# Patient Record
Sex: MALE | Race: WHITE | NOT HISPANIC OR LATINO | Employment: FULL TIME | URBAN - METROPOLITAN AREA
[De-identification: names, ages, dates, MRNs, and addresses within clinical notes are randomized per-mention and may not be internally consistent; named-entity substitution may affect disease eponyms.]

---

## 2020-02-24 ENCOUNTER — TRANSFERRED RECORDS (OUTPATIENT)
Dept: HEALTH INFORMATION MANAGEMENT | Facility: CLINIC | Age: 35
End: 2020-02-24

## 2020-04-13 ENCOUNTER — TRANSFERRED RECORDS (OUTPATIENT)
Dept: HEALTH INFORMATION MANAGEMENT | Facility: CLINIC | Age: 35
End: 2020-04-13

## 2020-04-16 ENCOUNTER — REFERRAL (OUTPATIENT)
Dept: TRANSPLANT | Facility: CLINIC | Age: 35
End: 2020-04-16

## 2020-04-16 DIAGNOSIS — Z87.891 HISTORY OF TOBACCO USE: ICD-10-CM

## 2020-04-16 DIAGNOSIS — Z01.818 PRE-TRANSPLANT EVALUATION FOR KIDNEY TRANSPLANT: ICD-10-CM

## 2020-04-16 DIAGNOSIS — Z76.82 ORGAN TRANSPLANT CANDIDATE: ICD-10-CM

## 2020-04-16 DIAGNOSIS — I42.9 CARDIOMYOPATHY (H): ICD-10-CM

## 2020-04-16 DIAGNOSIS — Q60.0 SOLITARY KIDNEY, CONGENITAL: ICD-10-CM

## 2020-04-16 DIAGNOSIS — N18.6 END STAGE RENAL DISEASE (H): ICD-10-CM

## 2020-04-16 DIAGNOSIS — I25.10 CARDIOVASCULAR DISEASE: ICD-10-CM

## 2020-04-16 DIAGNOSIS — I10 ESSENTIAL HYPERTENSION: ICD-10-CM

## 2020-04-16 DIAGNOSIS — Z99.2 ESRD (END STAGE RENAL DISEASE) ON DIALYSIS (H): ICD-10-CM

## 2020-04-16 DIAGNOSIS — N18.6 ESRD (END STAGE RENAL DISEASE) (H): Primary | ICD-10-CM

## 2020-04-16 DIAGNOSIS — N18.6 ESRD (END STAGE RENAL DISEASE) ON DIALYSIS (H): ICD-10-CM

## 2020-04-16 DIAGNOSIS — I10 HYPERTENSION: ICD-10-CM

## 2020-04-16 NOTE — LETTER
April 28, 2020      Chip Fowler  [address]  [address]  [facility] [address]    Dear Chip,    Thank you for your interest in the Transplant Center at [facility], [facility]. We look forward to being a part of your care team and assisting you through the transplant process.    As we discussed, your transplant coordinator is Naima Johns (Kidney).  You may call your coordinator at any time with questions or concerns.  Your first scheduled call will be on May 5, 2020.  If this needs to change, call [phone].    Please complete the following.    1. Fill out and return the enclosed forms    Authorization for Electronic Communication    Authorization to Discuss Protected Health Information    Authorization for Release of Protected Health Information    2. Sign up for:    [employer], access to your electronic medical record (see enclosed pamphlet)    [email or web address], a transplant education website    You can use these tools to learn more about your transplant, communicate with your care team, and track your medical details      Sincerely,    Solid Organ Transplant  [facility], [facility]    cc: Referring Physician

## 2020-04-17 VITALS — WEIGHT: 154 LBS | BODY MASS INDEX: 20.86 KG/M2 | HEIGHT: 72 IN

## 2020-04-17 PROBLEM — Q60.0 UNILATERAL CONGENITAL ABSENCE OF KIDNEY: Status: ACTIVE | Noted: 2020-02-24

## 2020-04-17 SDOH — HEALTH STABILITY: MENTAL HEALTH: HOW OFTEN DO YOU HAVE A DRINK CONTAINING ALCOHOL?: NOT ASKED

## 2020-04-17 ASSESSMENT — MIFFLIN-ST. JEOR: SCORE: 1666.54

## 2020-04-17 NOTE — TELEPHONE ENCOUNTER
PCP: no PCP   Referring Provider: Les Jaime  Referring Diagnosis: ESRD  Patient was born with only 1 kidney  Hx htn    Is patient under the age of 65? y  Is patient diabetic? n  Is patient on insulin? n  Was patient offered a pancreas transplant referral? n    Is patient in a group home/assisted living? n  Does patient have a guardian? n    Referral intake process completed.  Patient is aware that after financial approval is received, medical records will be requested.   Patient confirmed for a callback from transplant coordinator on May 5, 2020. (within 2 weeks)  Tentative evaluation date June 18, 2020. (within 4 weeks)    Confirmed coordinator will discuss evaluation process in more detail at the time of their call.   Patient is aware of the need to arrange age appropriate cancer screening, vaccinations, and dental care.  Reminded patient to complete questionnaire, complete medical records release, and review packet prior to evaluation visit .  Assessed patient for special needs (ie--wheelchair, assistance, guardian, and ):  no   Patient instructed to call [phone] with questions.

## 2020-04-30 ENCOUNTER — DOCUMENTATION ONLY (OUTPATIENT)
Dept: TRANSPLANT | Facility: CLINIC | Age: 35
End: 2020-04-30

## 2020-05-04 ENCOUNTER — DOCUMENTATION ONLY (OUTPATIENT)
Dept: TRANSPLANT | Facility: CLINIC | Age: 35
End: 2020-05-04

## 2020-05-04 NOTE — TELEPHONE ENCOUNTER
"Contacted patient and introduced myself as their Transplant Coordinator, also introduced the role of the Transplant Coordinator in the transplant process.  Explained the purpose of this call including reviewing next steps and answering questions.    Confirmed Referring Provider, Dialysis Center, and Primary Care Physician. Notified patient of the importance of continued communication with referring providers and primary care physicians.    Reviewed components of transplant evaluation process including necessary appointments, tests, and procedures.    Answered questions for patient regarding evaluation, provided my name and contact information and requested they call with any additional questions.    Determined that patient would like additional information regarding transplant by:     Drop Down choices: Mail, Email, MyChart, Phone Call   Encourage MyChart   Notified patients that they will hear from a Transplant  to schedule evaluation.       Reviewed medical records and interviewed the patient. ESRD on HD since 2/29/2020. He has a congenital solitary kidney. History of multiple bouts of kidney, bladder, urethral stones from 2014. Stones were calcium oxalate and calcium phosphate. He has had several cystoscopy and holmium laser and urethral dilations. He developed cardiomyopathy with his renal failure with an EF of 15%. There has been discussion of an AICD and a cardiac MRI. He will have a follow up cardiology visit on 6/4/2020 at [facility]. When I called him he had just walked into the dialysis unit. He was febrile to 100 and had chills. He was immediately isolated. He does not recall an exposure to COVID. Has never received blood. Smokes, no etoh and former history of meth. He has been sober for \"months\" and has never been through any treatment. There is discussion his renal failure and cardiac decompensation could be related to the illegal drug use. BMI 22.8. He lives with his girlfriend and she " will be able to assist him. He is independent in his ADL's. There are no living donors and he is quite resistant to the idea of a living donor despite being told the average wait time for a  donor is 5 years. He is due for dental. Records acceptable to proceed with pre kidney evaluation.     We talked about the virtual visits due to the pandemic and he is willing to start the process. We talked about the online MTP videos he will watch and then I will call him to discuss the day before his virtual visits. Reviewed the list of providers he will see and their roles. Reviewed the overall goals of an evaluation and the approval process. He is aware his next contact will be from scheduling. Provided my contact information and encouraged him to call with any questions.    Smart set orders placed in [place] and routed to scheduling.

## 2020-05-07 NOTE — TELEPHONE ENCOUNTER
Patient called back confirmed for Thurs June 18 for kidney eval, he doesn't have virtual capability.  I told him that I would be calling him back about 10 days before hand to verity live or virtual

## 2020-05-13 ENCOUNTER — DOCUMENTATION ONLY (OUTPATIENT)
Dept: TRANSPLANT | Facility: CLINIC | Age: 35
End: 2020-05-13

## 2020-06-17 ENCOUNTER — ALLIED HEALTH/NURSE VISIT (OUTPATIENT)
Dept: TRANSPLANT | Facility: CLINIC | Age: 35
End: 2020-06-17

## 2020-06-17 DIAGNOSIS — Z76.82 AWAITING ORGAN TRANSPLANT: Primary | ICD-10-CM

## 2020-06-17 NOTE — PROGRESS NOTES
"Kidney Transplant Referral - 4/16/2020  Chip Fowler watched the pre-transplant patient education videos at home today due to the pandemic.    Content reviewed:    Living Donation and how to access that program    Paired exchange    Kidney Donor Profile Index (KDPI)    Waiting list issues (right to decline without penalty, high PHS risk donors, what to expect when called with an offer)    Hospital experience,  length of stay , need to stay locally post-discharge (2-4 weeks)    Surgical options (with pictures)                             Post-surgery lifting and driving restrictions    Post-transplant routines, frequency of lab work and clinic visits    Need to stay locally post-discharge (2-4 weeks)    Role of Transplant Coordinator    Participants were informed of the benefits of transplant as well as potential risks such as infection, cancer, and death.  The need for total adherence with immunosuppression medications and following transplant regimens was stressed.  The overall evaluation/approval/listing process was reviewed.        The patient was provided with the following documents:  What You Need to Know About a Kidney Transplant  Adult Kidney Transplant - A Guide for Patients  SRTR Data Sheet - Kidney  Brochure - Kidney Allocation  Brochure - Multiple Listing and Waiting Time Transfer  What Every Patient Needs to Know (UNOS)  UNOS Facts and Figures  Finding a Donor  My Transplant Place - Quick Start Guide  KDPI Consent  Receipt of Information form    Chip Fowler signed the  Receipt of Information for Organ Transplant Recipient.\" He was provided Naima Johns's business card and instructed to call with additional questions.          "

## 2020-06-17 NOTE — PROGRESS NOTES
Assessment and Plan:  # Kidney Transplant Evaluation: Patient is a good candidate overall. Benefits of a living donor transplant were discussed.    # ESKD: multifactorial in setting of congenital solitary kidney, urologic history, and substance abuse. Doing OK on dialysis since February 2020, but may benefit from a kidney transplant.     # Urethral Strictures/Stones: 2/2 retained metal foreign bodies (see HPI for detail). Will refer back to urology as last seen several years ago with no interim follow up.    # Polysubstance Abuse: patient reports last methamphetamine use approximately 6 months ago. Appreciate social work input.    # Cardiac Risk: followed by local cardiology for new cardiomyopathy with EF of 15% discovered during February 2020 admission. EF now improved to 45% as of June 2020 but still with diffuse hypokinesis. He has not yet had further stress testing, angiogram, MRI, etc for work up of cardiomyopathy. He will need risk assessment.     # Abnormal Renal Imaging: renal US from February 2020 showed a 1.7 cm hypoechoic area at the superior pole of the left kidney that either represents a lesion vs normal pyramid. Will likely need further imaging, based on radiologic recommendations, and will have urology comment.    # Health Maintenance: Dental: should be done if not updated in the past 6-12 months.     Discussed the risks and benefits of a transplant, including the risk of surgery and immunosuppression medications.  Patient's overall evaluation will be discussed in the Transplant Program's regular meeting with a final recommendation on the patients suitability for transplant to be made at that time.  Patient was seen in conjunction with Dr. Leeroy Rahman as part of a shared visit.    Evaluation:  Chip Fowler was seen in consultation at the request of Dr. Sathya Sinha for evaluation as a potential kidney transplant recipient.    Reason for Visit:  Chip Fowler is a 35-year-old male with ESKD who  presents for kidney transplant evaluation.    History of Present Illness:         Kidney Disease Hx: Congenital left solitary kidney. 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 Was lost to follow up and then admitted February 2020 with worsening kidney function requiring initiation of dialysis and cardiomyopathy with EF 15%. Had used methamphetamine 10 days PTA. Creatinine had been normal in 2016. He underwent a native kidney biopsy, but the tissue sample was insufficient. Serologic work up was negative.        Kidney Disease Dx: multifactorial in setting of congenital solitary kidney, urologic history, and substance abuse       Biopsy Proven: No         On Dialysis: Yes, Date initiated: February 2020 and Dialysis Type: Incenter HD, but recently had PD catheter placed       Primary Nephrologist: Dr. Jaime         Diabetic Hx: None           Cardiac/Vascular Disease Risk Factors:        See above. Now followed by cardiology as outpatient with medical management, no stress or angio/mri yet. 6/2/2020 ECHO EF 45%, diffuse hypokinesis, no significant valvular abnormalities.          Functional Capacity/Frailty:        No significant limitations currently. No chest pain, SOB, or claudication symptoms.       Fatigue/Decreased Energy: [] No [] Yes    Chest Pain or SOB with Exertion: [] No [] Yes    Significant Weight Change: [] No [] Yes    Nausea, Vomiting or  Diarrhea: [] No [] Yes    Fever, Sweats or Chills:  [] No [] Yes    Leg Swelling [] No [] Yes        History of Cancer: None    Other Significant Medical Issues: None    Review of Systems:  A comprehensive review of systems was obtained and negative, except as noted in the HPI or PMH.    Past Medical History:   Medical record was reviewed and PMH was discussed with patient and noted below.  Past Medical History:   Diagnosis Date     Bladder stones      Cardiomyopathy (H)      ESRD (end stage renal disease) on dialysis (H)      Hypertension      Migraines      Polysubstance abuse (H)      Solitary kidney, congenital      Urethral stone      Urethral stricture        Past Social History:   Past Surgical History:   Procedure Laterality Date     BIOPSY  02/2020    renal, [place]     COMBINED CYSTOSCOPY, LASER HOLMIUM LITHOTRIPSY URETER(S)       CYSTOSCOPY       HERNIA REPAIR      infant     INSERT CATHETER PERITONEAL DIALYSIS       urethral dilation       Personal history of bleeding or anesthesia problems: No    Family History:  Family History   Problem Relation Age of Onset     Kidney Disease No family hx of        Personal History:   Social History     Socioeconomic History     Marital status: Single     Spouse name: Not on file     Number of children: Not on file     Years of education: Not on file     Highest education level: Not on file   Occupational History     Not on file   Social Needs     Financial resource strain: Not on file     Food insecurity     Worry: Not on file     Inability: Not on file     Transportation needs     Medical: Not on file     Non-medical: Not on file   Tobacco Use     Smoking status: Current Every Day Smoker     Packs/day: 0.40     Types: Cigarettes     Start date: 2002     Smokeless tobacco: Never Used     Tobacco comment: 4-8 cigs /day   Substance and Sexual Activity     Alcohol use: Not Currently     Comment: quit alcohol 3-4 yrs ago     Drug use: Not Currently     Types:  Methamphetamines     Sexual activity: Not on file   Lifestyle     Physical activity     Days per week: Not on file     Minutes per session: Not on file     Stress: Not on file   Relationships     Social connections     Talks on phone: Not on file     Gets together: Not on file     Attends Judaism service: Not on file     Active member of club or organization: Not on file     Attends meetings of clubs or organizations: Not on file     Relationship status: Not on file     Intimate partner violence     Fear of current or ex partner: Not on file     Emotionally abused: Not on file     Physically abused: Not on file     Forced sexual activity: Not on file   Other Topics Concern     Parent/sibling w/ CABG, MI or angioplasty before 65F 55M? Not Asked   Social History Narrative     Not on file       Allergies:  No Known Allergies    Medications:  Current Outpatient Medications   Medication Sig     carvedilol (COREG) 25 MG tablet Take 25 mg by mouth     furosemide (LASIX) 40 MG tablet Lasix 40 mg daily on non dialysis days     lisinopril (ZESTRIL) 2.5 MG tablet Take 2.5 mg by mouth     No current facility-administered medications for this visit.      Exam:  GENERAL: Healthy, alert and no distress  EYES: Eyes grossly normal to inspection.  No discharge or erythema, or obvious scleral/conjunctival abnormalities.  RESP: No audible wheeze, cough, or visible cyanosis.  No visible retractions or increased work of breathing.    SKIN: Visible skin clear. No significant rash, abnormal pigmentation or lesions.  NEURO: Cranial nerves grossly intact.  Mentation and speech appropriate for age.  PSYCH: Mentation appears normal, affect normal/bright, judgement and insight intact, normal speech and appearance well-groomed.

## 2020-06-18 ENCOUNTER — APPOINTMENT (OUTPATIENT)
Dept: TRANSPLANT | Facility: CLINIC | Age: 35
End: 2020-06-18
Attending: PHYSICIAN ASSISTANT
Payer: COMMERCIAL

## 2020-06-18 ENCOUNTER — DOCUMENTATION ONLY (OUTPATIENT)
Dept: TRANSPLANT | Facility: CLINIC | Age: 35
End: 2020-06-18

## 2020-06-18 VITALS — WEIGHT: 154 LBS | HEIGHT: 72 IN | BODY MASS INDEX: 20.86 KG/M2

## 2020-06-18 DIAGNOSIS — N21.1 URETHRAL STONE: ICD-10-CM

## 2020-06-18 DIAGNOSIS — Z76.82 AWAITING ORGAN TRANSPLANT: ICD-10-CM

## 2020-06-18 DIAGNOSIS — F19.10 POLYSUBSTANCE ABUSE (H): ICD-10-CM

## 2020-06-18 DIAGNOSIS — Z76.82 ORGAN TRANSPLANT CANDIDATE: Primary | ICD-10-CM

## 2020-06-18 DIAGNOSIS — N18.6 ESRD (END STAGE RENAL DISEASE) (H): Primary | ICD-10-CM

## 2020-06-18 DIAGNOSIS — N21.0 BLADDER STONES: ICD-10-CM

## 2020-06-18 DIAGNOSIS — Z01.818 PRE-TRANSPLANT EVALUATION FOR KIDNEY TRANSPLANT: ICD-10-CM

## 2020-06-18 RX ORDER — LISINOPRIL 2.5 MG/1
2.5 TABLET ORAL EVERY EVENING
COMMUNITY
Start: 2020-06-02 | End: 2021-08-10

## 2020-06-18 RX ORDER — CARVEDILOL 25 MG/1
6.25 TABLET ORAL 2 TIMES DAILY WITH MEALS
COMMUNITY
Start: 2020-03-20 | End: 2021-07-20

## 2020-06-18 RX ORDER — FUROSEMIDE 40 MG
80 TABLET ORAL 2 TIMES DAILY
Status: ON HOLD | COMMUNITY
Start: 2020-03-20 | End: 2023-01-18

## 2020-06-18 ASSESSMENT — MIFFLIN-ST. JEOR: SCORE: 1666.54

## 2020-06-18 NOTE — PROGRESS NOTES
"Chip Fowler is a 35 year old male who is being evaluated via a billable telephone visit.      The patient has been notified of following:     \"This telephone visit will be conducted via a call between you and your physician/provider. We have found that certain health care needs can be provided without the need for a physical exam.  This service lets us provide the care you need with a short phone conversation.  If a prescription is necessary we can send it directly to your pharmacy.  If lab work is needed we can place an order for that and you can then stop by our lab to have the test done at a later time.    Telephone visits are billed at different rates depending on your insurance coverage. During this emergency period, for some insurers they may be billed the same as an in-person visit.  Please reach out to your insurance provider with any questions.    If during the course of the call the physician/provider feels a telephone visit is not appropriate, you will not be charged for this service.\"    Patient has given verbal consent for Telephone visit?  yes    Phone call duration: 15 minutes    Outpatient MNT: Kidney Transplant Evaluation    Current BMI: 20.9 (HT 72 in,  lbs/70 kg)- data from 4/17   BMI is within recommendation of <35 for kidney transplant     Time Spent: 15 minutes  Visit Type: Initial  Referring Physician: Bharath  Pt accompanied by: self    Medical dx associated with RD referral  - ESRD    History of previous txp: none   Dialysis: yes    Dialysis Modality: HD  Days/Times: MWF 2-530  Dialysis Start: 3/2020   Pt receives protein supplement with dialysis: N    Nutrition Assessment  Appetite: good/baseline     Vitamins, Supplements, Pertinent Meds: none   Herbal Medicines/Supplements: none     Diet Recall  Breakfast Egg white s/w    Lunch Burger or salad with chicken    Dinner Lean Cuisine    Snacks Oatmeal or grapes    Beverages Water, some cranberry or grape juice    Alcohol None    Dining out " 1x/month      Physical Activity  Walking      Anthropometrics  Height:   72 in   BMI:    20.9    Weight Status:Normal BMI   Weight:  154 lbs (4/17)            IBW (lb): 178  % IBW: 87    Wt Hx: Pt reports weight overall stable w/ exception of diuresis when he started dialysis.     Adj/dosing BW: 154 lbs/70 kg       Labs  K 3.8 (3/3)  No recent Phos on file, yet pt reports slightly high lately     Malnutrition  % Intake: No decreased intake noted  % Weight Loss: None noted  Subcutaneous Fat Loss: None  Muscle Loss: None  Fluid Accumulation/Edema: None noted  Malnutrition Diagnosis: Patient does not meet two of the above criteria necessary for diagnosing malnutrition     Estimated Nutrition Needs  Energy  2996-0760     (25-30 kcal/kg for maintenance)     Protein  84-98    (1.2-1.4 g/kg for HD/PD needs)         Fluid  1 ml/kcal or per MD   Micronutrient   Na+: <2000 mg/day  K+: 0220-7975 mg/day  Phos: 800-1000 mg/day            Nutrition Diagnosis  Food and nutrition related knowledge deficit r/t pre kidney transplant eval AEB pt verbalized not hearing pre/post transplant diet guidelines.    Nutrition Intervention  Nutrition education provided:  Discussed sodium intake (low sodium foods and drinks, seasoning food without salt and tips for low sodium diet). Reviewed other components of dialysis diet (K, Phos, protein needs).     Reviewed post txp diet guidelines in brief (will review in further detail post txp):  (1) Review of proper food safety measures d/t immunosuppressant therapy post-op and increased risk for food-borne illness    (2) Avoid the following post txp d/t risk for rejection, unknown effects on the organs, and/or potential interactions with immunosuppressants:  - Herbal, Chinese, holistic, chiropractic, natural, alternative medicines and supplements  - Detoxes and cleanses  - Weight loss pills  - Protein powders or other products with extracts or herbs (ie green tea extract)    (3) Med regimen and  possible side effects    Patient Understanding: Pt verbalized understanding of education provided.  Expected Compliance: Good  Follow-Up Plans: PRN     Nutrition Goals  1. Limit Na+ <2000mg/day  2. Pt to verbalize understanding of 3 aspects of post txp education provided    Provided pt with contact info.   Yola Cochran, RD, LD, CCTD  [facility] [phone]

## 2020-06-18 NOTE — LETTER
6/18/2020         RE: Chip Fowler  [address]  [address]  [facility] [address]        Dear Colleague,    Thank you for referring your patient, Chip Fowler, to the [facility]. Please see a copy of my visit note below.    Psychosocial Assessment For Kidney Transplantation  Patient Name/ Age: Chip Fowler 35 year old   Medical Record #: [id number]  Duration of Interview:  30 min  Process:   Telephone Interview                (counseling < 50%)   Present on telephone: Chip        : SHAUN Whitlock [facility] Date:  June 18, 2020        Type of transplant: Kidney    Donor type:      Cadaver   Prior Transplants:    No Status of Transplant: n/a           Current Living Situation    Location:   [address]    [facility] [address]  With Whom: lives alone       Family/ Social Support:    Chip reported he does not have any children. He has two sisters Jelly and Muriel. Both live in [place], [place]. His mother and stepfather live in [place], [place]. He reported his mother has Alzheimer's.    available, helpful   Committed Relationship: Chip reported he has been in a relationship with his girlfriend Iraida for 9 years.      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 He reported he recently applied for SSDI. He reported he applied due to his kidney disease and he believes his heart failure.      Income   Salary/wages   Insurance      At this time, patient can afford medication costs:  Yes  MA through [facility]       [facility]    Current Mode of Treatment for ESRD Dialysis since  "2/2020   Complications None       Behavioral    Tobacco Use Yes  Chemical Dependency Yes , history    Chip reported he smokes about 1/2 pack of cigarettes a day. He reported he is thinking about quitting.    Chip reported he has not drank alcohol in \"many years\". When asked why, he reported because \"I got bored with it\". Chip reported he has a history of daily methamphetamine use. He reported he stopped using methamphetamine 6 months ago due to his health. He denied any other current or history of substance use. Chip reported he is currently attending AA and is going to be getting a chemical dependency evaluation, which is a requirement for him to get his drivers license reinstated. Informed Chip it is this writer's recommendation that he remain sober and he have a chemical dependency evaluation and comply with any recommendations. He reported he would provide the assessment to the transplant team once completed.      Psychiatric Impairment No  Chip denied any current or history of anxiety, depression, or other mental health concerns. He did note that large groups can make him nervous at times.     Reading Ability: Good  Education Level: High School Recent Legal History Yes   Chip has a history of DWI's. He reported his last DWI was 5 years go. He currently does not have a drivers license because of this but is working on getting it reinstated.     Coping Style/Strategies: Deep breathing        Ability to Adhere to Complex Medical Regime: Yes     Adherence History: Chip reported he follows his physician's recommendations, takes his medications as prescribed, and attends his appointments. Chip reported he recently started taking medications due to his health issues. He discussed some of the difficulty he has had coming up with a system to take his medications around the same time (stated he does take his medications daily). Discussed using a medication reminder maría elena on his cell phone and a pill box.       "   Education  _X_ Medicare  _X_ Rehabilitation  _X_ Donor issues  _X_ Community resources  _X_ Post discharge housing  _X_ Financial resources  _X_ Medical insurance options  _X_ Psych adjustment  _X_ Family adjustment  _X_ Health Care Directive Yes, Will Provide- Chip reported his sister Jelly Sunshine is his identified health care agent.    Psychosocial Risks of Transplant Reviewed and Discussed:  _X_ Increased stress related to emotional,            family, social, employment or financial           situation  _X_ Effect on work and/or disability benefits  _X_ Effect on future health and life           insurance  _X_ Transplant outcome expectations may           not be met  _X_ Mental Health Risks: anxiety,           depression, PTSD, guilt, grief and           chronic fatigue     Notable Items:   Chip has a history of daily methamphetamine use. He reported he stopped using methamphetamine 6 months ago due to his health. He denied any other current or history of substance use. Chip reported he is currently attending AA and is going to be getting a chemical dependency evaluation, which is a requirement for him to get his drivers license reinstated. Informed Chip it is this writer's recommendation that he remain sober and have a chemical dependency evaluation and comply with any recommendations. He reported he would provide the assessment to the transplant team once completed.       Final Evaluation/Assessment   Patient seemed to process information well. Appeared well informed, motivated and able to follow post transplant requirements. Behavior was appropriate during interview. Has adequate income and insurance coverage. Adequate social support. Recent methamphetamine use.  At this time patient appears to understand the risks and benefits of transplant.      Recommendation  Conditional- Recommend pt complete chemical dependency evaluation and follow through with any recommendations   Selection Criteria Met:  Plan  for support Yes   Chemical Dependence No  Smoking No  Mental Health Yes   Adequate Finances Yes    Signature: SHAUN Whitlock [facility]   Title: Clinical        Again, thank you for allowing me to participate in the care of your patient.        Sincerely,        SHAUN Wright

## 2020-06-18 NOTE — PROGRESS NOTES
Patient was seen by myself, Dr. Leeroy Rahman, in conjunction with Betzaida Reno, as part of a shared visit.    I personally reviewed past medical and surgical history, vital signs, medications and labs.  Present and past medical history, along with significant physical exam findings were all reviewed with DK.    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 Now up to 45% EF. He had JACK during this that necessitated HD initiation.    Now getting PD catheter and initiation.    No cp, sob, no n/v/d, no f/s/c.    Key management decisions made by me and discussed with DK:  1. Kidney transplant evaluation - patient is a fair candidate overall. Benefits of a living donor transplant were discussed. As on dialysis, will complete workup as below as then be listed actively. Can have donors come forward.    2. 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 Hx of methamphetamine use: will need CD eval and sobriety  4. Hx of systolic heart failure: will have cardiology assessment given reduced EF  5. Hx of urethral strictures: follow up with urology  6. Need for dental clearance    The combined time for this visit between the advanced practice provider and myself was 45 minutes of which > 50% of time was spent counseling regarding the options for replacing kidney function. All questions were answered.      Approximately 31 minutes of non face-to-face time were spent in review of the patient's medical record to date.  This included review of previous: clinic visits, hospital records, lab results, imaging studies, and procedural documentation.  The findings from  this review are summarized in the above note.

## 2020-06-18 NOTE — PROGRESS NOTES
"Chip Fowler is a 35 year old male who is being evaluated via a billable video visit.      The patient has been notified of following:     \"This video visit will be conducted via a call between you and your physician/provider. We have found that certain health care needs can be provided without the need for an in-person physical exam.  This service lets us provide the care you need with a video conversation.  If a prescription is necessary we can send it directly to your pharmacy.  If lab work is needed we can place an order for that and you can then stop by our lab to have the test done at a later time.    Video visits are billed at different rates depending on your insurance coverage.  Please reach out to your insurance provider with any questions.    If during the course of the call the physician/provider feels a video visit is not appropriate, you will not be charged for this service.\"    Patient has given verbal consent for Video visit? Yes    Will anyone else be joining your video visit? No        Video-Visit Details    Type of service:  Video Visit    Video Start Time: 0920  Video End Time: 10:07 AM    Originating Location (pt. Location): Home    Distant Location (provider location):  [facility]     Platform used for Video Visit: Osiris Rahman MD      "

## 2020-06-18 NOTE — LETTER
"    6/18/2020         RE: Chip Fowler  [address]  [address]  [place] [facility] [address]        Dear Colleague,    Thank you for referring your patient, Chip Fowler, to the [facility]. Please see a copy of my visit note below.    Chip Fowler is a 35 year old male who is being evaluated via a billable telephone visit.      The patient has been notified of following:     \"This telephone visit will be conducted via a call between you and your physician/provider. We have found that certain health care needs can be provided without the need for a physical exam.  This service lets us provide the care you need with a short phone conversation.  If a prescription is necessary we can send it directly to your pharmacy.  If lab work is needed we can place an order for that and you can then stop by our lab to have the test done at a later time.    Telephone visits are billed at different rates depending on your insurance coverage. During this emergency period, for some insurers they may be billed the same as an in-person visit.  Please reach out to your insurance provider with any questions.    If during the course of the call the physician/provider feels a telephone visit is not appropriate, you will not be charged for this service.\"    Patient has given verbal consent for Telephone visit?  yes    Phone call duration: 15 minutes    Outpatient MNT: Kidney Transplant Evaluation    Current BMI: 20.9 (HT 72 in,  lbs/70 kg)- data from 4/17   BMI is within recommendation of <35 for kidney transplant     Time Spent: 15 minutes  Visit Type: Initial  Referring Physician: Bharath  Pt accompanied by: self    Medical dx associated with RD referral  - ESRD    History of previous txp: none   Dialysis: yes    Dialysis Modality: HD  Days/Times: MWF 2-530  Dialysis Start: 3/2020   Pt receives protein supplement with dialysis: N    Nutrition Assessment  Appetite: good/baseline     Vitamins, Supplements, Pertinent " Meds: none   Herbal Medicines/Supplements: none     Diet Recall  Breakfast Egg white s/w    Lunch Burger or salad with chicken    Dinner Lean Cuisine    Snacks Oatmeal or grapes    Beverages Water, some cranberry or grape juice    Alcohol None    Dining out 1x/month      Physical Activity  Walking      Anthropometrics  Height:   72 in   BMI:    20.9    Weight Status:Normal BMI   Weight:  154 lbs (4/17)            IBW (lb): 178  % IBW: 87    Wt Hx: Pt reports weight overall stable w/ exception of diuresis when he started dialysis.     Adj/dosing BW: 154 lbs/70 kg       Labs  K 3.8 (3/3)  No recent Phos on file, yet pt reports slightly high lately     Malnutrition  % Intake: No decreased intake noted  % Weight Loss: None noted  Subcutaneous Fat Loss: None  Muscle Loss: None  Fluid Accumulation/Edema: None noted  Malnutrition Diagnosis: Patient does not meet two of the above criteria necessary for diagnosing malnutrition     Estimated Nutrition Needs  Energy  9872-2329     (25-30 kcal/kg for maintenance)     Protein  84-98    (1.2-1.4 g/kg for HD/PD needs)         Fluid  1 ml/kcal or per MD   Micronutrient   Na+: <2000 mg/day  K+: 9149-6651 mg/day  Phos: 800-1000 mg/day            Nutrition Diagnosis  Food and nutrition related knowledge deficit r/t pre kidney transplant eval AEB pt verbalized not hearing pre/post transplant diet guidelines.    Nutrition Intervention  Nutrition education provided:  Discussed sodium intake (low sodium foods and drinks, seasoning food without salt and tips for low sodium diet). Reviewed other components of dialysis diet (K, Phos, protein needs).     Reviewed post txp diet guidelines in brief (will review in further detail post txp):  (1) Review of proper food safety measures d/t immunosuppressant therapy post-op and increased risk for food-borne illness    (2) Avoid the following post txp d/t risk for rejection, unknown effects on the organs, and/or potential interactions with  immunosuppressants:  - Herbal, Chinese, holistic, chiropractic, natural, alternative medicines and supplements  - Detoxes and cleanses  - Weight loss pills  - Protein powders or other products with extracts or herbs (ie green tea extract)    (3) Med regimen and possible side effects    Patient Understanding: Pt verbalized understanding of education provided.  Expected Compliance: Good  Follow-Up Plans: PRN     Nutrition Goals  1. Limit Na+ <2000mg/day  2. Pt to verbalize understanding of 3 aspects of post txp education provided    Provided pt with contact info.   Yola Cochran RD, LD, CCTD  Pgr [phone]                          Yola Cochran RD

## 2020-06-18 NOTE — LETTER
6/18/2020         RE: Chip Fowler  [address]  [address]  [facility] [address]        Dear Colleague,    Thank you for referring your patient, Chip Fowler, to the [facility]. Please see a copy of my visit note below.    Assessment and Plan:  # Kidney Transplant Evaluation: Patient is a good candidate overall. Benefits of a living donor transplant were discussed.    # ESKD: multifactorial in setting of congenital solitary kidney, urologic history, and substance abuse. Doing OK on dialysis since February 2020, but may benefit from a kidney transplant.     # Urethral Strictures/Stones: 2/2 retained metal foreign bodies (see HPI for detail). Will refer back to urology as last seen several years ago with no interim follow up.    # Polysubstance Abuse: patient reports last methamphetamine use approximately 6 months ago. Appreciate social work input.    # Cardiac Risk: followed by local cardiology for new cardiomyopathy with EF of 15% discovered during February 2020 admission. EF now improved to 45% as of June 2020 but still with diffuse hypokinesis. He has not yet had further stress testing, angiogram, MRI, etc for work up of cardiomyopathy. He will need risk assessment.     # Abnormal Renal Imaging: renal US from February 2020 showed a 1.7 cm hypoechoic area at the superior pole of the left kidney that either represents a lesion vs normal pyramid. Will likely need further imaging, based on radiologic recommendations, and will have urology comment.    # Health Maintenance: Dental: should be done if not updated in the past 6-12 months.     Discussed the risks and benefits of a transplant, including the risk of surgery and immunosuppression medications.  Patient's overall evaluation will be discussed in the Transplant Program's regular meeting with a final recommendation on the patients suitability for transplant to be made at that time.  Patient was seen in conjunction with Dr. Umaña  Keys as part of a shared visit.    Evaluation:  Chip Fowler was seen in consultation at the request of Dr. Sathya Sinha for evaluation as a potential kidney transplant recipient.    Reason for Visit:  Chip Fowler is a 35-year-old male with ESKD who presents for kidney transplant evaluation.    History of Present Illness:         Kidney Disease Hx: Congenital left solitary kidney. 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 Was lost to follow up and then admitted February 2020 with worsening kidney function requiring initiation of dialysis and cardiomyopathy with EF 15%. Had used methamphetamine 10 days PTA. Creatinine had been normal in 2016. He underwent a native kidney biopsy, but the tissue sample was insufficient. Serologic work up was negative.        Kidney Disease Dx: multifactorial in setting of congenital solitary kidney, urologic history, and substance abuse       Biopsy Proven: No         On Dialysis: Yes, Date initiated: February 2020 and Dialysis Type: [facility] HD, but recently had PD catheter placed       Primary Nephrologist: Dr. Jaime         Diabetic Hx: None           Cardiac/Vascular Disease Risk Factors:        See above. Now followed by cardiology as outpatient with medical management, no stress or angio/mri yet. 6/2/2020 ECHO EF 45%, diffuse hypokinesis, no significant valvular abnormalities.          Functional Capacity/Frailty:         No significant limitations currently. No chest pain, SOB, or claudication symptoms.       Fatigue/Decreased Energy: [] No [] Yes    Chest Pain or SOB with Exertion: [] No [] Yes    Significant Weight Change: [] No [] Yes    Nausea, Vomiting or Diarrhea: [] No [] Yes    Fever, Sweats or Chills:  [] No [] Yes    Leg Swelling [] No [] Yes        History of Cancer: None    Other Significant Medical Issues: None    Review of Systems:  A comprehensive review of systems was obtained and negative, except as noted in the HPI or PMH.    Past Medical History:   Medical record was reviewed and PMH was discussed with patient and noted below.  Past Medical History:   Diagnosis Date     Bladder stones      Cardiomyopathy (H)      ESRD (end stage renal disease) on dialysis (H)      Hypertension      Migraines      Polysubstance abuse (H)      Solitary kidney, congenital      Urethral stone      Urethral stricture        Past Social History:   Past Surgical History:   Procedure Laterality Date     BIOPSY  02/2020    renal, Religious     COMBINED CYSTOSCOPY, LASER HOLMIUM LITHOTRIPSY URETER(S)       CYSTOSCOPY       HERNIA REPAIR      infant     INSERT CATHETER PERITONEAL DIALYSIS       urethral dilation       Personal history of bleeding or anesthesia problems: No    Family History:  Family History   Problem Relation Age of Onset     Kidney Disease No family hx of        Personal History:   Social History     Socioeconomic History     Marital status: Single     Spouse name: Not on file     Number of children: Not on file     Years of education: Not on file     Highest education level: Not on file   Occupational History     Not on file   Social Needs     Financial resource strain: Not on file     Food insecurity     Worry: Not on file     Inability: Not on file     Transportation needs     Medical: Not on file     Non-medical: Not on file   Tobacco Use     Smoking status: Current Every Day Smoker     Packs/day: 0.40     Types:  Cigarettes     Start date: 2002     Smokeless tobacco: Never Used     Tobacco comment: 4-8 cigs /day   Substance and Sexual Activity     Alcohol use: Not Currently     Comment: quit alcohol 3-4 yrs ago     Drug use: Not Currently     Types: Methamphetamines     Sexual activity: Not on file   Lifestyle     Physical activity     Days per week: Not on file     Minutes per session: Not on file     Stress: Not on file   Relationships     Social connections     Talks on phone: Not on file     Gets together: Not on file     Attends Scientologist service: Not on file     Active member of club or organization: Not on file     Attends meetings of clubs or organizations: Not on file     Relationship status: Not on file     Intimate partner violence     Fear of current or ex partner: Not on file     Emotionally abused: Not on file     Physically abused: Not on file     Forced sexual activity: Not on file   Other Topics Concern     Parent/sibling w/ CABG, MI or angioplasty before 65F 55M? Not Asked   Social History Narrative     Not on file       Allergies:  No Known Allergies    Medications:  Current Outpatient Medications   Medication Sig     carvedilol (COREG) 25 MG tablet Take 25 mg by mouth     furosemide (LASIX) 40 MG tablet Lasix 40 mg daily on non dialysis days     lisinopril (ZESTRIL) 2.5 MG tablet Take 2.5 mg by mouth     No current facility-administered medications for this visit.      Exam:  GENERAL: Healthy, alert and no distress  EYES: Eyes grossly normal to inspection.  No discharge or erythema, or obvious scleral/conjunctival abnormalities.  RESP: No audible wheeze, cough, or visible cyanosis.  No visible retractions or increased work of breathing.    SKIN: Visible skin clear. No significant rash, abnormal pigmentation or lesions.  NEURO: Cranial nerves grossly intact.  Mentation and speech appropriate for age.  PSYCH: Mentation appears normal, affect normal/bright, judgement and insight intact, normal speech and  "appearance well-groomed.      Chip Fowler is a 35 year old male who is being evaluated via a billable video visit.      The patient has been notified of following:     \"This video visit will be conducted via a call between you and your physician/provider. We have found that certain health care needs can be provided without the need for an in-person physical exam.  This service lets us provide the care you need with a video conversation.  If a prescription is necessary we can send it directly to your pharmacy.  If lab work is needed we can place an order for that and you can then stop by our lab to have the test done at a later time.    Video visits are billed at different rates depending on your insurance coverage.  Please reach out to your insurance provider with any questions.    If during the course of the call the physician/provider feels a video visit is not appropriate, you will not be charged for this service.\"    Patient has given verbal consent for Video visit? Yes    Will anyone else be joining your video visit? No        Video-Visit Details    Type of service:  Video Visit    Video Start Time: 0920  Video End Time: 10:07 AM    Originating Location (pt. Location): Home    Distant Location (provider location):  [facility]     Platform used for Video Visit: [employer]    Leeroy Rahman MD          Patient was seen by myself, Dr. Leeroy Rahman, in conjunction with Betzaida Reno, as part of a shared visit.    I personally reviewed past medical and surgical history, vital signs, medications and labs.  Present and past medical history, along with significant physical exam findings were all reviewed with DK.    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    Patient with congenital abnormality of kidney and urogenital tract complicated by foreign body insertions and urethral strictures " from this leading to progressive kidney injury from [id number].    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 Now up to 45% EF. He had JACK during this that necessitated HD initiation.    Now getting PD catheter and initiation.    No cp, sob, no n/v/d, no f/s/c.    Key management decisions made by me and discussed with DK:  1. Kidney transplant evaluation - patient is a fair candidate overall. Benefits of a living donor transplant were discussed. As on dialysis, will complete workup as below as then be listed actively. Can have donors come forward.    2. 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 Hx of methamphetamine use: will need CD eval and sobriety  4. Hx of systolic heart failure: will have cardiology assessment given reduced EF  5. Hx of urethral strictures: follow up with urology  6. Need for dental clearance    The combined time for this visit between the advanced practice provider and myself was 45 minutes of which > 50% of time was spent counseling regarding the options for replacing kidney function. All questions were answered.      Approximately 31 minutes of non face-to-face time were spent in review of the patient's medical record to date.  This included review of previous: clinic visits, hospital records, lab results, imaging studies, and procedural documentation.  The findings from this review are summarized in the above note.    Again, thank you for allowing me to participate in the care of your patient.        Sincerely,        PRABHU

## 2020-06-18 NOTE — LETTER
"    6/18/2020         RE: Chip Fowler  [address]  [address]  [facility] [address]        Dear Colleague,    Thank you for referring your patient, Chip Fowler, to the [facility] TRANSPL[facility]. Please see a copy of my visit note below.    Chip Fowler is a 35 year old male who is being evaluated via a billable video visit.      The patient has been notified of following:     \"This video visit will be conducted via a call between you and your physician/provider. We have found that certain health care needs can be provided without the need for an in-person physical exam.  This service lets us provide the care you need with a video conversation.  If a prescription is necessary we can send it directly to your pharmacy.  If lab work is needed we can place an order for that and you can then stop by our lab to have the test done at a later time.    Video visits are billed at different rates depending on your insurance coverage.  Please reach out to your insurance provider with any questions.    If during the course of the call the physician/provider feels a video visit is not appropriate, you will not be charged for this service.\"    Patient has given verbal consent for Video visit? Yes    Will anyone else be joining your video visit? No        Video-Visit Details    Type of service:  Video Visit    Video Start Time: 9:40am  Video End Time: 10:05am    Originating Location (pt. Location): Home    Distant Location (provider location):  [facility]ANS[facility]     Platform used for Video Visit: [employer]    RE: Chip Fowler    [facility]# [id number]        I saw your patient, Chip Fowler, in consultation in our pretransplant clinic.  He presented via video call to discuss care for his end-stage renal disease.      We talked about the pros and cons of transplantation vs. dialysis.  We discussed the fact that it was important that he think about the pros and cons of each treatment option and make an active " decision.  We also discussed the fact that the two were interconnected and he may need to go on dialysis before transplant (if he chose to have a transplant) and that if the transplant failed, he might need dialysis before another transplant.       We also discussed the fact that if he chose to have a transplant, he would need to decide between going on the wait list for a  donor transplant vs. having a living donor transplant.  We talked about the pros and cons of each option.  Although I didn't express an opinion regarding transplantation or dialysis, I suggested that if he chose to have a transplant, a living donor transplant would be preferable in that the surgery is the same, the immunosuppressive drugs and the risks are the same, but the transplant could be done sooner and the results are better.  I told him that the wait for  donor kidney was approximately five years for patients who are newly put on the waiting list.  In addition, we talked about the fact that the disadvantage of a living donor transplant was the risk to the donor.       His sister is interested in donation but he is concerned about risks to her.  I described our donor evaluation process and how the donor risks would be repeatedly discussed with the donor candidate. Because he was interested in living donation, we spent some time discussing those risks, including the risks of mortality, morbidity, and long-term risks with a single kidney. We also discussed the fact that a living donor does not have to be a direct match and that if there was a donor who met the criteria but was not a match, there was an option of participating in the national paired exchange system.  I described how the system would work. I have asked our coordinator to send him our donor video to view and share at his leisure.     I also discussed the new ( donor) kidney (KDPI) scoring system with him.  We discussed the advantages and disadvantages of  "accepting an \"expanded criteria\" donor kidney, and the latest data as to who is potentially benefited by receiving an expanded criteria donor kidney versus waiting longer for a standard criteria donor. I recommended he say \"no\" to one of these kidneys.      Finally, we discussed his recent drug use and our concerns re: transplantation.  He is currently in AA and says he is committed to be drug-free going forward.      I attempted to answer any remaining questions.  I also told him that should he have any questions, he should feel free to contact us.  We would be glad to answer any questions either over the phone or at another clinic visit.  His transplant coordinator is Elisabeth Johns and may be reached at [phone].  Thank you for the opportunity to see him.     I spent 25 minutes with this patient.  Over 90% of that time was spent in counseling and coordination of care.             Yours truly,               Sathya Sihna MD         Professor of Surgery         ([phone])    AJ/st          Again, thank you for allowing me to participate in the care of your patient.        Sincerely,        PRABHU    "

## 2020-06-18 NOTE — PROGRESS NOTES
Kidney Transplant Evaluation - 6/18/2020    Summary    Team s concerns/comments: Called pt at the end of appointments today, spoke to him briefly and he asked I call him again later he was on his way to dialysis right now.  Called pt again a little while later and spoke with him. Pt stating he had a very good experience today with provider appts and learned a lot. I reviewed the below recommendations, pt responded that he already is working on getting some of these things done and wants to get a kidney transplant some day. Pt shared he has not yet watched MTP videos - instructed him to do so asap so Elisabeth can review this with him at her next call - pt stating he will do. Reviewed next steps of Selection Committee review and Elisabeth will call him afterwards with the outcome of this. Pt expressed very good understanding of all and was in good agreement with the plan.     Chemical Dependency evaluation   Cardiology evaluation   Urology follow up   Dental     Candidacy category: YELLOW     Action/Plan: Continue evaluation     Expected Selection Meeting Discussion: 06/24/2020

## 2020-06-18 NOTE — PROGRESS NOTES
Psychosocial Assessment For Kidney Transplantation  Patient Name/ Age: Chip Fowler 35 year old   Medical Record #: [id number]  Duration of Interview:  30 min  Process:   Telephone Interview                (counseling < 50%)   Present on telephone: Chip        : SHAUN Whitlock LICSW Date:  June 18, 2020        Type of transplant: Kidney    Donor type:      Cadaver   Prior Transplants:    No Status of Transplant: n/a           Current Living Situation    Location:   [address] [place] [address] [address]  [facility] [address]  With Whom: lives alone       Family/ Social Support:    Chip reported he does not have any children. He has two sisters Jelly and Muriel. Both live in [place], [place]. His mother and stepfather live in [place], [place]. He reported his mother has Alzheimer's.    available, helpful   Committed Relationship: Chip reported he has been in a relationship with his girlfriend Iraida for 9 years.      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 He reported he recently applied for SSDI. He reported he applied due to his kidney disease and he believes his heart failure.      Income   Salary/wages   Insurance      At this time, patient can afford medication costs:  Yes  MA through [facility]       Medical Status    Current Mode of Treatment for ESRD Dialysis since 2/2020   Complications None       Behavioral    Tobacco Use Yes  Chemical Dependency Yes , history    Chip reported he smokes about 1/2 pack of cigarettes a day. He reported he is thinking about quitting.    Chip reported he has not drank alcohol in  "\"many years\". When asked why, he reported because \"I got bored with it\". Chip reported he has a history of daily methamphetamine use. He reported he stopped using methamphetamine 6 months ago due to his health. He denied any other current or history of substance use. Chip reported he is currently attending AA and is going to be getting a chemical dependency evaluation, which is a requirement for him to get his drivers license reinstated. Informed Chip it is this writer's recommendation that he remain sober and he have a chemical dependency evaluation and comply with any recommendations. He reported he would provide the assessment to the transplant team once completed.      Psychiatric Impairment No  Chip denied any current or history of anxiety, depression, or other mental health concerns. He did note that large groups can make him nervous at times.     Reading Ability: Good  Education Level: High School Recent Legal History Yes   Chip has a history of DWI's. He reported his last DWI was 5 years go. He currently does not have a drivers license because of this but is working on getting it reinstated.     Coping Style/Strategies: Deep breathing        Ability to Adhere to Complex Medical Regime: Yes     Adherence History: Chip reported he follows his physician's recommendations, takes his medications as prescribed, and attends his appointments. Chip reported he recently started taking medications due to his health issues. He discussed some of the difficulty he has had coming up with a system to take his medications around the same time (stated he does take his medications daily). Discussed using a medication reminder maría elena on his cell phone and a pill box.         Education  _X_ Medicare  _X_ Rehabilitation  _X_ Donor issues  _X_ Community resources  _X_ Post discharge housing  _X_ Financial resources  _X_ Medical insurance options  _X_ Psych adjustment  _X_ Family adjustment  _X_ Health Care Directive Yes, Will " Provide- Chip reported his sister Jelly Sunshine is his identified health care agent.    Psychosocial Risks of Transplant Reviewed and Discussed:  _X_ Increased stress related to emotional,            family, social, employment or financial           situation  _X_ Effect on work and/or disability benefits  _X_ Effect on future health and life           insurance  _X_ Transplant outcome expectations may           not be met  _X_ Mental Health Risks: anxiety,           depression, PTSD, guilt, grief and           chronic fatigue     Notable Items:   Chip has a history of daily methamphetamine use. He reported he stopped using methamphetamine 6 months ago due to his health. He denied any other current or history of substance use. Chip reported he is currently attending AA and is going to be getting a chemical dependency evaluation, which is a requirement for him to get his drivers license reinstated. Informed Chip it is this writer's recommendation that he remain sober and have a chemical dependency evaluation and comply with any recommendations. He reported he would provide the assessment to the transplant team once completed.       Final Evaluation/Assessment   Patient seemed to process information well. Appeared well informed, motivated and able to follow post transplant requirements. Behavior was appropriate during interview. Has adequate income and insurance coverage. Adequate social support. Recent methamphetamine use.  At this time patient appears to understand the risks and benefits of transplant.      Recommendation  Conditional- Recommend pt complete chemical dependency evaluation and follow through with any recommendations   Selection Criteria Met:  Plan for support Yes   Chemical Dependence No  Smoking No  Mental Health Yes   Adequate Finances Yes    Signature: SHAUN Whitlock LIC   Title: Clinical

## 2020-06-18 NOTE — PROGRESS NOTES
"Chip Fowler is a 35 year old male who is being evaluated via a billable video visit.      The patient has been notified of following:     \"This video visit will be conducted via a call between you and your physician/provider. We have found that certain health care needs can be provided without the need for an in-person physical exam.  This service lets us provide the care you need with a video conversation.  If a prescription is necessary we can send it directly to your pharmacy.  If lab work is needed we can place an order for that and you can then stop by our lab to have the test done at a later time.    Video visits are billed at different rates depending on your insurance coverage.  Please reach out to your insurance provider with any questions.    If during the course of the call the physician/provider feels a video visit is not appropriate, you will not be charged for this service.\"    Patient has given verbal consent for Video visit? Yes    Will anyone else be joining your video visit? No        Video-Visit Details    Type of service:  Video Visit    Video Start Time: 9:40am  Video End Time: 10:05am    Originating Location (pt. Location): Home    Distant Location (provider location):  [place] [facility]     Platform used for Video Visit: [employer]    RE: Chip Fowler    [facility]# [id number]        I saw your patient, Chip Fowler, in consultation in our pretransplant clinic.  He presented via video call to discuss care for his end-stage renal disease.      We talked about the pros and cons of transplantation vs. dialysis.  We discussed the fact that it was important that he think about the pros and cons of each treatment option and make an active decision.  We also discussed the fact that the two were interconnected and he may need to go on dialysis before transplant (if he chose to have a transplant) and that if the transplant failed, he might need dialysis before another transplant.       We also " "discussed the fact that if he chose to have a transplant, he would need to decide between going on the wait list for a  donor transplant vs. having a living donor transplant.  We talked about the pros and cons of each option.  Although I didn't express an opinion regarding transplantation or dialysis, I suggested that if he chose to have a transplant, a living donor transplant would be preferable in that the surgery is the same, the immunosuppressive drugs and the risks are the same, but the transplant could be done sooner and the results are better.  I told him that the wait for  donor kidney was approximately five years for patients who are newly put on the waiting list.  In addition, we talked about the fact that the disadvantage of a living donor transplant was the risk to the donor.       His sister is interested in donation but he is concerned about risks to her.  I described our donor evaluation process and how the donor risks would be repeatedly discussed with the donor candidate. Because he was interested in living donation, we spent some time discussing those risks, including the risks of mortality, morbidity, and long-term risks with a single kidney. We also discussed the fact that a living donor does not have to be a direct match and that if there was a donor who met the criteria but was not a match, there was an option of participating in the national paired exchange system.  I described how the system would work. I have asked our coordinator to send him our donor video to view and share at his leisure.     I also discussed the new ( donor) kidney (KDPI) scoring system with him.  We discussed the advantages and disadvantages of accepting an \"expanded criteria\" donor kidney, and the latest data as to who is potentially benefited by receiving an expanded criteria donor kidney versus waiting longer for a standard criteria donor. I recommended he say \"no\" to one of these kidneys. "      Finally, we discussed his recent drug use and our concerns re: transplantation.  He is currently in AA and says he is committed to be drug-free going forward.      I attempted to answer any remaining questions.  I also told him that should he have any questions, he should feel free to contact us.  We would be glad to answer any questions either over the phone or at another clinic visit.  His transplant coordinator is Elisabeth Johns and may be reached at [phone].  Thank you for the opportunity to see him.     I spent 25 minutes with this patient.  Over 90% of that time was spent in counseling and coordination of care.             Yours truly,               Sathya Sinha MD         Professor of Surgery         ([phone])    [place]/

## 2020-06-24 ENCOUNTER — COMMITTEE REVIEW (OUTPATIENT)
Dept: TRANSPLANT | Facility: CLINIC | Age: 35
End: 2020-06-24

## 2020-06-24 NOTE — COMMITTEE REVIEW
Abdominal Committee Review Note     Evaluation Date: 6/18/2020  Committee Review Date: 6/24/2020    Organ being evaluated for: Kidney    Transplant Phase: Evaluation  Transplant Status: Active    Transplant Coordinator: Naima Johns  Transplant Surgeon:       Referring Physician: Les Jaime    Primary Diagnosis: Nephrolithiasis  Secondary Diagnosis: Hypertensive Nephrosclerosis    Committee Review Members:  Nephrology Leeroy Rahman MD, Cuauhtemoc Syed, APRN CNP   Nurse Ashlee Garcia, RN   Pharmacy Parviz Jefferson, [facility]    - Clinical Lyla Cortez, [facility], Fadumo Truong [facility]   Transplant Veronica Reno PA-C, Allison Toussaint, RN, Terri Bedolla, RN, Sindy Segura, DIPTI, Cassie Damico, DIPTI, Jane Man, SILVIA, Liz Shields, DIPTI, Jovanna Mancuso, RN, Partha Staton MD, Naima Johns, RN   Transplant Surgery Katja Greenberg RN       Transplant Eligibility: Irreversible chronic kidney disease treated w/dialysis or expected need for dialysis    Committee Review Decision: Needs Re-presentation    Relative Contraindications: Other, CD assessment    Absolute Contraindications: None    Committee Chair Pratha Staton MD verbally attested to the committee's decision.    Committee Discussion Details: Reviewed medical records and evaluation results to date. Decision on candidacy is deferred for the time being. He is on dialysis so he will not lose any wait time. He is going to need a CD assessment due to his methamphetamine use. He will need cardiology and urology; team would like him seen here. He will need dental. Patient will be called and transplant summary letter will be sent.

## 2020-06-25 ENCOUNTER — TELEPHONE (OUTPATIENT)
Dept: TRANSPLANT | Facility: CLINIC | Age: 35
End: 2020-06-25

## 2020-06-25 DIAGNOSIS — Z76.82 AWAITING ORGAN TRANSPLANT: Primary | ICD-10-CM

## 2020-06-25 NOTE — LETTER
June 25, 2020    Chipkatt Fowler  [address]  [place] [address]      Dear Chip,    It was a pleasure to start working with you toward the goal of a kidney transplant. Your pre-transplant evaluation began as a virtual visit due to the pandemic on June 18, 2020. Your evaluation results were discussed at the Multidisciplinary Selection Committee on June 24, 2020. The Committee has deferred a decision on your candidacy for the time being. We would like you to complete the following items first:    1.  Please have a CD (chemical dependency) assessment done. We will ask you to follow the recommendations. Please call me when the assessment is complete because I will need to obtain those records.  2. Please establish care with a primary care provider. I do not have one listed in your chart. It is important to have a primary care provider to monitor your care outside of transplant. Call me with the information so I can get it into your chart. We do keep in communication with primary care providers.  3. We are asking you to see cardiology to make sure your heart is strong enough to undergo the stress of a transplant. Our  will call you with this appointment.  4. We are asking you to see urology to assess your urethral strictures. Our  will call you with this appointment.  5. Please make and appointment with your dentist and have any recommended work done. Call me when complete.    Once all of the above has been successfully completed your evaluation will be re-discussed at our Multidisciplinary Selection Committee for a determination on candidacy. Your waiting time will start with the start date of your dialysis so you will not be disadvantaged. You will be notified by our office with the outcome of the decision.    Even though we have not made a formal decision on your candidacy it is still a good idea to be talking to people about living donation. Once your candidacy is approved we will  talk more about living donors.    If you have any questions please feel free to call me with any questions at [phone].      Sincerely,       Elisabeth Johns, RN, BSN Transplant Coordinator  Solid [facility] Transplant [address]  [address] [facility], [address] [address]  Phone [phone]  Fax [phone]  [email or web address]    CC:[facility]

## 2020-06-25 NOTE — Clinical Note
Needs cardiology and urology consults. These do not need to be done immediately. He may chose to wait until September until he gets his 's license back and that is fine

## 2020-06-25 NOTE — TELEPHONE ENCOUNTER
Called Chip to discuss the outcome of the Selection Committee from yesterday 6/24/2020. A decision on candidacy is deferred for the time being. He will need to complete a CD assessment and follow the recommendations. He will need to see cardiology and urology for urethral strictures. He also needs dental. I suggested to him to call the dentist even if they are not seeing patients due to the pandemic and at least get on the list to be seen. Transplant summary letter will be sent.

## 2020-07-01 ENCOUNTER — TELEPHONE (OUTPATIENT)
Dept: TRANSPLANT | Facility: CLINIC | Age: 35
End: 2020-07-01

## 2020-07-02 NOTE — TELEPHONE ENCOUNTER
Returned Chip's call. He was wondering what I meant by finding a PCP in his transplant summary letter. I explained he needs a primary care provider to be the point person for his medical care. I used the example that if he got pneumonia it would not be transplant that would treat him but rather his primary doctor. That person would be the central point for his care. I told him it is important to transplant that we know who his PCP is so we can communicate with them. He understands and will call [facility] and ask to establish care with primary care and then let me know who his provider will be.

## 2020-07-06 ENCOUNTER — PRE VISIT (OUTPATIENT)
Dept: UROLOGY | Facility: CLINIC | Age: 35
End: 2020-07-06

## 2020-07-06 DIAGNOSIS — N35.919 URETHRAL STRICTURE: Primary | ICD-10-CM

## 2020-07-06 NOTE — TELEPHONE ENCOUNTER
Reason for visit: Transplant clearance      Relevant information: history of urethral stricture    Records/imaging/labs/orders: records available    Pt called: Yes, pt scheduled with Mena Calabrese PA-C, and message routed to Mena    At Rooming: standard

## 2020-07-07 NOTE — TELEPHONE ENCOUNTER
MEDICAL RECORDS REQUEST   [facility]  Urology Clinic  [address] [place] [address]  PHONE: [phone]  Fax: [phone]        FUTURE VISIT INFORMATION                                                   ESVIN Costa: 1985 scheduled for future visit at [facility]    APPOINTMENT INFORMATION:    Date: 20 11AM    Provider:  Mena Calabrese PA-C    Reason for Visit/Diagnosis: TX clearance     REFERRAL INFORMATION:    Referring provider:  N/A    Specialty: N/A    Referring providers clinic:  [facility]    Clinic contact number:  N/A    RECORDS REQUESTED FOR VISIT                                                     NOTES  STATUS/DETAILS   OFFICE NOTE from referring provider  yes   OFFICE NOTE from other specialist  no   DISCHARGE SUMMARY from hospital  no   DISCHARGE REPORT from the ER  no   OPERATIVE REPORT  yes   MEDICATION LIST  yes     PRE-VISIT CHECKLIST      Record collection complete Yes- Internal recs in epic   Appointment appropriately scheduled           (right time/right provider) Yes   MyChart activation Yes   Questionnaire complete If no, please explain: In process      Completed by: Ramona Tapia

## 2020-07-08 ENCOUNTER — VIRTUAL VISIT (OUTPATIENT)
Dept: UROLOGY | Facility: CLINIC | Age: 35
End: 2020-07-08
Payer: MEDICARE

## 2020-07-08 ENCOUNTER — PRE VISIT (OUTPATIENT)
Dept: UROLOGY | Facility: CLINIC | Age: 35
End: 2020-07-08

## 2020-07-08 DIAGNOSIS — R39.16 STRAINING TO VOID: ICD-10-CM

## 2020-07-08 DIAGNOSIS — N28.9 RENAL LESION: ICD-10-CM

## 2020-07-08 DIAGNOSIS — Z87.448: ICD-10-CM

## 2020-07-08 DIAGNOSIS — Z87.448 HISTORY OF URETHRAL STRICTURE: Primary | ICD-10-CM

## 2020-07-08 DIAGNOSIS — R39.198 SLOWING OF URINARY STREAM: ICD-10-CM

## 2020-07-08 RX ORDER — FOLIC ACID/VIT B COMPLEX AND C 0.8 MG
1 TABLET ORAL EVERY MORNING
Status: ON HOLD | COMMUNITY
Start: 2020-06-25 | End: 2023-01-16

## 2020-07-08 NOTE — LETTER
"7/8/2020       RE: Chip Fowler  [address]  [address]  [place] [address]     Dear Colleague,    Thank you for referring your patient, Chip Fowler, to the [facility] UROLOGY AND INST FOR PROSTATE AND UROLOGIC CANCERS at [facility]. Please see a copy of my visit note below.    Chip Fowler is a 35 year old male who is being evaluated via a billable video visit.      The patient has been notified of following:     \"This video visit will be conducted via a call between you and your physician/provider. We have found that certain health care needs can be provided without the need for an in-person physical exam.  This service lets us provide the care you need with a video conversation.  If a prescription is necessary we can send it directly to your pharmacy.  If lab work is needed we can place an order for that and you can then stop by our lab to have the test done at a later time.    Video visits are billed at different rates depending on your insurance coverage.  Please reach out to your insurance provider with any questions.    If during the course of the call the physician/provider feels a video visit is not appropriate, you will not be charged for this service.\"    Patient has given verbal consent for Video visit? Yes    How would you like to obtain your AVS? [facility]    Patient would like the video invitation sent by: Mobile [employer]hentication Waiting Room or [employer] via text if needed.       Video Start Time: 11:00 AM    Additional provider notes:      Name: Chip Fowler    MRN: [id number]   YOB: 1985                 Chief Complaint:   Transplant clearance for history of urethral stricture          History of Present Illness:   Mr. Chip Fowler is a 35 year old male with PMH significant for ESRD 2/2 multifactorial etiology including congenital solitary kidney, urologic history (see below), and polysubstance abuse who is being evaluated via billable video visit in " consultation from Veronica Reno PA-C for history of a urethral stricture and urology clearance prior to kidney transplant. History is obtained directly from the patient and supplemented by chart review.     In 2012, patient sustained a penile injury during intercourse. He had some pain and swelling and straining to urinate following this incident. Then in June 2014, he presented to [facility] with urinary retention and sudden leakage of urine through the skin near the base of his penis. 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 At that time, he was found to have a distal urethral stricture as well as a huge proximal urethral stone which was embedded around foreign material. Patient describes a history of inserting objects per urethra to delay orgasm. Suprapubic tube was placed due to inability to pass anything beyond the large stone/foreign body. He eventually underwent transurethral lithotripsy and removal of the stone/foreign body on 7/15/2014 at which time his SP tube was exchanged. 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 His SP tube was once again exchanged and he had urethral Taylor catheter placed to allow continued urethral rest/healing. He then failed to return for follow up urethrogram and was not seen again until 10/3/14 at which time the urethral catheter was unable to be removed in clinic. He was scheduled for surgery to remove it in the OR, but it apparently fell out at home 3 days later. RUG/VCUG through the SP tube on 10/13/14 showed that the proximal urethra was healed with no evidence for persistent urethrocutaneous fistula; however, the distal urethra showed stricture recurrence plus stone material, as well as a new bladder stone. He was taken to the OR the following day where the bladder stone was removed and his distal urethral stricture was  "re-dilated. His SP tube was also replaced. On 10/22/14, his urethral Taylor catheter was removed and he was instructed to self-dilate using an 18 Fr catheter 1-2 times daily. About 1 month later on 11/17/14, his SP tube was removed. At that time, he reported voiding well on his own and was self-dilating 1-2 times per day as instructed. He was recommended to follow up in 1-2 months for another cystourethroscopy but was then lost to follow up.    Today, he reports that he has continued to void on his own per urethra, though his flow is slow and he has to strain to void. He has not self-dilated in several years. He denies dysuria or gross hematuria and believes that he is emptying his bladder. He describes an intermittent cramping sensation in his left back with voiding, though this has improved in recent weeks. He also notes that he continues to dribble urine from a \"hole\" at the base of his penis at the site of his previous urethrocutaneous fistula. He denies redness or purulent drainage at this site.     Of note, outside renal ultrasound on 2/24/2020 revealed a possible 1.7 cm hypoechoic area at the superior pole, which could represent a nonspecific lesion or a normal pyramid. CT without contrast on 2/28/2020 (in conjunction with kidney biopsy) demonstrated congenital absence of right kidney, and a markedly abnormal left kidney with lobulated contours and mixed areas of low attenuation. Underlying renal mass or cystic change could not be excluded. Urology has been asked to comment on these findings.          Past Medical History:     Past Medical History:   Diagnosis Date     Bladder stones      Cardiomyopathy (H)      ESRD (end stage renal disease) on dialysis (H)      Hypertension      Migraines      Polysubstance abuse (H)      Solitary kidney, congenital      Urethral stone      Urethral stricture             Past Surgical History:     Past Surgical History:   Procedure Laterality Date     BIOPSY  02/2020    " renal, Hindu     COMBINED CYSTOSCOPY, LASER HOLMIUM LITHOTRIPSY URETER(S)       CYSTOSCOPY       HERNIA REPAIR      infant     INSERT CATHETER PERITONEAL DIALYSIS       urethral dilation              Social History:     Social History     Tobacco Use     Smoking status: Current Every Day Smoker     Packs/day: 0.40     Types: Cigarettes     Start date: 2002     Smokeless tobacco: Never Used     Tobacco comment: 4-8 cigs /day   Substance Use Topics     Alcohol use: Not Currently     Comment: quit alcohol 3-4 yrs ago            Family History:     Family History   Problem Relation Age of Onset     Kidney Disease No family hx of               Allergies:   No Known Allergies         Medications:     Current Outpatient Medications   Medication Sig     B Complex-C-Folic Acid (DIALYVITE 800) 0.8 MG TABS TK 1 T PO  QAM AFTER DIALYSIS     carvedilol (COREG) 25 MG tablet Take 25 mg by mouth     furosemide (LASIX) 40 MG tablet Lasix 40 mg daily on non dialysis days     lisinopril (ZESTRIL) 2.5 MG tablet Take 2.5 mg by mouth     No current facility-administered medications for this visit.              Review of Systems:    ROS: 14 point ROS neg other than the symptoms noted above in the HPI and PMH.          Physical Exam:   GENERAL: Healthy, alert and no distress  EYES: Eyes grossly normal to inspection.  No discharge or erythema, or obvious scleral/conjunctival abnormalities.  RESP: No audible wheeze, cough, or visible cyanosis.  No visible retractions or increased work of breathing.    SKIN: Visible skin clear. No significant rash, abnormal pigmentation or lesions.  NEURO: Cranial nerves grossly intact.  Mentation and speech appropriate for age.  PSYCH: Mentation appears normal, affect normal/bright, judgement and insight intact, normal speech and appearance well-groomed.         Labs:    Cr   6.84   6/9/2020    UC on 2/24/2020 with >100K Group B strep      Imaging:    Renal Bladder Ultrasound   2/24/2020  Result  Impression   COMPARISON:  None.    FINDINGS:      Right kidney: Absent.    Left kidney: Measures  9.2 x 5.3 x 5.6 cm. No hydronephrosis. Lobular contour. Question increased echogenicity. Faintly visualized 1.7 cm hypoechoic area at the superior pole could represent either a nonspecific lesion or a normal pyramid.    Urinary bladder: Unremarkable. A left ureteral jet is seen.    Pleural effusions are noted.    IMPRESSION:   1. No left hydronephrosis. A left ureteral jet is seen.  2. Question increased echogenicity of the left kidney. This is nonspecific but can be seen with medical renal disease.  3. A faintly visualized 1.7 cm hypoechoic area at the superior pole of the left kidney may represent either a nonspecific lesion or a normal pyramid. A nonemergent renal MRI is recommended for further assessment.  4. Pleural effusions.    Abnormal findings requiring follow-up.  Recommend: Nonemergent renal MRI, after the patient's        EXAM: CT-guided left renal random biopsy without contrast   2/28/2020.  The patient does not have a right kidney. The left kidney is markedly abnormal in appearance with lobulated contours and mixed areas of low attenuation. Underlying renal mass or cystic change not excluded based on this limited study.        Outside records:    I spent 10 minutes reviewing outside records.         Assessment and Plan:   35 year old male with a history of distal urethral stricture, urethral foreign body, bladder and urethral stones, and urethrocutaneous fistula s/p multiple procedures with [facility] urology in 2014 as outlined in the HPI. Previously recommended to self-dilate his distal stricture once daily which he has not performed in several years. Currently complains of slow stream and straining to void as well as dribbling of urine per possible recurrence of urethrocutaneous fistula. He will require additional evaluation.  -Schedule cystourethroscopy with Dr. Mejia next available. Possible RUG/VCUG  pending findings.   -May also be helpful to complete uroflow/PVR at the time of cystoscopy to assess flow rate and emptying ability prior to kidney transplant.     Also with abnormal findings of his solitary left kidney based on ultrasound and CT without contrast from 2/2020. Several areas of low attenuation which may represent cystic changes but underlying renal mass cannot be excluded. Unfortunately, his kidney function prohibits him from receiving contrast dye at this time which would provide enhanced imaging capabilities.   -Will refer to Dr. Sen for additional evaluation and discussion of next steps.          Video-Visit Details    Type of service:  Video Visit    Video End Time (time video stopped): 11:12 AM    Originating Location (pt. Location): Home    Distant Location (provider location):  [facility] UROLOGY AND [facility] FOR PROSTATE AND UROLOGIC CANCERS     Mode of Communication:  Video Conference via [employer]      Mena Calabrese PA-C

## 2020-07-08 NOTE — NURSING NOTE
Chip Fowler has given verbal permission for a video visit 07/08/20 10:37 AM and would like to be reached in the [facility] Waiting Room.    Called the pt 07/08/20, and 10:37 AM and reviewed their medications, history, and allergies. I then asked that they be in a quiet location with minimal distractions so they can hear the provider well.    Chief Complaint   Patient presents with     Video Visit     history of urethral stricture and transplant clearence        There were no vitals taken for this visit. There is no height or weight on file to calculate BMI.    Patient Active Problem List   Diagnosis     Unilateral congenital absence of kidney     ESRD (end stage renal disease) on dialysis (H)     Hypertension     Solitary kidney, congenital     Cardiomyopathy (H)     Bladder stones     Urethral stone     Urethral stricture     Polysubstance abuse (H)       No Known Allergies    Current Outpatient Medications   Medication Sig Dispense Refill     B Complex-C-Folic Acid (DIALYVITE 800) 0.8 MG TABS TK 1 T PO  QAM AFTER DIALYSIS       carvedilol (COREG) 25 MG tablet Take 25 mg by mouth       furosemide (LASIX) 40 MG tablet Lasix 40 mg daily on non dialysis days       lisinopril (ZESTRIL) 2.5 MG tablet Take 2.5 mg by mouth         Social History     Tobacco Use     Smoking status: Current Every Day Smoker     Packs/day: 0.40     Types: Cigarettes     Start date: 2002     Smokeless tobacco: Never Used     Tobacco comment: 4-8 cigs /day   Substance Use Topics     Alcohol use: Not Currently     Comment: quit alcohol 3-4 yrs ago     Drug use: Not Currently     Types: Methamphetamines       Aj Morales, EMT,  7/8/2020  10:37 AM

## 2020-07-08 NOTE — PROGRESS NOTES
"Chip Fowler is a 35 year old male who is being evaluated via a billable video visit.      The patient has been notified of following:     \"This video visit will be conducted via a call between you and your physician/provider. We have found that certain health care needs can be provided without the need for an in-person physical exam.  This service lets us provide the care you need with a video conversation.  If a prescription is necessary we can send it directly to your pharmacy.  If lab work is needed we can place an order for that and you can then stop by our lab to have the test done at a later time.    Video visits are billed at different rates depending on your insurance coverage.  Please reach out to your insurance provider with any questions.    If during the course of the call the physician/provider feels a video visit is not appropriate, you will not be charged for this service.\"    Patient has given verbal consent for Video visit? Yes    How would you like to obtain your AVS? [facility]    Patient would like the video invitation sent by: [facility] Waiting Room or [facility] via text if needed.       Video Start Time: 11:00 AM    Additional provider notes:      Name: Chip Fowler    MRN: [id number]   YOB: 1985                 Chief Complaint:   Transplant clearance for history of urethral stricture          History of Present Illness:   Mr. Chip Fowler is a 35 year old male with PMH significant for ESRD 2/2 multifactorial etiology including congenital solitary kidney, urologic history (see below), and polysubstance abuse who is being evaluated via billable video visit in consultation from Veronica Reno PA-C for history of a urethral stricture and urology clearance prior to kidney transplant. History is obtained directly from the patient and supplemented by chart review.     In 2012, patient sustained a penile injury during intercourse. He had some pain and swelling and straining to urinate " following this incident. Then in June 2014, he presented to [facility] with urinary retention and sudden leakage of urine through the skin near the base of his penis. 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 At that time, he was found to have a distal urethral stricture as well as a huge proximal urethral stone which was embedded around foreign material. Patient describes a history of inserting objects per urethra to delay orgasm. Suprapubic tube was placed due to inability to pass anything beyond the large stone/foreign body. He eventually underwent transurethral lithotripsy and removal of the stone/foreign body on 7/15/2014 at which time his SP tube was exchanged. 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 His SP tube was once again exchanged and he had urethral Taylor catheter placed to allow continued urethral rest/healing. He then failed to return for follow up urethrogram and was not seen again until 10/3/14 at which time the urethral catheter was unable to be removed in clinic. He was scheduled for surgery to remove it in the OR, but it apparently fell out at home 3 days later. RUG/VCUG through the SP tube on 10/13/14 showed that the proximal urethra was healed with no evidence for persistent urethrocutaneous fistula; however, the distal urethra showed stricture recurrence plus stone material, as well as a new bladder stone. He was taken to the OR the following day where the bladder stone was removed and his distal urethral stricture was re-dilated. His SP tube was also replaced. On 10/22/14, his urethral Taylor catheter was removed and he was instructed to self-dilate using an 18 Fr catheter 1-2 times daily. About 1 month later on 11/17/14, his SP tube was removed. At that time, he reported voiding well on his own and was self-dilating 1-2 times per day as  "instructed. He was recommended to follow up in 1-2 months for another cystourethroscopy but was then lost to follow up.    Today, he reports that he has continued to void on his own per urethra, though his flow is slow and he has to strain to void. He has not self-dilated in several years. He denies dysuria or gross hematuria and believes that he is emptying his bladder. He describes an intermittent cramping sensation in his left back with voiding, though this has improved in recent weeks. He also notes that he continues to dribble urine from a \"hole\" at the base of his penis at the site of his previous urethrocutaneous fistula. He denies redness or purulent drainage at this site.     Of note, outside renal ultrasound on 2/24/2020 revealed a possible 1.7 cm hypoechoic area at the superior pole, which could represent a nonspecific lesion or a normal pyramid. CT without contrast on 2/28/2020 (in conjunction with kidney biopsy) demonstrated congenital absence of right kidney, and a markedly abnormal left kidney with lobulated contours and mixed areas of low attenuation. Underlying renal mass or cystic change could not be excluded. Urology has been asked to comment on these findings.          Past Medical History:     Past Medical History:   Diagnosis Date     Bladder stones      Cardiomyopathy (H)      ESRD (end stage renal disease) on dialysis (H)      Hypertension      Migraines      Polysubstance abuse (H)      Solitary kidney, congenital      Urethral stone      Urethral stricture             Past Surgical History:     Past Surgical History:   Procedure Laterality Date     BIOPSY  02/2020    renal, Oriental orthodox     COMBINED CYSTOSCOPY, LASER HOLMIUM LITHOTRIPSY URETER(S)       CYSTOSCOPY       HERNIA REPAIR      infant     INSERT CATHETER PERITONEAL DIALYSIS       urethral dilation              Social History:     Social History     Tobacco Use     Smoking status: Current Every Day Smoker     Packs/day: 0.40     " Types: Cigarettes     Start date: 2002     Smokeless tobacco: Never Used     Tobacco comment: 4-8 cigs /day   Substance Use Topics     Alcohol use: Not Currently     Comment: quit alcohol 3-4 yrs ago            Family History:     Family History   Problem Relation Age of Onset     Kidney Disease No family hx of               Allergies:   No Known Allergies         Medications:     Current Outpatient Medications   Medication Sig     B Complex-C-Folic Acid (DIALYVITE 800) 0.8 MG TABS TK 1 T PO  QAM AFTER DIALYSIS     carvedilol (COREG) 25 MG tablet Take 25 mg by mouth     furosemide (LASIX) 40 MG tablet Lasix 40 mg daily on non dialysis days     lisinopril (ZESTRIL) 2.5 MG tablet Take 2.5 mg by mouth     No current facility-administered medications for this visit.              Review of Systems:    ROS: 14 point ROS neg other than the symptoms noted above in the HPI and PMH.          Physical Exam:   GENERAL: Healthy, alert and no distress  EYES: Eyes grossly normal to inspection.  No discharge or erythema, or obvious scleral/conjunctival abnormalities.  RESP: No audible wheeze, cough, or visible cyanosis.  No visible retractions or increased work of breathing.    SKIN: Visible skin clear. No significant rash, abnormal pigmentation or lesions.  NEURO: Cranial nerves grossly intact.  Mentation and speech appropriate for age.  PSYCH: Mentation appears normal, affect normal/bright, judgement and insight intact, normal speech and appearance well-groomed.         Labs:    Cr   6.84   6/9/2020    [facility] on 2/24/2020 with >100K Group B strep      Imaging:    Renal Bladder Ultrasound   2/24/2020  Result Impression   COMPARISON:  None.    FINDINGS:      Right kidney: Absent.    Left kidney: Measures  9.2 x 5.3 x 5.6 cm. No hydronephrosis. Lobular contour. Question increased echogenicity. Faintly visualized 1.7 cm hypoechoic area at the superior pole could represent either a nonspecific lesion or a normal pyramid.    Urinary  bladder: Unremarkable. A left ureteral jet is seen.    Pleural effusions are noted.    IMPRESSION:   1. No left hydronephrosis. A left ureteral jet is seen.  2. Question increased echogenicity of the left kidney. This is nonspecific but can be seen with medical renal disease.  3. A faintly visualized 1.7 cm hypoechoic area at the superior pole of the left kidney may represent either a nonspecific lesion or a normal pyramid. A nonemergent renal MRI is recommended for further assessment.  4. Pleural effusions.    Abnormal findings requiring follow-up.  Recommend: Nonemergent renal MRI, after the patient's        EXAM: CT-guided left renal random biopsy without contrast   2/28/2020.  The patient does not have a right kidney. The left kidney is markedly abnormal in appearance with lobulated contours and mixed areas of low attenuation. Underlying renal mass or cystic change not excluded based on this limited study.        Outside records:    I spent 10 minutes reviewing outside records.         Assessment and Plan:   35 year old male with a history of distal urethral stricture, urethral foreign body, bladder and urethral stones, and urethrocutaneous fistula s/p multiple procedures with [facility] in 2014 as outlined in the HPI. Previously recommended to self-dilate his distal stricture once daily which he has not performed in several years. Currently complains of slow stream and straining to void as well as dribbling of urine per possible recurrence of urethrocutaneous fistula. He will require additional evaluation.  -Schedule cystourethroscopy with Dr. Mejia next available. Possible RUG/VCUG pending findings.   -May also be helpful to complete uroflow/PVR at the time of cystoscopy to assess flow rate and emptying ability prior to kidney transplant.     Also with abnormal findings of his solitary left kidney based on ultrasound and CT without contrast from 2/2020. Several areas of low attenuation which may represent  cystic changes but underlying renal mass cannot be excluded. Unfortunately, his kidney function prohibits him from receiving contrast dye at this time which would provide enhanced imaging capabilities.   -Will refer to Dr. Sen for additional evaluation and discussion of next steps.          Video-Visit Details    Type of service:  Video Visit    Video End Time (time video stopped): 11:12 AM    Originating Location (pt. Location): Home    Distant Location (provider location):  [facility] UROLOGY AND [facility] FOR PROSTATE AND UROLOGIC CANCERS     Mode of Communication:  Video Conference via [employer]      eMna Calabrese PA-C

## 2020-07-08 NOTE — PATIENT INSTRUCTIONS
UROLOGY CLINIC VISIT PATIENT INSTRUCTIONS    Someone will contact you to schedule cystoscopy with Dr. Mejia (next available open cystoscopy slot).     You will also need an appointment (virtual or in person) with Dr. Sen to discuss a possible lesion on your kidney seen on ultrasound and CT scan from February 2020.     CYSTOSCOPY    What is a Cystoscopy?  This is a procedure done to check for problems inside the bladder.  Problems may include polyps (growths), tumors, inflammation (swelling and redness) and other concerns.    The doctor inserts a thin tube (called a cystoscope) into the bladder.  The tube is about the size of a pencil.  We will give you numbing medicine to reduce the pain or discomfort you may feel.    The tube allows the doctor to:  The doctor will be able to see inside the bladder by filling the bladder with water.  The water makes it easier to see any problems that may be present.    If needed, the doctor may use the tube to:  The doctor is able to take tissue samples (biopsies).  Samples are sent to the lab for testing.  The doctor can also burn off any small growths or tumors that are found.  This is call fulguration.    How should I get ready for the exam?  To prepare, stop taking any medications as instructed. Ask whether you should avoid eating or drinking anything after midnight before the procedure. Follow any other instructions your doctor gives you.    If you are having this procedure done at the clinic, you will be there for up to an hour.  You will receive care before and after the procedure.    Please tell your doctor if:  - You have a history of urinary tract infections.  - You know that you have a tumor in your bladder.  - You have bleeding problems.  - You have any allergies.  - You are or may be pregnant.      What happens after the exam?  You may go back to your normal diet and activity as you feel ready, unless your doctor tells you not to.    For the next two days, you may  notice:  - Some blood in your urine.  - Some burning when you urinate (use the toilet).  - An urge to urinate more often.  - Bladder spasms.    These are normal after the procedure. They should go away on their own after a day or two.      - You can help to relieve the above listed symptoms by:  - Drinking 6 to 8 large glasses of water each day (includes drinks at meals).  This will help clear the urine.  - Take warm baths to relieve pain and bladder spasms.  Do not add anything to the bath water.  - Your doctor may prescribe pain medicine.  You may also take Tylenol (acetaminophen) for pain.    When should I call my doctor?  - A fever over 100.0 F (38 C) for more than a day.  (Before you call the doctor, check your temperature under your tongue.)  - Chills.  - Failure to urinate: No urine comes out when you try to use the toilet.  (Try soaking in a bathtub full of warm water.  If still no urine, call your doctor.)  - A lot of blood in the urine or blood clots larger than a nickel.  - Pain in the back or abdomen (belly / stomach area).  - Pain or spasms that are not relieved by warm tub baths and pain medicine.  - Severe pain, burning or other problems while passing urine.  - Pain that gets worse after two days.        If you have any issues, questions or concerns in the meantime, do not hesitate to contact us at [phone] or via [employer].     It was a pleasure meeting with you today.  Thank you for allowing me and my team the privilege of caring for you today.  YOU are the reason we are here, and I truly hope we provided you with the excellent service you deserve.  Please let us know if there is anything else we can do for you so that we can be sure you are leaving completely satisfied with your care experience.

## 2020-07-28 ENCOUNTER — PRE VISIT (OUTPATIENT)
Dept: UROLOGY | Facility: CLINIC | Age: 35
End: 2020-07-28

## 2020-07-28 NOTE — TELEPHONE ENCOUNTER
Visit Type : Cysto    Hx/Sx: Stricture work up    Records/Orders: Yes    Pt Contacted: n/a    At Rooming: Flow/PVR

## 2020-07-30 ENCOUNTER — PRE VISIT (OUTPATIENT)
Dept: UROLOGY | Facility: CLINIC | Age: 35
End: 2020-07-30

## 2020-07-30 NOTE — TELEPHONE ENCOUNTER
Chief Complaint : Consult    Hx/Sx: Renal lesion, congenital absence of R kidney, urethral stricture    Records/Orders: Available    Pt Contacted: N/a    At Rooming: Tina Herrera, EMT

## 2020-08-12 ENCOUNTER — ALLIED HEALTH/NURSE VISIT (OUTPATIENT)
Dept: UROLOGY | Facility: CLINIC | Age: 35
End: 2020-08-12
Payer: MEDICARE

## 2020-08-12 ENCOUNTER — OFFICE VISIT (OUTPATIENT)
Dept: UROLOGY | Facility: CLINIC | Age: 35
End: 2020-08-12
Payer: MEDICARE

## 2020-08-12 ENCOUNTER — PRE VISIT (OUTPATIENT)
Dept: SURGERY | Facility: CLINIC | Age: 35
End: 2020-08-12

## 2020-08-12 VITALS — SYSTOLIC BLOOD PRESSURE: 128 MMHG | DIASTOLIC BLOOD PRESSURE: 88 MMHG | HEART RATE: 69 BPM

## 2020-08-12 DIAGNOSIS — N28.9 KIDNEY LESION, NATIVE, LEFT: Primary | ICD-10-CM

## 2020-08-12 DIAGNOSIS — N21.1 URETHRAL CALCULUS: Primary | ICD-10-CM

## 2020-08-12 DIAGNOSIS — Z11.59 ENCOUNTER FOR SCREENING FOR OTHER VIRAL DISEASES: Primary | ICD-10-CM

## 2020-08-12 DIAGNOSIS — Z87.448 HISTORY OF URETHRAL STRICTURE: Primary | ICD-10-CM

## 2020-08-12 DIAGNOSIS — N35.919 URETHRAL STRICTURE: Primary | ICD-10-CM

## 2020-08-12 RX ORDER — GENTAMICIN SULFATE 1 MG/G
OINTMENT TOPICAL EVERY MORNING
Status: ON HOLD | COMMUNITY
Start: 2020-06-23 | End: 2023-01-16

## 2020-08-12 RX ORDER — AMPICILLIN 2 G/1
2 INJECTION, POWDER, FOR SOLUTION INTRAVENOUS
Status: CANCELLED | OUTPATIENT
Start: 2020-08-12

## 2020-08-12 RX ORDER — CIPROFLOXACIN 2 MG/ML
400 INJECTION, SOLUTION INTRAVENOUS
Status: DISCONTINUED | OUTPATIENT
Start: 2020-08-12 | End: 2020-08-17 | Stop reason: HOSPADM

## 2020-08-12 RX ORDER — LIDOCAINE HYDROCHLORIDE 20 MG/ML
JELLY TOPICAL ONCE
Status: DISCONTINUED | OUTPATIENT
Start: 2020-08-12 | End: 2020-08-14

## 2020-08-12 ASSESSMENT — PAIN SCALES - GENERAL: PAINLEVEL: NO PAIN (0)

## 2020-08-12 NOTE — LETTER
8/12/2020       RE: Chip Fowler  [address]  [address]  [place] [address]     Dear Colleague,    Thank you for referring your patient, Chip Fowler, to the [facility] UROLOGY AND INST FOR PROSTATE AND UROLOGIC CANCERS at [facility]. Please see a copy of my visit note below.    Cystoscopy    PRE-PROCEDURE DIAGNOSIS:   1. History of urethral stricture  2. Obstructive voiding  3. History of foreign body in urethra  4. History of drug abuse  5. Urethrocutaneous fistula    POST-PROCEDURE DIAGNOSIS:   1. History of urethral stricture  2. Obstructive voiding  3. History of foreign body in urethra  4. History of drug abuse  5. Urethrocutaneous fistula  6. Urethral Stone  7. Urethral Stricture    PROCEDURE: Urethroscopy    HISTORY: Chip Fowler is a 35 year old man with a very unusual history of urethral manipulation. He has a history of drug abuse with methamphetamine but has since stopped using it. He now has ESRD and is on peritoneal dialysis. He has a history of urethral sounding and BDSM during meth abuse episodes. He has a number of prior episodes of cysto, urethral dilation and laser stone removal and foreign body removal at [facility] 4-8 years ago. One episode they took out a screw after breaking up stones. He is trying to get worked up for transplant and sent to us for eval. He notes obstructive voiding symptoms. Physical exam reveals some VERY abnormal distal bulbar urethral foreign body (stone vs. Item) and a UC fistula near the penoscrotal junction. He's had suprapubic tubes. He also has a renal mass and Dr. Liriano saw him today who recommended MRI.    Questionnaires reviewed. See flowsheet for details.    REVIEW OF OFFICE STUDIES:  Urinary Flow Rate  Residual Volume by Ultrasound: 140 mL     DESCRIPTION OF PROCEDURE:  After informed consent was obtained, the patient was brought to the procedure room where he was placed in the supine position with all pressure points well  padded.  He was prepped and draped in a sterile fashion. A flexible cystoscope was introduced through a well-lubricated urethra.  The urethra was scarred and I had to switch to a pediatric flexible scope. I estimate the urethra at 12-14 Fr. Upon passing into the proximal pendulous urethra, numerous bladder stones were visible and obstructing the lumen. I could not pass the stones.    ASSESSMENT AND PLAN:  I suspect foreign body with stones in the urethra. And perhaps in the bladder. I will take to OR electively and perform urethral dilation, urethral stone removal, removal of all foreign body, assessment of fistula. I suspect needing a catheter for 3-7 days postop. Patient understands risks, benefits, alternatives. I suspect he will need some form of urethroplasty once we get foreign bodies out and we can evaluate. Will do that during separate procedure.    Sam Mejia MD

## 2020-08-12 NOTE — NURSING NOTE
Pre Op Teaching Flowsheet       Pre and Post op Patient Education  Relevant Diagnosis:  Urethral calculus   Surgical procedure:  CYSTOSCOPY, WITH CALCULUS REMOVAL WITH HOLMIUM LASER and Urethral Dilation  Teaching Topic:  Pre and post op teaching  Person Involved in teaching: Yes    Motivation Level:  Asks Questions: Yes  Eager to Learn: Yes  Cooperative: Yes  Receptive (willing/able to accept information):  Yes    Patient demonstrates understanding of the following:  Date of surgery:  8/21/20  Location of surgery:  [facility]- 5th Floor  History and Physical and any other testing necessary prior to surgery: Yes  Required time line for completion of History and Physical and any pre-op testing: Yes    Patient demonstrates understanding of the following:  Pre-op bowel prep:  N/A  Pre-op showering/scrub information with PCMX Soap: Yes  Blood thinner medications discussed and when to stop (if applicable):  N/A      Infection Prevention:   Patient demonstrates understanding of the following:  Surgical procedure site care taught: Yes  Signs and symptoms of infection taught: Yes      Post-op follow-up:  Discussed how to contact the hospital, nurse, and clinic scheduling staff if necessary.    Instructional materials used/given/mailed:  [facility] Surgery Booklet, post op teaching sheet, Map, Soap, and arrival/location information.    Surgical instructions packet given to patient in office:  N/A    Follow up: Discussed arranging for someone to drive you home. ( No public transportation)  Someone needed to stay the first twenty hours after surgery: Yes

## 2020-08-12 NOTE — TELEPHONE ENCOUNTER
FUTURE VISIT INFORMATION      SURGERY INFORMATION:    Date: 20    Location: [facility] OR    Surgeon:  Sam Mejia MD     Anesthesia Type:  General    Procedure: CYSTOSCOPY, WITH CALCULUS REMOVAL WITH HOLMIUM LASER and Urethral Dilation     Consult: OV 20    RECORDS REQUESTED FROM:       Primary Care Provider: Ele Joshi- [facility], [facility]    Pertinent Medical History: Hypertension, cardiomyopathy    Most recent EKG+ Tracin20    Most recent ECHO: 20- [facility]    Most recent PFT's: 20

## 2020-08-12 NOTE — PROGRESS NOTES
Cystoscopy    PRE-PROCEDURE DIAGNOSIS:   1. History of urethral stricture  2. Obstructive voiding  3. History of foreign body in urethra  4. History of drug abuse  5. Urethrocutaneous fistula    POST-PROCEDURE DIAGNOSIS:   1. History of urethral stricture  2. Obstructive voiding  3. History of foreign body in urethra  4. History of drug abuse  5. Urethrocutaneous fistula  6. Urethral Stone  7. Urethral Stricture    PROCEDURE: Urethroscopy    HISTORY: Chip Fowler is a 35 year old man with a very unusual history of urethral manipulation. He has a history of drug abuse with methamphetamine but has since stopped using it. He now has ESRD and is on peritoneal dialysis. He has a history of urethral sounding and BDSM during meth abuse episodes. He has a number of prior episodes of cysto, urethral dilation and laser stone [place] and [place] [facility] [place]. One episode they took out a screw after breaking up stones. He is trying to get worked up for transplant and sent to us for eval. He notes obstructive voiding symptoms. Physical exam reveals some VERY abnormal distal bulbar urethral foreign body (stone vs. Item) and a UC fistula near the penoscrotal junction. He's had suprapubic tubes. He also has a renal mass and Dr. Liriano saw him today who recommended MRI.    Questionnaires reviewed. See flowsheet for details.    REVIEW OF OFFICE STUDIES:  Urinary Flow Rate  Residual Volume by Ultrasound: 140 mL     DESCRIPTION OF PROCEDURE:  After informed consent was obtained, the patient was brought to the procedure room where he was placed in the supine position with all pressure points well padded.  He was prepped and draped in a sterile fashion. A flexible cystoscope was introduced through a well-lubricated urethra.  The urethra was scarred and I had to switch to a pediatric flexible scope. I estimate the urethra at 12-14 Fr. Upon passing into the proximal pendulous urethra, numerous bladder stones were  visible and obstructing the lumen. I could not pass the stones.    ASSESSMENT AND PLAN:  I suspect foreign body with stones in the urethra. And perhaps in the bladder. I will take to OR electively and perform urethral dilation, urethral stone removal, removal of all foreign body, assessment of fistula. I suspect needing a catheter for 3-7 days postop. Patient understands risks, benefits, alternatives. I suspect he will need some form of urethroplasty once we get foreign bodies out and we can evaluate. Will do that during separate procedure.    Sam Mejia MD

## 2020-08-12 NOTE — NURSING NOTE
Chief Complaint   Patient presents with     Consult For     Renal lesion       Blood pressure 128/88, pulse 69. There is no height or weight on file to calculate BMI.    Patient Active Problem List   Diagnosis     Unilateral congenital absence of kidney     ESRD (end stage renal disease) on dialysis (H)     Hypertension     Solitary kidney, congenital     Cardiomyopathy (H)     Bladder stones     Urethral stone     Urethral stricture     Polysubstance abuse (H)       No Known Allergies    Current Outpatient Medications   Medication Sig Dispense Refill     B Complex-C-Folic Acid (DIALYVITE 800) 0.8 MG TABS TK 1 T PO  QAM AFTER DIALYSIS       carvedilol (COREG) 25 MG tablet Take 25 mg by mouth       furosemide (LASIX) 40 MG tablet Lasix 40 mg daily on non dialysis days       lisinopril (ZESTRIL) 2.5 MG tablet Take 2.5 mg by mouth       gentamicin (GARAMYCIN) 0.1 % external ointment APPLY TOPICALLY TO PERITONEAL EXIT SITE QD         Social History     Tobacco Use     Smoking status: Current Every Day Smoker     Packs/day: 0.40     Types: Cigarettes     Start date: 2002     Smokeless tobacco: Never Used     Tobacco comment: 4-8 cigs /day   Substance Use Topics     Alcohol use: Not Currently     Comment: quit alcohol 3-4 yrs ago     Drug use: Not Currently     Types: Methamphetamines       John Herrera, EMT  8/12/2020  9:08 AM

## 2020-08-12 NOTE — PROGRESS NOTES
Chief Complaint:    Renal lesion         History of Present Illness:    Chip Fowler is a very pleasant 35 year old male who presents with a history of ESRD and congenital solitary kidney. On dialysis and under consideration for kidney transplant. On ultrasound earlier this year, noted to have indeterminate left renal lesion on native kidney. No symptoms from this. In context of transplant consideration, his SOT team desires further evaluation of this potential lesion.    Of note, he also has complex history of urethral stricture, and is scheduled to meet with Dr. Mejia later today.          Past Medical History:     Past Medical History:   Diagnosis Date     Bladder stones      Cardiomyopathy (H)      ESRD (end stage renal disease) on dialysis (H)      Hypertension      Migraines      Polysubstance abuse (H)      Solitary kidney, congenital      Urethral stone      Urethral stricture           Past Surgical History:     Past Surgical History:   Procedure Laterality Date     BIOPSY  02/2020    renal, [employer]     COMBINED CYSTOSCOPY, LASER HOLMIUM LITHOTRIPSY URETER(S)       CYSTOSCOPY       HERNIA REPAIR      infant     INSERT CATHETER PERITONEAL DIALYSIS       urethral dilation              Medications     Current Outpatient Medications   Medication     B Complex-C-Folic Acid (DIALYVITE 800) 0.8 MG TABS     carvedilol (COREG) 25 MG tablet     furosemide (LASIX) 40 MG tablet     lisinopril (ZESTRIL) 2.5 MG tablet     gentamicin (GARAMYCIN) 0.1 % external ointment     No current facility-administered medications for this visit.             Family History:     Family History   Problem Relation Age of Onset     Kidney Disease No family hx of             Social History:     Social History     Socioeconomic History     Marital status: Single     Spouse name: Not on file     Number of children: Not on file     Years of education: Not on file     Highest education level: Not on file   Occupational History      Not on file   Social Needs     Financial resource strain: Not on file     Food insecurity     Worry: Not on file     Inability: Not on file     Transportation needs     Medical: Not on file     Non-medical: Not on file   Tobacco Use     Smoking status: Current Every Day Smoker     Packs/day: 0.40     Types: Cigarettes     Start date: 2002     Smokeless tobacco: Never Used     Tobacco comment: 4-8 cigs /day   Substance and Sexual Activity     Alcohol use: Not Currently     Comment: quit alcohol 3-4 yrs ago     Drug use: Not Currently     Types: Methamphetamines     Sexual activity: Not on file   Lifestyle     Physical activity     Days per week: Not on file     Minutes per session: Not on file     Stress: Not on file   Relationships     Social connections     Talks on phone: Not on file     Gets together: Not on file     Attends Protestant service: Not on file     Active member of club or organization: Not on file     Attends meetings of clubs or organizations: Not on file     Relationship status: Not on file     Intimate partner violence     Fear of current or ex partner: Not on file     Emotionally abused: Not on file     Physically abused: Not on file     Forced sexual activity: Not on file   Other Topics Concern     Parent/sibling w/ CABG, MI or angioplasty before 65F 55M? Not Asked   Social History Narrative     Not on file            Allergies:   Patient has no known allergies.         Review of Systems:  From intake questionnaire     Skin: negative  Eyes: negative  Ears/Nose/Throat: negative  Respiratory: No shortness of breath, dyspnea on exertion, cough, or hemoptysis  Cardiovascular: No chest pain or palpitations  Gastrointestinal: negative; no nausea/vomiting, constipation or diarrhea  Genitourinary: as per HPI  Musculoskeletal: negative  Neurologic: negative  Psychiatric: negative  Hematologic/Lymphatic/Immunologic: negative  Endocrine: negative         Physical Exam:     Patient is a 35 year old  male    Vitals: Blood pressure 128/88, pulse 69.  Constitutional: There is no height or weight on file to calculate BMI.  Alert, no acute distress, oriented, conversant  Eyes: no scleral icterus; extraocular muscles intact, moist conjunctivae  Neck: trachea midline, no thyromegaly  Ears/nose/mouth: throat/mouth:normal, good dentition  Respiratory: no respiratory distress, or pursed lip breathing  Cardiovascular: pulses strong and intact; no obvious jugular venous distension present  Gastrointestinal: soft, nontender, no organomegaly or masses,   Lymphatics: No inguinal adenopathy  Musculoskeletal: extremities normal, no peripheral edema  Skin: no suspicious lesions or rashes  Neuro: Alert, oriented, speech and mentation normal  Psych: affect and mood normal, alert and oriented to person, place and time  Gait: Normal      Imaging:    I directly visualized and reviewed all applicable imaging a with patient.    Renal US 2/2020  IMPRESSION:   1. No left hydronephrosis. A left ureteral jet is seen.  2. Question increased echogenicity of the left kidney. This is nonspecific but can be seen with medical renal disease.  3. A faintly visualized 1.7 cm hypoechoic area at the superior pole of the left kidney may represent either a nonspecific lesion or a normal pyramid. A nonemergent renal MRI is recommended for further assessment.  4. Pleural effusions.         Assessment and Plan:     Assessment: 35 year old male with ESRD on dialysis under consideration for transplant. Indeterminate lesion on left solitary kidney from ultrasound earlier this year. In anticipation of potential transplant, will plan for MRI to better assess this potential lesion. Will arrange for this in the near future and will call with results. Patient also to consult with Dr. Mejia with regard to his urethral stricture disease later today.    Plan:  MRI w/o contrast    Orders  Orders Placed This Encounter   Procedures     MR Abdomen w/o Contrast     I spent  over 15 minutes with the patient.  Over half this time was spent on counseling regarding left renal lesion.    Sam Liriano MD  Urology  [facility]

## 2020-08-12 NOTE — NURSING NOTE
Chief Complaint   Patient presents with     Cystoscopy     Stricture work up       There were no vitals taken for this visit. There is no height or weight on file to calculate BMI.    Patient Active Problem List   Diagnosis     Unilateral congenital absence of kidney     ESRD (end stage renal disease) on dialysis (H)     Hypertension     Solitary kidney, congenital     Cardiomyopathy (H)     Bladder stones     Urethral stone     Urethral stricture     Polysubstance abuse (H)       No Known Allergies    Current Outpatient Medications   Medication Sig Dispense Refill     B Complex-C-Folic Acid (DIALYVITE 800) 0.8 MG TABS TK 1 T PO  QAM AFTER DIALYSIS       carvedilol (COREG) 25 MG tablet Take 25 mg by mouth       furosemide (LASIX) 40 MG tablet Lasix 40 mg daily on non dialysis days       gentamicin (GARAMYCIN) 0.1 % external ointment APPLY TOPICALLY TO PERITONEAL EXIT SITE QD       lisinopril (ZESTRIL) 2.5 MG tablet Take 2.5 mg by mouth         Social History     Tobacco Use     Smoking status: Current Every Day Smoker     Packs/day: 0.40     Types: Cigarettes     Start date:      Smokeless tobacco: Never Used     Tobacco comment: 4-8 cigs /day   Substance Use Topics     Alcohol use: Not Currently     Comment: quit alcohol 3-4 yrs ago     Drug use: Not Currently     Types: Methamphetamines       Invasive Procedure Safety Checklist:    Procedure: Cystoscopy    Action: Complete sections and checkboxes as appropriate.    Pre-procedure:  1. Patient ID Verified with 2 identifiers (Janice and  or MRN) : YES    2. Procedure and site verified with patient/designee (when able) : YES    3. Accurate consent documentation in medical record : YES    4. H&P (or appropriate assessment) documented in medical record : N/A  H&P must be up to 30 days prior to procedure an updated within 24 hours of                 Procedure as applicable.     5. Relevant diagnostic and radiology test results appropriately labeled and displayed as  applicable : YES    6. Blood products, implants, devices, and/or special equipment available for the procedure as applicable : YES    7. Procedure site(s) marked with provider initials [Exclusions: none] : NO    8. Marking not required. Reason : Yes  Procedure does not require site marking    Time Out:     Time-Out performed immediately prior to starting procedure, including verbal and active participation of all team members addressing: YES    1. Correct patient identity.  2. Confirmed that the correct side and site are marked.  3. An accurate procedure to be done.  4. Agreement on the procedure to be done.  5. Correct patient position.  6. Relevant images and results are properly labeled and appropriately displayed.  7. The need to administer antibiotics or fluids for irrigation purposes during the procedure as applicable.  8. Safety precautions based on patient history or medication use.    During Procedure: Verification of correct person, site, and procedure occurs any time the responsibility for care of the patient is transferred to another member of the care team.    The following medication was given:     MEDICATION: Lidocaine Uro-Jet 2% 200mg (20mg/mL)  ROUTE: Urethral   SITE: Urethra   DOSE: 10mL  LOT #: [id number]  : [employer]   EXPIRATION DATE: 4-22  NDC#: [address]-93   Was there drug waste? No    Prior to injection, verified patient identity using patient's name and date of birth.  Due to injection administration, patient instructed to remain in clinic for 15 minutes  afterwards, and to report any adverse reaction to me immediately.    Drug Amount Wasted:  None.  Vial/Syringe: Single dose vial      PIYUSH Duggan  8/12/2020  10:30 AM

## 2020-08-12 NOTE — PATIENT INSTRUCTIONS
Please schedule MRI. Dr. Liriano will call you if there are any issues with your imaging.    It was a pleasure meeting with you today.  Thank you for allowing me and my team the privilege of caring for you today.  YOU are the reason we are here, and I truly hope we provided you with the excellent service you deserve.  Please let us know if there is anything else we can do for you so that we can be sure you are leaving completely satisfied with your care experience.

## 2020-08-14 ENCOUNTER — ANESTHESIA EVENT (OUTPATIENT)
Dept: SURGERY | Facility: AMBULATORY SURGERY CENTER | Age: 35
End: 2020-08-14

## 2020-08-14 ENCOUNTER — OFFICE VISIT (OUTPATIENT)
Dept: SURGERY | Facility: CLINIC | Age: 35
End: 2020-08-14
Payer: MEDICARE

## 2020-08-14 VITALS
OXYGEN SATURATION: 100 % | HEIGHT: 72 IN | TEMPERATURE: 98 F | HEART RATE: 66 BPM | SYSTOLIC BLOOD PRESSURE: 118 MMHG | WEIGHT: 159 LBS | RESPIRATION RATE: 16 BRPM | DIASTOLIC BLOOD PRESSURE: 74 MMHG | BODY MASS INDEX: 21.54 KG/M2

## 2020-08-14 DIAGNOSIS — Z01.818 PREOP EXAMINATION: ICD-10-CM

## 2020-08-14 DIAGNOSIS — Z01.818 PRE-TRANSPLANT EVALUATION FOR KIDNEY TRANSPLANT: ICD-10-CM

## 2020-08-14 DIAGNOSIS — N21.1 URETHRAL CALCULUS: ICD-10-CM

## 2020-08-14 DIAGNOSIS — Z76.82 AWAITING ORGAN TRANSPLANT: ICD-10-CM

## 2020-08-14 DIAGNOSIS — Z01.818 PREOP EXAMINATION: Primary | ICD-10-CM

## 2020-08-14 DIAGNOSIS — N18.6 ESRD (END STAGE RENAL DISEASE) (H): ICD-10-CM

## 2020-08-14 PROBLEM — Z72.0 TOBACCO USE: Status: ACTIVE | Noted: 2020-02-25

## 2020-08-14 PROBLEM — I50.9 CONGESTIVE HEART FAILURE OF UNKNOWN ETIOLOGY (H): Status: ACTIVE | Noted: 2020-02-24

## 2020-08-14 PROBLEM — N18.9 ACUTE RENAL FAILURE SUPERIMPOSED ON CHRONIC KIDNEY DISEASE (H): Status: ACTIVE | Noted: 2020-02-24

## 2020-08-14 PROBLEM — N17.9 ACUTE RENAL FAILURE SUPERIMPOSED ON CHRONIC KIDNEY DISEASE (H): Status: ACTIVE | Noted: 2020-02-24

## 2020-08-14 PROBLEM — R82.90 ABNORMAL URINALYSIS: Status: ACTIVE | Noted: 2020-02-24

## 2020-08-14 PROBLEM — F15.10 METHAMPHETAMINE ABUSE, EPISODIC (H): Status: ACTIVE | Noted: 2020-02-24

## 2020-08-14 PROBLEM — G43.909 MIGRAINE: Status: ACTIVE | Noted: 2020-02-24

## 2020-08-14 LAB
ALBUMIN UR-MCNC: 30 MG/DL
ANION GAP SERPL CALCULATED.3IONS-SCNC: 8 MMOL/L (ref 3–14)
APPEARANCE UR: ABNORMAL
BACTERIA #/AREA URNS HPF: ABNORMAL /HPF
BILIRUB UR QL STRIP: NEGATIVE
BUN SERPL-MCNC: 69 MG/DL (ref 7–30)
CALCIUM SERPL-MCNC: 8 MG/DL (ref 8.5–10.1)
CHLORIDE SERPL-SCNC: 110 MMOL/L (ref 94–109)
CO2 SERPL-SCNC: 23 MMOL/L (ref 20–32)
COLOR UR AUTO: YELLOW
CREAT SERPL-MCNC: 7.41 MG/DL (ref 0.66–1.25)
ERYTHROCYTE [DISTWIDTH] IN BLOOD BY AUTOMATED COUNT: 13.5 % (ref 10–15)
GFR SERPL CREATININE-BSD FRML MDRD: 9 ML/MIN/{1.73_M2}
GLUCOSE SERPL-MCNC: 77 MG/DL (ref 70–99)
GLUCOSE UR STRIP-MCNC: 50 MG/DL
HCT VFR BLD AUTO: 35.4 % (ref 40–53)
HGB BLD-MCNC: 11.7 G/DL (ref 13.3–17.7)
HGB UR QL STRIP: NEGATIVE
KETONES UR STRIP-MCNC: NEGATIVE MG/DL
LEUKOCYTE ESTERASE UR QL STRIP: ABNORMAL
MCH RBC QN AUTO: 31 PG (ref 26.5–33)
MCHC RBC AUTO-ENTMCNC: 33.1 G/DL (ref 31.5–36.5)
MCV RBC AUTO: 94 FL (ref 78–100)
NITRATE UR QL: NEGATIVE
PH UR STRIP: 7 PH (ref 5–7)
PLATELET # BLD AUTO: 231 10E9/L (ref 150–450)
POTASSIUM SERPL-SCNC: 4.4 MMOL/L (ref 3.4–5.3)
RBC # BLD AUTO: 3.78 10E12/L (ref 4.4–5.9)
RBC #/AREA URNS AUTO: 4 /HPF (ref 0–2)
SODIUM SERPL-SCNC: 141 MMOL/L (ref 133–144)
SOURCE: ABNORMAL
SP GR UR STRIP: 1.01 (ref 1–1.03)
SQUAMOUS #/AREA URNS AUTO: <1 /HPF (ref 0–1)
UROBILINOGEN UR STRIP-MCNC: 0 MG/DL (ref 0–2)
WBC # BLD AUTO: 10.4 10E9/L (ref 4–11)
WBC #/AREA URNS AUTO: 59 /HPF (ref 0–5)

## 2020-08-14 PROCEDURE — 87088 URINE BACTERIA CULTURE: CPT | Performed by: NURSE PRACTITIONER

## 2020-08-14 PROCEDURE — 87086 URINE CULTURE/COLONY COUNT: CPT | Performed by: NURSE PRACTITIONER

## 2020-08-14 PROCEDURE — 86886 COOMBS TEST INDIRECT TITER: CPT | Performed by: PHYSICIAN ASSISTANT

## 2020-08-14 PROCEDURE — 87186 SC STD MICRODIL/AGAR DIL: CPT | Performed by: NURSE PRACTITIONER

## 2020-08-14 ASSESSMENT — LIFESTYLE VARIABLES: TOBACCO_USE: 1

## 2020-08-14 ASSESSMENT — MIFFLIN-ST. JEOR: SCORE: 1689.22

## 2020-08-14 ASSESSMENT — PAIN SCALES - GENERAL: PAINLEVEL: NO PAIN (0)

## 2020-08-14 NOTE — H&P
Pre-Operative H & P     CC:  Preoperative exam to assess for increased cardiopulmonary risk while undergoing surgery and anesthesia.    Date of Encounter: 8/14/2020  Primary Care Physician:  No Ref-Primary, Physician  Associated diagnosis:  Urethral calculus    HPI  Chip Fowler is a 35 year old male who presents for pre-operative H & P in preparation for CYSTOSCOPY, WITH CALCULUS REMOVAL WITH HOLMIUM LASER and Urethral Dilation on 8/21/20 by Dr. Mejia   at [facility].     Chip Fowler is a 35 year old male with hypertension, cardiomyopathy, tobacco use disorder, migraines, ESRD on dialysis, congenital solitary kidney and meth dependence in remission that has a urethral calculus vs foreign body.  He has a history of urethral sounding and BDSM in the past.  He has had a numerous prior cystoscopies and urethral dilation procedures at other facilities.  There have been incidences before requiring foreign body removal.  He was referred to Dr. Mejia for further evaluation as part of the process of getting worked up for kidney transplant.  He noted Dr. Mejia symptoms of obstructive voiding.  Per Dr. Mejia's note, physical exam revealed abnormal distal bulbar urethral foreign body (stone vs item) and a UC fistula near the penoscrotal junction.  The above listed procedure has now been recommended.     History is obtained from the patient and the medical director.     Past Medical History  Past Medical History:   Diagnosis Date     Bladder stones      Cardiomyopathy (H)      ESRD (end stage renal disease) on dialysis (H)      Hypertension      Migraines      Polysubstance abuse (H)      Solitary kidney, congenital      Urethral stone      Urethral stricture        Past Surgical History  Past Surgical History:   Procedure Laterality Date     BIOPSY  02/2020    renal, [facility]     COMBINED CYSTOSCOPY, LASER HOLMIUM LITHOTRIPSY URETER(S)       CYSTOSCOPY       HERNIA REPAIR      infant      INSERT CATHETER PERITONEAL DIALYSIS       urethral dilation         Hx of Blood transfusions/reactions: none    Hx of abnormal bleeding or anti-platelet use: none      Steroid use in the last year: none    Personal or FH with difficulty with Anesthesia:  none    Prior to Admission Medications  Current Outpatient Medications   Medication Sig Dispense Refill     B Complex-C-Folic Acid (DIALYVITE 800) 0.8 MG TABS Take 1 tablet by mouth every morning        carvedilol (COREG) 25 MG tablet Take 25 mg by mouth 2 times daily (with meals)        furosemide (LASIX) 40 MG tablet Take 40 mg by mouth every morning        gentamicin (GARAMYCIN) 0.1 % external ointment every morning        lisinopril (ZESTRIL) 2.5 MG tablet Take 2.5 mg by mouth every evening          Allergies  No Known Allergies    Social History  Social History     Socioeconomic History     Marital status: Single     Spouse name: Not on file     Number of children: Not on file     Years of education: Not on file     Highest education level: Not on file   Occupational History     Occupation: material stager   Social Needs     Financial resource strain: Not on file     Food insecurity     Worry: Not on file     Inability: Not on file     Transportation needs     Medical: Not on file     Non-medical: Not on file   Tobacco Use     Smoking status: Current Every Day Smoker     Packs/day: 0.40     Types: Cigarettes     Start date: 2002     Smokeless tobacco: Never Used     Tobacco comment: 4-8 cigs /day   Substance and Sexual Activity     Alcohol use: Not Currently     Comment: quit alcohol 3-4 yrs ago     Drug use: Not Currently     Types: Methamphetamines     Sexual activity: Not on file   Lifestyle     Physical activity     Days per week: Not on file     Minutes per session: Not on file     Stress: Not on file   Relationships     Social connections     Talks on phone: Not on file     Gets together: Not on file     Attends Hinduism service: Not on file     Active  member of club or organization: Not on file     Attends meetings of clubs or organizations: Not on file     Relationship status: Not on file     Intimate partner violence     Fear of current or ex partner: Not on file     Emotionally abused: Not on file     Physically abused: Not on file     Forced sexual activity: Not on file   Other Topics Concern     Parent/sibling w/ CABG, MI or angioplasty before 65F 55M? Not Asked   Social History Narrative     Not on file       Family History  Family History   Problem Relation Age of Onset     Alzheimer Disease Mother      Unknown/Adopted Father      No Known Problems Sister      No Known Problems Sister      Kidney Disease No family hx of                ROS/MED HX  The complete review of systems is negative other than noted in the HPI or here.   ENT/Pulmonary:     (+)tobacco use, Current use 0.4 packs/day  , . .   (-) recent URI   Neurologic:  - neg neurologic ROS     Cardiovascular: Comment: cardiomyopathy    (+) hypertension----. : . CHF Last EF: 45% date: 6/2020 . . :    (-) JUNIOR   METS/Exercise Tolerance:  >4 METS   Hematologic:  - neg hematologic  ROS      (-) history of blood clots and History of Transfusion   Musculoskeletal:  - neg musculoskeletal ROS       GI/Hepatic:  - neg GI/hepatic ROS       Renal/Genitourinary:     (+) chronic renal disease, type: ESRD, Pt requires dialysis, type: Peritoneal dialysis, Pt has no history of transplant,       Endo:  - neg endo ROS       Psychiatric:     (+) psychiatric history Psychiatric Hx List: meth dependence in remission since 2/2020.      Infectious Disease:  - neg infectious disease ROS      (-) Recent Fever   Malignancy:      - no malignancy   Other:    (+) no H/O Chronic Pain,                       PHYSICAL EXAM:   Mental Status/Neuro: A/A/O; Age Appropriate   Airway: Facies: Feasible  Mallampati: I  Mouth/Opening: Full  TM distance: > 6 cm  Neck ROM: Full   Respiratory: Auscultation: CTAB     Resp. Rate: Normal     Resp.  "Effort: Normal      CV: Rhythm: Regular  Rate: Age appropriate  Heart: Normal Sounds  Edema: None   Comments: 1 left upper implant     Dental: Details                  Temp: 98  F (36.7  C) Temp src: Oral BP: 118/74 Pulse: 66   Resp: 16 SpO2: 100 %         159 lbs 0 oz  6' 0\"[pt reported[   Body mass index is 21.56 kg/m .       Physical Exam  Constitutional: Awake, alert, cooperative, no apparent distress, and appears stated age.  Eyes: Pupils equal, round and reactive to light, extra ocular muscles intact, sclera clear, conjunctiva normal.  HENT: Normocephalic, oral pharynx with moist mucus membranes, good dentition. No goiter appreciated.   Respiratory: Clear to auscultation bilaterally, no crackles or wheezing.  Cardiovascular: Regular rate and rhythm, normal S1 and S2, and no murmur noted.  Carotids +2, no bruits. No edema. Palpable pulses to radial  DP and PT arteries.   GI: Normal bowel sounds, soft, non-distended, non-tender, no masses palpated, no hepatosplenomegaly.   PD catheter LLQ.     Lymph/Hematologic: No cervical lymphadenopathy and no supraclavicular lymphadenopathy.  Genitourinary:  deferred  Skin: Warm and dry.  No rashes at anticipated surgical site.   Musculoskeletal: Full ROM of neck. There is no redness, warmth, or swelling of the exposed joints. Gross motor strength is normal.    Neurologic: Awake, alert, oriented to name, place and time. Cranial nerves II-XII are grossly intact. Gait is normal.   Neuropsychiatric: Calm, cooperative. Normal affect.     Labs: (personally reviewed)   Component      Latest Ref Rng & Units 8/14/2020   Sodium      133 - 144 mmol/L 141   Potassium      3.4 - 5.3 mmol/L 4.4   Chloride      94 - 109 mmol/L 110 (H)   Carbon Dioxide      20 - 32 mmol/L 23   Anion Gap      3 - 14 mmol/L 8   Glucose      70 - 99 mg/dL 77   Urea Nitrogen      7 - 30 mg/dL 69 (H)   Creatinine      0.66 - 1.25 mg/dL 7.41 (H)   GFR Estimate      >60 mL/min/1.73:m2 9 (L)   GFR Estimate If " Black      >60 mL/min/1.73:m2 10 (L)   Calcium      8.5 - 10.1 mg/dL 8.0 (L)   WBC      4.0 - 11.0 10e9/L 10.4   RBC Count      4.4 - 5.9 10e12/L 3.78 (L)   Hemoglobin      13.3 - 17.7 g/dL 11.7 (L)   Hematocrit      40.0 - 53.0 % 35.4 (L)   MCV      78 - 100 fl 94   MCH      26.5 - 33.0 pg 31.0   MCHC      31.5 - 36.5 g/dL 33.1   RDW      10.0 - 15.0 % 13.5   Platelet Count      150 - 450 10e9/L 231     Component      Latest Ref Rng & Units 8/14/2020   Color Urine       Yellow   Appearance Urine       Slightly Cloudy   Glucose Urine      NEG:Negative mg/dL 50 (A)   Bilirubin Urine      NEG:Negative Negative   Ketones Urine      NEG:Negative mg/dL Negative   Specific Gravity Urine      1.003 - 1.035 1.011   Blood Urine      NEG:Negative Negative   pH Urine      5.0 - 7.0 pH 7.0   Protein Albumin Urine      NEG:Negative mg/dL 30 (A)   Urobilinogen mg/dL      0.0 - 2.0 mg/dL 0.0   Nitrite Urine      NEG:Negative Negative   Leukocyte Esterase Urine      NEG:Negative Large (A)   Source       Midstream Urine   RBC Urine      0 - 2 /HPF 4 (H)   WBC Urine      0 - 5 /HPF 59 (H)   Bacteria Urine      NEG:Negative /HPF Many (A)   Squamous Epithelial /HPF Urine      0 - 1 /HPF <1   Urine culture pending**    Cardiac echo: 6/2/20  CONCLUSIONS  Mild diffuse hypokinesis is present.  Left ventricular ejection fraction is visually estimated at 45%.  No significant valvular abnormalities were identified.  Trace (physiologic) tricuspid regurgitation.  Pulmonary artery pressures cannot be estimated due to absence of  adequate TR jet.  The inferior vena cava is normal suggesting normal RA pressure.    Compared to the prior study on 2/25/2020, EF has significantly  improved.        Outside records reviewed from: Care Everywhere        ASSESSMENT and PLAN  Chip Fowler is a 35 year old male scheduled for CYSTOSCOPY, WITH CALCULUS REMOVAL WITH HOLMIUM LASER and Urethral Dilation on 8/21/20 by Dr. Mejia in treatment of urethral  calculus.  PAC referral for risk assessment and optimization for anesthesia with comorbid conditions of: hypertension, cardiomyopathy, tobacco use disorder, migraines, ESRD on dialysis, congenital solitary kidney and meth dependence in remission.    Pre-operative considerations:  1.  Cardiac:  Functional status- METS >4.  He doesn't purposefully exercise, but reports he is very active at work and can walk several blocks with no complication.  He is followed at [facility] cardiology for the above cardiac conditions.  In February 2020 his EF was noted to be 15-20%.  He has been on lasix, coreg and lisinopril and now on his echo done in June 2020 his EF showed to be improved to 45%.  Continue lasix, coreg and lisinopril with no interruption prior to surgery.  Low risk surgery with 0.9% risk of major adverse cardiac event.   2.  Pulm:  Airway feasible.  ZAHIDA risk: low.  He is a current smoker.  3.  GI:  Risk of PONV score = 2.  If > 2, anti-emetic intervention recommended.  4. :  ESRD secondary to meth use and associated uncontrolled hypertension.  He is doing peritoneal dialysis at home nightly.  He is not actively on the transplant list currently.  He has a congenital solitary kidney.  5. Psych:  Meth dependence in remission since Feb 2020.  6. Heme:  + chronic anemia.      VTE risk: 0.5%    Patient is optimized and is acceptable candidate for the proposed procedure.  No further diagnostic evaluation is needed.     Patient discussed with Dr. Olea.  Approved case for .          Gina Morales DNP, RN, APRN  Preoperative Assessment Center  [facility]  Phone: [phone]  Fax: [phone]

## 2020-08-14 NOTE — RESULT ENCOUNTER NOTE
Kerri Saunders,    Your test results are attached.  All of your labs are okay for surgery.  Your urine culture is pending.       Gina Morales DNP, RN, ANP-C

## 2020-08-14 NOTE — PATIENT INSTRUCTIONS
Preparing for Your Surgery      Name:  Chip Fowler   MRN:  [id number]   :  1985   Today's Date:  2020         Arriving for surgery:  Surgery date:  20  Arrival time:  11:00 am    Restrictions due to COVID 19:  No visitors at the Surgery Center   parking is not available     Please come to:    [facility]  [address] [place] [id number]    Please check in on the 5th floor at the Ambulatory Surgery Center         What can I eat or drink?    -  You may eat and drink normally until 8 hours before surgery. (Until 4:30 am)  -  You may have clear liquids up to 4 hours before surgery. (Until 8:30 am)      Examples of clear liquids:  Water  Clear broth  Juices (apple, white grape, white cranberry  and cider) without pulp  Noncarbonated, powder based beverages  (lemonade and Michael-Aid)  Sodas (Sprite, 7-Up, ginger ale and seltzer)  Coffee or tea (without milk or cream)  Gatorade    --No alcohol for at least 24 hours before surgery    Which medicines can I take?    Hold aspirin for 7 days before surgery.   Hold multivitamins for 7 days before surgery.  Hold supplements for 7 days before surgery.  Hold Ibuprofen (Advil, Motrin) for 1 day before surgery--unless otherwise directed by surgeon.  Hold Naproxen (Aleve) for 4 days before surgery.      -  PLEASE TAKE the following medications the day of surgery:  Carvedilol (Coreg)           Furosemide (Lasix)      How do I prepare myself?  - Please take 2 showers before surgery using Scrubcare or Hibiclens soap.    Use this soap only from the neck to your toes.     Leave the soap on your skin for one minute--then rinse thoroughly.      You may use your own shampoo and conditioner; no other hair products.   - Please remove all jewelry and body piercings.  - No lotions, deodorants or fragrance.  - Bring your ID and insurance card.        - All patients are required to have a Covid-19 test within 4 days of surgery/procedure.       -Patients will be contacted by the [facility] scheduling team within 1 week of surgery to make an appointment.      - Patients may call the Scheduling team at [phone] if they have not been scheduled within 4 days of  surgery.      ALL PATIENTS ARE REQUIRED TO HAVE A RESPONSIBLE ADULT TO DRIVE AND BE IN ATTENDANCE WITH THEM FOR 24 HOURS FOLLOWING SURGERY       Questions or Concerns:    -For questions regarding the day of surgery please contact the Ambulatory Surgery Center at [phone].    -If you have health changes between today and your surgery please contact your surgeon.     For questions after surgery please call your surgeons office.

## 2020-08-14 NOTE — ANESTHESIA PREPROCEDURE EVALUATION
Anesthesia Pre-Procedure Evaluation    Patient: Chip Fowler   MRN:     [id number] Gender:   male   Age:    35 year old :      1985        Preoperative Diagnosis: Urethral calculus [N21.1]   Procedure(s):  CYSTOSCOPY, WITH CALCULUS REMOVAL WITH HOLMIUM LASER and Urethral Dilation     LABS:  CBC: No results found for: WBC, HGB, HCT, PLT  BMP: No results found for: NA, POTASSIUM, CHLORIDE, CO2, BUN, CR, GLC  COAGS: No results found for: PTT, INR, FIBR  POC: No results found for: BGM, HCG, HCGS  OTHER: No results found for: PH, LACT, A1C, VISHNU, PHOS, MAG, ALBUMIN, PROTTOTAL, ALT, AST, GGT, ALKPHOS, BILITOTAL, BILIDIRECT, LIPASE, AMYLASE, SACHA, TSH, T4, T3, CRP, SED     Preop Vitals    BP Readings from Last 3 Encounters:   20 118/74   20 128/88    Pulse Readings from Last 3 Encounters:   20 66   20 69      Resp Readings from Last 3 Encounters:   20 16    SpO2 Readings from Last 3 Encounters:   20 100%      Temp Readings from Last 1 Encounters:   20 98  F (36.7  C) (Oral)    Ht Readings from Last 1 Encounters:   20 1.829 m (6')      Wt Readings from Last 1 Encounters:   20 72.1 kg (159 lb)    Estimated body mass index is 21.56 kg/m  as calculated from the following:    Height as of this encounter: 1.829 m (6').    Weight as of this encounter: 72.1 kg (159 lb).     LDA:        Past Medical History:   Diagnosis Date     Bladder stones      Cardiomyopathy (H)      ESRD (end stage renal disease) on dialysis (H)      Hypertension      Migraines      Polysubstance abuse (H)      Solitary kidney, congenital      Urethral stone      Urethral stricture       Past Surgical History:   Procedure Laterality Date     BIOPSY  2020    renal, [facility]     COMBINED CYSTOSCOPY, LASER HOLMIUM LITHOTRIPSY URETER(S)       CYSTOSCOPY       HERNIA REPAIR      infant     INSERT CATHETER PERITONEAL DIALYSIS       urethral dilation        No Known Allergies     Anesthesia  Evaluation     . Pt has had prior anesthetic. Type: General and MAC    No history of anesthetic complications          ROS/MED HX    ENT/Pulmonary:     (+)tobacco use, Current use 0.4 packs/day  , . .   (-) recent URI   Neurologic:  - neg neurologic ROS     Cardiovascular: Comment: cardiomyopathy    (+) hypertension----. : . CHF Last EF: 45% date: 6/2020 . . :. . Previous cardiac testing Echodate:6/2020results:CONCLUSIONS  Mild diffuse hypokinesis is present.  Left ventricular ejection fraction is visually estimated at 45%.  No significant valvular abnormalities were identified.  Trace (physiologic) tricuspid regurgitation.  Pulmonary artery pressures cannot be estimated due to absence of  adequate TR jet.  The inferior vena cava is normal suggesting normal RA pressure.    Compared to the prior study on 2/25/2020, EF has significantly  improved.date: results: date: results: date: results:         (-) JUNIOR   METS/Exercise Tolerance:  >4 METS   Hematologic:  - neg hematologic  ROS      (-) history of blood clots and History of Transfusion   Musculoskeletal:  - neg musculoskeletal ROS       GI/Hepatic:  - neg GI/hepatic ROS       Renal/Genitourinary:     (+) chronic renal disease, type: ESRD, Pt requires dialysis, type: Peritoneal dialysis, Pt has no history of transplant,       Endo:  - neg endo ROS       Psychiatric:     (+) psychiatric history Psychiatric Hx List: meth dependence in remission since 2/2020.      Infectious Disease:  - neg infectious disease ROS      (-) Recent Fever   Malignancy:      - no malignancy   Other:    (+) no H/O Chronic Pain,                       PHYSICAL EXAM:   Mental Status/Neuro: A/A/O; Age Appropriate   Airway: Facies: Feasible  Mallampati: I  Mouth/Opening: Full  TM distance: > 6 cm  Neck ROM: Full   Respiratory: Auscultation: CTAB     Resp. Rate: Normal     Resp. Effort: Normal      CV: Rhythm: Regular  Rate: Age appropriate  Heart: Normal Sounds  Edema: None   Comments: 1 left  upper implant     Dental: Details                Assessment:   ASA SCORE: 3    H&P: History and physical reviewed and following examination; no interval change.   Smoking Status:  Non-Smoker/Unknown   NPO Status: NPO Appropriate     Plan:   Anes. Type:  General   Pre-Medication: None   Induction:  IV (Standard)   Airway: LMA   Access/Monitoring: PIV   Maintenance: TIVA     Postop Plan:   Postop Pain: Opioids  Postop Sedation/Airway: Not planned  Disposition: Outpatient     PONV Management:   Adult Risk Factors:, Non-Smoker, Postop Opioids   Prevention: Ondansetron, Dexamethasone, No Volatiles     CONSENT: Direct conversation   Plan and risks discussed with: Patient                   PAC Discussion and Assessment    ASA Classification: 3  Case is suitable for: ASC  Anesthetic techniques and relevant risks discussed: GA  Invasive monitoring and risk discussed:   Types:   Possibility and Risk of blood transfusion discussed:   NPO instructions given:   Additional anesthetic preparation and risks discussed:   Needs early admission to pre-op area:   Other:     PAC Resident/NP Anesthesia Assessment:  Chip Fowler is a 35 year old male scheduled for CYSTOSCOPY, WITH CALCULUS REMOVAL WITH HOLMIUM LASER and Urethral Dilation on 8/21/20 by Dr. Mejia in treatment of urethral calculus.  PAC referral for risk assessment and optimization for anesthesia with comorbid conditions of: hypertension, cardiomyopathy, tobacco use disorder, migraines, ESRD on dialysis, congenital solitary kidney and meth dependence in remission.    Pre-operative considerations:  1.  Cardiac:  Functional status- METS >4.  He doesn't purposefully exercise, but reports he is very active at work and can walk several blocks with no complication.  He is followed at [facility] cardiology for the above cardiac conditions.  In February 2020 his EF was noted to be 15-20%.  He has been on lasix, coreg and lisinopril and now on his echo done in June 2020 his EF  showed to be improved to 45%.  Continue lasix, coreg and lisinopril with no interruption prior to surgery.  Low risk surgery with 0.9% risk of major adverse cardiac event.   2.  Pulm:  Airway feasible.  ZAHIDA risk: low.  He is a current smoker.  3.  GI:  Risk of PONV score = 2.  If > 2, anti-emetic intervention recommended.  4. :  ESRD secondary to meth use and associated uncontrolled hypertension.  He is doing peritoneal dialysis at home nightly.  He is not actively on the transplant list currently.  He has a congenital solitary kidney.  5. Psych:  Meth dependence in remission since Feb 2020.  6. Heme:  + chronic anemia.      VTE risk: 0.5%    Patient is optimized and is acceptable candidate for the proposed procedure.  No further diagnostic evaluation is needed.     Patient discussed with Dr. Olea.  Approved case for .      **For further details of assessment, testing, and physical exam please see H and P completed on same date.          Gina Morales DNP, RN, APRN      Reviewed and Signed by PAC Mid-Level Provider/Resident  Mid-Level Provider/Resident: Gina Morales DNP, RN, APRN  Date: 8/14/20  Time: 1337    Attending Anesthesiologist Anesthesia Assessment:        Anesthesiologist:   Date:   Time:   Pass/Fail:   Disposition:     PAC Pharmacist Assessment:        Pharmacist:   Date:   Time:    JESSE Rubio CNP

## 2020-08-16 LAB
BACTERIA SPEC CULT: ABNORMAL
Lab: ABNORMAL
SPECIMEN SOURCE: ABNORMAL

## 2020-08-18 DIAGNOSIS — N18.6 ESRD (END STAGE RENAL DISEASE) (H): ICD-10-CM

## 2020-08-18 DIAGNOSIS — Z01.818 PRE-TRANSPLANT EVALUATION FOR KIDNEY TRANSPLANT: ICD-10-CM

## 2020-08-18 DIAGNOSIS — Z11.59 ENCOUNTER FOR SCREENING FOR OTHER VIRAL DISEASES: ICD-10-CM

## 2020-08-18 DIAGNOSIS — Z76.82 AWAITING ORGAN TRANSPLANT: ICD-10-CM

## 2020-08-18 LAB
ABO + RH BLD: NORMAL
ABO + RH BLD: NORMAL
ALBUMIN SERPL-MCNC: 3.5 G/DL (ref 3.4–5)
ALBUMIN UR-MCNC: 100 MG/DL
ALP SERPL-CCNC: 76 U/L (ref 40–150)
ALT SERPL W P-5'-P-CCNC: 46 U/L (ref 0–70)
ANION GAP SERPL CALCULATED.3IONS-SCNC: 7 MMOL/L (ref 3–14)
APPEARANCE UR: ABNORMAL
APTT PPP: 29 SEC (ref 22–37)
AST SERPL W P-5'-P-CCNC: 18 U/L (ref 0–45)
BACTERIA #/AREA URNS HPF: ABNORMAL /HPF
BASOPHILS # BLD AUTO: 0.1 10E9/L (ref 0–0.2)
BASOPHILS NFR BLD AUTO: 1.1 %
BILIRUB SERPL-MCNC: 0.3 MG/DL (ref 0.2–1.3)
BILIRUB UR QL STRIP: NEGATIVE
BLD GP AB SCN SERPL QL: NORMAL
BLOOD BANK CMNT PATIENT-IMP: NORMAL
BUN SERPL-MCNC: 72 MG/DL (ref 7–30)
CALCIUM SERPL-MCNC: 8.1 MG/DL (ref 8.5–10.1)
CHLORIDE SERPL-SCNC: 108 MMOL/L (ref 94–109)
CO2 SERPL-SCNC: 24 MMOL/L (ref 20–32)
COLOR UR AUTO: YELLOW
CREAT SERPL-MCNC: 7.19 MG/DL (ref 0.66–1.25)
DIFFERENTIAL METHOD BLD: ABNORMAL
EOSINOPHIL # BLD AUTO: 1.6 10E9/L (ref 0–0.7)
EOSINOPHIL NFR BLD AUTO: 15.6 %
ERYTHROCYTE [DISTWIDTH] IN BLOOD BY AUTOMATED COUNT: 13.6 % (ref 10–15)
GFR SERPL CREATININE-BSD FRML MDRD: 9 ML/MIN/{1.73_M2}
GLUCOSE SERPL-MCNC: 80 MG/DL (ref 70–99)
GLUCOSE UR STRIP-MCNC: 50 MG/DL
HCT VFR BLD AUTO: 35.8 % (ref 40–53)
HGB BLD-MCNC: 12 G/DL (ref 13.3–17.7)
HGB UR QL STRIP: ABNORMAL
IMM GRANULOCYTES # BLD: 0 10E9/L (ref 0–0.4)
IMM GRANULOCYTES NFR BLD: 0.3 %
INR PPP: 1.08 (ref 0.86–1.14)
KETONES UR STRIP-MCNC: NEGATIVE MG/DL
LEUKOCYTE ESTERASE UR QL STRIP: ABNORMAL
LYMPHOCYTES # BLD AUTO: 2.5 10E9/L (ref 0.8–5.3)
LYMPHOCYTES NFR BLD AUTO: 23.9 %
MCH RBC QN AUTO: 31 PG (ref 26.5–33)
MCHC RBC AUTO-ENTMCNC: 33.5 G/DL (ref 31.5–36.5)
MCV RBC AUTO: 93 FL (ref 78–100)
MONOCYTES # BLD AUTO: 0.6 10E9/L (ref 0–1.3)
MONOCYTES NFR BLD AUTO: 5.3 %
NEUTROPHILS # BLD AUTO: 5.6 10E9/L (ref 1.6–8.3)
NEUTROPHILS NFR BLD AUTO: 53.8 %
NITRATE UR QL: NEGATIVE
NRBC # BLD AUTO: 0 10*3/UL
NRBC BLD AUTO-RTO: 0 /100
PH UR STRIP: 7 PH (ref 5–7)
PHOSPHATE SERPL-MCNC: 4.4 MG/DL (ref 2.5–4.5)
PLATELET # BLD AUTO: 231 10E9/L (ref 150–450)
POTASSIUM SERPL-SCNC: 4.6 MMOL/L (ref 3.4–5.3)
PROT SERPL-MCNC: 7.3 G/DL (ref 6.8–8.8)
RBC # BLD AUTO: 3.87 10E12/L (ref 4.4–5.9)
RBC #/AREA URNS AUTO: 9 /HPF (ref 0–2)
SODIUM SERPL-SCNC: 140 MMOL/L (ref 133–144)
SOURCE: ABNORMAL
SP GR UR STRIP: 1.01 (ref 1–1.03)
SPECIMEN EXP DATE BLD: NORMAL
SQUAMOUS #/AREA URNS AUTO: <1 /HPF (ref 0–1)
UROBILINOGEN UR STRIP-MCNC: 0 MG/DL (ref 0–2)
WBC # BLD AUTO: 10.4 10E9/L (ref 4–11)
WBC #/AREA URNS AUTO: >182 /HPF (ref 0–5)

## 2020-08-18 PROCEDURE — 81241 F5 GENE: CPT | Performed by: PHYSICIAN ASSISTANT

## 2020-08-18 PROCEDURE — 86147 CARDIOLIPIN ANTIBODY EA IG: CPT | Performed by: PHYSICIAN ASSISTANT

## 2020-08-18 PROCEDURE — 86905 BLOOD TYPING RBC ANTIGENS: CPT | Performed by: PHYSICIAN ASSISTANT

## 2020-08-18 PROCEDURE — 86665 EPSTEIN-BARR CAPSID VCA: CPT | Performed by: PHYSICIAN ASSISTANT

## 2020-08-18 PROCEDURE — 86481 TB AG RESPONSE T-CELL SUSP: CPT | Performed by: PHYSICIAN ASSISTANT

## 2020-08-18 PROCEDURE — 86704 HEP B CORE ANTIBODY TOTAL: CPT | Performed by: PHYSICIAN ASSISTANT

## 2020-08-18 PROCEDURE — 85670 THROMBIN TIME PLASMA: CPT | Performed by: PHYSICIAN ASSISTANT

## 2020-08-18 PROCEDURE — 86706 HEP B SURFACE ANTIBODY: CPT | Performed by: PHYSICIAN ASSISTANT

## 2020-08-18 PROCEDURE — 87340 HEPATITIS B SURFACE AG IA: CPT | Performed by: PHYSICIAN ASSISTANT

## 2020-08-18 PROCEDURE — 85613 RUSSELL VIPER VENOM DILUTED: CPT | Performed by: PHYSICIAN ASSISTANT

## 2020-08-18 PROCEDURE — 86644 CMV ANTIBODY: CPT | Performed by: PHYSICIAN ASSISTANT

## 2020-08-18 PROCEDURE — 85730 THROMBOPLASTIN TIME PARTIAL: CPT | Performed by: PHYSICIAN ASSISTANT

## 2020-08-18 PROCEDURE — 86803 HEPATITIS C AB TEST: CPT | Performed by: PHYSICIAN ASSISTANT

## 2020-08-18 PROCEDURE — 86780 TREPONEMA PALLIDUM: CPT | Performed by: PHYSICIAN ASSISTANT

## 2020-08-18 PROCEDURE — U0003 INFECTIOUS AGENT DETECTION BY NUCLEIC ACID (DNA OR RNA); SEVERE ACUTE RESPIRATORY SYNDROME CORONAVIRUS 2 (SARS-COV-2) (CORONAVIRUS DISEASE [COVID-19]), AMPLIFIED PROBE TECHNIQUE, MAKING USE OF HIGH THROUGHPUT TECHNOLOGIES AS DESCRIBED BY CMS-2020-01-R: HCPCS | Performed by: UROLOGY

## 2020-08-18 PROCEDURE — 40000866 ZZHCL STATISTIC HIV 1/2 ANTIGEN/ANTIBODY PRETRANSPLANT ONLY: Performed by: PHYSICIAN ASSISTANT

## 2020-08-18 PROCEDURE — 86787 VARICELLA-ZOSTER ANTIBODY: CPT | Performed by: PHYSICIAN ASSISTANT

## 2020-08-18 PROCEDURE — 81240 F2 GENE: CPT | Performed by: PHYSICIAN ASSISTANT

## 2020-08-18 PROCEDURE — 86886 COOMBS TEST INDIRECT TITER: CPT | Performed by: PHYSICIAN ASSISTANT

## 2020-08-19 DIAGNOSIS — N39.0 URINARY TRACT INFECTION: Primary | ICD-10-CM

## 2020-08-19 LAB
BLD GP AB SCN TITR SERPL: NORMAL {TITER}
BLOOD BANK CMNT PATIENT-IMP: NORMAL
BLOOD BANK CMNT PATIENT-IMP: NORMAL
CARDIOLIPIN ANTIBODY IGG: <1.6 GPL-U/ML (ref 0–19.9)
CARDIOLIPIN ANTIBODY IGM: 2.1 MPL-U/ML (ref 0–19.9)
CMV IGG SERPL QL IA: <0.2 AI (ref 0–0.8)
EBV VCA IGG SER QL IA: >8 AI (ref 0–0.8)
HBV CORE AB SERPL QL IA: NONREACTIVE
HBV SURFACE AB SERPL IA-ACNC: 212.08 M[IU]/ML
HBV SURFACE AG SERPL QL IA: NONREACTIVE
HCV AB SERPL QL IA: NONREACTIVE
HIV 1+2 AB+HIV1 P24 AG SERPL QL IA: NONREACTIVE
SARS-COV-2 RNA SPEC QL NAA+PROBE: NOT DETECTED
SPECIMEN SOURCE: NORMAL
T PALLIDUM AB SER QL: NONREACTIVE
THROMBIN TIME: 16 SEC (ref 13–19)
VZV IGG SER QL IA: 4.2 AI (ref 0–0.8)

## 2020-08-19 RX ORDER — CEPHALEXIN 500 MG/1
500 CAPSULE ORAL 2 TIMES DAILY
Qty: 10 CAPSULE | Refills: 0 | Status: SHIPPED | OUTPATIENT
Start: 2020-08-19 | End: 2020-08-24

## 2020-08-20 LAB
A* LOCUS: NORMAL
A*: NORMAL
ABTEST METHOD: NORMAL
B* LOCUS: NORMAL
B*: NORMAL
BW-1: NORMAL
BW-2: NORMAL
C* LOCUS: NORMAL
COPATH REPORT: NORMAL
DPA1* NMDP: NORMAL
DPA1*: NORMAL
DPB1* LOCUS NMDP: NORMAL
DPB1* NMDP: NORMAL
DPB1*: NORMAL
DPB1*LOCUS: NORMAL
DQA1*: NORMAL
DQA1*LOCUS: NORMAL
DQB1* LOCUS: NORMAL
DQB1*: NORMAL
DRB1* LOCUS: NORMAL
DRB1*: NORMAL
DRB3* LOCUS: NORMAL
DRSSO TEST METHOD: NORMAL
GAMMA INTERFERON BACKGROUND BLD IA-ACNC: 0.03 IU/ML
M TB IFN-G CD4+ BCKGRND COR BLD-ACNC: 9.97 IU/ML
M TB TUBERC IFN-G BLD QL: NEGATIVE
MITOGEN IGNF BCKGRD COR BLD-ACNC: 0.02 IU/ML
MITOGEN IGNF BCKGRD COR BLD-ACNC: 0.03 IU/ML
PROTOCOL CUTOFF: NORMAL
SA1 CELL: NORMAL
SA1 COMMENTS: NORMAL
SA1 HI RISK ABY: NORMAL
SA1 MOD RISK ABY: NORMAL
SA1 TEST METHOD: NORMAL
SA2 CELL: NORMAL
SA2 COMMENTS: NORMAL
SA2 HI RISK ABY UA: NORMAL
SA2 MOD RISK ABY: NORMAL
SA2 TEST METHOD: NORMAL
UNACCEPTABLE ANTIGEN: NORMAL
UNOS CPRA: 46

## 2020-08-20 RX ORDER — SODIUM CHLORIDE, SODIUM LACTATE, POTASSIUM CHLORIDE, CALCIUM CHLORIDE 600; 310; 30; 20 MG/100ML; MG/100ML; MG/100ML; MG/100ML
INJECTION, SOLUTION INTRAVENOUS CONTINUOUS
Status: CANCELLED | OUTPATIENT
Start: 2020-08-20

## 2020-08-21 ENCOUNTER — HOSPITAL ENCOUNTER (OUTPATIENT)
Facility: AMBULATORY SURGERY CENTER | Age: 35
End: 2020-08-21
Attending: UROLOGY
Payer: MEDICARE

## 2020-08-21 ENCOUNTER — ANESTHESIA (OUTPATIENT)
Dept: SURGERY | Facility: AMBULATORY SURGERY CENTER | Age: 35
End: 2020-08-21

## 2020-08-21 ENCOUNTER — ANCILLARY PROCEDURE (OUTPATIENT)
Dept: RADIOLOGY | Facility: AMBULATORY SURGERY CENTER | Age: 35
End: 2020-08-21
Attending: UROLOGY
Payer: MEDICARE

## 2020-08-21 VITALS
SYSTOLIC BLOOD PRESSURE: 103 MMHG | OXYGEN SATURATION: 98 % | HEART RATE: 69 BPM | DIASTOLIC BLOOD PRESSURE: 56 MMHG | RESPIRATION RATE: 16 BRPM | HEIGHT: 72 IN | TEMPERATURE: 97 F | BODY MASS INDEX: 21.54 KG/M2 | WEIGHT: 159 LBS

## 2020-08-21 DIAGNOSIS — N21.1 URETHRAL CALCULUS: ICD-10-CM

## 2020-08-21 DIAGNOSIS — N39.498 OTHER URINARY INCONTINENCE: ICD-10-CM

## 2020-08-21 LAB
ANION GAP SERPL CALCULATED.3IONS-SCNC: 8 MMOL/L (ref 3–14)
BUN SERPL-MCNC: 66 MG/DL (ref 7–30)
CALCIUM SERPL-MCNC: 8.1 MG/DL (ref 8.5–10.1)
CHLORIDE SERPL-SCNC: 110 MMOL/L (ref 94–109)
CO2 SERPL-SCNC: 20 MMOL/L (ref 20–32)
CREAT SERPL-MCNC: 7.16 MG/DL (ref 0.66–1.25)
GFR SERPL CREATININE-BSD FRML MDRD: 9 ML/MIN/{1.73_M2}
GLUCOSE SERPL-MCNC: 85 MG/DL (ref 70–99)
LA PPP-IMP: NEGATIVE
POTASSIUM SERPL-SCNC: 4.5 MMOL/L (ref 3.4–5.3)
SODIUM SERPL-SCNC: 138 MMOL/L (ref 133–144)

## 2020-08-21 PROCEDURE — 88300 SURGICAL PATH GROSS: CPT | Performed by: UROLOGY

## 2020-08-21 RX ORDER — LIDOCAINE HYDROCHLORIDE 20 MG/ML
INJECTION, SOLUTION INFILTRATION; PERINEURAL PRN
Status: DISCONTINUED | OUTPATIENT
Start: 2020-08-21 | End: 2020-08-21

## 2020-08-21 RX ORDER — ACETAMINOPHEN 325 MG/1
650 TABLET ORAL EVERY 6 HOURS PRN
Qty: 90 TABLET | Refills: 0 | Status: ON HOLD | OUTPATIENT
Start: 2020-08-21 | End: 2023-01-18

## 2020-08-21 RX ORDER — FENTANYL CITRATE 50 UG/ML
25-50 INJECTION, SOLUTION INTRAMUSCULAR; INTRAVENOUS
Status: DISCONTINUED | OUTPATIENT
Start: 2020-08-21 | End: 2020-08-22 | Stop reason: HOSPADM

## 2020-08-21 RX ORDER — LIDOCAINE 40 MG/G
CREAM TOPICAL
Status: DISCONTINUED | OUTPATIENT
Start: 2020-08-21 | End: 2020-08-21 | Stop reason: HOSPADM

## 2020-08-21 RX ORDER — IOPAMIDOL 612 MG/ML
INJECTION, SOLUTION INTRAVASCULAR PRN
Status: DISCONTINUED | OUTPATIENT
Start: 2020-08-21 | End: 2020-08-21 | Stop reason: HOSPADM

## 2020-08-21 RX ORDER — AMOXICILLIN 250 MG
1 CAPSULE ORAL DAILY
Qty: 14 TABLET | Refills: 0 | Status: SHIPPED | OUTPATIENT
Start: 2020-08-21 | End: 2020-09-04

## 2020-08-21 RX ORDER — ONDANSETRON 4 MG/1
4 TABLET, ORALLY DISINTEGRATING ORAL EVERY 30 MIN PRN
Status: DISCONTINUED | OUTPATIENT
Start: 2020-08-21 | End: 2020-08-22 | Stop reason: HOSPADM

## 2020-08-21 RX ORDER — EPHEDRINE SULFATE 50 MG/ML
INJECTION, SOLUTION INTRAMUSCULAR; INTRAVENOUS; SUBCUTANEOUS PRN
Status: DISCONTINUED | OUTPATIENT
Start: 2020-08-21 | End: 2020-08-21

## 2020-08-21 RX ORDER — CEFTRIAXONE 1 G/1
1 INJECTION, POWDER, FOR SOLUTION INTRAMUSCULAR; INTRAVENOUS
Status: COMPLETED | OUTPATIENT
Start: 2020-08-21 | End: 2020-08-21

## 2020-08-21 RX ORDER — FENTANYL CITRATE 50 UG/ML
25-50 INJECTION, SOLUTION INTRAMUSCULAR; INTRAVENOUS
Status: DISCONTINUED | OUTPATIENT
Start: 2020-08-21 | End: 2020-08-21 | Stop reason: HOSPADM

## 2020-08-21 RX ORDER — GABAPENTIN 300 MG/1
300 CAPSULE ORAL ONCE
Status: DISCONTINUED | OUTPATIENT
Start: 2020-08-21 | End: 2020-08-21 | Stop reason: HOSPADM

## 2020-08-21 RX ORDER — HYDROMORPHONE HYDROCHLORIDE 1 MG/ML
.3-.5 INJECTION, SOLUTION INTRAMUSCULAR; INTRAVENOUS; SUBCUTANEOUS EVERY 10 MIN PRN
Status: DISCONTINUED | OUTPATIENT
Start: 2020-08-21 | End: 2020-08-22 | Stop reason: HOSPADM

## 2020-08-21 RX ORDER — SODIUM CHLORIDE, SODIUM LACTATE, POTASSIUM CHLORIDE, CALCIUM CHLORIDE 600; 310; 30; 20 MG/100ML; MG/100ML; MG/100ML; MG/100ML
INJECTION, SOLUTION INTRAVENOUS CONTINUOUS
Status: DISCONTINUED | OUTPATIENT
Start: 2020-08-21 | End: 2020-08-21 | Stop reason: HOSPADM

## 2020-08-21 RX ORDER — DEXAMETHASONE SODIUM PHOSPHATE 4 MG/ML
INJECTION, SOLUTION INTRA-ARTICULAR; INTRALESIONAL; INTRAMUSCULAR; INTRAVENOUS; SOFT TISSUE PRN
Status: DISCONTINUED | OUTPATIENT
Start: 2020-08-21 | End: 2020-08-21

## 2020-08-21 RX ORDER — ACETAMINOPHEN 325 MG/1
975 TABLET ORAL ONCE
Status: COMPLETED | OUTPATIENT
Start: 2020-08-21 | End: 2020-08-21

## 2020-08-21 RX ORDER — PROPOFOL 10 MG/ML
INJECTION, EMULSION INTRAVENOUS PRN
Status: DISCONTINUED | OUTPATIENT
Start: 2020-08-21 | End: 2020-08-21

## 2020-08-21 RX ORDER — MEPERIDINE HYDROCHLORIDE 25 MG/ML
12.5 INJECTION INTRAMUSCULAR; INTRAVENOUS; SUBCUTANEOUS
Status: DISCONTINUED | OUTPATIENT
Start: 2020-08-21 | End: 2020-08-22 | Stop reason: HOSPADM

## 2020-08-21 RX ORDER — ONDANSETRON 2 MG/ML
4 INJECTION INTRAMUSCULAR; INTRAVENOUS EVERY 30 MIN PRN
Status: DISCONTINUED | OUTPATIENT
Start: 2020-08-21 | End: 2020-08-22 | Stop reason: HOSPADM

## 2020-08-21 RX ORDER — AMPICILLIN 1 G/1
1 INJECTION, POWDER, FOR SOLUTION INTRAMUSCULAR; INTRAVENOUS
Status: COMPLETED | OUTPATIENT
Start: 2020-08-21 | End: 2020-08-21

## 2020-08-21 RX ORDER — PROPOFOL 10 MG/ML
INJECTION, EMULSION INTRAVENOUS CONTINUOUS PRN
Status: DISCONTINUED | OUTPATIENT
Start: 2020-08-21 | End: 2020-08-21

## 2020-08-21 RX ORDER — NALOXONE HYDROCHLORIDE 0.4 MG/ML
.1-.4 INJECTION, SOLUTION INTRAMUSCULAR; INTRAVENOUS; SUBCUTANEOUS
Status: DISCONTINUED | OUTPATIENT
Start: 2020-08-21 | End: 2020-08-22 | Stop reason: HOSPADM

## 2020-08-21 RX ORDER — OXYCODONE HYDROCHLORIDE 5 MG/1
5 TABLET ORAL EVERY 6 HOURS PRN
Qty: 10 TABLET | Refills: 0 | Status: SHIPPED | OUTPATIENT
Start: 2020-08-21 | End: 2020-08-24

## 2020-08-21 RX ORDER — ONDANSETRON 2 MG/ML
INJECTION INTRAMUSCULAR; INTRAVENOUS PRN
Status: DISCONTINUED | OUTPATIENT
Start: 2020-08-21 | End: 2020-08-21

## 2020-08-21 RX ORDER — SODIUM CHLORIDE 9 MG/ML
INJECTION, SOLUTION INTRAVENOUS
Status: COMPLETED | OUTPATIENT
Start: 2020-08-21 | End: 2020-08-21

## 2020-08-21 RX ORDER — OXYCODONE HYDROCHLORIDE 5 MG/1
5 TABLET ORAL EVERY 4 HOURS PRN
Status: DISCONTINUED | OUTPATIENT
Start: 2020-08-21 | End: 2020-08-22 | Stop reason: HOSPADM

## 2020-08-21 RX ORDER — FENTANYL CITRATE 50 UG/ML
INJECTION, SOLUTION INTRAMUSCULAR; INTRAVENOUS PRN
Status: DISCONTINUED | OUTPATIENT
Start: 2020-08-21 | End: 2020-08-21

## 2020-08-21 RX ADMIN — LIDOCAINE HYDROCHLORIDE 80 MG: 20 INJECTION, SOLUTION INFILTRATION; PERINEURAL at 14:27

## 2020-08-21 RX ADMIN — FENTANYL CITRATE 25 MCG: 50 INJECTION, SOLUTION INTRAMUSCULAR; INTRAVENOUS at 15:12

## 2020-08-21 RX ADMIN — PROPOFOL: 10 INJECTION, EMULSION INTRAVENOUS at 15:04

## 2020-08-21 RX ADMIN — CEFTRIAXONE 1 G: 1 INJECTION, POWDER, FOR SOLUTION INTRAMUSCULAR; INTRAVENOUS at 14:30

## 2020-08-21 RX ADMIN — AMPICILLIN 1 G: 1 INJECTION, POWDER, FOR SOLUTION INTRAMUSCULAR; INTRAVENOUS at 14:24

## 2020-08-21 RX ADMIN — ACETAMINOPHEN 975 MG: 325 TABLET ORAL at 12:21

## 2020-08-21 RX ADMIN — FENTANYL CITRATE 25 MCG: 50 INJECTION, SOLUTION INTRAMUSCULAR; INTRAVENOUS at 15:23

## 2020-08-21 RX ADMIN — PROPOFOL 50 MG: 10 INJECTION, EMULSION INTRAVENOUS at 14:28

## 2020-08-21 RX ADMIN — PROPOFOL 50 MG: 10 INJECTION, EMULSION INTRAVENOUS at 14:51

## 2020-08-21 RX ADMIN — PROPOFOL 200 MG: 10 INJECTION, EMULSION INTRAVENOUS at 14:27

## 2020-08-21 RX ADMIN — PROPOFOL 150 MCG/KG/MIN: 10 INJECTION, EMULSION INTRAVENOUS at 14:27

## 2020-08-21 RX ADMIN — PROPOFOL 30 MG: 10 INJECTION, EMULSION INTRAVENOUS at 14:29

## 2020-08-21 RX ADMIN — SODIUM CHLORIDE: 9 INJECTION, SOLUTION INTRAVENOUS at 14:24

## 2020-08-21 RX ADMIN — FENTANYL CITRATE 25 MCG: 50 INJECTION, SOLUTION INTRAMUSCULAR; INTRAVENOUS at 15:48

## 2020-08-21 RX ADMIN — ONDANSETRON 4 MG: 2 INJECTION INTRAMUSCULAR; INTRAVENOUS at 14:27

## 2020-08-21 RX ADMIN — DEXAMETHASONE SODIUM PHOSPHATE 4 MG: 4 INJECTION, SOLUTION INTRA-ARTICULAR; INTRALESIONAL; INTRAMUSCULAR; INTRAVENOUS; SOFT TISSUE at 14:27

## 2020-08-21 RX ADMIN — PROPOFOL 30 MG: 10 INJECTION, EMULSION INTRAVENOUS at 15:11

## 2020-08-21 RX ADMIN — EPHEDRINE SULFATE 10 MG: 50 INJECTION, SOLUTION INTRAMUSCULAR; INTRAVENOUS; SUBCUTANEOUS at 14:44

## 2020-08-21 RX ADMIN — OXYCODONE HYDROCHLORIDE 5 MG: 5 TABLET ORAL at 16:48

## 2020-08-21 ASSESSMENT — MIFFLIN-ST. JEOR: SCORE: 1689.22

## 2020-08-21 NOTE — ANESTHESIA CARE TRANSFER NOTE
Patient: Chip Fowler    Procedure(s):  CYSTOSCOPY, bladder and urethral stone extraction, removal of foreign body, urethral dilation, urethrotomy, laser on standby    Diagnosis: Urethral calculus [N21.1]  Diagnosis Additional Information: No value filed.    Anesthesia Type:   General     Note:  Airway :Face Mask  Patient transferred to:PACU  Comments: VSS and WNL, comfortable, no PONV, report to Erin RESENDEZHandoff Report: Identifed the Patient, Identified the Reponsible Provider, Reviewed the pertinent medical history, Discussed the surgical course, Reviewed Intra-OP anesthesia mangement and issues during anesthesia, Set expectations for post-procedure period and Allowed opportunity for questions and acknowledgement of understanding      Vitals: (Last set prior to Anesthesia Care Transfer)    CRNA VITALS  8/21/2020 1533 - 8/21/2020 1609      8/21/2020             Pulse:  68    SpO2:  98 %    Resp Rate (observed):  (!) 5    Resp Rate (set):  10                Electronically Signed By: JESSE Callejas CRNA  August 21, 2020  4:09 PM

## 2020-08-21 NOTE — PROGRESS NOTES
PREOPERATIVE DIAGNOSIS:  Urethral stricture, urethral foreign body    POSTOPERATIVE DIAGNOSIS: As above    PROCEDURES PERFORMED:   1. Cystoscopy  2. Urethral dilation  3. Direct visualization internal urethrotomy  4. Removal of urethral foreign body  5. Intraoperative interpretation of fluoroscopic images  6. Complex catheter placement    STAFF SURGEON: Sam Mejia MD    ASSISTANT: Gaetano Yang MD    ANESTHESIA: GET    ESTIMATED BLOOD LOSS: <5cc mL.     IV FLUIDS:  see dictated anesthesia record    COMPLICATIONS: None.     SIGNIFICANT FINDINGS: Urethral foreign bodies, including one clip and one Cash pin.  Long anterior urethral stricture involving the pendulous and bulbar urethra.  Dilation performed using balloon dilator and Amplatz dilators, DVIU performed with urethrotome.  Complex catheter placement over a wire.    BRIEF OPERATIVE INDICATIONS: Chip Fowler is a 35 year old man with a history of urethral manipulation and insertion of foreign bodies, often while on Meth, who now has new foreign bodies in his urethra. He also has a history of frequent urethral sounding and BDSM during meth abuse episodes and has developed a worsening stricture. He understands the risks to include but not be limited to bleeding, infection, the need for additional procedures and recurrence of primary disease. He wishes to proceed.    DESCRIPTION OF PROCEDURE:  After full informed voluntary consent was obtained, the patient was transported to the operating room, placed in dorsal lithotomy on the table. After adequate anesthesia was induced, they were prepped and draped in the usual sterile fashion. A timeout was taken to confirm correct patient, procedure and laterality.     A 19 Vietnamese rigid cystoscope was inserted into a well-lubricated urethra but was unable to be passed beyond the proximal pendulous urethra given a large, dense stricture.  A sensor wire was fed through the scope into the bladder, confirmed by anoscopy.   Fluoroscopy was also notable for a few foreign object seemingly within the urethra, 1 of which.  To be a Cash pin.  The second object was irregular and difficult to identify.  A flexible grasper was passed through the rigid scope and grabbed the second object, which was then removed.  This appeared to be a clip of some sort.  The scope was then reinserted but again was unable to be passed.  We therefore decided to proceed with dilation of the urethra.    We began dilation by using Amplatz serial dilators, however only able to dilate up to 16 Taiwanese and were unable to pass the 18 Taiwanese dilator.  We also tried a balloon dilator, up to 30 Taiwanese, but this was also unsuccessful at allowing us to accommodate the 19 Taiwanese rigid scope, so we decided to proceed with direct visualization internal urethrotomy.  Using the urethrotome through the 21 Taiwanese urethrotomy scope, radial cuts were then made with a straight blade until the caliber of the urethra was approximately 20 F and the base of the incisions appeared healthy and bleeding.  We were then able to advance an offset pediatric rigid cystoscope first, and then the 19 Taiwanese rigid adult cystoscope with mild force.  The stricture did appear to and distal to the external sphincter, and were ultimately able to pass the scope through the bladder.  The bladder was drained.    We finished by draining a few small pieces of bladder debris/small stones through the scope, then advancing an 18F [place] tip catheter over the wire into the bladder, filling with 10 mL sterile water, then irrigating. Urine was clear.     Patient tolerated the procedure well.  No apparent complications. He was transported to the postanesthesia care unit in stable condition.

## 2020-08-21 NOTE — DISCHARGE INSTRUCTIONS
[facility]  Home Care Following Anesthesia  For 24 hours after surgery:  1. Get plenty of rest.  A responsible adult must stay with you for at least 24 hours after you leave the surgery center.  2. Do not drive or use heavy equipment.  If you have weakness or tingling, don't drive or use heavy equipment until this feeling goes away.   3. Do not drink alcohol.   4. Avoid strenuous or risky activities.  Ask for help when climbing stairs.  5. You may feel lightheaded.  IF so, sit for a few minutes before standing.  Have someone help you get up.   6. If you have nausea (feel sick to your stomach): Drink only clear liquids such as apple juice, ginger ale, broth or 7-Up.  Rest may also help.  Be sure to drink enough fluids.  Move to a regular diet as you feel able.   7. You may have a slight fever.  Call the doctor if your fever is over 100 F (37.7 C) (taken under the tongue) or lasts longer than 24 hours.  8. You may have a dry mouth, a sore throat, muscle aches or trouble sleeping. These should go away after 24 hours.  9. Do not make important or legal decisions.               Tips for taking pain medications  To get the best pain relief possible, remember these points:    Take pain medications as directed, before pain becomes severe.    Pain medication can upset your stomach: taking it with food may help.    Constipation is a common side effect of pain medication. Drink plenty of  fluids.    Eat foods high in fiber. Take a stool softener if recommended by your doctor or pharmacist.    Do not drink alcohol, drive or operate machinery while taking pain medications.    Ask about other ways to control pain, such as with heat, ice or relaxation.    Tylenol/Acetaminophen Consumption  To help encourage the safe use of acetaminophen, the makers of TYLENOL  have lowered the maximum daily dose for single-ingredient Extra Strength TYLENOL  (acetaminophen) products sold in the [place] from 8  pills per day (4,000 mg) to 6 pills per day (3,000 mg). The dosing interval has also changed from 2 pills every 4-6 hours to 2 pills every 6 hours.    If you feel your pain relief is insufficient, you may take Tylenol/Acetaminophen in addition to your narcotic pain medication.     Be careful not to exceed 3,000 mg of Tylenol/Acetaminophen in a 24 hour period from all sources.    If you are taking extra strength Tylenol/acetaminophen (500 mg), the maximum dose is 6 tablets in 24 hours.    If you are taking regular strength acetaminophen (325 mg), the maximum dose is 9 tablets in 24 hours.    Call a doctor for any of the followin. Signs of infection (fever, growing tenderness at the surgery site, a large amount of drainage or bleeding, severe pain, foul-smelling drainage, redness, swelling).  2. It has been over 8 to 10 hours since surgery and you are still not able to urinate (pass water).  3. Headache for over 24 hours.  4. Numbness, tingling or weakness the day after surgery (if you had spinal anesthesia).  5. Signs of Covid-19 infection (temperature over 100 degrees, shortness of breath, cough, loss of taste/smell, generalized body aches, persistent headache, chills, sore throat, nausea/vomiting/diarrhea)  Your doctor is:       Dr. Sam Mejia, Prostate and Urology: [phone]               Or dial [phone] and ask for the resident on call for:  Prostate Urology  For emergency care, call the:  [facility]:  [phone] (TTY for hearing impaired: [phone])    Emptying and Cleaning Your Urinary Catheter Bag  You have an indwelling urinary catheter. This drains urine from your bladder into a bag. The bag can be one that is used at your bedside. Or it can be a smaller bag that is strapped to your leg. Follow the steps below to empty and clean a urinary bag.       Drain Clean tube Clean catheter   Step 1. Drain the bag    Wash your hands well with soap and water to prevent  infecting the urinary catheter and bag.    If the short drainage tube is inserted into a pocket on the bag, take the drainage tube out of the pocket.    Hold the drainage tube over a toilet or measuring container. Open the valve.    Don t touch the tip of the valve or let it touch the toilet or container.    Wash your hands again.  Step 2. Clean the drainage tube    When the bag is empty, clean the tip of the drainage valve with an alcohol wipe.    Close the valve.    Reinsert the drainage tube into the pocket, if there is one.  Step 3. Clean your skin    Wash your hands well before and after cleaning your skin.    If you have a catheter (such as a Taylor) that enters through the urethra, clean the urethral area with soap and water 1 time(s) daily as you were taught by your healthcare provider. You should also clean after every bowel movement to prevent infection.  ? Don't pull on the tubing when cleaning so you don t injure the urethra.  ? Don t apply antibiotic ointment or any other antibacterial product to the urethra.  ? Don t use lubricant on the urethra.  ? Don t apply powder to the genital area or to the tubing.    If you have a suprapubic catheter, your healthcare provider will tell you how to clean your skin around the catheter. This is a catheter that was surgically placed into the bladder through the lower abdomen.  Step 4. Check and clean the catheter tubing    Check the tubing. If there are kinks, cracks, clogs, or you can t see into the tubing, you ll need to change to new tubing as you were shown by your healthcare provider.    If the current tubing can still be used, wash it with soap and water. Always wash the tubing in the direction away from your body. Don't pull on the tubing.    Dry the tubing with a clean washcloth or paper towel.  Step 5. Clean the drainage bag    Have a clean backup bag or other drainage device ready.    Follow these steps:  ? Wash your hands well with soap and  water.  ? Disconnect the bag from the catheter tubing. Connect the tubing to the backup bag or drainage device.  ? Drain any remaining urine from the bag you just disconnected. Close the drainage valve.  ? Pour some warm (not hot) soapy water into the bag. Swish the soap around, being sure to get the corners of the bag.  ? Open the drainage valve to drain the soap. Close the valve.:  ? Use a certain solution to clean the bag if your healthcare provider recommends one. Recommended solutions may include:     2 parts vinegar and 3 parts water    1 tablespoon of chlorine bleach mixed with a half cup of water  ? Ask your healthcare provider how often you should clean your bag and what solution you should use to reduce odor and keep your bag free of germs.  ? Shake the solution a bit and allow it to remain in the bag for 30 minutes.  ? Drain the solution and rinse the bag with cold tap water.  ? Hang the bag to drain and air-dry.  When to call your healthcare provider  Call your healthcare provider right away if you have any of the following:    Little or no urine flowing into the bag    Urine leaking where the catheter enters the body    Pain, burning, or redness of the area where the catheter enters the body    Bloody urine (a trace of blood is normal)    Cloudy or foul-smelling urine, or sand-like grains in your urine    Pain in your lower back or lower abdomen    Your catheter falls out    Fever of 100.4 F (38 C) or higher, or as directed by your healthcare provider    Shaking chills   Date Last Reviewed: 1/1/2017 2000-2018 [employer]. [address] [place] [address]. All rights reserved. This information is not intended as a substitute for professional medical care. Always follow your healthcare professional's instructions.       Discharge Instructions: Caring for Your Leg Bag  You are going home with a urinary catheter and collection device (drainage bag) in place. One type of collection  device is called a leg bag. This is a smaller drainage bag that you can wear on your leg to collect urine during the day. The bag can fit under your clothing. You can move around with greater ease when using a leg bag instead of a larger collection bag.  You were shown how to care for your catheter in the hospital. This sheet will help you remember those steps when you are at home.  Home care    Wash your hands thoroughly before and after you care for your catheter or collection device.    Gather your supplies:  ? Alcohol wipes  ? Soap and water  ? Towel and washcloth  ? Leg strap and leg bag    Use soap and water to wash the area where your catheter enters your body. Rinse well.    Secure the bag guan to your leg:  ? Put the leg band high on your thigh with the product label pointing away from your leg.  ? Stretch the leg band in place and fasten.  ? Place the catheter tubing over the bag and secure it. You may secure it with a Velcro tab or other method, depending on the product you use. Be sure to leave enough loop in the catheter above the leg band so you won't pull on the tube.  ? Every 4 to 6 hours, reposition the band. This will prevent pressure from the elastic on your leg. You can do this by changing the bag to the other leg or by raising or lowering the leg band.  ? Wash the band as often as needed. You can hand wash and dry the leg band.    Place the bag in the bag guan.    Clean the urine bag end of the catheter and your catheter port with an alcohol wipe.    Place a towel under the bag and port to keep urine from dripping onto your leg.    Before connecting the outlet valve at the bottom of the bag to the catheter, make sure that it is firmly closed. Flip the valve upward toward the bag. It needs to snap firmly in place. Be sure not to tug on the tubing. Be gentle.    Attach the urine bag to the end of the catheter. Insert the connector snugly into the catheter port. You can prevent dribbling urine  by bending the catheter tubing just below the tip and holding it while you disconnect it from the catheter. Be careful to keep the tip clean while connecting the leg bag tubing to the catheter--this keeps germs from getting into the system.    Drain the bag when it's full. To drain the bag, flip the clamp downward. Direct the flexible outlet tube to control the flow of urine. You don t have to disconnect the leg bag from the catheter to empty it. Raise your leg up to the edge of the toilet to reach the leg bag. Then you can empty the bag directly into the toilet. This way, you won t need to bend over, which may be uncomfortable.    Keep the leg bag clean. Your healthcare provider may recommend that you use a specific solution to clean the bag. Recommended solutions may include:  ? 2 parts vinegar and 3 parts water  ? 1 tablespoon of chlorine bleach mixed with a half cup of water    Ask your healthcare provider how often you should clean your bag and what solution you should use ?to reduce odor and keep the bag free of germs.    Shake the solution a bit and allow it to remain in the bag for 30 minutes.      Drain the solution and rinse the bag with cold tap water.    Hang the bag to drain and air dry.    Remember to keep the drainage bag below the level of your bladder for proper drainage.  Follow-up  Make a follow-up appointment as directed by your healthcare provider.  When to call your healthcare provider  Call your healthcare provider right away if you have any of the following:    Redness, swelling, or warmth around the catheter entry site    Pus draining from your catheter entry site or into the catheter tubing and bag    Blood, clots, or floating debris in the urine    Nausea and vomiting    Shaking chills    Fever above 100.4 F (38 C), or as directed by your healthcare provider    Pain that is not relieved by medicine     Catheter that falls out or is dislodged   Date Last Reviewed: 1/1/2017 2000-2018 [employer]. [address] [place] [address], [place] [address]. All rights reserved. This information is not intended as a substitute for professional medical care. Always follow your healthcare professional's instructions.

## 2020-08-21 NOTE — BRIEF OP NOTE
[facility]    Brief Operative Note    Pre-operative diagnosis: Urethral calculus [N21.1]  Post-operative diagnosis Same as pre-operative diagnosis    Procedure: Procedure(s):  CYSTOSCOPY, bladder and urethral stone extraction, removal of foreign body, urethral dilation, urethrotomy, laser on standby  Surgeon: Surgeon(s) and Role:     * Sam Mejia MD - Primary     * Naldo Carmona MD - Resident - Assisting  Anesthesia: General   Estimated blood loss: Minimal  Drains:  18F [place]-tip catheter  Specimens:   ID Type Source Tests Collected by Time Destination   A : Foreign Bodies Other (specify in comments) Other SURGICAL PATHOLOGY EXAM Sam Mejia MD 8/21/2020  3:59 PM      Findings:   Metal clip, marybeth pin in urethra removed via grasper, dilation performed with Amplatz dilators and balloon dilators, DVIU using urethrotome, catheter placed .  Complications: None.  Implants: * No implants in log *    Plan:  - Catheter until follow up 9/9/20

## 2020-08-25 ENCOUNTER — PRE VISIT (OUTPATIENT)
Dept: UROLOGY | Facility: CLINIC | Age: 35
End: 2020-08-25

## 2020-08-25 LAB — COPATH REPORT: NORMAL

## 2020-08-25 NOTE — TELEPHONE ENCOUNTER
Visit Type : Clinic-Post Op    Hx/Sx: Urethral stone/ foreign body removal     Records/Orders: yes    Pt Contacted: n/a    At Rooming: TOV/ Cath removal

## 2020-08-26 NOTE — OP NOTE
PRE-PROCEDURE DIAGNOSIS:   1. Urethral stricture  2. Foreign body in urethra  3. Urethrocutaneous fistula    POST-PROCEDURE DIAGNOSIS:   1. Urethral stricture  2. Foreign body in urethra  3. Urethrocutaneous fistula    PROCEDURE:   1. Cystoscopy and direct vision urethrotomy  2. Cystoscopy with urethral dilation  3. Cystoscopy with removal of multiple foreign bodies in the urethra    DATE OF SURGERY: 8/21/2020    SURGEON: Sam Mejia MD  ASSISTANT: Shen Yang MD    ANESTHESIA: general    TUBES/DRAINS: 18 Fr [place] tip    INDICATIONS: Chip Fowler is a 35 year old man with a history of drug abuse. He has a history of urethral play during his drug use for enjoyment. Years ago, he underwent a cystoscopy. Metal and stones were removed. He has now been clean from drug use for about 6 months but is now on dialysis and working towards transplant. He thinks over the last few years, a few foreign items are in his urethra. I performed cystoscopy in clinic which revealed a stone and urethral stricture which was impassable. I recommended cystoscopy with urethral dilation, urethrotomy if needed and foreign body removal. We discussed that he will likely need a urethroplasty and urethrocutaneous fistula repair in the future once the foreign bodies are removed and the urethra is allowed to heal. Risks and benefits were discussed.    DESCRIPTION OF PROCEDURE:  After informed consent was obtained, the patient was brought to the procedure room where he was placed in the supine position with all pressure points well padded.  After adequate anesthesia and securing of the airway he was prepped and draped in the dorsal lithotomy position. The penis and scrotum were prepped and draped in a sterile fashion. A rigid cystoscope was introduced through a well-lubricated urethra. There was notable stricture starting the fossa navicularis. A foreign body was visualized. A wire was placed in the bladder.   A 24 Fr urethral balloon  dilator was used, which allowed passage, but because of the metal, the balloon was lacerated.   We then switched to a urethrotome. Radial cuts were made in the pendulous urethra to allow scope passage. We first removed a metal clip. Some minor urethral trauma was encountered due to jagged edges of the metal. Next, the scope was passed, and additional stricture was noted in the proximal pendulous urethra. There was a ventrally located urethrocutaneous fistula with a large cavity. The stricture was opened again. In the proximal urethra, there was a marybeth pin and some stone material. I removed the marybeth pin with some difficulty. I finally entered the bladder. The bladder was clear except for some stone material which was irrigated to clear. The scope was removed and an 18 Fr [place] tip catheter was placed over the wire into the bladder and the wire was removed. 10cc placed in the balloon.      The patient was awakened from anesthesia and transferred to a gurney and to the PACU in stable position.    COMPLICATIONS: none  EBL: 20cc    PLAN:  Will plan at least 2 weeks of urethral catheter given extensive urethral manipulation today  I suspect he will develop stricture disease and he has a UC fistula. Thus, he will require some form of urethroplasty in the future, but we will make operative plan based on followup imaging.    As attending surgeon, I, Sam Mejia MD, was scrubbed and present for the entire procedure.

## 2020-08-28 ENCOUNTER — TELEPHONE (OUTPATIENT)
Dept: TRANSPLANT | Facility: CLINIC | Age: 35
End: 2020-08-28

## 2020-08-28 NOTE — TELEPHONE ENCOUNTER
Called patient he confirmed for 2nd day appts on Fri Sept 11 w/Ekg, PFT, CXR, Echo and Mon Sept 14 w/I Can in [facility] for a video conference call

## 2020-08-30 ENCOUNTER — TELEPHONE (OUTPATIENT)
Dept: UROLOGY | Facility: CLINIC | Age: 35
End: 2020-08-30

## 2020-08-30 NOTE — TELEPHONE ENCOUNTER
"Patient is status post urethral dilation, removal of multiple foreign bodies in urethra, and direct vision urethrotomy on 8/21/20. He has a shaver catheter in place, and he called as he was concerned he may be developing a UTI. He also reports that he slept oddly on side that may have caused the shaver catheter to be placed in the \"wrong position\" and stick to the skin of his scrotum.    He reports no fevers, chills, erythema. He also notes the shaver catheter is draining well. I explained the exam was limited as I am not able to see what is going on, and he understood.    I advised he go to urgent care to drop of a urine sample for UA/culture, as the increased foul smelling urine concerning for UTI (though he has no systemic symptoms). He said he would like to come to urology clinic and drop off a sample tomorrow morning, and given he has no systemic symptoms and has shaver catheter in place. I gave strict ED return precautions.    Naldo Carmona MD  PGY-2 Urology  "

## 2020-09-01 NOTE — TELEPHONE ENCOUNTER
RECORDS RECEIVED FROM: Internal/Care Everywhere   DATE RECEIVED: 9-14   NOTES STATUS DETAILS   OFFICE NOTE from referring provider    Internal SOT   OFFICE NOTE from other cardiologist    Care Everywhere JACQUELIN Joshi 3-20-20 [facility]   DISCHARGE SUMMARY from hospital    Care Everywhere 2-24-20 [facility]   DISCHARGE REPORT from the ER   N/A    OPERATIVE REPORT    N/A    MEDICATION LIST   Internal    LABS     BMP   Internal 8-21-20   CBC   Internal 8-14-20   CMP   Internal 8-18-20   Lipids   N/A    TSH   N/A    DIAGNOSTIC PROCEDURES     EKG   In process Scheduled 9-11-20   Monitor Reports   N/A    IMAGING (DISC & REPORT)      Echo   In process Scheduled 9-11-20   Stress Tests   N/A    Cath   N/A    MRI/MRA   N/A    CT/CTA   N/A      Action    Action Taken 9-1: Requested from PN:    EKG Strip    Echo   4 most recent    6-2-20, 2-25-20 9-9: sent second request to PN     Action 9.11.20 MJ   Action Taken Called PN and requested 2.25.20 EKG tracing. Spoke with Khloe- she will fax tracing.  Transferred to radiology spoke with Viviana, transferred to echo lab [phone]. Phone rang for awhile, no answer or VM.     Action 9.11.20 MJ 2:43 PM   Action Taken Received EKG strips- sent to scanning, called echo lab- no answer.

## 2020-09-04 ENCOUNTER — TELEPHONE (OUTPATIENT)
Dept: TRANSPLANT | Facility: CLINIC | Age: 35
End: 2020-09-04

## 2020-09-04 NOTE — TELEPHONE ENCOUNTER
Patient Call:  Chip checked his insurance Is OK to have Chemical Assessment   Please call Chip has a few question about where and when he should go            Call back needed? Yes    Return Call Needed  Same as documented in contacts section  When to return call?: Same day: Route High Priority

## 2020-09-05 ENCOUNTER — ANCILLARY PROCEDURE (OUTPATIENT)
Dept: MRI IMAGING | Facility: CLINIC | Age: 35
End: 2020-09-05
Attending: UROLOGY
Payer: MEDICARE

## 2020-09-05 DIAGNOSIS — N28.9 KIDNEY LESION, NATIVE, LEFT: ICD-10-CM

## 2020-09-09 ENCOUNTER — OFFICE VISIT (OUTPATIENT)
Dept: UROLOGY | Facility: CLINIC | Age: 35
End: 2020-09-09
Payer: MEDICARE

## 2020-09-09 DIAGNOSIS — N35.016 POST-TRAUMATIC STRICTURE OF OVERLAPPING SITES OF URETHRA IN MALE: Primary | ICD-10-CM

## 2020-09-09 RX ORDER — CIPROFLOXACIN 500 MG/1
500 TABLET, FILM COATED ORAL ONCE
Status: COMPLETED | OUTPATIENT
Start: 2020-09-09 | End: 2020-09-09

## 2020-09-09 RX ADMIN — CIPROFLOXACIN 500 MG: 500 TABLET, FILM COATED ORAL at 11:55

## 2020-09-09 ASSESSMENT — PAIN SCALES - GENERAL: PAINLEVEL: NO PAIN (0)

## 2020-09-09 NOTE — NURSING NOTE
Chip Fowler comes into clinic today at the request of Dr. Mejia for a TOV.    The following medication was given:     MEDICATION:  Ciprofloxacin  ROUTE: PO  SITE: Medication was given orally   DOSE: 500 mg  LOT #: [id number]   : [employer]  EXPIRATION DATE: 08/21  NDC#: 33466 [id number]   Was there drug waste? No    Prior to administration, verified patient identity using patient's name and date of birth.    Drug Amount Wasted:  None.  Vial/Syringe: single      Approx 150 mL of sterile water instilled into the bladder via catheter.      Removal:  18 Fr straight tipped latex shaver catheter removed from urethral meatus without difficulty after removing 10 mL of fluid from the balloon, balloon intact.    Patient voided approx 250 mL of clear urine.     Post-void residual was: 72 mL per bladder scan.    Patient tolerated procedure well.      Education: Teaching done with patient verbally as to where to go or call  if unable to urinate post-catheter removal. Increase fluids.   Plan: Follow-up as planned      This service provided today was under the supervising provider of the day Dr. Mejia, who was available if needed.    Armida Pa CMA

## 2020-09-09 NOTE — NURSING NOTE
Chief Complaint   Patient presents with     RECHECK     Post op -  Urethral stone/ foreign body removal, TOV       There were no vitals taken for this visit. There is no height or weight on file to calculate BMI.    Patient Active Problem List   Diagnosis     Unilateral congenital absence of kidney     ESRD (end stage renal disease) on dialysis (H)     Hypertension     Solitary kidney, congenital     Cardiomyopathy (H)     Bladder stones     Urethral stone     Urethral stricture     Polysubstance abuse (H)     Urethral calculus     Abnormal urinalysis     Acute renal failure superimposed on chronic kidney disease (H)     Acute retention of urine     Congestive heart failure of unknown etiology (H)     Methamphetamine abuse, episodic (H)     Migraine     Nephrolithiasis     Tobacco use       No Known Allergies    Current Outpatient Medications   Medication Sig Dispense Refill     acetaminophen (TYLENOL) 325 MG tablet Take 2 tablets (650 mg) by mouth every 6 hours as needed for mild pain 90 tablet 0     B Complex-C-Folic Acid (DIALYVITE 800) 0.8 MG TABS Take 1 tablet by mouth every morning        carvedilol (COREG) 25 MG tablet Take 6.25 mg by mouth 2 times daily (with meals) 1/2 tab BID w/meals       furosemide (LASIX) 40 MG tablet Take 40 mg by mouth every morning        gentamicin (GARAMYCIN) 0.1 % external ointment every morning        lisinopril (ZESTRIL) 2.5 MG tablet Take 2.5 mg by mouth every evening          Social History     Tobacco Use     Smoking status: Current Every Day Smoker     Packs/day: 0.40     Types: Cigarettes     Start date: 2002     Smokeless tobacco: Never Used     Tobacco comment: 4-8 cigs /day   Substance Use Topics     Alcohol use: Not Currently     Comment: quit alcohol 3-4 yrs ago     Drug use: Not Currently     Types: Methamphetamines       Armida Pa CMA  9/9/2020  10:49 AM

## 2020-09-09 NOTE — LETTER
9/9/2020       RE: Chip Fowler  [address]  [address]  [place] [facility] [address]     Dear Colleague,    Thank you for referring your patient, Chip Fowler, to the [facility] UROLOGY AND INST FOR PROSTATE AND UROLOGIC CANCERS at [facility]. Please see a copy of my visit note below.    Urology Followup Note    Chip Fowler is a 35 year old man with a very unusual history of urethral manipulation. He has a history of drug abuse with methamphetamine but has since stopped using it. He now has ESRD and is on peritoneal dialysis. He has a history of urethral sounding and BDSM during meth abuse episodes. He has a number of prior episodes of cysto, urethral dilation and laser stone removal and foreign body removal at [facility] 4-8 years ago. One episode they took out a screw after breaking up stones. He is trying to get worked up for transplant and sent to us for eval. He notes obstructive voiding symptoms. Physical exam reveals some VERY abnormal distal bulbar urethral foreign body (stone vs. Item) and a UC fistula near the penoscrotal junction. He's had suprapubic tubes.     He went to OR for DVIU and urethral foreign body extraction on 8/21/20.  He has extensive urethral stricture disease and a UC fistula at penoscrotal junction.  And we removed a marybeth pin and a metal clip of some sort from his urethra. There was some random debris in his bladder which was also removed.  We performed voiding trial today.  I suspect his stricture will recur and we will plan a urethroplasty (my suspicion is dorsal buccal onlay) with a fistula repair. Though we have to let his urethra heal first then evaluate.  He voided today without issue.  Followup in 2 months with a RUG and visit to plan his surgery.  He is working towards transplant and can't get one while his urethra is strictured.    Sam Mejia MD

## 2020-09-09 NOTE — TELEPHONE ENCOUNTER
Transplant Social Work Services Phone Call      Data: Received in-basket message that pt is wondering where he should have his Rule 25 Assessment done. Spoke with pt. Discussed pt will need to contact [facility] regarding where he can get an assessment done. Pt was not in a place where he could take the information down so this writer sent pt the information via [employer].   Intervention: Phone call   Assessment: Due to history of daily methamphetamine use and history of DWI's (and no longer having a license), it was recommended during transplant evaluation that pt have a chemical dependency evaluation and comply with any recommendations. Pt appears to be following through with the plan.   Education provided by : Rule 25 Assessment information   Plan: Pt to follow up with [facility] regarding Rule 25 Assessment.     Lyla Cortez [facility]    Kidney/Pancreas/Auto Islet Transplant Programs

## 2020-09-11 ENCOUNTER — ANCILLARY PROCEDURE (OUTPATIENT)
Dept: CARDIOLOGY | Facility: CLINIC | Age: 35
End: 2020-09-11
Attending: PHYSICIAN ASSISTANT
Payer: MEDICARE

## 2020-09-11 ENCOUNTER — ANCILLARY PROCEDURE (OUTPATIENT)
Dept: GENERAL RADIOLOGY | Facility: CLINIC | Age: 35
End: 2020-09-11
Attending: PHYSICIAN ASSISTANT
Payer: MEDICARE

## 2020-09-11 ENCOUNTER — RESULTS ONLY (OUTPATIENT)
Dept: NEPHROLOGY | Facility: CLINIC | Age: 35
End: 2020-09-11

## 2020-09-11 DIAGNOSIS — N18.6 ESRD (END STAGE RENAL DISEASE) (H): ICD-10-CM

## 2020-09-11 DIAGNOSIS — I25.10 CARDIOVASCULAR DISEASE: ICD-10-CM

## 2020-09-11 DIAGNOSIS — N18.6 ESRD (END STAGE RENAL DISEASE) ON DIALYSIS (H): Primary | ICD-10-CM

## 2020-09-11 DIAGNOSIS — Z87.891 HISTORY OF TOBACCO USE: ICD-10-CM

## 2020-09-11 DIAGNOSIS — Z76.82 ORGAN TRANSPLANT CANDIDATE: ICD-10-CM

## 2020-09-11 DIAGNOSIS — N18.6 END STAGE RENAL DISEASE (H): ICD-10-CM

## 2020-09-11 DIAGNOSIS — Z99.2 ESRD (END STAGE RENAL DISEASE) ON DIALYSIS (H): Primary | ICD-10-CM

## 2020-09-11 DIAGNOSIS — Z01.818 PRE-TRANSPLANT EVALUATION FOR KIDNEY TRANSPLANT: ICD-10-CM

## 2020-09-11 DIAGNOSIS — I10 ESSENTIAL HYPERTENSION: ICD-10-CM

## 2020-09-11 LAB — INTERPRETATION ECG - MUSE: NORMAL

## 2020-09-14 ENCOUNTER — VIRTUAL VISIT (OUTPATIENT)
Dept: CARDIOLOGY | Facility: CLINIC | Age: 35
End: 2020-09-14
Attending: INTERNAL MEDICINE
Payer: MEDICARE

## 2020-09-14 ENCOUNTER — PRE VISIT (OUTPATIENT)
Dept: CARDIOLOGY | Facility: CLINIC | Age: 35
End: 2020-09-14

## 2020-09-14 DIAGNOSIS — F19.11 HISTORY OF DRUG ABUSE (H): ICD-10-CM

## 2020-09-14 DIAGNOSIS — N18.6 ESRD (END STAGE RENAL DISEASE) ON DIALYSIS (H): ICD-10-CM

## 2020-09-14 DIAGNOSIS — Z76.82 KIDNEY TRANSPLANT CANDIDATE: Primary | ICD-10-CM

## 2020-09-14 DIAGNOSIS — Z99.2 ESRD (END STAGE RENAL DISEASE) ON DIALYSIS (H): ICD-10-CM

## 2020-09-14 LAB — INTERPRETATION ECG - MUSE: NORMAL

## 2020-09-14 PROCEDURE — 99203 OFFICE O/P NEW LOW 30 MIN: CPT | Mod: 95 | Performed by: INTERNAL MEDICINE

## 2020-09-14 ASSESSMENT — PAIN SCALES - GENERAL: PAINLEVEL: NO PAIN (0)

## 2020-09-14 NOTE — PROGRESS NOTES
"Chip Fowler is a 35 year old male who is being evaluated via a billable video visit.      The patient has been notified of following:     \"This video visit will be conducted via a call between you and your physician/provider. We have found that certain health care needs can be provided without the need for an in-person physical exam.  This service lets us provide the care you need with a video conversation.  If a prescription is necessary we can send it directly to your pharmacy.  If lab work is needed we can place an order for that and you can then stop by our lab to have the test done at a later time.    Video visits are billed at different rates depending on your insurance coverage.  Please reach out to your insurance provider with any questions.    If during the course of the call the physician/provider feels a video visit is not appropriate, you will not be charged for this service.\"    Patient has given verbal consent for Video visit? Yes    How would you like to obtain your AVS? Nicholashart    Patient would like the video invitation sent by: Patient will be using MY CHART        Vitals - Patient Reported  Systolic (Patient Reported): 131  Diastolic (Patient Reported): 81  Weight (Patient Reported): 70.2 kg (154 lb 12.8 oz)  Height (Patient Reported): 182.9 cm (6')  BMI (Based on Pt Reported Ht/Wt): 20.99  Pain Score: No Pain (0)(No SOB)    Video Start Time: 3:30 PM      Video-Visit Details    Type of service:  Video Visit    Video End Time (time video stopped): 3:50 pm (video visit duration 20 minutes)    Originating Location (pt. Location): Home    Distant Location (provider location):  [facility]     Mode of Communication:  Video Conference via trueEX    HPI: Mr. Chip Fowler is a 35 year old  male with PMH significant for end-stage renal disease from congenital solitary kidney, substance abuse, history of systolic heart failure with EF of 15 to 20% in 2/2020 recovered to 50% now.  Patient on " dialysis since February 2020.  He is currently being evaluated for kidney transplant.    Patient presented to [facility] on 2/24/2020 with dyspnea on exertion and lower extremity edema.  He was found to have heart failure with reduced ejection fraction at 15% and renal failure.  He was started on dialysis.  He was followed with cardiology a month later.  He reported doing well at that time.  Patient's EF improved to 50 to 55% as of 9/11/2020 echocardiogram at [facility].    Patient currently reports doing well. Patient works part-time at a plastic factory. The patient denies a history of chest discomfort, dyspnea, PND, orthopnea, pedal edema, palpitations, lightheadedness, or syncope.    He is a current smoker since 2002 (smokes half pack year for the last 18 years).  No alcohol abuse. Up until 6 months ago patient has history of using methamphetamine.  He denies using meth since February of this year.      No history of hypertension, diabetes, coronary artery disease, or family history of heart failure.    Patient is currently on carvedilol 6.25 mg twice daily, furosemide 40 mg and lisinopril 2.5 mg.    Echocardiogram 9/11/2020 shows EF of 50 to 55%.  Normal RV function.  No valvular disease.    I have reviewed patient's EKG 9/11/2020 which shows sinus rhythm otherwise unremarkable.    I reviewed patient's labs which are consistent with end-stage renal disease and mild anemia.    Medications, personal, family, and social history reviewed with patient and revised.    PAST MEDICAL HISTORY:  Past Medical History:   Diagnosis Date     Bladder stones      Cardiomyopathy (H)      ESRD (end stage renal disease) on dialysis (H)      Hypertension      Migraines      Polysubstance abuse (H)      Solitary kidney, congenital      Urethral stone      Urethral stricture        CURRENT MEDICATIONS:  Current Outpatient Medications   Medication Sig Dispense Refill     acetaminophen (TYLENOL) 325 MG tablet Take 2 tablets (650 mg)  by mouth every 6 hours as needed for mild pain 90 tablet 0     B Complex-C-Folic Acid (DIALYVITE 800) 0.8 MG TABS Take 1 tablet by mouth every morning        carvedilol (COREG) 25 MG tablet Take 6.25 mg by mouth 2 times daily (with meals) 1/2 tab BID w/meals       furosemide (LASIX) 40 MG tablet Take 40 mg by mouth every morning        gentamicin (GARAMYCIN) 0.1 % external ointment every morning        lisinopril (ZESTRIL) 2.5 MG tablet Take 2.5 mg by mouth every evening          PAST SURGICAL HISTORY:  Past Surgical History:   Procedure Laterality Date     BIOPSY  02/2020    renal, Buddhism     COMBINED CYSTOSCOPY, LASER HOLMIUM LITHOTRIPSY URETER(S)       CYSTOSCOPY       HERNIA REPAIR      infant     INSERT CATHETER PERITONEAL DIALYSIS       LASER HOLMIUM LITHOTRIPSY URETER(S), INSERT STENT, COMBINED N/A 8/21/2020    Procedure: CYSTOSCOPY, bladder and urethral stone extraction, removal of foreign body, urethral dilation, urethrotomy, laser on standby;  Surgeon: Sam Mejia MD;  Location: [facility]     urethral dilation         ALLERGIES:   No Known Allergies    FAMILY HISTORY:  Family History   Problem Relation Age of Onset     Alzheimer Disease Mother      Unknown/Adopted Father      No Known Problems Sister      No Known Problems Sister      Kidney Disease No family hx of          SOCIAL HISTORY:  Social History     Tobacco Use     Smoking status: Current Every Day Smoker     Packs/day: 0.40     Types: Cigarettes     Start date: 2002     Smokeless tobacco: Never Used     Tobacco comment: 4-8 cigs /day   Substance Use Topics     Alcohol use: Not Currently     Comment: quit alcohol 3-4 yrs ago     Drug use: Not Currently     Types: Methamphetamines       ROS:   Constitutional: No fever, chills, or sweats. Weight stable.   ENT: No visual disturbance, ear ache, epistaxis, sore throat.   Cardiovascular: As per HPI.   Respiratory: No cough, hemoptysis.    GI: No nausea, vomiting, hematemesis, melena, or  hematochezia.   : No hematuria.   Integument: Negative.   Psychiatric: Negative.   Hematologic:  No easy bruising, no easy bleeding.  Neuro: Negative.   Endocrinology: No significant heat or cold intolerance   Musculoskeletal: No myalgia.    Exam:  Physical Exam Elements attainable via telehealth:       Constitutional - alert and no distress    Eyes - no redness, no discharge    Respiratory - no cough, no labored breathing    Skin - no discoloration or lesions on the face or the arm.    Neurological -alert, normal speech,and affect, no tremor.     I have reviewed the labs and personally reviewed the imaging below and made my comment in the assessment and plan.    Labs:  CBC RESULTS:   Lab Results   Component Value Date    WBC 10.4 08/18/2020    RBC 3.87 (L) 08/18/2020    HGB 12.0 (L) 08/18/2020    HCT 35.8 (L) 08/18/2020    MCV 93 08/18/2020    MCH 31.0 08/18/2020    MCHC 33.5 08/18/2020    RDW 13.6 08/18/2020     08/18/2020       BMP RESULTS:  Lab Results   Component Value Date     08/21/2020    POTASSIUM 4.5 08/21/2020    CHLORIDE 110 (H) 08/21/2020    CO2 20 08/21/2020    ANIONGAP 8 08/21/2020    GLC 85 08/21/2020    BUN 66 (H) 08/21/2020    CR 7.16 (H) 08/21/2020    GFRESTIMATED 9 (L) 08/21/2020    GFRESTBLACK 10 (L) 08/21/2020    VISHNU 8.1 (L) 08/21/2020        INR RESULTS:  Lab Results   Component Value Date    INR 1.08 08/18/2020         Echocardiogram 9/11/2020 [facility]  Left ventricular size is normal.  Left ventricular wall thickness is normal.  The Ejection Fraction is estimated at 50-55%.  Right ventricular function, chamber size, wall motion, and thickness are normal.  The inferior vena cava is normal.  No pericardial effusion is present.  Previous study not available for comparison.    Echocardiogram [facility] 2/25/2020  CONCLUSIONS  Mild left ventricular dilation is present.  Left ventricular ejection fraction is visually estimated at 15-20%.  No hemodynamically significant valvular  abnormalities.    EKG 9/11/2020     Assessment and Plan:    Mr. Chip Fowler is a 35 year old  male with PMH significant for end-stage renal disease in the setting of congenital solitary kidney, substance abuse, history of systolic heart failure with EF of 15 to 20% in 2/2020 recovered to 50% now.  Patient on dialysis since February 2020.  He is currently being evaluated for kidney transplant.    Patient is currently asymptomatic from cardiac standpoint.  He has normal functional capacity (can easily climb 2 flights of stairs).    His major cardiac risk factors are CKD and tobacco abuse.  Recommended exercise stress echocardiogram.    Patient counseled against smoking during this visit.    Recommended to continue current medications including lisinopril 2.5, furosemide 40 mg and carvedilol 6.25 mg twice daily.    A total of  20 minutes spent face-to-face through video encounter today with greater than 50% of the time spent in counseling and coordinating cares of the issues above.     Please donot hesitate to contact me if you have any questions or concerns. Again, thank you for allowing me to participate in the care of your patient.    Kb GEE MD  [facility] Division of Cardiology  Pager [phone]     Addendum note 3/2/2021:    Stress test 2/18/2021  A low to moderate workload was achieved.  Target Heart Rate was achieved.  The resting visual ejection fraction is estimated at 45-50%.  Global LV systolic function augments with exercise.  The visual ejection fraction is estimated at 55-60%.  This test indicates a low probability of severe occlusive coronary artery  disease.    Patient completed stress test. Baseline EF mildly low at 50%. He reached target heart rate.  No inducible ischemia.    He can proceed to kidney transplant.

## 2020-09-14 NOTE — PATIENT INSTRUCTIONS
1.  Exercise stress echocardiogram will be scheduled at [facility].  We recommend you to hold carvedilol 24 hours prior to your stress test.  We will evaluate the results of the test and will let you know.

## 2020-09-14 NOTE — LETTER
"9/14/2020      RE: Chip Fowler  [address]  [address]  [place] [facility] [address]       Dear Colleague,    Thank you for the opportunity to participate in the care of your patient, Chip Fowler, at the [facility]. Please see a copy of my visit note below.    Chip Fowler is a 35 year old male who is being evaluated via a billable video visit.      The patient has been notified of following:     \"This video visit will be conducted via a call between you and your physician/provider. We have found that certain health care needs can be provided without the need for an in-person physical exam.  This service lets us provide the care you need with a video conversation.  If a prescription is necessary we can send it directly to your pharmacy.  If lab work is needed we can place an order for that and you can then stop by our lab to have the test done at a later time.    Video visits are billed at different rates depending on your insurance coverage.  Please reach out to your insurance provider with any questions.    If during the course of the call the physician/provider feels a video visit is not appropriate, you will not be charged for this service.\"    Patient has given verbal consent for Video visit? Yes    How would you like to obtain your AVS? Nicholashart    Patient would like the video invitation sent by: Patient will be using MY CHART        Vitals - Patient Reported  Systolic (Patient Reported): 131  Diastolic (Patient Reported): 81  Weight (Patient Reported): 70.2 kg (154 lb 12.8 oz)  Height (Patient Reported): 182.9 cm (6')  BMI (Based on Pt Reported Ht/Wt): 20.99  Pain Score: No Pain (0)(No SOB)    Video Start Time: 3:30 PM      Video-Visit Details    Type of service:  Video Visit    Video End Time (time video stopped): 3:50 pm (video visit duration 20 minutes)    Originating Location (pt. Location): Home    Distant Location (provider location):  [facility]" HEART CARE     Mode of Communication:  Video Conference via Headroom    HPI: Mr. Cihp Fowler is a 35 year old  male with PMH significant for end-stage renal disease from congenital solitary kidney, substance abuse, history of systolic heart failure with EF of 15 to 20% in 2/2020 recovered to 50% now.  Patient on dialysis since February 2020.  He is currently being evaluated for kidney transplant.    Patient presented to [facility] on 2/24/2020 with dyspnea on exertion and lower extremity edema.  He was found to have heart failure with reduced ejection fraction at 15% and renal failure.  He was started on dialysis.  He was followed with cardiology a month later.  He reported doing well at that time.  Patient's EF improved to 50 to 55% as of 9/11/2020 echocardiogram at [facility].    Patient currently reports doing well. Patient works part-time at a plastic factory. The patient denies a history of chest discomfort, dyspnea, PND, orthopnea, pedal edema, palpitations, lightheadedness, or syncope.    He is a current smoker since 2002 (smokes half pack year for the last 18 years).  No alcohol abuse. Up until 6 months ago patient has history of using methamphetamine.  He denies using meth since February of this year.      No history of hypertension, diabetes, coronary artery disease, or family history of heart failure.    Patient is currently on carvedilol 6.25 mg twice daily, furosemide 40 mg and lisinopril 2.5 mg.    Echocardiogram 9/11/2020 shows EF of 50 to 55%.  Normal RV function.  No valvular disease.    I have reviewed patient's EKG 9/11/2020 which shows sinus rhythm otherwise unremarkable.    I reviewed patient's labs which are consistent with end-stage renal disease and mild anemia.    Medications, personal, family, and social history reviewed with patient and revised.    PAST MEDICAL HISTORY:  Past Medical History:   Diagnosis Date     Bladder stones      Cardiomyopathy (H)      ESRD (end stage renal disease)  on dialysis (H)      Hypertension      Migraines      Polysubstance abuse (H)      Solitary kidney, congenital      Urethral stone      Urethral stricture        CURRENT MEDICATIONS:  Current Outpatient Medications   Medication Sig Dispense Refill     acetaminophen (TYLENOL) 325 MG tablet Take 2 tablets (650 mg) by mouth every 6 hours as needed for mild pain 90 tablet 0     B Complex-C-Folic Acid (DIALYVITE 800) 0.8 MG TABS Take 1 tablet by mouth every morning        carvedilol (COREG) 25 MG tablet Take 6.25 mg by mouth 2 times daily (with meals) 1/2 tab BID w/meals       furosemide (LASIX) 40 MG tablet Take 40 mg by mouth every morning        gentamicin (GARAMYCIN) 0.1 % external ointment every morning        lisinopril (ZESTRIL) 2.5 MG tablet Take 2.5 mg by mouth every evening          PAST SURGICAL HISTORY:  Past Surgical History:   Procedure Laterality Date     BIOPSY  02/2020    renal, [employer]     COMBINED CYSTOSCOPY, LASER HOLMIUM LITHOTRIPSY URETER(S)       CYSTOSCOPY       HERNIA REPAIR      infant     INSERT CATHETER PERITONEAL DIALYSIS       LASER HOLMIUM LITHOTRIPSY URETER(S), INSERT STENT, COMBINED N/A 8/21/2020    Procedure: CYSTOSCOPY, bladder and urethral stone extraction, removal of foreign body, urethral dilation, urethrotomy, laser on standby;  Surgeon: Sam Mejia MD;  Location: [facility] OR     urethral dilation         ALLERGIES:   No Known Allergies    FAMILY HISTORY:  Family History   Problem Relation Age of Onset     Alzheimer Disease Mother      Unknown/Adopted Father      No Known Problems Sister      No Known Problems Sister      Kidney Disease No family hx of          SOCIAL HISTORY:  Social History     Tobacco Use     Smoking status: Current Every Day Smoker     Packs/day: 0.40     Types: Cigarettes     Start date: 2002     Smokeless tobacco: Never Used     Tobacco comment: 4-8 cigs /day   Substance Use Topics     Alcohol use: Not Currently     Comment: quit alcohol 3-4 yrs ago     Drug  use: Not Currently     Types: Methamphetamines       ROS:   Constitutional: No fever, chills, or sweats. Weight stable.   ENT: No visual disturbance, ear ache, epistaxis, sore throat.   Cardiovascular: As per HPI.   Respiratory: No cough, hemoptysis.    GI: No nausea, vomiting, hematemesis, melena, or hematochezia.   : No hematuria.   Integument: Negative.   Psychiatric: Negative.   Hematologic:  No easy bruising, no easy bleeding.  Neuro: Negative.   Endocrinology: No significant heat or cold intolerance   Musculoskeletal: No myalgia.    Exam:  Physical Exam Elements attainable via telehealth:       Constitutional - alert and no distress    Eyes - no redness, no discharge    Respiratory - no cough, no labored breathing    Skin - no discoloration or lesions on the face or the arm.    Neurological -alert, normal speech,and affect, no tremor.     I have reviewed the labs and personally reviewed the imaging below and made my comment in the assessment and plan.    Labs:  CBC RESULTS:   Lab Results   Component Value Date    WBC 10.4 08/18/2020    RBC 3.87 (L) 08/18/2020    HGB 12.0 (L) 08/18/2020    HCT 35.8 (L) 08/18/2020    MCV 93 08/18/2020    MCH 31.0 08/18/2020    MCHC 33.5 08/18/2020    RDW 13.6 08/18/2020     08/18/2020       BMP RESULTS:  Lab Results   Component Value Date     08/21/2020    POTASSIUM 4.5 08/21/2020    CHLORIDE 110 (H) 08/21/2020    CO2 20 08/21/2020    ANIONGAP 8 08/21/2020    GLC 85 08/21/2020    BUN 66 (H) 08/21/2020    CR 7.16 (H) 08/21/2020    GFRESTIMATED 9 (L) 08/21/2020    GFRESTBLACK 10 (L) 08/21/2020    VISHNU 8.1 (L) 08/21/2020        INR RESULTS:  Lab Results   Component Value Date    INR 1.08 08/18/2020         Echocardiogram 9/11/2020 [facility]  Left ventricular size is normal.  Left ventricular wall thickness is normal.  The Ejection Fraction is estimated at 50-55%.  Right ventricular function, chamber size, wall motion, and thickness are normal.  The inferior vena cava is  normal.  No pericardial effusion is present.  Previous study not available for comparison.    [facility]  CONCLUSIONS  Mild left ventricular dilation is present.  Left ventricular ejection fraction is visually estimated at 15-20%.  No hemodynamically significant valvular abnormalities.    EKG 9/11/2020     Assessment and Plan:    Mr. Chip Fowler is a 35 year old  male with PMH significant for end-stage renal disease in the setting of congenital solitary kidney, substance abuse, history of systolic heart failure with EF of 15 to 20% in 2/2020 recovered to 50% now.  Patient on dialysis since February 2020.  He is currently being evaluated for kidney transplant.    Patient is currently asymptomatic from cardiac standpoint.  He has normal functional capacity (can easily climb 2 flights of stairs).    His major cardiac risk factors are CKD and tobacco abuse.  Recommended exercise stress echocardiogram.    Patient counseled against smoking during this visit.    Recommended to continue current medications including lisinopril 2.5, furosemide 40 mg and carvedilol 6.25 mg twice daily.    A total of  20 minutes spent face-to-face through video encounter today with greater than 50% of the time spent in counseling and coordinating cares of the issues above.     Please donot hesitate to contact me if you have any questions or concerns. Again, thank you for allowing me to participate in the care of your patient.    Kb GEE MD  [facility] Division of Cardiology  Pager [phone]       Please do not hesitate to contact me if you have any questions/concerns.     Sincerely,     Kb Gee MD

## 2020-09-15 ENCOUNTER — TELEPHONE (OUTPATIENT)
Dept: TRANSPLANT | Facility: CLINIC | Age: 35
End: 2020-09-15

## 2020-09-15 DIAGNOSIS — Z76.82 AWAITING ORGAN TRANSPLANT: ICD-10-CM

## 2020-09-15 DIAGNOSIS — Z76.82 AWAITING ORGAN TRANSPLANT: Primary | ICD-10-CM

## 2020-09-15 LAB
ABO + RH BLD: NORMAL
ABO + RH BLD: NORMAL
BASOPHILS # BLD AUTO: 0.1 10E9/L (ref 0–0.2)
BASOPHILS NFR BLD AUTO: 1.5 %
DIFFERENTIAL METHOD BLD: ABNORMAL
EOSINOPHIL # BLD AUTO: 1.3 10E9/L (ref 0–0.7)
EOSINOPHIL NFR BLD AUTO: 14.6 %
ERYTHROCYTE [DISTWIDTH] IN BLOOD BY AUTOMATED COUNT: 14.6 % (ref 10–15)
HCT VFR BLD AUTO: 30.6 % (ref 40–53)
HGB BLD-MCNC: 10.3 G/DL (ref 13.3–17.7)
IMM GRANULOCYTES # BLD: 0 10E9/L (ref 0–0.4)
IMM GRANULOCYTES NFR BLD: 0.3 %
LYMPHOCYTES # BLD AUTO: 3 10E9/L (ref 0.8–5.3)
LYMPHOCYTES NFR BLD AUTO: 34.9 %
MCH RBC QN AUTO: 31 PG (ref 26.5–33)
MCHC RBC AUTO-ENTMCNC: 33.7 G/DL (ref 31.5–36.5)
MCV RBC AUTO: 92 FL (ref 78–100)
MONOCYTES # BLD AUTO: 0.6 10E9/L (ref 0–1.3)
MONOCYTES NFR BLD AUTO: 6.5 %
NEUTROPHILS # BLD AUTO: 3.6 10E9/L (ref 1.6–8.3)
NEUTROPHILS NFR BLD AUTO: 42.2 %
PLATELET # BLD AUTO: 296 10E9/L (ref 150–450)
RBC # BLD AUTO: 3.32 10E12/L (ref 4.4–5.9)
SPECIMEN EXP DATE BLD: NORMAL
WBC # BLD AUTO: 8.6 10E9/L (ref 4–11)

## 2020-09-15 PROCEDURE — 85025 COMPLETE CBC W/AUTO DIFF WBC: CPT | Performed by: PHYSICIAN ASSISTANT

## 2020-09-15 PROCEDURE — 86900 BLOOD TYPING SEROLOGIC ABO: CPT | Performed by: PHYSICIAN ASSISTANT

## 2020-09-15 PROCEDURE — 36415 COLL VENOUS BLD VENIPUNCTURE: CPT | Performed by: PHYSICIAN ASSISTANT

## 2020-09-15 NOTE — TELEPHONE ENCOUNTER
Called Chip to see if he could get labs done today. The second ABO was missed and he needs a repeat CBC with diff. His eosinophil count was elevated and Dr Desir was consulted. Received in basket message from Dr Desir to redraw the CBC with diff. Lab appointment made for Chip today at [facility] for 1410. He is in agreement.

## 2020-09-15 NOTE — PROGRESS NOTES
Urology Followup Note    Chip Fowler is a 35 year old man with a very unusual history of urethral manipulation. He has a history of drug abuse with methamphetamine but has since stopped using it. He now has ESRD and is on peritoneal dialysis. He has a history of urethral sounding and BDSM during meth abuse episodes. He has a number of prior episodes of cysto, urethral dilation and laser stone removal and foreign body [facility] 4[facility]8 years ago. One episode they took out a screw after breaking up stones. He is trying to get worked up for transplant and sent to us for eval. He notes obstructive voiding symptoms. Physical exam reveals some VERY abnormal distal bulbar urethral foreign body (stone vs. Item) and a UC fistula near the penoscrotal junction. He's had suprapubic tubes.     He went to OR for DVIU and urethral foreign body extraction on 8/21/20.  He has extensive urethral stricture disease and a UC fistula at penoscrotal junction.  And we removed a marybeth pin and a metal clip of some sort from his urethra. There was some random debris in his bladder which was also removed.  We performed voiding trial today.  I suspect his stricture will recur and we will plan a urethroplasty (my suspicion is dorsal buccal onlay) with a fistula repair. Though we have to let his urethra heal first then evaluate.  He voided today without issue.  Followup in 2 months with a RUG and visit to plan his surgery.  He is working towards transplant and can't get one while his urethra is strictured.    Sam Mejia MD

## 2020-09-16 LAB
DLCOUNC-%PRED-PRE: 75 %
DLCOUNC-PRE: 25.45 ML/MIN/MMHG
DLCOUNC-PRED: 33.73 ML/MIN/MMHG
ERV-%PRED-PRE: 81 %
ERV-PRE: 1.69 L
ERV-PRED: 2.08 L
EXPTIME-PRE: 5.71 SEC
FEF2575-%PRED-PRE: 77 %
FEF2575-PRE: 3.51 L/SEC
FEF2575-PRED: 4.5 L/SEC
FEFMAX-%PRED-PRE: 79 %
FEFMAX-PRE: 8.46 L/SEC
FEFMAX-PRED: 10.69 L/SEC
FEV1-%PRED-PRE: 94 %
FEV1-PRE: 4.38 L
FEV1FEV6-PRE: 74 %
FEV1FEV6-PRED: 82 %
FEV1FVC-PRE: 74 %
FEV1FVC-PRED: 81 %
FEV1SVC-PRE: 78 %
FEV1SVC-PRED: 78 %
FIFMAX-PRE: 5.59 L/SEC
FRCPLETH-%PRED-PRE: 127 %
FRCPLETH-PRE: 4.46 L
FRCPLETH-PRED: 3.51 L
FVC-%PRED-PRE: 103 %
FVC-PRE: 5.93 L
FVC-PRED: 5.71 L
IC-%PRED-PRE: 102 %
IC-PRE: 3.92 L
IC-PRED: 3.81 L
RVPLETH-%PRED-PRE: 141 %
RVPLETH-PRE: 2.77 L
RVPLETH-PRED: 1.96 L
TLCPLETH-%PRED-PRE: 111 %
TLCPLETH-PRE: 8.38 L
TLCPLETH-PRED: 7.53 L
VA-%PRED-PRE: 105 %
VA-PRE: 7.54 L
VC-%PRED-PRE: 95 %
VC-PRE: 5.61 L
VC-PRED: 5.89 L

## 2020-09-23 ENCOUNTER — TRANSFERRED RECORDS (OUTPATIENT)
Dept: HEALTH INFORMATION MANAGEMENT | Facility: CLINIC | Age: 35
End: 2020-09-23

## 2020-10-01 ENCOUNTER — TELEPHONE (OUTPATIENT)
Dept: TRANSPLANT | Facility: CLINIC | Age: 35
End: 2020-10-01

## 2020-10-01 NOTE — TELEPHONE ENCOUNTER
RECORDS RECEIVED FROM: Internal - Awaiting organ transplant [Z76.82]   DATE RECEIVED: 10.06.2020   NOTES (Gather within 2 years) STATUS DETAILS   OFFICE NOTE from referring provider   Internal 09.15.2020 Veronica Reno PA-C   OFFICE NOTE from other specialist N/A    DISCHARGE SUMMARY from hospital N/A    DISCHARGE REPORT from the ER N/A    LABS (any labs) Internal / CE    MEDICATION LIST Internal / CE    IMAGING  (NEED IMAGES AND REPORTS)     Osteomyelitis: Foot imaging  N/A    Liver Abscess: Abdominal imaging N/A    Other (anything related to diagnoses Internal 09.11.2020

## 2020-10-06 ENCOUNTER — PRE VISIT (OUTPATIENT)
Dept: INFECTIOUS DISEASES | Facility: CLINIC | Age: 35
End: 2020-10-06

## 2020-10-06 ENCOUNTER — VIRTUAL VISIT (OUTPATIENT)
Dept: INFECTIOUS DISEASES | Facility: CLINIC | Age: 35
End: 2020-10-06
Attending: INTERNAL MEDICINE
Payer: MEDICARE

## 2020-10-06 DIAGNOSIS — Z01.818 ENCOUNTER FOR PRE-TRANSPLANT EVALUATION FOR CHRONIC KIDNEY DISEASE: ICD-10-CM

## 2020-10-06 DIAGNOSIS — K52.9 CHRONIC DIARRHEA: ICD-10-CM

## 2020-10-06 DIAGNOSIS — D72.19 EOSINOPHILIC LEUKOCYTOSIS, UNSPECIFIED TYPE: Primary | ICD-10-CM

## 2020-10-06 PROCEDURE — 99204 OFFICE O/P NEW MOD 45 MIN: CPT | Mod: 95 | Performed by: INTERNAL MEDICINE

## 2020-10-06 ASSESSMENT — PAIN SCALES - GENERAL: PAINLEVEL: NO PAIN (0)

## 2020-10-06 NOTE — PROGRESS NOTES
"Chip Fowler is a 35 year old male who is being evaluated via a billable video visit.      The patient has been notified of following:     \"This video visit will be conducted via a call between you and your physician/provider. We have found that certain health care needs can be provided without the need for an in-person physical exam.  This service lets us provide the care you need with a video conversation.  If a prescription is necessary we can send it directly to your pharmacy.  If lab work is needed we can place an order for that and you can then stop by our lab to have the test done at a later time.    Video visits are billed at different rates depending on your insurance coverage.  Please reach out to your insurance provider with any questions.    If during the course of the call the physician/provider feels a video visit is not appropriate, you will not be charged for this service.\"    Patient has given verbal consent for Video visit? Yes  How would you like to obtain your AVS? MyChart  Will anyone else be joining your video visit? No        Video-Visit Details    Type of service:  Video Visit    Video Start Time: 6:06 PM  Video End Time: 6:45 PM    Originating Location (pt. Location): Home    Distant Location (provider location):  [facility] [place]     Platform used for Video Visit: Chela Desir MD        "

## 2020-10-06 NOTE — PROGRESS NOTES
[facility]    Transplant Infectious Diseases Outpatient Consultation     Patient:  Chip Fowler, Date of birth 1985, Medical record number [id number]  Date of Visit:  10/06/2020  Consult requested by kidney transplant team for evaluation of eosinophilia.           Recommendations:   1. Stool for O/P X3, enteric panel, Cryptosporidium stain, Microsporidium stain, Giardia Ag, adenovirus Ag.   2. Strongyloides serology.   3. Salmonella serology.   4. Francisella tularensis serology.   5. Fungal serology.   6. Repeat CBC with diff.   7. Until the urethral stenosis and the urthrocutaneous fistula are repaired this patient is at increased risk for recurrent UTI and pyelonephritis following transplant. Also will need advice that a recurring BDSM sexual behaviors in the future will increase the risk of UTI and pyelonephritis.     RTC: one month.         Summary of Presentation:   This patient is a 35 year old male with congenital kidney disease and HTN induced renal failure, was started on HD then PD since 2/2020 or 3/2020.   The patient is currently being evaluated for kidney transplant and was found to have eosinophilia so he was referred to ID.         Active Problems and Infectious Diseases Issues:   1. Eosinophilia.   2. Chronic diarrhea since he was started on PD in 3/2020.   3. Pre-kidney transplant evaluation.     The differential diagnosis in a patient with eosinophilia with no signs or symptoms to suggest hematologic malignancies, with reptiles as pets, and diarrhea includes parasitic infections, bacterial infection specifically Salmonella, fungal infection given his trip to [place] and residence in [place].   Will also do additional infectious workup for the chronic diarrhea.   Will also repeat CBC with diff to determine the current eosinophilic count.      By reviewing medications list, no medication that would induce eosinophilia or OTC medications.   Denied history of eczema or allergy.      The differential diagnosis should also include reaction to foreign bodies in the urethra found as of 8/2020.         Old Problems and Infectious Diseases Issues:   1. E coli in urine in 8/2020.   2. Group B Strep in urine in 2/2020.     Other Infectious Disease issues include:  - Serostatus: CMV -, EBV +, HSV1/2 ?, VZV +        Thank you for your kind consultation, and for involving me in the care of Mr. Chip Fowler. Please do not hesitate to call me for any question.     Attestation:  I interviewed the patient and obtained history from the patient, and by reviewing the patient's chart including outside records, microbiological data, and radiological data. All data are summarized in this notes.  Tamara Desir MD   Pager: [phone]  10/06/2020         History of the Infectious Disease lllness:   This patient is a 35 year old male with congenital kidney disease and HTN induced renal failure, was started on HD then PD since 2/2020 or 3/2020. Kidney biopsy in OSH 2/2020 with insufficient cortical tissue for interpretation.   The patient is currently being evaluated for kidney transplant and was found to have eosinophilia so he was referred to ID.   The patient has history of IVDU and amphetamine use in the past but he's been abstinent for a while now. During that time he also had BDSM sexual behaviors which involved inserting foreign objects in his urethra.   He underwent urethrotomy with removal of stones, foreign bodies (marybeth pin and metal clips), stones and bladder debris on 8/21/2020. 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 Apparently there are plans for urethroplasty.   Worthwhile to mention that the patient had similar findings in the past in OSH according to [facility] when foreign bodies were removed from his urethra on multiple occasions in 2014 and 2016.     Stated that he has no problems with PD and no history of  infections involving the PD catheter, the dialysate bags are always clear.   Since he was started on PD he's been experiencing watery diarrhea up to 3-4 times a day.   No N/V, abdominal pain, fever, chills.   He did not seek medical attention for diarrhea.   No weight loss.   No night sweats.     Exposure History:  Was born in [place]. He lives in [place] in an apartment with his girlfriend. He has 2 snakes in an aquarium that he cleans himself with bare hands using paper towels and dish soap. He feeds the snakes mostly frozen rodents but sometimes living rodents. He works in plastic factory. The last travel outside of [place] was a road trip to [place] with friends 15-20 years ago. No known TB exposure, no institutionalization. No significant outdoors exposure. Tested negative for LTBI by [facility] when he was started on HD then PD.          Past Medical History:     Past Medical History:   Diagnosis Date     Bladder stones      Cardiomyopathy (H)      ESRD (end stage renal disease) on dialysis (H)      Hypertension      Migraines      Polysubstance abuse (H)      Solitary kidney, congenital      Urethral stone      Urethral stricture             Past Surgical History:     Past Surgical History:   Procedure Laterality Date     BIOPSY  02/2020    renal, [place]oroastrianism     COMBINED CYSTOSCOPY, LASER HOLMIUM LITHOTRIPSY URETER(S)       CYSTOSCOPY       HERNIA REPAIR      infant     INSERT CATHETER PERITONEAL DIALYSIS       LASER HOLMIUM LITHOTRIPSY URETER(S), INSERT STENT, COMBINED N/A 8/21/2020    Procedure: CYSTOSCOPY, bladder and urethral stone extraction, removal of foreign body, urethral dilation, urethrotomy, laser on standby;  Surgeon: Sam Mejia MD;  Location: [facility] OR     urethral dilation                Social History:     Social History     Tobacco Use     Smoking status: Current Every Day Smoker     Packs/day: 0.40     Types: Cigarettes     Start date: 2002     Smokeless tobacco: Never Used     Tobacco comment: 4-8 cigs  /day   Substance Use Topics     Alcohol use: Not Currently     Comment: quit alcohol 3-4 yrs ago            Family History:     Family History   Problem Relation Age of Onset     Alzheimer Disease Mother      Unknown/Adopted Father      No Known Problems Sister      No Known Problems Sister      Kidney Disease No family hx of             Immunizations:     Immunization History   Administered Date(s) Administered     Influenza (intradermal) 03/11/2020     Mantoux Tuberculin Skin Test 03/11/2020     Pneumococcal, Unspecified 03/11/2020             Allergies:   No Known Allergies          Medications:     Current Outpatient Medications   Medication Sig     acetaminophen (TYLENOL) 325 MG tablet Take 2 tablets (650 mg) by mouth every 6 hours as needed for mild pain     B Complex-C-Folic Acid (DIALYVITE 800) 0.8 MG TABS Take 1 tablet by mouth every morning      carvedilol (COREG) 25 MG tablet Take 6.25 mg by mouth 2 times daily (with meals) 1/2 tab BID w/meals     furosemide (LASIX) 40 MG tablet Take 40 mg by mouth every morning      gentamicin (GARAMYCIN) 0.1 % external ointment every morning      lisinopril (ZESTRIL) 2.5 MG tablet Take 2.5 mg by mouth every evening      No current facility-administered medications for this visit.        There are no discontinued medications.         Review of Systems:   As mentioned in the interim history otherwise negative by reviewing constitutional symptoms, central and peripheral neurological systems, respiratory system, cardiac system, GI system,  system, musculoskeletal, skin, allergy, and lymphatics.            Physical Exam:   There were no vitals taken for this visit. due to visit being virtual.     Constitutional: awake, alert, cooperative, no apparent distress and appears at stated age, well nourished.   Speaking in complete sentences without dyspnea. No rash on visible exposed parts of his body during the interview.            Laboratory Data:     No results found for:  ACD4    Inflammatory Markers  No lab results found.    Immune Globulin Studies    No lab results found.    Metabolic Studies    Recent Labs   Lab Test 08/21/20  1130 08/18/20  1307 08/14/20  1423    140 141   POTASSIUM 4.5 4.6 4.4   CHLORIDE 110* 108 110*   CO2 20 24 23   ANIONGAP 8 7 8   BUN 66* 72* 69*   CR 7.16* 7.19* 7.41*   GFRESTIMATED 9* 9* 9*   GLC 85 80 77   VISHNU 8.1* 8.1* 8.0*   PHOS  --  4.4  --        Hepatic Studies    Recent Labs   Lab Test 08/18/20  1307   BILITOTAL 0.3   ALKPHOS 76   PROTTOTAL 7.3   ALBUMIN 3.5   AST 18   ALT 46       Hematology Studies     Recent Labs   Lab Test 09/15/20  1347 08/18/20  1307 08/14/20  1423   WBC 8.6 10.4 10.4   ANEU 3.6 5.6  --    ALYM 3.0 2.5  --    TY 0.6 0.6  --    AEOS 1.3* 1.6*  --    HGB 10.3* 12.0* 11.7*   HCT 30.6* 35.8* 35.4*    231 231       Clotting Studies    Recent Labs   Lab Test 08/18/20  1307   INR 1.08   PTT 29       Urine Studies    Recent Labs   Lab Test 08/18/20  1312 08/14/20  1415   URINEPH 7.0 7.0   NITRITE Negative Negative   LEUKEST Large* Large*   WBCU >182* 59*         Microbiology:  Last 6 Culture results with specimen source  Culture Micro   Date Value Ref Range Status   08/14/2020 >100,000 colonies/mL  Escherichia coli   (A)  Final    Specimen Description   Date Value Ref Range Status   08/14/2020 Midstream Urine  Final      No results found for: CMV    Last check of C difficile  No results found for: CDBPCT      Virology:  CMV viral loads    CMV viral loads  No results found for: 56588, 19268, 39462, 90328, CMVQAL  CMV viral loads  No lab results found.    CMV viral loads  No results found for: CMVLOG, 23515, 90760, 53752, 62144    CMV resistance testing  No lab results found.  No results found for: CMVCID, CMVFOS, CMVGAN     EBV viral loads   No lab results found.  No results found for: EBVDN, EBRES, EBVDN, EBVSP, EBVPC, EBVPCR    Human Herpes Virus 6 viral loads    No results found for: H6RES No results found for:  H6SPEC    CMV Antibody IgG   Date Value Ref Range Status   08/18/2020 <0.2 0.0 - 0.8 AI Final     Comment:     Negative  Antibody index (AI) values reflect qualitative changes in antibody   concentration that cannot be directly associated with clinical condition or   disease state.       No results found for: EBIG2, EBIGM, EBVIGG, EBIGG, EBVAGN, ZZ7225, TOXG    Pathology:  Kidney biopsy in OSH 2/2020 with insufficient cortical tissue for interpretation.     Imaging:  Results for orders placed or performed in visit on 09/11/20   XR Chest 2 Views [IMG36]    Narrative    EXAM: XR CHEST 2 VW  9/11/2020 2:03 PM     HISTORY:  ESRD (end stage renal disease) (H); Pre-transplant  evaluation for kidney transplant; Cardiovascular disease; End stage  renal disease (H); History of tobacco use; Organ transplant candidate;  Essential hypertension       COMPARISON:  Outside chest radiograph 2/24/2020    FINDINGS:   PA and lateral views of the chest. Trachea is midline.  Cardiomediastinal silhouette and pulmonary vasculature are within  normal limits. No focal airspace opacity, pleural effusion or  appreciable pneumothorax. No acute osseous abnormality. Visualized  upper abdomen is unremarkable.        Impression    IMPRESSION: No acute cardiopulmonary disease.     I have personally reviewed the examination and initial interpretation  and I agree with the findings.    DAR BISWAS MD

## 2020-10-06 NOTE — LETTER
10/6/2020       RE: Chip Fowler  [address]  [address]  [facility] [address]     Dear Colleague,    Thank you for referring your patient, Chip Fowler, to the [facility] [place] [facility]. Please see a copy of my visit note below.    [facility]    Transplant Infectious Diseases Outpatient Consultation     Patient:  Chip Fowler, Date of birth 1985, Medical record number [id number]  Date of Visit:  10/06/2020  Consult requested by kidney transplant team for evaluation of eosinophilia.           Recommendations:   1. Stool for O/P X3, enteric panel, Cryptosporidium stain, Microsporidium stain, Giardia Ag, adenovirus Ag.   2. Strongyloides serology.   3. Salmonella serology.   4. Francisella tularensis serology.   5. Fungal serology.   6. Repeat CBC with diff.   7. Until the urethral stenosis and the urthrocutaneous fistula are repaired this patient is at increased risk for recurrent UTI and pyelonephritis following transplant. Also will need advice that a recurring BDSM sexual behaviors in the future will increase the risk of UTI and pyelonephritis.     RTC: one month.         Summary of Presentation:   This patient is a 35 year old male with congenital kidney disease and HTN induced renal failure, was started on HD then PD since 2/2020 or 3/2020.   The patient is currently being evaluated for kidney transplant and was found to have eosinophilia so he was referred to ID.         Active Problems and Infectious Diseases Issues:   1. Eosinophilia.   2. Chronic diarrhea since he was started on PD in 3/2020.   3. Pre-kidney transplant evaluation.     The differential diagnosis in a patient with eosinophilia with no signs or symptoms to suggest hematologic malignancies, with reptiles as pets, and diarrhea includes parasitic infections, bacterial infection specifically Salmonella, fungal infection given his  trip to [place] and residence [place].   Will also do additional infectious workup for the chronic diarrhea.   Will also repeat CBC with diff to determine the current eosinophilic count.      By reviewing medications list, no medication that would induce eosinophilia or OTC medications.   Denied history of eczema or allergy.     The differential diagnosis should also include reaction to foreign bodies in the urethra found as of 8/2020.         Old Problems and Infectious Diseases Issues:   1. E coli in urine in 8/2020.   2. Group B Strep in urine in 2/2020.     Other Infectious Disease issues include:  - Serostatus: CMV -, EBV +, HSV1/2 ?, VZV +        Thank you for your kind consultation, and for involving me in the care of Mr. Chip Fowler. Please do not hesitate to call me for any question.     Attestation:  I interviewed the patient and obtained history from the patient, and by reviewing the patient's chart including outside records, microbiological data, and radiological data. All data are summarized in this notes.  Tamara Desir MD   Pager: [phone]  10/06/2020         History of the Infectious Disease lllness:   This patient is a 35 year old male with congenital kidney disease and HTN induced renal failure, was started on HD then PD since 2/2020 or 3/2020. Kidney biopsy in OSH 2/2020 with insufficient cortical tissue for interpretation.   The patient is currently being evaluated for kidney transplant and was found to have eosinophilia so he was referred to ID.   The patient has history of IVDU and amphetamine use in the past but he's been abstinent for a while now. During that time he also had BDSM sexual behaviors which involved inserting foreign objects in his urethra.   He underwent urethrotomy with removal of stones, foreign bodies (marybeth pin and metal clips), stones and bladder debris on 8/21/2020. 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 Apparently there are plans for urethroplasty.   Worthwhile to mention that the patient had similar findings in the past in OSH according to [facility] when foreign bodies were removed from his urethra on multiple occasions in 2014 and 2016.     Stated that he has no problems with PD and no history of infections involving the PD catheter, the dialysate bags are always clear.   Since he was started on PD he's been experiencing watery diarrhea up to 3-4 times a day.   No N/V, abdominal pain, fever, chills.   He did not seek medical attention for diarrhea.   No weight loss.   No night sweats.     Exposure History:  Was born in [place]. He lives in [place] in an apartment with his girlfriend. He has 2 snakes in an aquarium that he cleans himself with bare hands using paper towels and dish soap. He feeds the snakes mostly frozen rodents but sometimes living rodents. He works in plastic factory. The last travel outside of [place] was a road trip to [place] with friends 15-20 years ago. No known TB exposure, no institutionalization. No significant outdoors exposure. Tested negative for LTBI by [facility] when he was started on HD then PD.          Past Medical History:     Past Medical History:   Diagnosis Date     Bladder stones      Cardiomyopathy (H)      ESRD (end stage renal disease) on dialysis (H)      Hypertension      Migraines      Polysubstance abuse (H)      Solitary kidney, congenital      Urethral stone      Urethral stricture             Past Surgical History:     Past Surgical History:   Procedure Laterality Date     BIOPSY  02/2020    renal, Hindu     COMBINED CYSTOSCOPY, LASER HOLMIUM LITHOTRIPSY URETER(S)       CYSTOSCOPY       HERNIA REPAIR      infant     INSERT CATHETER PERITONEAL DIALYSIS       LASER HOLMIUM LITHOTRIPSY URETER(S), INSERT STENT, COMBINED N/A 8/21/2020    Procedure: CYSTOSCOPY, bladder and urethral stone extraction, removal of foreign body, urethral dilation, urethrotomy, laser on  felix;  Surgeon: Sam Mejia MD;  Location: [facility] OR     urethral dilation                Social History:     Social History     Tobacco Use     Smoking status: Current Every Day Smoker     Packs/day: 0.40     Types: Cigarettes     Start date: 2002     Smokeless tobacco: Never Used     Tobacco comment: 4-8 cigs /day   Substance Use Topics     Alcohol use: Not Currently     Comment: quit alcohol 3-4 yrs ago            Family History:     Family History   Problem Relation Age of Onset     Alzheimer Disease Mother      Unknown/Adopted Father      No Known Problems Sister      No Known Problems Sister      Kidney Disease No family hx of             Immunizations:     Immunization History   Administered Date(s) Administered     Influenza (intradermal) 03/11/2020     Mantoux Tuberculin Skin Test 03/11/2020     Pneumococcal, Unspecified 03/11/2020             Allergies:   No Known Allergies          Medications:     Current Outpatient Medications   Medication Sig     acetaminophen (TYLENOL) 325 MG tablet Take 2 tablets (650 mg) by mouth every 6 hours as needed for mild pain     B Complex-C-Folic Acid (DIALYVITE 800) 0.8 MG TABS Take 1 tablet by mouth every morning      carvedilol (COREG) 25 MG tablet Take 6.25 mg by mouth 2 times daily (with meals) 1/2 tab BID w/meals     furosemide (LASIX) 40 MG tablet Take 40 mg by mouth every morning      gentamicin (GARAMYCIN) 0.1 % external ointment every morning      lisinopril (ZESTRIL) 2.5 MG tablet Take 2.5 mg by mouth every evening      No current facility-administered medications for this visit.        There are no discontinued medications.         Review of Systems:   As mentioned in the interim history otherwise negative by reviewing constitutional symptoms, central and peripheral neurological systems, respiratory system, cardiac system, GI system,  system, musculoskeletal, skin, allergy, and lymphatics.            Physical Exam:   There were no vitals taken for this  visit. due to visit being virtual.     Constitutional: awake, alert, cooperative, no apparent distress and appears at stated age, well nourished.   Speaking in complete sentences without dyspnea. No rash on visible exposed parts of his body during the interview.            Laboratory Data:     No results found for: ACD4    Inflammatory Markers  No lab results found.    Immune Globulin Studies    No lab results found.    Metabolic Studies    Recent Labs   Lab Test 08/21/20  1130 08/18/20  1307 08/14/20  1423    140 141   POTASSIUM 4.5 4.6 4.4   CHLORIDE 110* 108 110*   CO2 20 24 23   ANIONGAP 8 7 8   BUN 66* 72* 69*   CR 7.16* 7.19* 7.41*   GFRESTIMATED 9* 9* 9*   GLC 85 80 77   VISHNU 8.1* 8.1* 8.0*   PHOS  --  4.4  --        Hepatic Studies    Recent Labs   Lab Test 08/18/20  1307   BILITOTAL 0.3   ALKPHOS 76   PROTTOTAL 7.3   ALBUMIN 3.5   AST 18   ALT 46       Hematology Studies     Recent Labs   Lab Test 09/15/20  1347 08/18/20  1307 08/14/20  1423   WBC 8.6 10.4 10.4   ANEU 3.6 5.6  --    ALYM 3.0 2.5  --    TY 0.6 0.6  --    AEOS 1.3* 1.6*  --    HGB 10.3* 12.0* 11.7*   HCT 30.6* 35.8* 35.4*    231 231       Clotting Studies    Recent Labs   Lab Test 08/18/20  1307   INR 1.08   PTT 29       Urine Studies    Recent Labs   Lab Test 08/18/20  1312 08/14/20  1415   URINEPH 7.0 7.0   NITRITE Negative Negative   LEUKEST Large* Large*   WBCU >182* 59*         Microbiology:  Last 6 Culture results with specimen source  Culture Micro   Date Value Ref Range Status   08/14/2020 >100,000 colonies/mL  Escherichia coli   (A)  Final    Specimen Description   Date Value Ref Range Status   08/14/2020 Midstream Urine  Final      No results found for: CMV    Last check of C difficile  No results found for: CDBPCT      Virology:  CMV viral loads    CMV viral loads  No results found for: 22290, 71186, 50635, 87047, CMVQAL  CMV viral loads  No lab results found.    CMV viral loads  No results found for: CMVLOG, 46408,  "94097, 23331, 31038    CMV resistance testing  No lab results found.  No results found for: CMVCID, CMVFOS, CMVGAN     EBV viral loads   No lab results found.  No results found for: EBVDN, EBRES, EBVDN, EBVSP, EBVPC, EBVPCR    Human Herpes Virus 6 viral loads    No results found for: H6RES No results found for: H6SPEC    CMV Antibody IgG   Date Value Ref Range Status   08/18/2020 <0.2 0.0 - 0.8 AI Final     Comment:     Negative  Antibody index (AI) values reflect qualitative changes in antibody   concentration that cannot be directly associated with clinical condition or   disease state.       No results found for: EBIG2, EBIGM, EBVIGG, EBIGG, EBVAGN, UY2892, TOXG    Pathology:  Kidney biopsy in OSH 2/2020 with insufficient cortical tissue for interpretation.     Imaging:  Results for orders placed or performed in visit on 09/11/20   XR Chest 2 Views [IMG36]    Narrative    EXAM: XR CHEST 2 VW  9/11/2020 2:03 PM     HISTORY:  ESRD (end stage renal disease) (H); Pre-transplant  evaluation for kidney transplant; Cardiovascular disease; End stage  renal disease (H); History of tobacco use; Organ transplant candidate;  Essential hypertension       COMPARISON:  Outside chest radiograph 2/24/2020    FINDINGS:   PA and lateral views of the chest. Trachea is midline.  Cardiomediastinal silhouette and pulmonary vasculature are within  normal limits. No focal airspace opacity, pleural effusion or  appreciable pneumothorax. No acute osseous abnormality. Visualized  upper abdomen is unremarkable.        Impression    IMPRESSION: No acute cardiopulmonary disease.     I have personally reviewed the examination and initial interpretation  and I agree with the findings.    DAR BISWAS MD         Chip Fowler is a 35 year old male who is being evaluated via a billable video visit.      The patient has been notified of following:     \"This video visit will be conducted via a call between you and your physician/provider. We have " "found that certain health care needs can be provided without the need for an in-person physical exam.  This service lets us provide the care you need with a video conversation.  If a prescription is necessary we can send it directly to your pharmacy.  If lab work is needed we can place an order for that and you can then stop by our lab to have the test done at a later time.    Video visits are billed at different rates depending on your insurance coverage.  Please reach out to your insurance provider with any questions.    If during the course of the call the physician/provider feels a video visit is not appropriate, you will not be charged for this service.\"    Patient has given verbal consent for Video visit? Yes  How would you like to obtain your AVS? MyChart  Will anyone else be joining your video visit? No        Video-Visit Details    Type of service:  Video Visit    Video Start Time: 6:06 PM  Video End Time: 6:45 PM    Originating Location (pt. Location): Home    Distant Location (provider location):  [facility] [place]     Platform used for Video Visit: Chela Desir MD    "

## 2020-10-08 DIAGNOSIS — Z01.818 ENCOUNTER FOR PRE-TRANSPLANT EVALUATION FOR CHRONIC KIDNEY DISEASE: ICD-10-CM

## 2020-10-08 DIAGNOSIS — K52.9 CHRONIC DIARRHEA: ICD-10-CM

## 2020-10-08 DIAGNOSIS — D72.19 EOSINOPHILIC LEUKOCYTOSIS, UNSPECIFIED TYPE: ICD-10-CM

## 2020-10-08 LAB
BASOPHILS # BLD AUTO: 0.1 10E9/L (ref 0–0.2)
BASOPHILS NFR BLD AUTO: 1.4 %
C COLI+JEJUNI+LARI FUSA STL QL NAA+PROBE: NOT DETECTED
DIFFERENTIAL METHOD BLD: ABNORMAL
EC STX1 GENE STL QL NAA+PROBE: NOT DETECTED
EC STX2 GENE STL QL NAA+PROBE: NOT DETECTED
ENTERIC PATHOGEN COMMENT: NORMAL
EOSINOPHIL # BLD AUTO: 1.7 10E9/L (ref 0–0.7)
EOSINOPHIL NFR BLD AUTO: 17.7 %
ERYTHROCYTE [DISTWIDTH] IN BLOOD BY AUTOMATED COUNT: 14.6 % (ref 10–15)
HCT VFR BLD AUTO: 34.7 % (ref 40–53)
HGB BLD-MCNC: 11.7 G/DL (ref 13.3–17.7)
IMM GRANULOCYTES # BLD: 0 10E9/L (ref 0–0.4)
IMM GRANULOCYTES NFR BLD: 0.3 %
LYMPHOCYTES # BLD AUTO: 2.6 10E9/L (ref 0.8–5.3)
LYMPHOCYTES NFR BLD AUTO: 27.6 %
MCH RBC QN AUTO: 32.2 PG (ref 26.5–33)
MCHC RBC AUTO-ENTMCNC: 33.7 G/DL (ref 31.5–36.5)
MCV RBC AUTO: 96 FL (ref 78–100)
MONOCYTES # BLD AUTO: 0.5 10E9/L (ref 0–1.3)
MONOCYTES NFR BLD AUTO: 5.3 %
NEUTROPHILS # BLD AUTO: 4.5 10E9/L (ref 1.6–8.3)
NEUTROPHILS NFR BLD AUTO: 47.7 %
NOROV GI+II ORF1-ORF2 JNC STL QL NAA+PR: NOT DETECTED
PLATELET # BLD AUTO: 238 10E9/L (ref 150–450)
RBC # BLD AUTO: 3.63 10E12/L (ref 4.4–5.9)
RVA NSP5 STL QL NAA+PROBE: NOT DETECTED
SALMONELLA SP RPOD STL QL NAA+PROBE: NOT DETECTED
SHIGELLA SP+EIEC IPAH STL QL NAA+PROBE: NOT DETECTED
V CHOL+PARA RFBL+TRKH+TNAA STL QL NAA+PR: NOT DETECTED
WBC # BLD AUTO: 9.4 10E9/L (ref 4–11)
Y ENTERO RECN STL QL NAA+PROBE: NOT DETECTED

## 2020-10-08 PROCEDURE — 99000 SPECIMEN HANDLING OFFICE-LAB: CPT | Performed by: INTERNAL MEDICINE

## 2020-10-08 PROCEDURE — 87177 OVA AND PARASITES SMEARS: CPT | Mod: 59 | Performed by: INTERNAL MEDICINE

## 2020-10-08 PROCEDURE — 87506 IADNA-DNA/RNA PROBE TQ 6-11: CPT | Performed by: INTERNAL MEDICINE

## 2020-10-08 PROCEDURE — 86606 ASPERGILLUS ANTIBODY: CPT | Mod: 90 | Performed by: INTERNAL MEDICINE

## 2020-10-08 PROCEDURE — 86668 FRANCISELLA TULARENSIS: CPT | Mod: 90 | Performed by: INTERNAL MEDICINE

## 2020-10-08 PROCEDURE — 87328 CRYPTOSPORIDIUM AG IA: CPT | Performed by: INTERNAL MEDICINE

## 2020-10-08 PROCEDURE — 87329 GIARDIA AG IA: CPT | Mod: 59 | Performed by: INTERNAL MEDICINE

## 2020-10-08 PROCEDURE — 86682 HELMINTH ANTIBODY: CPT | Mod: 90 | Performed by: INTERNAL MEDICINE

## 2020-10-08 PROCEDURE — 86612 BLASTOMYCES ANTIBODY: CPT | Mod: 90 | Performed by: INTERNAL MEDICINE

## 2020-10-08 PROCEDURE — 85025 COMPLETE CBC W/AUTO DIFF WBC: CPT | Performed by: INTERNAL MEDICINE

## 2020-10-08 PROCEDURE — 86635 COCCIDIOIDES ANTIBODY: CPT | Mod: 90 | Performed by: INTERNAL MEDICINE

## 2020-10-08 PROCEDURE — 87209 SMEAR COMPLEX STAIN: CPT | Mod: 59 | Performed by: INTERNAL MEDICINE

## 2020-10-08 PROCEDURE — 86698 HISTOPLASMA ANTIBODY: CPT | Mod: 90 | Performed by: INTERNAL MEDICINE

## 2020-10-08 PROCEDURE — 86768 SALMONELLA ANTIBODY: CPT | Mod: 90 | Performed by: INTERNAL MEDICINE

## 2020-10-08 PROCEDURE — 87206 SMEAR FLUORESCENT/ACID STAI: CPT | Mod: 59 | Performed by: INTERNAL MEDICINE

## 2020-10-08 PROCEDURE — 87449 NOS EACH ORGANISM AG IA: CPT | Performed by: INTERNAL MEDICINE

## 2020-10-08 PROCEDURE — 36415 COLL VENOUS BLD VENIPUNCTURE: CPT | Performed by: INTERNAL MEDICINE

## 2020-10-09 LAB
C PARVUM AG STL QL IA: NEGATIVE
G LAMBLIA AG STL QL IA: NEGATIVE
HADV AG STL QL IA: NEGATIVE
MICROSPORID STL TRI STN: NORMAL
MICROSPORID STL TRI STN: NORMAL
O+P STL CONC: NORMAL
O+P STL CONC: NORMAL
O+P STL MICRO: NORMAL
O+P STL MICRO: NORMAL
SPECIMEN SOURCE: NORMAL

## 2020-10-10 LAB
SALMONELLA AB SER QL: POSITIVE
STRONGYLOIDES IGG SER IA-ACNC: 0.6 IV

## 2020-10-11 LAB
ASPERGILLUS AB TITR SER CF: NORMAL {TITER}
B DERMAT AB SER-ACNC: 0.3 IV
COCCIDIOIDES AB TITR SER CF: NORMAL {TITER}
H CAPSUL MYC AB TITR SER CF: NORMAL {TITER}
H CAPSUL YST AB TITR SER CF: NORMAL {TITER}

## 2020-10-15 DIAGNOSIS — D72.19 EOSINOPHILIC LEUKOCYTOSIS, UNSPECIFIED TYPE: Primary | ICD-10-CM

## 2020-10-21 NOTE — TELEPHONE ENCOUNTER
RECORDS STATUS - ALL OTHER DIAGNOSIS      RECORDS RECEIVED FROM: [facility]   DATE RECEIVED: 11/10/2020    NOTES STATUS DETAILS   OFFICE NOTE from referring provider Complete Tamara Desir MD   OFFICE NOTE from medical oncologist N/A    DISCHARGE SUMMARY from hospital N/A    DISCHARGE REPORT from the ER     OPERATIVE REPORT NA    MEDICATION LIST Complete [facility]   CLINICAL TRIAL TREATMENTS TO DATE     LABS     PATHOLOGY REPORTS     ANYTHING RELATED TO DIAGNOSIS Complete Labs last updated on 10/8/2020    GENONOMIC TESTING     TYPE:     IMAGING (NEED IMAGES & REPORT)     CT SCANS     MRI     MAMMO     ULTRASOUND     PET

## 2020-10-26 LAB — LAB SCANNED RESULT: NORMAL

## 2020-11-01 NOTE — PROGRESS NOTES
"Chip Fowler is a 35 year old male who is being evaluated via a billable video visit.      The patient has been notified of following:     \"This video visit will be conducted via a call between you and your physician/provider. We have found that certain health care needs can be provided without the need for an in-person physical exam.  This service lets us provide the care you need with a video conversation.  If a prescription is necessary we can send it directly to your pharmacy.  If lab work is needed we can place an order for that and you can then stop by our lab to have the test done at a later time.    Video visits are billed at different rates depending on your insurance coverage.  Please reach out to your insurance provider with any questions.    If during the course of the call the physician/provider feels a video visit is not appropriate, you will not be charged for this service.\"    Patient has given verbal consent for Video visit? yes    Video-Visit Details    Type of service:  Video Visit    Video visit duration: 22 min  Originating Location (pt. Location): home      Distant Location (provider location):  [facility]     Platform used for Video Visit:TRAVIS Betancourt MD, MD    Hematology Consultation:  Date on this visit: 11/10/2020    Chip Fowler  is referred by Dr.Karam ORLIN Desir for a hematology consultation. He requires evaluation for new diagnosis of eosinophilia.    Nephrologist Les Guevara [place]   Transplant:Leeroy Reddy  Urology: Sam Baptiste    History Of Present Illness:  Mr. Fowler is a 35 year old male who presents with HX of ESRD on PD since 03/2020, HTN, who presents for evaluation of eosinophilia.  He was noted to have a mildly elevated absolute eosinophil count dating back to August 2020 as below.  He has also had mild normocytic anemia.  Otherwise white count and absolute differential counts have been normal and platelet " count has remained normal as well.  Results for CAROLYN HIGHTOWER (MRN [id number]) as of 11/1/2020 16:56   Ref. Range 8/18/2020 13:07 9/15/2020 13:47 10/8/2020 13:00   Absolute Eosinophils Latest Ref Range: 0.0 - 0.7 10e9/L 1.6 (H) 1.3 (H) 1.7 (H)     I have reviewed outside lab results and it looks like absolute acidophil count was normal on February 24, 2020 in [facility].  It measured 0.4 at that time.  There are no differential blood counts up available prior to February 2020.  He was seen by infectious disease specialists for evaluation of eosinophilia and no infectious etiology was found except Salmonella typhi paratyphi in the stool.  He has had chronic diarrhea since he was started on peritoneal dialysis in March 2020.  He is being considered for kidney transplant. 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 The plan is is to proceed with urethroplasty with a fistula repair prior to kidney transplant in the future. The patient denies any rashes. He does get occasional rash/irritation when he applies silk tape around peritoneal dialysis catheter.  In addition, a complete 12 point  review of systems is negative.    Past Medical/Surgical History:  Past Medical History:   Diagnosis Date     Bladder stones      Cardiomyopathy (H)      ESRD (end stage renal disease) on dialysis (H)      Hypertension      Migraines      Polysubstance abuse (H)      Solitary kidney, congenital      Urethral stone      Urethral stricture      Past Surgical History:   Procedure Laterality Date     BIOPSY  02/2020    renal, [place]anda[place]     COMBINED CYSTOSCOPY, LASER HOLMIUM LITHOTRIPSY URETER(S)       CYSTOSCOPY       HERNIA REPAIR      infant     INSERT CATHETER PERITONEAL DIALYSIS       LASER HOLMIUM LITHOTRIPSY URETER(S), INSERT STENT, COMBINED N/A 8/21/2020    Procedure: CYSTOSCOPY, bladder and urethral stone extraction, removal of foreign body, urethral  dilation, urethrotomy, laser on standby;  Surgeon: Sam Mejia MD;  [facility]     urethral dilation       Allergies:  Allergies as of 11/10/2020     (No Known Allergies)     Current Medications:  Current Outpatient Medications   Medication Sig Dispense Refill     acetaminophen (TYLENOL) 325 MG tablet Take 2 tablets (650 mg) by mouth every 6 hours as needed for mild pain 90 tablet 0     B Complex-C-Folic Acid (DIALYVITE 800) 0.8 MG TABS Take 1 tablet by mouth every morning        carvedilol (COREG) 25 MG tablet Take 6.25 mg by mouth 2 times daily (with meals) 1/2 tab BID w/meals       furosemide (LASIX) 40 MG tablet Take 40 mg by mouth every morning        gentamicin (GARAMYCIN) 0.1 % external ointment every morning        lisinopril (ZESTRIL) 2.5 MG tablet Take 2.5 mg by mouth every evening         Family History:  Family History   Problem Relation Age of Onset     Alzheimer Disease Mother      Unknown/Adopted Father      No Known Problems Sister      No Known Problems Sister      Kidney Disease No family hx of      No family history of bleeding or clotting disorder.  Social History:  Social History     Socioeconomic History     Marital status: Single     Spouse name: Not on file     Number of children: Not on file     Years of education: Not on file     Highest education level: Not on file   Occupational History     Occupation: material stager   Social Needs     Financial resource strain: Not on file     Food insecurity     Worry: Not on file     Inability: Not on file     Transportation needs     Medical: Not on file     Non-medical: Not on file   Tobacco Use     Smoking status: Current Every Day Smoker     Packs/day: 0.40     Types: Cigarettes     Start date: 2002     Smokeless tobacco: Never Used     Tobacco comment: 4-8 cigs /day   Substance and Sexual Activity     Alcohol use: Not Currently     Comment: quit alcohol 3-4 yrs ago     Drug use: Not Currently     Types: Methamphetamines     Sexual  activity: Not on file     Physical Exam:  Wt Readings from Last 5 Encounters:   08/21/20 72.1 kg (159 lb)   08/14/20 72.1 kg (159 lb)   06/18/20 69.9 kg (154 lb)   04/17/20 69.9 kg (154 lb)   Constitutional: alert and in no distress  Eyes: No redness or discharge  Respiratory: No cough or labored breathing.  Musculoskeletal: Full range of motion in extremities.  Skin: no visible skin lesions or discoloration  Neurological: No tremors and denies headache.  Psychiatric: Mentation appears normal and affect is normal as well.  Alert and oriented x3.  The rest the comprehensive physical examination is deferred due to public health emergency video visit restrictions.    Laboratory/Imaging Studies  Labs reviewed and documented in the EMR.    ASSESSMENT/PLAN:    Chip is a pleasant 35 year old man with ESRD, on peritoneal dialysis since 03/2020, also with chronic diarrhea since, with negative ova and parasite testing, with new eosinophilia in August 2020, as compared to normal absolute eosinophil count prior to peritoneal dialysis initiation.   We'll go ahead and repeat CBCd and review a peripheral blood smear. We'll also check serum tryptase level.  If the above workup is non-revealing and he still has eosinophilia on his upcoming CBCd, then would also consider peritoneal catheter allergy. There are case reports of eosinophilia and rash associated with a peritoneal dialysis catheter as well as hemodialysis catheters, the former associated with silicone allergy and the latter with epoxy resin. (Edgardo Alvarez. Semin Dial. 2011 Nov-Dec; 24(6):686-7). We'll consider referral to an allergist for a patch test. In the aforementioned case report, removal of the peritoneal dialysis catheter following the kidney transplant, resulted in resolution of eosinophilia. It also seems that if he has contact dermatitis to the silk tape he uses around peritoneal dialysis catheter.  We'll inform the patient of the results and recommendations  and  f/up plan based on the results.   At the end of our visit patient verbalized understanding and concurred with the plan.      Addendum:  peripheral blood smear  Slight normochromic, normocytic anemia        Slight eosinophilia, with normal WBC   Absolute eosinophil count 1.2,  Serum tryptase mildly elevated at 17.3  We'll refer to allergy for consideration of peritoneal dialysis catheter allergy.    Addendum: per communication with Dr. Nath and after patient's consultation with Dr. Nath with essentially negative allergy workup except for positive allergy testing for cats only, we both recommend proceeding with bone marrow biopsy to r/o primary hematologic disorder as a cause of eosinophilia although most likely eosinophilia is secondary to PD, but unable to test for that.  Proceed with bone marrow biopsy at this time.

## 2020-11-10 ENCOUNTER — VIRTUAL VISIT (OUTPATIENT)
Dept: ONCOLOGY | Facility: CLINIC | Age: 35
End: 2020-11-10
Attending: INTERNAL MEDICINE
Payer: MEDICARE

## 2020-11-10 ENCOUNTER — PRE VISIT (OUTPATIENT)
Dept: ONCOLOGY | Facility: CLINIC | Age: 35
End: 2020-11-10

## 2020-11-10 DIAGNOSIS — N21.1 CALCULUS IN URETHRA: ICD-10-CM

## 2020-11-10 DIAGNOSIS — D72.19 OTHER EOSINOPHILIA: Primary | ICD-10-CM

## 2020-11-10 DIAGNOSIS — D72.828 OTHER ELEVATED WHITE BLOOD CELL COUNT: ICD-10-CM

## 2020-11-10 PROCEDURE — 99204 OFFICE O/P NEW MOD 45 MIN: CPT | Mod: 95 | Performed by: INTERNAL MEDICINE

## 2020-11-10 NOTE — LETTER
"    11/10/2020         RE: Chip Fowler  [address]  [address]  [place] [address]        Dear Colleague,    Thank you for referring your patient, Chip Fowler, to the [facility]. Please see a copy of my visit note below.    Chip Fowler is a 35 year old male who is being evaluated via a billable video visit.      The patient has been notified of following:     \"This video visit will be conducted via a call between you and your physician/provider. We have found that certain health care needs can be provided without the need for an in-person physical exam.  This service lets us provide the care you need with a video conversation.  If a prescription is necessary we can send it directly to your pharmacy.  If lab work is needed we can place an order for that and you can then stop by our lab to have the test done at a later time.    Video visits are billed at different rates depending on your insurance coverage.  Please reach out to your insurance provider with any questions.    If during the course of the call the physician/provider feels a video visit is not appropriate, you will not be charged for this service.\"    Patient has given verbal consent for Video visit? yes    Video-Visit Details    Type of service:  Video Visit    Video visit duration: 22 min  Originating Location (pt. Location): home      Distant Location (provider location):  [facility]     Platform used for Video Visit:TRAVIS Betancourt MD, MD    Hematology Consultation:  Date on this visit: 11/10/2020    Chip Fowler  is referred by Dr.Karam ORLIN Desir for a hematology consultation. He requires evaluation for new diagnosis of eosinophilia.    Nephrologist Les Guevara [place]   Transplant:Leeroy Reddy  Urology: Sam Baptiste    History Of Present Illness:  Mr. Fowler is a 35 year old male who presents with HX of ESRD on PD since 03/2020, HTN, " who presents for evaluation of eosinophilia.  He was noted to have a mildly elevated absolute eosinophil count dating back to August 2020 as below.  He has also had mild normocytic anemia.  Otherwise white count and absolute differential counts have been normal and platelet count has remained normal as well.  Results for CAROLYN HIGHTOWER (MRN [id number]) as of 11/1/2020 16:56   Ref. Range 8/18/2020 13:07 9/15/2020 13:47 10/8/2020 13:00   Absolute Eosinophils Latest Ref Range: 0.0 - 0.7 10e9/L 1.6 (H) 1.3 (H) 1.7 (H)     I have reviewed outside lab results and it looks like absolute acidophil count was normal on February 24, 2020 in [facility].  It measured 0.4 at that time.  There are no differential blood counts up available prior to February 2020.  He was seen by infectious disease specialists for evaluation of eosinophilia and no infectious etiology was found except Salmonella typhi paratyphi in the stool.  He has had chronic diarrhea since he was started on peritoneal dialysis in March 2020.  He is being considered for kidney transplant. 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 The plan is is to proceed with urethroplasty with a fistula repair prior to kidney transplant in the future. The patient denies any rashes. He does get occasional rash/irritation when he applies silk tape around peritoneal dialysis catheter.  In addition, a complete 12 point  review of systems is negative.    Past Medical/Surgical History:  Past Medical History:   Diagnosis Date     Bladder stones      Cardiomyopathy (H)      ESRD (end stage renal disease) on dialysis (H)      Hypertension      Migraines      Polysubstance abuse (H)      Solitary kidney, congenital      Urethral stone      Urethral stricture      Past Surgical History:   Procedure Laterality Date     BIOPSY  02/2020    renal, [place]     COMBINED CYSTOSCOPY, LASER HOLMIUM LITHOTRIPSY  URETER(S)       CYSTOSCOPY       HERNIA REPAIR      infant     INSERT CATHETER PERITONEAL DIALYSIS       LASER HOLMIUM LITHOTRIPSY URETER(S), INSERT STENT, COMBINED N/A 8/21/2020    Procedure: CYSTOSCOPY, bladder and urethral stone extraction, removal of foreign body, urethral dilation, urethrotomy, laser on standby;  Surgeon: Sam Mejia MD;  [facility]     urethral dilation       Allergies:  Allergies as of 11/10/2020     (No Known Allergies)     Current Medications:  Current Outpatient Medications   Medication Sig Dispense Refill     acetaminophen (TYLENOL) 325 MG tablet Take 2 tablets (650 mg) by mouth every 6 hours as needed for mild pain 90 tablet 0     B Complex-C-Folic Acid (DIALYVITE 800) 0.8 MG TABS Take 1 tablet by mouth every morning        carvedilol (COREG) 25 MG tablet Take 6.25 mg by mouth 2 times daily (with meals) 1/2 tab BID w/meals       furosemide (LASIX) 40 MG tablet Take 40 mg by mouth every morning        gentamicin (GARAMYCIN) 0.1 % external ointment every morning        lisinopril (ZESTRIL) 2.5 MG tablet Take 2.5 mg by mouth every evening         Family History:  Family History   Problem Relation Age of Onset     Alzheimer Disease Mother      Unknown/Adopted Father      No Known Problems Sister      No Known Problems Sister      Kidney Disease No family hx of      No family history of bleeding or clotting disorder.  Social History:  Social History     Socioeconomic History     Marital status: Single     Spouse name: Not on file     Number of children: Not on file     Years of education: Not on file     Highest education level: Not on file   Occupational History     Occupation: material stager   Social Needs     Financial resource strain: Not on file     Food insecurity     Worry: Not on file     Inability: Not on file     Transportation needs     Medical: Not on file     Non-medical: Not on file   Tobacco Use     Smoking status: Current Every Day Smoker     Packs/day: 0.40      Types: Cigarettes     Start date: 2002     Smokeless tobacco: Never Used     Tobacco comment: 4-8 cigs /day   Substance and Sexual Activity     Alcohol use: Not Currently     Comment: quit alcohol 3-4 yrs ago     Drug use: Not Currently     Types: Methamphetamines     Sexual activity: Not on file     Physical Exam:  Wt Readings from Last 5 Encounters:   08/21/20 72.1 kg (159 lb)   08/14/20 72.1 kg (159 lb)   06/18/20 69.9 kg (154 lb)   04/17/20 69.9 kg (154 lb)   Constitutional: alert and in no distress  Eyes: No redness or discharge  Respiratory: No cough or labored breathing.  Musculoskeletal: Full range of motion in extremities.  Skin: no visible skin lesions or discoloration  Neurological: No tremors and denies headache.  Psychiatric: Mentation appears normal and affect is normal as well.  Alert and oriented x3.  The rest the comprehensive physical examination is deferred due to public health emergency video visit restrictions.    Laboratory/Imaging Studies  Labs reviewed and documented in the EMR.    ASSESSMENT/PLAN:    Chip is a pleasant 35 year old man with ESRD, on peritoneal dialysis since 03/2020, also with chronic diarrhea since, with negative ova and parasite testing, with new eosinophilia in August 2020, as compared to normal absolute eosinophil count prior to peritoneal dialysis initiation.   We'll go ahead and repeat CBCd and review a peripheral blood smear. We'll also check serum tryptase level.  If the above workup is non-revealing and he still has eosinophilia on his upcoming CBCd, then would also consider peritoneal catheter allergy. There are case reports of eosinophilia and rash associated with a peritoneal dialysis catheter as well as hemodialysis catheters, the former associated with silicone allergy and the latter with epoxy resin. (Edgardo Alvarez. Semin Dial. 2011 Nov-Dec; 24(6):686-7). We'll consider referral to an allergist for a patch test. In the aforementioned case report, removal of  the peritoneal dialysis catheter following the kidney transplant, resulted in resolution of eosinophilia. It also seems that if he has contact dermatitis to the silk tape he uses around peritoneal dialysis catheter.  We'll inform the patient of the results and recommendations and  f/up plan based on the results.   At the end of our visit patient verbalized understanding and concurred with the plan.        Again, thank you for allowing me to participate in the care of your patient.        Sincerely,        Maame Betancourt MD, MD

## 2020-11-10 NOTE — NURSING NOTE
"Chip Fowler is a 35 year old male who is being evaluated via a billable video visit.      The patient has been notified of following:     \"This video visit will be conducted via a call between you and your physician/provider. We have found that certain health care needs can be provided without the need for an in-person physical exam.  This service lets us provide the care you need with a video conversation.  If a prescription is necessary we can send it directly to your pharmacy.  If lab work is needed we can place an order for that and you can then stop by our lab to have the test done at a later time.    Video visits are billed at different rates depending on your insurance coverage.  Please reach out to your insurance provider with any questions.    If during the course of the call the physician/provider feels a video visit is not appropriate, you will not be charged for this service.\"    Patient has given verbal consent for Video visit? Yes  How would you like to obtain your AVS? MyChart  If you are dropped from the video visit, the video invite should be resent to: Text to cell phone: [phone]  Will anyone else be joining your video visit? No      Video-Visit Details    Type of service:  Video Visit    Originating Location (pt. Location): Home    Distant Location (provider location):  [facility]     Platform used for Video Visit: Chela Mcdonnell CMA        "

## 2020-11-10 NOTE — LETTER
"    11/10/2020         RE: Chip Fowler  [address]  [address]  [facility] [address]        Dear Colleague,    Thank you for referring your patient, Chip Fowler, to the [facility]. Please see a copy of my visit note below.    Chip Fowler is a 35 year old male who is being evaluated via a billable video visit.      The patient has been notified of following:     \"This video visit will be conducted via a call between you and your physician/provider. We have found that certain health care needs can be provided without the need for an in-person physical exam.  This service lets us provide the care you need with a video conversation.  If a prescription is necessary we can send it directly to your pharmacy.  If lab work is needed we can place an order for that and you can then stop by our lab to have the test done at a later time.    Video visits are billed at different rates depending on your insurance coverage.  Please reach out to your insurance provider with any questions.    If during the course of the call the physician/provider feels a video visit is not appropriate, you will not be charged for this service.\"    Patient has given verbal consent for Video visit? yes    Video-Visit Details    Type of service:  Video Visit    Video visit duration: 22 min  Originating Location (pt. Location): home      Distant Location (provider location):  [facility]     Platform used for Video Visit:TRAVIS Betancourt MD, MD    Hematology Consultation:  Date on this visit: 11/10/2020    Chip Fowler  is referred by Dr.Karam ORLIN Desir for a hematology consultation. He requires evaluation for new diagnosis of eosinophilia.    Nephrologist Les Guevara [place]   Transplant:Leeroy Reddy  Urology: Sam Baptiste    History Of Present Illness:  Mr. Fowler is a 35 year old male who presents with HX of ESRD on PD since 03/2020, HTN, " who presents for evaluation of eosinophilia.  He was noted to have a mildly elevated absolute eosinophil count dating back to August 2020 as below.  He has also had mild normocytic anemia.  Otherwise white count and absolute differential counts have been normal and platelet count has remained normal as well.  Results for CAROLYN HIGHTOWER (MRN [id number]) as of 11/1/2020 16:56   Ref. Range 8/18/2020 13:07 9/15/2020 13:47 10/8/2020 13:00   Absolute Eosinophils Latest Ref Range: 0.0 - 0.7 10e9/L 1.6 (H) 1.3 (H) 1.7 (H)     I have reviewed outside lab results and it looks like absolute acidophil count was normal on February 24, 2020 in [facility].  It measured 0.4 at that time.  There are no differential blood counts up available prior to February 2020.  He was seen by infectious disease specialists for evaluation of eosinophilia and no infectious etiology was found except Salmonella typhi paratyphi in the stool.  He has had chronic diarrhea since he was started on peritoneal dialysis in March 2020.  He is being considered for kidney transplant. 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 The plan is is to proceed with urethroplasty with a fistula repair prior to kidney transplant in the future. The patient denies any rashes. He does get occasional rash/irritation when he applies silk tape around peritoneal dialysis catheter.  In addition, a complete 12 point  review of systems is negative.    Past Medical/Surgical History:  Past Medical History:   Diagnosis Date     Bladder stones      Cardiomyopathy (H)      ESRD (end stage renal disease) on dialysis (H)      Hypertension      Migraines      Polysubstance abuse (H)      Solitary kidney, congenital      Urethral stone      Urethral stricture      Past Surgical History:   Procedure Laterality Date     BIOPSY  02/2020    renal, [facility]     COMBINED CYSTOSCOPY, LASER HOLMIUM LITHOTRIPSY  URETER(S)       CYSTOSCOPY       HERNIA REPAIR      infant     INSERT CATHETER PERITONEAL DIALYSIS       LASER HOLMIUM LITHOTRIPSY URETER(S), INSERT STENT, COMBINED N/A 8/21/2020    Procedure: CYSTOSCOPY, bladder and urethral stone extraction, removal of foreign body, urethral dilation, urethrotomy, laser on standby;  Surgeon: Sam Mejia MD;  [facility]     urethral dilation       Allergies:  Allergies as of 11/10/2020     (No Known Allergies)     Current Medications:  Current Outpatient Medications   Medication Sig Dispense Refill     acetaminophen (TYLENOL) 325 MG tablet Take 2 tablets (650 mg) by mouth every 6 hours as needed for mild pain 90 tablet 0     B Complex-C-Folic Acid (DIALYVITE 800) 0.8 MG TABS Take 1 tablet by mouth every morning        carvedilol (COREG) 25 MG tablet Take 6.25 mg by mouth 2 times daily (with meals) 1/2 tab BID w/meals       furosemide (LASIX) 40 MG tablet Take 40 mg by mouth every morning        gentamicin (GARAMYCIN) 0.1 % external ointment every morning        lisinopril (ZESTRIL) 2.5 MG tablet Take 2.5 mg by mouth every evening         Family History:  Family History   Problem Relation Age of Onset     Alzheimer Disease Mother      Unknown/Adopted Father      No Known Problems Sister      No Known Problems Sister      Kidney Disease No family hx of      No family history of bleeding or clotting disorder.  Social History:  Social History     Socioeconomic History     Marital status: Single     Spouse name: Not on file     Number of children: Not on file     Years of education: Not on file     Highest education level: Not on file   Occupational History     Occupation: material stager   Social Needs     Financial resource strain: Not on file     Food insecurity     Worry: Not on file     Inability: Not on file     Transportation needs     Medical: Not on file     Non-medical: Not on file   Tobacco Use     Smoking status: Current Every Day Smoker     Packs/day: 0.40      Types: Cigarettes     Start date: 2002     Smokeless tobacco: Never Used     Tobacco comment: 4-8 cigs /day   Substance and Sexual Activity     Alcohol use: Not Currently     Comment: quit alcohol 3-4 yrs ago     Drug use: Not Currently     Types: Methamphetamines     Sexual activity: Not on file     Physical Exam:  Wt Readings from Last 5 Encounters:   08/21/20 72.1 kg (159 lb)   08/14/20 72.1 kg (159 lb)   06/18/20 69.9 kg (154 lb)   04/17/20 69.9 kg (154 lb)   Constitutional: alert and in no distress  Eyes: No redness or discharge  Respiratory: No cough or labored breathing.  Musculoskeletal: Full range of motion in extremities.  Skin: no visible skin lesions or discoloration  Neurological: No tremors and denies headache.  Psychiatric: Mentation appears normal and affect is normal as well.  Alert and oriented x3.  The rest the comprehensive physical examination is deferred due to public health emergency video visit restrictions.    Laboratory/Imaging Studies  Labs reviewed and documented in the EMR.    ASSESSMENT/PLAN:    Chip is a pleasant 35 year old man with ESRD, on peritoneal dialysis since 03/2020, also with chronic diarrhea since, with negative ova and parasite testing, with new eosinophilia in August 2020, as compared to normal absolute eosinophil count prior to peritoneal dialysis initiation.   We'll go ahead and repeat CBCd and review a peripheral blood smear. We'll also check serum tryptase level.  If the above workup is non-revealing and he still has eosinophilia on his upcoming CBCd, then would also consider peritoneal catheter allergy. There are case reports of eosinophilia and rash associated with a peritoneal dialysis catheter as well as hemodialysis catheters, the former associated with silicone allergy and the latter with epoxy resin. (Edgardo Alvarez. [employer]in [employer]. 2011 Nov-Dec; 24(6):686-7). We'll consider referral to an allergist for a patch test. In the aforementioned case report, removal of  the peritoneal dialysis catheter following the kidney transplant, resulted in resolution of eosinophilia. It also seems that if he has contact dermatitis to the silk tape he uses around peritoneal dialysis catheter.  We'll inform the patient of the results and recommendations and  f/up plan based on the results.   At the end of our visit patient verbalized understanding and concurred with the plan.        Again, thank you for allowing me to participate in the care of your patient.        Sincerely,        Maame Betancourt MD, MD

## 2020-11-11 ENCOUNTER — PRE VISIT (OUTPATIENT)
Dept: UROLOGY | Facility: CLINIC | Age: 35
End: 2020-11-11

## 2020-11-12 NOTE — TELEPHONE ENCOUNTER
Reason for visit: cystoscopy    Relevant information:urethral stricture and fistula    Records/imaging/labs/orders: imaging scheduled    Pt called: no need for a call    At Rooming: verify cystoscopy

## 2020-11-17 ENCOUNTER — ANCILLARY PROCEDURE (OUTPATIENT)
Dept: GENERAL RADIOLOGY | Facility: CLINIC | Age: 35
End: 2020-11-17
Attending: UROLOGY
Payer: MEDICARE

## 2020-11-17 DIAGNOSIS — D72.19 OTHER EOSINOPHILIA: ICD-10-CM

## 2020-11-17 DIAGNOSIS — N35.919 URETHRAL STRICTURE: ICD-10-CM

## 2020-11-17 DIAGNOSIS — N21.1 CALCULUS IN URETHRA: ICD-10-CM

## 2020-11-17 LAB
BASOPHILS # BLD AUTO: 0.1 10E9/L (ref 0–0.2)
BASOPHILS NFR BLD AUTO: 1.1 %
COPATH REPORT: NORMAL
DIFFERENTIAL METHOD BLD: ABNORMAL
EOSINOPHIL # BLD AUTO: 1.2 10E9/L (ref 0–0.7)
EOSINOPHIL NFR BLD AUTO: 15.9 %
ERYTHROCYTE [DISTWIDTH] IN BLOOD BY AUTOMATED COUNT: 12.8 % (ref 10–15)
HCT VFR BLD AUTO: 38.1 % (ref 40–53)
HGB BLD-MCNC: 12.7 G/DL (ref 13.3–17.7)
IMM GRANULOCYTES # BLD: 0 10E9/L (ref 0–0.4)
IMM GRANULOCYTES NFR BLD: 0.3 %
LYMPHOCYTES # BLD AUTO: 2.2 10E9/L (ref 0.8–5.3)
LYMPHOCYTES NFR BLD AUTO: 30.9 %
MCH RBC QN AUTO: 32.7 PG (ref 26.5–33)
MCHC RBC AUTO-ENTMCNC: 33.3 G/DL (ref 31.5–36.5)
MCV RBC AUTO: 98 FL (ref 78–100)
MONOCYTES # BLD AUTO: 0.5 10E9/L (ref 0–1.3)
MONOCYTES NFR BLD AUTO: 7.3 %
NEUTROPHILS # BLD AUTO: 3.2 10E9/L (ref 1.6–8.3)
NEUTROPHILS NFR BLD AUTO: 44.5 %
NRBC # BLD AUTO: 0 10*3/UL
NRBC BLD AUTO-RTO: 0 /100
PLATELET # BLD AUTO: 218 10E9/L (ref 150–450)
RBC # BLD AUTO: 3.88 10E12/L (ref 4.4–5.9)
RETICS # AUTO: 29.1 10E9/L (ref 25–95)
RETICS/RBC NFR AUTO: 0.8 % (ref 0.5–2)
WBC # BLD AUTO: 7.2 10E9/L (ref 4–11)

## 2020-11-17 PROCEDURE — 999N001086 HC STATISTIC MORPHOLOGY W/INTERP HEMEPATH TC 85060: Performed by: PATHOLOGY

## 2020-11-17 PROCEDURE — 85025 COMPLETE CBC W/AUTO DIFF WBC: CPT | Performed by: PATHOLOGY

## 2020-11-17 PROCEDURE — 85060 BLOOD SMEAR INTERPRETATION: CPT | Performed by: PATHOLOGY

## 2020-11-17 PROCEDURE — 36415 COLL VENOUS BLD VENIPUNCTURE: CPT | Performed by: PATHOLOGY

## 2020-11-17 PROCEDURE — 74450 X-RAY URETHRA/BLADDER: CPT | Mod: GC | Performed by: RADIOLOGY

## 2020-11-17 PROCEDURE — 51610 INJECTION FOR BLADDER X-RAY: CPT | Mod: GC | Performed by: RADIOLOGY

## 2020-11-17 PROCEDURE — 83520 IMMUNOASSAY QUANT NOS NONAB: CPT | Performed by: PATHOLOGY

## 2020-11-17 PROCEDURE — 99207 PR SATISFY VISIT NUMBER: CPT | Performed by: RADIOLOGY

## 2020-11-17 PROCEDURE — 85045 AUTOMATED RETICULOCYTE COUNT: CPT | Performed by: PATHOLOGY

## 2020-11-17 RX ORDER — LIDOCAINE HYDROCHLORIDE 20 MG/ML
10 JELLY TOPICAL ONCE
Status: COMPLETED | OUTPATIENT
Start: 2020-11-17 | End: 2020-11-17

## 2020-11-17 RX ADMIN — LIDOCAINE HYDROCHLORIDE 10 ML: 20 JELLY TOPICAL at 08:36

## 2020-11-18 ENCOUNTER — OFFICE VISIT (OUTPATIENT)
Dept: UROLOGY | Facility: CLINIC | Age: 35
End: 2020-11-18
Payer: MEDICARE

## 2020-11-18 VITALS — HEART RATE: 50 BPM | SYSTOLIC BLOOD PRESSURE: 150 MMHG | DIASTOLIC BLOOD PRESSURE: 91 MMHG

## 2020-11-18 DIAGNOSIS — N35.014 POST-TRAUMATIC MALE URETHRAL STRICTURE: Primary | ICD-10-CM

## 2020-11-18 DIAGNOSIS — N36.1 URETHRAL DIVERTICULUM: ICD-10-CM

## 2020-11-18 LAB — TRYPTASE SERPL-MCNC: 17.3 UG/L

## 2020-11-18 PROCEDURE — 52000 CYSTOURETHROSCOPY: CPT | Performed by: UROLOGY

## 2020-11-18 RX ORDER — AMPICILLIN 2 G/1
2 INJECTION, POWDER, FOR SOLUTION INTRAVENOUS
Status: CANCELLED | OUTPATIENT
Start: 2020-11-18

## 2020-11-18 RX ORDER — CEFTRIAXONE 1 G/1
1 INJECTION, POWDER, FOR SOLUTION INTRAMUSCULAR; INTRAVENOUS
Status: CANCELLED | OUTPATIENT
Start: 2020-11-18

## 2020-11-18 ASSESSMENT — PAIN SCALES - GENERAL: PAINLEVEL: NO PAIN (0)

## 2020-11-18 NOTE — LETTER
11/18/2020       RE: Chip Fowler  [address]  [address]  [facility] [address]     Dear Colleague,    Thank you for referring your patient, Chip Fowler, to the [facility] [place] at [facility]. Please see a copy of my visit note below.    Cystoscopy Note    PRE-PROCEDURE DIAGNOSIS:  Urethral stricture    POST-PROCEDURE DIAGNOSIS:  Urethral stricture    PROCEDURE:   Cystoscopy    HISTORY:  Chip Fowler is a 35 year old man with a history of urethral manipulation. He has a history of drug abuse with methamphetamine but has since stopped using it. He now has ESRD and is on peritoneal dialysis. He has a history of urethral sounding and BDSM during meth abuse episodes. He has a number of prior episodes of cysto, urethral dilation and laser stone removal and [place] [facility] [place]. One episode they took out a screw after breaking up stones. He is trying to get worked up for transplant and sent to us for eval. He notes obstructive voiding symptoms.    I took him to the OR for removal of a metal clip and a marybeth pin from his urethra. His urethra was grossly stenotic and required dilation and DVIU to remove all the foreign bodies. This was back in Aug 2020.  He now returns after urethral rest for cystoscopy, RUG, VCUG and for surgical planning. Of note, he wishes to have a kidney transplant and needs an unobstructed urethra in order to have a kidney transplant.    DESCRIPTION OF PROCEDURE:  After informed consent was obtained, the patient was brought to the procedure room where they were placed in the modified lithotomy position with all pressure points well padded.  The patient was prepped and draped in a sterile fashion. A flexible cystoscope was introduced through a well-lubricated urethra. The meatus was normal. Just proximal to the fossa, there was evidence of diffuse stricture. It was markedly narrow proximal to this and  the scope would not pass.      ASSESSMENT AND PLAN:  We reviewed his VCUG/RUG:      His VCUG/RUG shows 2 urethral diverticula. It also shows distal narrowing.    I discussed he would be a candidate for a perineal urethrostomy, but he feels strongly about being able to urinate from the tip and giving it an attempt at urethral reconstruction. Thus, we discussed that he would be a candidate for urethral diverticulectomy x 2 for those two outpouchings. I can basically palpate them on exam. One is at the penoscrotal junction and one slightly more proximal. Then he would need a buccal urethroplasty for the more distal aspect. Maybe 4-6 cm graft. I favor a dorsal buccal one stage approach. Based on exam, I think this could be done in supine position. I discussed that some would do an asopa ventral inlay. Some would stage it. I discussed that PU would be a backup plan if urethroplasty failed.    He understands risks, benefits, alternatives. We discussed SPT insertion during time of procedure.    Sam Mejia MD

## 2020-11-18 NOTE — PATIENT INSTRUCTIONS
"Schedule surgery.    It was a pleasure meeting with you today.  Thank you for allowing me and my team the privilege of caring for you today.  YOU are the reason we are here, and I truly hope we provided you with the excellent service you deserve.  Please let us know if there is anything else we can do for you so that we can be sure you are leaving completely satisfied with your care experience.        Armida Pa CMA    AFTER YOUR CYSTOSCOPY        You have just completed a cystoscopy, or \"cysto\", which allowed your physician to learn more about your bladder (or to remove a stent placed after surgery). We suggest that you continue to avoid caffeine, fruit juice, and alcohol for the next 24 hours, however, you are encouraged to return to your normal activities.         A few things that are considered normal after your cystoscopy:     * Small amount of bleeding (or spotting) that clears within the next 24 hours     * Slight burning sensation with urination     * Sensation to of needing to avoid more frequently     * The feeling of \"air\" in your urine     * Mild discomfort that is relieved with Tylenol        Please contact our office promptly if you:     * Develop a fever above 101 degrees     * Are unable to urinate     * Develop bright red blood that does not stop     * Severe pain or swelling         Please contact our office with any concerns or questions @[email or web address].  "

## 2020-11-18 NOTE — NURSING NOTE
Chief Complaint   Patient presents with     Cystoscopy     urethral stricture and bladder diverticulum       Blood pressure (!) 150/91, pulse 50. There is no height or weight on file to calculate BMI.    Patient Active Problem List   Diagnosis     Unilateral congenital absence of kidney     ESRD (end stage renal disease) on dialysis (H)     Hypertension     Solitary kidney, congenital     Cardiomyopathy (H)     Bladder stones     Urethral stone     Urethral stricture     Polysubstance abuse (H)     Urethral calculus     Abnormal urinalysis     Acute renal failure superimposed on chronic kidney disease (H)     Acute retention of urine     Congestive heart failure of unknown etiology (H)     Methamphetamine abuse, episodic (H)     Migraine     Nephrolithiasis     Tobacco use       No Known Allergies    Current Outpatient Medications   Medication Sig Dispense Refill     acetaminophen (TYLENOL) 325 MG tablet Take 2 tablets (650 mg) by mouth every 6 hours as needed for mild pain 90 tablet 0     B Complex-C-Folic Acid (DIALYVITE 800) 0.8 MG TABS Take 1 tablet by mouth every morning        carvedilol (COREG) 25 MG tablet Take 6.25 mg by mouth 2 times daily (with meals) 1/2 tab BID w/meals       furosemide (LASIX) 40 MG tablet Take 40 mg by mouth every morning        gentamicin (GARAMYCIN) 0.1 % external ointment every morning        lisinopril (ZESTRIL) 2.5 MG tablet Take 2.5 mg by mouth every evening        VITAMIN D, CHOLECALCIFEROL, PO Take by mouth daily         Social History     Tobacco Use     Smoking status: Current Every Day Smoker     Packs/day: 0.40     Types: Cigarettes     Start date: 2002     Smokeless tobacco: Never Used     Tobacco comment: 4-8 cigs /day   Substance Use Topics     Alcohol use: Not Currently     Comment: quit alcohol 3-4 yrs ago     Drug use: Not Currently     Types: Methamphetamines       Armida Pa CMA  11/18/2020  9:53 AM     Invasive Procedure Safety Checklist:    Procedure:  Cystoscopy     Action: Complete sections and checkboxes as appropriate.    Pre-procedure:  1. Patient ID Verified with 2 identifiers (Janice and  or MRN) : YES    2. Procedure and site verified with patient/designee (when able) : YES    3. Accurate consent documentation in medical record : YES    4. H&P (or appropriate assessment) documented in medical record : YES  H&P must be up to 30 days prior to procedure an updated within 24 hours of                 Procedure as applicable.     5. Relevant diagnostic and radiology test results appropriately labeled and displayed as applicable : YES    6. Blood products, implants, devices, and/or special equipment available for the procedure as applicable : YES    7. Procedure site(s) marked with provider initials [Exclusions: None] : NO    8. Marking not required. Reason : Yes  Procedure does not require site marking    Time Out:     Time-Out performed immediately prior to starting procedure, including verbal and active participation of all team members addressing: YES    1. Correct patient identity.  2. Confirmed that the correct side and site are marked.  3. An accurate procedure to be done.  4. Agreement on the procedure to be done.  5. Correct patient position.  6. Relevant images and results are properly labeled and appropriately displayed.  7. The need to administer antibiotics or fluids for irrigation purposes during the procedure as applicable.  8. Safety precautions based on patient history or medication use.    During Procedure: Verification of correct person, site, and procedure occurs any time the responsibility for care of the patient is transferred to another member of the care team.    No medications administered during this procedure.    Armida Pa CMA  2020

## 2020-11-19 DIAGNOSIS — N35.919 URETHRAL STRICTURE: Primary | ICD-10-CM

## 2020-11-19 NOTE — PROGRESS NOTES
Cystoscopy Note    PRE-PROCEDURE DIAGNOSIS:  Urethral stricture    POST-PROCEDURE DIAGNOSIS:  Urethral stricture    PROCEDURE:   Cystoscopy    HISTORY:  Chip Fowler is a 35 year old man with a history of urethral manipulation. He has a history of drug abuse with methamphetamine but has since stopped using it. He now has ESRD and is on peritoneal dialysis. He has a history of urethral sounding and BDSM during meth abuse episodes. He has a number of prior episodes of cysto, urethral dilation and laser stone removal and foreign body removal at [facility] [place] 4-8 years ago. One episode they took out a screw after breaking up stones. He is trying to get worked up for transplant and sent to us for eval. He notes obstructive voiding symptoms.    I took him to the OR for removal of a metal clip and a marybeth pin from his urethra. His urethra was grossly stenotic and required dilation and DVIU to remove all the foreign bodies. This was back in Aug 2020.  He now returns after urethral rest for cystoscopy, RUG, VCUG and for surgical planning. Of note, he wishes to have a kidney transplant and needs an unobstructed urethra in order to have a kidney transplant.    DESCRIPTION OF PROCEDURE:  After informed consent was obtained, the patient was brought to the procedure room where they were placed in the modified lithotomy position with all pressure points well padded.  The patient was prepped and draped in a sterile fashion. A flexible cystoscope was introduced through a well-lubricated urethra. The meatus was normal. Just proximal to the fossa, there was evidence of diffuse stricture. It was markedly narrow proximal to this and the scope would not pass.      ASSESSMENT AND PLAN:  We reviewed his VCUG/RUG:      His VCUG/RUG shows 2 urethral diverticula. It also shows distal narrowing.    I discussed he would be a candidate for a perineal urethrostomy, but he feels strongly about being able to urinate from the tip and giving it an attempt  at urethral reconstruction. Thus, we discussed that he would be a candidate for urethral diverticulectomy x 2 for those two outpouchings. I can basically palpate them on exam. One is at the penoscrotal junction and one slightly more proximal. Then he would need a buccal urethroplasty for the more distal aspect. Maybe 4-6 cm graft. I favor a dorsal buccal one stage approach. Based on exam, I think this could be done in supine position. I discussed that some would do an asopa ventral inlay. Some would stage it. I discussed that PU would be a backup plan if urethroplasty failed.    He understands risks, benefits, alternatives. We discussed SPT insertion during time of procedure.    Sam Mejia MD

## 2020-11-20 ENCOUNTER — PATIENT OUTREACH (OUTPATIENT)
Dept: UROLOGY | Facility: CLINIC | Age: 35
End: 2020-11-20

## 2020-11-20 DIAGNOSIS — R39.198 SLOWING OF URINARY STREAM: Primary | ICD-10-CM

## 2020-11-20 DIAGNOSIS — N39.498 OTHER URINARY INCONTINENCE: ICD-10-CM

## 2020-11-20 NOTE — TELEPHONE ENCOUNTER
Sent [email or web address] message to let the patient know what is expected prior to surgery on 12/14/20 with Dr. Mejia.    Isabel Walker, RN   Care Coordinator Urology

## 2020-11-23 ASSESSMENT — ENCOUNTER SYMPTOMS
TASTE DISTURBANCE: 0
RECTAL PAIN: 0
SINUS PAIN: 0
SORE THROAT: 0
BLOOD IN STOOL: 0
DIARRHEA: 1
SINUS CONGESTION: 0
CONSTIPATION: 0
BOWEL INCONTINENCE: 0
NECK MASS: 0
ABDOMINAL PAIN: 0
TROUBLE SWALLOWING: 1
BLOATING: 0
VOMITING: 0
SMELL DISTURBANCE: 0
NAUSEA: 0
HOARSE VOICE: 0
HEARTBURN: 1
JAUNDICE: 0

## 2020-11-24 ENCOUNTER — OFFICE VISIT (OUTPATIENT)
Dept: INFECTIOUS DISEASES | Facility: CLINIC | Age: 35
End: 2020-11-24
Attending: INTERNAL MEDICINE
Payer: MEDICARE

## 2020-11-24 VITALS
OXYGEN SATURATION: 100 % | TEMPERATURE: 98.1 F | DIASTOLIC BLOOD PRESSURE: 83 MMHG | SYSTOLIC BLOOD PRESSURE: 137 MMHG | WEIGHT: 160.2 LBS | HEART RATE: 70 BPM | BODY MASS INDEX: 21.73 KG/M2

## 2020-11-24 DIAGNOSIS — K52.9 CHRONIC DIARRHEA: ICD-10-CM

## 2020-11-24 DIAGNOSIS — Z01.818 ENCOUNTER FOR PRE-TRANSPLANT EVALUATION FOR CHRONIC KIDNEY DISEASE: ICD-10-CM

## 2020-11-24 DIAGNOSIS — Z23 NEED FOR VACCINATION AGAINST STREPTOCOCCUS PNEUMONIAE USING PNEUMOCOCCAL CONJUGATE VACCINE 13: ICD-10-CM

## 2020-11-24 DIAGNOSIS — D72.19 EOSINOPHILIC LEUKOCYTOSIS, UNSPECIFIED TYPE: Primary | ICD-10-CM

## 2020-11-24 PROCEDURE — 90670 PCV13 VACCINE IM: CPT | Performed by: INTERNAL MEDICINE

## 2020-11-24 PROCEDURE — 250N000011 HC RX IP 250 OP 636: Performed by: INTERNAL MEDICINE

## 2020-11-24 PROCEDURE — G0009 ADMIN PNEUMOCOCCAL VACCINE: HCPCS | Performed by: INTERNAL MEDICINE

## 2020-11-24 PROCEDURE — 99213 OFFICE O/P EST LOW 20 MIN: CPT | Performed by: INTERNAL MEDICINE

## 2020-11-24 RX ADMIN — PNEUMOCOCCAL 13-VALENT CONJUGATE VACCINE 0.5 ML: 2.2; 2.2; 2.2; 2.2; 2.2; 4.4; 2.2; 2.2; 2.2; 2.2; 2.2; 2.2; 2.2 INJECTION, SUSPENSION INTRAMUSCULAR at 16:53

## 2020-11-24 ASSESSMENT — PAIN SCALES - GENERAL: PAINLEVEL: NO PAIN (0)

## 2020-11-24 NOTE — PATIENT INSTRUCTIONS
1. Please take the pneumovax (23-valent pneumonia vaccine) on or after 1/24/2021.   2. Also remember that reptiles, including snakes, carry the risk of Salmonella infection specifically after transplant and it is best not have then as pets after transplantation. If you have to have snakes as pets please do not clean the cage or come in contact with anything that can be contaminated with their feces. Though it is preferable that you do not care for them or clean their cage of feet them, if you have to do so it is best that you wear gloves while doing so. Also do not eat or drink or smoke while you are caring for them.

## 2020-11-24 NOTE — LETTER
11/24/2020       RE: Chip Fowler  [address]  [address]  [facility] [address]     Dear Colleague,    Thank you for referring your patient, Chip Fowler, to the [facility] INFECTIOUS DISEASE CLINIC [place] [facility]. Please see a copy of my visit note below.    [facility]    Transplant Infectious Diseases Outpatient Progress Note     Patient:  Chip Fowler, Date of birth 1985, Medical record number [id number]  Date of Visit:  11/24/2020  Consult requested by kidney transplant team for evaluation of eosinophilia.           Recommendations:   1. This patient has no active infectious diseases issues that would prevent him from being listed for kidney transplant; however, this patient is at higher risk for UTI after transplantation given the urethral stenosis. This urethral stenosis is considered a modifiable factor, which could be rectified by the planned urethroplasty and diverticulectomy. Also has snakes as pets, which I recommended against keeping. But snakes as pets do not represent an absolute contraindication against transplantation.   2. O/P x2 to complete workup.   3. Prevnar today and pneumovax in 2 months.     RTC: as needed.          Summary of Presentation:   This patient is a 35 year old male with congenital kidney disease and HTN induced renal failure, was started on HD then PD since 2/2020 or 3/2020.   The patient is currently being evaluated for kidney transplant and was found to have eosinophilia so he was referred to ID.         Active Problems and Infectious Diseases Issues:   1. Eosinophilia.   2. Chronic diarrhea since he was started on PD in 7/2020.   3. Pre-kidney transplant evaluation.   4. Positive Salmonella serology.     The workup for eosinophilia and diarrhea was negative for fungal and parasitic infections that would account for the eosinophilia.   The eosinophilia is likely reactive to foreign  objects; the urethral foreign objects are less likely to be the culprit as they are no longer present with persistent eosinophilia. The PD catheter is potentially the source as noted by hematology.   The relatively increased tryptase level favors allergy (likely to PD catheter) to be the etiology of the eosinophilia rather than infectious processes or malignancies.     The removal of the PD catheter after transplantation and the use of prednisone will likely result in the resolution of eosinophilia.     The diarrhea workup has been negative. The patient stated the diarrhea started when PD was initiated.     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   The patient was counseled against keeping the snakes after transplantation as they are source of recurrent Salmonella infection.   No indication to treat the Salmonella as of now.     Will give the prevnar vaccine today and the pneumovax in 2 months.     Please see the recommendations section for transplant clearance.         Old Problems and Infectious Diseases Issues:   1. E coli in urine in 8/2020.   2. Group B Strep in urine in 2/2020.     Other Infectious Disease issues include:  - Serostatus: CMV -, EBV +, HSV1/2 ?, VZV +      Attestation:  I interviewed the patient and obtained history from the patient, and by reviewing the patient's chart including outside records, microbiological data, and radiological data. All data are summarized in this notes.  Tamara Desir MD   Pager: [phone]  11/24/2020           Interim History:   The patient still has diarrhea, watery up to 3-4 times a day.   No fever, no chills, no weight loss.   No other complaints.     During the initial phone consult, the patient stated that he didn't have history of PD catheter infection. Today she endorsed  possible purulent drainage from the PD site back in 9/2020 which was treated with three different antimicrobials (one of the might be an antifungal). He stated that back then he was applying lotion to PD site which may have been confused as purulence.          History of the Infectious Disease lllness:   This patient is a 35 year old male with congenital kidney disease and HTN induced renal failure, was started on HD since 3/2020 then PD since 7/2020. Kidney biopsy in OSH 2/2020 with insufficient cortical tissue for interpretation.   The patient is currently being evaluated for kidney transplant and was found to have eosinophilia so he was referred to ID.   The patient has history of IVDU and amphetamine use in the past but he's been abstinent for a while now. During that time he also had BDSM sexual behaviors which involved inserting foreign objects in his urethra.   He underwent urethrotomy with removal of stones, foreign bodies (marybeth pin and metal clips), stones and bladder debris on 8/21/2020. 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 Apparently there are plans for urethroplasty.   Worthwhile to mention that the patient had similar findings in the past in OSH according to [facility] when foreign bodies were removed from his urethra on multiple occasions in 2014 and 2016.     Stated that he has no problems with PD and no history of infections involving the PD catheter, the dialysate bags are always clear.   Since he was started on PD he's been experiencing watery diarrhea up to 3-4 times a day.   No N/V, abdominal pain, fever, chills.   He did not seek medical attention for diarrhea.   No weight loss.   No night sweats.     Exposure History:  Was born in [place]. He lives in [place] in an apartment with his girlfriend. He has 2 snakes in an aquarium that he cleans himself with bare hands using paper towels and dish soap. He feeds the  snakes mostly frozen rodents but sometimes living rodents. He works in plastic factory. The last travel outside of [place] was a road trip to [place] with friends 15-20 years ago. No known TB exposure, no institutionalization. No significant outdoors exposure. Tested negative for LTBI by [facility] when he was started on HD then PD.            Immunizations:     Immunization History   Administered Date(s) Administered     Influenza (intradermal) 03/11/2020     Mantoux Tuberculin Skin Test 03/11/2020     Pneumococcal, Unspecified 03/11/2020             Allergies:   No Known Allergies          Medications:     Current Outpatient Medications   Medication Sig     acetaminophen (TYLENOL) 325 MG tablet Take 2 tablets (650 mg) by mouth every 6 hours as needed for mild pain     B Complex-C-Folic Acid (DIALYVITE 800) 0.8 MG TABS Take 1 tablet by mouth every morning      carvedilol (COREG) 25 MG tablet Take 6.25 mg by mouth 2 times daily (with meals) 1/2 tab BID w/meals     furosemide (LASIX) 40 MG tablet Take 40 mg by mouth every morning      gentamicin (GARAMYCIN) 0.1 % external ointment every morning      lisinopril (ZESTRIL) 2.5 MG tablet Take 2.5 mg by mouth every evening      VITAMIN D, CHOLECALCIFEROL, PO Take by mouth daily     No current facility-administered medications for this visit.        There are no discontinued medications.         Review of Systems:   As mentioned in the interim history otherwise negative by reviewing constitutional symptoms, central and peripheral neurological systems, respiratory system, cardiac system, GI system,  system, musculoskeletal, skin, allergy, and lymphatics.            Physical Exam:     Vitals:    11/24/20 1552   BP: 137/83   Pulse: 70   Temp: 98.1  F (36.7  C)   SpO2: 100%   Weight: 72.7 kg (160 lb 3.2 oz)      Constitutional: awake, alert, cooperative, no apparent distress and appears at stated age, well nourished.   Head, ENT, Eyes, and Neck: Normocephalic, PERRL, EOMI, pink conjunctivae,  non-icteric sclera.   Neck supple without rigidity, no LA   Neurologic: Patient is moving all extremities without focal deficit, no focal sensory loss.   Lungs: CTA bilaterally, no accessory muscle use, no dullness to percussion and no abnormal tactile fremitus.   CVS: RRR, normal S1/S2, no murmur, PMI was not displaced.   Abdomen: non-tender, non-distended, no masses, no bruit, no shifting dullness, normal BS.   Genitalia: no lesions were visualized and no tenderness to palpation.   Extremities: no pitting edema of bilateral lower extremities, no ulcers, normal ROM of all joints, no swelling or erythema of any of joints and no tenderness to palpation.   Skin: no induration, fluctuation or discharge at the PD catheter site, and no rash             Laboratory Data:     No results found for: ACD4    Inflammatory Markers  No lab results found.    Immune Globulin Studies    No lab results found.    Metabolic Studies    Recent Labs   Lab Test 08/21/20  1130 08/18/20  1307 08/14/20  1423    140 141   POTASSIUM 4.5 4.6 4.4   CHLORIDE 110* 108 110*   CO2 20 24 23   ANIONGAP 8 7 8   BUN 66* 72* 69*   CR 7.16* 7.19* 7.41*   GFRESTIMATED 9* 9* 9*   GLC 85 80 77   VISHNU 8.1* 8.1* 8.0*   PHOS  --  4.4  --        Hepatic Studies    Recent Labs   Lab Test 08/18/20  1307   BILITOTAL 0.3   ALKPHOS 76   PROTTOTAL 7.3   ALBUMIN 3.5   AST 18   ALT 46       Hematology Studies     Recent Labs   Lab Test 11/17/20  0906 10/08/20  1300 09/15/20  1347 08/18/20  1307 08/14/20  1423   WBC 7.2 9.4 8.6 10.4 10.4   ANEU 3.2 4.5 3.6 5.6  --    ALYM 2.2 2.6 3.0 2.5  --    TY 0.5 0.5 0.6 0.6  --    AEOS 1.2* 1.7* 1.3* 1.6*  --    HGB 12.7* 11.7* 10.3* 12.0* 11.7*   HCT 38.1* 34.7* 30.6* 35.8* 35.4*    238 296 231 231       Clotting Studies    Recent Labs   Lab Test 08/18/20  1307   INR 1.08   PTT 29       Urine Studies    Recent Labs   Lab Test 08/18/20  1312 08/14/20  1415   URINEPH 7.0 7.0   NITRITE Negative Negative   LEUKEST Large*  Large*   WBCU >182* 59*         Microbiology:  Last 6 Culture results with specimen source  Culture Micro   Date Value Ref Range Status   08/14/2020 >100,000 colonies/mL  Escherichia coli   (A)  Final    Specimen Description   Date Value Ref Range Status   10/08/2020 Feces  Final   10/08/2020 Feces  Final   10/08/2020 Feces  Final   10/08/2020 Feces  Final   08/14/2020 Midstream Urine  Final      No results found for: CMV    Last check of C difficile  No results found for: CDBPCT      Virology:  CMV viral loads    CMV viral loads  No results found for: 79014, 10370, 42062, 14588, CMVQAL  CMV viral loads  No lab results found.    CMV viral loads  No results found for: CMVLOG, 93780, 50074, 13799, 30618    CMV resistance testing  No lab results found.  No results found for: CMVCID, CMVFOS, CMVGAN     EBV viral loads   No lab results found.  No results found for: EBVDN, EBRES, EBVDN, EBVSP, EBVPC, EBVPCR    Human Herpes Virus 6 viral loads    No results found for: H6RES No results found for: H6SPEC    CMV Antibody IgG   Date Value Ref Range Status   08/18/2020 <0.2 0.0 - 0.8 AI Final     Comment:     Negative  Antibody index (AI) values reflect qualitative changes in antibody   concentration that cannot be directly associated with clinical condition or   disease state.       No results found for: EBIG2, EBIGM, EBVIGG, EBIGG, EBVAGN, OP6123, TOXG    Pathology:  Kidney biopsy in OSH 2/2020 with insufficient cortical tissue for interpretation.     Imaging:  Results for orders placed or performed in visit on 09/11/20   XR Chest 2 Views [IMG36]    Narrative    EXAM: XR CHEST 2 VW  9/11/2020 2:03 PM     HISTORY:  ESRD (end stage renal disease) (H); Pre-transplant  evaluation for kidney transplant; Cardiovascular disease; End stage  renal disease (H); History of tobacco use; Organ transplant candidate;  Essential hypertension       COMPARISON:  Outside chest radiograph 2/24/2020    FINDINGS:   PA and lateral views of the  chest. Trachea is midline.  Cardiomediastinal silhouette and pulmonary vasculature are within  normal limits. No focal airspace opacity, pleural effusion or  appreciable pneumothorax. No acute osseous abnormality. Visualized  upper abdomen is unremarkable.        Impression    IMPRESSION: No acute cardiopulmonary disease.     I have personally reviewed the examination and initial interpretation  and I agree with the findings.    DAR BISWAS MD

## 2020-11-24 NOTE — PROGRESS NOTES
[facility]    Transplant Infectious Diseases Outpatient Progress Note     Patient:  Chip Fowler, Date of birth 1985, Medical record number [id number]  Date of Visit:  11/24/2020  Consult requested by kidney transplant team for evaluation of eosinophilia.           Recommendations:   1. This patient has no active infectious diseases issues that would prevent him from being listed for kidney transplant; however, this patient is at higher risk for UTI after transplantation given the urethral stenosis. This urethral stenosis is considered a modifiable factor, which could be rectified by the planned urethroplasty and diverticulectomy. Also has snakes as pets, which I recommended against keeping. But snakes as pets do not represent an absolute contraindication against transplantation.   2. O/P x2 to complete workup.   3. Prevnar today and pneumovax in 2 months.     RTC: as needed.          Summary of Presentation:   This patient is a 35 year old male with congenital kidney disease and HTN induced renal failure, was started on HD then PD since 2/2020 or 3/2020.   The patient is currently being evaluated for kidney transplant and was found to have eosinophilia so he was referred to ID.         Active Problems and Infectious Diseases Issues:   1. Eosinophilia.   2. Chronic diarrhea since he was started on PD in 7/2020.   3. Pre-kidney transplant evaluation.   4. Positive Salmonella serology.     The workup for eosinophilia and diarrhea was negative for fungal and parasitic infections that would account for the eosinophilia.   The eosinophilia is likely reactive to foreign objects; the urethral foreign objects are less likely to be the culprit as they are no longer present with persistent eosinophilia. The PD catheter is potentially the source as noted by hematology.   The relatively increased tryptase level favors allergy (likely to PD catheter) to be the etiology of the eosinophilia rather  than infectious processes or malignancies.     The removal of the PD catheter after transplantation and the use of prednisone will likely result in the resolution of eosinophilia.     The diarrhea workup has been negative. The patient stated the diarrhea started when PD was initiated.     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   The patient was counseled against keeping the snakes after transplantation as they are source of recurrent Salmonella infection.   No indication to treat the Salmonella as of now.     Will give the prevnar vaccine today and the pneumovax in 2 months.     Please see the recommendations section for transplant clearance.         Old Problems and Infectious Diseases Issues:   1. E coli in urine in 8/2020.   2. Group B Strep in urine in 2/2020.     Other Infectious Disease issues include:  - Serostatus: CMV -, EBV +, HSV1/2 ?, VZV +      Attestation:  I interviewed the patient and obtained history from the patient, and by reviewing the patient's chart including outside records, microbiological data, and radiological data. All data are summarized in this notes.  Tamara Desir MD   Pager: [phone]  11/24/2020           Interim History:   The patient still has diarrhea, watery up to 3-4 times a day.   No fever, no chills, no weight loss.   No other complaints.     During the initial phone consult, the patient stated that he didn't have history of PD catheter infection. Today she endorsed possible purulent drainage from the PD site back in 9/2020 which was treated with three different antimicrobials (one of the might be an antifungal). He stated that back then he was applying lotion to PD site which may have been confused as purulence.          History of the Infectious Disease lllness:   This patient is a 35 year  old male with congenital kidney disease and HTN induced renal failure, was started on HD since 3/2020 then PD since 7/2020. Kidney biopsy in OSH 2/2020 with insufficient cortical tissue for interpretation.   The patient is currently being evaluated for kidney transplant and was found to have eosinophilia so he was referred to ID.   The patient has history of IVDU and amphetamine use in the past but he's been abstinent for a while now. During that time he also had BDSM sexual behaviors which involved inserting foreign objects in his urethra.   He underwent urethrotomy with removal of stones, foreign bodies (marybeth pin and metal clips), stones and bladder debris on 8/21/2020. 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 Apparently there are plans for urethroplasty.   Worthwhile to mention that the patient had similar findings in the past in OSH according to [facility] when foreign bodies were removed from his urethra on multiple occasions in 2014 and 2016.     Stated that he has no problems with PD and no history of infections involving the PD catheter, the dialysate bags are always clear.   Since he was started on PD he's been experiencing watery diarrhea up to 3-4 times a day.   No N/V, abdominal pain, fever, chills.   He did not seek medical attention for diarrhea.   No weight loss.   No night sweats.     Exposure History:  Was born in [place]. He lives in [place] in an apartment with his girlfriend. He has 2 snakes in an aquarium that he cleans himself with bare hands using paper towels and dish soap. He feeds the snakes mostly frozen rodents but sometimes living rodents. He works in plastic factory. The last travel outside of [place] was a road trip to [place] with friends 15-20 years ago. No known TB exposure, no institutionalization. No significant outdoors exposure. Tested negative for LTBI by [facility] when he was started on HD then PD.             Immunizations:     Immunization History   Administered Date(s) Administered     Influenza (intradermal) 03/11/2020     Mantoux Tuberculin Skin Test 03/11/2020     Pneumococcal, Unspecified 03/11/2020             Allergies:   No Known Allergies          Medications:     Current Outpatient Medications   Medication Sig     acetaminophen (TYLENOL) 325 MG tablet Take 2 tablets (650 mg) by mouth every 6 hours as needed for mild pain     B Complex-C-Folic Acid (DIALYVITE 800) 0.8 MG TABS Take 1 tablet by mouth every morning      carvedilol (COREG) 25 MG tablet Take 6.25 mg by mouth 2 times daily (with meals) 1/2 tab BID w/meals     furosemide (LASIX) 40 MG tablet Take 40 mg by mouth every morning      gentamicin (GARAMYCIN) 0.1 % external ointment every morning      lisinopril (ZESTRIL) 2.5 MG tablet Take 2.5 mg by mouth every evening      VITAMIN D, CHOLECALCIFEROL, PO Take by mouth daily     No current facility-administered medications for this visit.        There are no discontinued medications.         Review of Systems:   As mentioned in the interim history otherwise negative by reviewing constitutional symptoms, central and peripheral neurological systems, respiratory system, cardiac system, GI system,  system, musculoskeletal, skin, allergy, and lymphatics.            Physical Exam:     Vitals:    11/24/20 1552   BP: 137/83   Pulse: 70   Temp: 98.1  F (36.7  C)   SpO2: 100%   Weight: 72.7 kg (160 lb 3.2 oz)      Constitutional: awake, alert, cooperative, no apparent distress and appears at stated age, well nourished.   Head, ENT, Eyes, and Neck: Normocephalic, PERRL, EOMI, pink conjunctivae, non-icteric sclera.   Neck supple without rigidity, no LA   Neurologic: Patient is moving all extremities without focal deficit, no focal sensory loss.   Lungs: CTA bilaterally, no accessory muscle use, no dullness to percussion and no abnormal tactile fremitus.   CVS: RRR, normal S1/S2, no murmur, PMI was not displaced.    Abdomen: non-tender, non-distended, no masses, no bruit, no shifting dullness, normal BS.   Genitalia: no lesions were visualized and no tenderness to palpation.   Extremities: no pitting edema of bilateral lower extremities, no ulcers, normal ROM of all joints, no swelling or erythema of any of joints and no tenderness to palpation.   Skin: no induration, fluctuation or discharge at the PD catheter site, and no rash             Laboratory Data:     No results found for: ACD4    Inflammatory Markers  No lab results found.    Immune Globulin Studies    No lab results found.    Metabolic Studies    Recent Labs   Lab Test 08/21/20  1130 08/18/20  1307 08/14/20  1423    140 141   POTASSIUM 4.5 4.6 4.4   CHLORIDE 110* 108 110*   CO2 20 24 23   ANIONGAP 8 7 8   BUN 66* 72* 69*   CR 7.16* 7.19* 7.41*   GFRESTIMATED 9* 9* 9*   GLC 85 80 77   VISHNU 8.1* 8.1* 8.0*   PHOS  --  4.4  --        Hepatic Studies    Recent Labs   Lab Test 08/18/20  1307   BILITOTAL 0.3   ALKPHOS 76   PROTTOTAL 7.3   ALBUMIN 3.5   AST 18   ALT 46       Hematology Studies     Recent Labs   Lab Test 11/17/20  0906 10/08/20  1300 09/15/20  1347 08/18/20  1307 08/14/20  1423   WBC 7.2 9.4 8.6 10.4 10.4   ANEU 3.2 4.5 3.6 5.6  --    ALYM 2.2 2.6 3.0 2.5  --    TY 0.5 0.5 0.6 0.6  --    AEOS 1.2* 1.7* 1.3* 1.6*  --    HGB 12.7* 11.7* 10.3* 12.0* 11.7*   HCT 38.1* 34.7* 30.6* 35.8* 35.4*    238 296 231 231       Clotting Studies    Recent Labs   Lab Test 08/18/20  1307   INR 1.08   PTT 29       Urine Studies    Recent Labs   Lab Test 08/18/20  1312 08/14/20  1415   URINEPH 7.0 7.0   NITRITE Negative Negative   LEUKEST Large* Large*   WBCU >182* 59*         Microbiology:  Last 6 Culture results with specimen source  Culture Micro   Date Value Ref Range Status   08/14/2020 >100,000 colonies/mL  Escherichia coli   (A)  Final    Specimen Description   Date Value Ref Range Status   10/08/2020 Feces  Final   10/08/2020 Feces  Final   10/08/2020  Feces  Final   10/08/2020 Feces  Final   08/14/2020 Midstream Urine  Final      No results found for: CMV    Last check of C difficile  No results found for: CDBPCT      Virology:  CMV viral loads    CMV viral loads  No results found for: 05253, 38273, 84595, 78545, CMVQAL  CMV viral loads  No lab results found.    CMV viral loads  No results found for: CMVLOG, 71268, 98997, 39230, 29014    CMV resistance testing  No lab results found.  No results found for: CMVCID, CMVFOS, CMVGAN     EBV viral loads   No lab results found.  No results found for: EBVDN, EBRES, EBVDN, EBVSP, EBVPC, EBVPCR    Human Herpes Virus 6 viral loads    No results found for: H6RES No results found for: H6SPEC    CMV Antibody IgG   Date Value Ref Range Status   08/18/2020 <0.2 0.0 - 0.8 AI Final     Comment:     Negative  Antibody index (AI) values reflect qualitative changes in antibody   concentration that cannot be directly associated with clinical condition or   disease state.       No results found for: EBIG2, EBIGM, EBVIGG, EBIGG, EBVAGN, JA0150, TOXG    Pathology:  Kidney biopsy in OSH 2/2020 with insufficient cortical tissue for interpretation.     Imaging:  Results for orders placed or performed in visit on 09/11/20   XR Chest 2 Views [IMG36]    Narrative    EXAM: XR CHEST 2 VW  9/11/2020 2:03 PM     HISTORY:  ESRD (end stage renal disease) (H); Pre-transplant  evaluation for kidney transplant; Cardiovascular disease; End stage  renal disease (H); History of tobacco use; Organ transplant candidate;  Essential hypertension       COMPARISON:  Outside chest radiograph 2/24/2020    FINDINGS:   PA and lateral views of the chest. Trachea is midline.  Cardiomediastinal silhouette and pulmonary vasculature are within  normal limits. No focal airspace opacity, pleural effusion or  appreciable pneumothorax. No acute osseous abnormality. Visualized  upper abdomen is unremarkable.        Impression    IMPRESSION: No acute cardiopulmonary disease.      I have personally reviewed the examination and initial interpretation  and I agree with the findings.    DAR BISWAS MD

## 2020-11-24 NOTE — NURSING NOTE
Chief Complaint   Patient presents with     RECHECK     eosinophillic leukocytosis      Vital signs:  Temp: 98.1  F (36.7  C)   BP: 137/83 Pulse: 70     SpO2: 100 %       Weight: 72.7 kg (160 lb 3.2 oz)  Estimated body mass index is 21.73 kg/m  as calculated from the following:    Height as of 8/21/20: 1.829 m (6').    Weight as of this encounter: 72.7 kg (160 lb 3.2 oz).        Janae Mac, CMA

## 2020-11-26 DIAGNOSIS — Z11.59 ENCOUNTER FOR SCREENING FOR OTHER VIRAL DISEASES: Primary | ICD-10-CM

## 2020-12-03 ENCOUNTER — OFFICE VISIT (OUTPATIENT)
Dept: ALLERGY | Facility: CLINIC | Age: 35
End: 2020-12-03
Payer: MEDICARE

## 2020-12-03 ENCOUNTER — OFFICE VISIT (OUTPATIENT)
Dept: INTERNAL MEDICINE | Facility: CLINIC | Age: 35
End: 2020-12-03
Payer: MEDICARE

## 2020-12-03 VITALS
WEIGHT: 161 LBS | OXYGEN SATURATION: 99 % | DIASTOLIC BLOOD PRESSURE: 62 MMHG | SYSTOLIC BLOOD PRESSURE: 110 MMHG | BODY MASS INDEX: 21.81 KG/M2 | RESPIRATION RATE: 20 BRPM | TEMPERATURE: 98.5 F | HEART RATE: 74 BPM | HEIGHT: 72 IN

## 2020-12-03 VITALS
DIASTOLIC BLOOD PRESSURE: 62 MMHG | HEART RATE: 74 BPM | HEIGHT: 72 IN | TEMPERATURE: 98.5 F | OXYGEN SATURATION: 99 % | SYSTOLIC BLOOD PRESSURE: 110 MMHG | BODY MASS INDEX: 21.81 KG/M2 | WEIGHT: 161 LBS

## 2020-12-03 DIAGNOSIS — R13.19 OTHER DYSPHAGIA: Primary | ICD-10-CM

## 2020-12-03 DIAGNOSIS — I10 ESSENTIAL HYPERTENSION: ICD-10-CM

## 2020-12-03 DIAGNOSIS — R53.83 FATIGUE, UNSPECIFIED TYPE: ICD-10-CM

## 2020-12-03 DIAGNOSIS — Z53.9 NO SHOW: Primary | ICD-10-CM

## 2020-12-03 DIAGNOSIS — Z01.818 PRE-OP EXAM: Primary | ICD-10-CM

## 2020-12-03 DIAGNOSIS — R39.198 SLOWING OF URINARY STREAM: ICD-10-CM

## 2020-12-03 DIAGNOSIS — J30.9 ALLERGIC RHINITIS, UNSPECIFIED SEASONALITY, UNSPECIFIED TRIGGER: ICD-10-CM

## 2020-12-03 DIAGNOSIS — D72.19 OTHER EOSINOPHILIA: ICD-10-CM

## 2020-12-03 DIAGNOSIS — N35.016 POST-TRAUMATIC STRICTURE OF OVERLAPPING SITES OF URETHRA IN MALE: ICD-10-CM

## 2020-12-03 DIAGNOSIS — N18.6 ESRD (END STAGE RENAL DISEASE) ON DIALYSIS (H): ICD-10-CM

## 2020-12-03 DIAGNOSIS — N36.1 URETHRAL DIVERTICULUM: ICD-10-CM

## 2020-12-03 DIAGNOSIS — Q60.0 SOLITARY KIDNEY, CONGENITAL: ICD-10-CM

## 2020-12-03 DIAGNOSIS — Z99.2 ESRD (END STAGE RENAL DISEASE) ON DIALYSIS (H): ICD-10-CM

## 2020-12-03 LAB
BASOPHILS # BLD AUTO: 0.1 10E9/L (ref 0–0.2)
BASOPHILS NFR BLD AUTO: 0.8 %
CREAT SERPL-MCNC: 6.2 MG/DL (ref 0.66–1.25)
DIFFERENTIAL METHOD BLD: ABNORMAL
EOSINOPHIL # BLD AUTO: 1.5 10E9/L (ref 0–0.7)
EOSINOPHIL NFR BLD AUTO: 15.9 %
ERYTHROCYTE [DISTWIDTH] IN BLOOD BY AUTOMATED COUNT: 12.8 % (ref 10–15)
GFR SERPL CREATININE-BSD FRML MDRD: 11 ML/MIN/{1.73_M2}
HCT VFR BLD AUTO: 38.4 % (ref 40–53)
HGB BLD-MCNC: 13 G/DL (ref 13.3–17.7)
LYMPHOCYTES # BLD AUTO: 3 10E9/L (ref 0.8–5.3)
LYMPHOCYTES NFR BLD AUTO: 31.7 %
MCH RBC QN AUTO: 32.4 PG (ref 26.5–33)
MCHC RBC AUTO-ENTMCNC: 33.9 G/DL (ref 31.5–36.5)
MCV RBC AUTO: 96 FL (ref 78–100)
MONOCYTES # BLD AUTO: 0.5 10E9/L (ref 0–1.3)
MONOCYTES NFR BLD AUTO: 5.6 %
NEUTROPHILS # BLD AUTO: 4.3 10E9/L (ref 1.6–8.3)
NEUTROPHILS NFR BLD AUTO: 46 %
PLATELET # BLD AUTO: 243 10E9/L (ref 150–450)
POTASSIUM SERPL-SCNC: 4.2 MMOL/L (ref 3.4–5.3)
RBC # BLD AUTO: 4.01 10E12/L (ref 4.4–5.9)
TROPONIN I SERPL-MCNC: <0.015 UG/L (ref 0–0.04)
VIT B12 SERPL-MCNC: 824 PG/ML (ref 193–986)
WBC # BLD AUTO: 9.3 10E9/L (ref 4–11)

## 2020-12-03 PROCEDURE — 84132 ASSAY OF SERUM POTASSIUM: CPT | Performed by: NURSE PRACTITIONER

## 2020-12-03 PROCEDURE — 82565 ASSAY OF CREATININE: CPT | Performed by: NURSE PRACTITIONER

## 2020-12-03 PROCEDURE — 86003 ALLG SPEC IGE CRUDE XTRC EA: CPT | Performed by: ALLERGY & IMMUNOLOGY

## 2020-12-03 PROCEDURE — 82607 VITAMIN B-12: CPT | Performed by: INTERNAL MEDICINE

## 2020-12-03 PROCEDURE — 86355 B CELLS TOTAL COUNT: CPT | Performed by: ALLERGY & IMMUNOLOGY

## 2020-12-03 PROCEDURE — 86255 FLUORESCENT ANTIBODY SCREEN: CPT | Performed by: ALLERGY & IMMUNOLOGY

## 2020-12-03 PROCEDURE — 84484 ASSAY OF TROPONIN QUANT: CPT | Performed by: ALLERGY & IMMUNOLOGY

## 2020-12-03 PROCEDURE — 86360 T CELL ABSOLUTE COUNT/RATIO: CPT | Performed by: ALLERGY & IMMUNOLOGY

## 2020-12-03 PROCEDURE — 99215 OFFICE O/P EST HI 40 MIN: CPT | Performed by: NURSE PRACTITIONER

## 2020-12-03 PROCEDURE — 99204 OFFICE O/P NEW MOD 45 MIN: CPT | Performed by: ALLERGY & IMMUNOLOGY

## 2020-12-03 PROCEDURE — 82785 ASSAY OF IGE: CPT | Performed by: ALLERGY & IMMUNOLOGY

## 2020-12-03 PROCEDURE — 85025 COMPLETE CBC W/AUTO DIFF WBC: CPT | Performed by: ALLERGY & IMMUNOLOGY

## 2020-12-03 PROCEDURE — 36415 COLL VENOUS BLD VENIPUNCTURE: CPT | Performed by: ALLERGY & IMMUNOLOGY

## 2020-12-03 PROCEDURE — 86359 T CELLS TOTAL COUNT: CPT | Performed by: ALLERGY & IMMUNOLOGY

## 2020-12-03 PROCEDURE — 87086 URINE CULTURE/COLONY COUNT: CPT | Performed by: UROLOGY

## 2020-12-03 PROCEDURE — 86357 NK CELLS TOTAL COUNT: CPT | Performed by: ALLERGY & IMMUNOLOGY

## 2020-12-03 PROCEDURE — 82784 ASSAY IGA/IGD/IGG/IGM EACH: CPT | Performed by: ALLERGY & IMMUNOLOGY

## 2020-12-03 ASSESSMENT — MIFFLIN-ST. JEOR
SCORE: 1698.29
SCORE: 1698.29

## 2020-12-03 NOTE — PROGRESS NOTES
Chip Hightower is a 35 year old White male with previous medical history significant for ESRD on PD, eosinophilia. Chip Hightower is being seen today for evaluation of eosinophilia. The patient is being seen in consultation at the request of Dr. Serafin MD.     Patient is being seen in consultation for eosinophil elevation. Previously seen by ID and hematology.    Echocardiogram normal from 4/2020. However, in february 2020 he had EF of 15%. Thought possibly secondary to methamphetamine use. Had JACK at that time which necessitated initiation of HD. Additionally he has a history of urethral strictures. Follows with urology. He congenitally has one kidney. PFT's done in September 2020 and normal. History of absolute eosinophilia noted in early 2020.  He has had several levels above 1.5 and several below 1.5. Apparently at [facility] in February of 2020 prior to starting dialysis he had eosinophil count of 0.4. Chest x-ray normal in September of 2020. Normal HIV. Elevated serum tryptase. Seen by ID and had salmonella IgG. All else negative. Had peripheral smear and was normal. Had HIV studies and normal.     Had future orders for vitamin B12 and PDGFRA mutational analysis.    History of ESRD on PD. Kidney biopsy with insufficient renal cortex tissue. Serologic workup negative at that time. No eosinophil count prior to dialysis that I can ascertain. He has a small amount of contact rash at site of tape exposure.     Has persistent nasal congestion. Associated ocular watering. Has sinus pressure involving maxillary sinuses. No seasonal worsening. No problems with cats, dogs, dust mites.     History of difficulty swallowing meats and breads. No impactions. This has been ongoing for years. No EGD.     Results for CHIP HIGHTOWER (MRN [id number]) as of 12/3/2020 17:05   Ref. Range 8/18/2020 13:07 9/15/2020 13:47 10/8/2020 13:00 11/17/2020 09:06   Absolute Eosinophils Latest Ref Range: 0.0 - 0.7 10e9/L 1.6 (H) 1.3 (H)  1.7 (H) 1.2 (H)       ENVIRONMENTAL HISTORY: [place] The home is heated with a forced air. They do have central air conditioning. The patient's bedroom is furnished with carpeting in bedroom.  Pets inside the house include 3 snakes. There is no history of cockroach or mice infestation. There is/are 0 smokers in the house.  The house does not have a damp basement.       Past Medical History:   Diagnosis Date     Bladder stones      Cardiomyopathy (H)      ESRD (end stage renal disease) on dialysis (H)      Hypertension      Migraines      Polysubstance abuse (H)      Solitary kidney, congenital      Urethral stone      Urethral stricture      Family History   Problem Relation Age of Onset     Alzheimer Disease Mother      Unknown/Adopted Father      No Known Problems Sister      No Known Problems Sister      Kidney Disease No family hx of      Past Surgical History:   Procedure Laterality Date     BIOPSY  02/2020    renal, [employer]ikh[employer]     COMBINED CYSTOSCOPY, LASER HOLMIUM LITHOTRIPSY URETER(S)       CYSTOSCOPY       HERNIA REPAIR      infant     INSERT CATHETER PERITONEAL DIALYSIS       LASER HOLMIUM LITHOTRIPSY URETER(S), INSERT STENT, COMBINED N/A 8/21/2020    Procedure: CYSTOSCOPY, bladder and urethral stone extraction, removal of foreign body, urethral dilation, urethrotomy, laser on standby;  Surgeon: Sam Mejia MD;  Location: [facility] OR     urethral dilation         REVIEW OF SYSTEMS:  General: negative for weight gain. negative for weight loss. negative for changes in sleep.   Ears: negative for fullness. negative for hearing loss. negative for dizziness.   Nose: negative for snoring.negative for changes in smell. negative for drainage.   Eyes: negative for eye watering. negative for eye itching. negative for vision changes. negative for eye redness.  Throat: negative for hoarseness. negative for sore throat. positive  for trouble swallowing.   Lungs: negative for  shortness of breath.negative for wheezing. negative for sputum production.   Cardiovascular: negative for chest pain. negative for swelling of ankles. negative for fast or irregular heartbeat.   Gastrointestinal: negative for nausea. negative for heartburn. negative for acid reflux.   Musculoskeletal: negative for joint pain. negative for joint stiffness. negative for joint swelling.   Neurologic: negative for seizures. negative for fainting. negative for weakness.   Psychiatric: negative for changes in mood. negative for anxiety.   Endocrine: negative for cold intolerance. negative for heat intolerance. negative for tremors.   Lymphatic: negative for lower extremity swelling. negative for lymph node swelling.   Hematologic: negative for easy bruising. negative for easy bleeding.  Integumentary: negative for rash. negative for scaling. negative for nail changes.       Current Outpatient Medications:      acetaminophen (TYLENOL) 325 MG tablet, Take 2 tablets (650 mg) by mouth every 6 hours as needed for mild pain, Disp: 90 tablet, Rfl: 0     B Complex-C-Folic Acid (DIALYVITE 800) 0.8 MG TABS, Take 1 tablet by mouth every morning , Disp: , Rfl:      carvedilol (COREG) 25 MG tablet, Take 6.25 mg by mouth 2 times daily (with meals) 1/2 tab BID w/meals, Disp: , Rfl:      furosemide (LASIX) 40 MG tablet, Take 40 mg by mouth every morning , Disp: , Rfl:      gentamicin (GARAMYCIN) 0.1 % external ointment, every morning , Disp: , Rfl:      lisinopril (ZESTRIL) 2.5 MG tablet, Take 2.5 mg by mouth every evening , Disp: , Rfl:      VITAMIN D, CHOLECALCIFEROL, PO, Take by mouth daily Takes M, W, F, Disp: , Rfl:   Immunization History   Administered Date(s) Administered     DTAP (<7y) 1984, 1984, 1984, 10/04/1985, 03/21/1989     Flu, Unspecified 11/12/1997     Hep B, Peds or Adolescent 02/19/1996, 06/17/1996, 09/04/1996     Influenza (intradermal) 03/11/2020     Influenza Vaccine IM > 6 months Valent IIV4  11/06/2016     MMR 07/03/1985, 02/19/1996     Mantoux Tuberculin Skin Test 03/11/2020     Pneumo Conj 13-V (2010&after) 11/24/2020     Pneumococcal 23 valent 03/11/2020     Poliovirus, inactivated (IPV) 1984, 1984, 1984, 03/21/1989     TDAP Vaccine (Adacel) 04/28/2020     Td (Adult), Adsorbed 06/17/1996     No Known Allergies      EXAM:   Constitutional:  Appears well-developed and well-nourished. No distress.   HEENT:   Head: Normocephalic.   Nasal tissue pink and normal appearing.  No rhinorrhea noted.    Eyes: Conjunctivae are non-erythematous   No maxillary or frontal sinus tenderness to palpation.   Cardiovascular: Normal rate, regular rhythm and normal heart sounds. Exam reveals no gallop and no friction rub.   No murmur heard.  Respiratory: Effort normal and breath sounds normal. No respiratory distress. No wheezes. No rales.   Musculoskeletal: Normal range of motion.   Lymphadenopathy:   No cervical adenopathy.   No lower extremity edema.   Neuro: Oriented to person, place, and time.  Skin: Skin is warm and dry. No rash noted.   Psychiatric: Normal mood and affect.     Nursing note and vitals reviewed.    ASSESSMENT/PLAN:  Problem List Items Addressed This Visit        Respiratory    Allergic rhinitis, unspecified seasonality, unspecified trigger     Perennial congestion. Associated ocular watering. Sinus pressure. Absolute eosinophilia.     - Serum IgE for environmental allergens.   - Consider CT of sinuses if negative allergy testing.          Relevant Orders    Allergen cat epithellium IgE (Completed)    Allergen dog epithelium IgE (Completed)    Allergen linda IgE (Completed)    Allergen D pteronyssinus IgE (Completed)    Allergen D farinae IgE (Completed)    Allergen alternaria alternata IgE (Completed)    Allergen Epicoccum purpurascens IgE (Completed)    Allergen penicillium notatum IgE (Completed)    Allergen aspergillus fumigatus IgE (Completed)    Allergen cladosporium herbarum  IgE (Completed)    Allergen Refugio IgE (Completed)    Allergen cottonwood IgE (Completed)    Allergen elm IgE (Completed)    Allergen maple box elder IgE (Completed)    Allergen Red [place] IgE (Completed)    Allergen silver  birch IgE (Completed)    Allergen Tree White [place] IgE (Completed)    Allergen white pine IgE (Completed)    Allergen oak white IgE (Completed)    Allergen dinorah white IgE (Completed)    Allergen English plantain IgE (Completed)    Allergen giant ragweed IgE (Completed)    Allergen lamb's quarter IgE (Completed)    Allergen Mugwort IgE (Completed)    Allergen ragweed short IgE (Completed)    Allergen Sheep Sorrel IgE (Completed)    Allergen thistle Russian IgE (Completed)       Digestive    Other dysphagia - Primary     Difficulty swallowing meats and breads. Elevated eosinophilia. No impaction. Could be concerned for eosinophilic esophagitis. Sending for EGD.          Relevant Orders    UPPER GI ENDOSCOPY       Immune    Other eosinophilia     History of elevated eosinophilia noted on numerous cbc. Prior to starting on dialysis he had absolute eosinophl count of 400. Today 1500. Otherwise complex history with renal failure and now on PD after briefly being on HD. He had cardiomyopathy that has improved. Elevated serum tryptase. This could be seen in renal failure. Congenitally one kidney. Follows with ID. Positive salmonella IgG. Normal HIV. Follows with hematology. PFT normal.     - Repeat cbc.   - tropoinin, vitamin b12, PDGFRA, lymphocyte flow cytometry, immunoglobulins, ANCA.   - could have eosinophilia associated with renal disease or dialysis. Eosinophilia can be associated with HD or PD in under 10% of patients. Maybe related to catheters or membranes and generally mild. This maybe the case with this patient given normal absolute eosinophil count in February of 2020.. Alternatively renal disease can be precipitated by eosinophilia. Had renal biopsy but inconclusive.   - Could consider  bone marrow biopsy given elevated serum tryptase. If normal would feel more comfortable stating eosinophilia secondary to dialysis vs renal disease vs some sort of hypereosinophilic syndrome. Will additionally consider ct of chest and abdomen to determine if areas of organ involvement.   - Given symptoms of dysphagia will also evaluate for EoE with EGD.   - Continue hematology follow up.          Relevant Orders    IgE (Completed)    IgG (Completed)    IgM (Completed)    IgA (Completed)    Troponin I (Completed)    T cell subset extended profile (Completed)    ANCA IgG by IFA with Reflex to Titer (Completed)    CBC with platelets differential (Completed)      Other Visit Diagnoses     Fatigue, unspecified type        Relevant Orders    CBC with platelets differential (Completed)          Chart documentation with Dragon Voice recognition Software. Although reviewed after completion, some words and grammatical errors may remain.    Chip Nath DO [id number]  Medical Director for Allergy/Immunology at [place], [place]

## 2020-12-03 NOTE — PATIENT INSTRUCTIONS
Allergy Staff Appt Hours Shot Hours Locations    Physician     Chip Nath DO       Support Staff     DIPTI Torres CMA  Tuesday:        [place] 7-5 Wednesday:        [place]: 7-5 Thursday:                    [place] 7-6     Friday:  [place]  7-2   [place]        Thursday: 8-5:20        Friday: 7-12     [place]        Tuesday: 7- 3:20 Wednesday: 7-4:20     [place] Monday: 7-2:20 Tuesday: 9-5:20         [facility]  [address] [place] [address]  Appt Line: [phone]  Allergy RN:  [phone]    [facility]  [address] [place] [address]  Appt Line: [phone]  Allergy RN:  [phone]       Important Scheduling Information  Aspirin Desensitization: Appt will last 2 clinic days. Please call the Allergy RN line for your clinic to schedule. Discontinue antihistamines 7 days prior to the appointment.     Food Challenges: Appt will last 3-4 hours. Please call the Allergy RN line for your clinic to schedule. Discontinue antihistamines 7 days prior to the appointment.     Penicillin Testing: Appt will last 2-3 hours. Please call the Allergy RN line for your clinic to schedule. Discontinue antihistamines 7 days prior to the appointment.     Skin Testing: Appt will about 40 minutes. Call the appointment line for your clinic to schedule. Discontinue antihistamines 7 days prior to the appointment.     Venom Testing: Appt will last 2-3 hours. Please call the Allergy RN line for your clinic to schedule. Discontinue antihistamines 7 days prior to the appointment.     Thank you for trusting us with your Allergy, Asthma, and Immunology care. Please feel free to contact us with any questions or concerns you may have.      - Blood testing today.   - EGD for difficulty swallowing.

## 2020-12-03 NOTE — PROGRESS NOTES
[place] [address]  [facility] [address]  Phone: [phone]  Primary Provider: No Ref-Primary, Physician  Pre-op Performing Provider: JUDITH SAMANIEGO    PREOPERATIVE EVALUATION:  Today's date: 12/3/2020    Chip Fowler is a 35 year old male who presents for a preoperative evaluation.    Surgical Information:  Surgery/Procedure:   URETHROPLASTY, USING BUCCAL MUCOSA GRAFT N/A General   CYSTOSCOPY, WITH OPEN EXCISION OF URETHRAL DIVERTICULUM N/A General   CYSTOSCOPY, WITH SUPRAPUBIC CATHETER INSERTION     surgery Location:   Surgeon: Roberto  Surgery Date: 12-  Time of Surgery: 7:30am  Where patient plans to recover: At home with family  Fax number for surgical facility: Note does not need to be faxed, will be available electronically in Epic.    Type of Anesthesia Anticipated: General    Subjective     HPI related to upcoming procedure:   Cysto and repair urethra - damaged form previous surgery   He is on peritoneal dialysis     Preop Questions 12/2/2020   1. Have you ever had a heart attack or stroke? No   2. Have you ever had surgery on your heart or blood vessels, such as a stent placement, a coronary artery bypass, or surgery on an artery in your head, neck, heart, or legs? No   3. Do you have chest pain with activity? No   4. Do you have a history of  heart failure? No   5. Do you currently have a cold, bronchitis or symptoms of other infection? No   6. Do you have a cough, shortness of breath, or wheezing? No   7. Do you or anyone in your family have previous history of blood clots? No   8. Do you or does anyone in your family have a serious bleeding problem such as prolonged bleeding following surgeries or cuts? No   9. Have you ever had problems with anemia or been told to take iron pills? No   10. Have you had any abnormal blood loss such as black, tarry or bloody stools? No   11. Have you ever had a blood transfusion? No   12. Are you willing to  have a blood transfusion if it is medically needed before, during, or after your surgery? Yes   13. Have you or any of your relatives ever had problems with anesthesia? No   14. Do you have sleep apnea, excessive snoring or daytime drowsiness? No   15. Do you have any artifical heart valves or other implanted medical devices like a pacemaker, defibrillator, or continuous glucose monitor? No   16. Do you have artificial joints? No   17. Are you allergic to latex? No       Health Care Directive:  Patient does not have a Health Care Directive or Living Will: Discussed advance care planning with patient; however, patient declined at this time.    56}    Status of Chronic Conditions:  HYPERTENSION - Patient has longstanding history of HTN , currently denies any symptoms referable to elevated blood pressure. Specifically denies chest pain, palpitations, dyspnea, orthopnea, PND or peripheral edema. Blood pressure readings have been in normal range. Current medication regimen is as listed below. Patient denies any side effects of medication.     history meth use and cardiomyopathy   No longer using and his EF is improved  50-55% 4/16/2020    He is on peritoneal dialysis and working towards possibly a transplant    His girlfriend in labor with their baby today   Her mom with her     Review of Systems  CONSTITUTIONAL: NEGATIVE for fever, chills, change in weight  INTEGUMENTARY/SKIN: NEGATIVE for worrisome rashes, moles or lesions  EYES: NEGATIVE for vision changes or irritation  ENT/MOUTH: NEGATIVE for ear, mouth and throat problems  RESP: NEGATIVE for significant cough or SOB  BREAST: NEGATIVE for masses, tenderness or discharge  CV: NEGATIVE for chest pain, palpitations or peripheral edema  GI: NEGATIVE for nausea, abdominal pain, heartburn, or change in bowel habits  : NEGATIVE for frequency, dysuria, or hematuria  MUSCULOSKELETAL: NEGATIVE for significant arthralgias or myalgia  NEURO: NEGATIVE for weakness, dizziness  or paresthesias  ENDOCRINE: NEGATIVE for temperature intolerance, skin/hair changes  HEME: NEGATIVE for bleeding problems  PSYCHIATRIC: NEGATIVE for changes in mood or affect    Patient Active Problem List    Diagnosis Date Noted     Post-traumatic male urethral stricture 11/18/2020     Priority: Medium     Added automatically from request for surgery [id number]       Urethral diverticulum 11/18/2020     Priority: Medium     Added automatically from request for surgery [id number]       Urethral calculus 08/12/2020     Priority: Medium     Added automatically from request for surgery [id number]       Bladder stones      Priority: Medium     Urethral stone      Priority: Medium     Urethral stricture      Priority: Medium     Polysubstance abuse (H)      Priority: Medium     ESRD (end stage renal disease) on dialysis (H)      Priority: Medium     Hypertension      Priority: Medium     Solitary kidney, congenital      Priority: Medium     Cardiomyopathy (H)      Priority: Medium     Tobacco use 02/25/2020     Priority: Medium     Unilateral congenital absence of kidney 02/24/2020     Priority: Medium     Abnormal urinalysis 02/24/2020     Priority: Medium     Acute renal failure superimposed on chronic kidney disease (H) 02/24/2020     Priority: Medium     Congestive heart failure of unknown etiology (H) 02/24/2020     Priority: Medium     Methamphetamine abuse, episodic (H) 02/24/2020     Priority: Medium     Migraine 02/24/2020     Priority: Medium     Nephrolithiasis 11/17/2014     Priority: Medium     Acute retention of urine 06/16/2014     Priority: Medium      Past Medical History:   Diagnosis Date     Bladder stones      Cardiomyopathy (H)      ESRD (end stage renal disease) on dialysis (H)      Hypertension      Migraines      Polysubstance abuse (H)      Solitary kidney, congenital      Urethral stone      Urethral stricture      Past Surgical History:   Procedure Laterality Date     BIOPSY  02/2020    renal, [place]      COMBINED CYSTOSCOPY, LASER HOLMIUM LITHOTRIPSY URETER(S)       CYSTOSCOPY       HERNIA REPAIR      infant     INSERT CATHETER PERITONEAL DIALYSIS       LASER HOLMIUM LITHOTRIPSY URETER(S), INSERT STENT, COMBINED N/A 8/21/2020    Procedure: CYSTOSCOPY, bladder and urethral stone extraction, removal of foreign body, urethral dilation, urethrotomy, laser on standby;  Surgeon: Sam Mejia MD;  Location: [facility] OR     urethral dilation       Current Outpatient Medications   Medication Sig Dispense Refill     B Complex-C-Folic Acid (DIALYVITE 800) 0.8 MG TABS Take 1 tablet by mouth every morning        carvedilol (COREG) 25 MG tablet Take 6.25 mg by mouth 2 times daily (with meals) 1/2 tab BID w/meals       furosemide (LASIX) 40 MG tablet Take 40 mg by mouth every morning        gentamicin (GARAMYCIN) 0.1 % external ointment every morning        lisinopril (ZESTRIL) 2.5 MG tablet Take 2.5 mg by mouth every evening        VITAMIN D, CHOLECALCIFEROL, PO Take by mouth daily Takes M, W, F       acetaminophen (TYLENOL) 325 MG tablet Take 2 tablets (650 mg) by mouth every 6 hours as needed for mild pain (Patient not taking: Reported on 12/3/2020) 90 tablet 0       No Known Allergies     Social History     Tobacco Use     Smoking status: Current Every Day Smoker     Packs/day: 0.40     Types: Cigarettes     Start date: 2002     Smokeless tobacco: Never Used     Tobacco comment: 4-8 cigs /day   Substance Use Topics     Alcohol use: Not Currently     Comment: quit alcohol 3-4 yrs ago     Family History   Problem Relation Age of Onset     Alzheimer Disease Mother      Unknown/Adopted Father      No Known Problems Sister      No Known Problems Sister      Kidney Disease No family hx of      History   Drug Use Unknown         Objective     /62   Pulse 74   Temp 98.5  F (36.9  C) (Oral)   Resp 20   Ht 1.829 m (6')   Wt 73 kg (161 lb)   SpO2 99%   BMI 21.84 kg/m      Physical Exam    GENERAL APPEARANCE:  alert and no  distress     HENT: ear canals and TM's normal and nose and mouth without ulcers or lesions     RESP: lungs clear to auscultation - no rales, rhonchi or wheezes     CV: regular rates and rhythm, normal S1 S2, no S3 or S4 and no murmur, click or rub     ABDOMEN:  soft, nontender, no HSM or masses and bowel sounds normal     MS: extremities normal- no gross deformities noted, no evidence of inflammation in joints, FROM in all extremities.     SKIN: no suspicious lesions or rashes     NEURO: Normal strength and tone, sensory exam grossly normal, mentation intact and speech normal     PSYCH: mentation appears normal. and affect normal/bright    Recent Labs   Lab Test 11/17/20  0906 10/08/20  1300 08/21/20  1130 08/21/20  1130 08/18/20  1307   HGB 12.7* 11.7*   < >  --  12.0*    238   < >  --  231   INR  --   --   --   --  1.08   NA  --   --   --  138 140   POTASSIUM  --   --   --  4.5 4.6   CR  --   --   --  7.16* 7.19*    < > = values in this interval not displayed.        Diagnostics:  Labs pending at this time.  Results will be reviewed when available.   EKG: appears normal, NSR, normal axis, normal intervals, no acute ST/T changes c/w ischemia, no LVH by voltage criteria, unchanged from previous tracings, 9/11/2020    Revised Cardiac Risk Index (RCRI):  The patient has the following serious cardiovascular risks for perioperative complications:   - Serum Creatinine >2.0 mg/dl = 1 point     RCRI Interpretation: 1 point: Class II (low risk - 0.9% complication rate)           Assessment & Plan   The proposed surgical procedure is considered INTERMEDIATE risk.    Pre-op exam  He is on peritoneal dialysis   - Potassium  - Creatinine    Post-traumatic stricture of overlapping sites of urethra in male  Needs repair     Urethral diverticulum      Solitary kidney, congenital      Essential hypertension  In god range current  medication   - Potassium  - Creatinine    ESRD (end stage renal disease) on dialysis (H)  On  peritoneal dialysis   - Potassium  - Creatinine           Medication Instructions:  Morning of surgery take     RECOMMENDATION:  APPROVAL GIVEN to proceed with proposed procedure, without further diagnostic evaluation.    Signed Electronically by: JESSE Luna CNP    Copy of this evaluation report is provided to requesting physician.    Preop [facility] Preop Guidelines    Revised Cardiac Risk Index

## 2020-12-03 NOTE — LETTER
12/3/2020         RE: Chip Fowler  [address]  [address]  [facility] [address]        Dear Colleague,    Thank you for referring your patient, Chip Fowler, to the [facility]. Please see a copy of my visit note below.    Chip Fowler is a 35 year old White male with previous medical history significant for ESRD on PD, eosinophilia. Chip Fowler is being seen today for evaluation of eosinophilia. The patient is being seen in consultation at the request of Dr. Serafin MD.     Patient is being seen in consultation for eosinophil elevation. Previously seen by ID and hematology.    Echocardiogram normal from 4/2020. However, in february 2020 he had EF of 15%. Thought possibly secondary to methamphetamine use. Had JACK at that time which necessitated initiation of HD. Additionally he has a history of urethral strictures. Follows with urology. He congenitally has one kidney. PFT's done in September 2020 and normal. History of absolute eosinophilia noted in early 2020.  He has had several levels above 1.5 and several below 1.5. Apparently at [facility] in February of 2020 prior to starting dialysis he had eosinophil count of 0.4. Chest x-ray normal in September of 2020. Normal HIV. Elevated serum tryptase. Seen by ID and had salmonella IgG. All else negative. Had peripheral smear and was normal. Had HIV studies and normal.     Had future orders for vitamin B12 and PDGFRA mutational analysis.    History of ESRD on PD. Kidney biopsy with insufficient renal cortex tissue. Serologic workup negative at that time. No eosinophil count prior to dialysis that I can ascertain. He has a small amount of contact rash at site of tape exposure.     Has persistent nasal congestion. Associated ocular watering. Has sinus pressure involving maxillary sinuses. No seasonal worsening. No problems with cats, dogs, dust mites.     History of difficulty swallowing meats and breads. No impactions.  This has been ongoing for years. No EGD.     Results for CAROLYN HIGHTOWER (MRN [id number]) as of 12/3/2020 17:05   Ref. Range 8/18/2020 13:07 9/15/2020 13:47 10/8/2020 13:00 11/17/2020 09:06   Absolute Eosinophils Latest Ref Range: 0.0 - 0.7 10e9/L 1.6 (H) 1.3 (H) 1.7 (H) 1.2 (H)       ENVIRONMENTAL HISTORY: [place] The home is heated with a forced air. They do have central air conditioning. The patient's bedroom is furnished with carpeting in bedroom.  Pets inside the house include 3 snakes. There is no history of cockroach or mice infestation. There is/are 0 smokers in the house.  The house does not have a damp basement.       Past Medical History:   Diagnosis Date     Bladder stones      Cardiomyopathy (H)      ESRD (end stage renal disease) on dialysis (H)      Hypertension      Migraines      Polysubstance abuse (H)      Solitary kidney, congenital      Urethral stone      Urethral stricture      Family History   Problem Relation Age of Onset     Alzheimer Disease Mother      Unknown/Adopted Father      No Known Problems Sister      No Known Problems Sister      Kidney Disease No family hx of      Past Surgical History:   Procedure Laterality Date     BIOPSY  02/2020    renal, Denominational     COMBINED CYSTOSCOPY, LASER HOLMIUM LITHOTRIPSY URETER(S)       CYSTOSCOPY       HERNIA REPAIR      infant     INSERT CATHETER PERITONEAL DIALYSIS       LASER HOLMIUM LITHOTRIPSY URETER(S), INSERT STENT, COMBINED N/A 8/21/2020    Procedure: CYSTOSCOPY, bladder and urethral stone extraction, removal of foreign body, urethral dilation, urethrotomy, laser on standby;  Surgeon: Sam Mejia MD;  Location: [facility] OR     urethral dilation         REVIEW OF SYSTEMS:  General: negative for weight gain. negative for weight loss. negative for changes in sleep.   Ears: negative for fullness. negative for hearing loss. negative for dizziness.   Nose: negative for snoring.negative for changes  in smell. negative for drainage.   Eyes: negative for eye watering. negative for eye itching. negative for vision changes. negative for eye redness.  Throat: negative for hoarseness. negative for sore throat. positive  for trouble swallowing.   Lungs: negative for shortness of breath.negative for wheezing. negative for sputum production.   Cardiovascular: negative for chest pain. negative for swelling of ankles. negative for fast or irregular heartbeat.   Gastrointestinal: negative for nausea. negative for heartburn. negative for acid reflux.   Musculoskeletal: negative for joint pain. negative for joint stiffness. negative for joint swelling.   Neurologic: negative for seizures. negative for fainting. negative for weakness.   Psychiatric: negative for changes in mood. negative for anxiety.   Endocrine: negative for cold intolerance. negative for heat intolerance. negative for tremors.   Lymphatic: negative for lower extremity swelling. negative for lymph node swelling.   Hematologic: negative for easy bruising. negative for easy bleeding.  Integumentary: negative for rash. negative for scaling. negative for nail changes.       Current Outpatient Medications:      acetaminophen (TYLENOL) 325 MG tablet, Take 2 tablets (650 mg) by mouth every 6 hours as needed for mild pain, Disp: 90 tablet, Rfl: 0     B Complex-C-Folic Acid (DIALYVITE 800) 0.8 MG TABS, Take 1 tablet by mouth every morning , Disp: , Rfl:      carvedilol (COREG) 25 MG tablet, Take 6.25 mg by mouth 2 times daily (with meals) 1/2 tab BID w/meals, Disp: , Rfl:      furosemide (LASIX) 40 MG tablet, Take 40 mg by mouth every morning , Disp: , Rfl:      gentamicin (GARAMYCIN) 0.1 % external ointment, every morning , Disp: , Rfl:      lisinopril (ZESTRIL) 2.5 MG tablet, Take 2.5 mg by mouth every evening , Disp: , Rfl:      VITAMIN D, CHOLECALCIFEROL, PO, Take by mouth daily Takes M, W, F, Disp: , Rfl:   Immunization History   Administered Date(s)  Administered     DTAP (<7y) 1984, 1984, 1984, 10/04/1985, 03/21/1989     Flu, Unspecified 11/12/1997     Hep B, Peds or Adolescent 02/19/1996, 06/17/1996, 09/04/1996     Influenza (intradermal) 03/11/2020     Influenza Vaccine IM > 6 months Valent IIV4 11/06/2016     MMR 07/03/1985, 02/19/1996     Mantoux Tuberculin Skin Test 03/11/2020     Pneumo Conj 13-V (2010&after) 11/24/2020     Pneumococcal 23 valent 03/11/2020     Poliovirus, inactivated (IPV) 1984, 1984, 1984, 03/21/1989     TDAP Vaccine (Adacel) 04/28/2020     Td (Adult), Adsorbed 06/17/1996     No Known Allergies      EXAM:   Constitutional:  Appears well-developed and well-nourished. No distress.   HEENT:   Head: Normocephalic.   Nasal tissue pink and normal appearing.  No rhinorrhea noted.    Eyes: Conjunctivae are non-erythematous   No maxillary or frontal sinus tenderness to palpation.   Cardiovascular: Normal rate, regular rhythm and normal heart sounds. Exam reveals no gallop and no friction rub.   No murmur heard.  Respiratory: Effort normal and breath sounds normal. No respiratory distress. No wheezes. No rales.   Musculoskeletal: Normal range of motion.   Lymphadenopathy:   No cervical adenopathy.   No lower extremity edema.   Neuro: Oriented to person, place, and time.  Skin: Skin is warm and dry. No rash noted.   Psychiatric: Normal mood and affect.     Nursing note and vitals reviewed.    ASSESSMENT/PLAN:  Problem List Items Addressed This Visit        Respiratory    Allergic rhinitis, unspecified seasonality, unspecified trigger     Perennial congestion. Associated ocular watering. Sinus pressure. Absolute eosinophilia.     - Serum IgE for environmental allergens.   - Consider CT of sinuses if negative allergy testing.          Relevant Orders    Allergen cat epithellium IgE (Completed)    Allergen dog epithelium IgE (Completed)    Allergen linda IgE (Completed)    Allergen D pteronyssinus IgE (Completed)     Allergen D farinae IgE (Completed)    Allergen alternaria alternata IgE (Completed)    Allergen Epicoccum purpurascens IgE (Completed)    Allergen penicillium notatum IgE (Completed)    Allergen aspergillus fumigatus IgE (Completed)    Allergen cladosporium herbarum IgE (Completed)    Allergen [place] IgE (Completed)    Allergen cottonwood IgE (Completed)    Allergen elm IgE (Completed)    Allergen maple box elder IgE (Completed)    Allergen Red [place] IgE (Completed)    Allergen silver  birch IgE (Completed)    Allergen Tree White [place] IgE (Completed)    Allergen white pine IgE (Completed)    Allergen oak white IgE (Completed)    Allergen dinorah white IgE (Completed)    Allergen English plantain IgE (Completed)    Allergen giant ragweed IgE (Completed)    Allergen lamb's quarter IgE (Completed)    Allergen Mugwort IgE (Completed)    Allergen ragweed short IgE (Completed)    Allergen Sheep Sorrel IgE (Completed)    Allergen thistle Russian IgE (Completed)       Digestive    Other dysphagia - Primary     Difficulty swallowing meats and breads. Elevated eosinophilia. No impaction. Could be concerned for eosinophilic esophagitis. Sending for EGD.          Relevant Orders    UPPER GI ENDOSCOPY       Immune    Other eosinophilia     History of elevated eosinophilia noted on numerous cbc. Prior to starting on dialysis he had absolute eosinophl count of 400. Today 1500. Otherwise complex history with renal failure and now on PD after briefly being on HD. He had cardiomyopathy that has improved. Elevated serum tryptase. This could be seen in renal failure. Congenitally one kidney. Follows with ID. Positive salmonella IgG. Normal HIV. Follows with hematology. PFT normal.     - Repeat cbc.   - tropoinin, vitamin b12, PDGFRA, lymphocyte flow cytometry, immunoglobulins, ANCA.   - could have eosinophilia associated with renal disease or dialysis. Eosinophilia can be associated with HD or PD in under 10% of patients. Maybe  related to catheters or membranes and generally mild. This maybe the case with this patient given normal absolute eosinophil count in February of 2020.. Alternatively renal disease can be precipitated by eosinophilia. Had renal biopsy but inconclusive.   - Could consider bone marrow biopsy given elevated serum tryptase. If normal would feel more comfortable stating eosinophilia secondary to dialysis vs renal disease vs some sort of hypereosinophilic syndrome. Will additionally consider ct of chest and abdomen to determine if areas of organ involvement.   - Given symptoms of dysphagia will also evaluate for EoE with EGD.   - Continue hematology follow up.          Relevant Orders    IgE (Completed)    IgG (Completed)    IgM (Completed)    IgA (Completed)    Troponin I (Completed)    T cell subset extended profile (Completed)    ANCA IgG by IFA with Reflex to Titer (Completed)    CBC with platelets differential (Completed)      Other Visit Diagnoses     Fatigue, unspecified type        Relevant Orders    CBC with platelets differential (Completed)          Chart documentation with Dragon Voice recognition Software. Although reviewed after completion, some words and grammatical errors may remain.    Chip Nath DO [id number]  Medical Director for Allergy/Immunology at [place], [place]        Again, thank you for allowing me to participate in the care of your patient.        Sincerely,        Chip Nath DO

## 2020-12-03 NOTE — LETTER
12/3/2020         RE: Chip Fowler  [address]  [address]  [facility] [facility] [address]        Dear Colleague,    Thank you for referring your patient, Chip Fowler, to the [facility]. Please see a copy of my visit note below.      This patient was a no show for this scheduled appointment.      Again, thank you for allowing me to participate in the care of your patient.        Sincerely,        Chip Nath, DO

## 2020-12-03 NOTE — NURSING NOTE
Chief Complaint   Patient presents with     Pre-Op Exam     initial /62   Pulse 74   Temp 98.5  F (36.9  C) (Oral)   Resp 20   Ht 1.829 m (6')   Wt 73 kg (161 lb)   SpO2 99%   BMI 21.84 kg/m   Estimated body mass index is 21.84 kg/m  as calculated from the following:    Height as of this encounter: 1.829 m (6').    Weight as of this encounter: 73 kg (161 lb)..  bp completed using cuff size regular  STUART CAMERON LPN

## 2020-12-03 NOTE — PATIENT INSTRUCTIONS
Lab in [address]    Morning of surgery take   Carvedilol     Hold other medication until back home and eating

## 2020-12-04 LAB
ANCA AB PATTERN SER IF-IMP: NORMAL
BACTERIA SPEC CULT: NO GROWTH
C-ANCA TITR SER IF: NORMAL {TITER}
CD19 CELLS # BLD: 532 CELLS/UL (ref 107–698)
CD19 CELLS NFR BLD: 17 % (ref 6–27)
CD3 CELLS # BLD: 2385 CELLS/UL (ref 603–2990)
CD3 CELLS NFR BLD: 74 % (ref 49–84)
CD3+CD4+ CELLS # BLD: 1325 CELLS/UL (ref 441–2156)
CD3+CD4+ CELLS NFR BLD: 41 % (ref 28–63)
CD3+CD4+ CELLS/CD3+CD8+ CLL BLD: 1.64 % (ref 1.4–2.6)
CD3+CD8+ CELLS # BLD: 794 CELLS/UL (ref 125–1312)
CD3+CD8+ CELLS NFR BLD: 25 % (ref 10–40)
CD3-CD16+CD56+ CELLS # BLD: 294 CELLS/UL (ref 95–640)
CD3-CD16+CD56+ CELLS NFR BLD: 9 % (ref 4–25)
IFC SPECIMEN: NORMAL
IGA SERPL-MCNC: 268 MG/DL (ref 84–499)
IGG SERPL-MCNC: 1152 MG/DL (ref 610–1616)
IGM SERPL-MCNC: 64 MG/DL (ref 35–242)
SPECIMEN SOURCE: NORMAL

## 2020-12-04 NOTE — ASSESSMENT & PLAN NOTE
Perennial congestion. Associated ocular watering. Sinus pressure. Absolute eosinophilia.     - Serum IgE for environmental allergens.   - Consider CT of sinuses if negative allergy testing.

## 2020-12-04 NOTE — ASSESSMENT & PLAN NOTE
Difficulty swallowing meats and breads. Elevated eosinophilia. No impaction. Could be concerned for eosinophilic esophagitis. Sending for EGD.

## 2020-12-04 NOTE — ASSESSMENT & PLAN NOTE
History of elevated eosinophilia noted on numerous cbc. Prior to starting on dialysis he had absolute eosinophl count of 400. Today 1500. Otherwise complex history with renal failure and now on PD after briefly being on HD. He had cardiomyopathy that has improved. Elevated serum tryptase. This could be seen in renal failure. Congenitally one kidney. Follows with ID. Positive salmonella IgG. Normal HIV. Follows with hematology. PFT normal.     - Repeat cbc.   - tropoinin, vitamin b12, PDGFRA, lymphocyte flow cytometry, immunoglobulins, ANCA.   - could have eosinophilia associated with renal disease or dialysis. However, it does not appear eosinophilia started till after dialysis initiated. Eosinophilia not present when his renal function was diminished. Eosinophilia can be associated with HD or PD in under 10% of patients. Maybe related to catheters or membranes and generally mild. This maybe the case with this patient given normal absolute eosinophil count in February of 2020.. Alternatively renal disease can be precipitated by eosinophilia. Had renal biopsy but inconclusive.   - Could consider bone marrow biopsy given elevated serum tryptase. If normal would feel more comfortable stating eosinophilia secondary to dialysis vs renal disease vs some sort of hypereosinophilic syndrome. Will additionally consider ct of chest and abdomen to determine if areas of organ involvement.   - Given symptoms of dysphagia will also evaluate for EoE with EGD.   - Continue hematology follow up.

## 2020-12-07 LAB
A ALTERNATA IGE QN: <0.1 KU(A)/L
A FUMIGATUS IGE QN: <0.1 KU(A)/L
C HERBARUM IGE QN: <0.1 KU(A)/L
CAT DANDER IGG QN: 0.58 KU(A)/L
CEDAR IGE QN: <0.1 KU(A)/L
COMMON RAGWEED IGE QN: <0.1 KU(A)/L
COTTONWOOD IGE QN: <0.1 KU(A)/L
D FARINAE IGE QN: <0.1 KU(A)/L
D PTERONYSS IGE QN: <0.1 KU(A)/L
DOG DANDER+EPITH IGE QN: <0.1 KU(A)/L
E PURPURASCENS IGE QN: <0.1 KU(A)/L
EAST WHITE PINE IGE QN: <0.1 KU(A)/L
ENGL PLANTAIN IGE QN: <0.1 KU(A)/L
GIANT RAGWEED IGE QN: <0.1 KU(A)/L
GOOSEFOOT IGE QN: <0.1 KU(A)/L
IGE SERPL-ACNC: 18 KIU/L (ref 0–114)
MAPLE IGE QN: <0.1 KU(A)/L
MUGWORT IGE QN: <0.1 KU(A)/L
P NOTATUM IGE QN: <0.1 KU(A)/L
RED MULBERRY IGE QN: <0.1 KU(A)/L
SALTWORT IGE QN: <0.1 KU(A)/L
SHEEP SORREL IGE QN: <0.1 KU(A)/L
SILVER BIRCH IGE QN: <0.1 KU(A)/L
TIMOTHY IGE QN: <0.1 KU(A)/L
WHITE ASH IGE QN: <0.1 KU(A)/L
WHITE ELM IGE QN: <0.1 KU(A)/L
WHITE MULBERRY IGE QN: <0.1 KU(A)/L
WHITE OAK IGE QN: <0.1 KU(A)/L

## 2020-12-08 NOTE — RESULT ENCOUNTER NOTE
Allergy testing positive for cats only. All else negative. Immune work up looks good too. Really only abnormal lab is the eosinophilia. I discussed with hematology. I really do think this is likely from either renal failure or dialysis. However, in effort of completeness to make sure no hematologic cause of eosinophilia especially in light of elevated serum tryptase a bone marrow biopsy could be considered. Thanks.     Dr. Nath

## 2020-12-09 PROCEDURE — 87177 OVA AND PARASITES SMEARS: CPT | Performed by: INTERNAL MEDICINE

## 2020-12-09 PROCEDURE — 87209 SMEAR COMPLEX STAIN: CPT | Performed by: INTERNAL MEDICINE

## 2020-12-09 PROCEDURE — 87506 IADNA-DNA/RNA PROBE TQ 6-11: CPT | Performed by: INTERNAL MEDICINE

## 2020-12-10 DIAGNOSIS — Z11.59 ENCOUNTER FOR SCREENING FOR OTHER VIRAL DISEASES: ICD-10-CM

## 2020-12-10 DIAGNOSIS — K52.9 CHRONIC DIARRHEA: ICD-10-CM

## 2020-12-10 DIAGNOSIS — D72.19 OTHER EOSINOPHILIA: ICD-10-CM

## 2020-12-10 DIAGNOSIS — Z23 NEED FOR VACCINATION AGAINST STREPTOCOCCUS PNEUMONIAE USING PNEUMOCOCCAL CONJUGATE VACCINE 13: ICD-10-CM

## 2020-12-10 DIAGNOSIS — D72.19 EOSINOPHILIC LEUKOCYTOSIS, UNSPECIFIED TYPE: ICD-10-CM

## 2020-12-10 PROCEDURE — G0452 MOLECULAR PATHOLOGY INTERPR: HCPCS | Mod: 59 | Performed by: PATHOLOGY

## 2020-12-10 PROCEDURE — 87177 OVA AND PARASITES SMEARS: CPT | Performed by: INTERNAL MEDICINE

## 2020-12-10 PROCEDURE — 36415 COLL VENOUS BLD VENIPUNCTURE: CPT | Performed by: INTERNAL MEDICINE

## 2020-12-10 PROCEDURE — 87209 SMEAR COMPLEX STAIN: CPT | Performed by: INTERNAL MEDICINE

## 2020-12-10 PROCEDURE — U0003 INFECTIOUS AGENT DETECTION BY NUCLEIC ACID (DNA OR RNA); SEVERE ACUTE RESPIRATORY SYNDROME CORONAVIRUS 2 (SARS-COV-2) (CORONAVIRUS DISEASE [COVID-19]), AMPLIFIED PROBE TECHNIQUE, MAKING USE OF HIGH THROUGHPUT TECHNOLOGIES AS DESCRIBED BY CMS-2020-01-R: HCPCS | Performed by: UROLOGY

## 2020-12-10 PROCEDURE — 81314 PDGFRA GENE: CPT | Performed by: INTERNAL MEDICINE

## 2020-12-11 LAB
O+P STL MICRO: NORMAL
SARS-COV-2 RNA SPEC QL NAA+PROBE: NOT DETECTED
SPECIMEN SOURCE: NORMAL

## 2020-12-13 ENCOUNTER — ANESTHESIA EVENT (OUTPATIENT)
Dept: SURGERY | Facility: CLINIC | Age: 35
End: 2020-12-13
Payer: MEDICARE

## 2020-12-14 ENCOUNTER — HOSPITAL ENCOUNTER (OUTPATIENT)
Facility: CLINIC | Age: 35
Discharge: HOME | End: 2020-12-14
Attending: UROLOGY | Admitting: UROLOGY
Payer: MEDICARE

## 2020-12-14 ENCOUNTER — ANESTHESIA (OUTPATIENT)
Dept: SURGERY | Facility: CLINIC | Age: 35
End: 2020-12-14
Payer: MEDICARE

## 2020-12-14 VITALS
HEIGHT: 72 IN | DIASTOLIC BLOOD PRESSURE: 75 MMHG | BODY MASS INDEX: 21.74 KG/M2 | RESPIRATION RATE: 16 BRPM | WEIGHT: 160.5 LBS | TEMPERATURE: 98.6 F | SYSTOLIC BLOOD PRESSURE: 121 MMHG | OXYGEN SATURATION: 100 % | HEART RATE: 68 BPM

## 2020-12-14 DIAGNOSIS — N36.1 URETHRAL DIVERTICULUM: ICD-10-CM

## 2020-12-14 DIAGNOSIS — N35.014 POST-TRAUMATIC MALE URETHRAL STRICTURE: ICD-10-CM

## 2020-12-14 LAB — GLUCOSE BLDC GLUCOMTR-MCNC: 74 MG/DL (ref 70–99)

## 2020-12-14 PROCEDURE — 250N000011 HC RX IP 250 OP 636: Performed by: NURSE ANESTHETIST, CERTIFIED REGISTERED

## 2020-12-14 PROCEDURE — 999N000139 HC STATISTIC PRE-PROCEDURE ASSESSMENT II: Performed by: UROLOGY

## 2020-12-14 PROCEDURE — 258N000003 HC RX IP 258 OP 636: Performed by: NURSE ANESTHETIST, CERTIFIED REGISTERED

## 2020-12-14 PROCEDURE — 250N000013 HC RX MED GY IP 250 OP 250 PS 637: Performed by: UROLOGY

## 2020-12-14 PROCEDURE — 761N000007 HC RECOVERY PHASE 2 EACH 15 MINS: Performed by: UROLOGY

## 2020-12-14 PROCEDURE — 360N000022 HC SURGERY LEVEL 3 1ST 30 MIN - UMMC: Performed by: UROLOGY

## 2020-12-14 PROCEDURE — 250N000011 HC RX IP 250 OP 636: Performed by: UROLOGY

## 2020-12-14 PROCEDURE — 360N000023 HC SURGERY LEVEL 3 EA 15 ADDTL MIN UMMC: Performed by: UROLOGY

## 2020-12-14 PROCEDURE — 88304 TISSUE EXAM BY PATHOLOGIST: CPT | Mod: 26 | Performed by: PATHOLOGY

## 2020-12-14 PROCEDURE — C2627 CATH, SUPRAPUBIC/CYSTOSCOPIC: HCPCS | Performed by: UROLOGY

## 2020-12-14 PROCEDURE — 370N000001 HC ANESTHESIA TECHNICAL FEE, 1ST 30 MIN: Performed by: UROLOGY

## 2020-12-14 PROCEDURE — 999N001017 HC STATISTIC GLUCOSE BY METER IP

## 2020-12-14 PROCEDURE — 761N000004 HC RECOVERY PHASE 1 LEVEL 2 EA ADDTL HR: Performed by: UROLOGY

## 2020-12-14 PROCEDURE — 370N000002 HC ANESTHESIA TECHNICAL FEE, EACH ADDTL 15 MIN: Performed by: UROLOGY

## 2020-12-14 PROCEDURE — 761N000003 HC RECOVERY PHASE 1 LEVEL 2 FIRST HR: Performed by: UROLOGY

## 2020-12-14 PROCEDURE — 250N000011 HC RX IP 250 OP 636: Performed by: STUDENT IN AN ORGANIZED HEALTH CARE EDUCATION/TRAINING PROGRAM

## 2020-12-14 PROCEDURE — C1769 GUIDE WIRE: HCPCS | Performed by: UROLOGY

## 2020-12-14 PROCEDURE — 88304 TISSUE EXAM BY PATHOLOGIST: CPT | Mod: TC | Performed by: UROLOGY

## 2020-12-14 PROCEDURE — 250N000003 HC SEVOFLURANE, EA 15 MIN: Performed by: UROLOGY

## 2020-12-14 PROCEDURE — 250N000009 HC RX 250: Performed by: UROLOGY

## 2020-12-14 PROCEDURE — 250N000009 HC RX 250: Performed by: NURSE ANESTHETIST, CERTIFIED REGISTERED

## 2020-12-14 PROCEDURE — 272N000001 HC OR GENERAL SUPPLY STERILE: Performed by: UROLOGY

## 2020-12-14 PROCEDURE — 250N000013 HC RX MED GY IP 250 OP 250 PS 637: Performed by: STUDENT IN AN ORGANIZED HEALTH CARE EDUCATION/TRAINING PROGRAM

## 2020-12-14 RX ORDER — CHLORHEXIDINE GLUCONATE ORAL RINSE 1.2 MG/ML
SOLUTION DENTAL PRN
Status: DISCONTINUED | OUTPATIENT
Start: 2020-12-14 | End: 2020-12-14 | Stop reason: HOSPADM

## 2020-12-14 RX ORDER — OXYCODONE HYDROCHLORIDE 5 MG/1
5 TABLET ORAL EVERY 6 HOURS PRN
Qty: 15 TABLET | Refills: 0 | Status: SHIPPED | OUTPATIENT
Start: 2020-12-14 | End: 2020-12-18

## 2020-12-14 RX ORDER — EPHEDRINE SULFATE 50 MG/ML
INJECTION, SOLUTION INTRAMUSCULAR; INTRAVENOUS; SUBCUTANEOUS PRN
Status: DISCONTINUED | OUTPATIENT
Start: 2020-12-14 | End: 2020-12-14

## 2020-12-14 RX ORDER — NALOXONE HYDROCHLORIDE 0.4 MG/ML
0.4 INJECTION, SOLUTION INTRAMUSCULAR; INTRAVENOUS; SUBCUTANEOUS
Status: DISCONTINUED | OUTPATIENT
Start: 2020-12-14 | End: 2020-12-14 | Stop reason: HOSPADM

## 2020-12-14 RX ORDER — TOLTERODINE 4 MG/1
4 CAPSULE, EXTENDED RELEASE ORAL DAILY
Qty: 30 CAPSULE | Refills: 0 | Status: SHIPPED | OUTPATIENT
Start: 2020-12-14 | End: 2021-02-02

## 2020-12-14 RX ORDER — GABAPENTIN 100 MG/1
300 CAPSULE ORAL ONCE
Status: DISCONTINUED | OUTPATIENT
Start: 2020-12-14 | End: 2020-12-14 | Stop reason: HOSPADM

## 2020-12-14 RX ORDER — SODIUM CHLORIDE 9 MG/ML
INJECTION, SOLUTION INTRAVENOUS CONTINUOUS PRN
Status: DISCONTINUED | OUTPATIENT
Start: 2020-12-14 | End: 2020-12-14

## 2020-12-14 RX ORDER — LIDOCAINE HYDROCHLORIDE 20 MG/ML
INJECTION, SOLUTION INFILTRATION; PERINEURAL PRN
Status: DISCONTINUED | OUTPATIENT
Start: 2020-12-14 | End: 2020-12-14

## 2020-12-14 RX ORDER — FENTANYL CITRATE 50 UG/ML
INJECTION, SOLUTION INTRAMUSCULAR; INTRAVENOUS PRN
Status: DISCONTINUED | OUTPATIENT
Start: 2020-12-14 | End: 2020-12-14

## 2020-12-14 RX ORDER — NALOXONE HYDROCHLORIDE 0.4 MG/ML
0.2 INJECTION, SOLUTION INTRAMUSCULAR; INTRAVENOUS; SUBCUTANEOUS
Status: DISCONTINUED | OUTPATIENT
Start: 2020-12-14 | End: 2020-12-14 | Stop reason: HOSPADM

## 2020-12-14 RX ORDER — AMPICILLIN 2 G/1
2 INJECTION, POWDER, FOR SOLUTION INTRAVENOUS
Status: COMPLETED | OUTPATIENT
Start: 2020-12-14 | End: 2020-12-14

## 2020-12-14 RX ORDER — CEFTRIAXONE 1 G/1
1 INJECTION, POWDER, FOR SOLUTION INTRAMUSCULAR; INTRAVENOUS
Status: COMPLETED | OUTPATIENT
Start: 2020-12-14 | End: 2020-12-14

## 2020-12-14 RX ORDER — HYDROMORPHONE HYDROCHLORIDE 1 MG/ML
.2-.4 INJECTION, SOLUTION INTRAMUSCULAR; INTRAVENOUS; SUBCUTANEOUS EVERY 10 MIN PRN
Status: DISCONTINUED | OUTPATIENT
Start: 2020-12-14 | End: 2020-12-14 | Stop reason: HOSPADM

## 2020-12-14 RX ORDER — FENTANYL CITRATE 50 UG/ML
25-50 INJECTION, SOLUTION INTRAMUSCULAR; INTRAVENOUS
Status: DISCONTINUED | OUTPATIENT
Start: 2020-12-14 | End: 2020-12-14 | Stop reason: HOSPADM

## 2020-12-14 RX ORDER — CIPROFLOXACIN 500 MG/1
500 TABLET, FILM COATED ORAL 2 TIMES DAILY
Qty: 1 TABLET | Refills: 0 | Status: SHIPPED | OUTPATIENT
Start: 2020-12-14 | End: 2021-01-07

## 2020-12-14 RX ORDER — SODIUM CHLORIDE, SODIUM LACTATE, POTASSIUM CHLORIDE, CALCIUM CHLORIDE 600; 310; 30; 20 MG/100ML; MG/100ML; MG/100ML; MG/100ML
INJECTION, SOLUTION INTRAVENOUS CONTINUOUS
Status: DISCONTINUED | OUTPATIENT
Start: 2020-12-14 | End: 2020-12-14 | Stop reason: HOSPADM

## 2020-12-14 RX ORDER — LIDOCAINE 40 MG/G
CREAM TOPICAL
Status: DISCONTINUED | OUTPATIENT
Start: 2020-12-14 | End: 2020-12-14 | Stop reason: HOSPADM

## 2020-12-14 RX ORDER — MAGNESIUM HYDROXIDE 1200 MG/15ML
LIQUID ORAL PRN
Status: DISCONTINUED | OUTPATIENT
Start: 2020-12-14 | End: 2020-12-14 | Stop reason: HOSPADM

## 2020-12-14 RX ORDER — PROPOFOL 10 MG/ML
INJECTION, EMULSION INTRAVENOUS PRN
Status: DISCONTINUED | OUTPATIENT
Start: 2020-12-14 | End: 2020-12-14

## 2020-12-14 RX ORDER — SENNA AND DOCUSATE SODIUM 50; 8.6 MG/1; MG/1
1 TABLET, FILM COATED ORAL AT BEDTIME
Qty: 14 TABLET | Refills: 0 | Status: SHIPPED | OUTPATIENT
Start: 2020-12-14 | End: 2021-01-29

## 2020-12-14 RX ORDER — OXYCODONE HCL 5 MG/5 ML
5 SOLUTION, ORAL ORAL EVERY 4 HOURS PRN
Status: DISCONTINUED | OUTPATIENT
Start: 2020-12-14 | End: 2020-12-14 | Stop reason: HOSPADM

## 2020-12-14 RX ORDER — BUPIVACAINE HYDROCHLORIDE AND EPINEPHRINE 5; 5 MG/ML; UG/ML
INJECTION, SOLUTION PERINEURAL PRN
Status: DISCONTINUED | OUTPATIENT
Start: 2020-12-14 | End: 2020-12-14 | Stop reason: HOSPADM

## 2020-12-14 RX ORDER — ACETAMINOPHEN 325 MG/1
975 TABLET ORAL ONCE
Status: DISCONTINUED | OUTPATIENT
Start: 2020-12-14 | End: 2020-12-14 | Stop reason: HOSPADM

## 2020-12-14 RX ORDER — ONDANSETRON 2 MG/ML
INJECTION INTRAMUSCULAR; INTRAVENOUS PRN
Status: DISCONTINUED | OUTPATIENT
Start: 2020-12-14 | End: 2020-12-14

## 2020-12-14 RX ORDER — GLYCOPYRROLATE 0.2 MG/ML
INJECTION, SOLUTION INTRAMUSCULAR; INTRAVENOUS PRN
Status: DISCONTINUED | OUTPATIENT
Start: 2020-12-14 | End: 2020-12-14

## 2020-12-14 RX ORDER — ONDANSETRON 2 MG/ML
4 INJECTION INTRAMUSCULAR; INTRAVENOUS EVERY 30 MIN PRN
Status: DISCONTINUED | OUTPATIENT
Start: 2020-12-14 | End: 2020-12-14 | Stop reason: HOSPADM

## 2020-12-14 RX ORDER — BACITRACIN ZINC 500 [USP'U]/G
OINTMENT TOPICAL PRN
Status: DISCONTINUED | OUTPATIENT
Start: 2020-12-14 | End: 2020-12-14 | Stop reason: HOSPADM

## 2020-12-14 RX ORDER — NEOSTIGMINE METHYLSULFATE 1 MG/ML
VIAL (ML) INJECTION PRN
Status: DISCONTINUED | OUTPATIENT
Start: 2020-12-14 | End: 2020-12-14

## 2020-12-14 RX ORDER — KETAMINE HYDROCHLORIDE 10 MG/ML
INJECTION INTRAMUSCULAR; INTRAVENOUS PRN
Status: DISCONTINUED | OUTPATIENT
Start: 2020-12-14 | End: 2020-12-14

## 2020-12-14 RX ORDER — ONDANSETRON 4 MG/1
4 TABLET, ORALLY DISINTEGRATING ORAL EVERY 30 MIN PRN
Status: DISCONTINUED | OUTPATIENT
Start: 2020-12-14 | End: 2020-12-14 | Stop reason: HOSPADM

## 2020-12-14 RX ORDER — DEXAMETHASONE SODIUM PHOSPHATE 4 MG/ML
INJECTION, SOLUTION INTRA-ARTICULAR; INTRALESIONAL; INTRAMUSCULAR; INTRAVENOUS; SOFT TISSUE PRN
Status: DISCONTINUED | OUTPATIENT
Start: 2020-12-14 | End: 2020-12-14

## 2020-12-14 RX ORDER — CHLORHEXIDINE GLUCONATE ORAL RINSE 1.2 MG/ML
15 SOLUTION DENTAL 2 TIMES DAILY
Qty: 118 ML | Refills: 0 | Status: SHIPPED | OUTPATIENT
Start: 2020-12-14 | End: 2021-01-29

## 2020-12-14 RX ADMIN — DEXAMETHASONE SODIUM PHOSPHATE 6 MG: 4 INJECTION, SOLUTION INTRAMUSCULAR; INTRAVENOUS at 07:41

## 2020-12-14 RX ADMIN — Medication 10 MG: at 11:08

## 2020-12-14 RX ADMIN — ROCURONIUM BROMIDE 20 MG: 10 INJECTION INTRAVENOUS at 09:49

## 2020-12-14 RX ADMIN — MIDAZOLAM 2 MG: 1 INJECTION INTRAMUSCULAR; INTRAVENOUS at 07:31

## 2020-12-14 RX ADMIN — FENTANYL CITRATE 30 MCG: 50 INJECTION, SOLUTION INTRAMUSCULAR; INTRAVENOUS at 09:18

## 2020-12-14 RX ADMIN — Medication 10 MG: at 12:23

## 2020-12-14 RX ADMIN — FENTANYL CITRATE 25 MCG: 50 INJECTION INTRAMUSCULAR; INTRAVENOUS at 13:34

## 2020-12-14 RX ADMIN — SODIUM CHLORIDE: 9 INJECTION, SOLUTION INTRAVENOUS at 07:38

## 2020-12-14 RX ADMIN — AMPICILLIN SODIUM 2 G: 2 INJECTION, POWDER, FOR SOLUTION INTRAMUSCULAR; INTRAVENOUS at 07:59

## 2020-12-14 RX ADMIN — ROCURONIUM BROMIDE 20 MG: 10 INJECTION INTRAVENOUS at 08:35

## 2020-12-14 RX ADMIN — FENTANYL CITRATE 20 MCG: 50 INJECTION, SOLUTION INTRAMUSCULAR; INTRAVENOUS at 08:44

## 2020-12-14 RX ADMIN — Medication 10 MG: at 10:36

## 2020-12-14 RX ADMIN — GLYCOPYRROLATE 0.8 MG: 0.2 INJECTION, SOLUTION INTRAMUSCULAR; INTRAVENOUS at 12:30

## 2020-12-14 RX ADMIN — FENTANYL CITRATE 25 MCG: 50 INJECTION INTRAMUSCULAR; INTRAVENOUS at 13:12

## 2020-12-14 RX ADMIN — FENTANYL CITRATE 25 MCG: 50 INJECTION INTRAMUSCULAR; INTRAVENOUS at 13:44

## 2020-12-14 RX ADMIN — PROPOFOL 30 MG: 10 INJECTION, EMULSION INTRAVENOUS at 08:24

## 2020-12-14 RX ADMIN — ONDANSETRON 4 MG: 2 INJECTION INTRAMUSCULAR; INTRAVENOUS at 12:25

## 2020-12-14 RX ADMIN — PHENYLEPHRINE HYDROCHLORIDE 100 MCG: 10 INJECTION INTRAVENOUS at 09:34

## 2020-12-14 RX ADMIN — NEOSTIGMINE METHYLSULFATE 4 MG: 1 INJECTION, SOLUTION INTRAVENOUS at 12:30

## 2020-12-14 RX ADMIN — PHENYLEPHRINE HYDROCHLORIDE 100 MCG: 10 INJECTION INTRAVENOUS at 08:02

## 2020-12-14 RX ADMIN — FENTANYL CITRATE 25 MCG: 50 INJECTION INTRAMUSCULAR; INTRAVENOUS at 13:03

## 2020-12-14 RX ADMIN — Medication 10 MG: at 09:48

## 2020-12-14 RX ADMIN — ROCURONIUM BROMIDE 50 MG: 10 INJECTION INTRAVENOUS at 07:41

## 2020-12-14 RX ADMIN — PHENYLEPHRINE HYDROCHLORIDE 150 MCG: 10 INJECTION INTRAVENOUS at 07:50

## 2020-12-14 RX ADMIN — Medication 10 MG: at 10:47

## 2020-12-14 RX ADMIN — AMPICILLIN SODIUM 1 G: 2 INJECTION, POWDER, FOR SOLUTION INTRAMUSCULAR; INTRAVENOUS at 09:59

## 2020-12-14 RX ADMIN — AMPICILLIN SODIUM 1 G: 2 INJECTION, POWDER, FOR SOLUTION INTRAMUSCULAR; INTRAVENOUS at 11:56

## 2020-12-14 RX ADMIN — PHENYLEPHRINE HYDROCHLORIDE 50 MCG: 10 INJECTION INTRAVENOUS at 07:47

## 2020-12-14 RX ADMIN — PROPOFOL 150 MG: 10 INJECTION, EMULSION INTRAVENOUS at 07:41

## 2020-12-14 RX ADMIN — LIDOCAINE HYDROCHLORIDE 60 MG: 20 INJECTION, SOLUTION INFILTRATION; PERINEURAL at 07:41

## 2020-12-14 RX ADMIN — PROPOFOL 20 MG: 10 INJECTION, EMULSION INTRAVENOUS at 10:09

## 2020-12-14 RX ADMIN — PHENYLEPHRINE HYDROCHLORIDE 100 MCG: 10 INJECTION INTRAVENOUS at 08:09

## 2020-12-14 RX ADMIN — PHENYLEPHRINE HYDROCHLORIDE 100 MCG: 10 INJECTION INTRAVENOUS at 08:33

## 2020-12-14 RX ADMIN — OXYCODONE HYDROCHLORIDE 5 MG: 5 SOLUTION ORAL at 13:48

## 2020-12-14 RX ADMIN — Medication 30 MG: at 07:41

## 2020-12-14 RX ADMIN — CEFTRIAXONE 1 G: 1 INJECTION, POWDER, FOR SOLUTION INTRAMUSCULAR; INTRAVENOUS at 07:59

## 2020-12-14 RX ADMIN — FENTANYL CITRATE 50 MCG: 50 INJECTION, SOLUTION INTRAMUSCULAR; INTRAVENOUS at 07:41

## 2020-12-14 ASSESSMENT — MIFFLIN-ST. JEOR: SCORE: 1696

## 2020-12-14 ASSESSMENT — LIFESTYLE VARIABLES: TOBACCO_USE: 1

## 2020-12-14 NOTE — ANESTHESIA POSTPROCEDURE EVALUATION
"Anesthesia POST Procedure Evaluation    Patient: Chip Fowler   MRN:     [id number] Gender:   male   Age:    35 year old :      1985        Preoperative Diagnosis: Post-traumatic male urethral stricture [N35.014]  Urethral diverticulum [N36.1]   Procedure(s):  URETHROPLASTY, USING BUCCAL MUCOSA GRAFT  EXCISION OF URETHRAL DIVERTICULUM X2  CYSTOSCOPY, WITH SUPRAPUBIC CATHETER INSERTION   Postop Comments: No value filed.     Anesthesia Type: General       Disposition: Outpatient   Postop Pain Control: Uneventful            Sign Out: Well controlled pain   PONV: No   Neuro/Psych: Uneventful            Sign Out: Acceptable/Baseline neuro status   Airway/Respiratory: Uneventful            Sign Out: Acceptable/Baseline resp. status   CV/Hemodynamics: Uneventful            Sign Out: Acceptable CV status   Other NRE: NONE   DID A NON-ROUTINE EVENT OCCUR? No    Event details/Postop Comments:  Patient comfortable, tolerating PO. Feels llike cheek \"has been in a fight\" Denies questions re anesthesia         Last Anesthesia Record Vitals:  CRNA VITALS  2020 1210 - 2020 1310      2020             NIBP:  123/82    Pulse:  83    Ht Rate:  83    Temp:  37.1  C (98.8  F)    SpO2:  100 %          Last PACU Vitals:  Vitals Value Taken Time   /81 20 1400   Temp 37  C (98.6  F) 20 1400   Pulse 75 20 1350   Resp 0 20 1350   SpO2 100 % 20 1400   Temp src     NIBP 123/82 20 1253   Pulse 83 20 1253   SpO2 100 % 20 1253   Resp     Temp 37.1  C (98.8  F) 20 1253   Ht Rate 83 20 1253   Temp 2     Vitals shown include unvalidated device data.      Electronically Signed By: Roma Huston MD, 2020, 2:34 PM  "

## 2020-12-14 NOTE — ANESTHESIA CARE TRANSFER NOTE
Patient: Chip Fowler    Procedure(s):  URETHROPLASTY, USING BUCCAL MUCOSA GRAFT  EXCISION OF URETHRAL DIVERTICULUM X2  CYSTOSCOPY, WITH SUPRAPUBIC CATHETER INSERTION    Diagnosis: Post-traumatic male urethral stricture [N35.014]  Urethral diverticulum [N36.1]  Diagnosis Additional Information: No value filed.    Anesthesia Type:   General     Note:  Airway :Face Mask  Patient transferred to:PACU  Handoff Report: Identifed the Patient, Identified the Reponsible Provider, Reviewed the pertinent medical history, Discussed the surgical course, Reviewed Intra-OP anesthesia mangement and issues during anesthesia, Set expectations for post-procedure period and Allowed opportunity for questions and acknowledgement of understanding      Vitals: (Last set prior to Anesthesia Care Transfer)    CRNA VITALS  12/14/2020 1210 - 12/14/2020 1255      12/14/2020             NIBP:  123/82    Pulse:  83    Ht Rate:  83    Temp:  37.1  C (98.8  F)    SpO2:  100 %                Electronically Signed By: JESSE Rivero CRNA  December 14, 2020  12:55 PM

## 2020-12-14 NOTE — DISCHARGE INSTRUCTIONS
Same-Day Surgery   Adult Discharge Orders & Instructions     For 24 hours after surgery:  1. Get plenty of rest.  A responsible adult must stay with you for at least 24 hours after you leave the hospital.   2. Pain medication can slow your reflexes. Do not drive or use heavy equipment.  If you have weakness or tingling, don't drive or use heavy equipment until this feeling goes away.  3. Mixing alcohol and pain medication can cause dizziness and slow your breathing. It can even be fatal. Do not drink alcohol while taking pain medication.  4. Avoid strenuous or risky activities.  Ask for help when climbing stairs.   5. You may feel lightheaded.  If so, sit for a few minutes before standing.  Have someone help you get up.   6. If you have nausea (feel sick to your stomach), drink only clear liquids such as apple juice, ginger ale, broth or 7-Up.  Rest may also help.  Be sure to drink enough fluids.  Move to a regular diet as you feel able. Take pain medications with a small amount of solid food, such as toast or crackers, to avoid nausea.   7. A slight fever is normal. Call the doctor if your fever is over 100 F (37.7 C) (taken under the tongue) or lasts longer than 24 hours.  8. You may have a dry mouth, muscle aches, trouble sleeping or a sore throat.  These symptoms should go away after 24 hours.  9. Do not make important or legal decisions.   Pain Management:      1. Take pain medication (if prescribed) for pain as directed by your physician.        2. WARNING: If the pain medication you have been prescribed contains Tylenol  (acetaminophen), DO NOT take additional doses of Tylenol (acetaminophen).     Call your doctor for any of the followin.  Signs of infection (fever, growing tenderness at the surgery site, severe pain, a large amount of drainage or bleeding, foul-smelling drainage, redness, swelling).    2.  It has been over 8 to 10 hours since surgery and you are still not able to urinate (pee).    3.   Headache for over 24 hours.    4.  Numbness, tingling or weakness the day after surgery (if you had spinal anesthesia).  To contact a doctor, call ____[phone] [CLINIC]__ or:      [phone] and ask for the Resident On Call for:          ___Dr. Mejia_____ (answered 24 hours a day)      Emergency Department:  [facility]: [phone]  [facility]: [phone]               Rev. 10/2014

## 2020-12-14 NOTE — BRIEF OP NOTE
[facility]     Brief Operative Note    Pre-operative diagnosis: Post-traumatic male urethral stricture [N35.014]  Urethral diverticulum [N36.1]  Post-operative diagnosis Same as pre-operative diagnosis    Procedure: Procedure(s):  URETHROPLASTY, USING BUCCAL MUCOSA GRAFT  EXCISION OF URETHRAL DIVERTICULUM X2  CYSTOSCOPY, WITH SUPRAPUBIC CATHETER INSERTION  Surgeon: Surgeon(s) and Role:     * Sam Mejia MD - Primary     * Katherine Waldrop MD - Resident - Assisting     * Woo Contreras MD - Fellow - Assisting  Anesthesia: General   Estimated blood loss: Less than 100 ml  Drains:  16 Swedish Taylor per urethra, 16 Swedish suprapubic tube  Specimens:   ID Type Source Tests Collected by Time Destination   A : urethral diverticulum Tissue Urethra SURGICAL PATHOLOGY EXAM Sam Mejia MD 12/14/2020  8:43 AM      Findings:   None.  Complications: None.  Implants: * No implants in log *      - 6 x 2 graft

## 2020-12-14 NOTE — ANESTHESIA PREPROCEDURE EVALUATION
Anesthesia Pre-Procedure Evaluation    Patient: Chip Fowler   MRN:     [id number] Gender:   male   Age:    35 year old :      1985        Preoperative Diagnosis: Post-traumatic male urethral stricture [N35.014]  Urethral diverticulum [N36.1]   Procedure(s):  URETHROPLASTY, USING BUCCAL MUCOSA GRAFT  CYSTOSCOPY, WITH OPEN EXCISION OF URETHRAL DIVERTICULUM  CYSTOSCOPY, WITH SUPRAPUBIC CATHETER INSERTION     LABS:  CBC:   Lab Results   Component Value Date    WBC 9.3 2020    WBC 7.2 2020    HGB 13.0 (L) 2020    HGB 12.7 (L) 2020    HCT 38.4 (L) 2020    HCT 38.1 (L) 2020     2020     2020     BMP:   Lab Results   Component Value Date     2020     2020    POTASSIUM 4.2 2020    POTASSIUM 4.5 2020    CHLORIDE 110 (H) 2020    CHLORIDE 108 2020    CO2 20 2020    CO2 24 2020    BUN 66 (H) 2020    BUN 72 (H) 2020    CR 6.20 (H) 2020    CR 7.16 (H) 2020    GLC 85 2020    GLC 80 2020     COAGS:   Lab Results   Component Value Date    PTT 29 2020    INR 1.08 2020     POC:   Lab Results   Component Value Date    BGM 74 2020     OTHER:   Lab Results   Component Value Date    VISHNU 8.1 (L) 2020    PHOS 4.4 2020    ALBUMIN 3.5 2020    PROTTOTAL 7.3 2020    ALT 46 2020    AST 18 2020    ALKPHOS 76 2020    BILITOTAL 0.3 2020        Preop Vitals    BP Readings from Last 3 Encounters:   20 (!) 134/92   20 110/62   20 110/62    Pulse Readings from Last 3 Encounters:   20 63   20 74   20 74      Resp Readings from Last 3 Encounters:   20 16   20 20   20 16    SpO2 Readings from Last 3 Encounters:   20 100%   20 99%   20 99%      Temp Readings from Last 1 Encounters:   20 36.6  C (97.9  F) (Oral)    Ht Readings from Last 1 Encounters:    12/14/20 1.829 m (6')      Wt Readings from Last 1 Encounters:   12/14/20 72.8 kg (160 lb 7.9 oz)    Estimated body mass index is 21.77 kg/m  as calculated from the following:    Height as of this encounter: 1.829 m (6').    Weight as of this encounter: 72.8 kg (160 lb 7.9 oz).     LDA:  Peripheral IV 12/14/20 Right Lower forearm (Active)   Site Assessment WDL 12/14/20 0616   Line Status Saline locked 12/14/20 0616   Dressing Intervention New dressing  12/14/20 0616   Phlebitis Scale 0-->no symptoms 12/14/20 0616   Infiltration Scale 0 12/14/20 0616   Number of days: 0       ETT Cuffed Single 8 mm (Active)   Number of days: 0       Urethral Catheter Latex;Double-lumen 18 fr (Active)   Urine Output 100 mL 08/21/20 1600   Number of days: 115       Urethral Catheter Non-latex;Straight-tip 16 fr (Active)   Number of days: 0        Past Medical History:   Diagnosis Date     Bladder stones      Cardiomyopathy (H)      ESRD (end stage renal disease) on dialysis (H)      Hypertension      Migraines      Polysubstance abuse (H)      Solitary kidney, congenital      Urethral stone      Urethral stricture       Past Surgical History:   Procedure Laterality Date     BIOPSY  02/2020    renal, [place]     COMBINED CYSTOSCOPY, LASER HOLMIUM LITHOTRIPSY URETER(S)       CYSTOSCOPY       HERNIA REPAIR      infant     INSERT CATHETER PERITONEAL DIALYSIS       LASER HOLMIUM LITHOTRIPSY URETER(S), INSERT STENT, COMBINED N/A 8/21/2020    Procedure: CYSTOSCOPY, bladder and urethral stone extraction, removal of foreign body, urethral dilation, urethrotomy, laser on standby;  Surgeon: Sam Mejia MD;  Location: [facility] OR     urethral dilation        No Known Allergies     Anesthesia Evaluation     . Pt has had prior anesthetic. Type: General and MAC    No history of anesthetic complications          ROS/MED HX    ENT/Pulmonary:     (+)tobacco use, Current use 0.4 packs/day  , . .   (-) recent URI   Neurologic:  - neg neurologic ROS      Cardiovascular: Comment: Cardiomyopathy ? 2/2 meth, improved     (+) hypertension----. : . CHF Last EF: 45% date: 6/2020 . . :. . Previous cardiac testing Echodate:6/2020results:CONCLUSIONS  Mild diffuse hypokinesis is present.  Left ventricular ejection fraction is visually estimated at 45%.  No significant valvular abnormalities were identified.  Trace (physiologic) tricuspid regurgitation.  Pulmonary artery pressures cannot be estimated due to absence of  adequate TR jet.  The inferior vena cava is normal suggesting normal RA pressure.    Compared to the prior study on 2/25/2020, EF has significantly  improved.date: results: date: results: date: results:         (-) JUNIOR   METS/Exercise Tolerance:  >4 METS   Hematologic:  - neg hematologic  ROS      (-) history of blood clots and History of Transfusion   Musculoskeletal:  - neg musculoskeletal ROS       GI/Hepatic: Comment: Eosinophilia, sonetimes difficulty swallowing meat        Renal/Genitourinary:     (+) chronic renal disease, type: ESRD, Pt requires dialysis, type: Peritoneal dialysis, Pt has no history of transplant,       Endo:  - neg endo ROS       Psychiatric:     (+) psychiatric history other (comment) (meth dependence in remission since 2/2020)      Infectious Disease:  - neg infectious disease ROS      (-) Recent Fever   Malignancy:      - no malignancy   Other:    (+) no H/O Chronic Pain,                       PHYSICAL EXAM:   Mental Status/Neuro: A/A/O   Airway: Facies: Feasible  Mallampati: I  Mouth/Opening: Full  TM distance: > 6 cm  Neck ROM: Full   Respiratory: Auscultation: CTAB     Resp. Rate: Normal     Resp. Effort: Normal      CV: Rhythm: Regular  Rate: Age appropriate  Heart: Normal Sounds  Edema: None   Comments:      Dental: Normal Dentition                Assessment:   ASA SCORE: 3    H&P: History and physical reviewed and following examination; no interval change.     Smoking Status:  Active Smoker       - patient did not smoke on  day of surgery       - instructed to abstain from smoking on day of procedure   NPO Status: NPO Appropriate     Plan:   Anes. Type:  General   Pre-Medication: None   Induction:  IV (Standard)   Airway: ETT; Oral   Access/Monitoring: PIV   Maintenance: Balanced     Postop Plan:   Postop Pain: Opioids  Postop Sedation/Airway: Not planned  Disposition: Inpatient/Admit     PONV Management:   Adult Risk Factors:, Postop Opioids   Prevention: Ondansetron, Dexamethasone     CONSENT: Direct conversation   Plan and risks discussed with: Patient   Blood Products: Consent Deferred (Minimal Blood Loss)       Comments for Plan/Consent:  Discussed risks of anesthesia including nausea, vomiting, sore throat, dental damage, cardiopulmonary complications, neurologic complications, and serious complications.    Close fluid control                 Roma Huston MD

## 2020-12-14 NOTE — ANESTHESIA PROCEDURE NOTES
Airway   Date/Time: 12/14/2020 7:43 AM   Patient location during procedure: OR    Staff -   CRNA: Edwin Junior APRN CRNA  Performed By: CRNA    Consent for Airway   Urgency: elective    Indications and Patient Condition  Indications for airway management: ra-procedural  Induction type:intravenousMask difficulty assessment: 1 - vent by mask    Final Airway Details  Final airway type: endotracheal airway  Successful airway:ETT - single  Endotracheal Airway Details   ETT size (mm): 8.0  Cuffed: yes  Successful intubation technique: direct laryngoscopy  Grade View of Cords: 1  Adjucts: stylet  Measured from: lips  Secured at (cm): 23  Secured with: silk tape  Bite block used: None    Post intubation assessment   Placement verified by: capnometry, equal breath sounds and chest rise   Number of attempts at approach: 1  Number of other approaches attempted: 0  Secured with:silk tape  Ease of procedure: easy  Dentition: Intact

## 2020-12-15 ENCOUNTER — PATIENT OUTREACH (OUTPATIENT)
Dept: ONCOLOGY | Facility: CLINIC | Age: 35
End: 2020-12-15

## 2020-12-15 NOTE — OP NOTE
December 14, 2020    Operative Report    PREOPERATIVE DIAGNOSIS:   1. Penile urethral stricture (6 cm)   2. Urethral diverticulum x 2    POSTOPERATIVE DIAGNOSIS:   1. Penile urethral stricture (6 cm)   2. Urethral diverticulum x 2    PROCEDURES:   1. Complex single stage anterior urethroplasty utilizing a dorsal onlay of buccal mucosa graft ( 6 cm x 2 cm).   2. [place] of buccal mucosa graft from the left oral cavity ( 2 cm x 6 cm).   3. Preparation of wound bed for grafting   4. Excision of urethral diverticula (x2)  5. Cystoscopy with Suprapubic tube placement    SURGEON: Sam Mejia MD  ASSISTANT: Katherine Waldrop MD; Randolph Contreras MD  SPECIMEN: Excised diverticulae  TUBES: 16 Fr urethral catheter, 16 Fr suprapubic catheter  EBL: 75ml    INDICATIONS: Mr. Chip Fowler is a 35 year old gentleman with a 6 cm proximal penile urethral stricture refractory to minimally-invasive management also with ESRD on hemodialysis seeking urethral reconstruction for protection of future kidney transplant. He also has two urethral diverticula on urethrogram. These were caused by urethral sounding and foreign body insertion during drug abuse. However, now he is completely off drug abuse. The risks, benefits and alternatives of the multiple treatment options were described and the patient wished to proceed with urethroplasty. He understood the risks to include but not be limited to bleeding, infection, penile pain or numbness, scrotal pain or numbness, change in erectile or ejaculatory function, lower extremity neuropathy, deep venous thrombosis. He understood that perineal urethrostomy would be a more definitive surgery and single stage urethroplasty has higher rates of complication and failure. He wished to proceed.     DESCRIPTION OF PROCEDURE:   After informed consent was obtained and preoperative antibiotics were given, the patient was taken to the operating room and placed supine on the operating table. General  anesthesia was induced. He was intubated.    The patient was placed in supine position. The groin, penis was shaved, prepped and draped in the usual sterile fashion. The mouth was prepped and draped in the usual sterile fashion.   The paired urethral diverticulae were palpated at the penoscrotal junction. A transverse high scrotal incision was made.   A red rubber catheter was placed per urethral meatus but would not progress beyond 1.5 cm therefore a 5 Dutch catheter was placed per urethra to help guide dissection. There was significant penile scarring and planes were difficult to initially find however with careful dissection we were able to dissect the urethra off the corporal bodies from the penoscrotal junction to the location of normal caliber urethra distally. We were also able to dissect out and excise the two diverticulae. The larger, more distal diverticulum was excised and yielded a 1.5 cm urethrotomy at the proximal penile to distal bulbar urethra. Excess diverticulum was excised to allow later closure. The more proximal diverticulum was excised in entirety. Small stones were present within. The diverticulum was sent for pathology. The neck was closed in two layers with 5-0 PDS.    We placed stay sutures on the area of scarred penile urethra dorsally in order to assist rotating this anteriorly then opened longitudinally a 6 cm portion of scarred urerthra dorsally.   The urethra calibrated proximally and distally 24 Fr. Upon visual inspection the urethral stricture appeared to be 6 cm long and the dorsal plate was as narrow as 1 cm. We had extended our urethrotomy into normal urethra for a short distance proximally and distally.  A flexible cystoscope was advanced through the proximal urethral opening. Additional urethral stricture was none. The voluntary sphincter was intact. The prostate was small . Bladder stones were absent. Trabeculations were present. Tumors were absent.   We took this opportunity  to place our suprapubic tube, filling the bladder, finding our trajectory with a spinal needle, then creating our cystotomy with a 16 Czech Tereso trocar. A 16 Fr catheter was advanced into the bladder, 10 ml in the balloon, and the trocar  Removed.   A 2 cm x 6 cm buccal graft was then  harvested from the left oral cavity in the standard fashion. Three holding sutures of 2-0 silk were placed in the vermillion border of the lip. The Steinhauser buccal mucosa retractor was put in place. The graft was marked out and hydrodissection was achieved with 0.5% Marcaine with 1:200,000 epinephrine. The graft was sharply harvested with a #15-blade scalpel taking care to avoid Carito's duct and leave the buccinator muscle down with the patient. The graft was defatted and tapered on the back table using a silicone block. The buccal mucosa was reapproximated in the mouth in a Z-plasty fashion using a running 4-0 chromic suture.   The corporal bodies (wound bed) were prepared for grafting by ensuring they were free of overlying tissue and controlling all bleeding points with bipolar cautery.  The graft was then brought into the field and sewn to these previously placed sutures. The graft was fixed in several places to the coporal bodies with mattress sutures of 4-0 Vicryl. 4-0 Vicryl was also used to fixed the lateral edges of the graft to the lateral edges of the corporal bodies. 5-0 PDS interrupted sutures were used to anastomose the proximal and distal apices of the graft to the proximal and distal apices of the urethra. The deep (right) edge of the graft was sewn to the lateral edge of the urethrotomy on the first side with a running 5-0 PDS. The urethra was then rotated back to anatomical position (0 degrees) The lateral edge of the second side of the graft was then sewn to the second side of the urethrotomy with another 5-0 PDS suture. This was all done over 16-Czech silicone catheter. A superficial layer was interposed  "to completely cover the underlying suture line with running 3-0 Vicryl. Dartos fascia was closed with a running 3-0 Vicryl suture. Skin was closed with rnning 4-0 Monocryl suture.   Bacitracin, soraida and coban were appled to the wound.    Katherine Waldrop MD  Urology Resident    6' 0\"  160 lbs 7.92 oz  Body mass index is 21.77 kg/m .    As attending surgeon, Sam DUFFY MD, was scrubbed and present for the entire procedure.                "

## 2020-12-15 NOTE — PROGRESS NOTES
Call placed to patient to communicate that Dr. Betancourt has communicated with Dr. Nath and his allergy testing did not show a cause of an elevated eosinophil count. They both think that elevated eosinophil count has to do with his body's reaction to his dialysis catheter, but there is no way to prove that unless dialysis catheter is removed and that is not advisable at this time either. His kidney team would want it figured out before they clear him for transplant and make sure he does not have a bone marrow issue before he receives a renal transplant. Dr. Nath and Dr. Coronado agree that for completion purposes he should have a bone marrow biopsy to make sure there are no hematologic issues with overproduction of eosinophils in the bone marrow.  Patient verbalizes agreement to proceed with the bone marrow biopsy.

## 2020-12-17 LAB — COPATH REPORT: NORMAL

## 2020-12-23 LAB — COPATH REPORT: NORMAL

## 2020-12-28 ENCOUNTER — PRE VISIT (OUTPATIENT)
Dept: UROLOGY | Facility: CLINIC | Age: 35
End: 2020-12-28

## 2021-01-04 ENCOUNTER — HEALTH MAINTENANCE LETTER (OUTPATIENT)
Age: 36
End: 2021-01-04

## 2021-01-05 ENCOUNTER — ANCILLARY PROCEDURE (OUTPATIENT)
Dept: GENERAL RADIOLOGY | Facility: CLINIC | Age: 36
End: 2021-01-05
Attending: UROLOGY
Payer: MEDICARE

## 2021-01-05 DIAGNOSIS — N35.014 POST-TRAUMATIC MALE URETHRAL STRICTURE: ICD-10-CM

## 2021-01-05 LAB — RADIOLOGIST FLAGS: ABNORMAL

## 2021-01-05 PROCEDURE — 51600 INJECTION FOR BLADDER X-RAY: CPT | Mod: GC | Performed by: RADIOLOGY

## 2021-01-05 PROCEDURE — 74455 X-RAY URETHRA/BLADDER: CPT | Mod: GC | Performed by: RADIOLOGY

## 2021-01-06 ENCOUNTER — OFFICE VISIT (OUTPATIENT)
Dept: UROLOGY | Facility: CLINIC | Age: 36
End: 2021-01-06
Payer: MEDICARE

## 2021-01-06 VITALS — SYSTOLIC BLOOD PRESSURE: 110 MMHG | HEART RATE: 88 BPM | DIASTOLIC BLOOD PRESSURE: 61 MMHG

## 2021-01-06 DIAGNOSIS — N35.014 POST-TRAUMATIC MALE URETHRAL STRICTURE: ICD-10-CM

## 2021-01-06 DIAGNOSIS — K65.1 ABSCESS OF PERITONEUM (H): Primary | ICD-10-CM

## 2021-01-06 DIAGNOSIS — N49.2 SCROTAL ABSCESS: ICD-10-CM

## 2021-01-06 PROCEDURE — 99024 POSTOP FOLLOW-UP VISIT: CPT | Performed by: UROLOGY

## 2021-01-06 RX ORDER — CEPHALEXIN 500 MG/1
500 CAPSULE ORAL 2 TIMES DAILY
Qty: 42 CAPSULE | Refills: 0 | Status: SHIPPED | OUTPATIENT
Start: 2021-01-06 | End: 2021-01-27

## 2021-01-06 ASSESSMENT — PAIN SCALES - GENERAL: PAINLEVEL: SEVERE PAIN (6)

## 2021-01-06 NOTE — NURSING NOTE
Chief Complaint   Patient presents with     RECHECK     Post op - SPT removal       Blood pressure 110/61, pulse 88. There is no height or weight on file to calculate BMI.    Patient Active Problem List   Diagnosis     Unilateral congenital absence of kidney     ESRD (end stage renal disease) on dialysis (H)     Hypertension     Solitary kidney, congenital     Cardiomyopathy (H)     Bladder stones     Urethral stone     Urethral stricture     Polysubstance abuse (H)     Urethral calculus     Abnormal urinalysis     Acute renal failure superimposed on chronic kidney disease (H)     Acute retention of urine     Congestive heart failure of unknown etiology (H)     Methamphetamine abuse, episodic (H)     Migraine     Nephrolithiasis     Tobacco use     Post-traumatic male urethral stricture     Urethral diverticulum     Other eosinophilia     Allergic rhinitis, unspecified seasonality, unspecified trigger     Other dysphagia       No Known Allergies    Current Outpatient Medications   Medication Sig Dispense Refill     acetaminophen (TYLENOL) 325 MG tablet Take 2 tablets (650 mg) by mouth every 6 hours as needed for mild pain 90 tablet 0     B Complex-C-Folic Acid (DIALYVITE 800) 0.8 MG TABS Take 1 tablet by mouth every morning        carvedilol (COREG) 25 MG tablet Take 6.25 mg by mouth 2 times daily (with meals) 1/2 tab BID w/meals       chlorhexidine (PERIDEX) 0.12 % solution Swish and spit 15 mLs in mouth 2 times daily 118 mL 0     furosemide (LASIX) 40 MG tablet Take 40 mg by mouth every morning        lisinopril (ZESTRIL) 2.5 MG tablet Take 2.5 mg by mouth every evening        tolterodine ER (DETROL LA) 4 MG 24 hr capsule Take 1 capsule (4 mg) by mouth daily 30 capsule 0     VITAMIN D, CHOLECALCIFEROL, PO Take by mouth daily Takes M, W, F       ciprofloxacin (CIPRO) 500 MG tablet Take 1 tablet (500 mg) by mouth 2 times daily Morning of catheter removal (Patient not taking: Reported on 1/6/2021) 1 tablet 0      gentamicin (GARAMYCIN) 0.1 % external ointment every morning        oxyCODONE (ROXICODONE) 5 MG tablet Take 1 tablet (5 mg) by mouth every 6 hours as needed for pain (Patient not taking: Reported on 1/6/2021) 20 tablet 0     SENNA-docusate sodium (SENNA S) 8.6-50 MG tablet Take 1 tablet by mouth At Bedtime (Patient not taking: Reported on 1/6/2021) 14 tablet 0       Social History     Tobacco Use     Smoking status: Current Every Day Smoker     Packs/day: 0.40     Types: Cigarettes     Start date: 2002     Smokeless tobacco: Never Used     Tobacco comment: 4-8 cigs /day   Substance Use Topics     Alcohol use: Not Currently     Comment: quit alcohol 3-4 yrs ago     Drug use: Not Currently     Types: Methamphetamines       Armida Pa CMA  1/6/2021  8:38 AM

## 2021-01-06 NOTE — LETTER
"1/6/2021       RE: Chip Fowler  [address]  [address]  [facility] [address]     Dear Colleague,    Thank you for referring your patient, Chip Fowler, to the [facility] [place] at [facility]. Please see a copy of my visit note below.    S/p urethroplasty and diverticulectomy x 2 after significant trauma from urethral sounding 3 weeks ago.  VCUG done yesterday which shows patent buccal graft, but the large urethral diverticulum closure has a leak. It is draining out of the skin.  I reviewed images today.  When squeezing the penis, there is drainage from tip of penis and cutaneously. Otherwise there is no cellulitis and the incision is well healed.    A/P:  -Large leak and diverticulum. He has some fistulous drainage today.  -Discussed replacement of shaver but he wants to avoid.  -Repeat VCUG in 2 weeks  -Antibiotics - Keflex  -Discussed next steps in form of abscess drainage and/or urethroplasty and/or fistula repair.  Also discussed perineal urethrostomy, but wants to avoid that \"at all costs\".    Sam Mejia MD    "

## 2021-01-06 NOTE — PROGRESS NOTES
[facility] Adult Bone Marrow Biopsy Procedure Note  January 7, 2021  /71   Pulse 62   Temp 96.5  F (35.8  C) (Oral)   Resp 16   SpO2 100%   Results for orders placed or performed in visit on 01/07/21   *CBC with platelets differential     Status: Abnormal   Result Value Ref Range    WBC 11.2 (H) 4.0 - 11.0 10e9/L    RBC Count 3.72 (L) 4.4 - 5.9 10e12/L    Hemoglobin 11.7 (L) 13.3 - 17.7 g/dL    Hematocrit 35.0 (L) 40.0 - 53.0 %    MCV 94 78 - 100 fl    MCH 31.5 26.5 - 33.0 pg    MCHC 33.4 31.5 - 36.5 g/dL    RDW 13.7 10.0 - 15.0 %    Platelet Count 346 150 - 450 10e9/L    Diff Method Automated Method     % Neutrophils 57.3 %    % Lymphocytes 24.5 %    % Monocytes 6.5 %    % Eosinophils 10.1 %    % Basophils 1.2 %    % Immature Granulocytes 0.4 %    Absolute Neutrophil 6.4 1.6 - 8.3 10e9/L    Absolute Lymphocytes 2.7 0.8 - 5.3 10e9/L    Absolute Monocytes 0.7 0.0 - 1.3 10e9/L    Absolute Eosinophils 1.1 (H) 0.0 - 0.7 10e9/L    Absolute Basophils 0.1 0.0 - 0.2 10e9/L    Abs Immature Granulocytes 0.1 0 - 0.4 10e9/L     DIAGNOSIS: evaluate for primary eosinophilia    PROCEDURE: Unilateral Bone Marrow Biopsy and Unilateral Aspirate    Patient s identification was positively verified by verbal identification and invasive procedure safety checklist was completed. Informed consent was obtained. Following the administration of [id number] IV as pre-medication, patient was placed in the prone position and prepped and draped in a sterile manner. Approximately 10 cc of 1% Lidocaine was used over the right posterior iliac spine. Following this a 3 mm incision was made. Trephine bone marrow core(s) was (were) obtained from the [facility]. Bone marrow aspirates were obtained from the [facility]. Aspirates were sent for morphology, immunophenotyping, cytogenetics and molecular diagnostics. A total of approximately 20 ml of marrow was aspirated. Following this procedure a sterile dressing was applied to the bone marrow biopsy site(s). The  patient was placed in the supine position to maintain pressure on the biopsy site. Post-procedure wound care instructions were given.     Complications: Has suprapubic catheter so was propped over and right side used    Post-procedural pain assessment: 0 out of 10 on the numeric pain rating scale. Tolerated well.    Interventions: NO    Length of procedure:20 minutes or less      Procedure performed by: Elaine Moran Cnp

## 2021-01-06 NOTE — PATIENT INSTRUCTIONS
Repeat VCG in 2 weeks with follow up with Dr. Mejia the day after.    It was a pleasure meeting with you today.  Thank you for allowing me and my team the privilege of caring for you today.  YOU are the reason we are here, and I truly hope we provided you with the excellent service you deserve.  Please let us know if there is anything else we can do for you so that we can be sure you are leaving completely satisfied with your care experience.

## 2021-01-06 NOTE — PROGRESS NOTES
"S/p urethroplasty and diverticulectomy x 2 after significant trauma from urethral sounding 3 weeks ago.  VCUG done yesterday which shows patent buccal graft, but the large urethral diverticulum closure has a leak. It is draining out of the skin.  I reviewed images today.  When squeezing the penis, there is drainage from tip of penis and cutaneously. Otherwise there is no cellulitis and the incision is well healed.    A/P:  -Large leak and diverticulum. He has some fistulous drainage today.  -Discussed replacement of shaver but he wants to avoid.  -Repeat VCUG in 2 weeks  -Antibiotics - Keflex  -Discussed next steps in form of abscess drainage and/or urethroplasty and/or fistula repair.  Also discussed perineal urethrostomy, but wants to avoid that \"at all costs\".    Sam Mejia MD  "

## 2021-01-07 ENCOUNTER — ONCOLOGY VISIT (OUTPATIENT)
Dept: ONCOLOGY | Facility: CLINIC | Age: 36
End: 2021-01-07
Payer: MEDICARE

## 2021-01-07 ENCOUNTER — OFFICE VISIT (OUTPATIENT)
Dept: ONCOLOGY | Facility: CLINIC | Age: 36
End: 2021-01-07
Payer: MEDICARE

## 2021-01-07 VITALS
SYSTOLIC BLOOD PRESSURE: 113 MMHG | RESPIRATION RATE: 16 BRPM | OXYGEN SATURATION: 100 % | DIASTOLIC BLOOD PRESSURE: 71 MMHG | HEART RATE: 62 BPM | TEMPERATURE: 96.5 F

## 2021-01-07 DIAGNOSIS — D72.19 OTHER EOSINOPHILIA: Primary | ICD-10-CM

## 2021-01-07 DIAGNOSIS — D72.19 OTHER EOSINOPHILIA: ICD-10-CM

## 2021-01-07 DIAGNOSIS — D72.828 OTHER ELEVATED WHITE BLOOD CELL COUNT: ICD-10-CM

## 2021-01-07 LAB
BASOPHILS # BLD AUTO: 0.1 10E9/L (ref 0–0.2)
BASOPHILS NFR BLD AUTO: 1.2 %
COPATH REPORT: NORMAL
DIFFERENTIAL METHOD BLD: ABNORMAL
EOSINOPHIL # BLD AUTO: 1.1 10E9/L (ref 0–0.7)
EOSINOPHIL NFR BLD AUTO: 10.1 %
ERYTHROCYTE [DISTWIDTH] IN BLOOD BY AUTOMATED COUNT: 13.7 % (ref 10–15)
HCT VFR BLD AUTO: 35 % (ref 40–53)
HGB BLD-MCNC: 11.7 G/DL (ref 13.3–17.7)
IMM GRANULOCYTES # BLD: 0.1 10E9/L (ref 0–0.4)
IMM GRANULOCYTES NFR BLD: 0.4 %
LYMPHOCYTES # BLD AUTO: 2.7 10E9/L (ref 0.8–5.3)
LYMPHOCYTES NFR BLD AUTO: 24.5 %
MCH RBC QN AUTO: 31.5 PG (ref 26.5–33)
MCHC RBC AUTO-ENTMCNC: 33.4 G/DL (ref 31.5–36.5)
MCV RBC AUTO: 94 FL (ref 78–100)
MONOCYTES # BLD AUTO: 0.7 10E9/L (ref 0–1.3)
MONOCYTES NFR BLD AUTO: 6.5 %
NEUTROPHILS # BLD AUTO: 6.4 10E9/L (ref 1.6–8.3)
NEUTROPHILS NFR BLD AUTO: 57.3 %
PLATELET # BLD AUTO: 346 10E9/L (ref 150–450)
RBC # BLD AUTO: 3.72 10E12/L (ref 4.4–5.9)
WBC # BLD AUTO: 11.2 10E9/L (ref 4–11)

## 2021-01-07 PROCEDURE — 38222 DX BONE MARROW BX & ASPIR: CPT | Performed by: NURSE PRACTITIONER

## 2021-01-07 PROCEDURE — 88161 CYTOPATH SMEAR OTHER SOURCE: CPT | Mod: 59 | Performed by: PATHOLOGY

## 2021-01-07 PROCEDURE — 88305 TISSUE EXAM BY PATHOLOGIST: CPT | Performed by: PATHOLOGY

## 2021-01-07 PROCEDURE — 88189 FLOWCYTOMETRY/READ 16 & >: CPT | Performed by: PATHOLOGY

## 2021-01-07 PROCEDURE — 88342 IMHCHEM/IMCYTCHM 1ST ANTB: CPT | Mod: 59 | Performed by: PATHOLOGY

## 2021-01-07 PROCEDURE — 99N1137 PR STATISTIC FLOW >15 ABY TC 88189: Performed by: INTERNAL MEDICINE

## 2021-01-07 PROCEDURE — 85025 COMPLETE CBC W/AUTO DIFF WBC: CPT | Performed by: INTERNAL MEDICINE

## 2021-01-07 PROCEDURE — 88341 IMHCHEM/IMCYTCHM EA ADD ANTB: CPT | Performed by: PATHOLOGY

## 2021-01-07 PROCEDURE — 88313 SPECIAL STAINS GROUP 2: CPT | Performed by: PATHOLOGY

## 2021-01-07 PROCEDURE — 99N1022: Performed by: INTERNAL MEDICINE

## 2021-01-07 PROCEDURE — 99N1086 PR STATISTIC MORPHOLOGY W/INTERP HEMEPATH TC 85060: Performed by: INTERNAL MEDICINE

## 2021-01-07 PROCEDURE — 85097 BONE MARROW INTERPRETATION: CPT | Performed by: PATHOLOGY

## 2021-01-07 PROCEDURE — 88291 CYTO/MOLECULAR REPORT: CPT | Performed by: MEDICAL GENETICS

## 2021-01-07 PROCEDURE — 88311 DECALCIFY TISSUE: CPT | Performed by: PATHOLOGY

## 2021-01-07 PROCEDURE — 85060 BLOOD SMEAR INTERPRETATION: CPT | Performed by: PATHOLOGY

## 2021-01-07 PROCEDURE — 96374 THER/PROPH/DIAG INJ IV PUSH: CPT | Mod: 59

## 2021-01-07 PROCEDURE — 99N1126: Performed by: INTERNAL MEDICINE

## 2021-01-07 ASSESSMENT — PAIN SCALES - GENERAL
PAINLEVEL: SEVERE PAIN (6)
PAINLEVEL: MILD PAIN (2)

## 2021-01-07 NOTE — PROGRESS NOTES
"Patient presents for bone marrow biopsy.  Discussed procedure with patient.  Vitals obtained and stable.  Lab staff present during PIV start and lab drawn.  Elaine Moran CNP met with patient and consent was signed.  Pre-medications administered, patient positioned in prone position.  Patient tolerated procedure well.  Post observation x 30\", Juice and snack provided. PIV D/C'd.  Dressing assessed; no bleeding. Discharge instructions reviewed with patient and he expressed understanding.  Patient had transportation service drive him home.  Patient discharged at 0940. Sherie Heller RN  BSN [facility]      "

## 2021-01-07 NOTE — LETTER
1/7/2021         RE: Chip Fowler  [address]  [address]  [facility] [address]        Dear Colleague,    Thank you for referring your patient, Chip Fowler, to the [facility]. Please see a copy of my visit note below.    ONC Adult Bone Marrow Biopsy Procedure Note  January 7, 2021  /71   Pulse 62   Temp 96.5  F (35.8  C) (Oral)   Resp 16   SpO2 100%   Results for orders placed or performed in visit on 01/07/21   *CBC with platelets differential     Status: Abnormal   Result Value Ref Range    WBC 11.2 (H) 4.0 - 11.0 10e9/L    RBC Count 3.72 (L) 4.4 - 5.9 10e12/L    Hemoglobin 11.7 (L) 13.3 - 17.7 g/dL    Hematocrit 35.0 (L) 40.0 - 53.0 %    MCV 94 78 - 100 fl    MCH 31.5 26.5 - 33.0 pg    MCHC 33.4 31.5 - 36.5 g/dL    RDW 13.7 10.0 - 15.0 %    Platelet Count 346 150 - 450 10e9/L    Diff Method Automated Method     % Neutrophils 57.3 %    % Lymphocytes 24.5 %    % Monocytes 6.5 %    % Eosinophils 10.1 %    % Basophils 1.2 %    % Immature Granulocytes 0.4 %    Absolute Neutrophil 6.4 1.6 - 8.3 10e9/L    Absolute Lymphocytes 2.7 0.8 - 5.3 10e9/L    Absolute Monocytes 0.7 0.0 - 1.3 10e9/L    Absolute Eosinophils 1.1 (H) 0.0 - 0.7 10e9/L    Absolute Basophils 0.1 0.0 - 0.2 10e9/L    Abs Immature Granulocytes 0.1 0 - 0.4 10e9/L     DIAGNOSIS: evaluate for primary eosinophilia    PROCEDURE: Unilateral Bone Marrow Biopsy and Unilateral Aspirate    Patient s identification was positively verified by verbal identification and invasive procedure safety checklist was completed. Informed consent was obtained. Following the administration of Drylhufrj2gp IV as pre-medication, patient was placed in the prone position and prepped and draped in a sterile manner. Approximately 10 cc of 1% Lidocaine was used over the right posterior iliac spine. Following this a 3 mm incision was made. Trephine bone marrow core(s) was (were) obtained from the [facility]. Bone marrow aspirates were  obtained from the [facility]. Aspirates were sent for morphology, immunophenotyping, cytogenetics and molecular diagnostics. A total of approximately 20 ml of marrow was aspirated. Following this procedure a sterile dressing was applied to the bone marrow biopsy site(s). The patient was placed in the supine position to maintain pressure on the biopsy site. Post-procedure wound care instructions were given.     Complications: Has suprapubic catheter so was propped over and right side used    Post-procedural pain assessment: 0 out of 10 on the numeric pain rating scale. Tolerated well.    Interventions: NO    Length of procedure:20 minutes or less      Procedure performed by: Elaine Moran Cnp        Again, thank you for allowing me to participate in the care of your patient.        Sincerely,        Elaine Moran NP, APRN CNP

## 2021-01-11 LAB — COPATH REPORT: NORMAL

## 2021-01-13 ENCOUNTER — PRE VISIT (OUTPATIENT)
Dept: UROLOGY | Facility: CLINIC | Age: 36
End: 2021-01-13

## 2021-01-13 NOTE — TELEPHONE ENCOUNTER
Reason for visit: review VCUG     Relevant information: diverticulum    Records/imaging/labs/orders: records available    Pt called: no need for a call    At Rooming: standard

## 2021-01-15 ENCOUNTER — DOCUMENTATION ONLY (OUTPATIENT)
Dept: OTHER | Facility: CLINIC | Age: 36
End: 2021-01-15

## 2021-01-16 ENCOUNTER — HOSPITAL ENCOUNTER (EMERGENCY)
Facility: CLINIC | Age: 36
Discharge: HOME | End: 2021-01-16
Attending: EMERGENCY MEDICINE | Admitting: EMERGENCY MEDICINE
Payer: MEDICARE

## 2021-01-16 VITALS
SYSTOLIC BLOOD PRESSURE: 118 MMHG | HEART RATE: 76 BPM | TEMPERATURE: 97.8 F | RESPIRATION RATE: 18 BRPM | DIASTOLIC BLOOD PRESSURE: 83 MMHG | OXYGEN SATURATION: 98 %

## 2021-01-16 DIAGNOSIS — T83.010A SUPRAPUBIC CATHETER DYSFUNCTION, INITIAL ENCOUNTER (H): ICD-10-CM

## 2021-01-16 PROCEDURE — 51700 IRRIGATION OF BLADDER: CPT

## 2021-01-16 PROCEDURE — 99283 EMERGENCY DEPT VISIT LOW MDM: CPT | Mod: 25

## 2021-01-16 ASSESSMENT — ENCOUNTER SYMPTOMS
FEVER: 0
DIFFICULTY URINATING: 1
VOMITING: 0
BACK PAIN: 0

## 2021-01-16 NOTE — ED AVS SNAPSHOT
[facility]  [address]  [facility] [address]  Phone: [phone]  Fax: [phone]                                    Chip Fowler   MRN: [id number]    Department: [facility]   Date of Visit: 1/16/2021           After Visit Summary Signature Page    I have received my discharge instructions, and my questions have been answered. I have discussed any challenges I see with this plan with the nurse or doctor.    ..........................................................................................................................................  Patient/Patient Representative Signature      ..........................................................................................................................................  Patient Representative Print Name and Relationship to Patient    ..................................................               ................................................  Date                                   Time    ..........................................................................................................................................  Reviewed by Signature/Title    ...................................................              ..............................................  Date                                               Time          22EPIC Rev 08/18

## 2021-01-16 NOTE — ED NOTES
Flushed suprapubic cath with 200ml. 200ml output prior to hooking back up to leg bag. No clots noted. Light yellow urine emptied from leg bag prior to flushing.  Will monitor further output.

## 2021-01-16 NOTE — ED NOTES
Reviewed home bladder irrigation with patient.  Supplies given for home care. Patient was able to teach back.  Patient also viewed this nurse flushing/irrigating catheter while in ED. All questions answered prior to discharge.

## 2021-01-16 NOTE — ED TRIAGE NOTES
A&O x4, ABCs intact. Pt presents with concern for clogged suprapubic catheter. Pt reports having urethral surgery on 12/14/20.

## 2021-01-16 NOTE — ED PROVIDER NOTES
History     Chief Complaint:  Catheter Problem       HPI  Chip Fowler is a 35 year old male with a history of hypertension, cardiomyopathy, acute renal failure, urethral diverticulum, and urethral stricture who presents for evaluation of catheter problems.  The patient states that today he has been able to urinate for a couple of seconds, but then stops suddenly.  He feels as though there is something blocking his catheter.  He denies any fever, vomiting and back pain.        Of note, he states that he has had his catheter since 12/14/2020 but feels as though he is able to pass urine on his own now.  He was supposed to get it out at his last check up, but they decided to leave it in.  His next appointment is scheduled for early next week.      Allergies:  No Known Allergies    Medications:   Tylenol  Coreg  Keflex  Lasix  Zestril  Oxycodone  Senna-docusate  Detrol    Medical History:   Bladder stones  Cardiomyopathy  ESRD  Hypertension  Migraines  Polysubstance abuse  Solitary kidney, congenital  Urethral stone  Acute renal failure  CHF  Methamphetamine abuse  Nephrolithiasis   Post traumatic male urethral stricture  Urethral diverticulum  Eosinophilia  Allergic rhinitis  Dysphagia  Bilateral PE      Surgical History   Renal biopsy  Cystoscopy, lithotripsy combined  Cystoscopy with suprapubic catheter  Excision of urethral diverticulum x2  Hernia repair  Insert catheter peritoneal dialysis  Cystoscopy, bladder and urethral stone extraction  Urethral dilation  Urethroplasty with buccal graft    Family History:   Mother: Alzheimer disease    Social History:  Patient presents to ED alone.  Patient is a current smoker.  PCP: No Ref-Primary, Physician      Review of Systems   Constitutional: Negative for fever.   Gastrointestinal: Negative for vomiting.   Genitourinary: Positive for difficulty urinating.   Musculoskeletal: Negative for back pain.   All other systems reviewed and are negative.      Physical Exam      Patient Vitals for the past 24 hrs:   BP Temp Temp src Pulse Resp SpO2   01/16/21 1218 (!) 149/104 97.8  F (36.6  C) Temporal 86 18 100 %        Physical Exam     General: Patient is alert and interactive when I enter the room  Head:  The scalp, face, and head appear normal  Eyes:  Conjunctivae are normal  ENT:    The nose is normal    Pinnae are normal  Neck:  Trachea midline  CV:  Normal rate  Resp:  No respiratory distress   GI:  No tenderness to palpation. Bladder not palpable.   Musc:  Normal muscular tone  Skin:  No rash or lesions noted  Neuro: Speech is normal and fluent. Face is symmetric. Moving all extremities well.   Psych:  Awake. Alert.  Normal affect.  Appropriate interactions.        Emergency Department Course     Emergency Department Course:     Reviewed:  I reviewed the patient's nursing notes, vitals, past medical records, Care Everywhere.      Assessments:  1307 I preformed my initial assessment of the patient.    1345 Patient rechecked and updated.     1555 Patient rechecked and updated.      Consults:   1338 I spoke with Dr. Mejia of Urology from [facility] regarding patient's presentation, findings, and plan of care.     Disposition:  Discharged to home.       Impression & Plan     Medical Decision Making:  Pt presents with difficulty with his suprapubic catheter. He notes bladder spasms and urination through his penis. He presents to ED wanting his catheter out. Discussed with his urologist who recommends against this at this time. We flushed catheter which appears to be adequately draining. Pt educated on how to flush catheter if there's concern that it's plugged again. Plan for urology follow-up on Wednesday as scheduled. Pt will return to ED for problems in the meantime.       Diagnosis:     ICD-10-CM    1. Suprapubic catheter dysfunction, initial encounter (H)  T83.010A         Disposition:  Discharged to home.      Scribe Disclosure:  Yesica DUFFY, am serving as a scribe at 1:13 PM  on 1/16/2021 to document services personally performed by Rios Bianchi MD based on my observations and the provider's statements to me.     Michie SARAH BETH Bianchi, Rios Cam MD  01/16/21 9030

## 2021-01-19 ENCOUNTER — ANCILLARY PROCEDURE (OUTPATIENT)
Dept: GENERAL RADIOLOGY | Facility: CLINIC | Age: 36
End: 2021-01-19
Attending: UROLOGY
Payer: MEDICARE

## 2021-01-19 DIAGNOSIS — N35.014 POST-TRAUMATIC MALE URETHRAL STRICTURE: ICD-10-CM

## 2021-01-19 PROCEDURE — 51600 INJECTION FOR BLADDER X-RAY: CPT | Mod: GC | Performed by: RADIOLOGY

## 2021-01-19 PROCEDURE — 74455 X-RAY URETHRA/BLADDER: CPT | Mod: GC | Performed by: RADIOLOGY

## 2021-01-20 ENCOUNTER — OFFICE VISIT (OUTPATIENT)
Dept: UROLOGY | Facility: CLINIC | Age: 36
End: 2021-01-20
Payer: MEDICARE

## 2021-01-20 VITALS — SYSTOLIC BLOOD PRESSURE: 124 MMHG | HEART RATE: 58 BPM | DIASTOLIC BLOOD PRESSURE: 79 MMHG

## 2021-01-20 DIAGNOSIS — N35.912 STRICTURE OF BULBOUS URETHRA IN MALE, UNSPECIFIED STRICTURE TYPE: ICD-10-CM

## 2021-01-20 DIAGNOSIS — N49.2 SCROTAL ABSCESS: Primary | ICD-10-CM

## 2021-01-20 LAB — COPATH REPORT: NORMAL

## 2021-01-20 PROCEDURE — 99024 POSTOP FOLLOW-UP VISIT: CPT | Performed by: UROLOGY

## 2021-01-20 RX ORDER — CIPROFLOXACIN 500 MG/1
500 TABLET, FILM COATED ORAL ONCE
Status: COMPLETED | OUTPATIENT
Start: 2021-01-20 | End: 2021-01-20

## 2021-01-20 RX ADMIN — CIPROFLOXACIN 500 MG: 500 TABLET, FILM COATED ORAL at 09:30

## 2021-01-20 ASSESSMENT — PAIN SCALES - GENERAL: PAINLEVEL: MODERATE PAIN (5)

## 2021-01-20 NOTE — PROGRESS NOTES
S/p urethroplasty and diverticulectomy x 2 after significant trauma from urethral sounding 5 weeks ago.  Initial postop VCUG showed an impressive leak.  We left the SPT and repeated VCUG now - 2 weeks later. He has significant improvement on imaging though the image is not as clear.    Penile exam no longer demonstrates a fistula. There is some fullness on the penile shaft though.    A/P:  S/p urethroplasty for stricture and diverticulectomy complicated by postoperative leak and fistula.  Much of his issues have resolved with urethral rest and urinary diversion through SPT.  I suggest 2 more weeks of urethral rest then repeat imaging.  If stable or improving, I would perform voiding trial in clinic.  I removed and replaced the suprapubic tube today. 10cc removed from balloon. Site cleaned. New 16 Fr catheter was inserted and 10cc in balloon. Catheter flushed well.    Sam Mejia MD

## 2021-01-20 NOTE — PROGRESS NOTES
The following medication was given:     MEDICATION:  Ciprofloxacin   ROUTE: PO  SITE: Medication was given orally   DOSE: 500 mg  LOT #: [id number]  : [employer]   EXPIRATION DATE: 07/22  NDC#: 18997 070 11   Was there drug waste? No    Prior to administration, verified patient identity using patient's name and date of birth.  Due to administration, patient instructed to remain in clinic for 15 minutes  afterwards, and to report any adverse reaction to me immediately.    Drug Amount Wasted:  None.  Vial/Syringe: single dose    Armida Pa CMA  January 20, 2021  11:09 AM

## 2021-01-20 NOTE — PATIENT INSTRUCTIONS
Schedule repeat imaging in two weeks and follow up with Dr. Mejia.    It was a pleasure meeting with you today.  Thank you for allowing me and my team the privilege of caring for you today.  YOU are the reason we are here, and I truly hope we provided you with the excellent service you deserve.  Please let us know if there is anything else we can do for you so that we can be sure you are leaving completely satisfied with your care experience.        Armida Pa, CMA

## 2021-01-20 NOTE — LETTER
1/20/2021       RE: Chip Fowler  [address]  [address]  [facility] [address]     Dear Colleague,    Thank you for referring your patient, Chip Fowler, to the [facility] [place] [facility]. Please see a copy of my visit note below.    S/p urethroplasty and diverticulectomy x 2 after significant trauma from urethral sounding 5 weeks ago.  Initial postop VCUG showed an impressive leak.  We left the SPT and repeated VCUG now - 2 weeks later. He has significant improvement on imaging though the image is not as clear.    Penile exam no longer demonstrates a fistula. There is some fullness on the penile shaft though.    A/P:  S/p urethroplasty for stricture and diverticulectomy complicated by postoperative leak and fistula.  Much of his issues have resolved with urethral rest and urinary diversion through SPT.  I suggest 2 more weeks of urethral rest then repeat imaging.  If stable or improving, I would perform voiding trial in clinic.  I removed and replaced the suprapubic tube today. 10cc removed from balloon. Site cleaned. New 16 Fr catheter was inserted and 10cc in balloon. Catheter flushed well.    Sam Mejia MD      The following medication was given:     MEDICATION:  Ciprofloxacin   ROUTE: PO  SITE: Medication was given orally   DOSE: 500 mg  LOT #: [id number]  : [employer]   EXPIRATION DATE: 07/22  NDC#: 33611 070 11   Was there drug waste? No    Prior to administration, verified patient identity using patient's name and date of birth.  Due to administration, patient instructed to remain in clinic for 15 minutes  afterwards, and to report any adverse reaction to me immediately.    Drug Amount Wasted:  None.  Vial/Syringe: single dose    Armida Pa CMA  January 20, 2021  11:09 AM

## 2021-01-26 ENCOUNTER — PRE VISIT (OUTPATIENT)
Dept: UROLOGY | Facility: CLINIC | Age: 36
End: 2021-01-26

## 2021-01-26 LAB — COPATH REPORT: NORMAL

## 2021-01-27 LAB — COPATH REPORT: NORMAL

## 2021-01-27 NOTE — ANESTHESIA POSTPROCEDURE EVALUATION
Anesthesia POST Procedure Evaluation    Patient: Chip Fowler   MRN:     [id number] Gender:   male   Age:    35 year old :      1985        Preoperative Diagnosis: Urethral calculus [N21.1]   Procedure(s):  CYSTOSCOPY, bladder and urethral stone extraction, removal of foreign body, urethral dilation, urethrotomy, laser on standby   Postop Comments: No value filed.     Anesthesia Type: General       Disposition: Outpatient   Postop Pain Control: Uneventful            Sign Out: Well controlled pain   PONV: No   Neuro/Psych: Uneventful            Sign Out: Acceptable/Baseline neuro status   Airway/Respiratory: Uneventful            Sign Out: Acceptable/Baseline resp. status   CV/Hemodynamics: Uneventful            Sign Out: Acceptable CV status   Other NRE: NONE   DID A NON-ROUTINE EVENT OCCUR? No         Last Anesthesia Record Vitals:  CRNA VITALS  2020 1533 - 2020 1633      2020             Pulse:  68    SpO2:  98 %    Resp Rate (observed):  (!) 5    Resp Rate (set):  10          Last PACU Vitals:  Vitals Value Taken Time   BP 77/62 2020  4:06 PM   Temp 36.4  C (97.5  F) 2020  4:06 PM   Pulse 64 2020  4:09 PM   Resp 11 2020  4:09 PM   SpO2 98 % 2020  4:09 PM   Temp src     NIBP     Pulse     SpO2     Resp     Temp     Ht Rate     Temp 2     Vitals shown include unvalidated device data.      Electronically Signed By: Gaetano Acosta MD, MD, 2020, 11:25 AM   Never smoker

## 2021-01-29 ENCOUNTER — VIRTUAL VISIT (OUTPATIENT)
Dept: ONCOLOGY | Facility: CLINIC | Age: 36
End: 2021-01-29
Payer: MEDICARE

## 2021-01-29 VITALS — HEIGHT: 72 IN | WEIGHT: 154 LBS | BODY MASS INDEX: 20.86 KG/M2

## 2021-01-29 DIAGNOSIS — D72.19 OTHER EOSINOPHILIA: Primary | ICD-10-CM

## 2021-01-29 PROCEDURE — 99213 OFFICE O/P EST LOW 20 MIN: CPT | Mod: 95 | Performed by: INTERNAL MEDICINE

## 2021-01-29 ASSESSMENT — PAIN SCALES - GENERAL: PAINLEVEL: NO PAIN (0)

## 2021-01-29 ASSESSMENT — MIFFLIN-ST. JEOR: SCORE: 1666.54

## 2021-01-29 NOTE — LETTER
"    1/29/2021         RE: Chip Fowler  [address]  [address]  [facility] [address]        Dear Colleague,    Thank you for referring your patient, Chip Fowler, to the [facility]. Please see a copy of my visit note below.    Chip Fowler is a 35 year old male who is being evaluated via a billable video visit.      The patient has been notified of following:     \"This video visit will be conducted via a call between you and your physician/provider. We have found that certain health care needs can be provided without the need for an in-person physical exam.  This service lets us provide the care you need with a video conversation.  If a prescription is necessary we can send it directly to your pharmacy.  If lab work is needed we can place an order for that and you can then stop by our lab to have the test done at a later time.    Video visits are billed at different rates depending on your insurance coverage.  Please reach out to your insurance provider with any questions.    If during the course of the call the physician/provider feels a video visit is not appropriate, you will not be charged for this service.\"    Patient has given verbal consent for Video visit? yes    Video-Visit Details    Type of service:  Video Visit    Video visit duration: 15 min  Originating Location (pt. Location): home      Distant Location (provider location):  [facility]     Platform used for Video Visit:TRAVIS Betancourt MD, MD    Hematology follow-up visit:  Date on this visit: Jan 29, 2021    Diagnosis: Eosinophilia.    Nephrologist Les Guevara [place]   Transplant:Leeroy Reddy  Urology: Sam Baptiste  Allergist: Dr. Nath    History Of Present Illness:  Mr. Fowler is a 35 year old male  with HX of ESRD on PD since 03/2020, HTN, who presents for follow-up of eosinophilia.  He was noted to have a mildly elevated absolute " eosinophil count (1.3-1.7 range) dating back to August 2020.  Prior to that his absolute eosinophil count was normal.  Actually, his absolute eosinophil count was normal prior to initiation of dialysis.   He has also had mild normocytic anemia.  Otherwise platelet count, white count and absolute differential counts have been normal.  He was seen by infectious disease specialists for evaluation of eosinophilia and no infectious etiology was found except Salmonella typhi paratyphi in the stool.  He has had chronic diarrhea since he was started on peritoneal dialysis in March 2020.  He is being considered for kidney transplant.   He proceeded with further hematologic evaluation of his eosinophilia. Peripheral blood smear on November 17, 2020 showed slight normochromic normocytic anemia, slight eosinophilia.  Serum tryptase level was mildly elevated at 17.3.  Vitamin B12 level was normal.  He was seen by  Dr. Nath and had  essentially negative allergy workup except for positive allergy testing for cats only.  Dr. Cabrera and I felt that his eosinophilia is likely reactive secondary to peritoneal dialysis catheter, but bone marrow biopsy was recommended to rule out primary hematologic malignancy/systemic mastocytosis.  He is here to discuss the results of the bone marrow biopsy which was performed on January 7, 2021.  It demonstrated normocellular bone marrow (40 to 50% cellularity), with trilineage hematopoiesis, slight eosinophilia and no increase in blasts.  There was no morphologic or immunophenotypic evidence for primary hematolymphoid neoplasm and no evidence of mast cell disease. Concurrent flow cytometry (IF21-78 ) showed no increase in myeloid blasts, no abnormal myeloid blast   population, polytypic B cells, no aberrant immunophenotype on the T cells.  Cytogenetics showed normal karyotype 46 XY. FISH showed no evidence of rearrangement of the PDGFRA locus.   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 He is s/p  urethroplasty for stricture and diverticulectomy complicated by postoperative leak and fistula.  He is feeling well.   In addition, a complete 12 point  review of systems is negative.    Past Medical/Surgical History:  Past Medical History:   Diagnosis Date     Bladder stones      Cardiomyopathy (H)      ESRD (end stage renal disease) on dialysis (H)      Hypertension      Migraines      Polysubstance abuse (H)      Solitary kidney, congenital      Urethral stone      Urethral stricture      Past Surgical History:   Procedure Laterality Date     BIOPSY  02/2020    renal, [facility]     COMBINED CYSTOSCOPY, LASER HOLMIUM LITHOTRIPSY URETER(S)       CYSTOSCOPY       CYSTOSCOPY FLEXIBLE, CYSTOSTOMY, INSERT TUBE SUPRAPUBIC, COMBINED N/A 12/14/2020    Procedure: CYSTOSCOPY, WITH SUPRAPUBIC CATHETER INSERTION;  Surgeon: Sam Mejia MD;  [facility]     CYSTOSCOPY, OPEN EXCISION URETHRAL DIVERTICULUM, COMBINED N/A 12/14/2020    Procedure: EXCISION OF URETHRAL DIVERTICULUM X2;  Surgeon: Sam Mejia MD;  Location: [facility] OR     HERNIA REPAIR      infant     INSERT CATHETER PERITONEAL DIALYSIS       LASER HOLMIUM LITHOTRIPSY URETER(S), INSERT STENT, COMBINED N/A 8/21/2020    Procedure: CYSTOSCOPY, bladder and urethral stone extraction, removal of foreign body, urethral dilation, urethrotomy, laser on standby;  Surgeon: Sam Mejia MD;  [facility]     urethral dilation       URETHROPLASTY WITH BUCCAL GRAFT N/A 12/14/2020    Procedure: URETHROPLASTY, USING BUCCAL MUCOSA GRAFT;  Surgeon: Sam Mejia MD;  Location: [facility]     Allergies:  Allergies as of 01/29/2021     (No Known Allergies)     Current Medications:  Current Outpatient Medications   Medication Sig Dispense Refill     B Complex-C-Folic Acid (DIALYVITE 800) 0.8 MG TABS Take 1 tablet by mouth every morning        carvedilol (COREG) 25 MG tablet Take 6.25 mg by mouth 2 times  daily (with meals) 1/2 tab BID w/meals       furosemide (LASIX) 40 MG tablet Take 40 mg by mouth every morning        lisinopril (ZESTRIL) 2.5 MG tablet Take 2.5 mg by mouth every evening        VITAMIN D, CHOLECALCIFEROL, PO Take by mouth daily Takes M, W, F       acetaminophen (TYLENOL) 325 MG tablet Take 2 tablets (650 mg) by mouth every 6 hours as needed for mild pain (Patient not taking: Reported on 1/29/2021) 90 tablet 0     gentamicin (GARAMYCIN) 0.1 % external ointment every morning        oxyCODONE (ROXICODONE) 5 MG tablet Take 1 tablet (5 mg) by mouth every 6 hours as needed for pain (Patient not taking: Reported on 1/20/2021) 20 tablet 0     tolterodine ER (DETROL LA) 4 MG 24 hr capsule Take 1 capsule (4 mg) by mouth daily (Patient not taking: Reported on 1/20/2021) 30 capsule 0      Family History:  Family History   Problem Relation Age of Onset     Alzheimer Disease Mother      Unknown/Adopted Father      No Known Problems Sister      No Known Problems Sister      Kidney Disease No family hx of      Social History:  Social History     Socioeconomic History     Marital status: Single     Spouse name: Not on file     Number of children: Not on file     Years of education: Not on file     Highest education level: Not on file   Occupational History     Occupation: material stager   Social Needs     Financial resource strain: Not on file     Food insecurity     Worry: Not on file     Inability: Not on file     Transportation needs     Medical: Not on file     Non-medical: Not on file   Tobacco Use     Smoking status: Current Every Day Smoker     Packs/day: 0.40     Types: Cigarettes     Start date: 2002     Smokeless tobacco: Never Used     Tobacco comment: 4-8 cigs /day   Substance and Sexual Activity     Alcohol use: Not Currently     Comment: quit alcohol 3-4 yrs ago     Drug use: Not Currently     Types: Methamphetamines     Sexual activity: Not on file     Physical Exam:  Wt Readings from Last 5  Encounters:   01/29/21 69.9 kg (154 lb)   12/14/20 72.8 kg (160 lb 7.9 oz)   12/03/20 73 kg (161 lb)   12/03/20 73 kg (161 lb)   11/24/20 72.7 kg (160 lb 3.2 oz)   Constitutional: alert and in no distress  Eyes: No redness or discharge  Respiratory: No cough or labored breathing.  Musculoskeletal: Full range of motion in extremities.  Skin: no visible skin lesions or discoloration  Neurological: No tremors and denies headache.  Psychiatric: Mentation appears normal and affect is normal as well.  Alert and oriented x3.  The rest the comprehensive physical examination is deferred due to public health emergency video visit restrictions.    Laboratory/Imaging Studies  Labs reviewed and documented in the EMR.  Pulmonary biopsy results reviewed as above.  Component      Latest Ref Rng & Units 1/7/2021   WBC      4.0 - 11.0 10e9/L 11.2 (H)   RBC Count      4.4 - 5.9 10e12/L 3.72 (L)   Hemoglobin      13.3 - 17.7 g/dL 11.7 (L)   Hematocrit      40.0 - 53.0 % 35.0 (L)   MCV      78 - 100 fl 94   MCH      26.5 - 33.0 pg 31.5   MCHC      31.5 - 36.5 g/dL 33.4   RDW      10.0 - 15.0 % 13.7   Platelet Count      150 - 450 10e9/L 346   Diff Method       Automated Method   % Neutrophils      % 57.3   % Lymphocytes      % 24.5   % Monocytes      % 6.5   % Eosinophils      % 10.1   % Basophils      % 1.2   % Immature Granulocytes      % 0.4   Absolute Neutrophil      1.6 - 8.3 10e9/L 6.4   Absolute Lymphocytes      0.8 - 5.3 10e9/L 2.7   Absolute Monocytes      0.0 - 1.3 10e9/L 0.7   Absolute Eosinophils      0.0 - 0.7 10e9/L 1.1 (H)   Absolute Basophils      0.0 - 0.2 10e9/L 0.1   Abs Immature Granulocytes      0 - 0.4 10e9/L 0.1       ASSESSMENT/PLAN:    Chip is a pleasant 35 year old man with ESRD, on peritoneal dialysis since 03/2020, also with chronic diarrhea since, with negative ova and parasite testing, essentially negative allergy workup, with new eosinophilia in August 2020, as compared to normal absolute eosinophil count  prior to peritoneal dialysis initiation.     1.  Reactive eosinophilia-there is no evidence for primary hematolymphoid neoplasm and no evidence of mast cell disease on BMBx.  I believe his eosinophilia is secondary to his peritoneal dialysis catheter, and I would expect it to resolve following kidney transplantation and removal of the peritoneal dialysis catheter       2. Cardiac-history of systolic heart failure with LVEF 15 to 20% recovering to 50%.  He was seen by  in September 2020 and stress echocardiogram was recommended but he has not scheduled yet.  We will assist him with scheduling stress echocardiogram per patient's request.    3.  Urology-S/p urethroplasty for stricture and diverticulectomy complicated by postoperative leak and fistula. F/up with Dr. Mejia.  4.  Mild normochromic normocytic anemia-stable and likely related to ESRD.  At the end of our visit patient verbalized understanding and concurred with the plan.        Again, thank you for allowing me to participate in the care of your patient.        Sincerely,        Maame Betancorut MD, MD

## 2021-01-29 NOTE — LETTER
"    1/29/2021         RE: Chip Fowler  [address]  [address]  [facility] [address]        Dear Colleague,    Thank you for referring your patient, Chip Fowler, to the [facility]. Please see a copy of my visit note below.    Chip Fowler is a 35 year old male who is being evaluated via a billable video visit.      The patient has been notified of following:     \"This video visit will be conducted via a call between you and your physician/provider. We have found that certain health care needs can be provided without the need for an in-person physical exam.  This service lets us provide the care you need with a video conversation.  If a prescription is necessary we can send it directly to your pharmacy.  If lab work is needed we can place an order for that and you can then stop by our lab to have the test done at a later time.    Video visits are billed at different rates depending on your insurance coverage.  Please reach out to your insurance provider with any questions.    If during the course of the call the physician/provider feels a video visit is not appropriate, you will not be charged for this service.\"    Patient has given verbal consent for Video visit? yes    Video-Visit Details    Type of service:  Video Visit    Video visit duration: 15 min  Originating Location (pt. Location): home      Distant Location (provider location):  [facility]     Platform used for Video Visit:TRAVIS Betancourt MD, MD    Hematology follow-up visit:  Date on this visit: Jan 29, 2021    Diagnosis: Eosinophilia.    Nephrologist Les Guevara [place]   Transplant:Leeroy Reddy  Urology: Sam Baptiste  Allergist: Dr. Nath    History Of Present Illness:  Mr. Fowler is a 35 year old male  with HX of ESRD on PD since 03/2020, HTN, who presents for follow-up of eosinophilia.  He was noted to have a mildly elevated absolute " eosinophil count (1.3-1.7 range) dating back to August 2020.  Prior to that his absolute eosinophil count was normal.  Actually, his absolute eosinophil count was normal prior to initiation of dialysis.   He has also had mild normocytic anemia.  Otherwise platelet count, white count and absolute differential counts have been normal.  He was seen by infectious disease specialists for evaluation of eosinophilia and no infectious etiology was found except Salmonella typhi paratyphi in the stool.  He has had chronic diarrhea since he was started on peritoneal dialysis in March 2020.  He is being considered for kidney transplant.   He proceeded with further hematologic evaluation of his eosinophilia. Peripheral blood smear on November 17, 2020 showed slight normochromic normocytic anemia, slight eosinophilia.  Serum tryptase level was mildly elevated at 17.3.  Vitamin B12 level was normal.  He was seen by  Dr. Nath and had  essentially negative allergy workup except for positive allergy testing for cats only.  Dr. Cabrera and I felt that his eosinophilia is likely reactive secondary to peritoneal dialysis catheter, but bone marrow biopsy was recommended to rule out primary hematologic malignancy/systemic mastocytosis.  He is here to discuss the results of the bone marrow biopsy which was performed on January 7, 2021.  It demonstrated normocellular bone marrow (40 to 50% cellularity), with trilineage hematopoiesis, slight eosinophilia and no increase in blasts.  There was no morphologic or immunophenotypic evidence for primary hematolymphoid neoplasm and no evidence of mast cell disease. Concurrent flow cytometry (IF21-78 ) showed no increase in myeloid blasts, no abnormal myeloid blast   population, polytypic B cells, no aberrant immunophenotype on the T cells.  Cytogenetics showed normal karyotype 46 XY. FISH showed no evidence of rearrangement of the PDGFRA locus.   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 He is s/p  urethroplasty for stricture and diverticulectomy complicated by postoperative leak and fistula.  He is feeling well.   In addition, a complete 12 point  review of systems is negative.    Past Medical/Surgical History:  Past Medical History:   Diagnosis Date     Bladder stones      Cardiomyopathy (H)      ESRD (end stage renal disease) on dialysis (H)      Hypertension      Migraines      Polysubstance abuse (H)      Solitary kidney, congenital      Urethral stone      Urethral stricture      Past Surgical History:   Procedure Laterality Date     BIOPSY  02/2020    renal, [facility]     COMBINED CYSTOSCOPY, LASER HOLMIUM LITHOTRIPSY URETER(S)       CYSTOSCOPY       CYSTOSCOPY FLEXIBLE, CYSTOSTOMY, INSERT TUBE SUPRAPUBIC, COMBINED N/A 12/14/2020    Procedure: CYSTOSCOPY, WITH SUPRAPUBIC CATHETER INSERTION;  Surgeon: Sam Mejia MD;  [facility]     CYSTOSCOPY, OPEN EXCISION URETHRAL DIVERTICULUM, COMBINED N/A 12/14/2020    Procedure: EXCISION OF URETHRAL DIVERTICULUM X2;  Surgeon: Sam Mejia MD;  [facility]     HERNIA REPAIR      infant     INSERT CATHETER PERITONEAL DIALYSIS       LASER HOLMIUM LITHOTRIPSY URETER(S), INSERT STENT, COMBINED N/A 8/21/2020    Procedure: CYSTOSCOPY, bladder and urethral stone extraction, removal of foreign body, urethral dilation, urethrotomy, laser on standby;  Surgeon: Sam Mejia MD;  [facility]     urethral dilation       URETHROPLASTY WITH BUCCAL GRAFT N/A 12/14/2020    Procedure: URETHROPLASTY, USING BUCCAL MUCOSA GRAFT;  Surgeon: Sam Mejia MD;  [facility]     Allergies:  Allergies as of 01/29/2021     (No Known Allergies)     Current Medications:  Current Outpatient Medications   Medication Sig Dispense Refill     B Complex-C-Folic Acid (DIALYVITE 800) 0.8 MG TABS Take 1 tablet by mouth every morning        carvedilol (COREG) 25 MG tablet Take 6.25 mg by mouth 2 times  daily (with meals) 1/2 tab BID w/meals       furosemide (LASIX) 40 MG tablet Take 40 mg by mouth every morning        lisinopril (ZESTRIL) 2.5 MG tablet Take 2.5 mg by mouth every evening        VITAMIN D, CHOLECALCIFEROL, PO Take by mouth daily Takes M, W, F       acetaminophen (TYLENOL) 325 MG tablet Take 2 tablets (650 mg) by mouth every 6 hours as needed for mild pain (Patient not taking: Reported on 1/29/2021) 90 tablet 0     gentamicin (GARAMYCIN) 0.1 % external ointment every morning        oxyCODONE (ROXICODONE) 5 MG tablet Take 1 tablet (5 mg) by mouth every 6 hours as needed for pain (Patient not taking: Reported on 1/20/2021) 20 tablet 0     tolterodine ER (DETROL LA) 4 MG 24 hr capsule Take 1 capsule (4 mg) by mouth daily (Patient not taking: Reported on 1/20/2021) 30 capsule 0      Family History:  Family History   Problem Relation Age of Onset     Alzheimer Disease Mother      Unknown/Adopted Father      No Known Problems Sister      No Known Problems Sister      Kidney Disease No family hx of      Social History:  Social History     Socioeconomic History     Marital status: Single     Spouse name: Not on file     Number of children: Not on file     Years of education: Not on file     Highest education level: Not on file   Occupational History     Occupation: material stager   Social Needs     Financial resource strain: Not on file     Food insecurity     Worry: Not on file     Inability: Not on file     Transportation needs     Medical: Not on file     Non-medical: Not on file   Tobacco Use     Smoking status: Current Every Day Smoker     Packs/day: 0.40     Types: Cigarettes     Start date: 2002     Smokeless tobacco: Never Used     Tobacco comment: 4-8 cigs /day   Substance and Sexual Activity     Alcohol use: Not Currently     Comment: quit alcohol 3-4 yrs ago     Drug use: Not Currently     Types: Methamphetamines     Sexual activity: Not on file     Physical Exam:  Wt Readings from Last 5  Encounters:   01/29/21 69.9 kg (154 lb)   12/14/20 72.8 kg (160 lb 7.9 oz)   12/03/20 73 kg (161 lb)   12/03/20 73 kg (161 lb)   11/24/20 72.7 kg (160 lb 3.2 oz)   Constitutional: alert and in no distress  Eyes: No redness or discharge  Respiratory: No cough or labored breathing.  Musculoskeletal: Full range of motion in extremities.  Skin: no visible skin lesions or discoloration  Neurological: No tremors and denies headache.  Psychiatric: Mentation appears normal and affect is normal as well.  Alert and oriented x3.  The rest the comprehensive physical examination is deferred due to public health emergency video visit restrictions.    Laboratory/Imaging Studies  Labs reviewed and documented in the EMR.  Pulmonary biopsy results reviewed as above.  Component      Latest Ref Rng & Units 1/7/2021   WBC      4.0 - 11.0 10e9/L 11.2 (H)   RBC Count      4.4 - 5.9 10e12/L 3.72 (L)   Hemoglobin      13.3 - 17.7 g/dL 11.7 (L)   Hematocrit      40.0 - 53.0 % 35.0 (L)   MCV      78 - 100 fl 94   MCH      26.5 - 33.0 pg 31.5   MCHC      31.5 - 36.5 g/dL 33.4   RDW      10.0 - 15.0 % 13.7   Platelet Count      150 - 450 10e9/L 346   Diff Method       Automated Method   % Neutrophils      % 57.3   % Lymphocytes      % 24.5   % Monocytes      % 6.5   % Eosinophils      % 10.1   % Basophils      % 1.2   % Immature Granulocytes      % 0.4   Absolute Neutrophil      1.6 - 8.3 10e9/L 6.4   Absolute Lymphocytes      0.8 - 5.3 10e9/L 2.7   Absolute Monocytes      0.0 - 1.3 10e9/L 0.7   Absolute Eosinophils      0.0 - 0.7 10e9/L 1.1 (H)   Absolute Basophils      0.0 - 0.2 10e9/L 0.1   Abs Immature Granulocytes      0 - 0.4 10e9/L 0.1       ASSESSMENT/PLAN:    Chip is a pleasant 35 year old man with ESRD, on peritoneal dialysis since 03/2020, also with chronic diarrhea since, with negative ova and parasite testing, essentially negative allergy workup, with new eosinophilia in August 2020, as compared to normal absolute eosinophil count  prior to peritoneal dialysis initiation.     1.  Reactive eosinophilia-there is no evidence for primary hematolymphoid neoplasm and no evidence of mast cell disease on BMBx.  I believe his eosinophilia is secondary to his peritoneal dialysis catheter, and I would expect it to resolve following kidney transplantation and removal of the peritoneal dialysis catheter       2. Cardiac-history of systolic heart failure with LVEF 15 to 20% recovering to 50%.  He was seen by  in September 2020 and stress echocardiogram was recommended but he has not scheduled yet.  We will assist him with scheduling stress echocardiogram per patient's request.    3.  Urology-S/p urethroplasty for stricture and diverticulectomy complicated by postoperative leak and fistula. F/up with Dr. Mejia.  4.  Mild normochromic normocytic anemia-stable and likely related to ESRD.  At the end of our visit patient verbalized understanding and concurred with the plan.        Again, thank you for allowing me to participate in the care of your patient.        Sincerely,        Maame Betancourt MD, MD

## 2021-01-29 NOTE — PROGRESS NOTES
"Chip Fowler is a 35 year old male who is being evaluated via a billable video visit.      The patient has been notified of following:     \"This video visit will be conducted via a call between you and your physician/provider. We have found that certain health care needs can be provided without the need for an in-person physical exam.  This service lets us provide the care you need with a video conversation.  If a prescription is necessary we can send it directly to your pharmacy.  If lab work is needed we can place an order for that and you can then stop by our lab to have the test done at a later time.    Video visits are billed at different rates depending on your insurance coverage.  Please reach out to your insurance provider with any questions.    If during the course of the call the physician/provider feels a video visit is not appropriate, you will not be charged for this service.\"    Patient has given verbal consent for Video visit? yes    Video-Visit Details    Type of service:  Video Visit    Video visit duration: 15 min  Originating Location (pt. Location): home      Distant Location (provider location):  [facility]     Platform used for Video Visit:TRAVIS Betancourt MD, MD    Hematology follow-up visit:  Date on this visit: Jan 29, 2021    Diagnosis: Eosinophilia.    Nephrologist Les Guevara [place]   Transplant:Leeroy Reddy  Urology: Sam Baptiste  Allergist: Dr. Nath    History Of Present Illness:  Mr. Fowler is a 35 year old male  with HX of ESRD on PD since 03/2020, HTN, who presents for follow-up of eosinophilia.  He was noted to have a mildly elevated absolute eosinophil count (1.3-1.7 range) dating back to August 2020.  Prior to that his absolute eosinophil count was normal.  Actually, his absolute eosinophil count was normal prior to initiation of dialysis.   He has also had mild normocytic anemia.  Otherwise platelet count, " white count and absolute differential counts have been normal.  He was seen by infectious disease specialists for evaluation of eosinophilia and no infectious etiology was found except Salmonella typhi paratyphi in the stool.  He has had chronic diarrhea since he was started on peritoneal dialysis in March 2020.  He is being considered for kidney transplant.   He proceeded with further hematologic evaluation of his eosinophilia. Peripheral blood smear on November 17, 2020 showed slight normochromic normocytic anemia, slight eosinophilia.  Serum tryptase level was mildly elevated at 17.3.  Vitamin B12 level was normal.  He was seen by  Dr. Nath and had  essentially negative allergy workup except for positive allergy testing for cats only.  Dr. Cabrera and I felt that his eosinophilia is likely reactive secondary to peritoneal dialysis catheter, but bone marrow biopsy was recommended to rule out primary hematologic malignancy/systemic mastocytosis.  He is here to discuss the results of the bone marrow biopsy which was performed on January 7, 2021.  It demonstrated normocellular bone marrow (40 to 50% cellularity), with trilineage hematopoiesis, slight eosinophilia and no increase in blasts.  There was no morphologic or immunophenotypic evidence for primary hematolymphoid neoplasm and no evidence of mast cell disease. Concurrent flow cytometry ([id number] ) showed no increase in myeloid blasts, no abnormal myeloid blast   population, polytypic B cells, no aberrant immunophenotype on the T cells.  Cytogenetics showed normal karyotype 46 XY. FISH showed no evidence of rearrangement of the PDGFRA locus.   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 He is s/p  urethroplasty for stricture and diverticulectomy complicated by postoperative leak and fistula.  He is feeling well.   In addition, a complete 12 point  review of systems is negative.    Past  Medical/Surgical History:  Past Medical History:   Diagnosis Date     Bladder stones      Cardiomyopathy (H)      ESRD (end stage renal disease) on dialysis (H)      Hypertension      Migraines      Polysubstance abuse (H)      Solitary kidney, congenital      Urethral stone      Urethral stricture      Past Surgical History:   Procedure Laterality Date     BIOPSY  02/2020    renal, Yarsani     COMBINED CYSTOSCOPY, LASER HOLMIUM LITHOTRIPSY URETER(S)       CYSTOSCOPY       CYSTOSCOPY FLEXIBLE, CYSTOSTOMY, INSERT TUBE SUPRAPUBIC, COMBINED N/A 12/14/2020    Procedure: CYSTOSCOPY, WITH SUPRAPUBIC CATHETER INSERTION;  Surgeon: Sam Mejia MD;  Location: [facility]     CYSTOSCOPY, OPEN EXCISION URETHRAL DIVERTICULUM, COMBINED N/A 12/14/2020    Procedure: EXCISION OF URETHRAL DIVERTICULUM X2;  Surgeon: Sam Mejia MD;  Location: [facility] OR     HERNIA REPAIR      infant     INSERT CATHETER PERITONEAL DIALYSIS       LASER HOLMIUM LITHOTRIPSY URETER(S), INSERT STENT, COMBINED N/A 8/21/2020    Procedure: CYSTOSCOPY, bladder and urethral stone extraction, removal of foreign body, urethral dilation, urethrotomy, laser on standby;  Surgeon: Sam Mejia MD;  [facility]     urethral dilation       URETHROPLASTY WITH BUCCAL GRAFT N/A 12/14/2020    Procedure: URETHROPLASTY, USING BUCCAL MUCOSA GRAFT;  Surgeon: Sam Mejia MD;  Location: [facility] OR     Allergies:  Allergies as of 01/29/2021     (No Known Allergies)     Current Medications:  Current Outpatient Medications   Medication Sig Dispense Refill     B Complex-C-Folic Acid (DIALYVITE 800) 0.8 MG TABS Take 1 tablet by mouth every morning        carvedilol (COREG) 25 MG tablet Take 6.25 mg by mouth 2 times daily (with meals) 1/2 tab BID w/meals       furosemide (LASIX) 40 MG tablet Take 40 mg by mouth every morning        lisinopril (ZESTRIL) 2.5 MG tablet Take 2.5 mg by mouth every evening        VITAMIN D, CHOLECALCIFEROL, PO Take by mouth daily Takes M, W, F        acetaminophen (TYLENOL) 325 MG tablet Take 2 tablets (650 mg) by mouth every 6 hours as needed for mild pain (Patient not taking: Reported on 1/29/2021) 90 tablet 0     gentamicin (GARAMYCIN) 0.1 % external ointment every morning        oxyCODONE (ROXICODONE) 5 MG tablet Take 1 tablet (5 mg) by mouth every 6 hours as needed for pain (Patient not taking: Reported on 1/20/2021) 20 tablet 0     tolterodine ER (DETROL LA) 4 MG 24 hr capsule Take 1 capsule (4 mg) by mouth daily (Patient not taking: Reported on 1/20/2021) 30 capsule 0      Family History:  Family History   Problem Relation Age of Onset     Alzheimer Disease Mother      Unknown/Adopted Father      No Known Problems Sister      No Known Problems Sister      Kidney Disease No family hx of      Social History:  Social History     Socioeconomic History     Marital status: Single     Spouse name: Not on file     Number of children: Not on file     Years of education: Not on file     Highest education level: Not on file   Occupational History     Occupation: material stager   Social Needs     Financial resource strain: Not on file     Food insecurity     Worry: Not on file     Inability: Not on file     Transportation needs     Medical: Not on file     Non-medical: Not on file   Tobacco Use     Smoking status: Current Every Day Smoker     Packs/day: 0.40     Types: Cigarettes     Start date: 2002     Smokeless tobacco: Never Used     Tobacco comment: 4-8 cigs /day   Substance and Sexual Activity     Alcohol use: Not Currently     Comment: quit alcohol 3-4 yrs ago     Drug use: Not Currently     Types: Methamphetamines     Sexual activity: Not on file     Physical Exam:  Wt Readings from Last 5 Encounters:   01/29/21 69.9 kg (154 lb)   12/14/20 72.8 kg (160 lb 7.9 oz)   12/03/20 73 kg (161 lb)   12/03/20 73 kg (161 lb)   11/24/20 72.7 kg (160 lb 3.2 oz)   Constitutional: alert and in no distress  Eyes: No redness or discharge  Respiratory: No cough or labored  breathing.  Musculoskeletal: Full range of motion in extremities.  Skin: no visible skin lesions or discoloration  Neurological: No tremors and denies headache.  Psychiatric: Mentation appears normal and affect is normal as well.  Alert and oriented x3.  The rest the comprehensive physical examination is deferred due to public health emergency video visit restrictions.    Laboratory/Imaging Studies  Labs reviewed and documented in the EMR.  Pulmonary biopsy results reviewed as above.  Component      Latest Ref Rng & Units 1/7/2021   WBC      4.0 - 11.0 10e9/L 11.2 (H)   RBC Count      4.4 - 5.9 10e12/L 3.72 (L)   Hemoglobin      13.3 - 17.7 g/dL 11.7 (L)   Hematocrit      40.0 - 53.0 % 35.0 (L)   MCV      78 - 100 fl 94   MCH      26.5 - 33.0 pg 31.5   MCHC      31.5 - 36.5 g/dL 33.4   RDW      10.0 - 15.0 % 13.7   Platelet Count      150 - 450 10e9/L 346   Diff Method       Automated Method   % Neutrophils      % 57.3   % Lymphocytes      % 24.5   % Monocytes      % 6.5   % Eosinophils      % 10.1   % Basophils      % 1.2   % Immature Granulocytes      % 0.4   Absolute Neutrophil      1.6 - 8.3 10e9/L 6.4   Absolute Lymphocytes      0.8 - 5.3 10e9/L 2.7   Absolute Monocytes      0.0 - 1.3 10e9/L 0.7   Absolute Eosinophils      0.0 - 0.7 10e9/L 1.1 (H)   Absolute Basophils      0.0 - 0.2 10e9/L 0.1   Abs Immature Granulocytes      0 - 0.4 10e9/L 0.1       ASSESSMENT/PLAN:    Chip is a pleasant 35 year old man with ESRD, on peritoneal dialysis since 03/2020, also with chronic diarrhea since, with negative ova and parasite testing, essentially negative allergy workup, with new eosinophilia in August 2020, as compared to normal absolute eosinophil count prior to peritoneal dialysis initiation.     1.  Reactive eosinophilia-there is no evidence for primary hematolymphoid neoplasm and no evidence of mast cell disease on BMBx.  I believe his eosinophilia is secondary to his peritoneal dialysis catheter, and I would  expect it to resolve following kidney transplantation and removal of the peritoneal dialysis catheter       2. Cardiac-history of systolic heart failure with LVEF 15 to 20% recovering to 50%.  He was seen by  in September 2020 and stress echocardiogram was recommended but he has not scheduled yet.  We will assist him with scheduling stress echocardiogram per patient's request.    3.  Urology-S/p urethroplasty for stricture and diverticulectomy complicated by postoperative leak and fistula. F/up with Dr. Mejia.  4.  Mild normochromic normocytic anemia-stable and likely related to ESRD.  At the end of our visit patient verbalized understanding and concurred with the plan.

## 2021-02-02 ENCOUNTER — HOSPITAL ENCOUNTER (OUTPATIENT)
Dept: GENERAL RADIOLOGY | Facility: CLINIC | Age: 36
Discharge: HOME | End: 2021-02-02
Attending: UROLOGY | Admitting: UROLOGY
Payer: MEDICARE

## 2021-02-02 DIAGNOSIS — N35.014 POST-TRAUMATIC MALE URETHRAL STRICTURE: ICD-10-CM

## 2021-02-02 DIAGNOSIS — N35.912 STRICTURE OF BULBOUS URETHRA IN MALE, UNSPECIFIED STRICTURE TYPE: ICD-10-CM

## 2021-02-02 DIAGNOSIS — N39.0 URINARY TRACT INFECTION WITHOUT HEMATURIA, SITE UNSPECIFIED: ICD-10-CM

## 2021-02-02 DIAGNOSIS — N39.0 URINARY TRACT INFECTION WITHOUT HEMATURIA, SITE UNSPECIFIED: Primary | ICD-10-CM

## 2021-02-02 PROCEDURE — 255N000002 HC RX 255 OP 636: Performed by: RADIOLOGY

## 2021-02-02 PROCEDURE — 74455 X-RAY URETHRA/BLADDER: CPT

## 2021-02-02 PROCEDURE — 51600 INJECTION FOR BLADDER X-RAY: CPT | Mod: GC | Performed by: RADIOLOGY

## 2021-02-02 PROCEDURE — 74455 X-RAY URETHRA/BLADDER: CPT | Mod: 26 | Performed by: RADIOLOGY

## 2021-02-02 RX ORDER — TOLTERODINE 4 MG/1
4 CAPSULE, EXTENDED RELEASE ORAL DAILY
Qty: 30 CAPSULE | Refills: 0 | Status: SHIPPED | OUTPATIENT
Start: 2021-02-02 | End: 2021-08-10

## 2021-02-02 RX ORDER — CIPROFLOXACIN 500 MG/1
500 TABLET, FILM COATED ORAL 2 TIMES DAILY
Qty: 6 TABLET | Refills: 0 | Status: SHIPPED | OUTPATIENT
Start: 2021-02-02 | End: 2021-05-05

## 2021-02-02 RX ORDER — CIPROFLOXACIN 500 MG/1
500 TABLET, FILM COATED ORAL 2 TIMES DAILY
Qty: 6 TABLET | Refills: 0 | Status: SHIPPED | OUTPATIENT
Start: 2021-02-02 | End: 2021-02-02

## 2021-02-02 RX ORDER — IOPAMIDOL 510 MG/ML
200 INJECTION, SOLUTION INTRAVASCULAR ONCE
Status: COMPLETED | OUTPATIENT
Start: 2021-02-02 | End: 2021-02-02

## 2021-02-02 RX ADMIN — IOPAMIDOL 175 ML: 510 INJECTION, SOLUTION INTRAVASCULAR at 08:32

## 2021-02-03 ENCOUNTER — OFFICE VISIT (OUTPATIENT)
Dept: UROLOGY | Facility: CLINIC | Age: 36
End: 2021-02-03
Payer: MEDICARE

## 2021-02-03 VITALS — DIASTOLIC BLOOD PRESSURE: 69 MMHG | HEART RATE: 52 BPM | SYSTOLIC BLOOD PRESSURE: 115 MMHG

## 2021-02-03 DIAGNOSIS — N35.016 POST-TRAUMATIC STRICTURE OF OVERLAPPING SITES OF URETHRA IN MALE: Primary | ICD-10-CM

## 2021-02-03 PROCEDURE — 99024 POSTOP FOLLOW-UP VISIT: CPT | Performed by: UROLOGY

## 2021-02-03 ASSESSMENT — PAIN SCALES - GENERAL: PAINLEVEL: NO PAIN (0)

## 2021-02-03 NOTE — LETTER
2/3/2021     RE: Chip Fowler  [address]  [address]  [place] [address]     Dear Colleague,    Thank you for referring your patient, Chip Fowler, to the [facility] UROLOGY CLINIC [place] at [facility]. Please see a copy of my visit note below.    Chip Fowler is a 35 year old male who is s/p very very complex urethroplasty and diverticulum excision on Dec 14 2020  Dorsal onlay 6 cm and 2 proximal urethral diverticula.  Unfortunately, there was a leak - I suspect from the largest diverticulum.  He was left with SPT and yesterday underwent a VCUG which shows stabilization and generally, the leak is now contained.  His SPT was removed, and he is now voiding spontaneously.  He was given Cipro to clear his bacteriuria.    /69 (BP Location: Right arm, Patient Position: Sitting, Cuff Size: Adult Regular)   Pulse 52     Exam:  Incision clean, dry, intact  No tenderness or evidence of cellulitis  No hematoma  Penis has some fullness consistent with a contained diverticulum around the penoscrotal junction.    A/P   Recurrent diverticulum after urethroplasty though contained.  I discussed he is currently able to void and empty his bladder.  Will need a few months to know how this will truly heal.  We've discussed his options if he would recur would be some sort of repeat procedure or more simply a perineal urethrostomy.  For now, we'll cystoscope in 3 months and he'll RTC if issues arise sooner.    Sam Mejia MD

## 2021-02-03 NOTE — PATIENT INSTRUCTIONS
Please follow up in 3 month for a cystoscopy    It was a pleasure meeting with you today.  Thank you for allowing me and my team the privilege of caring for you today.  YOU are the reason we are here, and I truly hope we provided you with the excellent service you deserve.  Please let us know if there is anything else we can do for you so that we can be sure you are leaving completely satisfied with your care experience.

## 2021-02-04 NOTE — PROGRESS NOTES
Chip Fowler is a 35 year old male who is s/p very very complex urethroplasty and diverticulum excision on Dec 14 2020  Dorsal onlay 6 cm and 2 proximal urethral diverticula.  Unfortunately, there was a leak - I suspect from the largest diverticulum.  He was left with SPT and yesterday underwent a VCUG which shows stabilization and generally, the leak is now contained.  His SPT was removed, and he is now voiding spontaneously.  He was given Cipro to clear his bacteriuria.    /69 (BP Location: Right arm, Patient Position: Sitting, Cuff Size: Adult Regular)   Pulse 52       Exam:  Incision clean, dry, intact  No tenderness or evidence of cellulitis  No hematoma  Penis has some fullness consistent with a contained diverticulum around the penoscrotal junction.    A/P   Recurrent diverticulum after urethroplasty though contained.  I discussed he is currently able to void and empty his bladder.  Will need a few months to know how this will truly heal.  We've discussed his options if he would recur would be some sort of repeat procedure or more simply a perineal urethrostomy.  For now, we'll cystoscope in 3 months and he'll RTC if issues arise sooner.    Sam Mejia MD

## 2021-02-10 ENCOUNTER — TELEPHONE (OUTPATIENT)
Dept: TRANSPLANT | Facility: CLINIC | Age: 36
End: 2021-02-10

## 2021-02-11 ENCOUNTER — TELEPHONE (OUTPATIENT)
Dept: TRANSPLANT | Facility: CLINIC | Age: 36
End: 2021-02-11

## 2021-02-11 NOTE — TELEPHONE ENCOUNTER
"Transplant Social Work Services Phone Call      Data: Spoke with pt regarding recommendation he attend AA (per Rule 25 assessment he completed). Pt reported he completed 3 months of AA (went through all steps). He reported in order to get his license back he had to also attend AA. He reported his license has been reinstated. Pt reported he remains sober. He does not have a sponsor and reported he is motivated to stay clean because it is \"life or death\".  Intervention: phone call  Assessment: Pt has a history of alcohol abuse and methamphetamine use. Pt followed through with obtaining Rule 25 and attended AA at the recommendation of the assessment. Per pt report, he remains sober.   Education provided by SW: N/A  Plan: SW to follow as needed.     Lyla Cortez, [facility]    Kidney/Pancreas/[facility] Transplant Programs          "

## 2021-02-11 NOTE — TELEPHONE ENCOUNTER
Returned call to Chip. Wondering what is next. He has dental nearly completed. He is scheduled for follow up cysto on 5/5/2021 and will need clearance from urology. He said he had his CD assessment done last fall and had it faxed. Unable to locate in epic. Will need to complete exercise stress echo and message sent to cardiology.

## 2021-02-18 DIAGNOSIS — Z01.810 PRE-OPERATIVE CARDIOVASCULAR EXAMINATION: Primary | ICD-10-CM

## 2021-02-25 ENCOUNTER — HOSPITAL ENCOUNTER (OUTPATIENT)
Dept: CARDIOLOGY | Facility: CLINIC | Age: 36
Discharge: HOME | End: 2021-02-25
Attending: INTERNAL MEDICINE | Admitting: INTERNAL MEDICINE
Payer: MEDICARE

## 2021-02-25 DIAGNOSIS — Z01.810 PRE-OPERATIVE CARDIOVASCULAR EXAMINATION: ICD-10-CM

## 2021-02-25 PROCEDURE — 93350 STRESS TTE ONLY: CPT | Mod: 26 | Performed by: INTERNAL MEDICINE

## 2021-02-25 PROCEDURE — 93321 DOPPLER ECHO F-UP/LMTD STD: CPT | Mod: 26 | Performed by: INTERNAL MEDICINE

## 2021-02-25 PROCEDURE — 93016 CV STRESS TEST SUPVJ ONLY: CPT | Performed by: INTERNAL MEDICINE

## 2021-02-25 PROCEDURE — 93018 CV STRESS TEST I&R ONLY: CPT | Performed by: INTERNAL MEDICINE

## 2021-02-25 PROCEDURE — 999N000208 ECHO STRESS ECHOCARDIOGRAM

## 2021-02-25 PROCEDURE — 255N000002 HC RX 255 OP 636: Performed by: INTERNAL MEDICINE

## 2021-02-25 PROCEDURE — 93325 DOPPLER ECHO COLOR FLOW MAPG: CPT | Mod: 26 | Performed by: INTERNAL MEDICINE

## 2021-02-25 RX ADMIN — HUMAN ALBUMIN MICROSPHERES AND PERFLUTREN 4 ML: 10; .22 INJECTION, SOLUTION INTRAVENOUS at 14:22

## 2021-04-19 ENCOUNTER — TRANSFERRED RECORDS (OUTPATIENT)
Dept: HEALTH INFORMATION MANAGEMENT | Facility: CLINIC | Age: 36
End: 2021-04-19

## 2021-04-20 ENCOUNTER — PRE VISIT (OUTPATIENT)
Dept: UROLOGY | Facility: CLINIC | Age: 36
End: 2021-04-20

## 2021-04-20 NOTE — TELEPHONE ENCOUNTER
Reason for visit: Cystoscopy     Relevant information: Hx of urethral stricture    Records/imaging/labs/orders: in EPIC    Pt called: no    At Rooming: normal

## 2021-04-29 ENCOUNTER — CARE COORDINATION (OUTPATIENT)
Dept: CARDIOLOGY | Facility: CLINIC | Age: 36
End: 2021-04-29

## 2021-04-29 NOTE — PROGRESS NOTES
See nurses note.     Charly Bowen CMA  Heart Failure, Advanced Heart Failure & CORE  Referral Specialist &     [facility]  Cardiology  Office: [phone]

## 2021-04-29 NOTE — PROGRESS NOTES
This is not a referral for heart failure. Patient called to follow up with cardiologist.  It appears that Dr. Elizabeth saw him in 2020 and then he had repeat stress test 2/2021 and message was sent to him via My Chart that his test was normal and she was clearing him for Tx. If patient needs meds refilled, he should call Dr. Elizabeth's office.

## 2021-05-05 ENCOUNTER — TELEPHONE (OUTPATIENT)
Dept: TRANSPLANT | Facility: CLINIC | Age: 36
End: 2021-05-05

## 2021-05-05 ENCOUNTER — OFFICE VISIT (OUTPATIENT)
Dept: UROLOGY | Facility: CLINIC | Age: 36
End: 2021-05-05
Payer: MEDICARE

## 2021-05-05 VITALS — DIASTOLIC BLOOD PRESSURE: 72 MMHG | SYSTOLIC BLOOD PRESSURE: 118 MMHG | HEART RATE: 54 BPM

## 2021-05-05 DIAGNOSIS — N35.014 POST-TRAUMATIC MALE URETHRAL STRICTURE: Primary | ICD-10-CM

## 2021-05-05 PROCEDURE — 52000 CYSTOURETHROSCOPY: CPT | Performed by: UROLOGY

## 2021-05-05 PROCEDURE — 51741 ELECTRO-UROFLOWMETRY FIRST: CPT | Performed by: UROLOGY

## 2021-05-05 ASSESSMENT — PAIN SCALES - GENERAL: PAINLEVEL: NO PAIN (0)

## 2021-05-05 NOTE — LETTER
5/5/2021       RE: Chip Fowler  [address]  [address]  [place] [address]     Dear Colleague,    Thank you for referring your patient, Chip Fowler, to the [facility] UROLOGY CLINIC [place] at [facility]. Please see a copy of my visit note below.    Urethroplasty Follow-up Visit with Surveillance Urethroscopy    PRE-PROCEDURE DIAGNOSIS: History of urethral stricture  POST-PROCEDURE DIAGNOSIS: No evidence of urethral stricture   PROCEDURE: Urethroscopy    HISTORY: Chip Fowler is a 36 year old man with ESRD on PD who is seeking renal transplant.  He is  month status-post buccal graft dorsal onlay urethroplasty for urethral stricture in pendulous urethra with 2 diverticulectomies as well.  He states he is urinating very well without complaints.    BMG complaints: No  Positioning complaints: No  Perineal / Genital complaints: No  Urinary incontinence: No    REVIEW OF OFFICE STUDIES:  Urinary Flow Rate  Peak Flow: 15.7 mL/s  Average Flow: 8.5 mL/s  Voided (mL): 140 mL     DESCRIPTION OF PROCEDURE:  After informed consent was obtained, the patient was brought to the procedure room where he was placed in the supine position with all pressure points well padded.  He was prepped and draped in a sterile fashion. A flexible cystoscope was introduced through a well-lubricated urethra.  The anterior urethra up to the point of reconstruction was normal in appearance. At the site of reconstruction there was subtle evidence of stricture recurrence. The narrowest caliber of the urethra at the reconstruction was estimated to be 17F and this was located in penile urethra at the proximal anastomosis. The flexible cystoscope passed with mild pressure. The proximal aspect of the pendulous urethra noted a wide mouthed diverticulum which did not have a fistula present and appeared well healed.  The voluntary sphincter was identified and the scope  withdrawn.    ASSESSMENT AND PLAN:  Excellent outcome after urethroplasty. The patient will follow-up in 9 months with with uroflowmetry, post-void residual urine volume measurement, Urethroplasty PROM, RICHIE, MSHQ, and CLSS and post-op questionnaires. Cystoscopy will be needed. If symptoms recur cystoscopy will be done sooner.    I would consider him healed from transplant given that a 17 Fr cystoscope passes through his urethra and risk of failure after 5 months healing after urethroplasty is exceedingly low. Of note, he will be a difficult catheterization. I would also suggest a 14 Fr catheter. I would consider cystoscopic placement with a wire during time of renal transplant given his penile urethral diverticulum.      Sam Mejia MD

## 2021-05-05 NOTE — PATIENT INSTRUCTIONS
"Return in nine months for a cystoscopy with Dr. Mejia. Please come with a comfortably full bladder.    It was a pleasure meeting with you today.  Thank you for allowing me and my team the privilege of caring for you today.  YOU are the reason we are here, and I truly hope we provided you with the excellent service you deserve.  Please let us know if there is anything else we can do for you so that we can be sure you are leaving completely satisfied with your care experience.        Armida Pa CMA    AFTER YOUR CYSTOSCOPY        You have just completed a cystoscopy, or \"cysto\", which allowed your physician to learn more about your bladder (or to remove a stent placed after surgery). We suggest that you continue to avoid caffeine, fruit juice, and alcohol for the next 24 hours, however, you are encouraged to return to your normal activities.         A few things that are considered normal after your cystoscopy:     * Small amount of bleeding (or spotting) that clears within the next 24 hours     * Slight burning sensation with urination     * Sensation to of needing to avoid more frequently     * The feeling of \"air\" in your urine     * Mild discomfort that is relieved with Tylenol        Please contact our office promptly if you:     * Develop a fever above 101 degrees     * Are unable to urinate     * Develop bright red blood that does not stop     * Severe pain or swelling         Please contact our office with any concerns or questions @DEPHN.  "

## 2021-05-05 NOTE — NURSING NOTE
Chief Complaint   Patient presents with     Cystoscopy     post urethroplasty       Blood pressure 118/72, pulse 54. There is no height or weight on file to calculate BMI.    Patient Active Problem List   Diagnosis     Unilateral congenital absence of kidney     ESRD (end stage renal disease) on dialysis (H)     Hypertension     Solitary kidney, congenital     Cardiomyopathy (H)     Bladder stones     Urethral stone     Urethral stricture     Polysubstance abuse (H)     Urethral calculus     Abnormal urinalysis     Acute renal failure superimposed on chronic kidney disease (H)     Acute retention of urine     Congestive heart failure of unknown etiology (H)     Methamphetamine abuse, episodic (H)     Migraine     Nephrolithiasis     Tobacco use     Post-traumatic male urethral stricture     Urethral diverticulum     Other eosinophilia     Allergic rhinitis, unspecified seasonality, unspecified trigger     Other dysphagia       No Known Allergies    Current Outpatient Medications   Medication Sig Dispense Refill     acetaminophen (TYLENOL) 325 MG tablet Take 2 tablets (650 mg) by mouth every 6 hours as needed for mild pain 90 tablet 0     B Complex-C-Folic Acid (DIALYVITE 800) 0.8 MG TABS Take 1 tablet by mouth every morning        furosemide (LASIX) 40 MG tablet Take 40 mg by mouth every morning        lisinopril (ZESTRIL) 2.5 MG tablet Take 2.5 mg by mouth every evening        tolterodine ER (DETROL LA) 4 MG 24 hr capsule Take 1 capsule (4 mg) by mouth daily 30 capsule 0     VITAMIN D, CHOLECALCIFEROL, PO Take by mouth daily Takes M, W, F       carvedilol (COREG) 25 MG tablet Take 6.25 mg by mouth 2 times daily (with meals) 1/2 tab BID w/meals       gentamicin (GARAMYCIN) 0.1 % external ointment every morning        oxyCODONE (ROXICODONE) 5 MG tablet Take 1 tablet (5 mg) by mouth every 6 hours as needed for pain (Patient not taking: Reported on 2/3/2021) 20 tablet 0       Social History     Tobacco Use     Smoking  status: Current Every Day Smoker     Packs/day: 0.40     Types: Cigarettes     Start date:      Smokeless tobacco: Never Used     Tobacco comment: 4-8 cigs /day   Substance Use Topics     Alcohol use: Not Currently     Comment: quit alcohol 3-4 yrs ago     Drug use: Not Currently     Types: Methamphetamines       Armida Pa CMA  2021  8:18 AM     Invasive Procedure Safety Checklist:    Procedure: Cystoscopy     Action: Complete sections and checkboxes as appropriate.    Pre-procedure:  1. Patient ID Verified with 2 identifiers (Janice and  or MRN) : YES    2. Procedure and site verified with patient/designee (when able) : YES    3. Accurate consent documentation in medical record : YES    4. H&P (or appropriate assessment) documented in medical record : YES  H&P must be up to 30 days prior to procedure an updated within 24 hours of                 Procedure as applicable.     5. Relevant diagnostic and radiology test results appropriately labeled and displayed as applicable : YES    6. Blood products, implants, devices, and/or special equipment available for the procedure as applicable : YES    7. Procedure site(s) marked with provider initials [Exclusions: None] : NO    8. Marking not required. Reason : Yes  Procedure does not require site marking    Time Out:     Time-Out performed immediately prior to starting procedure, including verbal and active participation of all team members addressing: YES    1. Correct patient identity.  2. Confirmed that the correct side and site are marked.  3. An accurate procedure to be done.  4. Agreement on the procedure to be done.  5. Correct patient position.  6. Relevant images and results are properly labeled and appropriately displayed.  7. The need to administer antibiotics or fluids for irrigation purposes during the procedure as applicable.  8. Safety precautions based on patient history or medication use.    During Procedure: Verification of correct  person, site, and procedure occurs any time the responsibility for care of the patient is transferred to another member of the care team.    No medications administered during this procedure.    Armida Pa CMA  May 5, 2021

## 2021-05-05 NOTE — PROGRESS NOTES
Urethroplasty Follow-up Visit with Surveillance Urethroscopy    PRE-PROCEDURE DIAGNOSIS: History of urethral stricture  POST-PROCEDURE DIAGNOSIS: No evidence of urethral stricture   PROCEDURE: Urethroscopy    HISTORY: Chip Fowler is a 36 year old man with ESRD on PD who is seeking renal transplant.  He is  month status-post buccal graft dorsal onlay urethroplasty for urethral stricture in pendulous urethra with 2 diverticulectomies as well.  He states he is urinating very well without complaints.    BMG complaints: No  Positioning complaints: No  Perineal / Genital complaints: No  Urinary incontinence: No    REVIEW OF OFFICE STUDIES:  Urinary Flow Rate  Peak Flow: 15.7 mL/s  Average Flow: 8.5 mL/s  Voided (mL): 140 mL     DESCRIPTION OF PROCEDURE:  After informed consent was obtained, the patient was brought to the procedure room where he was placed in the supine position with all pressure points well padded.  He was prepped and draped in a sterile fashion. A flexible cystoscope was introduced through a well-lubricated urethra.  The anterior urethra up to the point of reconstruction was normal in appearance. At the site of reconstruction there was subtle evidence of stricture recurrence. The narrowest caliber of the urethra at the reconstruction was estimated to be 17F and this was located in penile urethra at the proximal anastomosis. The flexible cystoscope passed with mild pressure. The proximal aspect of the pendulous urethra noted a wide mouthed diverticulum which did not have a fistula present and appeared well healed.  The voluntary sphincter was identified and the scope withdrawn.    ASSESSMENT AND PLAN:  Excellent outcome after urethroplasty. The patient will follow-up in 9 months with with uroflowmetry, post-void residual urine volume measurement, Urethroplasty PROM, RICHIE, MSHQ, and CLSS and post-op questionnaires. Cystoscopy will be needed. If symptoms recur cystoscopy will be done sooner.    I would  consider him healed from transplant given that a 17 Fr cystoscope passes through his urethra and risk of failure after 5 months healing after urethroplasty is exceedingly low. Of note, he will be a difficult catheterization. I would also suggest a 14 Fr catheter. I would consider cystoscopic placement with a wire during time of renal transplant given his penile urethral diverticulum.

## 2021-05-10 NOTE — TELEPHONE ENCOUNTER
Aaron called wanting to know the answers to insurance questions and coverage and eligibility if he were to work full time. I told him honestly I do not have the answers to those questions but will ask Lyla to call him.

## 2021-05-11 ENCOUNTER — TELEPHONE (OUTPATIENT)
Dept: TRANSPLANT | Facility: CLINIC | Age: 36
End: 2021-05-11

## 2021-05-11 NOTE — TELEPHONE ENCOUNTER
[facility] Phone Call      Data: Pt had questions about what would happen with his insurance if he returns to work full time. Discussed that he is currently on Medicare and [employer] [place]. Should he increase his work hours, he may no longer be eligible for MA as MA is income based. Pt reported he may be able to get BCBS through his employer. Reviewed that with private insurance he would have to pay towards a deductible and pay premiums out of his paycheck. Encouraged pt to reach out to his local county to see if he would be eligible for [place]-E[place] if he increased his work hours.   Intervention: phone call   Assessment: Pt would benefit from increasing his work hours as he would then have more income. However, having more income could lead to no longer being eligible for Medicaid. Pt is concerned about potential cost of private insurance (higher copays, large deductible, etc).  Education provided by : Private vs MA insurance  Plan: Pt to contact his local county to see if he would qualify for [place] if he increased his work hours. This writer sent in-basket message to transplant financial  at pt's request to discuss private insurance.     Lyla Cortez [facility]    Kidney/Pancreas/Auto Islet Transplant Programs

## 2021-05-20 ENCOUNTER — TELEPHONE (OUTPATIENT)
Dept: TRANSPLANT | Facility: CLINIC | Age: 36
End: 2021-05-20

## 2021-05-20 NOTE — TELEPHONE ENCOUNTER
Pt needs to let Elisabeth know there will be no changes in insurance  And has some updates to review

## 2021-05-25 NOTE — TELEPHONE ENCOUNTER
Returned call to Chip and he will need another abdominal MRI per urology to follow the kidney lesion. He will receive a call from urology scheduling.

## 2021-06-04 DIAGNOSIS — N28.9 KIDNEY LESION, NATIVE, LEFT: Primary | ICD-10-CM

## 2021-06-22 ENCOUNTER — HOSPITAL ENCOUNTER (OUTPATIENT)
Dept: MRI IMAGING | Facility: CLINIC | Age: 36
Discharge: HOME | End: 2021-06-22
Attending: UROLOGY | Admitting: UROLOGY
Payer: MEDICARE

## 2021-06-22 DIAGNOSIS — N28.9 KIDNEY LESION, NATIVE, LEFT: ICD-10-CM

## 2021-06-22 PROCEDURE — 74181 MRI ABDOMEN W/O CONTRAST: CPT | Mod: MG

## 2021-07-06 DIAGNOSIS — N28.9 KIDNEY LESION, NATIVE, LEFT: Primary | ICD-10-CM

## 2021-07-08 DIAGNOSIS — N28.9 KIDNEY LESION, NATIVE, LEFT: Primary | ICD-10-CM

## 2021-07-08 NOTE — PROGRESS NOTES
Outpatient IR Biopsy Referral    Patient is a 37 y/o male with a PMH of HTN, CHF, methamphetamine history, tobacco use, cardiomyopathy, solitary kidney, ESRD on PD, status-post buccal graft dorsal onlay   urethroplasty for urethral stricture in pendulous urethra with 2 diverticulectomies. Patient is being worked up for renal transplant. Patient has had a renal US 2/24/20 at [facility], and [facility] insufficient renal cortex tissue is present for evaluation. The [place] Red stain is negative for amyloid on the renal medulla tissue.  Most recent imaging MRI 6/22 notes no significant interval change in configuration of abnormal signal throughout the majority of the left kidney. This demonstrates moderate elevated T2 signal and may represent an infiltrative process within the left kidney including potential neoplasm. Recommend further oncologic workup. No new mass effect can be seen. No hydronephrosis on the left.    Per Dr. Liriano the patient is in the process of being listed for renal transplant. The appearance of his kidney on MRI remains suspicious, and it is critical we further evaluate for a potential malignant process prior to him being listed. The biopsy is time sensitive, as he is awaiting transplant, and there remains concern for possible malignancy.  IR has been asked by  to biopsy left kidney for transplant workup.    Case and imaging was reviewed with Dr. Ortiz from IR and US guided with CT availability to ensure adequate biopsy sample of the infiltrative process questionable for malignancy of the left kidney is recommended. Series 7 Image 39.        Procedure order and surgical pathology and leukemia lymphoma orders placed.    If requesting team would like sample sent for anything else please enter them.     Primary team and Dr. Liriano made aware of IR recommendations via epic messaging.     Angie Drew, JESSE CNP  Interventional  Radiology   IR on-call pager: [phone]

## 2021-07-09 DIAGNOSIS — Z11.59 ENCOUNTER FOR SCREENING FOR OTHER VIRAL DISEASES: ICD-10-CM

## 2021-07-20 DIAGNOSIS — Z76.82 KIDNEY TRANSPLANT CANDIDATE: ICD-10-CM

## 2021-07-20 DIAGNOSIS — I10 HYPERTENSION: Primary | ICD-10-CM

## 2021-07-20 RX ORDER — CARVEDILOL 6.25 MG/1
6.25 TABLET ORAL 2 TIMES DAILY WITH MEALS
Qty: 180 TABLET | Refills: 0 | Status: ON HOLD | OUTPATIENT
Start: 2021-07-20 | End: 2023-01-18

## 2021-08-04 ENCOUNTER — PRE VISIT (OUTPATIENT)
Dept: UROLOGY | Facility: CLINIC | Age: 36
End: 2021-08-04

## 2021-08-04 NOTE — TELEPHONE ENCOUNTER
Reason for visit: IR biopsy reslts     Relevant information: Left kidney lesion    Records/imaging/labs/orders: Check if MRI and IR bx results are ready    Pt called: N/A    At Rooming: Standard

## 2021-08-05 ENCOUNTER — TELEPHONE (OUTPATIENT)
Dept: INTERVENTIONAL RADIOLOGY/VASCULAR | Facility: CLINIC | Age: 36
End: 2021-08-05

## 2021-08-07 ENCOUNTER — LAB (OUTPATIENT)
Dept: URGENT CARE | Facility: URGENT CARE | Age: 36
End: 2021-08-07
Attending: NURSE PRACTITIONER
Payer: MEDICARE

## 2021-08-07 DIAGNOSIS — Z11.59 ENCOUNTER FOR SCREENING FOR OTHER VIRAL DISEASES: ICD-10-CM

## 2021-08-07 LAB — SARS-COV-2 RNA RESP QL NAA+PROBE: NEGATIVE

## 2021-08-07 PROCEDURE — U0005 INFEC AGEN DETEC AMPLI PROBE: HCPCS

## 2021-08-07 PROCEDURE — U0003 INFECTIOUS AGENT DETECTION BY NUCLEIC ACID (DNA OR RNA); SEVERE ACUTE RESPIRATORY SYNDROME CORONAVIRUS 2 (SARS-COV-2) (CORONAVIRUS DISEASE [COVID-19]), AMPLIFIED PROBE TECHNIQUE, MAKING USE OF HIGH THROUGHPUT TECHNOLOGIES AS DESCRIBED BY CMS-2020-01-R: HCPCS

## 2021-08-09 ENCOUNTER — APPOINTMENT (OUTPATIENT)
Dept: INTERVENTIONAL RADIOLOGY/VASCULAR | Facility: CLINIC | Age: 36
End: 2021-08-09
Attending: NURSE PRACTITIONER
Payer: MEDICARE

## 2021-08-09 ENCOUNTER — HOSPITAL ENCOUNTER (OUTPATIENT)
Facility: CLINIC | Age: 36
Discharge: HOME | End: 2021-08-09
Attending: UROLOGY | Admitting: RADIOLOGY
Payer: MEDICARE

## 2021-08-09 ENCOUNTER — APPOINTMENT (OUTPATIENT)
Dept: MEDSURG UNIT | Facility: CLINIC | Age: 36
End: 2021-08-09
Attending: UROLOGY
Payer: MEDICARE

## 2021-08-09 VITALS
BODY MASS INDEX: 20.99 KG/M2 | HEIGHT: 72 IN | WEIGHT: 155 LBS | HEART RATE: 64 BPM | DIASTOLIC BLOOD PRESSURE: 72 MMHG | OXYGEN SATURATION: 99 % | TEMPERATURE: 97.8 F | SYSTOLIC BLOOD PRESSURE: 99 MMHG | RESPIRATION RATE: 16 BRPM

## 2021-08-09 DIAGNOSIS — N28.9 KIDNEY LESION, NATIVE, LEFT: ICD-10-CM

## 2021-08-09 LAB
ANION GAP SERPL CALCULATED.3IONS-SCNC: 4 MMOL/L (ref 3–14)
BUN SERPL-MCNC: 40 MG/DL (ref 7–30)
CALCIUM SERPL-MCNC: 7.9 MG/DL (ref 8.5–10.1)
CHLORIDE BLD-SCNC: 111 MMOL/L (ref 94–109)
CO2 SERPL-SCNC: 27 MMOL/L (ref 20–32)
CREAT SERPL-MCNC: 5.21 MG/DL (ref 0.66–1.25)
ERYTHROCYTE [DISTWIDTH] IN BLOOD BY AUTOMATED COUNT: 12.7 % (ref 10–15)
GFR SERPL CREATININE-BSD FRML MDRD: 13 ML/MIN/1.73M2
GLUCOSE BLD-MCNC: 83 MG/DL (ref 70–99)
HCT VFR BLD AUTO: 39.8 % (ref 40–53)
HGB BLD-MCNC: 13.2 G/DL (ref 13.3–17.7)
INR PPP: 0.96 (ref 0.85–1.15)
MCH RBC QN AUTO: 32 PG (ref 26.5–33)
MCHC RBC AUTO-ENTMCNC: 33.2 G/DL (ref 31.5–36.5)
MCV RBC AUTO: 97 FL (ref 78–100)
PLATELET # BLD AUTO: 227 10E3/UL (ref 150–450)
POTASSIUM BLD-SCNC: 4.1 MMOL/L (ref 3.4–5.3)
RBC # BLD AUTO: 4.12 10E6/UL (ref 4.4–5.9)
SODIUM SERPL-SCNC: 142 MMOL/L (ref 133–144)
WBC # BLD AUTO: 8.4 10E3/UL (ref 4–11)

## 2021-08-09 PROCEDURE — 250N000011 HC RX IP 250 OP 636: Performed by: STUDENT IN AN ORGANIZED HEALTH CARE EDUCATION/TRAINING PROGRAM

## 2021-08-09 PROCEDURE — 250N000009 HC RX 250: Performed by: RADIOLOGY

## 2021-08-09 PROCEDURE — 36415 COLL VENOUS BLD VENIPUNCTURE: CPT | Performed by: NURSE PRACTITIONER

## 2021-08-09 PROCEDURE — 88305 TISSUE EXAM BY PATHOLOGIST: CPT | Mod: TC | Performed by: NURSE PRACTITIONER

## 2021-08-09 PROCEDURE — 88185 FLOWCYTOMETRY/TC ADD-ON: CPT | Performed by: NURSE PRACTITIONER

## 2021-08-09 PROCEDURE — 258N000003 HC RX IP 258 OP 636: Performed by: NURSE PRACTITIONER

## 2021-08-09 PROCEDURE — 85610 PROTHROMBIN TIME: CPT | Performed by: NURSE PRACTITIONER

## 2021-08-09 PROCEDURE — 77012 CT SCAN FOR NEEDLE BIOPSY: CPT | Mod: MG

## 2021-08-09 PROCEDURE — 85027 COMPLETE CBC AUTOMATED: CPT | Performed by: NURSE PRACTITIONER

## 2021-08-09 PROCEDURE — 88161 CYTOPATH SMEAR OTHER SOURCE: CPT | Mod: TC | Performed by: NURSE PRACTITIONER

## 2021-08-09 PROCEDURE — 77012 CT SCAN FOR NEEDLE BIOPSY: CPT | Mod: 26 | Performed by: RADIOLOGY

## 2021-08-09 PROCEDURE — 80048 BASIC METABOLIC PNL TOTAL CA: CPT | Performed by: NURSE PRACTITIONER

## 2021-08-09 PROCEDURE — 272N000506 HC NEEDLE CR6

## 2021-08-09 PROCEDURE — 88184 FLOWCYTOMETRY/ TC 1 MARKER: CPT | Performed by: NURSE PRACTITIONER

## 2021-08-09 PROCEDURE — 50200 RENAL BIOPSY PERQ: CPT | Mod: LT | Performed by: RADIOLOGY

## 2021-08-09 PROCEDURE — 999N000134 HC STATISTIC PP CARE STAGE 3

## 2021-08-09 PROCEDURE — 88189 FLOWCYTOMETRY/READ 16 & >: CPT | Performed by: PATHOLOGY

## 2021-08-09 PROCEDURE — 272N000653 HC COIL/EMBOLIC DEVICE CR8

## 2021-08-09 PROCEDURE — 99152 MOD SED SAME PHYS/QHP 5/>YRS: CPT

## 2021-08-09 PROCEDURE — 99152 MOD SED SAME PHYS/QHP 5/>YRS: CPT | Performed by: RADIOLOGY

## 2021-08-09 RX ORDER — NALOXONE HYDROCHLORIDE 0.4 MG/ML
0.2 INJECTION, SOLUTION INTRAMUSCULAR; INTRAVENOUS; SUBCUTANEOUS
Status: DISCONTINUED | OUTPATIENT
Start: 2021-08-09 | End: 2021-08-09

## 2021-08-09 RX ORDER — NALOXONE HYDROCHLORIDE 0.4 MG/ML
0.4 INJECTION, SOLUTION INTRAMUSCULAR; INTRAVENOUS; SUBCUTANEOUS
Status: DISCONTINUED | OUTPATIENT
Start: 2021-08-09 | End: 2021-08-09

## 2021-08-09 RX ORDER — FLUMAZENIL 0.1 MG/ML
0.2 INJECTION, SOLUTION INTRAVENOUS
Status: DISCONTINUED | OUTPATIENT
Start: 2021-08-09 | End: 2021-08-09

## 2021-08-09 RX ORDER — SODIUM CHLORIDE 9 MG/ML
INJECTION, SOLUTION INTRAVENOUS CONTINUOUS
Status: DISCONTINUED | OUTPATIENT
Start: 2021-08-09 | End: 2021-08-09

## 2021-08-09 RX ORDER — LIDOCAINE 40 MG/G
CREAM TOPICAL
Status: DISCONTINUED | OUTPATIENT
Start: 2021-08-09 | End: 2021-08-09

## 2021-08-09 RX ORDER — FENTANYL CITRATE 50 UG/ML
25-50 INJECTION, SOLUTION INTRAMUSCULAR; INTRAVENOUS EVERY 5 MIN PRN
Status: DISCONTINUED | OUTPATIENT
Start: 2021-08-09 | End: 2021-08-09

## 2021-08-09 RX ORDER — LIDOCAINE HYDROCHLORIDE 10 MG/ML
1-30 INJECTION, SOLUTION EPIDURAL; INFILTRATION; INTRACAUDAL; PERINEURAL
Status: COMPLETED | OUTPATIENT
Start: 2021-08-09 | End: 2021-08-09

## 2021-08-09 RX ADMIN — FENTANYL CITRATE 50 MCG: 50 INJECTION, SOLUTION INTRAMUSCULAR; INTRAVENOUS at 15:02

## 2021-08-09 RX ADMIN — MIDAZOLAM 1 MG: 1 INJECTION INTRAMUSCULAR; INTRAVENOUS at 15:02

## 2021-08-09 RX ADMIN — FENTANYL CITRATE 50 MCG: 50 INJECTION, SOLUTION INTRAMUSCULAR; INTRAVENOUS at 14:57

## 2021-08-09 RX ADMIN — SODIUM CHLORIDE: 9 INJECTION, SOLUTION INTRAVENOUS at 11:25

## 2021-08-09 RX ADMIN — MIDAZOLAM 1 MG: 1 INJECTION INTRAMUSCULAR; INTRAVENOUS at 14:57

## 2021-08-09 RX ADMIN — LIDOCAINE HYDROCHLORIDE 5 ML: 10 INJECTION, SOLUTION EPIDURAL; INFILTRATION; INTRACAUDAL; PERINEURAL at 15:02

## 2021-08-09 ASSESSMENT — MIFFLIN-ST. JEOR: SCORE: 1666.08

## 2021-08-09 NOTE — DISCHARGE INSTRUCTIONS
[facility] Following Kidney Biopsy    Physician:                                               Date:August 9, 2021    ACTIVITY:      Relax and take it easy, no strenuous activity for 24 hours.    No heavy lifting (>10 lbs.) for 1 week    DIET:            Resume your regular diet and drink plenty of fluids, unless you are fluid restricted                    DRAINAGE:    There should be minimal drainage from the biopsy site. If bleeding soaks the dressing, you should lie down and apply pressure to the site for a minimum of 10 minutes. If the bleeding persists, refer to the emergency contact numbers below; whether the bleeding persists or not, you should report the occurrence to one of the numbers below.    Refer to the emergency numbers below for the following:     Excessive bleeding or drainage    Excessive swelling, redness, or tenderness at the site    Fever above 100.5 degrees orally    Severe pain    Drainage that is green, yellow, thick white, or has a bad odor    Passage of bloody urine or clots after you are discharged.     Emergency Contact Numbers:             [phone]      Ask for the Adult Nephrology Fellow on Call, someone is available 24 hours a day.

## 2021-08-09 NOTE — SEDATION DOCUMENTATION
Pt pre op cares completed. Mervin wipes completed by pt independently. Awaiting physician to sign consent and talk with RN about plan with sedation.

## 2021-08-09 NOTE — SEDATION DOCUMENTATION
Pt confirms that he will get picked up by his roommate Migue after his procedure. Will continue to monitor pt status until he goes into procedure.

## 2021-08-09 NOTE — IR NOTE
Patient Name: Chip Fwoler  Medical Record Number: [id number]  Today's Date: 8/9/2021    Procedure: image guided left kidney biopsy  Proceduralist: Filiberto SARAH  Procedure Start: 1457  Procedure end: 1515  Sedation medications administered: 2mg versed IV, 100mcg fentanyl IV     Report given to: Anca RESENDEZ  : n/a    Other Notes: Pt arrived to IR room CT2 from . Consent reviewed. Pt denies any questions or concerns regarding procedure. Pt positioned prone and monitored per protocol. Pt tolerated procedure without any noted complications.Pathology present during procedure. Samples sent to lab. Pt transferred back to .

## 2021-08-09 NOTE — SEDATION DOCUMENTATION
Patient tolerated recovery stage well. VSS, L flank site clean/dry/intact, no hematoma, and denies pain. Patient tolerated PO food and fluids. Teaching was done and discharge instructions were given. All questions answered. Patient ambulated, voided, and PIV was removed. Patient discharged from the hospital via wheel chair to home with roommate.

## 2021-08-09 NOTE — PRE-PROCEDURE
GENERAL PRE-PROCEDURE:   Procedure:  Image guided left renal biospy     Written consent obtained?: Yes    Risks and benefits: Risks, benefits and alternatives were discussed    DC Plan: Appropriate discharge home plan in place for patients who are going home after procedure   Consent given by:  Patient  Patient states understanding of procedure being performed: Yes    Patient's understanding of procedure matches consent: Yes    Procedure consent matches procedure scheduled: Yes    Expected level of sedation:  Moderate  Appropriately NPO:  Yes  Mallampati  :  Grade 2- soft palate, base of uvula, tonsillar pillars, and portion of posterior pharyngeal wall visible  Lungs:  Lungs clear with good breath sounds bilaterally  Heart:  Normal heart sounds and rate  History & Physical reviewed:  History and physical reviewed and no updates needed  Statement of review:  I have reviewed the lab findings, diagnostic data, medications, and the plan for sedation

## 2021-08-10 ENCOUNTER — OFFICE VISIT (OUTPATIENT)
Dept: CARDIOLOGY | Facility: CLINIC | Age: 36
End: 2021-08-10
Payer: MEDICARE

## 2021-08-10 VITALS
WEIGHT: 151 LBS | SYSTOLIC BLOOD PRESSURE: 108 MMHG | DIASTOLIC BLOOD PRESSURE: 82 MMHG | OXYGEN SATURATION: 99 % | HEART RATE: 56 BPM | BODY MASS INDEX: 20.45 KG/M2 | HEIGHT: 72 IN

## 2021-08-10 DIAGNOSIS — I42.8 OTHER CARDIOMYOPATHY (H): ICD-10-CM

## 2021-08-10 DIAGNOSIS — I10 ESSENTIAL HYPERTENSION: Primary | ICD-10-CM

## 2021-08-10 LAB

## 2021-08-10 PROCEDURE — 99203 OFFICE O/P NEW LOW 30 MIN: CPT | Performed by: INTERNAL MEDICINE

## 2021-08-10 PROCEDURE — 88305 TISSUE EXAM BY PATHOLOGIST: CPT | Mod: 26 | Performed by: PATHOLOGY

## 2021-08-10 PROCEDURE — 88333 PATH CONSLTJ SURG CYTO XM 1: CPT | Mod: 26 | Performed by: PATHOLOGY

## 2021-08-10 RX ORDER — LISINOPRIL 5 MG/1
5 TABLET ORAL EVERY EVENING
Qty: 90 TABLET | Refills: 3 | Status: SHIPPED | OUTPATIENT
Start: 2021-08-10 | End: 2022-06-20

## 2021-08-10 ASSESSMENT — MIFFLIN-ST. JEOR: SCORE: 1647.93

## 2021-08-10 NOTE — LETTER
8/10/2021    Physician No Ref-Primary  No address on file    RE: Chip Fowler       Dear Colleague,    I had the pleasure of seeing Chip Fowler in the [facility].    HISTORY:    Chip Fowler is a very pleasant 36-year-old male with a history of congenital solitary kidney,, heart failure with EF 15 to 20% in February 2020 recovered to 50%, and end-stage renal disease on annual dialysis since February 2020.  He presents today for establishment of audiology care within the [place] system.    Chip reports that he generally is doing well.  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  He is able to fulfill his job duties without difficulty.  Even when his EF was low he was continuing to work full-time but mostly experienced dyspnea when he tried to lay down at night.  Chip specifically denies any exertional chest, arm, neck, or jaw discomfort as well as any symptoms of palpitations, PND/orthopnea, syncope or near syncope, orthostasis, strokelike symptoms, peripheral edema, or claudication.    Chip has been using a combination of low-dose lisinopril and carvedilol.  His dose of carvedilol was recently cut from 12.5 mg twice daily to 6.25 mg because of bradycardia.  Resting heart rate today is 56.    Chip had a stress echo done in February of this year demonstrating a resting ejection fraction of around 50% with good augmentation post exercise.  No inducible ischemia was detected.      ASSESSMENT/PLAN:    1.  Cardiomyopathy.  The patient presented with severe reduction in EF which has responded nicely to a combination of dialysis and use of low-dose carvedilol and lisinopril.  I like to adjust his medications to try to maximize heart failure meds and to that and I have suggested that we increase his lisinopril from 2.5 mg daily to 5 mg daily.  He is agreeable to this and let me know if he does not  tolerate it.  He has his electrolytes checked regularly because of his dialysis.  2.  Stage renal disease on peritoneal dialysis.  He is in the process of pain to get listed for kidney transplant.  Should that event occur he is stable for transplant and would not need further cardiology evaluation.    Thank you for inviting me to participate in your patient's care.  Please do not hesitate to call if I can be of further assistance.  I spent over 30 minutes today reviewing the chart, interviewing and examining the patient, and documenting our visit.    Chart documentation was completed, in part, with [employer] voice-recognition software. Even though reviewed, some grammatical, spelling, and word errors may remain.       Orders Placed This Encounter   Procedures     Follow-Up with Cardiologist     Orders Placed This Encounter   Medications     lisinopril (ZESTRIL) 5 MG tablet     Sig: Take 1 tablet (5 mg) by mouth every evening     Dispense:  90 tablet     Refill:  3     Medications Discontinued During This Encounter   Medication Reason     tolterodine ER (DETROL LA) 4 MG 24 hr capsule Medication Reconciliation Clean Up     lisinopril (ZESTRIL) 2.5 MG tablet Reorder       10 year ASCVD risk: The ASCVD Risk score ([place] DC Jr., et al., 2013) failed to calculate for the following reasons:    The 2013 ASCVD risk score is only valid for ages 40 to 79    Encounter Diagnoses   Name Primary?     Essential hypertension Yes     Other cardiomyopathy (H)        CURRENT MEDICATIONS:  Current Outpatient Medications   Medication Sig Dispense Refill     acetaminophen (TYLENOL) 325 MG tablet Take 2 tablets (650 mg) by mouth every 6 hours as needed for mild pain 90 tablet 0     B Complex-C-Folic Acid (DIALYVITE 800) 0.8 MG TABS Take 1 tablet by mouth every morning        carvedilol (COREG) 6.25 MG tablet Take 1 tablet (6.25 mg) by mouth 2 times daily (with meals) 180 tablet 0     furosemide (LASIX) 40 MG tablet Take 40 mg by mouth every  morning        gentamicin (GARAMYCIN) 0.1 % external ointment every morning        lisinopril (ZESTRIL) 5 MG tablet Take 1 tablet (5 mg) by mouth every evening 90 tablet 3     VITAMIN D, CHOLECALCIFEROL, PO Take by mouth daily Takes M, W, F         ALLERGIES   No Known Allergies    PAST MEDICAL HISTORY:  Past Medical History:   Diagnosis Date     Bladder stones      Cardiomyopathy (H)      ESRD (end stage renal disease) on dialysis (H)      Hypertension      Migraines      Polysubstance abuse (H)      Solitary kidney, congenital      Urethral stone      Urethral stricture        PAST SURGICAL HISTORY:  Past Surgical History:   Procedure Laterality Date     BIOPSY  02/2020    renal, [place]     COMBINED CYSTOSCOPY, LASER HOLMIUM LITHOTRIPSY URETER(S)       CYSTOSCOPY       CYSTOSCOPY FLEXIBLE, CYSTOSTOMY, INSERT TUBE SUPRAPUBIC, COMBINED N/A 12/14/2020    Procedure: CYSTOSCOPY, WITH SUPRAPUBIC CATHETER INSERTION;  Surgeon: Sam Mejia MD;  [facility]     CYSTOSCOPY, OPEN EXCISION URETHRAL DIVERTICULUM, COMBINED N/A 12/14/2020    Procedure: EXCISION OF URETHRAL DIVERTICULUM X2;  Surgeon: Sam Mejia MD;  [facility]     HERNIA REPAIR      infant     INSERT CATHETER PERITONEAL DIALYSIS       LASER HOLMIUM LITHOTRIPSY URETER(S), INSERT STENT, COMBINED N/A 8/21/2020    Procedure: CYSTOSCOPY, bladder and urethral stone extraction, removal of foreign body, urethral dilation, urethrotomy, laser on standby;  Surgeon: Sam Mejia MD;  [facility]     urethral dilation       URETHROPLASTY WITH BUCCAL GRAFT N/A 12/14/2020    Procedure: URETHROPLASTY, USING BUCCAL MUCOSA GRAFT;  Surgeon: Sam Mejia MD;  [facility]       FAMILY HISTORY:  Family History   Problem Relation Age of Onset     Alzheimer Disease Mother      Unknown/Adopted Father      No Known Problems Sister      No Known Problems Sister      Kidney Disease No family hx of        SOCIAL HISTORY:  Social History     Socioeconomic  History     Marital status: Single     Spouse name: None     Number of children: None     Years of education: None     Highest education level: None   Occupational History     Occupation: material stager   Tobacco Use     Smoking status: Light Tobacco Smoker     Packs/day: 0.40     Types: Cigarettes     Start date: 2002     Smokeless tobacco: Never Used     Tobacco comment: 4-8 cigs /day   Substance and Sexual Activity     Alcohol use: Not Currently     Comment: quit alcohol 3-4 yrs ago     Drug use: Not Currently     Types: Methamphetamines     Sexual activity: Not Currently   Other Topics Concern     Parent/sibling w/ CABG, MI or angioplasty before 65F 55M? Not Asked   Social History Narrative     None     Social Determinants of Health     Financial Resource Strain:      Difficulty of Paying Living Expenses:    Food Insecurity:      Worried About Running Out of Food in the Last Year:      Ran Out of Food in the Last Year:    Transportation Needs:      Lack of Transportation (Medical):      Lack of Transportation (Non-Medical):    Physical Activity:      Days of Exercise per Week:      Minutes of Exercise per Session:    Stress:      Feeling of Stress :    Social Connections:      Frequency of Communication with Friends and Family:      Frequency of Social Gatherings with Friends and Family:      Attends [employer] Services:      Active Member of Clubs or Organizations:      Attends Club or Organization Meetings:      Marital Status:    Intimate Partner Violence:      Fear of Current or Ex-Partner:      Emotionally Abused:      Physically Abused:      Sexually Abused:        Review of Systems:  Skin:  Negative     Eyes:  Positive for    ENT:  Negative    Respiratory:  Negative    Cardiovascular:  Negative    Gastroenterology: Negative    Genitourinary:  Positive for    Musculoskeletal:  Negative    Neurologic:  Negative    Psychiatric:  Negative    Heme/Lymph/Imm:  Negative    Endocrine:  Negative      Physical  Exam:  Vitals: /82 (BP Location: Right arm, Patient Position: Sitting, Cuff Size: Adult Regular)   Pulse 56   Ht 1.829 m (6')   Wt 68.5 kg (151 lb)   SpO2 99%   BMI 20.48 kg/m      Constitutional:           Skin:           Head:           Eyes:           ENT:           Neck:           Chest:           Cardiac:                    Abdomen:           Vascular:                                        Extremities and Muscular Skeletal:              Neurological:           Psych:        Recent Lab Results:  LIPID RESULTS:  No results found for: CHOL, HDL, LDL, TRIG, CHOLHDLRATIO    LIVER ENZYME RESULTS:  Lab Results   Component Value Date    AST 18 08/18/2020    ALT 46 08/18/2020       CBC RESULTS:  Lab Results   Component Value Date    WBC 8.4 08/09/2021    WBC 11.2 (H) 01/07/2021    RBC 4.12 (L) 08/09/2021    RBC 3.72 (L) 01/07/2021    HGB 13.2 (L) 08/09/2021    HGB 11.7 (L) 01/07/2021    HCT 39.8 (L) 08/09/2021    HCT 35.0 (L) 01/07/2021    MCV 97 08/09/2021    MCV 94 01/07/2021    MCH 32.0 08/09/2021    MCH 31.5 01/07/2021    MCHC 33.2 08/09/2021    MCHC 33.4 01/07/2021    RDW 12.7 08/09/2021    RDW 13.7 01/07/2021     08/09/2021     01/07/2021       BMP RESULTS:  Lab Results   Component Value Date     08/09/2021     08/21/2020    POTASSIUM 4.1 08/09/2021    POTASSIUM 4.2 12/03/2020    CHLORIDE 111 (H) 08/09/2021    CHLORIDE 110 (H) 08/21/2020    CO2 27 08/09/2021    CO2 20 08/21/2020    ANIONGAP 4 08/09/2021    ANIONGAP 8 08/21/2020    GLC 83 08/09/2021    GLC 85 08/21/2020    BUN 40 (H) 08/09/2021    BUN 66 (H) 08/21/2020    CR 5.21 (H) 08/09/2021    CR 6.20 (H) 12/03/2020    GFRESTIMATED 13 (L) 08/09/2021    GFRESTIMATED 11 (L) 12/03/2020    GFRESTBLACK 12 (L) 12/03/2020    VISHNU 7.9 (L) 08/09/2021    VISHNU 8.1 (L) 08/21/2020        A1C RESULTS:  No results found for: A1C    INR RESULTS:  Lab Results   Component Value Date    INR 0.96 08/09/2021    INR 1.08 08/18/2020         Jung  SAY Shine MD, [facility]    CC  No referring provider defined for this encounter.                      Thank you for allowing me to participate in the care of your patient.      Sincerely,     Jung Shine MD     [facility]  cc:   No referring provider defined for this encounter.

## 2021-08-10 NOTE — PROGRESS NOTES
HISTORY:    Chip Fowler is a very pleasant 36-year-old male with a history of congenital solitary kidney,, heart failure with EF 15 to 20% in February 2020 recovered to 50%, and end-stage renal disease on annual dialysis since February 2020.  He presents today for establishment of audiology care within the [place] system.    Chip reports that he generally is doing well.  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  He is able to fulfill his job duties without difficulty.  Even when his EF was low he was continuing to work full-time but mostly experienced dyspnea when he tried to lay down at night.  Chip specifically denies any exertional chest, arm, neck, or jaw discomfort as well as any symptoms of palpitations, PND/orthopnea, syncope or near syncope, orthostasis, strokelike symptoms, peripheral edema, or claudication.    Chip has been using a combination of low-dose lisinopril and carvedilol.  His dose of carvedilol was recently cut from 12.5 mg twice daily to 6.25 mg because of bradycardia.  Resting heart rate today is 56.    Chip had a stress echo done in February of this year demonstrating a resting ejection fraction of around 50% with good augmentation post exercise.  No inducible ischemia was detected.      ASSESSMENT/PLAN:    1.  Cardiomyopathy.  The patient presented with severe reduction in EF which has responded nicely to a combination of dialysis and use of low-dose carvedilol and lisinopril.  I like to adjust his medications to try to maximize heart failure meds and to that and I have suggested that we increase his lisinopril from 2.5 mg daily to 5 mg daily.  He is agreeable to this and let me know if he does not tolerate it.  He has his electrolytes checked regularly because of his dialysis.  2.  Stage renal disease on peritoneal dialysis.  He is in the process of pain to get listed for kidney transplant.  Should that event occur he is stable  for transplant and would not need further cardiology evaluation.    Thank you for inviting me to participate in your patient's care.  Please do not hesitate to call if I can be of further assistance.  I spent over 30 minutes today reviewing the chart, interviewing and examining the patient, and documenting our visit.    Chart documentation was completed, in part, with [employer] voice-recognition software. Even though reviewed, some grammatical, spelling, and word errors may remain.       Orders Placed This Encounter   Procedures     Follow-Up with Cardiologist     Orders Placed This Encounter   Medications     lisinopril (ZESTRIL) 5 MG tablet     Sig: Take 1 tablet (5 mg) by mouth every evening     Dispense:  90 tablet     Refill:  3     Medications Discontinued During This Encounter   Medication Reason     tolterodine ER (DETROL LA) 4 MG 24 hr capsule Medication Reconciliation Clean Up     lisinopril (ZESTRIL) 2.5 MG tablet Reorder       10 year ASCVD risk: The ASCVD Risk score (Dani ROHITH Jr., et al., 2013) failed to calculate for the following reasons:    The 2013 ASCVD risk score is only valid for ages 40 to 79    Encounter Diagnoses   Name Primary?     Essential hypertension Yes     Other cardiomyopathy (H)        CURRENT MEDICATIONS:  Current Outpatient Medications   Medication Sig Dispense Refill     acetaminophen (TYLENOL) 325 MG tablet Take 2 tablets (650 mg) by mouth every 6 hours as needed for mild pain 90 tablet 0     B Complex-C-Folic Acid (DIALYVITE 800) 0.8 MG TABS Take 1 tablet by mouth every morning        carvedilol (COREG) 6.25 MG tablet Take 1 tablet (6.25 mg) by mouth 2 times daily (with meals) 180 tablet 0     furosemide (LASIX) 40 MG tablet Take 40 mg by mouth every morning        gentamicin (GARAMYCIN) 0.1 % external ointment every morning        lisinopril (ZESTRIL) 5 MG tablet Take 1 tablet (5 mg) by mouth every evening 90 tablet 3     VITAMIN D, CHOLECALCIFEROL, PO Take by mouth daily Takes M,  W, F         ALLERGIES   No Known Allergies    PAST MEDICAL HISTORY:  Past Medical History:   Diagnosis Date     Bladder stones      Cardiomyopathy (H)      ESRD (end stage renal disease) on dialysis (H)      Hypertension      Migraines      Polysubstance abuse (H)      Solitary kidney, congenital      Urethral stone      Urethral stricture        PAST SURGICAL HISTORY:  Past Surgical History:   Procedure Laterality Date     BIOPSY  02/2020    renal, [place]     COMBINED CYSTOSCOPY, LASER HOLMIUM LITHOTRIPSY URETER(S)       CYSTOSCOPY       CYSTOSCOPY FLEXIBLE, CYSTOSTOMY, INSERT TUBE SUPRAPUBIC, COMBINED N/A 12/14/2020    Procedure: CYSTOSCOPY, WITH SUPRAPUBIC CATHETER INSERTION;  Surgeon: Sam Mejia MD;  [facility]     CYSTOSCOPY, OPEN EXCISION URETHRAL DIVERTICULUM, COMBINED N/A 12/14/2020    Procedure: EXCISION OF URETHRAL DIVERTICULUM X2;  Surgeon: Sam Mejia MD;  [facility]     HERNIA REPAIR      infant     INSERT CATHETER PERITONEAL DIALYSIS       LASER HOLMIUM LITHOTRIPSY URETER(S), INSERT STENT, COMBINED N/A 8/21/2020    Procedure: CYSTOSCOPY, bladder and urethral stone extraction, removal of foreign body, urethral dilation, urethrotomy, laser on standby;  Surgeon: Sam Mejia MD;  [facility]     urethral dilation       URETHROPLASTY WITH BUCCAL GRAFT N/A 12/14/2020    Procedure: URETHROPLASTY, USING BUCCAL MUCOSA GRAFT;  Surgeon: Sam Mejia MD;  [facility]       FAMILY HISTORY:  Family History   Problem Relation Age of Onset     Alzheimer Disease Mother      Unknown/Adopted Father      No Known Problems Sister      No Known Problems Sister      Kidney Disease No family hx of        SOCIAL HISTORY:  Social History     Socioeconomic History     Marital status: Single     Spouse name: None     Number of children: None     Years of education: None     Highest education level: None   Occupational History     Occupation: material stager   Tobacco Use     Smoking  status: Light Tobacco Smoker     Packs/day: 0.40     Types: Cigarettes     Start date: 2002     Smokeless tobacco: Never Used     Tobacco comment: 4-8 cigs /day   Substance and Sexual Activity     Alcohol use: Not Currently     Comment: quit alcohol 3-4 yrs ago     Drug use: Not Currently     Types: Methamphetamines     Sexual activity: Not Currently   Other Topics Concern     Parent/sibling w/ CABG, MI or angioplasty before 65F 55M? Not Asked   Social History Narrative     None     Social Determinants of Health     Financial Resource Strain:      Difficulty of Paying Living Expenses:    Food Insecurity:      Worried About Running Out of Food in the Last Year:      Ran Out of Food in the Last Year:    Transportation Needs:      Lack of Transportation (Medical):      Lack of Transportation (Non-Medical):    Physical Activity:      Days of Exercise per Week:      Minutes of Exercise per Session:    Stress:      Feeling of Stress :    Social Connections:      Frequency of Communication with Friends and Family:      Frequency of Social Gatherings with Friends and Family:      Attends [employer] Services:      Active Member of Clubs or Organizations:      Attends Club or Organization Meetings:      Marital Status:    Intimate Partner Violence:      Fear of Current or Ex-Partner:      Emotionally Abused:      Physically Abused:      Sexually Abused:        Review of Systems:  Skin:  Negative     Eyes:  Positive for    ENT:  Negative    Respiratory:  Negative    Cardiovascular:  Negative    Gastroenterology: Negative    Genitourinary:  Positive for    Musculoskeletal:  Negative    Neurologic:  Negative    Psychiatric:  Negative    Heme/Lymph/Imm:  Negative    Endocrine:  Negative      Physical Exam:  Vitals: /82 (BP Location: Right arm, Patient Position: Sitting, Cuff Size: Adult Regular)   Pulse 56   Ht 1.829 m (6')   Wt 68.5 kg (151 lb)   SpO2 99%   BMI 20.48 kg/m      Constitutional:           Skin:            Head:           Eyes:           ENT:           Neck:           Chest:           Cardiac:                    Abdomen:           Vascular:                                        Extremities and Muscular Skeletal:              Neurological:           Psych:        Recent Lab Results:  LIPID RESULTS:  No results found for: CHOL, HDL, LDL, TRIG, CHOLHDLRATIO    LIVER ENZYME RESULTS:  Lab Results   Component Value Date    AST 18 08/18/2020    ALT 46 08/18/2020       CBC RESULTS:  Lab Results   Component Value Date    WBC 8.4 08/09/2021    WBC 11.2 (H) 01/07/2021    RBC 4.12 (L) 08/09/2021    RBC 3.72 (L) 01/07/2021    HGB 13.2 (L) 08/09/2021    HGB 11.7 (L) 01/07/2021    HCT 39.8 (L) 08/09/2021    HCT 35.0 (L) 01/07/2021    MCV 97 08/09/2021    MCV 94 01/07/2021    MCH 32.0 08/09/2021    MCH 31.5 01/07/2021    MCHC 33.2 08/09/2021    MCHC 33.4 01/07/2021    RDW 12.7 08/09/2021    RDW 13.7 01/07/2021     08/09/2021     01/07/2021       BMP RESULTS:  Lab Results   Component Value Date     08/09/2021     08/21/2020    POTASSIUM 4.1 08/09/2021    POTASSIUM 4.2 12/03/2020    CHLORIDE 111 (H) 08/09/2021    CHLORIDE 110 (H) 08/21/2020    CO2 27 08/09/2021    CO2 20 08/21/2020    ANIONGAP 4 08/09/2021    ANIONGAP 8 08/21/2020    GLC 83 08/09/2021    GLC 85 08/21/2020    BUN 40 (H) 08/09/2021    BUN 66 (H) 08/21/2020    CR 5.21 (H) 08/09/2021    CR 6.20 (H) 12/03/2020    GFRESTIMATED 13 (L) 08/09/2021    GFRESTIMATED 11 (L) 12/03/2020    GFRESTBLACK 12 (L) 12/03/2020    VISHNU 7.9 (L) 08/09/2021    VISHNU 8.1 (L) 08/21/2020        A1C RESULTS:  No results found for: A1C    INR RESULTS:  Lab Results   Component Value Date    INR 0.96 08/09/2021    INR 1.08 08/18/2020         Jung Shine MD, [facility]    CC  No referring provider defined for this encounter.

## 2021-08-25 ENCOUNTER — OFFICE VISIT (OUTPATIENT)
Dept: UROLOGY | Facility: CLINIC | Age: 36
End: 2021-08-25
Payer: MEDICARE

## 2021-08-25 VITALS
HEART RATE: 64 BPM | HEIGHT: 72 IN | BODY MASS INDEX: 20.99 KG/M2 | WEIGHT: 155 LBS | SYSTOLIC BLOOD PRESSURE: 112 MMHG | DIASTOLIC BLOOD PRESSURE: 71 MMHG

## 2021-08-25 DIAGNOSIS — Z99.2 ESRD (END STAGE RENAL DISEASE) ON DIALYSIS (H): Primary | ICD-10-CM

## 2021-08-25 DIAGNOSIS — N28.9 LESION OF LEFT NATIVE KIDNEY: ICD-10-CM

## 2021-08-25 DIAGNOSIS — N18.6 ESRD (END STAGE RENAL DISEASE) ON DIALYSIS (H): Primary | ICD-10-CM

## 2021-08-25 PROCEDURE — 99213 OFFICE O/P EST LOW 20 MIN: CPT | Performed by: UROLOGY

## 2021-08-25 RX ORDER — CEPHALEXIN 500 MG/1
CAPSULE ORAL
Status: ON HOLD | COMMUNITY
Start: 2021-08-17 | End: 2023-01-16

## 2021-08-25 RX ORDER — FLUCONAZOLE 200 MG/1
TABLET ORAL
Status: ON HOLD | COMMUNITY
Start: 2021-08-17 | End: 2023-01-16

## 2021-08-25 ASSESSMENT — MIFFLIN-ST. JEOR: SCORE: 1666.08

## 2021-08-25 ASSESSMENT — PAIN SCALES - GENERAL: PAINLEVEL: NO PAIN (0)

## 2021-08-25 NOTE — LETTER
8/25/2021      RE: Chip Fowler  [id number] [facility] [id number]         CHIEF COMPLAINT   It was my pleasure to see Chip Fowler who is a 36 year old male for follow-up of renal lesion.      HPI  Chip Fowler is a very pleasant 36 year old male who presents with a history of ESRD and congenital solitary kidney. On dialysis and under consideration for kidney transplant. On ultrasound earlier this year, noted to have indeterminate left renal lesion on native kidney.      Of note, he also has complex history of urethral stricture, and had this repaired by Dr. Mejia.    On Follow-up MRI, lesion in kidney thought to be suspicious in appearance. Subsequent biopsy, reviewed today, demonstrates no evidence of malignancy.    CT renal biopsy 8/9/2021  Final Diagnosis   Kidney, left, biopsy:  - Benign renal parenchyma  - Negative for malignancy in this specimen   There is no immunophenotypic evidence of non-Hodgkin lymphoma. This specimen has decreased viability, which may affect the ability to detect a neoplastic process.  Final interpretation requires correlation with morphologic and clinical features.     MRI abd 6/22/2021  IMPRESSION:  1.  No significant interval change in configuration of abnormal signal  throughout the majority of the left kidney. This demonstrates moderate  elevated T2 signal and may represent an infiltrative process within  the left kidney including potential neoplasm. Recommend further  oncologic workup. No new mass effect can be seen. No hydronephrosis on  the left.  2.  Suggestion of iron deposition within the liver.  3.  Moderate ascites.  4.  Right kidney is absent.    PHYSICAL EXAM  Patient is a 36 year old  male   Vitals: Blood pressure 112/71, pulse 64, height 1.829 m (6'), weight 70.3 kg (155 lb).  General Appearance Adult: Body mass index is 21.02 kg/m .  Alert, no acute distress, oriented  Lungs: no respiratory distress, or pursed lip breathing  Abdomen: soft, nontender, no  organomegaly or masses  Back: no CVAT  Neuro: Alert, oriented, speech and mentation normal  Psych: affect and mood normal    Outside and Past Medical records:  Review of the result(s) of each unique test - MRI, renal biopsy    ASSESSMENT and PLAN  36 year old male with ESRD undergoing transplant workup. Left renal lesion has now been biopsied and demonstrated to be benign. No further workup indicated at this time. OK to proceed with transplant from urology perspective. Will follow-up with Dr. Mejia regarding his urethra, as scheduled.    21 minutes spent on the date of the encounter doing chart review, history and exam, documentation and further activities as noted above.    Sam Liriano MD  Urology  [facility]

## 2021-08-25 NOTE — NURSING NOTE
Chief Complaint   Patient presents with     Follow Up     biopsy results        Blood pressure 112/71, pulse 64, height 1.829 m (6'), weight 70.3 kg (155 lb). Body mass index is 21.02 kg/m .    Patient Active Problem List   Diagnosis     Unilateral congenital absence of kidney     ESRD (end stage renal disease) on dialysis (H)     Hypertension     Solitary kidney, congenital     Cardiomyopathy (H)     Bladder stones     Urethral stone     Urethral stricture     Polysubstance abuse (H)     Urethral calculus     Abnormal urinalysis     Acute renal failure superimposed on chronic kidney disease (H)     Acute retention of urine     Congestive heart failure of unknown etiology (H)     Methamphetamine abuse, episodic (H)     Migraine     Nephrolithiasis     Tobacco use     Post-traumatic male urethral stricture     Urethral diverticulum     Other eosinophilia     Allergic rhinitis, unspecified seasonality, unspecified trigger     Other dysphagia       No Known Allergies    Current Outpatient Medications   Medication Sig Dispense Refill     acetaminophen (TYLENOL) 325 MG tablet Take 2 tablets (650 mg) by mouth every 6 hours as needed for mild pain 90 tablet 0     B Complex-C-Folic Acid (DIALYVITE 800) 0.8 MG TABS Take 1 tablet by mouth every morning        carvedilol (COREG) 6.25 MG tablet Take 1 tablet (6.25 mg) by mouth 2 times daily (with meals) 180 tablet 0     cephALEXin (KEFLEX) 500 MG capsule TAKE ONE CAPSULE BY MOUTH TWICE DAILY FOR 14 DAYS       fluconazole (DIFLUCAN) 200 MG tablet TAKE ONE TABLET BY MOUTH EVERY 48 HOURS FOR 4 WEEKS       furosemide (LASIX) 40 MG tablet Take 40 mg by mouth every morning        gentamicin (GARAMYCIN) 0.1 % external ointment every morning        lisinopril (ZESTRIL) 5 MG tablet Take 1 tablet (5 mg) by mouth every evening 90 tablet 3     VITAMIN D, CHOLECALCIFEROL, PO Take by mouth daily Takes M, W, F         Social History     Tobacco Use     Smoking status: Light Tobacco Smoker      Packs/day: 0.40     Types: Cigarettes     Start date: 2002     Smokeless tobacco: Never Used     Tobacco comment: 4-8 cigs /day   Substance Use Topics     Alcohol use: Not Currently     Comment: quit alcohol 3-4 yrs ago     Drug use: Not Currently     Types: Methamphetamines       Jane Scruggs  8/25/2021  1:28 PM

## 2021-08-25 NOTE — PROGRESS NOTES
CHIEF COMPLAINT   It was my pleasure to see Chip Fowler who is a 36 year old male for follow-up of renal lesion.      HPI  Chip Fowler is a very pleasant 36 year old male who presents with a history of ESRD and congenital solitary kidney. On dialysis and under consideration for kidney transplant. On ultrasound earlier this year, noted to have indeterminate left renal lesion on native kidney.      Of note, he also has complex history of urethral stricture, and had this repaired by Dr. Mejia.    On Follow-up MRI, lesion in kidney thought to be suspicious in appearance. Subsequent biopsy, reviewed today, demonstrates no evidence of malignancy.    CT renal biopsy 8/9/2021  Final Diagnosis   Kidney, left, biopsy:  - Benign renal parenchyma  - Negative for malignancy in this specimen   There is no immunophenotypic evidence of non-Hodgkin lymphoma. This specimen has decreased viability, which may affect the ability to detect a neoplastic process.  Final interpretation requires correlation with morphologic and clinical features.     MRI abd 6/22/2021  IMPRESSION:  1.  No significant interval change in configuration of abnormal signal  throughout the majority of the left kidney. This demonstrates moderate  elevated T2 signal and may represent an infiltrative process within  the left kidney including potential neoplasm. Recommend further  oncologic workup. No new mass effect can be seen. No hydronephrosis on  the left.  2.  Suggestion of iron deposition within the liver.  3.  Moderate ascites.  4.  Right kidney is absent.    PHYSICAL EXAM  Patient is a 36 year old  male   Vitals: Blood pressure 112/71, pulse 64, height 1.829 m (6'), weight 70.3 kg (155 lb).  General Appearance Adult: Body mass index is 21.02 kg/m .  Alert, no acute distress, oriented  Lungs: no respiratory distress, or pursed lip breathing  Abdomen: soft, nontender, no organomegaly or masses  Back: no CVAT  Neuro: Alert, oriented, speech and mentation  normal  Psych: affect and mood normal    Outside and Past Medical records:  Review of the result(s) of each unique test - MRI, renal biopsy    ASSESSMENT and PLAN  36 year old male with ESRD undergoing transplant workup. Left renal lesion has now been biopsied and demonstrated to be benign. No further workup indicated at this time. OK to proceed with transplant from urology perspective. Will follow-up with Dr. Mejia regarding his urethra, as scheduled.    21 minutes spent on the date of the encounter doing chart review, history and exam, documentation and further activities as noted above.    Sam Liriano MD  Urology  [facility]

## 2021-09-08 ENCOUNTER — TELEPHONE (OUTPATIENT)
Dept: TRANSPLANT | Facility: CLINIC | Age: 36
End: 2021-09-08

## 2021-09-08 ENCOUNTER — COMMITTEE REVIEW (OUTPATIENT)
Dept: TRANSPLANT | Facility: CLINIC | Age: 36
End: 2021-09-08

## 2021-09-08 NOTE — TELEPHONE ENCOUNTER
Called Chip to discuss ACTIVE status. He was placed on active status today 9/8/2021. This means he is eligible to receive organ offers. He should keep his phone on and charged at all times. Answer all incoming calls even if he does not recognize the incoming number. Return any missed calls as soon as possible. Reminded him the on call coordinator will not leave a message and he has 30 minutes to return the missed call before the organ is offered to the next recipient. He will be told all known information about the donor and may decline an offer for any reason without penalty or consequence. He will be told when to report to the hospital, when to stop eating/drinking and what medications to take. Reviewed the admission process, periop, surgery,pacu, intermediate care and 7A routines. Surgery will take 4-5 hours, he will be in the hospital for 3-4 days and once discharged will need to be seen daily in [facility] for the first 2 weeks. Emphasized the importance of staying on track with his medications and labs.He is aware a PRA is due. He will have a new set of coordinators while on the wait list and their names will be included on the active listing letter. Active listing letter, PRA orders/mailers will be sent.

## 2021-09-08 NOTE — TELEPHONE ENCOUNTER
Called pt to introduce myself as WL coordinator and encouraged pt to stay up on health maintenance and to let us know if insurance changes, contact info updates. Explained WL protocol for pt's status and when follow up is needed. Gave pt direct information and encouraged to reach out with any questions/concerns.

## 2021-09-08 NOTE — LETTER
PHYSICIAN ORDER   ALA/PRA BLOOD    DATE & TIME ISSUED: 2021 4:37 PM  PATIENT NAME: Chip Fowler   : 1985     [facility] MR#  [id number]     DIAGNOSIS/ICD-10 CODE: Awaiting Organ transplant [Z76.82}   EXPIRES: (1 YEAR AFTER DATE ISSUED)  EVERY 12 weeks / 3 months Please draw and send in as soon as possible  1. Please draw 20ml of blood in red top (plain) tube for Antileukocyte Antibody (ALA or PRA).   2. Label tubes with the patient s name, complete lab slip.         3. Mailers, lab slips with instructions are sent to patient separately.      4. Call the Outreach Lab at [phone] to reorder mailers.       5. Mail blood to (this address is also on the mailers):    IMMUNOLOGY LABORATORY   [facility]   Room [phone] [address]  [address]      Viktoria Stephens MD FACS  Assistant Professor of Surgery  Director, Living Kidney Donor Program.

## 2021-09-08 NOTE — COMMITTEE REVIEW
Abdominal Committee Review Note     Evaluation Date: 6/18/2020  Committee Review Date: 9/8/2021    Organ being evaluated for: Kidney    Transplant Phase: Evaluation  Transplant Status: Active    Transplant Coordinator: Naima Johns  Transplant Surgeon:       Referring Physician: Les Jaime    Primary Diagnosis: Nephrolithiasis  Secondary Diagnosis: Hypertensive Nephrosclerosis    Committee Review Members:  Nephrology Franko Cerda MD, Cuauhtemoc Syed, APRN CNP, Rajeev Santos MD, Georgie Herrera MD, Edwin Green MD   Nutrition Yola Cochran,    Pharmacy Stefano Harris [facility]   Physician Assistant - Medical Anca Robison, APRN CNP    - Clinical Fadumo Breanna Truong, [facility]   Transplant Sindy Segura, DIPTI, Lee Soler MD, Barbi Cooper, DIPTI, Jane Man, SILVIA, Ashlee Garcia, RN, Liz Shields, RN, Carlyn Gorman, RN, Naima Johns, RN   Transplant Surgery Viktoria Stephens MD, MD       Transplant Eligibility: Irreversible chronic kidney disease treated w/dialysis or expected need for dialysis    Committee Review Decision: Approved    Relative Contraindications: None    Absolute Contraindications: None    Committee Chair Lee Soler MD verbally attested to the committee's decision.    Committee Discussion Details: Reviewed medical records and evaluation results to date. Reviewed the sequence of events leading up to the urethroplasty and need for urology to place shaver catheter at time of transplant. Reviewed negative renal mass biopsy. Reviewed work up for eosinopilia with ID, allergy,hematology and negative bmb. Conclusion is reaction to PD catheter. Reviewed cardiology approval. Reviewed completed CD assessment. He is approved for ACTIVE status. Patient will be called, ACTIVE listing letter, PRA orders and mailers will be sent.

## 2021-09-08 NOTE — LETTER
2021    Chip Fowler  [address] [address]    Dear Chip,    This letter is sent to confirm that you have completed your transplant work-up and you are a candidate in the kidney transplant program at the [facility].  You were placed on the kidney active waitlist on 2021. Your waiting time will start with the start date of your dialysis which is 2020 so you will not be disadvantaged. .      When you are active on the waitlist and an organ becomes available, a coordinator will need to speak to you immediately.  You could be contacted at any time during the day or night as an organ could become available at any time.  Please make certain our office always has your current telephone numbers and address.Keep your phone on and charged at all times. Answer all incoming calls even if you do not recognize the incoming number. Return any missed calls as soon as possible. Remember, the on call coordinator will not leave you a message and you have 30 minutes to return the missed call before the organ is offered to the next recipient in line. You will be given all known information about the donor and you may decline an organ offer for any reason without penalty or consequence. You will be told when to report to the hospital, when to stop eating/drinking and what medications to take.    Items we will need from you:      We have received approval from your insurance company for the transplant procedure.  It is critical that you notify us if there is any change in your insurance.  It is also important that you familiarize yourself with the details of your specific insurance policy.  Our patient  is available to assist you if you should have any questions regarding your coverage.      An ALA or PRA blood sample will  need to be sent here every 3 months to match you with  donors or any potential  living donors.  If you need this testing, special mailing boxes (called mailers) will be sent to you directly from the Outreach Department. You should take the physician order form and the  to your home laboratory when it is time to for this testing to be done.  Additional mailers can be obtained by calling the Transplant Office and asking to speak to a kidney .Please have this lab drawn and sent in as soon as possible.      During this waiting period, we may request additional periodic laboratory tests with your primary physician.  It will be your responsibility to remind your physician to forward your results to the Transplant Office.      We need to be kept informed of any changes in your medical condition such as:    o changes in your medications,   o significant changes in your health  o significant infections (such as pneumonia or abscesses)  o blood transfusions  o any condition which requires hospitalization  o any surgery      Remember to complete any routine cancer screening tests required before your transplant.  This includes colonoscopy; prostate screening for men, and mammogram and gynecologic testing for women, as well as dental work.  Your primary care clinic can assist you with arranging for these exams.  Remind your caregivers to forward copies of the records and final reports.    We want you to know that our program has physician and surgeon coverage 24 hours a day, 365 days a year. If this coverage changes or there are substantial program changes, you will be notified in writing by letter. You will now have a new set of coordinators while on the wait list. Jane Baugh -[phone] and Phyllis Hernandez -[phone].    Attached is a letter from the [employer] for Organ Sharing (UNOS). It describes the services and information offered to patients by UNOS and the Organ Procurement and Transplantation Network.    We appreciate having had the opportunity to  participate in your care.  If you have questions, please feel free to call the Transplant Office at [phone] or [phone].      Sincerely,      Solid Organ Transplant  [facility], [facility]      Enclosures: JOSE FRANCISCO  cc: Les Jaime, Chip Nath, Yoli Asher, Daniel Hargrove               The Organ Procurement and Transplantation Network  Toll-free patient services line:     Your resource for organ transplant information    If you have a question regarding your own medical care, you always should call your transplant hospital first. However, for general organ transplant-related information, you can call the Organ Procurement and Transplantation Network (OPTN) toll-free patient services line at [phone]- [phone]. Anyone, including potential transplant candidates, candidates, recipients, family members, friends, living donors, and donor family members, can call this number to:          Talk about organ donation, living donation, the transplant process, the donation process, and transplant policies.    Get a free patient information kit with helpful booklets, waiting list and transplant information, and a list of all transplant hospitals.    Ask questions about the OPTN website (https://optn.transplant.hrsa.gov/), the [employer] for Organ Sharing s (UNOS) website (https://unos.org/), or the UNOS website for living donors and transplant recipients. (https://www.transplantliving.org/).    Learn how the OPTN can help you.    Talk about any concerns that you may have with a transplant hospital.    The [facility] s transplant system, the OPTN, is managed under federal contract by the [employer] Organ Sharing (UNOS), which is a non-profit charitable organization. The OPTN helps create and define organ sharing policies that make the best use of donated organs. This process continuously evaluating new advances and  discoveries so policies can be adapted to best serve patients waiting for transplants. To do so, the OPTN works closely with transplant professionals, transplant patients, transplant candidates, donor families, living donors, and the public. All transplant programs and organ procurement organizations throughout the country are OPTN members and are obligated to follow the policies the OPTN creates for allocating organs.    The OPTN also is responsible for:      Providing educational material for patients, the public, and professionals.    Raising awareness of the need for donated organs and tissue.    Coordinating organ procurement, matching, and placement.    Collecting information about every organ transplant and donation that occurs in the [place].    Remember, you should contact your transplant hospital directly if you have questions or concerns about your own medical care including medical records, work-up progress, and test results.    We are not your transplant hospital, and our staff will not be able to answer questions about your case, so please keep your transplant hospital s phone number handy.    However, while you research your transplant needs and learn as much as you can about transplantation and donation, we welcome your call to our toll-free patient services line at [phone].          Updated 4/1/2019

## 2021-09-09 DIAGNOSIS — Z76.82 AWAITING ORGAN TRANSPLANT: Primary | ICD-10-CM

## 2021-09-16 ENCOUNTER — DOCUMENTATION ONLY (OUTPATIENT)
Dept: TRANSPLANT | Facility: CLINIC | Age: 36
End: 2021-09-16

## 2021-09-17 ENCOUNTER — LAB (OUTPATIENT)
Dept: LAB | Facility: CLINIC | Age: 36
End: 2021-09-17
Payer: MEDICARE

## 2021-09-17 DIAGNOSIS — Z76.82 AWAITING ORGAN TRANSPLANT: ICD-10-CM

## 2021-09-17 PROCEDURE — 86832 HLA CLASS I HIGH DEFIN QUAL: CPT

## 2021-09-17 PROCEDURE — 86833 HLA CLASS II HIGH DEFIN QUAL: CPT

## 2021-09-21 LAB
SA 1 CELL: NORMAL
SA 1 TEST METHOD: NORMAL
SA 2 CELL: NORMAL
SA 2 TEST METHOD: NORMAL
SA1 HI RISK ABY: NORMAL
SA1 MOD RISK ABY: NORMAL
SA2 HI RISK ABY: NORMAL
SA2 MOD RISK ABY: NORMAL
ZZZSA 1  COMMENTS: NORMAL
ZZZSA 2 COMMENTS: NORMAL

## 2021-09-22 LAB
PROTOCOL CUTOFF: NORMAL
UNACCEPTABLE ANTIGENS: NORMAL
UNOS CPRA: 46

## 2021-10-10 ENCOUNTER — HEALTH MAINTENANCE LETTER (OUTPATIENT)
Age: 36
End: 2021-10-10

## 2021-10-21 ENCOUNTER — TELEPHONE (OUTPATIENT)
Dept: TRANSPLANT | Facility: CLINIC | Age: 36
End: 2021-10-21

## 2022-01-03 ENCOUNTER — LAB (OUTPATIENT)
Dept: LAB | Facility: CLINIC | Age: 37
End: 2022-01-03
Payer: MEDICARE

## 2022-01-03 DIAGNOSIS — Z76.82 AWAITING ORGAN TRANSPLANT: ICD-10-CM

## 2022-01-03 PROCEDURE — 86832 HLA CLASS I HIGH DEFIN QUAL: CPT

## 2022-01-03 PROCEDURE — 86833 HLA CLASS II HIGH DEFIN QUAL: CPT

## 2022-01-11 LAB
PROTOCOL CUTOFF: NORMAL
SA 1 CELL: NORMAL
SA 1 TEST METHOD: NORMAL
SA 2 CELL: NORMAL
SA 2 TEST METHOD: NORMAL
SA1 HI RISK ABY: NORMAL
SA1 MOD RISK ABY: NORMAL
SA2 HI RISK ABY: NORMAL
SA2 MOD RISK ABY: NORMAL
UNACCEPTABLE ANTIGENS: NORMAL
UNOS CPRA: 46
ZZZSA 1  COMMENTS: NORMAL
ZZZSA 2 COMMENTS: NORMAL

## 2022-01-17 ENCOUNTER — PRE VISIT (OUTPATIENT)
Dept: UROLOGY | Facility: CLINIC | Age: 37
End: 2022-01-17
Payer: MEDICARE

## 2022-01-30 ENCOUNTER — HEALTH MAINTENANCE LETTER (OUTPATIENT)
Age: 37
End: 2022-01-30

## 2022-02-09 ENCOUNTER — OFFICE VISIT (OUTPATIENT)
Dept: UROLOGY | Facility: CLINIC | Age: 37
End: 2022-02-09
Payer: MEDICARE

## 2022-02-09 VITALS — DIASTOLIC BLOOD PRESSURE: 80 MMHG | HEART RATE: 70 BPM | SYSTOLIC BLOOD PRESSURE: 120 MMHG

## 2022-02-09 DIAGNOSIS — N35.819 OTHER STRICTURE OF URETHRA IN MALE: Primary | ICD-10-CM

## 2022-02-09 PROCEDURE — 52000 CYSTOURETHROSCOPY: CPT | Performed by: UROLOGY

## 2022-02-09 ASSESSMENT — PAIN SCALES - GENERAL: PAINLEVEL: NO PAIN (0)

## 2022-02-09 NOTE — LETTER
Date:February 11, 2022      Patient was self referred, no letter generated. Do not send.        [facility] Health Information

## 2022-02-09 NOTE — PATIENT INSTRUCTIONS
"  AFTER YOUR CYSTOSCOPY        You have just completed a cystoscopy, or \"cysto\", which allowed your physician to learn more about your bladder (or to remove a stent placed after surgery). We suggest that you continue to avoid caffeine, fruit juice, and alcohol for the next 24 hours, however, you are encouraged to return to your normal activities.         A few things that are considered normal after your cystoscopy:     * Small amount of bleeding (or spotting) that clears within the next 24 hours     * Slight burning sensation with urination     * Sensation to of needing to avoid more frequently     * The feeling of \"air\" in your urine     * Mild discomfort that is relieved with Tylenol        Please contact our office promptly if you:     * Develop a fever above 101 degrees     * Are unable to urinate     * Develop bright red blood that does not stop     * Severe pain or swelling     Please follow up as needed.    It was a pleasure meeting with you today.  Thank you for allowing me and my team the privilege of caring for you today.  YOU are the reason we are here, and I truly hope we provided you with the excellent service you deserve.  Please let us know if there is anything else we can do for you so that we can be sure you are leaving completely satisfied with your care experience.          "

## 2022-02-09 NOTE — LETTER
2/9/2022       RE: Chip Fowler  [address] [address]     Dear Colleague,    Thank you for referring your patient, Chip Fowler, to the [facility] [place] at [facility]. Please see a copy of my visit note below.    Urethroplasty Follow-up Visit with Surveillance Urethroscopy    PRE-PROCEDURE DIAGNOSIS: History of urethral stricture  POST-PROCEDURE DIAGNOSIS: No evidence of urethral stricture   PROCEDURE: Urethroscopy    HISTORY: Chip Fowler is a 36 year old man with ESRD on PD who is seeking renal transplant.  He is s/p buccal urethroplasty + diverticulum excision in Dec 2020.  He is doing well.  On the transplant list.  Voiding without issues.  Happy with sexual function      DESCRIPTION OF PROCEDURE:  After informed consent was obtained, the patient was brought to the procedure room where he was placed in the supine position with all pressure points well padded.  He was prepped and draped in a sterile fashion. A flexible cystoscope was introduced through a well-lubricated urethra.  The anterior urethra up to the point of reconstruction was normal in appearance. At the site of reconstruction there were scattered bands of urethral stricture, but the flexible 17 Fr cystoscope passes through the entirety of the urethra without issue.    There is a very very small penile urethral diverticulum.    The voluntary sphincter was identified and the scope withdrawn.    ASSESSMENT AND PLAN:    Excellent outcome after urethroplasty without recurrence.  Cleared for transplant from urologic standpoint. No further surgeries or urologic surveillance required.    I would also suggest a 14 Fr catheter if needs Taylor during transplant given mild banding. I would consider cystoscopic placement with a wire during time of renal transplant given his penile urethral diverticulum if the catheter does not pass easily.    Can followup PRN if  voiding issues arise but recurrence rates are extremely low after 1 yr for urethral stricture.    Sam Mejia MD      Again, thank you for allowing me to participate in the care of your patient.      Sincerely,    Sam Mejia MD

## 2022-02-09 NOTE — PROGRESS NOTES
Urethroplasty Follow-up Visit with Surveillance Urethroscopy    PRE-PROCEDURE DIAGNOSIS: History of urethral stricture  POST-PROCEDURE DIAGNOSIS: No evidence of urethral stricture   PROCEDURE: Urethroscopy    HISTORY: Chip Fowler is a 36 year old man with ESRD on PD who is seeking renal transplant.  He is s/p buccal urethroplasty + diverticulum excision in Dec 2020.  He is doing well.  On the transplant list.  Voiding without issues.  Happy with sexual function      DESCRIPTION OF PROCEDURE:  After informed consent was obtained, the patient was brought to the procedure room where he was placed in the supine position with all pressure points well padded.  He was prepped and draped in a sterile fashion. A flexible cystoscope was introduced through a well-lubricated urethra.  The anterior urethra up to the point of reconstruction was normal in appearance. At the site of reconstruction there were scattered bands of urethral stricture, but the flexible 17 Fr cystoscope passes through the entirety of the urethra without issue.    There is a very very small penile urethral diverticulum.    The voluntary sphincter was identified and the scope withdrawn.    ASSESSMENT AND PLAN:    Excellent outcome after urethroplasty without recurrence.  Cleared for transplant from urologic standpoint. No further surgeries or urologic surveillance required.    I would also suggest a 14 Fr catheter if needs Taylor during transplant given mild banding. I would consider cystoscopic placement with a wire during time of renal transplant given his penile urethral diverticulum if the catheter does not pass easily.    Can followup PRN if voiding issues arise but recurrence rates are extremely low after 1 yr for urethral stricture.    Sam Mejia MD

## 2022-03-21 ENCOUNTER — HOSPITAL ENCOUNTER (EMERGENCY)
Facility: CLINIC | Age: 37
Discharge: HOME | End: 2022-03-21
Attending: EMERGENCY MEDICINE | Admitting: EMERGENCY MEDICINE
Payer: MEDICARE

## 2022-03-21 ENCOUNTER — APPOINTMENT (OUTPATIENT)
Dept: GENERAL RADIOLOGY | Facility: CLINIC | Age: 37
End: 2022-03-21
Attending: EMERGENCY MEDICINE
Payer: MEDICARE

## 2022-03-21 VITALS
RESPIRATION RATE: 24 BRPM | DIASTOLIC BLOOD PRESSURE: 78 MMHG | TEMPERATURE: 97.8 F | HEART RATE: 62 BPM | SYSTOLIC BLOOD PRESSURE: 140 MMHG | OXYGEN SATURATION: 100 %

## 2022-03-21 DIAGNOSIS — R05.9 COUGH: ICD-10-CM

## 2022-03-21 DIAGNOSIS — N18.6 ESRD (END STAGE RENAL DISEASE) ON DIALYSIS (H): ICD-10-CM

## 2022-03-21 DIAGNOSIS — Z99.2 ESRD (END STAGE RENAL DISEASE) ON DIALYSIS (H): ICD-10-CM

## 2022-03-21 DIAGNOSIS — J06.9 UPPER RESPIRATORY TRACT INFECTION, UNSPECIFIED TYPE: ICD-10-CM

## 2022-03-21 LAB
ALBUMIN SERPL-MCNC: 3.1 G/DL (ref 3.4–5)
ALP SERPL-CCNC: 62 U/L (ref 40–150)
ALT SERPL W P-5'-P-CCNC: 73 U/L (ref 0–70)
ANION GAP SERPL CALCULATED.3IONS-SCNC: 9 MMOL/L (ref 3–14)
AST SERPL W P-5'-P-CCNC: 37 U/L (ref 0–45)
BASOPHILS # BLD AUTO: 0.1 10E3/UL (ref 0–0.2)
BASOPHILS NFR BLD AUTO: 1 %
BILIRUB SERPL-MCNC: 0.3 MG/DL (ref 0.2–1.3)
BUN SERPL-MCNC: 51 MG/DL (ref 7–30)
CALCIUM SERPL-MCNC: 8.3 MG/DL (ref 8.5–10.1)
CHLORIDE BLD-SCNC: 109 MMOL/L (ref 94–109)
CO2 SERPL-SCNC: 21 MMOL/L (ref 20–32)
CREAT SERPL-MCNC: 5.35 MG/DL (ref 0.66–1.25)
EOSINOPHIL # BLD AUTO: 1.2 10E3/UL (ref 0–0.7)
EOSINOPHIL NFR BLD AUTO: 15 %
ERYTHROCYTE [DISTWIDTH] IN BLOOD BY AUTOMATED COUNT: 12.4 % (ref 10–15)
FLUAV RNA SPEC QL NAA+PROBE: NEGATIVE
FLUBV RNA RESP QL NAA+PROBE: NEGATIVE
GFR SERPL CREATININE-BSD FRML MDRD: 13 ML/MIN/1.73M2
GLUCOSE BLD-MCNC: 90 MG/DL (ref 70–99)
HCT VFR BLD AUTO: 37.3 % (ref 40–53)
HGB BLD-MCNC: 12.5 G/DL (ref 13.3–17.7)
HOLD SPECIMEN: NORMAL
HOLD SPECIMEN: NORMAL
IMM GRANULOCYTES # BLD: 0 10E3/UL
IMM GRANULOCYTES NFR BLD: 0 %
LYMPHOCYTES # BLD AUTO: 2 10E3/UL (ref 0.8–5.3)
LYMPHOCYTES NFR BLD AUTO: 25 %
MCH RBC QN AUTO: 32.1 PG (ref 26.5–33)
MCHC RBC AUTO-ENTMCNC: 33.5 G/DL (ref 31.5–36.5)
MCV RBC AUTO: 96 FL (ref 78–100)
MONOCYTES # BLD AUTO: 0.5 10E3/UL (ref 0–1.3)
MONOCYTES NFR BLD AUTO: 7 %
NEUTROPHILS # BLD AUTO: 4.1 10E3/UL (ref 1.6–8.3)
NEUTROPHILS NFR BLD AUTO: 52 %
NRBC # BLD AUTO: 0 10E3/UL
NRBC BLD AUTO-RTO: 0 /100
NT-PROBNP SERPL-MCNC: 174 PG/ML (ref 0–450)
PLATELET # BLD AUTO: 233 10E3/UL (ref 150–450)
POTASSIUM BLD-SCNC: 4.9 MMOL/L (ref 3.4–5.3)
PROT SERPL-MCNC: 7 G/DL (ref 6.8–8.8)
RBC # BLD AUTO: 3.89 10E6/UL (ref 4.4–5.9)
SARS-COV-2 RNA RESP QL NAA+PROBE: NEGATIVE
SODIUM SERPL-SCNC: 139 MMOL/L (ref 133–144)
TROPONIN I SERPL HS-MCNC: 22 NG/L
WBC # BLD AUTO: 7.9 10E3/UL (ref 4–11)

## 2022-03-21 PROCEDURE — 80053 COMPREHEN METABOLIC PANEL: CPT | Performed by: EMERGENCY MEDICINE

## 2022-03-21 PROCEDURE — 99285 EMERGENCY DEPT VISIT HI MDM: CPT | Mod: 25

## 2022-03-21 PROCEDURE — 250N000013 HC RX MED GY IP 250 OP 250 PS 637: Performed by: EMERGENCY MEDICINE

## 2022-03-21 PROCEDURE — 85025 COMPLETE CBC W/AUTO DIFF WBC: CPT | Performed by: EMERGENCY MEDICINE

## 2022-03-21 PROCEDURE — 71046 X-RAY EXAM CHEST 2 VIEWS: CPT

## 2022-03-21 PROCEDURE — 93005 ELECTROCARDIOGRAM TRACING: CPT

## 2022-03-21 PROCEDURE — 83880 ASSAY OF NATRIURETIC PEPTIDE: CPT | Performed by: EMERGENCY MEDICINE

## 2022-03-21 PROCEDURE — 87636 SARSCOV2 & INF A&B AMP PRB: CPT | Performed by: EMERGENCY MEDICINE

## 2022-03-21 PROCEDURE — C9803 HOPD COVID-19 SPEC COLLECT: HCPCS

## 2022-03-21 PROCEDURE — 93308 TTE F-UP OR LMTD: CPT

## 2022-03-21 PROCEDURE — 94640 AIRWAY INHALATION TREATMENT: CPT

## 2022-03-21 PROCEDURE — 36415 COLL VENOUS BLD VENIPUNCTURE: CPT | Performed by: EMERGENCY MEDICINE

## 2022-03-21 PROCEDURE — 84484 ASSAY OF TROPONIN QUANT: CPT | Performed by: EMERGENCY MEDICINE

## 2022-03-21 RX ORDER — FLUTICASONE PROPIONATE 50 MCG
1 SPRAY, SUSPENSION (ML) NASAL DAILY
Qty: 11.1 ML | Refills: 0 | Status: SHIPPED | OUTPATIENT
Start: 2022-03-21 | End: 2022-03-26

## 2022-03-21 RX ORDER — ALBUTEROL SULFATE 90 UG/1
6 AEROSOL, METERED RESPIRATORY (INHALATION) ONCE
Status: COMPLETED | OUTPATIENT
Start: 2022-03-21 | End: 2022-03-21

## 2022-03-21 RX ADMIN — ALBUTEROL SULFATE 6 PUFF: 90 AEROSOL, METERED RESPIRATORY (INHALATION) at 19:12

## 2022-03-21 NOTE — ED PROVIDER NOTES
History     Chief Complaint:  Shortness of Breath, Nasal Congestion, and Cough     HPI:  Chip Fowler is a 36 year old male who presents with shortness of breath, nasal congestion and cough.  Patient reports his son was ill with URI symptoms about 2 weeks previous.  The patient and his wife subsequently developed symptoms last week.  The patient symptoms began on Thursday (4-5 days ago).  Wife has improved, though patient continues with nasal congestion, postnasal drip, cough, and wheezing.  He also notes a feeling of fullness in his lungs when lying down flat, and has had to sit upright.  He does have a history of ESRD secondary to uncontrolled HTN, as well as subsequent heart failure exacerbation at the time of diagnosis a few years ago.  He presents to the ED to ensure that his symptoms today are not suggestive of recurrent heart failure.  He denies any lower extremity edema.  He denies any known Covid exposures.  He has received his Covid vaccine and booster.  He is a former smoker.  He acknowledges hearing occasional wheezing.  He denies associated fevers.  Patient currently on peritoneal dialysis.  Reports compliance with this.  On the transplant list for kidney transplant.  Reports hypertension is well controlled, and typical blood pressure is 120/70.      Interpretation Summary  Echo 2/25/21  A low to moderate workload was achieved.  Target Heart Rate was achieved.  The resting visual ejection fraction is estimated at 45-50%.  Global LV systolic function augments with exercise.  The visual ejection fraction is estimated at 55-60%.  This test indicates a low probability of severe occlusive coronary artery  disease.    Allergies:  No Known Allergies     Medications:    fluticasone (FLONASE) 50 MCG/ACT nasal spray  acetaminophen (TYLENOL) 325 MG tablet  B Complex-C-Folic Acid (DIALYVITE 800) 0.8 MG TABS  carvedilol (COREG) 6.25 MG tablet  cephALEXin (KEFLEX) 500 MG capsule  fluconazole (DIFLUCAN) 200 MG  tablet  furosemide (LASIX) 40 MG tablet  gentamicin (GARAMYCIN) 0.1 % external ointment  lisinopril (ZESTRIL) 5 MG tablet  VITAMIN D, CHOLECALCIFEROL, PO      Past Medical History:    Past Medical History:   Diagnosis Date     Bladder stones      Cardiomyopathy (H)      ESRD (end stage renal disease) on dialysis (H)      Hypertension      Migraines      Polysubstance abuse (H)      Solitary kidney, congenital      Urethral stone      Urethral stricture      Patient Active Problem List    Diagnosis Date Noted     Other eosinophilia 12/03/2020     Priority: Medium     Allergic rhinitis, unspecified seasonality, unspecified trigger 12/03/2020     Priority: Medium     Other dysphagia 12/03/2020     Priority: Medium     Post-traumatic male urethral stricture 11/18/2020     Priority: Medium     Added automatically from request for surgery [id number]       Urethral diverticulum 11/18/2020     Priority: Medium     Added automatically from request for surgery [id number]       Urethral calculus 08/12/2020     Priority: Medium     Added automatically from request for surgery [id number]       Bladder stones      Priority: Medium     Urethral stone      Priority: Medium     Urethral stricture      Priority: Medium     Polysubstance abuse (H)      Priority: Medium     ESRD (end stage renal disease) on dialysis (H)      Priority: Medium     Hypertension      Priority: Medium     Solitary kidney, congenital      Priority: Medium     Cardiomyopathy (H)      Priority: Medium     Tobacco use 02/25/2020     Priority: Medium     Unilateral congenital absence of kidney 02/24/2020     Priority: Medium     Abnormal urinalysis 02/24/2020     Priority: Medium     Acute renal failure superimposed on chronic kidney disease (H) 02/24/2020     Priority: Medium     Congestive heart failure of unknown etiology (H) 02/24/2020     Priority: Medium     Methamphetamine abuse, episodic (H) 02/24/2020     Priority: Medium     Migraine 02/24/2020     Priority:  Medium     Nephrolithiasis 11/17/2014     Priority: Medium     Acute retention of urine 06/16/2014     Priority: Medium        Past Surgical History:    Past Surgical History:   Procedure Laterality Date     BIOPSY  02/2020    renal, [employer]     COMBINED CYSTOSCOPY, LASER HOLMIUM LITHOTRIPSY URETER(S)       CYSTOSCOPY       CYSTOSCOPY FLEXIBLE, CYSTOSTOMY, INSERT TUBE SUPRAPUBIC, COMBINED N/A 12/14/2020    Procedure: CYSTOSCOPY, WITH SUPRAPUBIC CATHETER INSERTION;  Surgeon: Sam Mejia MD;  Location: [facility] OR     CYSTOSCOPY, OPEN EXCISION URETHRAL DIVERTICULUM, COMBINED N/A 12/14/2020    Procedure: EXCISION OF URETHRAL DIVERTICULUM X2;  Surgeon: Sam Mejia MD;  Location: [facility] OR     HERNIA REPAIR      infant     INSERT CATHETER PERITONEAL DIALYSIS       LASER HOLMIUM LITHOTRIPSY URETER(S), INSERT STENT, COMBINED N/A 8/21/2020    Procedure: CYSTOSCOPY, bladder and urethral stone extraction, removal of foreign body, urethral dilation, urethrotomy, laser on standby;  Surgeon: Sam Mejia MD;  Location: [facility]     urethral dilation       URETHROPLASTY WITH BUCCAL GRAFT N/A 12/14/2020    Procedure: URETHROPLASTY, USING BUCCAL MUCOSA GRAFT;  Surgeon: Sam Mejia MD;  Location: [facility] OR        Family History:    family history includes Alzheimer Disease in his mother; No Known Problems in his sister and sister; Unknown/Adopted in his father.    Social History:   reports that he has been smoking cigarettes. He started smoking about 20 years ago. He has been smoking about 0.40 packs per day. He has never used smokeless tobacco. He reports previous alcohol use. He reports previous drug use. Drug: Methamphetamines.    Review of Systems:  10 point ROS is negative aside from that mentioned in the HPI      Physical Exam     Patient Vitals for the past 24 hrs:   BP Temp Temp src Pulse Resp SpO2   03/21/22 1916 (!) 135/97 -- -- -- -- 97 %   03/21/22 1544 (!) 130/94 97.8  F (36.6  C) Temporal 61 24 99 %       General:   Well-nourished   Speaking in full sentences  Eyes:   Conjunctiva without injection or scleral icterus   PERRL   EOM full w/out entrapment or proptosis  ENT:   Moist mucous membranes   Posterior oropharynx clear without erythema or exudate   No tonsillar hypertrophy, exudate, asymmetry, nor uvular deviation   No oral lesions   Bilateral TM translucent and gray without air/fluid level or overlying erythema, bony landmarks visualized.   Nares patent though notable nasal congestion appreciated   Pinnae normal   No midface swelling, erythema, or asymmetry  Neck:   Full ROM   No stiffness appreciated  Resp:   Faint end expiratory wheezing bilaterally   No crackles  CV:    Normal rate, regular rhythm   S1 and S2 present   No murmur, gallop or rub  GI:   BS present   Abdomen soft without distention   Non-tender to light and deep palpation   No guarding or rebound tenderness  Skin:   Warm, dry, well perfused   No rashes or open wounds on exposed skin  MSK:   Moves all extremities   No focal deformities or swelling  Neuro:   Alert   Answers questions appropriately   Moves all extremities equally   Gait stable  Psych:   Normal affect, normal mood          Emergency Department Course     ECG  ECG taken at 1556, ECG read at 1600  Sinus bradycardia. Otherwise normal ECG.   No significant change as compared to prior, dated 9/1/20.  Rate 58 bpm. TX interval 144 ms. QRS duration 88 ms. QT/QTc 392/384 ms. P-R-T axes 71 69 77.     Imaging:  Radiology findings were communicated with the patient who voiced understanding of the findings.  POC US ECHO LIMITED   Final Result    Limited Bedside ED Cardiac Ultrasound Procedure Note:      PROCEDURE: PERFORMED BY: Dr. Soren Rios MD   INDICATIONS/SYMPTOM:  Shortness of Breath   PROBE: Cardiac phased array probe   BODY LOCATION: Chest   FINDINGS:    The ultrasound was performed utilizing the parasternal long axis and parasternal short axis views.   Cardiac contractility:   Present   Gross estimation of cardiac kinesis: mildly depressed   Pericardial Effusion:  None   RV:LV ratio: RV > LV   Lung:  No b-lines bilaterally   INTERPRETATION:    Cardiac contractility present, mildly depressed EF, no pericardial effusion, no pulmonary edema.     IMAGE DOCUMENTATION: Images were archived to PACs system.              XR Chest 2 Views   Final Result   IMPRESSION: Chest is negative and unchanged. Lungs clear.      JAY JAY EATON MD            SYSTEM ID:  [id number]           Laboratory:  Laboratory findings were communicated with the patient who voiced understanding of the findings.  Labs Ordered and Resulted from Time of ED Arrival to Time of ED Departure   COMPREHENSIVE METABOLIC PANEL - Abnormal       Result Value    Sodium 139      Potassium 4.9      Chloride 109      Carbon Dioxide (CO2) 21      Anion Gap 9      Urea Nitrogen 51 (*)     Creatinine 5.35 (*)     Calcium 8.3 (*)     Glucose 90      Alkaline Phosphatase 62      AST 37      ALT 73 (*)     Protein Total 7.0      Albumin 3.1 (*)     Bilirubin Total 0.3      GFR Estimate 13 (*)    CBC WITH PLATELETS AND DIFFERENTIAL - Abnormal    WBC Count 7.9      RBC Count 3.89 (*)     Hemoglobin 12.5 (*)     Hematocrit 37.3 (*)     MCV 96      MCH 32.1      MCHC 33.5      RDW 12.4      Platelet Count 233      % Neutrophils 52      % Lymphocytes 25      % Monocytes 7      % Eosinophils 15      % Basophils 1      % Immature Granulocytes 0      NRBCs per 100 WBC 0      Absolute Neutrophils 4.1      Absolute Lymphocytes 2.0      Absolute Monocytes 0.5      Absolute Eosinophils 1.2 (*)     Absolute Basophils 0.1      Absolute Immature Granulocytes 0.0      Absolute NRBCs 0.0     TROPONIN I - Normal    Troponin I High Sensitivity 22     NT PROBNP INPATIENT - Normal    N terminal Pro BNP Inpatient 174     INFLUENZA A/B & SARS-COV2 PCR MULTIPLEX - Normal    Influenza A PCR Negative      Influenza B PCR Negative      SARS CoV2 PCR Negative         Diverticulitis interventions:  Medications   albuterol (PROVENTIL HFA/VENTOLIN HFA) inhaler (6 puffs Inhalation Given 3/21/22 1912)        Emergency Department Course / Reassessment:  Nursing notes and vitals reviewed.  6:41 PM: I performed an exam of the patient as documented above.      The patient was sent for X-ray while in the emergency department, results above.      ED Course as of 03/21/22 2001   Mon Mar 21, 2022   1911 Bedside US performed   2000 Patient re-evaluated        I discussed the treatment plan with the patient and significant other. They expressed understanding of this plan and consented to discharge. They will be discharged home with instructions for care and follow up. In addition, the patient will return to the emergency department if their symptoms persist, worsen, if new symptoms arise or if there is any concern.  All questions were answered.    I personally reviewed the imaging and laboratory results with the Patient and answered all related questions prior to discharge.      Impression & Plan      Medical Decision Making:  Chip Fowler is a 36 year old male who presents to the ER for evaluation of shortness of breath, cough, and nasal congestion.  Vital signs on presentation reveal mildly elevated BP though otherwise are unremarkable.  History, exam, and ED course as outlined above.  His presenting symptoms are most suspicious for upper respiratory infection and postnasal drip, with resultant cough/shortness of breath.  His exam demonstrates clear nasal congestion, postnasal drip, and occasional cough.  Chest x-ray negative for pneumonia, or other signs of pulmonary edema or pleural effusion.  I feel this is unlikely to represent CHF, in the absence of increased pulmonary edema, effusion, elevated BNP, or lower extremity edema.  Bedside ultrasound negative for pericardial effusion.  Laboratory evaluation revealed BMP consistent with ESRD, though no gross electrolyte abnormalities.   Patient informed of incidentally noted mildly elevated ALT (73), though this is of doubtful clinical significance (no acetaminophen use) and he is understanding he can follow-up with PCP as outpatient to have this re-checked.  ACS considered though felt unlikely as well.  EKG demonstrates sinus rhythm without acute ischemic changes compared with previous and high-sensitivity troponin has returned normal.  Rapid Covid/influenza testing obtained, which returned negative.  Patient was provided a bronchodilator treatment, with improvement in symptoms.  At this point, I suspect patient symptoms most likely URI, and do feel patient is appropriate for discharge and supportive outpatient treatment.  He was able to lie supine without respiratory distress.  Discussed supportive cares including trial of Flonase for decongestion and other supportive cares.  In the absence of fever, lobar infiltrate, hypoxia, or other signs of systemic illness, do feel empiric antibiotic therapy can be deferred safely at this time.  Return precautions discussed including any worsening breathing, development of chest pain, increased swelling, or any other concerns.  All questions have been answered prior to discharge.    Diagnosis:    ICD-10-CM    1. Upper respiratory tract infection, unspecified type  J06.9    2. Cough  R05.9    3. ESRD (end stage renal disease) on dialysis (H)  N18.6     Z99.2         Discharge Medications:  New Prescriptions    FLUTICASONE (FLONASE) 50 MCG/ACT NASAL SPRAY    Spray 1 spray into both nostrils daily for 5 days        3/21/2022   Sorne Rios MD Roach, Brian Donald, MD  03/21/22 2005

## 2022-03-21 NOTE — ED TRIAGE NOTES
"Pt presents with chest congestion, wheezing, shortness of breath when laying on back since last week  Last week, started \"feeling sick\"  Hx heart failure, PD dialysis daily.       Son was sick with cold symptoms 2 weeks ago.  "

## 2022-03-22 LAB
ATRIAL RATE - MUSE: 58 BPM
DIASTOLIC BLOOD PRESSURE - MUSE: NORMAL MMHG
INTERPRETATION ECG - MUSE: NORMAL
P AXIS - MUSE: 71 DEGREES
PR INTERVAL - MUSE: 144 MS
QRS DURATION - MUSE: 88 MS
QT - MUSE: 392 MS
QTC - MUSE: 384 MS
R AXIS - MUSE: 69 DEGREES
SYSTOLIC BLOOD PRESSURE - MUSE: NORMAL MMHG
T AXIS - MUSE: 77 DEGREES
VENTRICULAR RATE- MUSE: 58 BPM

## 2022-03-22 NOTE — DISCHARGE INSTRUCTIONS
Please monitor your symptoms very closely  You may use the Flonase nasal spray in both nostrils daily  Monitor for any worsened shortness of breath, increased swelling of your legs, chest pain, fevers, or any other concerns.  They should prompt you to return to the ED for reassessment.    Discharge Instructions  Upper Respiratory Infection    The upper respiratory tract includes the sinuses, nasal passages, pharynx, and larynx. A URI, or upper respiratory infection, is an infection of any of the parts of the upper airway. Symptoms include runny nose, congestion, sneezing, sore throat, cough, and fever. URIs are almost always caused by a virus. Antibiotics do not help with viral infections, so are generally not prescribed. A URI is very contagious through coughing and nasal secretions; make sure you wash your hands often and clean surfaces after sneezing, coughing or touching them. While you should start to improve in 3 - 5 days, remember that sometimes a cough can linger for several weeks.    Generally, every Emergency Department visit should have a follow-up clinic visit with either a primary or a specialty clinic/provider. Please follow-up as instructed by your emergency provider today.    Return to the Emergency Department if:  Any of your symptoms get much worse.  You seem very sick, like being too weak to get up.  You have chest pain or shortness of breath.   You have a severe headache.  You are vomiting (throwing up) so much you cannot keep fluids or medicines down.  You have confusion or seem unusually drowsy.  You have a seizure.    What can I do to help myself?  Fill any prescriptions the provider gave you and take them right away  If you have a fever, get plenty of rest and drink lots of fluids, especially water.  Using a humidifier or saline nose spray will also help loosen mucous.   Clothes or blankets will not change your fever. Do what is comfortable for you.  Bathing or sponging in lukewarm water may  help you feel better.  Acetaminophen (Tylenol ) or ibuprofen (Advil , Motrin ) will help bring fever down and may help you feel more comfortable. Be sure to read and follow the package directions, and ask your provider if you have questions.  Do not drink alcohol.  Decongestants may help you feel better. You may use decongestant nose sprays Afrin  (oxymetazoline) or Ed-Synephrine  (phenylephrine hydrochloride) for up to 3 days, or may use a decongestant tablet like Sudafed  (pseudoephedrine).  If you were given a prescription for medicine here today, be sure to read all of the information (including the package insert) that comes with your prescription.  This will include important information about the medicine, its side effects, and any warnings that you need to know about.  The pharmacist who fills the prescription can provide more information and answer questions you may have about the medicine.  If you have questions or concerns that the pharmacist cannot address, please call or return to the Emergency Department.   Remember that you can always come back to the Emergency Department if you are not able to see your regular provider in the amount of time listed above, if you get any new symptoms, or if there is anything that worries you.

## 2022-04-04 ENCOUNTER — LAB (OUTPATIENT)
Dept: LAB | Facility: CLINIC | Age: 37
End: 2022-04-04
Payer: MEDICARE

## 2022-04-04 DIAGNOSIS — Z76.82 AWAITING ORGAN TRANSPLANT: ICD-10-CM

## 2022-04-04 PROCEDURE — 86833 HLA CLASS II HIGH DEFIN QUAL: CPT

## 2022-04-04 PROCEDURE — 86832 HLA CLASS I HIGH DEFIN QUAL: CPT

## 2022-04-18 ENCOUNTER — OFFICE VISIT (OUTPATIENT)
Dept: FAMILY MEDICINE | Facility: CLINIC | Age: 37
End: 2022-04-18
Payer: MEDICARE

## 2022-04-18 VITALS
HEIGHT: 72 IN | BODY MASS INDEX: 21.13 KG/M2 | DIASTOLIC BLOOD PRESSURE: 80 MMHG | SYSTOLIC BLOOD PRESSURE: 120 MMHG | HEART RATE: 68 BPM | WEIGHT: 156 LBS | TEMPERATURE: 98.4 F | OXYGEN SATURATION: 100 %

## 2022-04-18 DIAGNOSIS — N17.9 ACUTE RENAL FAILURE SUPERIMPOSED ON CHRONIC KIDNEY DISEASE, ON CHRONIC DIALYSIS, UNSPECIFIED ACUTE RENAL FAILURE TYPE (H): ICD-10-CM

## 2022-04-18 DIAGNOSIS — N18.6 ACUTE RENAL FAILURE SUPERIMPOSED ON CHRONIC KIDNEY DISEASE, ON CHRONIC DIALYSIS, UNSPECIFIED ACUTE RENAL FAILURE TYPE (H): ICD-10-CM

## 2022-04-18 DIAGNOSIS — Z99.2 ACUTE RENAL FAILURE SUPERIMPOSED ON CHRONIC KIDNEY DISEASE, ON CHRONIC DIALYSIS, UNSPECIFIED ACUTE RENAL FAILURE TYPE (H): ICD-10-CM

## 2022-04-18 DIAGNOSIS — Z13.220 SCREENING FOR HYPERLIPIDEMIA: ICD-10-CM

## 2022-04-18 DIAGNOSIS — R94.5 ABNORMAL RESULTS OF LIVER FUNCTION STUDIES: ICD-10-CM

## 2022-04-18 DIAGNOSIS — I50.9 CONGESTIVE HEART FAILURE OF UNKNOWN ETIOLOGY (H): Primary | ICD-10-CM

## 2022-04-18 PROBLEM — N18.9 CHRONIC KIDNEY DISEASE: Status: ACTIVE | Noted: 2021-05-26

## 2022-04-18 LAB
ALBUMIN SERPL-MCNC: 3.4 G/DL (ref 3.4–5)
ALP SERPL-CCNC: 72 U/L (ref 40–150)
ALT SERPL W P-5'-P-CCNC: 76 U/L (ref 0–70)
AST SERPL W P-5'-P-CCNC: 36 U/L (ref 0–45)
BILIRUB DIRECT SERPL-MCNC: <0.1 MG/DL (ref 0–0.2)
BILIRUB SERPL-MCNC: 0.3 MG/DL (ref 0.2–1.3)
CHOLEST SERPL-MCNC: 161 MG/DL
CREAT UR-MCNC: 33 MG/DL
FASTING STATUS PATIENT QL REPORTED: NORMAL
HDLC SERPL-MCNC: 49 MG/DL
LDLC SERPL CALC-MCNC: 96 MG/DL
MICROALBUMIN UR-MCNC: 37 MG/L
MICROALBUMIN/CREAT UR: 112.12 MG/G CR (ref 0–17)
NONHDLC SERPL-MCNC: 112 MG/DL
PROT SERPL-MCNC: 7 G/DL (ref 6.8–8.8)
PTH-INTACT SERPL-MCNC: 315 PG/ML (ref 18–80)
TRIGL SERPL-MCNC: 78 MG/DL
TSH SERPL DL<=0.005 MIU/L-ACNC: 2.34 MU/L (ref 0.4–4)

## 2022-04-18 PROCEDURE — 84443 ASSAY THYROID STIM HORMONE: CPT | Performed by: PHYSICIAN ASSISTANT

## 2022-04-18 PROCEDURE — 99204 OFFICE O/P NEW MOD 45 MIN: CPT | Performed by: PHYSICIAN ASSISTANT

## 2022-04-18 PROCEDURE — 80076 HEPATIC FUNCTION PANEL: CPT | Performed by: PHYSICIAN ASSISTANT

## 2022-04-18 PROCEDURE — 82043 UR ALBUMIN QUANTITATIVE: CPT | Performed by: PHYSICIAN ASSISTANT

## 2022-04-18 PROCEDURE — 83970 ASSAY OF PARATHORMONE: CPT | Performed by: PHYSICIAN ASSISTANT

## 2022-04-18 PROCEDURE — 80061 LIPID PANEL: CPT | Performed by: PHYSICIAN ASSISTANT

## 2022-04-18 PROCEDURE — 36415 COLL VENOUS BLD VENIPUNCTURE: CPT | Performed by: PHYSICIAN ASSISTANT

## 2022-04-18 NOTE — PROGRESS NOTES
Assessment & Plan     Congestive heart failure of unknown etiology (H)    Patient is due for labs to screen cholesterol and thyroid. Stable and sees cardiology.    - Lipid panel reflex to direct LDL Fasting; Future  - TSH WITH FREE T4 REFLEX; Future  - Lipid panel reflex to direct LDL Fasting  - TSH WITH FREE T4 REFLEX      Acute renal failure superimposed on chronic kidney disease, on chronic dialysis, unspecified acute renal failure type (H)    Stable per recent BMP. Due for labs. Follows with nephrology and transplant clinic.    - Parathyroid Hormone Intact; Future  - Albumin Random Urine Quantitative with Creat Ratio; Future  - Parathyroid Hormone Intact  - Albumin Random Urine Quantitative with Creat Ratio      Abnormal results of liver function studies    Slightly abnormal. Will recheck.    - Hepatic panel (Albumin, ALT, AST, Bili, Alk Phos, TP); Future  - Hepatic panel (Albumin, ALT, AST, Bili, Alk Phos, TP)      Screening for hyperlipidemia    - Lipid panel reflex to direct LDL Fasting; Future  - Lipid panel reflex to direct LDL Fasting                 No follow-ups on file.    [facility]IN[facility]    Vineet Saunders is a 37 year old who presents for the following health issues  HPI     Pt would like to discuss lab results from an ER visit. States that liver tests were abnormal. No symptoms but they told him that he should get it checked.      Review of Systems   Constitutional, HEENT, cardiovascular, pulmonary, gi and gu systems are negative, except as otherwise noted.        Objective    /80 (BP Location: Right arm, Patient Position: Sitting, Cuff Size: Adult Regular)   Pulse 68   Temp 98.4  F (36.9  C) (Oral)   Ht 1.829 m (6')   Wt 70.8 kg (156 lb)   SpO2 100%   BMI 21.16 kg/m    Body mass index is 21.16 kg/m .       Physical Exam   GENERAL: healthy, alert and no distress  EYES: Eyes grossly normal to inspection, PERRL and conjunctivae and sclerae  normal  RESP: lungs clear to auscultation - no rales, rhonchi or wheezes  CV: regular rate and rhythm, normal S1 S2, no S3 or S4, no murmur, click or rub, no peripheral edema and peripheral pulses strong  ABDOMEN: soft, nontender, no hepatosplenomegaly, no masses and bowel sounds normal  MS: no gross musculoskeletal defects noted, no edema  SKIN: no suspicious lesions or rashes  NEURO: Normal strength and tone, mentation intact and speech normal  PSYCH: mentation appears normal, affect normal/bright

## 2022-04-20 ENCOUNTER — DOCUMENTATION ONLY (OUTPATIENT)
Dept: TRANSPLANT | Facility: CLINIC | Age: 37
End: 2022-04-20
Payer: MEDICARE

## 2022-04-20 ENCOUNTER — ORGAN (OUTPATIENT)
Dept: TRANSPLANT | Facility: CLINIC | Age: 37
End: 2022-04-20
Payer: MEDICARE

## 2022-04-20 DIAGNOSIS — Z76.82 AWAITING ORGAN TRANSPLANT: Primary | ICD-10-CM

## 2022-04-21 NOTE — TELEPHONE ENCOUNTER
Organ Offer Encounter Information    Organ Offer Information  Organ offer date & time: 4/20/2022 10:00 PM  Coordinator/Fellow/Attending name: Sheryl Baugh RN   Organ(s):  Organ UNOS ID Match Run ID Comment Organ Laterality   Kidney [id number] VONNIE       Recent infections?: No    New medications?: No Recent pregnancy?: No   Angicoagulation medications?: No Recent vaccinations?: No   Recent blood transfusions?: No Recent hospitalizations?: No   Has your insurance changed in the last 6-12 months?: Neg    Patient last dialyzed: 4/20/2022 10:24 PM  Dialysis type: Peritoneal  Discussed organ offer with: Patient  Patient/Caregiver name: Chip  Discussed risk category with Patient/Other: N/A  Understood donor criteria, verbalized understanding  Patient/Other asked to speak to a surgeon?: No  Discussed program-specific outcomes: Did not have questions regarding SRTR  Right to decline organ offer without penalty, Patient/Other: Aware of option to decline without penalty  Organ offer decision status Patient/Other: Accepted Offer  Organ disposition: Case Cancelled - Other (specify) (Comment: kidney went to primary recipient)  Additional Comments: 4/20/2022 10:30 PM  Kidney: Backup, import offer  MD: Shemar  OPO Contact: VONNIE, case complete  VXM Results: Compatible, no DSA  XM Plan (FXM must be done with serum no older than 10 days from transplant): If pt becomes primary will admit for FXM   Plan (Admission, NPO, Donor OR): Donor OR complete, Xclamp at 0230, called patient to discuss offer, he would like to move forward, currently on PD will come off around 0300, made NPO now, knows he will get a call early tomorrow morning to either be admitted or called off. Verbalized understanding, answered all questions.   - - -   COVID Screening  In the past month, have you had:  Any close contact with a suspect or laboratory-confirmed COVID-19 patient: No  Travel anywhere: No  COVID Symptoms (Fever, Cough, Short of Breath,  Loss of Taste/Smell, Rash): No    4/21/2022 6:16 AM:   Per Dr. Avelar the kidney is looking to go to the primary recipient but he will let me or Janae know in an hour or so when we know for sure. Called Chip to udpate him on the plan and let him know we will let him know as soon as we know.   Sheryl Baugh  Transplant Coordinator    4/21/2022 8:55 AM:  Called Dr. Avelar in the OR to see who the kidney was going to. Kidney is going to the primary recipient. Called Chip to tell him that the kidney is going to the primary recipient. Chip understood and had no further questions.   Janae Reed  Transplant Coordinator       Attestation I have discussed all of the above with the Patient/Legal Guardian/Caregiver regarding this organ offer.: Yes  Coordinator/Fellow/Attending name: Sheryl Baugh RN

## 2022-06-20 DIAGNOSIS — I42.8 OTHER CARDIOMYOPATHY (H): ICD-10-CM

## 2022-06-20 DIAGNOSIS — I10 ESSENTIAL HYPERTENSION: ICD-10-CM

## 2022-06-20 RX ORDER — LISINOPRIL 5 MG/1
5 TABLET ORAL EVERY EVENING
Qty: 90 TABLET | Refills: 0 | Status: ON HOLD | OUTPATIENT
Start: 2022-06-20 | End: 2023-01-16

## 2022-06-20 NOTE — TELEPHONE ENCOUNTER
[facility] Cardiology Refill Guideline reviewed.  Medication meets criteria for refill.   Radha Snow RN on 6/20/2022 at 1:44 PM

## 2022-07-11 ENCOUNTER — LAB (OUTPATIENT)
Dept: LAB | Facility: CLINIC | Age: 37
End: 2022-07-11
Payer: MEDICARE

## 2022-07-11 DIAGNOSIS — Z76.82 AWAITING ORGAN TRANSPLANT: ICD-10-CM

## 2022-07-11 PROCEDURE — 86832 HLA CLASS I HIGH DEFIN QUAL: CPT

## 2022-07-11 PROCEDURE — 86833 HLA CLASS II HIGH DEFIN QUAL: CPT

## 2022-09-06 ENCOUNTER — MYC MEDICAL ADVICE (OUTPATIENT)
Dept: INTERNAL MEDICINE | Facility: CLINIC | Age: 37
End: 2022-09-06

## 2022-09-09 ENCOUNTER — TELEPHONE (OUTPATIENT)
Dept: CARDIOLOGY | Facility: CLINIC | Age: 37
End: 2022-09-09

## 2022-09-09 NOTE — TELEPHONE ENCOUNTER
Pt wondering if he is having any testing done before this appointment and if he needs to hold any cardiac medications. I confirmed with pt that he is only scheduled for visit with cardiologist and will not have to hold anything. Pt reports understanding and denies further questions.     Doyle Moreno, RN   Cardiology Nurse Coordinator

## 2022-09-09 NOTE — TELEPHONE ENCOUNTER
[facility] Call Center    Phone Message    May a detailed message be left on voicemail: yes     Reason for Call: Medication Question or concern regarding medication   Prescription Clarification  Name of Medication: carvedilol (COREG) 6.25 MG tablet  Prescribing Provider: Can   Pharmacy:   [id number] - [place], [place] - [address] AT [facility]   What on the order needs clarification? Patient would like to know if he should be taking this medication. Please call patient back to further discuss, thank you.    Action Taken: Message routed to:  Other: Cardiology    Travel Screening: Not Applicable

## 2022-09-13 ENCOUNTER — TELEPHONE (OUTPATIENT)
Dept: TRANSPLANT | Facility: CLINIC | Age: 37
End: 2022-09-13

## 2022-09-13 NOTE — TELEPHONE ENCOUNTER
Returned patient's call about if he needs to stop meds prior to tomorrow's cardiology appt. Also confirmed patient's new address in Epic.

## 2022-09-14 ENCOUNTER — OFFICE VISIT (OUTPATIENT)
Dept: CARDIOLOGY | Facility: CLINIC | Age: 37
End: 2022-09-14
Attending: INTERNAL MEDICINE
Payer: MEDICARE

## 2022-09-14 VITALS
WEIGHT: 149 LBS | OXYGEN SATURATION: 100 % | BODY MASS INDEX: 20.86 KG/M2 | SYSTOLIC BLOOD PRESSURE: 129 MMHG | DIASTOLIC BLOOD PRESSURE: 81 MMHG | HEART RATE: 49 BPM | HEIGHT: 71 IN

## 2022-09-14 DIAGNOSIS — N18.6 ESRD (END STAGE RENAL DISEASE) ON DIALYSIS (H): ICD-10-CM

## 2022-09-14 DIAGNOSIS — Z01.810 PRE-OPERATIVE CARDIOVASCULAR EXAMINATION: ICD-10-CM

## 2022-09-14 DIAGNOSIS — I42.8 NONISCHEMIC CARDIOMYOPATHY (H): ICD-10-CM

## 2022-09-14 DIAGNOSIS — I10 ESSENTIAL HYPERTENSION: ICD-10-CM

## 2022-09-14 DIAGNOSIS — Z99.2 ESRD (END STAGE RENAL DISEASE) ON DIALYSIS (H): ICD-10-CM

## 2022-09-14 DIAGNOSIS — I50.9 CONGESTIVE HEART FAILURE OF UNKNOWN ETIOLOGY (H): Primary | ICD-10-CM

## 2022-09-14 DIAGNOSIS — I42.8 OTHER CARDIOMYOPATHY (H): ICD-10-CM

## 2022-09-14 PROCEDURE — 93005 ELECTROCARDIOGRAM TRACING: CPT

## 2022-09-14 PROCEDURE — 99214 OFFICE O/P EST MOD 30 MIN: CPT | Performed by: INTERNAL MEDICINE

## 2022-09-14 PROCEDURE — G0463 HOSPITAL OUTPT CLINIC VISIT: HCPCS | Mod: 25

## 2022-09-14 ASSESSMENT — PAIN SCALES - GENERAL: PAINLEVEL: NO PAIN (0)

## 2022-09-14 NOTE — NURSING NOTE
No med changes today. Plan for Echo. We will contact pt with results. Follow up with Dr. Lawson as needed.     Doyle Moreno, DIPTI   Cardiology Nurse Coordinator

## 2022-09-14 NOTE — PROGRESS NOTES
I am delighted to see Chip Fowler in consultation.The primary encounter diagnosis was Congestive heart failure of unknown etiology (H). Diagnoses of Essential hypertension, Other cardiomyopathy (H), Pre-operative cardiovascular examination, Nonischemic cardiomyopathy (H), and ESRD (end stage renal disease) on dialysis (H) were also pertinent to this visit.   As you know, the patient is a 37 year old  male. He   has a past medical history of Bladder stones, Cardiomyopathy (H), ESRD (end stage renal disease) on dialysis (H), Hypertension, Migraines, Polysubstance abuse (H), Solitary kidney, congenital, Urethral stone, and Urethral stricture..    On this visit, the patient states that he has good exercise tolerance.  The patient denies chest pressure/discomfort, palpitations, near-syncope, syncope, orthopnea, paroxysmal nocturnal dyspnea and lower extermity edema.    The patient's cardiovascular risk factors include known cardiac disease and hypertension.    The following portions of the patient's history were reviewed and updated as appropriate: allergies, current medications, past family history, past medical history, past social history, past surgical history, and the problem list.    PMH: The patient's past medical history includes:    Past Medical History:   Diagnosis Date     Bladder stones      Cardiomyopathy (H)      ESRD (end stage renal disease) on dialysis (H)      Hypertension      Migraines      Polysubstance abuse (H)      Solitary kidney, congenital      Urethral stone      Urethral stricture       Past Surgical History:   Procedure Laterality Date     BIOPSY  02/2020    renal, [facility]     COMBINED CYSTOSCOPY, LASER HOLMIUM LITHOTRIPSY URETER(S)       CYSTOSCOPY       CYSTOSCOPY FLEXIBLE, CYSTOSTOMY, INSERT TUBE SUPRAPUBIC, COMBINED N/A 12/14/2020    Procedure: CYSTOSCOPY, WITH SUPRAPUBIC CATHETER INSERTION;  Surgeon: Sam Mejia MD;  [facility]     CYSTOSCOPY, OPEN EXCISION  URETHRAL DIVERTICULUM, COMBINED N/A 12/14/2020    Procedure: EXCISION OF URETHRAL DIVERTICULUM X2;  Surgeon: Sam Mejia MD;  [facility]     HERNIA REPAIR      infant     INSERT CATHETER PERITONEAL DIALYSIS       LASER HOLMIUM LITHOTRIPSY URETER(S), INSERT STENT, COMBINED N/A 8/21/2020    Procedure: CYSTOSCOPY, bladder and urethral stone extraction, removal of foreign body, urethral dilation, urethrotomy, laser on standby;  Surgeon: Sam Mejia MD;  [facility]     urethral dilation       URETHROPLASTY WITH BUCCAL GRAFT N/A 12/14/2020    Procedure: URETHROPLASTY, USING BUCCAL MUCOSA GRAFT;  Surgeon: Sam Mejia MD;  [facility]       The patient's medications as of the current encounter are:     Current Outpatient Medications   Medication Sig Dispense Refill     B Complex-C-Folic Acid (DIALYVITE 800) 0.8 MG TABS Take 1 tablet by mouth every morning        carvedilol (COREG) 6.25 MG tablet Take 1 tablet (6.25 mg) by mouth 2 times daily (with meals) 180 tablet 0     furosemide (LASIX) 40 MG tablet Take 40 mg by mouth every morning        gentamicin (GARAMYCIN) 0.1 % external ointment every morning        lisinopril (ZESTRIL) 5 MG tablet Take 1 tablet (5 mg) by mouth every evening 90 tablet 0     VITAMIN D, CHOLECALCIFEROL, PO Take by mouth daily       acetaminophen (TYLENOL) 325 MG tablet Take 2 tablets (650 mg) by mouth every 6 hours as needed for mild pain 90 tablet 0     cephALEXin (KEFLEX) 500 MG capsule TAKE ONE CAPSULE BY MOUTH TWICE DAILY FOR 14 DAYS (Patient not taking: Reported on 9/14/2022)       fluconazole (DIFLUCAN) 200 MG tablet TAKE ONE TABLET BY MOUTH EVERY 48 HOURS FOR 4 WEEKS (Patient not taking: Reported on 9/14/2022)         Labs:     Lab on 07/11/2022   Component Date Value Ref Range Status     SA 1 TEST METHOD 07/11/2022 SA FCS   Final     SA 1 CELL 07/11/2022 Class I   Final     SA1 HI RISK TARA 07/11/2022 B:44 82   Final     SA1 MOD RISK TARA 07/11/2022 B:8 37 41 42 45  46Cw:5   Final     SA 1  COMMENTS 07/11/2022  Test performed by modified procedure. Serum heat inactivated and tested by a modified ([facility]) protocol including fetal calf serum addition. High-risk, mfi >3,000. Mod-risk, mfi 500-3,000.   Final     SA 2 TEST METHOD 07/11/2022 SA FCS   Final     SA 2 CELL 07/11/2022 Class II   Final     SA2 HI RISK TARA 07/11/2022 None   Final     SA2 MOD RISK TARA 07/11/2022 DP:20   Final     SA 2 COMMENTS 07/11/2022  Test performed by modified procedure. Serum heat inactivated and tested by a modified (Garber) protocol including fetal calf serum addition. High-risk, mfi >3,000. Mod-risk, mfi 500-3,000.   Final     PROTOCOL CUTOFF 07/11/2022 Plan A, 500 mfi cumulative    Final     UNOS CPRA 07/11/2022 46   Final     UNACCEPTABLE ANTIGENS 07/11/2022 B:8 37 41 42 44 45 46 82       Final       Allergies:  No Known Allergies    Family History:   Family History   Problem Relation Age of Onset     Alzheimer Disease Mother      Unknown/Adopted Father      No Known Problems Sister      No Known Problems Sister      Kidney Disease No family hx of        Psychosocial history:  reports that he has quit smoking. His smoking use included cigarettes. He started smoking about 20 years ago. He smoked 0.40 packs per day. He has never used smokeless tobacco. He reports previous alcohol use. He reports previous drug use. Drug: Methamphetamines.    Review of systems: negative for, palpitations, exertional chest pain or pressure, paroxysmal nocturnal dyspnea, dyspnea on exertion, orthopnea, lower extremity edema, syncope or near-syncope, claudication and exercise intolerance    In addition,   General: No change in weight, sleep or appetite.  Normal energy.  No fever or chills  Eyes: Negative for vision changes or eye problems  ENT: No problems with ears, nose or throat.  No difficulty swallowing.  Resp: No coughing, wheezing or shortness of breath  GI: No nausea, vomiting,  heartburn, abdominal pain,  "diarrhea, constipation or change in bowel habits  : No urinary frequency or dysuria, bladder or kidney problems  Musculoskeletal: No significant muscle or joint pains  Neurologic: No headaches, numbness, tingling, weakness, problems with balance or coordination  Psychiatric: No problems with anxiety, depression or mental health  Heme/immune/allergy: No history of bleeding or clotting problems or anemia.    Endocrine: No history of thyroid disease, diabetes or other endocrine disorders  Skin: No rashes,worrisome lesions or skin problems  Vascular:  No claudication, lifestyle limiting or otherwise; no ischemic rest pain; no non-healing ulcers. No weakness, No loss of sensation        Physical examination  Vitals: /81 (BP Location: Right arm, Patient Position: Supine, Cuff Size: Adult Regular)   Pulse (!) 49   Ht 1.813 m (5' 11.38\")   Wt 67.6 kg (149 lb)   SpO2 100%   BMI 20.56 kg/m    BMI= Body mass index is 20.56 kg/m .    In general, the patient is a pleasant male in no apparent distress.    HEENT: Normiocephalic and atraumatic.  PERRLA.  EOMI.  Sclerae white, not injected.  Nares clear.  Pharynx without erythema or exudate.  Dentition intact.    Neck: No adenopathy.  No thyromegaly. Carotids +2/2 bilaterally without bruits.  No jugular venous distension.   Heart:  The PMI is in the 5th ICS in the midclavicular line. There is no heave. Regular rate and rhythm. Normal S1, S2 splits physiologically. No murmur, rub, click, or gallop.    Lungs: Clear to asculation.  No ronchi, wheezes, rales.  No dullness to percussion.   Abdomen: Soft, nontender, nondistended. No organomegaly. No AAA.  No bruits.   Extremities: No clubbing, cyanosis, or edema. The pulses were intact bilaterally.   Neurological: The neurological examination reveal a patient who was oriented to person, place, and time.  The remainder of the examination was nonfocal.    Cardiac tests include:    2/25/21 - stress echo - nonischemic " cardiomyopathy  ECG: sinus markos HR= 48    Assessment and Plan    1. HTN - on ACEI  2. ESRD - on PD  3. Nonischemic CM - on beta blocker, ACEI  - heart rate prevents titration of beta blocker  - will check echo    The patient is to return in 6 months. The patient understood the treatment plan as outlined above.  There were no barriers to learning.      Bashir Lawson MD

## 2022-09-14 NOTE — NURSING NOTE
Chief Complaint   Patient presents with     Follow Up     Return Cardiology- Return kidney waitlist pt     Vitals were taken, medications reconciled, and EKG was performed.    PIYUSH Guerra  12:16 PM

## 2022-09-14 NOTE — PATIENT INSTRUCTIONS
"Cardiology Providers you saw during your visit:  Dr. Lawson    Medication changes: None    Follow up:   Echocardiogram in 2-4 weeks. We will contact you with results.   Follow up with Dr. Lawson in 6 months.       If you have any questions, contact  Doyle Moreno RN. We are encouraging the use of [employer] to communicate with your HealthCare Provider     To contact the [facility], please call, [phone]  To schedule an appointment or to leave a message for your Care Team Press #1  If you are a physician calling for another physician Press #2  For Billing Press #3  For Medical Records Press #4\"    "

## 2022-09-14 NOTE — LETTER
9/14/2022       RE: Chip Fowler  [id number] [address] [facility] [address]       Dear Colleague,    Thank you for the opportunity to participate in the care of your patient, Chip Fowler, at the [facility]. Please see a copy of my visit note below.    I am delighted to see Chip Fowler in consultation.The primary encounter diagnosis was Congestive heart failure of unknown etiology (H). Diagnoses of Essential hypertension, Other cardiomyopathy (H), Pre-operative cardiovascular examination, Nonischemic cardiomyopathy (H), and ESRD (end stage renal disease) on dialysis (H) were also pertinent to this visit.   As you know, the patient is a 37 year old  male. He   has a past medical history of Bladder stones, Cardiomyopathy (H), ESRD (end stage renal disease) on dialysis (H), Hypertension, Migraines, Polysubstance abuse (H), Solitary kidney, congenital, Urethral stone, and Urethral stricture..    On this visit, the patient states that he has good exercise tolerance.  The patient denies chest pressure/discomfort, palpitations, near-syncope, syncope, orthopnea, paroxysmal nocturnal dyspnea and lower extermity edema.    The patient's cardiovascular risk factors include known cardiac disease and hypertension.    The following portions of the patient's history were reviewed and updated as appropriate: allergies, current medications, past family history, past medical history, past social history, past surgical history, and the problem list.    PMH: The patient's past medical history includes:    Past Medical History:   Diagnosis Date     Bladder stones      Cardiomyopathy (H)      ESRD (end stage renal disease) on dialysis (H)      Hypertension      Migraines      Polysubstance abuse (H)      Solitary kidney, congenital      Urethral stone      Urethral stricture       Past Surgical History:   Procedure Laterality Date      BIOPSY  02/2020    renal, [facility]     COMBINED CYSTOSCOPY, LASER HOLMIUM LITHOTRIPSY URETER(S)       CYSTOSCOPY       CYSTOSCOPY FLEXIBLE, CYSTOSTOMY, INSERT TUBE SUPRAPUBIC, COMBINED N/A 12/14/2020    Procedure: CYSTOSCOPY, WITH SUPRAPUBIC CATHETER INSERTION;  Surgeon: Sam Mejia MD;  [facility]     CYSTOSCOPY, OPEN EXCISION URETHRAL DIVERTICULUM, COMBINED N/A 12/14/2020    Procedure: EXCISION OF URETHRAL DIVERTICULUM X2;  Surgeon: Sam Mejia MD;  [facility]     HERNIA REPAIR      infant     INSERT CATHETER PERITONEAL DIALYSIS       LASER HOLMIUM LITHOTRIPSY URETER(S), INSERT STENT, COMBINED N/A 8/21/2020    Procedure: CYSTOSCOPY, bladder and urethral stone extraction, removal of foreign body, urethral dilation, urethrotomy, laser on standby;  Surgeon: Sam Mejia MD;  [facility]     urethral dilation       URETHROPLASTY WITH BUCCAL GRAFT N/A 12/14/2020    Procedure: URETHROPLASTY, USING BUCCAL MUCOSA GRAFT;  Surgeon: Sam Mejia MD;  [facility]       The patient's medications as of the current encounter are:     Current Outpatient Medications   Medication Sig Dispense Refill     B Complex-C-Folic Acid (DIALYVITE 800) 0.8 MG TABS Take 1 tablet by mouth every morning        carvedilol (COREG) 6.25 MG tablet Take 1 tablet (6.25 mg) by mouth 2 times daily (with meals) 180 tablet 0     furosemide (LASIX) 40 MG tablet Take 40 mg by mouth every morning        gentamicin (GARAMYCIN) 0.1 % external ointment every morning        lisinopril (ZESTRIL) 5 MG tablet Take 1 tablet (5 mg) by mouth every evening 90 tablet 0     VITAMIN D, CHOLECALCIFEROL, PO Take by mouth daily       acetaminophen (TYLENOL) 325 MG tablet Take 2 tablets (650 mg) by mouth every 6 hours as needed for mild pain 90 tablet 0     cephALEXin (KEFLEX) 500 MG capsule TAKE ONE CAPSULE BY MOUTH TWICE DAILY FOR 14 DAYS (Patient not taking: Reported on 9/14/2022)       fluconazole (DIFLUCAN) 200 MG tablet TAKE ONE  TABLET BY MOUTH EVERY 48 HOURS FOR 4 WEEKS (Patient not taking: Reported on 9/14/2022)         Labs:     Lab on 07/11/2022   Component Date Value Ref Range Status     SA 1 TEST METHOD 07/11/2022 SA FCS   Final     SA 1 CELL 07/11/2022 Class I   Final     SA1 HI RISK TARA 07/11/2022 B:44 82   Final     SA1 MOD RISK TARA 07/11/2022 B:8 37 41 42 45 46Cw:5   Final     SA 1  COMMENTS 07/11/2022  Test performed by modified procedure. Serum heat inactivated and tested by a modified (Roy) protocol including fetal calf serum addition. High-risk, mfi >3,000. Mod-risk, mfi 500-3,000.   Final     SA 2 TEST METHOD 07/11/2022 SA FCS   Final     SA 2 CELL 07/11/2022 Class II   Final     SA2 HI RISK TARA 07/11/2022 None   Final     SA2 MOD RISK TARA 07/11/2022 DP:20   Final     SA 2 COMMENTS 07/11/2022  Test performed by modified procedure. Serum heat inactivated and tested by a modified (Roy) protocol including fetal calf serum addition. High-risk, mfi >3,000. Mod-risk, mfi 500-3,000.   Final     PROTOCOL CUTOFF 07/11/2022 Plan A, 500 mfi cumulative    Final     UNOS CPRA 07/11/2022 46   Final     UNACCEPTABLE ANTIGENS 07/11/2022 B:8 37 41 42 44 45 46 82       Final       Allergies:  No Known Allergies    Family History:   Family History   Problem Relation Age of Onset     Alzheimer Disease Mother      Unknown/Adopted Father      No Known Problems Sister      No Known Problems Sister      Kidney Disease No family hx of        Psychosocial history:  reports that he has quit smoking. His smoking use included cigarettes. He started smoking about 20 years ago. He smoked 0.40 packs per day. He has never used smokeless tobacco. He reports previous alcohol use. He reports previous drug use. Drug: Methamphetamines.    Review of systems: negative for, palpitations, exertional chest pain or pressure, paroxysmal nocturnal dyspnea, dyspnea on exertion, orthopnea, lower extremity edema, syncope or near-syncope, claudication and exercise  "intolerance    In addition,   General: No change in weight, sleep or appetite.  Normal energy.  No fever or chills  Eyes: Negative for vision changes or eye problems  ENT: No problems with ears, nose or throat.  No difficulty swallowing.  Resp: No coughing, wheezing or shortness of breath  GI: No nausea, vomiting,  heartburn, abdominal pain, diarrhea, constipation or change in bowel habits  : No urinary frequency or dysuria, bladder or kidney problems  Musculoskeletal: No significant muscle or joint pains  Neurologic: No headaches, numbness, tingling, weakness, problems with balance or coordination  Psychiatric: No problems with anxiety, depression or mental health  Heme/immune/allergy: No history of bleeding or clotting problems or anemia.    Endocrine: No history of thyroid disease, diabetes or other endocrine disorders  Skin: No rashes,worrisome lesions or skin problems  Vascular:  No claudication, lifestyle limiting or otherwise; no ischemic rest pain; no non-healing ulcers. No weakness, No loss of sensation        Physical examination  Vitals: /81 (BP Location: Right arm, Patient Position: Supine, Cuff Size: Adult Regular)   Pulse (!) 49   Ht 1.813 m (5' 11.38\")   Wt 67.6 kg (149 lb)   SpO2 100%   BMI 20.56 kg/m    BMI= Body mass index is 20.56 kg/m .    In general, the patient is a pleasant male in no apparent distress.    HEENT: Normiocephalic and atraumatic.  PERRLA.  EOMI.  Sclerae white, not injected.  Nares clear.  Pharynx without erythema or exudate.  Dentition intact.    Neck: No adenopathy.  No thyromegaly. Carotids +2/2 bilaterally without bruits.  No jugular venous distension.   Heart:  The PMI is in the 5th ICS in the midclavicular line. There is no heave. Regular rate and rhythm. Normal S1, S2 splits physiologically. No murmur, rub, click, or gallop.    Lungs: Clear to asculation.  No ronchi, wheezes, rales.  No dullness to percussion.   Abdomen: Soft, nontender, nondistended. No " organomegaly. No AAA.  No bruits.   Extremities: No clubbing, cyanosis, or edema. The pulses were intact bilaterally.   Neurological: The neurological examination reveal a patient who was oriented to person, place, and time.  The remainder of the examination was nonfocal.    Cardiac tests include:    2/25/21 - stress echo - nonischemic cardiomyopathy  ECG: sinus markos HR= 48    Assessment and Plan    1. HTN - on ACEI  2. ESRD - on PD  3. Nonischemic CM - on beta blocker, ACEI  - heart rate prevents titration of beta blocker  - will check echo    The patient is to return in 6 months. The patient understood the treatment plan as outlined above.  There were no barriers to learning.      Bashir Lawson MD

## 2022-09-15 LAB
ATRIAL RATE - MUSE: 48 BPM
DIASTOLIC BLOOD PRESSURE - MUSE: NORMAL MMHG
INTERPRETATION ECG - MUSE: NORMAL
P AXIS - MUSE: 69 DEGREES
PR INTERVAL - MUSE: 160 MS
QRS DURATION - MUSE: 90 MS
QT - MUSE: 414 MS
QTC - MUSE: 369 MS
R AXIS - MUSE: 63 DEGREES
SYSTOLIC BLOOD PRESSURE - MUSE: NORMAL MMHG
T AXIS - MUSE: 74 DEGREES
VENTRICULAR RATE- MUSE: 48 BPM

## 2022-09-16 ENCOUNTER — DOCUMENTATION ONLY (OUTPATIENT)
Dept: TRANSPLANT | Facility: CLINIC | Age: 37
End: 2022-09-16

## 2022-09-16 DIAGNOSIS — N18.6 ESRD (END STAGE RENAL DISEASE) (H): ICD-10-CM

## 2022-09-16 DIAGNOSIS — Z76.82 ORGAN TRANSPLANT CANDIDATE: ICD-10-CM

## 2022-09-23 ENCOUNTER — HOSPITAL ENCOUNTER (OUTPATIENT)
Dept: CARDIOLOGY | Facility: CLINIC | Age: 37
Discharge: HOME | End: 2022-09-23
Attending: INTERNAL MEDICINE | Admitting: INTERNAL MEDICINE
Payer: MEDICARE

## 2022-09-23 DIAGNOSIS — I42.8 NONISCHEMIC CARDIOMYOPATHY (H): ICD-10-CM

## 2022-09-23 LAB — LVEF ECHO: NORMAL

## 2022-09-23 PROCEDURE — 93306 TTE W/DOPPLER COMPLETE: CPT | Mod: 26 | Performed by: INTERNAL MEDICINE

## 2022-09-23 PROCEDURE — 93306 TTE W/DOPPLER COMPLETE: CPT

## 2022-09-24 ENCOUNTER — HEALTH MAINTENANCE LETTER (OUTPATIENT)
Age: 37
End: 2022-09-24

## 2022-09-28 ENCOUNTER — TELEPHONE (OUTPATIENT)
Dept: TRANSPLANT | Facility: CLINIC | Age: 37
End: 2022-09-28

## 2022-09-28 NOTE — TELEPHONE ENCOUNTER
Well-Check Questionnaire    September 28, 2022  Patient: Chip   Interviewer: Jane RESENDEZ      1. Have you been admitted to the hospital since we last saw you or had a recent ED visits?  NA  o If yes, what were you hospitalized for?  NA  o What facility were you admitted to?  NA   o When did this occur?  NA  o Did you complete all the recommended follow-up testing and visits, if applicable?  2. Have you had any surgeries or procedures since we last saw you?  NA  o If yes, what procedures were completed?  NA  o What facility performed the procedure?  NA  o When did this occur?  NA  o Did you complete all the recommended follow-up testing and visits, if applicable?  NA  3. Do you or have you ever been told you have PVD and/or other vascular issues?  NA  o If yes, what have you been diagnosed with?  NA  o Who is the physician treating this issue?  NA  4. Do you or have you ever been told you have Cardiac problems and/or issues?  Follows with Dr Nigel balbuena for 3/2023  o If yes, what have you been diagnosed with?  NA  o Who is the physician treating this issue?  NA  5. Do you or have you ever been told you have Pulmonary problems and/or Issues?  NA  o If yes, what have you been diagnosed with?  NA  o Who is the physician treating this issue?  NA  6. Do you have any current medical issues that are requiring the monitoring of a physician, that you did not note above (examples: open or non-healing wounds, hematologic conditions, etc.)?  NA  o If yes, what have you been diagnosed with?  NA  o Who is the physician treating this issue?  NA  7. Do you currently use or take any of the following:  o Oxygen NA   i. If yes, how many liters and how often do you use it?  o Midodrine (for hypotension)  NA  i. If yes, what is your current dosage and how often used?  o Florinef (for hypotension)  NA  i. If yes, what is your current dosage and how often used?  NA  o Anticoagulation or anti-platelet medications including Coumadin/Warfarin,  Plavix, Eliquis, Pradaxa, Brilinta, and/or Aspirin?  NA  i. If you, what medication are you taking and indication for use?  NA  o Antibiotic(s)  NA  i. If yes, what medication are you taking and indication for use?  NA  8. Describe your functional status, your ability to walk around and care for yourself:  o How far can you walk without stopping (Example: 1-2 Blocks)?  No concerns, has physical job   o Do you have any limitations or use assistive devices such as a walker or cane? NA   o Are you able to complete your Activities of Daily Living (ADL's) without         assistance? Yes  9. Are you currently on Dialysis?  Yes  o If yes, which type (Hemodialysis or Peritoneal)?  PD  o What center do you attend?  [facility]   o Which days of the week do you do dialysis?  Daily  10. Describe your current social situation:  o Where and with whom do you reside?  Lives with family   o What is your Post-Transplant Caregiver Plan?  Family   o What is your Post-Transplant Lodging Plan?  Back home   11. What is your current Insurance coverage?  [employer] and medicare  o Has this changed since you were last seen by us?  NA  12. Do you have any questions or concerns related to your transplant listing/next steps in the transplant process?  NA

## 2022-10-13 ENCOUNTER — LAB (OUTPATIENT)
Dept: LAB | Facility: CLINIC | Age: 37
End: 2022-10-13
Payer: MEDICARE

## 2022-10-13 DIAGNOSIS — N18.6 ESRD (END STAGE RENAL DISEASE) (H): ICD-10-CM

## 2022-10-13 DIAGNOSIS — Z76.82 ORGAN TRANSPLANT CANDIDATE: ICD-10-CM

## 2022-10-13 PROCEDURE — 86833 HLA CLASS II HIGH DEFIN QUAL: CPT

## 2022-10-13 PROCEDURE — 86832 HLA CLASS I HIGH DEFIN QUAL: CPT

## 2022-10-17 LAB
PROTOCOL CUTOFF: NORMAL
SA 1 CELL: NORMAL
SA 1 TEST METHOD: NORMAL
SA 2 CELL: NORMAL
SA 2 TEST METHOD: NORMAL
SA1 HI RISK ABY: NORMAL
SA1 MOD RISK ABY: NORMAL
SA2 HI RISK ABY: NORMAL
SA2 MOD RISK ABY: NORMAL
UNACCEPTABLE ANTIGENS: NORMAL
UNOS CPRA: 50
ZZZSA 1  COMMENTS: NORMAL
ZZZSA 2 COMMENTS: NORMAL

## 2023-01-06 PROCEDURE — 86833 HLA CLASS II HIGH DEFIN QUAL: CPT | Performed by: SURGERY

## 2023-01-06 PROCEDURE — 86832 HLA CLASS I HIGH DEFIN QUAL: CPT | Performed by: SURGERY

## 2023-01-09 ENCOUNTER — LAB (OUTPATIENT)
Dept: LAB | Facility: CLINIC | Age: 38
End: 2023-01-09
Payer: MEDICARE

## 2023-01-09 DIAGNOSIS — N18.6 ESRD (END STAGE RENAL DISEASE) (H): ICD-10-CM

## 2023-01-09 DIAGNOSIS — Z76.82 ORGAN TRANSPLANT CANDIDATE: ICD-10-CM

## 2023-01-14 DIAGNOSIS — Z76.82 AWAITING ORGAN TRANSPLANT: Primary | ICD-10-CM

## 2023-01-15 ENCOUNTER — DOCUMENTATION ONLY (OUTPATIENT)
Dept: TRANSPLANT | Facility: CLINIC | Age: 38
End: 2023-01-15

## 2023-01-15 ENCOUNTER — ANESTHESIA (OUTPATIENT)
Dept: SURGERY | Facility: CLINIC | Age: 38
DRG: 652 | End: 2023-01-15
Payer: MEDICARE

## 2023-01-15 ENCOUNTER — HOSPITAL ENCOUNTER (INPATIENT)
Facility: CLINIC | Age: 38
LOS: 2 days | Discharge: HOME | DRG: 652 | End: 2023-01-18
Attending: SURGERY | Admitting: SURGERY
Payer: MEDICARE

## 2023-01-15 ENCOUNTER — APPOINTMENT (OUTPATIENT)
Dept: ULTRASOUND IMAGING | Facility: CLINIC | Age: 38
DRG: 652 | End: 2023-01-15
Attending: SURGERY
Payer: MEDICARE

## 2023-01-15 ENCOUNTER — APPOINTMENT (OUTPATIENT)
Dept: GENERAL RADIOLOGY | Facility: CLINIC | Age: 38
DRG: 652 | End: 2023-01-15
Attending: SURGERY
Payer: MEDICARE

## 2023-01-15 ENCOUNTER — ANESTHESIA EVENT (OUTPATIENT)
Dept: SURGERY | Facility: CLINIC | Age: 38
DRG: 652 | End: 2023-01-15
Payer: MEDICARE

## 2023-01-15 ENCOUNTER — ORGAN (OUTPATIENT)
Dept: TRANSPLANT | Facility: CLINIC | Age: 38
End: 2023-01-15

## 2023-01-15 DIAGNOSIS — Z94.0 KIDNEY REPLACED BY TRANSPLANT: Primary | ICD-10-CM

## 2023-01-15 LAB
ABO/RH(D): NORMAL
ALBUMIN SERPL BCG-MCNC: 4.2 G/DL (ref 3.5–5.2)
ALBUMIN UR-MCNC: 50 MG/DL
ALP SERPL-CCNC: 59 U/L (ref 40–129)
ALT SERPL W P-5'-P-CCNC: 81 U/L (ref 10–50)
ANION GAP SERPL CALCULATED.3IONS-SCNC: 14 MMOL/L (ref 7–15)
ANION GAP SERPL CALCULATED.3IONS-SCNC: 16 MMOL/L (ref 7–15)
ANTIBODY SCREEN: NEGATIVE
APPEARANCE UR: CLEAR
APTT PPP: 28 SECONDS (ref 22–38)
AST SERPL W P-5'-P-CCNC: 52 U/L (ref 10–50)
BACTERIA #/AREA URNS HPF: ABNORMAL /HPF
BASE EXCESS BLDV CALC-SCNC: -4.7 MMOL/L (ref -8.1–1.9)
BASOPHILS # BLD AUTO: 0.1 10E3/UL (ref 0–0.2)
BASOPHILS NFR BLD AUTO: 1 %
BILIRUB SERPL-MCNC: 0.2 MG/DL
BILIRUB UR QL STRIP: NEGATIVE
BUN SERPL-MCNC: 44.2 MG/DL (ref 6–20)
BUN SERPL-MCNC: 47.5 MG/DL (ref 6–20)
CA-I BLD-MCNC: 4.5 MG/DL (ref 4.4–5.2)
CALCIUM SERPL-MCNC: 8.3 MG/DL (ref 8.6–10)
CALCIUM SERPL-MCNC: 9.1 MG/DL (ref 8.6–10)
CHLORIDE SERPL-SCNC: 106 MMOL/L (ref 98–107)
CHLORIDE SERPL-SCNC: 107 MMOL/L (ref 98–107)
CHOLEST SERPL-MCNC: 177 MG/DL
COLOR UR AUTO: ABNORMAL
CREAT SERPL-MCNC: 4.54 MG/DL (ref 0.67–1.17)
CREAT SERPL-MCNC: 4.62 MG/DL (ref 0.67–1.17)
DEPRECATED HCO3 PLAS-SCNC: 18 MMOL/L (ref 22–29)
DEPRECATED HCO3 PLAS-SCNC: 19 MMOL/L (ref 22–29)
EOSINOPHIL # BLD AUTO: 1.7 10E3/UL (ref 0–0.7)
EOSINOPHIL NFR BLD AUTO: 24 %
ERYTHROCYTE [DISTWIDTH] IN BLOOD BY AUTOMATED COUNT: 12.5 % (ref 10–15)
ERYTHROCYTE [DISTWIDTH] IN BLOOD BY AUTOMATED COUNT: 12.6 % (ref 10–15)
GFR SERPL CREATININE-BSD FRML MDRD: 16 ML/MIN/1.73M2
GFR SERPL CREATININE-BSD FRML MDRD: 16 ML/MIN/1.73M2
GLUCOSE BLD-MCNC: 101 MG/DL (ref 70–99)
GLUCOSE BLDC GLUCOMTR-MCNC: 94 MG/DL (ref 70–99)
GLUCOSE SERPL-MCNC: 106 MG/DL (ref 70–99)
GLUCOSE SERPL-MCNC: 90 MG/DL (ref 70–99)
GLUCOSE UR STRIP-MCNC: NEGATIVE MG/DL
HBA1C MFR BLD: 5.2 %
HCO3 BLDV-SCNC: 22 MMOL/L (ref 21–28)
HCT VFR BLD AUTO: 30.2 % (ref 40–53)
HCT VFR BLD AUTO: 39.6 % (ref 40–53)
HDLC SERPL-MCNC: 52 MG/DL
HGB BLD-MCNC: 10 G/DL (ref 13.3–17.7)
HGB BLD-MCNC: 10.3 G/DL (ref 13.3–17.7)
HGB BLD-MCNC: 13.1 G/DL (ref 13.3–17.7)
HGB BLD-MCNC: 9.5 G/DL (ref 13.3–17.7)
HGB UR QL STRIP: NEGATIVE
IMM GRANULOCYTES # BLD: 0 10E3/UL
IMM GRANULOCYTES NFR BLD: 0 %
INR PPP: 0.9 (ref 0.85–1.15)
KETONES UR STRIP-MCNC: NEGATIVE MG/DL
LACTATE BLD-SCNC: 1.5 MMOL/L
LDLC SERPL CALC-MCNC: 102 MG/DL
LEUKOCYTE ESTERASE UR QL STRIP: NEGATIVE
LYMPHOCYTES # BLD AUTO: 2 10E3/UL (ref 0.8–5.3)
LYMPHOCYTES NFR BLD AUTO: 29 %
MAGNESIUM SERPL-MCNC: 1.5 MG/DL (ref 1.7–2.3)
MCH RBC QN AUTO: 31.7 PG (ref 26.5–33)
MCH RBC QN AUTO: 31.8 PG (ref 26.5–33)
MCHC RBC AUTO-ENTMCNC: 33.1 G/DL (ref 31.5–36.5)
MCHC RBC AUTO-ENTMCNC: 33.1 G/DL (ref 31.5–36.5)
MCV RBC AUTO: 96 FL (ref 78–100)
MCV RBC AUTO: 96 FL (ref 78–100)
MONOCYTES # BLD AUTO: 0.5 10E3/UL (ref 0–1.3)
MONOCYTES NFR BLD AUTO: 7 %
NEUTROPHILS # BLD AUTO: 2.7 10E3/UL (ref 1.6–8.3)
NEUTROPHILS NFR BLD AUTO: 39 %
NITRATE UR QL: NEGATIVE
NONHDLC SERPL-MCNC: 125 MG/DL
NRBC # BLD AUTO: 0 10E3/UL
NRBC BLD AUTO-RTO: 0 /100
O2/TOTAL GAS SETTING VFR VENT: 45 %
PCO2 BLDV: 45 MM HG (ref 40–50)
PH BLDV: 7.29 [PH] (ref 7.32–7.43)
PH UR STRIP: 7 [PH] (ref 5–7)
PHOSPHATE SERPL-MCNC: 3.5 MG/DL (ref 2.5–4.5)
PLATELET # BLD AUTO: 116 10E3/UL (ref 150–450)
PLATELET # BLD AUTO: 231 10E3/UL (ref 150–450)
PO2 BLDV: 45 MM HG (ref 25–47)
POTASSIUM BLD-SCNC: 4.7 MMOL/L (ref 3.5–5)
POTASSIUM SERPL-SCNC: 4.1 MMOL/L (ref 3.4–5.3)
POTASSIUM SERPL-SCNC: 4.5 MMOL/L (ref 3.4–5.3)
POTASSIUM SERPL-SCNC: 4.8 MMOL/L (ref 3.4–5.3)
PROT SERPL-MCNC: 7.1 G/DL (ref 6.4–8.3)
RBC # BLD AUTO: 3.14 10E6/UL (ref 4.4–5.9)
RBC # BLD AUTO: 4.13 10E6/UL (ref 4.4–5.9)
RBC URINE: <1 /HPF
SARS-COV-2 RNA RESP QL NAA+PROBE: NEGATIVE
SODIUM BLD-SCNC: 136 MMOL/L (ref 133–144)
SODIUM SERPL-SCNC: 138 MMOL/L (ref 136–145)
SODIUM SERPL-SCNC: 142 MMOL/L (ref 136–145)
SP GR UR STRIP: 1.01 (ref 1–1.03)
SPECIMEN EXPIRATION DATE: NORMAL
SQUAMOUS EPITHELIAL: 1 /HPF
TRANSITIONAL EPI: <1 /HPF
TRIGL SERPL-MCNC: 115 MG/DL
UROBILINOGEN UR STRIP-MCNC: NORMAL MG/DL
WBC # BLD AUTO: 3.9 10E3/UL (ref 4–11)
WBC # BLD AUTO: 7 10E3/UL (ref 4–11)
WBC URINE: 1 /HPF

## 2023-01-15 PROCEDURE — 83735 ASSAY OF MAGNESIUM: CPT | Performed by: SURGERY

## 2023-01-15 PROCEDURE — 86704 HEP B CORE ANTIBODY TOTAL: CPT

## 2023-01-15 PROCEDURE — 85730 THROMBOPLASTIN TIME PARTIAL: CPT

## 2023-01-15 PROCEDURE — 81001 URINALYSIS AUTO W/SCOPE: CPT

## 2023-01-15 PROCEDURE — 0TY00Z0 TRANSPLANTATION OF RIGHT KIDNEY, ALLOGENEIC, OPEN APPROACH: ICD-10-PCS | Performed by: SURGERY

## 2023-01-15 PROCEDURE — 86665 EPSTEIN-BARR CAPSID VCA: CPT

## 2023-01-15 PROCEDURE — 84100 ASSAY OF PHOSPHORUS: CPT | Performed by: SURGERY

## 2023-01-15 PROCEDURE — 86644 CMV ANTIBODY: CPT

## 2023-01-15 PROCEDURE — 87340 HEPATITIS B SURFACE AG IA: CPT

## 2023-01-15 PROCEDURE — 258N000003 HC RX IP 258 OP 636: Performed by: STUDENT IN AN ORGANIZED HEALTH CARE EDUCATION/TRAINING PROGRAM

## 2023-01-15 PROCEDURE — 250N000011 HC RX IP 250 OP 636: Performed by: STUDENT IN AN ORGANIZED HEALTH CARE EDUCATION/TRAINING PROGRAM

## 2023-01-15 PROCEDURE — 250N000009 HC RX 250: Performed by: STUDENT IN AN ORGANIZED HEALTH CARE EDUCATION/TRAINING PROGRAM

## 2023-01-15 PROCEDURE — 812N000003 HC ACQUISITION KIDNEY CADAVER

## 2023-01-15 PROCEDURE — 85025 COMPLETE CBC W/AUTO DIFF WBC: CPT

## 2023-01-15 PROCEDURE — C2617 STENT, NON-COR, TEM W/O DEL: HCPCS | Performed by: SURGERY

## 2023-01-15 PROCEDURE — 85027 COMPLETE CBC AUTOMATED: CPT | Performed by: SURGERY

## 2023-01-15 PROCEDURE — 83036 HEMOGLOBIN GLYCOSYLATED A1C: CPT

## 2023-01-15 PROCEDURE — 76776 US EXAM K TRANSPL W/DOPPLER: CPT | Mod: 26 | Performed by: RADIOLOGY

## 2023-01-15 PROCEDURE — 86825 HLA X-MATH NON-CYTOTOXIC: CPT

## 2023-01-15 PROCEDURE — 82330 ASSAY OF CALCIUM: CPT

## 2023-01-15 PROCEDURE — 0WPG03Z REMOVAL OF INFUSION DEVICE FROM PERITONEAL CAVITY, OPEN APPROACH: ICD-10-PCS | Performed by: SURGERY

## 2023-01-15 PROCEDURE — 85610 PROTHROMBIN TIME: CPT

## 2023-01-15 PROCEDURE — 71046 X-RAY EXAM CHEST 2 VIEWS: CPT

## 2023-01-15 PROCEDURE — 84156 ASSAY OF PROTEIN URINE: CPT

## 2023-01-15 PROCEDURE — 82962 GLUCOSE BLOOD TEST: CPT

## 2023-01-15 PROCEDURE — 93005 ELECTROCARDIOGRAM TRACING: CPT

## 2023-01-15 PROCEDURE — 370N000017 HC ANESTHESIA TECHNICAL FEE, PER MIN: Performed by: SURGERY

## 2023-01-15 PROCEDURE — 85018 HEMOGLOBIN: CPT | Performed by: SURGERY

## 2023-01-15 PROCEDURE — 250N000013 HC RX MED GY IP 250 OP 250 PS 637: Performed by: SURGERY

## 2023-01-15 PROCEDURE — 86803 HEPATITIS C AB TEST: CPT

## 2023-01-15 PROCEDURE — 50360 RNL ALTRNSPLJ W/O RCP NFRCT: CPT | Mod: RT | Performed by: SURGERY

## 2023-01-15 PROCEDURE — 36415 COLL VENOUS BLD VENIPUNCTURE: CPT

## 2023-01-15 PROCEDURE — 84295 ASSAY OF SERUM SODIUM: CPT | Performed by: SURGERY

## 2023-01-15 PROCEDURE — 80061 LIPID PANEL: CPT

## 2023-01-15 PROCEDURE — 250N000009 HC RX 250: Performed by: ANESTHESIOLOGY

## 2023-01-15 PROCEDURE — 71046 X-RAY EXAM CHEST 2 VIEWS: CPT | Mod: 26 | Performed by: RADIOLOGY

## 2023-01-15 PROCEDURE — U0003 INFECTIOUS AGENT DETECTION BY NUCLEIC ACID (DNA OR RNA); SEVERE ACUTE RESPIRATORY SYNDROME CORONAVIRUS 2 (SARS-COV-2) (CORONAVIRUS DISEASE [COVID-19]), AMPLIFIED PROBE TECHNIQUE, MAKING USE OF HIGH THROUGHPUT TECHNOLOGIES AS DESCRIBED BY CMS-2020-01-R: HCPCS

## 2023-01-15 PROCEDURE — 93010 ELECTROCARDIOGRAM REPORT: CPT | Performed by: INTERNAL MEDICINE

## 2023-01-15 PROCEDURE — 86832 HLA CLASS I HIGH DEFIN QUAL: CPT

## 2023-01-15 PROCEDURE — 80053 COMPREHEN METABOLIC PANEL: CPT

## 2023-01-15 PROCEDURE — 250N000011 HC RX IP 250 OP 636: Performed by: SURGERY

## 2023-01-15 PROCEDURE — 86833 HLA CLASS II HIGH DEFIN QUAL: CPT

## 2023-01-15 PROCEDURE — 360N000077 HC SURGERY LEVEL 4, PER MIN: Performed by: SURGERY

## 2023-01-15 PROCEDURE — 999N000141 HC STATISTIC PRE-PROCEDURE NURSING ASSESSMENT: Performed by: SURGERY

## 2023-01-15 PROCEDURE — 250N000025 HC SEVOFLURANE, PER MIN: Performed by: SURGERY

## 2023-01-15 PROCEDURE — 250N000009 HC RX 250: Performed by: SURGERY

## 2023-01-15 PROCEDURE — 272N000001 HC OR GENERAL SUPPLY STERILE: Performed by: SURGERY

## 2023-01-15 PROCEDURE — 250N000011 HC RX IP 250 OP 636

## 2023-01-15 PROCEDURE — 258N000003 HC RX IP 258 OP 636: Performed by: ANESTHESIOLOGY

## 2023-01-15 PROCEDURE — 999N000063 XR CHEST PORT 1 VIEW

## 2023-01-15 PROCEDURE — 710N000010 HC RECOVERY PHASE 1, LEVEL 2, PER MIN: Performed by: SURGERY

## 2023-01-15 PROCEDURE — 71045 X-RAY EXAM CHEST 1 VIEW: CPT | Mod: 26 | Performed by: RADIOLOGY

## 2023-01-15 PROCEDURE — 258N000003 HC RX IP 258 OP 636: Performed by: SURGERY

## 2023-01-15 PROCEDURE — 999N000128 HC STATISTIC PERIPHERAL IV START W/O US GUIDANCE

## 2023-01-15 PROCEDURE — 86901 BLOOD TYPING SEROLOGIC RH(D): CPT

## 2023-01-15 PROCEDURE — 84132 ASSAY OF SERUM POTASSIUM: CPT | Performed by: SURGERY

## 2023-01-15 PROCEDURE — 250N000009 HC RX 250

## 2023-01-15 PROCEDURE — 0T9B70Z DRAINAGE OF BLADDER WITH DRAINAGE DEVICE, VIA NATURAL OR ARTIFICIAL OPENING: ICD-10-PCS | Performed by: UROLOGY

## 2023-01-15 PROCEDURE — 76776 US EXAM K TRANSPL W/DOPPLER: CPT

## 2023-01-15 PROCEDURE — 86790 VIRUS ANTIBODY NOS: CPT

## 2023-01-15 PROCEDURE — 99223 1ST HOSP IP/OBS HIGH 75: CPT | Mod: GC

## 2023-01-15 PROCEDURE — 87535 HIV-1 PROBE&REVERSE TRNSCRPJ: CPT

## 2023-01-15 PROCEDURE — 36592 COLLECT BLOOD FROM PICC: CPT | Performed by: SURGERY

## 2023-01-15 PROCEDURE — 250N000012 HC RX MED GY IP 250 OP 636 PS 637: Performed by: SURGERY

## 2023-01-15 PROCEDURE — 87389 HIV-1 AG W/HIV-1&-2 AB AG IA: CPT

## 2023-01-15 PROCEDURE — 258N000003 HC RX IP 258 OP 636

## 2023-01-15 PROCEDURE — 86706 HEP B SURFACE ANTIBODY: CPT

## 2023-01-15 DEVICE — SOF-FLEX DOUBLE PIGTAIL URETERAL STENT SET
Type: IMPLANTABLE DEVICE | Site: URETER | Status: NON-FUNCTIONAL
Brand: SOF-FLEX
Removed: 2023-03-01

## 2023-01-15 RX ORDER — SODIUM CHLORIDE, SODIUM GLUCONATE, SODIUM ACETATE, POTASSIUM CHLORIDE AND MAGNESIUM CHLORIDE 526; 502; 368; 37; 30 MG/100ML; MG/100ML; MG/100ML; MG/100ML; MG/100ML
INJECTION, SOLUTION INTRAVENOUS CONTINUOUS PRN
Status: DISCONTINUED | OUTPATIENT
Start: 2023-01-15 | End: 2023-01-15

## 2023-01-15 RX ORDER — PROPOFOL 10 MG/ML
INJECTION, EMULSION INTRAVENOUS PRN
Status: DISCONTINUED | OUTPATIENT
Start: 2023-01-15 | End: 2023-01-15

## 2023-01-15 RX ORDER — BISACODYL 10 MG
10 SUPPOSITORY, RECTAL RECTAL DAILY PRN
Status: DISCONTINUED | OUTPATIENT
Start: 2023-01-15 | End: 2023-01-18 | Stop reason: HOSPADM

## 2023-01-15 RX ORDER — PAPAVERINE HYDROCHLORIDE 30 MG/ML
INJECTION INTRAMUSCULAR; INTRAVENOUS PRN
Status: DISCONTINUED | OUTPATIENT
Start: 2023-01-15 | End: 2023-01-15 | Stop reason: HOSPADM

## 2023-01-15 RX ORDER — VASOPRESSIN IN 0.9 % NACL 2 UNIT/2ML
SYRINGE (ML) INTRAVENOUS PRN
Status: DISCONTINUED | OUTPATIENT
Start: 2023-01-15 | End: 2023-01-15

## 2023-01-15 RX ORDER — LIDOCAINE 40 MG/G
CREAM TOPICAL
Status: DISCONTINUED | OUTPATIENT
Start: 2023-01-15 | End: 2023-01-15

## 2023-01-15 RX ORDER — HYDROMORPHONE HCL IN WATER/PF 6 MG/30 ML
0.2 PATIENT CONTROLLED ANALGESIA SYRINGE INTRAVENOUS
Status: DISCONTINUED | OUTPATIENT
Start: 2023-01-15 | End: 2023-01-16

## 2023-01-15 RX ORDER — POLYETHYLENE GLYCOL 3350 17 G/17G
17 POWDER, FOR SOLUTION ORAL DAILY
Status: DISCONTINUED | OUTPATIENT
Start: 2023-01-16 | End: 2023-01-18 | Stop reason: HOSPADM

## 2023-01-15 RX ORDER — ONDANSETRON 4 MG/1
4 TABLET, ORALLY DISINTEGRATING ORAL EVERY 6 HOURS PRN
Status: DISCONTINUED | OUTPATIENT
Start: 2023-01-15 | End: 2023-01-18 | Stop reason: HOSPADM

## 2023-01-15 RX ORDER — DEXTROSE, SODIUM CHLORIDE, SODIUM LACTATE, POTASSIUM CHLORIDE, AND CALCIUM CHLORIDE 5; .6; .31; .03; .02 G/100ML; G/100ML; G/100ML; G/100ML; G/100ML
INJECTION, SOLUTION INTRAVENOUS CONTINUOUS
Status: DISCONTINUED | OUTPATIENT
Start: 2023-01-15 | End: 2023-01-16

## 2023-01-15 RX ORDER — ONDANSETRON 4 MG/1
4 TABLET, ORALLY DISINTEGRATING ORAL EVERY 30 MIN PRN
Status: DISCONTINUED | OUTPATIENT
Start: 2023-01-15 | End: 2023-01-15 | Stop reason: HOSPADM

## 2023-01-15 RX ORDER — ONDANSETRON 2 MG/ML
4 INJECTION INTRAMUSCULAR; INTRAVENOUS EVERY 30 MIN PRN
Status: DISCONTINUED | OUTPATIENT
Start: 2023-01-15 | End: 2023-01-15 | Stop reason: HOSPADM

## 2023-01-15 RX ORDER — CEFUROXIME SODIUM 1.5 G/16ML
1.5 INJECTION, POWDER, FOR SOLUTION INTRAVENOUS ONCE
Status: DISCONTINUED | OUTPATIENT
Start: 2023-01-15 | End: 2023-01-15 | Stop reason: HOSPADM

## 2023-01-15 RX ORDER — LIDOCAINE HYDROCHLORIDE 20 MG/ML
JELLY TOPICAL PRN
Status: DISCONTINUED | OUTPATIENT
Start: 2023-01-15 | End: 2023-01-15 | Stop reason: HOSPADM

## 2023-01-15 RX ORDER — NALOXONE HYDROCHLORIDE 0.4 MG/ML
0.4 INJECTION, SOLUTION INTRAMUSCULAR; INTRAVENOUS; SUBCUTANEOUS
Status: DISCONTINUED | OUTPATIENT
Start: 2023-01-15 | End: 2023-01-18 | Stop reason: HOSPADM

## 2023-01-15 RX ORDER — ONDANSETRON 2 MG/ML
INJECTION INTRAMUSCULAR; INTRAVENOUS PRN
Status: DISCONTINUED | OUTPATIENT
Start: 2023-01-15 | End: 2023-01-15

## 2023-01-15 RX ORDER — FAMOTIDINE 20 MG/1
20 TABLET, FILM COATED ORAL
Status: DISCONTINUED | OUTPATIENT
Start: 2023-01-15 | End: 2023-01-16

## 2023-01-15 RX ORDER — SODIUM CHLORIDE, SODIUM LACTATE, POTASSIUM CHLORIDE, CALCIUM CHLORIDE 600; 310; 30; 20 MG/100ML; MG/100ML; MG/100ML; MG/100ML
INJECTION, SOLUTION INTRAVENOUS CONTINUOUS
Status: DISCONTINUED | OUTPATIENT
Start: 2023-01-15 | End: 2023-01-15 | Stop reason: HOSPADM

## 2023-01-15 RX ORDER — MAGNESIUM HYDROXIDE 1200 MG/15ML
LIQUID ORAL PRN
Status: DISCONTINUED | OUTPATIENT
Start: 2023-01-15 | End: 2023-01-15 | Stop reason: HOSPADM

## 2023-01-15 RX ORDER — ACETAMINOPHEN 325 MG/1
975 TABLET ORAL EVERY 8 HOURS
Status: COMPLETED | OUTPATIENT
Start: 2023-01-15 | End: 2023-01-18

## 2023-01-15 RX ORDER — SODIUM CHLORIDE 450 MG/100ML
INJECTION, SOLUTION INTRAVENOUS CONTINUOUS PRN
Status: DISCONTINUED | OUTPATIENT
Start: 2023-01-15 | End: 2023-01-16

## 2023-01-15 RX ORDER — HYDROMORPHONE HYDROCHLORIDE 1 MG/ML
0.2 INJECTION, SOLUTION INTRAMUSCULAR; INTRAVENOUS; SUBCUTANEOUS EVERY 5 MIN PRN
Status: DISCONTINUED | OUTPATIENT
Start: 2023-01-15 | End: 2023-01-15 | Stop reason: HOSPADM

## 2023-01-15 RX ORDER — PROCHLORPERAZINE MALEATE 5 MG
10 TABLET ORAL EVERY 6 HOURS PRN
Status: DISCONTINUED | OUTPATIENT
Start: 2023-01-15 | End: 2023-01-18 | Stop reason: HOSPADM

## 2023-01-15 RX ORDER — MAGNESIUM OXIDE 400 MG/1
400 TABLET ORAL
Status: DISCONTINUED | OUTPATIENT
Start: 2023-01-17 | End: 2023-01-18 | Stop reason: HOSPADM

## 2023-01-15 RX ORDER — EPHEDRINE SULFATE 50 MG/ML
INJECTION, SOLUTION INTRAVENOUS PRN
Status: DISCONTINUED | OUTPATIENT
Start: 2023-01-15 | End: 2023-01-15

## 2023-01-15 RX ORDER — TACROLIMUS 1 MG/1
2 CAPSULE ORAL
Status: DISCONTINUED | OUTPATIENT
Start: 2023-01-16 | End: 2023-01-18

## 2023-01-15 RX ORDER — CALCIUM CHLORIDE 100 MG/ML
INJECTION INTRAVENOUS; INTRAVENTRICULAR PRN
Status: DISCONTINUED | OUTPATIENT
Start: 2023-01-15 | End: 2023-01-15

## 2023-01-15 RX ORDER — LIDOCAINE 40 MG/G
CREAM TOPICAL
Status: DISCONTINUED | OUTPATIENT
Start: 2023-01-15 | End: 2023-01-15 | Stop reason: HOSPADM

## 2023-01-15 RX ORDER — BUMETANIDE 0.25 MG/ML
4.5 INJECTION INTRAMUSCULAR; INTRAVENOUS ONCE
Status: DISCONTINUED | OUTPATIENT
Start: 2023-01-15 | End: 2023-01-15

## 2023-01-15 RX ORDER — FENTANYL CITRATE 50 UG/ML
25 INJECTION, SOLUTION INTRAMUSCULAR; INTRAVENOUS EVERY 5 MIN PRN
Status: DISCONTINUED | OUTPATIENT
Start: 2023-01-15 | End: 2023-01-15 | Stop reason: HOSPADM

## 2023-01-15 RX ORDER — MYCOPHENOLATE MOFETIL 250 MG/1
750 CAPSULE ORAL
Status: DISCONTINUED | OUTPATIENT
Start: 2023-01-15 | End: 2023-01-18 | Stop reason: HOSPADM

## 2023-01-15 RX ORDER — SODIUM CHLORIDE, SODIUM LACTATE, POTASSIUM CHLORIDE, CALCIUM CHLORIDE 600; 310; 30; 20 MG/100ML; MG/100ML; MG/100ML; MG/100ML
INJECTION, SOLUTION INTRAVENOUS CONTINUOUS PRN
Status: DISCONTINUED | OUTPATIENT
Start: 2023-01-15 | End: 2023-01-15

## 2023-01-15 RX ORDER — METHOCARBAMOL 750 MG/1
750 TABLET, FILM COATED ORAL EVERY 6 HOURS PRN
Status: DISCONTINUED | OUTPATIENT
Start: 2023-01-15 | End: 2023-01-18 | Stop reason: HOSPADM

## 2023-01-15 RX ORDER — SULFAMETHOXAZOLE AND TRIMETHOPRIM 400; 80 MG/1; MG/1
1 TABLET ORAL
Status: DISCONTINUED | OUTPATIENT
Start: 2023-01-16 | End: 2023-01-18 | Stop reason: HOSPADM

## 2023-01-15 RX ORDER — NALOXONE HYDROCHLORIDE 0.4 MG/ML
0.2 INJECTION, SOLUTION INTRAMUSCULAR; INTRAVENOUS; SUBCUTANEOUS
Status: DISCONTINUED | OUTPATIENT
Start: 2023-01-15 | End: 2023-01-18 | Stop reason: HOSPADM

## 2023-01-15 RX ORDER — CEFUROXIME SODIUM 1.5 G/16ML
1.5 INJECTION, POWDER, FOR SOLUTION INTRAVENOUS SEE ADMIN INSTRUCTIONS
Status: DISCONTINUED | OUTPATIENT
Start: 2023-01-15 | End: 2023-01-15

## 2023-01-15 RX ORDER — NICOTINE POLACRILEX 4 MG
15-30 LOZENGE BUCCAL
Status: DISCONTINUED | OUTPATIENT
Start: 2023-01-15 | End: 2023-01-15 | Stop reason: HOSPADM

## 2023-01-15 RX ORDER — SODIUM CHLORIDE 9 MG/ML
1000 INJECTION, SOLUTION INTRAVENOUS CONTINUOUS PRN
Status: DISCONTINUED | OUTPATIENT
Start: 2023-01-15 | End: 2023-01-16

## 2023-01-15 RX ORDER — CEFUROXIME SODIUM 1.5 G/16ML
1.5 INJECTION, POWDER, FOR SOLUTION INTRAVENOUS SEE ADMIN INSTRUCTIONS
Status: DISCONTINUED | OUTPATIENT
Start: 2023-01-15 | End: 2023-01-15 | Stop reason: HOSPADM

## 2023-01-15 RX ORDER — NICOTINE POLACRILEX 4 MG
15-30 LOZENGE BUCCAL
Status: DISCONTINUED | OUTPATIENT
Start: 2023-01-15 | End: 2023-01-15

## 2023-01-15 RX ORDER — BUMETANIDE 0.25 MG/ML
0.5 INJECTION INTRAMUSCULAR; INTRAVENOUS ONCE
Status: COMPLETED | OUTPATIENT
Start: 2023-01-15 | End: 2023-01-15

## 2023-01-15 RX ORDER — LIDOCAINE HYDROCHLORIDE 20 MG/ML
INJECTION, SOLUTION INFILTRATION; PERINEURAL PRN
Status: DISCONTINUED | OUTPATIENT
Start: 2023-01-15 | End: 2023-01-15

## 2023-01-15 RX ORDER — DEXTROSE MONOHYDRATE 25 G/50ML
25-50 INJECTION, SOLUTION INTRAVENOUS
Status: DISCONTINUED | OUTPATIENT
Start: 2023-01-15 | End: 2023-01-15 | Stop reason: HOSPADM

## 2023-01-15 RX ORDER — ONDANSETRON 2 MG/ML
4 INJECTION INTRAMUSCULAR; INTRAVENOUS EVERY 6 HOURS PRN
Status: DISCONTINUED | OUTPATIENT
Start: 2023-01-15 | End: 2023-01-18

## 2023-01-15 RX ORDER — ATORVASTATIN CALCIUM 10 MG/1
10 TABLET, FILM COATED ORAL DAILY
Status: DISCONTINUED | OUTPATIENT
Start: 2023-01-16 | End: 2023-01-18 | Stop reason: HOSPADM

## 2023-01-15 RX ORDER — ACETAMINOPHEN 325 MG/1
975 TABLET ORAL ONCE
Status: DISCONTINUED | OUTPATIENT
Start: 2023-01-15 | End: 2023-01-15 | Stop reason: HOSPADM

## 2023-01-15 RX ORDER — HYDROMORPHONE HYDROCHLORIDE 1 MG/ML
0.4 INJECTION, SOLUTION INTRAMUSCULAR; INTRAVENOUS; SUBCUTANEOUS EVERY 5 MIN PRN
Status: DISCONTINUED | OUTPATIENT
Start: 2023-01-15 | End: 2023-01-15 | Stop reason: HOSPADM

## 2023-01-15 RX ORDER — FUROSEMIDE 10 MG/ML
INJECTION INTRAMUSCULAR; INTRAVENOUS PRN
Status: DISCONTINUED | OUTPATIENT
Start: 2023-01-15 | End: 2023-01-15

## 2023-01-15 RX ORDER — OXYCODONE HYDROCHLORIDE 10 MG/1
10 TABLET ORAL EVERY 4 HOURS PRN
Status: DISCONTINUED | OUTPATIENT
Start: 2023-01-15 | End: 2023-01-18 | Stop reason: HOSPADM

## 2023-01-15 RX ORDER — HYDROMORPHONE HCL IN WATER/PF 6 MG/30 ML
0.4 PATIENT CONTROLLED ANALGESIA SYRINGE INTRAVENOUS
Status: DISCONTINUED | OUTPATIENT
Start: 2023-01-15 | End: 2023-01-18

## 2023-01-15 RX ORDER — GLYCOPYRROLATE 0.2 MG/ML
INJECTION, SOLUTION INTRAMUSCULAR; INTRAVENOUS PRN
Status: DISCONTINUED | OUTPATIENT
Start: 2023-01-15 | End: 2023-01-15

## 2023-01-15 RX ORDER — HEPARIN SODIUM 1000 [USP'U]/ML
INJECTION, SOLUTION INTRAVENOUS; SUBCUTANEOUS PRN
Status: DISCONTINUED | OUTPATIENT
Start: 2023-01-15 | End: 2023-01-15

## 2023-01-15 RX ORDER — LABETALOL HYDROCHLORIDE 5 MG/ML
10 INJECTION, SOLUTION INTRAVENOUS
Status: DISCONTINUED | OUTPATIENT
Start: 2023-01-15 | End: 2023-01-15 | Stop reason: HOSPADM

## 2023-01-15 RX ORDER — FENTANYL CITRATE 50 UG/ML
INJECTION, SOLUTION INTRAMUSCULAR; INTRAVENOUS PRN
Status: DISCONTINUED | OUTPATIENT
Start: 2023-01-15 | End: 2023-01-15

## 2023-01-15 RX ORDER — FENTANYL CITRATE 50 UG/ML
50 INJECTION, SOLUTION INTRAMUSCULAR; INTRAVENOUS EVERY 5 MIN PRN
Status: DISCONTINUED | OUTPATIENT
Start: 2023-01-15 | End: 2023-01-15 | Stop reason: HOSPADM

## 2023-01-15 RX ORDER — MANNITOL 20 G/100ML
INJECTION, SOLUTION INTRAVENOUS PRN
Status: DISCONTINUED | OUTPATIENT
Start: 2023-01-15 | End: 2023-01-15

## 2023-01-15 RX ORDER — CEFUROXIME SODIUM 1.5 G/16ML
1.5 INJECTION, POWDER, FOR SOLUTION INTRAVENOUS ONCE
Status: COMPLETED | OUTPATIENT
Start: 2023-01-15 | End: 2023-01-15

## 2023-01-15 RX ORDER — DEXTROSE MONOHYDRATE 25 G/50ML
25-50 INJECTION, SOLUTION INTRAVENOUS
Status: DISCONTINUED | OUTPATIENT
Start: 2023-01-15 | End: 2023-01-15

## 2023-01-15 RX ORDER — VALGANCICLOVIR 450 MG/1
450 TABLET, FILM COATED ORAL
Status: DISCONTINUED | OUTPATIENT
Start: 2023-01-16 | End: 2023-01-18 | Stop reason: HOSPADM

## 2023-01-15 RX ORDER — ACETAMINOPHEN 325 MG/1
650 TABLET ORAL EVERY 4 HOURS PRN
Status: DISCONTINUED | OUTPATIENT
Start: 2023-01-18 | End: 2023-01-18 | Stop reason: HOSPADM

## 2023-01-15 RX ORDER — OXYCODONE HYDROCHLORIDE 5 MG/1
5 TABLET ORAL EVERY 4 HOURS PRN
Status: DISCONTINUED | OUTPATIENT
Start: 2023-01-15 | End: 2023-01-18 | Stop reason: HOSPADM

## 2023-01-15 RX ORDER — CEFUROXIME SODIUM 1.5 G/16ML
1.5 INJECTION, POWDER, FOR SOLUTION INTRAVENOUS ONCE
Status: DISCONTINUED | OUTPATIENT
Start: 2023-01-15 | End: 2023-01-15

## 2023-01-15 RX ORDER — AMOXICILLIN 250 MG
1 CAPSULE ORAL 2 TIMES DAILY
Status: DISCONTINUED | OUTPATIENT
Start: 2023-01-15 | End: 2023-01-18 | Stop reason: HOSPADM

## 2023-01-15 RX ADMIN — Medication 65 MG: at 11:10

## 2023-01-15 RX ADMIN — MIDAZOLAM 1 MG: 1 INJECTION INTRAMUSCULAR; INTRAVENOUS at 11:07

## 2023-01-15 RX ADMIN — EPHEDRINE SULFATE 2.5 MG: 50 INJECTION, SOLUTION INTRAVENOUS at 15:00

## 2023-01-15 RX ADMIN — FUROSEMIDE 10 MG: 10 INJECTION, SOLUTION INTRAVENOUS at 15:08

## 2023-01-15 RX ADMIN — GLYCOPYRROLATE 0.2 MG: 0.2 INJECTION, SOLUTION INTRAMUSCULAR; INTRAVENOUS at 12:02

## 2023-01-15 RX ADMIN — CALCIUM CHLORIDE 0.2 G: 100 INJECTION INTRAVENOUS; INTRAVENTRICULAR at 16:50

## 2023-01-15 RX ADMIN — SENNOSIDES AND DOCUSATE SODIUM 1 TABLET: 8.6; 5 TABLET ORAL at 20:12

## 2023-01-15 RX ADMIN — NOREPINEPHRINE BITARTRATE 6.4 MCG: 1 INJECTION, SOLUTION, CONCENTRATE INTRAVENOUS at 11:21

## 2023-01-15 RX ADMIN — HYDROMORPHONE HYDROCHLORIDE 0.2 MG: 1 INJECTION, SOLUTION INTRAMUSCULAR; INTRAVENOUS; SUBCUTANEOUS at 13:22

## 2023-01-15 RX ADMIN — HEPARIN SODIUM 1000 UNITS: 1000 INJECTION INTRAVENOUS; SUBCUTANEOUS at 13:21

## 2023-01-15 RX ADMIN — LIDOCAINE HYDROCHLORIDE 100 MG: 20 INJECTION, SOLUTION INFILTRATION; PERINEURAL at 11:08

## 2023-01-15 RX ADMIN — NOREPINEPHRINE BITARTRATE 6.4 MCG: 1 INJECTION, SOLUTION, CONCENTRATE INTRAVENOUS at 11:36

## 2023-01-15 RX ADMIN — FUROSEMIDE 10 MG: 10 INJECTION, SOLUTION INTRAVENOUS at 15:10

## 2023-01-15 RX ADMIN — FUROSEMIDE 10 MG: 10 INJECTION, SOLUTION INTRAVENOUS at 15:09

## 2023-01-15 RX ADMIN — HYDROMORPHONE HYDROCHLORIDE 0.4 MG: 1 INJECTION, SOLUTION INTRAMUSCULAR; INTRAVENOUS; SUBCUTANEOUS at 18:35

## 2023-01-15 RX ADMIN — EPHEDRINE SULFATE 2.5 MG: 50 INJECTION, SOLUTION INTRAVENOUS at 15:04

## 2023-01-15 RX ADMIN — SODIUM CHLORIDE, SODIUM GLUCONATE, SODIUM ACETATE, POTASSIUM CHLORIDE AND MAGNESIUM CHLORIDE: 526; 502; 368; 37; 30 INJECTION, SOLUTION INTRAVENOUS at 11:45

## 2023-01-15 RX ADMIN — FENTANYL CITRATE 50 MCG: 50 INJECTION, SOLUTION INTRAMUSCULAR; INTRAVENOUS at 18:17

## 2023-01-15 RX ADMIN — GLYCOPYRROLATE 0.2 MG: 0.2 INJECTION, SOLUTION INTRAMUSCULAR; INTRAVENOUS at 14:56

## 2023-01-15 RX ADMIN — HYDROMORPHONE HYDROCHLORIDE 0.2 MG: 1 INJECTION, SOLUTION INTRAMUSCULAR; INTRAVENOUS; SUBCUTANEOUS at 18:28

## 2023-01-15 RX ADMIN — FUROSEMIDE 10 MG: 10 INJECTION, SOLUTION INTRAVENOUS at 15:06

## 2023-01-15 RX ADMIN — FENTANYL CITRATE 50 MCG: 50 INJECTION, SOLUTION INTRAMUSCULAR; INTRAVENOUS at 17:45

## 2023-01-15 RX ADMIN — ANTI-THYMOCYTE GLOBULIN (RABBIT) 125 MG: 5 INJECTION, POWDER, LYOPHILIZED, FOR SOLUTION INTRAVENOUS at 12:43

## 2023-01-15 RX ADMIN — FUROSEMIDE 10 MG: 10 INJECTION, SOLUTION INTRAVENOUS at 15:07

## 2023-01-15 RX ADMIN — SODIUM CHLORIDE, SODIUM LACTATE, POTASSIUM CHLORIDE, CALCIUM CHLORIDE AND DEXTROSE MONOHYDRATE: 5; 600; 310; 30; 20 INJECTION, SOLUTION INTRAVENOUS at 18:16

## 2023-01-15 RX ADMIN — MYCOPHENOLATE MOFETIL 750 MG: 250 CAPSULE ORAL at 20:11

## 2023-01-15 RX ADMIN — CALCIUM CHLORIDE 0.2 G: 100 INJECTION INTRAVENOUS; INTRAVENTRICULAR at 17:12

## 2023-01-15 RX ADMIN — CALCIUM CHLORIDE 0.2 G: 100 INJECTION INTRAVENOUS; INTRAVENTRICULAR at 16:12

## 2023-01-15 RX ADMIN — MANNITOL 25 G: 20 INJECTION, SOLUTION INTRAVENOUS at 15:05

## 2023-01-15 RX ADMIN — FENTANYL CITRATE 50 MCG: 50 INJECTION, SOLUTION INTRAMUSCULAR; INTRAVENOUS at 18:10

## 2023-01-15 RX ADMIN — OXYCODONE HYDROCHLORIDE 10 MG: 10 TABLET ORAL at 22:30

## 2023-01-15 RX ADMIN — HYDROMORPHONE HYDROCHLORIDE 0.4 MG: 0.2 INJECTION, SOLUTION INTRAMUSCULAR; INTRAVENOUS; SUBCUTANEOUS at 20:12

## 2023-01-15 RX ADMIN — METHOCARBAMOL 750 MG: 750 TABLET, FILM COATED ORAL at 22:31

## 2023-01-15 RX ADMIN — FENTANYL CITRATE 100 MCG: 50 INJECTION, SOLUTION INTRAMUSCULAR; INTRAVENOUS at 11:08

## 2023-01-15 RX ADMIN — ONDANSETRON 4 MG: 2 INJECTION INTRAMUSCULAR; INTRAVENOUS at 17:24

## 2023-01-15 RX ADMIN — HYDROMORPHONE HYDROCHLORIDE 0.25 MG: 1 INJECTION, SOLUTION INTRAMUSCULAR; INTRAVENOUS; SUBCUTANEOUS at 16:57

## 2023-01-15 RX ADMIN — CEFUROXIME 1.5 G: 1.5 INJECTION, POWDER, FOR SOLUTION INTRAVENOUS at 11:40

## 2023-01-15 RX ADMIN — SODIUM CHLORIDE, POTASSIUM CHLORIDE, SODIUM LACTATE AND CALCIUM CHLORIDE: 600; 310; 30; 20 INJECTION, SOLUTION INTRAVENOUS at 11:07

## 2023-01-15 RX ADMIN — CEFUROXIME 1.5 G: 1.5 INJECTION, POWDER, FOR SOLUTION INTRAVENOUS at 16:04

## 2023-01-15 RX ADMIN — FUROSEMIDE 10 MG: 10 INJECTION, SOLUTION INTRAVENOUS at 15:11

## 2023-01-15 RX ADMIN — SUGAMMADEX 200 MG: 100 INJECTION, SOLUTION INTRAVENOUS at 17:30

## 2023-01-15 RX ADMIN — FENTANYL CITRATE 25 MCG: 50 INJECTION, SOLUTION INTRAMUSCULAR; INTRAVENOUS at 18:24

## 2023-01-15 RX ADMIN — Medication 10 MG: at 14:29

## 2023-01-15 RX ADMIN — NOREPINEPHRINE BITARTRATE 12.8 MCG: 1 INJECTION, SOLUTION, CONCENTRATE INTRAVENOUS at 11:26

## 2023-01-15 RX ADMIN — CALCIUM CHLORIDE 0.2 G: 100 INJECTION INTRAVENOUS; INTRAVENTRICULAR at 17:20

## 2023-01-15 RX ADMIN — Medication 1 UNITS: at 11:55

## 2023-01-15 RX ADMIN — BUMETANIDE 5 MG: 0.25 INJECTION INTRAMUSCULAR; INTRAVENOUS at 15:12

## 2023-01-15 RX ADMIN — SODIUM CHLORIDE 500 MG: 9 INJECTION, SOLUTION INTRAVENOUS at 12:08

## 2023-01-15 RX ADMIN — FENTANYL CITRATE 25 MCG: 50 INJECTION, SOLUTION INTRAMUSCULAR; INTRAVENOUS at 17:53

## 2023-01-15 RX ADMIN — PROPOFOL 140 MG: 10 INJECTION, EMULSION INTRAVENOUS at 11:10

## 2023-01-15 RX ADMIN — HYDROMORPHONE HYDROCHLORIDE 0.4 MG: 1 INJECTION, SOLUTION INTRAMUSCULAR; INTRAVENOUS; SUBCUTANEOUS at 18:41

## 2023-01-15 RX ADMIN — FAMOTIDINE 20 MG: 20 TABLET, FILM COATED ORAL at 20:12

## 2023-01-15 RX ADMIN — HYDROMORPHONE HYDROCHLORIDE 0.3 MG: 1 INJECTION, SOLUTION INTRAMUSCULAR; INTRAVENOUS; SUBCUTANEOUS at 12:10

## 2023-01-15 RX ADMIN — MIDAZOLAM 1 MG: 1 INJECTION INTRAMUSCULAR; INTRAVENOUS at 11:03

## 2023-01-15 RX ADMIN — HYDROMORPHONE HYDROCHLORIDE 0.25 MG: 1 INJECTION, SOLUTION INTRAMUSCULAR; INTRAVENOUS; SUBCUTANEOUS at 16:41

## 2023-01-15 RX ADMIN — FUROSEMIDE 10 MG: 10 INJECTION, SOLUTION INTRAVENOUS at 15:15

## 2023-01-15 RX ADMIN — Medication 10 MG: at 13:58

## 2023-01-15 RX ADMIN — CALCIUM CHLORIDE 0.2 G: 100 INJECTION INTRAVENOUS; INTRAVENTRICULAR at 15:04

## 2023-01-15 RX ADMIN — GLYCOPYRROLATE 0.2 MG: 0.2 INJECTION, SOLUTION INTRAMUSCULAR; INTRAVENOUS at 12:00

## 2023-01-15 RX ADMIN — Medication 20 MG: at 13:13

## 2023-01-15 RX ADMIN — FENTANYL CITRATE 50 MCG: 50 INJECTION, SOLUTION INTRAMUSCULAR; INTRAVENOUS at 16:36

## 2023-01-15 RX ADMIN — Medication 5 MG: at 11:08

## 2023-01-15 RX ADMIN — Medication 10 MG: at 15:03

## 2023-01-15 RX ADMIN — NOREPINEPHRINE BITARTRATE 6.4 MCG: 1 INJECTION, SOLUTION, CONCENTRATE INTRAVENOUS at 11:17

## 2023-01-15 RX ADMIN — FUROSEMIDE 10 MG: 10 INJECTION, SOLUTION INTRAVENOUS at 15:12

## 2023-01-15 RX ADMIN — EPHEDRINE SULFATE 2.5 MG: 50 INJECTION, SOLUTION INTRAVENOUS at 14:58

## 2023-01-15 RX ADMIN — SODIUM CHLORIDE 1000 ML: 9 INJECTION, SOLUTION INTRAVENOUS at 18:15

## 2023-01-15 RX ADMIN — FENTANYL CITRATE 50 MCG: 50 INJECTION, SOLUTION INTRAMUSCULAR; INTRAVENOUS at 18:03

## 2023-01-15 RX ADMIN — ACETAMINOPHEN 975 MG: 325 TABLET, FILM COATED ORAL at 20:11

## 2023-01-15 RX ADMIN — FUROSEMIDE 10 MG: 10 INJECTION, SOLUTION INTRAVENOUS at 15:14

## 2023-01-15 RX ADMIN — Medication 1 UNITS: at 12:02

## 2023-01-15 RX ADMIN — FUROSEMIDE 10 MG: 10 INJECTION, SOLUTION INTRAVENOUS at 15:13

## 2023-01-15 RX ADMIN — MYCOPHENOLATE MOFETIL 1000 MG: 500 INJECTION, POWDER, LYOPHILIZED, FOR SOLUTION INTRAVENOUS at 12:13

## 2023-01-15 ASSESSMENT — ACTIVITIES OF DAILY LIVING (ADL)
ADLS_ACUITY_SCORE: 20
ADLS_ACUITY_SCORE: 31
ADLS_ACUITY_SCORE: 20

## 2023-01-15 ASSESSMENT — VISUAL ACUITY: OU: NORMAL ACUITY

## 2023-01-15 ASSESSMENT — ENCOUNTER SYMPTOMS
ORTHOPNEA: 0
DYSRHYTHMIAS: 0

## 2023-01-15 ASSESSMENT — LIFESTYLE VARIABLES: TOBACCO_USE: 1

## 2023-01-15 NOTE — ANESTHESIA PROCEDURE NOTES
Central Line/PA Catheter Placement    Pre-Procedure   Staff -        Anesthesiologist:  Jung Richardson MD       Resident/Fellow: Gregg Gamez MD       Performed By: resident       Location: OR       Pre-Anesthestic Checklist: patient identified, IV checked, risks and benefits discussed, informed consent, monitors and equipment checked, pre-op evaluation and at physician/surgeon's request  Timeout:       Correct Patient: Yes        Correct Procedure: Yes        Correct Site: Yes        Correct Position: Yes   Line Placement:   This line was placed Post Induction starting at 1/15/2023 11:14 AM and ending at 1/15/2023 11:44 AM    Procedure   Procedure: central line       Laterality: right       Insertion Site: internal jugular.       Patient Position: Trendelenburg  Sterile Prep        All elements of maximal sterile barrier technique followed       Patient Prep/Sterile Barriers: draped, hand hygiene, gloves , hat , mask , draped, gown, sterile gel and probe cover       Skin prep: Chloraprep  Insertion/Injection        Technique: ultrasound guided and Seldinger Technique        1. Ultrasound was used to evaluate the access site.       2. Vein evaluated via ultrasound for patency/adequacy.       3. Using real-time ultrasound the needle/catheter was observed entering the artery/vein.       Type: CVC       Number of Lumens: triple lumen  Narrative        Tegaderm and Biopatch dressing used.       All lumens flushed: Yes       Verification method: Ultrasound   Comments:  Ultrasound with wire passing through internal jugular to large vein just below internal jugular, wire retracted by Dr. Richardson and advanced in internal jugular with no issues.

## 2023-01-15 NOTE — LETTER
Transition Communication Hand-off for Care Transitions to Next Level of Care Provider    Name: Chip Fowler  : 1985  MRN #: [id number]  Primary Care Provider: Physician No Ref-Primary     Primary Clinic: No address on file     Reason for Hospitalization:  ESRD (end stage renal disease) (H) [N18.6]  Kidney disease, chronic, end stage on dialysis (H) [N18.6, Z99.2]  Transplant, organ [Z94.9]  Admit Date/Time: 1/15/2023  5:04 AM  Discharge Date:  2023  Payor Source: Payor: MEDICARE / Plan: MEDICARE / Product Type: Medicare /          Reason for Communication Hand-off Referral: Other Continuity of care    Discharge Plan: Home with home care and outpatient follow up         Concern for non-adherence with plan of care:   No  Discharge Needs Assessment:  Needs    Flowsheet Row Most Recent Value   Equipment Currently Used at Home none   # of Referrals Placed by [facility]          Follow-up specialty is recommended: Yes    Follow-up plan:    Future Appointments   Date Time Provider Department Center   2023  7:15 AM  LAB [facility]   2023  8:00 AM [facility] NURSE [facility]   2023  9:00 AM Rasheeda Hernandez APRN CNP [facility]   2023  7:15 AM  LAB [facility]   2023  8:00 AM [facility]   2023  9:00 AM Rasheeda Hernandez APRN CNP [facility]   2023 10:00 AM Tato Ralph DO [facility]    3/14/2023  9:00 AM Bashir Lawson MD [facility]       Any outstanding tests or procedures:        Referrals     Future Labs/Procedures    Home Care Referral     Comments:    [facility]  Phone: [phone]  Fax: [phone]    Home Care visits to start following completion of daily ATC Clinic visits ([phone])     Skilled nursing visits to monitor cardiac and resp status   Monitor hydration, nutrition, urinary and bowel status   Monitor healing of incision     Instruct in medications and eval effects    Assist / teach  patient to obtain and record lab results in handbook     Lab draws(mornings) per orders, report results to Post Transplant Coordinator:  #[phone]  Fax # [phone]      Your provider has ordered home health services. If you have not been contacted within 2 days of your discharge please call the inpatient department phone number at [phone] .            Key Recommendations:  Please see attached AVS.  ATC appointments confirmed.  Home care confirmed.      Namrata Thompson RN    AVS/Discharge Summary is the source of truth; this is a helpful guide for improved communication of patient story

## 2023-01-15 NOTE — PROGRESS NOTES
Admitted/transferred from: Home  Time of arrival on unit 0500  2 RN full  skin assessment completed by Marielena FLORES and Val SEQUEIRA  Skin assessment finding: No wounds. Pt. Has petechiae on scrotum.   Interventions/actions: none     Will continue to monitor.

## 2023-01-15 NOTE — CONSULTS
"Urology Consult    Name: Chip Fowler    MRN: [id number]   YOB: 1985               Chief Complaint:   \"hx of urethroplasty; prior urology note recommend urology to place shaver in OR\"    History is obtained from the patient and chart review          History of Present Illness:   Chip Fowler is a 37 year old male with ESRD on PD and s/p buccal urethroplasty + diverticulum excision 12/2020 who is admitted for a renal transplant. Transplant surgery have placed a consult for shaver catheter placement within the OR.    He was last seen by Dr. Mejia 2/9/2022 where a cystoscopy was performed. The cystoscopy demonstrated normal anterior urethra to the point of his reconstruction where there were scattered bands of urethral stricture, but a flexible 17Fr cystoscope was able to be passed through the entirety of the urethra without issues. He also had a very small penile urethra diverticulum. It was recommended at that time that if a catheter is needed during his surgery that at 14Fr catheter be placed given the banding. If the 14Fr is unable to pass then placement using a cystoscope may be warranted.          Past Medical History:     Past Medical History:   Diagnosis Date     Bladder stones      Cardiomyopathy (H)      ESRD (end stage renal disease) on dialysis (H)      Hypertension      Migraines      Polysubstance abuse (H)      Solitary kidney, congenital      Urethral stone      Urethral stricture             Past Surgical History:     Past Surgical History:   Procedure Laterality Date     BIOPSY  02/2020    renal, Shinto     COMBINED CYSTOSCOPY, LASER HOLMIUM LITHOTRIPSY URETER(S)       CYSTOSCOPY       CYSTOSCOPY FLEXIBLE, CYSTOSTOMY, INSERT TUBE SUPRAPUBIC, COMBINED N/A 12/14/2020    Procedure: CYSTOSCOPY, WITH SUPRAPUBIC CATHETER INSERTION;  Surgeon: Sam Mejia MD;  [facility]: [facility]     CYSTOSCOPY, OPEN EXCISION URETHRAL DIVERTICULUM, COMBINED N/A 12/14/2020    Procedure: EXCISION OF " URETHRAL DIVERTICULUM X2;  Surgeon: Sam Mejia MD;  Location: [facility] OR     HERNIA REPAIR      infant     INSERT CATHETER PERITONEAL DIALYSIS       LASER HOLMIUM LITHOTRIPSY URETER(S), INSERT STENT, COMBINED N/A 8/21/2020    Procedure: CYSTOSCOPY, bladder and urethral stone extraction, removal of foreign body, urethral dilation, urethrotomy, laser on standby;  Surgeon: Sam Mejia MD;  [facility]     urethral dilation       URETHROPLASTY WITH BUCCAL GRAFT N/A 12/14/2020    Procedure: URETHROPLASTY, USING BUCCAL MUCOSA GRAFT;  Surgeon: Sam Mejia MD;  Location: [facility] OR            Social History:     Social History     Tobacco Use     Smoking status: Former     Packs/day: 0.40     Types: Cigarettes     Start date: 2002     Smokeless tobacco: Never     Tobacco comments:     4-8 cigs /day   Substance Use Topics     Alcohol use: Not Currently     Comment: quit alcohol 3-4 yrs ago            Family History:     Family History   Problem Relation Age of Onset     Alzheimer Disease Mother      Unknown/Adopted Father      No Known Problems Sister      No Known Problems Sister      Kidney Disease No family hx of             Allergies:   No Known Allergies         Medications:     Current Facility-Administered Medications   Medication     anti-thymocyte globulin (THYMOGLOBULIN - Rabbit) 2 mg/kg in sodium chloride 0.9 % intermittent infusion     cefuroxime (ZINACEF) 1.5 g vial to attach to  ml bag for ADULTS or NS 50 ml bag for PEDS    And     cefuroxime (ZINACEF) 1.5 g vial to attach to  ml bag for ADULTS or NS 50 ml bag for PEDS     glucose gel 15-30 g    Or     dextrose 50 % injection 25-50 mL    Or     glucagon injection 1 mg     lidocaine (LMX4) cream     lidocaine 1 % 0.1-1 mL     methylPREDNISolone sodium succinate (solu-MEDROL) 500 mg in sodium chloride 0.9 % 118 mL intermittent infusion     mycophenolate mofetil (CELLCEPT) 1,000 mg in D5W intra-operative intermittent infusion     sodium  chloride (PF) 0.9% PF flush 3 mL     sodium chloride (PF) 0.9% PF flush 3 mL             Review of Systems:    ROS: 10 point ROS neg other than the symptoms noted above in the HPI           Physical Exam:   VS:  T: 98.2    HR: 53    BP: 139/98    RR: 18   GEN: Under anesthesia   CV:  RRR  LUNGS: Non-labored breathing.   BACK:  No midline or CVA tenderness.  ABD:  Soft.  NT.  ND.  No rebound or guarding.  No masses.  :  uncircumcised.  Normal penile shaft.  Testicles descended bilaterally, no nodules or tenderness.  No inguinal hernias.          Data:   All laboratory data reviewed:    Recent Labs   Lab 01/15/23  0540   WBC 7.0   HGB 13.1*          Recent Labs   Lab 01/15/23  1750 01/15/23  1711 01/15/23  0904 01/15/23  0540    136  --  142   POTASSIUM 4.8 4.7  --  4.1   CHLORIDE 106  --   --  107   CO2 18*  --   --  19*   BUN 44.2*  --   --  47.5*   CR 4.54*  --   --  4.62*   * 101* 94 90   VISHNU 8.3*  --   --  9.1   MAG 1.5*  --   --   --    PHOS 3.5  --   --   --        Recent Labs   Lab 01/15/23  0637   COLOR Light Yellow   APPEARANCE Clear   URINEGLC Negative   URINEBILI Negative   URINEKETONE Negative   SG 1.012   URINEPH 7.0   PROTEIN 50*   NITRITE Negative   LEUKEST Negative   RBCU <1   WBCU 1     All pertinent imaging reviewed.    Procedure:  The patient was prepped and draped in the usual sterile fashion. 10ml of urojet was instilled into the urethra. A 14Fr Shaver catheter was then placed through the well lubricated urethra and easily advanced into the bladder with return of yellow urine. 10ml was instilled into the balloon. The catheter was secured to the patient's leg and connected to a drainage bag.          Impression and Plan:   Impression:   Chip Fowler is a 37 year old male with ESRD on PD and s/p buccal urethroplasty + diverticulum excision 12/2020 who is admitted for a renal transplant today with consult placed for shaver catheter placement in OR. Successful shaver placement  in the OR. Defer timeline of catheter to transplant surgery, but request removal in the AM incase patient experiences retention and requires catheterization again.       Plan:   - catheter in place     - Defer timeline of catheter to transplant surgery, but request removal in the AM incase patient experiences retention and requires catheterization again.     - Urology to sign-off       Discussed with urology staff surgeon Dr. Ardon.    Niki Santos MD  Urology Resident    Agree with above.  Zain Ardon MD

## 2023-01-15 NOTE — OP NOTE
Transplant Surgery  Operative Note     Procedure date:  01/15/23    Preoperative diagnosis:  End Stage renal failure due to hypertension    Postoperative diagnosis:  Same    Procedure:  1. Right kidney transplant,  Donation after Circulatory Death , Right  iliac fossa, with venous reconstruction. A J-J ureteral stent was placed.  2. Kidney allograft preparation on Back Table  3. Peritoneal dialysis catheter removal    Surgeon:  KIMO HAY    Fellow/Assistant:  Niki Santos MD- resident. Dr. Santos participated in all aspects of the procedure including bench reconstruction, exposure, anastomosis, and closure.     Anesthesia:  General    Specimen:  None    Drains:  King-Solano drain    Urine output:  800 mls    Estimated blood loss:  50    Fluids administered:       Indication: The patient has End Stage renal failure due to hypertension and received an organ offer for a Donation after Circulatory Death  kidney allograft. After discussing the risks and benefits of proceeding, the patient agreed to proceed with surgery and provided informed consent.  Findings: Integrity of recipient artery: normal  Graft right kidney, single artery/vein/ureter. Vein reconstructed caval conduit with some redundancy on each side. Performed well on pump- flow ~150mL, resistance 0.18. Ureter appeared stripped of all periureteric tissue proximally during benching but did have decent vascularization following reperfusion. Peritoneum full of dialysate as this wasn't drained prior to OR. Small rent in peritoneum made and dialysate drained through this. This was repaired. Additional large rent in peritoneum made in superior most aspect of wound- not able to be visualized. Some omentum protruded through this- this was tacked in place as best able to contain this opening. Vein placed end to side to external iliac vein, artery placed end to side to external iliac artery. Good reperfusion- pink and pulsatile with good  thrill and no evident spasm. Vein had been reconstructed with stapled caval conduit that had some area of redundancy, but felt full and soft with evident flow. Bladder somewhat thin and retracted during dissection. Ureter managed with liche maximiliano anterior multistitch anastomosis, but had to be revised due to twisting in ureter. URETERAL STENT PLACED. TOM placed both to surveil bladder and to monitor for PD fluid leakage. Wound irrigated and closed in layers.    Separate incision made over peritoneal dialysis fascial cuff. Noted that cuff tunneled through midline rather than paramedian space. When cuff and catheter removed, fascia closed with 0 nonlooped PDS figure of 8 to reduce risk of hernia formation. Wound irrigated and closed in layers, PD exit site packed open.     Patient has uretheral stricture-Urology assisted with Taylor placement. DO NOT manipulate Taylor without Urology clearance.   Intraoperative Events: none    Final ABO/Crossmatch verification: After the donor organ arrived to the operating room and prior to anastomosis, I participated in the transplant pre-verification upon organ receipt timeout by visually verifying the donor ID, organ and laterality, donor blood type, recipient unique identifier, recipient blood type, and that the donor and recipient are blood type compatible. The crossmatch was done retrospectively; the T cell flow crossmatch result was negative and B cell flow crossmatch result was negative prior to anastomosis.  The patient received Thymoglobulin, Cellcept and Solumedrol on induction.    Donor Organ Information:   Donor UNOS ID:  [id number]    Donor arterial clamp on:  1/14/2023  6:13 PM    Total ischemic time:  1248 min    Cold ischemic time:  1,208 min    Warm ischemic time:  40 min    Preservation fluid:  HTK     Back Table Details:   Procedure:  Bench preparation of the kidney allograft for transplantation with venous reconstruction    Surgeon:  KIMO HAY     Faculty Co-Surgeon:  KIMO HAY    Fellow/Assistant: As above    Donor arrival to recipient room:  1/15/2023 12:22 PM    Graft injury:  yes- stripped ureter (procurment)    Graft biopsy:  no    Organ received on:  Pump    Pump resistance:      Pump flow:      Arterial anatomy:  single, normal    Donor arterial quality:  normal    Venous anatomy:  single    Ureteral anatomy:  single    Any reconstruction:  caval conduit    Artery:  no    Vein:  yes- caval conduit     Complications: None.    Findings: as above       None.    Back Table Preparation:  The donor kidney was received and inspected. It had been flushed with HTK. The graft was prepared on the back table by removing perinephric fat and ligating venous tributaries and lymphatics. The ureter was also cleaned of excess tissue. If required, reconstruction was performed as detailed above. The kidney was stored in iced cold preservation solution until ready for transplantation. Faculty was present for the critical portions of the procedure.    Operative Procedure:   Arterial anastomosis start:  1/15/2023  2:21 PM    Arterial unclamp:  1/15/2023  3:01 PM    Extra vessels used:   n/a      The patient was brought to the operating room, placed in a supine position, and a time out was performed. Sequential compression devices were placed on both lower extremities and general endotracheal anesthesia was induced.  The patient was given IV antibiotics and a Taylor catheter. A central line was placed by Anesthesia service. The abdomen was then shaved, prepped, and draped in the usual sterile fashion.  An incision was made in the right lower quadrant and carried down through the subcutaneous tissue and the abdominal wall fascia. If encountered, the epigastric vessels were ligated in continuity, divided and secured with surgical clips. The right iliac artery and vein were exposed. The retractor system was placed and the lymphatics overlying the vessels  were serially ligated and divided.     The patient was heparinized. We applied atraumatic vascular clamps and the donor kidney was brought to the operative field. We made a venotomy and the caval conduit was anastomosed to the recipient right external iliac vein in an end-to-side fashion. An arteriotomy was made and the donor renal artery was anastomosed to the recipient right external iliac artery  in an end to side fashion. The patient was simultaneously loaded with IV mannitol, Lasix and volume. The renal artery was protected and the clamps were removed. After several cardiac cycles, we opened the renal artery and the kidney had good reperfusion and was firm and pink .    The transplant ureter was managed by creating a Liche Gregroire anterior multistitch anastomosis with absorbable suture. A stent was placed across the anastomosis. The bladder retracted and visualization was difficult, so the anastomosis was redone. The kidney made good urine prior to implantation.    Hemostasis was obtained, the anastomoses inspected, and the kidney placed in the iliac fossa. After placement, the vessel lay was inspected and found to be acceptable. The kidney position was on its side, spine facing out in the retroperitoneum. The field was irrigated with antibiotic solution. A drain was placed. The retractor was removed and the abdominal wall fascia reapproximated. Subcutaneous tissues were irrigated and hemostasis obtained.  The skin was reapproximated with running subcuticular stitch and dermabond was applied. We then turned attention to the removal of the peritoneal dialysis catheter. The prior cuff incision was reopened and the catheter cuffs dissected free of the subcutaneous and fascia/muscle. Notably, the inner cuff traversed the midline fascia rather than staying entirely paramedian. When the cuff was removed, I closed the fascia with 0 PDS to help reduce the risk of hernia formation. That wound was irrigated and closed  in layers with 3-0 vicryl and 4-0 monocryl. The exit site was packed open.   All needle, sponge and instrument counts were correct x 2. The patient was awakened, extubated, and transferred to PACU for post-op monitoring. Faculty was present for key portions of the procedure.

## 2023-01-15 NOTE — ANESTHESIA PREPROCEDURE EVALUATION
Anesthesia Pre-Procedure Evaluation    Patient: Chip Fowler   MRN: [id number] : 1985        Procedure : Procedure(s):  TRANSPLANT, KIDNEY, RECIPIENT,  DONOR          Past Medical History:   Diagnosis Date     Bladder stones      Cardiomyopathy (H)      ESRD (end stage renal disease) on dialysis (H)      Hypertension      Migraines      Polysubstance abuse (H)      Solitary kidney, congenital      Urethral stone      Urethral stricture       Past Surgical History:   Procedure Laterality Date     BIOPSY  2020    renal, Caodaism     COMBINED CYSTOSCOPY, LASER HOLMIUM LITHOTRIPSY URETER(S)       CYSTOSCOPY       CYSTOSCOPY FLEXIBLE, CYSTOSTOMY, INSERT TUBE SUPRAPUBIC, COMBINED N/A 2020    Procedure: CYSTOSCOPY, WITH SUPRAPUBIC CATHETER INSERTION;  Surgeon: Sam Mejia MD;  [facility]     CYSTOSCOPY, OPEN EXCISION URETHRAL DIVERTICULUM, COMBINED N/A 2020    Procedure: EXCISION OF URETHRAL DIVERTICULUM X2;  Surgeon: Sam Mejia MD;  [facility] OR     HERNIA REPAIR      infant     INSERT CATHETER PERITONEAL DIALYSIS       LASER HOLMIUM LITHOTRIPSY URETER(S), INSERT STENT, COMBINED N/A 2020    Procedure: CYSTOSCOPY, bladder and urethral stone extraction, removal of foreign body, urethral dilation, urethrotomy, laser on standby;  Surgeon: Sam Mejia MD;  [facility]     urethral dilation       URETHROPLASTY WITH BUCCAL GRAFT N/A 2020    Procedure: URETHROPLASTY, USING BUCCAL MUCOSA GRAFT;  Surgeon: Sam Mejia MD;  [facility]      No Known Allergies   Social History     Tobacco Use     Smoking status: Former     Packs/day: 0.40     Types: Cigarettes     Start date:      Smokeless tobacco: Never     Tobacco comments:     4-8 cigs /day   Substance Use Topics     Alcohol use: Not Currently     Comment: quit alcohol 3-4 yrs ago      Wt Readings from Last 1 Encounters:   22 67.6 kg (149 lb)        Anesthesia Evaluation   Pt has had prior  anesthetic. Type: General and Regional (Blade size: 3. The patient was intubated with a 7.5 mm standard endotracheal tube  ).    No history of anesthetic complications       ROS/MED HX  ENT/Pulmonary:     (+) tobacco use, Past use,     Neurologic:  - neg neurologic ROS     Cardiovascular:     (+) hypertension-----CHF etiology: likely 2/2 metamphetamine  Previous cardiac testing   Echo: Date: 9/2022 Results:  The visual ejection fraction is 55-60%.  Normal transthoracic echocardiogram. Compared to prior study, changes are  noted.  Stress Test: Date: 9/2021 Results:  A low to moderate workload was achieved.  Target Heart Rate was achieved.  The resting visual ejection fraction is estimated at 45-50%.  Global LV systolic function augments with exercise.  The visual ejection fraction is estimated at 55-60%.  This test indicates a low probability of severe occlusive coronary artery  disease.  ECG Reviewed: Date: 9/2022 Results:  Sinus bradycardia  Cath:  Date: Results:   (-) JUNIOR, orthopnea/PND, arrhythmias, irregular heartbeat/palpitations, valvular problems/murmurs and murmur   METS/Exercise Tolerance: 4 - Raking leaves, gardening    Hematologic:     (+) anemia,     Musculoskeletal:  - neg musculoskeletal ROS     GI/Hepatic:  - neg GI/hepatic ROS     Renal/Genitourinary: Comment: Solitary kidney, congenital  Last dialyzed overnight    (+) renal disease, type: ESRD, Pt requires dialysis, type: Peritoneal dialysis,     Endo:  - neg endo ROS     Psychiatric/Substance Use: Comment: Hx of polysubstance abuse    (+) Recreational drug usage: Meth (in remission).    Infectious Disease:  - neg infectious disease ROS     Malignancy:  - neg malignancy ROS     Other:            Physical Exam    Airway      Comment: Limited mouth opening    Mallampati: III   TM distance: > 3 FB   Neck ROM: full   Mouth opening: > 3 cm    Respiratory Devices and Support         Dental  no notable dental history         Cardiovascular          Rhythm  and rate: regular and normal (-) no murmur    Pulmonary           breath sounds clear to auscultation           OUTSIDE LABS:  CBC:   Lab Results   Component Value Date    WBC 7.9 03/21/2022    WBC 8.4 08/09/2021    HGB 12.5 (L) 03/21/2022    HGB 13.2 (L) 08/09/2021    HCT 37.3 (L) 03/21/2022    HCT 39.8 (L) 08/09/2021     03/21/2022     08/09/2021     BMP:   Lab Results   Component Value Date     03/21/2022     08/09/2021    POTASSIUM 4.9 03/21/2022    POTASSIUM 4.1 08/09/2021    CHLORIDE 109 03/21/2022    CHLORIDE 111 (H) 08/09/2021    CO2 21 03/21/2022    CO2 27 08/09/2021    BUN 51 (H) 03/21/2022    BUN 40 (H) 08/09/2021    CR 5.35 (H) 03/21/2022    CR 5.21 (H) 08/09/2021    GLC 90 03/21/2022    GLC 83 08/09/2021     COAGS:   Lab Results   Component Value Date    PTT 29 08/18/2020    INR 0.96 08/09/2021     POC:   Lab Results   Component Value Date    BGM 74 12/14/2020     HEPATIC:   Lab Results   Component Value Date    ALBUMIN 3.4 04/18/2022    PROTTOTAL 7.0 04/18/2022    ALT 76 (H) 04/18/2022    AST 36 04/18/2022    ALKPHOS 72 04/18/2022    BILITOTAL 0.3 04/18/2022     OTHER:   Lab Results   Component Value Date    VISHNU 8.3 (L) 03/21/2022    PHOS 4.4 08/18/2020    TSH 2.34 04/18/2022       Anesthesia Plan    ASA Status:  4   NPO Status:  NPO Appropriate    Anesthesia Type: General.     - Airway: ETT   Induction: Intravenous.   Maintenance: Inhalation.   Techniques and Equipment:     - Lines/Monitors: Central Line, BIS, CVP     - Blood: T&S     Consents    Anesthesia Plan(s) and associated risks, benefits, and realistic alternatives discussed. Questions answered and patient/representative(s) expressed understanding.    - Discussed:     - Discussed with:  Patient         Postoperative Care    Pain management: IV analgesics, Oral pain medications.   PONV prophylaxis: Ondansetron (or other 5HT-3), Dexamethasone or Solumedrol     Comments:    Other Comments: Patient seen and examined.   Risks, benefits and alternatives to GETA discussed.  Questions answered and patient wishes to proceed  Patient with a known hx of congestive heart failure, presumptively due to methamphetamine abuse.  Most recent echo reveals resolution.            Michael Gonzalez MD

## 2023-01-15 NOTE — H&P
[facility], [place]    Transplant Surgery  History and Physical    Chip Fowler  : 1985  MRN # [id number]    ADMIT DATE: 1/15/2023    PCP: No Ref-Primary, Physician    CHIEF COMPLAINT: Kidney Failure    HPI: Chip Fowler is a 37 year old male with PMH of congestive heart failure and ESRD on PD with last dialysis treatment today 1/15/2023 (still has some of the dialysis infusion in peritoneum as session was interrupted by call to come to the hospital. He is being admitted after receiving organ offer for kidney in anticipation of simultaneous kidney transplant on 1/15/2023.    ROS:   CONSTITUTIONAL: Denies fever, chills, fatigue, or weight fluctuations  CV:Denies chest pain, palpitations, or shortness of breath.   PULMONARY: Denies cough or shortness of breath.  GI:Denies nausea, vomiting, diarrhea, and abdominal pain.    EXT:Denies lower extremity edema.   SKIN:Denies abnormal rashes or lesions.   MUSCULOSKELETAL:Denies upper or lower extremity weakness and pain.   NEUROLOGIC:Denies lightheadedness, dizziness, or upper or lower extremity paresthesia.   PSYCHIATRIC:Denies any mood alterations.     PMH:  Past Medical History:   Diagnosis Date     Bladder stones      Cardiomyopathy (H)      ESRD (end stage renal disease) on dialysis (H)      Hypertension      Migraines      Polysubstance abuse (H)      Solitary kidney, congenital      Urethral stone      Urethral stricture        PSH:  Past Surgical History:   Procedure Laterality Date     BIOPSY  2020    renal, Yarsanism     COMBINED CYSTOSCOPY, LASER HOLMIUM LITHOTRIPSY URETER(S)       CYSTOSCOPY       CYSTOSCOPY FLEXIBLE, CYSTOSTOMY, INSERT TUBE SUPRAPUBIC, COMBINED N/A 2020    Procedure: CYSTOSCOPY, WITH SUPRAPUBIC CATHETER INSERTION;  Surgeon: Sam Mejia MD;  [facility]     CYSTOSCOPY, OPEN EXCISION URETHRAL DIVERTICULUM, COMBINED N/A 2020    Procedure: EXCISION OF URETHRAL DIVERTICULUM X2;   Surgeon: Sam Mejia MD;  [facility] OR     HERNIA REPAIR      infant     INSERT CATHETER PERITONEAL DIALYSIS       LASER HOLMIUM LITHOTRIPSY URETER(S), INSERT STENT, COMBINED N/A 8/21/2020    Procedure: CYSTOSCOPY, bladder and urethral stone extraction, removal of foreign body, urethral dilation, urethrotomy, laser on standby;  Surgeon: Sam Mejia MD;  [facility]     urethral dilation       URETHROPLASTY WITH BUCCAL GRAFT N/A 12/14/2020    Procedure: URETHROPLASTY, USING BUCCAL MUCOSA GRAFT;  Surgeon: Sam Mejia MD;  [facility]       MEDICATIONS:  Prior to Admission Medications   Prescriptions Last Dose Informant Patient Reported? Taking?   B Complex-C-Folic Acid (DIALYVITE 800) 0.8 MG TABS   Yes No   Sig: Take 1 tablet by mouth every morning    VITAMIN D, CHOLECALCIFEROL, PO   Yes No   Sig: Take by mouth daily   acetaminophen (TYLENOL) 325 MG tablet   No No   Sig: Take 2 tablets (650 mg) by mouth every 6 hours as needed for mild pain   carvedilol (COREG) 6.25 MG tablet   No No   Sig: Take 1 tablet (6.25 mg) by mouth 2 times daily (with meals)   cephALEXin (KEFLEX) 500 MG capsule   Yes No   Sig: TAKE ONE CAPSULE BY MOUTH TWICE DAILY FOR 14 DAYS   Patient not taking: Reported on 9/14/2022   fluconazole (DIFLUCAN) 200 MG tablet   Yes No   Sig: TAKE ONE TABLET BY MOUTH EVERY 48 HOURS FOR 4 WEEKS   Patient not taking: Reported on 9/14/2022   furosemide (LASIX) 40 MG tablet   Yes No   Sig: Take 40 mg by mouth every morning    gentamicin (GARAMYCIN) 0.1 % external ointment   Yes No   Sig: every morning    lisinopril (ZESTRIL) 5 MG tablet   No No   Sig: Take 1 tablet (5 mg) by mouth every evening      Facility-Administered Medications: None        ALLERGIES:   No Known Allergies    FAMILY HISTORY:  Family History   Problem Relation Age of Onset     Alzheimer Disease Mother      Unknown/Adopted Father      No Known Problems Sister      No Known Problems Sister      Kidney Disease No family hx  of        SOCIAL HISTORY:  Social History     Socioeconomic History     Marital status: Single     Spouse name: Not on file     Number of children: Not on file     Years of education: Not on file     Highest education level: Not on file   Occupational History     Occupation: material stager   Tobacco Use     Smoking status: Former     Packs/day: 0.40     Types: Cigarettes     Start date: 2002     Smokeless tobacco: Never     Tobacco comments:     4-8 cigs /day   Vaping Use     Vaping Use: Never used   Substance and Sexual Activity     Alcohol use: Not Currently     Comment: quit alcohol 3-4 yrs ago     Drug use: Not Currently     Types: Methamphetamines     Sexual activity: Yes   Other Topics Concern     Parent/sibling w/ CABG, MI or angioplasty before 65F 55M? Not Asked   Social History Narrative     Not on file     Social Determinants of Health     Financial Resource Strain: Not on file   Food Insecurity: Not on file   Transportation Needs: Not on file   Physical Activity: Not on file   Stress: Not on file   Social Connections: Not on file   Intimate Partner Violence: Not on file   Housing Stability: Not on file       PHYSICAL EXAM:  There were no vitals taken for this visit.  GENERAL: No acute distress. Excited and nervous for surgery.  HEENT: Eye symmetrical and free of discharge bilaterally. Mucous membranes moist and without lesions.  NECK: Supple with normal ROM.  CV: RRR, normal S1 and S2.  RESPIRATORY: Respirations regular, even, and unlabored. Lungs CTA throughout.   GI: Soft and non distended with left sided PD cath in place. No tenderness, rebound, guarding. Multiple well healed scars from prior right and left sided PD cath placements.  EXTREMITIES: No peripheral edema.   NEUROLOGIC: Alert and orientated x 3. No focal deficits.   MUSCULOSKELETAL: No joint swelling or tenderness.   SKIN: No jaundice. No rashes or lesions.     LABS:  CBC:  Recent Labs   Lab Test 03/21/22  1551   WBC 7.9   RBC 3.89*   HGB  12.5*   HCT 37.3*   MCV 96   MCH 32.1   MCHC 33.5   RDW 12.4          CMP:  Recent Labs   Lab Test 04/18/22  0851 03/21/22  1551   NA  --  139   POTASSIUM  --  4.9   CHLORIDE  --  109   VISHNU  --  8.3*   CO2  --  21   BUN  --  51*   CR  --  5.35*   GLC  --  90   AST 36 37   ALT 76* 73*   BILITOTAL 0.3 0.3   ALBUMIN 3.4 3.1*   PROTTOTAL 7.0 7.0   ALKPHOS 72 62       IMAGING:  Imaging pending    ASSESSMENT & PLAN:  Chip Fowler is a 37 year old male with PMH of congestive heart failure and ESRD on PD with last dialysis treatment today 1/15/2023. He is being admitted after receiving an organ offer for kidney in anticipation of simultaneous kidney transplant on 1/15/2023.    - Stat Covid PCR and final HLA crossmatch  - US abdomen complete  - Nephrology consult  - 2 view CXR  - EKG  - Lab: CBC, CMP, CMV, EBV, BK, Hep B/C, HIV serology    CODE STATUS:  Full code      - - - - - - - - - - - - - - - - - -  Antonino Jasso MD  Surgery Resident PGY-1  01/15/2023     Attestation: I saw and examined the patient with Antonino Jasso MD, and the transplant team. I independently reviewed all pertinent laboratory and imaging results and made independent management decisions including immunosuppression management. I spent 30 minutes in care of this patient.  Tez Emerson MD

## 2023-01-15 NOTE — ANESTHESIA PROCEDURE NOTES
Airway       Patient location during procedure: OR       Procedure Start/Stop Times: 1/15/2023 11:12 AM  Staff -        Anesthesiologist:  Jung Richardson MD       Resident/Fellow: Gregg Gamez MD       Performed By: resident  Consent for Airway        Urgency: elective  Indications and Patient Condition       Indications for airway management: ra-procedural       Induction type:intravenous       Mask difficulty assessment: 1 - vent by mask    Final Airway Details       Final airway type: endotracheal airway       Successful airway: ETT - single  Endotracheal Airway Details        ETT size (mm): 7.5       Cuffed: yes       Successful intubation technique: direct laryngoscopy       DL Blade Type: Valdez 2       Grade View of Cords: 1       Adjucts: stylet       Position: Right       Measured from: lips       Secured at (cm): 24       Bite block used: None    Post intubation assessment        Placement verified by: capnometry, equal breath sounds and chest rise        Number of attempts at approach: 1       Secured with: pink tape       Ease of procedure: easy       Dentition: Intact and Unchanged    Medication(s) Administered   Medication Administration Time: 1/15/2023 11:12 AM

## 2023-01-15 NOTE — ANESTHESIA CARE TRANSFER NOTE
Patient: Chip Fowler    Procedure: Procedure(s):  TRANSPLANT, KIDNEY, RECIPIENT,  DONOR WITH DONOR URETER STENTING, PERITONEAL CATHETER REMOVAL       Diagnosis: ESRD (end stage renal disease) (H) [N18.6]  Diagnosis Additional Information: No value filed.    Anesthesia Type:   General     Note:    Oropharynx: oropharynx clear of all foreign objects and spontaneously breathing  Level of Consciousness: awake  Oxygen Supplementation: room air    Independent Airway: airway patency satisfactory and stable  Dentition: dentition unchanged  Vital Signs Stable: post-procedure vital signs reviewed and stable  Report to RN Given: handoff report given  Patient transferred to: PACU    Handoff Report: Identifed the Patient, Identified the Reponsible Provider, Reviewed the pertinent medical history, Discussed the surgical course, Reviewed Intra-OP anesthesia mangement and issues during anesthesia, Set expectations for post-procedure period and Allowed opportunity for questions and acknowledgement of understanding      Vitals:  Vitals Value Taken Time   /56 01/15/23 1750   Temp     Pulse 99 01/15/23 1756   Resp 7 01/15/23 1756   SpO2 96 % 01/15/23 1756   Vitals shown include unvalidated device data.    Electronically Signed By: Gregg Gamez MD  January 15, 2023  5:57 PM

## 2023-01-15 NOTE — OR NURSING
No need to give this a.m. Carvedilol dose per Dr. Richardson.    Pt told me  and Dr Richardson that he has a urethral stricture from surgery, and Dr. Mejia told the pt that Dr. Mejia might be the only one who could insert a drainage catheter through his penis. Pt also has a dental implant to Left upper jaw.

## 2023-01-16 ENCOUNTER — APPOINTMENT (OUTPATIENT)
Dept: ULTRASOUND IMAGING | Facility: CLINIC | Age: 38
DRG: 652 | End: 2023-01-16
Attending: SURGERY
Payer: MEDICARE

## 2023-01-16 PROBLEM — Z94.9 TRANSPLANT, ORGAN: Status: ACTIVE | Noted: 2023-01-16

## 2023-01-16 LAB
ALBUMIN MFR UR ELPH: 30.3 MG/DL (ref 1–14)
ANION GAP SERPL CALCULATED.3IONS-SCNC: 12 MMOL/L (ref 7–15)
ATRIAL RATE - MUSE: 57 BPM
BASOPHILS # BLD AUTO: 0 10E3/UL (ref 0–0.2)
BASOPHILS NFR BLD AUTO: 0 %
BUN SERPL-MCNC: 48 MG/DL (ref 6–20)
CALCIUM SERPL-MCNC: 7.3 MG/DL (ref 8.6–10)
CHLORIDE SERPL-SCNC: 108 MMOL/L (ref 98–107)
CMV DNA SPEC NAA+PROBE-ACNC: NOT DETECTED IU/ML
CMV IGG SERPL IA-ACNC: <0.2 U/ML
CMV IGG SERPL IA-ACNC: NORMAL
CREAT SERPL-MCNC: 4.82 MG/DL (ref 0.67–1.17)
CREAT UR-MCNC: 100 MG/DL
DEPRECATED HCO3 PLAS-SCNC: 18 MMOL/L (ref 22–29)
DIASTOLIC BLOOD PRESSURE - MUSE: NORMAL MMHG
EBV VCA IGG SER IA-ACNC: 228 U/ML
EBV VCA IGG SER IA-ACNC: POSITIVE
EBV VCA IGM SER IA-ACNC: <10 U/ML
EBV VCA IGM SER IA-ACNC: NORMAL
EOSINOPHIL # BLD AUTO: 0 10E3/UL (ref 0–0.7)
EOSINOPHIL NFR BLD AUTO: 0 %
ERYTHROCYTE [DISTWIDTH] IN BLOOD BY AUTOMATED COUNT: 12.5 % (ref 10–15)
GFR SERPL CREATININE-BSD FRML MDRD: 15 ML/MIN/1.73M2
GLUCOSE SERPL-MCNC: 137 MG/DL (ref 70–99)
HBV CORE AB SERPL QL IA: NONREACTIVE
HBV SURFACE AB SERPL IA-ACNC: 134.22 M[IU]/ML
HBV SURFACE AB SERPL IA-ACNC: REACTIVE M[IU]/ML
HBV SURFACE AG SERPL QL IA: NONREACTIVE
HCT VFR BLD AUTO: 31.8 % (ref 40–53)
HCV AB SERPL QL IA: NONREACTIVE
HGB BLD-MCNC: 10.2 G/DL (ref 13.3–17.7)
HGB BLD-MCNC: 10.3 G/DL (ref 13.3–17.7)
HGB BLD-MCNC: 10.4 G/DL (ref 13.3–17.7)
HGB BLD-MCNC: 10.5 G/DL (ref 13.3–17.7)
HGB BLD-MCNC: 10.6 G/DL (ref 13.3–17.7)
HGB BLD-MCNC: 11.1 G/DL (ref 13.3–17.7)
HIV 1+2 AB+HIV1 P24 AG SERPL QL IA: NONREACTIVE
IMM GRANULOCYTES # BLD: 0.3 10E3/UL
IMM GRANULOCYTES NFR BLD: 1 %
INTERPRETATION ECG - MUSE: NORMAL
LACTATE SERPL-SCNC: 1.2 MMOL/L (ref 0.7–2)
LYMPHOCYTES # BLD AUTO: 0 10E3/UL (ref 0.8–5.3)
LYMPHOCYTES NFR BLD AUTO: 0 %
MAGNESIUM SERPL-MCNC: 1.6 MG/DL (ref 1.7–2.3)
MAGNESIUM SERPL-MCNC: 1.9 MG/DL (ref 1.7–2.3)
MCH RBC QN AUTO: 31.2 PG (ref 26.5–33)
MCHC RBC AUTO-ENTMCNC: 32.4 G/DL (ref 31.5–36.5)
MCV RBC AUTO: 96 FL (ref 78–100)
MONOCYTES # BLD AUTO: 0.5 10E3/UL (ref 0–1.3)
MONOCYTES NFR BLD AUTO: 3 %
NEUTROPHILS # BLD AUTO: 18.6 10E3/UL (ref 1.6–8.3)
NEUTROPHILS NFR BLD AUTO: 96 %
NRBC # BLD AUTO: 0 10E3/UL
NRBC BLD AUTO-RTO: 0 /100
P AXIS - MUSE: 68 DEGREES
PHOSPHATE SERPL-MCNC: 3 MG/DL (ref 2.5–4.5)
PLATELET # BLD AUTO: 127 10E3/UL (ref 150–450)
POTASSIUM SERPL-SCNC: 4.6 MMOL/L (ref 3.4–5.3)
POTASSIUM SERPL-SCNC: 4.8 MMOL/L (ref 3.4–5.3)
POTASSIUM SERPL-SCNC: 4.8 MMOL/L (ref 3.4–5.3)
POTASSIUM SERPL-SCNC: 5 MMOL/L (ref 3.4–5.3)
POTASSIUM SERPL-SCNC: 5.4 MMOL/L (ref 3.4–5.3)
POTASSIUM SERPL-SCNC: 5.4 MMOL/L (ref 3.4–5.3)
PR INTERVAL - MUSE: 156 MS
PROT/CREAT 24H UR: 0.3 MG/MG CR (ref 0–0.2)
QRS DURATION - MUSE: 94 MS
QT - MUSE: 392 MS
QTC - MUSE: 381 MS
R AXIS - MUSE: 62 DEGREES
RBC # BLD AUTO: 3.3 10E6/UL (ref 4.4–5.9)
SODIUM SERPL-SCNC: 138 MMOL/L (ref 136–145)
SYSTOLIC BLOOD PRESSURE - MUSE: NORMAL MMHG
T AXIS - MUSE: 73 DEGREES
VENTRICULAR RATE- MUSE: 57 BPM
WBC # BLD AUTO: 19.4 10E3/UL (ref 4–11)

## 2023-01-16 PROCEDURE — 76776 US EXAM K TRANSPL W/DOPPLER: CPT | Mod: 26 | Performed by: RADIOLOGY

## 2023-01-16 PROCEDURE — 80048 BASIC METABOLIC PNL TOTAL CA: CPT | Performed by: SURGERY

## 2023-01-16 PROCEDURE — 36415 COLL VENOUS BLD VENIPUNCTURE: CPT

## 2023-01-16 PROCEDURE — 36592 COLLECT BLOOD FROM PICC: CPT | Performed by: PHYSICIAN ASSISTANT

## 2023-01-16 PROCEDURE — 85018 HEMOGLOBIN: CPT | Performed by: SURGERY

## 2023-01-16 PROCEDURE — 99231 SBSQ HOSP IP/OBS SF/LOW 25: CPT | Mod: GC

## 2023-01-16 PROCEDURE — 258N000003 HC RX IP 258 OP 636

## 2023-01-16 PROCEDURE — 85025 COMPLETE CBC W/AUTO DIFF WBC: CPT | Performed by: SURGERY

## 2023-01-16 PROCEDURE — 80076 HEPATIC FUNCTION PANEL: CPT | Performed by: PHYSICIAN ASSISTANT

## 2023-01-16 PROCEDURE — 250N000012 HC RX MED GY IP 250 OP 636 PS 637: Performed by: SURGERY

## 2023-01-16 PROCEDURE — 83735 ASSAY OF MAGNESIUM: CPT | Performed by: SURGERY

## 2023-01-16 PROCEDURE — 36415 COLL VENOUS BLD VENIPUNCTURE: CPT | Performed by: SURGERY

## 2023-01-16 PROCEDURE — 36592 COLLECT BLOOD FROM PICC: CPT | Performed by: SURGERY

## 2023-01-16 PROCEDURE — 84100 ASSAY OF PHOSPHORUS: CPT | Performed by: SURGERY

## 2023-01-16 PROCEDURE — 84132 ASSAY OF SERUM POTASSIUM: CPT | Performed by: SURGERY

## 2023-01-16 PROCEDURE — 84132 ASSAY OF SERUM POTASSIUM: CPT

## 2023-01-16 PROCEDURE — 85018 HEMOGLOBIN: CPT

## 2023-01-16 PROCEDURE — 76776 US EXAM K TRANSPL W/DOPPLER: CPT

## 2023-01-16 PROCEDURE — 250N000011 HC RX IP 250 OP 636

## 2023-01-16 PROCEDURE — 250N000011 HC RX IP 250 OP 636: Performed by: PHYSICIAN ASSISTANT

## 2023-01-16 PROCEDURE — 250N000013 HC RX MED GY IP 250 OP 250 PS 637: Performed by: SURGERY

## 2023-01-16 PROCEDURE — 83605 ASSAY OF LACTIC ACID: CPT | Performed by: SURGERY

## 2023-01-16 PROCEDURE — 99223 1ST HOSP IP/OBS HIGH 75: CPT | Mod: GC | Performed by: INTERNAL MEDICINE

## 2023-01-16 PROCEDURE — 250N000013 HC RX MED GY IP 250 OP 250 PS 637

## 2023-01-16 PROCEDURE — 120N000011 HC R&B TRANSPLANT UMMC

## 2023-01-16 PROCEDURE — 83735 ASSAY OF MAGNESIUM: CPT | Performed by: PHYSICIAN ASSISTANT

## 2023-01-16 RX ORDER — MAGNESIUM SULFATE 1 G/100ML
1 INJECTION INTRAVENOUS ONCE
Status: COMPLETED | OUTPATIENT
Start: 2023-01-16 | End: 2023-01-16

## 2023-01-16 RX ORDER — PREDNISONE 20 MG/1
20 TABLET ORAL DAILY
Status: DISCONTINUED | OUTPATIENT
Start: 2023-01-22 | End: 2023-01-18 | Stop reason: HOSPADM

## 2023-01-16 RX ORDER — SEVELAMER CARBONATE 800 MG/1
2400 TABLET, FILM COATED ORAL
Status: ON HOLD | COMMUNITY
End: 2023-01-18

## 2023-01-16 RX ORDER — SODIUM BICARBONATE 650 MG/1
1300 TABLET ORAL 2 TIMES DAILY
Status: DISCONTINUED | OUTPATIENT
Start: 2023-01-16 | End: 2023-01-17

## 2023-01-16 RX ORDER — FUROSEMIDE 10 MG/ML
80 INJECTION INTRAMUSCULAR; INTRAVENOUS ONCE
Status: COMPLETED | OUTPATIENT
Start: 2023-01-16 | End: 2023-01-16

## 2023-01-16 RX ORDER — PREDNISONE 20 MG/1
40 TABLET ORAL DAILY
Status: DISCONTINUED | OUTPATIENT
Start: 2023-01-20 | End: 2023-01-16

## 2023-01-16 RX ORDER — PREDNISONE 10 MG/1
10 TABLET ORAL DAILY
Status: DISCONTINUED | OUTPATIENT
Start: 2023-02-05 | End: 2023-01-16

## 2023-01-16 RX ORDER — ATORVASTATIN CALCIUM 20 MG/1
20 TABLET, FILM COATED ORAL DAILY
Status: ON HOLD | COMMUNITY
End: 2023-01-18

## 2023-01-16 RX ORDER — PREDNISONE 5 MG/1
5 TABLET ORAL DAILY
Status: DISCONTINUED | OUTPATIENT
Start: 2023-02-12 | End: 2023-01-18 | Stop reason: HOSPADM

## 2023-01-16 RX ORDER — ASPIRIN 81 MG/1
81 TABLET ORAL DAILY
Status: ON HOLD | COMMUNITY
End: 2023-01-18

## 2023-01-16 RX ORDER — PREDNISONE 20 MG/1
20 TABLET ORAL DAILY
Status: DISCONTINUED | OUTPATIENT
Start: 2023-01-22 | End: 2023-01-16

## 2023-01-16 RX ORDER — PREDNISONE 10 MG/1
10 TABLET ORAL DAILY
Status: DISCONTINUED | OUTPATIENT
Start: 2023-02-05 | End: 2023-01-18 | Stop reason: HOSPADM

## 2023-01-16 RX ORDER — HYDROXYZINE HYDROCHLORIDE 25 MG/1
25 TABLET, FILM COATED ORAL EVERY 6 HOURS PRN
Status: DISCONTINUED | OUTPATIENT
Start: 2023-01-16 | End: 2023-01-18 | Stop reason: HOSPADM

## 2023-01-16 RX ORDER — METHYLPREDNISOLONE SODIUM SUCCINATE 125 MG/2ML
100 INJECTION, POWDER, LYOPHILIZED, FOR SOLUTION INTRAMUSCULAR; INTRAVENOUS ONCE
Status: DISCONTINUED | OUTPATIENT
Start: 2023-01-17 | End: 2023-01-16

## 2023-01-16 RX ORDER — PREDNISONE 20 MG/1
60 TABLET ORAL DAILY
Status: DISCONTINUED | OUTPATIENT
Start: 2023-01-18 | End: 2023-01-16

## 2023-01-16 RX ORDER — PREDNISONE 5 MG/1
5 TABLET ORAL DAILY
Status: DISCONTINUED | OUTPATIENT
Start: 2023-02-12 | End: 2023-01-16

## 2023-01-16 RX ORDER — SODIUM CHLORIDE, SODIUM LACTATE, POTASSIUM CHLORIDE, CALCIUM CHLORIDE 600; 310; 30; 20 MG/100ML; MG/100ML; MG/100ML; MG/100ML
INJECTION, SOLUTION INTRAVENOUS CONTINUOUS
Status: DISCONTINUED | OUTPATIENT
Start: 2023-01-16 | End: 2023-01-17

## 2023-01-16 RX ORDER — DIPHENHYDRAMINE HYDROCHLORIDE 50 MG/ML
25 INJECTION INTRAMUSCULAR; INTRAVENOUS EVERY 6 HOURS PRN
Status: DISCONTINUED | OUTPATIENT
Start: 2023-01-16 | End: 2023-01-18

## 2023-01-16 RX ORDER — DIPHENHYDRAMINE HCL 25 MG
25 CAPSULE ORAL EVERY 6 HOURS PRN
Status: DISCONTINUED | OUTPATIENT
Start: 2023-01-16 | End: 2023-01-18 | Stop reason: HOSPADM

## 2023-01-16 RX ORDER — PREDNISONE 20 MG/1
40 TABLET ORAL DAILY
Status: DISCONTINUED | OUTPATIENT
Start: 2023-01-20 | End: 2023-01-18 | Stop reason: HOSPADM

## 2023-01-16 RX ORDER — CALCIUM CARBONATE 500(1250)
1000 TABLET ORAL AT BEDTIME
Status: DISCONTINUED | OUTPATIENT
Start: 2023-01-16 | End: 2023-01-18 | Stop reason: HOSPADM

## 2023-01-16 RX ORDER — METHYLPREDNISOLONE SODIUM SUCCINATE 125 MG/2ML
100 INJECTION, POWDER, LYOPHILIZED, FOR SOLUTION INTRAMUSCULAR; INTRAVENOUS ONCE
Status: COMPLETED | OUTPATIENT
Start: 2023-01-17 | End: 2023-01-17

## 2023-01-16 RX ORDER — FOLIC ACID/VIT B COMPLEX AND C 0.8 MG
1 TABLET ORAL DAILY
Status: ON HOLD | COMMUNITY
End: 2023-01-18

## 2023-01-16 RX ORDER — PANTOPRAZOLE SODIUM 40 MG/1
40 TABLET, DELAYED RELEASE ORAL
Status: DISCONTINUED | OUTPATIENT
Start: 2023-01-16 | End: 2023-01-18 | Stop reason: HOSPADM

## 2023-01-16 RX ORDER — PREDNISONE 20 MG/1
60 TABLET ORAL DAILY
Status: DISCONTINUED | OUTPATIENT
Start: 2023-01-18 | End: 2023-01-18 | Stop reason: HOSPADM

## 2023-01-16 RX ORDER — FUROSEMIDE 10 MG/ML
80 INJECTION INTRAMUSCULAR; INTRAVENOUS ONCE
Status: DISCONTINUED | OUTPATIENT
Start: 2023-01-16 | End: 2023-01-16

## 2023-01-16 RX ORDER — HYDROXYZINE HYDROCHLORIDE 50 MG/1
50 TABLET, FILM COATED ORAL EVERY 6 HOURS PRN
Status: DISCONTINUED | OUTPATIENT
Start: 2023-01-16 | End: 2023-01-18 | Stop reason: HOSPADM

## 2023-01-16 RX ADMIN — SODIUM CHLORIDE 20 MG: 9 INJECTION, SOLUTION INTRAVENOUS at 14:16

## 2023-01-16 RX ADMIN — OXYCODONE HYDROCHLORIDE 5 MG: 5 TABLET ORAL at 13:15

## 2023-01-16 RX ADMIN — OXYCODONE HYDROCHLORIDE 10 MG: 10 TABLET ORAL at 03:38

## 2023-01-16 RX ADMIN — ACETAMINOPHEN 975 MG: 325 TABLET, FILM COATED ORAL at 11:18

## 2023-01-16 RX ADMIN — METHOCARBAMOL 750 MG: 750 TABLET, FILM COATED ORAL at 16:28

## 2023-01-16 RX ADMIN — SENNOSIDES AND DOCUSATE SODIUM 1 TABLET: 8.6; 5 TABLET ORAL at 20:11

## 2023-01-16 RX ADMIN — SODIUM CHLORIDE, POTASSIUM CHLORIDE, SODIUM LACTATE AND CALCIUM CHLORIDE: 600; 310; 30; 20 INJECTION, SOLUTION INTRAVENOUS at 09:53

## 2023-01-16 RX ADMIN — SODIUM BICARBONATE 1300 MG: 650 TABLET ORAL at 20:11

## 2023-01-16 RX ADMIN — ACETAMINOPHEN 975 MG: 325 TABLET, FILM COATED ORAL at 03:38

## 2023-01-16 RX ADMIN — MYCOPHENOLATE MOFETIL 750 MG: 250 CAPSULE ORAL at 18:18

## 2023-01-16 RX ADMIN — TACROLIMUS 2 MG: 1 CAPSULE ORAL at 18:18

## 2023-01-16 RX ADMIN — SODIUM CHLORIDE 250 MG: 9 INJECTION, SOLUTION INTRAVENOUS at 12:29

## 2023-01-16 RX ADMIN — ATORVASTATIN CALCIUM 10 MG: 10 TABLET, FILM COATED ORAL at 07:53

## 2023-01-16 RX ADMIN — TACROLIMUS 2 MG: 1 CAPSULE ORAL at 07:53

## 2023-01-16 RX ADMIN — VALGANCICLOVIR 450 MG: 450 TABLET, FILM COATED ORAL at 08:55

## 2023-01-16 RX ADMIN — OXYCODONE HYDROCHLORIDE 5 MG: 5 TABLET ORAL at 17:30

## 2023-01-16 RX ADMIN — OXYCODONE HYDROCHLORIDE 10 MG: 10 TABLET ORAL at 07:59

## 2023-01-16 RX ADMIN — OXYCODONE HYDROCHLORIDE 5 MG: 5 TABLET ORAL at 22:06

## 2023-01-16 RX ADMIN — SODIUM BICARBONATE 1300 MG: 650 TABLET ORAL at 11:17

## 2023-01-16 RX ADMIN — SODIUM CHLORIDE, POTASSIUM CHLORIDE, SODIUM LACTATE AND CALCIUM CHLORIDE: 600; 310; 30; 20 INJECTION, SOLUTION INTRAVENOUS at 18:22

## 2023-01-16 RX ADMIN — POLYETHYLENE GLYCOL 3350 17 G: 17 POWDER, FOR SOLUTION ORAL at 07:53

## 2023-01-16 RX ADMIN — MAGNESIUM SULFATE IN DEXTROSE 1 G: 10 INJECTION, SOLUTION INTRAVENOUS at 11:14

## 2023-01-16 RX ADMIN — SENNOSIDES AND DOCUSATE SODIUM 1 TABLET: 8.6; 5 TABLET ORAL at 07:53

## 2023-01-16 RX ADMIN — FUROSEMIDE 80 MG: 10 INJECTION, SOLUTION INTRAVENOUS at 07:59

## 2023-01-16 RX ADMIN — Medication 1000 MG: at 22:06

## 2023-01-16 RX ADMIN — PANTOPRAZOLE SODIUM 40 MG: 40 TABLET, DELAYED RELEASE ORAL at 08:50

## 2023-01-16 RX ADMIN — MYCOPHENOLATE MOFETIL 750 MG: 250 CAPSULE ORAL at 07:49

## 2023-01-16 ASSESSMENT — ACTIVITIES OF DAILY LIVING (ADL)
ADLS_ACUITY_SCORE: 22
ADLS_ACUITY_SCORE: 20
ADLS_ACUITY_SCORE: 22
ADLS_ACUITY_SCORE: 20
ADLS_ACUITY_SCORE: 22
ADLS_ACUITY_SCORE: 20

## 2023-01-16 NOTE — ANESTHESIA POSTPROCEDURE EVALUATION
Patient: Chip Fowler    Procedure: Procedure(s):  TRANSPLANT, KIDNEY, RECIPIENT,  DONOR WITH DONOR URETER STENTING, PERITONEAL CATHETER REMOVAL       Anesthesia Type:  General    Note:  Disposition: Admission   Postop Pain Control: Uneventful            Sign Out: Well controlled pain   PONV: No   Neuro/Psych: Uneventful            Sign Out: Acceptable/Baseline neuro status   Airway/Respiratory: Uneventful            Sign Out: Acceptable/Baseline resp. status   CV/Hemodynamics: Uneventful            Sign Out: Acceptable CV status; No obvious hypovolemia; No obvious fluid overload   Other NRE: NONE   DID A NON-ROUTINE EVENT OCCUR? No           Last vitals:  Vitals Value Taken Time   /68 01/15/23 1830   Temp     Pulse 103 01/15/23 1840   Resp 9 01/15/23 1840   SpO2 100 % 01/15/23 1840   Vitals shown include unvalidated device data.    Electronically Signed By: Jung Richardson MD  January 15, 2023  6:41 PM

## 2023-01-16 NOTE — CONSULTS
[facility]  Transplant Nephrology Consult  Date of Admission:  1/15/2023  Today's Date: 01/16/2023  Requesting physician: Tez Emerson*    Recommendations:  - no RRT indicated at this time. will assess daily for HD needs.   - can add sodium bicarb 650 mg bid if no improvement.   - basiliximab 20 mg iv today, dose #2 1/21  - Monitor LFT, if not improving would hold statin and use alternative to bactrim (check G6PD)  - echocardiogram ~6 weeks post transplant (on ACEi/b-blocker ore transplant and currently off)    Assessment & Plan   # DDKT: Trend up   - Baseline Creatinine: ~ TBD    - Proteinuria: Not checked post transplant   - Date DSA Last Checked: Not Known      Latest DSA: No   - BK Viremia: Not checked recently   - Kidney Tx Biopsy: No        # Immunosuppression: Tacrolimus immediate release (goal 8-10) and Mycophenolate mofetil (dose 750 mg every 12 hours) steroid taper   - Induction: Intermediate risk    - Changes: No    # Infection Prophylaxis:   - PJP: Sulfa/TMP (Bactrim)  - CMV: Valganciclovir (Valcyte) D? R-    # Hypertension: Controlled;  Goal BP: < 150/90   - Volume status: Euvolemic  EDW ~ 70K   - Changes: No        # Anemia in Chronic Renal Disease: Hgb: Stable      SARA: No   - Iron studies: Not checked recently    # Mineral Bone Disorder:   - Secondary renal hyperparathyroidism; PTH level: Not checked recently        On treatment: None  - Vitamin D; level: Not checked recently        On supplement: No  - Calcium; level: Low normal        On supplement: No  - Phosphorus; level: Normal        On supplement: No    # Electrolytes:   - Potassium; level: Normal        On supplement: No  - Magnesium; level: Normal        On supplement: No  - Bicarbonate; level: Low        On supplement: No    # Transaminitis:   - hx of alcohol abuse   - hepatitis serologies negative   - hold statin and use alternative to bactrim if not improving    # Hx of  "cardiomyopathy:   - attributed to methamphetamine use in 2020 (ef:15%)   - echo: ef 55-60% Sep 2023   - stress echo neg Feb 2021   - previously on ACEI+b-blocker \"lisinopril & coreg\", currently on hold as BP low     # Hx of Polysubstance use:   - hx of methamphetamine use   - attended AA and underwent CD eval   - sober since 3/2021     # Hx of urethral stricture:   - shaver per tx surgery/urology    # Transplant History:  Etiology of Kidney Failure: unclear   hx of congenital solitary L kidney, CAUKT, urethral stricture, non diagnostic kidney bx, neg serologic w/up  Tx: DDKT  Transplant: 1/15/2023 (Kidney)  Crossmatch at time of Tx: pending  Significant changes in immunosuppression: None  Significant transplant-related complications: None    Recommendations were communicated to the primary team via this note.    Seen and discussed with Dr. Sharon Cueto MD  Pager: [phone]    REASON FOR CONSULT   Post kidney transplant    History of Present Illness   Chip Fowler is a 37 year old male with hx of end-stage renal disease of unclear etiology (kidney bx non diagnostic, neg serologic w/up, solitary L kidney), hx of CAKUT, urethral stricture s/p dilation, on PD x 2 years with residual renal function (~2L UOP/d), hx of polysubstance use \"methamphetamine, alcohol abuse\", cardiomyopathy \"attributed to methamphetamine, ef as low as 15% in 2020, nl ef 55-60% per last echo in 2022, status post DD KT on 01/15/2023 with J-J ureteral stent, intermediate risk induction.    Patient was on PD prior to surgery and he used to have normal urine output.  During surgery the PD catheter was removed and a temporary right IJ dialysis catheter was placed.  He has received ATG on 25 mg once.  He is scheduled to start basiliximab 20 mg today.  He is also receiving Solu-Medrol.  He had Bumex 0.5 mg yesterday with good response.  He has received Lasix 80 mg once daily.  He has good amount of urine output.  He is also started on " Valcyte 450 twice  .  His labs this morning shows hyperkalemia; 5.4 which has improved with diuretics.  Repeat K this afternoon was 4.8.  Metabolic acidosis also improving.    He reports mild abdominal discomfort secondary to surgery.  He denies any chest pain, palpitation, dizziness, lightheadedness, shortness of breath, or fever.  He denies hematuria or dysuria.    Review of Systems    The 10 point Review of Systems is negative other than noted in the HPI or here.     Past Medical History    I have reviewed this patient's medical history and updated it with pertinent information if needed.   Past Medical History:   Diagnosis Date     Bladder stones      Cardiomyopathy (H)      ESRD (end stage renal disease) on dialysis (H)      Hypertension      Migraines      Polysubstance abuse (H)      Solitary kidney, congenital      Urethral stone      Urethral stricture        Past Surgical History   I have reviewed this patient's surgical history and updated it with pertinent information if needed.  Past Surgical History:   Procedure Laterality Date     BIOPSY  02/2020    renal, Hoahaoism     COMBINED CYSTOSCOPY, LASER HOLMIUM LITHOTRIPSY URETER(S)       CYSTOSCOPY       CYSTOSCOPY FLEXIBLE, CYSTOSTOMY, INSERT TUBE SUPRAPUBIC, COMBINED N/A 12/14/2020    Procedure: CYSTOSCOPY, WITH SUPRAPUBIC CATHETER INSERTION;  Surgeon: Sam Mejia MD;  [facility]     CYSTOSCOPY, OPEN EXCISION URETHRAL DIVERTICULUM, COMBINED N/A 12/14/2020    Procedure: EXCISION OF URETHRAL DIVERTICULUM X2;  Surgeon: Sam Mejia MD;  [facility]     HERNIA REPAIR      infant     INSERT CATHETER PERITONEAL DIALYSIS       LASER HOLMIUM LITHOTRIPSY URETER(S), INSERT STENT, COMBINED N/A 8/21/2020    Procedure: CYSTOSCOPY, bladder and urethral stone extraction, removal of foreign body, urethral dilation, urethrotomy, laser on standby;  Surgeon: Sam Mejia MD;  [facility]     urethral dilation       URETHROPLASTY WITH BUCCAL GRAFT  N/A 12/14/2020    Procedure: URETHROPLASTY, USING BUCCAL MUCOSA GRAFT;  Surgeon: Sam Mejia MD;  [facility]       Family History   I have reviewed this patient's family history and updated it with pertinent information if needed.   Family History   Problem Relation Age of Onset     Alzheimer Disease Mother      Unknown/Adopted Father      No Known Problems Sister      No Known Problems Sister      Kidney Disease No family hx of        Social History   I have reviewed this patient's social history and updated it with pertinent information if needed. Chip Fowler  reports that he has quit smoking. His smoking use included cigarettes. He started smoking about 21 years ago. He smoked an average of .4 packs per day. He has never used smokeless tobacco. He reports that he does not currently use alcohol. He reports that he does not currently use drugs after having used the following drugs: Methamphetamines.    Allergies   No Known Allergies  Prior to Admission Medications     acetaminophen  975 mg Oral Q8H     atorvastatin  10 mg Oral Daily     [START ON 1/20/2023] basiliximab (SIMULECT) infusion  20 mg Intravenous Once     calcium carbonate  1,000 mg Oral At Bedtime     [START ON 1/17/2023] magnesium oxide  400 mg Oral Daily with lunch     [START ON 1/17/2023] methylPREDNISolone  100 mg Intravenous Once     mycophenolate  750 mg Oral BID IS     pantoprazole  40 mg Oral QAM AC     polyethylene glycol  17 g Oral Daily     [START ON 1/18/2023] predniSONE  60 mg Oral Daily    Followed by     [START ON 1/20/2023] predniSONE  40 mg Oral Daily    Followed by     [START ON 1/22/2023] predniSONE  20 mg Oral Daily    Followed by     [START ON 1/29/2023] predniSONE  15 mg Oral Daily    Followed by     [START ON 2/5/2023] predniSONE  10 mg Oral Daily    Followed by     [START ON 2/12/2023] predniSONE  5 mg Oral Daily     senna-docusate  1 tablet Oral BID     sodium bicarbonate  1,300 mg Oral BID     sodium chloride (PF)   10 mL Intracatheter Q8H     sulfamethoxazole-trimethoprim  1 tablet Oral Once per day on      tacrolimus  2 mg Oral BID IS     valGANciclovir  450 mg Oral Once per day on        lactated ringers 100 mL/hr at 23 1558       Physical Exam   Temp  Av.9  F (37.2  C)  Min: 98.1  F (36.7  C)  Max: 101  F (38.3  C)      Pulse  Av.3  Min: 53  Max: 118 Resp  Avg: 15.2  Min: 5  Max: 27  SpO2  Av.4 %  Min: 96 %  Max: 100 %    CVP (mmHg): 9 mmHgBP 114/73 (BP Location: Left arm)   Pulse 77   Temp 98.5  F (36.9  C) (Oral)   Resp 16   Wt 70.4 kg (155 lb 3.3 oz)   SpO2 98%   BMI 21.42 kg/m     Date 23 0700 - 23 0659   Shift 8236-5781 0841-3597 8827-2148 24 Hour Total   INTAKE   P.O. 700   700   I.V. 794.67   794.67   Shift Total(mL/kg) 1494.67(21.23)   1494.67(21.23)   OUTPUT   Urine 3650 425  4075   Drains 10   10   Shift Total(mL/kg) 3660(51.99) 425(6.04)  4085(58.03)   Weight (kg) 70.4 70.4 70.4 70.4      Admit Weight: 68.4 kg (150 lb 12.8 oz)     GENERAL APPEARANCE: alert and no distress  HENT: mouth without ulcers or lesions  LYMPHATICS: no cervical or supraclavicular nodes  RESP: lungs clear to auscultation - no rales, rhonchi or wheezes  CV: regular rhythm, normal rate, no rub, no murmur  EDEMA: no LE edema bilaterally  ABDOMEN: soft, nondistended, nontender, bowel sounds normal  MS: extremities normal - no gross deformities noted, no evidence of inflammation in joints, no muscle tenderness  SKIN: no rash    Data   CMP  Recent Labs   Lab 23  1406 23  0959 23  0632 23  0125 01/15/23  2210 01/15/23  1750 01/15/23  1711 01/15/23  0904 01/15/23  0540   NA  --   --  138  --   --  138 136  --  142   POTASSIUM 4.8 4.8 5.4*  5.4* 5.0   < > 4.8 4.7  --  4.1   CHLORIDE  --   --  108*  --   --  106  --   --  107   CO2  --   --  18*  --   --  18*  --   --  19*   ANIONGAP  --   --  12  --   --  14  --   --  16*   GLC  --   --  137*  --   --  106* 101* 94 90    BUN  --   --  48.0*  --   --  44.2*  --   --  47.5*   CR  --   --  4.82*  --   --  4.54*  --   --  4.62*   GFRESTIMATED  --   --  15*  --   --  16*  --   --  16*   VISHNU  --   --  7.3*  --   --  8.3*  --   --  9.1   MAG 1.9  --  1.6*  --   --  1.5*  --   --   --    PHOS  --   --  3.0  --   --  3.5  --   --   --    PROTTOTAL  --   --   --   --   --   --   --   --  7.1   ALBUMIN  --   --   --   --   --   --   --   --  4.2   BILITOTAL  --   --   --   --   --   --   --   --  0.2   ALKPHOS  --   --   --   --   --   --   --   --  59   AST  --   --   --   --   --   --   --   --  52*   ALT  --   --   --   --   --   --   --   --  81*    < > = values in this interval not displayed.     CBC  Recent Labs   Lab 01/16/23  1406 01/16/23  0959 01/16/23  0632 01/16/23  0125 01/15/23  2210 01/15/23  1750 01/15/23  1711 01/15/23  0540   HGB 10.2* 10.4* 10.3* 10.6*   < > 10.0*   < > 13.1*   WBC  --   --  19.4*  --   --  3.9*  --  7.0   RBC  --   --  3.30*  --   --  3.14*  --  4.13*   HCT  --   --  31.8*  --   --  30.2*  --  39.6*   MCV  --   --  96  --   --  96  --  96   MCH  --   --  31.2  --   --  31.8  --  31.7   MCHC  --   --  32.4  --   --  33.1  --  33.1   RDW  --   --  12.5  --   --  12.6  --  12.5   PLT  --   --  127*  --   --  116*  --  231    < > = values in this interval not displayed.     INR  Recent Labs   Lab 01/15/23  0540   INR 0.90   PTT 28     ABG  Recent Labs   Lab 01/15/23  1711   O2PER 45.0      Urine Studies  Recent Labs   Lab Test 01/15/23  0637 08/18/20  1312 08/14/20  1415   COLOR Light Yellow Yellow Yellow   APPEARANCE Clear Cloudy Slightly Cloudy   URINEGLC Negative 50* 50*   URINEBILI Negative Negative Negative   URINEKETONE Negative Negative Negative   SG 1.012 1.012 1.011   UBLD Negative Small* Negative   URINEPH 7.0 7.0 7.0   PROTEIN 50* 100* 30*   NITRITE Negative Negative Negative   LEUKEST Negative Large* Large*   RBCU <1 9* 4*   WBCU 1 >182* 59*     No lab results found.  PTH  Recent Labs   Lab Test  04/18/22  0851   PTHI 315*     Iron Studies  No lab results found.    IMAGING:  All imaging studies reviewed by me.    This patient has been seen and evaluated by me, Georgie Herrera MD.  I have reviewed the note and agree with plan of care as documented by the fellow.

## 2023-01-16 NOTE — TELEPHONE ENCOUNTER
Organ Offer Encounter Information    Organ Offer Information  Organ offer date & time: 1/15/2023  3:21 AM  Coordinator/Fellow/Attending name: Carlyn Morris RN   Organ(s):  Organ UNOS ID Match Run ID Comment Organ Laterality   Kidney [id number] IAOP       Recent infections?: No    New medications?: No Recent pregnancy?: No   Angicoagulation medications?: No Recent vaccinations?: No   Recent blood transfusions?: No Recent hospitalizations?: No   Has your insurance changed in the last 6-12 months?: Neg    Patient last dialyzed: 1/15/2023  3:26 AM  Dialysis type: Peritoneal  Discussed organ offer with: Patient  Patient/Caregiver name: Chip  Discussed risk category with Patient/Other: DCD  Patient/Other asked to speak to a surgeon?: No  Discussed program-specific outcomes: Asked questions regarding SRTR, verbalized understanding  Right to decline organ offer without penalty, Patient/Other: Aware of option to decline without penalty  Organ offer decision status Patient/Other: Accepted Offer  Organ disposition: Transplanted  Additional Comments: 1/15/2023 3:23 AM  Kidney: import primary kidney offer  MD: Idania  OPO Contact:   VXM Results: no dsa, compatible  XM Plan (FXM must be done with serum no older than 10 days from transplant):  on arrival of kidney  Is this an ABO A2 donor to ABO B Recipient: no  (In the event of A2 to B transplant, Anti-A titers need to be collected at the time of the crossmatch or admission - whichever is first.  Use smartphrase .A2toB for instructions.)  Plan (Admission, NPO, Donor OR): admit immediately, NPO immediately. Last food 0000, last liquid 0000.   - - -   COVID Screening  In the past month, have you:  Or anyone close to you had a positive COVID test or suspected to have COVID: no  Had any COVID symptoms (Fever, Cough, Short of Breath, Loss of Taste/Smell, Rash): no    Admissions: VinhHorace Carlyn.   Unit: 0335, 7a-Katja. Bed available. Patient information provided. ETA  [phone]  Update Provider Entering Orders (XM Plan & COVID Testing):  pagepilar gen Gretchen sx 0347  Immunology: 0405, Dorene  Inpatient Lab (COVID Testing [phone], Option 2): RN to confirm stat run  Book OR: 0358 Raisa, OR booked for 0930  Vessel Storage Confirmation (PA/KP/LI): yes, ok to bank  Blood Bank: 0[address]  Research: ON HOLD  TransNet/ABO Verification: 0356 labels printed  Add Organ: 0353 right kidney added        Attestation I have discussed all of the above with the Patient/Legal Guardian/Caregiver regarding this organ offer.: Yes  Coordinator/Fellow/Attending name: Carlyn Morris RN

## 2023-01-16 NOTE — PHARMACY-ADMISSION MEDICATION HISTORY
Admission Medication History Completed by Pharmacy    See [facility] Admission Navigator for allergy information, preferred outpatient pharmacy, prior to admission medications and immunization status.     Medication History Sources:     Fill History    Patient Interview    Changes made to PTA medication list (reason):    Added: None    Deleted:   o Cephalexin  o fluconazole    Changed:   o Dialyvite 800 -> Ankush-yogesh once daily  o Gentamycin every morning -> gentamicin apply topically every morning to PD catheter site    Additional Information:    Patient does not know his dose of vitamin D that he takes daily    Prior to Admission medications    Medication Sig Last Dose Taking? Auth Provider Long Term End Date   acetaminophen (TYLENOL) 325 MG tablet Take 2 tablets (650 mg) by mouth every 6 hours as needed for mild pain Unknown at PRN Yes Gaetano Yang MD     B Complex-C-Folic Acid (ANKUSH-YOGESH) TABS Take 1 tablet by mouth daily 1/14/2023 at AM Yes Unknown, Entered By History     carvedilol (COREG) 6.25 MG tablet Take 1 tablet (6.25 mg) by mouth 2 times daily (with meals) 1/14/2023 at PPM Yes Kb Elizabeth MD Yes    furosemide (LASIX) 40 MG tablet Take 40 mg by mouth every morning  1/14/2023 at AM Yes Reported, Patient Yes    gentamicin (GARAMYCIN) 0.1 % external ointment Apply topically every morning Apply to PD catheter site 1/14/2023 at AM Yes Reported, Patient     VITAMIN D, CHOLECALCIFEROL, PO Take by mouth daily 1/14/2023 at AM Yes Reported, Patient     lisinopril (ZESTRIL) 5 MG tablet Take 1 tablet (5 mg) by mouth every evening 1/14/2023 at PM  Jung Shine MD Yes        Date completed: 01/16/23    Medication history completed by:     Saad Vergara, PharmD  PGY1 Pharmacist Resident

## 2023-01-16 NOTE — PROGRESS NOTES
CLINICAL NUTRITION SERVICES - ASSESSMENT NOTE     Nutrition Prescription    Malnutrition Status:    Patient does not meet two of the established criteria necessary for diagnosing malnutrition but is at risk for malnutrition    Future/Additional Recommendations:  Minimize diet restrictions as able d/t high calorie/protein needs post-transplant.  Oral supplements as needed to help meet nutritional needs.     High protein food choices with meals to help meet high needs post-transplant over the next 6-8 weeks.     Heart-healthy diet (low saturated fat, low sodium, high fiber) and food safety precautions long term due to immunosuppression regimen post-transplant         REASON FOR ASSESSMENT  Chip Fowler is a 37 year old male seen by the dietitian for MD Order- Assess & Educate post-op SOT    NUTRITION HISTORY  Patient reported good appetite and no diet restrictions while on PD.      CURRENT NUTRITION ORDERS  Diet: 2 gm K+   Intake/Tolerance: Very hungry    LABS  K+ 5.4 (elevated)    MEDICATIONS  Protonix  Prednisone  Pepcid  Senokot BID  Miralax  Lasix  Tacrolimus  Mycophenolate    ANTHROPOMETRICS  Height: 181.3 cm  Most Recent Weight: 68.4 kg (150 lb 12.8 oz)    IBW: 79 kg  BMI: Normal BMI  Weight History:  He reports UBW of 147-149# lately.    Wt Readings from Last 15 Encounters:   01/15/23 68.4 kg (150 lb 12.8 oz)   09/14/22 67.6 kg (149 lb)   04/18/22 70.8 kg (156 lb)   08/25/21 70.3 kg (155 lb)   08/10/21 68.5 kg (151 lb)   08/09/21 70.3 kg (155 lb)   01/29/21 69.9 kg (154 lb)   12/14/20 72.8 kg (160 lb 7.9 oz)   12/03/20 73 kg (161 lb)   12/03/20 73 kg (161 lb)   11/24/20 72.7 kg (160 lb 3.2 oz)   08/21/20 72.1 kg (159 lb)   08/14/20 72.1 kg (159 lb)   06/18/20 69.9 kg (154 lb)   04/17/20 69.9 kg (154 lb)   Dosing Weight: 68 kg (actual wt)     ASSESSED NUTRITION NEEDS:  Estimated Energy Needs: 3734-0305 kcals (30-35 Kcal/Kg)  Justification: increased needs post-op SOT  Estimated Protein Needs:  grams  protein (1.3-2 gm/Kg)  Justification: increased needs post op SOT  Estimated Fluid Needs: per MD pending fluid status and adequate UOP    PHYSICAL FINDINGS  See malnutrition section below.    MALNUTRITION  % Intake: No decreased intake noted  % Weight Loss: None noted  Subcutaneous Fat Loss: Facial region:  mild and Thoracic/intercostal: mild  Muscle Loss: None observed  Fluid Accumulation/Edema: None noted  Malnutrition Diagnosis: Patient does not meet two of the established criteria necessary for diagnosing malnutrition but is at risk for malnutrition    NUTRITION DIAGNOSIS:  Food and nutrition-related knowledge deficit r/t length of time since previous post-transplant education AEB patient verbal report, review of chart record, and MD consult for nutrition education.     INTERVENTIONS  Implementation  Nutrition Education (to patient and SO):  1. Provided instruction on post-transplant diet with discussion regarding protein sources and high protein needs in acute post-tx phase.  Reviewed recommendations to follow low fat/low sodium diet long term and discussed heart healthy diet tips.  Discussed monitoring of K+/Phos lab values with possible need for adjustment of these in the diet as necessary. Reviewed need for food safety precautions to prevent food borne illness.    Discussed temporary low K+ diet with patient and spouse as well.     2. Provided & reviewed handout: Post-transplant diet guidelines. Potassium in Foods.  Patient receptive to information provided. Expected diet compliance is good.     Goals  1. Patient will verbalize understanding of 3 important aspects of post-transplant diet guidelines.     2. PO intake >50% meals TID.    Monitoring/Evaluation  Progress toward goals will be monitored and evaluated per protocol.    Danielle Case, MS, RD, LD, CCTD, CNSC  7A/Obs unit pager [phone]  Weekend pager [phone]

## 2023-01-16 NOTE — PROGRESS NOTES
Surgery Crosscover Post-op Check      Patient reporting significant pain, jordan with movement. Denies n/v, cp, sob. Tolerating sips of clear, feeling hungry. Urinating through shaver appropriately. Has not yet ambulated post-op.     /64 (BP Location: Left arm)   Pulse 78   Temp 98.5  F (36.9  C) (Oral)   Resp 12   Wt 68.4 kg (150 lb 12.8 oz)   SpO2 99%   BMI 20.81 kg/m     Gen: laying in bed, appears comfortable  CV: mildly tachy to 105-110, regular, wwp  Resp: NLB on RA  Abdo: soft, non-focal TTP, jordan near RLQ incision, but no pain over graft kidney. RLQ incision with surgical glue in place  : shaver in place, urine clear and yellow, 250cc over last 30-45min  Extremities: SCDs in place, no pitting oedema noted, wwp    A/P: 36yo M with PMH ESRD 2/2 HTN on PD, CHF (EF 55-60% 9/2022), and urethral stricture who is now s/p DCD kidney transplant. He is currently doing well, with no acute post-operative concerns.    - multimodal pain control: tylenol, robaxin, oxy, dilaudid  - IVF D5 LR @ 100ml/hr  - CLD, adat  - pulmonary hygiene, IS  - OOB  - Rest of care per primary team    Obdulia Martinez  General Surgery PGY1

## 2023-01-16 NOTE — PLAN OF CARE
/77   Pulse 81   Temp 98.5  F (36.9  C) (Oral)   Resp 17   Wt 68.4 kg (150 lb 12.8 oz)   SpO2 98%   BMI 20.81 kg/m      Shift: 8468-3729  Isolation Status: NA  Diet: Regular diet.  LDA: L PIV SL, R TL IJ infusing and transduced, R TOM to bulb suction w/ 30 mLs of sanguinous output.  Infusion(s): I=O, LRD5 @ 100 mL/hr.  VS: TMax of 101, but may have been an artifact value.  Pain/Nausea/PRN: Pain managed with oral Tylenol, Robaxin, Oxycodone, and IV Dilaudid.  Lab: K of 5.0 from 4.5. Cr of 4.54 pre-surg.  BG: POD 1 0600 Check:  Neuro: A&O x4. Calls appropriately.  Behaviors: Calm, cooperative, and pleasant  Respiratory: Regular depth and pattern; unlabored; expansion symmetrical; breath sounds clear and equal bilaterally; no cough  Cardiac: Regular rhythm, S1, S2; no reported chest pain  GI/: LBM: PTA. Taylor catheter in with 2.4 L of clear yellow urine for output.  Skin: RLQ incision, dermabonded and VERNON. LLQ PD cath removal site.  Mobility: Up w/ Ao2.  Plan: Continue monitoring for pot-op complications, promoting pain management, oral intake of liquids and solids, and a bowel movement.

## 2023-01-16 NOTE — PLAN OF CARE
Goal Outcome Evaluation:      Plan of Care Reviewed With: patient, spouse    Overall Patient Progress: improvingOverall Patient Progress: improving    Outcome Evaluation: Reviewed post-transplant diet guidelines.  Monitor PO intake, K+ trends.

## 2023-01-16 NOTE — PROGRESS NOTES
Transplant Surgery  Inpatient Daily Progress Note  2023    Assessment & Plan: Chip is a 37 year old male who has a past medical history of Bladder stones, Cardiomyopathy (H), ESRD (end stage renal disease) on dialysis (H), Hypertension, Migraines, Polysubstance abuse (H), Solitary kidney, congenital, Urethral stone, and Urethral stricture.. He is now POD #1 from a  donor kidney transplant with Dr. Tez Emerson.    Graft Function:  Kidney: Post-op US with patent doppler. Renal artery, renal vein, iliac artery, and iliac vein are all patent and non-stenotic with velocities <200cm/s. Arcuate Artery RI of 0.65, 0.66, 0.67.   US Renal Transplant with Doppler 01/15/2023    Narrative  EXAM:    Ultrasound of transplant kidney on 1/15/2023    HISTORY:    POD 0    COMPARISON: None available    FINDINGS: There is a right lower quadrant renal transplant. The  transplant kidney is of normal echogenicity. There is no perinephric  fluid collection or abnormal mass. The kidney measures 12.1 cm.  Doppler examination demonstrates normal, uniform parenchymal vascular  flow. The bladder is not well-visualized.    Arcuate Artery Resistive Indices:    Upper: 0.65    Middle: 0.66    Lower: 0.67    Renal Artery Flow:    118 cm/s peak systolic at the hilum.  129 cm/s peak systolic at the anastomosis.    Renal Vein Flow:  43 cm/s at the hilum.  104 cm/s at the anastomosis.    Iliac Artery Flow:  187 cm/s peak systolic above the anastomosis.  165 cm/s peak systolic below the anastomosis.    Iliac Vein Flow:  Patent above the anastomosis.  Patent below the anastomosis:    Impression  IMPRESSION:  Normal grayscale and Doppler evaluation of the right lower quadrant  renal transplant.    I have personally reviewed the examination and initial interpretation  and I agree with the findings.    GRETA SANDY MD      SYSTEM ID:  [id number]      - Creatinine down trending as expected, 2023: 4.82 mg/dL.  (from 4.62  preoperatively). UOP 1755ml and 1910ml over the last two shifts.   - Potassium 1/16/2023: 5.4 mmol/L; 5.4 mmol/L. And 5.0, 4.5 prior.      Immunosuppression management: PRA 1/6/2023: 50 - Intermediate Risk  Thymoglobulin: 2mg/kg IVx1 given intraoperatively  Basiliximab:  20mg IV on POD #1 and POD #5  Steroids: Methylprednisolone 500mg given intraoperatively on POD #0. Plan for 250mg on POD #1 and 100mg on POD#2.  - Prednisone taper starting on POD #3: 60mg QD x 2d, 40mg QD x2d, 20mg QD x7d, 15mg QD x7d, 10mg QD 7d, 5mg QD x7d.  MMF: 1000mg Intraop then 750mg BID  Tacrolimus: 2mg PO BID. Goal level 8-10.       Hematology:  Anemia of chronic disease: Last Hgb: 1/16/2023: 10.3 g/dL.  Continue to monitor with daily labs.    Neurology:  Pain management: APAP Q8H + Q4H PRN, Oxycodone 5-10mg PRN, discontinue Hydromorphone 0.2-0.4mg Q2H PRN    Cardiac:  - DC cardiac monitoring and CVP today (POD #1)  - Atorvastatin 10mg QD    Respiratory:  - Continue to titrate supplemental oxygen to SpO2 goals  - DC capnography today (POD #1)   - Encourage good pulmonary hygiene: IS use Q1H, Deep breathing and coughing    GI/Nutrition:  Diet: Low potassium diet  Bowel regimen: Senna BID, PEG QD, MoM daily with lunch  - Ondansetron PRN for nausea    Endocrine:   - Goal glucose 100-150mg/dl    Fluid/Electrolytes:   - MIVF: Straight rate LR  - DC fluid replacements POD1  - Lasix for hyperkalemia of 5.4  - Mg replacement for hypomagnessemia  - Ca replacement for hypocalcemia  - Bicarb for bicarbonate of 18    : Shaver in place d/t new surgical anastomosis. Placed by urology due to history of urethroplasty. Plan to DC on POC #7. Check drain Cr prior to pulling shaver.     Infectious disease: Afebrile    Prophylaxis:   - Fall  - GI (Pantoprazole during steroid use)  - Viral (Valganciclovir)  - Pneumocystis (TMP-SMX: held for hyperkalemia)     Disposition: Pending recovery    Medical Decision Making: Medium  Subsequent visit 90235 (moderate level  decision making)    DK/Fellow/Resident Provider: Antonino Jasso MD    Faculty: Tez Emerson MD    Attestation: I saw and examined the patient with Antonino Jasso MD, and the transplant team. I independently reviewed all pertinent laboratory and imaging results and made independent management decisions including immunosuppression management. I spent 30 minutes in care of this patient.  Tez Emerson MD  _________________________________________________________________  Transplant History:   1/15/2023 (Kidney), Postoperative day: 1     Interval History:  Overnight events: NAEON. Reports pain is well controlled. Eating breakfast. No nausea. Reports that prior to transplant he would make 1.5 to 2L of urine daily.    ROS:   A 10-point review of systems was negative except as noted above.    Meds:    acetaminophen  975 mg Oral Q8H     atorvastatin  10 mg Oral Daily     famotidine  20 mg Oral Q48H    Or     famotidine  20 mg Intravenous Q48H     [START ON 1/17/2023] magnesium oxide  400 mg Oral Daily with lunch     mycophenolate  750 mg Oral BID IS     polyethylene glycol  17 g Oral Daily     senna-docusate  1 tablet Oral BID     sodium chloride (PF)  10 mL Intracatheter Q8H     sulfamethoxazole-trimethoprim  1 tablet Oral Once per day on Mon Wed Fri     tacrolimus  2 mg Oral BID IS     valGANciclovir  450 mg Oral Once per day on Mon Thu       Physical Exam:     Admit Weight: 68.4 kg (150 lb 12.8 oz)    Current vitals:   /77   Pulse 81   Temp 98.5  F (36.9  C) (Oral)   Resp 17   Wt 68.4 kg (150 lb 12.8 oz)   SpO2 98%   BMI 20.81 kg/m      Vital sign ranges:    Temp:  [98.3  F (36.8  C)-101  F (38.3  C)] 98.5  F (36.9  C)  Pulse:  [] 81  Resp:  [5-27] 17  BP: (102-136)/(55-81) 124/77  SpO2:  [96 %-100 %] 98 %    General Appearance: NAD  Skin: Warm, perfused  Heart: Normotensive, Normocardic.  Lungs: NWOB on  None (Room air)  Abdomen: The abdomen is appropriately tender, ND, soft.  Curvilinear incision is present on the RLQ of ABD.  : shaver is present.  Urine is clear and yellow.  Extremities: edema: trace, strength 5/5  Neurologic: Alert and oriented. Tremor: absent.     Data:   CMP  Recent Labs   Lab 01/16/23  0632 01/16/23  0125 01/15/23  2210 01/15/23  1750 01/15/23  1711 01/15/23  0904 01/15/23  0540     --   --  138 136  --  142   POTASSIUM 5.4*  5.4* 5.0   < > 4.8 4.7  --  4.1   CHLORIDE 108*  --   --  106  --   --  107   CO2 18*  --   --  18*  --   --  19*   *  --   --  106* 101*   < > 90   BUN 48.0*  --   --  44.2*  --   --  47.5*   CR 4.82*  --   --  4.54*  --   --  4.62*   GFRESTIMATED 15*  --   --  16*  --   --  16*   VISHNU 7.3*  --   --  8.3*  --   --  9.1   ICAW  --   --   --   --  4.5  --   --    MAG 1.6*  --   --  1.5*  --   --   --    PHOS 3.0  --   --  3.5  --   --   --    ALBUMIN  --   --   --   --   --   --  4.2   BILITOTAL  --   --   --   --   --   --  0.2   ALKPHOS  --   --   --   --   --   --  59   AST  --   --   --   --   --   --  52*   ALT  --   --   --   --   --   --  81*    < > = values in this interval not displayed.     CBC  Recent Labs   Lab 01/16/23  0632 01/16/23  0125 01/15/23  2210 01/15/23  1750 01/15/23  1711 01/15/23  0540   HGB 10.3* 10.6*   < > 10.0*   < > 13.1*   WBC 19.4*  --   --  3.9*  --  7.0   *  --   --  116*  --  231   A1C  --   --   --   --   --  5.2    < > = values in this interval not displayed.

## 2023-01-16 NOTE — PROVIDER NOTIFICATION
7A, Room 7205, MAYTE Fowler. Kidney tx    Pt is reporting generalized itching. Tolerable currently, but would you be willing to put in an Atarax order incase it increase? Thanks!    Val SEQUEIRA, *[id number]   Appt changed and booked the procedure room.  Elli Milner MA

## 2023-01-16 NOTE — PHARMACY-TRANSPLANT NOTE
Adult Kidney Transplant Post Operative Note    37 year old male s/p  donor kidney transplant on 01/15/2023 for nephrolithiasis and hypertension.      Planned immunosuppression regimen per Kidney transplant protocol:  INDUCTION: Intermediate risk, PRA 50  1/15/23: Thymoglobulin 2 mg/kg x1, methylprednisolone 500 mg x1  23: Basiliximab 20 mg IV x1, methylprednisolone 250 mg x1  23: methylprednisolone 100 mg x1  23: Steroid taper  23: Basiliximab 20 mg IV x1     MAINTENANCE:   - Mycophenolate 750 mg BID  - Tacrolimus with goal trough levels of 8-10 mcg/L for first 6 months post-transplant     Opportunistic pathogen prophylaxis includes:  - PJP: trimethoprim/sulfamethoxazole  - CMV D (unreported at this time) /R-: Valganciclovir for unknown months duration tentatively - will follow up    Opportunistic pathogen prophylaxis includes: trimethoprim/sulfamethoxazole and valganciclovir.    Patient is not enrolled in medication study.    Pharmacy will monitor for medication interactions and immunosuppression levels in conjunction with the team. Medication therapy needs for discharge planning will continue to be addressed throughout the current admission via multidisciplinary rounds and order review.  Pharmacy will make recommendations as appropriate.    Saad Vergara, PharmD  PGY1 Pharmacist Resident

## 2023-01-16 NOTE — PROGRESS NOTES
Paged Dr. Emerson: Chip Fowler, [facility] [place] 18: US done. CVP 8. please advise ok to tx to floor once ready, thanks! Mena [phone]    Dr. Emerson said US looks ok and ok to transfer to 7th floor. Mena Rebolledo RN on 1/15/2023 at 6:29 PM

## 2023-01-16 NOTE — PROGRESS NOTES
Patient removed from OS waitlist after  donor kidney transplant. OS ID [id number].    Donor Has Risk Criteria for Transmission of HIV/HCV/HBV: No  Recipient Notified of Risk Criteria: N/A

## 2023-01-16 NOTE — PLAN OF CARE
Vitals: /70   Pulse 85   Temp 98.1  F (36.7  C) (Oral)   Resp 12   Wt 68.4 kg (150 lb 12.8 oz)   SpO2 97%   BMI 20.81 kg/m    On hourly VS until 1800.  Endocrine: Glucose per labs 137.  Labs: Elevated potassium, 5.4, elevated creatinine, 4.8. Potassium 4.8 after Lasix. Magnesium 1.6, 1 GM IV Magnesium replaced. Sepsis triggered (WBC 19), Lactic 1.2.  Pain: Mild abdominal incisional pain.  PRN's: Oxycodone 10 mg.  Diet: Diet changed to 2GM K+, good appetite.  LDA: R TL internal jugular, LR at 100cc/hr, TOM, shaver.  GI: Unable to pass flatus, Miralax and Senna given.  : Shaver in place, good urine output, high volumes after IV Lasix.  Skin: Abdominal incision with dermabond, LLQ old PD catheter site with dressing intact.  Neuro: Alert and oriented.  Mobility: Minimal stand by assist walking around the unit.  Education: Medication card and lab book updated.  Plan: Continue with plan of care, monitor labs, vitals.

## 2023-01-17 ENCOUNTER — APPOINTMENT (OUTPATIENT)
Dept: NUCLEAR MEDICINE | Facility: CLINIC | Age: 38
DRG: 652 | End: 2023-01-17
Attending: PHYSICIAN ASSISTANT
Payer: MEDICARE

## 2023-01-17 PROBLEM — Z94.0 KIDNEY REPLACED BY TRANSPLANT: Status: ACTIVE | Noted: 2023-01-17

## 2023-01-17 LAB
ALBUMIN SERPL BCG-MCNC: 2.7 G/DL (ref 3.5–5.2)
ALBUMIN SERPL BCG-MCNC: 2.8 G/DL (ref 3.5–5.2)
ALP SERPL-CCNC: 44 U/L (ref 40–129)
ALP SERPL-CCNC: 45 U/L (ref 40–129)
ALT SERPL W P-5'-P-CCNC: 66 U/L (ref 10–50)
ALT SERPL W P-5'-P-CCNC: 68 U/L (ref 10–50)
ANION GAP SERPL CALCULATED.3IONS-SCNC: 14 MMOL/L (ref 7–15)
AST SERPL W P-5'-P-CCNC: 121 U/L (ref 10–50)
AST SERPL W P-5'-P-CCNC: 94 U/L (ref 10–50)
BASOPHILS # BLD AUTO: 0 10E3/UL (ref 0–0.2)
BASOPHILS NFR BLD AUTO: 0 %
BILIRUB DIRECT SERPL-MCNC: <0.2 MG/DL (ref 0–0.3)
BILIRUB DIRECT SERPL-MCNC: <0.2 MG/DL (ref 0–0.3)
BILIRUB SERPL-MCNC: <0.2 MG/DL
BILIRUB SERPL-MCNC: <0.2 MG/DL
BUN SERPL-MCNC: 59.1 MG/DL (ref 6–20)
CALCIUM SERPL-MCNC: 8.6 MG/DL (ref 8.6–10)
CHLORIDE SERPL-SCNC: 104 MMOL/L (ref 98–107)
CREAT SERPL-MCNC: 5.03 MG/DL (ref 0.67–1.17)
DEPRECATED HCO3 PLAS-SCNC: 20 MMOL/L (ref 22–29)
EOSINOPHIL # BLD AUTO: 0 10E3/UL (ref 0–0.7)
EOSINOPHIL NFR BLD AUTO: 0 %
ERYTHROCYTE [DISTWIDTH] IN BLOOD BY AUTOMATED COUNT: 12.6 % (ref 10–15)
GFR SERPL CREATININE-BSD FRML MDRD: 14 ML/MIN/1.73M2
GLUCOSE BLDC GLUCOMTR-MCNC: 137 MG/DL (ref 70–99)
GLUCOSE BLDC GLUCOMTR-MCNC: 145 MG/DL (ref 70–99)
GLUCOSE SERPL-MCNC: 128 MG/DL (ref 70–99)
HCT VFR BLD AUTO: 30.6 % (ref 40–53)
HGB BLD-MCNC: 10.1 G/DL (ref 13.3–17.7)
IMM GRANULOCYTES # BLD: 0.1 10E3/UL
IMM GRANULOCYTES NFR BLD: 1 %
LYMPHOCYTES # BLD AUTO: 0.1 10E3/UL (ref 0.8–5.3)
LYMPHOCYTES NFR BLD AUTO: 1 %
MAGNESIUM SERPL-MCNC: 1.8 MG/DL (ref 1.7–2.3)
MCH RBC QN AUTO: 31.8 PG (ref 26.5–33)
MCHC RBC AUTO-ENTMCNC: 33 G/DL (ref 31.5–36.5)
MCV RBC AUTO: 96 FL (ref 78–100)
MONOCYTES # BLD AUTO: 0.4 10E3/UL (ref 0–1.3)
MONOCYTES NFR BLD AUTO: 2 %
NEUTROPHILS # BLD AUTO: 15 10E3/UL (ref 1.6–8.3)
NEUTROPHILS NFR BLD AUTO: 96 %
NRBC # BLD AUTO: 0 10E3/UL
NRBC BLD AUTO-RTO: 0 /100
PHOSPHATE SERPL-MCNC: 5.3 MG/DL (ref 2.5–4.5)
PLATELET # BLD AUTO: 106 10E3/UL (ref 150–450)
POTASSIUM SERPL-SCNC: 4.9 MMOL/L (ref 3.4–5.3)
POTASSIUM SERPL-SCNC: 5 MMOL/L (ref 3.4–5.3)
PROT SERPL-MCNC: 5 G/DL (ref 6.4–8.3)
PROT SERPL-MCNC: 5.1 G/DL (ref 6.4–8.3)
RBC # BLD AUTO: 3.18 10E6/UL (ref 4.4–5.9)
SA 1 CELL: NORMAL
SA 1 TEST METHOD: NORMAL
SA 2 CELL: NORMAL
SA 2 TEST METHOD: NORMAL
SA1 HI RISK ABY: NORMAL
SA1 MOD RISK ABY: NORMAL
SA2 HI RISK ABY: NORMAL
SA2 MOD RISK ABY: NORMAL
SODIUM SERPL-SCNC: 138 MMOL/L (ref 136–145)
UNACCEPTABLE ANTIGENS: NORMAL
UNOS CPRA: 50
WBC # BLD AUTO: 15.6 10E3/UL (ref 4–11)
ZZZSA 1  COMMENTS: NORMAL
ZZZSA 2 COMMENTS: NORMAL

## 2023-01-17 PROCEDURE — 250N000013 HC RX MED GY IP 250 OP 250 PS 637: Performed by: PHYSICIAN ASSISTANT

## 2023-01-17 PROCEDURE — 250N000011 HC RX IP 250 OP 636

## 2023-01-17 PROCEDURE — 78707 K FLOW/FUNCT IMAGE W/O DRUG: CPT | Mod: 26 | Performed by: RADIOLOGY

## 2023-01-17 PROCEDURE — 99231 SBSQ HOSP IP/OBS SF/LOW 25: CPT | Mod: FS | Performed by: SURGERY

## 2023-01-17 PROCEDURE — 80053 COMPREHEN METABOLIC PANEL: CPT | Performed by: SURGERY

## 2023-01-17 PROCEDURE — 36592 COLLECT BLOOD FROM PICC: CPT | Performed by: SURGERY

## 2023-01-17 PROCEDURE — 343N000001 HC RX 343: Performed by: SURGERY

## 2023-01-17 PROCEDURE — 82955 ASSAY OF G6PD ENZYME: CPT | Performed by: PHYSICIAN ASSISTANT

## 2023-01-17 PROCEDURE — 82248 BILIRUBIN DIRECT: CPT | Performed by: PHYSICIAN ASSISTANT

## 2023-01-17 PROCEDURE — 258N000003 HC RX IP 258 OP 636

## 2023-01-17 PROCEDURE — 250N000013 HC RX MED GY IP 250 OP 250 PS 637

## 2023-01-17 PROCEDURE — 85025 COMPLETE CBC W/AUTO DIFF WBC: CPT | Performed by: SURGERY

## 2023-01-17 PROCEDURE — 250N000012 HC RX MED GY IP 250 OP 636 PS 637: Performed by: SURGERY

## 2023-01-17 PROCEDURE — 83735 ASSAY OF MAGNESIUM: CPT | Performed by: SURGERY

## 2023-01-17 PROCEDURE — A9562 TC99M MERTIATIDE: HCPCS | Performed by: SURGERY

## 2023-01-17 PROCEDURE — 250N000013 HC RX MED GY IP 250 OP 250 PS 637: Performed by: SURGERY

## 2023-01-17 PROCEDURE — 84100 ASSAY OF PHOSPHORUS: CPT | Performed by: SURGERY

## 2023-01-17 PROCEDURE — 36592 COLLECT BLOOD FROM PICC: CPT | Performed by: PHYSICIAN ASSISTANT

## 2023-01-17 PROCEDURE — 99232 SBSQ HOSP IP/OBS MODERATE 35: CPT | Mod: GC | Performed by: INTERNAL MEDICINE

## 2023-01-17 PROCEDURE — 78707 K FLOW/FUNCT IMAGE W/O DRUG: CPT | Mod: MG

## 2023-01-17 PROCEDURE — 120N000011 HC R&B TRANSPLANT UMMC

## 2023-01-17 RX ORDER — HEPARIN SODIUM,PORCINE 10 UNIT/ML
5 VIAL (ML) INTRAVENOUS
Status: CANCELLED | OUTPATIENT
Start: 2023-01-17

## 2023-01-17 RX ORDER — METHOCARBAMOL 750 MG/1
750 TABLET, FILM COATED ORAL EVERY 6 HOURS PRN
Qty: 10 TABLET | Refills: 0 | Status: CANCELLED | OUTPATIENT
Start: 2023-01-17

## 2023-01-17 RX ORDER — METHYLPREDNISOLONE SODIUM SUCCINATE 125 MG/2ML
125 INJECTION, POWDER, LYOPHILIZED, FOR SOLUTION INTRAMUSCULAR; INTRAVENOUS
Status: CANCELLED
Start: 2023-01-17

## 2023-01-17 RX ORDER — MAGNESIUM OXIDE 400 MG/1
400 TABLET ORAL
Qty: 30 TABLET | Refills: 3 | Status: CANCELLED | OUTPATIENT
Start: 2023-01-18

## 2023-01-17 RX ORDER — ALBUTEROL SULFATE 0.83 MG/ML
2.5 SOLUTION RESPIRATORY (INHALATION)
Status: CANCELLED | OUTPATIENT
Start: 2023-01-17

## 2023-01-17 RX ORDER — OXYCODONE HYDROCHLORIDE 5 MG/1
5 TABLET ORAL EVERY 6 HOURS PRN
Qty: 20 TABLET | Refills: 0 | Status: CANCELLED | OUTPATIENT
Start: 2023-01-17

## 2023-01-17 RX ORDER — EPINEPHRINE 1 MG/ML
0.3 INJECTION, SOLUTION, CONCENTRATE INTRAVENOUS EVERY 5 MIN PRN
Status: CANCELLED | OUTPATIENT
Start: 2023-01-17

## 2023-01-17 RX ORDER — DEXTROSE MONOHYDRATE 25 G/50ML
25-50 INJECTION, SOLUTION INTRAVENOUS
Status: DISCONTINUED | OUTPATIENT
Start: 2023-01-17 | End: 2023-01-18 | Stop reason: HOSPADM

## 2023-01-17 RX ORDER — NICOTINE POLACRILEX 4 MG
15-30 LOZENGE BUCCAL
Status: DISCONTINUED | OUTPATIENT
Start: 2023-01-17 | End: 2023-01-18 | Stop reason: HOSPADM

## 2023-01-17 RX ORDER — HEPARIN SODIUM (PORCINE) LOCK FLUSH IV SOLN 100 UNIT/ML 100 UNIT/ML
5 SOLUTION INTRAVENOUS
Status: CANCELLED | OUTPATIENT
Start: 2023-01-17

## 2023-01-17 RX ORDER — ASPIRIN 81 MG/1
81 TABLET ORAL DAILY
Status: DISCONTINUED | OUTPATIENT
Start: 2023-01-17 | End: 2023-01-18 | Stop reason: HOSPADM

## 2023-01-17 RX ORDER — SODIUM BICARBONATE 650 MG/1
650 TABLET ORAL 2 TIMES DAILY
Status: DISCONTINUED | OUTPATIENT
Start: 2023-01-17 | End: 2023-01-18 | Stop reason: HOSPADM

## 2023-01-17 RX ORDER — MEPERIDINE HYDROCHLORIDE 25 MG/ML
25 INJECTION INTRAMUSCULAR; INTRAVENOUS; SUBCUTANEOUS EVERY 30 MIN PRN
Status: CANCELLED | OUTPATIENT
Start: 2023-01-17

## 2023-01-17 RX ORDER — DIPHENHYDRAMINE HYDROCHLORIDE 50 MG/ML
50 INJECTION INTRAMUSCULAR; INTRAVENOUS
Status: CANCELLED
Start: 2023-01-17

## 2023-01-17 RX ORDER — ALBUTEROL SULFATE 90 UG/1
1-2 AEROSOL, METERED RESPIRATORY (INHALATION)
Status: CANCELLED
Start: 2023-01-17

## 2023-01-17 RX ADMIN — ACETAMINOPHEN 975 MG: 325 TABLET, FILM COATED ORAL at 17:42

## 2023-01-17 RX ADMIN — POLYETHYLENE GLYCOL 3350 17 G: 17 POWDER, FOR SOLUTION ORAL at 08:22

## 2023-01-17 RX ADMIN — SODIUM CHLORIDE, POTASSIUM CHLORIDE, SODIUM LACTATE AND CALCIUM CHLORIDE: 600; 310; 30; 20 INJECTION, SOLUTION INTRAVENOUS at 02:33

## 2023-01-17 RX ADMIN — TECHNESCAN TC 99M MERTIATIDE 9.9 MILLICURIE: 1 INJECTION, POWDER, LYOPHILIZED, FOR SOLUTION INTRAVENOUS at 11:50

## 2023-01-17 RX ADMIN — ACETAMINOPHEN 975 MG: 325 TABLET, FILM COATED ORAL at 10:22

## 2023-01-17 RX ADMIN — SENNOSIDES AND DOCUSATE SODIUM 1 TABLET: 8.6; 5 TABLET ORAL at 20:51

## 2023-01-17 RX ADMIN — OXYCODONE HYDROCHLORIDE 5 MG: 5 TABLET ORAL at 16:09

## 2023-01-17 RX ADMIN — ATORVASTATIN CALCIUM 10 MG: 10 TABLET, FILM COATED ORAL at 08:22

## 2023-01-17 RX ADMIN — TACROLIMUS 2 MG: 1 CAPSULE ORAL at 17:43

## 2023-01-17 RX ADMIN — SODIUM BICARBONATE 650 MG: 650 TABLET ORAL at 20:51

## 2023-01-17 RX ADMIN — TACROLIMUS 2 MG: 1 CAPSULE ORAL at 08:22

## 2023-01-17 RX ADMIN — ACETAMINOPHEN 975 MG: 325 TABLET, FILM COATED ORAL at 02:27

## 2023-01-17 RX ADMIN — MYCOPHENOLATE MOFETIL 750 MG: 250 CAPSULE ORAL at 08:22

## 2023-01-17 RX ADMIN — METHYLPREDNISOLONE SODIUM SUCCINATE 100 MG: 125 INJECTION, POWDER, FOR SOLUTION INTRAMUSCULAR; INTRAVENOUS at 08:24

## 2023-01-17 RX ADMIN — Medication 1000 MG: at 21:54

## 2023-01-17 RX ADMIN — MAGNESIUM HYDROXIDE 30 ML: 400 SUSPENSION ORAL at 16:03

## 2023-01-17 RX ADMIN — OXYCODONE HYDROCHLORIDE 5 MG: 5 TABLET ORAL at 11:38

## 2023-01-17 RX ADMIN — PANTOPRAZOLE SODIUM 40 MG: 40 TABLET, DELAYED RELEASE ORAL at 08:22

## 2023-01-17 RX ADMIN — MYCOPHENOLATE MOFETIL 750 MG: 250 CAPSULE ORAL at 17:44

## 2023-01-17 RX ADMIN — SENNOSIDES AND DOCUSATE SODIUM 1 TABLET: 8.6; 5 TABLET ORAL at 08:38

## 2023-01-17 RX ADMIN — MAGNESIUM OXIDE TAB 400 MG (241.3 MG ELEMENTAL MG) 400 MG: 400 (241.3 MG) TAB at 13:45

## 2023-01-17 RX ADMIN — OXYCODONE HYDROCHLORIDE 5 MG: 5 TABLET ORAL at 02:27

## 2023-01-17 RX ADMIN — SODIUM BICARBONATE 1300 MG: 650 TABLET ORAL at 08:22

## 2023-01-17 RX ADMIN — SERTRALINE HYDROCHLORIDE 50 MG: 50 TABLET, FILM COATED ORAL at 15:54

## 2023-01-17 ASSESSMENT — ACTIVITIES OF DAILY LIVING (ADL)
ADLS_ACUITY_SCORE: 22

## 2023-01-17 NOTE — PROGRESS NOTES
[facility]   Transplant Nephrology Progress Note  Date of Admission:  1/15/2023  Today's Date: 01/17/2023    Recommendations:    - slow graft function, had residual renal function preTx, agree with renogram, results pending  - no RRT indicated, will continue to monitor   - consider switch to high risk induction   -continue bicarb 650 mg bid  -monitor LFT, check G6PD . Hold statin and use alternative to bactrim (dapsone if nl G6PD and stable Hb)  -ECHO ~6 weeks post transplant    Assessment & Plan   # DDKT: Trend up slow graft function, had residual renal function preTx (previously on PD ~2 L/urine). Suspect ischemic ATN (DCD, prolonged CIT)   - Baseline Creatinine: ~ TBD   - Proteinuria: Not checked post transplant   - Date DSA Last Checked: Not Known      Latest DSA: No   - BK Viremia: Not checked recently   - Kidney Tx Biopsy: No      # Immunosuppression: Tacrolimus immediate release (goal 8-10) and Mycophenolate mofetil (dose 750 mg every 12 hours)   - Changes: No   -Induction; intermediate risk    # Infection Prophylaxis:   - PJP: Sulfa/TMP (Bactrim)  - CMV: Valacyclovir (Valtrex)    # Hypertension: Controlled;  Goal BP: < 150/90   - Volume status: Euvolemic  EDW ~ 70k   - Changes: No      # Anemia in Chronic Renal Disease: Hgb: Stable      SARA: No   - Iron studies: Not checked recently    # Mineral Bone Disorder:   - Secondary renal hyperparathyroidism; PTH level: Not checked recently        On treatment: None  - Vitamin D; level: Not checked recently        On supplement: No  - Calcium; level: Normal        On supplement: No  - Phosphorus; level: High        On supplement: No    # Electrolytes:   - Potassium; level: Normal        On supplement: No  - Magnesium; level: Normal        On supplement: No  - Bicarbonate; level: Low        On supplement: No    # Transaminitis: improving              - hx of alcohol abuse              - hepatitis serologies negative         "      - denies using statin    -use alternative to bactrim if not improving     # Hx of cardiomyopathy:              - attributed to methamphetamine use in 2020 (ef:15%)              - echo: ef 55-60% Sep 2023              - stress echo neg Feb 2021              - previously on ACEI+b-blocker \"lisinopril & coreg\", currently on hold as BP low      # Hx of Polysubstance use:              - hx of methamphetamine use              - attended AA and underwent CD eval              - sober since 3/2021      # Hx of urethral stricture:              - shaver per tx surgery/urology    # Transplant History:  Etiology of Kidney Failure: Unknown etiology  Tx: DDKT  Transplant: 1/15/2023 (Kidney)  Significant changes in immunosuppression: None  Significant transplant-related complications: None    Recommendations were communicated to the primary team via this note.    Seen and discussed with Dr. Sharon Cueto MD   Pager: [phone]    Interval History   Mr. Fowler's cr is trending up. US; elevated renal artery peak systolic velocity and edema     -no nausea or vomiting  -no LE edema  -no fever, sweat, or chills    Review of Systems   4 point ROS was obtained and negative except as noted in the Interval History.    MEDICATIONS:    acetaminophen  975 mg Oral Q8H     [Held by provider] aspirin  81 mg Oral Daily     [Held by provider] atorvastatin  10 mg Oral Daily     [START ON 1/20/2023] basiliximab (SIMULECT) infusion  20 mg Intravenous Once     calcium carbonate  1,000 mg Oral At Bedtime     insulin aspart  1-7 Units Subcutaneous TID AC     insulin aspart  1-5 Units Subcutaneous At Bedtime     magnesium oxide  400 mg Oral Daily with lunch     mycophenolate  750 mg Oral BID IS     pantoprazole  40 mg Oral QAM AC     polyethylene glycol  17 g Oral Daily     [START ON 1/18/2023] predniSONE  60 mg Oral Daily    Followed by     [START ON 1/20/2023] predniSONE  40 mg Oral Daily    Followed by     [START ON 1/22/2023] " predniSONE  20 mg Oral Daily    Followed by     [START ON 2023] predniSONE  15 mg Oral Daily    Followed by     [START ON 2023] predniSONE  10 mg Oral Daily    Followed by     [START ON 2023] predniSONE  5 mg Oral Daily     senna-docusate  1 tablet Oral BID     sertraline  50 mg Oral Daily     sodium bicarbonate  650 mg Oral BID     sodium chloride (PF)  10 mL Intracatheter Q8H     sulfamethoxazole-trimethoprim  1 tablet Oral Once per day on      tacrolimus  2 mg Oral BID IS     valGANciclovir  450 mg Oral Once per day on          Physical Exam   Temp  Av.5  F (36.9  C)  Min: 97.7  F (36.5  C)  Max: 101  F (38.3  C)      Pulse  Av.7  Min: 53  Max: 118 Resp  Avg: 15.3  Min: 5  Max: 27  SpO2  Av.5 %  Min: 96 %  Max: 100 %    CVP (mmHg): 9 mmHgBP 125/81 (BP Location: Left arm)   Pulse 77   Temp 98.1  F (36.7  C) (Oral)   Resp 16   Wt 69.6 kg (153 lb 8 oz)   SpO2 98%   BMI 21.18 kg/m     Date 23 0700 - 23 0659   Shift 2151-8084 5086-3021 8640-8074 24 Hour Total   INTAKE   P.O. 500   500   Shift Total(mL/kg) 500(7.18)   500(7.18)   OUTPUT   Urine 1100   1100   Drains 100   100   Shift Total(mL/kg) 1200(17.23)   1200(17.23)   Weight (kg) 69.63 69.63 69.63 69.63      Admit Weight: 68.4 kg (150 lb 12.8 oz)     GENERAL APPEARANCE: alert and no distress  HENT: mouth without ulcers or lesions  LYMPHATICS: no cervical or supraclavicular nodes  RESP: lungs clear to auscultation - no rales, rhonchi or wheezes  CV: regular rhythm, normal rate, no rub, no murmur  EDEMA: no LE edema bilaterally  ABDOMEN: soft, nondistended, nontender, bowel sounds normal  MS: extremities normal - no gross deformities noted, no evidence of inflammation in joints, no muscle tenderness  SKIN: no rash    Data   All labs reviewed by me.  CMP  Recent Labs   Lab 23  0553 23  2203 23  1750 23  1406 23  0959 23  0632 01/15/23  2210 01/15/23  1750  01/15/23  1711 01/15/23  0904 01/15/23  0540     --   --   --   --  138  --  138 136  --  142   POTASSIUM 4.9 5.0 4.6 4.8   < > 5.4*  5.4*   < > 4.8 4.7  --  4.1   CHLORIDE 104  --   --   --   --  108*  --  106  --   --  107   CO2 20*  --   --   --   --  18*  --  18*  --   --  19*   ANIONGAP 14  --   --   --   --  12  --  14  --   --  16*   *  --   --   --   --  137*  --  106* 101*   < > 90   BUN 59.1*  --   --   --   --  48.0*  --  44.2*  --   --  47.5*   CR 5.03*  --   --   --   --  4.82*  --  4.54*  --   --  4.62*   GFRESTIMATED 14*  --   --   --   --  15*  --  16*  --   --  16*   VISHNU 8.6  --   --   --   --  7.3*  --  8.3*  --   --  9.1   MAG 1.8  --   --  1.9  --  1.6*  --  1.5*  --   --   --    PHOS 5.3*  --   --   --   --  3.0  --  3.5  --   --   --    PROTTOTAL 5.0* 5.1*  --   --   --   --   --   --   --   --  7.1   ALBUMIN 2.8* 2.7*  --   --   --   --   --   --   --   --  4.2   BILITOTAL <0.2 <0.2  --   --   --   --   --   --   --   --  0.2   ALKPHOS 45 44  --   --   --   --   --   --   --   --  59   AST 94* 121*  --   --   --   --   --   --   --   --  52*   ALT 68* 66*  --   --   --   --   --   --   --   --  81*    < > = values in this interval not displayed.     CBC  Recent Labs   Lab 01/17/23  0553 01/16/23  2203 01/16/23  1750 01/16/23  1406 01/16/23  0959 01/16/23  0632 01/15/23  2210 01/15/23  1750 01/15/23  1711 01/15/23  0540   HGB 10.1* 10.5* 11.1* 10.2*   < > 10.3*   < > 10.0*   < > 13.1*   WBC 15.6*  --   --   --   --  19.4*  --  3.9*  --  7.0   RBC 3.18*  --   --   --   --  3.30*  --  3.14*  --  4.13*   HCT 30.6*  --   --   --   --  31.8*  --  30.2*  --  39.6*   MCV 96  --   --   --   --  96  --  96  --  96   MCH 31.8  --   --   --   --  31.2  --  31.8  --  31.7   MCHC 33.0  --   --   --   --  32.4  --  33.1  --  33.1   RDW 12.6  --   --   --   --  12.5  --  12.6  --  12.5   *  --   --   --   --  127*  --  116*  --  231    < > = values in this interval not displayed.      INR  Recent Labs   Lab 01/15/23  0540   INR 0.90   PTT 28     ABG  Recent Labs   Lab 01/15/23  1711   O2PER 45.0      Urine Studies  Recent Labs   Lab Test 01/15/23  0637 08/18/20  1312 08/14/20  1415   COLOR Light Yellow Yellow Yellow   APPEARANCE Clear Cloudy Slightly Cloudy   URINEGLC Negative 50* 50*   URINEBILI Negative Negative Negative   URINEKETONE Negative Negative Negative   SG 1.012 1.012 1.011   UBLD Negative Small* Negative   URINEPH 7.0 7.0 7.0   PROTEIN 50* 100* 30*   NITRITE Negative Negative Negative   LEUKEST Negative Large* Large*   RBCU <1 9* 4*   WBCU 1 >182* 59*     No lab results found.  PTH  Recent Labs   Lab Test 04/18/22  0851   PTHI 315*     Iron Studies  No lab results found.    IMAGING:  All imaging studies reviewed by me.    This patient has been seen and evaluated by me, Georgie Herrera MD.  I have reviewed the note and agree with plan of care as documented by the fellow.

## 2023-01-17 NOTE — PLAN OF CARE
VS: stable, on room air.    BG: none  Labs: Last K 5.0.  AM labs pending collection.    Pain/Nausea: scheduled tylenol and oxycodone given with good relief of incisional pain.  Denies nausea.    Diet: 2 gm K diet.  Good appetite.  Ate a peanut butter and jelly sandwich.  Drinking good amounts of water.    LDA: I J infusing LR at 100cc/hr.  TOM leaking at site, dressing changed.  70cc out.    GI: passing gas.  No BM yet.   : Taylor with over 1 liter of urine out.  Pink tinged.    Skin: incision liquid bandaged.    Mobility: Pt walked yesterday.  Encouraged pt to be out of bed as much as possible today, to chair etc.    Plan: Transplant education.

## 2023-01-17 NOTE — PROGRESS NOTES
Care Management Follow Up    Length of Stay (days): 1    Expected Discharge Date: 01/18/2023     Concerns to be Addressed: discharge planning     Patient plan of care discussed at interdisciplinary rounds: Yes    Anticipated Discharge Disposition: Home, Home Care     Anticipated Discharge Services: None  Anticipated Discharge DME: None    Patient/family educated on Medicare website which has current facility and service quality ratings: yes (Reviewed Home Care)  Education Provided on the Discharge Plan:    Patient/Family in Agreement with the Plan: yes    Referrals Placed by CM/SW: Homecare    Additional Information:  Patient status reviewed/discussed during care team rounds.  Pt s/p kidney transplant.  Pt will need ATC appointments confirmed prior to discharge.  Home care RN recommended.      Met with pt.  Introduced RNCC role.  Reviewed anticipated plan for discharge.  Reviewed/discussed anticipated plan for ATC appointments.  Reviewed/discussed home care RN services. Reviewed/discussed anticipated plan for transplant labs on Monday/Thursday.  Pt notes his SO Ashleigh will be his primary caregiver.  Pt does not have a current PCP. Pt would like to establish care within the [facility] primary care clinic group.  [facility] request sent.  Pt open to home care referrals being made and has no preference.  Agreed to f/u with pt when closer to discharge.     Initiated referral to [facility].    Will continue to monitor.     1/18/2023 0830: Received confirmation that [facility] accepted for home RN services.  Discharge home care orders updated.       Erin Thompson RN BSN, PHN, ACM-RN  7A RN Care Coordinator  Phone: [phone]  Pager [phone]    To contact the weekend RNCC  [place] (0800 - 1630) Saturday and Sunday    Units: 4A, 4C, 4E, 5A and 5B- Pager 1: [phone]    Units: 6A, 6B, 6C, 6D- Pager 2: [phone]    Units: 7A, 7B, 7C, 7D, and 5C-Pager 3: [phone]    [facility] ([phone]) Saturday and  Sunday    Units: 5 Ortho, 8A, 10 ICU, & Pediatric Units-Pager 4: [phone]    1/17/2023 4:18 PM

## 2023-01-17 NOTE — PLAN OF CARE
/70 (BP Location: Left arm)   Pulse 75   Temp 98.2  F (36.8  C) (Oral)   Resp 16   Wt 70.4 kg (155 lb 3.3 oz)   SpO2 98%   BMI 21.42 kg/m      [id number]  Pt is VSS on RA, alert and oriented x4. 2 g k+ diet w/ a fair appetite. R triple lumen internal jugular infusing LR @ 100 ml/hr other lumens are SL. R TOM-scant output. R PIV SL. Endorsed some incisional pain gave oxy and robaxin x1. Denies nausea. Taylor w/ yellow tinged urine good uop. No bm this shift, denies passing gas. PD catheter removed, dressing is CDI. Abdominal incision dermabonded VERNON no drainage. SBA w/ iv pole. Will notify team w/ any changes.

## 2023-01-17 NOTE — PROGRESS NOTES
Transplant Surgery  Inpatient Daily Progress Note  2023    Assessment & Plan: Chip is a 37 year old male who has a past medical history of Bladder stones, Cardiomyopathy (H), ESRD (end stage renal disease) on dialysis (H), Hypertension, Migraines, Polysubstance abuse (H), Solitary kidney, congenital, Urethral stone, and Urethral stricture.. He is now POD #2 from a  donor kidney transplant with Dr. Tez Emerson.    Graft Function:  Kidney: Cr trending up slowly, 2023: 5.03 mg/dL. (from 4.62 preoperatively). UOP 7.9L yesterday and 1.5L since midnight. Patient did make urine prior to transplant.  - Potassium 2023: 4.9 mmol/L. Continue low K diet today  - No indication for HD today  - Renogram to differentiate between native kidney and transplant kidney function.    Imagin23: Post-op US with patent doppler. Renal artery, renal vein, iliac artery, and iliac vein are all patent and non-stenotic with velocities <200cm/s. Arcuate Artery RI of 0.65, 0.66, 0.67.   23: POD#1 US with patent doppler, elevated RA peak systolic velocity at the anastomosis (current: 396  cm/sec, prior: 129 cm/sec) may be related to post op edema. No hydronephrosis or peritransplant fluid collection      Immunosuppression management: PRA 2023: 50 - Intermediate Risk  Thymoglobulin: 2mg/kg IVx1 given intraoperatively  Basiliximab:  20mg IV on POD #1 and POD #5  Steroids: Methylprednisolone 500mg given intraoperatively on POD #0. Plan for 250mg on POD #1 and 100mg on POD#2.  - Prednisone taper starting on POD #3: 60mg QD x 2d, 40mg QD x2d, 20mg QD x7d, 15mg QD x7d, 10mg QD 7d, 5mg QD x7d.  MMF: 1000mg Intraop then 750mg BID  Tacrolimus: 2mg PO BID. Goal level 8-10. Check level tomorrow.      Hematology:  Anemia of chronic disease: Last Hgb: 2023: 10.1 g/dL.  Continue to monitor with daily labs.    Neurology:  Pain management: APAP Q8H + Q4H PRN, Oxycodone 5-10mg PRN  PTA sertraline  restarted      Cardiac: Hx HTN/cardiomyopathy  - SBP 100s-130s. PTA lisinopril and carvedilol not restarted  - Hx cardiomyopathy 2/2 methamphetamine use (2020). Renal recommending repeat ECHO ~ 6 weeks post transplant.   - Atorvastatin 10mg daily, just started for prevention given comorbidities. Hold d/t AST/ALT elevation.    Respiratory:  - Encourage good pulmonary hygiene: IS use Q1H, Deep breathing and coughing    GI/Nutrition:  Diet: Low potassium diet  Bowel regimen: Senna BID, PEG QD, MoM daily with lunch  - Ondansetron PRN for nausea  Transaminitis: Repeat AST/ALT today. Will hold statin. Hepatitis serologies negative    Endocrine:   - Mild hyperglycemia 2/2 steroids. Do not expect patient will be discharged on insulin.    Fluid/Electrolytes:   - MIVF: stop  - Encourage adequate fluid intake  - Bicarb for bicarbonate of 20. Continue sodium bicarbonate supplement    : Shaver in place d/t new surgical anastomosis. Placed by urology due to history of urethroplasty. Plan to DC on POC #7. Check drain Cr prior to pulling shaver.     Infectious disease: Afebrile    Prophylaxis:   - Fall  - GI (Pantoprazole during steroid use)  - Viral (Valganciclovir)  - Pneumocystis (TMP-SMX: held for hyperkalemia)     Disposition: Transplant education today. Possible discharge in 1-2 days.    Medical Decision Making: Medium  Subsequent visit 20267 (moderate level decision making)    DK/Fellow/Resident Provider: Elaine Saenz PA-C, PRINCE    Faculty: Tez Emerson MD    Attestation: I saw and examined the patient with Elaine Saenz PA-C, and the transplant team. I independently reviewed all pertinent laboratory and imaging results and made independent management decisions including immunosuppression management. I spent 30 minutes in care of this patient.  Tez Emerson MD  _________________________________________________________________  Transplant History:   1/15/2023 (Kidney), Postoperative day: 2     Interval  History:  Overnight events: NAEON. Tolerating diet. +Flatus, no BM yet. Ambulating. Pain control adequate.     Urine output initially yellow now light pink.    ROS:   A 10-point review of systems was negative except as noted above.    Meds:    acetaminophen  975 mg Oral Q8H     atorvastatin  10 mg Oral Daily     [START ON 1/20/2023] basiliximab (SIMULECT) infusion  20 mg Intravenous Once     calcium carbonate  1,000 mg Oral At Bedtime     insulin aspart  1-7 Units Subcutaneous TID AC     insulin aspart  1-5 Units Subcutaneous At Bedtime     magnesium oxide  400 mg Oral Daily with lunch     mycophenolate  750 mg Oral BID IS     pantoprazole  40 mg Oral QAM AC     polyethylene glycol  17 g Oral Daily     [START ON 1/18/2023] predniSONE  60 mg Oral Daily    Followed by     [START ON 1/20/2023] predniSONE  40 mg Oral Daily    Followed by     [START ON 1/22/2023] predniSONE  20 mg Oral Daily    Followed by     [START ON 1/29/2023] predniSONE  15 mg Oral Daily    Followed by     [START ON 2/5/2023] predniSONE  10 mg Oral Daily    Followed by     [START ON 2/12/2023] predniSONE  5 mg Oral Daily     senna-docusate  1 tablet Oral BID     sodium bicarbonate  1,300 mg Oral BID     sodium chloride (PF)  10 mL Intracatheter Q8H     sulfamethoxazole-trimethoprim  1 tablet Oral Once per day on Mon Wed Fri     tacrolimus  2 mg Oral BID IS     valGANciclovir  450 mg Oral Once per day on Mon Thu       Physical Exam:     Admit Weight: 68.4 kg (150 lb 12.8 oz)    Current vitals:   BP (!) 135/93 (BP Location: Left arm)   Pulse 69   Temp 97.7  F (36.5  C) (Oral)   Resp 16   Wt 69.6 kg (153 lb 8 oz)   SpO2 100%   BMI 21.18 kg/m      Vital sign ranges:    Temp:  [97.7  F (36.5  C)-98.6  F (37  C)] 97.7  F (36.5  C)  Pulse:  [69-83] 69  Resp:  [16] 16  BP: (108-135)/(63-93) 135/93  SpO2:  [98 %-100 %] 100 %    General Appearance: NAD  Skin: Warm, perfused  Heart: RRR  Lungs: BCTA. NWOB on  None (Room air)  Abdomen: The abdomen is  appropriately tender, ND, soft. Curvilinear incision is present on the RLQ of ABD. TOM drain serosanguinous output  : shaver is present.  Urine is pink, no clots.  Extremities: edema: trace, strength 5/5  Neurologic: Alert and oriented. Tremor: absent.     Data:   CMP  Recent Labs   Lab 01/17/23  0553 01/16/23  2203 01/16/23  1750 01/16/23  1406 01/16/23  0959 01/16/23  0632 01/15/23  1750 01/15/23  1711 01/15/23  0904 01/15/23  0540     --   --   --   --  138   < > 136  --  142   POTASSIUM 4.9 5.0   < > 4.8   < > 5.4*  5.4*   < > 4.7  --  4.1   CHLORIDE 104  --   --   --   --  108*   < >  --   --  107   CO2 20*  --   --   --   --  18*   < >  --   --  19*   *  --   --   --   --  137*   < > 101*   < > 90   BUN 59.1*  --   --   --   --  48.0*   < >  --   --  47.5*   CR 5.03*  --   --   --   --  4.82*   < >  --   --  4.62*   GFRESTIMATED 14*  --   --   --   --  15*   < >  --   --  16*   VISHNU 8.6  --   --   --   --  7.3*   < >  --   --  9.1   ICAW  --   --   --   --   --   --   --  4.5  --   --    MAG 1.8  --   --  1.9  --  1.6*   < >  --   --   --    PHOS 5.3*  --   --   --   --  3.0   < >  --   --   --    ALBUMIN  --  2.7*  --   --   --   --   --   --   --  4.2   BILITOTAL  --  <0.2  --   --   --   --   --   --   --  0.2   ALKPHOS  --  44  --   --   --   --   --   --   --  59   AST  --  121*  --   --   --   --   --   --   --  52*   ALT  --  66*  --   --   --   --   --   --   --  81*    < > = values in this interval not displayed.     CBC  Recent Labs   Lab 01/17/23  0553 01/16/23  2203 01/16/23  0959 01/16/23  0632 01/15/23  1711 01/15/23  0540   HGB 10.1* 10.5*   < > 10.3*   < > 13.1*   WBC 15.6*  --   --  19.4*   < > 7.0   *  --   --  127*   < > 231   A1C  --   --   --   --   --  5.2    < > = values in this interval not displayed.

## 2023-01-17 NOTE — PLAN OF CARE
Vitals: BP (!) 135/93 (BP Location: Left arm)   Pulse 69   Temp 97.7  F (36.5  C) (Oral)   Resp 16   Wt 69.6 kg (153 lb 8 oz)   SpO2 100%   BMI 21.18 kg/m      Endocrine: Glucose 128.  Labs: Stable labs, creatinine 5.0.   Pain: Mild incisional pan.  PRN's: Oxycodone 5 mg X1.  Diet: 2 GM K+ diet, good appetite.  LDA: R TL internal jugular, IVF's stopped, R PIV saline locked, R TOM, shaver.  GI: Passing gas, no BM, is on laxatives.  : Shaver in place, urine is now pink tinged, good output.   Skin: Abdominal incision approximated with dermabond, old PD cath site, dressing clean and dry. R TOM site leaking, dressing changed.  Neuro: Alert and oriented.  Mobility: Up ad charles, ambulating in the  halls.  Education: Medication card reviewed, patient distracted at times. Specialty Pharmacy to see him tomorrow between 10 and noon.  Plan: Renogram at 11 am, continue with plan of care.

## 2023-01-18 ENCOUNTER — TELEPHONE (OUTPATIENT)
Dept: TRANSPLANT | Facility: CLINIC | Age: 38
End: 2023-01-18
Payer: MEDICARE

## 2023-01-18 VITALS
OXYGEN SATURATION: 98 % | RESPIRATION RATE: 16 BRPM | BODY MASS INDEX: 20.87 KG/M2 | DIASTOLIC BLOOD PRESSURE: 84 MMHG | SYSTOLIC BLOOD PRESSURE: 124 MMHG | TEMPERATURE: 98 F | WEIGHT: 151.2 LBS | HEART RATE: 70 BPM

## 2023-01-18 PROBLEM — D72.829 LEUKOCYTOSIS: Status: ACTIVE | Noted: 2020-12-03

## 2023-01-18 PROBLEM — E87.5 HYPERKALEMIA: Status: ACTIVE | Noted: 2023-01-18

## 2023-01-18 PROBLEM — R79.89 ELEVATED LFTS: Status: ACTIVE | Noted: 2023-01-18

## 2023-01-18 PROBLEM — D84.9 IMMUNOSUPPRESSED STATUS (H): Status: ACTIVE | Noted: 2023-01-18

## 2023-01-18 LAB
ALBUMIN SERPL BCG-MCNC: 2.7 G/DL (ref 3.5–5.2)
ALP SERPL-CCNC: 44 U/L (ref 40–129)
ALT SERPL W P-5'-P-CCNC: 59 U/L (ref 10–50)
ANION GAP SERPL CALCULATED.3IONS-SCNC: 14 MMOL/L (ref 7–15)
AST SERPL W P-5'-P-CCNC: 50 U/L (ref 10–50)
BASOPHILS # BLD AUTO: 0 10E3/UL (ref 0–0.2)
BASOPHILS NFR BLD AUTO: 0 %
BILIRUB DIRECT SERPL-MCNC: <0.2 MG/DL (ref 0–0.3)
BILIRUB SERPL-MCNC: <0.2 MG/DL
BUN SERPL-MCNC: 65.1 MG/DL (ref 6–20)
CALCIUM SERPL-MCNC: 8.2 MG/DL (ref 8.6–10)
CELL TYPE ALLO: NORMAL
CELL TYPE AUTO: NORMAL
CHANNELSHIFTALLOB1: -37
CHANNELSHIFTALLOB2: -37
CHANNELSHIFTALLOT1: -22
CHANNELSHIFTALLOT2: -21
CHANNELSHIFTAUTOB1: -50
CHANNELSHIFTAUTOB2: -51
CHANNELSHIFTAUTOT1: -27
CHANNELSHIFTAUTOT2: -28
CHLORIDE SERPL-SCNC: 104 MMOL/L (ref 98–107)
CREAT SERPL-MCNC: 4.27 MG/DL (ref 0.67–1.17)
CROSSMATCHDATEALLO: NORMAL
CROSSMATCHDATEAUTO: NORMAL
DEPRECATED HCO3 PLAS-SCNC: 19 MMOL/L (ref 22–29)
DONOR ALLO: NORMAL
DONOR AUTO: NORMAL
DONORCELLDATE ALLO: NORMAL
DONORCELLDATE AUTO: NORMAL
EOSINOPHIL # BLD AUTO: 0.1 10E3/UL (ref 0–0.7)
EOSINOPHIL NFR BLD AUTO: 1 %
ERYTHROCYTE [DISTWIDTH] IN BLOOD BY AUTOMATED COUNT: 12.6 % (ref 10–15)
G6PD RBC-CCNT: 18 U/G HB
GFR SERPL CREATININE-BSD FRML MDRD: 17 ML/MIN/1.73M2
GLUCOSE BLDC GLUCOMTR-MCNC: 102 MG/DL (ref 70–99)
GLUCOSE BLDC GLUCOMTR-MCNC: 123 MG/DL (ref 70–99)
GLUCOSE BLDC GLUCOMTR-MCNC: 99 MG/DL (ref 70–99)
GLUCOSE SERPL-MCNC: 105 MG/DL (ref 70–99)
HBV DNA SERPL QL NAA+PROBE: NORMAL
HCT VFR BLD AUTO: 29 % (ref 40–53)
HCV RNA SERPL QL NAA+PROBE: NORMAL
HGB BLD-MCNC: 9.3 G/DL (ref 13.3–17.7)
HIV1+2 RNA SERPL QL NAA+PROBE: NORMAL
IMM GRANULOCYTES # BLD: 0.1 10E3/UL
IMM GRANULOCYTES NFR BLD: 1 %
LYMPHOCYTES # BLD AUTO: 0.2 10E3/UL (ref 0.8–5.3)
LYMPHOCYTES NFR BLD AUTO: 2 %
MAGNESIUM SERPL-MCNC: 2.4 MG/DL (ref 1.7–2.3)
MCH RBC QN AUTO: 31.4 PG (ref 26.5–33)
MCHC RBC AUTO-ENTMCNC: 32.1 G/DL (ref 31.5–36.5)
MCV RBC AUTO: 98 FL (ref 78–100)
MONOCYTES # BLD AUTO: 0.4 10E3/UL (ref 0–1.3)
MONOCYTES NFR BLD AUTO: 4 %
NEUTROPHILS # BLD AUTO: 9.5 10E3/UL (ref 1.6–8.3)
NEUTROPHILS NFR BLD AUTO: 92 %
NRBC # BLD AUTO: 0 10E3/UL
NRBC BLD AUTO-RTO: 0 /100
PHOSPHATE SERPL-MCNC: 4 MG/DL (ref 2.5–4.5)
PLATELET # BLD AUTO: 95 10E3/UL (ref 150–450)
POS CUT OFF ALLO B: >93
POS CUT OFF ALLO T: >79
POS CUT OFF AUTO B: >93
POS CUT OFF AUTO T: >79
POTASSIUM SERPL-SCNC: 5.1 MMOL/L (ref 3.4–5.3)
PROT SERPL-MCNC: 4.9 G/DL (ref 6.4–8.3)
RBC # BLD AUTO: 2.96 10E6/UL (ref 4.4–5.9)
RESULT ALLO B1: NORMAL
RESULT ALLO B2: NORMAL
RESULT ALLO T1: NORMAL
RESULT ALLO T2: NORMAL
RESULT AUTO B1: NORMAL
RESULT AUTO B2: NORMAL
RESULT AUTO T1: NORMAL
RESULT AUTO T2: NORMAL
SERUM DATE ALLO B1: NORMAL
SERUM DATE ALLO B2: NORMAL
SERUM DATE ALLO T1: NORMAL
SERUM DATE ALLO T2: NORMAL
SERUM DATE AUTO B1: NORMAL
SERUM DATE AUTO B2: NORMAL
SERUM DATE AUTO T1: NORMAL
SERUM DATE AUTO T2: NORMAL
SODIUM SERPL-SCNC: 137 MMOL/L (ref 136–145)
TACROLIMUS BLD-MCNC: 2.6 UG/L (ref 5–15)
TESTMETHODALLO: NORMAL
TESTMETHODAUTO: NORMAL
TME LAST DOSE: ABNORMAL H
TME LAST DOSE: ABNORMAL H
TREATMENT ALLO B1: NORMAL
TREATMENT ALLO B2: NORMAL
TREATMENT ALLO T1: NORMAL
TREATMENT ALLO T2: NORMAL
TREATMENT AUTO B1: NORMAL
TREATMENT AUTO B2: NORMAL
TREATMENT AUTO T1: NORMAL
TREATMENT AUTO T2: NORMAL
WBC # BLD AUTO: 10.2 10E3/UL (ref 4–11)
ZZZCOMMENT ALLOB2: NORMAL

## 2023-01-18 PROCEDURE — 36592 COLLECT BLOOD FROM PICC: CPT | Performed by: SURGERY

## 2023-01-18 PROCEDURE — 84100 ASSAY OF PHOSPHORUS: CPT | Performed by: SURGERY

## 2023-01-18 PROCEDURE — 83735 ASSAY OF MAGNESIUM: CPT | Performed by: SURGERY

## 2023-01-18 PROCEDURE — 82248 BILIRUBIN DIRECT: CPT | Performed by: PHYSICIAN ASSISTANT

## 2023-01-18 PROCEDURE — 85025 COMPLETE CBC W/AUTO DIFF WBC: CPT | Performed by: SURGERY

## 2023-01-18 PROCEDURE — 250N000013 HC RX MED GY IP 250 OP 250 PS 637

## 2023-01-18 PROCEDURE — 250N000012 HC RX MED GY IP 250 OP 636 PS 637: Performed by: PHYSICIAN ASSISTANT

## 2023-01-18 PROCEDURE — 80197 ASSAY OF TACROLIMUS: CPT | Performed by: SURGERY

## 2023-01-18 PROCEDURE — 250N000013 HC RX MED GY IP 250 OP 250 PS 637: Performed by: SURGERY

## 2023-01-18 PROCEDURE — 250N000013 HC RX MED GY IP 250 OP 250 PS 637: Performed by: PHYSICIAN ASSISTANT

## 2023-01-18 PROCEDURE — 99232 SBSQ HOSP IP/OBS MODERATE 35: CPT | Mod: GC | Performed by: INTERNAL MEDICINE

## 2023-01-18 PROCEDURE — 250N000012 HC RX MED GY IP 250 OP 636 PS 637

## 2023-01-18 PROCEDURE — 250N000012 HC RX MED GY IP 250 OP 636 PS 637: Performed by: SURGERY

## 2023-01-18 PROCEDURE — 80053 COMPREHEN METABOLIC PANEL: CPT | Performed by: SURGERY

## 2023-01-18 RX ORDER — PREDNISONE 10 MG/1
TABLET ORAL
Qty: 49 TABLET | Refills: 0 | Status: SHIPPED | OUTPATIENT
Start: 2023-01-19 | End: 2023-02-19

## 2023-01-18 RX ORDER — OXYCODONE HYDROCHLORIDE 5 MG/1
5 TABLET ORAL EVERY 4 HOURS PRN
Qty: 20 TABLET | Refills: 0 | Status: SHIPPED | OUTPATIENT
Start: 2023-01-18 | End: 2023-01-31

## 2023-01-18 RX ORDER — ACETAMINOPHEN 325 MG/1
650 TABLET ORAL EVERY 4 HOURS PRN
Qty: 100 TABLET | Refills: 3 | COMMUNITY
Start: 2023-01-18 | End: 2023-02-28

## 2023-01-18 RX ORDER — TACROLIMUS 1 MG/1
4 CAPSULE ORAL
Status: DISCONTINUED | OUTPATIENT
Start: 2023-01-18 | End: 2023-01-18 | Stop reason: HOSPADM

## 2023-01-18 RX ORDER — TACROLIMUS 1 MG/1
4 CAPSULE ORAL 2 TIMES DAILY
Qty: 240 CAPSULE | Refills: 11 | Status: SHIPPED | OUTPATIENT
Start: 2023-01-18 | End: 2023-01-19

## 2023-01-18 RX ORDER — VALGANCICLOVIR 450 MG/1
450 TABLET, FILM COATED ORAL
Qty: 60 TABLET | Refills: 5 | Status: SHIPPED | OUTPATIENT
Start: 2023-01-19 | End: 2023-02-28

## 2023-01-18 RX ORDER — ONDANSETRON 4 MG/1
4 TABLET, ORALLY DISINTEGRATING ORAL EVERY 6 HOURS PRN
Qty: 10 TABLET | Refills: 1 | Status: SHIPPED | OUTPATIENT
Start: 2023-01-18 | End: 2023-01-31

## 2023-01-18 RX ORDER — SODIUM BICARBONATE 650 MG/1
650 TABLET ORAL 2 TIMES DAILY
Qty: 30 TABLET | Refills: 1 | Status: SHIPPED | OUTPATIENT
Start: 2023-01-18 | End: 2023-02-02

## 2023-01-18 RX ORDER — POLYETHYLENE GLYCOL 3350 17 G/17G
17 POWDER, FOR SOLUTION ORAL DAILY
Qty: 510 G | Refills: 1 | Status: SHIPPED | OUTPATIENT
Start: 2023-01-18 | End: 2023-01-23

## 2023-01-18 RX ORDER — AMOXICILLIN 250 MG
1-2 CAPSULE ORAL 2 TIMES DAILY
Qty: 60 TABLET | Refills: 1 | Status: SHIPPED | OUTPATIENT
Start: 2023-01-18 | End: 2023-01-31

## 2023-01-18 RX ORDER — SULFAMETHOXAZOLE AND TRIMETHOPRIM 400; 80 MG/1; MG/1
1 TABLET ORAL
Qty: 30 TABLET | Refills: 11 | Status: SHIPPED | OUTPATIENT
Start: 2023-01-20 | End: 2023-01-20

## 2023-01-18 RX ORDER — METHOCARBAMOL 750 MG/1
750 TABLET, FILM COATED ORAL EVERY 6 HOURS PRN
Qty: 20 TABLET | Refills: 0 | Status: SHIPPED | OUTPATIENT
Start: 2023-01-18 | End: 2023-01-31

## 2023-01-18 RX ORDER — TACROLIMUS 1 MG/1
2 CAPSULE ORAL ONCE
Status: COMPLETED | OUTPATIENT
Start: 2023-01-18 | End: 2023-01-18

## 2023-01-18 RX ORDER — MYCOPHENOLATE MOFETIL 250 MG/1
750 CAPSULE ORAL 2 TIMES DAILY
Qty: 180 CAPSULE | Refills: 11 | Status: ON HOLD | OUTPATIENT
Start: 2023-01-18 | End: 2023-03-24

## 2023-01-18 RX ORDER — PANTOPRAZOLE SODIUM 40 MG/1
40 TABLET, DELAYED RELEASE ORAL
Qty: 30 TABLET | Refills: 2 | Status: ON HOLD | OUTPATIENT
Start: 2023-01-19 | End: 2023-07-06

## 2023-01-18 RX ADMIN — PREDNISONE 60 MG: 20 TABLET ORAL at 08:30

## 2023-01-18 RX ADMIN — SERTRALINE HYDROCHLORIDE 50 MG: 50 TABLET, FILM COATED ORAL at 08:30

## 2023-01-18 RX ADMIN — SENNOSIDES AND DOCUSATE SODIUM 1 TABLET: 8.6; 5 TABLET ORAL at 08:30

## 2023-01-18 RX ADMIN — ACETAMINOPHEN 975 MG: 325 TABLET, FILM COATED ORAL at 10:50

## 2023-01-18 RX ADMIN — ACETAMINOPHEN 975 MG: 325 TABLET, FILM COATED ORAL at 01:38

## 2023-01-18 RX ADMIN — OXYCODONE HYDROCHLORIDE 5 MG: 5 TABLET ORAL at 01:38

## 2023-01-18 RX ADMIN — SULFAMETHOXAZOLE AND TRIMETHOPRIM 1 TABLET: 400; 80 TABLET ORAL at 08:30

## 2023-01-18 RX ADMIN — TACROLIMUS 2 MG: 1 CAPSULE ORAL at 08:30

## 2023-01-18 RX ADMIN — SODIUM BICARBONATE 650 MG: 650 TABLET ORAL at 08:30

## 2023-01-18 RX ADMIN — POLYETHYLENE GLYCOL 3350 17 G: 17 POWDER, FOR SOLUTION ORAL at 08:30

## 2023-01-18 RX ADMIN — TACROLIMUS 2 MG: 1 CAPSULE ORAL at 10:50

## 2023-01-18 RX ADMIN — PANTOPRAZOLE SODIUM 40 MG: 40 TABLET, DELAYED RELEASE ORAL at 08:30

## 2023-01-18 RX ADMIN — MYCOPHENOLATE MOFETIL 750 MG: 250 CAPSULE ORAL at 08:31

## 2023-01-18 ASSESSMENT — ACTIVITIES OF DAILY LIVING (ADL)
ADLS_ACUITY_SCORE: 22
ADLS_ACUITY_SCORE: 22
DOING_ERRANDS_INDEPENDENTLY_DIFFICULTY: NO
ADLS_ACUITY_SCORE: 22
ADLS_ACUITY_SCORE: 22
WALKING_OR_CLIMBING_STAIRS_DIFFICULTY: NO
DRESSING/BATHING_DIFFICULTY: NO
ADLS_ACUITY_SCORE: 18
ADLS_ACUITY_SCORE: 22
TOILETING_ISSUES: NO
DEPENDENT_IADLS:: INDEPENDENT
FALL_HISTORY_WITHIN_LAST_SIX_MONTHS: NO
DIFFICULTY_COMMUNICATING: NO
ADLS_ACUITY_SCORE: 18
WEAR_GLASSES_OR_BLIND: NO
CONCENTRATING,_REMEMBERING_OR_MAKING_DECISIONS_DIFFICULTY: NO
ADLS_ACUITY_SCORE: 18
ADLS_ACUITY_SCORE: 18
HEARING_DIFFICULTY_OR_DEAF: NO
CHANGE_IN_FUNCTIONAL_STATUS_SINCE_ONSET_OF_CURRENT_ILLNESS/INJURY: NO
DIFFICULTY_EATING/SWALLOWING: NO

## 2023-01-18 NOTE — PROGRESS NOTES
[facility]   Transplant Nephrology Progress Note  Date of Admission:  1/15/2023  Today's Date: 01/18/2023    Recommendations:    - slow graft function, had residual renal function preTx. Renogram suggests ATN and 74% function of kidney transplant. Cr improved today.    -due for 2nd dose basiliximab 1/20  -continue bicarb 650 mg bid  -monitor LFT, check G6PD . avoid statin and use alternative to bactrim (dapsone if nl G6PD and stable Hb)  -ECHO ~4- 6 weeks post transplant as he is currently off ACEI/b-blocker and has significant hx of cardiomyopathy (non ischemic)    Assessment & Plan   # DDKT: Trend up slow graft function, had residual renal function preTx (previously on PD ~2 L/urine). Suspect ischemic ATN (DCD, prolonged CIT)   - Baseline Creatinine: ~ TBD   - Proteinuria: Not checked post transplant   - Date DSA Last Checked: Not Known      Latest DSA: No   - BK Viremia: Not checked recently   - Kidney Tx Biopsy: No      # Immunosuppression: Tacrolimus immediate release (goal 8-10) and Mycophenolate mofetil (dose 750 mg every 12 hours)   - Changes: No   -Induction; intermediate risk (PRA-50%)    # Infection Prophylaxis:   - PJP: Sulfa/TMP (Bactrim)  - CMV: Valacyclovir (Valtrex)    # Hypertension: Controlled;  Goal BP: < 150/90   - Volume status: Euvolemic  EDW ~ 70k   - Changes: No      # Anemia in Chronic Renal Disease: Hgb: Stable      SARA: No   - Iron studies: Not checked recently    # Mineral Bone Disorder:   - Secondary renal hyperparathyroidism; PTH level: Not checked recently        On treatment: None  - Vitamin D; level: Not checked recently        On supplement: No  - Calcium; level: Normal        On supplement: No  - Phosphorus; level: High        On supplement: No    # Electrolytes:   - Potassium; level: Normal        On supplement: No  - Magnesium; level: Normal        On supplement: No  - Bicarbonate; level: Low        On supplement: No    #  "Transaminitis: improving              - hx of alcohol abuse              - hepatitis serologies negative              - denies using statin    -AST/ALT improving      # Hx of cardiomyopathy:              - attributed to methamphetamine use in 2020 (ef:15%)              - echo: ef 55-60% Sep 2023              - stress echo neg Feb 2021              - previously on ACEI+b-blocker \"lisinopril & coreg\", currently on hold as BP low      # Hx of Polysubstance use:              - hx of methamphetamine use              - attended AA and underwent CD eval              - sober since 3/2021      # Hx of urethral stricture:              - shaver per tx surgery/urology    # Transplant History:  Etiology of Kidney Failure: Unknown etiology  Tx: DDKT  Transplant: 1/15/2023 (Kidney)  Significant changes in immunosuppression: None  Significant transplant-related complications: None    Recommendations were communicated to the primary team via this note.    Seen and discussed with Dr. Sharon Cueto MD   Pager: [phone]    Interval History   Mr. Valencia cr is trending down. US; elevated renal artery peak systolic velocity and edema. Renogram shows cortical tracer retention.     -no nausea or vomiting  -no LE edema  -no fever, sweat, or chills    Review of Systems   4 point ROS was obtained and negative except as noted in the Interval History.    MEDICATIONS:    [Held by provider] aspirin  81 mg Oral Daily     [Held by provider] atorvastatin  10 mg Oral Daily     [START ON 1/20/2023] basiliximab (SIMULECT) infusion  20 mg Intravenous Once     calcium carbonate  1,000 mg Oral At Bedtime     [Held by provider] magnesium oxide  400 mg Oral Daily with lunch     mycophenolate  750 mg Oral BID IS     pantoprazole  40 mg Oral QAM AC     polyethylene glycol  17 g Oral Daily     predniSONE  60 mg Oral Daily    Followed by     [START ON 1/20/2023] predniSONE  40 mg Oral Daily    Followed by     [START ON 1/22/2023] predniSONE  20 mg " Oral Daily    Followed by     [START ON 2023] predniSONE  15 mg Oral Daily    Followed by     [START ON 2023] predniSONE  10 mg Oral Daily    Followed by     [START ON 2023] predniSONE  5 mg Oral Daily     senna-docusate  1 tablet Oral BID     sertraline  50 mg Oral Daily     sodium bicarbonate  650 mg Oral BID     sodium chloride (PF)  10 mL Intracatheter Q8H     sulfamethoxazole-trimethoprim  1 tablet Oral Once per day on      tacrolimus  4 mg Oral BID IS     valGANciclovir  450 mg Oral Once per day on          Physical Exam   Temp  Av.5  F (36.9  C)  Min: 97.7  F (36.5  C)  Max: 101  F (38.3  C)      Pulse  Av.7  Min: 53  Max: 118 Resp  Avg: 15.3  Min: 5  Max: 27  SpO2  Av.5 %  Min: 96 %  Max: 100 %    CVP (mmHg): 9 mmHgBP 124/84 (BP Location: Left arm)   Pulse 70   Temp 98  F (36.7  C) (Oral)   Resp 16   Wt 68.6 kg (151 lb 3.2 oz)   SpO2 98%   BMI 20.87 kg/m     Date 23 07 - 23 0659   Shift 7187-6770 9618-0858 4145-9419 24 Hour Total   INTAKE   P.O. 500   500   Shift Total(mL/kg) 500(7.18)   500(7.18)   OUTPUT   Urine 1100   1100   Drains 100   100   Shift Total(mL/kg) 1200(17.23)   1200(17.23)   Weight (kg) 69.63 69.63 69.63 69.63      Admit Weight: 68.4 kg (150 lb 12.8 oz)     GENERAL APPEARANCE: alert and no distress  HENT: mouth without ulcers or lesions  LYMPHATICS: no cervical or supraclavicular nodes  RESP: lungs clear to auscultation - no rales, rhonchi or wheezes  CV: regular rhythm, normal rate, no rub, no murmur  EDEMA: no LE edema bilaterally  ABDOMEN: soft, nondistended, nontender, bowel sounds normal  MS: extremities normal - no gross deformities noted, no evidence of inflammation in joints, no muscle tenderness  SKIN: no rash    Data   All labs reviewed by me.  CMP  Recent Labs   Lab 23  1314 23  1053 23  0754 23  0606 23  1637 23  0553 23  2203 23  1750 23  1406 23  0959  01/16/23  0632 01/15/23  2210 01/15/23  1750 01/15/23  0904 01/15/23  0540   NA  --   --   --  137  --  138  --   --   --   --  138  --  138   < > 142   POTASSIUM  --   --   --  5.1  --  4.9 5.0 4.6 4.8   < > 5.4*  5.4*   < > 4.8   < > 4.1   CHLORIDE  --   --   --  104  --  104  --   --   --   --  108*  --  106  --  107   CO2  --   --   --  19*  --  20*  --   --   --   --  18*  --  18*  --  19*   ANIONGAP  --   --   --  14  --  14  --   --   --   --  12  --  14  --  16*   * 99 102* 105*   < > 128*  --   --   --   --  137*  --  106*   < > 90   BUN  --   --   --  65.1*  --  59.1*  --   --   --   --  48.0*  --  44.2*  --  47.5*   CR  --   --   --  4.27*  --  5.03*  --   --   --   --  4.82*  --  4.54*  --  4.62*   GFRESTIMATED  --   --   --  17*  --  14*  --   --   --   --  15*  --  16*  --  16*   VISHNU  --   --   --  8.2*  --  8.6  --   --   --   --  7.3*  --  8.3*  --  9.1   MAG  --   --   --  2.4*  --  1.8  --   --  1.9  --  1.6*  --  1.5*   < >  --    PHOS  --   --   --  4.0  --  5.3*  --   --   --   --  3.0  --  3.5  --   --    PROTTOTAL  --   --   --  4.9*  --  5.0* 5.1*  --   --   --   --   --   --   --  7.1   ALBUMIN  --   --   --  2.7*  --  2.8* 2.7*  --   --   --   --   --   --   --  4.2   BILITOTAL  --   --   --  <0.2  --  <0.2 <0.2  --   --   --   --   --   --   --  0.2   ALKPHOS  --   --   --  44  --  45 44  --   --   --   --   --   --   --  59   AST  --   --   --  50  --  94* 121*  --   --   --   --   --   --   --  52*   ALT  --   --   --  59*  --  68* 66*  --   --   --   --   --   --   --  81*    < > = values in this interval not displayed.     CBC  Recent Labs   Lab 01/18/23  0606 01/17/23  0553 01/16/23 2203 01/16/23  1750 01/16/23  0959 01/16/23  0632 01/15/23  2210 01/15/23  1750   HGB 9.3* 10.1* 10.5* 11.1*   < > 10.3*   < > 10.0*   WBC 10.2 15.6*  --   --   --  19.4*  --  3.9*   RBC 2.96* 3.18*  --   --   --  3.30*  --  3.14*   HCT 29.0* 30.6*  --   --   --  31.8*  --  30.2*   MCV 98 96  --    --   --  96  --  96   MCH 31.4 31.8  --   --   --  31.2  --  31.8   MCHC 32.1 33.0  --   --   --  32.4  --  33.1   RDW 12.6 12.6  --   --   --  12.5  --  12.6   PLT 95* 106*  --   --   --  127*  --  116*    < > = values in this interval not displayed.     INR  Recent Labs   Lab 01/15/23  0540   INR 0.90   PTT 28     ABG  Recent Labs   Lab 01/15/23  1711   O2PER 45.0      Urine Studies  Recent Labs   Lab Test 01/15/23  0637 08/18/20  1312 08/14/20  1415   COLOR Light Yellow Yellow Yellow   APPEARANCE Clear Cloudy Slightly Cloudy   URINEGLC Negative 50* 50*   URINEBILI Negative Negative Negative   URINEKETONE Negative Negative Negative   SG 1.012 1.012 1.011   UBLD Negative Small* Negative   URINEPH 7.0 7.0 7.0   PROTEIN 50* 100* 30*   NITRITE Negative Negative Negative   LEUKEST Negative Large* Large*   RBCU <1 9* 4*   WBCU 1 >182* 59*     No lab results found.  PTH  Recent Labs   Lab Test 04/18/22  0851   PTHI 315*     Iron Studies  No lab results found.    IMAGING:  All imaging studies reviewed by me.    This patient has been seen and evaluated by me, Georgie Herrera MD.  I have reviewed the note and agree with plan of care as documented by the fellow.

## 2023-01-18 NOTE — PHARMACY-TRANSPLANT NOTE
Solid Organ Transplant Recipient Prior to Discharge Note    37 year old male s/p Kidney transplant on 01/15/2023.    Planned immunosuppression regimen per Kidney transplant protocol:  INDUCTION: Intermediate risk, PRA 50  1/15/23: Thymoglobulin 2 mg/kg x1, methylprednisolone 500 mg x1  1/16/23: Basiliximab 20 mg IV x1, methylprednisolone 250 mg x1  1/17/23: methylprednisolone 100 mg x1  1/18/23: Steroid taper  1/20/23: Basiliximab 20 mg IV x1     MAINTENANCE:   - Mycophenolate 750 mg BID  - Tacrolimus with goal trough levels of 8-10 mcg/L for first 6 months post-transplant. Current dose 4 mg twice daily (increased 1/18). Most recent level 1/18 was 2.6 (12.25 hr).      Opportunistic pathogen prophylaxis includes:  - PJP: Bactrim was held during admission due to hyperkalemia. G6PD pending for possible Dapsone treatment.   - CMV D-/R+: Valganciclovir for 6 months duration tentatively    Pharmacy has monitored for medication interactions and immunosuppression levels in conjunction with the multidisciplinary team. In anticipation for discharge, medication therapy needs have been addressed daily throughout the current admission via multidisciplinary rounds and/or discussions, order verification, daily clinical pharmacy review, and communication with prescribers  Saad Vergara, PharmD  PGY1 Pharmacist Resident         Depth Of Biopsy: dermis Hide Biopsy Depth?: No Cryotherapy Text: The wound bed was treated with cryotherapy after the biopsy was performed. Wound Care: Bacitracin Detail Level: Detailed Dressing: bandage Biopsy Method: Dermablade Information: Selecting Yes will display possible errors in your note based on the variables you have selected. This validation is only offered as a suggestion for you. PLEASE NOTE THAT THE VALIDATION TEXT WILL BE REMOVED WHEN YOU FINALIZE YOUR NOTE. IF YOU WANT TO FAX A PRELIMINARY NOTE YOU WILL NEED TO TOGGLE THIS TO 'NO' IF YOU DO NOT WANT IT IN YOUR FAXED NOTE. Curettage Text: The wound bed was treated with curettage after the biopsy was performed. Biopsy Type: H and E Size Of Lesion In Cm: 0 Anesthesia Type: 1% lidocaine with epinephrine Electrodesiccation And Curettage Text: The wound bed was treated with electrodesiccation and curettage after the biopsy was performed. Anesthesia Volume In Cc (Will Not Render If 0): 0.5 Post-Care Instructions: I reviewed with the patient in detail post-care instructions. Patient is to keep the biopsy site dry overnight, and then apply bacitracin twice daily until healed. Patient may apply hydrogen peroxide soaks to remove any crusting. Notification Instructions: Patient will be notified of biopsy results. However, patient instructed to call the office if not contacted within 2 weeks. Silver Nitrate Text: The wound bed was treated with silver nitrate after the biopsy was performed. Type Of Destruction Used: Curettage Consent: Written consent was obtained and risks were reviewed including but not limited to scarring, infection, bleeding, scabbing, incomplete removal, nerve damage and allergy to anesthesia. Billing Type: Third-Party Bill Electrodesiccation Text: The wound bed was treated with electrodesiccation after the biopsy was performed. Was A Bandage Applied: Yes Hemostasis: Aluminum Chloride

## 2023-01-18 NOTE — PLAN OF CARE
VS: stable, on room air.    BG: achs  Labs: pending collection  Pain/Nausea: scheduled tylenol and oxycodone 5mg X 1 with good relief.   Denies nausea.    Diet: 2 gm K diet  LDA: I J saline  Locked.  TOM with 50cc out.  GI: no BM since OR  : Taylor with good urine output.    Skin: incisions with liquid bandage.    Mobility: up ad charles  Plan: med card and lab book updated.  Plan for specialty pharmacy this am.  Pt wants to shower today.

## 2023-01-18 NOTE — TELEPHONE ENCOUNTER
A pharmacist spent 45 minutes providing medication teaching with Chip Fowler for discharge with a focus on new medications/dose changes.  The discharge medication list was reviewed with the patient/family and the following points were discussed, as applicable: Name, description, purpose, dose/strength, duration of medications, common side effects, food/medications to avoid, action to be taken if dose is missed and how to obtain refills.  The patient will be responsible for managing medications. Additionally, the following transplant related education was covered: Purpose of medication card, Timing of medications and day of lab draw considerations , Emesis or missed dose, Prescription Insurance  and Discharge process for receiving meds   Patient will  transplant supplies including 7 day pill organizer, thermometer, and BP monitor at the discharge pharmacy along with medications.  Patient chooses to receive medications from [facility] specialty pharmacy.   Clinical Pharmacy Consult:                                                      Transplant Specific:   Date of Transplant: 01/18/2023   Type of Transplant: kidney  First Transplant: yes  History of rejection: no    Immunosuppression Regimen   TAC 2 mg qAM & 2 mg qPM and  mg qAM & 750 mg qPM; Prednisone taper  Patient specific goal: Tac 8 to 10   Most recent level: 2.6 , date 01/18  Immunosuppressant Levels:  Subtherapeutic  Pt adherent to lab draws: yes  Scr:   Lab Results   Component Value Date    CR 4.27 01/18/2023    CR 6.20 12/03/2020     Side effects: no side effects    Prophylactic Medications  Antibacterial:  Bactrim three times weekly   Scheduled Discontinue Date: Indefinitely    Antifungal: Not needed thus far  Scheduled Discontinue Date: N/A    Antiviral: CrCl 10 to 24 mL/minute: Valcyte 450 mg twice weekly   Scheduled Discontinue Date: 6 months    Acid Reducer: Protonix (pantoprazole) 40 mg once daily  Scheduled Reviewed Date: review in  clinic    Thrombosis Prevention: Aspirin 81 mg once daily  Scheduled Discontinue Date: currently on hold    Blood Pressure Management  Frequency of home Blood Pressure checks: once daily  Most recent home BP: 124/84  Patient Blood pressure goal: <150/90  Patient blood pressure at goal:  yes  Hospitalizations/ER visits since last assessment: 0      Med rec/DUR performed: yes  Med Rec Discrepancies: no    Reminders:    1. Bring to first clinic appt: med box, med card, bp monitor, all medications being taken, and lab book.  2.   MTM pharmacist visit on first clinic appt and if ok, again in 3 to 4 months during follow up appt.  3.   Avoid Grapefruit and Grapefruit juice.   4.   Avoid herbal supplements. If wish to take other medications or supplements, call your coordinator.   5.   Keep lab appts.   6.   Can use apps on phone like txtr to help manage medication lists and reminders.   7.   Make sure you are protecting your skin by wearing long sleeves and applying sunscreen to exposed skin, for any significant time in the sun.     Transplant Coordinator is Veronica Harris, Pharm.D.  [facility]  [phone]

## 2023-01-18 NOTE — DISCHARGE SUMMARY
[facility]    Discharge Summary  Transplant Surgery    Date of Admission:  1/15/2023  Date of Discharge:  2023  Discharging Provider: Jen Posey PA-C/Partha Staton MD    Discharge Diagnoses   Principal Problem:    Transplant, organ  Active Problems:    Essential hypertension    Leukocytosis    Kidney replaced by transplant    Immunosuppressed status (H)    Elevated LFTs    Hyperkalemia      History of Present Illness   Chip Fowler is an 37 year old male who has a past medical history of Bladder stones, Cardiomyopathy (H), ESRD (end stage renal disease) on dialysis (H), Hypertension, Migraines, Polysubstance abuse (H), Solitary kidney, congenital, Urethral stone, and Urethral stricture. He is now s/p  donor kidney transplant with Dr. Tez Emerson.    Hospital Course   Graft Function:  Kidney: Cr trending up slowly to 5.0 on POD 2, then decreased to 4.3 on POD 3. (from 4.62 preoperatively). UOP 3.4L over the last 24 hours. Patient did make urine prior to transplant.  - Potassium 5.1 on discharge, continue low K diet on discharge  - Renogram to differentiate between native kidney and transplant kidney function with ATN and 74% of function from transplant kidney      Imagin23: Post-op US with patent doppler. Renal artery, renal vein, iliac artery, and iliac vein are all patent and non-stenotic with velocities <200cm/s. [place] Artery RI of 0.65, 0.66, 0.67.   23: POD#1 US with patent doppler, elevated RA peak systolic velocity at the anastomosis (current: 396  cm/sec, prior: 129 cm/sec) may be related to post op edema. No hydronephrosis or peritransplant fluid collection     Immunosuppression management: PRA 2023: 50 - Intermediate Risk  Thymoglobulin: 2mg/kg IVx1 given intraoperatively  Basiliximab: 20mg IV on POD #1 and POD #5  Steroids: Methylprednisolone 500mg given intraoperatively on POD #0, 250mg on POD #1 and 100mg on  POD#2.  - Prednisone taper starting on POD #3: 60mg QD x 2d, 40mg QD x2d, 20mg QD x7d, 15mg QD x7d, 10mg QD 7d, 5mg QD x7d.  MMF: 1000mg Intraop then 750mg BID  Tacrolimus: 4mg PO BID. Goal level 8-10. 1/18 level 2.6 (12 hour trough), dose increased from 2 to 4 mg BID at that time.      Hematology:  Anemia of chronic disease: HGB 9.3 on the day of discharge, stable.      Neurology:  Pain management: APAP Q8H + Q4H PRN, Oxycodone 5-10mg PRN  PTA sertraline restarted     Cardiac: Hx HTN/cardiomyopathy  - SBP 100s-130s. PTA lisinopril and carvedilol not restarted. If need to restart outpatient would plan for Coreg.   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 Renal recommending repeat ECHO ~ 6 weeks post transplant.   - Atorvastatin 10mg daily would ideally be started, hold d/t AST/ALT elevation.     Respiratory: Stable on room air  - Encourage good pulmonary hygiene: IS use Q1H, Deep breathing and coughing     GI/Nutrition:  Diet: Low potassium diet  Bowel regimen: Senna BID, PEG QD, MoM daily with lunch  - Ondansetron PRN for nausea  Transaminitis: AST decreased to 50 and ALT decreased to 59 on discharge. Will hold statin. Hepatitis serologies negative. Plan to follow up with PCP on discharge and obtain liver US.      Endocrine:   Mild hyperglycemia 2/2 steroids: Was on sliding scale insulin prn, not required on discharge.      Fluid/Electrolytes:   Hyperkalemia: K 5.1 on discharge, continue to hold Bactrim and low K diet until normalized.      : Shaver in place d/t new surgical anastomosis. Placed by urology due to history of urethroplasty. Plan to DC on POC #7. Check drain Cr prior to pulling shaver. If Shaver needs to be replaced urology should be called.      Infectious disease: Afebrile.   Leukocytosis: 2/2 steroids, normalized on discharge.      Prophylaxis:   - Fall  - GI (Pantoprazole during steroid use)  - Viral (Valganciclovir x 6 months due to CMV R-/D+). EBV R+/D+.   - Pneumocystis (TMP-SMX: held  for hyperkalemia, G6PD pending)     Significant Results and Procedures   Procedure date:  01/15/23    Preoperative diagnosis:  End Stage renal failure due to hypertension    Postoperative diagnosis:  Same    Procedure:  1. Right kidney transplant,  Donation after Circulatory Death , Right  iliac fossa, with venous reconstruction. A J-J ureteral stent was placed.  2. Kidney allograft preparation on Back Table  3. Peritoneal dialysis catheter removal    Surgeon:  KIMO HAY    Fellow/Assistant:  Niki Santos MD- resident. Dr. Santos participated in all aspects of the procedure including bench reconstruction, exposure, anastomosis, and closure.     Anesthesia:  General    Specimen:  None    Drains:  King-Solano drain    Urine output:  800 mls    Estimated blood loss:  50    Fluids administered:           Pending Results   These results will be followed up by transplant coordinator  Unresulted Labs Ordered in the Past 30 Days of this Admission     Date and Time Order Name Status Description    1/17/2023 10:56 AM Glucose 6 phosphate dehydrogenase In process     1/15/2023  5:16 AM HBV HCV HIV by MACARIO In process     1/15/2023  5:16 AM BK Virus IgG Antibody In process           Code Status   Full    Primary Care Physician   Physician No Ref-Primary    Physical Exam   Temp: 98  F (36.7  C) Temp src: Oral BP: 124/84 Pulse: 70   Resp: 16 SpO2: 98 % O2 Device: None (Room air)    Vitals:    01/16/23 1128 01/17/23 0538 01/18/23 0558   Weight: 70.4 kg (155 lb 3.3 oz) 69.6 kg (153 lb 8 oz) 68.6 kg (151 lb 3.2 oz)     Vital Signs with Ranges  Temp:  [97.8  F (36.6  C)-98.1  F (36.7  C)] 98  F (36.7  C)  Pulse:  [64-74] 70  Resp:  [16] 16  BP: (119-131)/(75-92) 124/84  SpO2:  [95 %-100 %] 98 %  I/O last 3 completed shifts:  In: 2060 [P.O.:2060]  Out: 3550 [Urine:3350; Drains:200]    General Appearance: NAD  Skin: Warm, perfused  Heart: RRR  Lungs: NWOB on  None (Room air)  Abdomen: The abdomen is  appropriately tender, ND, soft. Curvilinear incision is present on the RLQ of ABD. TOM drain serosanguinous output  : shaver is present.  Urine is pink, no clots.  Extremities: edema: trace  Neurologic: Alert and oriented. Tremor: absent.     Time Spent on this Encounter   I, Jen Posey PA-C, personally saw the patient today and spent greater than 30 minutes discharging this patient.    Discharge Disposition   Discharged to home  Condition at discharge: Stable    Consultations This Hospital Stay   NEPHROLOGY KIDNEY/PANCREAS TRANSPLANT ADULT IP CONSULT  NEPHROLOGY KIDNEY/PANCREAS TRANSPLANT ADULT IP CONSULT  NURSING TO CONSULT FOR VASCULAR ACCESS CARE IP CONSULT  UROLOGY IP CONSULT  SOT MEDICATION HISTORY IP PHARMACY CONSULT  SOCIAL WORK IP CONSULT  PHARMACY IP CONSULT  NUTRITION SERVICES ADULT IP CONSULT  NEPHROLOGY KIDNEY/PANCREAS TRANSPLANT ADULT IP CONSULT  CARE MANAGEMENT / SOCIAL WORK IP CONSULT    Discharge Orders       Home Care Referral      Reason for your hospital stay     donor kidney transplant with ureteral stent on 1/15/23     Activity    Walk at least four times a day, lift no greater than 10 pounds for 6-8 weeks from the time of surgery.  No driving while taking narcotics or 3 weeks after surgery.     [facility] Follow-up and recommended labs and tests    [facility] FOLLOW UP:     1. [facility]: Over the next 2 days you will be seen in the [facility] (ph. [phone], option 3). Your labs will be drawn at the beginning of your appointment. DO NOT take your medications prior to having labs drawn. Please bring all your medications with you from home to take after labs are drawn.     -Basiliximab infusion on 23 in [facility].  -Shaver will be removed on POD 7 (23).    2. Follow up with Dr. Tez Emerson in Transplant Clinic in 1-2 weeks.     3. Follow up with Transplant Nephrologist as scheduled.     4.  Ureteral stent removal in  4-6 weeks, to be scheduled by Transplant Coordinator. If a  does not contact you for this, please contact your transplant coordinator.     5. Follow up with your primary care provider in ~8 weeks. Patient to schedule.     Remember to always bring an updated medication list to all appointments.        Call your Transplant Coordinator ([phone]) with questions about Transplant Center appointment scheduling.     LABS:     CBC, BMP, magnesium, phosphorus, tacrolimus level to be drawn daily while in [facility], then every Monday and Thursday by home health care nurse if arranged, or at an outpatient lab.     Monitor and record    blood pressure daily  weight every day     When to contact your care team    WHEN TO CONTACT YOUR  COORDINATOR:     Transplant Coordinator: Veronica Enrikemejiamercedes, phone: [phone]    Notify your coordinator if you have pain over your kidney, increased redness or drainage from your incision, fever greater than 100F, decreased urine output or new or increased amount of blood in urine.     Notify your coordinator immediately if you are ever unable to take your immunosuppressive medications for any reason.     If you have URGENT concerns after office hours, please call the hospital switchboard at [phone] and ask to have the organ transplant nurse on-call paged. If you have a life-threatening emergency, go to the nearest emergency room.     Wound care and dressings    Wash incision daily with soap and water. Do not soak or scrub.     Tubes and drains    You are going home with the following tubes or drains:  1.Shaver catheter, drain to gravity. Plan to remove in clinic on POD 7.  2.Surgical drain, drain to bulb suction. Plan to remove in clinic POD 8, continue x 1 day after shaver removed to monitor for urine leak.     Reason for your hospital stay    Kidney transplant.     Activity    Your activity upon discharge: Walk at least four times a day, lift no greater than 10 pounds for 6-8 weeks from  the time of surgery.  No driving while taking narcotics or 3 weeks after surgery.     Follow Up and recommended labs and tests    [facility] FOLLOW UP:     1. [facility]: Over the next 2 days you will be seen in the [facility] (ph. [phone], option 3). Your labs will be drawn at the beginning of your appointment. DO NOT take your medications prior to having labs drawn. Please bring all your medications with you from home to take after labs are drawn. Will have an additional appointment on Sunday 1/22 for Taylor removal.     2. Follow up with Dr. Emerson in Transplant Clinic in on Monday Jan 30th.     3. Follow up with Transplant Nephrologist as scheduled.     4.  Ureteral stent removal in 4-6 weeks, to be scheduled by Transplant Coordinator. If a  does not contact you for this, please contact your transplant coordinator. This will be done by urology vs transplant surgery.     5. Follow up with your primary care provider in 2 weeks as scheduled. You should discuss elevated LFTs during hospitalization and have a liver US for work up.     6. Follow up with ECHO 6 weeks post transplant.     7. Simulect to be given in [facility] on 1/20.     Remember to always bring an updated medication list to all appointments.        Call your Transplant Coordinator ([phone]) with questions about Transplant Center appointment scheduling.     LABS:     CBC, BMP, magnesium, phosphorus, tacrolimus level to be drawn daily while in [facility], then every Monday and Thursday by home health care nurse if arranged, or at an outpatient lab.     When to contact your care team    WHEN TO CONTACT YOUR  COORDINATOR:     Transplant Coordinator [phone]     Notify your coordinator if you have pain over your kidney, increased redness or drainage from your incision, fever greater than 100F, decreased urine output or new or increased amount of blood in urine.     Notify your coordinator immediately  if you are ever unable to take your immunosuppressive medications for any reason.     If you have URGENT concerns after office hours, please call the hospital switchboard at [phone] and ask to have the organ transplant nurse on-call paged. If you have a life-threatening emergency, go to the nearest emergency room.     Monitor and record    Monitor blood pressure and weight daily.     Tubes and drains    You are going home with the following tubes or drains:   TOM drain plan: Please monitor and write down color and amount of output. Drain will be removed at discretion of transplant surgeon, plan to check fluid Cr on POD 8 or 1 day after Taylor removal.     Taylor plan: Please monitor color and amount of urine output. Taylor will be removed on post-op day 7.     Wound care and dressings    Instructions to care for your wound at home: If you have staples in place, they will be removed in 3 weeks after operation. Wash incision daily with soap and water. Do not soak or scrub.     Diet    Diet recommendations post-transplant: Heart healthy dietary habits long term (low saturated/trans fat, low sodium). High protein diet x 8 weeks. Practice food safety precautions.     Diet    Follow this diet upon discharge: Diet recommendations post-transplant: Heart healthy dietary habits long term (low saturated/trans fat, low sodium). High protein diet x 8 weeks. Practice food safety precautions.      Low potassium diet until instructed to liberalize diet.         Discharge Medications   Current Discharge Medication List      START taking these medications    Details   methocarbamol (ROBAXIN) 750 MG tablet Take 1 tablet (750 mg) by mouth every 6 hours as needed for muscle spasms  Qty: 20 tablet, Refills: 0    Associated Diagnoses: Kidney replaced by transplant      mycophenolate (GENERIC EQUIVALENT) 250 MG capsule Take 3 capsules (750 mg) by mouth 2 times daily  Qty: 180 capsule, Refills: 11    Associated Diagnoses: Kidney replaced  by transplant      ondansetron (ZOFRAN ODT) 4 MG ODT tab Take 1 tablet (4 mg) by mouth every 6 hours as needed for nausea or vomiting  Qty: 10 tablet, Refills: 1    Associated Diagnoses: Kidney replaced by transplant      oxyCODONE (ROXICODONE) 5 MG tablet Take 1 tablet (5 mg) by mouth every 4 hours as needed for moderate pain (4-6)  Qty: 20 tablet, Refills: 0    Associated Diagnoses: Kidney replaced by transplant      pantoprazole (PROTONIX) 40 MG EC tablet Take 1 tablet (40 mg) by mouth every morning (before breakfast)  Qty: 30 tablet, Refills: 2    Associated Diagnoses: Kidney replaced by transplant      polyethylene glycol (MIRALAX) 17 GM/Dose powder Take 17 g by mouth daily  Qty: 510 g, Refills: 1    Associated Diagnoses: Kidney replaced by transplant      predniSONE (DELTASONE) 10 MG tablet Take 6 tablets (60 mg) by mouth daily for 1 day, THEN 4 tablets (40 mg) daily for 2 days, THEN 2 tablets (20 mg) daily for 7 days, THEN 1.5 tablets (15 mg) daily for 7 days, THEN 1 tablet (10 mg) daily for 7 days, THEN 0.5 tablets (5 mg) daily for 7 days.  Qty: 49 tablet, Refills: 0    Associated Diagnoses: Kidney replaced by transplant      senna-docusate (SENOKOT-S/PERICOLACE) 8.6-50 MG tablet Take 1-2 tablets by mouth 2 times daily  Qty: 60 tablet, Refills: 1    Associated Diagnoses: Kidney replaced by transplant      sodium bicarbonate 650 MG tablet Take 1 tablet (650 mg) by mouth 2 times daily  Qty: 30 tablet, Refills: 1    Associated Diagnoses: Kidney replaced by transplant      sulfamethoxazole-trimethoprim (BACTRIM) 400-80 MG tablet Take 1 tablet by mouth three times a week  Qty: 30 tablet, Refills: 11    Comments: Increase to daily as instructed by transplant team when renal function improves  Associated Diagnoses: Kidney replaced by transplant      tacrolimus (GENERIC EQUIVALENT) 1 MG capsule Take 4 capsules (4 mg) by mouth 2 times daily  Qty: 240 capsule, Refills: 11    Associated Diagnoses: Kidney replaced by  transplant      valGANciclovir (VALCYTE) 450 MG tablet Take 1 tablet (450 mg) by mouth twice a week  Qty: 60 tablet, Refills: 5    Comments: Increase dose to 900 mg daily as instructed by transplant team when renal function improves.  Associated Diagnoses: Kidney replaced by transplant         CONTINUE these medications which have CHANGED    Details   acetaminophen (TYLENOL) 325 MG tablet Take 2 tablets (650 mg) by mouth every 4 hours as needed for pain  Qty: 100 tablet, Refills: 3    Associated Diagnoses: Kidney replaced by transplant         CONTINUE these medications which have NOT CHANGED    Details   sertraline (ZOLOFT) 50 MG tablet Take 50 mg by mouth daily      VITAMIN D, CHOLECALCIFEROL, PO Take 2,000 Units by mouth daily         STOP taking these medications       aspirin 81 MG EC tablet Comments:   Reason for Stopping:         atorvastatin (LIPITOR) 20 MG tablet Comments:   Reason for Stopping:         B Complex-C-Folic Acid (ANKUSH-YOGESH) TABS Comments:   Reason for Stopping:         carvedilol (COREG) 6.25 MG tablet Comments:   Reason for Stopping:         furosemide (LASIX) 40 MG tablet Comments:   Reason for Stopping:         sevelamer carbonate (RENVELA) 800 MG tablet Comments:   Reason for Stopping:             Allergies   No Known Allergies  Data   Most Recent 3 CBC's:Recent Labs   Lab Test 01/18/23  0606 01/17/23  0553 01/16/23 2203 01/16/23  0959 01/16/23  0632   WBC 10.2 15.6*  --   --  19.4*   HGB 9.3* 10.1* 10.5*   < > 10.3*   MCV 98 96  --   --  96   PLT 95* 106*  --   --  127*    < > = values in this interval not displayed.      Most Recent 3 BMP's:  Recent Labs   Lab Test 01/18/23  1314 01/18/23  1053 01/18/23  0754 01/18/23  0606 01/17/23  1637 01/17/23  0553 01/16/23 2203 01/16/23  0959 01/16/23  0632   NA  --   --   --  137  --  138  --   --  138   POTASSIUM  --   --   --  5.1  --  4.9 5.0   < > 5.4*  5.4*   CHLORIDE  --   --   --  104  --  104  --   --  108*   CO2  --   --   --  19*  --   20*  --   --  18*   BUN  --   --   --  65.1*  --  59.1*  --   --  48.0*   CR  --   --   --  4.27*  --  5.03*  --   --  4.82*   ANIONGAP  --   --   --  14  --  14  --   --  12   VISHNU  --   --   --  8.2*  --  8.6  --   --  7.3*   * 99 102* 105*   < > 128*  --   --  137*    < > = values in this interval not displayed.     Most Recent 2 LFT's:  Recent Labs   Lab Test 01/18/23  0606 01/17/23  0553   AST 50 94*   ALT 59* 68*   ALKPHOS 44 45   BILITOTAL <0.2 <0.2     Most Recent INR's and Anticoagulation Dosing History:  Anticoagulation Dose History     Recent Dosing and Labs Latest Ref Rng & Units 8/18/2020 8/9/2021 1/15/2023    INR 0.85 - 1.15 1.08 0.96 0.90        Most Recent 3 Troponin's:  Recent Labs   Lab Test 12/03/20  1647   TROPI <0.015     Most Recent Cholesterol Panel:  Recent Labs   Lab Test 01/15/23  0540   CHOL 177   *   HDL 52   TRIG 115     Most Recent 6 Bacteria Isolates From Any Culture (See EPIC Reports for Culture Details):  Recent Labs   Lab Test 12/03/20  1705 08/14/20  1415   CULT No growth >100,000 colonies/mL  Escherichia coli  *     Most Recent TSH, T4 and A1c Labs:  Recent Labs   Lab Test 01/15/23  0540 04/18/22  0851   TSH  --  2.34   A1C 5.2  --      I have reviewed history, examined patient and discussed plan with the fellow/resident/DK.    I concur with the findings in this note.    Time spent on discharge activities: 45 minutes.

## 2023-01-18 NOTE — PROGRESS NOTES
CLINICAL NUTRITION SERVICES - DISCHARGE NOTE    Patient s discharge needs assessed and discharge planning has been conducted with the multidisciplinary transplant care team including physicians, pharmacy, social work and transplant coordinator.    Follow up/Monitoring:  Once discharged, place outpatient nutrition consult via the transplant team if nutrition concerns arise.    Danielle Case, MS, RD, LD, CCTD, CNSC  7A/Obs unit pager [phone]  Weekend pager [phone]

## 2023-01-18 NOTE — CONSULTS
Care Management Initial Consult    General Information  Assessment completed with: Patient, VM-chart review,    Type of CM/SW Visit: Initial Assessment    Primary Care Provider verified and updated as needed: Yes   Readmission within the last 30 days:        Reason for Consult: emotional/coping/adjustment concerns, discharge planning  Advance Care Planning: Advance Care Planning Reviewed: verified with patient, no concerns identified, questions answered          Communication Assessment  Patient's communication style: spoken language (English or Bilingual)    Hearing Difficulty or Deaf: no   Wear Glasses or Blind: no    Cognitive  Cognitive/Neuro/Behavioral: WDL                      Living Environment:   People in home: spouse     Current living Arrangements: house      Able to return to prior arrangements: yes       Family/Social Support:  Care provided by: self  Provides care for: no one  Marital Status:   Wife, Sibling(s)          Description of Support System: Supportive, Involved    Support Assessment: Adequate social supports    Current Resources:   Patient receiving home care services:       Community Resources:    Equipment currently used at home:    Supplies currently used at home:      Employment/Financial:  Employment Status: employed full-time        Financial Concerns: No concerns identified   Referral to Financial Worker: No       Lifestyle & Psychosocial Needs:  Social Determinants of Health     Tobacco Use: Medium Risk     Smoking Tobacco Use: Former     Smokeless Tobacco Use: Never     Passive Exposure: Not on file   Alcohol Use: Not on file   Financial Resource Strain: Not on file   Food Insecurity: Not on file   Transportation Needs: Not on file   Physical Activity: Not on file   Stress: Not on file   Social Connections: Not on file   Intimate Partner Violence: Not on file   Depression: Not at risk     PHQ-2 Score: 0   Housing Stability: Not on file       Functional Status:  Prior to  admission patient needed assistance:   Dependent ADLs:: Independent  Dependent IADLs:: Independent  Assesssment of Functional Status: At functional baseline    Mental Health Status:  Mental Health Status: No Current Concerns       Chemical Dependency Status:  Chemical Dependency Status: No Current Concerns             Values/Beliefs:  Spiritual, Cultural Beliefs, Congregation Practices, Values that affect care: no             Patient underwent  Kidney transplant.  Met with patient to update psychosocial assessment and provide brief education about SW role while inpatient, as well as expectations/requirements and follow up needs post-transplant. SW also provided education about need for compliance with transplant medications, and explained ESRD Medicare benefits and medication coverage under Medicare part B.  Medicare 2728 forms completed and signed by patient.    Initial social work evaluation: 6/18/2020    Patient's post transplant caregivers: QUINN Khoury   If not local, plans for short term stay post transplant:  NA     Financial concerns/resources provided: Chip reports concerns with medication costs. Medications were ran and there are little to no out of pocket co-pays given his Medicare A,B and [employer] MA insurance. SW provided education and reassurance.     Insurance and Medication: HEALTH BENEFIT: MEDICARE  ID# [id number] (PART A EFFECTIVE (DATE: 5/1/2020) , PART B EFFECTIVE (DATE: 5/1/2020) )  PROCESSING INFO: ID# [id number] GRP# OTHER BIN# [id number]  SECONDARY HEALTH BENEFIT: ([facility])  ID#[id number] GRP#[id number] (EFFECTIVE (DATE: 5/1/2020) )  PROCESSING INFO: ([facility]) ID#[id number] GRP# [id number] BIN#[id number]  PT WILL PAY $0 AT TIME OF SERVICE   PHARMACY BENEFIT: ([facility]) PART D  PROCESSING INFO: ID# [id number] GRP# [id number] PCN# [id number] BIN# [id number] (EFFECTIVE (DATE: 1/1/2022) )  NO DEDUCTIBLE OR MAX OUT-OF-POCKET DUE TO LOW-INCOME SUBSIDY  COPAY STRUCTURE:  $ (1.45) FOR GENERIC  $ (4.30) FOR  BRAND  (PA NEEDED) FOR NON-FORMULARY MEDICATIONS  BILL OTC S TO RX PROCESSOR (THIRD PARTY NAME) BIN#[id number] ID# [id number]  PCN# [id number].   TEST CLAIM SPECIALTY #28  MYCOPHENOLATE 250mg (#240/30DS)= $0 AT TIME OF SERVICE  PROGRAF 1mg (#180/30DS)= $0 AT TIME OF SERVICE  TACROLIMUS 1mg (#60/30DS)= $0 AT TIME OF SERVICE  CYCLOSPORINE 100mg (#60/30DS)= $0 AT TIME OF SERVICE  VALGANCICLOVIR 450mg (#60/30DS)=$1.45  VALACYCLOVIR 1gm (#90=30DS) =$1.45  TEST CLAIM DISCHARGE #27 (18 YEARS AND OLDER):  MYCOPHENOLATE 250mg (#240/30DS)= $0 AT TIME OF SERVICE  PROGRAF 1mg (#180/30DS)= $0 AT TIME OF SERVICE  TACROLIMUS 1mg (#60/30DS)= $0 AT TIME OF SERVICE  CYCLOSPORINE 100mg (#60/30DS)= $0 AT TIME OF SERVICE  VALGANCICLOVIR 450mg (#60/30DS)=$1.45  VALACYCLOVIR 1gm (#90=30DS) =$1.45    Mental and chemical health services needed: Chip reports no baseline concerns with anxiety or depression. He possessed appropriate feelings of anxiety given his current DGF status. Chip has a history of CD use.     Education provided by LILLY: Social Work role inpatient setting, availability of support groups, parking information and psychosocial support.    Assessment and recommendations and plan:  LILLY met with Chip at the bedside. Chip appeared to present with an anxious mood, alert and appropriate. He reports that he has concerns about his DGF status. SW provided emotional support discussing this can happen for patients which creates elevated feelings of stress. He reports that his SO raad and family will be assisting him at home post transplant.     Chip has reported in the past that he has a history of daily methamphetamine use. He reported he stopped using methamphetamine 6 months ago due to his health (2019). Chip completed a chem dep evaluation pre transplant, no current concerns. SW will continue to follow for psychosocial support, resources and advocate on behalf of the patient.    Casie Freire   [facility]  Outpatient  Kidney/Pancreas/Auto Islet Transplant Program   [address]  [place] [address]  [email or web address]  [email or web address]  Office: [phone] I Fax: [phone]    RICCO Waldrop

## 2023-01-18 NOTE — PROGRESS NOTES
/84 (BP Location: Left arm)   Pulse 70   Temp 97.9  F (36.6  C) (Oral)   Resp 16   Wt 69.6 kg (153 lb 8 oz)   SpO2 95%   BMI 21.18 kg/m      Shift: 2409-4773  Isolation Status: none  VS: Stable on room air, afebrile  Neuro: Aox4  Behaviors: Calm & cooperative   B, 145  Labs: Creatinine 5.03  Respiratory: WNL  Cardiac: WNL  Pain/Nausea: Denies nausea  PRN: PRN oxy 5mg for pain & Milk of Mag  Diet: 2g K diet, eating well  Infusion(s): none  LDA: R triple lumen internal jugular, R PIV saline locked, R TOM, shaver  GI/: Pass gas, no BM this shift, Good urine output-pink tinged   Skin: Abdominal incision dermabond, old PD site, dressing clean, dry and intact  Mobility: Up ad charles, ambulated braswell  Plan: Continue plan of care

## 2023-01-19 ENCOUNTER — INFUSION THERAPY VISIT (OUTPATIENT)
Dept: INFUSION THERAPY | Facility: CLINIC | Age: 38
DRG: 652 | End: 2023-01-19
Attending: NURSE PRACTITIONER
Payer: MEDICARE

## 2023-01-19 ENCOUNTER — TELEPHONE (OUTPATIENT)
Dept: TRANSPLANT | Facility: CLINIC | Age: 38
End: 2023-01-19

## 2023-01-19 ENCOUNTER — OFFICE VISIT (OUTPATIENT)
Dept: INFUSION THERAPY | Facility: CLINIC | Age: 38
DRG: 652 | End: 2023-01-19
Attending: OBSTETRICS & GYNECOLOGY
Payer: MEDICARE

## 2023-01-19 VITALS
DIASTOLIC BLOOD PRESSURE: 97 MMHG | SYSTOLIC BLOOD PRESSURE: 155 MMHG | HEART RATE: 66 BPM | OXYGEN SATURATION: 100 % | WEIGHT: 152 LBS | RESPIRATION RATE: 16 BRPM | BODY MASS INDEX: 20.98 KG/M2 | TEMPERATURE: 98.1 F

## 2023-01-19 DIAGNOSIS — Z94.0 KIDNEY REPLACED BY TRANSPLANT: ICD-10-CM

## 2023-01-19 DIAGNOSIS — Z94.0 KIDNEY REPLACED BY TRANSPLANT: Primary | ICD-10-CM

## 2023-01-19 DIAGNOSIS — R68.83 CHILLS: ICD-10-CM

## 2023-01-19 DIAGNOSIS — K59.00 CONSTIPATION, UNSPECIFIED CONSTIPATION TYPE: Primary | ICD-10-CM

## 2023-01-19 DIAGNOSIS — N18.6 END STAGE RENAL DISEASE (H): ICD-10-CM

## 2023-01-19 LAB
ALBUMIN UR-MCNC: 30 MG/DL
ANION GAP SERPL CALCULATED.3IONS-SCNC: 9 MMOL/L (ref 7–15)
APPEARANCE UR: CLEAR
BASOPHILS # BLD AUTO: 0 10E3/UL (ref 0–0.2)
BASOPHILS NFR BLD AUTO: 0 %
BILIRUB UR QL STRIP: NEGATIVE
BUN SERPL-MCNC: 62.2 MG/DL (ref 6–20)
CALCIUM SERPL-MCNC: 8.9 MG/DL (ref 8.6–10)
CHLORIDE SERPL-SCNC: 105 MMOL/L (ref 98–107)
COLOR UR AUTO: ABNORMAL
CREAT SERPL-MCNC: 4.07 MG/DL (ref 0.67–1.17)
DEPRECATED HCO3 PLAS-SCNC: 23 MMOL/L (ref 22–29)
EOSINOPHIL # BLD AUTO: 0.1 10E3/UL (ref 0–0.7)
EOSINOPHIL NFR BLD AUTO: 2 %
ERYTHROCYTE [DISTWIDTH] IN BLOOD BY AUTOMATED COUNT: 12.4 % (ref 10–15)
GFR SERPL CREATININE-BSD FRML MDRD: 18 ML/MIN/1.73M2
GLUCOSE SERPL-MCNC: 100 MG/DL (ref 70–99)
GLUCOSE UR STRIP-MCNC: NEGATIVE MG/DL
HCT VFR BLD AUTO: 31.6 % (ref 40–53)
HGB BLD-MCNC: 10.8 G/DL (ref 13.3–17.7)
HGB UR QL STRIP: ABNORMAL
IMM GRANULOCYTES # BLD: 0.1 10E3/UL
IMM GRANULOCYTES NFR BLD: 1 %
KETONES UR STRIP-MCNC: NEGATIVE MG/DL
LEUKOCYTE ESTERASE UR QL STRIP: ABNORMAL
LYMPHOCYTES # BLD AUTO: 0.3 10E3/UL (ref 0.8–5.3)
LYMPHOCYTES NFR BLD AUTO: 4 %
MAGNESIUM SERPL-MCNC: 2.3 MG/DL (ref 1.7–2.3)
MCH RBC QN AUTO: 31.8 PG (ref 26.5–33)
MCHC RBC AUTO-ENTMCNC: 34.2 G/DL (ref 31.5–36.5)
MCV RBC AUTO: 93 FL (ref 78–100)
MONOCYTES # BLD AUTO: 0.5 10E3/UL (ref 0–1.3)
MONOCYTES NFR BLD AUTO: 5 %
NEUTROPHILS # BLD AUTO: 8.1 10E3/UL (ref 1.6–8.3)
NEUTROPHILS NFR BLD AUTO: 88 %
NITRATE UR QL: NEGATIVE
NRBC # BLD AUTO: 0 10E3/UL
NRBC BLD AUTO-RTO: 0 /100
PH UR STRIP: 7 [PH] (ref 5–7)
PHOSPHATE SERPL-MCNC: 2.9 MG/DL (ref 2.5–4.5)
PLATELET # BLD AUTO: 125 10E3/UL (ref 150–450)
POTASSIUM SERPL-SCNC: 4.3 MMOL/L (ref 3.4–5.3)
RBC # BLD AUTO: 3.4 10E6/UL (ref 4.4–5.9)
RBC URINE: >182 /HPF
SODIUM SERPL-SCNC: 137 MMOL/L (ref 136–145)
SP GR UR STRIP: 1.01 (ref 1–1.03)
TACROLIMUS BLD-MCNC: 5 UG/L (ref 5–15)
TME LAST DOSE: NORMAL H
TME LAST DOSE: NORMAL H
UROBILINOGEN UR STRIP-MCNC: NORMAL MG/DL
WBC # BLD AUTO: 9.1 10E3/UL (ref 4–11)
WBC URINE: 27 /HPF

## 2023-01-19 PROCEDURE — 36415 COLL VENOUS BLD VENIPUNCTURE: CPT

## 2023-01-19 PROCEDURE — 87088 URINE BACTERIA CULTURE: CPT | Performed by: NURSE PRACTITIONER

## 2023-01-19 PROCEDURE — 80197 ASSAY OF TACROLIMUS: CPT | Performed by: NURSE PRACTITIONER

## 2023-01-19 PROCEDURE — 258N000003 HC RX IP 258 OP 636: Performed by: NURSE PRACTITIONER

## 2023-01-19 PROCEDURE — 99215 OFFICE O/P EST HI 40 MIN: CPT | Mod: 24 | Performed by: NURSE PRACTITIONER

## 2023-01-19 PROCEDURE — 84100 ASSAY OF PHOSPHORUS: CPT

## 2023-01-19 PROCEDURE — 83735 ASSAY OF MAGNESIUM: CPT

## 2023-01-19 PROCEDURE — 85025 COMPLETE CBC W/AUTO DIFF WBC: CPT

## 2023-01-19 PROCEDURE — 81001 URINALYSIS AUTO W/SCOPE: CPT | Performed by: NURSE PRACTITIONER

## 2023-01-19 PROCEDURE — 80048 BASIC METABOLIC PNL TOTAL CA: CPT

## 2023-01-19 RX ORDER — POLYETHYLENE GLYCOL 3350 17 G/17G
1 POWDER, FOR SOLUTION ORAL 2 TIMES DAILY
Qty: 578 G | Refills: 0 | Status: SHIPPED | OUTPATIENT
Start: 2023-01-19 | End: 2023-01-23

## 2023-01-19 RX ORDER — TACROLIMUS 1 MG/1
5 CAPSULE ORAL 2 TIMES DAILY
Qty: 300 CAPSULE | Refills: 11 | Status: SHIPPED | OUTPATIENT
Start: 2023-01-19 | End: 2023-01-20

## 2023-01-19 RX ADMIN — SODIUM CHLORIDE 1000 ML: 9 INJECTION, SOLUTION INTRAVENOUS at 08:52

## 2023-01-19 ASSESSMENT — PAIN SCALES - GENERAL: PAINLEVEL: SEVERE PAIN (7)

## 2023-01-19 NOTE — PROGRESS NOTES
"Chip Fowler came to [facility] today for a lab and assess following a kidney transplant on 01/15/23.      Discharge date: 1/18/23  Transplant coordinator: Veronica Edgar  Phone number patient can be reached at: Chip [phone]      Physical Assessment:  See physical assessment located under \"Document Flowsheets\".  Incision site: clean, dry, and intact  Lines: TOM drain on right side in place. Emptied 40 ml of serosanguinous drainage in SIPC, pt emptied 76 ml this morning when waking up.  Taylor: catheter in place currently. Pt emptied 23 oz (690 ml) before bed and 11 oz this morning (330 ml). 475 ml emptied in SIPC. Pt tracking output  Urine clarity: urine is clear, light colored with a pink tinge. Per patient the urine was more clear and yellow this morning and got more pink as he was up and moving.   Hydration: 32 oz after leaving the hospital last night, 16 oz this morning  Nutrition: less than normal, eating small amounts at a time   Last BM: none since transplant  Pain: 7/10, no meds this morning yet   My transplant place videos watched: yes    Labs drawn by [facility] staff Yes    Plan of care for today:   1 L normal saline given  Continue stool softeners and miralax to try to produce bowel movement  Get 2-3 L Fluid intake  Monitor BPs, temp and drain output at home    Medication changes: none at this time    Medications administered:  Pt self administered morning medications    Patient education:    The following teaching topics were addressed: Importance of drinking 2L of non-caffeinated fluids daily, Incisional care, Signs/symptoms of infection, Good handwashing, Medications (purposes, doses and times of administration), Phone numbers to call with concenrs (Transplant coordinator, [facility] and [facility]), Plan of care, Drain care and Taylor cath care   Patient verbalized understanding and all questions answered.    Drug level:  Patient took 4 mg of tacrolimus last evening at 7 pm.  Care coordinator to follow up " with the result.    Face to face time: 1.75 hrs    Discharge Plan  Take miralax twice today once getting home  Pt will follow up with [facility] visit on 1/20/23  Discharge instructions reviewed with patient: YES  Patient/Representative verbalized understanding, all questions answered: YES    Discharged from unit at 1045 with whom: self to home. Girlfriend will  from the lobby. She will attend [facility] for teaching tomorrow.     Administrations This Visit     0.9% sodium chloride BOLUS     Admin Date  01/19/2023 Action  New Bag Dose  1,000 mL Rate  500 mL/hr Route  Intravenous Administered By  Nati Cornelius RN Alyssa Sakhitab-Kerestes, DIPTI

## 2023-01-19 NOTE — TELEPHONE ENCOUNTER
Issue tacrolimus level 5 below goal level         OUTCOME:   Spoke with patient, they confirm accurate  Tacrolimus  trough level and current dose 4  mg BID. Patient confirmed dose change to 5 mg BID and to repeat labs in 1  day. Orders sent to preferred pharmacy for dose change and lab for repeat labs. Patient voiced understanding of plan.                           Glucose-6-PO4 Dehydrogenase 9.9 - 16.6 U/g Hb 18.0 High

## 2023-01-19 NOTE — LETTER
Date:January 24, 2023      Provider requested that no letter be sent. Do not send.       [facility]

## 2023-01-19 NOTE — PATIENT INSTRUCTIONS
Dear hCip Fowler    Thank you for choosing [facility] ([facility]) for your infusion.  The following information is a summary of our appointment as well as important reminders.      Plan of care:  1) Drink at least 2-3 L of fluids today (60-80 oz)  2) Taylor catheter will stay in until at least Monday 1/23/23 per transplant team and urology  3) Transplant coordinator will call you if you need to adjust your tacrolimus dose based on labs  4) Continue to take vital signs twice a day. Empty catheter and TOM as needed.  5) Return for labs and appointment on Friday morning. Arrival at 7:15 AM on the 2nd floor. Bring your medication box, transplant manual, all pill bottles, and notebook.  6) Take miralax today when you get home. Mix 1 capful of miralax with at least 8 oz of fluid.    Contact Information:    Transplant Coordinator: Veronica Edgar [phone]  Transplant Office:  [phone]  [facility]:  [phone]  Ask for physician on call for kidney transplant.  [facility]:  [phone]    We look forward in seeing you on your next appointment here at [facility] ([facility]).  Please don t hesitate to call us at [phone] to reschedule any of your appointments or to speak with one of the [facility] registered nurses.  It was a pleasure taking care of you today.    Sincerely,    [facility]  [address] [place] [address]  [address]  Phone:  [phone]

## 2023-01-19 NOTE — LETTER
1/19/2023         RE: Chip Fowler  [address]  [place] [address]        Dear Colleague,    Thank you for referring your patient, Chip Fowler, to the [facility]. Please see a copy of my visit note below.    TRANSPLANT  EARLY POST TRANSPLANT VISIT    Assessment & Plan   # DDKT: Trend down   - Baseline Creatinine: ~ TBD   - Proteinuria: Moderate (1-3 grams)   - Date DSA Last Checked: Jan/2023      Latest DSA: No DSA at time of transplant   - BK Viremia: No   - Kidney Tx Biopsy: No   - Transplant Ureteral Stent: Yes; Scheduled removal date 4-6weeks    # Immunosuppression: Tacrolimus immediate release (goal 8-10) and Mycophenolate mofetil (dose 750 mg every 12 hours)   - Induction with Recent Transplant:  Intermediate Intensity   - Continue with intensive monitoring of immunosuppression for efficacy and toxicity.   - Changes: Not at this time    # Infection Prophylaxis:   - PJP: hold  - CMV: Valganciclovir (Valcyte)    # Blood Pressure: Controlled;  Goal BP: < 140/90   - Volume status: Hypovolemic  1 l IVF   - Changes: Not at this time    # Elevated Blood Glucose: Glucose generally running ~ 120s   - Management as per primary care.    # Anemia in Chronic Renal Disease: Hgb: Stable      SARA: No   - Iron studies: Not checked recently    # Mineral Bone Disorder:   - Secondary renal hyperparathyroidism; PTH level: Not checked recently        On treatment: None  - Vitamin D; level: Not checked recently        On supplement: No  - Calcium; level: Normal        On supplement: No  - Phosphorus; level: Normal        On supplement: No    # Electrolytes:   - Potassium; level: Normal        On supplement: No  - Magnesium; level: Normal        On supplement: No  - Bicarbonate; level: Normal        On supplement: Yes  - Sodium; level: Normal    # Transaminitis:              - hx of alcohol abuse              - hepatitis serologies negative              - hold statin and  "use alternative to bactrim if not improving     # Hx of cardiomyopathy:              - attributed to methamphetamine use in 2020 (ef:15%)              - echo: ef 55-60% Sep 2023              - stress echo neg Feb 2021              - previously on ACEI+b-blocker \"lisinopril & coreg\", currently on hold as BP low      # Hx of Polysubstance use:              - hx of methamphetamine use              - attended AA and underwent CD eval              - sober since 3/2021      # Hx of urethral stricture:              - shaver per tx surgery/urology    # Medical Compliance: Yes    # COVID-19 Virus Review: Discussed COVID-19 virus and the potential medical risks.  Reviewed preventative health recommendations, including wearing a mask where appropriate.  Recommended COVID vaccination should be up to date with either an initial vaccination or booster shot when appropriate.  Asked the patient to inform the transplant center if they are exposed or diagnosed with this virus.    # COVID Vaccination Up To Date: No    # Transaminitis:              - hx of alcohol abuse              - hepatitis serologies negative              - hold statin and use alternative to bactrim if not improving     # Hx of cardiomyopathy:              - attributed to methamphetamine use in 2020 (ef:15%)              - echo: ef 55-60% Sep 2023              - stress echo neg Feb 2021              - previously on ACEI+b-blocker \"lisinopril & coreg\", currently on hold as BP low      # Hx of Polysubstance use:              - hx of methamphetamine use              - attended AA and underwent CD eval              - sober since 3/2021      # Hx of urethral stricture:              - shaver per tx surgery/urology    # Transplant History:  Etiology of Kidney Failure: Unknown etiology  Tx: DDKT  Transplant: 1/15/2023 (Kidney)  Donor Type: Donation after Circulatory Death  Donor Class:   Crossmatch at time of Tx: negative  DSA at time of Tx: No  Significant changes in " "immunosuppression: None  CMV IgG Ab High Risk Discordance (D+/R-): Yes  EBV IgG Ab High Risk Discordance (D+/R-): No  Significant transplant-related complications: None    Transplant Office Phone Number: [phone]    Assessment and plan was discussed with the patient and he voiced his understanding and agreement.    Return visit: No follow-ups on file.    Jane Man NP    Chief Complaint   Mr. Fowler is a 37 year old here for kidney transplant.     History of Present Illness    Chip Fowler is a 37 year old male with hx of end-stage renal disease of unclear etiology (kidney bx non diagnostic, neg serologic w/up, solitary L kidney), hx of CAKUT, urethral stricture s/p dilation, on PD x 2 years with residual renal function (~2L UOP/d), hx of polysubstance use \"methamphetamine, alcohol abuse\", cardiomyopathy \"attributed to methamphetamine, ef as low as 15% in 2020, nl ef 55-60% per last echo in 2022, status post DD KT on 01/15/2023 with J-J ureteral stent, intermediate risk induction.      No fevers, chills nausea or vomiting    Some burning with urination    Taylor in place     Home BP: Not checked    Problem List   Patient Active Problem List   Diagnosis     Unilateral congenital absence of kidney     ESRD (end stage renal disease) on dialysis (H)     Essential hypertension     Solitary kidney, congenital     Nonischemic cardiomyopathy (H)     Bladder stones     Urethral stone     Urethral stricture     Polysubstance abuse (H)     Urethral calculus     Abnormal urinalysis     Acute renal failure superimposed on chronic kidney disease (H)     Acute retention of urine     Congestive heart failure of unknown etiology (H)     Methamphetamine abuse, episodic (H)     Migraine     Nephrolithiasis     Tobacco use     Post-traumatic male urethral stricture     Urethral diverticulum     Leukocytosis     Allergic rhinitis, unspecified seasonality, unspecified trigger     Other dysphagia     Chronic kidney disease "     Pre-operative cardiovascular examination     Transplant, organ     Kidney replaced by transplant     Immunosuppressed status (H)     Elevated LFTs     Hyperkalemia       Allergies   No Known Allergies    Medications   Current Outpatient Medications   Medication Sig     polyethylene glycol (MIRALAX) 17 GM/Dose powder Take 17 g (1 capful) by mouth 2 times daily     acetaminophen (TYLENOL) 325 MG tablet Take 2 tablets (650 mg) by mouth every 4 hours as needed for pain     methocarbamol (ROBAXIN) 750 MG tablet Take 1 tablet (750 mg) by mouth every 6 hours as needed for muscle spasms     mycophenolate (GENERIC EQUIVALENT) 250 MG capsule Take 3 capsules (750 mg) by mouth 2 times daily     ondansetron (ZOFRAN ODT) 4 MG ODT tab Take 1 tablet (4 mg) by mouth every 6 hours as needed for nausea or vomiting (Patient not taking: Reported on 1/19/2023)     oxyCODONE (ROXICODONE) 5 MG tablet Take 1 tablet (5 mg) by mouth every 4 hours as needed for moderate pain (4-6)     pantoprazole (PROTONIX) 40 MG EC tablet Take 1 tablet (40 mg) by mouth every morning (before breakfast)     polyethylene glycol (MIRALAX) 17 GM/Dose powder Take 17 g by mouth daily     predniSONE (DELTASONE) 10 MG tablet Take 6 tablets (60 mg) by mouth daily for 1 day, THEN 4 tablets (40 mg) daily for 2 days, THEN 2 tablets (20 mg) daily for 7 days, THEN 1.5 tablets (15 mg) daily for 7 days, THEN 1 tablet (10 mg) daily for 7 days, THEN 0.5 tablets (5 mg) daily for 7 days.     senna-docusate (SENOKOT-S/PERICOLACE) 8.6-50 MG tablet Take 1-2 tablets by mouth 2 times daily     sertraline (ZOLOFT) 50 MG tablet Take 50 mg by mouth daily (Patient not taking: Reported on 1/19/2023)     sodium bicarbonate 650 MG tablet Take 1 tablet (650 mg) by mouth 2 times daily     [START ON 1/20/2023] sulfamethoxazole-trimethoprim (BACTRIM) 400-80 MG tablet Take 1 tablet by mouth three times a week     tacrolimus (GENERIC EQUIVALENT) 1 MG capsule Take 4 capsules (4 mg) by mouth 2  times daily     valGANciclovir (VALCYTE) 450 MG tablet Take 1 tablet (450 mg) by mouth twice a week     VITAMIN D, CHOLECALCIFEROL, PO Take 2,000 Units by mouth daily     No current facility-administered medications for this visit.     There are no discontinued medications.    Physical Exam   Vital Signs: There were no vitals taken for this visit.    GENERAL APPEARANCE: alert and no distress  HENT: mouth without ulcers or lesions  RESP: lungs clear to auscultation - no rales, rhonchi or wheezes  CV: regular rhythm, normal rate, no rub, no murmur  EDEMA: no LE edema bilaterally  ABDOMEN: soft, nondistended, nontender, bowel sounds normal  MS: extremities normal - no gross deformities noted, no evidence of inflammation in joints, no muscle tenderness  SKIN: no rash    Data     Renal Latest Ref Rng & Units 1/19/2023 1/18/2023 1/18/2023   Na 136 - 145 mmol/L 137 - -   K 3.4 - 5.3 mmol/L 4.3 - -   Cl 98 - 107 mmol/L 105 - -   CO2 22 - 29 mmol/L 23 - -   BUN 6.0 - 20.0 mg/dL 62.2(H) - -   Cr 0.67 - 1.17 mg/dL 4.07(H) - -   Glucose 70 - 99 mg/dL 100(H) 123(H) 99   Ca  8.6 - 10.0 mg/dL 8.9 - -   Mg 1.7 - 2.3 mg/dL 2.3 - -     Bone Health Latest Ref Rng & Units 1/19/2023 1/18/2023 1/17/2023   Phos 2.5 - 4.5 mg/dL 2.9 4.0 5.3(H)   PTHi 18 - 80 pg/mL - - -     Heme Latest Ref Rng & Units 1/19/2023 1/18/2023 1/17/2023   WBC 4.0 - 11.0 10e3/uL 9.1 10.2 15.6(H)   Hgb 13.3 - 17.7 g/dL 10.8(L) 9.3(L) 10.1(L)   Plt 150 - 450 10e3/uL 125(L) 95(L) 106(L)   ABSOLUTE NEUTROPHIL 1.6 - 8.3 10e9/L - - -   ABSOLUTE LYMPHOCYTES 0.8 - 5.3 10e9/L - - -   ABSOLUTE MONOCYTES 0.0 - 1.3 10e9/L - - -   ABSOLUTE EOSINOPHILS 0.0 - 0.7 10e9/L - - -   ABSOLUTE BASOPHILS 0.0 - 0.2 10e9/L - - -   ABS IMMATURE GRANULOCYTES 0 - 0.4 10e9/L - - -   ABSOLUTE NUCLEATED RBC - - - -     Liver Latest Ref Rng & Units 1/18/2023 1/17/2023 1/16/2023   AP 40 - 129 U/L 44 45 44   TBili <=1.2 mg/dL <0.2 <0.2 <0.2   DBili 0.00 - 0.30 mg/dL <0.20 <0.20 <0.20   ALT 10 - 50  U/L 59(H) 68(H) 66(H)   AST 10 - 50 U/L 50 94(H) 121(H)   Tot Protein 6.4 - 8.3 g/dL 4.9(L) 5.0(L) 5.1(L)   Albumin 3.5 - 5.2 g/dL 2.7(L) 2.8(L) 2.7(L)     Pancreas Latest Ref Rng & Units 1/15/2023   A1C <5.7 % 5.2        [facility] Txp Virology Latest Ref Rng & Units 1/15/2023 8/18/2020   CMV QUANT IU/ML Not Detected IU/mL Not Detected -   EBV CAPSID ANTIBODY IGG No detectable antibody. Positive(A) >8.0(H)   Hep B Core NR:Nonreactive - Nonreactive        Recent Labs   Lab Test 01/18/23  0606   TACROL 2.6*             Again, thank you for allowing me to participate in the care of your patient.        Sincerely,        Jane Man, NP

## 2023-01-19 NOTE — LETTER
"    1/19/2023         RE: Chip Fowler  [address] [facility] [address]        Dear Colleague,    Thank you for referring your patient, Chip Fowler, to the [facility]. Please see a copy of my visit note below.    Chip Fowler came to [facility] today for a lab and assess following a kidney transplant on 01/15/23.      Discharge date: 1/18/23  Transplant coordinator: Veronica Edgar  Phone number patient can be reached at: Chip [phone]      Physical Assessment:  See physical assessment located under \"Document Flowsheets\".  Incision site: clean, dry, and intact  Lines: TOM drain on right side in place. Emptied 40 ml of serosanguinous drainage in [facility], pt emptied 76 ml this morning when waking up.  Taylor: catheter in place currently. Pt emptied 23 oz (690 ml) before bed and 11 oz this morning (330 ml). 475 ml emptied in [facility]. Pt tracking output  Urine clarity: urine is clear, light colored with a pink tinge. Per patient the urine was more clear and yellow this morning and got more pink as he was up and moving.   Hydration: 32 oz after leaving the hospital last night, 16 oz this morning  Nutrition: less than normal, eating small amounts at a time   Last BM: none since transplant  Pain: 7/10, no meds this morning yet   My transplant place videos watched: yes    Labs drawn by [facility] staff Yes    Plan of care for today:   1 L normal saline given  Continue stool softeners and miralax to try to produce bowel movement  Get 2-3 L Fluid intake  Monitor BPs, temp and drain output at home    Medication changes: none at this time    Medications administered:  Pt self administered morning medications    Patient education:    The following teaching topics were addressed: Importance of drinking 2L of non-caffeinated fluids daily, Incisional care, Signs/symptoms of infection, Good handwashing, Medications (purposes, doses and times of administration), Phone numbers to call with " brannons (Transplant coordinator, [facility] and [facility]), Plan of care, Drain care and Taylor cath care   Patient verbalized understanding and all questions answered.    Drug level:  Patient took 4 mg of tacrolimus last evening at 7 pm.  Care coordinator to follow up with the result.    Face to face time: 1.75 hrs    Discharge Plan  Take miralax twice today once getting home  Pt will follow up with [facility] visit on 1/20/23  Discharge instructions reviewed with patient: YES  Patient/Representative verbalized understanding, all questions answered: YES    Discharged from unit at 1045 with whom: self to home. Girlfriend will  from the [employer]. She will attend [facility] for teaching tomorrow.     Administrations This Visit     0.9% sodium chloride BOLUS     Admin Date  01/19/2023 Action  New Bag Dose  1,000 mL Rate  500 mL/hr Route  Intravenous Administered By  Nati Cornelius, DIPTI Cornelius, DIPTI      Again, thank you for allowing me to participate in the care of your patient.        Sincerely,        Specialty Infusion Nurse

## 2023-01-19 NOTE — PROGRESS NOTES
TRANSPLANT  EARLY POST TRANSPLANT VISIT    Assessment & Plan   # DDKT: Trend down   - Baseline Creatinine: ~ TBD   - Proteinuria: Moderate (1-3 grams)   - Date DSA Last Checked: Jan/2023      Latest DSA: No DSA at time of transplant   - BK Viremia: No   - Kidney Tx Biopsy: No   - Transplant Ureteral Stent: Yes; Scheduled removal date 4-6weeks    # Immunosuppression: Tacrolimus immediate release (goal 8-10) and Mycophenolate mofetil (dose 750 mg every 12 hours)   - Induction with Recent Transplant:  Intermediate Intensity   - Continue with intensive monitoring of immunosuppression for efficacy and toxicity.   - Changes: Not at this time    # Infection Prophylaxis:   - PJP: hold  - CMV: Valganciclovir (Valcyte)    # Blood Pressure: Controlled;  Goal BP: < 140/90   - Volume status: Hypovolemic  1 l IVF   - Changes: Not at this time    # Elevated Blood Glucose: Glucose generally running ~ 120s   - Management as per primary care.    # Anemia in Chronic Renal Disease: Hgb: Stable      SARA: No   - Iron studies: Not checked recently    # Mineral Bone Disorder:   - Secondary renal hyperparathyroidism; PTH level: Not checked recently        On treatment: None  - Vitamin D; level: Not checked recently        On supplement: No  - Calcium; level: Normal        On supplement: No  - Phosphorus; level: Normal        On supplement: No    # Electrolytes:   - Potassium; level: Normal        On supplement: No  - Magnesium; level: Normal        On supplement: No  - Bicarbonate; level: Normal        On supplement: Yes  - Sodium; level: Normal    # Transaminitis:              - hx of alcohol abuse              - hepatitis serologies negative              - hold statin and use alternative to bactrim if not improving     # Hx of cardiomyopathy:              - attributed to methamphetamine use in 2020 (ef:15%)              - echo: ef 55-60% Sep 2023              - stress echo neg Feb 2021              - previously on ACEI+b-blocker  "\"lisinopril & coreg\", currently on hold as BP low      # Hx of Polysubstance use:              - hx of methamphetamine use              - attended AA and underwent CD eval              - sober since 3/2021      # Hx of urethral stricture:              - shaver per tx surgery/urology    # Medical Compliance: Yes    # COVID-19 Virus Review: Discussed COVID-19 virus and the potential medical risks.  Reviewed preventative health recommendations, including wearing a mask where appropriate.  Recommended COVID vaccination should be up to date with either an initial vaccination or booster shot when appropriate.  Asked the patient to inform the transplant center if they are exposed or diagnosed with this virus.    # COVID Vaccination Up To Date: No    # Transaminitis:              - hx of alcohol abuse              - hepatitis serologies negative              - hold statin and use alternative to bactrim if not improving     # Hx of cardiomyopathy:              - attributed to methamphetamine use in 2020 (ef:15%)              - echo: ef 55-60% Sep 2023              - stress echo neg Feb 2021              - previously on ACEI+b-blocker \"lisinopril & coreg\", currently on hold as BP low      # Hx of Polysubstance use:              - hx of methamphetamine use              - attended AA and underwent CD eval              - sober since 3/2021      # Hx of urethral stricture:              - shaver per tx surgery/urology    # Transplant History:  Etiology of Kidney Failure: Unknown etiology  Tx: DDKT  Transplant: 1/15/2023 (Kidney)  Donor Type: Donation after Circulatory Death  Donor Class:   Crossmatch at time of Tx: negative  DSA at time of Tx: No  Significant changes in immunosuppression: None  CMV IgG Ab High Risk Discordance (D+/R-): Yes  EBV IgG Ab High Risk Discordance (D+/R-): No  Significant transplant-related complications: None    Transplant Office Phone Number: [phone]    Assessment and plan was discussed with the " "patient and he voiced his understanding and agreement.    Return visit: No follow-ups on file.    Jane Man NP    Chief Complaint   Mr. Fowler is a 37 year old here for kidney transplant.     History of Present Illness    Chip Fowler is a 37 year old male with hx of end-stage renal disease of unclear etiology (kidney bx non diagnostic, neg serologic w/up, solitary L kidney), hx of CAKUT, urethral stricture s/p dilation, on PD x 2 years with residual renal function (~2L UOP/d), hx of polysubstance use \"methamphetamine, alcohol abuse\", cardiomyopathy \"attributed to methamphetamine, ef as low as 15% in 2020, nl ef 55-60% per last echo in 2022, status post DD KT on 01/15/2023 with J-J ureteral stent, intermediate risk induction.      No fevers, chills nausea or vomiting    Some burning with urination    Taylor in place     Home BP: Not checked    Problem List   Patient Active Problem List   Diagnosis     Unilateral congenital absence of kidney     ESRD (end stage renal disease) on dialysis (H)     Essential hypertension     Solitary kidney, congenital     Nonischemic cardiomyopathy (H)     Bladder stones     Urethral stone     Urethral stricture     Polysubstance abuse (H)     Urethral calculus     Abnormal urinalysis     Acute renal failure superimposed on chronic kidney disease (H)     Acute retention of urine     Congestive heart failure of unknown etiology (H)     Methamphetamine abuse, episodic (H)     Migraine     Nephrolithiasis     Tobacco use     Post-traumatic male urethral stricture     Urethral diverticulum     Leukocytosis     Allergic rhinitis, unspecified seasonality, unspecified trigger     Other dysphagia     Chronic kidney disease     Pre-operative cardiovascular examination     Transplant, organ     Kidney replaced by transplant     Immunosuppressed status (H)     Elevated LFTs     Hyperkalemia       Allergies   No Known Allergies    Medications   Current Outpatient Medications   Medication " Sig     polyethylene glycol (MIRALAX) 17 GM/Dose powder Take 17 g (1 capful) by mouth 2 times daily     acetaminophen (TYLENOL) 325 MG tablet Take 2 tablets (650 mg) by mouth every 4 hours as needed for pain     methocarbamol (ROBAXIN) 750 MG tablet Take 1 tablet (750 mg) by mouth every 6 hours as needed for muscle spasms     mycophenolate (GENERIC EQUIVALENT) 250 MG capsule Take 3 capsules (750 mg) by mouth 2 times daily     ondansetron (ZOFRAN ODT) 4 MG ODT tab Take 1 tablet (4 mg) by mouth every 6 hours as needed for nausea or vomiting (Patient not taking: Reported on 1/19/2023)     oxyCODONE (ROXICODONE) 5 MG tablet Take 1 tablet (5 mg) by mouth every 4 hours as needed for moderate pain (4-6)     pantoprazole (PROTONIX) 40 MG EC tablet Take 1 tablet (40 mg) by mouth every morning (before breakfast)     polyethylene glycol (MIRALAX) 17 GM/Dose powder Take 17 g by mouth daily     predniSONE (DELTASONE) 10 MG tablet Take 6 tablets (60 mg) by mouth daily for 1 day, THEN 4 tablets (40 mg) daily for 2 days, THEN 2 tablets (20 mg) daily for 7 days, THEN 1.5 tablets (15 mg) daily for 7 days, THEN 1 tablet (10 mg) daily for 7 days, THEN 0.5 tablets (5 mg) daily for 7 days.     senna-docusate (SENOKOT-S/PERICOLACE) 8.6-50 MG tablet Take 1-2 tablets by mouth 2 times daily     sertraline (ZOLOFT) 50 MG tablet Take 50 mg by mouth daily (Patient not taking: Reported on 1/19/2023)     sodium bicarbonate 650 MG tablet Take 1 tablet (650 mg) by mouth 2 times daily     [START ON 1/20/2023] sulfamethoxazole-trimethoprim (BACTRIM) 400-80 MG tablet Take 1 tablet by mouth three times a week     tacrolimus (GENERIC EQUIVALENT) 1 MG capsule Take 4 capsules (4 mg) by mouth 2 times daily     valGANciclovir (VALCYTE) 450 MG tablet Take 1 tablet (450 mg) by mouth twice a week     VITAMIN D, CHOLECALCIFEROL, PO Take 2,000 Units by mouth daily     No current facility-administered medications for this visit.     There are no discontinued  medications.    Physical Exam   Vital Signs: There were no vitals taken for this visit.    GENERAL APPEARANCE: alert and no distress  HENT: mouth without ulcers or lesions  RESP: lungs clear to auscultation - no rales, rhonchi or wheezes  CV: regular rhythm, normal rate, no rub, no murmur  EDEMA: no LE edema bilaterally  ABDOMEN: soft, nondistended, nontender, bowel sounds normal  MS: extremities normal - no gross deformities noted, no evidence of inflammation in joints, no muscle tenderness  SKIN: no rash    Data     Renal Latest Ref Rng & Units 1/19/2023 1/18/2023 1/18/2023   Na 136 - 145 mmol/L 137 - -   K 3.4 - 5.3 mmol/L 4.3 - -   Cl 98 - 107 mmol/L 105 - -   CO2 22 - 29 mmol/L 23 - -   BUN 6.0 - 20.0 mg/dL 62.2(H) - -   Cr 0.67 - 1.17 mg/dL 4.07(H) - -   Glucose 70 - 99 mg/dL 100(H) 123(H) 99   Ca  8.6 - 10.0 mg/dL 8.9 - -   Mg 1.7 - 2.3 mg/dL 2.3 - -     Bone Health Latest Ref Rng & Units 1/19/2023 1/18/2023 1/17/2023   Phos 2.5 - 4.5 mg/dL 2.9 4.0 5.3(H)   PTHi 18 - 80 pg/mL - - -     Heme Latest Ref Rng & Units 1/19/2023 1/18/2023 1/17/2023   WBC 4.0 - 11.0 10e3/uL 9.1 10.2 15.6(H)   Hgb 13.3 - 17.7 g/dL 10.8(L) 9.3(L) 10.1(L)   Plt 150 - 450 10e3/uL 125(L) 95(L) 106(L)   ABSOLUTE NEUTROPHIL 1.6 - 8.3 10e9/L - - -   ABSOLUTE LYMPHOCYTES 0.8 - 5.3 10e9/L - - -   ABSOLUTE MONOCYTES 0.0 - 1.3 10e9/L - - -   ABSOLUTE EOSINOPHILS 0.0 - 0.7 10e9/L - - -   ABSOLUTE BASOPHILS 0.0 - 0.2 10e9/L - - -   ABS IMMATURE GRANULOCYTES 0 - 0.4 10e9/L - - -   ABSOLUTE NUCLEATED RBC - - - -     Liver Latest Ref Rng & Units 1/18/2023 1/17/2023 1/16/2023   AP 40 - 129 U/L 44 45 44   TBili <=1.2 mg/dL <0.2 <0.2 <0.2   DBili 0.00 - 0.30 mg/dL <0.20 <0.20 <0.20   ALT 10 - 50 U/L 59(H) 68(H) 66(H)   AST 10 - 50 U/L 50 94(H) 121(H)   Tot Protein 6.4 - 8.3 g/dL 4.9(L) 5.0(L) 5.1(L)   Albumin 3.5 - 5.2 g/dL 2.7(L) 2.8(L) 2.7(L)     Pancreas Latest Ref Rng & Units 1/15/2023   A1C <5.7 % 5.2        UMP Txp Virology Latest Ref Rng & Units  1/15/2023 8/18/2020   CMV QUANT IU/ML Not Detected IU/mL Not Detected -   EBV CAPSID ANTIBODY IGG No detectable antibody. Positive(A) >8.0(H)   Hep B Core NR:Nonreactive - Nonreactive        Recent Labs   Lab Test 01/18/23  0606   TACROL 2.6*

## 2023-01-19 NOTE — TELEPHONE ENCOUNTER
Transplant Coordinator review     Dr Tez Salas    Requesting  urology to remove ureteral stent FYI so if you can arrange that would be great. Thank you!       Referral to urology orders placed         Right kidney transplant,  Donation after Circulatory Death , Right  iliac fossa, with venous reconstruction. A J-J ureteral stent was placed.  2. Kidney allograft preparation on Back Table  3. Peritoneal dialysis catheter removal     Surgeon:  TEZ HAY

## 2023-01-19 NOTE — TELEPHONE ENCOUNTER
Chip is recent kidney transplant patient who discharged on 01/18/2023.     The patient will be responsible for managing medications.     Patient was given transplant supplies including 7 day pill organizer, thermometer, and BP monitor at the discharge pharmacy along with medications.      Patient chooses to receive medications from  specialty pharmacy.     HEALTH BENEFIT: MEDICARE   ID# [id number] (PART A EFFECTIVE (DATE: 5/1/2020) , PART B EFFECTIVE (DATE: 5/1/2020) )   PROCESSING INFO: ID# [id number] GRP# [id number] BIN# [id number]  SECONDARY HEALTH BENEFIT: ([employer])  ID#[id number] GRP#[id number] (EFFECTIVE (DATE: 5/1/2020) )  PROCESSING INFO: ([facility]) ID#[id number] GRP# [id number] BIN#[id number]  PT WILL PAY $0 AT TIME OF SERVICE       PHARMACY BENEFIT: ([employer]) PART D  PROCESSING INFO: ID# [id number] GRP# [id number] PCN# [id number] BIN# [id number] (EFFECTIVE (DATE: 1/1/2022) )   NO DEDUCTIBLE OR MAX OUT-OF-POCKET DUE TO LOW-INCOME SUBSIDY  COPAY STRUCTURE:  $ (1.45) FOR GENERIC  $ (4.30) FOR BRAND  (PA NEEDED) FOR NON-FORMULARY MEDICATIONS     OTC S BILLED TO TO [facility] BIN#[id number] ID# [id number]  PCN# [id number].     TEST CLAIM SPECIALTY #28   MYCOPHENOLATE 250mg (#240/30DS)= $0 AT TIME OF SERVICE  PROGRAF 1mg (#180/30DS)= $0 AT TIME OF SERVICE  TACROLIMUS 1mg (#60/30DS)= $0 AT TIME OF SERVICE  CYCLOSPORINE 100mg (#60/30DS)= $0 AT TIME OF SERVICE  VALGANCICLOVIR 450mg (#60/30DS)=$1.45  VALACYCLOVIR 1gm (#90=30DS) =$1.45      Stefano Harris, Pharm.D.  [facility]  [phone]

## 2023-01-19 NOTE — LETTER
Date:January 19, 2023      Provider requested that no letter be sent. Do not send.       [facility]

## 2023-01-20 ENCOUNTER — LAB (OUTPATIENT)
Dept: LAB | Facility: CLINIC | Age: 38
End: 2023-01-20
Attending: NURSE PRACTITIONER
Payer: MEDICARE

## 2023-01-20 ENCOUNTER — TELEPHONE (OUTPATIENT)
Dept: TRANSPLANT | Facility: CLINIC | Age: 38
End: 2023-01-20

## 2023-01-20 ENCOUNTER — OFFICE VISIT (OUTPATIENT)
Dept: INFUSION THERAPY | Facility: CLINIC | Age: 38
End: 2023-01-20
Attending: PSYCHIATRY & NEUROLOGY
Payer: MEDICARE

## 2023-01-20 ENCOUNTER — INFUSION THERAPY VISIT (OUTPATIENT)
Dept: INFUSION THERAPY | Facility: CLINIC | Age: 38
End: 2023-01-20
Attending: NURSE PRACTITIONER
Payer: MEDICARE

## 2023-01-20 VITALS
WEIGHT: 154.5 LBS | RESPIRATION RATE: 18 BRPM | BODY MASS INDEX: 21.32 KG/M2 | TEMPERATURE: 98.1 F | OXYGEN SATURATION: 100 %

## 2023-01-20 VITALS
DIASTOLIC BLOOD PRESSURE: 81 MMHG | RESPIRATION RATE: 16 BRPM | BODY MASS INDEX: 21.31 KG/M2 | SYSTOLIC BLOOD PRESSURE: 148 MMHG | WEIGHT: 154.4 LBS | HEART RATE: 65 BPM | OXYGEN SATURATION: 100 % | TEMPERATURE: 97.9 F

## 2023-01-20 DIAGNOSIS — Z94.0 KIDNEY REPLACED BY TRANSPLANT: ICD-10-CM

## 2023-01-20 DIAGNOSIS — Z94.0 KIDNEY REPLACED BY TRANSPLANT: Primary | ICD-10-CM

## 2023-01-20 DIAGNOSIS — N18.6 END STAGE RENAL DISEASE (H): ICD-10-CM

## 2023-01-20 DIAGNOSIS — R79.89 ELEVATED LFTS: Primary | ICD-10-CM

## 2023-01-20 LAB
ALT SERPL W P-5'-P-CCNC: 146 U/L (ref 10–50)
ANION GAP SERPL CALCULATED.3IONS-SCNC: 8 MMOL/L (ref 7–15)
AST SERPL W P-5'-P-CCNC: 70 U/L (ref 10–50)
BASOPHILS # BLD AUTO: 0 10E3/UL (ref 0–0.2)
BASOPHILS NFR BLD AUTO: 0 %
BUN SERPL-MCNC: 54.5 MG/DL (ref 6–20)
CALCIUM SERPL-MCNC: 8.8 MG/DL (ref 8.6–10)
CHLORIDE SERPL-SCNC: 107 MMOL/L (ref 98–107)
CREAT FLD-MCNC: 3.6 MG/DL
CREAT SERPL-MCNC: 3.6 MG/DL (ref 0.67–1.17)
CREATININE BODY FLUID SOURCE: NORMAL
DEPRECATED CALCIDIOL+CALCIFEROL SERPL-MC: 9 UG/L (ref 20–75)
DEPRECATED HCO3 PLAS-SCNC: 22 MMOL/L (ref 22–29)
EOSINOPHIL # BLD AUTO: 0.2 10E3/UL (ref 0–0.7)
EOSINOPHIL NFR BLD AUTO: 3 %
ERYTHROCYTE [DISTWIDTH] IN BLOOD BY AUTOMATED COUNT: 12.3 % (ref 10–15)
FERRITIN SERPL-MCNC: 717 NG/ML (ref 31–409)
GFR SERPL CREATININE-BSD FRML MDRD: 21 ML/MIN/1.73M2
GLUCOSE SERPL-MCNC: 95 MG/DL (ref 70–99)
HCT VFR BLD AUTO: 32.3 % (ref 40–53)
HGB BLD-MCNC: 11 G/DL (ref 13.3–17.7)
IMM GRANULOCYTES # BLD: 0 10E3/UL
IMM GRANULOCYTES NFR BLD: 0 %
IRON BINDING CAPACITY (ROCHE): 244 UG/DL (ref 240–430)
IRON SATN MFR SERPL: 63 % (ref 15–46)
IRON SERPL-MCNC: 153 UG/DL (ref 61–157)
LYMPHOCYTES # BLD AUTO: 0.3 10E3/UL (ref 0.8–5.3)
LYMPHOCYTES NFR BLD AUTO: 5 %
MAGNESIUM SERPL-MCNC: 2.2 MG/DL (ref 1.7–2.3)
MCH RBC QN AUTO: 31.7 PG (ref 26.5–33)
MCHC RBC AUTO-ENTMCNC: 34.1 G/DL (ref 31.5–36.5)
MCV RBC AUTO: 93 FL (ref 78–100)
MONOCYTES # BLD AUTO: 0.5 10E3/UL (ref 0–1.3)
MONOCYTES NFR BLD AUTO: 8 %
NEUTROPHILS # BLD AUTO: 5.8 10E3/UL (ref 1.6–8.3)
NEUTROPHILS NFR BLD AUTO: 84 %
NRBC # BLD AUTO: 0 10E3/UL
NRBC BLD AUTO-RTO: 0 /100
PHOSPHATE SERPL-MCNC: 2.8 MG/DL (ref 2.5–4.5)
PLATELET # BLD AUTO: 136 10E3/UL (ref 150–450)
POTASSIUM SERPL-SCNC: 4.8 MMOL/L (ref 3.4–5.3)
PTH-INTACT SERPL-MCNC: 101 PG/ML (ref 15–65)
RBC # BLD AUTO: 3.47 10E6/UL (ref 4.4–5.9)
SODIUM SERPL-SCNC: 137 MMOL/L (ref 136–145)
TACROLIMUS BLD-MCNC: 5.8 UG/L (ref 5–15)
TME LAST DOSE: NORMAL H
TME LAST DOSE: NORMAL H
WBC # BLD AUTO: 6.9 10E3/UL (ref 4–11)

## 2023-01-20 PROCEDURE — 82728 ASSAY OF FERRITIN: CPT | Performed by: NURSE PRACTITIONER

## 2023-01-20 PROCEDURE — 258N000003 HC RX IP 258 OP 636: Performed by: NURSE PRACTITIONER

## 2023-01-20 PROCEDURE — 96361 HYDRATE IV INFUSION ADD-ON: CPT

## 2023-01-20 PROCEDURE — 84450 TRANSFERASE (AST) (SGOT): CPT | Performed by: NURSE PRACTITIONER

## 2023-01-20 PROCEDURE — 250N000013 HC RX MED GY IP 250 OP 250 PS 637: Performed by: NURSE PRACTITIONER

## 2023-01-20 PROCEDURE — 83550 IRON BINDING TEST: CPT | Performed by: NURSE PRACTITIONER

## 2023-01-20 PROCEDURE — 83735 ASSAY OF MAGNESIUM: CPT | Performed by: NURSE PRACTITIONER

## 2023-01-20 PROCEDURE — 83970 ASSAY OF PARATHORMONE: CPT | Performed by: NURSE PRACTITIONER

## 2023-01-20 PROCEDURE — 84460 ALANINE AMINO (ALT) (SGPT): CPT | Performed by: NURSE PRACTITIONER

## 2023-01-20 PROCEDURE — 82955 ASSAY OF G6PD ENZYME: CPT | Performed by: NURSE PRACTITIONER

## 2023-01-20 PROCEDURE — G0463 HOSPITAL OUTPT CLINIC VISIT: HCPCS | Mod: 25 | Performed by: NURSE PRACTITIONER

## 2023-01-20 PROCEDURE — 84100 ASSAY OF PHOSPHORUS: CPT | Performed by: NURSE PRACTITIONER

## 2023-01-20 PROCEDURE — 99215 OFFICE O/P EST HI 40 MIN: CPT | Mod: 24 | Performed by: NURSE PRACTITIONER

## 2023-01-20 PROCEDURE — 85025 COMPLETE CBC W/AUTO DIFF WBC: CPT | Performed by: NURSE PRACTITIONER

## 2023-01-20 PROCEDURE — 250N000011 HC RX IP 250 OP 636: Performed by: PHYSICIAN ASSISTANT

## 2023-01-20 PROCEDURE — 80197 ASSAY OF TACROLIMUS: CPT | Performed by: NURSE PRACTITIONER

## 2023-01-20 PROCEDURE — 82306 VITAMIN D 25 HYDROXY: CPT | Performed by: NURSE PRACTITIONER

## 2023-01-20 PROCEDURE — 82570 ASSAY OF URINE CREATININE: CPT

## 2023-01-20 PROCEDURE — 96365 THER/PROPH/DIAG IV INF INIT: CPT

## 2023-01-20 PROCEDURE — 82947 ASSAY GLUCOSE BLOOD QUANT: CPT | Performed by: NURSE PRACTITIONER

## 2023-01-20 PROCEDURE — 80180 DRUG SCRN QUAN MYCOPHENOLATE: CPT | Performed by: NURSE PRACTITIONER

## 2023-01-20 PROCEDURE — 36415 COLL VENOUS BLD VENIPUNCTURE: CPT | Performed by: NURSE PRACTITIONER

## 2023-01-20 PROCEDURE — 258N000003 HC RX IP 258 OP 636: Performed by: PHYSICIAN ASSISTANT

## 2023-01-20 RX ORDER — ALBUTEROL SULFATE 0.83 MG/ML
2.5 SOLUTION RESPIRATORY (INHALATION)
Status: CANCELLED | OUTPATIENT
Start: 2023-01-20

## 2023-01-20 RX ORDER — HEPARIN SODIUM (PORCINE) LOCK FLUSH IV SOLN 100 UNIT/ML 100 UNIT/ML
5 SOLUTION INTRAVENOUS
Status: CANCELLED | OUTPATIENT
Start: 2023-01-20

## 2023-01-20 RX ORDER — MEPERIDINE HYDROCHLORIDE 25 MG/ML
25 INJECTION INTRAMUSCULAR; INTRAVENOUS; SUBCUTANEOUS EVERY 30 MIN PRN
Status: CANCELLED | OUTPATIENT
Start: 2023-01-20

## 2023-01-20 RX ORDER — PENTAMIDINE ISETHIONATE 300 MG/300MG
300 INHALANT RESPIRATORY (INHALATION) ONCE
Status: CANCELLED
Start: 2023-01-20 | End: 2023-01-20

## 2023-01-20 RX ORDER — ALBUTEROL SULFATE 90 UG/1
1-2 AEROSOL, METERED RESPIRATORY (INHALATION)
Status: CANCELLED
Start: 2023-01-20

## 2023-01-20 RX ORDER — METHYLPREDNISOLONE SODIUM SUCCINATE 125 MG/2ML
125 INJECTION, POWDER, LYOPHILIZED, FOR SOLUTION INTRAMUSCULAR; INTRAVENOUS
Status: CANCELLED
Start: 2023-01-20

## 2023-01-20 RX ORDER — DIPHENHYDRAMINE HYDROCHLORIDE 50 MG/ML
50 INJECTION INTRAMUSCULAR; INTRAVENOUS
Status: CANCELLED
Start: 2023-01-20

## 2023-01-20 RX ORDER — BISACODYL 10 MG
10 SUPPOSITORY, RECTAL RECTAL ONCE
Status: COMPLETED | OUTPATIENT
Start: 2023-01-20 | End: 2023-01-20

## 2023-01-20 RX ORDER — TACROLIMUS 1 MG/1
6 CAPSULE ORAL 2 TIMES DAILY
Qty: 360 CAPSULE | Refills: 11 | Status: SHIPPED | OUTPATIENT
Start: 2023-01-20 | End: 2023-01-21

## 2023-01-20 RX ORDER — EPINEPHRINE 1 MG/ML
0.3 INJECTION, SOLUTION, CONCENTRATE INTRAVENOUS EVERY 5 MIN PRN
Status: CANCELLED | OUTPATIENT
Start: 2023-01-20

## 2023-01-20 RX ORDER — HEPARIN SODIUM,PORCINE 10 UNIT/ML
5 VIAL (ML) INTRAVENOUS
Status: CANCELLED | OUTPATIENT
Start: 2023-01-20

## 2023-01-20 RX ORDER — EPINEPHRINE 1 MG/ML
0.3 INJECTION, SOLUTION INTRAMUSCULAR; SUBCUTANEOUS EVERY 5 MIN PRN
Status: CANCELLED | OUTPATIENT
Start: 2023-01-20

## 2023-01-20 RX ORDER — ALBUTEROL SULFATE 0.83 MG/ML
2.5 SOLUTION RESPIRATORY (INHALATION) ONCE
Status: CANCELLED
Start: 2023-01-20 | End: 2023-01-20

## 2023-01-20 RX ADMIN — SODIUM CHLORIDE 1000 ML: 9 INJECTION, SOLUTION INTRAVENOUS at 09:14

## 2023-01-20 RX ADMIN — BISACODYL 10 MG: 10 SUPPOSITORY RECTAL at 10:00

## 2023-01-20 RX ADMIN — SODIUM CHLORIDE 20 MG: 9 INJECTION, SOLUTION INTRAVENOUS at 08:18

## 2023-01-20 ASSESSMENT — PAIN SCALES - GENERAL
PAINLEVEL: SEVERE PAIN (7)
PAINLEVEL: SEVERE PAIN (7)

## 2023-01-20 NOTE — TELEPHONE ENCOUNTER
ISSUE:       OUTCOME:   Spoke with patient, they confirm accurate tacrolimus   trough level and current dose 5 mg BID. Patient confirmed dose change to tacrolimus  6 mg BID and to repeat labs in 1 days. Orders sent to preferred pharmacy for dose change and lab for repeat labs. Patient voiced understanding of plan.       Increased tacrolimus  6 mg twice  Per day

## 2023-01-20 NOTE — PROGRESS NOTES
"Chip Fowler came to [facility] today for a lab and assess following a kidney transplant on 1/15/23.      Discharge date: 1/18/23  Transplant coordinator: Veronica  Phone number patient can be reached at: see demographics    Physical Assessment:  See physical assessment located under \"Document Flowsheets\".  Incision site: CDI, dermabond. Also has previous PD site. Dressing supplies provided to patient.    Lines: TOM drain. Patient reports emptying 70ml last night and 50ml this morning. Nurse emptied 70ml in [facility]   Taylor: patient reports emptying 2302ml last night, 2502 at 0440.   Urine clarity: dark yellow - miguel   Hydration: ~2 liters intake yesterday  Nutrition: fair appetite. Denies n/v. Yesterday had flatbread pizza for lunch, pizza for dinner, sandwhich before bed   Last BM: 1/14/23. Using miralax daily, two tab senna BID   Pain: 7/10 to incision. Last dose oxy 0600. Hasn't been using tylenol - did discuss pain management/scheduled tylenol   My transplant place videos watched: yes per patient    Labs drawn by [facility] staff No drawn on second floor prior to [facility]     Plan of care for today:   Patient had labs drawn and PIV placed on second floor  Nursing assessment completed  Pill box set up - patient and girlfriend Ashleigh participating in education and asking questions appropriately. Patient needs reinforcement on education.   Simulect administered  Copy of labs printed and provided to patient  Phone call received from SILVIA Lara  - orders to start 1L NS bolus and admin dulcolax suppository   Patient able to have moderate sized BM.  Patient seen by SILVIA Lara - new orders for creatine from TOM, ultrasound, hold bactrim, and to return to [facility] Saturday. Recheck ALT/AST Saturday.   TOM drain sample sent to lab, ultrasound scheduled for Monday (Jane aware), bactrim removed from pill box.    Medication changes:   Hold Bactrim    Bactrim removed from pill box.    Medications administered:  patient self administered AM home " meds    Administrations This Visit     0.9% sodium chloride BOLUS     Admin Date  01/20/2023 Action  New Bag Dose  1,000 mL Rate  1,000 mL/hr Route  Intravenous Administered By  Sherie Cowan RN          basiliximab (SIMULECT) 20 mg in sodium chloride 0.9 % 60 mL infusion     Admin Date  01/20/2023 Action  New Bag Dose  20 mg Rate  120 mL/hr Route  Intravenous Administered By  Sherie Cowan RN          bisacodyl (DULCOLAX) suppository 10 mg     Admin Date  01/20/2023 Action  Given Dose  10 mg Route  Rectal Administered By  Sherie Cowan RN                Patient education:    The following teaching topics were addressed: Importance of drinking 2L of non-caffeinated fluids daily, Incisional care, Signs/symptoms of infection, Good handwashing, Medications (purposes, doses and times of administration), Phone numbers to call with concenrs (Transplant coordinator, [facility]), 7A discharge check list, Plan of care, Drain care and Taylor cath care   Patient and girlfrienpilar Sotelo  verbalized understanding and all questions answered.    Drug level:  Patient took 5mg of tacrolimus last evening at 1930.  Care coordinator to follow up with the result.    Face to face time: 90 minutes  Discharge Plan    Pt will follow up with [facility] on Saturday  Discharge instructions reviewed with patient: YES  Patient/Representative verbalized understanding, all questions answered: YES    Discharged from unit at 1100 with whom: girlfrienpilar Sotelo to home.    Sherie Cowan RN

## 2023-01-20 NOTE — LETTER
Date:January 20, 2023      Provider requested that no letter be sent. Do not send.       [facility]

## 2023-01-20 NOTE — LETTER
1/20/2023         RE: Chip Fowler  [address]  [facility] [address]        Dear Colleague,    Thank you for referring your patient, Chip Fowler, to the [facility]. Please see a copy of my visit note below.    TRANSPLANT  EARLY POST TRANSPLANT VISIT    Assessment & Plan   # DDKT: Trend down  Cr fluid drawn   - Baseline Creatinine: ~ TBD   - Proteinuria: Moderate (1-3 grams)   - Date DSA Last Checked: Jan/2023      Latest DSA: No DSA at time of transplant   - BK Viremia: No   - Kidney Tx Biopsy: No   - Transplant Ureteral Stent: Yes; Scheduled removal date 4-6weeks    # Immunosuppression: Tacrolimus immediate release (goal 8-10) and Mycophenolate mofetil (dose 750 mg every 12 hours)   - Induction with Recent Transplant:  Intermediate Intensity   - Continue with intensive monitoring of immunosuppression for efficacy and toxicity.   - Changes: Not at this time    # Infection Prophylaxis:   - PJP: hold.  G6pd drawn  - CMV: Valganciclovir (Valcyte)    # Blood Pressure: Controlled;  Goal BP: < 140/90   - Volume status: Hypovolemic  1 l IVF   - Changes: Not at this time    # Elevated Blood Glucose: Glucose generally running ~ 120s   - Management as per primary care.    # Anemia in Chronic Renal Disease: Hgb: Stable      SARA: No   - Iron studies: Not checked recently    # Mineral Bone Disorder:   - Secondary renal hyperparathyroidism; PTH level: Minimally elevated ( pg/ml)        On treatment: None  - Vitamin D; level: Low        On supplement: No  - Calcium; level: Normal        On supplement: No  - Phosphorus; level: Normal        On supplement: No    # Electrolytes:   - Potassium; level: Normal        On supplement: No  - Magnesium; level: Normal        On supplement: No  - Bicarbonate; level: Normal        On supplement: Yes  - Sodium; level: Normal    # Transaminitis:              - hx of alcohol abuse              - hepatitis serologies negative         "      - hold statin and use alternative to bactrim if not improving  RUQ Ultrasound.       # Hx of cardiomyopathy:              - attributed to methamphetamine use in 2020 (ef:15%)              - echo: ef 55-60% Sep 2023              - stress echo neg Feb 2021              - previously on ACEI+b-blocker \"lisinopril & coreg\", currently on hold as BP low      # Hx of Polysubstance use:              - hx of methamphetamine use              - attended AA and underwent CD eval              - sober since 3/2021      # Hx of urethral stricture:              - shaver per tx surgery/urology    # Chills:  UA/UC pending    # Medical Compliance: Yes    # COVID-19 Virus Review: Discussed COVID-19 virus and the potential medical risks.  Reviewed preventative health recommendations, including wearing a mask where appropriate.  Recommended COVID vaccination should be up to date with either an initial vaccination or booster shot when appropriate.  Asked the patient to inform the transplant center if they are exposed or diagnosed with this virus.    # COVID Vaccination Up To Date: No    # Transaminitis:              - hx of alcohol abuse              - hepatitis serologies negative              - hold statin and use alternative to bactrim if not improving     # Hx of cardiomyopathy:              - attributed to methamphetamine use in 2020 (ef:15%)              - echo: ef 55-60% Sep 2023              - stress echo neg Feb 2021              - previously on ACEI+b-blocker \"lisinopril & coreg\", currently on hold as BP low      # Hx of Polysubstance use:              - hx of methamphetamine use              - attended AA and underwent CD eval              - sober since 3/2021      # Hx of urethral stricture:              - shaver per tx surgery/urology    # Transplant History:  Etiology of Kidney Failure: Unknown etiology  Tx: DDKT  Transplant: 1/15/2023 (Kidney)  Donor Type: Donation after Circulatory Death  Donor Class:   Crossmatch at " "time of Tx: negative  DSA at time of Tx: No  Significant changes in immunosuppression: None  CMV IgG Ab High Risk Discordance (D+/R-): Yes  EBV IgG Ab High Risk Discordance (D+/R-): No  Significant transplant-related complications: None    Transplant Office Phone Number: [phone]    Assessment and plan was discussed with the patient and he voiced his understanding and agreement.    Return visit: No follow-ups on file.    Jane Man NP    Chief Complaint   Mr. Fowler is a 37 year old here for kidney transplant.     History of Present Illness    Chip Fowler is a 37 year old male with hx of end-stage renal disease of unclear etiology (kidney bx non diagnostic, neg serologic w/up, solitary L kidney), hx of CAKUT, urethral stricture s/p dilation, on PD x 2 years with residual renal function (~2L UOP/d), hx of polysubstance use \"methamphetamine, alcohol abuse\", cardiomyopathy \"attributed to methamphetamine, ef as low as 15% in 2020, nl ef 55-60% per last echo in 2022, status post DD KT on 01/15/2023 with J-J ureteral stent, intermediate risk induction.      No fevers, chills nausea or vomiting    Some burning with urination    Taylor in place     Home BP: Not checked    Problem List   Patient Active Problem List   Diagnosis     Unilateral congenital absence of kidney     ESRD (end stage renal disease) on dialysis (H)     Essential hypertension     Solitary kidney, congenital     Nonischemic cardiomyopathy (H)     Bladder stones     Urethral stone     Urethral stricture     Polysubstance abuse (H)     Urethral calculus     Abnormal urinalysis     Acute renal failure superimposed on chronic kidney disease (H)     Acute retention of urine     Congestive heart failure of unknown etiology (H)     Methamphetamine abuse, episodic (H)     Migraine     Nephrolithiasis     Tobacco use     Post-traumatic male urethral stricture     Urethral diverticulum     Leukocytosis     Allergic rhinitis, unspecified seasonality, " unspecified trigger     Other dysphagia     Chronic kidney disease     Pre-operative cardiovascular examination     Transplant, organ     Kidney replaced by transplant     Immunosuppressed status (H)     Elevated LFTs     Hyperkalemia       Allergies   No Known Allergies    Medications   Current Outpatient Medications   Medication Sig     acetaminophen (TYLENOL) 325 MG tablet Take 2 tablets (650 mg) by mouth every 4 hours as needed for pain     amoxicillin (AMOXIL) 250 MG capsule Take 1 capsule (250 mg) by mouth 2 times daily for 10 days     methocarbamol (ROBAXIN) 750 MG tablet Take 1 tablet (750 mg) by mouth every 6 hours as needed for muscle spasms (Patient not taking: Reported on 1/20/2023)     mycophenolate (GENERIC EQUIVALENT) 250 MG capsule Take 3 capsules (750 mg) by mouth 2 times daily     ondansetron (ZOFRAN ODT) 4 MG ODT tab Take 1 tablet (4 mg) by mouth every 6 hours as needed for nausea or vomiting (Patient not taking: Reported on 1/19/2023)     oxyCODONE (ROXICODONE) 5 MG tablet Take 1 tablet (5 mg) by mouth every 4 hours as needed for moderate pain (4-6)     pantoprazole (PROTONIX) 40 MG EC tablet Take 1 tablet (40 mg) by mouth every morning (before breakfast)     polyethylene glycol (MIRALAX) 17 GM/Dose powder Take 17 g (1 capful) by mouth 2 times daily     polyethylene glycol (MIRALAX) 17 GM/Dose powder Take 17 g by mouth daily     predniSONE (DELTASONE) 10 MG tablet Take 6 tablets (60 mg) by mouth daily for 1 day, THEN 4 tablets (40 mg) daily for 2 days, THEN 2 tablets (20 mg) daily for 7 days, THEN 1.5 tablets (15 mg) daily for 7 days, THEN 1 tablet (10 mg) daily for 7 days, THEN 0.5 tablets (5 mg) daily for 7 days.     senna-docusate (SENOKOT-S/PERICOLACE) 8.6-50 MG tablet Take 1-2 tablets by mouth 2 times daily     sertraline (ZOLOFT) 50 MG tablet Take 50 mg by mouth daily (Patient not taking: Reported on 1/19/2023)     sodium bicarbonate 650 MG tablet Take 1 tablet (650 mg) by mouth 2 times  daily     tacrolimus (GENERIC EQUIVALENT) 1 MG capsule Take 7 capsules (7 mg) by mouth 2 times daily     valGANciclovir (VALCYTE) 450 MG tablet Take 1 tablet (450 mg) by mouth twice a week     VITAMIN D, CHOLECALCIFEROL, PO Take 2,000 Units by mouth daily     No current facility-administered medications for this visit.     Medications Discontinued During This Encounter   Medication Reason     sulfamethoxazole-trimethoprim (BACTRIM) 400-80 MG tablet        Physical Exam   Vital Signs: BP (!) 148/81 (BP Location: Right arm, Patient Position: Sitting, Cuff Size: Adult Regular)   Pulse 65   Temp 97.9  F (36.6  C) (Oral)   Resp 16   Wt 70 kg (154 lb 6.4 oz)   SpO2 100%   BMI 21.31 kg/m      GENERAL APPEARANCE: alert and no distress  HENT: mouth without ulcers or lesions  RESP: lungs clear to auscultation - no rales, rhonchi or wheezes  CV: regular rhythm, normal rate, no rub, no murmur  EDEMA: no LE edema bilaterally  ABDOMEN: soft, nondistended, nontender, bowel sounds normal  MS: extremities normal - no gross deformities noted, no evidence of inflammation in joints, no muscle tenderness  SKIN: no rash    Data     Renal Latest Ref Rng & Units 1/23/2023 1/21/2023 1/20/2023   Na 136 - 145 mmol/L 135(L) 136 137   K 3.4 - 5.3 mmol/L 4.0 3.9 4.8   Cl 98 - 107 mmol/L 106 108(H) 107   CO2 22 - 29 mmol/L 20(L) 18(L) 22   BUN 6.0 - 20.0 mg/dL 36.1(H) 44.1(H) 54.5(H)   Cr 0.67 - 1.17 mg/dL 2.62(H) 3.12(H) 3.60(H)   Glucose 70 - 99 mg/dL 100(H) 143(H) 95   Ca  8.6 - 10.0 mg/dL 8.6 8.4(L) 8.8   Mg 1.7 - 2.3 mg/dL 1.7 2.0 2.2     Bone Health Latest Ref Rng & Units 1/23/2023 1/21/2023 1/20/2023   Phos 2.5 - 4.5 mg/dL 3.4 2.7 2.8   PTHi 15 - 65 pg/mL - - 101(H)   Vit D Def 20 - 75 ug/L - - 9(L)     Heme Latest Ref Rng & Units 1/23/2023 1/21/2023 1/20/2023   WBC 4.0 - 11.0 10e3/uL 8.0 7.7 6.9   Hgb 13.3 - 17.7 g/dL 10.9(L) 10.9(L) 11.0(L)   Plt 150 - 450 10e3/uL 229 150 136(L)   ABSOLUTE NEUTROPHIL 1.6 - 8.3 10e9/L - - -    ABSOLUTE LYMPHOCYTES 0.8 - 5.3 10e9/L - - -   ABSOLUTE MONOCYTES 0.0 - 1.3 10e9/L - - -   ABSOLUTE EOSINOPHILS 0.0 - 0.7 10e9/L - - -   ABSOLUTE BASOPHILS 0.0 - 0.2 10e9/L - - -   ABS IMMATURE GRANULOCYTES 0 - 0.4 10e9/L - - -   ABSOLUTE NUCLEATED RBC - - - -     Liver Latest Ref Rng & Units 1/23/2023 1/21/2023 1/20/2023   AP 40 - 129 U/L - - -   TBili <=1.2 mg/dL - - -   DBili 0.00 - 0.30 mg/dL - - -   ALT 10 - 50 U/L 96(H) 134(H) 146(H)   AST 10 - 50 U/L 24 48 70(H)   Tot Protein 6.4 - 8.3 g/dL - - -   Albumin 3.5 - 5.2 g/dL - - -     Pancreas Latest Ref Rng & Units 1/15/2023   A1C <5.7 % 5.2     Iron studies Latest Ref Rng & Units 1/20/2023   Iron 61 - 157 ug/dL 153   Iron sat 15 - 46 % 63(H)   Ferritin 31 - 409 ng/mL 717(H)     [facility] Txp Virology Latest Ref Rng & Units 1/15/2023 8/18/2020   CMV QUANT IU/ML Not Detected IU/mL Not Detected -   EBV CAPSID ANTIBODY IGG No detectable antibody. Positive(A) >8.0(H)   Hep B Core NR:Nonreactive - Nonreactive        Recent Labs   Lab Test 01/18/23  0606 01/19/23  0819 01/20/23  0746 01/21/23  0715   DOSTAC  --  1/18/2023 1/19/2023  --    TACROL 2.6* 5.0 5.8 6.1             Again, thank you for allowing me to participate in the care of your patient.        Sincerely,        Jane Man NP

## 2023-01-20 NOTE — LETTER
"    1/20/2023         RE: Chip Fowler  [address] [facility] [address]        Dear Colleague,    Thank you for referring your patient, Chip Fowler, to the [facility]. Please see a copy of my visit note below.    Chip Fowler came to [facility] today for a lab and assess following a kidney transplant on 1/15/23.      Discharge date: 1/18/23  Transplant coordinator: Veronica  Phone number patient can be reached at: see demographics    Physical Assessment:  See physical assessment located under \"Document Flowsheets\".  Incision site: CDI, dermabond. Also has previous PD site. Dressing supplies provided to patient.    Lines: TOM drain. Patient reports emptying 70ml last night and 50ml this morning. Nurse emptied 70ml in [facility]   Taylor: patient reports emptying 2302ml last night, 2502 at 0440.   Urine clarity: dark yellow - miguel   Hydration: ~2 liters intake yesterday  Nutrition: fair appetite. Denies n/v. Yesterday had flatbread pizza for lunch, pizza for dinner, sandwhich before bed   Last BM: 1/14/23. Using miralax daily, two tab senna BID   Pain: 7/10 to incision. Last dose oxy 0600. Hasn't been using tylenol - did discuss pain management/scheduled tylenol   My transplant place videos watched: yes per patient    Labs drawn by [facility] staff No drawn on second floor prior to [facility]     Plan of care for today:   Patient had labs drawn and PIV placed on second floor  Nursing assessment completed  Pill box set up - patient and girlfriend Ashleigh participating in education and asking questions appropriately. Patient needs reinforcement on education.   Simulect administered  Copy of labs printed and provided to patient  Phone call received from SILVIA Lara  - orders to start 1L NS bolus and admin dulcolax suppository   Patient able to have moderate sized BM.  Patient seen by SILVIA Lara - new orders for creatine from [facility], ultrasound, hold bactrim, and to return to [facility] Saturday. Recheck " ALT/AST Saturday.   TOM drain sample sent to lab, ultrasound scheduled for Monday (Jane jurado), bactrim removed from pill box.    Medication changes:   Hold Bactrim    Bactrim removed from pill box.    Medications administered:  patient self administered AM home meds    Administrations This Visit     0.9% sodium chloride BOLUS     Admin Date  01/20/2023 Action  New Bag Dose  1,000 mL Rate  1,000 mL/hr Route  Intravenous Administered By  Sherie Cowan RN          basiliximab (SIMULECT) 20 mg in sodium chloride 0.9 % 60 mL infusion     Admin Date  01/20/2023 Action  New Bag Dose  20 mg Rate  120 mL/hr Route  Intravenous Administered By  Sherie Cowan, DIPTI          bisacodyl (DULCOLAX) suppository 10 mg     Admin Date  01/20/2023 Action  Given Dose  10 mg Route  Rectal Administered By  Sherie Cowan, DIPTI                Patient education:    The following teaching topics were addressed: Importance of drinking 2L of non-caffeinated fluids daily, Incisional care, Signs/symptoms of infection, Good handwashing, Medications (purposes, doses and times of administration), Phone numbers to call with concenrs (Transplant coordinator, [facility] [address] and [facility]), 7A discharge check list, Plan of care, Drain care and Taylor cath care   Patient and girlfrienpilar Sotelo  verbalized understanding and all questions answered.    Drug level:  Patient took 5mg of tacrolimus last evening at 1930.  Care coordinator to follow up with the result.    Face to face time: 90 minutes  Discharge Plan    Pt will follow up with [facility] on Saturday  Discharge instructions reviewed with patient: YES  Patient/Representative verbalized understanding, all questions answered: YES    Discharged from unit at 1100 with whom: girlfrienpilar Sotelo to home.    Sherie Cowan RN      Again, thank you for allowing me to participate in the care of your patient.        Sincerely,        Specialty Infusion Nurse

## 2023-01-20 NOTE — LETTER
Date:January 24, 2023      Provider requested that no letter be sent. Do not send.       [facility]

## 2023-01-20 NOTE — PATIENT INSTRUCTIONS
Dear Chip Fowler    Thank you for choosing [facility] ([facility]) for your transplant cares.  The following information is a summary of our appointment as well as important reminders.      Return to [facility] on the second floor of the clinics and surgery center at 7:00am on Saturday. We will draw your labs during this visit. Bring all your medications and pill box to appointment  Continue using senna and miralax. Pushing fluids - try to drink 2-3L per day.   Hold bactrim until further notice (we removed this from your pill box). You will have an ultrasound done on Monday. You need to fast for 8 hours for this ultrasound     We look forward in seeing you on your next appointment here at [facility] ([facility]).  Please don t hesitate to call us at [phone] to reschedule any of your appointments or to speak with one of the [facility] registered nurses.  It was a pleasure taking care of you today.    Sincerely,    [facility]  [facility]  [address] [place] [address]  [address]  Phone:  [phone]

## 2023-01-21 ENCOUNTER — TELEPHONE (OUTPATIENT)
Dept: TRANSPLANT | Facility: CLINIC | Age: 38
End: 2023-01-21

## 2023-01-21 ENCOUNTER — INFUSION THERAPY VISIT (OUTPATIENT)
Dept: INFUSION THERAPY | Facility: CLINIC | Age: 38
End: 2023-01-21
Attending: NURSE PRACTITIONER
Payer: MEDICARE

## 2023-01-21 VITALS
WEIGHT: 151.9 LBS | RESPIRATION RATE: 16 BRPM | HEART RATE: 78 BPM | TEMPERATURE: 98.2 F | BODY MASS INDEX: 20.96 KG/M2 | OXYGEN SATURATION: 99 %

## 2023-01-21 DIAGNOSIS — Z94.0 KIDNEY REPLACED BY TRANSPLANT: Primary | ICD-10-CM

## 2023-01-21 DIAGNOSIS — R79.89 ELEVATED LFTS: ICD-10-CM

## 2023-01-21 DIAGNOSIS — Z94.0 KIDNEY REPLACED BY TRANSPLANT: ICD-10-CM

## 2023-01-21 LAB
ALT SERPL W P-5'-P-CCNC: 134 U/L (ref 10–50)
ANION GAP SERPL CALCULATED.3IONS-SCNC: 10 MMOL/L (ref 7–15)
AST SERPL W P-5'-P-CCNC: 48 U/L (ref 10–50)
BACTERIA UR CULT: ABNORMAL
BASOPHILS # BLD AUTO: 0 10E3/UL (ref 0–0.2)
BASOPHILS NFR BLD AUTO: 0 %
BUN SERPL-MCNC: 44.1 MG/DL (ref 6–20)
CALCIUM SERPL-MCNC: 8.4 MG/DL (ref 8.6–10)
CHLORIDE SERPL-SCNC: 108 MMOL/L (ref 98–107)
CREAT SERPL-MCNC: 3.12 MG/DL (ref 0.67–1.17)
DEPRECATED HCO3 PLAS-SCNC: 18 MMOL/L (ref 22–29)
EOSINOPHIL # BLD AUTO: 0.4 10E3/UL (ref 0–0.7)
EOSINOPHIL NFR BLD AUTO: 5 %
ERYTHROCYTE [DISTWIDTH] IN BLOOD BY AUTOMATED COUNT: 12.2 % (ref 10–15)
GFR SERPL CREATININE-BSD FRML MDRD: 25 ML/MIN/1.73M2
GLUCOSE SERPL-MCNC: 143 MG/DL (ref 70–99)
HCT VFR BLD AUTO: 31.3 % (ref 40–53)
HGB BLD-MCNC: 10.9 G/DL (ref 13.3–17.7)
IMM GRANULOCYTES # BLD: 0 10E3/UL
IMM GRANULOCYTES NFR BLD: 1 %
LYMPHOCYTES # BLD AUTO: 0.5 10E3/UL (ref 0.8–5.3)
LYMPHOCYTES NFR BLD AUTO: 6 %
MAGNESIUM SERPL-MCNC: 2 MG/DL (ref 1.7–2.3)
MCH RBC QN AUTO: 31.6 PG (ref 26.5–33)
MCHC RBC AUTO-ENTMCNC: 34.8 G/DL (ref 31.5–36.5)
MCV RBC AUTO: 91 FL (ref 78–100)
MONOCYTES # BLD AUTO: 0.6 10E3/UL (ref 0–1.3)
MONOCYTES NFR BLD AUTO: 8 %
NEUTROPHILS # BLD AUTO: 6.3 10E3/UL (ref 1.6–8.3)
NEUTROPHILS NFR BLD AUTO: 80 %
NRBC # BLD AUTO: 0 10E3/UL
NRBC BLD AUTO-RTO: 0 /100
PHOSPHATE SERPL-MCNC: 2.7 MG/DL (ref 2.5–4.5)
PLATELET # BLD AUTO: 150 10E3/UL (ref 150–450)
POTASSIUM SERPL-SCNC: 3.9 MMOL/L (ref 3.4–5.3)
RBC # BLD AUTO: 3.45 10E6/UL (ref 4.4–5.9)
SODIUM SERPL-SCNC: 136 MMOL/L (ref 136–145)
TACROLIMUS BLD-MCNC: 6.1 UG/L (ref 5–15)
TME LAST DOSE: NORMAL H
TME LAST DOSE: NORMAL H
WBC # BLD AUTO: 7.7 10E3/UL (ref 4–11)

## 2023-01-21 PROCEDURE — 36415 COLL VENOUS BLD VENIPUNCTURE: CPT

## 2023-01-21 PROCEDURE — 84100 ASSAY OF PHOSPHORUS: CPT

## 2023-01-21 PROCEDURE — 85004 AUTOMATED DIFF WBC COUNT: CPT

## 2023-01-21 PROCEDURE — 83735 ASSAY OF MAGNESIUM: CPT

## 2023-01-21 PROCEDURE — 80197 ASSAY OF TACROLIMUS: CPT

## 2023-01-21 PROCEDURE — 84460 ALANINE AMINO (ALT) (SGPT): CPT

## 2023-01-21 PROCEDURE — 80048 BASIC METABOLIC PNL TOTAL CA: CPT

## 2023-01-21 PROCEDURE — 84450 TRANSFERASE (AST) (SGOT): CPT

## 2023-01-21 RX ORDER — TACROLIMUS 1 MG/1
7 CAPSULE ORAL 2 TIMES DAILY
Qty: 360 CAPSULE | Refills: 11 | Status: SHIPPED | OUTPATIENT
Start: 2023-01-21 | End: 2023-01-23

## 2023-01-21 RX ORDER — AMOXICILLIN 250 MG/1
250 CAPSULE ORAL 2 TIMES DAILY
Qty: 20 CAPSULE | Refills: 0 | Status: SHIPPED | OUTPATIENT
Start: 2023-01-21 | End: 2023-01-31

## 2023-01-21 NOTE — TELEPHONE ENCOUNTER
ISSUE:   Tacrolimus IR level 6.1 on 1/21/2023, goal 8-10, dose 6 mg BID.    PLAN:   Please call patient and confirm this was an accurate 12-hour trough. Verify Tacrolimus IR dose 6 mg BID. Confirm no new medications or illness. Confirm no missed doses. If accurate trough and accurate dose, increase Tacrolimus IR dose to 7 mg BID and repeat labs in 2 days    OUTCOME:   Spoke with patient, they confirm accurate trough level and current dose 6 mg BID. Patient confirmed dose change to 7 mg BID and to repeat labs in 2 days. Orders sent to preferred pharmacy for dose change and lab for repeat labs. Patient voiced understanding of plan.     Katlyn Montelongo RN   Transplant Coordinator  [phone]

## 2023-01-21 NOTE — LETTER
"    1/21/2023         RE: Chip Fowler  [address] [facility] [address]        Dear Colleague,    Thank you for referring your patient, Chip Fowler, to the [facility]. Please see a copy of my visit note below.    Chip Fowler came to [facility] today for a lab and assess following a kidney transplant on 1/15/23.      Discharge date: 1/18/23  Transplant coordinator: Veronica Edgar  Phone number patient can be reached at: [phone]      Physical Assessment:  See physical assessment located under \"Document Flowsheets\".  Incision site: clean, dry and intact  Lines: TOM with serosanguinous drainage. TOM output 70 ml at 6 pm, 30 ml this morning at 0500. 60 ml emptied this morning in [facility].  Shaver: leg bag on. Urine output 1900 at 9:30 pm, 2200 at 0130 AM, 1600 at 5 AM.   Urine clarity: dark yellow, clear  Hydration: about 2 L yesterday, maybe a little more  Nutrition: appetite ok, gets full fast. Yesterday had lean cuisine meal for lunch, 4-5 chicken strips for dinner, this morning had 1/2 bagel   Last BM: 1/20 - had multiple BM after leaving [facility] (3-4 per patient), they were soft  Pain: 6/10 pain in lower abdomen. Taking intermittent oxycodone  My transplant place videos watched: yes    Labs drawn by [facility] staff Yes    Plan of care for today:   Continue to drink 2-3 L fluids  Continue to track urine and drain output  Return on Monday for US, labs, and shaver removal    Medication changes:   Start amoxicillin for UTI    Medications administered:  Pt self-administered morning medications.     Patient education:    The following teaching topics were addressed: Importance of drinking 2L of non-caffeinated fluids daily, Incisional care, Signs/symptoms of infection, Good handwashing, Medications (purposes, doses and times of administration), Phone numbers to call with concenrs (Transplant coordinator, [facility] and [facility]), Plan of care, Drain care and Shaver cath care   " Patient verbalized understanding and all questions answered.    Drug level:  Patient took 6 mg of tacrolimus last evening at 1930.  Care coordinator to follow up with the result.    Face to face time: 1 hour    Discharge Plan  Pt will follow up with appointments on Monday morning  Discharge instructions reviewed with patient: YES  Patient/Representative verbalized understanding, all questions answered: YES    Discharged from unit at 0930 with whom: self to home. Girlfriend will pick pt up downstairs after he gets new medication from the pharmacy.     Nati Cornelius RN      Again, thank you for allowing me to participate in the care of your patient.        Sincerely,        Specialty Infusion Nurse

## 2023-01-21 NOTE — PROGRESS NOTES
"Chip Fowler came to [facility] today for a lab and assess following a kidney transplant on 1/15/23.      Discharge date: 1/18/23  Transplant coordinator: Veronica Edgar  Phone number patient can be reached at: [phone]      Physical Assessment:  See physical assessment located under \"Document Flowsheets\".  Incision site: clean, dry and intact  Lines: TOM with serosanguinous drainage. TOM output 70 ml at 6 pm, 30 ml this morning at 0500. 60 ml emptied this morning in [facility].  Shaver: leg bag on. Urine output 1900 at 9:30 pm, 2200 at 0130 AM, 1600 at 5 AM.   Urine clarity: dark yellow, clear  Hydration: about 2 L yesterday, maybe a little more  Nutrition: appetite ok, gets full fast. Yesterday had lean cuisine meal for lunch, 4-5 chicken strips for dinner, this morning had 1/2 bagel   Last BM: 1/20 - had multiple BM after leaving [facility] (3-4 per patient), they were soft  Pain: 6/10 pain in lower abdomen. Taking intermittent oxycodone  My transplant place videos watched: yes    Labs drawn by [facility] staff Yes    Plan of care for today:   Continue to drink 2-3 L fluids  Continue to track urine and drain output  Return on Monday for US, labs, and shaver removal    Medication changes:   Start amoxicillin for UTI    Medications administered:  Pt self-administered morning medications.     Patient education:    The following teaching topics were addressed: Importance of drinking 2L of non-caffeinated fluids daily, Incisional care, Signs/symptoms of infection, Good handwashing, Medications (purposes, doses and times of administration), Phone numbers to call with concenrs (Transplant coordinator, [facility] and [facility]), Plan of care, Drain care and Shaver cath care   Patient verbalized understanding and all questions answered.    Drug level:  Patient took 6 mg of tacrolimus last evening at 1930.  Care coordinator to follow up with the result.    Face to face time: 1 hour    Discharge Plan  Pt will follow up with appointments on Monday " morning  Discharge instructions reviewed with patient: YES  Patient/Representative verbalized understanding, all questions answered: YES    Discharged from unit at 0930 with whom: self to home. Girlfriend will pick pt up downstairs after he gets new medication from the pharmacy.     Nati Cornelius RN

## 2023-01-21 NOTE — PATIENT INSTRUCTIONS
Dear Chip Fowler    Thank you for choosing [facility] ([facility]) for your transplant cares.  The following information is a summary of our appointment as well as important reminders.      Plan:  1) Start amoxicillin to treat UTI  2) Continue other medications as ordered. Transplant coordinator with call you with tacrolimus results  3) Continue to drink 2-3 L of non-caffeinated fluids per day. Can try Antelope drops or crystal light for some flavor  4) Continue to track output from shaver and abdominal drain  5) Continue to get weight every day and BP and temperature twice a day  6) Return on Monday for US, then labs and catheter removal. Check in on the 2nd floor.   7) Continue restrictions for activity - no driving or lifting over 10 lbs  8) Contact transplant team if weight gain or loss of more than 3 lbs in 1 day or 5 lbs in 3 days. Today's weight 151.8 lbs.    We look forward in seeing you on your next appointment here at [facility] ([facility]).  Please don t hesitate to call us at [phone] to reschedule any of your appointments or to speak with one of the [facility] registered nurses.  It was a pleasure taking care of you today.    Sincerely,    [facility]  [address] [place] [address]  [address]  Phone:  [phone]

## 2023-01-22 LAB
DONOR IDENTIFICATION: NORMAL
DSA COMMENTS: NORMAL
DSA PRESENT: NO
DSA TEST METHOD: NORMAL
G6PD RBC-CCNT: 15.5 U/G HB
ORGAN: NORMAL

## 2023-01-23 ENCOUNTER — ANCILLARY PROCEDURE (OUTPATIENT)
Dept: ULTRASOUND IMAGING | Facility: CLINIC | Age: 38
End: 2023-01-23
Attending: NURSE PRACTITIONER
Payer: MEDICARE

## 2023-01-23 ENCOUNTER — TELEPHONE (OUTPATIENT)
Dept: TRANSPLANT | Facility: CLINIC | Age: 38
End: 2023-01-23

## 2023-01-23 ENCOUNTER — OFFICE VISIT (OUTPATIENT)
Dept: INFUSION THERAPY | Facility: CLINIC | Age: 38
End: 2023-01-23
Attending: NURSE PRACTITIONER
Payer: MEDICARE

## 2023-01-23 ENCOUNTER — INFUSION THERAPY VISIT (OUTPATIENT)
Dept: INFUSION THERAPY | Facility: CLINIC | Age: 38
End: 2023-01-23
Attending: INTERNAL MEDICINE
Payer: MEDICARE

## 2023-01-23 ENCOUNTER — TELEPHONE (OUTPATIENT)
Dept: FAMILY MEDICINE | Facility: CLINIC | Age: 38
End: 2023-01-23

## 2023-01-23 ENCOUNTER — MEDICAL CORRESPONDENCE (OUTPATIENT)
Dept: HEALTH INFORMATION MANAGEMENT | Facility: CLINIC | Age: 38
End: 2023-01-23

## 2023-01-23 ENCOUNTER — LAB (OUTPATIENT)
Dept: LAB | Facility: CLINIC | Age: 38
End: 2023-01-23
Payer: MEDICARE

## 2023-01-23 ENCOUNTER — VIRTUAL VISIT (OUTPATIENT)
Dept: PHARMACY | Facility: CLINIC | Age: 38
End: 2023-01-23
Payer: COMMERCIAL

## 2023-01-23 VITALS
OXYGEN SATURATION: 100 % | BODY MASS INDEX: 20.22 KG/M2 | WEIGHT: 146.5 LBS | HEART RATE: 82 BPM | RESPIRATION RATE: 16 BRPM | TEMPERATURE: 98.1 F

## 2023-01-23 DIAGNOSIS — Z20.828 CONTACT WITH AND (SUSPECTED) EXPOSURE TO OTHER VIRAL COMMUNICABLE DISEASES: ICD-10-CM

## 2023-01-23 DIAGNOSIS — Z94.0 KIDNEY REPLACED BY TRANSPLANT: ICD-10-CM

## 2023-01-23 DIAGNOSIS — R68.83 CHILLS: ICD-10-CM

## 2023-01-23 DIAGNOSIS — Z94.0 KIDNEY REPLACED BY TRANSPLANT: Primary | ICD-10-CM

## 2023-01-23 DIAGNOSIS — R79.89 ELEVATED LFTS: ICD-10-CM

## 2023-01-23 DIAGNOSIS — R11.0 NAUSEA: ICD-10-CM

## 2023-01-23 DIAGNOSIS — G89.18 POSTOPERATIVE PAIN: ICD-10-CM

## 2023-01-23 DIAGNOSIS — Z94.0 TRANSPLANTED KIDNEY: Primary | ICD-10-CM

## 2023-01-23 DIAGNOSIS — Z48.298 AFTERCARE FOLLOWING ORGAN TRANSPLANT: ICD-10-CM

## 2023-01-23 DIAGNOSIS — R63.8 OTHER SYMPTOMS AND SIGNS CONCERNING FOOD AND FLUID INTAKE: ICD-10-CM

## 2023-01-23 DIAGNOSIS — Z94.0 TRANSPLANTED KIDNEY: ICD-10-CM

## 2023-01-23 DIAGNOSIS — R19.7 DIARRHEA, UNSPECIFIED TYPE: ICD-10-CM

## 2023-01-23 DIAGNOSIS — Z79.899 ENCOUNTER FOR LONG-TERM CURRENT USE OF MEDICATION: ICD-10-CM

## 2023-01-23 LAB
ACANTHOCYTES BLD QL SMEAR: SLIGHT
ALBUMIN UR-MCNC: 30 MG/DL
ALT SERPL W P-5'-P-CCNC: 96 U/L (ref 10–50)
ANION GAP SERPL CALCULATED.3IONS-SCNC: 9 MMOL/L (ref 7–15)
APPEARANCE UR: CLEAR
AST SERPL W P-5'-P-CCNC: 24 U/L (ref 10–50)
BASOPHILS # BLD AUTO: 0 10E3/UL (ref 0–0.2)
BASOPHILS NFR BLD AUTO: 0 %
BILIRUB UR QL STRIP: NEGATIVE
BUN SERPL-MCNC: 36.1 MG/DL (ref 6–20)
CALCIUM SERPL-MCNC: 8.6 MG/DL (ref 8.6–10)
CHLORIDE SERPL-SCNC: 106 MMOL/L (ref 98–107)
COLOR UR AUTO: ABNORMAL
CREAT SERPL-MCNC: 2.62 MG/DL (ref 0.67–1.17)
DEPRECATED HCO3 PLAS-SCNC: 20 MMOL/L (ref 22–29)
EOSINOPHIL # BLD AUTO: 0.6 10E3/UL (ref 0–0.7)
EOSINOPHIL NFR BLD AUTO: 7 %
ERYTHROCYTE [DISTWIDTH] IN BLOOD BY AUTOMATED COUNT: 12.3 % (ref 10–15)
GFR SERPL CREATININE-BSD FRML MDRD: 31 ML/MIN/1.73M2
GLUCOSE SERPL-MCNC: 100 MG/DL (ref 70–99)
GLUCOSE UR STRIP-MCNC: NEGATIVE MG/DL
HCT VFR BLD AUTO: 31.3 % (ref 40–53)
HGB BLD-MCNC: 10.9 G/DL (ref 13.3–17.7)
HGB UR QL STRIP: ABNORMAL
IMM GRANULOCYTES # BLD: 0.1 10E3/UL
IMM GRANULOCYTES NFR BLD: 1 %
KETONES UR STRIP-MCNC: NEGATIVE MG/DL
LEUKOCYTE ESTERASE UR QL STRIP: ABNORMAL
LYMPHOCYTES # BLD AUTO: 0.6 10E3/UL (ref 0.8–5.3)
LYMPHOCYTES NFR BLD AUTO: 8 %
MAGNESIUM SERPL-MCNC: 1.7 MG/DL (ref 1.7–2.3)
MCH RBC QN AUTO: 31.4 PG (ref 26.5–33)
MCHC RBC AUTO-ENTMCNC: 34.8 G/DL (ref 31.5–36.5)
MCV RBC AUTO: 90 FL (ref 78–100)
MONOCYTES # BLD AUTO: 0.7 10E3/UL (ref 0–1.3)
MONOCYTES NFR BLD AUTO: 9 %
MUCOUS THREADS #/AREA URNS LPF: PRESENT /LPF
MYCOPHENOLATE SERPL LC/MS/MS-MCNC: 3.75 MG/L (ref 1–3.5)
MYCOPHENOLATE-G SERPL LC/MS/MS-MCNC: 75 MG/L (ref 30–95)
NEUTROPHILS # BLD AUTO: 6 10E3/UL (ref 1.6–8.3)
NEUTROPHILS NFR BLD AUTO: 75 %
NITRATE UR QL: NEGATIVE
NRBC # BLD AUTO: 0 10E3/UL
NRBC BLD AUTO-RTO: 0 /100
PH UR STRIP: 6.5 [PH] (ref 5–7)
PHOSPHATE SERPL-MCNC: 3.4 MG/DL (ref 2.5–4.5)
PLAT MORPH BLD: ABNORMAL
PLATELET # BLD AUTO: 229 10E3/UL (ref 150–450)
POLYCHROMASIA BLD QL SMEAR: SLIGHT
POTASSIUM SERPL-SCNC: 4 MMOL/L (ref 3.4–5.3)
RBC # BLD AUTO: 3.47 10E6/UL (ref 4.4–5.9)
RBC MORPH BLD: ABNORMAL
RBC URINE: 116 /HPF
SODIUM SERPL-SCNC: 135 MMOL/L (ref 136–145)
SP GR UR STRIP: 1.01 (ref 1–1.03)
TACROLIMUS BLD-MCNC: 7.5 UG/L (ref 5–15)
TME LAST DOSE: ABNORMAL H
TME LAST DOSE: ABNORMAL H
TME LAST DOSE: NORMAL H
TME LAST DOSE: NORMAL H
TRANSITIONAL EPI: 1 /HPF
UROBILINOGEN UR STRIP-MCNC: NORMAL MG/DL
WBC # BLD AUTO: 8 10E3/UL (ref 4–11)
WBC URINE: 13 /HPF

## 2023-01-23 PROCEDURE — 99207 PR NO CHARGE LOS: CPT | Performed by: PHARMACIST

## 2023-01-23 PROCEDURE — 36415 COLL VENOUS BLD VENIPUNCTURE: CPT | Performed by: PATHOLOGY

## 2023-01-23 PROCEDURE — G0463 HOSPITAL OUTPT CLINIC VISIT: HCPCS | Performed by: NURSE PRACTITIONER

## 2023-01-23 PROCEDURE — 87086 URINE CULTURE/COLONY COUNT: CPT | Performed by: NURSE PRACTITIONER

## 2023-01-23 PROCEDURE — 76705 ECHO EXAM OF ABDOMEN: CPT | Mod: GC | Performed by: RADIOLOGY

## 2023-01-23 PROCEDURE — 80048 BASIC METABOLIC PNL TOTAL CA: CPT | Performed by: PATHOLOGY

## 2023-01-23 PROCEDURE — 84100 ASSAY OF PHOSPHORUS: CPT | Performed by: PATHOLOGY

## 2023-01-23 PROCEDURE — 81001 URINALYSIS AUTO W/SCOPE: CPT | Performed by: PATHOLOGY

## 2023-01-23 PROCEDURE — 84450 TRANSFERASE (AST) (SGOT): CPT | Performed by: PATHOLOGY

## 2023-01-23 PROCEDURE — 84460 ALANINE AMINO (ALT) (SGPT): CPT | Performed by: PATHOLOGY

## 2023-01-23 PROCEDURE — 85025 COMPLETE CBC W/AUTO DIFF WBC: CPT | Performed by: PATHOLOGY

## 2023-01-23 PROCEDURE — 83735 ASSAY OF MAGNESIUM: CPT | Performed by: PATHOLOGY

## 2023-01-23 PROCEDURE — 99215 OFFICE O/P EST HI 40 MIN: CPT | Mod: 24 | Performed by: NURSE PRACTITIONER

## 2023-01-23 PROCEDURE — 80197 ASSAY OF TACROLIMUS: CPT | Performed by: NURSE PRACTITIONER

## 2023-01-23 RX ORDER — TACROLIMUS 1 MG/1
7 CAPSULE ORAL 2 TIMES DAILY
Qty: 420 CAPSULE | Refills: 11 | Status: SHIPPED | OUTPATIENT
Start: 2023-01-23 | End: 2023-01-24

## 2023-01-23 RX ORDER — TACROLIMUS 0.5 MG/1
0.5 CAPSULE ORAL 2 TIMES DAILY
Qty: 180 CAPSULE | Refills: 3 | Status: SHIPPED | OUTPATIENT
Start: 2023-01-23 | End: 2023-01-24

## 2023-01-23 ASSESSMENT — ENCOUNTER SYMPTOMS: FEVER: 1

## 2023-01-23 NOTE — LETTER
1/23/2023         RE: Chip Fowler  [address] [facility] [address]        Dear Colleague,    Thank you for referring your patient, Chip Fowler, to the [facility]. Please see a copy of my visit note below.    Pt comes to [facility] today POD after kidney transplant for shaver catheter removal. Per discharge note ok to remove shaver after POD 7 and drain creatinine was obtained. RN contacted Jane Man today to verify if catheter removal was ok with last drain Cr on 1/20. Provider gave ok for removal. Shaver catheter removed around 8:15.     Pt able to void 125cc around 9:50 of clear yellow urine.  PVR was 18cc    Pt was seen by Jane Man today. No further orders obtained. Pt ok to discharge to home and will be having labs drawn with home care starting Thursday.       Again, thank you for allowing me to participate in the care of your patient.        Sincerely,        Specialty Infusion Nurse

## 2023-01-23 NOTE — TELEPHONE ENCOUNTER
Medication/Refill approved per CPA:    [facility] COLL[facility]ATIVE AGREEMENT FOR  IMMUNOSUPPRESSENT PRESCRIPTION MODIFICATION.      Routing encounter to Transplant as an FYI.    Thanks,  Tiara Stock, PharmD  Specialty Pharmacist/Transplant  [facility]  [phone]

## 2023-01-23 NOTE — PATIENT INSTRUCTIONS
"Recommendations from today's MTM visit:                                                    MTM (medication therapy management) is a service provided by a clinical pharmacist designed to help you get the most of out of your medicines.   Today we reviewed what your medicines are for, how to know if they are working, that your medicines are safe and how to make your medicine regimen as easy as possible.      1. After stopping Senokot-S, try starting Metamucil (fiber) for the loose stools.   2. Start taking Valcyte 450 mg (1 tablet) three times weekly on Monday, Wednesday, and Friday.  3. Continue to hold Bactrim until we hear back from the Transplant team.      Follow-up: 2 months post-transplant    It was great speaking with you today.  I value your experience and would be very thankful for your time in providing feedback in our clinic survey. In the next few days, you may receive an email or text message from [employer] with a link to a survey related to your  clinical pharmacist.\"     To schedule another MTM appointment, please call the clinic directly or you may call the MTM scheduling line at [phone] or toll-free at [phone].     My Clinical Pharmacist's contact information:                                                      Please feel free to contact me with any questions or concerns you have.      Oleksandr Ashraf, PharmD  MTM Pharmacist    Phone: [phone]     "

## 2023-01-23 NOTE — TELEPHONE ENCOUNTER
Pt called to clarify mychart message sent this afternoon.     Tacrolimus level of 7.5 on 1/23/23 is below your goal of 8-10. Increase to 7.5 mg bid.     Pt states he only has 1 mg capsules. Writer sent a new prescription to his local [facility] for 0.5 mg capsules. Pt to start 7.5 mg tacrolimus bid.     Writer discouraged cutting capsules.     Pt verbalized understanding and agrees with the plan.

## 2023-01-23 NOTE — LETTER
1/23/2023         RE: Chip Fowler  [address] [facility] [address]        Dear Colleague,    Thank you for referring your patient, Chip Fowler, to the [facility]. Please see a copy of my visit note below.    TRANSPLANT  EARLY POST TRANSPLANT VISIT    Assessment & Plan   # DDKT: Trend down  Taylor removed PVR was normal.    - Baseline Creatinine: ~ TBD   - Proteinuria: Moderate (1-3 grams)   - Date DSA Last Checked: Jan/2023      Latest DSA: No DSA at time of transplant   - BK Viremia: No   - Kidney Tx Biopsy: No   - Transplant Ureteral Stent: Yes; Scheduled removal date 4-6weeks    # Immunosuppression: Tacrolimus immediate release (goal 8-10) and Mycophenolate mofetil (dose 750 mg every 12 hours)   - Induction with Recent Transplant:  Intermediate Intensity   - Continue with intensive monitoring of immunosuppression for efficacy and toxicity.   - Changes: Not at this time    # Infection Prophylaxis:   - PJP: Dapsone. Ordered   - CMV: Valganciclovir (Valcyte)    # Blood Pressure: Controlled;  Goal BP: < 140/90   - Volume status: Hypovolemic  1 l IVF   - Changes: Not at this time    # Elevated Blood Glucose: Glucose generally running ~ 120s   - Management as per primary care.    # Anemia in Chronic Renal Disease: Hgb: Stable      SARA: No   - Iron studies: Not checked recently    # Mineral Bone Disorder:   - Secondary renal hyperparathyroidism; PTH level: Minimally elevated ( pg/ml)        On treatment: None  - Vitamin D; level: Low        On supplement: No  - Calcium; level: Normal        On supplement: No  - Phosphorus; level: Normal        On supplement: No    # Electrolytes:   - Potassium; level: Normal        On supplement: No  - Magnesium; level: Normal        On supplement: No  - Bicarbonate; level: Normal        On supplement: Yes  - Sodium; level: Normal    # Transaminitis:              - hx of alcohol abuse              - hepatitis serologies  "negative              - hold statin and use alternative to bactrim if not improving  RUQ Ultrasound was normal      # Hx of cardiomyopathy:              - attributed to methamphetamine use in 2020 (ef:15%)              - echo: ef 55-60% Sep 2023              - stress echo neg Feb 2021              - previously on ACEI+b-blocker \"lisinopril & coreg\", currently on hold as BP low      # Hx of Polysubstance use:              - hx of methamphetamine use              - attended AA and underwent CD eval              - sober since 3/2021      # Hx of urethral stricture:              - shaver per tx surgery/urology    # Chills:  UA/UC pending    # Medical Compliance: Yes    # COVID-19 Virus Review: Discussed COVID-19 virus and the potential medical risks.  Reviewed preventative health recommendations, including wearing a mask where appropriate.  Recommended COVID vaccination should be up to date with either an initial vaccination or booster shot when appropriate.  Asked the patient to inform the transplant center if they are exposed or diagnosed with this virus.    # COVID Vaccination Up To Date: No    # Transaminitis:              - hx of alcohol abuse              - hepatitis serologies negative              - hold statin and use alternative to bactrim if not improving     # Hx of cardiomyopathy:              - attributed to methamphetamine use in 2020 (ef:15%)              - echo: ef 55-60% Sep 2023              - stress echo neg Feb 2021              - previously on ACEI+b-blocker \"lisinopril & coreg\", currently on hold as BP low      # Hx of Polysubstance use:              - hx of methamphetamine use              - attended AA and underwent CD eval              - sober since 3/2021      # Hx of urethral stricture:              - shaver per tx surgery/urology   -removed, voiding normally    #UTI:  On amoxicillin    # Transplant History:  Etiology of Kidney Failure: Unknown etiology  Tx: DDKT  Transplant: 1/15/2023 " "(Kidney)  Donor Type: Donation after Circulatory Death  Donor Class:   Crossmatch at time of Tx: negative  DSA at time of Tx: No  Significant changes in immunosuppression: None  CMV IgG Ab High Risk Discordance (D+/R-): Yes  EBV IgG Ab High Risk Discordance (D+/R-): No  Significant transplant-related complications: None    Transplant Office Phone Number: [phone]    Assessment and plan was discussed with the patient and he voiced his understanding and agreement.    Return visit: No follow-ups on file.    Jane Man NP    Chief Complaint   Mr. Fowler is a 37 year old here for kidney transplant.     History of Present Illness    Chip Fowler is a 37 year old male with hx of end-stage renal disease of unclear etiology (kidney bx non diagnostic, neg serologic w/up, solitary L kidney), hx of CAKUT, urethral stricture s/p dilation, on PD x 2 years with residual renal function (~2L UOP/d), hx of polysubstance use \"methamphetamine, alcohol abuse\", cardiomyopathy \"attributed to methamphetamine, ef as low as 15% in 2020, nl ef 55-60% per last echo in 2022, status post DD KT on 01/15/2023 with J-J ureteral stent, intermediate risk induction.      No fevers, chills nausea or vomiting    Some burning with urination    Taylor removed and waiting to void     Home BP: Not checked    Problem List   Patient Active Problem List   Diagnosis     Unilateral congenital absence of kidney     ESRD (end stage renal disease) on dialysis (H)     Essential hypertension     Solitary kidney, congenital     Nonischemic cardiomyopathy (H)     Bladder stones     Urethral stone     Urethral stricture     Polysubstance abuse (H)     Urethral calculus     Abnormal urinalysis     Acute renal failure superimposed on chronic kidney disease (H)     Acute retention of urine     Congestive heart failure of unknown etiology (H)     Methamphetamine abuse, episodic (H)     Migraine     Nephrolithiasis     Tobacco use     Post-traumatic male " urethral stricture     Urethral diverticulum     Leukocytosis     Allergic rhinitis, unspecified seasonality, unspecified trigger     Other dysphagia     Chronic kidney disease     Pre-operative cardiovascular examination     Transplant, organ     Kidney replaced by transplant     Immunosuppressed status (H)     Elevated LFTs     Hyperkalemia       Allergies   No Known Allergies    Medications   Current Outpatient Medications   Medication Sig     acetaminophen (TYLENOL) 325 MG tablet Take 2 tablets (650 mg) by mouth every 4 hours as needed for pain (Patient not taking: Reported on 1/23/2023)     amoxicillin (AMOXIL) 250 MG capsule Take 1 capsule (250 mg) by mouth 2 times daily for 10 days     methocarbamol (ROBAXIN) 750 MG tablet Take 1 tablet (750 mg) by mouth every 6 hours as needed for muscle spasms (Patient not taking: Reported on 1/20/2023)     mycophenolate (GENERIC EQUIVALENT) 250 MG capsule Take 3 capsules (750 mg) by mouth 2 times daily     ondansetron (ZOFRAN ODT) 4 MG ODT tab Take 1 tablet (4 mg) by mouth every 6 hours as needed for nausea or vomiting (Patient not taking: Reported on 1/19/2023)     oxyCODONE (ROXICODONE) 5 MG tablet Take 1 tablet (5 mg) by mouth every 4 hours as needed for moderate pain (4-6)     pantoprazole (PROTONIX) 40 MG EC tablet Take 1 tablet (40 mg) by mouth every morning (before breakfast)     predniSONE (DELTASONE) 10 MG tablet Take 6 tablets (60 mg) by mouth daily for 1 day, THEN 4 tablets (40 mg) daily for 2 days, THEN 2 tablets (20 mg) daily for 7 days, THEN 1.5 tablets (15 mg) daily for 7 days, THEN 1 tablet (10 mg) daily for 7 days, THEN 0.5 tablets (5 mg) daily for 7 days.     senna-docusate (SENOKOT-S/PERICOLACE) 8.6-50 MG tablet Take 1-2 tablets by mouth 2 times daily     sodium bicarbonate 650 MG tablet Take 1 tablet (650 mg) by mouth 2 times daily     tacrolimus (GENERIC EQUIVALENT) 1 MG capsule Take 7 capsules (7 mg) by mouth 2 times daily     valGANciclovir (VALCYTE)  450 MG tablet Take 1 tablet (450 mg) by mouth twice a week     VITAMIN D, CHOLECALCIFEROL, PO Take 2,000 Units by mouth daily     No current facility-administered medications for this visit.     There are no discontinued medications.    Physical Exam   Vital Signs: There were no vitals taken for this visit.    GENERAL APPEARANCE: alert and no distress  HENT: mouth without ulcers or lesions  RESP: lungs clear to auscultation - no rales, rhonchi or wheezes  CV: regular rhythm, normal rate, no rub, no murmur  EDEMA: no LE edema bilaterally  ABDOMEN: soft, nondistended, nontender, bowel sounds normal  MS: extremities normal - no gross deformities noted, no evidence of inflammation in joints, no muscle tenderness  SKIN: no rash    Data     Renal Latest Ref Rng & Units 1/23/2023 1/21/2023 1/20/2023   Na 136 - 145 mmol/L 135(L) 136 137   K 3.4 - 5.3 mmol/L 4.0 3.9 4.8   Cl 98 - 107 mmol/L 106 108(H) 107   CO2 22 - 29 mmol/L 20(L) 18(L) 22   BUN 6.0 - 20.0 mg/dL 36.1(H) 44.1(H) 54.5(H)   Cr 0.67 - 1.17 mg/dL 2.62(H) 3.12(H) 3.60(H)   Glucose 70 - 99 mg/dL 100(H) 143(H) 95   Ca  8.6 - 10.0 mg/dL 8.6 8.4(L) 8.8   Mg 1.7 - 2.3 mg/dL 1.7 2.0 2.2     Bone Health Latest Ref Rng & Units 1/23/2023 1/21/2023 1/20/2023   Phos 2.5 - 4.5 mg/dL 3.4 2.7 2.8   PTHi 15 - 65 pg/mL - - 101(H)   Vit D Def 20 - 75 ug/L - - 9(L)     Heme Latest Ref Rng & Units 1/23/2023 1/21/2023 1/20/2023   WBC 4.0 - 11.0 10e3/uL 8.0 7.7 6.9   Hgb 13.3 - 17.7 g/dL 10.9(L) 10.9(L) 11.0(L)   Plt 150 - 450 10e3/uL 229 150 136(L)   ABSOLUTE NEUTROPHIL 1.6 - 8.3 10e9/L - - -   ABSOLUTE LYMPHOCYTES 0.8 - 5.3 10e9/L - - -   ABSOLUTE MONOCYTES 0.0 - 1.3 10e9/L - - -   ABSOLUTE EOSINOPHILS 0.0 - 0.7 10e9/L - - -   ABSOLUTE BASOPHILS 0.0 - 0.2 10e9/L - - -   ABS IMMATURE GRANULOCYTES 0 - 0.4 10e9/L - - -   ABSOLUTE NUCLEATED RBC - - - -     Liver Latest Ref Rng & Units 1/23/2023 1/21/2023 1/20/2023   AP 40 - 129 U/L - - -   TBili <=1.2 mg/dL - - -   DBili 0.00 - 0.30  mg/dL - - -   ALT 10 - 50 U/L 96(H) 134(H) 146(H)   AST 10 - 50 U/L 24 48 70(H)   Tot Protein 6.4 - 8.3 g/dL - - -   Albumin 3.5 - 5.2 g/dL - - -     Pancreas Latest Ref Rng & Units 1/15/2023   A1C <5.7 % 5.2     Iron studies Latest Ref Rng & Units 1/20/2023   Iron 61 - 157 ug/dL 153   Iron sat 15 - 46 % 63(H)   Ferritin 31 - 409 ng/mL 717(H)     [facility] Txp Virology Latest Ref Rng & Units 1/15/2023 8/18/2020   CMV QUANT IU/ML Not Detected IU/mL Not Detected -   EBV CAPSID ANTIBODY IGG No detectable antibody. Positive(A) >8.0(H)   Hep B Core NR:Nonreactive - Nonreactive        Recent Labs   Lab Test 01/19/23  0819 01/20/23  0746 01/21/23  0715 01/23/23  0748   DOSTAC 1/18/2023 1/19/2023  --  1/22/2023   TACROL 5.0 5.8 6.1 7.5     Recent Labs   Lab Test 01/20/23  0746   DOSMPA 1/19/2023   7:30 PM   MPACID 3.75*   MPAG 75.0         Jane Man, NP

## 2023-01-23 NOTE — TELEPHONE ENCOUNTER
[facility] calling for verbal orders     Skilled nursing 1 for 1 week  2 times a week for 8 weeks   3 prn visits     Homecare RN reports the patient stopped taking his Zoloft and doesn't feel like he needs it anymore.  He was not taken it before the kidney transplant but was given in the hospital       Advised will call back with providers response/yes or no to homecare orders before seeing the patient . Patient has an establish care visit 1/31  Future Appointments 1/23/2023 - 7/22/2023      Date Visit Type Length Department Provider     1/30/2023  1:45 PM UMP KIDNEY POST OP 15 min UC SOT SURGERY UC SOT SURG FELLOW 1    Location Instructions:     Located in the [facility] at [address] [id number]. For parking options, enter the [facility] /arrival plaza from [facility] and attendants can assist you based on your needs.  parking is available for those with limited mobility M-F from 7 a.m. to 5 p.m. Due to short staffing, we are unable to offer  to all patients/visitors. Visit [email or web address] for more details.  Self-parking:&nbsp;  West Lot: Located across from the main entrance, this is a convenient option for patients. Enter on [facility]. Parking attendants available most hours to assist.&nbsp;     [address] Ramp: Enter at the [facility] SE entrance (one block north of the [facility] main entrance). Do not enter the ramp from [facility] - this entrance is not staffed and is further from the [facility] main entrance.              1/31/2023 10:00 AM NEW - ED/HOSP/UC FOLLOW UP 30 min  FAMILY PRACTICE Tato Ralph DO    Location Instructions:     [facility] is located at [address] about one mile east of the [facility] [address] exit off of [address]. To access the parking lot from [facility] [address] [facility] [address], turn south onto [facility] [address]. From [facility] [address] [address] [facility] [address], turn west onto [facility] [address] [facility] [address], then north onto [facility].              2/13/2023   4:00 PM UMP POST-OP 15 min  SOT SURGERY Tez Emerson MD    Location Instructions:     Located in the [facility] at [address] [place] [address]. For parking options, enter the [facility] /arrival plaza from [facility] and attendants can assist you based on your needs.  parking is available for those with limited mobility M-F from 7 a.m. to 5 p.m. Due to short staffing, we are unable to offer  to all patients/visitors. Visit [email or web address] for more details.  Self-parking:&nbsp;  West Lot: Located across from the main entrance, this is a convenient option for patients. Enter on [address]. Parking attendants available most hours to assist.&nbsp;     [address] Ramp: Enter at the [address] entrance (one block north of the [facility] main entrance). Do not enter the ramp from [address] - this entrance is not staffed and is further from the [facility] main entrance.              2/28/2023  8:45 AM LAB 15 min [facility] LABORATORY  LAB    Location Instructions:     Located in the [facility] at [address] [place] [address]. For parking options, enter the [facility] /arrival plaza from [facility] and attendants can assist you based on your needs.  parking is available for those with limited mobility M-F from 7 a.m. to 5 p.m. Due to short staffing, we are unable to offer  to all patients/visitors. Visit [email or web address] for more details.  Self-parking:&nbsp;  West Lot: Located across from the main entrance, this is a convenient option for patients. Enter on [address]. Parking attendants available most hours to assist.&nbsp;     [address] Ramp: Enter at the [address] SE entrance (one block north of the [facility] main entrance). Do not enter the ramp from [address] - this entrance is not staffed and is further from the [facility] main entrance.              2/28/2023  9:30 AM ACUTE KIDNEY TRANSPLANT 30 min  MEDICINE RENAL Spong,  Edwin Mendiola MD    Location Instructions:     Located in the [facility] at [address] [place] [address]. For parking options, enter the [facility] /arrival plaza from [facility] and attendants can assist you based on your needs.  parking is available for those with limited mobility M-F from 7 a.m. to 5 p.m. Due to short staffing, we are unable to offer  to all patients/visitors. Visit [email or web address] for more details.  Self-parking:&nbsp;  West Lot: Located across from the main entrance, this is a convenient option for patients. Enter on [address]. Parking attendants available most hours to assist.&nbsp;     [place] [address] Ramp: Enter at the [facility] SE entrance (one block north of the [facility] main entrance). Do not enter the ramp from [facility] - this entrance is not staffed and is further from the [facility] main entrance.              3/8/2023  8:00 AM RETURN PATIENT 15 min [facility] UROLOGY Sam Mejia MD    Location Instructions:     Located in the [facility] at [address] [place] [address]. For parking options, enter the [facility] /arrival plaza from [facility] and attendants can assist you based on your needs.  parking is available for those with limited mobility M-F from 7 a.m. to 5 p.m. Due to short staffing, we are unable to offer  to all patients/visitors. Visit [email or web address]/[facility] for more details.  Self-parking:&nbsp;  West Lot: Located across from the main entrance, this is a convenient option for patients. Enter on [address]. Parking attendants available most hours to assist.&nbsp;     [place] [address] Ramp: Enter at the [facility] SE entrance (one block north of the [facility] main entrance). Do not enter the ramp from [place] [address] - this entrance is not staffed and is further from the [facility] main entrance.              3/14/2023  9:00 AM RETURN CARDIOLOGY 30 min  CARDIOVASCULAR CTR Bashir Lawson MD    Location Instructions:      Located in the [place] at [address] [place] [address]. For parking options, enter the [facility] /arrival plaza from [facility] and attendants can assist you based on your needs.  parking is available for those with limited mobility M-F from 7 a.m. to 5 p.m. Due to short staffing, we are unable to offer  to all patients/visitors. Visit [email or web address]/[facility] for more details.  Self-parking:&nbsp;  West Lot: Located across from the main entrance, this is a convenient option for patients. Enter on [address]. Parking attendants available most hours to assist.&nbsp;     [address] Ramp: Enter at the [facility] entrance (one block north of the [facility] main entrance). Do not enter the ramp from [facility] - this entrance is not staffed and is further from the [facility] main entrance.              3/20/2023  8:15 AM LAB 15 min [facility] LABORATORY  LAB    Location Instructions:     Located in the [facility] at [address] [place] [address]. For parking options, enter the [facility] /arrival plaza from [facility] and attendants can assist you based on your needs.  parking is available for those with limited mobility M-F from 7 a.m. to 5 p.m. Due to short staffing, we are unable to offer  to all patients/visitors. Visit [email or web address]/[facility] for more details.  Self-parking:&nbsp;  West Lot: Located across from the main entrance, this is a convenient option for patients. Enter on [facility]. Parking attendants available most hours to assist.&nbsp;     [address] Ramp: Enter at the [facility] entrance (one block north of the [facility] main entrance). Do not enter the ramp from [facility] - this entrance is not staffed and is further from the [facility] main entrance.              3/20/2023  9:00 AM ACUTE KIDNEY TRANSPLANT 30 min  MEDICINE RENAL El Georgie Powers MD    Location Instructions:     Located in the [place] at [address] [place] [address]  [facility] [id number]. For parking options, enter the [facility] /arrival plaza from [facility] and attendants can assist you based on your needs.  parking is available for those with limited mobility M-F from 7 a.m. to 5 p.m. Due to short staffing, we are unable to offer  to all patients/visitors. Visit [email or web address] for more details.  Self-parking:&nbsp;  West Lot: Located across from the main entrance, this is a convenient option for patients. Enter on [facility]. Parking attendants available most hours to assist.&nbsp;     [facility] Ramp: Enter at the [facility] SE entrance (one block north of the [facility] main entrance). Do not enter the ramp from [facility] - this entrance is not staffed and is further from the [facility] main entrance.                 Martha MELÉNDEZ RN   [facility] Triage

## 2023-01-23 NOTE — TELEPHONE ENCOUNTER
ISSUE:   Tacrolimus IR level 7.5 on 1/23, goal 8-10, dose 7 mg BID.    PLAN:   Please call patient and confirm this was an accurate 12-hour trough. Verify Tacrolimus IR dose 7 mg BID. Confirm no new medications or illness. Confirm no missed doses. If accurate trough and accurate dose, increase Tacrolimus IR dose to 7.5 mg BID and repeat labs in 3 days

## 2023-01-23 NOTE — PROGRESS NOTES
Pt comes to [facility] today POD after kidney transplant for shaver catheter removal. Per discharge note ok to remove shaver after POD 7 and drain creatinine was obtained. RN contacted Jane Man today to verify if catheter removal was ok with last drain Cr on 1/20. Provider gave ok for removal. Shaver catheter removed around 8:15.     Pt able to void 125cc around 9:50 of clear yellow urine.  PVR was 18cc    Pt was seen by Jane Man today. No further orders obtained. Pt ok to discharge to home and will be having labs drawn with home care starting Thursday.

## 2023-01-23 NOTE — TELEPHONE ENCOUNTER
[employer] message sent to patient regarding:  Tacrolimus IR level 7.5 on 1/23, goal 8-10, dose 7 mg BID.     PLAN:   Please call patient and confirm this was an accurate 12-hour trough. Verify Tacrolimus IR dose 7 mg BID. Confirm no new medications or illness. Confirm no missed doses. If accurate trough and accurate dose, increase Tacrolimus IR dose to 7.5 mg BID and repeat labs in 3 days

## 2023-01-23 NOTE — PATIENT INSTRUCTIONS
Continue to push fluids  If you are unable to void please contact transplant coordinator  Continue to outpatient labs twice weekly

## 2023-01-23 NOTE — PROGRESS NOTES
Medication Therapy Management (MTM) Encounter    ASSESSMENT:                            Medication Adherence/Access: No issues identified    Renal Transplant: Patient is currently not on a PJP prophylaxis regimen due to recent hyperkalemia and G6PD testing. The patient's most recent Potassium level was 4 and his G6PD was 15.5, both WNL. The transplant team is being consulted to determine if the patient should be started back on Bactrim or start on another agent, such as Dapsone.    Post-Operative Pain: Stable.    Nausea: Stable.    Diarrhea: With continued loose stools, the patient was recommended to start taking Metamucil after he finishes his supply of Senokot-S.    PLAN:                            1. After stopping Senokot-S, try starting Metamucil (fiber) for the loose stools if they continue.   2. Start taking Valcyte 450 mg (1 tablet) three times weekly on Monday, Wednesday, and Friday.  3. Continue to hold Bactrim until we hear back from the Transplant team.    Transplant team consider...  Patient's Bactrim was stopped due to hyperkalemia. Potassium currently at 4, would you like to restart at 3 times weekly or go ahead and use Dapsone?    Follow-up: 2 months post-transplant    SUBJECTIVE/OBJECTIVE:                          Chip Fowler is a 37 year old male called for a transitions of care visit. He was discharged from [facility] on 1/18/23 for Kidney Transplant.      Reason for visit: initial review post kidney transplant.    Allergies/ADRs: Reviewed in chart  Past Medical History: Reviewed in chart  Tobacco: He reports that he has quit smoking. His smoking use included cigarettes. He started smoking about 21 years ago. He smoked an average of .4 packs per day. He has never used smokeless tobacco. Patient is wondering about vaping products and if that would be appropriate with his recent transplant. Spoke about the risks of using nicotine based vaping products.   Alcohol: none  Other Substance Use: no CBD or THC  use.     Medication Adherence/Access: Patient uses pill box(es). Patient uses alarms.  Patient takes medications 2 time(s) per day at 7:30 AM and 7:30 PM.  Per patient, misses medication 0 times per week.   Medication barriers: none.   The patient fills medications at [place]: YES.    Renal Transplant:  Current immunosuppressants include TAC 7 mg twice daily, Mycophenolate Mofetil 750 mg twice daily and Prednisone 20 mg daily (with taper schedule).  Pt reports Diarrhea (had since dialysis), tremors (pt describes as mild-moderate).  Transplant date: 1/15/23  Estimated Creatinine Clearance: 36 mL/min (A) (based on SCr of 2.62 mg/dL (H)).  CMV prophylaxis: CrCl 25 to 39 mL/minute: Valcyte 450 mg twice weekly on Mondays and Thursdays.  Donor (+), Recipient (-), treat 6 months post tx .  PJP prophylaxis: Was started on bactrim but was instructed to hold for hyperkalemia and G6PD testing on 1/20.  PPI use: Pantoprazole 40 mg daily in the morning. No reports of heartburn.   Supplements: Sodium bicarbonate 650 mg twice daily and Vitamin D 2000 units daily .  Tx Coordinator: Yonathan Hernandez MD: Dr. Edwin Green, Using Med Card: Yes  Recent Infections:  UTI, patient is currently taking Amoxicillin 250 mg twice daily  Immunizations: annual flu shot 2020; Pneumovax 23:  2020; Prevnar 13: 2020; TDaP:  2020; Shingrix: none, HBV: immunity per last titer, COVID: Moderna x 3    Lab Results   Component Value Date    CO2 20 (L) 01/23/2023    CO2 18 (L) 01/21/2023    CO2 22 01/20/2023     Lab Results   Component Value Date    POTASSIUM 4.0 01/23/2023    POTASSIUM 3.9 01/21/2023    POTASSIUM 4.8 01/20/2023    POTASSIUM 4.3 01/19/2023    POTASSIUM 5.1 01/18/2023     Lab Results   Component Value Date    G6PD 15.5 01/20/2023    G6PD 18.0 (H) 01/17/2023     Post-Operative Pain: Patient is taking APAP 650 mg every 4 hours as needed (has not needed recently), Methocarbamol 750 mg every 6 hours as needed (has not needed recently) and  Oxycodone 1 tabs 1-2 times daily. Patient reports being sore and pain at 5-6/10. Patient reports that he feels the pain is fairly well controlled, mostly identifies the pain as soreness.     Nausea: Patient is taking Ondansetron 4 mg every 6 hours as needed. Patient reports that he has not needed to use Ondansetron but experiences occasional nausea due to increased fluid intake.    Diarrhea: Patient reports diarrhea episodes about twice per day. Patient is not currently using Fiber. He reports that he is taking Senokot-S 1-2 tablets twice daily. Patient states he desires to finish the remainder of his Senokot-S on hand prior to addressing the diarrhea with any supplementation (ie. Fiber).    Today's Vitals: There were no vitals taken for this visit.  ----------------  Post Discharge Medication Reconciliation Status: discharge medications reconciled and changed, per note/orders.     I spent 30 minutes with this patient today. All changes were made via collaborative practice agreement with Dr. Green. A copy of the visit note was provided to the patient's provider(s).    A summary of these recommendations was sent via [employer].    Fifi Barnett, PharmD Student.     Oleksandr Ashraf, PharmD  [facility] Pharmacist    Phone: [phone]     Telemedicine Visit Details  Type of service:  Telephone visit  Start Time: 1:36 PM  End Time: 2:06 PM     Medication Therapy Recommendations  Diarrhea, unspecified type    Rationale: Untreated condition - Needs additional medication therapy - Indication   Recommendation: Start Medication - METAMUCIL FIBER PO   Status: Accepted - no CPA Needed         Kidney replaced by transplant    Current Medication: sulfamethoxazole-trimethoprim (BACTRIM) 400-80 MG tablet (Discontinued)   Rationale: Preventive therapy - Needs additional medication therapy - Indication   Recommendation: Start Medication - sulfamethoxazole-trimethoprim 400-80 MG tablet   Status: Contact Provider - Awaiting Response           Current Medication: valGANciclovir (VALCYTE) 450 MG tablet   Rationale: Dose too low - Dosage too low - Effectiveness   Recommendation: Increase Frequency   Status: Accepted per CPA

## 2023-01-23 NOTE — PROGRESS NOTES
TRANSPLANT  EARLY POST TRANSPLANT VISIT    Assessment & Plan   # DDKT: Trend down  Taylor removed PVR was normal.    - Baseline Creatinine: ~ TBD   - Proteinuria: Moderate (1-3 grams)   - Date DSA Last Checked: Jan/2023      Latest DSA: No DSA at time of transplant   - BK Viremia: No   - Kidney Tx Biopsy: No   - Transplant Ureteral Stent: Yes; Scheduled removal date 4-6weeks    # Immunosuppression: Tacrolimus immediate release (goal 8-10) and Mycophenolate mofetil (dose 750 mg every 12 hours)   - Induction with Recent Transplant:  Intermediate Intensity   - Continue with intensive monitoring of immunosuppression for efficacy and toxicity.   - Changes: Not at this time    # Infection Prophylaxis:   - PJP: Dapsone. Ordered   - CMV: Valganciclovir (Valcyte)    # Blood Pressure: Controlled;  Goal BP: < 140/90   - Volume status: Hypovolemic  1 l IVF   - Changes: Not at this time    # Elevated Blood Glucose: Glucose generally running ~ 120s   - Management as per primary care.    # Anemia in Chronic Renal Disease: Hgb: Stable      SARA: No   - Iron studies: Not checked recently    # Mineral Bone Disorder:   - Secondary renal hyperparathyroidism; PTH level: Minimally elevated ( pg/ml)        On treatment: None  - Vitamin D; level: Low        On supplement: No  - Calcium; level: Normal        On supplement: No  - Phosphorus; level: Normal        On supplement: No    # Electrolytes:   - Potassium; level: Normal        On supplement: No  - Magnesium; level: Normal        On supplement: No  - Bicarbonate; level: Normal        On supplement: Yes  - Sodium; level: Normal    # Transaminitis:              - hx of alcohol abuse              - hepatitis serologies negative              - hold statin and use alternative to bactrim if not improving  RUQ Ultrasound was normal      # Hx of cardiomyopathy:              - attributed to methamphetamine use in 2020 (ef:15%)              - echo: ef 55-60% Sep 2023              - stress  "echo neg Feb 2021              - previously on ACEI+b-blocker \"lisinopril & coreg\", currently on hold as BP low      # Hx of Polysubstance use:              - hx of methamphetamine use              - attended AA and underwent CD eval              - sober since 3/2021      # Hx of urethral stricture:              - shaver per tx surgery/urology    # Chills:  UA/UC pending    # Medical Compliance: Yes    # COVID-19 Virus Review: Discussed COVID-19 virus and the potential medical risks.  Reviewed preventative health recommendations, including wearing a mask where appropriate.  Recommended COVID vaccination should be up to date with either an initial vaccination or booster shot when appropriate.  Asked the patient to inform the transplant center if they are exposed or diagnosed with this virus.    # COVID Vaccination Up To Date: No    # Transaminitis:              - hx of alcohol abuse              - hepatitis serologies negative              - hold statin and use alternative to bactrim if not improving     # Hx of cardiomyopathy:              - attributed to methamphetamine use in 2020 (ef:15%)              - echo: ef 55-60% Sep 2023              - stress echo neg Feb 2021              - previously on ACEI+b-blocker \"lisinopril & coreg\", currently on hold as BP low      # Hx of Polysubstance use:              - hx of methamphetamine use              - attended AA and underwent CD eval              - sober since 3/2021      # Hx of urethral stricture:              - shaver per tx surgery/urology   -removed, voiding normally    #UTI:  On amoxicillin    # Transplant History:  Etiology of Kidney Failure: Unknown etiology  Tx: DDKT  Transplant: 1/15/2023 (Kidney)  Donor Type: Donation after Circulatory Death  Donor Class:   Crossmatch at time of Tx: negative  DSA at time of Tx: No  Significant changes in immunosuppression: None  CMV IgG Ab High Risk Discordance (D+/R-): Yes  EBV IgG Ab High Risk Discordance (D+/R-): " "No  Significant transplant-related complications: None    Transplant Office Phone Number: [phone]    Assessment and plan was discussed with the patient and he voiced his understanding and agreement.    Return visit: No follow-ups on file.    Jane Man NP    Chief Complaint   Mr. Fowler is a 37 year old here for kidney transplant.     History of Present Illness    Chip Fowler is a 37 year old male with hx of end-stage renal disease of unclear etiology (kidney bx non diagnostic, neg serologic w/up, solitary L kidney), hx of CAKUT, urethral stricture s/p dilation, on PD x 2 years with residual renal function (~2L UOP/d), hx of polysubstance use \"methamphetamine, alcohol abuse\", cardiomyopathy \"attributed to methamphetamine, ef as low as 15% in 2020, nl ef 55-60% per last echo in 2022, status post DD KT on 01/15/2023 with J-J ureteral stent, intermediate risk induction.      No fevers, chills nausea or vomiting    Some burning with urination    Taylor removed and waiting to void     Home BP: Not checked    Problem List   Patient Active Problem List   Diagnosis     Unilateral congenital absence of kidney     ESRD (end stage renal disease) on dialysis (H)     Essential hypertension     Solitary kidney, congenital     Nonischemic cardiomyopathy (H)     Bladder stones     Urethral stone     Urethral stricture     Polysubstance abuse (H)     Urethral calculus     Abnormal urinalysis     Acute renal failure superimposed on chronic kidney disease (H)     Acute retention of urine     Congestive heart failure of unknown etiology (H)     Methamphetamine abuse, episodic (H)     Migraine     Nephrolithiasis     Tobacco use     Post-traumatic male urethral stricture     Urethral diverticulum     Leukocytosis     Allergic rhinitis, unspecified seasonality, unspecified trigger     Other dysphagia     Chronic kidney disease     Pre-operative cardiovascular examination     Transplant, organ     Kidney replaced by " transplant     Immunosuppressed status (H)     Elevated LFTs     Hyperkalemia       Allergies   No Known Allergies    Medications   Current Outpatient Medications   Medication Sig     acetaminophen (TYLENOL) 325 MG tablet Take 2 tablets (650 mg) by mouth every 4 hours as needed for pain (Patient not taking: Reported on 1/23/2023)     amoxicillin (AMOXIL) 250 MG capsule Take 1 capsule (250 mg) by mouth 2 times daily for 10 days     methocarbamol (ROBAXIN) 750 MG tablet Take 1 tablet (750 mg) by mouth every 6 hours as needed for muscle spasms (Patient not taking: Reported on 1/20/2023)     mycophenolate (GENERIC EQUIVALENT) 250 MG capsule Take 3 capsules (750 mg) by mouth 2 times daily     ondansetron (ZOFRAN ODT) 4 MG ODT tab Take 1 tablet (4 mg) by mouth every 6 hours as needed for nausea or vomiting (Patient not taking: Reported on 1/19/2023)     oxyCODONE (ROXICODONE) 5 MG tablet Take 1 tablet (5 mg) by mouth every 4 hours as needed for moderate pain (4-6)     pantoprazole (PROTONIX) 40 MG EC tablet Take 1 tablet (40 mg) by mouth every morning (before breakfast)     predniSONE (DELTASONE) 10 MG tablet Take 6 tablets (60 mg) by mouth daily for 1 day, THEN 4 tablets (40 mg) daily for 2 days, THEN 2 tablets (20 mg) daily for 7 days, THEN 1.5 tablets (15 mg) daily for 7 days, THEN 1 tablet (10 mg) daily for 7 days, THEN 0.5 tablets (5 mg) daily for 7 days.     senna-docusate (SENOKOT-S/PERICOLACE) 8.6-50 MG tablet Take 1-2 tablets by mouth 2 times daily     sodium bicarbonate 650 MG tablet Take 1 tablet (650 mg) by mouth 2 times daily     tacrolimus (GENERIC EQUIVALENT) 1 MG capsule Take 7 capsules (7 mg) by mouth 2 times daily     valGANciclovir (VALCYTE) 450 MG tablet Take 1 tablet (450 mg) by mouth twice a week     VITAMIN D, CHOLECALCIFEROL, PO Take 2,000 Units by mouth daily     No current facility-administered medications for this visit.     There are no discontinued medications.    Physical Exam   Vital Signs:  There were no vitals taken for this visit.    GENERAL APPEARANCE: alert and no distress  HENT: mouth without ulcers or lesions  RESP: lungs clear to auscultation - no rales, rhonchi or wheezes  CV: regular rhythm, normal rate, no rub, no murmur  EDEMA: no LE edema bilaterally  ABDOMEN: soft, nondistended, nontender, bowel sounds normal  MS: extremities normal - no gross deformities noted, no evidence of inflammation in joints, no muscle tenderness  SKIN: no rash    Data     Renal Latest Ref Rng & Units 1/23/2023 1/21/2023 1/20/2023   Na 136 - 145 mmol/L 135(L) 136 137   K 3.4 - 5.3 mmol/L 4.0 3.9 4.8   Cl 98 - 107 mmol/L 106 108(H) 107   CO2 22 - 29 mmol/L 20(L) 18(L) 22   BUN 6.0 - 20.0 mg/dL 36.1(H) 44.1(H) 54.5(H)   Cr 0.67 - 1.17 mg/dL 2.62(H) 3.12(H) 3.60(H)   Glucose 70 - 99 mg/dL 100(H) 143(H) 95   Ca  8.6 - 10.0 mg/dL 8.6 8.4(L) 8.8   Mg 1.7 - 2.3 mg/dL 1.7 2.0 2.2     Bone Health Latest Ref Rng & Units 1/23/2023 1/21/2023 1/20/2023   Phos 2.5 - 4.5 mg/dL 3.4 2.7 2.8   PTHi 15 - 65 pg/mL - - 101(H)   Vit D Def 20 - 75 ug/L - - 9(L)     Heme Latest Ref Rng & Units 1/23/2023 1/21/2023 1/20/2023   WBC 4.0 - 11.0 10e3/uL 8.0 7.7 6.9   Hgb 13.3 - 17.7 g/dL 10.9(L) 10.9(L) 11.0(L)   Plt 150 - 450 10e3/uL 229 150 136(L)   ABSOLUTE NEUTROPHIL 1.6 - 8.3 10e9/L - - -   ABSOLUTE LYMPHOCYTES 0.8 - 5.3 10e9/L - - -   ABSOLUTE MONOCYTES 0.0 - 1.3 10e9/L - - -   ABSOLUTE EOSINOPHILS 0.0 - 0.7 10e9/L - - -   ABSOLUTE BASOPHILS 0.0 - 0.2 10e9/L - - -   ABS IMMATURE GRANULOCYTES 0 - 0.4 10e9/L - - -   ABSOLUTE NUCLEATED RBC - - - -     Liver Latest Ref Rng & Units 1/23/2023 1/21/2023 1/20/2023   AP 40 - 129 U/L - - -   TBili <=1.2 mg/dL - - -   DBili 0.00 - 0.30 mg/dL - - -   ALT 10 - 50 U/L 96(H) 134(H) 146(H)   AST 10 - 50 U/L 24 48 70(H)   Tot Protein 6.4 - 8.3 g/dL - - -   Albumin 3.5 - 5.2 g/dL - - -     Pancreas Latest Ref Rng & Units 1/15/2023   A1C <5.7 % 5.2     Iron studies Latest Ref Rng & Units 1/20/2023   Iron 61  - 157 ug/dL 153   Iron sat 15 - 46 % 63(H)   Ferritin 31 - 409 ng/mL 717(H)     [facility] Txp Virology Latest Ref Rng & Units 1/15/2023 8/18/2020   CMV QUANT IU/ML Not Detected IU/mL Not Detected -   EBV CAPSID ANTIBODY IGG No detectable antibody. Positive(A) >8.0(H)   Hep B Core NR:Nonreactive - Nonreactive        Recent Labs   Lab Test 01/19/23  0819 01/20/23  0746 01/21/23  0715 01/23/23  0748   DOSTAC 1/18/2023 1/19/2023  --  1/22/2023   TACROL 5.0 5.8 6.1 7.5     Recent Labs   Lab Test 01/20/23  0746   DOSMPA 1/19/2023   7:30 PM   MPACID 3.75*   MPAG 75.0

## 2023-01-23 NOTE — PROGRESS NOTES
TRANSPLANT  EARLY POST TRANSPLANT VISIT    Assessment & Plan   # DDKT: Trend down  Cr fluid drawn   - Baseline Creatinine: ~ TBD   - Proteinuria: Moderate (1-3 grams)   - Date DSA Last Checked: Jan/2023      Latest DSA: No DSA at time of transplant   - BK Viremia: No   - Kidney Tx Biopsy: No   - Transplant Ureteral Stent: Yes; Scheduled removal date 4-6weeks    # Immunosuppression: Tacrolimus immediate release (goal 8-10) and Mycophenolate mofetil (dose 750 mg every 12 hours)   - Induction with Recent Transplant:  Intermediate Intensity   - Continue with intensive monitoring of immunosuppression for efficacy and toxicity.   - Changes: Not at this time    # Infection Prophylaxis:   - PJP: hold.  G6pd drawn  - CMV: Valganciclovir (Valcyte)    # Blood Pressure: Controlled;  Goal BP: < 140/90   - Volume status: Hypovolemic  1 l IVF   - Changes: Not at this time    # Elevated Blood Glucose: Glucose generally running ~ 120s   - Management as per primary care.    # Anemia in Chronic Renal Disease: Hgb: Stable      SARA: No   - Iron studies: Not checked recently    # Mineral Bone Disorder:   - Secondary renal hyperparathyroidism; PTH level: Minimally elevated ( pg/ml)        On treatment: None  - Vitamin D; level: Low        On supplement: No  - Calcium; level: Normal        On supplement: No  - Phosphorus; level: Normal        On supplement: No    # Electrolytes:   - Potassium; level: Normal        On supplement: No  - Magnesium; level: Normal        On supplement: No  - Bicarbonate; level: Normal        On supplement: Yes  - Sodium; level: Normal    # Transaminitis:              - hx of alcohol abuse              - hepatitis serologies negative              - hold statin and use alternative to bactrim if not improving  RUQ Ultrasound.       # Hx of cardiomyopathy:              - attributed to methamphetamine use in 2020 (ef:15%)              - echo: ef 55-60% Sep 2023              - stress echo neg Feb 2021            "   - previously on ACEI+b-blocker \"lisinopril & coreg\", currently on hold as BP low      # Hx of Polysubstance use:              - hx of methamphetamine use              - attended AA and underwent CD eval              - sober since 3/2021      # Hx of urethral stricture:              - shaver per tx surgery/urology    # Chills:  UA/UC pending    # Medical Compliance: Yes    # COVID-19 Virus Review: Discussed COVID-19 virus and the potential medical risks.  Reviewed preventative health recommendations, including wearing a mask where appropriate.  Recommended COVID vaccination should be up to date with either an initial vaccination or booster shot when appropriate.  Asked the patient to inform the transplant center if they are exposed or diagnosed with this virus.    # COVID Vaccination Up To Date: No    # Transaminitis:              - hx of alcohol abuse              - hepatitis serologies negative              - hold statin and use alternative to bactrim if not improving     # Hx of cardiomyopathy:              - attributed to methamphetamine use in 2020 (ef:15%)              - echo: ef 55-60% Sep 2023              - stress echo neg Feb 2021              - previously on ACEI+b-blocker \"lisinopril & coreg\", currently on hold as BP low      # Hx of Polysubstance use:              - hx of methamphetamine use              - attended AA and underwent CD eval              - sober since 3/2021      # Hx of urethral stricture:              - shaver per tx surgery/urology    # Transplant History:  Etiology of Kidney Failure: Unknown etiology  Tx: DDKT  Transplant: 1/15/2023 (Kidney)  Donor Type: Donation after Circulatory Death  Donor Class:   Crossmatch at time of Tx: negative  DSA at time of Tx: No  Significant changes in immunosuppression: None  CMV IgG Ab High Risk Discordance (D+/R-): Yes  EBV IgG Ab High Risk Discordance (D+/R-): No  Significant transplant-related complications: None    Transplant Office Phone Number: " "[phone]    Assessment and plan was discussed with the patient and he voiced his understanding and agreement.    Return visit: No follow-ups on file.    Jane Man NP    Chief Complaint   Mr. Fowler is a 37 year old here for kidney transplant.     History of Present Illness    Chip Fowler is a 37 year old male with hx of end-stage renal disease of unclear etiology (kidney bx non diagnostic, neg serologic w/up, solitary L kidney), hx of CAKUT, urethral stricture s/p dilation, on PD x 2 years with residual renal function (~2L UOP/d), hx of polysubstance use \"methamphetamine, alcohol abuse\", cardiomyopathy \"attributed to methamphetamine, ef as low as 15% in 2020, nl ef 55-60% per last echo in 2022, status post DD KT on 01/15/2023 with J-J ureteral stent, intermediate risk induction.      No fevers, chills nausea or vomiting    Some burning with urination    Taylor in place     Home BP: Not checked    Problem List   Patient Active Problem List   Diagnosis     Unilateral congenital absence of kidney     ESRD (end stage renal disease) on dialysis (H)     Essential hypertension     Solitary kidney, congenital     Nonischemic cardiomyopathy (H)     Bladder stones     Urethral stone     Urethral stricture     Polysubstance abuse (H)     Urethral calculus     Abnormal urinalysis     Acute renal failure superimposed on chronic kidney disease (H)     Acute retention of urine     Congestive heart failure of unknown etiology (H)     Methamphetamine abuse, episodic (H)     Migraine     Nephrolithiasis     Tobacco use     Post-traumatic male urethral stricture     Urethral diverticulum     Leukocytosis     Allergic rhinitis, unspecified seasonality, unspecified trigger     Other dysphagia     Chronic kidney disease     Pre-operative cardiovascular examination     Transplant, organ     Kidney replaced by transplant     Immunosuppressed status (H)     Elevated LFTs     Hyperkalemia       Allergies   No Known " Allergies    Medications   Current Outpatient Medications   Medication Sig     acetaminophen (TYLENOL) 325 MG tablet Take 2 tablets (650 mg) by mouth every 4 hours as needed for pain     amoxicillin (AMOXIL) 250 MG capsule Take 1 capsule (250 mg) by mouth 2 times daily for 10 days     methocarbamol (ROBAXIN) 750 MG tablet Take 1 tablet (750 mg) by mouth every 6 hours as needed for muscle spasms (Patient not taking: Reported on 1/20/2023)     mycophenolate (GENERIC EQUIVALENT) 250 MG capsule Take 3 capsules (750 mg) by mouth 2 times daily     ondansetron (ZOFRAN ODT) 4 MG ODT tab Take 1 tablet (4 mg) by mouth every 6 hours as needed for nausea or vomiting (Patient not taking: Reported on 1/19/2023)     oxyCODONE (ROXICODONE) 5 MG tablet Take 1 tablet (5 mg) by mouth every 4 hours as needed for moderate pain (4-6)     pantoprazole (PROTONIX) 40 MG EC tablet Take 1 tablet (40 mg) by mouth every morning (before breakfast)     polyethylene glycol (MIRALAX) 17 GM/Dose powder Take 17 g (1 capful) by mouth 2 times daily     polyethylene glycol (MIRALAX) 17 GM/Dose powder Take 17 g by mouth daily     predniSONE (DELTASONE) 10 MG tablet Take 6 tablets (60 mg) by mouth daily for 1 day, THEN 4 tablets (40 mg) daily for 2 days, THEN 2 tablets (20 mg) daily for 7 days, THEN 1.5 tablets (15 mg) daily for 7 days, THEN 1 tablet (10 mg) daily for 7 days, THEN 0.5 tablets (5 mg) daily for 7 days.     senna-docusate (SENOKOT-S/PERICOLACE) 8.6-50 MG tablet Take 1-2 tablets by mouth 2 times daily     sertraline (ZOLOFT) 50 MG tablet Take 50 mg by mouth daily (Patient not taking: Reported on 1/19/2023)     sodium bicarbonate 650 MG tablet Take 1 tablet (650 mg) by mouth 2 times daily     tacrolimus (GENERIC EQUIVALENT) 1 MG capsule Take 7 capsules (7 mg) by mouth 2 times daily     valGANciclovir (VALCYTE) 450 MG tablet Take 1 tablet (450 mg) by mouth twice a week     VITAMIN D, CHOLECALCIFEROL, PO Take 2,000 Units by mouth daily     No  current facility-administered medications for this visit.     Medications Discontinued During This Encounter   Medication Reason     sulfamethoxazole-trimethoprim (BACTRIM) 400-80 MG tablet        Physical Exam   Vital Signs: BP (!) 148/81 (BP Location: Right arm, Patient Position: Sitting, Cuff Size: Adult Regular)   Pulse 65   Temp 97.9  F (36.6  C) (Oral)   Resp 16   Wt 70 kg (154 lb 6.4 oz)   SpO2 100%   BMI 21.31 kg/m      GENERAL APPEARANCE: alert and no distress  HENT: mouth without ulcers or lesions  RESP: lungs clear to auscultation - no rales, rhonchi or wheezes  CV: regular rhythm, normal rate, no rub, no murmur  EDEMA: no LE edema bilaterally  ABDOMEN: soft, nondistended, nontender, bowel sounds normal  MS: extremities normal - no gross deformities noted, no evidence of inflammation in joints, no muscle tenderness  SKIN: no rash    Data     Renal Latest Ref Rng & Units 1/23/2023 1/21/2023 1/20/2023   Na 136 - 145 mmol/L 135(L) 136 137   K 3.4 - 5.3 mmol/L 4.0 3.9 4.8   Cl 98 - 107 mmol/L 106 108(H) 107   CO2 22 - 29 mmol/L 20(L) 18(L) 22   BUN 6.0 - 20.0 mg/dL 36.1(H) 44.1(H) 54.5(H)   Cr 0.67 - 1.17 mg/dL 2.62(H) 3.12(H) 3.60(H)   Glucose 70 - 99 mg/dL 100(H) 143(H) 95   Ca  8.6 - 10.0 mg/dL 8.6 8.4(L) 8.8   Mg 1.7 - 2.3 mg/dL 1.7 2.0 2.2     Bone Health Latest Ref Rng & Units 1/23/2023 1/21/2023 1/20/2023   Phos 2.5 - 4.5 mg/dL 3.4 2.7 2.8   PTHi 15 - 65 pg/mL - - 101(H)   Vit D Def 20 - 75 ug/L - - 9(L)     Heme Latest Ref Rng & Units 1/23/2023 1/21/2023 1/20/2023   WBC 4.0 - 11.0 10e3/uL 8.0 7.7 6.9   Hgb 13.3 - 17.7 g/dL 10.9(L) 10.9(L) 11.0(L)   Plt 150 - 450 10e3/uL 229 150 136(L)   ABSOLUTE NEUTROPHIL 1.6 - 8.3 10e9/L - - -   ABSOLUTE LYMPHOCYTES 0.8 - 5.3 10e9/L - - -   ABSOLUTE MONOCYTES 0.0 - 1.3 10e9/L - - -   ABSOLUTE EOSINOPHILS 0.0 - 0.7 10e9/L - - -   ABSOLUTE BASOPHILS 0.0 - 0.2 10e9/L - - -   ABS IMMATURE GRANULOCYTES 0 - 0.4 10e9/L - - -   ABSOLUTE NUCLEATED RBC - - - -     Liver  Latest Ref Rng & Units 1/23/2023 1/21/2023 1/20/2023   AP 40 - 129 U/L - - -   TBili <=1.2 mg/dL - - -   DBili 0.00 - 0.30 mg/dL - - -   ALT 10 - 50 U/L 96(H) 134(H) 146(H)   AST 10 - 50 U/L 24 48 70(H)   Tot Protein 6.4 - 8.3 g/dL - - -   Albumin 3.5 - 5.2 g/dL - - -     Pancreas Latest Ref Rng & Units 1/15/2023   A1C <5.7 % 5.2     Iron studies Latest Ref Rng & Units 1/20/2023   Iron 61 - 157 ug/dL 153   Iron sat 15 - 46 % 63(H)   Ferritin 31 - 409 ng/mL 717(H)     [facility] Txp Virology Latest Ref Rng & Units 1/15/2023 8/18/2020   CMV QUANT IU/ML Not Detected IU/mL Not Detected -   EBV CAPSID ANTIBODY IGG No detectable antibody. Positive(A) >8.0(H)   Hep B Core NR:Nonreactive - Nonreactive        Recent Labs   Lab Test 01/18/23  0606 01/19/23  0819 01/20/23  0746 01/21/23  0715   DOSTAC  --  1/18/2023 1/19/2023  --    TACROL 2.6* 5.0 5.8 6.1

## 2023-01-24 ENCOUNTER — TELEPHONE (OUTPATIENT)
Dept: INFUSION THERAPY | Facility: CLINIC | Age: 38
End: 2023-01-24
Payer: MEDICARE

## 2023-01-24 DIAGNOSIS — Z94.0 KIDNEY TRANSPLANTED: Primary | ICD-10-CM

## 2023-01-24 LAB
BACTERIA UR CULT: ABNORMAL
BK VIRUS IGG ANTIBODY: ABNORMAL

## 2023-01-24 RX ORDER — SULFAMETHOXAZOLE AND TRIMETHOPRIM 400; 80 MG/1; MG/1
1 TABLET ORAL
Qty: 30 TABLET | Refills: 11 | Status: SHIPPED | OUTPATIENT
Start: 2023-01-25 | End: 2023-02-28

## 2023-01-24 RX ORDER — TACROLIMUS 0.5 MG/1
0.5 CAPSULE ORAL 2 TIMES DAILY
Qty: 180 CAPSULE | Refills: 3 | Status: SHIPPED | OUTPATIENT
Start: 2023-01-24 | End: 2023-01-26

## 2023-01-24 RX ORDER — TACROLIMUS 1 MG/1
7 CAPSULE ORAL 2 TIMES DAILY
Qty: 420 CAPSULE | Refills: 11 | Status: SHIPPED | OUTPATIENT
Start: 2023-01-24 | End: 2023-01-26

## 2023-01-24 RX ORDER — DAPSONE 25 MG/1
50 TABLET ORAL DAILY
Qty: 180 TABLET | Refills: 3 | Status: SHIPPED | OUTPATIENT
Start: 2023-01-24 | End: 2023-01-24

## 2023-01-24 NOTE — TELEPHONE ENCOUNTER
Patient confirms this was an accurate 12-hour trough. Verified Tacrolimus IR dose 7 mg BID. Confirmed no new medications or illness. Confirmed no missed doses. Patient confirms increase Tacrolimus IR dose to 7.5 mg BID and repeat labs in 3 days

## 2023-01-24 NOTE — TELEPHONE ENCOUNTER
[employer] message sent to patient regarding:  OK to increase to 8mg BID and recheck on Thursday

## 2023-01-24 NOTE — TELEPHONE ENCOUNTER
Prior Authorization Not Needed per Insurance    Medication: Tacrolimus 0.5mg- pa not needed  Insurance Company:    Expected CoPay:      Pharmacy Filling the Rx:  [facility]   Pharmacy Notified:  yes  Patient Notified:  yes    Billed to part B patient already picked up medication

## 2023-01-24 NOTE — TELEPHONE ENCOUNTER
Spoke to patient who confirms start of Dapzone 50 daily    Confirmed with patient that he is taking cholecalciferol 50 mcg daily

## 2023-01-24 NOTE — PROGRESS NOTES
Edwin Green MD Griffin, Peter Alberto [facility]  Cc: Veronica Edgar, RN  Let s try Bactrim again and see.  We can first start with 3x/wk and if okay after a couple of weeks go to daily, if appropriate.           Previous Messages     ----- Message -----   From: Oleksandr Ashraf [facility]   Sent: 1/24/2023   1:08 PM CST   To: Edwin Green MD, Veronica Edgar RN   Subject: Bactrim vs dapsone                               Patient's Bactrim was stopped due to hyperkalemia. Potassium currently at 4, would you like to restart at 3 times weekly or go ahead and use Dapsone? G6PD normal as well.     Lab Results        Component                Value               Date                        G6PD                     15.5                01/20/2023                 Lab Results        Component                Value               Date                        POTASSIUM                4.0                 01/23/2023                  POTASSIUM                3.9                 01/21/2023                  POTASSIUM                4.8                 01/20/2023                  POTASSIUM                4.3                 01/19/2023                  POTASSIUM                5.1                 01/18/2023            \      Spoke to patient, told him not to  Dapsone, but rather fill Bactrim S S three times per week

## 2023-01-24 NOTE — TELEPHONE ENCOUNTER
Patient replied via [employer] with this message:  Update I talked to a coordinator the .5 tablets Prescription has been sent to [facility]. [facility] has informed me it's on hold due to price for now    Please advise.

## 2023-01-24 NOTE — TELEPHONE ENCOUNTER
Most recent nurse triage note reviewed.  I am agreeable with skilled nursing visit schedule.  Concerning discontinuation of Zoloft, I am agreeable with this so long as patient feels he is doing well emotionally without it, and especially so long as he does not have any increasing thoughts of self-harm off of medication.

## 2023-01-24 NOTE — TELEPHONE ENCOUNTER
Calll to [facility] left detailed message with  message below. Also per review of chart Zoloft was noted as historical and was discontinued yesterday. But it then says erroneous entry.     Of note becky Cardoso if patient feels need to go back on may need appointment or at minimal discussion with .     Stephanie Do R.N.

## 2023-01-24 NOTE — TELEPHONE ENCOUNTER
Patient replied via [employer] with the following:  I just got the prescription for .5. What would you like me to do 7.5 or 8?   Please advise.

## 2023-01-24 NOTE — TELEPHONE ENCOUNTER
Jane Man, Veronica Chavez, RN  Dapzone 50 daily  thx     Edwin Green MD Schuller, Laura M, NP; Veronica Edgar RN  Agree, would start cholecalciferol 50 mcg daily.     PLAN:  Please notify patient to start dapsone 50mg daily and start cholecalciferol 50mcg daily.     Ivette Webb RN BSN  Transplant Care Coordinator

## 2023-01-25 ENCOUNTER — TELEPHONE (OUTPATIENT)
Dept: TRANSPLANT | Facility: CLINIC | Age: 38
End: 2023-01-25
Payer: MEDICARE

## 2023-01-25 NOTE — TELEPHONE ENCOUNTER
Post Kidney and Pancreas Transplant Team Conference  Date: 1/25/2023  Transplant Coordinator: Veronica Edgar     Attendees:  [x]  Dr. Green [x] Kate Chaves, RN [x] Jessenia Bishop LPN     []  Dr. Pretty [] Veronica Edgar RN [] Kathy Allen LPN   [x]  Dr. Herrera [x] Ivette Webb RN    [x]  Dr. Cerda [] Marcia Gurrola RN [x] Oleksandr Ashraf, PharmD   [] Dr. Santos [] Katlyn Montelongo, DIPTI    [] Dr. Staton [] Kb Negron RN    [] Dr. Fan [] Rachael Oropeza, DIPTI [x] Roma Collins RN   [] Dr. Sinha [] Phyllis Rosen RN    []  Dr. Emerson [] Carlyn Simmons RN    [] Dr. Soler [x] Danielle Eubanks RN    [] Jane Man, NP [] Yola Gasca RN        Verbal Plan Read Back:   Continue current treatment plan    Routed to RN Coordinator   Jessenia Bishop LPN

## 2023-01-26 ENCOUNTER — LAB REQUISITION (OUTPATIENT)
Dept: LAB | Facility: CLINIC | Age: 38
End: 2023-01-26
Payer: MEDICARE

## 2023-01-26 DIAGNOSIS — Z94.0 KIDNEY REPLACED BY TRANSPLANT: ICD-10-CM

## 2023-01-26 DIAGNOSIS — Z94.0 KIDNEY TRANSPLANT STATUS: ICD-10-CM

## 2023-01-26 DIAGNOSIS — Z94.0 TRANSPLANTED KIDNEY: Primary | ICD-10-CM

## 2023-01-26 LAB
ANION GAP SERPL CALCULATED.3IONS-SCNC: 12 MMOL/L (ref 7–15)
BUN SERPL-MCNC: 42.1 MG/DL (ref 6–20)
CALCIUM SERPL-MCNC: 8.5 MG/DL (ref 8.6–10)
CHLORIDE SERPL-SCNC: 107 MMOL/L (ref 98–107)
CREAT SERPL-MCNC: 2.21 MG/DL (ref 0.67–1.17)
DEPRECATED HCO3 PLAS-SCNC: 19 MMOL/L (ref 22–29)
ERYTHROCYTE [DISTWIDTH] IN BLOOD BY AUTOMATED COUNT: 12.9 % (ref 10–15)
GFR SERPL CREATININE-BSD FRML MDRD: 38 ML/MIN/1.73M2
GLUCOSE SERPL-MCNC: 90 MG/DL (ref 70–99)
HCT VFR BLD AUTO: 28.9 % (ref 40–53)
HGB BLD-MCNC: 9.9 G/DL (ref 13.3–17.7)
HOLD SPECIMEN: NORMAL
MAGNESIUM SERPL-MCNC: 1.4 MG/DL (ref 1.7–2.3)
MCH RBC QN AUTO: 31.7 PG (ref 26.5–33)
MCHC RBC AUTO-ENTMCNC: 34.3 G/DL (ref 31.5–36.5)
MCV RBC AUTO: 93 FL (ref 78–100)
PHOSPHATE SERPL-MCNC: 3.6 MG/DL (ref 2.5–4.5)
PLATELET # BLD AUTO: 338 10E3/UL (ref 150–450)
POTASSIUM SERPL-SCNC: 3.7 MMOL/L (ref 3.4–5.3)
RBC # BLD AUTO: 3.12 10E6/UL (ref 4.4–5.9)
SODIUM SERPL-SCNC: 138 MMOL/L (ref 136–145)
TACROLIMUS BLD-MCNC: 7.5 UG/L (ref 5–15)
TME LAST DOSE: NORMAL H
TME LAST DOSE: NORMAL H
WBC # BLD AUTO: 9.2 10E3/UL (ref 4–11)

## 2023-01-26 PROCEDURE — 80048 BASIC METABOLIC PNL TOTAL CA: CPT | Mod: ORL | Performed by: SURGERY

## 2023-01-26 PROCEDURE — 80197 ASSAY OF TACROLIMUS: CPT | Mod: ORL | Performed by: SURGERY

## 2023-01-26 PROCEDURE — 84100 ASSAY OF PHOSPHORUS: CPT | Mod: ORL | Performed by: SURGERY

## 2023-01-26 PROCEDURE — 85027 COMPLETE CBC AUTOMATED: CPT | Mod: ORL | Performed by: SURGERY

## 2023-01-26 PROCEDURE — 83735 ASSAY OF MAGNESIUM: CPT | Mod: ORL | Performed by: SURGERY

## 2023-01-26 RX ORDER — TACROLIMUS 0.5 MG/1
CAPSULE ORAL
Qty: 180 CAPSULE | Refills: 3 | Status: SHIPPED | OUTPATIENT
Start: 2023-01-26 | End: 2023-01-31 | Stop reason: DRUGHIGH

## 2023-01-26 RX ORDER — TACROLIMUS 1 MG/1
8 CAPSULE ORAL 2 TIMES DAILY
Qty: 480 CAPSULE | Refills: 11 | Status: SHIPPED | OUTPATIENT
Start: 2023-01-26 | End: 2023-02-17

## 2023-01-26 NOTE — TELEPHONE ENCOUNTER
Left message and sent [employer] message to patient regarding:  Tacrolimus IR level 7.5 on 1/26, goal 8-10, dose 7.5 mg BID.     PLAN:   Please call patient and confirm this was an accurate 12-hour trough. Verify Tacrolimus IR dose 7.5 mg BID. Confirm no new medications or illness. Confirm no missed doses. If accurate trough and accurate dose, increase Tacrolimus IR dose to 8 mg BID and repeat labs in 3 days

## 2023-01-26 NOTE — TELEPHONE ENCOUNTER
ISSUE:   Tacrolimus IR level 7.5 on 1/26, goal 8-10, dose 7.5 mg BID.    PLAN:   Please call patient and confirm this was an accurate 12-hour trough. Verify Tacrolimus IR dose 7.5 mg BID. Confirm no new medications or illness. Confirm no missed doses. If accurate trough and accurate dose, increase Tacrolimus IR dose to 8 mg BID and repeat labs in 3 days    Ivette Webb RN BSN  Transplant Care Coordinator    OUTCOME:   Spoke with patient, they confirm accurate trough level and current dose 7.5 mg BID. Patient confirmed dose change to 8 mg BID and to repeat labs in 3 days. Orders sent to preferred pharmacy for dose change and lab for repeat labs. Patient voiced understanding of plan.

## 2023-01-30 ENCOUNTER — OFFICE VISIT (OUTPATIENT)
Dept: TRANSPLANT | Facility: CLINIC | Age: 38
End: 2023-01-30
Attending: SURGERY
Payer: MEDICARE

## 2023-01-30 ENCOUNTER — LAB REQUISITION (OUTPATIENT)
Dept: LAB | Facility: CLINIC | Age: 38
End: 2023-01-30
Payer: MEDICARE

## 2023-01-30 VITALS
DIASTOLIC BLOOD PRESSURE: 88 MMHG | BODY MASS INDEX: 20.63 KG/M2 | SYSTOLIC BLOOD PRESSURE: 143 MMHG | HEART RATE: 78 BPM | WEIGHT: 149.5 LBS | OXYGEN SATURATION: 100 %

## 2023-01-30 DIAGNOSIS — Z48.298 AFTERCARE FOLLOWING ORGAN TRANSPLANT: Primary | ICD-10-CM

## 2023-01-30 DIAGNOSIS — Z48.22 ENCOUNTER FOR AFTERCARE FOLLOWING KIDNEY TRANSPLANT: ICD-10-CM

## 2023-01-30 LAB
ANION GAP SERPL CALCULATED.3IONS-SCNC: 12 MMOL/L (ref 7–15)
BASOPHILS # BLD AUTO: 0.1 10E3/UL (ref 0–0.2)
BASOPHILS NFR BLD AUTO: 1 %
BUN SERPL-MCNC: 39.8 MG/DL (ref 6–20)
CALCIUM SERPL-MCNC: 8.4 MG/DL (ref 8.6–10)
CHLORIDE SERPL-SCNC: 114 MMOL/L (ref 98–107)
CREAT SERPL-MCNC: 1.71 MG/DL (ref 0.67–1.17)
DEPRECATED HCO3 PLAS-SCNC: 18 MMOL/L (ref 22–29)
EOSINOPHIL # BLD AUTO: 0.2 10E3/UL (ref 0–0.7)
EOSINOPHIL NFR BLD AUTO: 3 %
ERYTHROCYTE [DISTWIDTH] IN BLOOD BY AUTOMATED COUNT: 14.1 % (ref 10–15)
GFR SERPL CREATININE-BSD FRML MDRD: 52 ML/MIN/1.73M2
GLUCOSE SERPL-MCNC: 96 MG/DL (ref 70–99)
HCT VFR BLD AUTO: 32.1 % (ref 40–53)
HGB BLD-MCNC: 10.3 G/DL (ref 13.3–17.7)
HOLD SPECIMEN: NORMAL
IMM GRANULOCYTES # BLD: 0.1 10E3/UL
IMM GRANULOCYTES NFR BLD: 1 %
LYMPHOCYTES # BLD AUTO: 0.7 10E3/UL (ref 0.8–5.3)
LYMPHOCYTES NFR BLD AUTO: 10 %
MAGNESIUM SERPL-MCNC: 1.2 MG/DL (ref 1.7–2.3)
MCH RBC QN AUTO: 31.2 PG (ref 26.5–33)
MCHC RBC AUTO-ENTMCNC: 32.1 G/DL (ref 31.5–36.5)
MCV RBC AUTO: 97 FL (ref 78–100)
MONOCYTES # BLD AUTO: 0.5 10E3/UL (ref 0–1.3)
MONOCYTES NFR BLD AUTO: 7 %
NEUTROPHILS # BLD AUTO: 5.7 10E3/UL (ref 1.6–8.3)
NEUTROPHILS NFR BLD AUTO: 78 %
NRBC # BLD AUTO: 0 10E3/UL
NRBC BLD AUTO-RTO: 0 /100
PHOSPHATE SERPL-MCNC: 3.7 MG/DL (ref 2.5–4.5)
PLATELET # BLD AUTO: 481 10E3/UL (ref 150–450)
POTASSIUM SERPL-SCNC: 3.7 MMOL/L (ref 3.4–5.3)
RBC # BLD AUTO: 3.3 10E6/UL (ref 4.4–5.9)
SODIUM SERPL-SCNC: 144 MMOL/L (ref 136–145)
WBC # BLD AUTO: 7.3 10E3/UL (ref 4–11)

## 2023-01-30 PROCEDURE — 80197 ASSAY OF TACROLIMUS: CPT | Mod: ORL | Performed by: SURGERY

## 2023-01-30 PROCEDURE — 99213 OFFICE O/P EST LOW 20 MIN: CPT | Performed by: SURGERY

## 2023-01-30 PROCEDURE — G0463 HOSPITAL OUTPT CLINIC VISIT: HCPCS | Performed by: SURGERY

## 2023-01-30 PROCEDURE — 83735 ASSAY OF MAGNESIUM: CPT | Mod: ORL | Performed by: SURGERY

## 2023-01-30 PROCEDURE — 84100 ASSAY OF PHOSPHORUS: CPT | Mod: ORL | Performed by: SURGERY

## 2023-01-30 PROCEDURE — 85025 COMPLETE CBC W/AUTO DIFF WBC: CPT | Mod: ORL | Performed by: SURGERY

## 2023-01-30 PROCEDURE — 80048 BASIC METABOLIC PNL TOTAL CA: CPT | Mod: ORL | Performed by: SURGERY

## 2023-01-30 NOTE — LETTER
Date:February 2, 2023      Patient was self referred, no letter generated. Do not send.        [facility] Health Information

## 2023-01-30 NOTE — LETTER
1/30/2023         RE: Chip Fowler  [id number] [address]        Dear Colleague,    Thank you for referring your patient, Chip Fowler, to the [facility]. Please see a copy of my visit note below.    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    Transplant Date:  1/15/2023 (Kidney)   Ureteral Stent:  Yes   Crossmatch:  negative   DSA at Tx:  No  Baseline Cr: pending, current 1.71   DeNovo DSA: No    Acute Rejection Hx:  No    Present Maintenance Immunosuppression:  Tacrolimus and Mycophenolate mofetil    CMV IgG Ab Discordance:  Yes +/-  EBV IgG Ab Discordance:  No +/+    BK Viremia:  No  EBV Viremia:  No    Transplant Coordinator: Veronica Edgar     Transplant Office Phone Number: [phone]     Immunosuppressant Medications     Immunosuppressive Agents Disp Start End     mycophenolate (GENERIC EQUIVALENT) 250 MG capsule    180 capsule 1/18/2023     Sig - Route: Take 3 capsules (750 mg) by mouth 2 times daily - Oral    Class: E-Prescribe     tacrolimus (GENERIC EQUIVALENT) 0.5 MG capsule    180 capsule 1/26/2023     Sig: HOLD FOR FUTURE DOSE CHANGES.  Profile Rx: patient will contact pharmacy when needed    Class: E-Prescribe     tacrolimus (GENERIC EQUIVALENT) 1 MG capsule    480 capsule 1/26/2023     Sig - Route: Take 8 capsules (8 mg) by mouth 2 times daily - Oral    Class: E-Prescribe          Possible Immunosuppression-related side effects:   []             headache  []             vivid dreams  []             irritability  []             cognitive difficuties  []             fine tremor  []             nausea  []             diarrhea  []             neuropathy      []             edema  []             renal  calcineurin toxicity  []             hyperkalemia  []             post-transplant diabetes  []             decreased appetite  []             increased appetite  []             other:  []             none    Prescription Medications as of 1/30/2023       Rx Number Disp Refills Start End Last Dispensed Date Next Fill Date Owning Pharmacy    amoxicillin (AMOXIL) 250 MG capsule  20 capsule 0 1/21/2023 1/31/2023   [address] [phone]    Sig: Take 1 capsule (250 mg) by mouth 2 times daily for 10 days    Class: E-Prescribe    Route: Oral    mycophenolate (GENERIC EQUIVALENT) 250 MG capsule  180 capsule 11 1/18/2023    [address]    Sig: Take 3 capsules (750 mg) by mouth 2 times daily    Class: E-Prescribe    Route: Oral    pantoprazole (PROTONIX) 40 MG EC tablet  30 tablet 2 1/19/2023    [address]    Sig: Take 1 tablet (40 mg) by mouth every morning (before breakfast)    Class: E-Prescribe    Route: Oral    predniSONE (DELTASONE) 10 MG tablet  49 tablet 0 1/19/2023 2/19/2023   [address]    Sig: Take 6 tablets (60 mg) by mouth daily for 1 day, THEN 4 tablets (40 mg) daily for 2 days, THEN 2 tablets (20 mg) daily for 7 days, THEN 1.5 tablets (15 mg) daily for 7 days, THEN 1 tablet (10 mg) daily for 7 days, THEN 0.5 tablets (5 mg) daily for 7 days.    Class: E-Prescribe    Route: Oral    senna-docusate (SENOKOT-S/PERICOLACE) 8.6-50 MG tablet  60 tablet 1 1/18/2023    [address]    Sig: Take 1-2 tablets by mouth 2 times daily    Class: E-Prescribe    Route: Oral    sodium bicarbonate 650 MG tablet  30 tablet 1 1/18/2023    [address]    Sig: Take 1 tablet (650 mg) by  mouth 2 times daily    Class: E-Prescribe    Route: Oral    sulfamethoxazole-trimethoprim (BACTRIM) 400-80 MG tablet  30 tablet 11 1/25/2023    [id number] - [place], [place] - [address] [address]    Sig: Take 1 tablet by mouth three times a week Transplant team may increase this to 1 tablet every day depending on potassium and kidney function    Class: E-Prescribe    Route: Oral    tacrolimus (GENERIC EQUIVALENT) 0.5 MG capsule  180 capsule 3 1/26/2023    [facility] - [place], [place] - [address] [place] [address]    Sig: HOLD FOR FUTURE DOSE CHANGES.  Profile Rx: patient will contact pharmacy when needed    Class: E-Prescribe    tacrolimus (GENERIC EQUIVALENT) 1 MG capsule  480 capsule 11 1/26/2023    [facility] [place], [place] - [address] [place] [address]    Sig: Take 8 capsules (8 mg) by mouth 2 times daily    Class: E-Prescribe    Route: Oral    valGANciclovir (VALCYTE) 450 MG tablet  60 tablet 5 1/19/2023    [address]    Sig: Take 1 tablet (450 mg) by mouth twice a week    Class: E-Prescribe    Notes to Pharmacy: Increase dose to 900 mg daily as instructed by transplant team when renal function improves.    Route: Oral    VITAMIN D, CHOLECALCIFEROL, PO            Sig: Take 2,000 Units by mouth daily    Class: Historical    Route: Oral    acetaminophen (TYLENOL) 325 MG tablet  100 tablet 3 1/18/2023        Sig: Take 2 tablets (650 mg) by mouth every 4 hours as needed for pain    Class: OTC    Route: Oral    methocarbamol (ROBAXIN) 750 MG tablet  20 tablet 0 1/18/2023    [address]    Sig: Take 1 tablet (750 mg) by mouth every 6 hours as needed for muscle spasms    Class: E-Prescribe    Route: Oral    ondansetron (ZOFRAN ODT) 4 MG ODT tab  10 tablet 1 1/18/2023    [address]  [address]    Sig: Take 1 tablet (4 mg) by mouth every 6 hours as needed for nausea or vomiting    Class: E-Prescribe    Route: Oral    oxyCODONE (ROXICODONE) 5 MG tablet  20 tablet 0 1/18/2023    [place], [place] - [address] [facility] [facility] [address] [facility] [address]    Sig: Take 1 tablet (5 mg) by mouth every 4 hours as needed for moderate pain (4-6)    Class: E-Prescribe    Earliest Fill Date: 1/18/2023    Route: Oral          O:   Pulse:  [78] 78  BP: (143)/(88) 143/88  SpO2:  [100 %] 100 %  General Appearance: in no apparent distress.   Skin: Normal, no rashes or jaundice  Heart: regular rate and rhythm  Lungs: easy respirations, no audible wheezing.  Abdomen: symmetric, incision healing without signs of infection or hernia  Extremities: Tremor absent.   Edema: absent. 1+    Transplant Immunosuppression Labs Latest Ref Rng & Units 1/30/2023 1/26/2023 1/23/2023 1/21/2023 1/20/2023   Creat 0.67 - 1.17 mg/dL 1.71(H) 2.21(H) 2.62(H) 3.12(H) 3.60(H)   BUN 6.0 - 20.0 mg/dL 39.8(H) 42.1(H) 36.1(H) 44.1(H) 54.5(H)   WBC 4.0 - 11.0 10e3/uL 7.3 9.2 8.0 7.7 6.9   Neutrophil % 78 - 75 80 84   ANEU 1.6 - 8.3 10e9/L - - - - -       Chemistries:   Recent Labs   Lab Test 01/30/23  0845   BUN 39.8*   CR 1.71*   GFRESTIMATED 52*   GLC 96     Lab Results   Component Value Date    A1C 5.2 01/15/2023     Recent Labs   Lab Test 01/23/23  0748 01/20/23  0746 01/18/23  0606   ALBUMIN  --   --  2.7*   BILITOTAL  --   --  <0.2   ALKPHOS  --   --  44   AST 24   < > 50   ALT 96*   < > 59*    < > = values in this interval not displayed.     Urine Studies:  Recent Labs   Lab Test 01/23/23  0752   COLOR Light Yellow   APPEARANCE Clear   URINEGLC Negative   URINEBILI Negative   URINEKETONE Negative   SG 1.012   UBLD Large*   URINEPH 6.5   PROTEIN 30*   NITRITE Negative   LEUKEST Moderate*   RBCU 116*   WBCU 13*     No lab results found.  Hematology:   Recent Labs   Lab Test 01/30/23  0845 01/26/23  0830 01/23/23  0748   HGB 10.3* 9.9* 10.9*   *  338 229   WBC 7.3 9.2 8.0     Coags:   Recent Labs   Lab Test 01/15/23  0540 08/09/21  1124   INR 0.90 0.96     HLA antibodies:   SA1 Hi Risk Tara   Date Value Ref Range Status   08/18/2020 B:44 82  Final     SA1 HI RISK TARA   Date Value Ref Range Status   01/15/2023 None  Final     SA1 Mod Risk Tara   Date Value Ref Range Status   08/18/2020 B:8 37 41 42 45 46 Cw:5  Final     SA1 MOD RISK TARA   Date Value Ref Range Status   01/15/2023 B:8 37 41 42 44 45 82  Final     SA2 Hi Risk Tara   Date Value Ref Range Status   08/18/2020 None  Final     SA2 HI RISK TARA   Date Value Ref Range Status   01/15/2023 None  Final     SA2 Mod Risk Tara   Date Value Ref Range Status   08/18/2020 None  Final     SA2 MOD RISK TARA   Date Value Ref Range Status   01/15/2023 [id number]:3  Final       Assessment: Chip Fowler is doing well s/p DDKT:  Issues we addressed during his visit include:    Plan:    1. Graft function: stable and improving  2. Immunosuppression Management: No change continue .  Complexity of management:Medium.  Contributing factors: post-op  3. TOM drain out today  Followup: 6 weeks    Attestation: I saw and examined the patient with Franko Massey MD. I independently reviewed all pertinent laboratory and imaging findings. I agree with the plan as documented in the note above.    Total Time: 30 min,   Counselling Time: 30 min.      Tez Emerson MD        Again, thank you for allowing me to participate in the care of your patient.        Sincerely,        Tez Emerson MD

## 2023-01-30 NOTE — PROGRESS NOTES
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    Transplant Date:  1/15/2023 (Kidney)   Ureteral Stent:  Yes   Crossmatch:  negative   DSA at Tx:  No  Baseline Cr: pending, current 1.71   DeNovo DSA: No    Acute Rejection Hx:  No    Present Maintenance Immunosuppression:  Tacrolimus and Mycophenolate mofetil    CMV IgG Ab Discordance:  Yes +/-  EBV IgG Ab Discordance:  No +/+    BK Viremia:  No  EBV Viremia:  No    Transplant Coordinator: Veronica Edgar     Transplant Office Phone Number: [phone]     Immunosuppressant Medications     Immunosuppressive Agents Disp Start End     mycophenolate (GENERIC EQUIVALENT) 250 MG capsule    180 capsule 1/18/2023     Sig - Route: Take 3 capsules (750 mg) by mouth 2 times daily - Oral    Class: E-Prescribe     tacrolimus (GENERIC EQUIVALENT) 0.5 MG capsule    180 capsule 1/26/2023     Sig: HOLD FOR FUTURE DOSE CHANGES.  Profile Rx: patient will contact pharmacy when needed    Class: E-Prescribe     tacrolimus (GENERIC EQUIVALENT) 1 MG capsule    480 capsule 1/26/2023     Sig - Route: Take 8 capsules (8 mg) by mouth 2 times daily - Oral    Class: E-Prescribe          Possible Immunosuppression-related side effects:   []             headache  []             vivid dreams  []             irritability  []             cognitive difficuties  []             fine tremor  []             nausea  []             diarrhea  []             neuropathy      []             edema  []             renal calcineurin toxicity  []             hyperkalemia  []             post-transplant diabetes  []             decreased appetite  []             increased appetite  []             other:  []             none    Prescription Medications as of 1/30/2023        Rx Number Disp Refills Start End Last Dispensed Date Next Fill Date Owning Pharmacy    amoxicillin (AMOXIL) 250 MG capsule  20 capsule 0 1/21/2023 1/31/2023   [address] [phone]    Sig: Take 1 capsule (250 mg) by mouth 2 times daily for 10 days    Class: E-Prescribe    Route: Oral    mycophenolate (GENERIC EQUIVALENT) 250 MG capsule  180 capsule 11 1/18/2023    [address]    Sig: Take 3 capsules (750 mg) by mouth 2 times daily    Class: E-Prescribe    Route: Oral    pantoprazole (PROTONIX) 40 MG EC tablet  30 tablet 2 1/19/2023    [address]    Sig: Take 1 tablet (40 mg) by mouth every morning (before breakfast)    Class: E-Prescribe    Route: Oral    predniSONE (DELTASONE) 10 MG tablet  49 tablet 0 1/19/2023 2/19/2023   [address]    Sig: Take 6 tablets (60 mg) by mouth daily for 1 day, THEN 4 tablets (40 mg) daily for 2 days, THEN 2 tablets (20 mg) daily for 7 days, THEN 1.5 tablets (15 mg) daily for 7 days, THEN 1 tablet (10 mg) daily for 7 days, THEN 0.5 tablets (5 mg) daily for 7 days.    Class: E-Prescribe    Route: Oral    senna-docusate (SENOKOT-S/PERICOLACE) 8.6-50 MG tablet  60 tablet 1 1/18/2023    [address]    Sig: Take 1-2 tablets by mouth 2 times daily    Class: E-Prescribe    Route: Oral    sodium bicarbonate 650 MG tablet  30 tablet 1 1/18/2023    [address]    Sig: Take 1 tablet (650 mg) by mouth 2 times daily    Class: E-Prescribe    Route: Oral    sulfamethoxazole-trimethoprim (BACTRIM) 400-80 MG tablet  30 tablet 11 1/25/2023    [facility][id number] - [place], [place] - [address]    Sig: Take 1  tablet by mouth three times a week Transplant team may increase this to 1 tablet every day depending on potassium and kidney function    Class: E-Prescribe    Route: Oral    tacrolimus (GENERIC EQUIVALENT) 0.5 MG capsule  180 capsule 3 1/26/2023    [facility]    Sig: HOLD FOR FUTURE DOSE CHANGES.  Profile Rx: patient will contact pharmacy when needed    Class: E-Prescribe    tacrolimus (GENERIC EQUIVALENT) 1 MG capsule  480 capsule 11 1/26/2023    [facility]    Sig: Take 8 capsules (8 mg) by mouth 2 times daily    Class: E-Prescribe    Route: Oral    valGANciclovir (VALCYTE) 450 MG tablet  60 tablet 5 1/19/2023    [address]    Sig: Take 1 tablet (450 mg) by mouth twice a week    Class: E-Prescribe    Notes to Pharmacy: Increase dose to 900 mg daily as instructed by transplant team when renal function improves.    Route: Oral    VITAMIN D, CHOLECALCIFEROL, PO            Sig: Take 2,000 Units by mouth daily    Class: Historical    Route: Oral    acetaminophen (TYLENOL) 325 MG tablet  100 tablet 3 1/18/2023        Sig: Take 2 tablets (650 mg) by mouth every 4 hours as needed for pain    Class: OTC    Route: Oral    methocarbamol (ROBAXIN) 750 MG tablet  20 tablet 0 1/18/2023    [address]    Sig: Take 1 tablet (750 mg) by mouth every 6 hours as needed for muscle spasms    Class: E-Prescribe    Route: Oral    ondansetron (ZOFRAN ODT) 4 MG ODT tab  10 tablet 1 1/18/2023    [address]    Sig: Take 1 tablet (4 mg) by mouth every 6 hours as needed for nausea or vomiting    Class: E-Prescribe    Route: Oral    oxyCODONE (ROXICODONE) 5 MG tablet  20 tablet 0 1/18/2023    [place] [address]  [facility]    Sig: Take 1 tablet (5 mg) by mouth every 4 hours as needed for moderate pain (4-6)    Class: E-Prescribe    Earliest Fill Date: 1/18/2023    Route: Oral          O:   Pulse:  [78] 78  BP: (143)/(88) 143/88  SpO2:  [100 %] 100 %  General Appearance: in no apparent distress.   Skin: Normal, no rashes or jaundice  Heart: regular rate and rhythm  Lungs: easy respirations, no audible wheezing.  Abdomen: symmetric, incision healing without signs of infection or hernia  Extremities: Tremor absent.   Edema: absent. 1+    Transplant Immunosuppression Labs Latest Ref Rng & Units 1/30/2023 1/26/2023 1/23/2023 1/21/2023 1/20/2023   Creat 0.67 - 1.17 mg/dL 1.71(H) 2.21(H) 2.62(H) 3.12(H) 3.60(H)   BUN 6.0 - 20.0 mg/dL 39.8(H) 42.1(H) 36.1(H) 44.1(H) 54.5(H)   WBC 4.0 - 11.0 10e3/uL 7.3 9.2 8.0 7.7 6.9   Neutrophil % 78 - 75 80 84   ANEU 1.6 - 8.3 10e9/L - - - - -       Chemistries:   Recent Labs   Lab Test 01/30/23  0845   BUN 39.8*   CR 1.71*   GFRESTIMATED 52*   GLC 96     Lab Results   Component Value Date    A1C 5.2 01/15/2023     Recent Labs   Lab Test 01/23/23  0748 01/20/23  0746 01/18/23  0606   ALBUMIN  --   --  2.7*   BILITOTAL  --   --  <0.2   ALKPHOS  --   --  44   AST 24   < > 50   ALT 96*   < > 59*    < > = values in this interval not displayed.     Urine Studies:  Recent Labs   Lab Test 01/23/23  0752   COLOR Light Yellow   APPEARANCE Clear   URINEGLC Negative   URINEBILI Negative   URINEKETONE Negative   SG 1.012   UBLD Large*   URINEPH 6.5   PROTEIN 30*   NITRITE Negative   LEUKEST Moderate*   RBCU 116*   WBCU 13*     No lab results found.  Hematology:   Recent Labs   Lab Test 01/30/23  0845 01/26/23  0830 01/23/23  0748   HGB 10.3* 9.9* 10.9*   * 338 229   WBC 7.3 9.2 8.0     Coags:   Recent Labs   Lab Test 01/15/23  0540 08/09/21  1124   INR 0.90 0.96     HLA antibodies:   SA1 Hi Risk Tara   Date Value Ref Range Status   08/18/2020 B:44 82  Final     SA1 HI RISK TARA   Date Value Ref  Range Status   01/15/2023 None  Final     SA1 Mod Risk Tara   Date Value Ref Range Status   08/18/2020 B:8 37 41 42 45 46 Cw:5  Final     SA1 MOD RISK TARA   Date Value Ref Range Status   01/15/2023 B:8 37 41 42 44 45 82  Final     SA2 Hi Risk Tara   Date Value Ref Range Status   08/18/2020 None  Final     SA2 HI RISK TARA   Date Value Ref Range Status   01/15/2023 None  Final     SA2 Mod Risk Tara   Date Value Ref Range Status   08/18/2020 None  Final     SA2 MOD RISK TARA   Date Value Ref Range Status   01/15/2023 [id number]:3  Final       Assessment: Chip Fowler is doing well s/p DDKT:  Issues we addressed during his visit include:    Plan:    1. Graft function: stable and improving  2. Immunosuppression Management: No change continue .  Complexity of management:Medium.  Contributing factors: post-op  3. TOM drain out today  Followup: 6 weeks    Attestation: I saw and examined the patient with Franko Massey MD. I independently reviewed all pertinent laboratory and imaging findings. I agree with the plan as documented in the note above.    Total Time: 30 min,   Counselling Time: 30 min.      Tez Emerson MD

## 2023-01-31 ENCOUNTER — TELEPHONE (OUTPATIENT)
Dept: TRANSPLANT | Facility: CLINIC | Age: 38
End: 2023-01-31

## 2023-01-31 ENCOUNTER — MYC MEDICAL ADVICE (OUTPATIENT)
Dept: TRANSPLANT | Facility: CLINIC | Age: 38
End: 2023-01-31

## 2023-01-31 ENCOUNTER — OFFICE VISIT (OUTPATIENT)
Dept: FAMILY MEDICINE | Facility: CLINIC | Age: 38
End: 2023-01-31
Payer: MEDICARE

## 2023-01-31 VITALS
OXYGEN SATURATION: 100 % | TEMPERATURE: 97.5 F | WEIGHT: 149.4 LBS | SYSTOLIC BLOOD PRESSURE: 118 MMHG | HEIGHT: 71 IN | BODY MASS INDEX: 20.92 KG/M2 | HEART RATE: 68 BPM | DIASTOLIC BLOOD PRESSURE: 78 MMHG | RESPIRATION RATE: 16 BRPM

## 2023-01-31 DIAGNOSIS — Z94.0 KIDNEY REPLACED BY TRANSPLANT: ICD-10-CM

## 2023-01-31 DIAGNOSIS — Z79.899 ENCOUNTER FOR LONG-TERM CURRENT USE OF MEDICATION: ICD-10-CM

## 2023-01-31 DIAGNOSIS — Z48.298 AFTERCARE FOLLOWING ORGAN TRANSPLANT: ICD-10-CM

## 2023-01-31 LAB
FERRITIN SERPL-MCNC: 430 NG/ML (ref 31–409)
IRON BINDING CAPACITY (ROCHE): 258 UG/DL (ref 240–430)
IRON SATN MFR SERPL: 27 % (ref 15–46)
IRON SERPL-MCNC: 70 UG/DL (ref 61–157)
PTH-INTACT SERPL-MCNC: 73 PG/ML (ref 15–65)
TACROLIMUS BLD-MCNC: 9.4 UG/L (ref 5–15)
TME LAST DOSE: NORMAL H
TME LAST DOSE: NORMAL H

## 2023-01-31 PROCEDURE — 82306 VITAMIN D 25 HYDROXY: CPT | Performed by: FAMILY MEDICINE

## 2023-01-31 PROCEDURE — 99214 OFFICE O/P EST MOD 30 MIN: CPT | Performed by: FAMILY MEDICINE

## 2023-01-31 PROCEDURE — 82728 ASSAY OF FERRITIN: CPT | Mod: GA | Performed by: FAMILY MEDICINE

## 2023-01-31 PROCEDURE — 83970 ASSAY OF PARATHORMONE: CPT | Performed by: FAMILY MEDICINE

## 2023-01-31 PROCEDURE — 83540 ASSAY OF IRON: CPT | Mod: GA | Performed by: FAMILY MEDICINE

## 2023-01-31 PROCEDURE — 83550 IRON BINDING TEST: CPT | Mod: GA | Performed by: FAMILY MEDICINE

## 2023-01-31 PROCEDURE — 36415 COLL VENOUS BLD VENIPUNCTURE: CPT | Performed by: FAMILY MEDICINE

## 2023-01-31 NOTE — LETTER
OUTPATIENT LABORATORY TEST ORDER     Patient Name: Chip Fowler   YOB: 1985     [facility] MR# [if applicable]: [id number]   Date & Time: January 23, 2023  2:47 PM  Expiration Date: 1 year after date issued      Diagnosis: Kidney Transplant (ICD-10 Z94.0)    Aftercare following organ transplant (ICD-10 Z48.288)    Long term use of medications (ICD-10 Z79.899)    Contact with and (suspected) exposure to other viral communicable   diseases (Z20.828)     We ask your assistance in obtaining the following laboratory tests, which are part of our routine surveillance program for Solid Organ Transplant patients.       Please fax each result to [phone], same day as resulted/available      Critical lab results page [phone]  Monday - Friday 8 am to 5 pm    Evening/Weekend/Holiday communicate Critical labs results [phone]    First 8 weeks post-transplant (1/15/2023 - 3/15/2023)  Labs 2x/week (Monday and Thursday)    CBC with platelets    Basic Metabolic Panel (Sodium, Potassium, Chloride, Creatinine, CO2, Urea Nitrogen, glucose, Calcium)    Tacrolimus/Prograf/ drug level    Labs weekly (Monday)    Phosphorus    Magnesium    Labs every 2 weeks    Mycophenolic Acid Level                  Months 2-4 post-transplant (3/16/2023 - 5/16/2023)  Labs 1x weekly (Monday or Tuesday)    CBC with platelets    Basic Metabolic Panel (Sodium, Potassium, Chloride, Creatinine, CO2, Urea Nitrogen, glucose, Calcium)    Tacrolimus/Prograf/ drug level  Labs monthly     Phosphorus    Magnesium    Months 4-7 post-transplant (5/17/2023 - 8/17/2023)  Labs every other week    CBC with platelets    Basic Metabolic Panel (Sodium, Potassium, Chloride, Creatinine, CO2, Urea Nitrogen, glucose, Calcium)    Tacrolimus/Prograf/ drug level  Monthly    Phosphorus    Magnesium    BK (Polyoma Virus) PCR Quantitative/Plasma    Months 7-12 post-transplant (8/18/2023 - 01/15/2024)  Monthly    CBC with  platelets    Basic Metabolic Panel (Sodium, Potassium, Chloride, Creatinine, CO2, Urea Nitrogen, glucose, Calcium)    Tacrolimus/Prograf/ drug level    BK (Polyoma Virus) PCR Quantitative/Plasma    CMV PCR QT    At 1-month post-transplant (Due: 2/15/2023)    DSA PRA (patient to supply )     BK Virus PCR Quantitative/Plasma    Urine protein/creatinine    AlloSure (patient to provide )    Iron Panel    Vitamin D Deficiency Screening    PTH Intact    HBV, HCV, HIV by MACARIO testing  If unable to conduct MACARIO testing, please substitute the following:   o Hepatitis B DNA Quantitative, Real-Time PCR   o Hepatitis C RNA, Quantitation   o HIV 1 RNA, Quantitation   o HIV 2 RNA, Quantitation         At 2 months post-transplant (Due: 3/15/2023)     DSA PRA (patient to supply  kit)    BK Virus PCR Quantitative/Plasma    Urine protein/creatinine    AlloSure (patient to provide )    At month 3 only (Due: 4/15/2023)    BK (Polyoma virus) PCR Quantitative/Plasma    AlloSure (patient to provide )    At 4 months post-transplant (Due: 5/15/2023)    DSA PRA (patient to supply )    PTH    Urine protein/creatinine    AlloSure (patient to provide )    At 6 months post-transplant (Fasting Labs) (Due: 7/15/2023)    Hepatic panel    Hemoglobin A1c    Uric Acid    Lipid panel    Urine protein/creatinine    AlloSure (patient to provide )    At 7 months post-transplant (Due: 8/15/2023)     PRA/DSA (patient to supply )    At 9 months post-transplant (Due: 10/15/2023)     Urine protein/creatinine    AlloSure (patient to provide )    At 12 months post-transplant (Fasting labs) Due: 1/15/2024     Hepatic panel    Hemoglobin A1c    Uric Acid    Lipid panel    Urine protein/creatinine    PTH    Vitamin D    AlloSure (patient to provide )              If you have any questions please call the Transplant Center at [phone]. All lab results should be faxed to [phone]    .

## 2023-01-31 NOTE — LETTER
PHYSICIAN ORDERS      DATE & TIME ISSUED: 2023 11:45 AM  PATIENT NAME: Chip Fowler   : 1985     [facility] MR# [if applicable]: [id number]     DIAGNOSIS:  kidney transplant   ICD-10 CODE: z94.0      To whom it may concern         Chip Fowler received a kidney transplant on 1/15/2023    He may return to work on  with abdominal  weight lifting restriction of 10 lbs   March 15,2023 is the  expected date with no work restrictions     Thank you for consideration       Any questions please call: [phone]            Tez Emerson MD  Transplant Surgery

## 2023-01-31 NOTE — TELEPHONE ENCOUNTER
Can Mr Fowler get a note for work. He needs 4 more weeks of lifting restrictions less than 10 pounds and then will come to us at 6 weeks for clinic visit and clearance. 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     Franko

## 2023-01-31 NOTE — PROGRESS NOTES
Assessment & Plan   See after visit summary for helpful information and advice given to patient.    Aftercare following organ transplant    - Vitamin D Deficiency  - Parathyroid Hormone Intact  - FERRITIN  - IRON AND IRON BINDING CAPACITY    Encounter for long-term current use of medication    - Vitamin D Deficiency  - Parathyroid Hormone Intact  - FERRITIN  - IRON AND IRON BINDING CAPACITY    Kidney replaced by transplant    - Vitamin D Deficiency  - Parathyroid Hormone Intact  - FERRITIN  - IRON AND IRON BINDING CAPACITY             MED REC REQUIRED--done during exam.  Post Medication Reconciliation Status: Completed at exam.      Return for Routine preventive, with me.    Tato Ralph DO  [facility] DEBI Saunders is a 37 year old, presenting for the following health issues:  Hospital F/U      hospitals       Hospital Follow-up Visit:    Hospital/Nursing Home/IP Rehab Facility: [facility]  Date of Admission: 1/15/2023  Date of Discharge: 1/18/2023  Reason(s) for Admission: kidney transplant    Was your hospitalization related to COVID-19? No   Problems taking medications regularly:  None  Medication changes since discharge: None  Problems adhering to non-medication therapy:  None    Summary of hospitalization:  [facility] discharge summary reviewed  Diagnostic Tests/Treatments reviewed.  Follow up needed: Yes, with urology, cardiology, and nephrology.  Other Healthcare Providers Involved in Patient s Care:         Specialist appointment - See future schedule in epic.  Update since discharge: stable.         Plan of care communicated with patient                 Review of Systems   Patient is seen for follow-up exam after being managed earlier this month for kidney transplant procedure.    Patient has been getting BP at home in the 140s/90s. Transplant team is avoiding lowering blood pressure for now.     Has periodic right  "iliac crest area down in to right scrotal area. Has pain here with walking.  He had some surgical incisions made in his right distal abdomen area which I told him I suspected was related to his current discomfort.  No urgent management is needed so long as it is not worsening.    Does not need medication refills this visit.     Other than his right hip and groin area discomfort with walking, patient has no other acute concerns.  He denies having any significant exertional shortness of breath.  He is agreeable to having previously scheduled labs done today.        Objective    /78   Pulse 68   Temp 97.5  F (36.4  C) (Tympanic)   Resp 16   Ht 1.803 m (5' 11\")   Wt 67.8 kg (149 lb 6.4 oz)   SpO2 100%   BMI 20.84 kg/m    Body mass index is 20.84 kg/m .  Physical Exam   Vital signs reviewed.  Patient is in no acute appearing distress.  Breathing appears nonlabored.  Patient is alert and oriented ×3.  Patient is very pleasant, making good eye contact and responding with clear fluent speech.    Heart: Heart rate is regular without murmur.    Lungs: Lungs are clear to auscultation with good airflow bilaterally.    Skin/extremities: Warm and dry, with no lower leg edema.    Surgical wounds appear to be healing in RLA with no wound dehiscence or drainage, and no erythema noted. No hernia palpated either in the right lower quadrant, or right inguinal region.                     "

## 2023-02-02 ENCOUNTER — TELEPHONE (OUTPATIENT)
Dept: TRANSPLANT | Facility: CLINIC | Age: 38
End: 2023-02-02

## 2023-02-02 ENCOUNTER — LAB REQUISITION (OUTPATIENT)
Dept: LAB | Facility: CLINIC | Age: 38
End: 2023-02-02
Payer: MEDICARE

## 2023-02-02 DIAGNOSIS — Z94.0 TRANSPLANTED KIDNEY: Primary | ICD-10-CM

## 2023-02-02 DIAGNOSIS — Z48.22 ENCOUNTER FOR AFTERCARE FOLLOWING KIDNEY TRANSPLANT: ICD-10-CM

## 2023-02-02 LAB
ANION GAP SERPL CALCULATED.3IONS-SCNC: 10 MMOL/L (ref 7–15)
BASOPHILS # BLD AUTO: 0.1 10E3/UL (ref 0–0.2)
BASOPHILS NFR BLD AUTO: 1 %
BUN SERPL-MCNC: 26.8 MG/DL (ref 6–20)
CALCIUM SERPL-MCNC: 8.6 MG/DL (ref 8.6–10)
CHLORIDE SERPL-SCNC: 115 MMOL/L (ref 98–107)
CREAT SERPL-MCNC: 1.55 MG/DL (ref 0.67–1.17)
DEPRECATED CALCIDIOL+CALCIFEROL SERPL-MC: 11 UG/L (ref 20–75)
DEPRECATED HCO3 PLAS-SCNC: 17 MMOL/L (ref 22–29)
EOSINOPHIL # BLD AUTO: 0.3 10E3/UL (ref 0–0.7)
EOSINOPHIL NFR BLD AUTO: 5 %
ERYTHROCYTE [DISTWIDTH] IN BLOOD BY AUTOMATED COUNT: 14.5 % (ref 10–15)
GFR SERPL CREATININE-BSD FRML MDRD: 58 ML/MIN/1.73M2
GLUCOSE SERPL-MCNC: 92 MG/DL (ref 70–99)
HCT VFR BLD AUTO: 31.4 % (ref 40–53)
HGB BLD-MCNC: 10.1 G/DL (ref 13.3–17.7)
HOLD SPECIMEN: NORMAL
IMM GRANULOCYTES # BLD: 0.1 10E3/UL
IMM GRANULOCYTES NFR BLD: 1 %
LYMPHOCYTES # BLD AUTO: 0.7 10E3/UL (ref 0.8–5.3)
LYMPHOCYTES NFR BLD AUTO: 13 %
MAGNESIUM SERPL-MCNC: 1.1 MG/DL (ref 1.7–2.3)
MCH RBC QN AUTO: 31.5 PG (ref 26.5–33)
MCHC RBC AUTO-ENTMCNC: 32.2 G/DL (ref 31.5–36.5)
MCV RBC AUTO: 98 FL (ref 78–100)
MONOCYTES # BLD AUTO: 0.4 10E3/UL (ref 0–1.3)
MONOCYTES NFR BLD AUTO: 8 %
NEUTROPHILS # BLD AUTO: 3.9 10E3/UL (ref 1.6–8.3)
NEUTROPHILS NFR BLD AUTO: 72 %
NRBC # BLD AUTO: 0 10E3/UL
NRBC BLD AUTO-RTO: 0 /100
PHOSPHATE SERPL-MCNC: 3.3 MG/DL (ref 2.5–4.5)
PLATELET # BLD AUTO: 435 10E3/UL (ref 150–450)
POTASSIUM SERPL-SCNC: 4.2 MMOL/L (ref 3.4–5.3)
RBC # BLD AUTO: 3.21 10E6/UL (ref 4.4–5.9)
SODIUM SERPL-SCNC: 142 MMOL/L (ref 136–145)
TACROLIMUS BLD-MCNC: 11.4 UG/L (ref 5–15)
TME LAST DOSE: NORMAL H
TME LAST DOSE: NORMAL H
WBC # BLD AUTO: 5.4 10E3/UL (ref 4–11)

## 2023-02-02 PROCEDURE — 80048 BASIC METABOLIC PNL TOTAL CA: CPT | Mod: ORL | Performed by: SURGERY

## 2023-02-02 PROCEDURE — 84100 ASSAY OF PHOSPHORUS: CPT | Mod: ORL | Performed by: SURGERY

## 2023-02-02 PROCEDURE — 85025 COMPLETE CBC W/AUTO DIFF WBC: CPT | Mod: ORL | Performed by: SURGERY

## 2023-02-02 PROCEDURE — 83735 ASSAY OF MAGNESIUM: CPT | Mod: ORL | Performed by: SURGERY

## 2023-02-02 PROCEDURE — 80197 ASSAY OF TACROLIMUS: CPT | Mod: ORL | Performed by: SURGERY

## 2023-02-02 RX ORDER — SODIUM BICARBONATE 650 MG/1
1200 TABLET ORAL 2 TIMES DAILY
Qty: 60 TABLET | Refills: 1 | Status: SHIPPED | OUTPATIENT
Start: 2023-02-02 | End: 2023-02-28

## 2023-02-02 RX ORDER — MAGNESIUM OXIDE 400 MG/1
400 TABLET ORAL 2 TIMES DAILY
Qty: 120 TABLET | Refills: 3 | Status: ON HOLD | OUTPATIENT
Start: 2023-02-02 | End: 2023-03-24

## 2023-02-02 NOTE — TELEPHONE ENCOUNTER
Called Chip regarding labs letter and schedule   Reviewed the reason for frequent labs -   monitoring for graft function - Discussed most recent labs     Confirmed labs with be at [facility]     Cihp verbalized understanding

## 2023-02-02 NOTE — TELEPHONE ENCOUNTER
General Review of transplant labs with patient      1.1    Sent RX to Mag Oxide to local [facility]'s  Chip will  RX today       done

## 2023-02-02 NOTE — TELEPHONE ENCOUNTER
Follow up with Chip via phone regarding short term disability  - Plan to fax paper work to transplant office attention rishi cramer

## 2023-02-03 ENCOUNTER — TELEPHONE (OUTPATIENT)
Dept: FAMILY MEDICINE | Facility: CLINIC | Age: 38
End: 2023-02-03

## 2023-02-03 DIAGNOSIS — Z53.9 DIAGNOSIS NOT YET DEFINED: Primary | ICD-10-CM

## 2023-02-03 PROCEDURE — G0180 MD CERTIFICATION HHA PATIENT: HCPCS | Performed by: FAMILY MEDICINE

## 2023-02-03 NOTE — TELEPHONE ENCOUNTER
Reason for Call:  Form, our goal is to have forms completed with 72 hours, however, some forms may require a visit or additional information.    Type of letter, form or note:  Home Health Certification    Who is the form from?: Home care    Where did the form come from: form was faxed in    What clinic location was the form placed at?: [facility]     Where the form was placed: Dr. Ralph Box/Folder    What number is listed as a contact on the form?: [phone]       Additional comments: fax back to [phone]    Call taken on 2/3/2023 at 8:58 AM by Rose Marie Stearns MA

## 2023-02-06 ENCOUNTER — TELEPHONE (OUTPATIENT)
Dept: TRANSPLANT | Facility: CLINIC | Age: 38
End: 2023-02-06

## 2023-02-06 ENCOUNTER — LAB REQUISITION (OUTPATIENT)
Dept: LAB | Facility: CLINIC | Age: 38
End: 2023-02-06
Payer: MEDICARE

## 2023-02-06 DIAGNOSIS — Z48.22 ENCOUNTER FOR AFTERCARE FOLLOWING KIDNEY TRANSPLANT: ICD-10-CM

## 2023-02-06 LAB
ANION GAP SERPL CALCULATED.3IONS-SCNC: 13 MMOL/L (ref 7–15)
BASOPHILS # BLD AUTO: 0.1 10E3/UL (ref 0–0.2)
BASOPHILS NFR BLD AUTO: 1 %
BUN SERPL-MCNC: 24.7 MG/DL (ref 6–20)
CALCIUM SERPL-MCNC: 8.2 MG/DL (ref 8.6–10)
CHLORIDE SERPL-SCNC: 112 MMOL/L (ref 98–107)
CREAT SERPL-MCNC: 1.4 MG/DL (ref 0.67–1.17)
DEPRECATED HCO3 PLAS-SCNC: 18 MMOL/L (ref 22–29)
EOSINOPHIL # BLD AUTO: 0.3 10E3/UL (ref 0–0.7)
EOSINOPHIL NFR BLD AUTO: 6 %
ERYTHROCYTE [DISTWIDTH] IN BLOOD BY AUTOMATED COUNT: 15 % (ref 10–15)
GFR SERPL CREATININE-BSD FRML MDRD: 66 ML/MIN/1.73M2
GLUCOSE SERPL-MCNC: 103 MG/DL (ref 70–99)
HCT VFR BLD AUTO: 34.4 % (ref 40–53)
HGB BLD-MCNC: 10.7 G/DL (ref 13.3–17.7)
HOLD SPECIMEN: NORMAL
IMM GRANULOCYTES # BLD: 0.1 10E3/UL
IMM GRANULOCYTES NFR BLD: 1 %
LYMPHOCYTES # BLD AUTO: 0.9 10E3/UL (ref 0.8–5.3)
LYMPHOCYTES NFR BLD AUTO: 16 %
MAGNESIUM SERPL-MCNC: 1.3 MG/DL (ref 1.7–2.3)
MCH RBC QN AUTO: 31.8 PG (ref 26.5–33)
MCHC RBC AUTO-ENTMCNC: 31.1 G/DL (ref 31.5–36.5)
MCV RBC AUTO: 102 FL (ref 78–100)
MONOCYTES # BLD AUTO: 0.4 10E3/UL (ref 0–1.3)
MONOCYTES NFR BLD AUTO: 8 %
NEUTROPHILS # BLD AUTO: 3.9 10E3/UL (ref 1.6–8.3)
NEUTROPHILS NFR BLD AUTO: 68 %
NRBC # BLD AUTO: 0 10E3/UL
NRBC BLD AUTO-RTO: 0 /100
PHOSPHATE SERPL-MCNC: 2.7 MG/DL (ref 2.5–4.5)
PLATELET # BLD AUTO: 355 10E3/UL (ref 150–450)
POTASSIUM SERPL-SCNC: 4.3 MMOL/L (ref 3.4–5.3)
RBC # BLD AUTO: 3.36 10E6/UL (ref 4.4–5.9)
SODIUM SERPL-SCNC: 143 MMOL/L (ref 136–145)
TACROLIMUS BLD-MCNC: 10.9 UG/L (ref 5–15)
TME LAST DOSE: NORMAL H
TME LAST DOSE: NORMAL H
WBC # BLD AUTO: 5.8 10E3/UL (ref 4–11)

## 2023-02-06 PROCEDURE — 85025 COMPLETE CBC W/AUTO DIFF WBC: CPT | Mod: ORL | Performed by: SURGERY

## 2023-02-06 PROCEDURE — 83735 ASSAY OF MAGNESIUM: CPT | Mod: ORL | Performed by: SURGERY

## 2023-02-06 PROCEDURE — 80197 ASSAY OF TACROLIMUS: CPT | Mod: ORL | Performed by: SURGERY

## 2023-02-06 PROCEDURE — 84100 ASSAY OF PHOSPHORUS: CPT | Mod: ORL | Performed by: SURGERY

## 2023-02-06 PROCEDURE — 80048 BASIC METABOLIC PNL TOTAL CA: CPT | Mod: ORL | Performed by: SURGERY

## 2023-02-08 ENCOUNTER — MYC MEDICAL ADVICE (OUTPATIENT)
Dept: TRANSPLANT | Facility: CLINIC | Age: 38
End: 2023-02-08
Payer: MEDICARE

## 2023-02-08 ENCOUNTER — TELEPHONE (OUTPATIENT)
Dept: TRANSPLANT | Facility: CLINIC | Age: 38
End: 2023-02-08
Payer: MEDICARE

## 2023-02-08 NOTE — TELEPHONE ENCOUNTER
Post Kidney and Pancreas Transplant Team Conference  Date: 2/8/2023  Transplant Coordinator: Veronica Edgar     Attendees:  []  Dr. Green [x] Kate Chaves, RN [x] Jessenia Bishop LPN     []  Dr. Pretty [x] Veronica Edgar RN [] Kathy Allen LPN   [x]  Dr. Herrera [x] Ivette Webb, DIPTI    [x]  Dr. Cerda [] Marcia Gurrola RN [] Oleksandr Ashraf, PharmD   [] Dr. Santos [] Katlyn Montelongo, DIPTI    [] Dr. Staton [] Kb Negron RN    [] Dr. Fan [] Rachael Oropeza RN [] Roma Collins RN   [] Dr. Sinha [] Phyllis Rosen RN    []  Dr. Emerson [] Carlyn Simmons RN    [] Dr. Soler [] Danielle Eubanks RN    [x] Jane Man, SILVIA [] Yola Gasca RN        Verbal Plan Read Back:   Continue current treatment plan    Routed to RN Coordinator   Jessenia Bishop LPN

## 2023-02-09 ENCOUNTER — LAB REQUISITION (OUTPATIENT)
Dept: LAB | Facility: CLINIC | Age: 38
End: 2023-02-09
Payer: MEDICARE

## 2023-02-09 DIAGNOSIS — Z48.22 ENCOUNTER FOR AFTERCARE FOLLOWING KIDNEY TRANSPLANT: ICD-10-CM

## 2023-02-09 LAB
ANION GAP SERPL CALCULATED.3IONS-SCNC: 10 MMOL/L (ref 7–15)
BASOPHILS # BLD AUTO: 0.1 10E3/UL (ref 0–0.2)
BASOPHILS NFR BLD AUTO: 1 %
BUN SERPL-MCNC: 28 MG/DL (ref 6–20)
CALCIUM SERPL-MCNC: 8.4 MG/DL (ref 8.6–10)
CHLORIDE SERPL-SCNC: 111 MMOL/L (ref 98–107)
CREAT SERPL-MCNC: 1.44 MG/DL (ref 0.67–1.17)
DEPRECATED HCO3 PLAS-SCNC: 20 MMOL/L (ref 22–29)
EOSINOPHIL # BLD AUTO: 0.4 10E3/UL (ref 0–0.7)
EOSINOPHIL NFR BLD AUTO: 7 %
ERYTHROCYTE [DISTWIDTH] IN BLOOD BY AUTOMATED COUNT: 14.6 % (ref 10–15)
GFR SERPL CREATININE-BSD FRML MDRD: 64 ML/MIN/1.73M2
GLUCOSE SERPL-MCNC: 107 MG/DL (ref 70–99)
HCT VFR BLD AUTO: 33.9 % (ref 40–53)
HGB BLD-MCNC: 10.9 G/DL (ref 13.3–17.7)
HOLD SPECIMEN: NORMAL
IMM GRANULOCYTES # BLD: 0.1 10E3/UL
IMM GRANULOCYTES NFR BLD: 1 %
LYMPHOCYTES # BLD AUTO: 1 10E3/UL (ref 0.8–5.3)
LYMPHOCYTES NFR BLD AUTO: 17 %
MAGNESIUM SERPL-MCNC: 1.3 MG/DL (ref 1.7–2.3)
MCH RBC QN AUTO: 32.1 PG (ref 26.5–33)
MCHC RBC AUTO-ENTMCNC: 32.2 G/DL (ref 31.5–36.5)
MCV RBC AUTO: 100 FL (ref 78–100)
MONOCYTES # BLD AUTO: 0.5 10E3/UL (ref 0–1.3)
MONOCYTES NFR BLD AUTO: 9 %
MYCOPHENOLATE SERPL LC/MS/MS-MCNC: 3.43 MG/L (ref 1–3.5)
MYCOPHENOLATE-G SERPL LC/MS/MS-MCNC: 31.9 MG/L (ref 30–95)
NEUTROPHILS # BLD AUTO: 4 10E3/UL (ref 1.6–8.3)
NEUTROPHILS NFR BLD AUTO: 65 %
NRBC # BLD AUTO: 0 10E3/UL
NRBC BLD AUTO-RTO: 0 /100
PHOSPHATE SERPL-MCNC: 3.3 MG/DL (ref 2.5–4.5)
PLATELET # BLD AUTO: 318 10E3/UL (ref 150–450)
POTASSIUM SERPL-SCNC: 4.1 MMOL/L (ref 3.4–5.3)
RBC # BLD AUTO: 3.4 10E6/UL (ref 4.4–5.9)
SODIUM SERPL-SCNC: 141 MMOL/L (ref 136–145)
TACROLIMUS BLD-MCNC: 16.2 UG/L (ref 5–15)
TME LAST DOSE: ABNORMAL H
TME LAST DOSE: ABNORMAL H
TME LAST DOSE: NORMAL H
TME LAST DOSE: NORMAL H
WBC # BLD AUTO: 6.1 10E3/UL (ref 4–11)

## 2023-02-09 PROCEDURE — 80048 BASIC METABOLIC PNL TOTAL CA: CPT | Mod: ORL | Performed by: SURGERY

## 2023-02-09 PROCEDURE — 84100 ASSAY OF PHOSPHORUS: CPT | Mod: ORL | Performed by: SURGERY

## 2023-02-09 PROCEDURE — 83735 ASSAY OF MAGNESIUM: CPT | Mod: ORL | Performed by: SURGERY

## 2023-02-09 PROCEDURE — 80180 DRUG SCRN QUAN MYCOPHENOLATE: CPT | Mod: ORL | Performed by: SURGERY

## 2023-02-09 PROCEDURE — 85025 COMPLETE CBC W/AUTO DIFF WBC: CPT | Mod: ORL | Performed by: SURGERY

## 2023-02-09 PROCEDURE — 80197 ASSAY OF TACROLIMUS: CPT | Mod: ORL | Performed by: SURGERY

## 2023-02-10 ENCOUNTER — TELEPHONE (OUTPATIENT)
Dept: TRANSPLANT | Facility: CLINIC | Age: 38
End: 2023-02-10
Payer: MEDICARE

## 2023-02-10 DIAGNOSIS — Z94.0 KIDNEY REPLACED BY TRANSPLANT: ICD-10-CM

## 2023-02-10 DIAGNOSIS — Z94.0 TRANSPLANTED KIDNEY: ICD-10-CM

## 2023-02-10 RX ORDER — TACROLIMUS 1 MG/1
7 CAPSULE ORAL 2 TIMES DAILY
Refills: 11 | Status: CANCELLED | OUTPATIENT
Start: 2023-02-10

## 2023-02-10 NOTE — TELEPHONE ENCOUNTER
Lower tacrolimus  Tacrolimus  7 mg bid          OUTCOME:   Spoke with patient, they confirm accurate tacrolimus  trough level and current dose 8 mg BID. Patient confirmed dose change to 7 mg BID and to repeat labs in 5 days. Orders sent to preferred pharmacy for dose change and lab for repeat labs. Patient voiced understanding of plan.

## 2023-02-12 NOTE — TELEPHONE ENCOUNTER
Thanks.      Do you know when my labs from Monday will go to my chart so I can view them?       Jessenia Bishop LPN Aaron M Ritcey 4 days ago     LL  Good morning!  Enclosed you will find a copy of your completed paperwork.  I also faxed the forms to [phone].  Please let me know if you need any further assistance.  Thank you.  Jessenia Bishop LPN   Attachments   AR

## 2023-02-13 ENCOUNTER — LAB REQUISITION (OUTPATIENT)
Dept: LAB | Facility: CLINIC | Age: 38
End: 2023-02-13
Payer: MEDICARE

## 2023-02-13 ENCOUNTER — OFFICE VISIT (OUTPATIENT)
Dept: TRANSPLANT | Facility: CLINIC | Age: 38
End: 2023-02-13
Attending: INTERNAL MEDICINE
Payer: MEDICARE

## 2023-02-13 VITALS
DIASTOLIC BLOOD PRESSURE: 76 MMHG | SYSTOLIC BLOOD PRESSURE: 125 MMHG | RESPIRATION RATE: 16 BRPM | WEIGHT: 153.3 LBS | OXYGEN SATURATION: 97 % | BODY MASS INDEX: 21.46 KG/M2 | TEMPERATURE: 98.1 F | HEART RATE: 63 BPM | HEIGHT: 71 IN

## 2023-02-13 DIAGNOSIS — Z48.22 ENCOUNTER FOR AFTERCARE FOLLOWING KIDNEY TRANSPLANT: ICD-10-CM

## 2023-02-13 DIAGNOSIS — Z94.0 KIDNEY REPLACED BY TRANSPLANT: ICD-10-CM

## 2023-02-13 LAB
ANION GAP SERPL CALCULATED.3IONS-SCNC: 11 MMOL/L (ref 7–15)
BASOPHILS # BLD AUTO: 0.1 10E3/UL (ref 0–0.2)
BASOPHILS NFR BLD AUTO: 1 %
BUN SERPL-MCNC: 20.6 MG/DL (ref 6–20)
CALCIUM SERPL-MCNC: 9.1 MG/DL (ref 8.6–10)
CHLORIDE SERPL-SCNC: 109 MMOL/L (ref 98–107)
CREAT SERPL-MCNC: 1.39 MG/DL (ref 0.67–1.17)
DEPRECATED HCO3 PLAS-SCNC: 24 MMOL/L (ref 22–29)
EOSINOPHIL # BLD AUTO: 0.4 10E3/UL (ref 0–0.7)
EOSINOPHIL NFR BLD AUTO: 9 %
ERYTHROCYTE [DISTWIDTH] IN BLOOD BY AUTOMATED COUNT: 14.6 % (ref 10–15)
GFR SERPL CREATININE-BSD FRML MDRD: 67 ML/MIN/1.73M2
GLUCOSE SERPL-MCNC: 93 MG/DL (ref 70–99)
HCT VFR BLD AUTO: 35.5 % (ref 40–53)
HGB BLD-MCNC: 11.2 G/DL (ref 13.3–17.7)
HOLD SPECIMEN: NORMAL
IMM GRANULOCYTES # BLD: 0.1 10E3/UL
IMM GRANULOCYTES NFR BLD: 2 %
LYMPHOCYTES # BLD AUTO: 0.6 10E3/UL (ref 0.8–5.3)
LYMPHOCYTES NFR BLD AUTO: 14 %
MAGNESIUM SERPL-MCNC: 1.5 MG/DL (ref 1.7–2.3)
MCH RBC QN AUTO: 31.7 PG (ref 26.5–33)
MCHC RBC AUTO-ENTMCNC: 31.5 G/DL (ref 31.5–36.5)
MCV RBC AUTO: 101 FL (ref 78–100)
MONOCYTES # BLD AUTO: 0.6 10E3/UL (ref 0–1.3)
MONOCYTES NFR BLD AUTO: 14 %
NEUTROPHILS # BLD AUTO: 2.5 10E3/UL (ref 1.6–8.3)
NEUTROPHILS NFR BLD AUTO: 60 %
NRBC # BLD AUTO: 0 10E3/UL
NRBC BLD AUTO-RTO: 0 /100
PHOSPHATE SERPL-MCNC: 3.6 MG/DL (ref 2.5–4.5)
PLATELET # BLD AUTO: 250 10E3/UL (ref 150–450)
POTASSIUM SERPL-SCNC: 4.9 MMOL/L (ref 3.4–5.3)
RBC # BLD AUTO: 3.53 10E6/UL (ref 4.4–5.9)
SODIUM SERPL-SCNC: 144 MMOL/L (ref 136–145)
TACROLIMUS BLD-MCNC: 12.4 UG/L (ref 5–15)
TME LAST DOSE: NORMAL H
TME LAST DOSE: NORMAL H
WBC # BLD AUTO: 4.2 10E3/UL (ref 4–11)

## 2023-02-13 PROCEDURE — 83735 ASSAY OF MAGNESIUM: CPT | Mod: ORL | Performed by: SURGERY

## 2023-02-13 PROCEDURE — G0463 HOSPITAL OUTPT CLINIC VISIT: HCPCS | Performed by: SURGERY

## 2023-02-13 PROCEDURE — 80048 BASIC METABOLIC PNL TOTAL CA: CPT | Mod: ORL | Performed by: SURGERY

## 2023-02-13 PROCEDURE — 84100 ASSAY OF PHOSPHORUS: CPT | Mod: ORL | Performed by: SURGERY

## 2023-02-13 PROCEDURE — 80197 ASSAY OF TACROLIMUS: CPT | Mod: ORL | Performed by: SURGERY

## 2023-02-13 PROCEDURE — 99213 OFFICE O/P EST LOW 20 MIN: CPT | Mod: 24 | Performed by: SURGERY

## 2023-02-13 PROCEDURE — 85025 COMPLETE CBC W/AUTO DIFF WBC: CPT | Mod: ORL | Performed by: SURGERY

## 2023-02-13 ASSESSMENT — PAIN SCALES - GENERAL: PAINLEVEL: NO PAIN (0)

## 2023-02-13 NOTE — LETTER
2/13/2023         RE: Chip Fowler  [address] [address] [address]        Dear Colleague,    Thank you for referring your patient, Chip Fowler, to the [facility]PL[facility]. Please see a copy of my visit note below.    Transplant Surgery Progress Note    Transplants:  1/15/2023 (Kidney)     S: Presents for post op.continues to feel well. No pain, eating and drinking without issues.     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    Transplant Date:  1/15/2023 (Kidney)   Ureteral Stent:  Yes   Crossmatch:  negative   DSA at Tx:  No  Baseline Cr: pending, best 1.4-1.6  DeNovo DSA: No    Acute Rejection Hx:  No    Present Maintenance Immunosuppression:  Tacrolimus and Mycophenolate mofetil    CMV IgG Ab Discordance:  Yes +/-  EBV IgG Ab Discordance:  No +/+    BK Viremia:  No  EBV Viremia:  No    Transplant Coordinator: Veronica Edgar     Transplant Office Phone Number: [phone]     Immunosuppressant Medications       Immunosuppressive Agents Disp Start End     mycophenolate (GENERIC EQUIVALENT) 250 MG capsule    180 capsule 1/18/2023     Sig - Route: Take 3 capsules (750 mg) by mouth 2 times daily - Oral    Class: E-Prescribe     tacrolimus (GENERIC EQUIVALENT) 0.5 MG capsule    180 capsule 1/26/2023     Sig: HOLD FOR FUTURE DOSE CHANGES.  Profile Rx: patient will contact pharmacy when needed    Class: E-Prescribe     tacrolimus (GENERIC EQUIVALENT) 1 MG capsule    480 capsule 1/26/2023     Sig - Route: Take 8 capsules (8 mg) by mouth 2 times daily - Oral    Class: E-Prescribe            Possible Immunosuppression-related side effects:   []             headache  []             vivid dreams  []             irritability  []             cognitive difficuties  []             fine tremor  []             nausea  []             diarrhea  []             neuropathy      []             edema  []             renal calcineurin toxicity  []              hyperkalemia  []             post-transplant diabetes  []             decreased appetite  []             increased appetite  []             other:  []             none    Prescription Medications as of 4/29/2023         Rx Number Disp Refills Start End Last Dispensed Date Next Fill Date Owning Pharmacy    magnesium oxide (MAG-OX) 400 MG tablet  60 tablet 3 3/31/2023    [facility] [place] [address]    Sig: Take 2 tablets (800 mg) by mouth daily 6PM    Class: E-Prescribe    Route: Oral    mycophenolic acid (GENERIC EQUIVALENT) 180 MG EC tablet  240 tablet 11 4/19/2023    [facility] [address]    Sig: Take 4 tablets (720 mg) by mouth 2 times daily    Class: E-Prescribe    Route: Oral    pantoprazole (PROTONIX) 40 MG EC tablet  30 tablet 2 1/19/2023    [facility] [place], [place] - [address]    Sig: Take 1 tablet (40 mg) by mouth every morning (before breakfast)    Class: E-Prescribe    Route: Oral    sodium bicarbonate 650 MG tablet  60 tablet 1 3/8/2023    [facility], [place] - [facility] [address]    Sig: Take 1 tablet (650 mg) by mouth 2 times daily    Class: E-Prescribe    Route: Oral    sulfamethoxazole-trimethoprim (BACTRIM) 400-80 MG tablet  30 tablet 11 3/29/2023    [facility] [address]    Sig: Take 1 tablet by mouth daily    Class: E-Prescribe    Route: Oral    tacrolimus (GENERIC EQUIVALENT) 1 MG capsule  240 capsule 11 4/21/2023    [facility] [address]    Sig: Take 4 capsules (4 mg) by mouth 2 times daily    Class: E-Prescribe    Notes to Pharmacy: TXP DT 1/15/2023 (Kidney) TXP Dischg DT 1/18/2023 DX Kidney replaced by transplant Z94.0 TX [facility] ([place], [place])    Route: Oral    valGANciclovir (VALCYTE) 450  MG tablet  60 tablet 1 2/28/2023    [facility] [place], [place] - [address]    Sig: Take 2 tablets (900 mg) by mouth daily    Class: E-Prescribe    Route: Oral    VITAMIN D, CHOLECALCIFEROL, PO            Sig: Take 2,000 Units by mouth daily    Class: Historical    Route: Oral            O:      General Appearance: in no apparent distress.   Skin: Normal, no rashes or jaundice  Heart: regular rate and rhythm  Lungs: easy respirations, no audible wheezing.  Abdomen: symmetric, incision well-healed without erythema, breakdown, or evident hernia  Extremities: Tremor absent.   Edema: absent. 1+        Latest Ref Rng & Units 4/27/2023     3:33 PM 4/24/2023     3:19 PM 4/20/2023     3:20 PM 4/17/2023     3:19 PM 4/15/2023    10:02 AM   Transplant Immunosuppression Labs   Creat 0.67 - 1.17 mg/dL 1.57   1.60   1.65   1.89   1.55     Urea Nitrogen 6.0 - 20.0 mg/dL 28.0   30.4   28.2   22.5   20.6     WBC 4.0 - 11.0 10e3/uL 2.8   3.7   3.6   6.2   6.7     Neutrophil %  65   55   76      ANEU 1.6 - 8.3 10e3/uL  2.4   2.0   4.7          Chemistries:   Recent Labs   Lab Test 04/27/23  1533   BUN 28.0*   CR 1.57*   GFRESTIMATED 57*   GLC 91     Lab Results   Component Value Date    A1C 5.2 01/15/2023     Recent Labs   Lab Test 04/06/23  1543   ALBUMIN 4.5   BILITOTAL 0.2   ALKPHOS 65   AST 30   ALT 42     Urine Studies:  Recent Labs   Lab Test 04/15/23  0918   COLOR Yellow   APPEARANCE Clear   URINEGLC Negative   URINEBILI Negative   URINEKETONE Negative   SG 1.020   UBLD Large*   URINEPH 6.5   PROTEIN >=300*   NITRITE Negative   LEUKEST Moderate*   RBCU 10-25*   WBCU *     No lab results found.  Hematology:   Recent Labs   Lab Test 04/27/23  1533 04/24/23  1519 04/20/23  1520   HGB 10.0* 9.5* 8.9*    249 173   WBC 2.8* 3.7* 3.6*     Coags:   Recent Labs   Lab Test 01/15/23  0540 08/09/21  1124   INR 0.90 0.96     HLA antibodies:   SA1 Hi Risk Jihan   Date Value Ref Range Status    08/18/2020 B:44 82  Final     SA1 HI RISK TARA   Date Value Ref Range Status   03/29/2023 None  Final     SA1 Mod Risk Tara   Date Value Ref Range Status   08/18/2020 B:8 37 41 42 45 46 Cw:5  Final     SA1 MOD RISK TARA   Date Value Ref Range Status   03/29/2023 B:8 37 41 42 44 45 82  Final     SA2 Hi Risk Tara   Date Value Ref Range Status   08/18/2020 None  Final     SA2 HI RISK TARA   Date Value Ref Range Status   03/29/2023 None  Final     SA2 Mod Risk Tara   Date Value Ref Range Status   08/18/2020 None  Final     SA2 MOD RISK TARA   Date Value Ref Range Status   03/29/2023 None  Final       Assessment: Chip Fowler is doing well s/p DDKT:  Issues we addressed during his visit include:    Plan:    1. Graft function: stable and improving  2. Immunosuppression Management: No change continue .  Complexity of management:Medium.  Contributing factors: post-op  Followup: as needed       Total Time: 15 min,   Counselling Time: 10 min.      Tez Emerson MD

## 2023-02-13 NOTE — NURSING NOTE
"Chief Complaint   Patient presents with     RECHECK     S/P Kidney TX 1/15/2023     Vital signs:  Temp: 98.1  F (36.7  C) Temp src: Oral BP: 125/76 Pulse: 63   Resp: 16 SpO2: 97 %     Height: 180.3 cm (5' 11\") Weight: 69.5 kg (153 lb 4.8 oz)  Estimated body mass index is 21.38 kg/m  as calculated from the following:    Height as of this encounter: 1.803 m (5' 11\").    Weight as of this encounter: 69.5 kg (153 lb 4.8 oz).        Ramona Solano, [facility]  2/13/2023 4:00 PM      "

## 2023-02-15 ENCOUNTER — MYC MEDICAL ADVICE (OUTPATIENT)
Dept: TRANSPLANT | Facility: CLINIC | Age: 38
End: 2023-02-15
Payer: MEDICARE

## 2023-02-16 ENCOUNTER — LAB REQUISITION (OUTPATIENT)
Dept: LAB | Facility: CLINIC | Age: 38
End: 2023-02-16
Payer: MEDICARE

## 2023-02-16 DIAGNOSIS — Z48.22 ENCOUNTER FOR AFTERCARE FOLLOWING KIDNEY TRANSPLANT: ICD-10-CM

## 2023-02-16 LAB
ANION GAP SERPL CALCULATED.3IONS-SCNC: 9 MMOL/L (ref 7–15)
BUN SERPL-MCNC: 23.2 MG/DL (ref 6–20)
CALCIUM SERPL-MCNC: 9.3 MG/DL (ref 8.6–10)
CHLORIDE SERPL-SCNC: 108 MMOL/L (ref 98–107)
CREAT SERPL-MCNC: 1.54 MG/DL (ref 0.67–1.17)
DEPRECATED HCO3 PLAS-SCNC: 24 MMOL/L (ref 22–29)
ERYTHROCYTE [DISTWIDTH] IN BLOOD BY AUTOMATED COUNT: 13.9 % (ref 10–15)
GFR SERPL CREATININE-BSD FRML MDRD: 59 ML/MIN/1.73M2
GLUCOSE SERPL-MCNC: 124 MG/DL (ref 70–99)
HCT VFR BLD AUTO: 35.2 % (ref 40–53)
HGB BLD-MCNC: 11.4 G/DL (ref 13.3–17.7)
MAGNESIUM SERPL-MCNC: 1.4 MG/DL (ref 1.7–2.3)
MCH RBC QN AUTO: 31.6 PG (ref 26.5–33)
MCHC RBC AUTO-ENTMCNC: 32.4 G/DL (ref 31.5–36.5)
MCV RBC AUTO: 98 FL (ref 78–100)
PHOSPHATE SERPL-MCNC: 3.6 MG/DL (ref 2.5–4.5)
PLATELET # BLD AUTO: 197 10E3/UL (ref 150–450)
POTASSIUM SERPL-SCNC: 4.4 MMOL/L (ref 3.4–5.3)
RBC # BLD AUTO: 3.61 10E6/UL (ref 4.4–5.9)
SODIUM SERPL-SCNC: 141 MMOL/L (ref 136–145)
TACROLIMUS BLD-MCNC: 12.6 UG/L (ref 5–15)
TME LAST DOSE: NORMAL H
TME LAST DOSE: NORMAL H
WBC # BLD AUTO: 4.5 10E3/UL (ref 4–11)

## 2023-02-16 PROCEDURE — 80197 ASSAY OF TACROLIMUS: CPT | Mod: ORL | Performed by: SURGERY

## 2023-02-16 PROCEDURE — 85027 COMPLETE CBC AUTOMATED: CPT | Mod: ORL | Performed by: SURGERY

## 2023-02-16 PROCEDURE — 84100 ASSAY OF PHOSPHORUS: CPT | Mod: ORL | Performed by: SURGERY

## 2023-02-16 PROCEDURE — 83735 ASSAY OF MAGNESIUM: CPT | Mod: ORL | Performed by: SURGERY

## 2023-02-16 PROCEDURE — 80048 BASIC METABOLIC PNL TOTAL CA: CPT | Mod: ORL | Performed by: SURGERY

## 2023-02-17 ENCOUNTER — TELEPHONE (OUTPATIENT)
Dept: TRANSPLANT | Facility: CLINIC | Age: 38
End: 2023-02-17
Payer: MEDICARE

## 2023-02-17 DIAGNOSIS — Z94.0 KIDNEY REPLACED BY TRANSPLANT: ICD-10-CM

## 2023-02-17 DIAGNOSIS — Z94.0 TRANSPLANTED KIDNEY: ICD-10-CM

## 2023-02-17 RX ORDER — TACROLIMUS 1 MG/1
6 CAPSULE ORAL 2 TIMES DAILY
Qty: 480 CAPSULE | Refills: 11 | Status: SHIPPED | OUTPATIENT
Start: 2023-02-17 | End: 2023-02-21

## 2023-02-20 ENCOUNTER — LAB (OUTPATIENT)
Dept: LAB | Facility: CLINIC | Age: 38
End: 2023-02-20
Payer: MEDICARE

## 2023-02-20 DIAGNOSIS — Z20.828 CONTACT WITH AND (SUSPECTED) EXPOSURE TO OTHER VIRAL COMMUNICABLE DISEASES: ICD-10-CM

## 2023-02-20 DIAGNOSIS — Z94.0 KIDNEY REPLACED BY TRANSPLANT: ICD-10-CM

## 2023-02-20 DIAGNOSIS — Z48.298 AFTERCARE FOLLOWING ORGAN TRANSPLANT: ICD-10-CM

## 2023-02-20 DIAGNOSIS — Z79.899 ENCOUNTER FOR LONG-TERM CURRENT USE OF MEDICATION: ICD-10-CM

## 2023-02-20 LAB
ALBUMIN MFR UR ELPH: 9.3 MG/DL (ref 1–14)
ANION GAP SERPL CALCULATED.3IONS-SCNC: 11 MMOL/L (ref 7–15)
BUN SERPL-MCNC: 25.7 MG/DL (ref 6–20)
CALCIUM SERPL-MCNC: 9.5 MG/DL (ref 8.6–10)
CHLORIDE SERPL-SCNC: 103 MMOL/L (ref 98–107)
CREAT SERPL-MCNC: 1.69 MG/DL (ref 0.67–1.17)
CREAT UR-MCNC: 56.1 MG/DL
DEPRECATED HCO3 PLAS-SCNC: 23 MMOL/L (ref 22–29)
ERYTHROCYTE [DISTWIDTH] IN BLOOD BY AUTOMATED COUNT: 13 % (ref 10–15)
GFR SERPL CREATININE-BSD FRML MDRD: 53 ML/MIN/1.73M2
GLUCOSE SERPL-MCNC: 90 MG/DL (ref 70–99)
HCT VFR BLD AUTO: 34.3 % (ref 40–53)
HGB BLD-MCNC: 11.4 G/DL (ref 13.3–17.7)
MAGNESIUM SERPL-MCNC: 1.4 MG/DL (ref 1.7–2.3)
MCH RBC QN AUTO: 31.8 PG (ref 26.5–33)
MCHC RBC AUTO-ENTMCNC: 33.2 G/DL (ref 31.5–36.5)
MCV RBC AUTO: 96 FL (ref 78–100)
PHOSPHATE SERPL-MCNC: 3 MG/DL (ref 2.5–4.5)
PLATELET # BLD AUTO: 240 10E3/UL (ref 150–450)
POTASSIUM SERPL-SCNC: 4.3 MMOL/L (ref 3.4–5.3)
PROT/CREAT 24H UR: 0.17 MG/MG CR (ref 0–0.2)
RBC # BLD AUTO: 3.59 10E6/UL (ref 4.4–5.9)
SODIUM SERPL-SCNC: 137 MMOL/L (ref 136–145)
WBC # BLD AUTO: 7.2 10E3/UL (ref 4–11)

## 2023-02-20 PROCEDURE — 86828 HLA CLASS I&II ANTIBODY QUAL: CPT | Mod: XU

## 2023-02-20 PROCEDURE — 87521 HEPATITIS C PROBE&RVRS TRNSC: CPT

## 2023-02-20 PROCEDURE — 83735 ASSAY OF MAGNESIUM: CPT

## 2023-02-20 PROCEDURE — 84156 ASSAY OF PROTEIN URINE: CPT

## 2023-02-20 PROCEDURE — 86833 HLA CLASS II HIGH DEFIN QUAL: CPT

## 2023-02-20 PROCEDURE — 80197 ASSAY OF TACROLIMUS: CPT

## 2023-02-20 PROCEDURE — 36415 COLL VENOUS BLD VENIPUNCTURE: CPT

## 2023-02-20 PROCEDURE — 87799 DETECT AGENT NOS DNA QUANT: CPT

## 2023-02-20 PROCEDURE — 86832 HLA CLASS I HIGH DEFIN QUAL: CPT

## 2023-02-20 PROCEDURE — 80048 BASIC METABOLIC PNL TOTAL CA: CPT

## 2023-02-20 PROCEDURE — 84100 ASSAY OF PHOSPHORUS: CPT

## 2023-02-20 PROCEDURE — 80180 DRUG SCRN QUAN MYCOPHENOLATE: CPT

## 2023-02-20 PROCEDURE — 85027 COMPLETE CBC AUTOMATED: CPT

## 2023-02-21 ENCOUNTER — TELEPHONE (OUTPATIENT)
Dept: TRANSPLANT | Facility: CLINIC | Age: 38
End: 2023-02-21
Payer: MEDICARE

## 2023-02-21 DIAGNOSIS — Z94.0 KIDNEY REPLACED BY TRANSPLANT: ICD-10-CM

## 2023-02-21 DIAGNOSIS — Z94.0 TRANSPLANTED KIDNEY: Primary | ICD-10-CM

## 2023-02-21 LAB
BKV DNA # SPEC NAA+PROBE: NOT DETECTED COPIES/ML
MYCOPHENOLATE SERPL LC/MS/MS-MCNC: 3.27 MG/L (ref 1–3.5)
MYCOPHENOLATE-G SERPL LC/MS/MS-MCNC: 42.1 MG/L (ref 30–95)
TACROLIMUS BLD-MCNC: 12.9 UG/L (ref 5–15)
TME LAST DOSE: NORMAL H

## 2023-02-21 NOTE — TELEPHONE ENCOUNTER
Called  Chip regarding returning to work   Issue   Increase creatinine with  Increase tacrolimus  Level   Confirmed current dose of tacrolimus  6 mg twice per day   Confirmed 12 hour level  - Currently taking medications at 4 am and obtaining labs 4 pm  After work   Reviewed the importance of hydration when returning to work   Reviewed of creatinine continues to increase plan to obtain UA UC IV fluids transplant  Kidney  ultrasound   Although emphasis dehydration with tac above goal level           Weight a    Current weight  144  From 145  146   --- 149     to return home

## 2023-02-22 LAB
DONOR IDENTIFICATION: NORMAL
DSA COMMENTS: NORMAL
DSA PRESENT: NO
DSA TEST METHOD: NORMAL
INT SUB RESULT: NORMAL
INTERF SUBSTANCE: NORMAL
INTSUB TEST METHOD: NORMAL
ORGAN: NORMAL
SA 1 CELL: NORMAL
SA 1 TEST METHOD: NORMAL
SA 2 CELL: NORMAL
SA 2 TEST METHOD: NORMAL
SA1 HI RISK ABY: NORMAL
SA1 MOD RISK ABY: NORMAL
SA2 HI RISK ABY: NORMAL
SA2 MOD RISK ABY: NORMAL
UNACCEPTABLE ANTIGENS: NORMAL
UNOS CPRA: 49
ZZZINT SUB COMMENTS: NORMAL
ZZZSA 1  COMMENTS: NORMAL
ZZZSA 2 COMMENTS: NORMAL

## 2023-02-23 ENCOUNTER — LAB (OUTPATIENT)
Dept: LAB | Facility: CLINIC | Age: 38
End: 2023-02-23
Payer: MEDICARE

## 2023-02-23 ENCOUNTER — TELEPHONE (OUTPATIENT)
Dept: TRANSPLANT | Facility: CLINIC | Age: 38
End: 2023-02-23

## 2023-02-23 DIAGNOSIS — Z48.298 AFTERCARE FOLLOWING ORGAN TRANSPLANT: ICD-10-CM

## 2023-02-23 DIAGNOSIS — Z94.0 KIDNEY REPLACED BY TRANSPLANT: ICD-10-CM

## 2023-02-23 DIAGNOSIS — Z79.899 ENCOUNTER FOR LONG-TERM CURRENT USE OF MEDICATION: ICD-10-CM

## 2023-02-23 DIAGNOSIS — Z20.828 CONTACT WITH AND (SUSPECTED) EXPOSURE TO OTHER VIRAL COMMUNICABLE DISEASES: ICD-10-CM

## 2023-02-23 DIAGNOSIS — Z48.298 AFTERCARE FOLLOWING ORGAN TRANSPLANT: Primary | ICD-10-CM

## 2023-02-23 DIAGNOSIS — Z94.0 TRANSPLANTED KIDNEY: ICD-10-CM

## 2023-02-23 LAB
ANION GAP SERPL CALCULATED.3IONS-SCNC: 10 MMOL/L (ref 7–15)
BUN SERPL-MCNC: 26 MG/DL (ref 6–20)
CALCIUM SERPL-MCNC: 9.2 MG/DL (ref 8.6–10)
CHLORIDE SERPL-SCNC: 106 MMOL/L (ref 98–107)
CREAT SERPL-MCNC: 1.78 MG/DL (ref 0.67–1.17)
DEPRECATED HCO3 PLAS-SCNC: 23 MMOL/L (ref 22–29)
ERYTHROCYTE [DISTWIDTH] IN BLOOD BY AUTOMATED COUNT: 12.9 % (ref 10–15)
GFR SERPL CREATININE-BSD FRML MDRD: 49 ML/MIN/1.73M2
GLUCOSE SERPL-MCNC: 94 MG/DL (ref 70–99)
HBV DNA SERPL QL NAA+PROBE: NORMAL
HCT VFR BLD AUTO: 33.3 % (ref 40–53)
HCV RNA SERPL QL NAA+PROBE: NORMAL
HGB BLD-MCNC: 11 G/DL (ref 13.3–17.7)
HIV1+2 RNA SERPL QL NAA+PROBE: NORMAL
MCH RBC QN AUTO: 31.7 PG (ref 26.5–33)
MCHC RBC AUTO-ENTMCNC: 33 G/DL (ref 31.5–36.5)
MCV RBC AUTO: 96 FL (ref 78–100)
PLATELET # BLD AUTO: 251 10E3/UL (ref 150–450)
POTASSIUM SERPL-SCNC: 4.5 MMOL/L (ref 3.4–5.3)
RBC # BLD AUTO: 3.47 10E6/UL (ref 4.4–5.9)
SODIUM SERPL-SCNC: 139 MMOL/L (ref 136–145)
TACROLIMUS BLD-MCNC: 12.5 UG/L (ref 5–15)
TME LAST DOSE: NORMAL H
TME LAST DOSE: NORMAL H
WBC # BLD AUTO: 5.2 10E3/UL (ref 4–11)

## 2023-02-23 PROCEDURE — 36415 COLL VENOUS BLD VENIPUNCTURE: CPT

## 2023-02-23 PROCEDURE — 85014 HEMATOCRIT: CPT

## 2023-02-23 PROCEDURE — 80048 BASIC METABOLIC PNL TOTAL CA: CPT

## 2023-02-23 PROCEDURE — 80197 ASSAY OF TACROLIMUS: CPT

## 2023-02-23 RX ORDER — TACROLIMUS 1 MG/1
5 CAPSULE ORAL 2 TIMES DAILY
Qty: 300 CAPSULE | Refills: 11 | Status: ON HOLD | OUTPATIENT
Start: 2023-02-23 | End: 2023-03-24

## 2023-02-23 NOTE — TELEPHONE ENCOUNTER
When you get a chance could you give me a call to go over the time change of the medications for starting work on Monday?     Also when to take the one that I currently take at 12pm and 6pm when I start working.

## 2023-02-23 NOTE — TELEPHONE ENCOUNTER
[address] [facility]        Pt Name: Lorrayne Opitz  MRN: [id number]  [place] 1953  Date of evaluation: 12/23/2021  Provider: Hilary Alvarado PA-C  PCP: No primary care provider on file. Note Started: 3:15 PM EST       I have seen and evaluated this patient with my supervising physician Marianna Lott MD.      [address] 48.       Chief Complaint   Patient presents with    Hypotension     80s over 46s         HISTORY OF PRESENT ILLNESS   (Location/Symptom, Timing/Onset, Context/Setting, Quality, Duration, Modifying Factors, Severity)  Note limiting factors. Chief Complaint: hypotension    Lorrayne Opitz is a 70 y.o. female who presents with reported hypotension. Per EMS patient BP 80s/50s. Per patient, she mentions she is residing at a skilled nursing facility, she was sitting down smoking, and describes becoming \"lethargic\". No reported altered mental status, syncope, prolonged downtime, injury or trauma, recent illness, fever    Nursing Notes were all reviewed and agreed with or any disagreements were addressed in the HPI. REVIEW OF SYSTEMS    (2-9 systems for level 4, 10 or more for level 5)     Review of Systems   Constitutional: Negative for activity change, chills and fever. HENT: Negative for congestion and rhinorrhea. Eyes: Negative for photophobia. Respiratory: Negative for cough, shortness of breath and stridor. Cardiovascular: Negative for chest pain. Gastrointestinal: Positive for vomiting. Negative for abdominal pain. Genitourinary: Negative for dysuria and hematuria. Musculoskeletal: Negative for back pain. Skin: Negative for rash. Neurological: Negative for headaches. Hematological: Negative for adenopathy. Psychiatric/Behavioral: Negative for behavioral problems.        PAST MEDICAL HISTORY     Past Medical History:   Diagnosis Date    Hyperlipidemia     Insomnia     Thyroid disease     hypo       SURGICAL HISTORY Please review telephone encounter    Past Surgical History:   Procedure Laterality Date    BRAIN SURGERY       SECTION         [address]       Current Discharge Medication List      CONTINUE these medications which have NOT CHANGED    Details   aspirin 81 MG EC tablet Take 81 mg by mouth daily      levothyroxine (SYNTHROID) 137 MCG tablet Take 137 mcg by mouth Daily      pravastatin (PRAVACHOL) 40 MG tablet Take 40 mg by mouth daily      traZODone (DESYREL) 50 MG tablet Take 50 mg by mouth nightly             ALLERGIES     Epinephrine, Tartrazine, Dye [iodides], and Other    FAMILYHISTORY     History reviewed. No pertinent family history. SOCIAL HISTORY       Social History     Socioeconomic History    Marital status: Single     Spouse name: None    Number of children: None    Years of education: None    Highest education level: None   Occupational History    None   Tobacco Use    Smoking status: Current Every Day Smoker     Packs/day: 1.00     Types: Cigarettes    Smokeless tobacco: Never Used   Vaping Use    Vaping Use: Never used   Substance and Sexual Activity    Alcohol use: Not Currently    Drug use: Not Currently    Sexual activity: None   Other Topics Concern    None   Social History Narrative    None     Social Determinants of Health     Financial Resource Strain:     Difficulty of Paying Living Expenses: Not on file   Food Insecurity:     Worried About Running Out of Food in the Last Year: Not on file    Kelly of Food in the Last Year: Not on file   Transportation Needs:     Lack of Transportation (Medical): Not on file    Lack of Transportation (Non-Medical):  Not on file   Physical Activity:     Days of Exercise per Week: Not on file    Minutes of Exercise per Session: Not on file   Stress:     Feeling of Stress : Not on file   Social Connections:     Frequency of Communication with Friends and Family: Not on file    Frequency of Social Gatherings with Friends and Family: Not on file   Darrin Aggarwal Attends Voodoo Services: Not on file    Active Member of Clubs or Organizations: Not on file    Attends Club or Organization Meetings: Not on file    Marital Status: Not on file   Intimate Partner Violence:     Fear of Current or Ex-Partner: Not on file    Emotionally Abused: Not on file    Physically Abused: Not on file    Sexually Abused: Not on file   Housing Stability:     Unable to Pay for Housing in the Last Year: Not on file    Number of [place] in the Last Year: Not on file    Unstable Housing in the Last Year: Not on file       SCREENINGS    [place] Coma Scale  Eye Opening: Spontaneous  Best Verbal Response: Confused  Best Motor Response: Obeys commands  [place] Coma Scale Score: 14        PHYSICAL EXAM    (up to 7 for level 4, 8 or more for level 5)     ED Triage Vitals [12/23/21 1456]   BP Temp Temp src Pulse Resp SpO2 Height Weight   (!) 108/48 -- -- 63 18 -- 5' 6\" (1.676 m) 207 lb (93.9 kg)       Physical Exam  Vitals and nursing note reviewed. Constitutional:       Appearance: Normal appearance. She is well-developed. She is not ill-appearing or diaphoretic. HENT:      Head: Normocephalic and atraumatic. Nose: No rhinorrhea. Mouth/Throat:      Pharynx: No posterior oropharyngeal erythema. Eyes:      General: No scleral icterus. Right eye: No discharge. Left eye: No discharge. Extraocular Movements: Extraocular movements intact. Cardiovascular:      Rate and Rhythm: Normal rate and regular rhythm. Heart sounds: Normal heart sounds. No murmur heard. No friction rub. No gallop. Pulmonary:      Effort: Pulmonary effort is normal. No respiratory distress. Breath sounds: Normal breath sounds. No stridor. No wheezing or rales. Chest:      Chest wall: No tenderness. Abdominal:      General: Bowel sounds are normal. There is no distension. Palpations: Abdomen is soft. There is no mass. Tenderness:  There is no abdominal tenderness. There is no guarding or rebound. Musculoskeletal:         General: No tenderness. Normal range of motion. Cervical back: Normal range of motion and neck supple. Right lower leg: No edema. Left lower leg: No edema. Skin:     General: Skin is warm and dry. Coloration: Skin is not pale. Neurological:      General: No focal deficit present. Mental Status: She is alert and oriented to person, place, and time. Sensory: No sensory deficit. Motor: No weakness.       Coordination: Coordination normal.   Psychiatric:         Mood and Affect: Mood normal.         Behavior: Behavior normal.         DIAGNOSTIC RESULTS   LABS:    Labs Reviewed   CBC WITH AUTO DIFFERENTIAL - Abnormal; Notable for the following components:       Result Value    RBC 4.17 (*)     Hemoglobin 12.3 (*)     MCHC 31.8 (*)     Monocytes % 7.4 (*)     All other components within normal limits   COMPREHENSIVE METABOLIC PANEL W/ REFLEX TO MG FOR LOW K - Abnormal; Notable for the following components:    Albumin 2.9 (*)     Total Protein 5.9 (*)     AST 13 (*)     All other components within normal limits   BRAIN NATRIURETIC PEPTIDE - Abnormal; Notable for the following components:    Pro-BNP 1,025 (*)     All other components within normal limits   URINE RT REFLEX TO CULTURE - Abnormal; Notable for the following components:    Clarity, UA SLIGHTLY CLOUDY (*)     Blood, Urine SMALL (*)     Nitrite Urine, Quantitative POSITIVE (*)     Leukocyte Esterase, Urine SMALL (*)     Bacteria, UA RARE (*)     Mucus, UA RARE (*)     All other components within normal limits   TSH WITHOUT REFLEX - Abnormal; Notable for the following components:    TSH, High Sensitivity 0.041 (*)     All other components within normal limits   T4, FREE - Abnormal; Notable for the following components:    T4 Free 2.24 (*)     All other components within normal limits   CULTURE, BLOOD 1    Narrative:     SETUP DATE/TIME:  12/23/2021 1538 CULTURE, BLOOD 2   TROPONIN   LACTIC ACID, PLASMA   BASIC METABOLIC PANEL W/ REFLEX TO MG FOR LOW K   CBC   BASIC METABOLIC PANEL       When ordered, only abnormal lab results are displayed. All other labs were within normal range or not returned as of this dictation. EKG: When ordered, EKG's are interpreted by the Emergency Department Physician in the absence of a cardiologist.  Please see their note for interpretation of EKG. RADIOLOGY:   Non-plain film images such as CT, Ultrasound and MRI are read by the radiologist. Plain radiographic images are visualized andpreliminarily interpreted by the  ED Provider with the below findings:        Interpretation perthe Radiologist below, if available at the time of this note:    VL DUP CAROTID BILATERAL   Final Result   The right internal carotid artery demonstrates 0-50% stenosis. The left internal carotid artery demonstrates 0-50% stenosis. Bilateral vertebral arteries are patent with flow in the normal direction. RECOMMENDATIONS:   Unavailable         CT CERVICAL SPINE WO CONTRAST   Final Result   No evidence of acute intracranial hemorrhage or fracture. No scalp hematoma. 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 Mild-to-moderate multilevel cervical spine degenerative changes. No acute   fracture or subluxation. Straightening of the normal cervical lordosis which   may be related to muscle spasm or position in collar. CT HEAD WO CONTRAST   Final Result   No evidence of acute intracranial hemorrhage or fracture. No scalp hematoma. 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 Mild-to-moderate multilevel cervical spine degenerative changes. No acute   fracture or subluxation.   Straightening of the normal cervical lordosis which   may be related to muscle spasm or position in collar. XR CHEST PORTABLE   Final Result   Cardiomegaly with mild interstitial edema and questionable small left pleural   effusion. No results found.       PROCEDURES   Unless otherwise noted below, none     Procedures    CRITICAL CARE TIME   N/A    CONSULTS:  IP CONSULT TO HOSPITALIST      EMERGENCY DEPARTMENT COURSE and DIFFERENTIAL DIAGNOSIS/MDM:   Vitals:    Vitals:    12/24/21 0347 12/24/21 0753 12/24/21 0903 12/24/21 1734   BP: (!) 105/51  (!) 122/57 112/84   Pulse: 59  62 63   Resp: 20 18 18   Temp: 97.9 °F (36.6 °C)  98.1 °F (36.7 °C) 98.2 °F (36.8 °C)   TempSrc: Oral  Oral Axillary   SpO2: 98% 98% 93% 95%   Weight: 206 lb 9.1 oz (93.7 kg)      Height:           Patient was given thefollowing medications:  Medications   aspirin EC tablet 81 mg (81 mg Oral Given 12/24/21 0858)   levothyroxine (SYNTHROID) tablet 137 mcg (137 mcg Oral Given 12/24/21 0858)   pravastatin (PRAVACHOL) tablet 40 mg (40 mg Oral Given 12/24/21 2124)   traZODone (DESYREL) tablet 50 mg (has no administration in time range)   sodium chloride flush 0.9 % injection 5-40 mL (0 mLs IntraVENous Held 12/24/21 2124)   sodium chloride flush 0.9 % injection 5-40 mL (has no administration in time range)   0.9 % sodium chloride infusion (25 mLs IntraVENous New Bag 12/24/21 1809)   enoxaparin (LOVENOX) injection 40 mg (40 mg SubCUTAneous Given 12/24/21 0858)   ondansetron (ZOFRAN-ODT) disintegrating tablet 4 mg (has no administration in time range)     Or   ondansetron (ZOFRAN) injection 4 mg (has no administration in time range)   polyethylene glycol (GLYCOLAX) packet 17 g (has no administration in time range)   acetaminophen (TYLENOL) tablet 650 mg (has no administration in time range)     Or   acetaminophen (TYLENOL) suppository 650 mg (has no administration in time range)   cefTRIAXone (ROCEPHIN) 1000 mg IVPB in 50 mL D5W minibag (0 mg IntraVENous Stopped 12/24/21 6839)           Patient present with above HPI. I saw patient shortly after arrival to exam room. She is alert, answering questions appropriately. She had no acute distress. Per nursing, patient has muffled voice d/t teeth issues. Initial blood pressure 108/48. Work-up initiated. @15:40 I was able to reach out to patient's assisted-living facility [facility], I spoke with nurse Francesca Huffman, who had attempted to call us earlier to discuss patient but was unable to reach anyone but voicemail. She mentions that patient had been on the smoking patio. Another resident had noticed that patient had apparently passed out. There  had also saw patient, and also described that the patient had a presyncopal episode. Charlene Jiemnez mentions by the time they were out there, patient was able to arise by herself, back in the chair, at at her baseline. She mentions they had not noticed any seizure like activity, or stroke like symptoms. She states her speech is abnormal, but is no different than it typically is, mentioning her dental issues. She does mention that the patient vomited after smoking, however also mentions that patient is noted to vomit after smoking states that she has done this quite often ever since she has been in this facility over the past few months. She does feel that patient had vomited a few extra times, and initially looked pale. They are not sure if patient had hit her head, however the syncopal episode is the main reason for coming in. Data mentions at that time patient's blood pressure was 110/71, they had not measured any hypotensive episodes. She denies any other injury trauma, fever, recent illness. @16:19 will plan for admission for syncopal episode. @17:45 /64 patient discussed with and accepted by hospitalist  FINAL IMPRESSION      1. Syncope and collapse          DISPOSITION/PLAN   DISPOSITION        PATIENT REFERREDTO:  No follow-up provider specified.     DISCHARGE MEDICATIONS:  Current Discharge Medication List          DISCONTINUED MEDICATIONS:  Current Discharge Medication List                 (Please note that portions ofthis note were completed with a voice recognition program.  Efforts were made to edit the dictations but occasionally words are mis-transcribed.)    Marychuy Valdivia PA-C (electronically signed)              Marychuy Valdivia PA-C  12/25/21 0532

## 2023-02-24 ENCOUNTER — INFUSION THERAPY VISIT (OUTPATIENT)
Dept: INFUSION THERAPY | Facility: CLINIC | Age: 38
End: 2023-02-24
Attending: SURGERY
Payer: MEDICARE

## 2023-02-24 ENCOUNTER — ANCILLARY PROCEDURE (OUTPATIENT)
Dept: ULTRASOUND IMAGING | Facility: CLINIC | Age: 38
End: 2023-02-24
Attending: INTERNAL MEDICINE
Payer: MEDICARE

## 2023-02-24 VITALS
RESPIRATION RATE: 16 BRPM | SYSTOLIC BLOOD PRESSURE: 134 MMHG | DIASTOLIC BLOOD PRESSURE: 82 MMHG | HEART RATE: 66 BPM | OXYGEN SATURATION: 100 %

## 2023-02-24 DIAGNOSIS — Z94.0 TRANSPLANTED KIDNEY: ICD-10-CM

## 2023-02-24 DIAGNOSIS — Z48.298 AFTERCARE FOLLOWING ORGAN TRANSPLANT: ICD-10-CM

## 2023-02-24 DIAGNOSIS — E86.0 DEHYDRATION: ICD-10-CM

## 2023-02-24 DIAGNOSIS — Z94.0 KIDNEY REPLACED BY TRANSPLANT: Primary | ICD-10-CM

## 2023-02-24 DIAGNOSIS — Z94.0 TRANSPLANTED KIDNEY: Primary | ICD-10-CM

## 2023-02-24 DIAGNOSIS — Z94.0 KIDNEY REPLACED BY TRANSPLANT: ICD-10-CM

## 2023-02-24 LAB
ALBUMIN UR-MCNC: NEGATIVE MG/DL
ANION GAP SERPL CALCULATED.3IONS-SCNC: 8 MMOL/L (ref 7–15)
APPEARANCE UR: CLEAR
BILIRUB UR QL STRIP: NEGATIVE
BUN SERPL-MCNC: 25.9 MG/DL (ref 6–20)
CALCIUM SERPL-MCNC: 8.5 MG/DL (ref 8.6–10)
CHLORIDE SERPL-SCNC: 110 MMOL/L (ref 98–107)
COLOR UR AUTO: ABNORMAL
CREAT SERPL-MCNC: 1.65 MG/DL (ref 0.67–1.17)
DEPRECATED HCO3 PLAS-SCNC: 22 MMOL/L (ref 22–29)
GFR SERPL CREATININE-BSD FRML MDRD: 54 ML/MIN/1.73M2
GLUCOSE SERPL-MCNC: 85 MG/DL (ref 70–99)
GLUCOSE UR STRIP-MCNC: NEGATIVE MG/DL
HGB UR QL STRIP: NEGATIVE
KETONES UR STRIP-MCNC: NEGATIVE MG/DL
LEUKOCYTE ESTERASE UR QL STRIP: NEGATIVE
MUCOUS THREADS #/AREA URNS LPF: PRESENT /LPF
NITRATE UR QL: NEGATIVE
PH UR STRIP: 6.5 [PH] (ref 5–7)
POTASSIUM SERPL-SCNC: 4.5 MMOL/L (ref 3.4–5.3)
RBC URINE: 3 /HPF
SODIUM SERPL-SCNC: 140 MMOL/L (ref 136–145)
SP GR UR STRIP: 1.01 (ref 1–1.03)
SQUAMOUS EPITHELIAL: 1 /HPF
UROBILINOGEN UR STRIP-MCNC: NORMAL MG/DL
WBC URINE: 1 /HPF

## 2023-02-24 PROCEDURE — 258N000003 HC RX IP 258 OP 636: Performed by: INTERNAL MEDICINE

## 2023-02-24 PROCEDURE — 96360 HYDRATION IV INFUSION INIT: CPT

## 2023-02-24 PROCEDURE — 36415 COLL VENOUS BLD VENIPUNCTURE: CPT

## 2023-02-24 PROCEDURE — 80048 BASIC METABOLIC PNL TOTAL CA: CPT

## 2023-02-24 PROCEDURE — 81001 URINALYSIS AUTO W/SCOPE: CPT

## 2023-02-24 PROCEDURE — 76776 US EXAM K TRANSPL W/DOPPLER: CPT | Performed by: RADIOLOGY

## 2023-02-24 RX ORDER — HEPARIN SODIUM (PORCINE) LOCK FLUSH IV SOLN 100 UNIT/ML 100 UNIT/ML
5 SOLUTION INTRAVENOUS
Status: CANCELLED | OUTPATIENT
Start: 2023-02-24

## 2023-02-24 RX ORDER — DIPHENHYDRAMINE HYDROCHLORIDE 50 MG/ML
50 INJECTION INTRAMUSCULAR; INTRAVENOUS
Status: CANCELLED
Start: 2023-02-24

## 2023-02-24 RX ORDER — HEPARIN SODIUM,PORCINE 10 UNIT/ML
5 VIAL (ML) INTRAVENOUS
Status: CANCELLED | OUTPATIENT
Start: 2023-02-24

## 2023-02-24 RX ORDER — ALBUTEROL SULFATE 90 UG/1
1-2 AEROSOL, METERED RESPIRATORY (INHALATION)
Status: CANCELLED
Start: 2023-02-24

## 2023-02-24 RX ORDER — EPINEPHRINE 1 MG/ML
0.3 INJECTION, SOLUTION, CONCENTRATE INTRAVENOUS EVERY 5 MIN PRN
Status: CANCELLED | OUTPATIENT
Start: 2023-02-24

## 2023-02-24 RX ORDER — METHYLPREDNISOLONE SODIUM SUCCINATE 125 MG/2ML
125 INJECTION, POWDER, LYOPHILIZED, FOR SOLUTION INTRAMUSCULAR; INTRAVENOUS
Status: CANCELLED
Start: 2023-02-24

## 2023-02-24 RX ORDER — ALBUTEROL SULFATE 0.83 MG/ML
2.5 SOLUTION RESPIRATORY (INHALATION)
Status: CANCELLED | OUTPATIENT
Start: 2023-02-24

## 2023-02-24 RX ORDER — MEPERIDINE HYDROCHLORIDE 25 MG/ML
25 INJECTION INTRAMUSCULAR; INTRAVENOUS; SUBCUTANEOUS EVERY 30 MIN PRN
Status: CANCELLED | OUTPATIENT
Start: 2023-02-24

## 2023-02-24 RX ORDER — EPINEPHRINE 1 MG/ML
0.3 INJECTION, SOLUTION INTRAMUSCULAR; SUBCUTANEOUS EVERY 5 MIN PRN
Status: CANCELLED | OUTPATIENT
Start: 2023-02-24

## 2023-02-24 RX ADMIN — SODIUM CHLORIDE 1000 ML: 9 INJECTION, SOLUTION INTRAVENOUS at 14:43

## 2023-02-24 NOTE — PROGRESS NOTES
Nursing Note  Chip Fowler [facility]:   Chief Complaint   Patient presents with     Infusion     IVF     During today's [facility] appointment, orders from Dr. Green were completed.  Frequency: once    Progress note:  Patient identification verified by name and date of birth.  Assessment completed.  Vitals recorded in Doc Flowsheets.  Patient was provided with education regarding medication/procedure and possible side effects.  Patient verbalized understanding.     present during visit today: Not Applicable.    Treatment Conditions: Non-applicable.    Infusion length and rate:  infusion given over approximately 1 hours    Labs: were drawn per orders.     Vascular access: peripheral IV placed today.    Is the next appt scheduled? no    Post Infusion Assessment:  Patient tolerated infusion without incident.     Discharge Plan:   Follow up plan of care with: transplant coordinator.  Discharge instructions were reviewed with patient.  Patient/representative verbalized understanding of discharge instructions and all questions answered.  Patient discharged [facility] in stable condition.    Carlyn Faulkner RN    Administrations This Visit     0.9% sodium chloride BOLUS     Admin Date  02/24/2023 Action  $New Bag Dose  1,000 mL Route  Intravenous Administered By  Carlyn Faulkner RN                /82 (BP Location: Right arm)   Pulse 66   Resp 16   SpO2 100%

## 2023-02-24 NOTE — LETTER
2/24/2023         RE: Chip Fowler  [address] [facility] [address]        Dear Colleague,    Thank you for referring your patient, Chip Fowler, to the [facility]. Please see a copy of my visit note below.    Nursing Note  Chip Fowler [facility]:   Chief Complaint   Patient presents with     Infusion     IVF     During [facility]'s [facility] appointment, orders from Dr. Green were completed.  Frequency: once    Progress note:  Patient identification verified by name and date of birth.  Assessment completed.  Vitals recorded in Doc Flowsheets.  Patient was provided with education regarding medication/procedure and possible side effects.  Patient verbalized understanding.     present during visit today: Not Applicable.    Treatment Conditions: Non-applicable.    Infusion length and rate:  infusion given over approximately 1 hours    Labs: were drawn per orders.     Vascular access: peripheral IV placed today.    Is the next appt scheduled? no    Post Infusion Assessment:  Patient tolerated infusion without incident.     Discharge Plan:   Follow up plan of care with: transplant coordinator.  Discharge instructions were reviewed with patient.  Patient/representative verbalized understanding of discharge instructions and all questions answered.  Patient discharged from [facility] in stable condition.    Carlyn Faulkner RN    Administrations This Visit     0.9% sodium chloride BOLUS     Admin Date  02/24/2023 Action  $New Bag Dose  1,000 mL Route  Intravenous Administered By  Carlyn Faulkner RN                /82 (BP Location: Right arm)   Pulse 66   Resp 16   SpO2 100%         Again, thank you for allowing me to participate in the care of your patient.        Sincerely,        [facility] Nurse

## 2023-02-24 NOTE — LETTER
Date:February 27, 2023      Provider requested that no letter be sent. Do not send.       [facility]

## 2023-02-24 NOTE — TELEPHONE ENCOUNTER
increase creatinine  -(40 days post kidney transplant )    Appears to be dehydration and high tacrolimus level -    tacrolimus dose lowered  With  increase creatinine  Allosure and Donor Specific Antibodies  Pending   Reviewed plan with Dr Green     Give 1 LNS  On 2/24/2023  Kidney transplant ultrasound 2/24/2023  3pm   Repeat BMP after fluids   Obtain UAUC     Chip returned to work this week maybe related to slight increase in creatinine (dehydration )   Reviewed plan with Chip  Agreed

## 2023-02-27 ENCOUNTER — LAB (OUTPATIENT)
Dept: LAB | Facility: CLINIC | Age: 38
End: 2023-02-27
Payer: MEDICARE

## 2023-02-27 ENCOUNTER — TELEPHONE (OUTPATIENT)
Dept: PHARMACY | Facility: CLINIC | Age: 38
End: 2023-02-27
Payer: MEDICARE

## 2023-02-27 DIAGNOSIS — Z79.899 ENCOUNTER FOR LONG-TERM CURRENT USE OF MEDICATION: ICD-10-CM

## 2023-02-27 DIAGNOSIS — Z48.298 AFTERCARE FOLLOWING ORGAN TRANSPLANT: ICD-10-CM

## 2023-02-27 DIAGNOSIS — Z94.0 KIDNEY REPLACED BY TRANSPLANT: ICD-10-CM

## 2023-02-27 DIAGNOSIS — Z20.828 CONTACT WITH AND (SUSPECTED) EXPOSURE TO OTHER VIRAL COMMUNICABLE DISEASES: ICD-10-CM

## 2023-02-27 LAB
ANION GAP SERPL CALCULATED.3IONS-SCNC: 9 MMOL/L (ref 7–15)
BUN SERPL-MCNC: 19.2 MG/DL (ref 6–20)
CALCIUM SERPL-MCNC: 9 MG/DL (ref 8.6–10)
CHLORIDE SERPL-SCNC: 107 MMOL/L (ref 98–107)
CREAT SERPL-MCNC: 1.54 MG/DL (ref 0.67–1.17)
DEPRECATED HCO3 PLAS-SCNC: 23 MMOL/L (ref 22–29)
ERYTHROCYTE [DISTWIDTH] IN BLOOD BY AUTOMATED COUNT: 12.8 % (ref 10–15)
GFR SERPL CREATININE-BSD FRML MDRD: 59 ML/MIN/1.73M2
GLUCOSE SERPL-MCNC: 90 MG/DL (ref 70–99)
HCT VFR BLD AUTO: 30.7 % (ref 40–53)
HGB BLD-MCNC: 10.1 G/DL (ref 13.3–17.7)
MCH RBC QN AUTO: 31 PG (ref 26.5–33)
MCHC RBC AUTO-ENTMCNC: 32.9 G/DL (ref 31.5–36.5)
MCV RBC AUTO: 94 FL (ref 78–100)
PLATELET # BLD AUTO: 277 10E3/UL (ref 150–450)
POTASSIUM SERPL-SCNC: 4.6 MMOL/L (ref 3.4–5.3)
RBC # BLD AUTO: 3.26 10E6/UL (ref 4.4–5.9)
SODIUM SERPL-SCNC: 139 MMOL/L (ref 136–145)
TACROLIMUS BLD-MCNC: 10.2 UG/L (ref 5–15)
TME LAST DOSE: NORMAL H
TME LAST DOSE: NORMAL H
WBC # BLD AUTO: 6.4 10E3/UL (ref 4–11)

## 2023-02-27 PROCEDURE — 85027 COMPLETE CBC AUTOMATED: CPT

## 2023-02-27 PROCEDURE — 80197 ASSAY OF TACROLIMUS: CPT

## 2023-02-27 PROCEDURE — 83735 ASSAY OF MAGNESIUM: CPT

## 2023-02-27 PROCEDURE — 36415 COLL VENOUS BLD VENIPUNCTURE: CPT

## 2023-02-27 PROCEDURE — 84100 ASSAY OF PHOSPHORUS: CPT

## 2023-02-27 PROCEDURE — 80048 BASIC METABOLIC PNL TOTAL CA: CPT

## 2023-02-27 NOTE — TELEPHONE ENCOUNTER
Clinical Pharmacy Consult:                                                      Transplant Specific: 1 Month Post Transplant Call  Date of Transplant: 1/15/2023  Type of Transplant: kidney  First Transplant: yes  History of rejection: no    Immunosuppression Regimen   TAC 5mg qAM & 5mg qPM and MMF 750mg qAM & 750mg qPM  Patient specific goal: 8-10  Most recent level: 12.5, date 2/23/23  Immunosuppressant Levels:  Supratherapeutic, dose reduced  Pt adherent to lab draws: yes  Scr:   Lab Results   Component Value Date    CR 1.65 02/24/2023    CR 6.20 12/03/2020     Side effects: Tremors throughout body and Diarrhea    Prophylactic Medications  Antibacterial:  Bactrim 400-80 three times per week  Scheduled Discontinue Date: Lifelong    Antifungal: Not needed thus far  Scheduled Discontinue Date: N/A    Antiviral: CrCl 25 to 39 mL/minute: Valcyte 450 mg every other day   Scheduled Discontinue Date: 6 months    Acid Reducer: Protonix (pantoprazole)  Scheduled Reviewed Date: Followed by clinic    Thrombosis Prevention: Not on   Scheduled Discontinue Date: N/A    Blood Pressure Management  Frequency of home Blood Pressure checks: once daily  Most recent home BP: 134-138/85-90  Patient Blood pressure goal: <140/90  Patient blood pressure at goal:  yes  Hospitalizations/ER visits since last assessment: 0    Medication adherence flowsheet 2/27/2023   Patient medication administration: Responsible for own medications   Patient estimated adherence level: %   Pharmacist assessment of adherence: Good   Patient reported doses missed per week: 0-1   Pharmacy MPR: -   Facilitators to medication adherence  Alarm;Medication dosing chart;Pill box;Schedule/routine   Patient reported barriers to medication adherence  No issues identified   Adherence intervention(s): -      Medication access flowsheet 2/27/2023   Number of pharmacies used: 1   Pharmacy: [facility]   Enrolled in [facility]? Yes   Patient  reported barriers to accessing medications: No issues reported by patient   Medication access interventions: -      Med rec/DUR performed: yes  Med Rec Discrepancies: no    Chip states he is feeling good.  He is back at work now.  He reports that he has a tremor.  It is most noticeable if he is trying to stand still, and then his head and hands start to shake.  Talked about how his tacrolimus level has been high and once it comes back down into range, hopefully it will get better. He also states he's had some liquid stools.  He has not started the Metamucil Oleksandr had recommended.  Confirmed he has some at home and will start taking it.    Adherence:  Uses pill box, alarms, and med card.  He has not missed any doses but has been an hour late on a few due to getting home a little later than normal.  He takes his immunos at 3:40-3:50am and pm.  This works out best so he can stop at the lab on his way home from work.  He has been taking his magnesium oxide at 5pm.  Told him to push it closer to 6pm so it is 2 hours or more after taking his immunos.    BP: Checking daily.  Under control.     Hi CrCl is now just under 60ml/min.  Will talk to transplant team to see if they want to increase sulfa and valganciclovir to once daily now.    No other questions or concerns today.  Will follow up in 1 month.    Tiara Stock [facility]

## 2023-02-28 ENCOUNTER — OFFICE VISIT (OUTPATIENT)
Dept: NEPHROLOGY | Facility: CLINIC | Age: 38
End: 2023-02-28
Attending: INTERNAL MEDICINE
Payer: MEDICARE

## 2023-02-28 VITALS
HEIGHT: 71 IN | SYSTOLIC BLOOD PRESSURE: 124 MMHG | HEART RATE: 67 BPM | DIASTOLIC BLOOD PRESSURE: 85 MMHG | BODY MASS INDEX: 21.61 KG/M2 | OXYGEN SATURATION: 98 % | WEIGHT: 154.4 LBS

## 2023-02-28 DIAGNOSIS — D63.1 ANEMIA IN STAGE 3A CHRONIC KIDNEY DISEASE (H): ICD-10-CM

## 2023-02-28 DIAGNOSIS — E83.42 HYPOMAGNESEMIA: ICD-10-CM

## 2023-02-28 DIAGNOSIS — E87.20 METABOLIC ACIDOSIS: Primary | ICD-10-CM

## 2023-02-28 DIAGNOSIS — Z29.89 NEED FOR PNEUMOCYSTIS PROPHYLAXIS: ICD-10-CM

## 2023-02-28 DIAGNOSIS — D84.9 IMMUNOSUPPRESSION (H): ICD-10-CM

## 2023-02-28 DIAGNOSIS — N18.31 ANEMIA IN STAGE 3A CHRONIC KIDNEY DISEASE (H): ICD-10-CM

## 2023-02-28 DIAGNOSIS — E55.9 VITAMIN D DEFICIENCY: ICD-10-CM

## 2023-02-28 DIAGNOSIS — Z94.0 KIDNEY REPLACED BY TRANSPLANT: Primary | ICD-10-CM

## 2023-02-28 DIAGNOSIS — N25.81 SECONDARY RENAL HYPERPARATHYROIDISM (H): ICD-10-CM

## 2023-02-28 DIAGNOSIS — N18.31 STAGE 3A CHRONIC KIDNEY DISEASE (H): ICD-10-CM

## 2023-02-28 DIAGNOSIS — Z48.298 AFTERCARE FOLLOWING ORGAN TRANSPLANT: ICD-10-CM

## 2023-02-28 DIAGNOSIS — Z94.0 KIDNEY REPLACED BY TRANSPLANT: ICD-10-CM

## 2023-02-28 LAB
MAGNESIUM SERPL-MCNC: 1.5 MG/DL (ref 1.7–2.3)
PHOSPHATE SERPL-MCNC: 2.8 MG/DL (ref 2.5–4.5)

## 2023-02-28 PROCEDURE — G0463 HOSPITAL OUTPT CLINIC VISIT: HCPCS | Performed by: INTERNAL MEDICINE

## 2023-02-28 PROCEDURE — 99215 OFFICE O/P EST HI 40 MIN: CPT | Mod: 24 | Performed by: INTERNAL MEDICINE

## 2023-02-28 RX ORDER — SODIUM BICARBONATE 650 MG/1
650 TABLET ORAL 2 TIMES DAILY
Qty: 60 TABLET | Refills: 1 | COMMUNITY
Start: 2023-02-28 | End: 2023-03-08

## 2023-02-28 RX ORDER — SULFAMETHOXAZOLE AND TRIMETHOPRIM 400; 80 MG/1; MG/1
1 TABLET ORAL DAILY
Qty: 90 TABLET | Refills: 3 | Status: ON HOLD | OUTPATIENT
Start: 2023-02-28 | End: 2023-03-24

## 2023-02-28 RX ORDER — VALGANCICLOVIR 450 MG/1
900 TABLET, FILM COATED ORAL DAILY
Qty: 60 TABLET | Refills: 1 | Status: SHIPPED | OUTPATIENT
Start: 2023-02-28 | End: 2023-05-22

## 2023-02-28 ASSESSMENT — PAIN SCALES - GENERAL: PAINLEVEL: NO PAIN (0)

## 2023-02-28 NOTE — LETTER
2/28/2023       RE: Chip Fowler  [address] [facility] [address]     Dear Colleague,    Thank you for referring your patient, Chip Fowler, to the [facility]PH[facility] [place] at [facility]. Please see a copy of my visit note below.    TRANSPLANT NEPHROLOGY EARLY POST TRANSPLANT VISIT    Assessment & Plan   # DDKT: Stable   - Baseline Creatinine: ~ 1.4-1.7, so far   - Proteinuria: Not checked post transplant   - Date DSA Last Checked: Feb/2023      Latest DSA: No cPRA: 49%   - BK Viremia: No   - Kidney Tx Biopsy: No   - Transplant Ureteral Stent: Yes; Plan for removal tomorrow.    # Immunosuppression: Tacrolimus immediate release (goal 8-10) and Mycophenolate mofetil (dose 750 mg every 12 hours)   - Induction with Recent Transplant:  Intermediate Intensity   - Continue with intensive monitoring of immunosuppression for efficacy and toxicity.   - Changes: Not at this time    # Infection Prophylaxis:   - PJP: Sulfa/TMP (Bactrim); Will increase dose to once daily.  - CMV: Valganciclovir (Valcyte); Patient is CMV IgG Ab discordant (D+/R-) and will continue on Valcyte x 6 months, then check CMV PCR monthly until 12 months post transplant.    # Blood Pressure: Controlled;  Goal BP: < 140/90   - Volume status: Euvolemic   - Changes: Not at this time    # Anemia in Chronic Renal Disease: Hgb: Stable      SARA: No   - Iron studies: Low iron saturation, but high ferritin    # Mineral Bone Disorder:   - Secondary renal hyperparathyroidism; PTH level: Minimally elevated ( pg/ml)        On treatment: None  - Vitamin D; level: Low        On supplement: Yes  - Calcium; level: Normal        On supplement: No    # Electrolytes:   - Potassium; level: Normal        On supplement: No  - Magnesium; level: Stable low        On supplement: Yes  - Bicarbonate; level: Normal        On supplement: Yes    # H/o Cardiomyopathy: Normal LVEF at 55-60% with last  cardiac echo 9/2022.    # Urethral Stricture: Patient is s/p buccal urethroplasty and diverticulum excision 12/2020.  Patient is to have Urology remove ureteral stent.  Follows closely with Urology.    # Elevated Transaminases: Trend down with last check 1/2023.   - Will repeat hepatic panel.    # GERD: Asymptomatic on PPI.   - Recommend tapering off pantoprazole by starting to take it every other day.  If tolerated without significant symptoms, patient can stop pantoprazole and just take it or OTC famotidine as needed for heartburn symptoms.    # H/o Polysubstance Abuse: Patient with h/o methamphetamine and alcohol abuse.  Now sober.    # Medical Compliance: Yes    # COVID-19 Virus Review: Discussed COVID-19 virus and the potential medical risks.  Reviewed preventative health recommendations, including wearing a mask where appropriate.  Recommended COVID vaccination should be up to date with either an initial vaccination or booster shot when appropriate.  Asked the patient to inform the transplant center if they are exposed or diagnosed with this virus.    # COVID Vaccination Up To Date: No, due for next dose at 3-4 months post transplant    # Transplant History:  Etiology of Kidney Failure: Solitary congenital kidney and h/o urethral calculus and urologic issues  Tx: DDKT  Transplant: 1/15/2023 (Kidney)  Donor Type: Donation after Circulatory Death  Donor Class:   Crossmatch at time of Tx: negative  DSA at time of Tx: No  Significant changes in immunosuppression: None  CMV IgG Ab High Risk Discordance (D+/R-): Yes  EBV IgG Ab High Risk Discordance (D+/R-): No  Significant transplant-related complications: None    Transplant Office Phone Number: [phone]    Assessment and plan was discussed with the patient and he voiced his understanding and agreement.    Return visit: Return for 2 month post transplant visit.    Edwin Green MD    Chief Complaint   Mr. Fowler is a 37 year old here for kidney  transplant and immunosuppression management.     History of Present Illness    Mr. Fowler reports feeling good overall with some medical complaints.  Since last clinic visit, patient reports no hospitalizations or new medical complaints and has been doing well overall.  His energy level is improving, now pretty good and mostly normal.  He is active and started to get some exercise.  Denies any chest pain or shortness of breath with exertion.  No leg swelling.    Appetite is good and he is pushing fluids.  Weight has been stable.  No nausea or vomiting.  Stools have been a little loose to soft.  No heartburn symptoms on pantoprazole.  No fever, sweats or chills.  Occasional night sweats since transplant.  No problems emptying his bladder.    Of note, with medication review, patient was only taking 650 mg of sodium bicarbonate twice daily.  He reports being compliant with his medications and takes his immunosuppression at ~ 3:50 am and 3:50 pm.  He does this as he gets up very early for his commute to work.    Home BP: 130/80s.    Problem List   Patient Active Problem List   Diagnosis     HTN, kidney transplant related     Solitary kidney, congenital     Nonischemic cardiomyopathy (H)     Bladder stones     Urethral stricture     Polysubstance abuse (H)     Congestive heart failure of unknown etiology (H)     Methamphetamine abuse, episodic (H)     Migraine     Nephrolithiasis     Tobacco use     Post-traumatic male urethral stricture     Urethral diverticulum     Allergic rhinitis, unspecified seasonality, unspecified trigger     Other dysphagia     Stage 3a chronic kidney disease (H)     Kidney replaced by transplant     Immunosuppression (H)     Elevated LFTs     Aftercare following organ transplant     Need for pneumocystis prophylaxis     Anemia in chronic renal disease     Secondary renal hyperparathyroidism (H)     Vitamin D deficiency     Hypomagnesemia       Allergies   No Known Allergies    Medications  "  Current Outpatient Medications   Medication Sig     magnesium oxide (MAG-OX) 400 MG tablet Take 1 tablet (400 mg) by mouth 2 times daily     mycophenolate (GENERIC EQUIVALENT) 250 MG capsule Take 3 capsules (750 mg) by mouth 2 times daily     pantoprazole (PROTONIX) 40 MG EC tablet Take 1 tablet (40 mg) by mouth every morning (before breakfast)     sodium bicarbonate 650 MG tablet Take 1 tablet (650 mg) by mouth 2 times daily     sulfamethoxazole-trimethoprim (BACTRIM) 400-80 MG tablet Take 1 tablet by mouth daily Transplant team may increase this to 1 tablet every day depending on potassium and kidney function     tacrolimus (GENERIC EQUIVALENT) 1 MG capsule Take 5 capsules (5 mg) by mouth 2 times daily     valGANciclovir (VALCYTE) 450 MG tablet Take 2 tablets (900 mg) by mouth daily     VITAMIN D, CHOLECALCIFEROL, PO Take 2,000 Units by mouth daily     No current facility-administered medications for this visit.     Medications Discontinued During This Encounter   Medication Reason     acetaminophen (TYLENOL) 325 MG tablet      sulfamethoxazole-trimethoprim (BACTRIM) 400-80 MG tablet      sodium bicarbonate 650 MG tablet      valGANciclovir (VALCYTE) 450 MG tablet        Physical Exam   Vital Signs: /85 (BP Location: Right arm, Patient Position: Sitting, Cuff Size: Adult Regular)   Pulse 67   Ht 1.803 m (5' 10.98\")   Wt 70 kg (154 lb 6.4 oz)   SpO2 98%   BMI 21.54 kg/m      GENERAL APPEARANCE: alert and no distress  HENT: mouth without ulcers or lesions  LYMPHATICS: no cervical or supraclavicular nodes  RESP: lungs clear to auscultation - no rales, rhonchi or wheezes  CV: regular rhythm, normal rate, no rub, no murmur  EDEMA: no LE edema bilaterally  ABDOMEN: soft, nondistended, nontender, bowel sounds normal  MS: extremities normal - no gross deformities noted, no evidence of inflammation in joints, no muscle tenderness  SKIN: no rash  TX KIDNEY: normal  DIALYSIS ACCESS: none    Data     Renal Latest " Ref Rng & Units 3/2/2023 2/27/2023 2/24/2023   SODIUM 136 - 145 mmol/L 138 139 140   SODIUM POCT 133 - 144 mmol/L - - -   K 3.4 - 5.3 mmol/L 4.5 4.6 4.5   Cl 98 - 107 mmol/L 106 107 110(H)   Cl (external) 98 - 107 mmol/L 106 107 110(H)   CO2 22 - 29 mmol/L 21(L) 23 22   UREA NITROGEN 7 - 30 mg/dL - - -   UREA NITROGEN (R) 6.0 - 20.0 mg/dL 24.2(H) 19.2 25.9(H)   CREATININE 0.67 - 1.17 mg/dL 1.61(H) 1.54(H) 1.65(H)   Glucose 70 - 99 mg/dL 95 90 85   CALCIUM, TOTAL 8.6 - 10.0 mg/dL 8.9 9.0 8.5(L)   MAGNESIUM 1.7 - 2.3 mg/dL 1.4(L) 1.5(L) -     Bone Health Latest Ref Rng & Units 3/2/2023 2/27/2023 2/20/2023   PHOSPHORUS 2.5 - 4.5 mg/dL 3.0 2.8 3.0   PARATHYROID HORMONE INTACT 15 - 65 pg/mL - - -   Vit D Def 20 - 75 ug/L - - -     Heme Latest Ref Rng & Units 3/2/2023 2/27/2023 2/23/2023   WBC 4.0 - 11.0 10e3/uL 5.3 6.4 5.2   Hgb 13.3 - 17.7 g/dL 10.0(L) 10.1(L) 11.0(L)   Plt 150 - 450 10e3/uL 259 277 251   ABSOLUTE NEUTROPHIL 1.6 - 8.3 10e9/L - - -   ABSOLUTE LYMPHOCYTES 0.8 - 5.3 10e9/L - - -   ABSOLUTE MONOCYTES 0.0 - 1.3 10e9/L - - -   ABSOLUTE EOSINOPHILS 0.0 - 0.7 10e9/L - - -   ABSOLUTE BASOPHILS 0.0 - 0.2 10e9/L - - -   ABS IMMATURE GRANULOCYTES 0 - 0.4 10e9/L - - -   ABSOLUTE NUCLEATED RBC - - - -     Liver Latest Ref Rng & Units 1/23/2023 1/21/2023 1/20/2023   AP 40 - 129 U/L - - -   TBili <=1.2 mg/dL - - -   BILIRUBIN, DIRECT 0.0 - 0.2 mg/dL - - -   BILIRUBIN DIRECT (R) 0.00 - 0.30 mg/dL - - -   ALT 10 - 50 U/L 96(H) 134(H) 146(H)   AST 10 - 50 U/L 24 48 70(H)   Tot Protein 6.4 - 8.3 g/dL - - -   ALBUMIN 3.4 - 5.0 g/dL - - -   ALBUMIN (R) 3.5 - 5.2 g/dL - - -     Pancreas Latest Ref Rng & Units 1/15/2023   A1C <5.7 % 5.2     Iron studies Latest Ref Rng & Units 1/31/2023 1/20/2023   Iron 61 - 157 ug/dL 70 153   IRON SATURATION INDEX (R) 15 - 46 % 27 63(H)   FERRITIN 31 - 409 ng/mL 430(H) 717(H)     [facility] Txp Virology Latest Ref Rng & Units 1/15/2023 8/18/2020   CMV QUANT IU/ML Not Detected IU/mL Not Detected -    EBV CAPSID ANTIBODY IGG No detectable antibody. Positive(A) >8.0(H)   Hep B Core NR:Nonreactive - Nonreactive        Recent Labs   Lab Test 02/23/23  1515 02/27/23  1543 03/02/23  1554   DOSTAC 2/23/2023 2/27/2023 3/3/2023   TACROL 12.5 10.2 9.7     Recent Labs   Lab Test 02/09/23  0850 02/20/23  1605 03/02/23  1554   DOSMPA 2/8/2023   9:00 PM 2/20/2023   3:40 AM 3/2/2023   3:50 AM   MPACID 3.43 3.27 6.01*   MPAG 31.9 42.1 37.6       Again, thank you for allowing me to participate in the care of your patient.      Sincerely,    Edwin Green MD

## 2023-02-28 NOTE — LETTER
2/28/2023      RE: Chip Fowler  [address] [facility] [address]       TRANSPLANT NEPHROLOGY EARLY POST TRANSPLANT VISIT    Assessment & Plan   # DDKT: Stable   - Baseline Creatinine: ~ 1.4-1.7, so far   - Proteinuria: Not checked post transplant   - Date DSA Last Checked: Feb/2023      Latest DSA: No cPRA: 49%   - BK Viremia: No   - Kidney Tx Biopsy: No   - Transplant Ureteral Stent: Yes; Plan for removal tomorrow.    # Immunosuppression: Tacrolimus immediate release (goal 8-10) and Mycophenolate mofetil (dose 750 mg every 12 hours)   - Induction with Recent Transplant:  Intermediate Intensity   - Continue with intensive monitoring of immunosuppression for efficacy and toxicity.   - Changes: Not at this time    # Infection Prophylaxis:   - PJP: Sulfa/TMP (Bactrim); Will increase dose to once daily.  - CMV: Valganciclovir (Valcyte); Patient is CMV IgG Ab discordant (D+/R-) and will continue on Valcyte x 6 months, then check CMV PCR monthly until 12 months post transplant.    # Blood Pressure: Controlled;  Goal BP: < 140/90   - Volume status: Euvolemic   - Changes: Not at this time    # Anemia in Chronic Renal Disease: Hgb: Stable      SARA: No   - Iron studies: Low iron saturation, but high ferritin    # Mineral Bone Disorder:   - Secondary renal hyperparathyroidism; PTH level: Minimally elevated ( pg/ml)        On treatment: None  - Vitamin D; level: Low        On supplement: Yes  - Calcium; level: Normal        On supplement: No    # Electrolytes:   - Potassium; level: Normal        On supplement: No  - Magnesium; level: Stable low        On supplement: Yes  - Bicarbonate; level: Normal        On supplement: Yes    # H/o Cardiomyopathy: Normal LVEF at 55-60% with last cardiac echo 9/2022.    # Urethral Stricture: Patient is s/p buccal urethroplasty and diverticulum excision 12/2020.  Patient is to have Urology remove ureteral stent.  Follows closely with Urology.    # Elevated Transaminases: Trend  down with last check 1/2023.   - Will repeat hepatic panel.    # GERD: Asymptomatic on PPI.   - Recommend tapering off pantoprazole by starting to take it every other day.  If tolerated without significant symptoms, patient can stop pantoprazole and just take it or OTC famotidine as needed for heartburn symptoms.    # H/o Polysubstance Abuse: Patient with h/o methamphetamine and alcohol abuse.  Now sober.    # Medical Compliance: Yes    # COVID-19 Virus Review: Discussed COVID-19 virus and the potential medical risks.  Reviewed preventative health recommendations, including wearing a mask where appropriate.  Recommended COVID vaccination should be up to date with either an initial vaccination or booster shot when appropriate.  Asked the patient to inform the transplant center if they are exposed or diagnosed with this virus.    # COVID Vaccination Up To Date: No, due for next dose at 3-4 months post transplant    # Transplant History:  Etiology of Kidney Failure: Solitary congenital kidney and h/o urethral calculus and urologic issues  Tx: DDKT  Transplant: 1/15/2023 (Kidney)  Donor Type: Donation after Circulatory Death  Donor Class:   Crossmatch at time of Tx: negative  DSA at time of Tx: No  Significant changes in immunosuppression: None  CMV IgG Ab High Risk Discordance (D+/R-): Yes  EBV IgG Ab High Risk Discordance (D+/R-): No  Significant transplant-related complications: None    Transplant Office Phone Number: [phone]    Assessment and plan was discussed with the patient and he voiced his understanding and agreement.    Return visit: Return for 2 month post transplant visit.    Edwin Green MD    Chief Complaint   Mr. Fowler is a 37 year old here for kidney transplant and immunosuppression management.     History of Present Illness    Mr. Fowler reports feeling good overall with some medical complaints.  Since last clinic visit, patient reports no hospitalizations or new medical complaints and  has been doing well overall.  His energy level is improving, now pretty good and mostly normal.  He is active and started to get some exercise.  Denies any chest pain or shortness of breath with exertion.  No leg swelling.    Appetite is good and he is pushing fluids.  Weight has been stable.  No nausea or vomiting.  Stools have been a little loose to soft.  No heartburn symptoms on pantoprazole.  No fever, sweats or chills.  Occasional night sweats since transplant.  No problems emptying his bladder.    Of note, with medication review, patient was only taking 650 mg of sodium bicarbonate twice daily.  He reports being compliant with his medications and takes his immunosuppression at ~ 3:50 am and 3:50 pm.  He does this as he gets up very early for his commute to work.    Home BP: 130/80s.    Problem List   Patient Active Problem List   Diagnosis     HTN, kidney transplant related     Solitary kidney, congenital     Nonischemic cardiomyopathy (H)     Bladder stones     Urethral stricture     Polysubstance abuse (H)     Congestive heart failure of unknown etiology (H)     Methamphetamine abuse, episodic (H)     Migraine     Nephrolithiasis     Tobacco use     Post-traumatic male urethral stricture     Urethral diverticulum     Allergic rhinitis, unspecified seasonality, unspecified trigger     Other dysphagia     Stage 3a chronic kidney disease (H)     Kidney replaced by transplant     Immunosuppression (H)     Elevated LFTs     Aftercare following organ transplant     Need for pneumocystis prophylaxis     Anemia in chronic renal disease     Secondary renal hyperparathyroidism (H)     Vitamin D deficiency     Hypomagnesemia       Allergies   No Known Allergies    Medications   Current Outpatient Medications   Medication Sig     magnesium oxide (MAG-OX) 400 MG tablet Take 1 tablet (400 mg) by mouth 2 times daily     mycophenolate (GENERIC EQUIVALENT) 250 MG capsule Take 3 capsules (750 mg) by mouth 2 times daily      "pantoprazole (PROTONIX) 40 MG EC tablet Take 1 tablet (40 mg) by mouth every morning (before breakfast)     sodium bicarbonate 650 MG tablet Take 1 tablet (650 mg) by mouth 2 times daily     sulfamethoxazole-trimethoprim (BACTRIM) 400-80 MG tablet Take 1 tablet by mouth daily Transplant team may increase this to 1 tablet every day depending on potassium and kidney function     tacrolimus (GENERIC EQUIVALENT) 1 MG capsule Take 5 capsules (5 mg) by mouth 2 times daily     valGANciclovir (VALCYTE) 450 MG tablet Take 2 tablets (900 mg) by mouth daily     VITAMIN D, CHOLECALCIFEROL, PO Take 2,000 Units by mouth daily     No current facility-administered medications for this visit.     Medications Discontinued During This Encounter   Medication Reason     acetaminophen (TYLENOL) 325 MG tablet      sulfamethoxazole-trimethoprim (BACTRIM) 400-80 MG tablet      sodium bicarbonate 650 MG tablet      valGANciclovir (VALCYTE) 450 MG tablet        Physical Exam   Vital Signs: /85 (BP Location: Right arm, Patient Position: Sitting, Cuff Size: Adult Regular)   Pulse 67   Ht 1.803 m (5' 10.98\")   Wt 70 kg (154 lb 6.4 oz)   SpO2 98%   BMI 21.54 kg/m      GENERAL APPEARANCE: alert and no distress  HENT: mouth without ulcers or lesions  LYMPHATICS: no cervical or supraclavicular nodes  RESP: lungs clear to auscultation - no rales, rhonchi or wheezes  CV: regular rhythm, normal rate, no rub, no murmur  EDEMA: no LE edema bilaterally  ABDOMEN: soft, nondistended, nontender, bowel sounds normal  MS: extremities normal - no gross deformities noted, no evidence of inflammation in joints, no muscle tenderness  SKIN: no rash  TX KIDNEY: normal  DIALYSIS ACCESS: none    Data     Renal Latest Ref Rng & Units 3/2/2023 2/27/2023 2/24/2023   SODIUM 136 - 145 mmol/L 138 139 140   SODIUM POCT 133 - 144 mmol/L - - -   K 3.4 - 5.3 mmol/L 4.5 4.6 4.5   Cl 98 - 107 mmol/L 106 107 110(H)   Cl (external) 98 - 107 mmol/L 106 107 110(H)   CO2 22 " - 29 mmol/L 21(L) 23 22   UREA NITROGEN 7 - 30 mg/dL - - -   UREA NITROGEN (R) 6.0 - 20.0 mg/dL 24.2(H) 19.2 25.9(H)   CREATININE 0.67 - 1.17 mg/dL 1.61(H) 1.54(H) 1.65(H)   Glucose 70 - 99 mg/dL 95 90 85   CALCIUM, TOTAL 8.6 - 10.0 mg/dL 8.9 9.0 8.5(L)   MAGNESIUM 1.7 - 2.3 mg/dL 1.4(L) 1.5(L) -     Bone Health Latest Ref Rng & Units 3/2/2023 2/27/2023 2/20/2023   PHOSPHORUS 2.5 - 4.5 mg/dL 3.0 2.8 3.0   PARATHYROID HORMONE INTACT 15 - 65 pg/mL - - -   Vit D Def 20 - 75 ug/L - - -     Heme Latest Ref Rng & Units 3/2/2023 2/27/2023 2/23/2023   WBC 4.0 - 11.0 10e3/uL 5.3 6.4 5.2   Hgb 13.3 - 17.7 g/dL 10.0(L) 10.1(L) 11.0(L)   Plt 150 - 450 10e3/uL 259 277 251   ABSOLUTE NEUTROPHIL 1.6 - 8.3 10e9/L - - -   ABSOLUTE LYMPHOCYTES 0.8 - 5.3 10e9/L - - -   ABSOLUTE MONOCYTES 0.0 - 1.3 10e9/L - - -   ABSOLUTE EOSINOPHILS 0.0 - 0.7 10e9/L - - -   ABSOLUTE BASOPHILS 0.0 - 0.2 10e9/L - - -   ABS IMMATURE GRANULOCYTES 0 - 0.4 10e9/L - - -   ABSOLUTE NUCLEATED RBC - - - -     Liver Latest Ref Rng & Units 1/23/2023 1/21/2023 1/20/2023   AP 40 - 129 U/L - - -   TBili <=1.2 mg/dL - - -   BILIRUBIN, DIRECT 0.0 - 0.2 mg/dL - - -   BILIRUBIN DIRECT (R) 0.00 - 0.30 mg/dL - - -   ALT 10 - 50 U/L 96(H) 134(H) 146(H)   AST 10 - 50 U/L 24 48 70(H)   Tot Protein 6.4 - 8.3 g/dL - - -   ALBUMIN 3.4 - 5.0 g/dL - - -   ALBUMIN (R) 3.5 - 5.2 g/dL - - -     Pancreas Latest Ref Rng & Units 1/15/2023   A1C <5.7 % 5.2     Iron studies Latest Ref Rng & Units 1/31/2023 1/20/2023   Iron 61 - 157 ug/dL 70 153   IRON SATURATION INDEX (R) 15 - 46 % 27 63(H)   FERRITIN 31 - 409 ng/mL 430(H) 717(H)     [facility] Txp Virology Latest Ref Rng & Units 1/15/2023 8/18/2020   CMV QUANT IU/ML Not Detected IU/mL Not Detected -   EBV CAPSID ANTIBODY IGG No detectable antibody. Positive(A) >8.0(H)   Hep B Core NR:Nonreactive - Nonreactive        Recent Labs   Lab Test 02/23/23  1515 02/27/23  1543 03/02/23  1554   DOSTAC 2/23/2023 2/27/2023 3/3/2023   TACROL 12.5 10.2 9.7      Recent Labs   Lab Test 02/09/23  0850 02/20/23  1605 03/02/23  1554   DOSMPA 2/8/2023   9:00 PM 2/20/2023   3:40 AM 3/2/2023   3:50 AM   MPACID 3.43 3.27 6.01*   MPAG 31.9 42.1 37.6       Edwin Green MD

## 2023-02-28 NOTE — PATIENT INSTRUCTIONS
Patient Recommendations:  - Keep taking sodium bicarbonate 650 mg twice daily.  - Increase sulfamethoxazole-trimethoprim (Bactrim) to once daily.  - Increase Valcyte to 900 mg once daily.  - Recommend tapering off pantoprazole by starting to take it every other day.  If tolerated without significant symptoms, patient can stop pantoprazole and just take it or over-the-counter famotidine as needed for heartburn symptoms.   - Bring medication list and/or medication bottles to each clinic visit.    Transplant Patient Information  Your Post Transplant Coordinator is: Veronica Edgar  For non urgent items, we encourage you to contact your coordinator/care team online via [employer]  You and your care team can also contact your transplant coordinator Monday - Friday, 8am - 5pm at [phone] (Option 2 to reach the coordinator or Option 4 to schedule an appointment).  After hours for urgent matters, please call [facility] at [phone].

## 2023-02-28 NOTE — TELEPHONE ENCOUNTER
Issue increase creatinine with increase tacrolimus  Level      Called Chip denies any recent illness , pain over the kidney transplant  ,   Encouraged him to increase hydration  -    Please review previous phone lowered tacrolimus      Reviewed with Dr Green   IF creatinine remains elevated obtain Donor Specific Antibodies  AlloSure , UAUC with transplant  Labs - kidney transplant  Ultrasound and IV fluids NS      Reviewed the above plan  Chip

## 2023-02-28 NOTE — NURSING NOTE
"Chief Complaint   Patient presents with     RECHECK     Follow-up s/p kidney txp 1/15/2023     /85 (BP Location: Right arm, Patient Position: Sitting, Cuff Size: Adult Regular)   Pulse 67   Ht 1.803 m (5' 10.98\")   Wt 70 kg (154 lb 6.4 oz)   SpO2 98%   BMI 21.54 kg/m      Jane Cerda on 2/28/2023 at 9:27 AM    "

## 2023-03-01 ENCOUNTER — OFFICE VISIT (OUTPATIENT)
Dept: UROLOGY | Facility: CLINIC | Age: 38
End: 2023-03-01
Payer: MEDICARE

## 2023-03-01 VITALS
HEIGHT: 71 IN | HEART RATE: 70 BPM | SYSTOLIC BLOOD PRESSURE: 149 MMHG | DIASTOLIC BLOOD PRESSURE: 82 MMHG | WEIGHT: 154 LBS | BODY MASS INDEX: 21.56 KG/M2

## 2023-03-01 DIAGNOSIS — Z99.2 ESRD (END STAGE RENAL DISEASE) ON DIALYSIS (H): Primary | ICD-10-CM

## 2023-03-01 DIAGNOSIS — N18.6 ESRD (END STAGE RENAL DISEASE) ON DIALYSIS (H): Primary | ICD-10-CM

## 2023-03-01 DIAGNOSIS — Z94.0 KIDNEY REPLACED BY TRANSPLANT: ICD-10-CM

## 2023-03-01 DIAGNOSIS — N35.819 OTHER STRICTURE OF URETHRA IN MALE: ICD-10-CM

## 2023-03-01 LAB — ALLOSURE DD-CFDNA: 0.59 %

## 2023-03-01 PROCEDURE — 52310 CYSTOSCOPY AND TREATMENT: CPT | Performed by: UROLOGY

## 2023-03-01 RX ORDER — CIPROFLOXACIN 500 MG/1
500 TABLET, FILM COATED ORAL ONCE
Status: COMPLETED | OUTPATIENT
Start: 2023-03-01 | End: 2023-03-01

## 2023-03-01 RX ORDER — LIDOCAINE HYDROCHLORIDE 20 MG/ML
JELLY TOPICAL ONCE
Status: COMPLETED | OUTPATIENT
Start: 2023-03-01 | End: 2023-03-01

## 2023-03-01 RX ADMIN — CIPROFLOXACIN 500 MG: 500 TABLET, FILM COATED ORAL at 09:04

## 2023-03-01 RX ADMIN — LIDOCAINE HYDROCHLORIDE: 20 JELLY TOPICAL at 08:40

## 2023-03-01 NOTE — NURSING NOTE
The following medication was given:     The following medication was given:     MEDICATION:  Ciprofloxacin  ROUTE: PO  SITE: Medication was given orally   DOSE: 500 mg  LOT #: [id number]  : [employer]  EXPIRATION DATE: 05/24  NDC#: 8217-7554-33   Was there drug waste? No    Prior to medication administration, verified patient identity using patient's name and date of birth.  Due to medication administration, patient instructed to remain in clinic for 15 minutes  afterwards, and to report any adverse reaction to me immediately.    Drug Amount Wasted:  None.  Single dose tablet    Pavan Kang EMT  03/01/23  9:03 AM

## 2023-03-01 NOTE — PROGRESS NOTES
Cystoscopy with Stent Removal    PRE-PROCEDURE DIAGNOSIS: Recent kidney transplant  POST-PROCEDURE DIAGNOSIS: Recent kidney transplant  PROCEDURE: Cystoscopy with stent removal    HISTORY: Chip Fowler is a 37 year old man with history of buccal urethroplasty + diverticulum excision in Dec 2020.  He is doing well.  Voiding without issues.  Happy with sexual function  He recently had a kidney transplant 12/2020.  He still has the stent, which is to be removed today      DESCRIPTION OF PROCEDURE:  After informed consent was obtained, the patient was brought to the procedure room where he was placed in the supine position with all pressure points well padded.  He was prepped and draped in a sterile fashion. A flexible cystoscope was introduced through a well-lubricated urethra.  The urethra showed signs of urethral reconstruction. There was some irregularity and outpouchings. However, the scope passes. The urethra is ~20 Fr. There are no strictures. Sphincter was seen. Bladder was entered. Urine was clear. Stent visualized/grasped and stent and scope was removed.    ASSESSMENT AND PLAN:  Stent removed today  Abx x 1 given today given manipulation in transplant patient  Followup REGULO Mejia MD

## 2023-03-01 NOTE — LETTER
Date:March 1, 2023      Provider requested that no letter be sent. Do not send.       [facility]

## 2023-03-01 NOTE — LETTER
3/1/2023       RE: Chip Fowler  [address] [facility] [address]     Dear Colleague,    Thank you for referring your patient, Chip Fowler, to the [facility]OLOG[facility] [place] at [facility]. Please see a copy of my visit note below.    Cystoscopy with Stent Removal    PRE-PROCEDURE DIAGNOSIS: Recent kidney transplant  POST-PROCEDURE DIAGNOSIS: Recent kidney transplant  PROCEDURE: Cystoscopy with stent removal    HISTORY: Chip Fowler is a 37 year old man with history of buccal urethroplasty + diverticulum excision in Dec 2020.  He is doing well.  Voiding without issues.  Happy with sexual function  He recently had a kidney transplant 12/2020.  He still has the stent, which is to be removed today      DESCRIPTION OF PROCEDURE:  After informed consent was obtained, the patient was brought to the procedure room where he was placed in the supine position with all pressure points well padded.  He was prepped and draped in a sterile fashion. A flexible cystoscope was introduced through a well-lubricated urethra.  The urethra showed signs of urethral reconstruction. There was some irregularity and outpouchings. However, the scope passes. The urethra is ~20 Fr. There are no strictures. Sphincter was seen. Bladder was entered. Urine was clear. Stent visualized/grasped and stent and scope was removed.    ASSESSMENT AND PLAN:  Stent removed today  Abx x 1 given today given manipulation in transplant patient  Followup REGULO Mejia MD      Again, thank you for allowing me to participate in the care of your patient.      Sincerely,    Sam Mejia MD

## 2023-03-01 NOTE — NURSING NOTE
"Chief Complaint   Patient presents with     Cystoscopy       Blood pressure (!) 149/82, pulse 70, height 1.803 m (5' 11\"), weight 69.9 kg (154 lb). Body mass index is 21.48 kg/m .    Patient Active Problem List   Diagnosis     Unilateral congenital absence of kidney     ESRD (end stage renal disease) on dialysis (H)     Essential hypertension     Solitary kidney, congenital     Nonischemic cardiomyopathy (H)     Bladder stones     Urethral stone     Urethral stricture     Polysubstance abuse (H)     Urethral calculus     Abnormal urinalysis     Acute renal failure superimposed on chronic kidney disease (H)     Acute retention of urine     Congestive heart failure of unknown etiology (H)     Methamphetamine abuse, episodic (H)     Migraine     Nephrolithiasis     Tobacco use     Post-traumatic male urethral stricture     Urethral diverticulum     Leukocytosis     Allergic rhinitis, unspecified seasonality, unspecified trigger     Other dysphagia     Chronic kidney disease     Pre-operative cardiovascular examination     Transplant, organ     Kidney replaced by transplant     Immunosuppressed status (H)     Elevated LFTs     Hyperkalemia     Dehydration       No Known Allergies    Current Outpatient Medications   Medication Sig Dispense Refill     magnesium oxide (MAG-OX) 400 MG tablet Take 1 tablet (400 mg) by mouth 2 times daily 120 tablet 3     mycophenolate (GENERIC EQUIVALENT) 250 MG capsule Take 3 capsules (750 mg) by mouth 2 times daily 180 capsule 11     pantoprazole (PROTONIX) 40 MG EC tablet Take 1 tablet (40 mg) by mouth every morning (before breakfast) 30 tablet 2     sodium bicarbonate 650 MG tablet Take 1 tablet (650 mg) by mouth 2 times daily 60 tablet 1     sulfamethoxazole-trimethoprim (BACTRIM) 400-80 MG tablet Take 1 tablet by mouth daily Transplant team may increase this to 1 tablet every day depending on potassium and kidney function 90 tablet 3     tacrolimus (GENERIC EQUIVALENT) 1 MG capsule " Take 5 capsules (5 mg) by mouth 2 times daily 300 capsule 11     valGANciclovir (VALCYTE) 450 MG tablet Take 2 tablets (900 mg) by mouth daily 60 tablet 1     VITAMIN D, CHOLECALCIFEROL, PO Take 2,000 Units by mouth daily         Social History     Tobacco Use     Smoking status: Former     Packs/day: 0.40     Types: Cigarettes     Start date:      Smokeless tobacco: Never     Tobacco comments:     4-8 cigs /day   Vaping Use     Vaping Use: Never used   Substance Use Topics     Alcohol use: Not Currently     Comment: quit alcohol 3-4 yrs ago     Drug use: Not Currently     Types: Methamphetamines       Invasive Procedure Safety Checklist:    Procedure: Cystoscopy    Action: Complete sections and checkboxes as appropriate.    Pre-procedure:  1. Patient ID Verified with 2 identifiers (Janice and  or MRN) : YES    2. Procedure and site verified with patient/designee (when able) : YES    3. Accurate consent documentation in medical record : YES    4. H&P (or appropriate assessment) documented in medical record : N/A  H&P must be up to 30 days prior to procedure an updated within 24 hours of                 Procedure as applicable.     5. Relevant diagnostic and radiology test results appropriately labeled and displayed as applicable : YES    6. Blood products, implants, devices, and/or special equipment available for the procedure as applicable : YES    7. Procedure site(s) marked with provider initials [Exclusions: none] : NO    8. Marking not required. Reason : Yes  Procedure does not require site marking    Time Out:     Time-Out performed immediately prior to starting procedure, including verbal and active participation of all team members addressing: YES    1. Correct patient identity.  2. Confirmed that the correct side and site are marked.  3. An accurate procedure to be done.  4. Agreement on the procedure to be done.  5. Correct patient position.  6. Relevant images and results are properly labeled and  appropriately displayed.  7. The need to administer antibiotics or fluids for irrigation purposes during the procedure as applicable.  8. Safety precautions based on patient history or medication use.    During Procedure: Verification of correct person, site, and procedure occurs any time the responsibility for care of the patient is transferred to another member of the care team.    The following medication was given:     MEDICATION:  Lidocaine without epinephrine 2% jelly  ROUTE: urethral   SITE: urethral   DOSE: 10 mL  LOT #: [id number]  : [employer]  EXPIRATION DATE: 07/24  NDC#: 17679-50734-4   Was there drug waste? No    Prior to med admin, verified patient identity using patient's name and date of birth.  Due to med administration, patient instructed to remain in clinic for 15 minutes  afterwards, and to report any adverse reaction to me immediately.    Drug Amount Wasted:  None.  Vial/Syringe: John Kang EMT  3/1/2023  8:39 AM

## 2023-03-02 ENCOUNTER — LAB (OUTPATIENT)
Dept: LAB | Facility: CLINIC | Age: 38
End: 2023-03-02
Payer: MEDICARE

## 2023-03-02 DIAGNOSIS — Z94.0 KIDNEY REPLACED BY TRANSPLANT: ICD-10-CM

## 2023-03-02 DIAGNOSIS — Z48.298 AFTERCARE FOLLOWING ORGAN TRANSPLANT: ICD-10-CM

## 2023-03-02 DIAGNOSIS — Z79.899 ENCOUNTER FOR LONG-TERM CURRENT USE OF MEDICATION: ICD-10-CM

## 2023-03-02 DIAGNOSIS — Z20.828 CONTACT WITH AND (SUSPECTED) EXPOSURE TO OTHER VIRAL COMMUNICABLE DISEASES: ICD-10-CM

## 2023-03-02 LAB
ANION GAP SERPL CALCULATED.3IONS-SCNC: 11 MMOL/L (ref 7–15)
BUN SERPL-MCNC: 24.2 MG/DL (ref 6–20)
CALCIUM SERPL-MCNC: 8.9 MG/DL (ref 8.6–10)
CHLORIDE SERPL-SCNC: 106 MMOL/L (ref 98–107)
CREAT SERPL-MCNC: 1.61 MG/DL (ref 0.67–1.17)
DEPRECATED HCO3 PLAS-SCNC: 21 MMOL/L (ref 22–29)
ERYTHROCYTE [DISTWIDTH] IN BLOOD BY AUTOMATED COUNT: 13 % (ref 10–15)
GFR SERPL CREATININE-BSD FRML MDRD: 56 ML/MIN/1.73M2
GLUCOSE SERPL-MCNC: 95 MG/DL (ref 70–99)
HCT VFR BLD AUTO: 30.6 % (ref 40–53)
HGB BLD-MCNC: 10 G/DL (ref 13.3–17.7)
MAGNESIUM SERPL-MCNC: 1.4 MG/DL (ref 1.7–2.3)
MCH RBC QN AUTO: 31 PG (ref 26.5–33)
MCHC RBC AUTO-ENTMCNC: 32.7 G/DL (ref 31.5–36.5)
MCV RBC AUTO: 95 FL (ref 78–100)
PHOSPHATE SERPL-MCNC: 3 MG/DL (ref 2.5–4.5)
PLATELET # BLD AUTO: 259 10E3/UL (ref 150–450)
POTASSIUM SERPL-SCNC: 4.5 MMOL/L (ref 3.4–5.3)
RBC # BLD AUTO: 3.23 10E6/UL (ref 4.4–5.9)
SODIUM SERPL-SCNC: 138 MMOL/L (ref 136–145)
WBC # BLD AUTO: 5.3 10E3/UL (ref 4–11)

## 2023-03-02 PROCEDURE — 36415 COLL VENOUS BLD VENIPUNCTURE: CPT

## 2023-03-02 PROCEDURE — 83735 ASSAY OF MAGNESIUM: CPT

## 2023-03-02 PROCEDURE — 80180 DRUG SCRN QUAN MYCOPHENOLATE: CPT

## 2023-03-02 PROCEDURE — 85027 COMPLETE CBC AUTOMATED: CPT

## 2023-03-02 PROCEDURE — 84100 ASSAY OF PHOSPHORUS: CPT

## 2023-03-02 PROCEDURE — 80197 ASSAY OF TACROLIMUS: CPT

## 2023-03-02 PROCEDURE — 82947 ASSAY GLUCOSE BLOOD QUANT: CPT

## 2023-03-02 PROCEDURE — 80051 ELECTROLYTE PANEL: CPT

## 2023-03-03 LAB
MYCOPHENOLATE SERPL LC/MS/MS-MCNC: 6.01 MG/L (ref 1–3.5)
MYCOPHENOLATE-G SERPL LC/MS/MS-MCNC: 37.6 MG/L (ref 30–95)
TACROLIMUS BLD-MCNC: 9.7 UG/L (ref 5–15)
TME LAST DOSE: ABNORMAL H
TME LAST DOSE: ABNORMAL H
TME LAST DOSE: NORMAL H
TME LAST DOSE: NORMAL H

## 2023-03-04 PROBLEM — E55.9 VITAMIN D DEFICIENCY: Status: ACTIVE | Noted: 2023-03-04

## 2023-03-04 PROBLEM — E87.5 HYPERKALEMIA: Status: RESOLVED | Noted: 2023-01-18 | Resolved: 2023-03-04

## 2023-03-04 PROBLEM — Z94.9 TRANSPLANT, ORGAN: Status: RESOLVED | Noted: 2023-01-16 | Resolved: 2023-03-04

## 2023-03-04 PROBLEM — N17.9 ACUTE RENAL FAILURE SUPERIMPOSED ON CHRONIC KIDNEY DISEASE (H): Status: RESOLVED | Noted: 2020-02-24 | Resolved: 2023-03-04

## 2023-03-04 PROBLEM — E86.0 DEHYDRATION: Status: RESOLVED | Noted: 2023-02-24 | Resolved: 2023-03-04

## 2023-03-04 PROBLEM — Z01.810 PRE-OPERATIVE CARDIOVASCULAR EXAMINATION: Status: RESOLVED | Noted: 2022-09-14 | Resolved: 2023-03-04

## 2023-03-04 PROBLEM — E83.42 HYPOMAGNESEMIA: Status: ACTIVE | Noted: 2023-03-04

## 2023-03-04 PROBLEM — Q60.0 UNILATERAL CONGENITAL ABSENCE OF KIDNEY: Status: RESOLVED | Noted: 2020-02-24 | Resolved: 2023-03-04

## 2023-03-04 PROBLEM — Z29.89 NEED FOR PNEUMOCYSTIS PROPHYLAXIS: Status: ACTIVE | Noted: 2023-03-04

## 2023-03-04 PROBLEM — N18.9 ACUTE RENAL FAILURE SUPERIMPOSED ON CHRONIC KIDNEY DISEASE (H): Status: RESOLVED | Noted: 2020-02-24 | Resolved: 2023-03-04

## 2023-03-04 PROBLEM — Z48.298 AFTERCARE FOLLOWING ORGAN TRANSPLANT: Status: ACTIVE | Noted: 2023-03-04

## 2023-03-04 PROBLEM — N18.31 STAGE 3A CHRONIC KIDNEY DISEASE (H): Status: ACTIVE | Noted: 2021-05-26

## 2023-03-04 PROBLEM — D72.829 LEUKOCYTOSIS: Status: RESOLVED | Noted: 2020-12-03 | Resolved: 2023-03-04

## 2023-03-04 PROBLEM — R82.90 ABNORMAL URINALYSIS: Status: RESOLVED | Noted: 2020-02-24 | Resolved: 2023-03-04

## 2023-03-04 PROBLEM — N21.1 URETHRAL CALCULUS: Status: RESOLVED | Noted: 2020-08-12 | Resolved: 2023-03-04

## 2023-03-04 PROBLEM — D63.1 ANEMIA IN CHRONIC RENAL DISEASE: Status: ACTIVE | Noted: 2023-03-04

## 2023-03-04 PROBLEM — N18.9 ANEMIA IN CHRONIC RENAL DISEASE: Status: ACTIVE | Noted: 2023-03-04

## 2023-03-04 PROBLEM — N18.6 ESRD (END STAGE RENAL DISEASE) ON DIALYSIS (H): Status: RESOLVED | Noted: 2020-06-02 | Resolved: 2023-03-04

## 2023-03-04 PROBLEM — Z99.2 ESRD (END STAGE RENAL DISEASE) ON DIALYSIS (H): Status: RESOLVED | Noted: 2020-06-02 | Resolved: 2023-03-04

## 2023-03-04 PROBLEM — N25.81 SECONDARY RENAL HYPERPARATHYROIDISM (H): Status: ACTIVE | Noted: 2023-03-04

## 2023-03-04 NOTE — PROGRESS NOTES
TRANSPLANT NEPHROLOGY EARLY POST TRANSPLANT VISIT    Assessment & Plan   # DDKT: Stable   - Baseline Creatinine: ~ 1.4-1.7, so far   - Proteinuria: Not checked post transplant   - Date DSA Last Checked: Feb/2023      Latest DSA: No cPRA: 49%   - BK Viremia: No   - Kidney Tx Biopsy: No   - Transplant Ureteral Stent: Yes; Plan for removal tomorrow.    # Immunosuppression: Tacrolimus immediate release (goal 8-10) and Mycophenolate mofetil (dose 750 mg every 12 hours)   - Induction with Recent Transplant:  Intermediate Intensity   - Continue with intensive monitoring of immunosuppression for efficacy and toxicity.   - Changes: Not at this time    # Infection Prophylaxis:   - PJP: Sulfa/TMP (Bactrim); Will increase dose to once daily.  - CMV: Valganciclovir (Valcyte); Patient is CMV IgG Ab discordant (D+/R-) and will continue on Valcyte x 6 months, then check CMV PCR monthly until 12 months post transplant.    # Blood Pressure: Controlled;  Goal BP: < 140/90   - Volume status: Euvolemic   - Changes: Not at this time    # Anemia in Chronic Renal Disease: Hgb: Stable      SARA: No   - Iron studies: Low iron saturation, but high ferritin    # Mineral Bone Disorder:   - Secondary renal hyperparathyroidism; PTH level: Minimally elevated ( pg/ml)        On treatment: None  - Vitamin D; level: Low        On supplement: Yes  - Calcium; level: Normal        On supplement: No    # Electrolytes:   - Potassium; level: Normal        On supplement: No  - Magnesium; level: Stable low        On supplement: Yes  - Bicarbonate; level: Normal        On supplement: Yes    # H/o Cardiomyopathy: Normal LVEF at 55-60% with last cardiac echo 9/2022.    # Urethral Stricture: Patient is s/p buccal urethroplasty and diverticulum excision 12/2020.  Patient is to have Urology remove ureteral stent.  Follows closely with Urology.    # Elevated Transaminases: Trend down with last check 1/2023.   - Will repeat hepatic panel.    # GERD:  Asymptomatic on PPI.   - Recommend tapering off pantoprazole by starting to take it every other day.  If tolerated without significant symptoms, patient can stop pantoprazole and just take it or OTC famotidine as needed for heartburn symptoms.    # H/o Polysubstance Abuse: Patient with h/o methamphetamine and alcohol abuse.  Now sober.    # Medical Compliance: Yes    # COVID-19 Virus Review: Discussed COVID-19 virus and the potential medical risks.  Reviewed preventative health recommendations, including wearing a mask where appropriate.  Recommended COVID vaccination should be up to date with either an initial vaccination or booster shot when appropriate.  Asked the patient to inform the transplant center if they are exposed or diagnosed with this virus.    # COVID Vaccination Up To Date: No, due for next dose at 3-4 months post transplant    # Transplant History:  Etiology of Kidney Failure: Solitary congenital kidney and h/o urethral calculus and urologic issues  Tx: DDKT  Transplant: 1/15/2023 (Kidney)  Donor Type: Donation after Circulatory Death  Donor Class:   Crossmatch at time of Tx: negative  DSA at time of Tx: No  Significant changes in immunosuppression: None  CMV IgG Ab High Risk Discordance (D+/R-): Yes  EBV IgG Ab High Risk Discordance (D+/R-): No  Significant transplant-related complications: None    Transplant Office Phone Number: [phone]    Assessment and plan was discussed with the patient and he voiced his understanding and agreement.    Return visit: Return for 2 month post transplant visit.    Edwin Green MD    Chief Complaint   Mr. Fowler is a 37 year old here for kidney transplant and immunosuppression management.     History of Present Illness    Mr. Fowler reports feeling good overall with some medical complaints.  Since last clinic visit, patient reports no hospitalizations or new medical complaints and has been doing well overall.  His energy level is improving, now pretty  good and mostly normal.  He is active and started to get some exercise.  Denies any chest pain or shortness of breath with exertion.  No leg swelling.    Appetite is good and he is pushing fluids.  Weight has been stable.  No nausea or vomiting.  Stools have been a little loose to soft.  No heartburn symptoms on pantoprazole.  No fever, sweats or chills.  Occasional night sweats since transplant.  No problems emptying his bladder.    Of note, with medication review, patient was only taking 650 mg of sodium bicarbonate twice daily.  He reports being compliant with his medications and takes his immunosuppression at ~ 3:50 am and 3:50 pm.  He does this as he gets up very early for his commute to work.    Home BP: 130/80s.    Problem List   Patient Active Problem List   Diagnosis     HTN, kidney transplant related     Solitary kidney, congenital     Nonischemic cardiomyopathy (H)     Bladder stones     Urethral stricture     Polysubstance abuse (H)     Congestive heart failure of unknown etiology (H)     Methamphetamine abuse, episodic (H)     Migraine     Nephrolithiasis     Tobacco use     Post-traumatic male urethral stricture     Urethral diverticulum     Allergic rhinitis, unspecified seasonality, unspecified trigger     Other dysphagia     Stage 3a chronic kidney disease (H)     Kidney replaced by transplant     Immunosuppression (H)     Elevated LFTs     Aftercare following organ transplant     Need for pneumocystis prophylaxis     Anemia in chronic renal disease     Secondary renal hyperparathyroidism (H)     Vitamin D deficiency     Hypomagnesemia       Allergies   No Known Allergies    Medications   Current Outpatient Medications   Medication Sig     magnesium oxide (MAG-OX) 400 MG tablet Take 1 tablet (400 mg) by mouth 2 times daily     mycophenolate (GENERIC EQUIVALENT) 250 MG capsule Take 3 capsules (750 mg) by mouth 2 times daily     pantoprazole (PROTONIX) 40 MG EC tablet Take 1 tablet (40 mg) by mouth  "every morning (before breakfast)     sodium bicarbonate 650 MG tablet Take 1 tablet (650 mg) by mouth 2 times daily     sulfamethoxazole-trimethoprim (BACTRIM) 400-80 MG tablet Take 1 tablet by mouth daily Transplant team may increase this to 1 tablet every day depending on potassium and kidney function     tacrolimus (GENERIC EQUIVALENT) 1 MG capsule Take 5 capsules (5 mg) by mouth 2 times daily     valGANciclovir (VALCYTE) 450 MG tablet Take 2 tablets (900 mg) by mouth daily     VITAMIN D, CHOLECALCIFEROL, PO Take 2,000 Units by mouth daily     No current facility-administered medications for this visit.     Medications Discontinued During This Encounter   Medication Reason     acetaminophen (TYLENOL) 325 MG tablet      sulfamethoxazole-trimethoprim (BACTRIM) 400-80 MG tablet      sodium bicarbonate 650 MG tablet      valGANciclovir (VALCYTE) 450 MG tablet        Physical Exam   Vital Signs: /85 (BP Location: Right arm, Patient Position: Sitting, Cuff Size: Adult Regular)   Pulse 67   Ht 1.803 m (5' 10.98\")   Wt 70 kg (154 lb 6.4 oz)   SpO2 98%   BMI 21.54 kg/m      GENERAL APPEARANCE: alert and no distress  HENT: mouth without ulcers or lesions  LYMPHATICS: no cervical or supraclavicular nodes  RESP: lungs clear to auscultation - no rales, rhonchi or wheezes  CV: regular rhythm, normal rate, no rub, no murmur  EDEMA: no LE edema bilaterally  ABDOMEN: soft, nondistended, nontender, bowel sounds normal  MS: extremities normal - no gross deformities noted, no evidence of inflammation in joints, no muscle tenderness  SKIN: no rash  TX KIDNEY: normal  DIALYSIS ACCESS: none    Data     Renal Latest Ref Rng & Units 3/2/2023 2/27/2023 2/24/2023   SODIUM 136 - 145 mmol/L 138 139 140   SODIUM POCT 133 - 144 mmol/L - - -   K 3.4 - 5.3 mmol/L 4.5 4.6 4.5   Cl 98 - 107 mmol/L 106 107 110(H)   Cl (external) 98 - 107 mmol/L 106 107 110(H)   CO2 22 - 29 mmol/L 21(L) 23 22   UREA NITROGEN 7 - 30 mg/dL - - -   UREA " NITROGEN (R) 6.0 - 20.0 mg/dL 24.2(H) 19.2 25.9(H)   CREATININE 0.67 - 1.17 mg/dL 1.61(H) 1.54(H) 1.65(H)   Glucose 70 - 99 mg/dL 95 90 85   CALCIUM, TOTAL 8.6 - 10.0 mg/dL 8.9 9.0 8.5(L)   MAGNESIUM 1.7 - 2.3 mg/dL 1.4(L) 1.5(L) -     Bone Health Latest Ref Rng & Units 3/2/2023 2/27/2023 2/20/2023   PHOSPHORUS 2.5 - 4.5 mg/dL 3.0 2.8 3.0   PARATHYROID HORMONE INTACT 15 - 65 pg/mL - - -   Vit D Def 20 - 75 ug/L - - -     Heme Latest Ref Rng & Units 3/2/2023 2/27/2023 2/23/2023   WBC 4.0 - 11.0 10e3/uL 5.3 6.4 5.2   Hgb 13.3 - 17.7 g/dL 10.0(L) 10.1(L) 11.0(L)   Plt 150 - 450 10e3/uL 259 277 251   ABSOLUTE NEUTROPHIL 1.6 - 8.3 10e9/L - - -   ABSOLUTE LYMPHOCYTES 0.8 - 5.3 10e9/L - - -   ABSOLUTE MONOCYTES 0.0 - 1.3 10e9/L - - -   ABSOLUTE EOSINOPHILS 0.0 - 0.7 10e9/L - - -   ABSOLUTE BASOPHILS 0.0 - 0.2 10e9/L - - -   ABS IMMATURE GRANULOCYTES 0 - 0.4 10e9/L - - -   ABSOLUTE NUCLEATED RBC - - - -     Liver Latest Ref Rng & Units 1/23/2023 1/21/2023 1/20/2023   AP 40 - 129 U/L - - -   TBili <=1.2 mg/dL - - -   BILIRUBIN, DIRECT 0.0 - 0.2 mg/dL - - -   BILIRUBIN DIRECT (R) 0.00 - 0.30 mg/dL - - -   ALT 10 - 50 U/L 96(H) 134(H) 146(H)   AST 10 - 50 U/L 24 48 70(H)   Tot Protein 6.4 - 8.3 g/dL - - -   ALBUMIN 3.4 - 5.0 g/dL - - -   ALBUMIN (R) 3.5 - 5.2 g/dL - - -     Pancreas Latest Ref Rng & Units 1/15/2023   A1C <5.7 % 5.2     Iron studies Latest Ref Rng & Units 1/31/2023 1/20/2023   Iron 61 - 157 ug/dL 70 153   IRON SATURATION INDEX (R) 15 - 46 % 27 63(H)   FERRITIN 31 - 409 ng/mL 430(H) 717(H)     [facility] Txp Virology Latest Ref Rng & Units 1/15/2023 8/18/2020   CMV QUANT IU/ML Not Detected IU/mL Not Detected -   EBV CAPSID ANTIBODY IGG No detectable antibody. Positive(A) >8.0(H)   Hep B Core NR:Nonreactive - Nonreactive        Recent Labs   Lab Test 02/23/23  1515 02/27/23  1543 03/02/23  1554   DOSTAC 2/23/2023 2/27/2023 3/3/2023   TACROL 12.5 10.2 9.7     Recent Labs   Lab Test 02/09/23  0850 02/20/23  1605  03/02/23  1554   DOSMPA 2/8/2023   9:00 PM 2/20/2023   3:40 AM 3/2/2023   3:50 AM   MPACID 3.43 3.27 6.01*   MPAG 31.9 42.1 37.6

## 2023-03-05 ENCOUNTER — TELEPHONE (OUTPATIENT)
Dept: TRANSPLANT | Facility: CLINIC | Age: 38
End: 2023-03-05
Payer: MEDICARE

## 2023-03-05 DIAGNOSIS — Z94.0 KIDNEY REPLACED BY TRANSPLANT: Primary | ICD-10-CM

## 2023-03-06 ENCOUNTER — LAB (OUTPATIENT)
Dept: LAB | Facility: CLINIC | Age: 38
End: 2023-03-06
Payer: MEDICARE

## 2023-03-06 DIAGNOSIS — Z20.828 CONTACT WITH AND (SUSPECTED) EXPOSURE TO OTHER VIRAL COMMUNICABLE DISEASES: ICD-10-CM

## 2023-03-06 DIAGNOSIS — Z48.298 AFTERCARE FOLLOWING ORGAN TRANSPLANT: ICD-10-CM

## 2023-03-06 DIAGNOSIS — Z79.899 ENCOUNTER FOR LONG-TERM CURRENT USE OF MEDICATION: ICD-10-CM

## 2023-03-06 DIAGNOSIS — Z94.0 KIDNEY REPLACED BY TRANSPLANT: ICD-10-CM

## 2023-03-06 LAB
ALBUMIN SERPL BCG-MCNC: 4 G/DL (ref 3.5–5.2)
ALP SERPL-CCNC: 58 U/L (ref 40–129)
ALT SERPL W P-5'-P-CCNC: 19 U/L (ref 10–50)
ANION GAP SERPL CALCULATED.3IONS-SCNC: 10 MMOL/L (ref 7–15)
AST SERPL W P-5'-P-CCNC: 18 U/L (ref 10–50)
BILIRUB DIRECT SERPL-MCNC: <0.2 MG/DL (ref 0–0.3)
BILIRUB SERPL-MCNC: 0.3 MG/DL
BUN SERPL-MCNC: 23.1 MG/DL (ref 6–20)
CALCIUM SERPL-MCNC: 8.5 MG/DL (ref 8.6–10)
CHLORIDE SERPL-SCNC: 108 MMOL/L (ref 98–107)
CREAT SERPL-MCNC: 1.61 MG/DL (ref 0.67–1.17)
DEPRECATED HCO3 PLAS-SCNC: 20 MMOL/L (ref 22–29)
ERYTHROCYTE [DISTWIDTH] IN BLOOD BY AUTOMATED COUNT: 13.1 % (ref 10–15)
GFR SERPL CREATININE-BSD FRML MDRD: 56 ML/MIN/1.73M2
GLUCOSE SERPL-MCNC: 79 MG/DL (ref 70–99)
HCT VFR BLD AUTO: 28.2 % (ref 40–53)
HGB BLD-MCNC: 9.3 G/DL (ref 13.3–17.7)
MAGNESIUM SERPL-MCNC: 1.4 MG/DL (ref 1.7–2.3)
MCH RBC QN AUTO: 31.1 PG (ref 26.5–33)
MCHC RBC AUTO-ENTMCNC: 33 G/DL (ref 31.5–36.5)
MCV RBC AUTO: 94 FL (ref 78–100)
PHOSPHATE SERPL-MCNC: 2.5 MG/DL (ref 2.5–4.5)
PLATELET # BLD AUTO: 249 10E3/UL (ref 150–450)
POTASSIUM SERPL-SCNC: 4.5 MMOL/L (ref 3.4–5.3)
PROT SERPL-MCNC: 6.1 G/DL (ref 6.4–8.3)
RBC # BLD AUTO: 2.99 10E6/UL (ref 4.4–5.9)
SODIUM SERPL-SCNC: 138 MMOL/L (ref 136–145)
TACROLIMUS BLD-MCNC: 10.5 UG/L (ref 5–15)
TME LAST DOSE: NORMAL H
TME LAST DOSE: NORMAL H
WBC # BLD AUTO: 4.4 10E3/UL (ref 4–11)

## 2023-03-06 PROCEDURE — 82310 ASSAY OF CALCIUM: CPT

## 2023-03-06 PROCEDURE — 36415 COLL VENOUS BLD VENIPUNCTURE: CPT

## 2023-03-06 PROCEDURE — 85027 COMPLETE CBC AUTOMATED: CPT

## 2023-03-06 PROCEDURE — 80197 ASSAY OF TACROLIMUS: CPT

## 2023-03-06 PROCEDURE — 84100 ASSAY OF PHOSPHORUS: CPT

## 2023-03-06 PROCEDURE — 83735 ASSAY OF MAGNESIUM: CPT

## 2023-03-06 PROCEDURE — 82248 BILIRUBIN DIRECT: CPT

## 2023-03-06 PROCEDURE — 80180 DRUG SCRN QUAN MYCOPHENOLATE: CPT

## 2023-03-07 LAB
CMV DNA SPEC NAA+PROBE-ACNC: NOT DETECTED IU/ML
MYCOPHENOLATE SERPL LC/MS/MS-MCNC: 5.53 MG/L (ref 1–3.5)
MYCOPHENOLATE-G SERPL LC/MS/MS-MCNC: 51.3 MG/L (ref 30–95)
TME LAST DOSE: ABNORMAL H
TME LAST DOSE: ABNORMAL H

## 2023-03-08 DIAGNOSIS — E87.20 METABOLIC ACIDOSIS: Primary | ICD-10-CM

## 2023-03-08 RX ORDER — SODIUM BICARBONATE 650 MG/1
650 TABLET ORAL 2 TIMES DAILY
Qty: 60 TABLET | Refills: 1 | Status: SHIPPED | OUTPATIENT
Start: 2023-03-08 | End: 2023-05-15

## 2023-03-09 ENCOUNTER — LAB (OUTPATIENT)
Dept: LAB | Facility: CLINIC | Age: 38
End: 2023-03-09
Payer: MEDICARE

## 2023-03-09 DIAGNOSIS — Z94.0 KIDNEY REPLACED BY TRANSPLANT: ICD-10-CM

## 2023-03-09 DIAGNOSIS — Z48.298 AFTERCARE FOLLOWING ORGAN TRANSPLANT: ICD-10-CM

## 2023-03-09 DIAGNOSIS — Z20.828 CONTACT WITH AND (SUSPECTED) EXPOSURE TO OTHER VIRAL COMMUNICABLE DISEASES: ICD-10-CM

## 2023-03-09 DIAGNOSIS — Z79.899 ENCOUNTER FOR LONG-TERM CURRENT USE OF MEDICATION: ICD-10-CM

## 2023-03-09 LAB
ANION GAP SERPL CALCULATED.3IONS-SCNC: 10 MMOL/L (ref 7–15)
BUN SERPL-MCNC: 26.5 MG/DL (ref 6–20)
CALCIUM SERPL-MCNC: 8.8 MG/DL (ref 8.6–10)
CHLORIDE SERPL-SCNC: 106 MMOL/L (ref 98–107)
CREAT SERPL-MCNC: 1.62 MG/DL (ref 0.67–1.17)
DEPRECATED HCO3 PLAS-SCNC: 21 MMOL/L (ref 22–29)
ERYTHROCYTE [DISTWIDTH] IN BLOOD BY AUTOMATED COUNT: 13 % (ref 10–15)
GFR SERPL CREATININE-BSD FRML MDRD: 55 ML/MIN/1.73M2
GLUCOSE SERPL-MCNC: 86 MG/DL (ref 70–99)
HCT VFR BLD AUTO: 28.8 % (ref 40–53)
HGB BLD-MCNC: 9.6 G/DL (ref 13.3–17.7)
MCH RBC QN AUTO: 30.9 PG (ref 26.5–33)
MCHC RBC AUTO-ENTMCNC: 33.3 G/DL (ref 31.5–36.5)
MCV RBC AUTO: 93 FL (ref 78–100)
PLATELET # BLD AUTO: 240 10E3/UL (ref 150–450)
POTASSIUM SERPL-SCNC: 4.7 MMOL/L (ref 3.4–5.3)
RBC # BLD AUTO: 3.11 10E6/UL (ref 4.4–5.9)
SODIUM SERPL-SCNC: 137 MMOL/L (ref 136–145)
TACROLIMUS BLD-MCNC: 9.4 UG/L (ref 5–15)
TME LAST DOSE: NORMAL H
TME LAST DOSE: NORMAL H
WBC # BLD AUTO: 3.7 10E3/UL (ref 4–11)

## 2023-03-09 PROCEDURE — 80197 ASSAY OF TACROLIMUS: CPT

## 2023-03-09 PROCEDURE — 36415 COLL VENOUS BLD VENIPUNCTURE: CPT

## 2023-03-09 PROCEDURE — 85027 COMPLETE CBC AUTOMATED: CPT

## 2023-03-09 PROCEDURE — 82310 ASSAY OF CALCIUM: CPT

## 2023-03-13 ENCOUNTER — LAB (OUTPATIENT)
Dept: LAB | Facility: CLINIC | Age: 38
End: 2023-03-13
Payer: MEDICARE

## 2023-03-13 DIAGNOSIS — Z79.899 ENCOUNTER FOR LONG-TERM CURRENT USE OF MEDICATION: ICD-10-CM

## 2023-03-13 DIAGNOSIS — Z20.828 CONTACT WITH AND (SUSPECTED) EXPOSURE TO OTHER VIRAL COMMUNICABLE DISEASES: ICD-10-CM

## 2023-03-13 DIAGNOSIS — Z94.0 KIDNEY REPLACED BY TRANSPLANT: ICD-10-CM

## 2023-03-13 DIAGNOSIS — Z48.298 AFTERCARE FOLLOWING ORGAN TRANSPLANT: ICD-10-CM

## 2023-03-13 LAB
ANION GAP SERPL CALCULATED.3IONS-SCNC: 11 MMOL/L (ref 7–15)
BUN SERPL-MCNC: 25.4 MG/DL (ref 6–20)
CALCIUM SERPL-MCNC: 9 MG/DL (ref 8.6–10)
CHLORIDE SERPL-SCNC: 106 MMOL/L (ref 98–107)
CREAT SERPL-MCNC: 1.75 MG/DL (ref 0.67–1.17)
DEPRECATED HCO3 PLAS-SCNC: 20 MMOL/L (ref 22–29)
ERYTHROCYTE [DISTWIDTH] IN BLOOD BY AUTOMATED COUNT: 13.1 % (ref 10–15)
GFR SERPL CREATININE-BSD FRML MDRD: 50 ML/MIN/1.73M2
GLUCOSE SERPL-MCNC: 82 MG/DL (ref 70–99)
HCT VFR BLD AUTO: 29.9 % (ref 40–53)
HGB BLD-MCNC: 9.9 G/DL (ref 13.3–17.7)
MAGNESIUM SERPL-MCNC: 1.5 MG/DL (ref 1.7–2.3)
MCH RBC QN AUTO: 31.1 PG (ref 26.5–33)
MCHC RBC AUTO-ENTMCNC: 33.1 G/DL (ref 31.5–36.5)
MCV RBC AUTO: 94 FL (ref 78–100)
PHOSPHATE SERPL-MCNC: 3.2 MG/DL (ref 2.5–4.5)
PLATELET # BLD AUTO: 230 10E3/UL (ref 150–450)
POTASSIUM SERPL-SCNC: 4.5 MMOL/L (ref 3.4–5.3)
RBC # BLD AUTO: 3.18 10E6/UL (ref 4.4–5.9)
SODIUM SERPL-SCNC: 137 MMOL/L (ref 136–145)
TACROLIMUS BLD-MCNC: 8.8 UG/L (ref 5–15)
TME LAST DOSE: NORMAL H
TME LAST DOSE: NORMAL H
WBC # BLD AUTO: 3.2 10E3/UL (ref 4–11)

## 2023-03-13 PROCEDURE — 83735 ASSAY OF MAGNESIUM: CPT

## 2023-03-13 PROCEDURE — 82310 ASSAY OF CALCIUM: CPT

## 2023-03-13 PROCEDURE — 36415 COLL VENOUS BLD VENIPUNCTURE: CPT

## 2023-03-13 PROCEDURE — 80197 ASSAY OF TACROLIMUS: CPT

## 2023-03-13 PROCEDURE — 84100 ASSAY OF PHOSPHORUS: CPT

## 2023-03-13 PROCEDURE — 85027 COMPLETE CBC AUTOMATED: CPT

## 2023-03-14 ENCOUNTER — OFFICE VISIT (OUTPATIENT)
Dept: CARDIOLOGY | Facility: CLINIC | Age: 38
End: 2023-03-14
Attending: INTERNAL MEDICINE
Payer: MEDICARE

## 2023-03-14 VITALS
BODY MASS INDEX: 22.02 KG/M2 | SYSTOLIC BLOOD PRESSURE: 132 MMHG | WEIGHT: 157.9 LBS | DIASTOLIC BLOOD PRESSURE: 80 MMHG | OXYGEN SATURATION: 100 % | HEART RATE: 64 BPM

## 2023-03-14 DIAGNOSIS — Z94.0 KIDNEY REPLACED BY TRANSPLANT: ICD-10-CM

## 2023-03-14 DIAGNOSIS — Z48.298 AFTERCARE FOLLOWING ORGAN TRANSPLANT: ICD-10-CM

## 2023-03-14 DIAGNOSIS — I15.1 HTN, KIDNEY TRANSPLANT RELATED: Primary | ICD-10-CM

## 2023-03-14 DIAGNOSIS — D84.9 IMMUNOSUPPRESSION (H): ICD-10-CM

## 2023-03-14 DIAGNOSIS — I42.8 NONISCHEMIC CARDIOMYOPATHY (H): ICD-10-CM

## 2023-03-14 DIAGNOSIS — Z94.0 HTN, KIDNEY TRANSPLANT RELATED: Primary | ICD-10-CM

## 2023-03-14 PROBLEM — D63.8 ANEMIA IN OTHER CHRONIC DISEASES CLASSIFIED ELSEWHERE: Status: ACTIVE | Noted: 2023-03-04

## 2023-03-14 PROCEDURE — 99214 OFFICE O/P EST MOD 30 MIN: CPT | Mod: 24 | Performed by: INTERNAL MEDICINE

## 2023-03-14 PROCEDURE — G0463 HOSPITAL OUTPT CLINIC VISIT: HCPCS | Performed by: INTERNAL MEDICINE

## 2023-03-14 ASSESSMENT — PAIN SCALES - GENERAL: PAINLEVEL: NO PAIN (0)

## 2023-03-14 NOTE — NURSING NOTE
Chief Complaint   Patient presents with     Follow Up     Dr. Lawson: Return kidney waitlist pt.      Vitals were taken and medications reconciled.    Rafita Gunderson, EMT  9:14 AM

## 2023-03-14 NOTE — PROGRESS NOTES
I am delighted to see Chip ORDAZ Janeth in consultation.The primary encounter diagnosis was HTN, kidney transplant related. Diagnoses of Nonischemic cardiomyopathy (H), Immunosuppression (H), Kidney replaced by transplant, and Aftercare following organ transplant were also pertinent to this visit.   As you know, the patient is a 37 year old  male. He   has a past medical history of Bladder stones, Cardiomyopathy (H), ESRD (end stage renal disease) on dialysis (H), Hypertension, Migraines, Polysubstance abuse (H), Solitary kidney, congenital, Urethral stone, and Urethral stricture..    On this visit, the patient states that he had a kidney transplant in january.  The kidney doctors took him off his carvedilol and ACEI. The patient denies chest pressure/discomfort, palpitations, irregular heart beats, near-syncope, syncope, orthopnea, paroxysmal nocturnal dyspnea and lower extermity edema.    The patient's cardiovascular risk factors include hypertension and renal disease.    The following portions of the patient's history were reviewed and updated as appropriate: allergies, current medications, past family history, past medical history, past social history, past surgical history, and the problem list.    PMH: The patient's past medical history includes:    Past Medical History:   Diagnosis Date     Bladder stones      Cardiomyopathy (H)      ESRD (end stage renal disease) on dialysis (H)      Hypertension      Migraines      Polysubstance abuse (H)      Solitary kidney, congenital      Urethral stone      Urethral stricture       Past Surgical History:   Procedure Laterality Date     BENCH KIDNEY  1/15/2023    Procedure: Bench kidney;  Surgeon: Tez Emerson MD;  [facility]: [facility]     BIOPSY  02/2020    renal, Religion     COMBINED CYSTOSCOPY, LASER HOLMIUM LITHOTRIPSY URETER(S)       CYSTOSCOPY       CYSTOSCOPY FLEXIBLE, CYSTOSTOMY, INSERT TUBE SUPRAPUBIC, COMBINED N/A 12/14/2020     Procedure: CYSTOSCOPY, WITH SUPRAPUBIC CATHETER INSERTION;  Surgeon: Sam Mejia MD;  [facility]     CYSTOSCOPY, OPEN EXCISION URETHRAL DIVERTICULUM, COMBINED N/A 2020    Procedure: EXCISION OF URETHRAL DIVERTICULUM X2;  Surgeon: Sam Mejia MD;  [facility]     HERNIA REPAIR      infant     INSERT CATHETER PERITONEAL DIALYSIS       LASER HOLMIUM LITHOTRIPSY URETER(S), INSERT STENT, COMBINED N/A 2020    Procedure: CYSTOSCOPY, bladder and urethral stone extraction, removal of foreign body, urethral dilation, urethrotomy, laser on standby;  Surgeon: Sam Mejia MD;  [facility]     REMOVE CATHETER PERITONEAL N/A 1/15/2023    Procedure: Remove catheter peritoneal;  Surgeon: Tez Emerson MD;  [facility]     TRANSPLANT KIDNEY RECIPIENT  DONOR N/A 1/15/2023    Procedure: TRANSPLANT, KIDNEY, RECIPIENT,  DONOR WITH DONOR URETER STENTING;  Surgeon: Tez Emerson MD;  Location: [facility]     urethral dilation       URETHROPLASTY WITH BUCCAL GRAFT N/A 2020    Procedure: URETHROPLASTY, USING BUCCAL MUCOSA GRAFT;  Surgeon: Sam Mejia MD;  [facility]       The patient's medications as of the current encounter are:     Current Outpatient Medications   Medication Sig Dispense Refill     magnesium oxide (MAG-OX) 400 MG tablet Take 1 tablet (400 mg) by mouth 2 times daily 120 tablet 3     mycophenolate (GENERIC EQUIVALENT) 250 MG capsule Take 3 capsules (750 mg) by mouth 2 times daily 180 capsule 11     pantoprazole (PROTONIX) 40 MG EC tablet Take 1 tablet (40 mg) by mouth every morning (before breakfast) 30 tablet 2     sodium bicarbonate 650 MG tablet Take 1 tablet (650 mg) by mouth 2 times daily 60 tablet 1     sulfamethoxazole-trimethoprim (BACTRIM) 400-80 MG tablet Take 1 tablet by mouth daily Transplant team may increase this to 1 tablet every day depending on potassium and kidney function 90 tablet 3     tacrolimus  (GENERIC EQUIVALENT) 1 MG capsule Take 5 capsules (5 mg) by mouth 2 times daily 300 capsule 11     valGANciclovir (VALCYTE) 450 MG tablet Take 2 tablets (900 mg) by mouth daily 60 tablet 1     VITAMIN D, CHOLECALCIFEROL, PO Take 2,000 Units by mouth daily         Labs:     Lab on 03/13/2023   Component Date Value Ref Range Status     Sodium 03/13/2023 137  136 - 145 mmol/L Final     Potassium 03/13/2023 4.5  3.4 - 5.3 mmol/L Final     Chloride 03/13/2023 106  98 - 107 mmol/L Final     Carbon Dioxide (CO2) 03/13/2023 20 (L)  22 - 29 mmol/L Final     Anion Gap 03/13/2023 11  7 - 15 mmol/L Final     Urea Nitrogen 03/13/2023 25.4 (H)  6.0 - 20.0 mg/dL Final     Creatinine 03/13/2023 1.75 (H)  0.67 - 1.17 mg/dL Final     Calcium 03/13/2023 9.0  8.6 - 10.0 mg/dL Final     Glucose 03/13/2023 82  70 - 99 mg/dL Final     GFR Estimate 03/13/2023 50 (L)  >60 mL/min/1.73m2 Final    eGFR calculated using 2021 CKD-EPI equation.     WBC Count 03/13/2023 3.2 (L)  4.0 - 11.0 10e3/uL Final     RBC Count 03/13/2023 3.18 (L)  4.40 - 5.90 10e6/uL Final     Hemoglobin 03/13/2023 9.9 (L)  13.3 - 17.7 g/dL Final     Hematocrit 03/13/2023 29.9 (L)  40.0 - 53.0 % Final     MCV 03/13/2023 94  78 - 100 fL Final     MCH 03/13/2023 31.1  26.5 - 33.0 pg Final     MCHC 03/13/2023 33.1  31.5 - 36.5 g/dL Final     RDW 03/13/2023 13.1  10.0 - 15.0 % Final     Platelet Count 03/13/2023 230  150 - 450 10e3/uL Final     Tacrolimus by Tandem Mass Spectrom* 03/13/2023 8.8  5.0 - 15.0 ug/L Final    Comment: Tacrolimus Reference Range (ug/L):    Kidney Transplant:  Pediatric  0-3 months post transplant: 10-12  3-6 months post transplant: 8-10  6-12 months post transplant: 6-8  >12 months post transplant: 4-7    Adult  0-6 months post transplant: 8-10  6-12 months post transplant: 6-8  >12 months post transplant: 4-6  >5 years post transplant: 3-5    Heart Transplant:  Pediatric  0-12 months post transplant: 10-15  >12 months post transplant:  5-10    Adult  0-3 months post transplant: 10-15  3-6 months post transplant: 8-12  6-12 months post transplant: 6-12  >12 months post transplant: 6-10    Lung Transplant:  0-12 months post transplant: 10-15  >12 months post transplant: 8-12    Liver Transplant:  Pediatric  0-3 months post transplant: 10-15  3-6 months post transplant: 8-10  6 months-5 years post transplant: 6-8   >5 years post transplant: 1-3    Adult  0-3 months post transplant: 10-12  3-6 months post transplant: 8-10  >6 months post transplant: 6-8    Pancreas Transplant:  0-6                            months post transplant: 8-10  >6 months post transplant: 5-8     Tacrolimus Last Dose Date 03/13/2023 3/13/2023   Final     Tacrolimus Last Dose Time 03/13/2023  3:50 AM   Final     Phosphorus 03/13/2023 3.2  2.5 - 4.5 mg/dL Final     Magnesium 03/13/2023 1.5 (L)  1.7 - 2.3 mg/dL Final       Allergies:  No Known Allergies    Family History:   Family History   Problem Relation Age of Onset     Alzheimer Disease Mother      Unknown/Adopted Father      No Known Problems Sister      No Known Problems Sister      Kidney Disease No family hx of        Psychosocial history:  reports that he has quit smoking. His smoking use included cigarettes. He started smoking about 21 years ago. He smoked an average of .4 packs per day. He has never used smokeless tobacco. He reports that he does not currently use alcohol. He reports that he does not currently use drugs after having used the following drugs: Methamphetamines.    Review of systems: negative for, palpitations, exertional chest pain or pressure, paroxysmal nocturnal dyspnea, dyspnea on exertion, orthopnea, lower extremity edema, syncope or near-syncope, claudication and exercise intolerance    In addition,   General: No change in weight, sleep or appetite.  Normal energy.  No fever or chills  Eyes: Negative for vision changes or eye problems  ENT: No problems with ears, nose or throat.  No difficulty  swallowing.  Resp: No coughing, wheezing or shortness of breath  GI: No nausea, vomiting,  heartburn, abdominal pain, diarrhea, constipation or change in bowel habits  : s/p kidney transplant  Musculoskeletal: No significant muscle or joint pains  Neurologic: No headaches, numbness, tingling, weakness, problems with balance or coordination, migraines  Psychiatric: remote polysubstance abuse  Heme/immune/allergy: No history of bleeding or clotting problems. Anemia   Endocrine: No history of thyroid disease, diabetes or other endocrine disorders  Skin: No rashes,worrisome lesions or skin problems  Vascular:  No claudication, lifestyle limiting or otherwise; no ischemic rest pain; no non-healing ulcers. No weakness, No loss of sensation        Physical examination  Vitals: /80 (BP Location: Right arm, Patient Position: Chair, Cuff Size: Adult Regular)   Pulse 64   Wt 71.6 kg (157 lb 14.4 oz)   SpO2 100%   BMI 22.02 kg/m    BMI= Body mass index is 22.02 kg/m .    In general, the patient is a pleasant male in no apparent distress.    HEENT: Normiocephalic and atraumatic.  PERRLA.  EOMI.  Sclerae white, not injected.  Nares clear.  Pharynx without erythema or exudate.  Dentition intact.    Neck: No adenopathy.  No thyromegaly. Carotids +2/2 bilaterally without bruits.  No jugular venous distension.   Heart:  The PMI is in the 5th ICS in the midclavicular line. There is no heave. Regular rate and rhythm. Normal S1, S2 splits physiologically. No murmur, rub, click, or gallop.    Lungs: Clear to asculation.  No ronchi, wheezes, rales.  No dullness to percussion.   Abdomen: Soft, nontender, nondistended. No organomegaly. No AAA.  No bruits.   Extremities: No clubbing, cyanosis, or edema. The pulses were intact bilaterally.   Neurological: The neurological examination reveal a patient who was oriented to person, place, and time.  The remainder of the examination was nonfocal.    Cardiac tests include:    9/22 -  echo - EF 55-60%      Assessment and Plan    1. HTN - off meds after transplant  2. S/p kidney transplant  3. Nonischemic CM - will reassess need for beta blocker and ACEI in 6 months  - last EF normal      The patient is to return  In 6 months. The patient understood the treatment plan as outlined above.  There were no barriers to learning.      Bashir Lawson MD

## 2023-03-14 NOTE — LETTER
3/14/2023      RE: Chip Fowler  [id number] [facility] [id number]       Dear Colleague,    Thank you for the opportunity to participate in the care of your patient, Chip Fowler, at the [facility]. Please see a copy of my visit note below.    I am delighted to see Chip Fowler in consultation.The primary encounter diagnosis was HTN, kidney transplant related. Diagnoses of Nonischemic cardiomyopathy (H), Immunosuppression (H), Kidney replaced by transplant, and Aftercare following organ transplant were also pertinent to this visit.   As you know, the patient is a 37 year old  male. He   has a past medical history of Bladder stones, Cardiomyopathy (H), ESRD (end stage renal disease) on dialysis (H), Hypertension, Migraines, Polysubstance abuse (H), Solitary kidney, congenital, Urethral stone, and Urethral stricture..    On this visit, the patient states that he had a kidney transplant in january.  The kidney doctors took him off his carvedilol and ACEI. The patient denies chest pressure/discomfort, palpitations, irregular heart beats, near-syncope, syncope, orthopnea, paroxysmal nocturnal dyspnea and lower extermity edema.    The patient's cardiovascular risk factors include hypertension and renal disease.    The following portions of the patient's history were reviewed and updated as appropriate: allergies, current medications, past family history, past medical history, past social history, past surgical history, and the problem list.    PMH: The patient's past medical history includes:    Past Medical History:   Diagnosis Date     Bladder stones      Cardiomyopathy (H)      ESRD (end stage renal disease) on dialysis (H)      Hypertension      Migraines      Polysubstance abuse (H)      Solitary kidney, congenital      Urethral stone      Urethral stricture       Past Surgical History:   Procedure Laterality  Date     BENCH KIDNEY  1/15/2023    Procedure: Bench kidney;  Surgeon: Tez Emerson MD;  Location: [facility]     BIOPSY  2020    renal, Oriental orthodox     COMBINED CYSTOSCOPY, LASER HOLMIUM LITHOTRIPSY URETER(S)       CYSTOSCOPY       CYSTOSCOPY FLEXIBLE, CYSTOSTOMY, INSERT TUBE SUPRAPUBIC, COMBINED N/A 2020    Procedure: CYSTOSCOPY, WITH SUPRAPUBIC CATHETER INSERTION;  Surgeon: Sam Mejia MD;  [facility]     CYSTOSCOPY, OPEN EXCISION URETHRAL DIVERTICULUM, COMBINED N/A 2020    Procedure: EXCISION OF URETHRAL DIVERTICULUM X2;  Surgeon: Sam Mejia MD;  [facility]     HERNIA REPAIR      infant     INSERT CATHETER PERITONEAL DIALYSIS       LASER HOLMIUM LITHOTRIPSY URETER(S), INSERT STENT, COMBINED N/A 2020    Procedure: CYSTOSCOPY, bladder and urethral stone extraction, removal of foreign body, urethral dilation, urethrotomy, laser on standby;  Surgeon: Sam Mejia MD;  [facility]     REMOVE CATHETER PERITONEAL N/A 1/15/2023    Procedure: Remove catheter peritoneal;  Surgeon: Tez Emerson MD;  [facility]     TRANSPLANT KIDNEY RECIPIENT  DONOR N/A 1/15/2023    Procedure: TRANSPLANT, KIDNEY, RECIPIENT,  DONOR WITH DONOR URETER STENTING;  Surgeon: Tez Emerson MD;  [facility]: [facility]     urethral dilation       URETHROPLASTY WITH BUCCAL GRAFT N/A 2020    Procedure: URETHROPLASTY, USING BUCCAL MUCOSA GRAFT;  Surgeon: Sam Mejia MD;  [facility]       The patient's medications as of the current encounter are:     Current Outpatient Medications   Medication Sig Dispense Refill     magnesium oxide (MAG-OX) 400 MG tablet Take 1 tablet (400 mg) by mouth 2 times daily 120 tablet 3     mycophenolate (GENERIC EQUIVALENT) 250 MG capsule Take 3 capsules (750 mg) by mouth 2 times daily 180 capsule 11     pantoprazole (PROTONIX) 40 MG EC tablet Take 1 tablet (40 mg) by mouth every morning (before  breakfast) 30 tablet 2     sodium bicarbonate 650 MG tablet Take 1 tablet (650 mg) by mouth 2 times daily 60 tablet 1     sulfamethoxazole-trimethoprim (BACTRIM) 400-80 MG tablet Take 1 tablet by mouth daily Transplant team may increase this to 1 tablet every day depending on potassium and kidney function 90 tablet 3     tacrolimus (GENERIC EQUIVALENT) 1 MG capsule Take 5 capsules (5 mg) by mouth 2 times daily 300 capsule 11     valGANciclovir (VALCYTE) 450 MG tablet Take 2 tablets (900 mg) by mouth daily 60 tablet 1     VITAMIN D, CHOLECALCIFEROL, PO Take 2,000 Units by mouth daily         Labs:     Lab on 03/13/2023   Component Date Value Ref Range Status     Sodium 03/13/2023 137  136 - 145 mmol/L Final     Potassium 03/13/2023 4.5  3.4 - 5.3 mmol/L Final     Chloride 03/13/2023 106  98 - 107 mmol/L Final     Carbon Dioxide (CO2) 03/13/2023 20 (L)  22 - 29 mmol/L Final     Anion Gap 03/13/2023 11  7 - 15 mmol/L Final     Urea Nitrogen 03/13/2023 25.4 (H)  6.0 - 20.0 mg/dL Final     Creatinine 03/13/2023 1.75 (H)  0.67 - 1.17 mg/dL Final     Calcium 03/13/2023 9.0  8.6 - 10.0 mg/dL Final     Glucose 03/13/2023 82  70 - 99 mg/dL Final     GFR Estimate 03/13/2023 50 (L)  >60 mL/min/1.73m2 Final    eGFR calculated using 2021 CKD-EPI equation.     WBC Count 03/13/2023 3.2 (L)  4.0 - 11.0 10e3/uL Final     RBC Count 03/13/2023 3.18 (L)  4.40 - 5.90 10e6/uL Final     Hemoglobin 03/13/2023 9.9 (L)  13.3 - 17.7 g/dL Final     Hematocrit 03/13/2023 29.9 (L)  40.0 - 53.0 % Final     MCV 03/13/2023 94  78 - 100 fL Final     MCH 03/13/2023 31.1  26.5 - 33.0 pg Final     MCHC 03/13/2023 33.1  31.5 - 36.5 g/dL Final     RDW 03/13/2023 13.1  10.0 - 15.0 % Final     Platelet Count 03/13/2023 230  150 - 450 10e3/uL Final     Tacrolimus by Tandem Mass Spectrom* 03/13/2023 8.8  5.0 - 15.0 ug/L Final    Comment: Tacrolimus Reference Range (ug/L):    Kidney Transplant:  Pediatric  0-3 months post transplant: 10-12  3-6 months post  transplant: 8-10  6-12 months post transplant: 6-8  >12 months post transplant: 4-7    Adult  0-6 months post transplant: 8-10  6-12 months post transplant: 6-8  >12 months post transplant: 4-6  >5 years post transplant: 3-5    Heart Transplant:  Pediatric  0-12 months post transplant: 10-15  >12 months post transplant: 5-10    Adult  0-3 months post transplant: 10-15  3-6 months post transplant: 8-12  6-12 months post transplant: 6-12  >12 months post transplant: 6-10    Lung Transplant:  0-12 months post transplant: 10-15  >12 months post transplant: 8-12    Liver Transplant:  Pediatric  0-3 months post transplant: 10-15  3-6 months post transplant: 8-10  6 months-5 years post transplant: 6-8   >5 years post transplant: 1-3    Adult  0-3 months post transplant: 10-12  3-6 months post transplant: 8-10  >6 months post transplant: 6-8    Pancreas Transplant:  0-6                            months post transplant: 8-10  >6 months post transplant: 5-8     Tacrolimus Last Dose Date 03/13/2023 3/13/2023   Final     Tacrolimus Last Dose Time 03/13/2023  3:50 AM   Final     Phosphorus 03/13/2023 3.2  2.5 - 4.5 mg/dL Final     Magnesium 03/13/2023 1.5 (L)  1.7 - 2.3 mg/dL Final       Allergies:  No Known Allergies    Family History:   Family History   Problem Relation Age of Onset     Alzheimer Disease Mother      Unknown/Adopted Father      No Known Problems Sister      No Known Problems Sister      Kidney Disease No family hx of        Psychosocial history:  reports that he has quit smoking. His smoking use included cigarettes. He started smoking about 21 years ago. He smoked an average of .4 packs per day. He has never used smokeless tobacco. He reports that he does not currently use alcohol. He reports that he does not currently use drugs after having used the following drugs: Methamphetamines.    Review of systems: negative for, palpitations, exertional chest pain or pressure, paroxysmal nocturnal dyspnea, dyspnea on  exertion, orthopnea, lower extremity edema, syncope or near-syncope, claudication and exercise intolerance    In addition,   General: No change in weight, sleep or appetite.  Normal energy.  No fever or chills  Eyes: Negative for vision changes or eye problems  ENT: No problems with ears, nose or throat.  No difficulty swallowing.  Resp: No coughing, wheezing or shortness of breath  GI: No nausea, vomiting,  heartburn, abdominal pain, diarrhea, constipation or change in bowel habits  : s/p kidney transplant  Musculoskeletal: No significant muscle or joint pains  Neurologic: No headaches, numbness, tingling, weakness, problems with balance or coordination, migraines  Psychiatric: remote polysubstance abuse  Heme/immune/allergy: No history of bleeding or clotting problems. Anemia   Endocrine: No history of thyroid disease, diabetes or other endocrine disorders  Skin: No rashes,worrisome lesions or skin problems  Vascular:  No claudication, lifestyle limiting or otherwise; no ischemic rest pain; no non-healing ulcers. No weakness, No loss of sensation        Physical examination  Vitals: /80 (BP Location: Right arm, Patient Position: Chair, Cuff Size: Adult Regular)   Pulse 64   Wt 71.6 kg (157 lb 14.4 oz)   SpO2 100%   BMI 22.02 kg/m    BMI= Body mass index is 22.02 kg/m .    In general, the patient is a pleasant male in no apparent distress.    HEENT: Normiocephalic and atraumatic.  PERRLA.  EOMI.  Sclerae white, not injected.  Nares clear.  Pharynx without erythema or exudate.  Dentition intact.    Neck: No adenopathy.  No thyromegaly. Carotids +2/2 bilaterally without bruits.  No jugular venous distension.   Heart:  The PMI is in the 5th ICS in the midclavicular line. There is no heave. Regular rate and rhythm. Normal S1, S2 splits physiologically. No murmur, rub, click, or gallop.    Lungs: Clear to asculation.  No ronchi, wheezes, rales.  No dullness to percussion.   Abdomen: Soft, nontender,  nondistended. No organomegaly. No AAA.  No bruits.   Extremities: No clubbing, cyanosis, or edema. The pulses were intact bilaterally.   Neurological: The neurological examination reveal a patient who was oriented to person, place, and time.  The remainder of the examination was nonfocal.    Cardiac tests include:    9/22 - echo - EF 55-60%      Assessment and Plan    1. HTN - off meds after transplant  2. S/p kidney transplant  3. Nonischemic CM - will reassess need for beta blocker and ACEI in 6 months  - last EF normal      The patient is to return  In 6 months. The patient understood the treatment plan as outlined above.  There were no barriers to learning.      Bashir Lawson MD

## 2023-03-19 ENCOUNTER — APPOINTMENT (OUTPATIENT)
Dept: CT IMAGING | Facility: CLINIC | Age: 38
DRG: 698 | End: 2023-03-19
Attending: EMERGENCY MEDICINE
Payer: MEDICARE

## 2023-03-19 ENCOUNTER — HOSPITAL ENCOUNTER (EMERGENCY)
Facility: CLINIC | Age: 38
Discharge: OTHER INSTITUTION | DRG: 698 | End: 2023-03-21
Attending: EMERGENCY MEDICINE | Admitting: EMERGENCY MEDICINE
Payer: MEDICARE

## 2023-03-19 ENCOUNTER — TELEPHONE (OUTPATIENT)
Dept: TRANSPLANT | Facility: CLINIC | Age: 38
End: 2023-03-19
Payer: MEDICARE

## 2023-03-19 DIAGNOSIS — E87.1 HYPONATREMIA: ICD-10-CM

## 2023-03-19 DIAGNOSIS — Z94.0 STATUS POST KIDNEY TRANSPLANT: ICD-10-CM

## 2023-03-19 DIAGNOSIS — N17.9 ACUTE KIDNEY INJURY (H): ICD-10-CM

## 2023-03-19 DIAGNOSIS — R19.7 DIARRHEA, UNSPECIFIED TYPE: ICD-10-CM

## 2023-03-19 DIAGNOSIS — E86.0 DEHYDRATION: ICD-10-CM

## 2023-03-19 DIAGNOSIS — R50.9 FEVER, UNSPECIFIED FEVER CAUSE: ICD-10-CM

## 2023-03-19 LAB
ALBUMIN UR-MCNC: 100 MG/DL
AMORPH CRY #/AREA URNS HPF: ABNORMAL /HPF
ANION GAP SERPL CALCULATED.3IONS-SCNC: 13 MMOL/L (ref 7–15)
APPEARANCE UR: ABNORMAL
BILIRUB UR QL STRIP: NEGATIVE
BUN SERPL-MCNC: 39 MG/DL (ref 6–20)
CALCIUM SERPL-MCNC: 8.8 MG/DL (ref 8.6–10)
CHLORIDE SERPL-SCNC: 95 MMOL/L (ref 98–107)
COLOR UR AUTO: YELLOW
CREAT SERPL-MCNC: 3.43 MG/DL (ref 0.67–1.17)
DEPRECATED HCO3 PLAS-SCNC: 20 MMOL/L (ref 22–29)
ERYTHROCYTE [DISTWIDTH] IN BLOOD BY AUTOMATED COUNT: 12.6 % (ref 10–15)
FLUAV RNA SPEC QL NAA+PROBE: NEGATIVE
FLUBV RNA RESP QL NAA+PROBE: NEGATIVE
GFR SERPL CREATININE-BSD FRML MDRD: 23 ML/MIN/1.73M2
GLUCOSE SERPL-MCNC: 141 MG/DL (ref 70–99)
GLUCOSE UR STRIP-MCNC: NEGATIVE MG/DL
HCT VFR BLD AUTO: 29.2 % (ref 40–53)
HGB BLD-MCNC: 10 G/DL (ref 13.3–17.7)
HGB UR QL STRIP: ABNORMAL
KETONES UR STRIP-MCNC: NEGATIVE MG/DL
LEUKOCYTE ESTERASE UR QL STRIP: ABNORMAL
MCH RBC QN AUTO: 31 PG (ref 26.5–33)
MCHC RBC AUTO-ENTMCNC: 34.2 G/DL (ref 31.5–36.5)
MCV RBC AUTO: 90 FL (ref 78–100)
MUCOUS THREADS #/AREA URNS LPF: PRESENT /LPF
NITRATE UR QL: NEGATIVE
PH UR STRIP: 5.5 [PH] (ref 5–7)
PLATELET # BLD AUTO: 153 10E3/UL (ref 150–450)
POTASSIUM SERPL-SCNC: 3.6 MMOL/L (ref 3.4–5.3)
RBC # BLD AUTO: 3.23 10E6/UL (ref 4.4–5.9)
RBC URINE: 9 /HPF
RSV RNA SPEC NAA+PROBE: NEGATIVE
SARS-COV-2 RNA RESP QL NAA+PROBE: NEGATIVE
SODIUM SERPL-SCNC: 128 MMOL/L (ref 136–145)
SP GR UR STRIP: 1.02 (ref 1–1.03)
SQUAMOUS EPITHELIAL: 4 /HPF
UROBILINOGEN UR STRIP-MCNC: NORMAL MG/DL
WBC # BLD AUTO: 3.3 10E3/UL (ref 4–11)
WBC URINE: 28 /HPF

## 2023-03-19 PROCEDURE — 87186 SC STD MICRODIL/AGAR DIL: CPT | Performed by: EMERGENCY MEDICINE

## 2023-03-19 PROCEDURE — 87637 SARSCOV2&INF A&B&RSV AMP PRB: CPT | Performed by: EMERGENCY MEDICINE

## 2023-03-19 PROCEDURE — 96376 TX/PRO/DX INJ SAME DRUG ADON: CPT | Mod: 59

## 2023-03-19 PROCEDURE — 258N000003 HC RX IP 258 OP 636: Performed by: EMERGENCY MEDICINE

## 2023-03-19 PROCEDURE — 85027 COMPLETE CBC AUTOMATED: CPT | Performed by: EMERGENCY MEDICINE

## 2023-03-19 PROCEDURE — 81001 URINALYSIS AUTO W/SCOPE: CPT | Performed by: EMERGENCY MEDICINE

## 2023-03-19 PROCEDURE — 96375 TX/PRO/DX INJ NEW DRUG ADDON: CPT

## 2023-03-19 PROCEDURE — 80053 COMPREHEN METABOLIC PANEL: CPT | Performed by: EMERGENCY MEDICINE

## 2023-03-19 PROCEDURE — 82248 BILIRUBIN DIRECT: CPT | Performed by: EMERGENCY MEDICINE

## 2023-03-19 PROCEDURE — 250N000011 HC RX IP 250 OP 636: Performed by: EMERGENCY MEDICINE

## 2023-03-19 PROCEDURE — 83690 ASSAY OF LIPASE: CPT | Performed by: EMERGENCY MEDICINE

## 2023-03-19 PROCEDURE — 36415 COLL VENOUS BLD VENIPUNCTURE: CPT | Performed by: EMERGENCY MEDICINE

## 2023-03-19 PROCEDURE — 99285 EMERGENCY DEPT VISIT HI MDM: CPT | Mod: CS,25

## 2023-03-19 PROCEDURE — C9803 HOPD COVID-19 SPEC COLLECT: HCPCS

## 2023-03-19 PROCEDURE — G1010 CDSM STANSON: HCPCS

## 2023-03-19 PROCEDURE — 250N000013 HC RX MED GY IP 250 OP 250 PS 637: Performed by: EMERGENCY MEDICINE

## 2023-03-19 RX ORDER — ACETAMINOPHEN 500 MG
1000 TABLET ORAL ONCE
Status: COMPLETED | OUTPATIENT
Start: 2023-03-19 | End: 2023-03-19

## 2023-03-19 RX ORDER — LOPERAMIDE HCL 2 MG
4 CAPSULE ORAL ONCE
Status: COMPLETED | OUTPATIENT
Start: 2023-03-19 | End: 2023-03-19

## 2023-03-19 RX ORDER — ONDANSETRON 2 MG/ML
4 INJECTION INTRAMUSCULAR; INTRAVENOUS ONCE
Status: COMPLETED | OUTPATIENT
Start: 2023-03-19 | End: 2023-03-19

## 2023-03-19 RX ADMIN — ONDANSETRON 4 MG: 2 INJECTION INTRAMUSCULAR; INTRAVENOUS at 22:04

## 2023-03-19 RX ADMIN — SODIUM CHLORIDE 1000 ML: 9 INJECTION, SOLUTION INTRAVENOUS at 23:16

## 2023-03-19 RX ADMIN — ACETAMINOPHEN 1000 MG: 500 TABLET ORAL at 23:15

## 2023-03-19 RX ADMIN — SODIUM CHLORIDE 1000 ML: 9 INJECTION, SOLUTION INTRAVENOUS at 22:03

## 2023-03-19 RX ADMIN — LOPERAMIDE HYDROCHLORIDE 4 MG: 2 CAPSULE ORAL at 23:23

## 2023-03-19 ASSESSMENT — ACTIVITIES OF DAILY LIVING (ADL)
ADLS_ACUITY_SCORE: 35
ADLS_ACUITY_SCORE: 33

## 2023-03-19 ASSESSMENT — ENCOUNTER SYMPTOMS
RHINORRHEA: 0
BLOOD IN STOOL: 0
DYSURIA: 0
DIAPHORESIS: 1
NAUSEA: 1
CHILLS: 1
FEVER: 1
ABDOMINAL PAIN: 0
VOMITING: 0
SORE THROAT: 0
COUGH: 0

## 2023-03-19 NOTE — ED TRIAGE NOTES
SUNNY from home with c/o fever, chills, painful and frequent urination, and diarrhea that began on Friday and are worsening. 2 months post kidney transplant which was done at [facility] [place].

## 2023-03-19 NOTE — TELEPHONE ENCOUNTER
Friday started to feel ill, has diarrhea, unable to get out of bed Saturday and Sunday.  He reports chills, feeling rundown.  He has not been able to check his temp, and reports tolerating his medications.  He states he is shaking with the chills and that is what is bothering him the most.  He was advised he needs to be seen in the emergency room for work up of likely fever.  He states understanding but wishes not to be in the ED.  He was advised the recommendation is to be seen since he is a newer transplant and it sounds like he is febrile and has increased stool output.  He states understanding and will work on getting a ride to the hospital.  He will call with further questions or concerns.

## 2023-03-20 ENCOUNTER — MYC MEDICAL ADVICE (OUTPATIENT)
Dept: TRANSPLANT | Facility: CLINIC | Age: 38
End: 2023-03-20

## 2023-03-20 ENCOUNTER — APPOINTMENT (OUTPATIENT)
Dept: ULTRASOUND IMAGING | Facility: CLINIC | Age: 38
DRG: 698 | End: 2023-03-20
Attending: EMERGENCY MEDICINE
Payer: MEDICARE

## 2023-03-20 LAB
ALBUMIN SERPL BCG-MCNC: 4.1 G/DL (ref 3.5–5.2)
ALP SERPL-CCNC: 41 U/L (ref 40–129)
ALT SERPL W P-5'-P-CCNC: 40 U/L (ref 10–50)
ANION GAP SERPL CALCULATED.3IONS-SCNC: 13 MMOL/L (ref 7–15)
AST SERPL W P-5'-P-CCNC: 34 U/L (ref 10–50)
BILIRUB DIRECT SERPL-MCNC: <0.2 MG/DL (ref 0–0.3)
BILIRUB SERPL-MCNC: 0.4 MG/DL
BUN SERPL-MCNC: 36.1 MG/DL (ref 6–20)
C DIFF TOX B STL QL: NEGATIVE
CALCIUM SERPL-MCNC: 7.9 MG/DL (ref 8.6–10)
CHLORIDE SERPL-SCNC: 100 MMOL/L (ref 98–107)
CREAT SERPL-MCNC: 3.09 MG/DL (ref 0.67–1.17)
DEPRECATED HCO3 PLAS-SCNC: 19 MMOL/L (ref 22–29)
GFR SERPL CREATININE-BSD FRML MDRD: 26 ML/MIN/1.73M2
GLUCOSE SERPL-MCNC: 128 MG/DL (ref 70–99)
HCO3 BLDV-SCNC: 17 MMOL/L (ref 21–28)
LACTATE BLD-SCNC: 0.9 MMOL/L
LACTATE SERPL-SCNC: 1 MMOL/L (ref 0.7–2)
LIPASE SERPL-CCNC: 20 U/L (ref 13–60)
PCO2 BLDV: 26 MM HG (ref 40–50)
PH BLDV: 7.42 [PH] (ref 7.32–7.43)
PO2 BLDV: 21 MM HG (ref 25–47)
POTASSIUM SERPL-SCNC: 3.4 MMOL/L (ref 3.4–5.3)
PROT SERPL-MCNC: 6.9 G/DL (ref 6.4–8.3)
SAO2 % BLDV: 37 % (ref 94–100)
SODIUM SERPL-SCNC: 132 MMOL/L (ref 136–145)

## 2023-03-20 PROCEDURE — 250N000013 HC RX MED GY IP 250 OP 250 PS 637: Performed by: EMERGENCY MEDICINE

## 2023-03-20 PROCEDURE — 36415 COLL VENOUS BLD VENIPUNCTURE: CPT | Performed by: EMERGENCY MEDICINE

## 2023-03-20 PROCEDURE — 250N000012 HC RX MED GY IP 250 OP 636 PS 637: Performed by: EMERGENCY MEDICINE

## 2023-03-20 PROCEDURE — 87506 IADNA-DNA/RNA PROBE TQ 6-11: CPT | Performed by: EMERGENCY MEDICINE

## 2023-03-20 PROCEDURE — 96365 THER/PROPH/DIAG IV INF INIT: CPT

## 2023-03-20 PROCEDURE — 82803 BLOOD GASES ANY COMBINATION: CPT

## 2023-03-20 PROCEDURE — 258N000003 HC RX IP 258 OP 636: Performed by: EMERGENCY MEDICINE

## 2023-03-20 PROCEDURE — 96366 THER/PROPH/DIAG IV INF ADDON: CPT

## 2023-03-20 PROCEDURE — 250N000011 HC RX IP 250 OP 636: Performed by: EMERGENCY MEDICINE

## 2023-03-20 PROCEDURE — 87799 DETECT AGENT NOS DNA QUANT: CPT | Performed by: EMERGENCY MEDICINE

## 2023-03-20 PROCEDURE — 83605 ASSAY OF LACTIC ACID: CPT

## 2023-03-20 PROCEDURE — 96368 THER/DIAG CONCURRENT INF: CPT

## 2023-03-20 PROCEDURE — 76775 US EXAM ABDO BACK WALL LIM: CPT

## 2023-03-20 PROCEDURE — 87493 C DIFF AMPLIFIED PROBE: CPT | Performed by: EMERGENCY MEDICINE

## 2023-03-20 PROCEDURE — 82310 ASSAY OF CALCIUM: CPT | Performed by: EMERGENCY MEDICINE

## 2023-03-20 PROCEDURE — 83605 ASSAY OF LACTIC ACID: CPT | Performed by: EMERGENCY MEDICINE

## 2023-03-20 PROCEDURE — 87040 BLOOD CULTURE FOR BACTERIA: CPT | Performed by: EMERGENCY MEDICINE

## 2023-03-20 RX ORDER — ONDANSETRON 2 MG/ML
4 INJECTION INTRAMUSCULAR; INTRAVENOUS ONCE
Status: COMPLETED | OUTPATIENT
Start: 2023-03-20 | End: 2023-03-20

## 2023-03-20 RX ORDER — LOPERAMIDE HCL 2 MG
4 CAPSULE ORAL ONCE
Status: COMPLETED | OUTPATIENT
Start: 2023-03-20 | End: 2023-03-20

## 2023-03-20 RX ORDER — TACROLIMUS 1 MG/1
5 CAPSULE ORAL
Status: DISCONTINUED | OUTPATIENT
Start: 2023-03-20 | End: 2023-03-21 | Stop reason: HOSPADM

## 2023-03-20 RX ORDER — SODIUM BICARBONATE 650 MG/1
650 TABLET ORAL 2 TIMES DAILY
Status: DISCONTINUED | OUTPATIENT
Start: 2023-03-20 | End: 2023-03-20

## 2023-03-20 RX ORDER — SODIUM BICARBONATE 650 MG/1
650 TABLET ORAL 2 TIMES DAILY
Status: DISCONTINUED | OUTPATIENT
Start: 2023-03-20 | End: 2023-03-21 | Stop reason: HOSPADM

## 2023-03-20 RX ORDER — TACROLIMUS 1 MG/1
5 CAPSULE ORAL
Status: DISCONTINUED | OUTPATIENT
Start: 2023-03-20 | End: 2023-03-20

## 2023-03-20 RX ORDER — SULFAMETHOXAZOLE/TRIMETHOPRIM 800-160 MG
1 TABLET ORAL DAILY
Status: DISCONTINUED | OUTPATIENT
Start: 2023-03-20 | End: 2023-03-21 | Stop reason: HOSPADM

## 2023-03-20 RX ORDER — MYCOPHENOLATE MOFETIL 250 MG/1
750 CAPSULE ORAL
Status: DISCONTINUED | OUTPATIENT
Start: 2023-03-20 | End: 2023-03-21 | Stop reason: HOSPADM

## 2023-03-20 RX ORDER — DIPHENHYDRAMINE HYDROCHLORIDE 50 MG/ML
25 INJECTION INTRAMUSCULAR; INTRAVENOUS ONCE
Status: COMPLETED | OUTPATIENT
Start: 2023-03-20 | End: 2023-03-20

## 2023-03-20 RX ORDER — VALGANCICLOVIR 450 MG/1
900 TABLET, FILM COATED ORAL DAILY
Status: DISCONTINUED | OUTPATIENT
Start: 2023-03-20 | End: 2023-03-21 | Stop reason: HOSPADM

## 2023-03-20 RX ORDER — DEXTROSE MONOHYDRATE, SODIUM CHLORIDE, AND POTASSIUM CHLORIDE 50; 1.49; 4.5 G/1000ML; G/1000ML; G/1000ML
INJECTION, SOLUTION INTRAVENOUS ONCE
Status: COMPLETED | OUTPATIENT
Start: 2023-03-20 | End: 2023-03-20

## 2023-03-20 RX ORDER — ACETAMINOPHEN 500 MG
1000 TABLET ORAL ONCE
Status: COMPLETED | OUTPATIENT
Start: 2023-03-20 | End: 2023-03-20

## 2023-03-20 RX ORDER — METOCLOPRAMIDE HYDROCHLORIDE 5 MG/ML
5 INJECTION INTRAMUSCULAR; INTRAVENOUS ONCE
Status: COMPLETED | OUTPATIENT
Start: 2023-03-20 | End: 2023-03-20

## 2023-03-20 RX ORDER — PANTOPRAZOLE SODIUM 40 MG/1
40 TABLET, DELAYED RELEASE ORAL
Status: DISCONTINUED | OUTPATIENT
Start: 2023-03-21 | End: 2023-03-21 | Stop reason: HOSPADM

## 2023-03-20 RX ORDER — VITAMIN B COMPLEX
50 TABLET ORAL DAILY
Status: DISCONTINUED | OUTPATIENT
Start: 2023-03-20 | End: 2023-03-21 | Stop reason: HOSPADM

## 2023-03-20 RX ORDER — ACETAMINOPHEN 500 MG
1000 TABLET ORAL EVERY 6 HOURS PRN
Status: DISCONTINUED | OUTPATIENT
Start: 2023-03-20 | End: 2023-03-21 | Stop reason: HOSPADM

## 2023-03-20 RX ORDER — MYCOPHENOLATE MOFETIL 250 MG/1
750 CAPSULE ORAL
Status: DISCONTINUED | OUTPATIENT
Start: 2023-03-20 | End: 2023-03-20

## 2023-03-20 RX ORDER — MAGNESIUM OXIDE 400 MG/1
800 TABLET ORAL
Status: DISCONTINUED | OUTPATIENT
Start: 2023-03-20 | End: 2023-03-21 | Stop reason: HOSPADM

## 2023-03-20 RX ADMIN — ONDANSETRON 4 MG: 2 INJECTION INTRAMUSCULAR; INTRAVENOUS at 11:32

## 2023-03-20 RX ADMIN — LOPERAMIDE HYDROCHLORIDE 4 MG: 2 CAPSULE ORAL at 12:01

## 2023-03-20 RX ADMIN — SODIUM BICARBONATE 650 MG TABLET 650 MG: at 16:23

## 2023-03-20 RX ADMIN — MAGNESIUM OXIDE TAB 400 MG (241.3 MG ELEMENTAL MG) 800 MG: 400 (241.3 MG) TAB at 19:45

## 2023-03-20 RX ADMIN — TAZOBACTAM SODIUM AND PIPERACILLIN SODIUM 3.38 G: 375; 3 INJECTION, SOLUTION INTRAVENOUS at 06:53

## 2023-03-20 RX ADMIN — TAZOBACTAM SODIUM AND PIPERACILLIN SODIUM 3.38 G: 375; 3 INJECTION, SOLUTION INTRAVENOUS at 19:45

## 2023-03-20 RX ADMIN — MYCOPHENOLATE MOFETIL 750 MG: 250 CAPSULE ORAL at 16:23

## 2023-03-20 RX ADMIN — POTASSIUM CHLORIDE, DEXTROSE MONOHYDRATE AND SODIUM CHLORIDE: 150; 5; 450 INJECTION, SOLUTION INTRAVENOUS at 05:16

## 2023-03-20 RX ADMIN — TAZOBACTAM SODIUM AND PIPERACILLIN SODIUM 3.38 G: 375; 3 INJECTION, SOLUTION INTRAVENOUS at 12:22

## 2023-03-20 RX ADMIN — METOCLOPRAMIDE HYDROCHLORIDE 5 MG: 5 INJECTION INTRAMUSCULAR; INTRAVENOUS at 12:50

## 2023-03-20 RX ADMIN — TACROLIMUS 5 MG: 1 CAPSULE ORAL at 16:23

## 2023-03-20 RX ADMIN — ACETAMINOPHEN 1000 MG: 500 TABLET ORAL at 21:08

## 2023-03-20 RX ADMIN — DIPHENHYDRAMINE HYDROCHLORIDE 25 MG: 50 INJECTION INTRAMUSCULAR; INTRAVENOUS at 12:50

## 2023-03-20 RX ADMIN — ACETAMINOPHEN 1000 MG: 500 TABLET ORAL at 12:42

## 2023-03-20 RX ADMIN — ACETAMINOPHEN 1000 MG: 500 TABLET ORAL at 06:53

## 2023-03-20 ASSESSMENT — ACTIVITIES OF DAILY LIVING (ADL)
ADLS_ACUITY_SCORE: 35

## 2023-03-20 NOTE — ED NOTES
Patient spiked Fever to 104.6 rectally while in ED.  Tylenol provided. Will add blood culture x2 and Lactic acid to blood work.  Given UA findings, at this time will also give dose of IV Zosyn.     Charly Sales MD  03/20/23 [id number]

## 2023-03-20 NOTE — ED NOTES
Pt up to BR ind to void. No BM at this time. Pt knows to give stool sample if he needs to have BM. Dr. Henrry CHRISTIANSON water.

## 2023-03-20 NOTE — H&P
[facility]    History and Physical  Transplant Surgery     Date of Admission:  3/21/23    Assessment & Plan   Chip Fowler is a 37 year old male with history of ESRD secondary to congenital solitary kidney and obstruction s/p DCD kidney transplant on 1/15/23 (Cr 1.4-1.7), nephrolithiasis, s/p buccal urethroplasty and excision of urethral diverticulum 12/2020, HTN, and cardiomyopathy secondary to methamphetamines (last EF 55-60%). Presented to [facility] with fever to 104.6F, leukopenia, JACK, and diarrhea.     Graft Function:  JACK of transplant kidney: Cr 3.5, baseline 1.4-1.7. US at OSH normal. Continue IVF. 3/19 CT noted some fat stranding around graft. 3/29 UC + enterococcus faecalis. Recheck BMP.    Immunosuppression management:   MMF: 750mg BID  Tacrolimus: Goal level 8-10. Level in AM.     Hematology:  Pancytopenia, acute: Secondary to infection and medications. 3/20 CMV & EBV negative.  Anemia of chronic disease: Hgb 9.1.  Leukopenia, acute: CMV negative.  Thrombocytopenia: Plt 127, monitor.    Cardiac:   Hx cardiomyopathy: Last EF 55-60% in 9/2022.     Respiratory: No acute issues.      GI/Nutrition:  N/V/D: Infectious work up as below. C-diff negative. Lomotil PRN.     Endocrine: No acute issues.      Fluid/Electrolytes:   Hyponatremia: Na 132. Likely d/t hypovolemia.  Hypovolemia: Secondary to diarrhea. Continue IVF.  Metabolic acidosis: Will discuss w/ Neph.     :   Hx urethroplasty, nephrolithiasis: No obstruction on US transplant kidney. Check PVR.     Infectious disease:  Fever w/ diarrhea: Work up as below.  UTI, possible pyelonephritis of transplant kidney: Previous UTI in 1/2023 + enterococcus faecalis, pan sensitive. 3/19 Fat stranding around graft on CT. 3/19 UC + enterococcus faecalis 50-100K. Zosyn started in [facility] ED. Will continue and add Vanco.    Work up:  3/19 CT A/P: fat stranding adjacent to tx kidney  3/19 UA + pyuria but also +  squamous epi  3/19 SARS-CoV-2 negative  3/19 Influenza A/B negative  3/19 RSV negative  3/19 UCx + enterococcus faecalis 50-100K  3/20  transplant kidney: patent vessels, no obstruction  3/20 CMV negative  3/20 Enteric pathogen panel negative  3/20 Rotavirus negative  3/20 C-diff negative  3/20 EBV negative.    Pending:  3/20 BCx pending, no growth to date     Prophylaxis: PJP (TMP/sulfa), valganciclovir (CMV D+/R-)    Divina Ho NP     Chief Complaint   Fever, diarrhea, JACK    History of Present Illness   Chip Fowler is a 37 year old male with history of ESRD secondary to congenital solitary kidney and obstruction s/p DCD kidney transplant on 1/15/23 (Cr 1.4-1.7), nephrolithiasis, s/p buccal urethroplasty and excision of urethral diverticulum 12/2020, HTN, and cardiomyopathy secondary to methamphetamines (last EF 55-60%). Presented to [facility] with fever to 104.6F, leukopenia, JACK, urinary frequency, and watery diarrhea that started on 3/17.     Pt feels slightly better today. Fever curve trending down. Diarrhea remains watery but less frequent. Occipital HA and nausea with fever, resolves when afebrile. Slight dizziness on standing. Denies pain over graft.    Past Medical History    I have reviewed this patient's medical history and updated it with pertinent information if needed.   Past Medical History:   Diagnosis Date     Bladder stones      Cardiomyopathy (H)      ESRD (end stage renal disease) on dialysis (H)      Hypertension      Kidney replaced by transplant 01/15/2023    DCD DDKT. Intermediate risk induction.     Migraines      Polysubstance abuse (H)      Solitary kidney, congenital      Urethral stone      Urethral stricture        Past Surgical History   I have reviewed this patient's surgical history and updated it with pertinent information if needed.  Past Surgical History:   Procedure Laterality Date     BENCH KIDNEY  1/15/2023    Procedure: Bench kidney;  Surgeon: Tez Emerson  MD Bryan;  Location: [facility] OR     BIOPSY  2020    renal, Druze     COMBINED CYSTOSCOPY, LASER HOLMIUM LITHOTRIPSY URETER(S)       CYSTOSCOPY       CYSTOSCOPY FLEXIBLE, CYSTOSTOMY, INSERT TUBE SUPRAPUBIC, COMBINED N/A 2020    Procedure: CYSTOSCOPY, WITH SUPRAPUBIC CATHETER INSERTION;  Surgeon: Sam Mejia MD;  Location: [facility] OR     CYSTOSCOPY, OPEN EXCISION URETHRAL DIVERTICULUM, COMBINED N/A 2020    Procedure: EXCISION OF URETHRAL DIVERTICULUM X2;  Surgeon: Sam Mejia MD;  Location: [facility] OR     HERNIA REPAIR      infant     INSERT CATHETER PERITONEAL DIALYSIS       LASER HOLMIUM LITHOTRIPSY URETER(S), INSERT STENT, COMBINED N/A 2020    Procedure: CYSTOSCOPY, bladder and urethral stone extraction, removal of foreign body, urethral dilation, urethrotomy, laser on standby;  Surgeon: Sam Mejia MD;  Location: [facility] OR     REMOVE CATHETER PERITONEAL N/A 1/15/2023    Procedure: Remove catheter peritoneal;  Surgeon: Tez Emerson MD;  Location: [facility] OR     TRANSPLANT KIDNEY RECIPIENT  DONOR N/A 1/15/2023    Procedure: TRANSPLANT, KIDNEY, RECIPIENT,  DONOR WITH DONOR URETER STENTING;  Surgeon: Tez Emerson MD;  Location: [facility] OR     urethral dilation       URETHROPLASTY WITH BUCCAL GRAFT N/A 2020    Procedure: URETHROPLASTY, USING BUCCAL MUCOSA GRAFT;  Surgeon: Sam Mejia MD;  Location: [facility] OR       Prior to Admission Medications    Prior to Admission Medications   Prescriptions Last Dose Informant Patient Reported? Taking?   VITAMIN D, CHOLECALCIFEROL, PO   Yes No   Sig: Take 2,000 Units by mouth daily   magnesium oxide (MAG-OX) 400 MG tablet   No No   Sig: Take 1 tablet (400 mg) by mouth 2 times daily   Patient taking differently: Take 800 mg by mouth daily 6PM   mycophenolate (GENERIC EQUIVALENT) 250 MG capsule   No No   Sig: Take 3 capsules (750 mg) by mouth 2 times daily   pantoprazole (PROTONIX) 40 MG EC tablet   No  No   Sig: Take 1 tablet (40 mg) by mouth every morning (before breakfast)   sodium bicarbonate 650 MG tablet   No No   Sig: Take 1 tablet (650 mg) by mouth 2 times daily   sulfamethoxazole-trimethoprim (BACTRIM) 400-80 MG tablet   No No   Sig: Take 1 tablet by mouth daily Transplant team may increase this to 1 tablet every day depending on potassium and kidney function   tacrolimus (GENERIC EQUIVALENT) 1 MG capsule   No No   Sig: Take 5 capsules (5 mg) by mouth 2 times daily   valGANciclovir (VALCYTE) 450 MG tablet   No No   Sig: Take 2 tablets (900 mg) by mouth daily      Facility-Administered Medications: None     Allergies   No Known Allergies    Social History   I have reviewed this patient's social history and updated it with pertinent information if needed. Chip Fowler  reports that he has quit smoking. His smoking use included cigarettes. He started smoking about 21 years ago. He smoked an average of .4 packs per day. He has never used smokeless tobacco. He reports that he does not currently use alcohol. He reports that he does not currently use drugs after having used the following drugs: Methamphetamines.    Review of Systems   The 10 point Review of Systems is negative other than noted in the HPI or here.     Physical Exam                      Vital Signs with Ranges  Temp:  [98.9  F (37.2  C)-104.6  F (40.3  C)] 103  F (39.4  C)  Pulse:  [] 103  Resp:  [18] 18  BP: ()/(50-87) 126/61  SpO2:  [91 %-100 %] 97 %  0 lbs 0 oz    Constitutional: NAD  Eyes: No icterus  Respiratory: CTA, unlabored  Cardiovascular: RRR no murmur  GI: Soft, nontender  Lymph/Hematologic: No edema  Skin: Pink  Musculoskeletal: Strength 5/5  Neurologic: A&Ox4, no tremor  Neuropsychiatric: Calm    Data   Pending

## 2023-03-20 NOTE — ED TRIAGE NOTES
"Pt c/o frequent, not painful urination. Kidney transplant on left for \"unchecked HTN\". Was born with one kidney. Frequent diarrhea today, around 10 episodes. Tylenol taken at [phone].      "

## 2023-03-20 NOTE — ED PROVIDER NOTES
History     Chief Complaint:  Generalized Body Aches       The history is provided by the patient.      Chip Fowler is a 37 year old male with a history of end stage renal disease and a kidney transplant who was born with one kidney presents with flu-like symptoms. Patient reports he had a kidney transplant 2 months ago, however starting Friday, he began feeling febrile, which was accompanied by chills, diaphoresis, and generalized myalgias. On Saturday, he began experiencing diarrhea. Endorses mild nausea in the ED. Denies vomiting, cough, sore throat, rhinorrhea, dysuria, bloody stool, abdominal pain. Not on antibiotics.     Independent Historian:   None - Patient Only    Review of External Notes: None     ROS:  Review of Systems   Constitutional: Positive for chills, diaphoresis and fever.   HENT: Negative for rhinorrhea and sore throat.    Respiratory: Negative for cough.    Gastrointestinal: Positive for nausea. Negative for abdominal pain, blood in stool and vomiting.   Genitourinary: Negative for dysuria.   All other systems reviewed and are negative.    Allergies:  No Known Allergies     Medications:    mycophenolate   pantoprazole   sulfamethoxazole-trimethoprim   tacrolimus   valGANciclovir     Past Medical History:  Bladder stones  Cardiomyopathy   ESRD  Hypertension  Migraines   Polysubstance abuse   Urethral stone     Past Surgical History:    Bench kidney   Combined cystoscopy   Cystoscopy (x3)  Hernia repair   Catheter insertion  Laser holmium lithotripsy   Transplant kidney   Urethroplasty      Family History:    family history includes Alzheimer Disease in his mother; No Known Problems in his sister and sister; Unknown/Adopted in his father.    Social History:  Presents to the ED alone  PCP: No Ref-Primary, Physician     Physical Exam     Patient Vitals for the past 24 hrs:   BP Temp Temp src Pulse Resp SpO2 Height Weight   03/20/23 0130 113/64 -- -- 98 -- 97 % -- --   03/20/23 0121 114/68 --  -- -- -- 97 % -- --   03/20/23 0059 118/68 -- -- 96 -- 98 % -- --   03/20/23 0045 125/73 -- -- 98 -- 100 % -- --   03/20/23 0035 122/77 -- -- -- -- 100 % -- --   03/20/23 0010 139/84 -- -- -- -- 91 % -- --   03/19/23 2355 125/70 -- -- -- -- 100 % -- --   03/19/23 2340 125/74 -- -- -- -- -- -- --   03/19/23 2325 122/75 -- -- -- -- -- -- --   03/19/23 2315 -- 100.3  F (37.9  C) Oral -- -- -- -- --   03/19/23 2210 -- -- -- -- -- 100 % -- --   03/19/23 2155 111/69 -- -- 87 -- -- -- --   03/19/23 1917 105/71 99.9  F (37.7  C) Oral 112 18 98 % 1.829 m (6') 68 kg (150 lb)        Physical Exam  Constitutional: Vital signs reviewed as above  General: Alert, pleasant  HEENT: Drymucous membranes  Eyes: Pupils are equal, round, and reactive to light.   Neck: Normal range of motion  Cardiovascular: Tachycardic, Regular rhythm and normal heart sounds.  No MRG  Pulmonary/Chest: Effort normal and breath sounds normal. No respiratory distress. Patient has no wheezes. Patient has no rales.   Gastrointestinal: Soft. Positive bowel sounds. No MRG. Well-healing incisions to abdomen. No surrounding erythema or abscess.   Musculoskeletal/Extremities: Full ROM.  Endo: No pitting edema  Neurological: Alert, no focal deficits.  Skin: Skin is warm and dry.   Psychiatric: Pleasant    Emergency Department Course     Imaging:  Abd/pelvis CT no contrast - Stone Protocol   Final Result   IMPRESSION:    1.  The right kidney is not seen. The left kidney is moderately atrophic with no hydronephrosis. There is a transplant kidney seen in the right hemipelvis. There is some mild stranding of the fat seen within the renal pelvis and adjacent to the renal    parenchyma which is nonspecific, but could represent changes of inflammation/pyelonephritis. Given patient's history of recent transplantation, this may also be most surgical in nature. Do not appreciate any evidence for dilatation of the right    transplant kidney urinary tract.             Report  per radiology    Laboratory:  Labs Ordered and Resulted from Time of ED Arrival to Time of ED Departure   ROUTINE UA WITH MICROSCOPIC REFLEX TO CULTURE - Abnormal       Result Value    Color Urine Yellow      Appearance Urine Slightly Cloudy (*)     Glucose Urine Negative      Bilirubin Urine Negative      Ketones Urine Negative      Specific Gravity Urine 1.019      Blood Urine Small (*)     pH Urine 5.5      Protein Albumin Urine 100 (*)     Urobilinogen Urine Normal      Nitrite Urine Negative      Leukocyte Esterase Urine Moderate (*)     Mucus Urine Present (*)     Amorphous Crystals Urine Moderate (*)     RBC Urine 9 (*)     WBC Urine 28 (*)     Squamous Epithelials Urine 4 (*)    BASIC METABOLIC PANEL - Abnormal    Sodium 128 (*)     Potassium 3.6      Chloride 95 (*)     Carbon Dioxide (CO2) 20 (*)     Anion Gap 13      Urea Nitrogen 39.0 (*)     Creatinine 3.43 (*)     Calcium 8.8      Glucose 141 (*)     GFR Estimate 23 (*)    CBC WITH PLATELETS - Abnormal    WBC Count 3.3 (*)     RBC Count 3.23 (*)     Hemoglobin 10.0 (*)     Hematocrit 29.2 (*)     MCV 90      MCH 31.0      MCHC 34.2      RDW 12.6      Platelet Count 153     INFLUENZA A/B, RSV, & SARS-COV2 PCR - Normal    Influenza A PCR Negative      Influenza B PCR Negative      RSV PCR Negative      SARS CoV2 PCR Negative     URINE CULTURE   ENTERIC BACTERIA AND VIRUS PANEL BY MACARIO STOOL   C. DIFFICILE TOXIN B PCR WITH REFLEX TO C. DIFFICILE ANTIGEN AND TOXINS A/B EIA      Emergency Department Course & Assessments:    Interventions:  Medications   ondansetron (ZOFRAN) injection 4 mg (4 mg Intravenous $Given 3/19/23 2204)   0.9% sodium chloride BOLUS (0 mLs Intravenous Stopped 3/19/23 2322)   acetaminophen (TYLENOL) tablet 1,000 mg (1,000 mg Oral $Given 3/19/23 2315)   0.9% sodium chloride BOLUS (0 mLs Intravenous Stopped 3/20/23 0114)   loperamide (IMODIUM) capsule 4 mg (4 mg Oral $Given 3/19/23 2323)      Independent Interpretation (X-rays, CTs,  rhythm strip):  No obvious renal stone on the CT scan    Assessments/Consultations/Discussion of Management or Tests:  ED Course as of 03/20/23 0208   Sun Mar 19, 2023   2120 Obtained history and examined.      Social Determinants of Health affecting care:   Recent renal transplant and on immunocompromising drugs    Disposition:  The patient will be transferred to the [facility].    Impression & Plan      CMS Diagnoses: None    Medical Decision Making:  Chip is a 37-year-old gentleman who is 2 months status post renal transplant who now presents with abdominal discomfort and diarrhea up to 10 times today.  He also has had low-grade fevers and was 99.9 here.  He is concerned that he is dehydrated.  His abdominal exam is benign but he was initially tachycardic with dry mucous membranes.  IV was established and he was given fluids as well as Zofran and Tylenol.  His electrolyte panel does show a sodium of 128.  Chloride is also low at 95.  Creatinine is up to 3.43 from his baseline of 1.75.  But his BUN is also elevated to 39.  Certainly a lot of this is prerenal but given that he just had a renal transplant this is concerning.  His white count is slightly low at 3.3 and hemoglobin is low but stable at 10.  His UA does show small blood with some protein with amorphous crystals.  There is moderate leuk esterase and 28 white cells with 9 red cells however there is mucus present as well as squames making this a contaminant likely.  Urine cultures in process.  COVID, influenza and RSV had also been checked and were normal.  A CT stone run was obtained there is some mild stranding of the fat within the renal pelvis of the transplant kidney and adjacent to the renal parenchyma which is nonspecific and could represent changes of inflammation pyelonephritis or postsurgical in nature no obvious evidence of dilatation of the right transplant kidney urinary tract.  He is not having any urinary  symptoms as such I have not ordered antibiotics.  On reevaluation he is still feeling chilled and just had another episode of diarrhea.  I gave him 4 mg of loperamide and a second liter of fluid.  After this he was feeling somewhat better, but is concerned about his labs.  I discussed the case with our hospitalist service here and they prefer that he be transferred to [facility], which is reasonable.  He is currently on the list at the [facility] and has been signed out to my colleague, Dr. Sales.    Diagnosis:    ICD-10-CM    1. Acute kidney injury (H)  N17.9       2. Dehydration  E86.0       3. Status post kidney transplant  Z94.0       4. Hyponatremia  E87.1       5. Diarrhea, unspecified type  R19.7            Discharge Medications:  New Prescriptions    No medications on file      Scribe Disclosure:  I, JUAN DANIEL VILMA, am serving as a scribe at 9:22 PM on 3/19/2023 to document services personally performed by Ha Cohen MD based on my observations and the provider's statements to me.   3/19/2023   Ha Cohen MD Walters, Brent Aaron, MD  03/20/23 0209

## 2023-03-20 NOTE — ED NOTES
"Pt has rigors, requested something for \"fever reducer\". Tylenol and another L of NS ordered per Dr. Cohen. Pt had diarrhea that he described as mucousy and had small void at same time. Temp before tylenol given 100.3. Vicki updated.  "

## 2023-03-20 NOTE — ED NOTES
I spoke with Dr Marc from [facility] Transplant team.  Reviewed presentation.  Agree with work up.  Requested we add LFT, Lipase, CMV, EBV and order Renal ultrasound with doppler.  Will obtain stool samples for culture.  Continue maintenance IVFs.      Charly Sales MD  03/20/23 3604

## 2023-03-20 NOTE — PHARMACY-ADMISSION MEDICATION HISTORY
Admission medication history interview status for this patient is complete. See [facility] admission navigator for allergy information, prior to admission medications and immunization status.     Medication history interview done, indicate source(s): Patient  Medication history resources (including written lists, pill bottles, clinic record):None  [facility]    Changes made to PTA medication list:  Added: none  Changed: mag oxide 400 mg bid --> 800 mg daily @ 6PM  Reported as Not Taking: none  Removed: none    Actions taken by pharmacist (provider contacted, etc):None     Additional medication history information: Pt states he has home meds with him in room - states he took am meds today around 3:50 am. Pt states he is no longer taking carvedilol, lisinopril, or giulia-shae tabs.     Medication reconciliation/reorder completed by provider prior to medication history?  Y   (Y/N)       Prior to Admission medications    Medication Sig Last Dose Taking? Auth Provider Long Term End Date   magnesium oxide (MAG-OX) 400 MG tablet Take 1 tablet (400 mg) by mouth 2 times daily  Patient taking differently: Take 800 mg by mouth daily 6PM 3/19/2023 at 6PM Yes Edwin Green MD     mycophenolate (GENERIC EQUIVALENT) 250 MG capsule Take 3 capsules (750 mg) by mouth 2 times daily 3/20/2023 at am Yes Jen Posey PA-C Yes    pantoprazole (PROTONIX) 40 MG EC tablet Take 1 tablet (40 mg) by mouth every morning (before breakfast) 3/20/2023 at am Yes Jen Posey PA-C     sodium bicarbonate 650 MG tablet Take 1 tablet (650 mg) by mouth 2 times daily 3/20/2023 at x1 Yes Edwin Green MD     sulfamethoxazole-trimethoprim (BACTRIM) 400-80 MG tablet Take 1 tablet by mouth daily Transplant team may increase this to 1 tablet every day depending on potassium and kidney function 3/20/2023 at am Yes Edwin Green MD     tacrolimus (GENERIC EQUIVALENT) 1 MG capsule Take 5 capsules (5 mg) by  mouth 2 times daily 3/20/2023 at am Yes Edwin Green MD     valGANciclovir (VALCYTE) 450 MG tablet Take 2 tablets (900 mg) by mouth daily 3/20/2023 at am Yes Edwin Green MD Yes    VITAMIN D, CHOLECALCIFEROL, PO Take 2,000 Units by mouth daily 3/20/2023 at am Yes Reported, Patient

## 2023-03-21 ENCOUNTER — HOSPITAL ENCOUNTER (INPATIENT)
Facility: CLINIC | Age: 38
LOS: 3 days | Discharge: HOME | DRG: 698 | End: 2023-03-24
Attending: STUDENT IN AN ORGANIZED HEALTH CARE EDUCATION/TRAINING PROGRAM | Admitting: TRANSPLANT SURGERY
Payer: MEDICARE

## 2023-03-21 VITALS
HEIGHT: 72 IN | BODY MASS INDEX: 20.32 KG/M2 | TEMPERATURE: 98.9 F | HEART RATE: 79 BPM | DIASTOLIC BLOOD PRESSURE: 59 MMHG | WEIGHT: 150 LBS | SYSTOLIC BLOOD PRESSURE: 106 MMHG | OXYGEN SATURATION: 99 % | RESPIRATION RATE: 17 BRPM

## 2023-03-21 DIAGNOSIS — Z94.0 KIDNEY REPLACED BY TRANSPLANT: ICD-10-CM

## 2023-03-21 DIAGNOSIS — Z94.0 TRANSPLANTED KIDNEY: ICD-10-CM

## 2023-03-21 DIAGNOSIS — R19.7 DIARRHEA, UNSPECIFIED TYPE: ICD-10-CM

## 2023-03-21 DIAGNOSIS — N12 PYELONEPHRITIS OF TRANSPLANTED KIDNEY: Primary | ICD-10-CM

## 2023-03-21 DIAGNOSIS — T86.19 PYELONEPHRITIS OF TRANSPLANTED KIDNEY: Primary | ICD-10-CM

## 2023-03-21 DIAGNOSIS — E83.39 HYPOPHOSPHATEMIA: ICD-10-CM

## 2023-03-21 PROBLEM — N17.9 AKI (ACUTE KIDNEY INJURY) (H): Status: ACTIVE | Noted: 2023-03-21

## 2023-03-21 LAB
ANION GAP SERPL CALCULATED.3IONS-SCNC: 10 MMOL/L (ref 7–15)
ANION GAP SERPL CALCULATED.3IONS-SCNC: 14 MMOL/L (ref 7–15)
BACTERIA UR CULT: ABNORMAL
BACTERIA UR CULT: ABNORMAL
BASOPHILS # BLD MANUAL: 0 10E3/UL (ref 0–0.2)
BASOPHILS NFR BLD MANUAL: 0 %
BUN SERPL-MCNC: 41.4 MG/DL (ref 6–20)
BUN SERPL-MCNC: 42.7 MG/DL (ref 6–20)
BURR CELLS BLD QL SMEAR: SLIGHT
C COLI+JEJUNI+LARI FUSA STL QL NAA+PROBE: NOT DETECTED
CALCIUM SERPL-MCNC: 8 MG/DL (ref 8.6–10)
CALCIUM SERPL-MCNC: 8.1 MG/DL (ref 8.6–10)
CHLORIDE SERPL-SCNC: 101 MMOL/L (ref 98–107)
CHLORIDE SERPL-SCNC: 103 MMOL/L (ref 98–107)
CMV DNA SPEC NAA+PROBE-ACNC: NOT DETECTED IU/ML
CREAT SERPL-MCNC: 3.37 MG/DL (ref 0.67–1.17)
CREAT SERPL-MCNC: 3.54 MG/DL (ref 0.67–1.17)
DEPRECATED HCO3 PLAS-SCNC: 14 MMOL/L (ref 22–29)
DEPRECATED HCO3 PLAS-SCNC: 16 MMOL/L (ref 22–29)
EBV DNA # SPEC NAA+PROBE: NOT DETECTED COPIES/ML
EC STX1 GENE STL QL NAA+PROBE: NOT DETECTED
EC STX2 GENE STL QL NAA+PROBE: NOT DETECTED
EOSINOPHIL # BLD MANUAL: 0 10E3/UL (ref 0–0.7)
EOSINOPHIL NFR BLD MANUAL: 0 %
ERYTHROCYTE [DISTWIDTH] IN BLOOD BY AUTOMATED COUNT: 13.2 % (ref 10–15)
GFR SERPL CREATININE-BSD FRML MDRD: 22 ML/MIN/1.73M2
GFR SERPL CREATININE-BSD FRML MDRD: 23 ML/MIN/1.73M2
GLUCOSE SERPL-MCNC: 103 MG/DL (ref 70–99)
GLUCOSE SERPL-MCNC: 136 MG/DL (ref 70–99)
HCT VFR BLD AUTO: 25.9 % (ref 40–53)
HGB BLD-MCNC: 9.1 G/DL (ref 13.3–17.7)
LYMPHOCYTES # BLD MANUAL: 0.1 10E3/UL (ref 0.8–5.3)
LYMPHOCYTES NFR BLD MANUAL: 2 %
MAGNESIUM SERPL-MCNC: 1.9 MG/DL (ref 1.7–2.3)
MCH RBC QN AUTO: 31.1 PG (ref 26.5–33)
MCHC RBC AUTO-ENTMCNC: 35.1 G/DL (ref 31.5–36.5)
MCV RBC AUTO: 88 FL (ref 78–100)
MONOCYTES # BLD MANUAL: 0.4 10E3/UL (ref 0–1.3)
MONOCYTES NFR BLD MANUAL: 15 %
MYELOCYTES # BLD MANUAL: 0 10E3/UL
MYELOCYTES NFR BLD MANUAL: 1 %
NEUTROPHILS # BLD MANUAL: 2.1 10E3/UL (ref 1.6–8.3)
NEUTROPHILS NFR BLD MANUAL: 82 %
NOROV GI+II ORF1-ORF2 JNC STL QL NAA+PR: NOT DETECTED
PHOSPHATE SERPL-MCNC: 2.7 MG/DL (ref 2.5–4.5)
PLAT MORPH BLD: ABNORMAL
PLATELET # BLD AUTO: 127 10E3/UL (ref 150–450)
POTASSIUM SERPL-SCNC: 2.8 MMOL/L (ref 3.4–5.3)
POTASSIUM SERPL-SCNC: 2.9 MMOL/L (ref 3.4–5.3)
RBC # BLD AUTO: 2.93 10E6/UL (ref 4.4–5.9)
RBC MORPH BLD: ABNORMAL
RVA NSP5 STL QL NAA+PROBE: NOT DETECTED
SALMONELLA SP RPOD STL QL NAA+PROBE: NOT DETECTED
SHIGELLA SP+EIEC IPAH STL QL NAA+PROBE: NOT DETECTED
SODIUM SERPL-SCNC: 129 MMOL/L (ref 136–145)
SODIUM SERPL-SCNC: 129 MMOL/L (ref 136–145)
V CHOL+PARA RFBL+TRKH+TNAA STL QL NAA+PR: NOT DETECTED
WBC # BLD AUTO: 2.5 10E3/UL (ref 4–11)
Y ENTERO RECN STL QL NAA+PROBE: NOT DETECTED

## 2023-03-21 PROCEDURE — 250N000011 HC RX IP 250 OP 636: Performed by: EMERGENCY MEDICINE

## 2023-03-21 PROCEDURE — 250N000011 HC RX IP 250 OP 636: Performed by: NURSE PRACTITIONER

## 2023-03-21 PROCEDURE — 85007 BL SMEAR W/DIFF WBC COUNT: CPT | Performed by: NURSE PRACTITIONER

## 2023-03-21 PROCEDURE — 36415 COLL VENOUS BLD VENIPUNCTURE: CPT | Performed by: PHYSICIAN ASSISTANT

## 2023-03-21 PROCEDURE — 250N000012 HC RX MED GY IP 250 OP 636 PS 637: Performed by: EMERGENCY MEDICINE

## 2023-03-21 PROCEDURE — 250N000013 HC RX MED GY IP 250 OP 250 PS 637: Performed by: EMERGENCY MEDICINE

## 2023-03-21 PROCEDURE — 250N000013 HC RX MED GY IP 250 OP 250 PS 637: Performed by: STUDENT IN AN ORGANIZED HEALTH CARE EDUCATION/TRAINING PROGRAM

## 2023-03-21 PROCEDURE — 250N000011 HC RX IP 250 OP 636: Performed by: TRANSPLANT SURGERY

## 2023-03-21 PROCEDURE — 36415 COLL VENOUS BLD VENIPUNCTURE: CPT | Performed by: NURSE PRACTITIONER

## 2023-03-21 PROCEDURE — 84100 ASSAY OF PHOSPHORUS: CPT | Performed by: NURSE PRACTITIONER

## 2023-03-21 PROCEDURE — 250N000012 HC RX MED GY IP 250 OP 636 PS 637: Performed by: NURSE PRACTITIONER

## 2023-03-21 PROCEDURE — 96361 HYDRATE IV INFUSION ADD-ON: CPT

## 2023-03-21 PROCEDURE — 999N000128 HC STATISTIC PERIPHERAL IV START W/O US GUIDANCE

## 2023-03-21 PROCEDURE — 85027 COMPLETE CBC AUTOMATED: CPT | Performed by: NURSE PRACTITIONER

## 2023-03-21 PROCEDURE — 250N000013 HC RX MED GY IP 250 OP 250 PS 637: Performed by: NURSE PRACTITIONER

## 2023-03-21 PROCEDURE — 250N000009 HC RX 250: Performed by: PHYSICIAN ASSISTANT

## 2023-03-21 PROCEDURE — 120N000011 HC R&B TRANSPLANT UMMC

## 2023-03-21 PROCEDURE — 258N000003 HC RX IP 258 OP 636: Performed by: PHYSICIAN ASSISTANT

## 2023-03-21 PROCEDURE — 82310 ASSAY OF CALCIUM: CPT | Performed by: NURSE PRACTITIONER

## 2023-03-21 PROCEDURE — 99221 1ST HOSP IP/OBS SF/LOW 40: CPT | Mod: FS | Performed by: TRANSPLANT SURGERY

## 2023-03-21 PROCEDURE — 80048 BASIC METABOLIC PNL TOTAL CA: CPT | Performed by: PHYSICIAN ASSISTANT

## 2023-03-21 PROCEDURE — 83735 ASSAY OF MAGNESIUM: CPT | Performed by: NURSE PRACTITIONER

## 2023-03-21 PROCEDURE — 99223 1ST HOSP IP/OBS HIGH 75: CPT | Mod: 24 | Performed by: INTERNAL MEDICINE

## 2023-03-21 PROCEDURE — 258N000003 HC RX IP 258 OP 636: Performed by: TRANSPLANT SURGERY

## 2023-03-21 RX ORDER — MYCOPHENOLATE MOFETIL 250 MG/1
750 CAPSULE ORAL 2 TIMES DAILY
Status: DISCONTINUED | OUTPATIENT
Start: 2023-03-21 | End: 2023-03-22

## 2023-03-21 RX ORDER — VALGANCICLOVIR 450 MG/1
450 TABLET, FILM COATED ORAL EVERY OTHER DAY
Status: DISCONTINUED | OUTPATIENT
Start: 2023-03-23 | End: 2023-03-24

## 2023-03-21 RX ORDER — TACROLIMUS 5 MG/1
5 CAPSULE ORAL
Status: DISCONTINUED | OUTPATIENT
Start: 2023-03-21 | End: 2023-03-24

## 2023-03-21 RX ORDER — LIDOCAINE 40 MG/G
CREAM TOPICAL
Status: DISCONTINUED | OUTPATIENT
Start: 2023-03-21 | End: 2023-03-24 | Stop reason: HOSPADM

## 2023-03-21 RX ORDER — POTASSIUM CHLORIDE 1500 MG/1
60 TABLET, EXTENDED RELEASE ORAL ONCE
Status: COMPLETED | OUTPATIENT
Start: 2023-03-21 | End: 2023-03-21

## 2023-03-21 RX ORDER — ONDANSETRON 2 MG/ML
4 INJECTION INTRAMUSCULAR; INTRAVENOUS EVERY 6 HOURS PRN
Status: DISCONTINUED | OUTPATIENT
Start: 2023-03-21 | End: 2023-03-24 | Stop reason: HOSPADM

## 2023-03-21 RX ORDER — ONDANSETRON 2 MG/ML
4 INJECTION INTRAMUSCULAR; INTRAVENOUS ONCE
Status: COMPLETED | OUTPATIENT
Start: 2023-03-21 | End: 2023-03-21

## 2023-03-21 RX ORDER — SULFAMETHOXAZOLE AND TRIMETHOPRIM 400; 80 MG/1; MG/1
1 TABLET ORAL DAILY
Status: DISCONTINUED | OUTPATIENT
Start: 2023-03-22 | End: 2023-03-22

## 2023-03-21 RX ORDER — SODIUM BICARBONATE 650 MG/1
650 TABLET ORAL 2 TIMES DAILY
Status: DISCONTINUED | OUTPATIENT
Start: 2023-03-21 | End: 2023-03-24 | Stop reason: HOSPADM

## 2023-03-21 RX ORDER — PIPERACILLIN SODIUM, TAZOBACTAM SODIUM 2; .25 G/10ML; G/10ML
2.25 INJECTION, POWDER, LYOPHILIZED, FOR SOLUTION INTRAVENOUS EVERY 6 HOURS
Status: DISCONTINUED | OUTPATIENT
Start: 2023-03-21 | End: 2023-03-22

## 2023-03-21 RX ORDER — POTASSIUM CHLORIDE 1500 MG/1
60 TABLET, EXTENDED RELEASE ORAL ONCE
Status: DISCONTINUED | OUTPATIENT
Start: 2023-03-21 | End: 2023-03-21

## 2023-03-21 RX ORDER — PANTOPRAZOLE SODIUM 40 MG/1
40 TABLET, DELAYED RELEASE ORAL
Status: DISCONTINUED | OUTPATIENT
Start: 2023-03-22 | End: 2023-03-24 | Stop reason: HOSPADM

## 2023-03-21 RX ORDER — POTASSIUM CHLORIDE 29.8 MG/ML
20 INJECTION INTRAVENOUS ONCE
Status: DISCONTINUED | OUTPATIENT
Start: 2023-03-21 | End: 2023-03-21

## 2023-03-21 RX ORDER — LOPERAMIDE HCL 2 MG
4 CAPSULE ORAL ONCE
Status: COMPLETED | OUTPATIENT
Start: 2023-03-21 | End: 2023-03-21

## 2023-03-21 RX ORDER — POTASSIUM CHLORIDE 7.45 MG/ML
10 INJECTION INTRAVENOUS
Status: COMPLETED | OUTPATIENT
Start: 2023-03-21 | End: 2023-03-22

## 2023-03-21 RX ORDER — DIPHENOXYLATE HCL/ATROPINE 2.5-.025MG
1 TABLET ORAL 4 TIMES DAILY PRN
Status: DISCONTINUED | OUTPATIENT
Start: 2023-03-21 | End: 2023-03-22

## 2023-03-21 RX ORDER — ACETAMINOPHEN 325 MG/1
650 TABLET ORAL EVERY 4 HOURS PRN
Status: DISCONTINUED | OUTPATIENT
Start: 2023-03-21 | End: 2023-03-24 | Stop reason: HOSPADM

## 2023-03-21 RX ORDER — DEXTROSE, SODIUM CHLORIDE, SODIUM LACTATE, POTASSIUM CHLORIDE, AND CALCIUM CHLORIDE 5; .6; .31; .03; .02 G/100ML; G/100ML; G/100ML; G/100ML; G/100ML
INJECTION, SOLUTION INTRAVENOUS CONTINUOUS
Status: DISCONTINUED | OUTPATIENT
Start: 2023-03-21 | End: 2023-03-21

## 2023-03-21 RX ORDER — ONDANSETRON 4 MG/1
4 TABLET, ORALLY DISINTEGRATING ORAL EVERY 6 HOURS PRN
Status: DISCONTINUED | OUTPATIENT
Start: 2023-03-21 | End: 2023-03-24 | Stop reason: HOSPADM

## 2023-03-21 RX ADMIN — VALGANCICLOVIR 900 MG: 450 TABLET, FILM COATED ORAL at 03:50

## 2023-03-21 RX ADMIN — SODIUM BICARBONATE 650 MG: 650 TABLET ORAL at 20:27

## 2023-03-21 RX ADMIN — ONDANSETRON 4 MG: 4 TABLET, ORALLY DISINTEGRATING ORAL at 15:02

## 2023-03-21 RX ADMIN — VANCOMYCIN HYDROCHLORIDE 1250 MG: 10 INJECTION, POWDER, LYOPHILIZED, FOR SOLUTION INTRAVENOUS at 15:57

## 2023-03-21 RX ADMIN — TAZOBACTAM SODIUM AND PIPERACILLIN SODIUM 3.38 G: 375; 3 INJECTION, SOLUTION INTRAVENOUS at 01:42

## 2023-03-21 RX ADMIN — SODIUM BICARBONATE: 84 INJECTION, SOLUTION INTRAVENOUS at 17:46

## 2023-03-21 RX ADMIN — POTASSIUM CHLORIDE 60 MEQ: 1500 TABLET, EXTENDED RELEASE ORAL at 16:21

## 2023-03-21 RX ADMIN — TACROLIMUS 5 MG: 5 CAPSULE ORAL at 17:52

## 2023-03-21 RX ADMIN — TAZOBACTAM SODIUM AND PIPERACILLIN SODIUM 3.38 G: 375; 3 INJECTION, SOLUTION INTRAVENOUS at 10:55

## 2023-03-21 RX ADMIN — ACETAMINOPHEN 1000 MG: 500 TABLET ORAL at 05:05

## 2023-03-21 RX ADMIN — Medication 50 MCG: at 03:50

## 2023-03-21 RX ADMIN — POTASSIUM CHLORIDE 10 MEQ: 7.46 INJECTION, SOLUTION INTRAVENOUS at 22:36

## 2023-03-21 RX ADMIN — MYCOPHENOLATE MOFETIL 750 MG: 250 CAPSULE ORAL at 20:27

## 2023-03-21 RX ADMIN — SODIUM BICARBONATE 650 MG TABLET 650 MG: at 03:51

## 2023-03-21 RX ADMIN — LOPERAMIDE HYDROCHLORIDE 4 MG: 2 CAPSULE ORAL at 01:43

## 2023-03-21 RX ADMIN — ONDANSETRON 4 MG: 2 INJECTION INTRAMUSCULAR; INTRAVENOUS at 11:02

## 2023-03-21 RX ADMIN — POTASSIUM CHLORIDE 60 MEQ: 1500 TABLET, EXTENDED RELEASE ORAL at 23:06

## 2023-03-21 RX ADMIN — MYCOPHENOLATE MOFETIL 750 MG: 250 CAPSULE ORAL at 03:51

## 2023-03-21 RX ADMIN — TACROLIMUS 5 MG: 1 CAPSULE ORAL at 03:51

## 2023-03-21 RX ADMIN — PIPERACILLIN AND TAZOBACTAM 2.25 G: 2; .25 INJECTION, POWDER, FOR SOLUTION INTRAVENOUS at 17:52

## 2023-03-21 RX ADMIN — ACETAMINOPHEN 650 MG: 325 TABLET ORAL at 20:27

## 2023-03-21 RX ADMIN — SULFAMETHOXAZOLE AND TRIMETHOPRIM 1 TABLET: 800; 160 TABLET ORAL at 03:50

## 2023-03-21 RX ADMIN — POTASSIUM CHLORIDE 10 MEQ: 7.46 INJECTION, SOLUTION INTRAVENOUS at 23:43

## 2023-03-21 RX ADMIN — SODIUM CHLORIDE, SODIUM LACTATE, POTASSIUM CHLORIDE, CALCIUM CHLORIDE AND DEXTROSE MONOHYDRATE: 5; 600; 310; 30; 20 INJECTION, SOLUTION INTRAVENOUS at 14:54

## 2023-03-21 ASSESSMENT — ACTIVITIES OF DAILY LIVING (ADL)
ADLS_ACUITY_SCORE: 35
FALL_HISTORY_WITHIN_LAST_SIX_MONTHS: NO
ADLS_ACUITY_SCORE: 18
WEAR_GLASSES_OR_BLIND: NO
ADLS_ACUITY_SCORE: 35
ADLS_ACUITY_SCORE: 35
DIFFICULTY_EATING/SWALLOWING: NO
ADLS_ACUITY_SCORE: 35
TOILETING_ISSUES: NO
CONCENTRATING,_REMEMBERING_OR_MAKING_DECISIONS_DIFFICULTY: NO
DOING_ERRANDS_INDEPENDENTLY_DIFFICULTY: NO
DRESSING/BATHING_DIFFICULTY: NO
ADLS_ACUITY_SCORE: 35
WALKING_OR_CLIMBING_STAIRS_DIFFICULTY: NO
CHANGE_IN_FUNCTIONAL_STATUS_SINCE_ONSET_OF_CURRENT_ILLNESS/INJURY: NO
ADLS_ACUITY_SCORE: 35

## 2023-03-21 NOTE — LETTER
Transition Communication Hand-off for Care Transitions to Next Level of Care Provider    Name: Chip Fowler  : 1985  MRN #: [id number]  Primary Care Provider: Physician No Ref-Primary     Primary Clinic: No address on file     Reason for Hospitalization:  JACK (acute kidney injury) (H) [N17.9]  Admit Date/Time: 3/21/2023  1:20 PM  Discharge Date: 3/23/23  Payor Source: Payor: MEDICARE / Plan: MEDICARE / Product Type: Medicare /              Reason for Communication Hand-off Referral: Other continuity of care    Discharge Needs Assessment:  Needs    Flowsheet Row Most Recent Value   Equipment Currently Used at Home none          Follow-up plan:    Future Appointments   Date Time Provider Department Center   2023  2:05 PM Edwin Green MD [facility]   2023  9:00 AM Tato Ralph DO [facility]   9/15/2023  8:00 AM [id number]   9/15/2023  9:15 AM Tiffany Sun, APRN CNP [facility]                 Key Recommendations:  See AVS    Selina Crane RN    AVS/Discharge Summary is the source of truth; this is a helpful guide for improved communication of patient story

## 2023-03-21 NOTE — CONSULTS
[facility]  Transplant Nephrology Consult  Date of Admission:  3/21/2023  Today's Date: 03/21/2023  Requesting physician: Lee Soler MD    Recommendations:  - Please replete potassium with 60 meq  -Please start D5W with 150 meq of bicarb  -Tac level tmrw AM as there is concern for tac toxicity  -Can consider sending stool ova and parasite if not already sent  -If diarrhea continues despite treating E.faecalis pyelonephritis would consider GI consult and colonoscopy with biopsies  -Decrease valcyte to 450mg every other day due to JACK  -Decrease bactrim to MWF for now with JACK  -Obtain MPA level    Assessment & Plan   # DDKT: JACK 2/2 hypovolemia and pyelonephritis as well as possible tac toxicity due to diarrhea .SCr uptrending   - Baseline Creatinine: ~ 1.4-1.7   - Proteinuria: Normal (<0.2 grams)   - Date DSA Last Checked: Feb/2023      Latest DSA: No cPRA 49%   - BK Viremia: No   - Kidney Tx Biopsy: No    -U/S 3/20 negative    # Immunosuppression: Tacrolimus immediate release (goal 8-10) and Mycophenolate mofetil (dose 750 mg every 12 hours)   - Changes: Not at this time. Concern that tac level is quite high due to diarrhea    # Infection Prophylaxis:   - PJP: Sulfa/TMP (Bactrim), decrease to MWF  - CMV: Valganciclovir (Valcyte) Patient is CMV IgG Ab discordant (D+/R-) and will continue on Valcyte x 6 months, then check CMV PCR monthly until 12 months post transplant.    # Blood Pressure: Controlled, but low at times;  Goal BP: > 100, but < 130 systolic   - Volume status: Hypovolemic     - Changes: Yes - Give D5 with 150 meq of bicarb      # Anemia in Chronic Renal Disease: Hgb: Stable      SARA: No   - Iron studies: Replete    # Mineral Bone Disorder:   - Secondary renal hyperparathyroidism; PTH level: Minimally elevated ( pg/ml)        On treatment: None  - Vitamin D; level: Low        On supplement: Yes  - Calcium; level: Low        On supplement: No  -  Phosphorus; level: Normal        On supplement: No    # Electrolytes:   - Potassium; level: Low        On supplement: No, give KCl 60meq. Recheck K afterwards as bicarb gtt will decrease K by shifting.   - Magnesium; level: Normal        On supplement: No  - Bicarbonate; level: Low        On supplement: Yes, start bicarb gtt as above  - Sodium; level: Low           # Hyponatremia:   -SNa decreasing possibly 2/2 decreased effective circulating volume due to infection, JACK with decreased ability to excrete free water   -isotonic bicarb as above      #pancytopenia:    -Unclear etiology. CMV PCR negative. Possibly 2/2 pyelonephritis   -Obtain MPA level   -Consider parvovirus       # Diarrhea unclear etiology   -C diff,  enteric panel and cmv negative   -Consider consulting GI for possible scope if diarrhea continues despite treating pyelonephritis   -Consider sending stool and ova parasite    # E.faecalis pyelonephritis:   -Agree with starting vanc and continuing zosyn until sensivities return   -Recommend total 14d treatment    # H/o Cardiomyopathy: Normal LVEF at 55-60% with last cardiac echo 9/2022.     # Urethral Stricture:    -Patient is s/p buccal urethroplasty and diverticulum excision 12/2020. Follows closely with Urology.     # H/o Polysubstance Abuse: Patient with h/o methamphetamine and alcohol abuse.  Now sober.     # Transplant History:  Etiology of Kidney Failure: Solitary congenital kidney and h/o urethral calculus and urologic issues  Tx: DDKT  Transplant: 1/15/2023 (Kidney)  Crossmatch at time of Tx: negative  Significant changes in immunosuppression: None   CMV IgG Ab High Risk Discordance (D+/R-): Yes  EBV IgG Ab High Risk Discordance (D+/R-): No  Significant transplant-related complications: None    Recommendations were communicated to the primary team verbally.    Seen and discussed with Dr. Zaida Antonio MD  Pager: [phone]    Physician Attestation   I, Franko Cerda MD, personally  examined and evaluated this patient.  I discussed the patient with the resident/fellow and care team, and agree with the assessment and plan of care as documented in the note on 03/21/23 .      I personally reviewed vital signs, medications, labs and imaging.    Franko Cerda MD  Date of Service (when I saw the patient): 03/21/23          REASON FOR CONSULT   Transplant medication management. JACK    History of Present Illness   Chip Fowler is a 37 year old male with history of ESRD secondary to congenital solitary kidney and obstruction s/p DCD kidney transplant on 1/15/23 (Cr 1.4-1.7), nephrolithiasis, s/p buccal urethroplasty and excision of urethral diverticulum 12/2020, HTN, and cardiomyopathy secondary to methamphetamines (last EF 55-60%). [facility] with fever to 104.6F, leukopenia, JACK, and diarrhea.     Patient has been feeling chills fever and rigors since Friday and even when I was in the room he was having rigors. He reports having 10-15 episode of diarrhea since Saturday. Was in OSH and transfer here so far enteric panel c diff EBV cmv have been negative.He is having abdominal pain. Reports feel better with tylenol.    No peripheral edema  numbness, tingling, cough, dyspnea, chest pain,  constipation,NSAID use, dysuria, and rash..      Review of Systems    The 10 point Review of Systems is negative other than noted in the HPI     Past Medical History    I have reviewed this patient's medical history and updated it with pertinent information if needed.   Past Medical History:   Diagnosis Date     Bladder stones      Cardiomyopathy (H)      ESRD (end stage renal disease) on dialysis (H)      Hypertension      Kidney replaced by transplant 01/15/2023    DCD DDKT. Intermediate risk induction.     Migraines      Polysubstance abuse (H)      Solitary kidney, congenital      Urethral stone      Urethral stricture        Past Surgical History   I have reviewed this patient's surgical history  and updated it with pertinent information if needed.  Past Surgical History:   Procedure Laterality Date     BENCH KIDNEY  1/15/2023    Procedure: Bench kidney;  Surgeon: Tez Emerson MD;  Location: [facility] OR     BIOPSY  2020    renal, Muslim     COMBINED CYSTOSCOPY, LASER HOLMIUM LITHOTRIPSY URETER(S)       CYSTOSCOPY       CYSTOSCOPY FLEXIBLE, CYSTOSTOMY, INSERT TUBE SUPRAPUBIC, COMBINED N/A 2020    Procedure: CYSTOSCOPY, WITH SUPRAPUBIC CATHETER INSERTION;  Surgeon: Sam Mejia MD;  Location: [facility] OR     CYSTOSCOPY, OPEN EXCISION URETHRAL DIVERTICULUM, COMBINED N/A 2020    Procedure: EXCISION OF URETHRAL DIVERTICULUM X2;  Surgeon: Sam Mejia MD;  Location: [facility] OR     HERNIA REPAIR      infant     INSERT CATHETER PERITONEAL DIALYSIS       LASER HOLMIUM LITHOTRIPSY URETER(S), INSERT STENT, COMBINED N/A 2020    Procedure: CYSTOSCOPY, bladder and urethral stone extraction, removal of foreign body, urethral dilation, urethrotomy, laser on standby;  Surgeon: Sam Mejia MD;  Location[facility] OR     REMOVE CATHETER PERITONEAL N/A 1/15/2023    Procedure: Remove catheter peritoneal;  Surgeon: Tez Emerson MD;  Location: [facility] OR     TRANSPLANT KIDNEY RECIPIENT  DONOR N/A 1/15/2023    Procedure: TRANSPLANT, KIDNEY, RECIPIENT,  DONOR WITH DONOR URETER STENTING;  Surgeon: Tez Emerson MD;  Location: [facility] OR     urethral dilation       URETHROPLASTY WITH BUCCAL GRAFT N/A 2020    Procedure: URETHROPLASTY, USING BUCCAL MUCOSA GRAFT;  Surgeon: Sam Mejia MD;  Location: [facility] OR       Family History   I have reviewed this patient's family history and updated it with pertinent information if needed.   Family History   Problem Relation Age of Onset     Alzheimer Disease Mother      Unknown/Adopted Father      No Known Problems Sister      No Known Problems Sister      Kidney Disease No family hx of        Social History    I have reviewed this patient's social history and updated it with pertinent information if needed. Chip Fowler  reports that he has quit smoking. His smoking use included cigarettes. He started smoking about 21 years ago. He smoked an average of .5 packs per day. He has never used smokeless tobacco. He reports that he does not currently use alcohol. He reports that he does not currently use drugs after having used the following drugs: Methamphetamines.    Allergies   No Known Allergies  Prior to Admission Medications     mycophenolate  750 mg Oral BID     [START ON 3/22/2023] pantoprazole  40 mg Oral QAM AC     piperacillin-tazobactam  2.25 g Intravenous Q6H     sodium bicarbonate  650 mg Oral BID     sodium chloride (PF)  3 mL Intracatheter Q8H     [START ON 3/22/2023] sulfamethoxazole-trimethoprim  1 tablet Oral Daily     tacrolimus  5 mg Oral BID IS     [START ON 3/23/2023] valGANciclovir  450 mg Oral Every Other Day     vancomycin  1,250 mg Intravenous Once     vancomycin place guan - receiving intermittent dosing  1 each Intravenous See Admin Instructions       sodium bicabonate in 5% dextrose for infusion         Physical Exam   Temp  Av.2  F (38.4  C)  Min: 98.8  F (37.1  C)  Max: 104.6  F (40.3  C)      Pulse  Av.3  Min: 64  Max: 118 Resp  Av.3  Min: 16  Max: 18  SpO2  Av.5 %  Min: 91 %  Max: 100 %     /72 (BP Location: Left arm)   Pulse 84   Temp 98.8  F (37.1  C) (Oral)   SpO2 100%     Admit       GENERAL APPEARANCE: alert and having rigors  HENT: mouth without ulcers or lesions  LYMPHATICS: no cervical or supraclavicular nodes  RESP: lungs clear to auscultation - no rales, rhonchi or wheezes  CV: regular rhythm, normal rate, no rub, no murmur  EDEMA: no LE edema bilaterally  ABDOMEN: soft, nondistended, nontender, bowel sounds normal  MS: extremities normal - no gross deformities noted, no evidence of inflammation in joints, no muscle tenderness  SKIN: no rash    Data    CMP  Recent Labs   Lab 03/21/23  1356 03/20/23 1956 03/19/23  2155   * 132* 128*   POTASSIUM 2.8* 3.4 3.6   CHLORIDE 101 100 95*   CO2 14* 19* 20*   ANIONGAP 14 13 13   * 128* 141*   BUN 42.7* 36.1* 39.0*   CR 3.54* 3.09* 3.43*   GFRESTIMATED 22* 26* 23*   VISHNU 8.1* 7.9* 8.8   MAG 1.9  --   --    PHOS 2.7  --   --    PROTTOTAL  --   --  6.9   ALBUMIN  --   --  4.1   BILITOTAL  --   --  0.4   ALKPHOS  --   --  41   AST  --   --  34   ALT  --   --  40     CBC  Recent Labs   Lab 03/21/23 1356 03/19/23 2155   HGB 9.1* 10.0*   WBC 2.5* 3.3*   RBC 2.93* 3.23*   HCT 25.9* 29.2*   MCV 88 90   MCH 31.1 31.0   MCHC 35.1 34.2   RDW 13.2 12.6   * 153     INRNo lab results found in last 7 days.  ABG  Recent Labs   Lab 03/20/23  0652   PH 7.42      Urine Studies  Recent Labs   Lab Test 03/19/23 2011 02/24/23  1445 01/23/23  0752 01/19/23  1055   COLOR Yellow Straw Light Yellow Straw   APPEARANCE Slightly Cloudy* Clear Clear Clear   URINEGLC Negative Negative Negative Negative   URINEBILI Negative Negative Negative Negative   URINEKETONE Negative Negative Negative Negative   SG 1.019 1.009 1.012 1.008   UBLD Small* Negative Large* Large*   URINEPH 5.5 6.5 6.5 7.0   PROTEIN 100* Negative 30* 30*   NITRITE Negative Negative Negative Negative   LEUKEST Moderate* Negative Moderate* Moderate*   RBCU 9* 3* 116* >182*   WBCU 28* 1 13* 27*     No lab results found.  PTH  Recent Labs   Lab Test 01/31/23  1049 01/20/23  0746 04/18/22  0851   PTHI 73* 101* 315*     Iron Studies  Recent Labs   Lab Test 01/31/23  1049 01/20/23  0746   IRON 70 153    244   IRONSAT 27 63*   DAWSON 430* 717*       IMAGING:  All imaging studies reviewed by me.

## 2023-03-21 NOTE — TELEPHONE ENCOUNTER
Called Chip while @ [facility] inpatient   Reassured him Dr Franko Cerda and transplant team are aware of his current hospitalization  --   Reviewed that he has a pending admission at the [facility] but unable to give him exact time or date due to bed availability - Managed by  bed placement at [facility]       General review from my experience fevers,diearrhea and possible UTI   Will increase creatinine  With transplant kidney   Reviewed to follow up with his inpatient team with further questions

## 2023-03-21 NOTE — TELEPHONE ENCOUNTER
"Franko Cerda MD Huepfel, Veronica SWAN RN  Thank you. Yes, I am aware waiting for his admission      2nd Item -  Chip paged the transplant \"on  Call\"  team during the night     MyChart message from Chip Fowler    I went into [facility]  on Sunday evening I have been stuck in [place] waiting to be transferred.  There not 100 percent sure yet but are starting to call it a kidney infection . Bad diarrhea has made my creatin jump up to 3. I'm thinking the transplant team would want me transferred right away?         "

## 2023-03-21 NOTE — PHARMACY-VANCOMYCIN DOSING SERVICE
"Pharmacy Vancomycin Initial Note  Date of Service 2023  Patient's  1985  37 year old, male    Indication: Urinary Tract Infection    Current estimated CrCl = Estimated Creatinine Clearance: 31.2 mL/min (A) (based on SCr of 3.09 mg/dL (H)).    Creatinine for last 3 days  3/19/2023:  9:55 PM Creatinine 3.43 mg/dL  3/20/2023:  7:56 PM Creatinine 3.09 mg/dL    Baseline Scr 1.75 mg/dL, per MD note.       Recent Vancomycin Level(s) for last 3 days  No results found for requested labs within last 72 hours.      Vancomycin IV Administrations (past 72 hours)      No vancomycin orders with administrations in past 72 hours.                Nephrotoxins and other renal medications (From now, onward)    Start     Dose/Rate Route Frequency Ordered Stop    23  tacrolimus (GENERIC EQUIVALENT) capsule 5 mg        Note to Pharmacy: PTA Sig:Take 5 capsules (5 mg) by mouth 2 times daily      5 mg Oral 2 TIMES DAILY 23 1412      23 1700  piperacillin-tazobactam (ZOSYN) 2.25 g vial to attach to  ml bag        Note to Pharmacy: For [facility], [facility] and [facility]: For Zosyn-naive patients, use the \"Zosyn initial dose + extended infusion\" order panel.    2.25 g  over 30 Minutes Intravenous EVERY 6 HOURS 23 1349      23 1500  vancomycin (VANCOCIN) 1,250 mg in 0.9% NaCl 250 mL intermittent infusion         1,250 mg  over 90 Minutes Intravenous ONCE 23 1412      23 1412  vancomycin place guan - receiving intermittent dosing         1 each Intravenous SEE ADMIN INSTRUCTIONS 23 1412            Contrast Orders - past 72 hours (72h ago, onward)    None            Plan:  1. Start vancomycin intermittent dosing due to changing renal function. Give 1250 mg IV x1 now.   2. Vancomycin monitoring method: Trough (Method 2 = manual dose calculation)  3. Vancomycin therapeutic monitoring goal: 10-15 mg/L  4. Pharmacy will check vancomycin levels as appropriate in 1-3 Days.    5. Serum " creatinine levels will be ordered daily for the first week of therapy and at least twice weekly for subsequent weeks.      Samantha Gallo, PharmD

## 2023-03-21 NOTE — PROGRESS NOTES
/54 (BP Location: Left arm)   Pulse 90   Temp 99  F (37.2  C) (Oral)   Resp 16   SpO2 100%     [id number]. VSS on RA, tmax 99.6. Patient endorsing feeling achy and generally unwell. Ice packs given for comfort. Denies nausea, although patient reported that his stomach is not feeling well. Regular diet although patient does not have an appetite. PIV infusing sodium bicarb in D5 at 125 mL/hr, also getting abx. LBM 3/21, diarrhea. Patient inquired about whether or not we would check for e coli. Voiding in urinal at the bedside. No skin check completed upon admission. Patient UAL in room. Continue to monitor and update team with any changes.

## 2023-03-21 NOTE — PLAN OF CARE
/72 (BP Location: Left arm)   Pulse 84   Temp 98.8  F (37.1  C) (Oral)   SpO2 100%     Shift: 4713-4168  VS: Stable on RA, afebrile  Neuro: AOx4  BG: N/A  Labs: K+ 2.8, MD ordered replacement, creatinine 3.54.  Respiratory: WDL  Pain/Nausea/PRN: Denies pain.  Diet: Regular.  LDA: R PIV - running D5LR at 125/hr.  GI/: Diarrhea with frequent stools, voiding into hat or urinal for I&O reporting.  Skin: WDL  Mobility: UAL  Plan: Patient arrived from [facility] ED at 1330, presented with fever, chills, diarrhea, nausea.    Handoff given to following RN.

## 2023-03-21 NOTE — ED NOTES
Report called to Birgit RESENDEZ, pt will transfer to [facility] unit 7A room 720, pt aware and agreeable to transfer.

## 2023-03-21 NOTE — PHARMACY-ADMISSION MEDICATION HISTORY
Admission Medication History Completed by Pharmacy    See [facility] Admission Navigator for allergy information, preferred outpatient pharmacy, prior to admission medications and immunization status.     Medication History Sources:     Medication history completed at [facility]. Please see pharmacist note dated 3/20/23.    Changes made to PTA medication list (reason):    Added: None    Deleted: None    Changed: None    Additional Information:    Additional significant medications patient received at [facility]:  o Zosyn 3.375 g Q6hr for indication of sepsis  o Loperamide 4 mg x 3 doses    Prior to Admission medications    Medication Sig Last Dose Taking? Auth Provider Long Term End Date   magnesium oxide (MAG-OX) 400 MG tablet Take 1 tablet (400 mg) by mouth 2 times daily  Patient taking differently: Take 800 mg by mouth daily 6PM   Edwin Green MD     mycophenolate (GENERIC EQUIVALENT) 250 MG capsule Take 3 capsules (750 mg) by mouth 2 times daily   Jen Posey PA-C Yes    pantoprazole (PROTONIX) 40 MG EC tablet Take 1 tablet (40 mg) by mouth every morning (before breakfast)   Jen Posey PA-C     sodium bicarbonate 650 MG tablet Take 1 tablet (650 mg) by mouth 2 times daily   Edwin Green MD     sulfamethoxazole-trimethoprim (BACTRIM) 400-80 MG tablet Take 1 tablet by mouth daily Transplant team may increase this to 1 tablet every day depending on potassium and kidney function   Edwin Green MD     tacrolimus (GENERIC EQUIVALENT) 1 MG capsule Take 5 capsules (5 mg) by mouth 2 times daily   Edwin Green MD     valGANciclovir (VALCYTE) 450 MG tablet Take 2 tablets (900 mg) by mouth daily   Edwin Green MD Yes    VITAMIN D, CHOLECALCIFEROL, PO Take 2,000 Units by mouth daily   Reported, Patient         Date completed: 03/21/23    Medication history completed by:   Samantha Gallo, PharmD

## 2023-03-21 NOTE — ED PROVIDER NOTES
Patient is a 37-year-old gentleman who was a recipient of a kidney transplant 2 months ago who presented to the emergency department with fever, chills and general myalgias.  Patient was initially seen by Dr. Cohen, please see his note for full details.  Patient was found to have evidence of sepsis likely from a UTI.  Given his transplant status recommended transfer to [facility].  Patient was accepted by Dr. Marc.  Is currently awaiting transfer.  Patient started on broad-spectrum antibiotics.    Patient remains febrile but otherwise hemodynamically stable while under my care.  Repeat BMP still shows elevated creatinine above 3.  Patient requesting for possible discharge.  However given his ongoing fever and still elevated creatinine I strongly recommended that we continue course towards transfer and admission.  Patient is still amenable with current plan at this time.       Parviz Flores MD  03/20/23 9110

## 2023-03-21 NOTE — ED NOTES
Patient asking if he would be able to leave and follow up OP. RN explained that due to his fever, increased Creatine level, and recent transplant it could be detrimental to his health if he were to leave.RN also informed patient that if he wanted to leave it would be AMA. Patient in agreement to stay. CMP recheck placed per patient request. MD aware of plan and in agreement

## 2023-03-22 PROBLEM — T86.19 PYELONEPHRITIS OF TRANSPLANTED KIDNEY: Status: ACTIVE | Noted: 2023-03-22

## 2023-03-22 PROBLEM — E86.1 HYPOVOLEMIA: Status: ACTIVE | Noted: 2023-03-22

## 2023-03-22 PROBLEM — R19.7 DIARRHEA: Status: ACTIVE | Noted: 2023-03-22

## 2023-03-22 PROBLEM — N12 PYELONEPHRITIS OF TRANSPLANTED KIDNEY: Status: ACTIVE | Noted: 2023-03-22

## 2023-03-22 LAB
ANION GAP SERPL CALCULATED.3IONS-SCNC: 11 MMOL/L (ref 7–15)
BASE EXCESS BLDV CALC-SCNC: -1.7 MMOL/L (ref -7.7–1.9)
BUN SERPL-MCNC: 39.9 MG/DL (ref 6–20)
BUN SERPL-MCNC: 42.9 MG/DL (ref 6–20)
BUN SERPL-MCNC: 43.8 MG/DL (ref 6–20)
CALCIUM SERPL-MCNC: 7.8 MG/DL (ref 8.6–10)
CALCIUM SERPL-MCNC: 7.9 MG/DL (ref 8.6–10)
CALCIUM SERPL-MCNC: 8.2 MG/DL (ref 8.6–10)
CHLORIDE SERPL-SCNC: 102 MMOL/L (ref 98–107)
CHLORIDE SERPL-SCNC: 102 MMOL/L (ref 98–107)
CHLORIDE SERPL-SCNC: 103 MMOL/L (ref 98–107)
CREAT SERPL-MCNC: 3.06 MG/DL (ref 0.67–1.17)
CREAT SERPL-MCNC: 3.5 MG/DL (ref 0.67–1.17)
CREAT SERPL-MCNC: 3.62 MG/DL (ref 0.67–1.17)
DEPRECATED HCO3 PLAS-SCNC: 15 MMOL/L (ref 22–29)
DEPRECATED HCO3 PLAS-SCNC: 17 MMOL/L (ref 22–29)
DEPRECATED HCO3 PLAS-SCNC: 18 MMOL/L (ref 22–29)
ERYTHROCYTE [DISTWIDTH] IN BLOOD BY AUTOMATED COUNT: 13.2 % (ref 10–15)
GFR SERPL CREATININE-BSD FRML MDRD: 21 ML/MIN/1.73M2
GFR SERPL CREATININE-BSD FRML MDRD: 22 ML/MIN/1.73M2
GFR SERPL CREATININE-BSD FRML MDRD: 26 ML/MIN/1.73M2
GLUCOSE SERPL-MCNC: 126 MG/DL (ref 70–99)
GLUCOSE SERPL-MCNC: 137 MG/DL (ref 70–99)
GLUCOSE SERPL-MCNC: 141 MG/DL (ref 70–99)
HCO3 BLDV-SCNC: 23 MMOL/L (ref 21–28)
HCT VFR BLD AUTO: 24.7 % (ref 40–53)
HGB BLD-MCNC: 8.4 G/DL (ref 13.3–17.7)
MAGNESIUM SERPL-MCNC: 2 MG/DL (ref 1.7–2.3)
MCH RBC QN AUTO: 30.3 PG (ref 26.5–33)
MCHC RBC AUTO-ENTMCNC: 34 G/DL (ref 31.5–36.5)
MCV RBC AUTO: 89 FL (ref 78–100)
O2/TOTAL GAS SETTING VFR VENT: 20 %
PCO2 BLDV: 39 MM HG (ref 40–50)
PH BLDV: 7.38 [PH] (ref 7.32–7.43)
PHOSPHATE SERPL-MCNC: 1.7 MG/DL (ref 2.5–4.5)
PLATELET # BLD AUTO: 115 10E3/UL (ref 150–450)
PO2 BLDV: 14 MM HG (ref 25–47)
POTASSIUM SERPL-SCNC: 3.1 MMOL/L (ref 3.4–5.3)
POTASSIUM SERPL-SCNC: 3.3 MMOL/L (ref 3.4–5.3)
POTASSIUM SERPL-SCNC: 3.3 MMOL/L (ref 3.4–5.3)
POTASSIUM SERPL-SCNC: 3.5 MMOL/L (ref 3.4–5.3)
POTASSIUM SERPL-SCNC: 3.8 MMOL/L (ref 3.4–5.3)
RBC # BLD AUTO: 2.77 10E6/UL (ref 4.4–5.9)
SODIUM SERPL-SCNC: 128 MMOL/L (ref 136–145)
SODIUM SERPL-SCNC: 131 MMOL/L (ref 136–145)
SODIUM SERPL-SCNC: 131 MMOL/L (ref 136–145)
TACROLIMUS BLD-MCNC: 25.1 UG/L (ref 5–15)
TME LAST DOSE: ABNORMAL H
TME LAST DOSE: ABNORMAL H
WBC # BLD AUTO: 2.5 10E3/UL (ref 4–11)

## 2023-03-22 PROCEDURE — 87209 SMEAR COMPLEX STAIN: CPT | Performed by: NURSE PRACTITIONER

## 2023-03-22 PROCEDURE — 250N000013 HC RX MED GY IP 250 OP 250 PS 637: Performed by: TRANSPLANT SURGERY

## 2023-03-22 PROCEDURE — 250N000013 HC RX MED GY IP 250 OP 250 PS 637: Performed by: NURSE PRACTITIONER

## 2023-03-22 PROCEDURE — 80048 BASIC METABOLIC PNL TOTAL CA: CPT | Performed by: NURSE PRACTITIONER

## 2023-03-22 PROCEDURE — 250N000011 HC RX IP 250 OP 636: Performed by: STUDENT IN AN ORGANIZED HEALTH CARE EDUCATION/TRAINING PROGRAM

## 2023-03-22 PROCEDURE — 99221 1ST HOSP IP/OBS SF/LOW 40: CPT | Mod: 24 | Performed by: INTERNAL MEDICINE

## 2023-03-22 PROCEDURE — 84132 ASSAY OF SERUM POTASSIUM: CPT | Performed by: STUDENT IN AN ORGANIZED HEALTH CARE EDUCATION/TRAINING PROGRAM

## 2023-03-22 PROCEDURE — 250N000011 HC RX IP 250 OP 636: Performed by: NURSE PRACTITIONER

## 2023-03-22 PROCEDURE — 999N000128 HC STATISTIC PERIPHERAL IV START W/O US GUIDANCE

## 2023-03-22 PROCEDURE — 36415 COLL VENOUS BLD VENIPUNCTURE: CPT | Performed by: STUDENT IN AN ORGANIZED HEALTH CARE EDUCATION/TRAINING PROGRAM

## 2023-03-22 PROCEDURE — 82310 ASSAY OF CALCIUM: CPT | Performed by: STUDENT IN AN ORGANIZED HEALTH CARE EDUCATION/TRAINING PROGRAM

## 2023-03-22 PROCEDURE — 80197 ASSAY OF TACROLIMUS: CPT | Performed by: NURSE PRACTITIONER

## 2023-03-22 PROCEDURE — 99233 SBSQ HOSP IP/OBS HIGH 50: CPT | Mod: 24 | Performed by: INTERNAL MEDICINE

## 2023-03-22 PROCEDURE — 82803 BLOOD GASES ANY COMBINATION: CPT | Performed by: STUDENT IN AN ORGANIZED HEALTH CARE EDUCATION/TRAINING PROGRAM

## 2023-03-22 PROCEDURE — 84132 ASSAY OF SERUM POTASSIUM: CPT | Performed by: NURSE PRACTITIONER

## 2023-03-22 PROCEDURE — 250N000013 HC RX MED GY IP 250 OP 250 PS 637: Performed by: PHYSICIAN ASSISTANT

## 2023-03-22 PROCEDURE — 36415 COLL VENOUS BLD VENIPUNCTURE: CPT | Performed by: NURSE PRACTITIONER

## 2023-03-22 PROCEDURE — 85027 COMPLETE CBC AUTOMATED: CPT | Performed by: NURSE PRACTITIONER

## 2023-03-22 PROCEDURE — 83735 ASSAY OF MAGNESIUM: CPT | Performed by: NURSE PRACTITIONER

## 2023-03-22 PROCEDURE — 258N000003 HC RX IP 258 OP 636: Performed by: PHYSICIAN ASSISTANT

## 2023-03-22 PROCEDURE — 120N000011 HC R&B TRANSPLANT UMMC

## 2023-03-22 PROCEDURE — 250N000012 HC RX MED GY IP 250 OP 636 PS 637: Performed by: NURSE PRACTITIONER

## 2023-03-22 PROCEDURE — 84100 ASSAY OF PHOSPHORUS: CPT | Performed by: NURSE PRACTITIONER

## 2023-03-22 PROCEDURE — 250N000009 HC RX 250: Performed by: PHYSICIAN ASSISTANT

## 2023-03-22 RX ORDER — POTASSIUM CHLORIDE 7.45 MG/ML
10 INJECTION INTRAVENOUS ONCE
Status: COMPLETED | OUTPATIENT
Start: 2023-03-22 | End: 2023-03-22

## 2023-03-22 RX ORDER — POTASSIUM CHLORIDE 7.45 MG/ML
10 INJECTION INTRAVENOUS
Status: COMPLETED | OUTPATIENT
Start: 2023-03-22 | End: 2023-03-22

## 2023-03-22 RX ORDER — POTASSIUM CHLORIDE 750 MG/1
40 TABLET, EXTENDED RELEASE ORAL ONCE
Status: DISCONTINUED | OUTPATIENT
Start: 2023-03-22 | End: 2023-03-22

## 2023-03-22 RX ORDER — SULFAMETHOXAZOLE AND TRIMETHOPRIM 400; 80 MG/1; MG/1
1 TABLET ORAL
Status: DISCONTINUED | OUTPATIENT
Start: 2023-03-22 | End: 2023-03-24

## 2023-03-22 RX ORDER — AMPICILLIN 2 G/1
2 INJECTION, POWDER, FOR SOLUTION INTRAVENOUS EVERY 12 HOURS
Status: DISCONTINUED | OUTPATIENT
Start: 2023-03-22 | End: 2023-03-23

## 2023-03-22 RX ORDER — POTASSIUM CHLORIDE 7.45 MG/ML
10 INJECTION INTRAVENOUS ONCE
Status: COMPLETED | OUTPATIENT
Start: 2023-03-22 | End: 2023-03-23

## 2023-03-22 RX ORDER — DIPHENOXYLATE HCL/ATROPINE 2.5-.025MG
1 TABLET ORAL 4 TIMES DAILY
Status: DISCONTINUED | OUTPATIENT
Start: 2023-03-22 | End: 2023-03-24

## 2023-03-22 RX ADMIN — PANTOPRAZOLE SODIUM 40 MG: 40 TABLET, DELAYED RELEASE ORAL at 08:17

## 2023-03-22 RX ADMIN — MYCOPHENOLATE MOFETIL 750 MG: 250 CAPSULE ORAL at 08:17

## 2023-03-22 RX ADMIN — PIPERACILLIN AND TAZOBACTAM 2.25 G: 2; .25 INJECTION, POWDER, FOR SOLUTION INTRAVENOUS at 10:54

## 2023-03-22 RX ADMIN — MYCOPHENOLIC ACID 540 MG: 360 TABLET, DELAYED RELEASE ORAL at 18:35

## 2023-03-22 RX ADMIN — TACROLIMUS 5 MG: 5 CAPSULE ORAL at 08:17

## 2023-03-22 RX ADMIN — PIPERACILLIN AND TAZOBACTAM 2.25 G: 2; .25 INJECTION, POWDER, FOR SOLUTION INTRAVENOUS at 00:49

## 2023-03-22 RX ADMIN — SODIUM BICARBONATE: 84 INJECTION, SOLUTION INTRAVENOUS at 06:52

## 2023-03-22 RX ADMIN — SULFAMETHOXAZOLE AND TRIMETHOPRIM 1 TABLET: 400; 80 TABLET ORAL at 08:17

## 2023-03-22 RX ADMIN — ACETAMINOPHEN 650 MG: 325 TABLET ORAL at 20:27

## 2023-03-22 RX ADMIN — POTASSIUM CHLORIDE 10 MEQ: 7.46 INJECTION, SOLUTION INTRAVENOUS at 14:48

## 2023-03-22 RX ADMIN — DIPHENOXYLATE HYDROCHLORIDE AND ATROPINE SULFATE 1 TABLET: 2.5; .025 TABLET ORAL at 13:10

## 2023-03-22 RX ADMIN — POTASSIUM CHLORIDE 10 MEQ: 7.46 INJECTION, SOLUTION INTRAVENOUS at 06:51

## 2023-03-22 RX ADMIN — POTASSIUM CHLORIDE 10 MEQ: 7.46 INJECTION, SOLUTION INTRAVENOUS at 16:35

## 2023-03-22 RX ADMIN — AMPICILLIN SODIUM 2 G: 2 INJECTION, POWDER, FOR SOLUTION INTRAVENOUS at 20:09

## 2023-03-22 RX ADMIN — POTASSIUM CHLORIDE 10 MEQ: 7.46 INJECTION, SOLUTION INTRAVENOUS at 16:31

## 2023-03-22 RX ADMIN — SODIUM PHOSPHATE, DIBASIC, ANHYDROUS, POTASSIUM PHOSPHATE, MONOBASIC, AND SODIUM PHOSPHATE, MONOBASIC, MONOHYDRATE 250 MG: 852; 155; 130 TABLET, COATED ORAL at 08:17

## 2023-03-22 RX ADMIN — POTASSIUM CHLORIDE 10 MEQ: 7.46 INJECTION, SOLUTION INTRAVENOUS at 08:17

## 2023-03-22 RX ADMIN — POTASSIUM CHLORIDE 10 MEQ: 7.46 INJECTION, SOLUTION INTRAVENOUS at 06:01

## 2023-03-22 RX ADMIN — DIPHENOXYLATE HYDROCHLORIDE AND ATROPINE SULFATE 1 TABLET: 2.5; .025 TABLET ORAL at 16:09

## 2023-03-22 RX ADMIN — POTASSIUM CHLORIDE 10 MEQ: 7.46 INJECTION, SOLUTION INTRAVENOUS at 20:56

## 2023-03-22 RX ADMIN — POTASSIUM CHLORIDE 10 MEQ: 7.46 INJECTION, SOLUTION INTRAVENOUS at 23:17

## 2023-03-22 RX ADMIN — SODIUM PHOSPHATE, DIBASIC, ANHYDROUS, POTASSIUM PHOSPHATE, MONOBASIC, AND SODIUM PHOSPHATE, MONOBASIC, MONOHYDRATE 250 MG: 852; 155; 130 TABLET, COATED ORAL at 20:10

## 2023-03-22 RX ADMIN — DIPHENOXYLATE HYDROCHLORIDE AND ATROPINE SULFATE 1 TABLET: 2.5; .025 TABLET ORAL at 04:15

## 2023-03-22 RX ADMIN — POTASSIUM CHLORIDE 10 MEQ: 7.46 INJECTION, SOLUTION INTRAVENOUS at 13:11

## 2023-03-22 RX ADMIN — POLYETHYLENE GLYCOL 3350, SODIUM SULFATE ANHYDROUS, SODIUM BICARBONATE, SODIUM CHLORIDE, POTASSIUM CHLORIDE 4000 ML: 236; 22.74; 6.74; 5.86; 2.97 POWDER, FOR SOLUTION ORAL at 17:32

## 2023-03-22 RX ADMIN — SODIUM BICARBONATE: 84 INJECTION, SOLUTION INTRAVENOUS at 16:20

## 2023-03-22 RX ADMIN — PIPERACILLIN AND TAZOBACTAM 2.25 G: 2; .25 INJECTION, POWDER, FOR SOLUTION INTRAVENOUS at 06:02

## 2023-03-22 ASSESSMENT — ACTIVITIES OF DAILY LIVING (ADL)
ADLS_ACUITY_SCORE: 18

## 2023-03-22 NOTE — PLAN OF CARE
Shift: 0017-3902  BP 94/68 (BP Location: Left arm)   Pulse 71   Temp 98.9  F (37.2  C) (Oral)   Resp 16   SpO2 98%      VS: t-max 101.8 soft BP, on RA.   BG- none   Labs- Potasium 2.9 replaced oral and IV, recheck 3.5 and 3.3.   Pain/Nausea/PRN'S- Complains of aches and fevers, Tylenol PRN given X2, denies nausea. PRN lomotil given for diarrhea.  Diet- regular.  LDA- PIV x2  Gtt/IVF- D5 sodium bicarb MIV @125 ml/hr   GI/- Voiding adequately, still having diarrhea, 3 BM's overnight.  Skin- intact no issue  Activity- UAL  Plan- continue to monitor

## 2023-03-22 NOTE — PROGRESS NOTES
/66 (BP Location: Left arm)   Pulse 71   Temp 99.9  F (37.7  C) (Oral)   Resp 16   SpO2 99%     Shift note (6679-2763): VSS. On RA. Up independently. Cr 3.06. K+ 3.3 & Na 131. Clear diet, no solids @ midnight. L PIV continuous sodium bicarbonate + D5 at 125 ml/hr. R PIV, KCl + NS at 80 ml/hr for K+ replacement.  Ampicillin started today. Golytely until stools are clear, colonoscopy scheduled 3/23. Held lomotil d/t bowel prep. Tylenol PRN x1. Stool sample sent.

## 2023-03-22 NOTE — PROGRESS NOTES
Transplant Surgery  Inpatient Daily Progress Note  03/22/2023    Assessment & Plan: Chip Fowler is a 37 year old male with history of ESRD secondary to congenital solitary kidney and obstruction s/p DCD kidney transplant on 1/15/23 (Cr 1.4-1.7), nephrolithiasis, s/p buccal urethroplasty and excision of urethral diverticulum 12/2020, HTN, and cardiomyopathy secondary to methamphetamines (last EF 55-60%). Presented to [facility] with fever to 104.6F, leukopenia, JACK, and diarrhea.    Graft Function:  JACK secondary to pyelonephritis, high tacrolimus level: Cr 3.6, baseline 1.4-1.7. US at OSH normal. Continue IVF. 3/19 CT noted some fat stranding around graft. 3/29 UC + enterococcus faecalis.      Immunosuppression management:   MMF: 750mg BID, switch to Myfortic.  Tacrolimus: Goal level 8-10. Level 25, HOLD. Level daily.     Hematology:  Pancytopenia, acute: Secondary to infection and medications. 3/20 CMV & EBV negative. If diarrhea and pancytopenia persist, consider further work up for viral infection and/or GVHD.  Anemia of chronic disease: Hgb 9.1->8.4 with IVF.  Leukopenia, acute: WBC 2.5, monitor.  Thrombocytopenia: Plt 127->115, unclear etiology.     Cardiac:   Hx cardiomyopathy: Last EF 55-60% in 9/2022.     Respiratory: No acute issues.      GI/Nutrition:  Diarrhea: Infectious work up as below. C-diff negative. Lomotil PRN. If diarrhea persists, consider GI consult.     Endocrine: No acute issues.      Fluid/Electrolytes:   Hyponatremia: Na 131. Likely d/t hypovolemia.  Hypovolemia: Secondary to diarrhea. Continue IVF.  Metabolic acidosis: On bicarb gtt.  Hypokalemia: Secondary to GI losses. Continue replacement.     :   Hx urethroplasty, nephrolithiasis: No obstruction on US transplant kidney. Check PVR.     Infectious disease: Tmax 101.8F  Fever w/ diarrhea: Work up as below.  UTI, possible pyelonephritis of transplant kidney: Previous UTI in 1/2023 + enterococcus faecalis, pan sensitive. 3/19 Fat  stranding around graft on CT. 3/19 UC + enterococcus faecalis 50-100K, pan sensitive. Zosyn started in [facility] ED. Will continue Zosyn. Stop Vanco.     Work up:  3/19 CT A/P: fat stranding adjacent to tx kidney  3/19 UA + pyuria but also + squamous epi  3/19 SARS-CoV-2 negative  3/19 Influenza A/B negative  3/19 RSV negative  3/19 UCx + enterococcus faecalis 50-100K  3/20 US transplant kidney: patent vessels, no obstruction  3/20 CMV negative  3/20 Enteric pathogen panel negative  3/20 Rotavirus negative  3/20 C-diff negative  3/20 EBV negative.     Pending:  3/20 BCx pending, no growth to date     Prophylaxis: PJP (TMP/sulfa), valganciclovir (CMV D+/R-)    DK/Fellow/Resident Provider: Divina Ho NP [id number]    Faculty: Lee Soler MD   _________________________________________________________________    Interval History: History is obtained from the patient  Overnight events: Continued watery diarrhea.    ROS:   A 10-point review of systems was negative except as noted above.    Meds:    mycophenolate  750 mg Oral BID     pantoprazole  40 mg Oral QAM AC     phosphorus tablet 250 mg  250 mg Oral BID     piperacillin-tazobactam  2.25 g Intravenous Q6H     [Held by provider] sodium bicarbonate  650 mg Oral BID     sodium chloride (PF)  3 mL Intracatheter Q8H     sulfamethoxazole-trimethoprim  1 tablet Oral Once per day on Mon Wed Fri     tacrolimus  5 mg Oral BID IS     [START ON 3/23/2023] valGANciclovir  450 mg Oral Every Other Day     vancomycin place guan - receiving intermittent dosing  1 each Intravenous See Admin Instructions       Physical Exam:   Current vitals:   BP 94/68 (BP Location: Left arm)   Pulse 71   Temp 98.9  F (37.2  C) (Oral)   Resp 16   SpO2 98%     Vital sign ranges:    Temp:  [98.8  F (37.1  C)-101.8  F (38.8  C)] 98.9  F (37.2  C)  Pulse:  [71-90] 71  Resp:  [16-17] 16  BP: ()/(51-72) 94/68  SpO2:  [98 %-100 %] 98 %  Patient Vitals for the past 24 hrs:   BP Temp Temp src Pulse Resp  SpO2   03/22/23 0525 94/68 98.9  F (37.2  C) Oral 71 16 98 %   03/22/23 0200 108/67 99.4  F (37.4  C) Oral 76 16 98 %   03/21/23 2237 90/51 99  F (37.2  C) Oral 80 16 100 %   03/21/23 2100 -- 99  F (37.2  C) Oral -- -- --   03/21/23 2046 -- (!) 101  F (38.3  C) Oral -- -- --   03/21/23 1900 -- (!) 101.8  F (38.8  C) Oral -- -- --   03/21/23 1738 105/54 99  F (37.2  C) Oral 90 16 100 %   03/21/23 1300 119/72 98.8  F (37.1  C) Oral 84 -- 100 %     Constitutional: NAD  Eyes: No icterus  Respiratory: unlabored  Cardiovascular: perfused  GI: Soft, nontender  Lymph/Hematologic: No edema  Skin: Pink  Musculoskeletal: Strength 5/5  Neurologic: A&Ox4, no tremor  Neuropsychiatric: Calm    Data:   CMP  Recent Labs   Lab 03/22/23  0453 03/22/23  0332 03/21/23  2031 03/21/23  1356 03/20/23  1956 03/19/23  2155   * 128*   < > 129*   < > 128*   POTASSIUM 3.3* 3.5   < > 2.8*   < > 3.6   CHLORIDE 103 102   < > 101   < > 95*   CO2 17* 15*   < > 14*   < > 20*   * 137*   < > 103*   < > 141*   BUN 43.8* 42.9*   < > 42.7*   < > 39.0*   CR 3.62* 3.50*   < > 3.54*   < > 3.43*   GFRESTIMATED 21* 22*   < > 22*   < > 23*   VISHNU 8.2* 7.9*   < > 8.1*   < > 8.8   MAG 2.0  --   --  1.9  --   --    PHOS 1.7*  --   --  2.7  --   --    LIPASE  --   --   --   --   --  20   ALBUMIN  --   --   --   --   --  4.1   BILITOTAL  --   --   --   --   --  0.4   ALKPHOS  --   --   --   --   --  41   AST  --   --   --   --   --  34   ALT  --   --   --   --   --  40    < > = values in this interval not displayed.     CBC  Recent Labs   Lab 03/22/23  0453 03/21/23  1356   HGB 8.4* 9.1*   WBC 2.5* 2.5*   * 127*     COAGSNo lab results found in last 7 days.    Invalid input(s): XA   Urinalysis  Recent Labs   Lab Test 03/19/23 2011 02/24/23  1445   COLOR Yellow Straw   APPEARANCE Slightly Cloudy* Clear   URINEGLC Negative Negative   URINEBILI Negative Negative   URINEKETONE Negative Negative   SG 1.019 1.009   UBLD Small* Negative   URINEPH 5.5  6.5   PROTEIN 100* Negative   NITRITE Negative Negative   LEUKEST Moderate* Negative   RBCU 9* 3*   WBCU 28* 1     Virology:  Hepatitis C Antibody   Date Value Ref Range Status   01/15/2023 Nonreactive Nonreactive Final   08/18/2020 Nonreactive NR^Nonreactive Final     Comment:     Assay performance characteristics have not been established for newborns,   infants, and children       BK Virus DNA copies/mL   Date Value Ref Range Status   02/20/2023 Not Detected Not Detected copies/mL Final

## 2023-03-22 NOTE — PLAN OF CARE
Admitted/transferred from:  [facility]  Time of arrival on unit 1300 (Shift prior)  2 RN full  skin assessment completed by Yahir PATIÑO RN, Ciara BURROWS RN  Skin assessment finding: skin intact, no problems. Right abdominal scar, otherwise intact.  Interventions/actions:  No interventions needed at this time.    Will continue to monitor.

## 2023-03-22 NOTE — PROGRESS NOTES
Antimicrobial Stewardship Team Note    Antimicrobial Stewardship Program - A joint venture between [facility] and  Physicians to optimize antibiotic management.  NOT a formal consult - Restricted Antimicrobial Review     Patient: Chip Fowler  MRN: [id number]  Allergies: Patient has no known allergies.    Brief Summary: Chip Fowler is a 37 year old male with PMHx significant for ESRD secondary to congenital solitary kidney and obstruction s/p DCD kidney transplant on 1/15/23, nephrolithiasis s/p buccal urethroplasty and excision of urethral diverticulum 12/2020, hypertension, and cardiomyopathy secondary to methamphetamines who was admitted on 3/19/23 for UTI with c/f pyelonephritis of transplant kidney.     Interval History: On 3/19, patient presented to [facility] ED with flu-like symptoms. To note, patient had kidney transplant 2 months ago. Starting Friday prior to presenting, patient began feeling febrile accompanied by chills, diaphoresis, and generalized myalgias with diarrhea starting on Saturday. Upon exam, patient was noted to have dry mucous membranes and diaphoretic. While in the ED, patient was febrile to Tmax 104.6 and tachycardic to 105 with leukopenia (WBC of 3.3). Influenza A/B, RSV, and COVID-19 PCR negative. UA mildly inflammatory with 28 WBC, moderate LE, negative nitrite and many bacteria. Urine culture collected. CT A/P significant for moderately atrophic left kidney with no hydronephrosis. Mild stranding of the fat was also seen within the renal pelvis and adjacent to the renal parenchyma which was noted to be nonspecific, but possibly representing changes of inflammation/pyelonephritis. Given patient's history of recent transplantation, it could also be surgical in nature. Renal US showed right lower quadrant renal transplant without stones, obstruction or adjacent fluid reduction. Patient was empirically started on Zosyn. Patient was subsequently transferred to [facility].      Blood cultures collected on 3/20 with no growth to date x 1 day. Urine culture resulted positive for 50,000-100,000 CFU/mL Enterococcus faecalis. CMV PCR negative. C. Difficile Toxin B by PCR negative. EBV PCR negative. Enteric stool pathogen panel negative. On 3/21, patient was subjectively feeling better with a down trending fever curve. Patient had continued episodes of diarrhea but less frequent with hypotensive to 90/51. Vancomycin was started in addition to the Zosyn. To note, CBC significant for pancytopenia. Etiology of pancytopenia is unclear but could be in the setting of pyelonephritis.          Active Anti-infective Medications   (From admission, onward)                 Start     Stop    03/23/23 0800  valGANciclovir  450 mg,   Oral,   EVERY OTHER DAY        Cytomegalovirus Disease Pharmacoprophylaxis        --    03/22/23 0900  sulfamethoxazole-trimethoprim  1 tablet,   Oral,   Once per day on Mon Wed Fri        PJP ppx        --    03/21/23 1700  piperacillin-tazobactam  2.25 g,   Intravenous,   EVERY 6 HOURS        Urinary Tract Infection        --                  Assessment: UTI with c/f pyelonephritis of transplant kidney  Overall, patient has demonstrated some clinical improvement, remaining afebrile the last 12 hours with overall down trending fever curve. Satting well on RA but soft BPs, diarrhea likely contributing. WBC has continued to down trend since admission, now 2.5, with unclear etiology. Given Enterococcus faecalis was isolated from urine culture and blood cultures remain without growth, Pseudomonas aeruginosa plus broad gram negative coverage provided by Zosyn are no longer indicated. We recommend discontinuing Zosyn and initiating ampicillin to specifically target Enterococcus faecalis. Patient's blood pressures have been low to SBPs in the 90s and given ongoing fevers yesterday, we recommend holding off on de-escalating to oral therapy. Once patient remains afebrile for at least  48 hours and has shown additional clinical improvement, we recommend de-escalating to amoxicillin PO to complete 14-21 days of antimicrobial therapy for pyelonephritis per 2019 Urinary tract infections in solid organ transplant recipients: Guidelines from the American Society of Transplantation Infectious Diseases Community of Practice. If patient continues to be febrile while on antibiotics, we recommend considering Transplant ID consult for further infectious workup.     Recommendations:  1. Stop Zosyn  2. Initiate ampicillin 2 g every 8 hours, adjusted per current CrCl 38.9 mL/min (may deescalate with clinical improvement to amoxicillin 875 mg every 12 hours), until 4/2 to complete 14 days of antimicrobial therapy     Discussed with ID Staff: Andrzej Willis MD, MS and Kira Cortes, PharmD, BCIDP  Danielle Duran, 2023 PharmD Candidate    Vital Signs/Clinical Features:  Vitals         03/20 0700  03/21 0659 03/21 0700  03/22 0659 03/22 0700  03/22 1228   Most Recent      Temp ( F)   98.8 -  101.8      98.7     98.7 (37.1) 03/22 1059    Pulse   71 -  90      84     84 03/22 1059    Resp     16      16     16 03/22 1059    BP   90/51 -  119/72      98/57     98/57 03/22 1059    SpO2 (%)   98 -  100      100     100 03/22 1059            Labs  Estimated Creatinine Clearance: 26.6 mL/min (A) (based on SCr of 3.62 mg/dL (H)).  Recent Labs   Lab Test 03/19/23  2155 03/20/23  1956 03/21/23  1356 03/21/23  2031 03/22/23  0332 03/22/23  0453   CR 3.43* 3.09* 3.54* 3.37* 3.50* 3.62*       Recent Labs   Lab Test 09/15/20  1347 10/08/20  1300 11/17/20  0906 12/03/20  1647 01/07/21  0825 08/09/21  1124 03/06/23  1550 03/09/23  1550 03/13/23  1608 03/19/23  2155 03/21/23  1356 03/22/23  0453   WBC 8.6 9.4 7.2 9.3 11.2*   < > 4.4 3.7* 3.2* 3.3* 2.5* 2.5*   ANEU 3.6 4.5 3.2 4.3 6.4  --   --   --   --   --  2.1  --    ALYM 3.0 2.6 2.2 3.0 2.7  --   --   --   --   --  0.1*  --    TY 0.6 0.5 0.5 0.5 0.7  --   --   --   --   --  0.4   --    AEOS 1.3* 1.7* 1.2* 1.5* 1.1*  --   --   --   --   --  0.0  --    HGB 10.3* 11.7* 12.7* 13.0* 11.7*   < > 9.3* 9.6* 9.9* 10.0* 9.1* 8.4*   HCT 30.6* 34.7* 38.1* 38.4* 35.0*   < > 28.2* 28.8* 29.9* 29.2* 25.9* 24.7*   MCV 92 96 98 96 94   < > 94 93 94 90 88 89    238 218 243 346   < > 249 240 230 153 127* 115*    < > = values in this interval not displayed.       Recent Labs   Lab Test 01/15/23  0540 01/16/23  2203 01/17/23  0553 01/18/23  0606 01/20/23  0746 01/21/23  0715 01/23/23  0748 03/06/23  1550 03/19/23  2155   BILITOTAL 0.2 <0.2 <0.2 <0.2  --   --   --  0.3 0.4   ALKPHOS 59 44 45 44  --   --   --  58 41   ALBUMIN 4.2 2.7* 2.8* 2.7*  --   --   --  4.0 4.1   AST 52* 121* 94* 50 70* 48 24 18 34   ALT 81* 66* 68* 59* 146* 134* 96* 19 40       Recent Labs   Lab Test 01/15/23  1711 01/16/23  0959 03/20/23  0647 03/20/23  0652   LACT 1.5 1.2 1.0 0.9       Recent Labs   Lab Test 03/06/23  1550 03/09/23  1550 03/22/23  0453   TACROL 10.5   < > 25.1*   MPACID 5.53*  --   --    MPAG 51.3  --   --     < > = values in this interval not displayed.       Culture Results:  7-Day Micro Results       Procedure Component Value Units Date/Time    Routine parasitology exam     Order Status: Sent Lab Status: No result     Specimen: Stool     Enteric Bacteria and Virus Panel by MACARIO Stool [[id number]]  (Normal) Collected: 03/20/23 0853    Order Status: Completed Lab Status: Final result Updated: 03/21/23 0015    Specimen: Stool from Per Rectum      Campylobacter group Not Detected     Salmonella species Not Detected     Shigella species Not Detected     Vibrio group Not Detected     Rotavirus Not Detected     Shiga toxin 1 gene Not Detected     Shiga toxin 2 gene Not Detected     Norovirus I and II Not Detected     Yersinia enterocolitica Not Detected    Narrative:      Testing performed by multiplexed, qualitative PCR using the [employer] Enteric Pathogens Nucleic Acid Test. Results should not be used as the  sole basis for diagnosis, treatment or other patient management decisions. Positive results do not rule out co-infection with other organisms that are not detected by this test and may not be the sole or definitive cause of patient illness. Negative results in the setting of clinical illness compatible with gastroenteritis may be due to infection by pathogens that are not detected by this test or non-infectious causes such as ulcerative colitis, irritable bowel syndrome or Crohn's disease. Note: Shiga toxin producing E. coli (STEC) typically harbor one or both genes that encode for Shiga toxins 1 and 2.    C. difficile Toxin B PCR with reflex to C. difficile Antigen and Toxins A/B EIA [[id number]]  (Normal) Collected: 03/20/23 0853    Order Status: Completed Lab Status: Final result Updated: 03/20/23 1356    Specimen: Stool from Per Rectum      C Difficile Toxin B by PCR Negative     Comment: A negative result does not exclude actual disease due to C. difficile and may be due to improper collection, handling and storage of the specimen or the number of organisms in the specimen is below the detection limit of the assay.       Narrative:      The dondeEstaâ„¢ Xpert C. difficile Assay, performed on the [employer]  Instrument Systems, is a qualitative in vitro diagnostic test for rapid detection of toxin B gene sequences from unformed (liquid or soft) stool specimens collected from patients suspected of having Clostridioides difficile infection (CDI). The test utilizes automated real-time polymerase chain reaction (PCR) to detect toxin gene sequences associated with toxin producing C. difficile. The Xpert C. difficile Assay is intended as an aid in the diagnosis of CDI.    CMV Quantitative, PCR [[id number]]  (Normal) Collected: 03/20/23 0731    Order Status: Completed Lab Status: Final result Updated: 03/21/23 0859    Specimen: Blood from Arm, Left      CMV DNA IU/mL Not Detected IU/mL     Narrative:       Mutations within the highly conserved regions of the viral genome covered by the KIKI AmpliPrep/KIKI TaqMan test primers and/or probes have been identified and may result in under-quantitation of or failure to detect the virus. Supplemental testing methods should be used for testing when this is suspected. The KIKI AmpliPrep/KIKI TaqMan CMV Test is a FDA-approved, in vitro, nucleic acid amplification test for the quantitation of cytomegalovirus DNA in human plasma (EDTA plasma) using the KIKI AmpliPrep instrument for automated viral nucleic acid extraction and the KIKI TaqMan for automated real-time amplification and detection of the viral nucleic acid target. Titer results are reported in International Units/mL (IU/mL) using 1st WHO International standard for Human Cytomegalovirus for Nucleic Acid Amplification based assays. The conversion factor between CMV DNA copies/mL (as defined by the Roche KIKI TaqMan CMV test) and International Units is the CMV DNA concentration in IU/mL x 1.1 copies/IU = CMV DNA in copies/mL. This assay has received FDA approval for the testing of human plasma only. The [facility] at [facility] has validated the performance characteristics of the Roche CMV assay for plasma, bronchial alveolar lavage/wash and urine.        Blood Culture Peripheral Blood [[id number]]  (Normal) Collected: 03/20/23 0647    Order Status: Completed Lab Status: Preliminary result Updated: 03/21/23 1247    Specimen: Peripheral Blood      Culture No growth after 1 day    Blood Culture Line, venous [[id number]]  (Normal) Collected: 03/20/23 0647    Order Status: Completed Lab Status: Preliminary result Updated: 03/21/23 1247    Specimen: Blood from Line, venous      Culture No growth after 1 day    Urine Culture [[id number]]  (Abnormal)  (Susceptibility) Collected: 03/19/23 2011    Order Status: Completed Lab Status: Final result Updated: 03/21/23 2247    Specimen:  Urine, Clean Catch      Culture 50,000-100,000 CFU/mL Enterococcus faecalis      <10,000 CFU/mL Urogenital cooper    Susceptibility       Enterococcus faecalis (1)       Antibiotic Interpretation Sensitivity   Method Status    Penicillin Susceptible 2 ug/mL ALEXANDER Final    Ampicillin Susceptible <=2 ug/mL ALEXANDER Final    Gentamicin Synergy  [*]  Susceptible Susceptible ug/mL ALEXANDER Final     No high level gentamicin resistance found - therefore combination therapy with an aminoglycoside may be indicated for serious enterococcal infections such as bacteremia and endocarditis.       Streptomycin Synergy  [*]  Susceptible Susceptible ug/mL ALEXANDER Final    Ciprofloxacin  [*]  Susceptible <=0.5 ug/mL ALEXANDER Final    Levofloxacin  [*]  Susceptible 0.5 ug/mL ALEXANDER Final    Quinupristin/Dalfopristin  [*]  Intermediate 2 ug/mL ALEXANDER Final    Linezolid  [*]  Susceptible 2 ug/mL ALEXANDER Final    Vancomycin Susceptible 1 ug/mL ALEXANDER Final    Tigecycline  [*]  Susceptible <=0.12 ug/mL ALEXANDER Final    Nitrofurantoin Susceptible <=16 ug/mL ALEXANDER Final               [*]  Suppressed Antibiotic                           Recent Labs   Lab Test 01/15/23  0637 01/19/23  1055 01/23/23  0752 02/24/23  1445 03/19/23 2011   URINEPH 7.0 7.0 6.5 6.5 5.5   NITRITE Negative Negative Negative Negative Negative   LEUKEST Negative Moderate* Moderate* Negative Moderate*   WBCU 1 27* 13* 1 28*                   Recent Labs   Lab Test 03/20/23  0853   CDBPCT Negative       Imaging: Abd/pelvis CT no contrast - Stone Protocol    Result Date: 3/19/2023  EXAM: CT ABDOMEN PELVIS W/O CONTRAST LOCATION: [facility] DATE/TIME: 3/19/2023 10:34 PM INDICATION: recent renal transplant,pain and crystals in urine COMPARISON: None. TECHNIQUE: CT scan of the abdomen and pelvis was performed without IV contrast. Multiplanar reformats were obtained. Dose reduction techniques were used. CONTRAST: None. FINDINGS: LOWER CHEST: Normal. HEPATOBILIARY: Normal. PANCREAS: Normal.  SPLEEN: Normal. ADRENAL GLANDS: Normal. KIDNEYS/BLADDER: The right kidney is not seen. The left kidney is moderately atrophic with no hydronephrosis. There is a transplant kidney seen in the right hemipelvis. There is some mild stranding of the fat seen within the renal pelvis and adjacent to the renal parenchyma which is nonspecific, but could represent changes of inflammation/pyelonephritis. Given patient's history of recent transplantation, this may also be most surgical in nature. Do not appreciate any evidence for dilatation of the right  transplant kidney urinary tract. The bladder is normal in appearance. BOWEL: Normal LYMPH NODES: Normal. VASCULATURE: Unremarkable. PELVIC ORGANS: Normal. MUSCULOSKELETAL: Normal.     IMPRESSION: 1.  The right kidney is not seen. The left kidney is moderately atrophic with no hydronephrosis. There is a transplant kidney seen in the right hemipelvis. There is some mild stranding of the fat seen within the renal pelvis and adjacent to the renal parenchyma which is nonspecific, but could represent changes of inflammation/pyelonephritis. Given patient's history of recent transplantation, this may also be most surgical in nature. Do not appreciate any evidence for dilatation of the right transplant kidney urinary tract.     US Renal Artery with Kidney Right    Result Date: 3/20/2023  EXAM: 1. RENAL ULTRASOUND 2. RENAL DUPLEX LOCATION: [facility] DATE: 3/20/2023 INDICATION: Transplant kidney, right hemiplevis. Renal insufficiency in setting of diarrhea. COMPARISON: CT abdomen pelvis without IV contrast 03/19/2023. TECHNIQUE: Duplex imaging is performed utilizing gray-scale, two-dimensional images, and color-flow imaging. Doppler waveform analysis and spectral Doppler imaging is also performed. FINDINGS: RIGHT KIDNEY: 14.3 cm [id number]. Normal transplanted right kidney without stones, masses or hydronephrosis. Normal parenchymal thickness and echogenicity.  No perinephric fluid collection. BLADDER: Normal. ARCUATE ARTERY RESISTIVE INDEX Upper 0.61 Mid 0.58 Lower 0.52 RENAL ARTERY Hilum 139 cm/s (RI 0.65) At anastomosis 258 cm/s (RI 0.66) RENAL VEIN Hilum: Patent At anastomosis: Patent ILIAC ARTERY Above anastomosis 134 cm/s (RI 0.92) Below anastomosis 146 cm/s (RI 0.85) ILIAC VEIN About anastomosis: Patent Below anastomosis: Patent     IMPRESSION: 1.  Right lower quadrant renal transplant without stones, obstruction or adjacent fluid reduction. 2.  Patent renal artery and vein as described above.

## 2023-03-22 NOTE — PLAN OF CARE
BP 98/57 (BP Location: Left arm)   Pulse 84   Temp 98.7  F (37.1  C) (Oral)   Resp 16   SpO2 100%     Shift: 3625-2637  VS: Stable on RA, afebrile  Neuro: AOx4  BG: N/A  Labs: K+ low with replacement ordered. Patient c/o pain at PIV site when infusing K+. Using a NS drip this afternoon with the K+ to see if this helps with pain.  Respiratory: WDL  Pain/Nausea/PRN: Denies pain this shift.  Diet: Regular, fair appetite.  LDA: L PIV - running Sodium Bicarbonate MIVF at 125/hr. R PIV running Potassium Chloride and NS.  GI/: Patient has diarrhea, getting Lomotil 4 times daily. Voiding with urinal for strict I&O's.  Skin: WDL  Mobility: UAL  Plan: Continue treating with antibiotics per orders. Recheck K+ post infusion.     Handoff given to following RN.

## 2023-03-22 NOTE — PROGRESS NOTES
[facility]   Transplant Nephrology Progress Note  Date of Admission:  3/21/2023  Today's Date: 03/22/2023    Recommendations:  - On phoslo for phsophrous repletion   -Will recommend checking potassium and vbg  in evening   -Will recommend giving IV potassium of 40 meq to replete  -Recommend GI consult for colonoscopy with biopsies to rule out CMV (discordant)  -hold Tacrolimus and obtain repeat level in AM  -change abx to IV ampicillin    Assessment & Plan   # DDKT: JACK  2/2 hypovolemia and pyelonephritis/ tacrolimus toxicity: Patient has diarrhea which increase tacrolimus. Will hold dose and get tacrolimus level tomorrow. Creatinine uptrending   - Baseline Creatinine: ~ 1.4-1.7   - Proteinuria: Normal (<0.2 grams)   - Date DSA Last Checked: Feb/2023      Latest DSA: No cPRA 49%   - BK Viremia: No   - Kidney Tx Biopsy: No    -U/S 3/20 negative      # Immunosuppression: Tacrolimus immediate release (goal 8-10) and Mycophenolate mofetil (dose 750 mg every 12 hours)   - Changes: Yes - will hold tacrolimus as the level are elevated likely 2/2 diarrhea    # Infection Prophylaxis:   - PJP: Sulfa/TMP (Bactrim) decrease to MWF  - CMV: Valganciclovir (Valcyte) Patient is CMV IgG Ab discordant (D+/R-) and will continue on Valcyte x 6 months, then check CMV PCR monthly until 12 months post transplant.Valacyte has  Renally dose to 450 mg every other day due to jack    # Blood Pressure: Controlled, but low at times;  Goal BP: > 100, but < 130 systolic   - Volume status: Hypovolemic     - Changes: Yes - continue with the D5 bicarb 150 meq      # Anemia in Chronic Renal Disease: Hgb: Trend down      SARA: No   - Iron studies: Replete. Patient has UTI so no point of checking right now. Hb drop could be dilutional from fluid.    # Mineral Bone Disorder:   - Secondary renal hyperparathyroidism; PTH level: Minimally elevated ( pg/ml)        On treatment: None  - Vitamin D; level: Low         On supplement: Yes  - Calcium; level: Low        On supplement: No  - Phosphorus; level: Low        On supplement: Yes    # Electrolytes:   - Potassium; level: Low        On supplement: No recommend giving IV potassium 40 meq and rechecking  - Magnesium; level: Normal        On supplement: No  - Bicarbonate; level: Low        On supplement: No on IV sodium bicarb gtt  - Sodium; level: Low    #  Hyponatremia:              -SNa decreasing possibly 2/2 decreased effective circulating volume due to infection, JACK with decreased ability to excrete free water              -isotonic bicarb as above        #pancytopenia:               -Unclear etiology. CMV PCR negative. Possibly 2/2 pyelonephritis              -Obtain MPA level              -Consider parvovirus         # Diarrhea unclear etiology              -C diff,  enteric panel and cmv negative              -Recommend GI consult to consider colonoscopy with biopsies to rule out CMV     # E.faecalis pyelonephritis:              -Agree with discontinuing Zosyn and Vanc and changing to Ampicillin              -Recommend total 14d treatment     # H/o Cardiomyopathy: Normal LVEF at 55-60% with last cardiac echo 9/2022.     # Urethral Stricture:               -Patient is s/p buccal urethroplasty and diverticulum excision 12/2020. Follows closely with Urology.     # H/o Polysubstance Abuse: Patient with h/o methamphetamine and alcohol abuse.  Now sober.    # Transplant History:  Etiology of Kidney Failure: Etiology of Kidney Failure: Solitary congenital kidney and h/o urethral calculus and urologic issues  Tx: DDKT  Transplant: 1/15/2023 (Kidney)  Significant changes in immunosuppression: None  Significant transplant-related complications: None    Recommendations were communicated to the primary team verbally.    Seen and discussed with Dr. Zaida Antonio MD   Pager: [phone]    Physician Attestation   I, Franko Cerda MD, personally examined and evaluated  this patient.  I discussed the patient with the resident/fellow and care team, and agree with the assessment and plan of care as documented in the note on 23 .      I personally reviewed vital signs, medications, labs and imaging.    Franko Cerda MD  Date of Service (when I saw the patient): 23          Interval History     Mr. Valencia creatinine is 3.62 ( 0453); Trend up.  Does not complain of headache  Other significant labs/tests/vitals: had a fever last night  No events overnight.  No chest pain or shortness of breath.  No leg swelling.  positive nausea and vomiting.  Bowel movements are present.  Yes  fever, sweats or chills.      Review of Systems   4 point ROS was obtained and negative except as noted in the Interval History.    MEDICATIONS:    ampicillin  2 g Intravenous Q12H     diphenoxylate-atropine  1 tablet Oral 4x Daily     mycophenolic acid  540 mg Oral BID IS     pantoprazole  40 mg Oral QAM AC     phosphorus tablet 250 mg  250 mg Oral BID     potassium chloride  10 mEq Intravenous Q1H     [Held by provider] sodium bicarbonate  650 mg Oral BID     sodium chloride (PF)  3 mL Intracatheter Q8H     sulfamethoxazole-trimethoprim  1 tablet Oral Once per day on      [Held by provider] tacrolimus  5 mg Oral BID IS     [START ON 3/23/2023] valGANciclovir  450 mg Oral Every Other Day       sodium bicabonate in 5% dextrose for infusion 125 mL/hr at 23 0652       Physical Exam   Temp  Av.6  F (38.1  C)  Min: 98.7  F (37.1  C)  Max: 104.6  F (40.3  C)      Pulse  Av.8  Min: 64  Max: 118 Resp  Av.6  Min: 16  Max: 18  SpO2  Av.6 %  Min: 91 %  Max: 100 %     BP 98/57 (BP Location: Left arm)   Pulse 84   Temp 98.7  F (37.1  C) (Oral)   Resp 16   SpO2 100%    Date 23 0700 - 23 0659   Shift 6695-3791 3654-3893 8147-1288 24 Hour Total   INTAKE   I.V. 200   200   Shift Total 200   200   OUTPUT   Urine 275   275   Shift Total 275   275   Weight (kg)           Admit       GENERAL APPEARANCE: alert and no distress  HENT: mouth without ulcers or lesions  LYMPHATICS: no cervical or supraclavicular nodes  RESP: lungs clear to auscultation - no rales, rhonchi or wheezes  CV: regular rhythm, normal rate, no rub, no murmur  EDEMA: no LE edema bilaterally  ABDOMEN: soft, nondistended, nontender, bowel sounds normal  MS: extremities normal - no gross deformities noted, no evidence of inflammation in joints, no muscle tenderness  SKIN: no rash    Data   All labs reviewed by me.  CMP  Recent Labs   Lab 03/22/23  1050 03/22/23  0453 03/22/23  0332 03/21/23 2031 03/21/23  1356 03/20/23 1956 03/19/23 2155   NA  --  131* 128* 129* 129*   < > 128*   POTASSIUM 3.1* 3.3* 3.5 2.9* 2.8*   < > 3.6   CHLORIDE  --  103 102 103 101   < > 95*   CO2  --  17* 15* 16* 14*   < > 20*   ANIONGAP  --  11 11 10 14   < > 13   GLC  --  141* 137* 136* 103*   < > 141*   BUN  --  43.8* 42.9* 41.4* 42.7*   < > 39.0*   CR  --  3.62* 3.50* 3.37* 3.54*   < > 3.43*   GFRESTIMATED  --  21* 22* 23* 22*   < > 23*   VISHNU  --  8.2* 7.9* 8.0* 8.1*   < > 8.8   MAG  --  2.0  --   --  1.9  --   --    PHOS  --  1.7*  --   --  2.7  --   --    PROTTOTAL  --   --   --   --   --   --  6.9   ALBUMIN  --   --   --   --   --   --  4.1   BILITOTAL  --   --   --   --   --   --  0.4   ALKPHOS  --   --   --   --   --   --  41   AST  --   --   --   --   --   --  34   ALT  --   --   --   --   --   --  40    < > = values in this interval not displayed.     CBC  Recent Labs   Lab 03/22/23  0453 03/21/23  1356 03/19/23  2155   HGB 8.4* 9.1* 10.0*   WBC 2.5* 2.5* 3.3*   RBC 2.77* 2.93* 3.23*   HCT 24.7* 25.9* 29.2*   MCV 89 88 90   MCH 30.3 31.1 31.0   MCHC 34.0 35.1 34.2   RDW 13.2 13.2 12.6   * 127* 153     INRNo lab results found in last 7 days.  ABG  Recent Labs   Lab 03/20/23  0652   PH 7.42      Urine Studies  Recent Labs   Lab Test 03/19/23 2011 02/24/23  1445 01/23/23  0752 01/19/23  1055   COLOR Yellow Straw  Light Yellow Straw   APPEARANCE Slightly Cloudy* Clear Clear Clear   URINEGLC Negative Negative Negative Negative   URINEBILI Negative Negative Negative Negative   URINEKETONE Negative Negative Negative Negative   SG 1.019 1.009 1.012 1.008   UBLD Small* Negative Large* Large*   URINEPH 5.5 6.5 6.5 7.0   PROTEIN 100* Negative 30* 30*   NITRITE Negative Negative Negative Negative   LEUKEST Moderate* Negative Moderate* Moderate*   RBCU 9* 3* 116* >182*   WBCU 28* 1 13* 27*     No lab results found.  PTH  Recent Labs   Lab Test 01/31/23  1049 01/20/23  0746 04/18/22  0851   PTHI 73* 101* 315*     Iron Studies  Recent Labs   Lab Test 01/31/23  1049 01/20/23  0746   IRON 70 153    244   IRONSAT 27 63*   DAWSON 430* 717*       IMAGING:  All imaging studies reviewed by me.

## 2023-03-22 NOTE — CONSULTS
Gastroenterology Consultation      Date of Admission:  3/21/2023  Reason for Admission:  Fever, JACK, Diarrhea  Date of Consult  3/22/2023   Requesting Physician:  Lee Soler MD           ASSESSMENT AND RECOMMENDATIONS:   Assessment:  Chip Fowler is a 37 year old male with history of ESRD secondary to congenital solitary kidney and obstruction s/p DCD kidney transplant on 1/15/23 (Cr 1.4-1.7), nephrolithiasis, s/p buccal urethroplasty and excision of urethral diverticulum 12/2020, HTN, and cardiomyopathy secondary to methamphetamines (last EF 55-60%). Presented to the Hospital with fever to 104.6F, leukopenia, JACK, and diarrhea. GI Luminal service consulted for severe acute diarrhea in patient with recent kidney transplant.     #Acute Diarrhea  -Patient with recent kidney transplant in 1/23 presents with about 4 days of fever and liquid diarrhea, he is reporting up to 10-15 non bloody BMs daily. He denies other associated GI related concerns.      -Given the clinical presentation and history in this patient with recent kidney transplant, differentials includes medication associated (mycophenolate/Tacrolimus/magnesium oxide), less likely to be infectious given negative infectious evaluation (Cdiff, enteric panel) so far though pending cryptosporidium/microsporidium/Giardia  -Patient did note some improvement with recently initiated Lomotil.     Given concern for medication associated diarrhea ( increased concern for MMF toxicity), will plan to proceed with colonoscopy and biopsies for evaluation of medication toxicity vs other etiologies.     Plan discussed with the patient including benefits of colonoscopy and colon prep. Patient is in agreement with the plan    Recommendations:  -Pending labs, IVF resuscitation  -Colonoscopy for tissue biopsies for concerns of diarrhea 2/2 to immunosuppressive medications (mycophenolate)  -Hold off antidiarrheal agents for now pending colonoscopy  -Optimization of  immunosuppressive medications may be required pending pathology 2/2 colon biopsies  -Would recommend repleting magnesium via IV, po mag oxide can worsen diarrhea   - Clear liquids, no red coloring today  - Colonoscopy prep  - Give 4L Go-Lytely today at 1700  - Give 2L Go-Lytely tomorrow at 0500, can stop this once stool is clear (can see a vernon through it)  - If not clear by 0700, please give an additional 2L Go-Lytely  - NPO at midnight except medications.      Plan relayed to the primary team and discussed with the patient and his nurse. Answered all questions and concerns.    Thank you for involving us in this patient's care. Please do not hesitate to contact the GI service with any questions or concerns.     Pt seen and care plan discussed with Dr. Cardenas, GI staff physician.    Overall time spent on the date of this encounter preparing to see the patient (including chart review of available notes, clinical status events, imaging and labs); obtaining and/or reviewing separately obtained history; ordering medications, tests or procedures; communicating with other health care professionals; and documenting the above clinical information in the electronic medical record was 45 minutes.      RADHA CAROLINA MD  2nd year resident,  GI Luminal Service,  [facility]  -------------------------------------------------------------------------------------------------------------------    I agree with Dr. Carolina's assessment and plan as documented.    Dom Garibay MD  GI Fellow     ---------------------------  ATTESTATION:  Physician Attestation   I saw this patient with the resident and fellow and agree with the resident/fellow's findings and plan of care as documented in the note.        45 MINUTES SPENT BY ME on the date of service doing chart review, history, exam, documentation & further activities per the note.    I have personally reviewed the following data over the past 24 hrs:    2.5  (L)  \   8.4 (L)   / 115 (L)     131 (L) 102 39.9 (H) /  126 (H)   3.8 18 (L) 3.06 (H) \         Ana Cardenas MD  Date of Service (when I saw the patient): 03/22/23             Reason for Consultation:   GI Luminal service consulted for severe acute diarrhea in patient with recent kidney transplant.              History of Present Illness:   Chip Fowler is a 37 year old male with history of ESRD secondary to congenital solitary kidney and obstruction s/p DCD kidney transplant on 1/15/23 (Cr 1.4-1.7), nephrolithiasis, s/p buccal urethroplasty and excision of urethral diverticulum 12/2020, HTN, and cardiomyopathy secondary to methamphetamines (last EF 55-60%). Presented to the Hospital with fever (104.6F), leukopenia, JACK, and diarrhea. GI Luminal service consulted for severe acute diarrhea in patient with recent kidney transplant.     Patient says he was feeling well until Friday, when he started experiencing chills and felt feverish in the morning. He started having loose watery stools in the evening with number of BMs being around 10-15, loose, non formed, yellowish-brown in color, no blood, not associated with abdominal pain, no tenesmus. He denies any nausea or episodes of vomiting. He denies any recent travel, sick contacts, acute changes in diet. He was started on magnesium oxide in February for his hypomagnesemia, he started taking two pills together recently. He further denies any recent antibiotics or changes in any other medications. He endorses having non formed stools since past few months, however the frequency was 2-3 everyday and denies any changes in weight or appetite.     Labs so far are negative for C diff, negative for enteric bacteria and viruses, CMV, Rota virus, EBV.  Mycophenolate levels were elevated at 6.01 (3/6)       Patient seen and examined at 1.20pm. History is obtained from chart review and the patient.              Past Medical History:   Reviewed and edited as  appropriate  Past Medical History:   Diagnosis Date     Bladder stones      Cardiomyopathy (H)      ESRD (end stage renal disease) on dialysis (H)      Hypertension      Kidney replaced by transplant 01/15/2023    DCD DDKT. Intermediate risk induction.     Migraines      Polysubstance abuse (H)      Solitary kidney, congenital      Urethral stone      Urethral stricture             Past Surgical History:   Reviewed and edited as appropriate   Past Surgical History:   Procedure Laterality Date     BENCH KIDNEY  1/15/2023    Procedure: Bench kidney;  Surgeon: Tez Emerson MD;  Location: [facility] OR     BIOPSY  2020    renal, Sabianist     COMBINED CYSTOSCOPY, LASER HOLMIUM LITHOTRIPSY URETER(S)       CYSTOSCOPY       CYSTOSCOPY FLEXIBLE, CYSTOSTOMY, INSERT TUBE SUPRAPUBIC, COMBINED N/A 2020    Procedure: CYSTOSCOPY, WITH SUPRAPUBIC CATHETER INSERTION;  Surgeon: Sam Mejia MD;  Location: [facility] OR     CYSTOSCOPY, OPEN EXCISION URETHRAL DIVERTICULUM, COMBINED N/A 2020    Procedure: EXCISION OF URETHRAL DIVERTICULUM X2;  Surgeon: Sam Mejia MD;  Location: [facility] OR     HERNIA REPAIR      infant     INSERT CATHETER PERITONEAL DIALYSIS       LASER HOLMIUM LITHOTRIPSY URETER(S), INSERT STENT, COMBINED N/A 2020    Procedure: CYSTOSCOPY, bladder and urethral stone extraction, removal of foreign body, urethral dilation, urethrotomy, laser on standby;  Surgeon: Sam Mejia MD;  Location: [facility] OR     REMOVE CATHETER PERITONEAL N/A 1/15/2023    Procedure: Remove catheter peritoneal;  Surgeon: Tez Emerson MD;  Location: [facility] OR     TRANSPLANT KIDNEY RECIPIENT  DONOR N/A 1/15/2023    Procedure: TRANSPLANT, KIDNEY, RECIPIENT,  DONOR WITH DONOR URETER STENTING;  Surgeon: Tez Emerson MD;  Location: [facility] OR     urethral dilation       URETHROPLASTY WITH BUCCAL GRAFT N/A 2020    Procedure: URETHROPLASTY, USING BUCCAL MUCOSA GRAFT;   Surgeon: Sam Mejia MD;  Location: [facility] OR                    Family History:   Patient's family history is reviewed today  Family History   Problem Relation Age of Onset     Alzheimer Disease Mother      Unknown/Adopted Father      No Known Problems Sister      No Known Problems Sister      Kidney Disease No family hx of              Allergies:   Reviewed and edited as appropriate   No Known Allergies         Medications:     Current Facility-Administered Medications   Medication     acetaminophen (TYLENOL) tablet 650 mg     ampicillin (OMNIPEN) 2 g vial to attach to  mL bag     diphenoxylate-atropine (LOMOTIL) 2.5-0.025 MG per tablet 1 tablet     lidocaine (LMX4) cream     lidocaine 1 % 1 mL     mycophenolic acid (GENERIC EQUIVALENT) EC tablet 540 mg     ondansetron (ZOFRAN ODT) ODT tab 4 mg    Or     ondansetron (ZOFRAN) injection 4 mg     pantoprazole (PROTONIX) EC tablet 40 mg     phosphorus tablet 250 mg (PHOSPHA 250 NEUTRAL) per tablet 250 mg     potassium chloride 10 mEq in 100 mL sterile water infusion     sodium bicarbonate 150 mEq in D5W 1,000 mL infusion     [Held by provider] sodium bicarbonate tablet 650 mg     sodium chloride (PF) 0.9% PF flush 3 mL     sodium chloride (PF) 0.9% PF flush 3 mL     sulfamethoxazole-trimethoprim (BACTRIM) 400-80 MG per tablet 1 tablet     [Held by provider] tacrolimus (GENERIC EQUIVALENT) capsule 5 mg     [START ON 3/23/2023] valGANciclovir (VALCYTE) tablet 450 mg               Review of Systems:     A complete review of systems was performed and is negative except as noted in the HPI          Physical Exam:   Temp: 98.7  F (37.1  C) Temp src: Oral BP: 98/57 Pulse: 84   Resp: 16 SpO2: 100 % O2 Device: None (Room air)    Wt:   Wt Readings from Last 2 Encounters:   03/19/23 68 kg (150 lb)   03/14/23 71.6 kg (157 lb 14.4 oz)        General: male in NAD.  Answers appropriately.    HEENT: Head is AT/NC. Sclera anicteric. No conjunctival injection.  Oropharynx is  clear, moist and w/o exudate or lesions. No thrush. Good dentition.  Lungs: Non-labored breathing on RA. Clear to auscultation bilaterally.  No wheezes, rhonchi or crackles.  Heart: Regular rate and rhythm.  No murmurs, gallops or rubs.  Normal S1 and S2.  Abdomen: Soft, non-tender, non-distended. +BS, resonant in all quadrants, post surgical well healed scars noted.   Extremities: WWP, no pedal edema.  MSK: no gross deformity  Skin: No jaundice or rash  Neurologic: Grossly non-focal.  CN 2-12 grossly intact.   Psych: mood appropriate to situation           Data:   Labs and imaging below were independently reviewed and interpreted    LAB WORK:    BMP  Recent Labs   Lab 03/22/23  1050 03/22/23  0453 03/22/23  0332 03/21/23 2031 03/21/23  1356   NA  --  131* 128* 129* 129*   POTASSIUM 3.1* 3.3* 3.5 2.9* 2.8*   CHLORIDE  --  103 102 103 101   VISHNU  --  8.2* 7.9* 8.0* 8.1*   CO2  --  17* 15* 16* 14*   BUN  --  43.8* 42.9* 41.4* 42.7*   CR  --  3.62* 3.50* 3.37* 3.54*   GLC  --  141* 137* 136* 103*     CBC  Recent Labs   Lab 03/22/23  0453 03/21/23  1356 03/19/23 2155   WBC 2.5* 2.5* 3.3*   RBC 2.77* 2.93* 3.23*   HGB 8.4* 9.1* 10.0*   HCT 24.7* 25.9* 29.2*   MCV 89 88 90   MCH 30.3 31.1 31.0   MCHC 34.0 35.1 34.2   RDW 13.2 13.2 12.6   * 127* 153     INRNo lab results found in last 7 days.  LFTs  Recent Labs   Lab 03/19/23 2155   ALKPHOS 41   AST 34   ALT 40   BILITOTAL 0.4   PROTTOTAL 6.9   ALBUMIN 4.1      PANC  Recent Labs   Lab 03/19/23 2155   LIPASE 20       IMAGING:    EXAM: CT ABDOMEN PELVIS W/O CONTRAST  LOCATION: [facility]  DATE/TIME: 3/19/2023 10:34 PM     INDICATION: recent renal transplant,pain and crystals in urine  COMPARISON: None.  TECHNIQUE: CT scan of the abdomen and pelvis was performed without IV contrast. Multiplanar reformats were obtained. Dose reduction techniques were used.  CONTRAST: None.     FINDINGS:   LOWER CHEST: Normal.     HEPATOBILIARY:  Normal.     PANCREAS: Normal.     SPLEEN: Normal.     ADRENAL GLANDS: Normal.     KIDNEYS/BLADDER: The right kidney is not seen. The left kidney is moderately atrophic with no hydronephrosis. There is a transplant kidney seen in the right hemipelvis. There is some mild stranding of the fat seen within the renal pelvis and adjacent to   the renal parenchyma which is nonspecific, but could represent changes of inflammation/pyelonephritis. Given patient's history of recent transplantation, this may also be most surgical in nature. Do not appreciate any evidence for dilatation of the right   transplant kidney urinary tract. The bladder is normal in appearance.     BOWEL: Normal     LYMPH NODES: Normal.     VASCULATURE: Unremarkable.     PELVIC ORGANS: Normal.     MUSCULOSKELETAL: Normal.                                                                      IMPRESSION:   1.  The right kidney is not seen. The left kidney is moderately atrophic with no hydronephrosis. There is a transplant kidney seen in the right hemipelvis. There is some mild stranding of the fat seen within the renal pelvis and adjacent to the renal   parenchyma which is nonspecific, but could represent changes of inflammation/pyelonephritis. Given patient's history of recent transplantation, this may also be most surgical in nature. Do not appreciate any evidence for dilatation of the right   transplant kidney urinary tract.            =======================================================================  EXAMINATION: US ABDOMEN LIMITED  1/23/2023 7:31 AM       CLINICAL HISTORY: Elevated LFTs     COMPARISON: 1/16/2023         FINDINGS:  The liver is normal in contour and echogenicity. Measures 14.6 cm in  craniocaudal span. No focal hepatic lesions. The portal vein is patent  with antegrade flow.     There is no intrahepatic or extrahepatic biliary ductal dilatation.  The common bile duct measures 4 mm. The gallbladder is normal, without  gallstones,  wall thickening, or pericholecystic fluid. Negative  sonographic Golden's sign.     The visualized portions of the pancreas are normal in echogenicity.     The visualized upper abdominal aorta and inferior vena cava are  normal.       The right kidney is absent.                                                                      IMPRESSION:      1. No acute sonographic findings in the right upper quadrant.     2. No focal hepatic lesions or biliary dilatation.     I have personally reviewed the examination and initial interpretation  and I agree with the findings.     EVA VANG MD

## 2023-03-23 LAB
ANION GAP SERPL CALCULATED.3IONS-SCNC: 10 MMOL/L (ref 7–15)
BUN SERPL-MCNC: 31.9 MG/DL (ref 6–20)
CA-I BLD-MCNC: 4.3 MG/DL (ref 4.4–5.2)
CALCIUM SERPL-MCNC: 7.8 MG/DL (ref 8.6–10)
CHLORIDE SERPL-SCNC: 101 MMOL/L (ref 98–107)
COLONOSCOPY: NORMAL
CREAT SERPL-MCNC: 2.62 MG/DL (ref 0.67–1.17)
CREAT UR-MCNC: 38.3 MG/DL
DEPRECATED HCO3 PLAS-SCNC: 24 MMOL/L (ref 22–29)
ERYTHROCYTE [DISTWIDTH] IN BLOOD BY AUTOMATED COUNT: 13.5 % (ref 10–15)
GFR SERPL CREATININE-BSD FRML MDRD: 31 ML/MIN/1.73M2
GLUCOSE SERPL-MCNC: 132 MG/DL (ref 70–99)
HCT VFR BLD AUTO: 22.7 % (ref 40–53)
HGB BLD-MCNC: 7.7 G/DL (ref 13.3–17.7)
MAGNESIUM SERPL-MCNC: 1.5 MG/DL (ref 1.7–2.3)
MCH RBC QN AUTO: 29.8 PG (ref 26.5–33)
MCHC RBC AUTO-ENTMCNC: 33.9 G/DL (ref 31.5–36.5)
MCV RBC AUTO: 88 FL (ref 78–100)
MYCOPHENOLATE SERPL LC/MS/MS-MCNC: <0.25 MG/L (ref 1–3.5)
MYCOPHENOLATE-G SERPL LC/MS/MS-MCNC: 28.9 MG/L (ref 30–95)
O+P STL MICRO: NEGATIVE
PHOSPHATE 24H UR-MCNC: 0.11 G/G CR
PHOSPHATE SERPL-MCNC: 0.8 MG/DL (ref 2.5–4.5)
PHOSPHATE SERPL-MCNC: 2.3 MG/DL (ref 2.5–4.5)
PHOSPHATE UR-MCNC: 4.4 MG/DL (ref 40–136)
PLATELET # BLD AUTO: 149 10E3/UL (ref 150–450)
POTASSIUM SERPL-SCNC: 3 MMOL/L (ref 3.4–5.3)
POTASSIUM SERPL-SCNC: 3.1 MMOL/L (ref 3.4–5.3)
POTASSIUM SERPL-SCNC: 3.9 MMOL/L (ref 3.4–5.3)
POTASSIUM SERPL-SCNC: 4 MMOL/L (ref 3.4–5.3)
POTASSIUM SERPL-SCNC: 6.3 MMOL/L (ref 3.4–5.3)
RBC # BLD AUTO: 2.58 10E6/UL (ref 4.4–5.9)
SODIUM SERPL-SCNC: 135 MMOL/L (ref 136–145)
TACROLIMUS BLD-MCNC: 23.5 UG/L (ref 5–15)
TME LAST DOSE: ABNORMAL H
WBC # BLD AUTO: 2.1 10E3/UL (ref 4–11)

## 2023-03-23 PROCEDURE — 45380 COLONOSCOPY AND BIOPSY: CPT | Performed by: INTERNAL MEDICINE

## 2023-03-23 PROCEDURE — 250N000013 HC RX MED GY IP 250 OP 250 PS 637: Performed by: NURSE PRACTITIONER

## 2023-03-23 PROCEDURE — 36415 COLL VENOUS BLD VENIPUNCTURE: CPT | Performed by: STUDENT IN AN ORGANIZED HEALTH CARE EDUCATION/TRAINING PROGRAM

## 2023-03-23 PROCEDURE — 36415 COLL VENOUS BLD VENIPUNCTURE: CPT | Performed by: NURSE PRACTITIONER

## 2023-03-23 PROCEDURE — 120N000011 HC R&B TRANSPLANT UMMC

## 2023-03-23 PROCEDURE — 99233 SBSQ HOSP IP/OBS HIGH 50: CPT | Mod: 24 | Performed by: INTERNAL MEDICINE

## 2023-03-23 PROCEDURE — 83735 ASSAY OF MAGNESIUM: CPT | Performed by: NURSE PRACTITIONER

## 2023-03-23 PROCEDURE — 0DBG8ZX EXCISION OF LEFT LARGE INTESTINE, VIA NATURAL OR ARTIFICIAL OPENING ENDOSCOPIC, DIAGNOSTIC: ICD-10-PCS | Performed by: INTERNAL MEDICINE

## 2023-03-23 PROCEDURE — 84132 ASSAY OF SERUM POTASSIUM: CPT | Performed by: NURSE PRACTITIONER

## 2023-03-23 PROCEDURE — 250N000011 HC RX IP 250 OP 636: Performed by: NURSE PRACTITIONER

## 2023-03-23 PROCEDURE — 99153 MOD SED SAME PHYS/QHP EA: CPT | Performed by: INTERNAL MEDICINE

## 2023-03-23 PROCEDURE — 88305 TISSUE EXAM BY PATHOLOGIST: CPT | Mod: TC | Performed by: INTERNAL MEDICINE

## 2023-03-23 PROCEDURE — 84132 ASSAY OF SERUM POTASSIUM: CPT | Performed by: STUDENT IN AN ORGANIZED HEALTH CARE EDUCATION/TRAINING PROGRAM

## 2023-03-23 PROCEDURE — G0500 MOD SEDAT ENDO SERVICE >5YRS: HCPCS | Performed by: INTERNAL MEDICINE

## 2023-03-23 PROCEDURE — 84100 ASSAY OF PHOSPHORUS: CPT | Performed by: NURSE PRACTITIONER

## 2023-03-23 PROCEDURE — 88305 TISSUE EXAM BY PATHOLOGIST: CPT | Mod: 26 | Performed by: STUDENT IN AN ORGANIZED HEALTH CARE EDUCATION/TRAINING PROGRAM

## 2023-03-23 PROCEDURE — 88342 IMHCHEM/IMCYTCHM 1ST ANTB: CPT | Mod: 26 | Performed by: STUDENT IN AN ORGANIZED HEALTH CARE EDUCATION/TRAINING PROGRAM

## 2023-03-23 PROCEDURE — 84105 ASSAY OF URINE PHOSPHORUS: CPT | Performed by: STUDENT IN AN ORGANIZED HEALTH CARE EDUCATION/TRAINING PROGRAM

## 2023-03-23 PROCEDURE — 250N000011 HC RX IP 250 OP 636: Performed by: INTERNAL MEDICINE

## 2023-03-23 PROCEDURE — 0DBF8ZX EXCISION OF RIGHT LARGE INTESTINE, VIA NATURAL OR ARTIFICIAL OPENING ENDOSCOPIC, DIAGNOSTIC: ICD-10-PCS | Performed by: INTERNAL MEDICINE

## 2023-03-23 PROCEDURE — 80180 DRUG SCRN QUAN MYCOPHENOLATE: CPT | Performed by: NURSE PRACTITIONER

## 2023-03-23 PROCEDURE — 258N000003 HC RX IP 258 OP 636: Performed by: PHYSICIAN ASSISTANT

## 2023-03-23 PROCEDURE — 250N000009 HC RX 250: Performed by: PHYSICIAN ASSISTANT

## 2023-03-23 PROCEDURE — 82330 ASSAY OF CALCIUM: CPT | Performed by: STUDENT IN AN ORGANIZED HEALTH CARE EDUCATION/TRAINING PROGRAM

## 2023-03-23 PROCEDURE — 85014 HEMATOCRIT: CPT | Performed by: NURSE PRACTITIONER

## 2023-03-23 PROCEDURE — 250N000013 HC RX MED GY IP 250 OP 250 PS 637: Performed by: PHYSICIAN ASSISTANT

## 2023-03-23 PROCEDURE — 93005 ELECTROCARDIOGRAM TRACING: CPT

## 2023-03-23 PROCEDURE — 250N000011 HC RX IP 250 OP 636: Performed by: STUDENT IN AN ORGANIZED HEALTH CARE EDUCATION/TRAINING PROGRAM

## 2023-03-23 PROCEDURE — 93010 ELECTROCARDIOGRAM REPORT: CPT | Performed by: INTERNAL MEDICINE

## 2023-03-23 PROCEDURE — 258N000003 HC RX IP 258 OP 636: Performed by: NURSE PRACTITIONER

## 2023-03-23 PROCEDURE — 80197 ASSAY OF TACROLIMUS: CPT | Performed by: NURSE PRACTITIONER

## 2023-03-23 PROCEDURE — 250N000011 HC RX IP 250 OP 636: Performed by: TRANSPLANT SURGERY

## 2023-03-23 PROCEDURE — 250N000009 HC RX 250: Performed by: NURSE PRACTITIONER

## 2023-03-23 PROCEDURE — 250N000013 HC RX MED GY IP 250 OP 250 PS 637: Performed by: STUDENT IN AN ORGANIZED HEALTH CARE EDUCATION/TRAINING PROGRAM

## 2023-03-23 PROCEDURE — 250N000012 HC RX MED GY IP 250 OP 636 PS 637: Performed by: NURSE PRACTITIONER

## 2023-03-23 RX ORDER — POTASSIUM CHLORIDE 750 MG/1
40 TABLET, EXTENDED RELEASE ORAL ONCE
Status: COMPLETED | OUTPATIENT
Start: 2023-03-23 | End: 2023-03-23

## 2023-03-23 RX ORDER — FENTANYL CITRATE 50 UG/ML
INJECTION, SOLUTION INTRAMUSCULAR; INTRAVENOUS PRN
Status: DISCONTINUED | OUTPATIENT
Start: 2023-03-23 | End: 2023-03-24

## 2023-03-23 RX ORDER — SODIUM CHLORIDE, SODIUM LACTATE, POTASSIUM CHLORIDE, CALCIUM CHLORIDE 600; 310; 30; 20 MG/100ML; MG/100ML; MG/100ML; MG/100ML
INJECTION, SOLUTION INTRAVENOUS CONTINUOUS
Status: DISCONTINUED | OUTPATIENT
Start: 2023-03-23 | End: 2023-03-24

## 2023-03-23 RX ORDER — AMPICILLIN 2 G/1
2 INJECTION, POWDER, FOR SOLUTION INTRAVENOUS EVERY 8 HOURS
Status: DISCONTINUED | OUTPATIENT
Start: 2023-03-23 | End: 2023-03-24

## 2023-03-23 RX ORDER — MAGNESIUM SULFATE HEPTAHYDRATE 40 MG/ML
2 INJECTION, SOLUTION INTRAVENOUS ONCE
Status: COMPLETED | OUTPATIENT
Start: 2023-03-23 | End: 2023-03-23

## 2023-03-23 RX ORDER — POTASSIUM CHLORIDE 7.45 MG/ML
10 INJECTION INTRAVENOUS
Status: COMPLETED | OUTPATIENT
Start: 2023-03-23 | End: 2023-03-23

## 2023-03-23 RX ADMIN — AMPICILLIN SODIUM 2 G: 2 INJECTION, POWDER, FOR SOLUTION INTRAVENOUS at 21:03

## 2023-03-23 RX ADMIN — SODIUM BICARBONATE: 84 INJECTION, SOLUTION INTRAVENOUS at 01:30

## 2023-03-23 RX ADMIN — AMPICILLIN SODIUM 2 G: 2 INJECTION, POWDER, FOR SOLUTION INTRAVENOUS at 13:18

## 2023-03-23 RX ADMIN — SODIUM CHLORIDE, POTASSIUM CHLORIDE, SODIUM LACTATE AND CALCIUM CHLORIDE: 600; 310; 30; 20 INJECTION, SOLUTION INTRAVENOUS at 08:32

## 2023-03-23 RX ADMIN — POTASSIUM CHLORIDE 10 MEQ: 7.46 INJECTION, SOLUTION INTRAVENOUS at 07:50

## 2023-03-23 RX ADMIN — POTASSIUM PHOSPHATE, MONOBASIC AND POTASSIUM PHOSPHATE, DIBASIC 15 MMOL: 224; 236 INJECTION, SOLUTION, CONCENTRATE INTRAVENOUS at 10:44

## 2023-03-23 RX ADMIN — POTASSIUM CHLORIDE 10 MEQ: 7.46 INJECTION, SOLUTION INTRAVENOUS at 07:07

## 2023-03-23 RX ADMIN — DIPHENOXYLATE HYDROCHLORIDE AND ATROPINE SULFATE 1 TABLET: 2.5; .025 TABLET ORAL at 16:49

## 2023-03-23 RX ADMIN — SODIUM CHLORIDE, POTASSIUM CHLORIDE, SODIUM LACTATE AND CALCIUM CHLORIDE: 600; 310; 30; 20 INJECTION, SOLUTION INTRAVENOUS at 18:14

## 2023-03-23 RX ADMIN — DIPHENOXYLATE HYDROCHLORIDE AND ATROPINE SULFATE 1 TABLET: 2.5; .025 TABLET ORAL at 21:03

## 2023-03-23 RX ADMIN — POTASSIUM CHLORIDE 40 MEQ: 750 TABLET, EXTENDED RELEASE ORAL at 04:35

## 2023-03-23 RX ADMIN — POTASSIUM CHLORIDE 10 MEQ: 7.46 INJECTION, SOLUTION INTRAVENOUS at 04:34

## 2023-03-23 RX ADMIN — MYCOPHENOLIC ACID 540 MG: 360 TABLET, DELAYED RELEASE ORAL at 07:53

## 2023-03-23 RX ADMIN — MAGNESIUM SULFATE IN WATER 2 G: 40 INJECTION, SOLUTION INTRAVENOUS at 10:47

## 2023-03-23 RX ADMIN — PANTOPRAZOLE SODIUM 40 MG: 40 TABLET, DELAYED RELEASE ORAL at 07:53

## 2023-03-23 RX ADMIN — AMPICILLIN SODIUM 2 G: 2 INJECTION, POWDER, FOR SOLUTION INTRAVENOUS at 04:33

## 2023-03-23 RX ADMIN — VALGANCICLOVIR 450 MG: 450 TABLET, FILM COATED ORAL at 07:53

## 2023-03-23 RX ADMIN — POTASSIUM CHLORIDE 10 MEQ: 7.46 INJECTION, SOLUTION INTRAVENOUS at 05:59

## 2023-03-23 RX ADMIN — DIPHENOXYLATE HYDROCHLORIDE AND ATROPINE SULFATE 1 TABLET: 2.5; .025 TABLET ORAL at 13:18

## 2023-03-23 RX ADMIN — MYCOPHENOLIC ACID 540 MG: 360 TABLET, DELAYED RELEASE ORAL at 17:27

## 2023-03-23 ASSESSMENT — ACTIVITIES OF DAILY LIVING (ADL)
ADLS_ACUITY_SCORE: 18

## 2023-03-23 NOTE — PROGRESS NOTES
Transplant Surgery  Inpatient Daily Progress Note  03/23/2023    Assessment & Plan: Chip Fowler is a 37 year old male with history of ESRD secondary to congenital solitary kidney and obstruction s/p DCD kidney transplant on 1/15/23 (Cr 1.4-1.7), nephrolithiasis, s/p buccal urethroplasty and excision of urethral diverticulum 12/2020, HTN, and cardiomyopathy secondary to methamphetamines (last EF 55-60%). Presented to [facility] with fever to 104.6F, leukopenia, JACK, and diarrhea.    Graft Function:  JACK secondary to pyelonephritis, high tacrolimus level: Cr 3.6->2.6, baseline 1.4-1.7. US at OSH normal. 3/19 CT noted some fat stranding around graft. 3/29 UC + enterococcus faecalis. 3/22 Tacrolimus level 25. Continue IVF.     Immunosuppression management:   MMF: 750mg BID, switched to Myfortic due to acute diarrhea.  Tacrolimus: Goal level 8-10. On HOLD d/t high level. Level daily.     Hematology:  Pancytopenia, acute: Secondary to infection and medications. 3/20 CMV & EBV negative. If diarrhea and pancytopenia persist, consider further work up for viral infection and/or GVHD.  Anemia of chronic disease: Hgb recheck.  Leukopenia, acute: WBC recheck today.  Thrombocytopenia: Plt recheck today.     Cardiac:   Hx cardiomyopathy: Last EF 55-60% in 9/2022.     Respiratory: No acute issues.      GI/Nutrition:  Diarrhea: Infectious work up as below. C-diff negative. GI consulted. 3/23 colonoscopy: Diverticulosis otherwise normal colonoscopy, s/p random colon biopsies to evaluate for MC, medication effect and CMV.     Endocrine: No acute issues.      Fluid/Electrolytes:   Hyponatremia: Na 131->135. Likely secondary to hypovolemia.  Hypovolemia: Secondary to diarrhea. Continue IVF.  Metabolic acidosis: Stop bicarb gtt and monitor.  Hypokalemia: Secondary to GI losses. Continue replacement.  Hypophosphatemia: Phos 0.8, replace IV.  Hypomagnesemia: Replace IV today.     :   Hx urethroplasty, nephrolithiasis: No  obstruction on US transplant kidney. Check PVR.     Infectious disease: Tmax 99.9F  Fever w/ diarrhea: Work up as below.  UTI, possible pyelonephritis of transplant kidney: Previous UTI in 1/2023 + enterococcus faecalis, pan sensitive. 3/19 Fat stranding around graft on CT. 3/19 UC + enterococcus faecalis 50-100K, pan sensitive. Zosyn started in [facility] ED. Switched to ampicillin on 3/22.     Work up:  3/19 CT A/P: fat stranding adjacent to tx kidney  3/19 UA + pyuria but also + squamous epi  3/19 SARS-CoV-2 negative  3/19 Influenza A/B negative  3/19 RSV negative  3/19 UCx + enterococcus faecalis 50-100K  3/20 US transplant kidney: patent vessels, no obstruction  3/20 CMV negative  3/20 Enteric pathogen panel negative  3/20 Rotavirus negative  3/20 C-diff negative  3/20 EBV negative.     Pending:  3/20 BCx pending, no growth to date  3/22 O&P pending     Prophylaxis: PJP (TMP/sulfa), valganciclovir (CMV D+/R-)    DK/Fellow/Resident Provider: Divina Ho NP [id number]    Faculty: Lee Soler MD   _________________________________________________________________    Interval History: History is obtained from the patient  Overnight events: Bowel prep overnight. Feeling OK this morning. No abdominal pain or rigors.    ROS:   A 10-point review of systems was negative except as noted above.    Meds:    ampicillin  2 g Intravenous Q8H     diphenoxylate-atropine  1 tablet Oral 4x Daily     magnesium sulfate  2 g Intravenous Once     mycophenolic acid  540 mg Oral BID IS     pantoprazole  40 mg Oral QAM AC     potassium phosphate  15 mmol Intravenous Once     [Held by provider] sodium bicarbonate  650 mg Oral BID     sodium chloride (PF)  3 mL Intracatheter Q8H     sulfamethoxazole-trimethoprim  1 tablet Oral Once per day on Mon Wed Fri     [Held by provider] tacrolimus  5 mg Oral BID IS     valGANciclovir  450 mg Oral Every Other Day       Physical Exam:   Current vitals:   BP 98/62   Pulse 70   Temp 99.1  F (37.3  C)   Resp  17   Wt 71.9 kg (158 lb 8 oz)   SpO2 99%   BMI 21.50 kg/m      Vital sign ranges:    Temp:  [98  F (36.7  C)-99.9  F (37.7  C)] 99.1  F (37.3  C)  Pulse:  [66-88] 70  Resp:  [5-31] 17  BP: ()/(61-83) 98/62  SpO2:  [95 %-100 %] 99 %  Patient Vitals for the past 24 hrs:   BP Temp Temp src Pulse Resp SpO2 Weight   03/23/23 1039 -- 99.1  F (37.3  C) -- 70 17 99 % --   03/23/23 1000 98/62 -- -- 68 16 98 % --   03/23/23 0955 (!) 86/64 -- -- 66 16 98 % --   03/23/23 0950 100/61 -- -- 69 (!) 9 98 % --   03/23/23 0945 103/65 -- -- 72 (!) 5 98 % --   03/23/23 0940 107/68 -- -- 85 (!) 8 98 % --   03/23/23 0935 101/70 -- -- 80 10 98 % --   03/23/23 0930 114/67 -- -- 84 (!) 6 95 % --   03/23/23 0925 120/75 -- -- 86 16 100 % --   03/23/23 0922 129/83 -- -- 80 (!) 31 100 % --   03/23/23 0908 128/81 -- -- 78 -- -- --   03/23/23 0857 123/80 -- -- 77 18 99 % --   03/23/23 0531 108/61 98.6  F (37  C) Oral 70 18 97 % 71.9 kg (158 lb 8 oz)   03/23/23 0158 98/72 98  F (36.7  C) Oral 72 18 100 % --   03/22/23 2144 108/64 99.1  F (37.3  C) Oral 72 18 97 % --   03/22/23 1750 114/66 99.9  F (37.7  C) Oral 71 16 99 % --   03/22/23 1452 100/61 98.6  F (37  C) Oral 88 16 100 % --     Constitutional: NAD  Eyes: No icterus  Respiratory: unlabored  Cardiovascular: perfused  GI: Soft, nontender  Lymph/Hematologic: No edema  Skin: Pink  Musculoskeletal: Strength 5/5  Neurologic: A&Ox4, no tremor  Neuropsychiatric: Calm    Data:   CMP  Recent Labs   Lab 03/23/23  0437 03/23/23  0332 03/22/23  1807 03/22/23  1550 03/22/23  1050 03/22/23  0453 03/20/23  1956 03/19/23  2155   *  --   --  131*  --  131*   < > 128*   POTASSIUM 3.0* 3.1*   < > 3.8   < > 3.3*   < > 3.6   CHLORIDE 101  --   --  102  --  103   < > 95*   CO2 24  --   --  18*  --  17*   < > 20*   *  --   --  126*  --  141*   < > 141*   BUN 31.9*  --   --  39.9*  --  43.8*   < > 39.0*   CR 2.62*  --   --  3.06*  --  3.62*   < > 3.43*   GFRESTIMATED 31*  --   --  26*  --   21*   < > 23*   VISHNU 7.8*  --   --  7.8*  --  8.2*   < > 8.8   MAG 1.5*  --   --   --   --  2.0   < >  --    PHOS 0.8*  --   --   --   --  1.7*   < >  --    LIPASE  --   --   --   --   --   --   --  20   ALBUMIN  --   --   --   --   --   --   --  4.1   BILITOTAL  --   --   --   --   --   --   --  0.4   ALKPHOS  --   --   --   --   --   --   --  41   AST  --   --   --   --   --   --   --  34   ALT  --   --   --   --   --   --   --  40    < > = values in this interval not displayed.     CBC  Recent Labs   Lab 03/22/23  0453 03/21/23  1356   HGB 8.4* 9.1*   WBC 2.5* 2.5*   * 127*     COAGSNo lab results found in last 7 days.    Invalid input(s): XA   Urinalysis  Recent Labs   Lab Test 03/19/23 2011 02/24/23  1445   COLOR Yellow Straw   APPEARANCE Slightly Cloudy* Clear   URINEGLC Negative Negative   URINEBILI Negative Negative   URINEKETONE Negative Negative   SG 1.019 1.009   UBLD Small* Negative   URINEPH 5.5 6.5   PROTEIN 100* Negative   NITRITE Negative Negative   LEUKEST Moderate* Negative   RBCU 9* 3*   WBCU 28* 1     Virology:  Hepatitis C Antibody   Date Value Ref Range Status   01/15/2023 Nonreactive Nonreactive Final   08/18/2020 Nonreactive NR^Nonreactive Final     Comment:     Assay performance characteristics have not been established for newborns,   infants, and children       BK Virus DNA copies/mL   Date Value Ref Range Status   02/20/2023 Not Detected Not Detected copies/mL Final

## 2023-03-23 NOTE — PROGRESS NOTES
[facility]   Transplant Nephrology Progress Note  Date of Admission:  3/21/2023  Today's Date: 03/23/2023    Recommendations:  - please give IV phosphrous   -Send BMP magnesium and phosphorus in evening    -F/U on colon biopsies to rule out CMV and MMF toxicity  -hold Tacrolimus and obtain repeat level in AM  -continue IV ampicillin for total of 14 days  -can place him on ringer lactate to meet fluid loss through stool and diarrhea as his blood pressure is running low    Assessment & Plan   # DDKT: JACK  2/2 hypovolemia and pyelonephritis/ tacrolimus toxicity: improving. Patient urine output picked up and he has urinated 2.5 liter. Patient has diarrhea which increase tacrolimus. Will hold dose and get tacrolimus level tomorrow.    - Baseline Creatinine: ~ 1.4-1.7   - Proteinuria: Normal (<0.2 grams)   - Date DSA Last Checked: Feb/2023      Latest DSA: No cPRA 49%   - BK Viremia: No   - Kidney Tx Biopsy: No    -U/S 3/20 negative    # Immunosuppression: Tacrolimus immediate release (goal 8-10) and Mycophenolate mofetil (dose 750 mg every 12 hours)   - Changes: Yes - will hold tacrolimus as the level are elevated likely 2/2 diarrhea    # Infection Prophylaxis:   - PJP: Sulfa/TMP (Bactrim)  MWF  - CMV: Valganciclovir (Valcyte) Patient is CMV IgG Ab discordant (D+/R-) and will continue on Valcyte x 6 months, then check CMV PCR monthly until 12 months post transplant.Valacyte has  Renally dose to 450 mg every other day due to jack    # Blood Pressure: Controlled, but low at times;  Goal BP: > 100, but < 130 systolic   - Volume status: Hypovolemic     - Changes: Yes - can place him on ringer lactate to meet fluid loss through stool and diarrhea as his blood pressure is running low       # Anemia in Chronic Renal Disease: Hgb: Trend down      SARA: No   - Iron studies: Replete. Patient has UTI so no point of checking right now. Hb drop could be dilutional from fluid.    # Mineral  Bone Disorder:   - Secondary renal hyperparathyroidism; PTH level: Minimally elevated ( pg/ml)        On treatment: None  - Vitamin D; level: Low        On supplement: Yes  - Calcium; level: Low        On supplement: No  - Phosphorus; level: Low        On supplement: Yes replace with IV phosphrous    # Electrolytes:   - Potassium; level: Low        On supplement: No, replete and recheck  - Magnesium; level: Normal        On supplement: No  - Bicarbonate; level: Normal        On supplement: No   - Sodium; level: Low    #  Hyponatremia:(resolving)              -SNa was low possibly 2/2 decreased effective circulating volume due to infection, JACK with decreased ability to excrete free water                   #pancytopenia:               -Unclear etiology. CMV PCR negative. Possibly 2/2 pyelonephritis              -Follow up MPA level              -Consider parvovirus         # Diarrhea unclear etiology              -C diff, enteric panel, cmv PCR blood, and O+P stool all negative              -Follow up colon biopsies on 3/23   -Ok to continue antidiarrheals      # E.faecalis pyelonephritis:              -Continue IV ampicillin for now              -Recommend total 14d treatment     # H/o Cardiomyopathy: Normal LVEF at 55-60% with last cardiac echo 9/2022.     # Urethral Stricture:               -Patient is s/p buccal urethroplasty and diverticulum excision 12/2020. Follows closely with Urology.     # H/o Polysubstance Abuse: Patient with h/o methamphetamine and alcohol abuse.  Now sober.    # Transplant History:  Etiology of Kidney Failure: Etiology of Kidney Failure: Solitary congenital kidney and h/o urethral calculus and urologic issues  Tx: DDKT  Transplant: 1/15/2023 (Kidney)  Significant changes in immunosuppression: None  Significant transplant-related complications: None    Recommendations were communicated to the primary team verbally.    Seen and discussed with Dr. Zaida Antonio MD   Pager:  [phone]    Physician Attestation   I, Franko Cerda MD, personally examined and evaluated this patient.  I discussed the patient with the resident/fellow and care team, and agree with the assessment and plan of care as documented in the note on 23 .      I personally reviewed vital signs, medications, labs, and imaging.  Franko Cerda MD  Date of Service (when I saw the patient): 23          Interval History     Mr. Tos creatinine is 3.62 ( 0453); Trend down.had a colonoscopy to rule out CMV vs mycophenolate toxicity   Does not complain of headache  Other significant labs/tests/vitals:no fever  No events overnight.  No chest pain or shortness of breath.  No leg swelling.  positive nausea and vomiting.  Bowel movements are present.  No  fever, sweats or chills.      Review of Systems   4 point ROS was obtained and negative except as noted in the Interval History.    MEDICATIONS:    ampicillin  2 g Intravenous Q8H     diphenoxylate-atropine  1 tablet Oral 4x Daily     mycophenolic acid  540 mg Oral BID IS     pantoprazole  40 mg Oral QAM AC     [Held by provider] sodium bicarbonate  650 mg Oral BID     sodium chloride (PF)  3 mL Intracatheter Q8H     sulfamethoxazole-trimethoprim  1 tablet Oral Once per day on      [Held by provider] tacrolimus  5 mg Oral BID IS     valGANciclovir  450 mg Oral Every Other Day       lactated ringers 125 mL/hr at 23 1200       Physical Exam   Temp  Av.6  F (38.1  C)  Min: 98.7  F (37.1  C)  Max: 104.6  F (40.3  C)      Pulse  Av.8  Min: 64  Max: 118 Resp  Av.6  Min: 16  Max: 18  SpO2  Av.6 %  Min: 91 %  Max: 100 %     BP 94/61 (BP Location: Right arm, Cuff Size: Adult Small)   Pulse 71   Temp 98  F (36.7  C) (Oral)   Resp 16   Wt 71.9 kg (158 lb 8 oz)   SpO2 100%   BMI 21.50 kg/m     Date 23 0700 - 23 0659   Shift 5222-0993 6189-8045 9010-1324 24 Hour Total   INTAKE   I.V. 200   200   Shift Total 200   200    OUTPUT   Urine 275   275   Shift Total 275   275   Weight (kg)          Admit       GENERAL APPEARANCE: alert and no distress  HENT: mouth without ulcers or lesions  LYMPHATICS: no cervical or supraclavicular nodes  RESP: lungs clear to auscultation - no rales, rhonchi or wheezes  CV: regular rhythm, normal rate, no rub, no murmur  EDEMA: no LE edema bilaterally  ABDOMEN: soft, nondistended, nontender, bowel sounds normal  MS: extremities normal - no gross deformities noted, no evidence of inflammation in joints, no muscle tenderness  SKIN: no rash    Data   All labs reviewed by me.  CMP  Recent Labs   Lab 03/23/23  1310 03/23/23  1111 03/23/23  0437 03/23/23  0332 03/22/23  1807 03/22/23  1550 03/22/23  1050 03/22/23  0453 03/22/23  0332 03/21/23  2031 03/21/23  1356 03/20/23  1956 03/19/23  2155   NA  --   --  135*  --   --  131*  --  131* 128*   < > 129*   < > 128*   POTASSIUM 3.9 6.3* 3.0* 3.1*   < > 3.8   < > 3.3* 3.5   < > 2.8*   < > 3.6   CHLORIDE  --   --  101  --   --  102  --  103 102   < > 101   < > 95*   CO2  --   --  24  --   --  18*  --  17* 15*   < > 14*   < > 20*   ANIONGAP  --   --  10  --   --  11  --  11 11   < > 14   < > 13   GLC  --   --  132*  --   --  126*  --  141* 137*   < > 103*   < > 141*   BUN  --   --  31.9*  --   --  39.9*  --  43.8* 42.9*   < > 42.7*   < > 39.0*   CR  --   --  2.62*  --   --  3.06*  --  3.62* 3.50*   < > 3.54*   < > 3.43*   GFRESTIMATED  --   --  31*  --   --  26*  --  21* 22*   < > 22*   < > 23*   VISHNU  --   --  7.8*  --   --  7.8*  --  8.2* 7.9*   < > 8.1*   < > 8.8   MAG  --   --  1.5*  --   --   --   --  2.0  --   --  1.9  --   --    PHOS  --   --  0.8*  --   --   --   --  1.7*  --   --  2.7  --   --    PROTTOTAL  --   --   --   --   --   --   --   --   --   --   --   --  6.9   ALBUMIN  --   --   --   --   --   --   --   --   --   --   --   --  4.1   BILITOTAL  --   --   --   --   --   --   --   --   --   --   --   --  0.4   ALKPHOS  --   --   --   --   --   --    --   --   --   --   --   --  41   AST  --   --   --   --   --   --   --   --   --   --   --   --  34   ALT  --   --   --   --   --   --   --   --   --   --   --   --  40    < > = values in this interval not displayed.     CBC  Recent Labs   Lab 03/23/23  1127 03/22/23  0453 03/21/23  1356 03/19/23  2155   HGB 7.7* 8.4* 9.1* 10.0*   WBC 2.1* 2.5* 2.5* 3.3*   RBC 2.58* 2.77* 2.93* 3.23*   HCT 22.7* 24.7* 25.9* 29.2*   MCV 88 89 88 90   MCH 29.8 30.3 31.1 31.0   MCHC 33.9 34.0 35.1 34.2   RDW 13.5 13.2 13.2 12.6   * 115* 127* 153     INRNo lab results found in last 7 days.  ABG  Recent Labs   Lab 03/22/23  1550 03/20/23  0652   PH  --  7.42   O2PER 20  --       Urine Studies  Recent Labs   Lab Test 03/19/23 2011 02/24/23  1445 01/23/23  0752 01/19/23  1055   COLOR Yellow Straw Light Yellow Straw   APPEARANCE Slightly Cloudy* Clear Clear Clear   URINEGLC Negative Negative Negative Negative   URINEBILI Negative Negative Negative Negative   URINEKETONE Negative Negative Negative Negative   SG 1.019 1.009 1.012 1.008   UBLD Small* Negative Large* Large*   URINEPH 5.5 6.5 6.5 7.0   PROTEIN 100* Negative 30* 30*   NITRITE Negative Negative Negative Negative   LEUKEST Moderate* Negative Moderate* Moderate*   RBCU 9* 3* 116* >182*   WBCU 28* 1 13* 27*     No lab results found.  PTH  Recent Labs   Lab Test 01/31/23  1049 01/20/23  0746 04/18/22  0851   PTHI 73* 101* 315*     Iron Studies  Recent Labs   Lab Test 01/31/23  1049 01/20/23  0746   IRON 70 153    244   IRONSAT 27 63*   DAWSON 430* 717*       IMAGING:  All imaging studies reviewed by me.

## 2023-03-23 NOTE — PROVIDER NOTIFICATION
Notified NP at 1257 PM regarding critical results read back.      Spoke with: Divina Ho NP, Kidney Transplanbt    Orders were obtained.    Comments: Received call from Chemistry Lab with critical high potassium level:  6.3  Pt has been hypokalemic for several shifts with replacements received per orders.  Reported result to NP.  Order placed for re-check K+ level and 12 lead ECG.  Will continue to monitor.

## 2023-03-23 NOTE — OR NURSING
Procedure: Colonoscopy with biopsies  Sedation: conscious sedation   Specimens:  X 2 jars, sent to lab.   O2: 2L/NC during procedure and RA psot  Tolerated procedure: well    Other:  Report called ot 7A, pt to return to room with transport when available    Caty Molina RN

## 2023-03-23 NOTE — PROGRESS NOTES
[facility] Brief Operative Note    Procedure:  Colonoscopy    Post-operative diagnosis:  Diverticulosis otherwise normal colonoscopy    Staff Physician:  Dr. Ana Cardenas   Fellow/Assistant(s):  Dom Garibay    Specimens:  Please see final procedure note for further details.   Findings:  Diverticulosis otherwise normal colonoscopy, s/p random colon biopsies to evaluate for MC, medication effect and CMV   Complications:  None.   Condition:  Stable   Recommendations  Diet:  Return to previous diet    Follow up pathology report   Discharge Planning:   Pending clinical improvement

## 2023-03-23 NOTE — DISCHARGE SUMMARY
[facility]    Discharge Summary  Solid Organ Transplant    Date of Admission:  3/21/2023  Date of Discharge:  3/24/2023  6:05 PM  Discharging Provider: Elaine Saenz PA-C    Discharge Diagnoses   Principal Problem:    JACK (acute kidney injury) (H)  Active Problems:    Pyelonephritis of transplanted kidney    Diarrhea    Hypovolemia      Follow-ups Needed After Discharge       Unresulted Labs Ordered in the Past 30 Days of this Admission     Date and Time Order Name Status Description    3/23/2023 10:11 AM Surgical Pathology Exam In process     3/20/2023  6:45 AM Blood Culture Line, venous Preliminary     3/20/2023  6:45 AM Blood Culture Peripheral Blood Preliminary       These results will be followed up by Veronica Edgar, Nephrology    Discharge Disposition   Discharged to home  Condition at discharge: Stable      Hospital Course   Chip Fowler is a 37 year old male with PMH significant for ESRD secondary to congenital solitary kidney and obstruction s/p DCD kidney transplant on 1/15/23 (Cr 1.4-1.7), nephrolithiasis, s/p buccal urethroplasty and excision of urethral diverticulum 12/2020, HTN, and 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  The following problems were addressed during his hospitalization:    Graft Function:  JACK secondary to pyelonephritis, high tacrolimus level: SCr 3.6 on presentation. (baseline SCr 1.4-1.7). US at OSH normal. 3/19 CT noted some fat stranding around graft. 3/29 UC + enterococcus faecalis. 3/22 Tacrolimus level supratherapeutic at 25. Following IV hydration, antibiotics, and lowering tac dose, Scr improved to 1.6 at discharge.        Immunosuppression management:   MMF: 750mg BID, switched to Myfortic due to acute diarrhea.  Tacrolimus: Goal level 8-10. Presented with  supratherapeutic level, likely in the setting of diarrhea. He will discharge on 2 mg BID, reduced dose. Check level on Monday. Monitor for diarrhea.      Hematology:  Pancytopenia, acute: Secondary to infection and medications. 3/20 CMV & EBV negative. Monitor CBC outpatient.   Anemia of chronic disease: Hgb downtrending likely due to dilution, 7.7 at discharge.  Leukopenia, acute: WBC stable ~2 -3  Thrombocytopenia: Plt >150     Cardiac:   Hx cardiomyopathy: Last EF 55-60% in 9/2022.     Respiratory: No acute issues.      GI/Nutrition:  Diarrhea: Infectious work up as below. C-diff negative. GI consulted. 3/23 colonoscopy: Diverticulosis otherwise normal colonoscopy, s/p random colon biopsies to evaluate for MC, medication effect and CMV. Biopsies pending at the time of discharge. Follow up in clinic next week. At the time of discharge patient hadn't a bowel movement in > 24 hours due to antidiarrheal. Discharged on antidiarrheal PRN.      Endocrine: No acute issues.      Fluid/Electrolytes:   Hyponatremia: Likely secondary to hypovolemia. Resolved  Hypovolemia: Secondary to diarrhea. Improved with IVF.  Metabolic acidosis: initially required bicarb gtt,. Discharged on sodium bicarbonate 650 mg BID.  Hypokalemia: Secondary to GI losses. Required replacement. K 4.2 at discharge.  Hypophosphatemia: Required IV  replacement. Discharged on PO supplement x 3 days. Repeat Phos on Monday  Hypomagnesemia: required supplement IV. Discharged on Mg Ox. Check Mg on Monday.      :   Hx urethroplasty, nephrolithiasis: No obstruction on US transplant kidney.      Infectious disease: Tmax 99.9F  Fever w/ diarrhea: Work up as below.  UTI, possible pyelonephritis of transplant kidney: Previous UTI in 1/2023 + enterococcus faecalis, pan sensitive. 3/19 Fat stranding around graft on CT. 3/19 UC + enterococcus faecalis 50-100K, pan sensitive. Zosyn started in [facility] ED. Switched to ampicillin on 3/22. At discharge he will continue  amoxicillin to complete 14 days of treatment.      Work up:   3/19 CT A/P: fat stranding adjacent to tx kidney  3/19 UA + pyuria but also + squamous epi  3/19 SARS-CoV-2 negative  3/19 Influenza A/B negative  3/19 RSV negative  3/19 UCx + enterococcus faecalis 50-100K  3/20 US transplant kidney: patent vessels, no obstruction  3/20 CMV negative  3/20 Enteric pathogen panel negative  3/20 Rotavirus negative  3/20 C-diff negative  3/20 EBV negative.     Pending:  3/20 BCx pending, no growth to date  3/22 O&P pending      Consultations This Hospital Stay   NEPHROLOGY KIDNEY/PANCREAS TRANSPLANT ADULT IP CONSULT  PHARMACY TO DOSE VANCO  NURSING TO CONSULT FOR VASCULAR ACCESS CARE IP CONSULT  NURSING TO CONSULT FOR VASCULAR ACCESS CARE IP CONSULT  GI LUMINAL ADULT IP CONSULT    Code Status   Full Code    Time Spent on this Encounter   I, Elaine Saenz PA-C, personally saw the patient today and spent greater than 30 minutes discharging this patient.       Elaine Saenz PA-C  [facility]  ______________________________________________________________________    /77 (BP Location: Left arm)   Pulse 72   Temp 98.8  F (37.1  C) (Oral)   Resp 16   Wt 71.9 kg (158 lb 8 oz)   SpO2 98%   BMI 21.50 kg/m      Constitutional: NAD  Respiratory: unlabored  Cardiovascular: perfused  GI: Soft, nontender  Lymph/Hematologic: No edema  Musculoskeletal: Strength 5/5  Neurologic: A&Ox4, no tremor      Primary Care Physician   Physician No Ref-Primary    Discharge Orders      Reason for your hospital stay    You were treated for acute kidney injury, kidney infection, dehydration, electrolyte abnormalities, and evaluation of diarrhea. .     Activity    Your activity upon discharge: Resume previous activities, increase as tolerated. Exercise daily     [facility]ult [facility] Follow-up and recommended labs and tests    -Follow up acute care nephrology clinic on Tuesday or Wednesday.   -Check  transplant labs: BMP, CBC, Mg, Phos, and Tacrolimus level on Monday, Wednesday and Thursday x 1 week then per coordinator. Please schedule labs at 1500 (patient takes tacrolimus at 0350 and 1500 due to work schedule).     Monitor and record    weight every day     When to contact your care team    Please contact transplant coordinator if you have a temperature > 100.4, have return of diarrhea, dehydration-increased thirst, decreased urine output, lightheaded, unable to transplant medications for ANY reason.    Your transplant coordinator is Veronica Edgar. Phone: [phone], Fax: [phone]     Diet    Follow this diet upon discharge: Regular diet. Recommend at least 1500 ml fluid intake daily, increase fluid intake if diarrhea returns.         Discharge Medications   Discharge Medication List as of 3/24/2023  3:37 PM      START taking these medications    Details   amoxicillin (AMOXIL) 500 MG capsule Take 1 capsule (500 mg) by mouth every 8 hours for 9 days, Disp-27 capsule, R-0, E-Prescribe      diphenoxylate-atropine (LOMOTIL) 2.5-0.025 MG tablet Take 1 tablet by mouth 4 times daily as needed for diarrhea, Disp-60 tablet, R-1, E-Prescribe      mycophenolic acid (GENERIC EQUIVALENT) 180 MG EC tablet Take 3 tablets (540 mg) by mouth 2 times daily, Disp-180 tablet, R-11, E-Prescribe      phosphorus tablet 250 mg (PHOSPHA 250 NEUTRAL) 250 MG per tablet Take 1 tablet (250 mg) by mouth 2 times daily for 3 days, Disp-6 tablet, R-0, E-Prescribe         CONTINUE these medications which have CHANGED    Details   magnesium oxide (MAG-OX) 400 MG tablet Take 2 tablets (800 mg) by mouth daily 6PM, No Print Out      sulfamethoxazole-trimethoprim (BACTRIM) 400-80 MG tablet Take 1 tablet by mouth daily, Disp-30 tablet, R-11, No Print Out      tacrolimus (GENERIC EQUIVALENT) 1 MG capsule Take 2 capsules (2 mg) by mouth 2 times daily, Disp-120 capsule, R-11, E-Prescribe         CONTINUE these medications which have NOT CHANGED     Details   pantoprazole (PROTONIX) 40 MG EC tablet Take 1 tablet (40 mg) by mouth every morning (before breakfast), Disp-30 tablet, R-2, E-Prescribe      sodium bicarbonate 650 MG tablet Take 1 tablet (650 mg) by mouth 2 times daily, Disp-60 tablet, R-1, E-Prescribe      valGANciclovir (VALCYTE) 450 MG tablet Take 2 tablets (900 mg) by mouth daily, Disp-60 tablet, R-1, E-Prescribe      VITAMIN D, CHOLECALCIFEROL, PO Take 2,000 Units by mouth daily, Historical         STOP taking these medications       mycophenolate (GENERIC EQUIVALENT) 250 MG capsule Comments:   Reason for Stopping:             Allergies   No Known Allergies

## 2023-03-23 NOTE — PLAN OF CARE
Cares- 2300- 0730  /61 (BP Location: Left arm)   Pulse 70   Temp 98.6  F (37  C) (Oral)   Resp 18   Wt 71.9 kg (158 lb 8 oz)   SpO2 97%   BMI 21.50 kg/m       VS- BP soft. On RA  BG- none   Labs- potassium 3.3 I replacement given, recheck 3.1 and  3.0, replacements ordered.  Phos 0.8 replacementsordered by provider  Mag 1.5 replacement ordered  Pain/Nausea/PRN'S- denies nausea, minimal pain feeling achey.  Diet- NPO but drinking Bowel prep  LDA- 2 PIV's  Gtt/IVF- Sodium Bicarb in D5W continuously running 125 ml/hr, Potassium Replacement 80 ml/hr   GI/- voiding adequately. On Golytely, stools are clear.  Skin- intact   Activity- up Ad charles   Plan- Colonoscopy with biopsies today.

## 2023-03-24 ENCOUNTER — TELEPHONE (OUTPATIENT)
Dept: TRANSPLANT | Facility: CLINIC | Age: 38
End: 2023-03-24
Payer: MEDICARE

## 2023-03-24 ENCOUNTER — HOME INFUSION (PRE-WILLOW HOME INFUSION) (OUTPATIENT)
Dept: PHARMACY | Facility: CLINIC | Age: 38
End: 2023-03-24
Payer: MEDICARE

## 2023-03-24 VITALS
OXYGEN SATURATION: 98 % | BODY MASS INDEX: 21.5 KG/M2 | TEMPERATURE: 98.8 F | DIASTOLIC BLOOD PRESSURE: 77 MMHG | WEIGHT: 158.5 LBS | SYSTOLIC BLOOD PRESSURE: 120 MMHG | HEART RATE: 72 BPM | RESPIRATION RATE: 16 BRPM

## 2023-03-24 LAB
ANION GAP SERPL CALCULATED.3IONS-SCNC: 9 MMOL/L (ref 7–15)
ATRIAL RATE - MUSE: 73 BPM
BUN SERPL-MCNC: 19 MG/DL (ref 6–20)
CALCIUM SERPL-MCNC: 7.7 MG/DL (ref 8.6–10)
CHLORIDE SERPL-SCNC: 108 MMOL/L (ref 98–107)
CREAT SERPL-MCNC: 1.55 MG/DL (ref 0.67–1.17)
DEPRECATED HCO3 PLAS-SCNC: 22 MMOL/L (ref 22–29)
DIASTOLIC BLOOD PRESSURE - MUSE: NORMAL MMHG
ERYTHROCYTE [DISTWIDTH] IN BLOOD BY AUTOMATED COUNT: 13.8 % (ref 10–15)
GFR SERPL CREATININE-BSD FRML MDRD: 58 ML/MIN/1.73M2
GLUCOSE SERPL-MCNC: 100 MG/DL (ref 70–99)
HCT VFR BLD AUTO: 25.9 % (ref 40–53)
HGB BLD-MCNC: 8.3 G/DL (ref 13.3–17.7)
INTERPRETATION ECG - MUSE: NORMAL
MAGNESIUM SERPL-MCNC: 2.4 MG/DL (ref 1.7–2.3)
MCH RBC QN AUTO: 30.4 PG (ref 26.5–33)
MCHC RBC AUTO-ENTMCNC: 32 G/DL (ref 31.5–36.5)
MCV RBC AUTO: 95 FL (ref 78–100)
P AXIS - MUSE: 60 DEGREES
PHOSPHATE SERPL-MCNC: 2 MG/DL (ref 2.5–4.5)
PLATELET # BLD AUTO: 183 10E3/UL (ref 150–450)
POTASSIUM SERPL-SCNC: 4.1 MMOL/L (ref 3.4–5.3)
POTASSIUM SERPL-SCNC: 4.2 MMOL/L (ref 3.4–5.3)
PR INTERVAL - MUSE: 142 MS
QRS DURATION - MUSE: 94 MS
QT - MUSE: 378 MS
QTC - MUSE: 416 MS
R AXIS - MUSE: 55 DEGREES
RBC # BLD AUTO: 2.73 10E6/UL (ref 4.4–5.9)
SODIUM SERPL-SCNC: 139 MMOL/L (ref 136–145)
SYSTOLIC BLOOD PRESSURE - MUSE: NORMAL MMHG
T AXIS - MUSE: 54 DEGREES
TACROLIMUS BLD-MCNC: 12.2 UG/L (ref 5–15)
TME LAST DOSE: NORMAL H
TME LAST DOSE: NORMAL H
VENTRICULAR RATE- MUSE: 73 BPM
WBC # BLD AUTO: 2.6 10E3/UL (ref 4–11)

## 2023-03-24 PROCEDURE — 250N000012 HC RX MED GY IP 250 OP 636 PS 637: Performed by: NURSE PRACTITIONER

## 2023-03-24 PROCEDURE — 84132 ASSAY OF SERUM POTASSIUM: CPT | Performed by: NURSE PRACTITIONER

## 2023-03-24 PROCEDURE — 250N000013 HC RX MED GY IP 250 OP 250 PS 637: Performed by: NURSE PRACTITIONER

## 2023-03-24 PROCEDURE — 250N000012 HC RX MED GY IP 250 OP 636 PS 637: Performed by: PHYSICIAN ASSISTANT

## 2023-03-24 PROCEDURE — 250N000013 HC RX MED GY IP 250 OP 250 PS 637: Performed by: PHYSICIAN ASSISTANT

## 2023-03-24 PROCEDURE — 84100 ASSAY OF PHOSPHORUS: CPT | Performed by: NURSE PRACTITIONER

## 2023-03-24 PROCEDURE — 99231 SBSQ HOSP IP/OBS SF/LOW 25: CPT | Mod: 24 | Performed by: INTERNAL MEDICINE

## 2023-03-24 PROCEDURE — 99233 SBSQ HOSP IP/OBS HIGH 50: CPT | Mod: 24

## 2023-03-24 PROCEDURE — 85027 COMPLETE CBC AUTOMATED: CPT | Performed by: NURSE PRACTITIONER

## 2023-03-24 PROCEDURE — 83735 ASSAY OF MAGNESIUM: CPT | Performed by: NURSE PRACTITIONER

## 2023-03-24 PROCEDURE — 258N000003 HC RX IP 258 OP 636: Performed by: NURSE PRACTITIONER

## 2023-03-24 PROCEDURE — 36415 COLL VENOUS BLD VENIPUNCTURE: CPT | Performed by: NURSE PRACTITIONER

## 2023-03-24 PROCEDURE — 80197 ASSAY OF TACROLIMUS: CPT | Performed by: NURSE PRACTITIONER

## 2023-03-24 PROCEDURE — 250N000011 HC RX IP 250 OP 636: Performed by: TRANSPLANT SURGERY

## 2023-03-24 PROCEDURE — 250N000013 HC RX MED GY IP 250 OP 250 PS 637: Performed by: TRANSPLANT SURGERY

## 2023-03-24 RX ORDER — SULFAMETHOXAZOLE AND TRIMETHOPRIM 400; 80 MG/1; MG/1
1 TABLET ORAL DAILY
Status: DISCONTINUED | OUTPATIENT
Start: 2023-03-25 | End: 2023-03-24 | Stop reason: HOSPADM

## 2023-03-24 RX ORDER — TACROLIMUS 1 MG/1
2 CAPSULE ORAL
Status: DISCONTINUED | OUTPATIENT
Start: 2023-03-24 | End: 2023-03-24 | Stop reason: HOSPADM

## 2023-03-24 RX ORDER — AMOXICILLIN 500 MG/1
500 CAPSULE ORAL EVERY 8 HOURS SCHEDULED
Status: DISCONTINUED | OUTPATIENT
Start: 2023-03-24 | End: 2023-03-24 | Stop reason: HOSPADM

## 2023-03-24 RX ORDER — MAGNESIUM OXIDE 400 MG/1
800 TABLET ORAL DAILY
Start: 2023-03-24 | End: 2023-03-31

## 2023-03-24 RX ORDER — DIPHENOXYLATE HCL/ATROPINE 2.5-.025MG
1 TABLET ORAL 4 TIMES DAILY PRN
Qty: 60 TABLET | Refills: 1 | Status: SHIPPED | OUTPATIENT
Start: 2023-03-24 | End: 2023-04-12

## 2023-03-24 RX ORDER — DIPHENOXYLATE HCL/ATROPINE 2.5-.025MG
1 TABLET ORAL 4 TIMES DAILY PRN
Status: DISCONTINUED | OUTPATIENT
Start: 2023-03-24 | End: 2023-03-24 | Stop reason: HOSPADM

## 2023-03-24 RX ORDER — MYCOPHENOLIC ACID 180 MG/1
540 TABLET, DELAYED RELEASE ORAL 2 TIMES DAILY
Qty: 180 TABLET | Refills: 11 | Status: SHIPPED | OUTPATIENT
Start: 2023-03-24 | End: 2023-03-28

## 2023-03-24 RX ORDER — SULFAMETHOXAZOLE AND TRIMETHOPRIM 400; 80 MG/1; MG/1
1 TABLET ORAL DAILY
Qty: 30 TABLET | Refills: 11
Start: 2023-03-25 | End: 2023-03-29

## 2023-03-24 RX ORDER — VALGANCICLOVIR 450 MG/1
900 TABLET, FILM COATED ORAL DAILY
Status: DISCONTINUED | OUTPATIENT
Start: 2023-03-24 | End: 2023-03-24 | Stop reason: HOSPADM

## 2023-03-24 RX ORDER — AMOXICILLIN 500 MG/1
500 CAPSULE ORAL EVERY 8 HOURS
Qty: 27 CAPSULE | Refills: 0 | Status: SHIPPED | OUTPATIENT
Start: 2023-03-24 | End: 2023-04-02

## 2023-03-24 RX ORDER — TACROLIMUS 1 MG/1
2 CAPSULE ORAL 2 TIMES DAILY
Qty: 120 CAPSULE | Refills: 11 | Status: SHIPPED | OUTPATIENT
Start: 2023-03-24 | End: 2023-03-28

## 2023-03-24 RX ADMIN — AMOXICILLIN 500 MG: 500 CAPSULE ORAL at 13:00

## 2023-03-24 RX ADMIN — MYCOPHENOLIC ACID 540 MG: 360 TABLET, DELAYED RELEASE ORAL at 08:20

## 2023-03-24 RX ADMIN — SULFAMETHOXAZOLE AND TRIMETHOPRIM 1 TABLET: 400; 80 TABLET ORAL at 08:20

## 2023-03-24 RX ADMIN — VALGANCICLOVIR 900 MG: 450 TABLET, FILM COATED ORAL at 12:59

## 2023-03-24 RX ADMIN — PANTOPRAZOLE SODIUM 40 MG: 40 TABLET, DELAYED RELEASE ORAL at 08:20

## 2023-03-24 RX ADMIN — AMPICILLIN SODIUM 2 G: 2 INJECTION, POWDER, FOR SOLUTION INTRAVENOUS at 05:41

## 2023-03-24 RX ADMIN — SODIUM PHOSPHATE, DIBASIC, ANHYDROUS, POTASSIUM PHOSPHATE, MONOBASIC, AND SODIUM PHOSPHATE, MONOBASIC, MONOHYDRATE 500 MG: 852; 155; 130 TABLET, COATED ORAL at 09:47

## 2023-03-24 RX ADMIN — TACROLIMUS 2 MG: 1 CAPSULE ORAL at 11:02

## 2023-03-24 RX ADMIN — DIPHENOXYLATE HYDROCHLORIDE AND ATROPINE SULFATE 1 TABLET: 2.5; .025 TABLET ORAL at 08:25

## 2023-03-24 RX ADMIN — SODIUM CHLORIDE, POTASSIUM CHLORIDE, SODIUM LACTATE AND CALCIUM CHLORIDE: 600; 310; 30; 20 INJECTION, SOLUTION INTRAVENOUS at 02:00

## 2023-03-24 ASSESSMENT — ACTIVITIES OF DAILY LIVING (ADL)
ADLS_ACUITY_SCORE: 18

## 2023-03-24 NOTE — PROGRESS NOTES
GASTROENTEROLOGY PROGRESS NOTE    Date of Admission: 3/21/2023  Reason for Admission: Diarrhea      ASSESSMENT:  Chip Fowler is a 37 year old male with history of ESRD secondary to congenital solitary kidney and obstruction s/p DCD kidney transplant on 1/15/23 (Cr 1.4-1.7), nephrolithiasis, s/p buccal urethroplasty and excision of urethral diverticulum 12/2020, HTN, and cardiomyopathy secondary to methamphetamines (last EF 55-60%). Presented to the Hospital with fever to 104.6F, leukopenia, JACK, and diarrhea. GI Luminal service consulted for severe acute diarrhea in patient with recent kidney transplant.      #Acute Diarrhea  #s/p Colonoscopy  -Patient with recent kidney transplant in 1/23 presents with about 4 days of fever and liquid diarrhea, he is reporting up to 10-15 non bloody BMs daily. He denies other associated GI related concerns.      -Given the clinical presentation and history in this patient with recent kidney transplant, differentials includes medication associated (mycophenolate/Tacrolimus/magnesium oxide), less likely to be infectious given negative infectious evaluation  -Patient did note some improvement with recently initiated Lomotil.   -Multiple biopsies from throughout the colon were obtained for path for drug induced colitis vs CMV   -Patient notes remarkable improvement in diarrhea, last BM yesterday     Recommendations:  -Pending pathology report from colonic biopsies  -Lomotil prn for diarrhea  -MMF changed to Myfortic per transplant surg team.  -Tacrolimus levels were noted to be high, being managed by Nephro and transplant surg.    The inpatient GI Luminal service will sign off at this time. Please call or repost with questions or if status changes. Thank you for allowing us to participate in the care of this patient.       Plan relayed to the primary team and discussed with the patient and his nurse. Answered all questions and concerns.     Thank you for involving us in this  patient's care. Please do not hesitate to contact the GI service with any questions or concerns.      Pt seen and care plan discussed with Dr. Cardenas, GI staff physician.  Overall time spent on the date of this encounter preparing to see the patient (including chart review of available notes, clinical status events, imaging and labs); obtaining interim history/subjective data; ordering and/or coordinating medications, tests or procedures; ordering medications, tests or procedures; communicating with other health care professionals; and documenting the above clinical information in the electronic medical record was 35 minutes.     RADHA BOYCE MD  GI Luminal Service,  2nd year resident,  [facility]  _______________________________________________________________    I agree with the plan as documented by Dr. Ge Garibay MD  GI fellow     Subjective: Nursing notes and 24hr events reviewed. Patient seen and examined at 8.30AM. Patient reports no further episodes of diarrhea. Denies any abdominal pain, nausea or vomiting. Fells better overall today.     ROS:   10 pt ROS negative unless noted in subjective.     Objective:  Blood pressure 120/77, pulse 72, temperature 98.8  F (37.1  C), temperature source Oral, resp. rate 16, weight 71.9 kg (158 lb 8 oz), SpO2 98 %.  General: male in NAD.  Answers appropriately.    HEENT: Head is AT/NC. Sclera anicteric. No conjunctival injection.  Oropharynx is clear, moist and w/o exudate or lesions. No thrush. Good dentition.  Lungs: Non-labored breathing on RA. Clear to auscultation bilaterally.  No wheezes, rhonchi or crackles.  Heart: Regular rate and rhythm.  No murmurs, gallops or rubs.  Normal S1 and S2.  Abdomen: Soft, non-tender, non-distended. +BS, resonant in all quadrants, post surgical well healed scars noted.   Extremities: WWP, no pedal edema.  MSK: no gross deformity  Skin: No jaundice or rash  Neurologic: Grossly non-focal.  CN  2-12 grossly intact.   Psych: mood appropriate to situation    Date 03/24/23 0700 - 03/25/23 0659   Shift 5814-4899 5356-9394 2388-8874 24 Hour Total   INTAKE   I.V. 1854.17   1854.17   Shift Total(mL/kg) 1854.17(25.79)   1854.17(25.79)   OUTPUT   Urine 275   275   Shift Total(mL/kg) 275(3.83)   275(3.83)   Weight (kg) 71.89 71.89 71.89 71.89         PROCEDURES:    Procedure:             Colonoscopy   Indications:           Non bloody Diarrhea   Providers:             AMARILIS CHOPRA MD, Kaci Page RN,                          Caty Molina RN, ROMEL CAMPBELL MD   Patient Profile:       37 year old male with history of ESRD secondary to                          congenital solitary kidney and obstruction s/p DCD                          kidney transplant on 1/15/23 presenting with a few                          days of fever and liquid non bloody diarrhea, now with                          negative infectious work up and incomplete response to                          supportive care. GI was consulted for colonoscopy with                          random biopsies to evaluate for medication effect, CMV                          and MC.   Referring MD:             Medicines:             Fentanyl 100 micrograms IV, Midazolam 4 mg IV   Complications:         No immediate complications.   _______________________________________________________________________________   Procedure:             Pre-Anesthesia Assessment:                          - Prior to the procedure, a History and Physical was                          performed, and patient medications and allergies were                          reviewed. The patient is competent. The risks and                          benefits of the procedure and the sedation options and                          risks were discussed with the patient. All questions                          were answered and informed consent was obtained.                          Patient  identification and proposed procedure were                          verified by the physician and the nurse in the                          procedure room. Mental Status Examination: alert and                          oriented. Airway Examination: normal oropharyngeal                          airway and neck mobility. Respiratory Examination:                          clear to auscultation. CV Examination: normal.                          Prophylactic Antibiotics: The patient does not require                          prophylactic antibiotics. Prior Anticoagulants: The                          patient has taken no anticoagulant or antiplatelet                          agents. ASA Grade Assessment: III - A patient with                          severe systemic disease. After reviewing the risks and                          benefits, the patient was deemed in satisfactory                          condition to undergo the procedure. The anesthesia                          plan was to use moderate sedation / analgesia                          (conscious sedation). Immediately prior to                          administration of medications, the patient was                          re-assessed for adequacy to receive sedatives. The                          heart rate, respiratory rate, oxygen saturations,                          blood pressure, adequacy of pulmonary ventilation, and                          response to care were monitored throughout the                          procedure. The physical status of the patient was                          re-assessed after the procedure.                          After obtaining informed consent, the colonoscope was                          passed under direct vision. Throughout the procedure,                           the patient's blood pressure, pulse, and oxygen                          saturations were monitored continuously. The                          Colonoscope was  introduced through the anus and                          advanced to the cecum, identified by appendiceal                          orifice and ileocecal valve. The colonoscopy was                          performed without difficulty. The patient tolerated                          the procedure well. The quality of the bowel                          preparation was evaluated using the BBPS ([place] Bowel                          Preparation Scale) with scores of: Right Colon = 3,                          Transverse Colon = 3 and Left Colon = 3 (entire mucosa                          seen well with no residual staining, small fragments                          of stool or opaque liquid). The total BBPS score                          equals 9. The quality of the bowel preparation was                          evaluated using the BBPS ([place] Bowel Preparation                          Scale) with scores of: Right Colon = 3 (entire mucosa                          seen well with no residual staining, small fragments                          of stool or opaque liquid), Transverse Colon = 3                          (entire mucosa seen well with no residual staining,                          small fragments of stool or opaque liquid) and Left                          Colon = 2 (minor amount of residual staining, small                          fragments of stool and/or opaque liquid, but mucosa                          seen well). The total BBPS score equals 8. The quality                          of the bowel preparation was good.                                                                                     Findings:        Many small and large-mouthed diverticula were found in the entire colon.        Random right and left colon biopsies were taken with a cold forceps for        evaluation of medication effect, CMV and microscopic colitis.        The exam was otherwise without abnormality on direct and retroflexion         views.        The perianal and digital rectal examinations were normal.                                                                                     Moderate Sedation:        Moderate (conscious) sedation was administered by the endoscopy nurse        and supervised by the endoscopist. The patient's oxygen saturation,        heart rate, blood pressure and response to care were monitored. Total        physician intraservice time was 44 minutes.   Impression:            - Diverticulosis in the entire examined colon. Random                          colon biopsies obtained to evaluate for medication                          effect, CMV and microscopic colitis.                          - The examination was otherwise normal on direct and                          retroflexion views.   Recommendation:        - [facility].                          - Resume previous diet.                          - Await pathology results.                          -Continue supportive care for ongoing diarrhea (                          patient seems to have some response to lomotil)                                                                                       Ana Cardenas MD   _______________________________   ANA CARDENAS MD   3/23/2023 4:27:28 PM   I was physically present for the entire viewing portion of the exam.   __________________________   Signature of teaching physician   B4c/Guy CARDENAS MD       LABS:  BMP  Recent Labs   Lab 03/24/23  0607 03/24/23  0025 03/23/23  1729 03/23/23  1310 03/23/23  1111 03/23/23  0437 03/22/23  1807 03/22/23  1550 03/22/23  1050 03/22/23  0453     --   --   --   --  135*  --  131*  --  131*   POTASSIUM 4.2 4.1 4.0 3.9   < > 3.0*   < > 3.8   < > 3.3*   CHLORIDE 108*  --   --   --   --  101  --  102  --  103   VISHNU 7.7*  --   --   --   --  7.8*  --  7.8*  --  8.2*   CO2 22  --   --   --   --  24  --  18*  --   17*   BUN 19.0  --   --   --   --  31.9*  --  39.9*  --  43.8*   CR 1.55*  --   --   --   --  2.62*  --  3.06*  --  3.62*   *  --   --   --   --  132*  --  126*  --  141*    < > = values in this interval not displayed.     CBC  Recent Labs   Lab 03/24/23  0607 03/23/23  1127 03/22/23  0453 03/21/23  1356   WBC 2.6* 2.1* 2.5* 2.5*   RBC 2.73* 2.58* 2.77* 2.93*   HGB 8.3* 7.7* 8.4* 9.1*   HCT 25.9* 22.7* 24.7* 25.9*   MCV 95 88 89 88   MCH 30.4 29.8 30.3 31.1   MCHC 32.0 33.9 34.0 35.1   RDW 13.8 13.5 13.2 13.2    149* 115* 127*     INRNo lab results found in last 7 days.  LFTs  Recent Labs   Lab 03/19/23 2155   ALKPHOS 41   AST 34   ALT 40   BILITOTAL 0.4   PROTTOTAL 6.9   ALBUMIN 4.1      PANC  Recent Labs   Lab 03/19/23 2155   LIPASE 20         IMAGING:  (Personally reviewed)  EXAM: CT ABDOMEN PELVIS W/O CONTRAST  LOCATION: [facility]  DATE/TIME: 3/19/2023 10:34 PM     INDICATION: recent renal transplant,pain and crystals in urine  COMPARISON: None.  TECHNIQUE: CT scan of the abdomen and pelvis was performed without IV contrast. Multiplanar reformats were obtained. Dose reduction techniques were used.  CONTRAST: None.     FINDINGS:   LOWER CHEST: Normal.     HEPATOBILIARY: Normal.     PANCREAS: Normal.     SPLEEN: Normal.     ADRENAL GLANDS: Normal.     KIDNEYS/BLADDER: The right kidney is not seen. The left kidney is moderately atrophic with no hydronephrosis. There is a transplant kidney seen in the right hemipelvis. There is some mild stranding of the fat seen within the renal pelvis and adjacent to   the renal parenchyma which is nonspecific, but could represent changes of inflammation/pyelonephritis. Given patient's history of recent transplantation, this may also be most surgical in nature. Do not appreciate any evidence for dilatation of the right   transplant kidney urinary tract. The bladder is normal in appearance.     BOWEL: Normal     LYMPH NODES:  Normal.     VASCULATURE: Unremarkable.     PELVIC ORGANS: Normal.     MUSCULOSKELETAL: Normal.                                                                      IMPRESSION:   1.  The right kidney is not seen. The left kidney is moderately atrophic with no hydronephrosis. There is a transplant kidney seen in the right hemipelvis. There is some mild stranding of the fat seen within the renal pelvis and adjacent to the renal   parenchyma which is nonspecific, but could represent changes of inflammation/pyelonephritis. Given patient's history of recent transplantation, this may also be most surgical in nature. Do not appreciate any evidence for dilatation of the right   transplant kidney urinary tract.               =======================================================================  EXAMINATION: US ABDOMEN LIMITED  1/23/2023 7:31 AM       CLINICAL HISTORY: Elevated LFTs     COMPARISON: 1/16/2023         FINDINGS:  The liver is normal in contour and echogenicity. Measures 14.6 cm in  craniocaudal span. No focal hepatic lesions. The portal vein is patent  with antegrade flow.     There is no intrahepatic or extrahepatic biliary ductal dilatation.  The common bile duct measures 4 mm. The gallbladder is normal, without  gallstones, wall thickening, or pericholecystic fluid. Negative  sonographic Golden's sign.     The visualized portions of the pancreas are normal in echogenicity.     The visualized upper abdominal aorta and inferior vena cava are  normal.       The right kidney is absent.                                                                      IMPRESSION:      1. No acute sonographic findings in the right upper quadrant.     2. No focal hepatic lesions or biliary dilatation.     I have personally reviewed the examination and initial interpretation  and I agree with the findings.     EVA VANG MD

## 2023-03-24 NOTE — PROGRESS NOTES
Therapy: IV ABX   Insurance: [employer]   Ded: $3.80 monthly ded    100% coverage for supplies    Drug is billed through his Medicare part D pharmacy plan, patient may have a co-pay per dispense.    Nursing is covered as well but must be a regional agency     Please contact Intake with any questions, [phone] or In Basket pool,  [facility] ([phone]).

## 2023-03-24 NOTE — PLAN OF CARE
Goal Outcome Evaluation:    Plan of Care Reviewed With: patient  /77 (BP Location: Left arm)   Pulse 72   Temp 98.8  F (37.1  C) (Oral)   Resp 16   Wt 71.9 kg (158 lb 8 oz)   SpO2 98%   BMI 21.50 kg/m      NEURO: A&O x4.  RESPIRATORY: Sating adequately on RA.   CARDIAC: WDL, denies chest pain.    GI/: Voiding adequately via urinal. +BS, LBM 3/23/23  DIET: Regular  PAIN: denies  SKIN: WNL  INCISION/DRAINS/IV ACCESS: L PIV SL, R PIV SL.   ACTIVITY: UAL  LABS: Phosphorus 2.0, replaced orally this shift.   PLAN: Continue Tacrolimus 2mg twice daily. Possible discharge later today?  Continue POC.

## 2023-03-24 NOTE — PROGRESS NOTES
[facility]   Transplant Nephrology Progress Note  Date of Admission:  3/21/2023  Today's Date: 03/24/2023    Recommendations:  - Restart tacrolimus at 2 mg PO twice daily  - Restart PO sodium bicarbonate.650mg bid  - Change ampicillin to Amoxicillin 500 mg PO every 8 hours to complete a total of 14 days treatment.  - Labs three times per week as outpatient with next labs Monday 3/27  -F/U on colon biopsies to rule out CMV and MMF toxicity      Assessment & Plan   # DDKT: Trend down. JACK was 2/2 hypovolemia and pyelonephritis/ tacrolimus toxicity, improving.   - Baseline Creatinine: ~ 1.4-1.7   - Proteinuria: Normal (<0.2 grams)   - Date DSA Last Checked: Feb/2023      Latest DSA: No cPRA 49%   - BK Viremia: No   - Kidney Tx Biopsy: No    -U/S 3/20 negative    # Immunosuppression: Tacrolimus immediate release (goal 8-10) and Mycophenolate mofetil (dose 750 mg every 12 hours)   - Changes: Yes - restart tacrolimus at 2 mg PO twice daily.  MPA level low, but do not change dose at this time. Recheck MPA level next week    # Infection Prophylaxis:   - PJP: Sulfa/TMP (Bactrim)  MWF  - CMV: Valganciclovir (Valcyte) Patient is CMV IgG Ab discordant (D+/R-) and will continue on Valcyte x 6 months, then check CMV PCR monthly until 12 months post transplant.Valacyte has  Renally dose to 450 mg every other day due to jack    # Blood Pressure: Controlled, but low at times (better this morning)  Goal BP: > 100, but < 130 systolic   - Volume status: Euvolemic     - Changes: No      # Anemia in Chronic Renal Disease: Hgb: Trend up      SARA: No   - Iron studies: Replete.     # Mineral Bone Disorder:   - Secondary renal hyperparathyroidism; PTH level: Minimally elevated ( pg/ml)        On treatment: None  - Vitamin D; level: Low        On supplement: Yes  - Calcium; level: Low        On supplement: No  - Phosphorus; level: Low normal        On supplement: No    # Electrolytes:   -  Potassium; level: Normal        On supplement: No  - Magnesium; level: High        On supplement: No  - Bicarbonate; level: Normal        On supplement: On hold, ok to restart sodium bicarbonate 650 mg PO twice daily.   - Sodium; level: Normal    #  Hyponatremia:(resolved)              -SNa was low possibly 2/2 decreased effective circulating volume due to infection, JACK with decreased ability to excrete free water                   #pancytopenia:               -Improving   - CMV PCR negative. Possibly 2/2 pyelonephritis   - MPA level low.         # Diarrhea unclear etiology              -C diff, enteric panel, cmv PCR blood, and O+P stool all negative              -Follow up colon biopsies on 3/23   -Ok to continue antidiarrheals PRN     # E.faecalis pyelonephritis:              -Change ampicillin to Amoxicillin 500 mg PO every 8 hours for a total of 14 days treatment.                # H/o Cardiomyopathy: Normal LVEF at 55-60% with last cardiac echo 9/2022.     # Urethral Stricture:               -Patient is s/p buccal urethroplasty and diverticulum excision 12/2020. Follows closely with Urology.     # H/o Polysubstance Abuse: Patient with h/o methamphetamine and alcohol abuse.  Now sober.    # Transplant History:  Etiology of Kidney Failure: Etiology of Kidney Failure: Solitary congenital kidney and h/o urethral calculus and urologic issues  Tx: DDKT  Transplant: 1/15/2023 (Kidney)  Significant changes in immunosuppression: None  Significant transplant-related complications: None    Recommendations were communicated to the primary team verbally.    Seen and discussed with JESSE Barrera CNP   Pager: [phone]      Physician Attestation     I saw and evaluated Chip Fowler as part of a shared APRN/PA visit.     I personally reviewed the vital signs, medications, labs and imaging.    I personally performed the substantive portion of the medical decision making for this visit - please see the  DK's documentation for full details.    Key management decisions made by me and carried out under my direction: Ok to discharge on tac , next labs Monday 3/27 then 3x next week, restart bicarb 650mg bid. Repeat MPA level next week, follow up on colon biopsies. Change antidiarrheals to PRN    I personally performed the substantive portion of the history for this visit - please see the DK's documentation for full details.  Key additional history findings made by me: Pt has not had BM today but took 4x lomotil yesterday. UOP increasing    Franko Cerda MD  Date of Service (when I saw the patient): 23    Interval History   Colon biopsy results pending.    Mr. Fowler's creatinine is 1.55 (607); Trend down.  Good urine output.  Other significant labs/tests/vitals: Tacrolimus level approaching goal range: 12.2 today.  No events overnight.  No chest pain or shortness of breath.  No leg swelling.  No nausea and vomiting.  No diarrhea on Lomotil.  No fever, sweats or chills.        Review of Systems   4 point ROS was obtained and negative except as noted in the Interval History.    MEDICATIONS:    amoxicillin  500 mg Oral Q8H FREDRICK     mycophenolic acid  540 mg Oral BID IS     pantoprazole  40 mg Oral QAM AC     phosphorus tablet 250 mg  500 mg Oral BID     sodium bicarbonate  650 mg Oral BID     sodium chloride (PF)  3 mL Intracatheter Q8H     [START ON 3/25/2023] sulfamethoxazole-trimethoprim  1 tablet Oral Daily     tacrolimus  2 mg Oral BID IS     valGANciclovir  900 mg Oral Daily         Physical Exam   Temp  Av.6  F (38.1  C)  Min: 98.7  F (37.1  C)  Max: 104.6  F (40.3  C)      Pulse  Av.8  Min: 64  Max: 118 Resp  Av.6  Min: 16  Max: 18  SpO2  Av.6 %  Min: 91 %  Max: 100 %     /77 (BP Location: Left arm)   Pulse 72   Temp 98.8  F (37.1  C) (Oral)   Resp 16   Wt 71.9 kg (158 lb 8 oz)   SpO2 98%   BMI 21.50 kg/m     Date 23 07 - 23 0659   Shift 0347-8346  0955-1024 1033-9112 24 Hour Total   INTAKE   I.V. 200   200   Shift Total 200   200   OUTPUT   Urine 275   275   Shift Total 275   275   Weight (kg)          Admit       GENERAL APPEARANCE: alert and no distress  HENT: mouth without ulcers or lesions  RESP: lungs clear to auscultation - no rales, rhonchi or wheezes  CV: regular rhythm, normal rate, no rub, no murmur  EDEMA: no LE edema bilaterally  ABDOMEN: soft, nondistended, nontender, bowel sounds normal  MS: extremities normal - no gross deformities noted, no evidence of inflammation in joints, no muscle tenderness  SKIN: no rash    Data   All labs reviewed by me.  CMP  Recent Labs   Lab 03/24/23  0607 03/24/23  0025 03/23/23  1729 03/23/23  1310 03/23/23  1111 03/23/23  0437 03/22/23  1807 03/22/23  1550 03/22/23  1050 03/22/23  0453 03/21/23  2031 03/21/23  1356 03/20/23  1956 03/19/23  2155     --   --   --   --  135*  --  131*  --  131*   < > 129*   < > 128*   POTASSIUM 4.2 4.1 4.0 3.9   < > 3.0*   < > 3.8   < > 3.3*   < > 2.8*   < > 3.6   CHLORIDE 108*  --   --   --   --  101  --  102  --  103   < > 101   < > 95*   CO2 22  --   --   --   --  24  --  18*  --  17*   < > 14*   < > 20*   ANIONGAP 9  --   --   --   --  10  --  11  --  11   < > 14   < > 13   *  --   --   --   --  132*  --  126*  --  141*   < > 103*   < > 141*   BUN 19.0  --   --   --   --  31.9*  --  39.9*  --  43.8*   < > 42.7*   < > 39.0*   CR 1.55*  --   --   --   --  2.62*  --  3.06*  --  3.62*   < > 3.54*   < > 3.43*   GFRESTIMATED 58*  --   --   --   --  31*  --  26*  --  21*   < > 22*   < > 23*   VISHNU 7.7*  --   --   --   --  7.8*  --  7.8*  --  8.2*   < > 8.1*   < > 8.8   MAG 2.4*  --   --   --   --  1.5*  --   --   --  2.0  --  1.9  --   --    PHOS 2.0*  --  2.3*  --   --  0.8*  --   --   --  1.7*  --  2.7   < >  --    PROTTOTAL  --   --   --   --   --   --   --   --   --   --   --   --   --  6.9   ALBUMIN  --   --   --   --   --   --   --   --   --   --   --   --   --  4.1    BILITOTAL  --   --   --   --   --   --   --   --   --   --   --   --   --  0.4   ALKPHOS  --   --   --   --   --   --   --   --   --   --   --   --   --  41   AST  --   --   --   --   --   --   --   --   --   --   --   --   --  34   ALT  --   --   --   --   --   --   --   --   --   --   --   --   --  40    < > = values in this interval not displayed.     CBC  Recent Labs   Lab 03/24/23  0607 03/23/23  1127 03/22/23  0453 03/21/23  1356   HGB 8.3* 7.7* 8.4* 9.1*   WBC 2.6* 2.1* 2.5* 2.5*   RBC 2.73* 2.58* 2.77* 2.93*   HCT 25.9* 22.7* 24.7* 25.9*   MCV 95 88 89 88   MCH 30.4 29.8 30.3 31.1   MCHC 32.0 33.9 34.0 35.1   RDW 13.8 13.5 13.2 13.2    149* 115* 127*     INRNo lab results found in last 7 days.  ABG  Recent Labs   Lab 03/22/23  1550 03/20/23  0652   PH  --  7.42   O2PER 20  --       Urine Studies  Recent Labs   Lab Test 03/19/23 2011 02/24/23  1445 01/23/23  0752 01/19/23  1055   COLOR Yellow Straw Light Yellow Straw   APPEARANCE Slightly Cloudy* Clear Clear Clear   URINEGLC Negative Negative Negative Negative   URINEBILI Negative Negative Negative Negative   URINEKETONE Negative Negative Negative Negative   SG 1.019 1.009 1.012 1.008   UBLD Small* Negative Large* Large*   URINEPH 5.5 6.5 6.5 7.0   PROTEIN 100* Negative 30* 30*   NITRITE Negative Negative Negative Negative   LEUKEST Moderate* Negative Moderate* Moderate*   RBCU 9* 3* 116* >182*   WBCU 28* 1 13* 27*     No lab results found.  PTH  Recent Labs   Lab Test 01/31/23  1049 01/20/23  0746 04/18/22  0851   PTHI 73* 101* 315*     Iron Studies  Recent Labs   Lab Test 01/31/23  1049 01/20/23  0746   IRON 70 153    244   IRONSAT 27 63*   DAWSON 430* 717*       IMAGING:  All imaging studies reviewed by me.

## 2023-03-24 NOTE — PROGRESS NOTES
Care Management Discharge Note    Discharge Date: 03/24/2023       Discharge Disposition:      Discharge Services:      Discharge DME:      Discharge Transportation:   Family/friend will provide    Education Provided on the Discharge Plan:  yes  Persons Notified of Discharge Plans: yes  Patient/Family in Agreement with the Plan:  yes    Handoff Referral Completed: Yes    Additional Information:  Pt is discharging home on oral antibiotics. Pt is independent with their lab appts and informed writer that they will continue to schedule as directed. No other needs noted at this time.       Lázaro Hagan RNCC  Covering for 7A/Erin Mcu  Phone [phone]    SEARCHABLE in [facility] - search CARE COORDINATOR    [place] [employer] [place] ([phone]) Saturday & Sunday; ([phone]) [facility] Recognized Holidays    Units: 4A, 4C, 4E, 5A & 5B   Pager: [phone]    Units: 6A & 6B    Pager: [phone]    Units: 6C & 6D   Pager: [phone]    Units: 7A, 7B, 7C, 7D & 5C    Pager: [phone]    Units: [facility] ED, 5 Ortho, 5 Med/Surg, 6 Med/Surg, 8A & 10 ICU

## 2023-03-24 NOTE — TELEPHONE ENCOUNTER
alfonzo Saunders, to ensure that the tacro dosing that the doctor told him to take and on discharge orders are correct. He was on 5 mg bid and now taking 2 mg BID. Reviewed orders and notes. Patient to have tacro 2 mg every 12 hours and repeat labs on Monday.    Pt to call with any changes or concerns.

## 2023-03-24 NOTE — PLAN OF CARE
/59 (BP Location: Right arm)   Pulse 68   Temp 99.1  F (37.3  C) (Oral)   Resp 16   Wt 71.9 kg (158 lb 8 oz)   SpO2 100%   BMI 21.50 kg/m      Shift: 6957-6514  Isolation Status: none  VS: stable on room air, afebrile  Neuro: Aox4  Behaviors: calm, cooperative, flat  BG: none  Labs: K+, Mag, Phos all replaced today. Last results: K+=4.0; Mag=1.5; phos=2.3  Respiratory: WDL  Cardiac: WDL  Pain/Nausea: denies both  PRN: none needed  Diet: regular  IV Access: right and left PIV  Infusion(s): LR@125, NS @10 (TKO)  Lines/Drains: none  GI/: BM 3/23 - on lomotil. Voiding and saving via urinal  Skin: scar from ktx in January.   Mobility: independent  Events/Education: colonoscopy with biopsy performed today  Plan: Continue monitoring electrolytes.

## 2023-03-24 NOTE — PLAN OF CARE
/69 (BP Location: Left arm)   Pulse 72   Temp 99.1  F (37.3  C) (Oral)   Resp 16   Wt 71.9 kg (158 lb 8 oz)   SpO2 97%   BMI 21.50 kg/m      Shift: 3720-4539  VS: VSS on RA, afebrile  Neuro: AOx4  BG: none  Labs: K Q6: 1am check 4.1  Respiratory: WNL  Pain/Nausea/PRN: denies  Diet: regular  LDA: PIVx2 with TKO and LR @ 125  GI/: voiding in urinal, 1 loose BM overnight (on lomotil)  Skin: WNL  Mobility: UAL    Plan: continue abx regimen    Handoff given to following RN.

## 2023-03-25 LAB
BACTERIA BLD CULT: NO GROWTH
BACTERIA BLD CULT: NO GROWTH

## 2023-03-27 ENCOUNTER — LAB (OUTPATIENT)
Dept: LAB | Facility: CLINIC | Age: 38
End: 2023-03-27
Payer: MEDICARE

## 2023-03-27 DIAGNOSIS — Z48.298 AFTERCARE FOLLOWING ORGAN TRANSPLANT: ICD-10-CM

## 2023-03-27 DIAGNOSIS — Z20.828 CONTACT WITH AND (SUSPECTED) EXPOSURE TO OTHER VIRAL COMMUNICABLE DISEASES: ICD-10-CM

## 2023-03-27 DIAGNOSIS — Z94.0 KIDNEY REPLACED BY TRANSPLANT: ICD-10-CM

## 2023-03-27 DIAGNOSIS — Z79.899 ENCOUNTER FOR LONG-TERM CURRENT USE OF MEDICATION: ICD-10-CM

## 2023-03-27 LAB
ANION GAP SERPL CALCULATED.3IONS-SCNC: 10 MMOL/L (ref 7–15)
BUN SERPL-MCNC: 23.6 MG/DL (ref 6–20)
CALCIUM SERPL-MCNC: 8.8 MG/DL (ref 8.6–10)
CHLORIDE SERPL-SCNC: 104 MMOL/L (ref 98–107)
CREAT SERPL-MCNC: 1.43 MG/DL (ref 0.67–1.17)
DEPRECATED HCO3 PLAS-SCNC: 21 MMOL/L (ref 22–29)
ERYTHROCYTE [DISTWIDTH] IN BLOOD BY AUTOMATED COUNT: 13.5 % (ref 10–15)
GFR SERPL CREATININE-BSD FRML MDRD: 64 ML/MIN/1.73M2
GLUCOSE SERPL-MCNC: 98 MG/DL (ref 70–99)
HCT VFR BLD AUTO: 27.9 % (ref 40–53)
HGB BLD-MCNC: 8.9 G/DL (ref 13.3–17.7)
MAGNESIUM SERPL-MCNC: 1.4 MG/DL (ref 1.7–2.3)
MCH RBC QN AUTO: 30.4 PG (ref 26.5–33)
MCHC RBC AUTO-ENTMCNC: 31.9 G/DL (ref 31.5–36.5)
MCV RBC AUTO: 95 FL (ref 78–100)
PHOSPHATE SERPL-MCNC: 2.5 MG/DL (ref 2.5–4.5)
PLATELET # BLD AUTO: 400 10E3/UL (ref 150–450)
POTASSIUM SERPL-SCNC: 5 MMOL/L (ref 3.4–5.3)
RBC # BLD AUTO: 2.93 10E6/UL (ref 4.4–5.9)
SODIUM SERPL-SCNC: 135 MMOL/L (ref 136–145)
WBC # BLD AUTO: 3.7 10E3/UL (ref 4–11)

## 2023-03-27 PROCEDURE — 80197 ASSAY OF TACROLIMUS: CPT

## 2023-03-27 PROCEDURE — 85027 COMPLETE CBC AUTOMATED: CPT

## 2023-03-27 PROCEDURE — 84100 ASSAY OF PHOSPHORUS: CPT

## 2023-03-27 PROCEDURE — 80048 BASIC METABOLIC PNL TOTAL CA: CPT

## 2023-03-27 PROCEDURE — 83735 ASSAY OF MAGNESIUM: CPT

## 2023-03-27 PROCEDURE — 80180 DRUG SCRN QUAN MYCOPHENOLATE: CPT

## 2023-03-27 PROCEDURE — 36415 COLL VENOUS BLD VENIPUNCTURE: CPT

## 2023-03-28 ENCOUNTER — MYC MEDICAL ADVICE (OUTPATIENT)
Dept: TRANSPLANT | Facility: CLINIC | Age: 38
End: 2023-03-28
Payer: MEDICARE

## 2023-03-28 ENCOUNTER — TELEPHONE (OUTPATIENT)
Dept: LAB | Facility: CLINIC | Age: 38
End: 2023-03-28
Payer: MEDICARE

## 2023-03-28 DIAGNOSIS — Z94.0 KIDNEY REPLACED BY TRANSPLANT: Primary | ICD-10-CM

## 2023-03-28 LAB
MYCOPHENOLATE SERPL LC/MS/MS-MCNC: <0.25 MG/L (ref 1–3.5)
MYCOPHENOLATE-G SERPL LC/MS/MS-MCNC: 14.4 MG/L (ref 30–95)
TACROLIMUS BLD-MCNC: 5.5 UG/L (ref 5–15)
TME LAST DOSE: ABNORMAL H
TME LAST DOSE: ABNORMAL H
TME LAST DOSE: NORMAL H
TME LAST DOSE: NORMAL H

## 2023-03-28 RX ORDER — TACROLIMUS 1 MG/1
3 CAPSULE ORAL 2 TIMES DAILY
Qty: 180 CAPSULE | Refills: 11 | Status: SHIPPED | OUTPATIENT
Start: 2023-03-28 | End: 2023-04-04

## 2023-03-28 RX ORDER — MYCOPHENOLIC ACID 180 MG/1
720 TABLET, DELAYED RELEASE ORAL 2 TIMES DAILY
Qty: 240 TABLET | Refills: 11 | Status: SHIPPED | OUTPATIENT
Start: 2023-03-28 | End: 2023-04-10

## 2023-03-28 NOTE — TELEPHONE ENCOUNTER
Issue mpa level less than 0.25  With tacrolimus  5.5  Increase Myfortic  720 mg per Dr Franko Cerda    Increase tacrolimus  3 mg twice per day from tacrolimus  2 mg twice per day       Chip recently admitted for urosepsis     Currently on antibiotic    Chip  confirmed the dose change

## 2023-03-28 NOTE — TELEPHONE ENCOUNTER
Franko Cerda MD     Increase MPA to 720mg bid please     Please increase your Myfortic mycophenolic acid  to 720 mg twice per day   I spoke to Dr Franko Cerda  for your other result- still in process   Hope your feeling well  [facility] Transplant Coordinator

## 2023-03-29 ENCOUNTER — MYC MEDICAL ADVICE (OUTPATIENT)
Dept: TRANSPLANT | Facility: CLINIC | Age: 38
End: 2023-03-29

## 2023-03-29 ENCOUNTER — LAB (OUTPATIENT)
Dept: LAB | Facility: CLINIC | Age: 38
End: 2023-03-29
Payer: MEDICARE

## 2023-03-29 ENCOUNTER — TELEPHONE (OUTPATIENT)
Dept: PHARMACY | Facility: CLINIC | Age: 38
End: 2023-03-29

## 2023-03-29 DIAGNOSIS — Z20.828 CONTACT WITH AND (SUSPECTED) EXPOSURE TO OTHER VIRAL COMMUNICABLE DISEASES: ICD-10-CM

## 2023-03-29 DIAGNOSIS — Z79.899 ENCOUNTER FOR LONG-TERM CURRENT USE OF MEDICATION: ICD-10-CM

## 2023-03-29 DIAGNOSIS — Z48.298 AFTERCARE FOLLOWING ORGAN TRANSPLANT: ICD-10-CM

## 2023-03-29 DIAGNOSIS — Z94.0 KIDNEY REPLACED BY TRANSPLANT: ICD-10-CM

## 2023-03-29 DIAGNOSIS — Z94.0 KIDNEY REPLACED BY TRANSPLANT: Primary | ICD-10-CM

## 2023-03-29 LAB
ALBUMIN MFR UR ELPH: 8.4 MG/DL (ref 1–14)
ANION GAP SERPL CALCULATED.3IONS-SCNC: 9 MMOL/L (ref 7–15)
BUN SERPL-MCNC: 27.6 MG/DL (ref 6–20)
CALCIUM SERPL-MCNC: 9.1 MG/DL (ref 8.6–10)
CHLORIDE SERPL-SCNC: 106 MMOL/L (ref 98–107)
CREAT SERPL-MCNC: 1.53 MG/DL (ref 0.67–1.17)
CREAT UR-MCNC: 67 MG/DL
DEPRECATED HCO3 PLAS-SCNC: 20 MMOL/L (ref 22–29)
ERYTHROCYTE [DISTWIDTH] IN BLOOD BY AUTOMATED COUNT: 13.4 % (ref 10–15)
GFR SERPL CREATININE-BSD FRML MDRD: 59 ML/MIN/1.73M2
GLUCOSE SERPL-MCNC: 82 MG/DL (ref 70–99)
HCT VFR BLD AUTO: 29.7 % (ref 40–53)
HGB BLD-MCNC: 9.4 G/DL (ref 13.3–17.7)
MAGNESIUM SERPL-MCNC: 1.6 MG/DL (ref 1.7–2.3)
MCH RBC QN AUTO: 30.3 PG (ref 26.5–33)
MCHC RBC AUTO-ENTMCNC: 31.6 G/DL (ref 31.5–36.5)
MCV RBC AUTO: 96 FL (ref 78–100)
PATH REPORT.ADDENDUM SPEC: NORMAL
PATH REPORT.COMMENTS IMP SPEC: NORMAL
PATH REPORT.FINAL DX SPEC: NORMAL
PATH REPORT.GROSS SPEC: NORMAL
PATH REPORT.MICROSCOPIC SPEC OTHER STN: NORMAL
PATH REPORT.RELEVANT HX SPEC: NORMAL
PHOSPHATE SERPL-MCNC: 2.8 MG/DL (ref 2.5–4.5)
PHOTO IMAGE: NORMAL
PLATELET # BLD AUTO: 481 10E3/UL (ref 150–450)
POTASSIUM SERPL-SCNC: 4.7 MMOL/L (ref 3.4–5.3)
PROT/CREAT 24H UR: 0.13 MG/MG CR (ref 0–0.2)
RBC # BLD AUTO: 3.1 10E6/UL (ref 4.4–5.9)
SODIUM SERPL-SCNC: 135 MMOL/L (ref 136–145)
WBC # BLD AUTO: 4.2 10E3/UL (ref 4–11)

## 2023-03-29 PROCEDURE — 83735 ASSAY OF MAGNESIUM: CPT

## 2023-03-29 PROCEDURE — 36415 COLL VENOUS BLD VENIPUNCTURE: CPT

## 2023-03-29 PROCEDURE — 86833 HLA CLASS II HIGH DEFIN QUAL: CPT

## 2023-03-29 PROCEDURE — 84156 ASSAY OF PROTEIN URINE: CPT

## 2023-03-29 PROCEDURE — 86832 HLA CLASS I HIGH DEFIN QUAL: CPT

## 2023-03-29 PROCEDURE — 80197 ASSAY OF TACROLIMUS: CPT

## 2023-03-29 PROCEDURE — 84100 ASSAY OF PHOSPHORUS: CPT

## 2023-03-29 PROCEDURE — 85014 HEMATOCRIT: CPT

## 2023-03-29 PROCEDURE — 80048 BASIC METABOLIC PNL TOTAL CA: CPT

## 2023-03-29 PROCEDURE — 87799 DETECT AGENT NOS DNA QUANT: CPT

## 2023-03-29 RX ORDER — SULFAMETHOXAZOLE AND TRIMETHOPRIM 400; 80 MG/1; MG/1
1 TABLET ORAL DAILY
Qty: 30 TABLET | Refills: 11 | Status: SHIPPED | OUTPATIENT
Start: 2023-03-29 | End: 2024-04-08

## 2023-03-29 NOTE — TELEPHONE ENCOUNTER
Clinical Pharmacy Consult:                                                      Transplant Specific: 2 Month Post Transplant Call  Date of Transplant: 1/15/2023  Type of Transplant: kidney  First Transplant: yes  History of rejection: no    Immunosuppression Regimen   TAC 3mg qAM & 3mg qPM and MPA 720mg qAM & 720mg qPM  Patient specific goal: 8-10  Most recent level: 5.5, date: 3/27/23  Immunosuppressant Levels:  Subtherapeutic, dose increased  Pt adherent to lab draws: yes  Scr:   Lab Results   Component Value Date    CR 1.65 02/24/2023    CR 6.20 12/03/2020     Side effects: none    Prophylactic Medications  Antibacterial:  Bactrim 400-80 QD  Scheduled Discontinue Date: Lifelong    Antifungal: Not needed thus far  Scheduled Discontinue Date: N/A    Antiviral: CrCl 25 to 39 mL/minute: Valcyte 450 mg every other day   Scheduled Discontinue Date: 6 months    Acid Reducer: Protonix (pantoprazole)  Scheduled Reviewed Date: Followed by clinic    Thrombosis Prevention: Not on   Scheduled Discontinue Date: N/A    Blood Pressure Management  Frequency of home Blood Pressure checks: once daily  Most recent home BP: 134-138/85-90  Patient Blood pressure goal: <140/90  Patient blood pressure at goal:  Yes    Hospitalizations/ER visits since last assessment: 1 for uti still being treated but much better       Med rec/DUR performed: yes  Med Rec Discrepancies: no    Medication adherence flowsheet 3/29/2023   Patient medication administration: Responsible for own medications   Patient estimated adherence level: %   Pharmacist assessment of adherence: Good   Patient reported doses missed per week: 0-1   Pharmacy MPR: -   Facilitators to medication adherence  Cell phone;Medication dosing chart;Pill box;Schedule/routine   Patient reported barriers to medication adherence  -   Adherence intervention(s): -      Medication access flowsheet 3/29/2023   Number of pharmacies used: 1   Pharmacy: [facility]   Enrolled in  [facility]? Yes   Patient reported barriers to accessing medications: -   Medication access interventions: -        Chip states he is feeling good. Switched to MPA and diarrhea cleared up.  Reports no other side effects.  Had uti which gave him 105 fever, feeling much better.     Adherence:  Uses pill box, alarms, and med card.  He has not missed any doses.  He takes his immunos at 3:40-3:50am and pm.  He has been taking his magnesium oxide at 6pm. Last tacro level was low and dose increased.    BP: Checking daily.  Under control.     No other questions or concerns today.  Will follow up in 1 month.    Cipriano Rooney [facility]  Specialty Pharmacist [phone]

## 2023-03-30 ENCOUNTER — LAB (OUTPATIENT)
Dept: LAB | Facility: CLINIC | Age: 38
End: 2023-03-30
Payer: MEDICARE

## 2023-03-30 ENCOUNTER — MYC MEDICAL ADVICE (OUTPATIENT)
Dept: TRANSPLANT | Facility: CLINIC | Age: 38
End: 2023-03-30

## 2023-03-30 DIAGNOSIS — Z94.0 KIDNEY REPLACED BY TRANSPLANT: ICD-10-CM

## 2023-03-30 LAB
ANION GAP SERPL CALCULATED.3IONS-SCNC: 12 MMOL/L (ref 7–15)
BKV DNA # SPEC NAA+PROBE: NOT DETECTED COPIES/ML
BUN SERPL-MCNC: 28.1 MG/DL (ref 6–20)
CALCIUM SERPL-MCNC: 9.2 MG/DL (ref 8.6–10)
CHLORIDE SERPL-SCNC: 105 MMOL/L (ref 98–107)
CREAT SERPL-MCNC: 1.56 MG/DL (ref 0.67–1.17)
DEPRECATED HCO3 PLAS-SCNC: 19 MMOL/L (ref 22–29)
ERYTHROCYTE [DISTWIDTH] IN BLOOD BY AUTOMATED COUNT: 13.3 % (ref 10–15)
GFR SERPL CREATININE-BSD FRML MDRD: 58 ML/MIN/1.73M2
GLUCOSE SERPL-MCNC: 94 MG/DL (ref 70–99)
HCT VFR BLD AUTO: 29.1 % (ref 40–53)
HGB BLD-MCNC: 9 G/DL (ref 13.3–17.7)
MCH RBC QN AUTO: 30.2 PG (ref 26.5–33)
MCHC RBC AUTO-ENTMCNC: 30.9 G/DL (ref 31.5–36.5)
MCV RBC AUTO: 98 FL (ref 78–100)
PLATELET # BLD AUTO: 481 10E3/UL (ref 150–450)
POTASSIUM SERPL-SCNC: 4.9 MMOL/L (ref 3.4–5.3)
RBC # BLD AUTO: 2.98 10E6/UL (ref 4.4–5.9)
SODIUM SERPL-SCNC: 136 MMOL/L (ref 136–145)
TACROLIMUS BLD-MCNC: 5.4 UG/L (ref 5–15)
TME LAST DOSE: NORMAL H
TME LAST DOSE: NORMAL H
WBC # BLD AUTO: 4.2 10E3/UL (ref 4–11)

## 2023-03-30 PROCEDURE — 36415 COLL VENOUS BLD VENIPUNCTURE: CPT

## 2023-03-30 PROCEDURE — 80197 ASSAY OF TACROLIMUS: CPT

## 2023-03-30 PROCEDURE — 80048 BASIC METABOLIC PNL TOTAL CA: CPT

## 2023-03-30 PROCEDURE — 85018 HEMOGLOBIN: CPT

## 2023-03-31 ENCOUNTER — MYC MEDICAL ADVICE (OUTPATIENT)
Dept: TRANSPLANT | Facility: CLINIC | Age: 38
End: 2023-03-31
Payer: MEDICARE

## 2023-03-31 ENCOUNTER — TELEPHONE (OUTPATIENT)
Dept: TRANSPLANT | Facility: CLINIC | Age: 38
End: 2023-03-31
Payer: MEDICARE

## 2023-03-31 DIAGNOSIS — Z94.0 KIDNEY REPLACED BY TRANSPLANT: Primary | ICD-10-CM

## 2023-03-31 DIAGNOSIS — Z94.0 TRANSPLANTED KIDNEY: ICD-10-CM

## 2023-03-31 LAB
ALLOSURE DD-CFDNA: 1 %
DONOR IDENTIFICATION: NORMAL
DSA COMMENTS: NORMAL
DSA PRESENT: NO
DSA TEST METHOD: NORMAL
ORGAN: NORMAL
SA 1 CELL: NORMAL
SA 1 TEST METHOD: NORMAL
SA 2 CELL: NORMAL
SA 2 TEST METHOD: NORMAL
SA1 HI RISK ABY: NORMAL
SA1 MOD RISK ABY: NORMAL
SA2 HI RISK ABY: NORMAL
SA2 MOD RISK ABY: NORMAL
TACROLIMUS BLD-MCNC: 4.8 UG/L (ref 5–15)
TME LAST DOSE: ABNORMAL H
TME LAST DOSE: ABNORMAL H
UNACCEPTABLE ANTIGENS: NORMAL
UNOS CPRA: 49
ZZZSA 1  COMMENTS: NORMAL
ZZZSA 2 COMMENTS: NORMAL

## 2023-03-31 RX ORDER — MAGNESIUM OXIDE 400 MG/1
800 TABLET ORAL DAILY
Qty: 60 TABLET | Refills: 3 | Status: SHIPPED | OUTPATIENT
Start: 2023-03-31 | End: 2023-05-05

## 2023-03-31 RX ORDER — PREDNISONE 10 MG/1
10 TABLET ORAL DAILY
Qty: 10 TABLET | Refills: 0 | Status: SHIPPED | OUTPATIENT
Start: 2023-03-31 | End: 2023-04-12

## 2023-03-31 NOTE — TELEPHONE ENCOUNTER
Edwin Green MD Huepfel, Mary K, RN  Cc: Franko Cerda MD  Increased Allosure, but no DSA and good kidney function.  Concerned about low drug levels and recommend starting prednisone 10 mg daily x 7 days and increasing tacrolimus dose.     Franko Cerda MD Spong, Richard Steven, MD; Veronica Edgar RN  allosure was obtained in the setting of pyelo. Also, he was still on abx which I think is why the MPA level was low. I'm ok with putting him on pred for a few days. Veronica just increased tac by 100% yesterday so this tac level is not reflective of that                 Prednisone  10 mg   RX sent tot local   [facility]  In [place]   Called [facility]  Times 2 this evening   Sent [employer] message to Chip     Tanmay admitted with urosepsis  Lowered immunosuppression

## 2023-04-02 NOTE — TELEPHONE ENCOUNTER
Franko Cerda MD Huepfel, Mary K, RN  Repeat allosure in 2 weeks please. I think it could have been elevated with pyelo.

## 2023-04-02 NOTE — TELEPHONE ENCOUNTER
I just received a message from Dr Green and Dr Franko Cerda  @ 458pm today         They  are recommending  to give  Prednisone  10 mg once per day for 10 days   OR While your drug levels are low      They do not want to increase your tacrolimus  or myfortic but give you some prednisone  -- Then  wait for drug levels to increase       I have sent the RX to your

## 2023-04-02 NOTE — TELEPHONE ENCOUNTER
Ok sorry I was babysitting my kid so I thought it was safe to put the phone down. I got the message and will check with [employer] to when it will be ready kaitlynn

## 2023-04-03 ENCOUNTER — LAB (OUTPATIENT)
Dept: LAB | Facility: CLINIC | Age: 38
End: 2023-04-03
Payer: MEDICARE

## 2023-04-03 DIAGNOSIS — Z94.0 KIDNEY REPLACED BY TRANSPLANT: ICD-10-CM

## 2023-04-03 LAB
ALBUMIN UR-MCNC: NEGATIVE MG/DL
APPEARANCE UR: CLEAR
BASOPHILS # BLD AUTO: 0.1 10E3/UL (ref 0–0.2)
BASOPHILS NFR BLD AUTO: 3 %
BILIRUB UR QL STRIP: NEGATIVE
COLOR UR AUTO: NORMAL
EOSINOPHIL # BLD AUTO: 0.1 10E3/UL (ref 0–0.7)
EOSINOPHIL NFR BLD AUTO: 2 %
ERYTHROCYTE [DISTWIDTH] IN BLOOD BY AUTOMATED COUNT: 13.6 % (ref 10–15)
GLUCOSE UR STRIP-MCNC: NEGATIVE MG/DL
HCT VFR BLD AUTO: 29.2 % (ref 40–53)
HGB BLD-MCNC: 9.1 G/DL (ref 13.3–17.7)
HGB UR QL STRIP: NEGATIVE
IMM GRANULOCYTES # BLD: 0.2 10E3/UL
IMM GRANULOCYTES NFR BLD: 4 %
KETONES UR STRIP-MCNC: NEGATIVE MG/DL
LEUKOCYTE ESTERASE UR QL STRIP: NEGATIVE
LYMPHOCYTES # BLD AUTO: 0.9 10E3/UL (ref 0.8–5.3)
LYMPHOCYTES NFR BLD AUTO: 22 %
MCH RBC QN AUTO: 30.4 PG (ref 26.5–33)
MCHC RBC AUTO-ENTMCNC: 31.2 G/DL (ref 31.5–36.5)
MCV RBC AUTO: 98 FL (ref 78–100)
MONOCYTES # BLD AUTO: 0.3 10E3/UL (ref 0–1.3)
MONOCYTES NFR BLD AUTO: 7 %
NEUTROPHILS # BLD AUTO: 2.7 10E3/UL (ref 1.6–8.3)
NEUTROPHILS NFR BLD AUTO: 62 %
NITRATE UR QL: NEGATIVE
NRBC # BLD AUTO: 0 10E3/UL
NRBC BLD AUTO-RTO: 0 /100
PH UR STRIP: 6.5 [PH] (ref 5–7)
PLATELET # BLD AUTO: 434 10E3/UL (ref 150–450)
RBC # BLD AUTO: 2.99 10E6/UL (ref 4.4–5.9)
RBC URINE: <1 /HPF
SP GR UR STRIP: 1.01 (ref 1–1.03)
SQUAMOUS EPITHELIAL: <1 /HPF
UROBILINOGEN UR STRIP-MCNC: NORMAL MG/DL
WBC # BLD AUTO: 4.4 10E3/UL (ref 4–11)
WBC URINE: 1 /HPF

## 2023-04-03 PROCEDURE — 85025 COMPLETE CBC W/AUTO DIFF WBC: CPT

## 2023-04-03 PROCEDURE — 80197 ASSAY OF TACROLIMUS: CPT

## 2023-04-03 PROCEDURE — 81001 URINALYSIS AUTO W/SCOPE: CPT

## 2023-04-03 PROCEDURE — 36415 COLL VENOUS BLD VENIPUNCTURE: CPT

## 2023-04-03 PROCEDURE — 80180 DRUG SCRN QUAN MYCOPHENOLATE: CPT

## 2023-04-04 ENCOUNTER — TELEPHONE (OUTPATIENT)
Dept: TRANSPLANT | Facility: CLINIC | Age: 38
End: 2023-04-04
Payer: MEDICARE

## 2023-04-04 ENCOUNTER — MYC MEDICAL ADVICE (OUTPATIENT)
Dept: TRANSPLANT | Facility: CLINIC | Age: 38
End: 2023-04-04
Payer: MEDICARE

## 2023-04-04 DIAGNOSIS — Z94.0 KIDNEY REPLACED BY TRANSPLANT: Primary | ICD-10-CM

## 2023-04-04 LAB
MYCOPHENOLATE SERPL LC/MS/MS-MCNC: 0.28 MG/L (ref 1–3.5)
MYCOPHENOLATE-G SERPL LC/MS/MS-MCNC: 14.5 MG/L (ref 30–95)
TACROLIMUS BLD-MCNC: 5.9 UG/L (ref 5–15)
TME LAST DOSE: ABNORMAL H
TME LAST DOSE: ABNORMAL H
TME LAST DOSE: NORMAL H
TME LAST DOSE: NORMAL H

## 2023-04-04 RX ORDER — TACROLIMUS 1 MG/1
5 CAPSULE ORAL 2 TIMES DAILY
Qty: 300 CAPSULE | Refills: 11 | Status: SHIPPED | OUTPATIENT
Start: 2023-04-04 | End: 2023-04-10

## 2023-04-04 NOTE — TELEPHONE ENCOUNTER
[employer] message sent to patient regarding:  Tacrolimus IR level 5.9 on 4/3, goal 8-10, dose 3 mg BID.  Labs drawn at 1530, please confirm this was a 12 hour trough.      PLAN:   Please call patient and confirm this was an accurate 12-hour trough. Verify Tacrolimus IR dose 3 mg BID. Confirm no new medications or illness. Confirm no missed doses. If accurate trough and accurate dose, increase Tacrolimus IR dose to 4 mg BID and repeat labs in 1 weeks

## 2023-04-04 NOTE — TELEPHONE ENCOUNTER
ISSUE:   Tacrolimus IR level 5.9 on 4/3, goal 8-10, dose 3 mg BID.  Labs drawn at 1530, please confirm this was a 12 hour trough.     PLAN:   Please call patient and confirm this was an accurate 12-hour trough. Verify Tacrolimus IR dose 3 mg BID. Confirm no new medications or illness. Confirm no missed doses. If accurate trough and accurate dose, increase Tacrolimus IR dose to 4 mg BID and repeat labs in 1 weeks    Danielle Eubanks RN    OUTCOME:   Spoke with patient, they confirm accurate trough level and current dose 4 mg BID. Patient confirmed dose change to 5 mg BID and to repeat labs in 1 weeks. Orders sent to preferred pharmacy for dose change and lab for repeat labs. Patient voiced understanding of plan.

## 2023-04-06 ENCOUNTER — LAB (OUTPATIENT)
Dept: LAB | Facility: CLINIC | Age: 38
End: 2023-04-06
Payer: MEDICARE

## 2023-04-06 ENCOUNTER — TELEPHONE (OUTPATIENT)
Dept: TRANSPLANT | Facility: CLINIC | Age: 38
End: 2023-04-06

## 2023-04-06 DIAGNOSIS — Z94.0 KIDNEY REPLACED BY TRANSPLANT: Primary | ICD-10-CM

## 2023-04-06 DIAGNOSIS — Z20.828 CONTACT WITH AND (SUSPECTED) EXPOSURE TO OTHER VIRAL COMMUNICABLE DISEASES: ICD-10-CM

## 2023-04-06 DIAGNOSIS — Z79.899 ENCOUNTER FOR LONG-TERM CURRENT USE OF MEDICATION: ICD-10-CM

## 2023-04-06 DIAGNOSIS — N18.6 ESRD (END STAGE RENAL DISEASE) (H): ICD-10-CM

## 2023-04-06 DIAGNOSIS — Z48.298 AFTERCARE FOLLOWING ORGAN TRANSPLANT: ICD-10-CM

## 2023-04-06 DIAGNOSIS — Z76.82 ORGAN TRANSPLANT CANDIDATE: ICD-10-CM

## 2023-04-06 DIAGNOSIS — Z94.0 KIDNEY REPLACED BY TRANSPLANT: ICD-10-CM

## 2023-04-06 LAB
ALBUMIN SERPL BCG-MCNC: 4.5 G/DL (ref 3.5–5.2)
ALP SERPL-CCNC: 65 U/L (ref 40–129)
ALT SERPL W P-5'-P-CCNC: 42 U/L (ref 10–50)
ANION GAP SERPL CALCULATED.3IONS-SCNC: 12 MMOL/L (ref 7–15)
AST SERPL W P-5'-P-CCNC: 30 U/L (ref 10–50)
BASOPHILS # BLD MANUAL: 0.1 10E3/UL (ref 0–0.2)
BASOPHILS NFR BLD MANUAL: 2 %
BILIRUB DIRECT SERPL-MCNC: <0.2 MG/DL (ref 0–0.3)
BILIRUB SERPL-MCNC: 0.2 MG/DL
BUN SERPL-MCNC: 27.4 MG/DL (ref 6–20)
CALCIUM SERPL-MCNC: 9.4 MG/DL (ref 8.6–10)
CHLORIDE SERPL-SCNC: 103 MMOL/L (ref 98–107)
CREAT SERPL-MCNC: 1.49 MG/DL (ref 0.67–1.17)
DEPRECATED HCO3 PLAS-SCNC: 22 MMOL/L (ref 22–29)
EOSINOPHIL # BLD MANUAL: 0 10E3/UL (ref 0–0.7)
EOSINOPHIL NFR BLD MANUAL: 0 %
ERYTHROCYTE [DISTWIDTH] IN BLOOD BY AUTOMATED COUNT: 13.5 % (ref 10–15)
GFR SERPL CREATININE-BSD FRML MDRD: 61 ML/MIN/1.73M2
GLUCOSE SERPL-MCNC: 94 MG/DL (ref 70–99)
HCT VFR BLD AUTO: 31 % (ref 40–53)
HGB BLD-MCNC: 9.9 G/DL (ref 13.3–17.7)
LYMPHOCYTES # BLD MANUAL: 1.1 10E3/UL (ref 0.8–5.3)
LYMPHOCYTES NFR BLD MANUAL: 27 %
MAGNESIUM SERPL-MCNC: 1.6 MG/DL (ref 1.7–2.3)
MCH RBC QN AUTO: 30.7 PG (ref 26.5–33)
MCHC RBC AUTO-ENTMCNC: 31.9 G/DL (ref 31.5–36.5)
MCV RBC AUTO: 96 FL (ref 78–100)
MONOCYTES # BLD MANUAL: 0.2 10E3/UL (ref 0–1.3)
MONOCYTES NFR BLD MANUAL: 6 %
NEUTROPHILS # BLD MANUAL: 2.6 10E3/UL (ref 1.6–8.3)
NEUTROPHILS NFR BLD MANUAL: 65 %
PHOSPHATE SERPL-MCNC: 3.1 MG/DL (ref 2.5–4.5)
PLAT MORPH BLD: NORMAL
PLATELET # BLD AUTO: 381 10E3/UL (ref 150–450)
POTASSIUM SERPL-SCNC: 4.6 MMOL/L (ref 3.4–5.3)
PROT SERPL-MCNC: 7.2 G/DL (ref 6.4–8.3)
RBC # BLD AUTO: 3.22 10E6/UL (ref 4.4–5.9)
RBC MORPH BLD: NORMAL
SODIUM SERPL-SCNC: 137 MMOL/L (ref 136–145)
TACROLIMUS BLD-MCNC: 7.6 UG/L (ref 5–15)
TME LAST DOSE: NORMAL H
TME LAST DOSE: NORMAL H
WBC # BLD AUTO: 4 10E3/UL (ref 4–11)

## 2023-04-06 PROCEDURE — 84100 ASSAY OF PHOSPHORUS: CPT

## 2023-04-06 PROCEDURE — 85027 COMPLETE CBC AUTOMATED: CPT

## 2023-04-06 PROCEDURE — 82248 BILIRUBIN DIRECT: CPT

## 2023-04-06 PROCEDURE — 80053 COMPREHEN METABOLIC PANEL: CPT

## 2023-04-06 PROCEDURE — 36415 COLL VENOUS BLD VENIPUNCTURE: CPT

## 2023-04-06 PROCEDURE — 82310 ASSAY OF CALCIUM: CPT

## 2023-04-06 PROCEDURE — 83735 ASSAY OF MAGNESIUM: CPT

## 2023-04-06 PROCEDURE — 85007 BL SMEAR W/DIFF WBC COUNT: CPT

## 2023-04-06 PROCEDURE — 80197 ASSAY OF TACROLIMUS: CPT

## 2023-04-06 RX ORDER — TACROLIMUS 1 MG/1
5 CAPSULE ORAL 2 TIMES DAILY
Qty: 300 CAPSULE | Refills: 11 | Status: CANCELLED | OUTPATIENT
Start: 2023-04-06

## 2023-04-06 RX ORDER — MYCOPHENOLIC ACID 180 MG/1
900 TABLET, DELAYED RELEASE ORAL 2 TIMES DAILY
Qty: 300 TABLET | Refills: 11 | Status: CANCELLED | OUTPATIENT
Start: 2023-04-06

## 2023-04-06 NOTE — TELEPHONE ENCOUNTER
[employer] message sent to patient regarding:  Please increase MPA to 900mg bid and recheck MPA in 1 week   Continue  On 5 mg prednisone   Please increase tacrolimus  5 mg twice per d

## 2023-04-06 NOTE — TELEPHONE ENCOUNTER
Spoke to patient who is asking if the txp team would like to adjust his Mycophenolate dose due to his last MPA level.  OK to send [employer] message if change is needed.

## 2023-04-06 NOTE — TELEPHONE ENCOUNTER
Patient confirms the following:  Please increase MPA to 900mg bid and recheck MPA in 1 week   Continue  On 5 mg prednisone   Please increase tacrolimus  5 mg twice per day

## 2023-04-06 NOTE — TELEPHONE ENCOUNTER
Franko Cerda MD     Please increase MPA to 900mg bid and recheck MPA in 1 week   Continue  On 5 mg prednisone   Please increase tacrolimus  5 mg twice per day     Issue low immunosuppression after admitted for urosepsis /diarrhea       Task   Please contact Chip regarding the above changes from Dr Franko Cerda    Update orders to weekly  Transplant  Labs with MPA levels    Refill his medications

## 2023-04-07 ENCOUNTER — TELEPHONE (OUTPATIENT)
Dept: TRANSPLANT | Facility: CLINIC | Age: 38
End: 2023-04-07
Payer: MEDICARE

## 2023-04-07 ENCOUNTER — MYC MEDICAL ADVICE (OUTPATIENT)
Dept: TRANSPLANT | Facility: CLINIC | Age: 38
End: 2023-04-07
Payer: MEDICARE

## 2023-04-07 DIAGNOSIS — Z94.0 KIDNEY REPLACED BY TRANSPLANT: ICD-10-CM

## 2023-04-09 NOTE — TELEPHONE ENCOUNTER
Telephone encounter for follow up with management of diarrhea  /  Low levels of immunosuppression

## 2023-04-10 ENCOUNTER — LAB (OUTPATIENT)
Dept: LAB | Facility: CLINIC | Age: 38
End: 2023-04-10
Payer: MEDICARE

## 2023-04-10 DIAGNOSIS — Z94.0 KIDNEY REPLACED BY TRANSPLANT: ICD-10-CM

## 2023-04-10 DIAGNOSIS — Z94.0 KIDNEY REPLACED BY TRANSPLANT: Primary | ICD-10-CM

## 2023-04-10 LAB
BASOPHILS # BLD AUTO: 0.1 10E3/UL (ref 0–0.2)
BASOPHILS NFR BLD AUTO: 2 %
EOSINOPHIL # BLD AUTO: 0.2 10E3/UL (ref 0–0.7)
EOSINOPHIL NFR BLD AUTO: 3 %
ERYTHROCYTE [DISTWIDTH] IN BLOOD BY AUTOMATED COUNT: 13.9 % (ref 10–15)
HCT VFR BLD AUTO: 30.8 % (ref 40–53)
HGB BLD-MCNC: 9.9 G/DL (ref 13.3–17.7)
IMM GRANULOCYTES # BLD: 0.2 10E3/UL
IMM GRANULOCYTES NFR BLD: 4 %
LYMPHOCYTES # BLD AUTO: 1.3 10E3/UL (ref 0.8–5.3)
LYMPHOCYTES NFR BLD AUTO: 23 %
MCH RBC QN AUTO: 30.8 PG (ref 26.5–33)
MCHC RBC AUTO-ENTMCNC: 32.1 G/DL (ref 31.5–36.5)
MCV RBC AUTO: 96 FL (ref 78–100)
MONOCYTES # BLD AUTO: 0.4 10E3/UL (ref 0–1.3)
MONOCYTES NFR BLD AUTO: 7 %
NEUTROPHILS # BLD AUTO: 3.3 10E3/UL (ref 1.6–8.3)
NEUTROPHILS NFR BLD AUTO: 61 %
NRBC # BLD AUTO: 0 10E3/UL
NRBC BLD AUTO-RTO: 0 /100
PLATELET # BLD AUTO: 291 10E3/UL (ref 150–450)
RBC # BLD AUTO: 3.21 10E6/UL (ref 4.4–5.9)
WBC # BLD AUTO: 5.4 10E3/UL (ref 4–11)

## 2023-04-10 PROCEDURE — 36415 COLL VENOUS BLD VENIPUNCTURE: CPT

## 2023-04-10 PROCEDURE — 85025 COMPLETE CBC W/AUTO DIFF WBC: CPT

## 2023-04-10 PROCEDURE — 80197 ASSAY OF TACROLIMUS: CPT

## 2023-04-10 RX ORDER — TACROLIMUS 1 MG/1
6 CAPSULE ORAL 2 TIMES DAILY
Qty: 360 CAPSULE | Refills: 11 | Status: SHIPPED | OUTPATIENT
Start: 2023-04-10 | End: 2023-04-18

## 2023-04-10 RX ORDER — MYCOPHENOLIC ACID 180 MG/1
900 TABLET, DELAYED RELEASE ORAL 2 TIMES DAILY
Qty: 300 TABLET | Refills: 11 | Status: SHIPPED | OUTPATIENT
Start: 2023-04-10 | End: 2023-04-10

## 2023-04-10 RX ORDER — MYCOPHENOLIC ACID 180 MG/1
900 TABLET, DELAYED RELEASE ORAL 2 TIMES DAILY
Qty: 300 TABLET | Refills: 11 | Status: SHIPPED | OUTPATIENT
Start: 2023-04-10 | End: 2023-04-19

## 2023-04-10 NOTE — TELEPHONE ENCOUNTER
Good day      Lets go to tacrolimus  6 mg twice per day   Labs on Monday         Have a nice weekend     
Good day      Lets go to tacrolimus  6 mg twice per day   Labs on Monday       Have a nice weekend   [facility] Transplant Coordinator      
(0) Normal

## 2023-04-10 NOTE — TELEPHONE ENCOUNTER
Pt is requesting new rx for    Mycophenolate mofetil 250 mg caps    Did not see on active med list please verify and send new rx    [place] [facility]mail pharmacy  [phone]

## 2023-04-10 NOTE — TELEPHONE ENCOUNTER
Issue tacrolimus  Level below goal level       Increased  tacrolimus  6 mg twice per day     Confirmed current dose of 5 mg twice per day   Confirmed 12 hour trough level

## 2023-04-10 NOTE — TELEPHONE ENCOUNTER
Medication/Refill approved per CPA:    [facility] COLLABORATIVE AGREEMENT FOR  IMMUNOSUPPRESSENT PRESCRIPTION MODIFICATION.      Routing encounter to Transplant as an FYI.    Thanks,  Tiara Stock, PharmD  Specialty Pharmacist/Transplant  [facility]  [phone]

## 2023-04-11 LAB
TACROLIMUS BLD-MCNC: 10.7 UG/L (ref 5–15)
TME LAST DOSE: NORMAL H
TME LAST DOSE: NORMAL H

## 2023-04-12 ENCOUNTER — TELEPHONE (OUTPATIENT)
Dept: TRANSPLANT | Facility: CLINIC | Age: 38
End: 2023-04-12

## 2023-04-12 ENCOUNTER — OFFICE VISIT (OUTPATIENT)
Dept: NEPHROLOGY | Facility: CLINIC | Age: 38
End: 2023-04-12
Attending: INTERNAL MEDICINE
Payer: MEDICARE

## 2023-04-12 ENCOUNTER — VIRTUAL VISIT (OUTPATIENT)
Dept: PHARMACY | Facility: CLINIC | Age: 38
End: 2023-04-12

## 2023-04-12 ENCOUNTER — LAB (OUTPATIENT)
Dept: LAB | Facility: CLINIC | Age: 38
End: 2023-04-12
Attending: INTERNAL MEDICINE
Payer: MEDICARE

## 2023-04-12 VITALS
TEMPERATURE: 98.7 F | DIASTOLIC BLOOD PRESSURE: 86 MMHG | OXYGEN SATURATION: 100 % | SYSTOLIC BLOOD PRESSURE: 136 MMHG | BODY MASS INDEX: 20.79 KG/M2 | HEART RATE: 75 BPM | WEIGHT: 153.3 LBS

## 2023-04-12 DIAGNOSIS — Z79.899 ENCOUNTER FOR LONG-TERM CURRENT USE OF MEDICATION: ICD-10-CM

## 2023-04-12 DIAGNOSIS — K21.9 GASTROESOPHAGEAL REFLUX DISEASE, UNSPECIFIED WHETHER ESOPHAGITIS PRESENT: ICD-10-CM

## 2023-04-12 DIAGNOSIS — G62.9 NEUROPATHY: ICD-10-CM

## 2023-04-12 DIAGNOSIS — Z94.0 KIDNEY REPLACED BY TRANSPLANT: ICD-10-CM

## 2023-04-12 DIAGNOSIS — Z20.828 CONTACT WITH AND (SUSPECTED) EXPOSURE TO OTHER VIRAL COMMUNICABLE DISEASES: ICD-10-CM

## 2023-04-12 DIAGNOSIS — Z94.0 KIDNEY REPLACED BY TRANSPLANT: Primary | ICD-10-CM

## 2023-04-12 DIAGNOSIS — R19.5 ABNORMAL FECES: ICD-10-CM

## 2023-04-12 DIAGNOSIS — D84.9 IMMUNOSUPPRESSION (H): ICD-10-CM

## 2023-04-12 DIAGNOSIS — Z48.298 AFTERCARE FOLLOWING ORGAN TRANSPLANT: ICD-10-CM

## 2023-04-12 PROBLEM — D63.8 ANEMIA IN OTHER CHRONIC DISEASES CLASSIFIED ELSEWHERE: Status: RESOLVED | Noted: 2023-03-04 | Resolved: 2023-04-12

## 2023-04-12 PROBLEM — I50.9 CONGESTIVE HEART FAILURE OF UNKNOWN ETIOLOGY (H): Status: RESOLVED | Noted: 2020-02-24 | Resolved: 2023-04-12

## 2023-04-12 PROBLEM — N17.9 AKI (ACUTE KIDNEY INJURY) (H): Status: RESOLVED | Noted: 2023-03-21 | Resolved: 2023-04-12

## 2023-04-12 PROBLEM — T86.19 PYELONEPHRITIS OF TRANSPLANTED KIDNEY: Status: RESOLVED | Noted: 2023-03-22 | Resolved: 2023-04-12

## 2023-04-12 PROBLEM — N35.014 POST-TRAUMATIC MALE URETHRAL STRICTURE: Status: RESOLVED | Noted: 2020-11-18 | Resolved: 2023-04-12

## 2023-04-12 PROBLEM — R19.7 DIARRHEA: Status: RESOLVED | Noted: 2023-03-22 | Resolved: 2023-04-12

## 2023-04-12 PROBLEM — E86.1 HYPOVOLEMIA: Status: RESOLVED | Noted: 2023-03-22 | Resolved: 2023-04-12

## 2023-04-12 PROBLEM — R13.19 OTHER DYSPHAGIA: Status: RESOLVED | Noted: 2020-12-03 | Resolved: 2023-04-12

## 2023-04-12 PROBLEM — Z29.89 NEED FOR PNEUMOCYSTIS PROPHYLAXIS: Status: RESOLVED | Noted: 2023-03-04 | Resolved: 2023-04-12

## 2023-04-12 PROBLEM — Z72.0 TOBACCO USE: Status: RESOLVED | Noted: 2020-02-25 | Resolved: 2023-04-12

## 2023-04-12 PROBLEM — N12 PYELONEPHRITIS OF TRANSPLANTED KIDNEY: Status: RESOLVED | Noted: 2023-03-22 | Resolved: 2023-04-12

## 2023-04-12 PROBLEM — R79.89 ELEVATED LFTS: Status: RESOLVED | Noted: 2023-01-18 | Resolved: 2023-04-12

## 2023-04-12 PROBLEM — G43.909 MIGRAINE: Status: RESOLVED | Noted: 2020-02-24 | Resolved: 2023-04-12

## 2023-04-12 LAB
ANION GAP SERPL CALCULATED.3IONS-SCNC: 8 MMOL/L (ref 7–15)
BUN SERPL-MCNC: 22.5 MG/DL (ref 6–20)
CALCIUM SERPL-MCNC: 9.2 MG/DL (ref 8.6–10)
CHLORIDE SERPL-SCNC: 108 MMOL/L (ref 98–107)
CREAT SERPL-MCNC: 1.43 MG/DL (ref 0.67–1.17)
DEPRECATED HCO3 PLAS-SCNC: 23 MMOL/L (ref 22–29)
ERYTHROCYTE [DISTWIDTH] IN BLOOD BY AUTOMATED COUNT: 14.1 % (ref 10–15)
GFR SERPL CREATININE-BSD FRML MDRD: 64 ML/MIN/1.73M2
GLUCOSE SERPL-MCNC: 92 MG/DL (ref 70–99)
HCT VFR BLD AUTO: 31.1 % (ref 40–53)
HGB BLD-MCNC: 9.9 G/DL (ref 13.3–17.7)
MAGNESIUM SERPL-MCNC: 1.9 MG/DL (ref 1.7–2.3)
MCH RBC QN AUTO: 30.7 PG (ref 26.5–33)
MCHC RBC AUTO-ENTMCNC: 31.8 G/DL (ref 31.5–36.5)
MCV RBC AUTO: 96 FL (ref 78–100)
PHOSPHATE SERPL-MCNC: 3.3 MG/DL (ref 2.5–4.5)
PLATELET # BLD AUTO: 250 10E3/UL (ref 150–450)
POTASSIUM SERPL-SCNC: 4.7 MMOL/L (ref 3.4–5.3)
RBC # BLD AUTO: 3.23 10E6/UL (ref 4.4–5.9)
SODIUM SERPL-SCNC: 139 MMOL/L (ref 136–145)
VIT B12 SERPL-MCNC: 2476 PG/ML (ref 232–1245)
WBC # BLD AUTO: 4.6 10E3/UL (ref 4–11)

## 2023-04-12 PROCEDURE — 80048 BASIC METABOLIC PNL TOTAL CA: CPT | Performed by: PATHOLOGY

## 2023-04-12 PROCEDURE — 85027 COMPLETE CBC AUTOMATED: CPT | Performed by: PATHOLOGY

## 2023-04-12 PROCEDURE — G0463 HOSPITAL OUTPT CLINIC VISIT: HCPCS | Performed by: INTERNAL MEDICINE

## 2023-04-12 PROCEDURE — 99207 PR NO CHARGE LOS: CPT | Performed by: PHARMACIST

## 2023-04-12 PROCEDURE — 99215 OFFICE O/P EST HI 40 MIN: CPT | Mod: 24 | Performed by: INTERNAL MEDICINE

## 2023-04-12 PROCEDURE — 82607 VITAMIN B-12: CPT | Performed by: INTERNAL MEDICINE

## 2023-04-12 PROCEDURE — 36415 COLL VENOUS BLD VENIPUNCTURE: CPT | Performed by: PATHOLOGY

## 2023-04-12 PROCEDURE — 87799 DETECT AGENT NOS DNA QUANT: CPT | Performed by: INTERNAL MEDICINE

## 2023-04-12 PROCEDURE — 83735 ASSAY OF MAGNESIUM: CPT | Performed by: PATHOLOGY

## 2023-04-12 PROCEDURE — 84100 ASSAY OF PHOSPHORUS: CPT | Performed by: PATHOLOGY

## 2023-04-12 ASSESSMENT — PAIN SCALES - GENERAL: PAINLEVEL: NO PAIN (0)

## 2023-04-12 NOTE — PATIENT INSTRUCTIONS
"Recommendations from today's MTM visit:                                                       1. Take Pantoprazole 40mg every other day for a couple weeks, then stop.   2. At 6 months after post (after 7/15)  transplant recommend getting Shingrix (2 doses 8 weeks apart).    Follow-up: as needed    It was great speaking with you today.  I value your experience and would be very thankful for your time in providing feedback in our clinic survey. In the next few days, you may receive an email or text message from [employer] with a link to a survey related to your  clinical pharmacist.\"     To schedule another MTM appointment, please call the clinic directly or you may call the MTM scheduling line at [phone] or toll-free at [phone].     My Clinical Pharmacist's contact information:                                                      Please feel free to contact me with any questions or concerns you have.      Oleksandr Ashraf, PharmD  MTM Pharmacist    Phone: [phone]     "

## 2023-04-12 NOTE — LETTER
4/12/2023       RE: Chip Fowler  [address] [address]     Dear Colleague,    Thank you for referring your patient, Chip Fowler, to the [facility] [place] [facility]. Please see a copy of my visit note below.    TRANSPLANT NEPHROLOGY EARLY POST TRANSPLANT VISIT    Assessment & Plan   # DDKT: Stable   - Baseline Creatinine: ~ 1.4-1.6   - Proteinuria: Normal (<0.2 grams)   - Date DSA Last Checked: Mar/2023      Latest DSA: No    - BK Viremia: No   - Kidney Tx Biopsy: No   - Transplant Ureteral Stent: Removed 3/1/23   - Elevated allosure 1% 3/27/23 likely 2/2 pyelo. Repeat now    -As long as tac and MPA at goal, can reduce frequency of lab draws to weekly     # Immunosuppression: Tacrolimus immediate release (goal 8-10) and Mycophenolic acid (dose 900 mg every 12 hours)    - Induction with Recent Transplant:  Intermediate Intensity   - Continue with intensive monitoring of immunosuppression for efficacy and toxicity.   - Changes: Not at this time. Prednisone 10mg daily was added 3/31-4/10 due to low tac levels. MPA levels had been low possibly 2/2 antibiotics. Obtain MPA levels tmrw      # Infection Prophylaxis:   - PJP: Sulfa/TMP (Bactrim)  - CMV: Valganciclovir (Valcyte) 900mg daily; Patient is CMV IgG Ab discordant (D+/R-) and will continue on Valcyte x 6 months, then check CMV PCR monthly until 12 months post transplant.    # Blood Pressure: Controlled;  Goal BP: < 140/90   - Volume status: Euvolemic   - Changes: Not at this time    # Anemia in Chronic Renal Disease: Hgb: Stable      SARA: No   - Iron studies: Low iron saturation, but high ferritin    # Mineral Bone Disorder:   - Secondary renal hyperparathyroidism; PTH level: Minimally elevated ( pg/ml)        On treatment: None  - Vitamin D; level: Low        On supplement: Yes, 2000 units daily  - Calcium; level: Normal        On supplement: No    #  Electrolytes:   - Potassium; level: Normal        On supplement: No  - Magnesium; level: Stable low        On supplement: Yes, mag ox 800mg daily   - Bicarbonate; level: Low normal        On supplement: Yes, bicarb 650mg bid    # H/o Cardiomyopathy: Normal LVEF at 55-60% with last cardiac echo 9/2022.    # Urethral Stricture: Patient is s/p buccal urethroplasty and diverticulum excision 12/2020. Follows with Urology.     # Diarrhea:   -During 3/2023 admission negative C diff, enteric panel, cmv PCR blood, and O+P stool     # Pyelonephritis:   -Treated E. feacalis pyelonephritis during 3/2023 admission. Off abx    # GERD: Asymptomatic on pantoprazole 40mg daily     # H/o Polysubstance Abuse: Patient with h/o methamphetamine and alcohol abuse.  Now sober.    # Neuropathy:   -new since transplant. Obtain B12    # Medical Compliance: Yes    # COVID-19 Virus Review: Discussed COVID-19 virus and the potential medical risks.  Reviewed preventative health recommendations, including wearing a mask where appropriate.  Recommended COVID vaccination should be up to date with either an initial vaccination or booster shot when appropriate.  Asked the patient to inform the transplant center if they are exposed or diagnosed with this virus.    # COVID Vaccination Up To Date: No, due for next dose at 3-4 months post transplant    # Transplant History:  Etiology of Kidney Failure: Solitary congenital kidney and h/o urethral calculus and urologic issues  Tx: DDKT  Transplant: 1/15/2023 (Kidney)  Donor Type: Donation after Circulatory Death  Donor Class:   Crossmatch at time of Tx: negative  DSA at time of Tx: No  Significant changes in immunosuppression: None  CMV IgG Ab High Risk Discordance (D+/R-): Yes  EBV IgG Ab High Risk Discordance (D+/R-): No  Significant transplant-related complications: None    Transplant Office Phone Number: [phone]    Assessment and plan was discussed with the patient and he voiced his understanding  and agreement.    Return visit: Return in 3 months (on 7/20/2023) for 6 month post transplant visit.    Franko Cerda MD    Chief Complaint   Mr. Fowler is a 38 year old here for kidney transplant and immunosuppression management.     History of Present Illness   Mr. Fowler was admitted 3/21-3/24 for fever, leukopenia, JACK, diarrhea as a transfer from [facility]. The patient had Scr 3.6 up from baseline of 1.4-1.7. He was found to have E.faecalis pyelonephritis, tac level was 25 on presentation. He received IV abx, tac held, IVF with improvement. His MMF was switched to MPA. He had a colonoscopy 3/29/23 that was negative for CMV but did now rare crypt epithelial apoptosis.     The patient feels well. He  denies nausea, vomiting, diarrhea (hasn't used lomotil since hospital discharge), fever, chills, shortness of breath, chest pain, LE edema, unintentional weight loss, nights sweats, dysuria, hematuria.         Home BP: 130/80s.    Problem List   Patient Active Problem List   Diagnosis     HTN, kidney transplant related     Solitary kidney, congenital     Nonischemic cardiomyopathy (H)     Bladder stones     Urethral stricture     Polysubstance abuse (H)     Congestive heart failure of unknown etiology (H)     Methamphetamine abuse, episodic (H)     Migraine     Nephrolithiasis     Tobacco use     Post-traumatic male urethral stricture     Urethral diverticulum     Allergic rhinitis, unspecified seasonality, unspecified trigger     Other dysphagia     Stage 3a chronic kidney disease (H)     Kidney replaced by transplant     Immunosuppression (H)     Elevated LFTs     Aftercare following organ transplant     Need for pneumocystis prophylaxis     Anemia in other chronic diseases classified elsewhere     Secondary renal hyperparathyroidism (H)     Vitamin D deficiency     Hypomagnesemia     JACK (acute kidney injury) (H)     Pyelonephritis of transplanted kidney     Diarrhea     Hypovolemia       Allergies   No Known  Allergies    Medications   Current Outpatient Medications   Medication Sig     magnesium oxide (MAG-OX) 400 MG tablet Take 2 tablets (800 mg) by mouth daily 6PM     mycophenolic acid (GENERIC EQUIVALENT) 180 MG EC tablet Take 5 tablets (900 mg) by mouth 2 times daily     pantoprazole (PROTONIX) 40 MG EC tablet Take 1 tablet (40 mg) by mouth every morning (before breakfast)     sodium bicarbonate 650 MG tablet Take 1 tablet (650 mg) by mouth 2 times daily     sulfamethoxazole-trimethoprim (BACTRIM) 400-80 MG tablet Take 1 tablet by mouth daily     tacrolimus (GENERIC EQUIVALENT) 1 MG capsule Take 6 capsules (6 mg) by mouth 2 times daily     valGANciclovir (VALCYTE) 450 MG tablet Take 2 tablets (900 mg) by mouth daily     VITAMIN D, CHOLECALCIFEROL, PO Take 2,000 Units by mouth daily     No current facility-administered medications for this visit.     Medications Discontinued During This Encounter   Medication Reason     diphenoxylate-atropine (LOMOTIL) 2.5-0.025 MG tablet      predniSONE (DELTASONE) 10 MG tablet        Physical Exam   Vital Signs: /86   Pulse 75   Temp 98.7  F (37.1  C) (Oral)   Wt 69.5 kg (153 lb 4.8 oz)   SpO2 100%   BMI 20.79 kg/m      GENERAL APPEARANCE: alert and no distress  HENT: mouth without ulcers or lesions  LYMPHATICS: no cervical or supraclavicular nodes  RESP: lungs clear to auscultation - no rales, rhonchi or wheezes  CV: regular rhythm, normal rate, no rub, no murmur  EDEMA: no LE edema bilaterally  ABDOMEN: soft, nondistended, nontender, bowel sounds normal  MS: extremities normal - no gross deformities noted, no evidence of inflammation in joints, no muscle tenderness  SKIN: no rash  TX KIDNEY: normal  DIALYSIS ACCESS: none    Data         Latest Ref Rng & Units 4/6/2023     3:43 PM 3/30/2023     3:35 PM 3/29/2023     3:35 PM   Renal   Sodium 136 - 145 mmol/L 137   136   135     K 3.4 - 5.3 mmol/L 4.6   4.9   4.7     Cl 98 - 107 mmol/L 103   105   106     Cl (external) 98  - 107 mmol/L 103   105   106     CO2 22 - 29 mmol/L 22   19   20     Urea Nitrogen 6.0 - 20.0 mg/dL 27.4   28.1   27.6     Creatinine 0.67 - 1.17 mg/dL 1.49   1.56   1.53     Glucose 70 - 99 mg/dL 94   94   82     Calcium 8.6 - 10.0 mg/dL 9.4   9.2   9.1     Magnesium 1.7 - 2.3 mg/dL 1.6    1.6           Latest Ref Rng & Units 4/6/2023     3:43 PM 3/29/2023     3:35 PM 3/27/2023     3:54 PM   Bone Health   Phosphorus 2.5 - 4.5 mg/dL 3.1   2.8   2.5           Latest Ref Rng & Units 4/10/2023     3:32 PM 4/6/2023     3:43 PM 4/3/2023     3:35 PM   Heme   WBC 4.0 - 11.0 10e3/uL 5.4   4.0   4.4     Hgb 13.3 - 17.7 g/dL 9.9   9.9   9.1     Plt 150 - 450 10e3/uL 291   381   434     ABSOLUTE NEUTROPHIL 1.6 - 8.3 10e3/uL  2.6      ABSOLUTE LYMPHOCYTES 0.8 - 5.3 10e3/uL  1.1      ABSOLUTE MONOCYTES 0.0 - 1.3 10e3/uL  0.2      ABSOLUTE EOSINOPHILS 0.0 - 0.7 10e3/uL  0.0            Latest Ref Rng & Units 4/6/2023     3:43 PM 3/19/2023     9:55 PM 3/6/2023     3:50 PM   Liver   AP 40 - 129 U/L 65   41   58     TBili <=1.2 mg/dL 0.2   0.4   0.3     Bilirubin Direct 0.00 - 0.30 mg/dL <0.20   <0.20   <0.20     ALT 10 - 50 U/L 42   40   19     AST 10 - 50 U/L 30   34   18     Tot Protein 6.4 - 8.3 g/dL 7.2   6.9   6.1     Albumin 3.5 - 5.2 g/dL 4.5   4.1   4.0           Latest Ref Rng & Units 3/19/2023     9:55 PM 1/15/2023     5:40 AM   Pancreas   A1C <5.7 %  5.2     Lipase (Roche) 13 - 60 U/L 20            Latest Ref Rng & Units 1/31/2023    10:49 AM 1/20/2023     7:46 AM   Iron studies   Iron 61 - 157 ug/dL 70   153     Iron Sat Index 15 - 46 % 27   63     Ferritin 31 - 409 ng/mL 430   717           Latest Ref Rng & Units 3/20/2023     7:31 AM 3/6/2023     3:50 PM 1/15/2023     5:40 AM   [facility] Txp Virology   CMV QUANT IU/ML Not Detected IU/mL Not Detected   Not Detected   Not Detected     EBV CAPSID ANTIBODY IGG No detectable antibody.   Positive     EBV DNA COPIES/ML Not Detected copies/mL Not Detected            Recent Labs    Lab Test 03/30/23  1535 04/03/23  1535 04/06/23  1543 04/10/23  1532   DOSTAC 3/30/2023  --  4/5/2023 4/10/2023   TACROL 4.8* 5.9 7.6 10.7     Recent Labs   Lab Test 03/06/23  1550 03/23/23  0437 03/27/23  1554 04/03/23  1535   DOSMPA 3/6/2023   3:50 AM  --  3/27/2023   3:50 AM  --    MPACID 5.53* <0.25* <0.25* 0.28*   MPAG 51.3 28.9* 14.4* 14.5*       Again, thank you for allowing me to participate in the care of your patient.      Sincerely,    Franko Cerda MD

## 2023-04-12 NOTE — TELEPHONE ENCOUNTER
Post Kidney and Pancreas Transplant Team Conference  Date: 4/12/2023  Transplant Coordinator: Veronica Edgar     Attendees:  []  Dr. Green [] Kate Chaves, RN [] Jessenia Bishop LPN     []  Dr. Pretty [] Veronica Edgar RN [] Kathy Allen LPN   []  Dr. Herrera [] Ivette Webb RN    []  Dr. Cerda [] Marcia Gurrola RN [] Oleksandr Ashraf, PharmD   [] Dr. Santos [] Katlyn Montelongo RN    [] Dr. Staton [] Kb Negron RN    [] Dr. Fan [] Rachael Oropeza RN [] Roma Collins RN   [] Dr. Sinha [] Phyllis Rosen RN    []  Dr. Emerson [] Carlyn Simmons RN    [] Dr. Soler [] Danielle Eubanks RN    [] Jane Man NP [] Yola Gasca RN        Verbal Plan Read Back:   Continue current treatment plan    Routed to RN Coordinator   Jessenia Bishop LPN

## 2023-04-12 NOTE — NURSING NOTE
Chief Complaint   Patient presents with     RECHECK       /86   Pulse 75   Temp 98.7  F (37.1  C) (Oral)   Wt 69.5 kg (153 lb 4.8 oz)   SpO2 100%   BMI 20.79 kg/m      Braeden Morales on 4/12/2023 at 11:24 AM

## 2023-04-12 NOTE — PROGRESS NOTES
TRANSPLANT NEPHROLOGY EARLY POST TRANSPLANT VISIT    Assessment & Plan   # DDKT: Stable   - Baseline Creatinine: ~ 1.4-1.6   - Proteinuria: Normal (<0.2 grams)   - Date DSA Last Checked: Mar/2023      Latest DSA: No    - BK Viremia: No   - Kidney Tx Biopsy: No   - Transplant Ureteral Stent: Removed 3/1/23   - Elevated allosure 1% 3/27/23 likely 2/2 pyelo. Repeat now    -As long as tac and MPA at goal, can reduce frequency of lab draws to weekly     # Immunosuppression: Tacrolimus immediate release (goal 8-10) and Mycophenolic acid (dose 900 mg every 12 hours)    - Induction with Recent Transplant:  Intermediate Intensity   - Continue with intensive monitoring of immunosuppression for efficacy and toxicity.   - Changes: Not at this time. Prednisone 10mg daily was added 3/31-4/10 due to low tac levels. MPA levels had been low possibly 2/2 antibiotics. Obtain MPA levels tmrw      # Infection Prophylaxis:   - PJP: Sulfa/TMP (Bactrim)  - CMV: Valganciclovir (Valcyte) 900mg daily; Patient is CMV IgG Ab discordant (D+/R-) and will continue on Valcyte x 6 months, then check CMV PCR monthly until 12 months post transplant.    # Blood Pressure: Controlled;  Goal BP: < 140/90   - Volume status: Euvolemic   - Changes: Not at this time    # Anemia in Chronic Renal Disease: Hgb: Stable      SARA: No   - Iron studies: Low iron saturation, but high ferritin    # Mineral Bone Disorder:   - Secondary renal hyperparathyroidism; PTH level: Minimally elevated ( pg/ml)        On treatment: None  - Vitamin D; level: Low        On supplement: Yes, 2000 units daily  - Calcium; level: Normal        On supplement: No    # Electrolytes:   - Potassium; level: Normal        On supplement: No  - Magnesium; level: Stable low        On supplement: Yes, mag ox 800mg daily   - Bicarbonate; level: Low normal        On supplement: Yes, bicarb 650mg bid    # H/o Cardiomyopathy: Normal LVEF at 55-60% with last cardiac echo 9/2022.    # Urethral  Stricture: Patient is s/p buccal urethroplasty and diverticulum excision 12/2020. Follows with Urology.     # Diarrhea:   -During 3/2023 admission negative C diff, enteric panel, cmv PCR blood, and O+P stool     # Pyelonephritis:   -Treated E. feacalis pyelonephritis during 3/2023 admission. Off abx    # GERD: Asymptomatic on pantoprazole 40mg daily     # H/o Polysubstance Abuse: Patient with h/o methamphetamine and alcohol abuse.  Now sober.    # Neuropathy:   -new since transplant. Obtain B12    # Medical Compliance: Yes    # COVID-19 Virus Review: Discussed COVID-19 virus and the potential medical risks.  Reviewed preventative health recommendations, including wearing a mask where appropriate.  Recommended COVID vaccination should be up to date with either an initial vaccination or booster shot when appropriate.  Asked the patient to inform the transplant center if they are exposed or diagnosed with this virus.    # COVID Vaccination Up To Date: No, due for next dose at 3-4 months post transplant    # Transplant History:  Etiology of Kidney Failure: Solitary congenital kidney and h/o urethral calculus and urologic issues  Tx: DDKT  Transplant: 1/15/2023 (Kidney)  Donor Type: Donation after Circulatory Death  Donor Class:   Crossmatch at time of Tx: negative  DSA at time of Tx: No  Significant changes in immunosuppression: None  CMV IgG Ab High Risk Discordance (D+/R-): Yes  EBV IgG Ab High Risk Discordance (D+/R-): No  Significant transplant-related complications: None    Transplant Office Phone Number: [phone]    Assessment and plan was discussed with the patient and he voiced his understanding and agreement.    Return visit: Return in 3 months (on 7/20/2023) for 6 month post transplant visit.    Franko Cerda MD    Chief Complaint   Mr. Fowler is a 38 year old here for kidney transplant and immunosuppression management.     History of Present Illness   Mr. Fowler was admitted 3/21-3/24 for fever,  leukopenia, JACK, diarrhea as a transfer from [facility]. The patient had Scr 3.6 up from baseline of 1.4-1.7. He was found to have E.faecalis pyelonephritis, tac level was 25 on presentation. He received IV abx, tac held, IVF with improvement. His MMF was switched to MPA. He had a colonoscopy 3/29/23 that was negative for CMV but did now rare crypt epithelial apoptosis.     The patient feels well. He  denies nausea, vomiting, diarrhea (hasn't used lomotil since hospital discharge), fever, chills, shortness of breath, chest pain, LE edema, unintentional weight loss, nights sweats, dysuria, hematuria.         Home BP: 130/80s.    Problem List   Patient Active Problem List   Diagnosis     HTN, kidney transplant related     Solitary kidney, congenital     Nonischemic cardiomyopathy (H)     Bladder stones     Urethral stricture     Polysubstance abuse (H)     Congestive heart failure of unknown etiology (H)     Methamphetamine abuse, episodic (H)     Migraine     Nephrolithiasis     Tobacco use     Post-traumatic male urethral stricture     Urethral diverticulum     Allergic rhinitis, unspecified seasonality, unspecified trigger     Other dysphagia     Stage 3a chronic kidney disease (H)     Kidney replaced by transplant     Immunosuppression (H)     Elevated LFTs     Aftercare following organ transplant     Need for pneumocystis prophylaxis     Anemia in other chronic diseases classified elsewhere     Secondary renal hyperparathyroidism (H)     Vitamin D deficiency     Hypomagnesemia     JACK (acute kidney injury) (H)     Pyelonephritis of transplanted kidney     Diarrhea     Hypovolemia       Allergies   No Known Allergies    Medications   Current Outpatient Medications   Medication Sig     magnesium oxide (MAG-OX) 400 MG tablet Take 2 tablets (800 mg) by mouth daily 6PM     mycophenolic acid (GENERIC EQUIVALENT) 180 MG EC tablet Take 5 tablets (900 mg) by mouth 2 times daily     pantoprazole (PROTONIX) 40 MG EC tablet Take 1  tablet (40 mg) by mouth every morning (before breakfast)     sodium bicarbonate 650 MG tablet Take 1 tablet (650 mg) by mouth 2 times daily     sulfamethoxazole-trimethoprim (BACTRIM) 400-80 MG tablet Take 1 tablet by mouth daily     tacrolimus (GENERIC EQUIVALENT) 1 MG capsule Take 6 capsules (6 mg) by mouth 2 times daily     valGANciclovir (VALCYTE) 450 MG tablet Take 2 tablets (900 mg) by mouth daily     VITAMIN D, CHOLECALCIFEROL, PO Take 2,000 Units by mouth daily     No current facility-administered medications for this visit.     Medications Discontinued During This Encounter   Medication Reason     diphenoxylate-atropine (LOMOTIL) 2.5-0.025 MG tablet      predniSONE (DELTASONE) 10 MG tablet        Physical Exam   Vital Signs: /86   Pulse 75   Temp 98.7  F (37.1  C) (Oral)   Wt 69.5 kg (153 lb 4.8 oz)   SpO2 100%   BMI 20.79 kg/m      GENERAL APPEARANCE: alert and no distress  HENT: mouth without ulcers or lesions  LYMPHATICS: no cervical or supraclavicular nodes  RESP: lungs clear to auscultation - no rales, rhonchi or wheezes  CV: regular rhythm, normal rate, no rub, no murmur  EDEMA: no LE edema bilaterally  ABDOMEN: soft, nondistended, nontender, bowel sounds normal  MS: extremities normal - no gross deformities noted, no evidence of inflammation in joints, no muscle tenderness  SKIN: no rash  TX KIDNEY: normal  DIALYSIS ACCESS: none    Data         Latest Ref Rng & Units 4/6/2023     3:43 PM 3/30/2023     3:35 PM 3/29/2023     3:35 PM   Renal   Sodium 136 - 145 mmol/L 137   136   135     K 3.4 - 5.3 mmol/L 4.6   4.9   4.7     Cl 98 - 107 mmol/L 103   105   106     Cl (external) 98 - 107 mmol/L 103   105   106     CO2 22 - 29 mmol/L 22   19   20     Urea Nitrogen 6.0 - 20.0 mg/dL 27.4   28.1   27.6     Creatinine 0.67 - 1.17 mg/dL 1.49   1.56   1.53     Glucose 70 - 99 mg/dL 94   94   82     Calcium 8.6 - 10.0 mg/dL 9.4   9.2   9.1     Magnesium 1.7 - 2.3 mg/dL 1.6    1.6           Latest Ref  Rng & Units 4/6/2023     3:43 PM 3/29/2023     3:35 PM 3/27/2023     3:54 PM   Bone Health   Phosphorus 2.5 - 4.5 mg/dL 3.1   2.8   2.5           Latest Ref Rng & Units 4/10/2023     3:32 PM 4/6/2023     3:43 PM 4/3/2023     3:35 PM   Heme   WBC 4.0 - 11.0 10e3/uL 5.4   4.0   4.4     Hgb 13.3 - 17.7 g/dL 9.9   9.9   9.1     Plt 150 - 450 10e3/uL 291   381   434     ABSOLUTE NEUTROPHIL 1.6 - 8.3 10e3/uL  2.6      ABSOLUTE LYMPHOCYTES 0.8 - 5.3 10e3/uL  1.1      ABSOLUTE MONOCYTES 0.0 - 1.3 10e3/uL  0.2      ABSOLUTE EOSINOPHILS 0.0 - 0.7 10e3/uL  0.0            Latest Ref Rng & Units 4/6/2023     3:43 PM 3/19/2023     9:55 PM 3/6/2023     3:50 PM   Liver   AP 40 - 129 U/L 65   41   58     TBili <=1.2 mg/dL 0.2   0.4   0.3     Bilirubin Direct 0.00 - 0.30 mg/dL <0.20   <0.20   <0.20     ALT 10 - 50 U/L 42   40   19     AST 10 - 50 U/L 30   34   18     Tot Protein 6.4 - 8.3 g/dL 7.2   6.9   6.1     Albumin 3.5 - 5.2 g/dL 4.5   4.1   4.0           Latest Ref Rng & Units 3/19/2023     9:55 PM 1/15/2023     5:40 AM   Pancreas   A1C <5.7 %  5.2     Lipase (Roche) 13 - 60 U/L 20            Latest Ref Rng & Units 1/31/2023    10:49 AM 1/20/2023     7:46 AM   Iron studies   Iron 61 - 157 ug/dL 70   153     Iron Sat Index 15 - 46 % 27   63     Ferritin 31 - 409 ng/mL 430   717           Latest Ref Rng & Units 3/20/2023     7:31 AM 3/6/2023     3:50 PM 1/15/2023     5:40 AM   [facility] Txp Virology   CMV QUANT IU/ML Not Detected IU/mL Not Detected   Not Detected   Not Detected     EBV CAPSID ANTIBODY IGG No detectable antibody.   Positive     EBV DNA COPIES/ML Not Detected copies/mL Not Detected            Recent Labs   Lab Test 03/30/23  1535 04/03/23  1535 04/06/23  1543 04/10/23  1532   DOSTAC 3/30/2023  --  4/5/2023 4/10/2023   TACROL 4.8* 5.9 7.6 10.7     Recent Labs   Lab Test 03/06/23  1550 03/23/23  0437 03/27/23  1554 04/03/23  1535   DOSMPA 3/6/2023   3:50 AM  --  3/27/2023   3:50 AM  --    MPACID 5.53* <0.25* <0.25*  0.28*   MPAG 51.3 28.9* 14.4* 14.5*

## 2023-04-12 NOTE — PROGRESS NOTES
Medication Therapy Management (MTM) Encounter    ASSESSMENT:                            Medication Adherence/Access: No issues identified    Renal Transplant:  Discussed vaccines both short-term and long-term.  Patient declines COVID-vaccine but is agreeable to getting the flu shot next year.  He will be due for Shingrix at 6 months posttransplant.    GERD: No indication for Pantoprazole. Patient may taper off.     Stools: Stable.    PLAN:                            1. Take Pantoprazole 40mg every other day for a couple weeks, then stop.   2. At 6 months after post (after 7/15)  transplant recommend getting Shingrix (2 doses 8 weeks apart).    Follow-up: as needed.    SUBJECTIVE/OBJECTIVE:                          Chip Fowler is a 38 year old male called for a follow-up visit.  Today's visit is a follow-up MTM visit from 1/23     Reason for visit: 3 month post txp.    Allergies/ADRs: Reviewed in chart  Past Medical History: Reviewed in chart  Tobacco: He reports that he quit smoking about 2 months ago. His smoking use included cigarettes. He started smoking about 21 years ago. He smoked an average of .5 packs per day. He has never used smokeless tobacco.  Alcohol: not currently using  THC/CBD: none     Medication Adherence/Access: no issues reported    Renal Transplant:  Current immunosuppressants include Tacrolimus 6 mg twice daily, Myfortic 900mg twice daily. Pt reports tremors (pt describes as mild).  Transplant date: 1/15/23  Estimated Creatinine Clearance: 68.2 mL/min (A) (based on SCr of 1.43 mg/dL (H)).  CMV prophylaxis: Valcyte 900mg once daily. Donor (+), Recipient (-), treat 6 months post tx .  PJP prophylaxis: Bactrim S S once daily.   Supplements: Sodium bicarbonate 650 mg twice daily and Vitamin D 2000 units daily, Magnesium 800mg daily.   Tx Coordinator: Yonathan Hernandez MD: Dr. Edwin Green, Using Med Card: Yes  Recent Infections: recent UTI with fever and hospitalization.   Immunizations: annual  flu shot 2020; Pneumovax 23:  2020; Prevnar 13: 2020; TDaP:  2020; Shingrix: none, HBV: immunity per last titer, COVID: Moderna x 3  Lab Results   Component Value Date    CO2 23 04/12/2023    CO2 22 04/06/2023    CO2 19 (L) 03/30/2023    MAG 1.9 04/12/2023    MAG 1.6 (L) 04/06/2023    MAG 1.6 (L) 03/29/2023     GERD: Current medications include: Protonix (pantoprazole) 40mg once daily. Patient reports no current symptoms.  Patient feels that current regimen is effective.    Stools: Pt has been having formed/ solid 3 BMs daily. Using Metamucil once daily.      Today's Vitals: There were no vitals taken for this visit.  ----------------  Post Discharge Medication Reconciliation Status: discharge medications reconciled and changed, per note/orders.    I spent 15 minutes with this patient today. All changes were made via collaborative practice agreement with Dr. Cerda. A copy of the visit note was provided to the patient's provider(s).    A summary of these recommendations was sent via [email or web address].    Oleksandr Ashraf, PharmD  MTM Pharmacist    Phone: [phone]     Telemedicine Visit Details  Type of service:  Telephone visit  Start Time: 2:01 PM  End Time: 2:14 PM     Medication Therapy Recommendations  Aftercare following organ transplant    Rationale: Preventive therapy - Needs additional medication therapy - Indication   Recommendation: Order Vaccine - Shingrix 50 MCG/0.5ML Susr   Status: Patient Agreed - Adherence/Education         Gastroesophageal reflux disease, unspecified whether esophagitis present    Current Medication: pantoprazole (PROTONIX) 40 MG EC tablet   Rationale: No medical indication at this time - Unnecessary medication therapy - Indication   Recommendation: Discontinue Medication   Status: Accepted per CPA

## 2023-04-12 NOTE — PATIENT INSTRUCTIONS
Patient Recommendations:  - No new recommendations at this time.    Transplant Patient Information  Your Post Transplant Coordinator is: Veronica Edgar  For non urgent items, we encourage you to contact your coordinator/care team online via [employer]  You and your care team can also contact your transplant coordinator Monday - Friday, 8am - 5pm at [phone] (Option 2 to reach the coordinator or Option 4 to schedule an appointment).  After hours for urgent matters, please call [facility] at [phone].

## 2023-04-13 ENCOUNTER — LAB (OUTPATIENT)
Dept: LAB | Facility: CLINIC | Age: 38
End: 2023-04-13
Payer: MEDICARE

## 2023-04-13 DIAGNOSIS — Z94.0 KIDNEY REPLACED BY TRANSPLANT: ICD-10-CM

## 2023-04-13 PROCEDURE — 80197 ASSAY OF TACROLIMUS: CPT

## 2023-04-13 PROCEDURE — 36415 COLL VENOUS BLD VENIPUNCTURE: CPT

## 2023-04-13 PROCEDURE — 80180 DRUG SCRN QUAN MYCOPHENOLATE: CPT

## 2023-04-14 LAB
BKV DNA # SPEC NAA+PROBE: NOT DETECTED COPIES/ML
MYCOPHENOLATE SERPL LC/MS/MS-MCNC: 9.17 MG/L (ref 1–3.5)
MYCOPHENOLATE-G SERPL LC/MS/MS-MCNC: 78.9 MG/L (ref 30–95)
TACROLIMUS BLD-MCNC: 10.7 UG/L (ref 5–15)
TME LAST DOSE: ABNORMAL H
TME LAST DOSE: ABNORMAL H
TME LAST DOSE: NORMAL H
TME LAST DOSE: NORMAL H

## 2023-04-14 RX ORDER — PREDNISONE 5 MG/1
5 TABLET ORAL DAILY
Qty: 30 TABLET | Refills: 11 | Status: CANCELLED | OUTPATIENT
Start: 2023-04-14

## 2023-04-15 ENCOUNTER — OFFICE VISIT (OUTPATIENT)
Dept: URGENT CARE | Facility: URGENT CARE | Age: 38
End: 2023-04-15
Payer: MEDICARE

## 2023-04-15 ENCOUNTER — MYC MEDICAL ADVICE (OUTPATIENT)
Dept: TRANSPLANT | Facility: CLINIC | Age: 38
End: 2023-04-15

## 2023-04-15 VITALS
HEART RATE: 88 BPM | TEMPERATURE: 98.2 F | OXYGEN SATURATION: 100 % | SYSTOLIC BLOOD PRESSURE: 126 MMHG | DIASTOLIC BLOOD PRESSURE: 73 MMHG

## 2023-04-15 DIAGNOSIS — R35.0 URINARY FREQUENCY: ICD-10-CM

## 2023-04-15 DIAGNOSIS — Z94.0 KIDNEY REPLACED BY TRANSPLANT: ICD-10-CM

## 2023-04-15 DIAGNOSIS — N30.01 ACUTE CYSTITIS WITH HEMATURIA: Primary | ICD-10-CM

## 2023-04-15 LAB
ALBUMIN UR-MCNC: >=300 MG/DL
ANION GAP SERPL CALCULATED.3IONS-SCNC: 9 MMOL/L (ref 7–15)
APPEARANCE UR: CLEAR
BACTERIA #/AREA URNS HPF: ABNORMAL /HPF
BILIRUB UR QL STRIP: NEGATIVE
BUN SERPL-MCNC: 20.6 MG/DL (ref 6–20)
CALCIUM SERPL-MCNC: 9.2 MG/DL (ref 8.6–10)
CHLORIDE SERPL-SCNC: 106 MMOL/L (ref 98–107)
COLOR UR AUTO: YELLOW
CREAT SERPL-MCNC: 1.55 MG/DL (ref 0.67–1.17)
DEPRECATED HCO3 PLAS-SCNC: 24 MMOL/L (ref 22–29)
ERYTHROCYTE [DISTWIDTH] IN BLOOD BY AUTOMATED COUNT: 14.2 % (ref 10–15)
GFR SERPL CREATININE-BSD FRML MDRD: 58 ML/MIN/1.73M2
GLUCOSE SERPL-MCNC: 100 MG/DL (ref 70–99)
GLUCOSE UR STRIP-MCNC: NEGATIVE MG/DL
HCT VFR BLD AUTO: 32.7 % (ref 40–53)
HGB BLD-MCNC: 10.7 G/DL (ref 13.3–17.7)
HGB UR QL STRIP: ABNORMAL
KETONES UR STRIP-MCNC: NEGATIVE MG/DL
LEUKOCYTE ESTERASE UR QL STRIP: ABNORMAL
MCH RBC QN AUTO: 31.5 PG (ref 26.5–33)
MCHC RBC AUTO-ENTMCNC: 32.7 G/DL (ref 31.5–36.5)
MCV RBC AUTO: 96 FL (ref 78–100)
NITRATE UR QL: NEGATIVE
PH UR STRIP: 6.5 [PH] (ref 5–7)
PLATELET # BLD AUTO: 229 10E3/UL (ref 150–450)
POTASSIUM SERPL-SCNC: 4.9 MMOL/L (ref 3.4–5.3)
RBC # BLD AUTO: 3.4 10E6/UL (ref 4.4–5.9)
RBC #/AREA URNS AUTO: ABNORMAL /HPF
SODIUM SERPL-SCNC: 139 MMOL/L (ref 136–145)
SP GR UR STRIP: 1.02 (ref 1–1.03)
UROBILINOGEN UR STRIP-ACNC: 0.2 E.U./DL
WBC # BLD AUTO: 6.7 10E3/UL (ref 4–11)
WBC #/AREA URNS AUTO: ABNORMAL /HPF

## 2023-04-15 PROCEDURE — 85027 COMPLETE CBC AUTOMATED: CPT | Performed by: NURSE PRACTITIONER

## 2023-04-15 PROCEDURE — 87086 URINE CULTURE/COLONY COUNT: CPT | Performed by: NURSE PRACTITIONER

## 2023-04-15 PROCEDURE — 36415 COLL VENOUS BLD VENIPUNCTURE: CPT | Performed by: NURSE PRACTITIONER

## 2023-04-15 PROCEDURE — 81001 URINALYSIS AUTO W/SCOPE: CPT

## 2023-04-15 PROCEDURE — 87088 URINE BACTERIA CULTURE: CPT | Performed by: NURSE PRACTITIONER

## 2023-04-15 PROCEDURE — 87186 SC STD MICRODIL/AGAR DIL: CPT | Performed by: NURSE PRACTITIONER

## 2023-04-15 PROCEDURE — 99214 OFFICE O/P EST MOD 30 MIN: CPT | Performed by: NURSE PRACTITIONER

## 2023-04-15 PROCEDURE — 80048 BASIC METABOLIC PNL TOTAL CA: CPT | Performed by: NURSE PRACTITIONER

## 2023-04-15 ASSESSMENT — ENCOUNTER SYMPTOMS
FATIGUE: 0
FLANK PAIN: 0
CHILLS: 0
NAUSEA: 0
DIAPHORESIS: 0
HEMATURIA: 0
DYSURIA: 1
UNEXPECTED WEIGHT CHANGE: 0
DIARRHEA: 0
FREQUENCY: 1
ABDOMINAL DISTENTION: 0
APPETITE CHANGE: 0
ABDOMINAL PAIN: 0
FEVER: 0
VOMITING: 0

## 2023-04-15 NOTE — PROGRESS NOTES
Assessment & Plan       ICD-10-CM    1. Acute cystitis with hematuria  N30.01 CBC with platelets     Basic metabolic panel  (Ca, Cl, CO2, Creat, Gluc, K, Na, BUN)     CBC with platelets     Basic metabolic panel  (Ca, Cl, CO2, Creat, Gluc, K, Na, BUN)     amoxicillin-clavulanate (AUGMENTIN) 875-125 MG tablet      2. Urinary frequency  R35.0 UA Macro with Reflex to Micro and Culture - lab collect     UA Macro with Reflex to Micro and Culture - lab collect     Urine Microscopic Exam     Urine Culture      3. Kidney replaced by transplant  Z94.0            Patient instructions:  Take antibiotics as prescribed with food. Increase probiotics either through OTC medications or yogurts. Take antibiotics with food to decrease chance of GI upset. If no improvement or worsening symptoms please follow up with PCP or higher level of care. Please follow up with nephrology Monday morning. Go to ER for any worsening symptoms.       Medical decision making: Pt presents with UTI symptoms that started last night. Ddx include pyelonephritis, UTI, JACK, sepsis among others. Pt does have a R kidney transplant and was admitted to the hospital about 1 month ago with JACK and pyelonephritis, but states this does not feel as severe as when he was in the hospital. UA ordered and indicates a UTI. Concerning that there is protein in urine so CBC and BMP ordered. Electrolytes WNL and no elevation to WBCs, BUN and Cr not elevated from baseline for patient. Opted to treat with augmentin here today as he was recently on amoxicillin for his previous UTI. Urine culture in process. Gave strict follow up instructions to follow up with transplant team / nephrology on Monday, but if anything worsens such as he develops abdominal pain, flank pain, fever etc to go to the ER. Pt agrees with plan.     Results for orders placed or performed in visit on 04/15/23   UA Macro with Reflex to Micro and Culture - lab collect     Status: Abnormal    Specimen: Urine,  Midstream   Result Value Ref Range    Color Urine Yellow Colorless, Straw, Light Yellow, Yellow    Appearance Urine Clear Clear    Glucose Urine Negative Negative mg/dL    Bilirubin Urine Negative Negative    Ketones Urine Negative Negative mg/dL    Specific Gravity Urine 1.020 1.003 - 1.035    Blood Urine Large (A) Negative    pH Urine 6.5 5.0 - 7.0    Protein Albumin Urine >=300 (A) Negative mg/dL    Urobilinogen Urine 0.2 0.2, 1.0 E.U./dL    Nitrite Urine Negative Negative    Leukocyte Esterase Urine Moderate (A) Negative   Urine Microscopic Exam     Status: Abnormal   Result Value Ref Range    Bacteria Urine Moderate (A) None Seen /HPF    RBC Urine 10-25 (A) 0-2 /HPF /HPF    WBC Urine  (A) 0-5 /HPF /HPF   CBC with platelets     Status: Abnormal   Result Value Ref Range    WBC Count 6.7 4.0 - 11.0 10e3/uL    RBC Count 3.40 (L) 4.40 - 5.90 10e6/uL    Hemoglobin 10.7 (L) 13.3 - 17.7 g/dL    Hematocrit 32.7 (L) 40.0 - 53.0 %    MCV 96 78 - 100 fL    MCH 31.5 26.5 - 33.0 pg    MCHC 32.7 31.5 - 36.5 g/dL    RDW 14.2 10.0 - 15.0 %    Platelet Count 229 150 - 450 10e3/uL   Basic metabolic panel  (Ca, Cl, CO2, Creat, Gluc, K, Na, BUN)     Status: Abnormal   Result Value Ref Range    Sodium 139 136 - 145 mmol/L    Potassium 4.9 3.4 - 5.3 mmol/L    Chloride 106 98 - 107 mmol/L    Carbon Dioxide (CO2) 24 22 - 29 mmol/L    Anion Gap 9 7 - 15 mmol/L    Urea Nitrogen 20.6 (H) 6.0 - 20.0 mg/dL    Creatinine 1.55 (H) 0.67 - 1.17 mg/dL    Calcium 9.2 8.6 - 10.0 mg/dL    Glucose 100 (H) 70 - 99 mg/dL    GFR Estimate 58 (L) >60 mL/min/1.73m2          No follow-ups on file.    At the end of the encounter, I discussed results, diagnosis, medications. Discussed red flags for immediate return to clinic/ER, as well as indications for follow up if no improvement. Patient understood and agreed to plan. Patient was stable for discharge.    Subjective     Chip is a 38 year old male who presents to clinic today the following health  issues:  Chief Complaint   Patient presents with     Urgent Care     Possible of UTI,frequency urination which started yesterday. Pt is kidney transplant patient.     Pt reports urinary frequency that started last night with dysuria. Denies any concern for STIs. Pt states he does feel intermittent left flank pain (his non transplant kidney), none currently and no fevers. Did have a UTI about 1 month ago where he was admitted to the hospital, however that time he did have fever and abdominal pain and diarrhea which he states he does not have now. Pt was treated with amox at that time and symptoms did resolve.           Review of Systems   Constitutional: Negative for appetite change, chills, diaphoresis, fatigue, fever and unexpected weight change.   Gastrointestinal: Negative for abdominal distention, abdominal pain, diarrhea, nausea and vomiting.   Genitourinary: Positive for dysuria, frequency and urgency. Negative for flank pain, hematuria, scrotal swelling and testicular pain.       Problem List:  2023-03: Pyelonephritis of transplanted kidney  2023-03: Diarrhea  2023-03: Hypovolemia  2023-03: JACK (acute kidney injury) (H)  2023-03: Aftercare following organ transplant  2023-03: Need for pneumocystis prophylaxis  2023-03: Anemia in other chronic diseases classified elsewhere  2023-03: Secondary renal hyperparathyroidism (H)  2023-03: Vitamin D deficiency  2023-03: Hypomagnesemia  2023-02: Dehydration  2023-01: Immunosuppression (H)  2023-01: Elevated LFTs  2023-01: Hyperkalemia  2023-01: Kidney replaced by transplant  2023-01: Transplant, organ  2022-09: Pre-operative cardiovascular examination  2021-05: Stage 3a chronic kidney disease (H)  2020-12: Leukocytosis  2020-12: Allergic rhinitis, unspecified seasonality, unspecified   trigger  2020-12: Other dysphagia  2020-11: Post-traumatic male urethral stricture  2020-11: Urethral diverticulum  2020-08: Urethral calculus  2020-06: ESRD (end stage renal disease) on  dialysis (H)  2020: Tobacco use  2020: Unilateral congenital absence of kidney  2020: Abnormal urinalysis  2020: Acute renal failure superimposed on chronic kidney disease   (H)  2020: Congestive heart failure of unknown etiology (H)  2020: Methamphetamine abuse, episodic (H)  2020: Migraine  2014: Nephrolithiasis  2014: Acute retention of urine  HTN, kidney transplant related  Solitary kidney, congenital  Nonischemic cardiomyopathy (H)  Bladder stones  Urethral stone  Urethral stricture  Polysubstance abuse (H)      Past Medical History:   Diagnosis Date     Bladder stones      Cardiomyopathy (H)      ESRD (end stage renal disease) on dialysis (H)      Hypertension      Kidney replaced by transplant 01/15/2023    DCD DDKT. Intermediate risk induction.     Migraines      Polysubstance abuse (H)      Solitary kidney, congenital      Urethral stone      Urethral stricture        Social History     Tobacco Use     Smoking status: Former     Packs/day: 0.50     Types: Cigarettes     Start date:      Quit date: 1/15/2023     Years since quittin.2     Smokeless tobacco: Never   Vaping Use     Vaping status: Never Used   Substance Use Topics     Alcohol use: Not Currently     Comment: quit alcohol 3-4 yrs ago           Objective    /73 (BP Location: Right arm, Patient Position: Sitting, Cuff Size: Adult Small)   Pulse 88   Temp 98.2  F (36.8  C) (Tympanic)   SpO2 100%   Physical Exam  Constitutional:       Appearance: Normal appearance. He is not toxic-appearing or diaphoretic.   HENT:      Head: Normocephalic and atraumatic.   Cardiovascular:      Rate and Rhythm: Normal rate.   Pulmonary:      Effort: Pulmonary effort is normal.   Abdominal:      General: Abdomen is flat.      Tenderness: There is no right CVA tenderness or left CVA tenderness.   Neurological:      Mental Status: He is alert.              JESSE DRISCOLL CNP

## 2023-04-15 NOTE — PATIENT INSTRUCTIONS
The BMP takes a few hours to come back and I will call you with results when they come back. If your creatine is elevated then we will need to send you to the ER for further evaluation. If it is not elevated please start the antibiotic and follow up with nephrology on Monday. Go to ER if any symptoms worsen.

## 2023-04-17 ENCOUNTER — LAB (OUTPATIENT)
Dept: LAB | Facility: CLINIC | Age: 38
End: 2023-04-17
Payer: MEDICARE

## 2023-04-17 ENCOUNTER — TELEPHONE (OUTPATIENT)
Dept: TRANSPLANT | Facility: CLINIC | Age: 38
End: 2023-04-17

## 2023-04-17 DIAGNOSIS — Z94.0 KIDNEY REPLACED BY TRANSPLANT: ICD-10-CM

## 2023-04-17 DIAGNOSIS — Z79.899 ENCOUNTER FOR LONG-TERM CURRENT USE OF MEDICATION: ICD-10-CM

## 2023-04-17 DIAGNOSIS — Z94.0 KIDNEY REPLACED BY TRANSPLANT: Primary | ICD-10-CM

## 2023-04-17 DIAGNOSIS — Z48.298 AFTERCARE FOLLOWING ORGAN TRANSPLANT: ICD-10-CM

## 2023-04-17 DIAGNOSIS — Z20.828 CONTACT WITH AND (SUSPECTED) EXPOSURE TO OTHER VIRAL COMMUNICABLE DISEASES: ICD-10-CM

## 2023-04-17 LAB
ANION GAP SERPL CALCULATED.3IONS-SCNC: 11 MMOL/L (ref 7–15)
BASOPHILS # BLD MANUAL: 0.1 10E3/UL (ref 0–0.2)
BASOPHILS NFR BLD MANUAL: 1 %
BUN SERPL-MCNC: 22.5 MG/DL (ref 6–20)
CALCIUM SERPL-MCNC: 9 MG/DL (ref 8.6–10)
CHLORIDE SERPL-SCNC: 101 MMOL/L (ref 98–107)
CREAT SERPL-MCNC: 1.89 MG/DL (ref 0.67–1.17)
DEPRECATED HCO3 PLAS-SCNC: 22 MMOL/L (ref 22–29)
EOSINOPHIL # BLD MANUAL: 0.2 10E3/UL (ref 0–0.7)
EOSINOPHIL NFR BLD MANUAL: 3 %
ERYTHROCYTE [DISTWIDTH] IN BLOOD BY AUTOMATED COUNT: 13.9 % (ref 10–15)
GFR SERPL CREATININE-BSD FRML MDRD: 46 ML/MIN/1.73M2
GLUCOSE SERPL-MCNC: 116 MG/DL (ref 70–99)
HCT VFR BLD AUTO: 27.5 % (ref 40–53)
HGB BLD-MCNC: 9 G/DL (ref 13.3–17.7)
LYMPHOCYTES # BLD MANUAL: 0.6 10E3/UL (ref 0.8–5.3)
LYMPHOCYTES NFR BLD MANUAL: 10 %
MAGNESIUM SERPL-MCNC: 1.5 MG/DL (ref 1.7–2.3)
MCH RBC QN AUTO: 31 PG (ref 26.5–33)
MCHC RBC AUTO-ENTMCNC: 32.7 G/DL (ref 31.5–36.5)
MCV RBC AUTO: 95 FL (ref 78–100)
MONOCYTES # BLD MANUAL: 0.6 10E3/UL (ref 0–1.3)
MONOCYTES NFR BLD MANUAL: 10 %
NEUTROPHILS # BLD MANUAL: 4.7 10E3/UL (ref 1.6–8.3)
NEUTROPHILS NFR BLD MANUAL: 76 %
PHOSPHATE SERPL-MCNC: 2.4 MG/DL (ref 2.5–4.5)
PLAT MORPH BLD: ABNORMAL
PLATELET # BLD AUTO: 154 10E3/UL (ref 150–450)
POTASSIUM SERPL-SCNC: 4.2 MMOL/L (ref 3.4–5.3)
RBC # BLD AUTO: 2.9 10E6/UL (ref 4.4–5.9)
RBC MORPH BLD: ABNORMAL
SODIUM SERPL-SCNC: 134 MMOL/L (ref 136–145)
TACROLIMUS BLD-MCNC: 13.3 UG/L (ref 5–15)
TME LAST DOSE: NORMAL H
TME LAST DOSE: NORMAL H
WBC # BLD AUTO: 6.2 10E3/UL (ref 4–11)

## 2023-04-17 PROCEDURE — 83735 ASSAY OF MAGNESIUM: CPT

## 2023-04-17 PROCEDURE — 36415 COLL VENOUS BLD VENIPUNCTURE: CPT

## 2023-04-17 PROCEDURE — 80197 ASSAY OF TACROLIMUS: CPT

## 2023-04-17 PROCEDURE — 85014 HEMATOCRIT: CPT

## 2023-04-17 PROCEDURE — 84100 ASSAY OF PHOSPHORUS: CPT

## 2023-04-17 PROCEDURE — 80048 BASIC METABOLIC PNL TOTAL CA: CPT

## 2023-04-17 PROCEDURE — 85007 BL SMEAR W/DIFF WBC COUNT: CPT

## 2023-04-17 PROCEDURE — 80180 DRUG SCRN QUAN MYCOPHENOLATE: CPT

## 2023-04-17 NOTE — TELEPHONE ENCOUNTER
Called regarding Creatinine     Issue tacrolimus  13.3  Above goal level   ISSUE:    .    PLAN:   Please call patient and confirm this was an accurate 12-hour trough. Verify Tacrolimus IR dose  6  mg BID. Confirm no new medications or illness. Confirm no missed doses. If accurate trough and accurate dose, decrease Tacrolimus IR dose to 5 mg BID and repeat labs in Thurs of this week

## 2023-04-18 DIAGNOSIS — N39.0 ACUTE UTI: Primary | ICD-10-CM

## 2023-04-18 LAB
BACTERIA UR CULT: ABNORMAL
BACTERIA UR CULT: ABNORMAL
MYCOPHENOLATE SERPL LC/MS/MS-MCNC: 6.31 MG/L (ref 1–3.5)
MYCOPHENOLATE-G SERPL LC/MS/MS-MCNC: 66.8 MG/L (ref 30–95)
TME LAST DOSE: ABNORMAL H
TME LAST DOSE: ABNORMAL H

## 2023-04-18 RX ORDER — CEFDINIR 300 MG/1
300 CAPSULE ORAL 2 TIMES DAILY
Qty: 14 CAPSULE | Refills: 0 | Status: SHIPPED | OUTPATIENT
Start: 2023-04-18 | End: 2023-04-25

## 2023-04-18 NOTE — TELEPHONE ENCOUNTER
Spoke to patient who confirms this was an accurate 12-hour trough. Verified Tacrolimus IR dose  6  mg BID. Confirmed no new medications or illness. Confirmed no missed doses. Patient confirms decrease Tacrolimus IR dose to 5 mg BID and repeat labs in Thurs of this week

## 2023-04-19 ENCOUNTER — TELEPHONE (OUTPATIENT)
Dept: TRANSPLANT | Facility: CLINIC | Age: 38
End: 2023-04-19
Payer: MEDICARE

## 2023-04-19 DIAGNOSIS — Z94.0 KIDNEY REPLACED BY TRANSPLANT: Primary | ICD-10-CM

## 2023-04-19 RX ORDER — MYCOPHENOLIC ACID 180 MG/1
720 TABLET, DELAYED RELEASE ORAL 2 TIMES DAILY
Qty: 240 TABLET | Refills: 11 | Status: ON HOLD | OUTPATIENT
Start: 2023-04-19 | End: 2023-05-07

## 2023-04-19 RX ORDER — TACROLIMUS 1 MG/1
5 CAPSULE ORAL 2 TIMES DAILY
Qty: 300 CAPSULE | Refills: 11 | Status: SHIPPED | OUTPATIENT
Start: 2023-04-19 | End: 2023-04-21

## 2023-04-19 NOTE — TELEPHONE ENCOUNTER
Spoke to patient who confirms MPA level is representative of 900mg bid please reduce to 720mg bid.

## 2023-04-19 NOTE — TELEPHONE ENCOUNTER
Janeth, Veronica Dupont, RN  Phone Number: [phone]     Hello, i noticed Friday night i was feeling a little achie bladder area again and woke up Saturday morning peeing frequently again with low urine in the bladder. So I went down to urgent care with a suspected UTI and now a confrim UTI.   May have seeked treatment early enough that I have not had a fever or anything else yet.       I'm on amoxicillin-clavulanate. Is there anything we can do to prevent the UTI if there going to be an issue every month?           Update on health   (Newest Message First)  View All Conversations on this Encounter  Chip Fowler  You 4 days ago        Hello, i noticed Friday night i was feeling a little achie bladder area again and woke up Saturday morning peeing frequently again with low urine in the bladder. So I went down to urgent care with a suspected UTI and now a confrim UTI.   May have seeked treatment early enough that I have not had a fever or anything else yet.        I'm on amoxicillin-clavulanate. Is there anything we can do to prevent the UTI if there going to be an issue every month?      Telephone encounter after this message   Plan to follow up with ID and Urology

## 2023-04-19 NOTE — TELEPHONE ENCOUNTER
Franko Cerda MD Huepfel, Mary K, RN  If this MPA level is representative of 900mg bid please reduce to 720mg bid.       Task      Please contact Chip to reduce his  Myfortic mycophenolic acid

## 2023-04-20 ENCOUNTER — LAB (OUTPATIENT)
Dept: LAB | Facility: CLINIC | Age: 38
End: 2023-04-20
Payer: MEDICARE

## 2023-04-20 DIAGNOSIS — Z94.0 KIDNEY REPLACED BY TRANSPLANT: ICD-10-CM

## 2023-04-20 LAB
ANION GAP SERPL CALCULATED.3IONS-SCNC: 9 MMOL/L (ref 7–15)
BASOPHILS # BLD MANUAL: 0 10E3/UL (ref 0–0.2)
BASOPHILS NFR BLD MANUAL: 1 %
BUN SERPL-MCNC: 28.2 MG/DL (ref 6–20)
CALCIUM SERPL-MCNC: 9.2 MG/DL (ref 8.6–10)
CHLORIDE SERPL-SCNC: 106 MMOL/L (ref 98–107)
CREAT SERPL-MCNC: 1.65 MG/DL (ref 0.67–1.17)
DEPRECATED HCO3 PLAS-SCNC: 22 MMOL/L (ref 22–29)
EOSINOPHIL # BLD MANUAL: 0.4 10E3/UL (ref 0–0.7)
EOSINOPHIL NFR BLD MANUAL: 10 %
ERYTHROCYTE [DISTWIDTH] IN BLOOD BY AUTOMATED COUNT: 13.6 % (ref 10–15)
GFR SERPL CREATININE-BSD FRML MDRD: 54 ML/MIN/1.73M2
GLUCOSE SERPL-MCNC: 83 MG/DL (ref 70–99)
HCT VFR BLD AUTO: 27.6 % (ref 40–53)
HGB BLD-MCNC: 8.9 G/DL (ref 13.3–17.7)
LYMPHOCYTES # BLD MANUAL: 0.8 10E3/UL (ref 0.8–5.3)
LYMPHOCYTES NFR BLD MANUAL: 23 %
MCH RBC QN AUTO: 30.9 PG (ref 26.5–33)
MCHC RBC AUTO-ENTMCNC: 32.2 G/DL (ref 31.5–36.5)
MCV RBC AUTO: 96 FL (ref 78–100)
MONOCYTES # BLD MANUAL: 0.4 10E3/UL (ref 0–1.3)
MONOCYTES NFR BLD MANUAL: 11 %
NEUTROPHILS # BLD MANUAL: 2 10E3/UL (ref 1.6–8.3)
NEUTROPHILS NFR BLD MANUAL: 55 %
PLAT MORPH BLD: NORMAL
PLATELET # BLD AUTO: 173 10E3/UL (ref 150–450)
POTASSIUM SERPL-SCNC: 4.8 MMOL/L (ref 3.4–5.3)
RBC # BLD AUTO: 2.88 10E6/UL (ref 4.4–5.9)
RBC MORPH BLD: NORMAL
SODIUM SERPL-SCNC: 137 MMOL/L (ref 136–145)
WBC # BLD AUTO: 3.6 10E3/UL (ref 4–11)

## 2023-04-20 PROCEDURE — 36415 COLL VENOUS BLD VENIPUNCTURE: CPT

## 2023-04-20 PROCEDURE — 85007 BL SMEAR W/DIFF WBC COUNT: CPT

## 2023-04-20 PROCEDURE — 80197 ASSAY OF TACROLIMUS: CPT

## 2023-04-20 PROCEDURE — 85014 HEMATOCRIT: CPT

## 2023-04-20 PROCEDURE — 82310 ASSAY OF CALCIUM: CPT

## 2023-04-21 ENCOUNTER — TELEPHONE (OUTPATIENT)
Dept: TRANSPLANT | Facility: CLINIC | Age: 38
End: 2023-04-21
Payer: MEDICARE

## 2023-04-21 DIAGNOSIS — Z94.0 KIDNEY REPLACED BY TRANSPLANT: ICD-10-CM

## 2023-04-21 LAB
TACROLIMUS BLD-MCNC: 16.2 UG/L (ref 5–15)
TME LAST DOSE: ABNORMAL H
TME LAST DOSE: ABNORMAL H

## 2023-04-21 RX ORDER — TACROLIMUS 1 MG/1
4 CAPSULE ORAL 2 TIMES DAILY
Qty: 240 CAPSULE | Refills: 11 | Status: SHIPPED | OUTPATIENT
Start: 2023-04-21 | End: 2023-05-02

## 2023-04-21 NOTE — TELEPHONE ENCOUNTER
ISSUE:   Tacrolimus IR level 16.2 on 4/20, goal 8-10, dose 5 mg BID.    PLAN:   Please call patient and confirm this was an accurate 12-hour trough. Verify Tacrolimus IR dose 5 mg BID. Confirm no new medications or illness. Confirm no missed doses. If accurate trough and accurate dose, decrease Tacrolimus IR dose to 4 mg BID and repeat labs in 1 weeks    OUTCOME:   Spoke with patient, they confirm accurate trough level and current dose 5 mg BID. Patient confirmed dose change to 4 mg BID and to repeat labs in 1 weeks. Orders sent to preferred pharmacy for dose change and lab for repeat labs. Patient voiced understanding of plan.

## 2023-04-24 ENCOUNTER — LAB (OUTPATIENT)
Dept: LAB | Facility: CLINIC | Age: 38
End: 2023-04-24
Payer: MEDICARE

## 2023-04-24 ENCOUNTER — TELEPHONE (OUTPATIENT)
Dept: TRANSPLANT | Facility: CLINIC | Age: 38
End: 2023-04-24

## 2023-04-24 DIAGNOSIS — Z94.0 KIDNEY REPLACED BY TRANSPLANT: Primary | ICD-10-CM

## 2023-04-24 DIAGNOSIS — Z94.0 KIDNEY REPLACED BY TRANSPLANT: ICD-10-CM

## 2023-04-24 DIAGNOSIS — N39.0 URINARY TRACT INFECTION: ICD-10-CM

## 2023-04-24 LAB
ANION GAP SERPL CALCULATED.3IONS-SCNC: 9 MMOL/L (ref 7–15)
BASOPHILS # BLD MANUAL: 0.1 10E3/UL (ref 0–0.2)
BASOPHILS NFR BLD MANUAL: 3 %
BUN SERPL-MCNC: 30.4 MG/DL (ref 6–20)
CALCIUM SERPL-MCNC: 9.3 MG/DL (ref 8.6–10)
CHLORIDE SERPL-SCNC: 107 MMOL/L (ref 98–107)
CREAT SERPL-MCNC: 1.6 MG/DL (ref 0.67–1.17)
DEPRECATED HCO3 PLAS-SCNC: 21 MMOL/L (ref 22–29)
EOSINOPHIL # BLD MANUAL: 0.4 10E3/UL (ref 0–0.7)
EOSINOPHIL NFR BLD MANUAL: 11 %
ERYTHROCYTE [DISTWIDTH] IN BLOOD BY AUTOMATED COUNT: 14.1 % (ref 10–15)
GFR SERPL CREATININE-BSD FRML MDRD: 56 ML/MIN/1.73M2
GLUCOSE SERPL-MCNC: 98 MG/DL (ref 70–99)
HCT VFR BLD AUTO: 29.4 % (ref 40–53)
HGB BLD-MCNC: 9.5 G/DL (ref 13.3–17.7)
LYMPHOCYTES # BLD MANUAL: 0.6 10E3/UL (ref 0.8–5.3)
LYMPHOCYTES NFR BLD MANUAL: 15 %
MCH RBC QN AUTO: 30.8 PG (ref 26.5–33)
MCHC RBC AUTO-ENTMCNC: 32.3 G/DL (ref 31.5–36.5)
MCV RBC AUTO: 96 FL (ref 78–100)
MONOCYTES # BLD MANUAL: 0.2 10E3/UL (ref 0–1.3)
MONOCYTES NFR BLD MANUAL: 6 %
NEUTROPHILS # BLD MANUAL: 2.4 10E3/UL (ref 1.6–8.3)
NEUTROPHILS NFR BLD MANUAL: 65 %
PLAT MORPH BLD: ABNORMAL
PLATELET # BLD AUTO: 249 10E3/UL (ref 150–450)
POTASSIUM SERPL-SCNC: 4.5 MMOL/L (ref 3.4–5.3)
RBC # BLD AUTO: 3.08 10E6/UL (ref 4.4–5.9)
RBC MORPH BLD: ABNORMAL
SODIUM SERPL-SCNC: 137 MMOL/L (ref 136–145)
WBC # BLD AUTO: 3.7 10E3/UL (ref 4–11)

## 2023-04-24 PROCEDURE — 82435 ASSAY OF BLOOD CHLORIDE: CPT

## 2023-04-24 PROCEDURE — 36415 COLL VENOUS BLD VENIPUNCTURE: CPT

## 2023-04-24 PROCEDURE — 80197 ASSAY OF TACROLIMUS: CPT

## 2023-04-24 PROCEDURE — 82374 ASSAY BLOOD CARBON DIOXIDE: CPT

## 2023-04-24 PROCEDURE — 80180 DRUG SCRN QUAN MYCOPHENOLATE: CPT

## 2023-04-24 PROCEDURE — 85027 COMPLETE CBC AUTOMATED: CPT

## 2023-04-24 PROCEDURE — 85007 BL SMEAR W/DIFF WBC COUNT: CPT

## 2023-04-24 NOTE — TELEPHONE ENCOUNTER
Chip Fowler, Veronica SWAN, RN  Phone Number: [phone]     Just a fyi urgent care called a few days ago, added a med that's is called CEFDINIR they saw a 2nd bacteria.

## 2023-04-25 LAB
MYCOPHENOLATE SERPL LC/MS/MS-MCNC: 1.12 MG/L (ref 1–3.5)
MYCOPHENOLATE-G SERPL LC/MS/MS-MCNC: 53.4 MG/L (ref 30–95)
TACROLIMUS BLD-MCNC: 8.4 UG/L (ref 5–15)
TME LAST DOSE: NORMAL H

## 2023-04-27 ENCOUNTER — LAB (OUTPATIENT)
Dept: LAB | Facility: CLINIC | Age: 38
End: 2023-04-27
Payer: MEDICARE

## 2023-04-27 DIAGNOSIS — Z94.0 KIDNEY REPLACED BY TRANSPLANT: ICD-10-CM

## 2023-04-27 DIAGNOSIS — Z79.899 ENCOUNTER FOR LONG-TERM CURRENT USE OF MEDICATION: ICD-10-CM

## 2023-04-27 DIAGNOSIS — Z20.828 CONTACT WITH AND (SUSPECTED) EXPOSURE TO OTHER VIRAL COMMUNICABLE DISEASES: ICD-10-CM

## 2023-04-27 DIAGNOSIS — Z48.298 AFTERCARE FOLLOWING ORGAN TRANSPLANT: ICD-10-CM

## 2023-04-27 LAB
ANION GAP SERPL CALCULATED.3IONS-SCNC: 9 MMOL/L (ref 7–15)
BUN SERPL-MCNC: 28 MG/DL (ref 6–20)
CALCIUM SERPL-MCNC: 9.2 MG/DL (ref 8.6–10)
CHLORIDE SERPL-SCNC: 105 MMOL/L (ref 98–107)
CREAT SERPL-MCNC: 1.57 MG/DL (ref 0.67–1.17)
DEPRECATED HCO3 PLAS-SCNC: 22 MMOL/L (ref 22–29)
ERYTHROCYTE [DISTWIDTH] IN BLOOD BY AUTOMATED COUNT: 13.9 % (ref 10–15)
GFR SERPL CREATININE-BSD FRML MDRD: 57 ML/MIN/1.73M2
GLUCOSE SERPL-MCNC: 91 MG/DL (ref 70–99)
HCT VFR BLD AUTO: 30.5 % (ref 40–53)
HGB BLD-MCNC: 10 G/DL (ref 13.3–17.7)
MCH RBC QN AUTO: 31.2 PG (ref 26.5–33)
MCHC RBC AUTO-ENTMCNC: 32.8 G/DL (ref 31.5–36.5)
MCV RBC AUTO: 95 FL (ref 78–100)
PLATELET # BLD AUTO: 260 10E3/UL (ref 150–450)
POTASSIUM SERPL-SCNC: 4.4 MMOL/L (ref 3.4–5.3)
RBC # BLD AUTO: 3.21 10E6/UL (ref 4.4–5.9)
SODIUM SERPL-SCNC: 136 MMOL/L (ref 136–145)
TACROLIMUS BLD-MCNC: 7.3 UG/L (ref 5–15)
TME LAST DOSE: NORMAL H
TME LAST DOSE: NORMAL H
WBC # BLD AUTO: 2.8 10E3/UL (ref 4–11)

## 2023-04-27 PROCEDURE — 85027 COMPLETE CBC AUTOMATED: CPT

## 2023-04-27 PROCEDURE — 80180 DRUG SCRN QUAN MYCOPHENOLATE: CPT

## 2023-04-27 PROCEDURE — 80197 ASSAY OF TACROLIMUS: CPT

## 2023-04-27 PROCEDURE — 36415 COLL VENOUS BLD VENIPUNCTURE: CPT

## 2023-04-27 PROCEDURE — 80048 BASIC METABOLIC PNL TOTAL CA: CPT

## 2023-04-28 ENCOUNTER — MYC MEDICAL ADVICE (OUTPATIENT)
Dept: TRANSPLANT | Facility: CLINIC | Age: 38
End: 2023-04-28
Payer: MEDICARE

## 2023-04-28 DIAGNOSIS — Z94.0 KIDNEY REPLACED BY TRANSPLANT: ICD-10-CM

## 2023-04-28 LAB
MYCOPHENOLATE SERPL LC/MS/MS-MCNC: 4.58 MG/L (ref 1–3.5)
MYCOPHENOLATE-G SERPL LC/MS/MS-MCNC: 83.3 MG/L (ref 30–95)
TME LAST DOSE: ABNORMAL H
TME LAST DOSE: ABNORMAL H

## 2023-04-29 NOTE — PROGRESS NOTES
Transplant Surgery Progress Note    Transplants:  1/15/2023 (Kidney)     S: Presents for post op.continues to feel well. No pain, eating and drinking without issues.     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    Transplant Date:  1/15/2023 (Kidney)   Ureteral Stent:  Yes   Crossmatch:  negative   DSA at Tx:  No  Baseline Cr: pending, best 1.4-1.6  DeNovo DSA: No    Acute Rejection Hx:  No    Present Maintenance Immunosuppression:  Tacrolimus and Mycophenolate mofetil    CMV IgG Ab Discordance:  Yes +/-  EBV IgG Ab Discordance:  No +/+    BK Viremia:  No  EBV Viremia:  No    Transplant Coordinator: Veronica Edgar     Transplant Office Phone Number: [phone]     Immunosuppressant Medications     Immunosuppressive Agents Disp Start End     mycophenolate (GENERIC EQUIVALENT) 250 MG capsule    180 capsule 1/18/2023     Sig - Route: Take 3 capsules (750 mg) by mouth 2 times daily - Oral    Class: E-Prescribe     tacrolimus (GENERIC EQUIVALENT) 0.5 MG capsule    180 capsule 1/26/2023     Sig: HOLD FOR FUTURE DOSE CHANGES.  Profile Rx: patient will contact pharmacy when needed    Class: E-Prescribe     tacrolimus (GENERIC EQUIVALENT) 1 MG capsule    480 capsule 1/26/2023     Sig - Route: Take 8 capsules (8 mg) by mouth 2 times daily - Oral    Class: E-Prescribe          Possible Immunosuppression-related side effects:   []             headache  []             vivid dreams  []             irritability  []             cognitive difficuties  []             fine tremor  []             nausea  []             diarrhea  []             neuropathy      []             edema  []             renal calcineurin toxicity  []             hyperkalemia  []             post-transplant diabetes  []             decreased appetite  []             increased appetite  []             other:  []             none    Prescription Medications as of 4/29/2023       Rx Number Disp Refills Start End  Last Dispensed Date Next Fill Date Owning Pharmacy    magnesium oxide (MAG-OX) 400 MG tablet  60 tablet 3 3/31/2023    [facility] [place] [address]    Sig: Take 2 tablets (800 mg) by mouth daily 6PM    Class: E-Prescribe    Route: Oral    mycophenolic acid (GENERIC EQUIVALENT) 180 MG EC tablet  240 tablet 11 4/19/2023    [facility] [place] [address]    Sig: Take 4 tablets (720 mg) by mouth 2 times daily    Class: E-Prescribe    Route: Oral    pantoprazole (PROTONIX) 40 MG EC tablet  30 tablet 2 1/19/2023    [facility] [place] [place] - [address] [place] [address] [place]    Sig: Take 1 tablet (40 mg) by mouth every morning (before breakfast)    Class: E-Prescribe    Route: Oral    sodium bicarbonate 650 MG tablet  60 tablet 1 3/8/2023    [facility] [address]    Sig: Take 1 tablet (650 mg) by mouth 2 times daily    Class: E-Prescribe    Route: Oral    sulfamethoxazole-trimethoprim (BACTRIM) 400-80 MG tablet  30 tablet 11 3/29/2023    [facility] [place] [address] [address]    Sig: Take 1 tablet by mouth daily    Class: E-Prescribe    Route: Oral    tacrolimus (GENERIC EQUIVALENT) 1 MG capsule  240 capsule 11 4/21/2023    [facility] [address]    Sig: Take 4 capsules (4 mg) by mouth 2 times daily    Class: E-Prescribe    Notes to Pharmacy: TXP DT 1/15/2023 (Kidney) TXP Dischg DT 1/18/2023 DX Kidney replaced by transplant Z94.0 [facility] [place])    Route: Oral    valGANciclovir (VALCYTE) 450 MG tablet  60 tablet 1 2/28/2023    [facility] [id number]    Sig: Take 2 tablets (900 mg) by mouth daily    Class: E-Prescribe    Route: Oral    VITAMIN D, CHOLECALCIFEROL, PO            Sig: Take 2,000 Units  by mouth daily    Class: Historical    Route: Oral          O:      General Appearance: in no apparent distress.   Skin: Normal, no rashes or jaundice  Heart: regular rate and rhythm  Lungs: easy respirations, no audible wheezing.  Abdomen: symmetric, incision well-healed without erythema, breakdown, or evident hernia  Extremities: Tremor absent.   Edema: absent. 1+        Latest Ref Rng & Units 4/27/2023     3:33 PM 4/24/2023     3:19 PM 4/20/2023     3:20 PM 4/17/2023     3:19 PM 4/15/2023    10:02 AM   Transplant Immunosuppression Labs   Creat 0.67 - 1.17 mg/dL 1.57   1.60   1.65   1.89   1.55     Urea Nitrogen 6.0 - 20.0 mg/dL 28.0   30.4   28.2   22.5   20.6     WBC 4.0 - 11.0 10e3/uL 2.8   3.7   3.6   6.2   6.7     Neutrophil %  65   55   76      ANEU 1.6 - 8.3 10e3/uL  2.4   2.0   4.7          Chemistries:   Recent Labs   Lab Test 04/27/23  1533   BUN 28.0*   CR 1.57*   GFRESTIMATED 57*   GLC 91     Lab Results   Component Value Date    A1C 5.2 01/15/2023     Recent Labs   Lab Test 04/06/23  1543   ALBUMIN 4.5   BILITOTAL 0.2   ALKPHOS 65   AST 30   ALT 42     Urine Studies:  Recent Labs   Lab Test 04/15/23  0918   COLOR Yellow   APPEARANCE Clear   URINEGLC Negative   URINEBILI Negative   URINEKETONE Negative   SG 1.020   UBLD Large*   URINEPH 6.5   PROTEIN >=300*   NITRITE Negative   LEUKEST Moderate*   RBCU 10-25*   WBCU *     No lab results found.  Hematology:   Recent Labs   Lab Test 04/27/23  1533 04/24/23  1519 04/20/23  1520   HGB 10.0* 9.5* 8.9*    249 173   WBC 2.8* 3.7* 3.6*     Coags:   Recent Labs   Lab Test 01/15/23  0540 08/09/21  1124   INR 0.90 0.96     HLA antibodies:   SA1 Hi Risk Tara   Date Value Ref Range Status   08/18/2020 B:44 82  Final     SA1 HI RISK TARA   Date Value Ref Range Status   03/29/2023 None  Final     SA1 Mod Risk Tara   Date Value Ref Range Status   08/18/2020 B:8 37 41 42 45 46 Cw:5  Final     SA1 MOD RISK TARA   Date Value Ref Range Status   03/29/2023 B:8 37  41 42 44 45 82  Final     SA2 Hi Risk Tara   Date Value Ref Range Status   08/18/2020 None  Final     SA2 HI RISK TARA   Date Value Ref Range Status   03/29/2023 None  Final     SA2 Mod Risk Tara   Date Value Ref Range Status   08/18/2020 None  Final     SA2 MOD RISK TARA   Date Value Ref Range Status   03/29/2023 None  Final       Assessment: Chip Fowler is doing well s/p DDKT:  Issues we addressed during his visit include:    Plan:    1. Graft function: stable and improving  2. Immunosuppression Management: No change continue .  Complexity of management:Medium.  Contributing factors: post-op  Followup: as needed       Total Time: 15 min,   Counselling Time: 10 min.      Tez Emerson MD

## 2023-05-01 ENCOUNTER — LAB (OUTPATIENT)
Dept: LAB | Facility: CLINIC | Age: 38
DRG: 698 | End: 2023-05-01
Payer: MEDICARE

## 2023-05-01 DIAGNOSIS — Z94.0 KIDNEY REPLACED BY TRANSPLANT: ICD-10-CM

## 2023-05-01 LAB
ANION GAP SERPL CALCULATED.3IONS-SCNC: 8 MMOL/L (ref 7–15)
BUN SERPL-MCNC: 23.4 MG/DL (ref 6–20)
CALCIUM SERPL-MCNC: 9.1 MG/DL (ref 8.6–10)
CHLORIDE SERPL-SCNC: 105 MMOL/L (ref 98–107)
CREAT SERPL-MCNC: 1.58 MG/DL (ref 0.67–1.17)
DEPRECATED HCO3 PLAS-SCNC: 22 MMOL/L (ref 22–29)
ERYTHROCYTE [DISTWIDTH] IN BLOOD BY AUTOMATED COUNT: 14 % (ref 10–15)
GFR SERPL CREATININE-BSD FRML MDRD: 57 ML/MIN/1.73M2
GLUCOSE SERPL-MCNC: 89 MG/DL (ref 70–99)
HCT VFR BLD AUTO: 29.7 % (ref 40–53)
HGB BLD-MCNC: 9.7 G/DL (ref 13.3–17.7)
MCH RBC QN AUTO: 30.8 PG (ref 26.5–33)
MCHC RBC AUTO-ENTMCNC: 32.7 G/DL (ref 31.5–36.5)
MCV RBC AUTO: 94 FL (ref 78–100)
PLATELET # BLD AUTO: 263 10E3/UL (ref 150–450)
POTASSIUM SERPL-SCNC: 4.4 MMOL/L (ref 3.4–5.3)
RBC # BLD AUTO: 3.15 10E6/UL (ref 4.4–5.9)
SODIUM SERPL-SCNC: 135 MMOL/L (ref 136–145)
WBC # BLD AUTO: 2.4 10E3/UL (ref 4–11)

## 2023-05-01 PROCEDURE — 80197 ASSAY OF TACROLIMUS: CPT

## 2023-05-01 PROCEDURE — 80180 DRUG SCRN QUAN MYCOPHENOLATE: CPT

## 2023-05-01 PROCEDURE — 80048 BASIC METABOLIC PNL TOTAL CA: CPT

## 2023-05-01 PROCEDURE — 85027 COMPLETE CBC AUTOMATED: CPT

## 2023-05-01 PROCEDURE — 36415 COLL VENOUS BLD VENIPUNCTURE: CPT

## 2023-05-02 ENCOUNTER — TELEPHONE (OUTPATIENT)
Dept: NEPHROLOGY | Facility: CLINIC | Age: 38
End: 2023-05-02
Payer: MEDICARE

## 2023-05-02 ENCOUNTER — MYC MEDICAL ADVICE (OUTPATIENT)
Dept: TRANSPLANT | Facility: CLINIC | Age: 38
End: 2023-05-02
Payer: MEDICARE

## 2023-05-02 LAB
TACROLIMUS BLD-MCNC: 10 UG/L (ref 5–15)
TME LAST DOSE: NORMAL H
TME LAST DOSE: NORMAL H

## 2023-05-02 RX ORDER — TACROLIMUS 1 MG/1
5 CAPSULE ORAL 2 TIMES DAILY
Qty: 300 CAPSULE | Refills: 11 | Status: ON HOLD | OUTPATIENT
Start: 2023-05-02 | End: 2023-05-07

## 2023-05-02 NOTE — TELEPHONE ENCOUNTER
Pt is requesting new rx for    Calcitriol 0.25 mg caps    Did not see on active med list please verify and send new rx    [facility]  [phone]

## 2023-05-02 NOTE — TELEPHONE ENCOUNTER
Forgot to add drinking cranberry juice everyday so maybe that will help.       Chip Fowler  You 4 days ago       No everything is normal        You  Chip Fowler 4 days ago     MH  Yes please increase your tacrolimus  to 5 mg twice per day      General question   Are your feeling ok this week   Any issues with fevers  BP  weight gain weight loss     Please respond to this message or indicate if a phone call is needed      Be well   [facility] Kidney Transplant Coordinator         Chip Fowler  You 4 days ago       The tac results came back just a little low should I go up to 5 pills?

## 2023-05-03 ENCOUNTER — MYC MEDICAL ADVICE (OUTPATIENT)
Dept: TRANSPLANT | Facility: CLINIC | Age: 38
End: 2023-05-03
Payer: MEDICARE

## 2023-05-03 LAB
MYCOPHENOLATE SERPL LC/MS/MS-MCNC: 6.58 MG/L (ref 1–3.5)
MYCOPHENOLATE-G SERPL LC/MS/MS-MCNC: 68.9 MG/L (ref 30–95)
TME LAST DOSE: ABNORMAL H
TME LAST DOSE: ABNORMAL H

## 2023-05-04 ENCOUNTER — LAB (OUTPATIENT)
Dept: LAB | Facility: CLINIC | Age: 38
DRG: 698 | End: 2023-05-04
Payer: MEDICARE

## 2023-05-04 ENCOUNTER — APPOINTMENT (OUTPATIENT)
Dept: ULTRASOUND IMAGING | Facility: CLINIC | Age: 38
DRG: 698 | End: 2023-05-04
Attending: NURSE PRACTITIONER
Payer: MEDICARE

## 2023-05-04 ENCOUNTER — TELEPHONE (OUTPATIENT)
Dept: TRANSPLANT | Facility: CLINIC | Age: 38
End: 2023-05-04

## 2023-05-04 ENCOUNTER — HOSPITAL ENCOUNTER (INPATIENT)
Facility: CLINIC | Age: 38
LOS: 3 days | Discharge: HOME | DRG: 698 | End: 2023-05-07
Attending: EMERGENCY MEDICINE | Admitting: SURGERY
Payer: MEDICARE

## 2023-05-04 ENCOUNTER — TELEPHONE (OUTPATIENT)
Dept: INFECTIOUS DISEASES | Facility: CLINIC | Age: 38
End: 2023-05-04
Payer: MEDICARE

## 2023-05-04 DIAGNOSIS — Z20.828 CONTACT WITH AND (SUSPECTED) EXPOSURE TO OTHER VIRAL COMMUNICABLE DISEASES: ICD-10-CM

## 2023-05-04 DIAGNOSIS — Z94.0 KIDNEY REPLACED BY TRANSPLANT: ICD-10-CM

## 2023-05-04 DIAGNOSIS — A41.9 SEPSIS, DUE TO UNSPECIFIED ORGANISM, UNSPECIFIED WHETHER ACUTE ORGAN DYSFUNCTION PRESENT (H): ICD-10-CM

## 2023-05-04 DIAGNOSIS — E55.9 VITAMIN D DEFICIENCY: ICD-10-CM

## 2023-05-04 DIAGNOSIS — D84.9 IMMUNOSUPPRESSED STATUS (H): ICD-10-CM

## 2023-05-04 DIAGNOSIS — R19.7 DIARRHEA, UNSPECIFIED TYPE: Primary | ICD-10-CM

## 2023-05-04 DIAGNOSIS — Z79.899 ENCOUNTER FOR LONG-TERM CURRENT USE OF MEDICATION: ICD-10-CM

## 2023-05-04 DIAGNOSIS — Z94.0 KIDNEY REPLACED BY TRANSPLANT: Primary | ICD-10-CM

## 2023-05-04 DIAGNOSIS — Z48.298 AFTERCARE FOLLOWING ORGAN TRANSPLANT: ICD-10-CM

## 2023-05-04 DIAGNOSIS — D84.9 IMMUNOSUPPRESSION (H): ICD-10-CM

## 2023-05-04 DIAGNOSIS — R19.7 DIARRHEA OF PRESUMED INFECTIOUS ORIGIN: ICD-10-CM

## 2023-05-04 DIAGNOSIS — N17.9 AKI (ACUTE KIDNEY INJURY) (H): ICD-10-CM

## 2023-05-04 DIAGNOSIS — N39.0 URINARY TRACT INFECTION WITHOUT HEMATURIA, SITE UNSPECIFIED: ICD-10-CM

## 2023-05-04 LAB
ALBUMIN SERPL BCG-MCNC: 4.1 G/DL (ref 3.5–5.2)
ALBUMIN UR-MCNC: 100 MG/DL
ALP SERPL-CCNC: 48 U/L (ref 40–129)
ALT SERPL W P-5'-P-CCNC: 72 U/L (ref 10–50)
ANION GAP SERPL CALCULATED.3IONS-SCNC: 14 MMOL/L (ref 7–15)
ANION GAP SERPL CALCULATED.3IONS-SCNC: 15 MMOL/L (ref 7–15)
APPEARANCE UR: ABNORMAL
AST SERPL W P-5'-P-CCNC: 33 U/L (ref 10–50)
BACTERIA #/AREA URNS HPF: ABNORMAL /HPF
BASOPHILS # BLD MANUAL: 0 10E3/UL (ref 0–0.2)
BASOPHILS NFR BLD MANUAL: 1 %
BILIRUB SERPL-MCNC: 0.4 MG/DL
BILIRUB UR QL STRIP: NEGATIVE
BUN SERPL-MCNC: 27.2 MG/DL (ref 6–20)
BUN SERPL-MCNC: 31.2 MG/DL (ref 6–20)
C DIFF TOX B STL QL: NEGATIVE
CALCIUM SERPL-MCNC: 8.8 MG/DL (ref 8.6–10)
CALCIUM SERPL-MCNC: 9.1 MG/DL (ref 8.6–10)
CHLORIDE SERPL-SCNC: 95 MMOL/L (ref 98–107)
CHLORIDE SERPL-SCNC: 98 MMOL/L (ref 98–107)
COLOR UR AUTO: YELLOW
CREAT SERPL-MCNC: 2.56 MG/DL (ref 0.67–1.17)
CREAT SERPL-MCNC: 2.85 MG/DL (ref 0.67–1.17)
DEPRECATED HCO3 PLAS-SCNC: 19 MMOL/L (ref 22–29)
DEPRECATED HCO3 PLAS-SCNC: 20 MMOL/L (ref 22–29)
EOSINOPHIL # BLD MANUAL: 0 10E3/UL (ref 0–0.7)
EOSINOPHIL NFR BLD MANUAL: 1 %
ERYTHROCYTE [DISTWIDTH] IN BLOOD BY AUTOMATED COUNT: 13.2 % (ref 10–15)
ERYTHROCYTE [DISTWIDTH] IN BLOOD BY AUTOMATED COUNT: 13.4 % (ref 10–15)
GFR SERPL CREATININE-BSD FRML MDRD: 28 ML/MIN/1.73M2
GFR SERPL CREATININE-BSD FRML MDRD: 32 ML/MIN/1.73M2
GLUCOSE SERPL-MCNC: 125 MG/DL (ref 70–99)
GLUCOSE SERPL-MCNC: 173 MG/DL (ref 70–99)
GLUCOSE UR STRIP-MCNC: NEGATIVE MG/DL
HCT VFR BLD AUTO: 30.7 % (ref 40–53)
HCT VFR BLD AUTO: 31 % (ref 40–53)
HGB BLD-MCNC: 10.3 G/DL (ref 13.3–17.7)
HGB BLD-MCNC: 10.3 G/DL (ref 13.3–17.7)
HGB UR QL STRIP: NEGATIVE
KETONES UR STRIP-MCNC: ABNORMAL MG/DL
LEUKOCYTE ESTERASE UR QL STRIP: ABNORMAL
LYMPHOCYTES # BLD MANUAL: 0.3 10E3/UL (ref 0.8–5.3)
LYMPHOCYTES NFR BLD MANUAL: 11 %
MCH RBC QN AUTO: 30.5 PG (ref 26.5–33)
MCH RBC QN AUTO: 31 PG (ref 26.5–33)
MCHC RBC AUTO-ENTMCNC: 33.2 G/DL (ref 31.5–36.5)
MCHC RBC AUTO-ENTMCNC: 33.6 G/DL (ref 31.5–36.5)
MCV RBC AUTO: 92 FL (ref 78–100)
MCV RBC AUTO: 93 FL (ref 78–100)
MONOCYTES # BLD MANUAL: 0.4 10E3/UL (ref 0–1.3)
MONOCYTES NFR BLD MANUAL: 15 %
MUCOUS THREADS #/AREA URNS LPF: PRESENT /LPF
NEUTROPHILS # BLD MANUAL: 2 10E3/UL (ref 1.6–8.3)
NEUTROPHILS NFR BLD MANUAL: 72 %
NITRATE UR QL: NEGATIVE
PH UR STRIP: 6 [PH] (ref 5–7)
PLAT MORPH BLD: ABNORMAL
PLATELET # BLD AUTO: 178 10E3/UL (ref 150–450)
PLATELET # BLD AUTO: 192 10E3/UL (ref 150–450)
POTASSIUM SERPL-SCNC: 3.8 MMOL/L (ref 3.4–5.3)
POTASSIUM SERPL-SCNC: 4 MMOL/L (ref 3.4–5.3)
PROT SERPL-MCNC: 7.1 G/DL (ref 6.4–8.3)
RBC # BLD AUTO: 3.32 10E6/UL (ref 4.4–5.9)
RBC # BLD AUTO: 3.38 10E6/UL (ref 4.4–5.9)
RBC MORPH BLD: ABNORMAL
RBC URINE: 3 /HPF
SODIUM SERPL-SCNC: 129 MMOL/L (ref 136–145)
SODIUM SERPL-SCNC: 132 MMOL/L (ref 136–145)
SP GR UR STRIP: 1.02 (ref 1–1.03)
SQUAMOUS EPITHELIAL: 2 /HPF
UROBILINOGEN UR STRIP-MCNC: NORMAL MG/DL
WBC # BLD AUTO: 2 10E3/UL (ref 4–11)
WBC # BLD AUTO: 2.8 10E3/UL (ref 4–11)
WBC CLUMPS #/AREA URNS HPF: PRESENT /HPF
WBC URINE: 71 /HPF

## 2023-05-04 PROCEDURE — 250N000011 HC RX IP 250 OP 636: Performed by: SURGERY

## 2023-05-04 PROCEDURE — 76776 US EXAM K TRANSPL W/DOPPLER: CPT

## 2023-05-04 PROCEDURE — 80180 DRUG SCRN QUAN MYCOPHENOLATE: CPT

## 2023-05-04 PROCEDURE — 258N000003 HC RX IP 258 OP 636: Performed by: NURSE PRACTITIONER

## 2023-05-04 PROCEDURE — 85007 BL SMEAR W/DIFF WBC COUNT: CPT | Performed by: NURSE PRACTITIONER

## 2023-05-04 PROCEDURE — 80197 ASSAY OF TACROLIMUS: CPT

## 2023-05-04 PROCEDURE — 250N000011 HC RX IP 250 OP 636: Performed by: NURSE PRACTITIONER

## 2023-05-04 PROCEDURE — 82040 ASSAY OF SERUM ALBUMIN: CPT | Performed by: NURSE PRACTITIONER

## 2023-05-04 PROCEDURE — 96365 THER/PROPH/DIAG IV INF INIT: CPT | Performed by: EMERGENCY MEDICINE

## 2023-05-04 PROCEDURE — 258N000003 HC RX IP 258 OP 636: Performed by: SURGERY

## 2023-05-04 PROCEDURE — 87799 DETECT AGENT NOS DNA QUANT: CPT

## 2023-05-04 PROCEDURE — 120N000002 HC R&B MED SURG/OB UMMC

## 2023-05-04 PROCEDURE — 87506 IADNA-DNA/RNA PROBE TQ 6-11: CPT | Performed by: NURSE PRACTITIONER

## 2023-05-04 PROCEDURE — 99222 1ST HOSP IP/OBS MODERATE 55: CPT | Mod: 24

## 2023-05-04 PROCEDURE — 85027 COMPLETE CBC AUTOMATED: CPT

## 2023-05-04 PROCEDURE — 99285 EMERGENCY DEPT VISIT HI MDM: CPT | Mod: 25 | Performed by: EMERGENCY MEDICINE

## 2023-05-04 PROCEDURE — 36415 COLL VENOUS BLD VENIPUNCTURE: CPT

## 2023-05-04 PROCEDURE — 99285 EMERGENCY DEPT VISIT HI MDM: CPT | Performed by: EMERGENCY MEDICINE

## 2023-05-04 PROCEDURE — 81001 URINALYSIS AUTO W/SCOPE: CPT

## 2023-05-04 PROCEDURE — 250N000013 HC RX MED GY IP 250 OP 250 PS 637: Performed by: NURSE PRACTITIONER

## 2023-05-04 PROCEDURE — 80053 COMPREHEN METABOLIC PANEL: CPT

## 2023-05-04 PROCEDURE — 87493 C DIFF AMPLIFIED PROBE: CPT | Performed by: NURSE PRACTITIONER

## 2023-05-04 PROCEDURE — 76776 US EXAM K TRANSPL W/DOPPLER: CPT | Mod: 26 | Performed by: RADIOLOGY

## 2023-05-04 PROCEDURE — 85027 COMPLETE CBC AUTOMATED: CPT | Performed by: NURSE PRACTITIONER

## 2023-05-04 PROCEDURE — 36415 COLL VENOUS BLD VENIPUNCTURE: CPT | Performed by: NURSE PRACTITIONER

## 2023-05-04 PROCEDURE — 87040 BLOOD CULTURE FOR BACTERIA: CPT | Performed by: NURSE PRACTITIONER

## 2023-05-04 PROCEDURE — 87186 SC STD MICRODIL/AGAR DIL: CPT

## 2023-05-04 RX ORDER — SULFAMETHOXAZOLE AND TRIMETHOPRIM 400; 80 MG/1; MG/1
1 TABLET ORAL DAILY
Status: DISCONTINUED | OUTPATIENT
Start: 2023-05-05 | End: 2023-05-07 | Stop reason: HOSPADM

## 2023-05-04 RX ORDER — PANTOPRAZOLE SODIUM 40 MG/1
40 TABLET, DELAYED RELEASE ORAL
Status: DISCONTINUED | OUTPATIENT
Start: 2023-05-05 | End: 2023-05-07 | Stop reason: HOSPADM

## 2023-05-04 RX ORDER — ONDANSETRON 2 MG/ML
4 INJECTION INTRAMUSCULAR; INTRAVENOUS EVERY 6 HOURS PRN
Status: DISCONTINUED | OUTPATIENT
Start: 2023-05-04 | End: 2023-05-07 | Stop reason: HOSPADM

## 2023-05-04 RX ORDER — VALGANCICLOVIR 450 MG/1
450 TABLET, FILM COATED ORAL EVERY OTHER DAY
Status: DISCONTINUED | OUTPATIENT
Start: 2023-05-05 | End: 2023-05-06

## 2023-05-04 RX ORDER — ACETAMINOPHEN 325 MG/1
650 TABLET ORAL EVERY 4 HOURS PRN
Status: DISCONTINUED | OUTPATIENT
Start: 2023-05-04 | End: 2023-05-07 | Stop reason: HOSPADM

## 2023-05-04 RX ORDER — TACROLIMUS 5 MG/1
5 CAPSULE ORAL 2 TIMES DAILY
Status: DISCONTINUED | OUTPATIENT
Start: 2023-05-05 | End: 2023-05-07 | Stop reason: HOSPADM

## 2023-05-04 RX ORDER — ACETAMINOPHEN 325 MG/1
325-650 TABLET ORAL EVERY 4 HOURS PRN
Status: DISCONTINUED | OUTPATIENT
Start: 2023-05-04 | End: 2023-05-07 | Stop reason: HOSPADM

## 2023-05-04 RX ORDER — SODIUM CHLORIDE 9 MG/ML
INJECTION, SOLUTION INTRAVENOUS CONTINUOUS
Status: DISCONTINUED | OUTPATIENT
Start: 2023-05-04 | End: 2023-05-04

## 2023-05-04 RX ORDER — PIPERACILLIN SODIUM, TAZOBACTAM SODIUM 3; .375 G/15ML; G/15ML
3.38 INJECTION, POWDER, LYOPHILIZED, FOR SOLUTION INTRAVENOUS ONCE
Status: DISCONTINUED | OUTPATIENT
Start: 2023-05-04 | End: 2023-05-04

## 2023-05-04 RX ORDER — PIPERACILLIN SODIUM, TAZOBACTAM SODIUM 3; .375 G/15ML; G/15ML
3.38 INJECTION, POWDER, LYOPHILIZED, FOR SOLUTION INTRAVENOUS ONCE
Status: COMPLETED | OUTPATIENT
Start: 2023-05-04 | End: 2023-05-04

## 2023-05-04 RX ORDER — ONDANSETRON 4 MG/1
4 TABLET, ORALLY DISINTEGRATING ORAL EVERY 6 HOURS PRN
Status: DISCONTINUED | OUTPATIENT
Start: 2023-05-04 | End: 2023-05-07 | Stop reason: HOSPADM

## 2023-05-04 RX ORDER — PIPERACILLIN SODIUM, TAZOBACTAM SODIUM 3; .375 G/15ML; G/15ML
3.38 INJECTION, POWDER, LYOPHILIZED, FOR SOLUTION INTRAVENOUS EVERY 6 HOURS
Status: DISCONTINUED | OUTPATIENT
Start: 2023-05-05 | End: 2023-05-06

## 2023-05-04 RX ORDER — ONDANSETRON 2 MG/ML
4 INJECTION INTRAMUSCULAR; INTRAVENOUS EVERY 30 MIN PRN
Status: DISCONTINUED | OUTPATIENT
Start: 2023-05-04 | End: 2023-05-06

## 2023-05-04 RX ADMIN — PIPERACILLIN AND TAZOBACTAM 3.38 G: 3; .375 INJECTION, POWDER, LYOPHILIZED, FOR SOLUTION INTRAVENOUS at 20:03

## 2023-05-04 RX ADMIN — ACETAMINOPHEN 650 MG: 325 TABLET, FILM COATED ORAL at 21:00

## 2023-05-04 RX ADMIN — ONDANSETRON 4 MG: 2 INJECTION INTRAMUSCULAR; INTRAVENOUS at 20:03

## 2023-05-04 RX ADMIN — VANCOMYCIN HYDROCHLORIDE 1250 MG: 10 INJECTION, POWDER, LYOPHILIZED, FOR SOLUTION INTRAVENOUS at 22:34

## 2023-05-04 RX ADMIN — SODIUM CHLORIDE 1000 ML: 9 INJECTION, SOLUTION INTRAVENOUS at 20:04

## 2023-05-04 ASSESSMENT — ACTIVITIES OF DAILY LIVING (ADL)
ADLS_ACUITY_SCORE: 35

## 2023-05-04 NOTE — TELEPHONE ENCOUNTER
[facility] Call Center    Phone Message    May a detailed message be left on voicemail: yes     Reason for Call: Appointment Intake    Referring Provider Name: Referred by Franko Cerda MD in  SOT, Priority: 1-2 Weeks  Diagnosis and/or Symptoms: Kidney replaced by transplant [Z94.0] Urinary tract infection [N39.0],     Action Taken: Other: ID    Travel Screening: Not Applicable

## 2023-05-04 NOTE — ED PROVIDER NOTES
ED Provider Note  [facility]      History     Chief Complaint   Patient presents with     Diarrhea     Flank Pain     HPI  Chip Fowler is a 38 year old male with a past medical history which includes ESRD on dialysis s/p kidney transplant on 1/15/23, recurrent UTI, polysubstance abuse, cardiomyopathy 2/2 methamphetamines (LVEF 55-60% on last cardiac echo 9/2022) who presents to the Emergency Department referred by [facility] transplant clinic for evaluation of fever and abnormal labs. Of note, per review of his chart he was advised to present to the ED d/t concern for UTI and workup for pyelonephritis, bacteremia, and CMV. Initially symptoms began with urinary frequency. He reports recurrent UTI since his transplant, and reports currently developing fevers, with Tmax of 100.4, and has been taking Tylenol for this and his 24 hour max for Tylenol will reset at 2030. Reports loose stools with one episode yesterday and 3 today.     Of note, per chart review the patient has a history of recurrent UTI. Previous UTI in 1/2023 + enterococcus faecalis, pan sensitive. 3/19 Fat stranding around graft on CT. 3/19 UC + enterococcus faecalis 50-100K, pan sensitive. He was hospitalized for a UTI and possible pyelonephritis of transplanted right kidney 3/21 - 3/24/23, symptoms resolved with amoxicillin. He more recently presented to [facility] on 4/15/23 with recurrent UTI symptoms, UA at that time thought to indicate a UTI. Patient was treated with a 7-day course of Augmentin (4/15 - 4/22).    Past Medical History  Past Medical History:   Diagnosis Date     Bladder stones      Cardiomyopathy (H)      ESRD (end stage renal disease) on dialysis (H)      Hypertension      Kidney replaced by transplant 01/15/2023    DCD DDKT. Intermediate risk induction.     Migraines      Polysubstance abuse (H)      Solitary kidney, congenital      Urethral stone      Urethral stricture      Past  Surgical History:   Procedure Laterality Date     BENCH KIDNEY  1/15/2023    Procedure: Bench kidney;  Surgeon: Tez Emerson MD;  [facility] OR     BIOPSY  2020    renal, Restorationist     COLONOSCOPY N/A 3/23/2023    Procedure: Colonoscopy;  Surgeon: Ana Cardenas MD;  Location: [facility] GI     COMBINED CYSTOSCOPY, LASER HOLMIUM LITHOTRIPSY URETER(S)       CYSTOSCOPY       CYSTOSCOPY FLEXIBLE, CYSTOSTOMY, INSERT TUBE SUPRAPUBIC, COMBINED N/A 2020    Procedure: CYSTOSCOPY, WITH SUPRAPUBIC CATHETER INSERTION;  Surgeon: Sam Mejia MD;  Location: [facility] OR     CYSTOSCOPY, OPEN EXCISION URETHRAL DIVERTICULUM, COMBINED N/A 2020    Procedure: EXCISION OF URETHRAL DIVERTICULUM X2;  Surgeon: Sam Mejia MD;  Location: [facility] OR     HERNIA REPAIR      infant     INSERT CATHETER PERITONEAL DIALYSIS       LASER HOLMIUM LITHOTRIPSY URETER(S), INSERT STENT, COMBINED N/A 2020    Procedure: CYSTOSCOPY, bladder and urethral stone extraction, removal of foreign body, urethral dilation, urethrotomy, laser on standby;  Surgeon: Sam eMjia MD;  [facility] OR     REMOVE CATHETER PERITONEAL N/A 1/15/2023    Procedure: Remove catheter peritoneal;  Surgeon: Tez Emerson MD;  Location: [facility] OR     TRANSPLANT KIDNEY RECIPIENT  DONOR N/A 1/15/2023    Procedure: TRANSPLANT, KIDNEY, RECIPIENT,  DONOR WITH DONOR URETER STENTING;  Surgeon: Tez Emerson MD;  Location[facility] OR     urethral dilation       URETHROPLASTY WITH BUCCAL GRAFT N/A 2020    Procedure: URETHROPLASTY, USING BUCCAL MUCOSA GRAFT;  Surgeon: Sam Mejia MD;  Location: [facility] OR     magnesium oxide (MAG-OX) 400 MG tablet  mycophenolic acid (GENERIC EQUIVALENT) 180 MG EC tablet  pantoprazole (PROTONIX) 40 MG EC tablet  sodium bicarbonate 650 MG tablet  sulfamethoxazole-trimethoprim (BACTRIM) 400-80 MG tablet  tacrolimus (GENERIC EQUIVALENT) 1 MG  capsule  valGANciclovir (VALCYTE) 450 MG tablet  VITAMIN D, CHOLECALCIFEROL, PO      No Known Allergies  Family History  Family History   Problem Relation Age of Onset     Alzheimer Disease Mother      Unknown/Adopted Father      No Known Problems Sister      No Known Problems Sister      Kidney Disease No family hx of      Social History   Social History     Tobacco Use     Smoking status: Former     Packs/day: 0.50     Types: Cigarettes     Start date:      Quit date: 1/15/2023     Years since quittin.2     Smokeless tobacco: Never   Vaping Use     Vaping status: Never Used   Substance Use Topics     Alcohol use: Not Currently     Comment: quit alcohol 3-4 yrs ago     Drug use: Not Currently     Types: Methamphetamines, MDMA (Ecstasy)         A medically appropriate review of systems was performed with pertinent positives and negatives noted in the HPI, and all other systems negative.    Physical Exam   BP: 127/72  Pulse: 82  Temp: (!) 96.6  F (35.9  C)  Resp: 16  SpO2: 100 %  Physical Exam  Vitals and nursing note reviewed.   Constitutional:       Appearance: Normal appearance.   HENT:      Head: Normocephalic and atraumatic.      Right Ear: External ear normal.      Left Ear: External ear normal.      Nose: Nose normal.      Mouth/Throat:      Mouth: Mucous membranes are dry.   Eyes:      Conjunctiva/sclera: Conjunctivae normal.   Cardiovascular:      Rate and Rhythm: Normal rate and regular rhythm.      Pulses: Normal pulses.      Heart sounds: Normal heart sounds.   Pulmonary:      Effort: Pulmonary effort is normal.      Breath sounds: Normal breath sounds.   Abdominal:      General: Abdomen is flat. Bowel sounds are normal.      Palpations: Abdomen is soft.      Tenderness: There is no abdominal tenderness.      Comments: No abdominal tenderness or tenderness over right sided graft.   Musculoskeletal:         General: Normal range of motion.      Cervical back: Normal range of motion and neck supple.    Skin:     General: Skin is dry.   Neurological:      Mental Status: He is alert and oriented to person, place, and time.   Psychiatric:         Mood and Affect: Mood normal.         ED Course, Procedures, & Data      Procedures           Results for orders placed or performed during the hospital encounter of 05/04/23   US Renal Transplant with Doppler     Status: None    Narrative    EXAMINATION: US RENAL TRANSPLANT,  5/4/2023 7:56 PM     COMPARISON: Renal transplant 3/20/2023    HISTORY: Right sided transplant, elevated creatinine.    TECHNIQUE:  Grey-scale, color Doppler and spectral flow analysis.    FINDINGS:  The transplant kidney is located right lower quadrant, and measures  13.7 cm. Parenchyma is of normal thickness and echogenicity. No focal  lesions. Similar mild prominence of the proximal ureter. No  hydronephrosis. No perinephric fluid collection.    Renal artery flow:   121 cm/sec peak systolic at hilum.  449 cm/s peak systolic at anastomosis.  Arcuate artery resistive indices (upper to lower): 0.59, 0.71, 0.78    Renal Vein Flow:  58 cm/sat hilum.   67 cm/s at anastomosis.    Iliac artery flow:  196 cm/s peak systolic above anastomosis.  181 cm/s peak systolic below anastomosis.    Iliac vein flow:  Patent above and below the anastomosis.      Impression    IMPRESSION:   1. Patent antegrade Doppler evaluation of the renal transplant  vasculature.  2. Elevated velocity at the renal artery anastomosis up to 449 cm/s  which may suggest stenosis.  3. Normal grayscale appearance of the right lower quadrant transplant  kidney.    I have personally reviewed the examination and initial interpretation  and I agree with the findings.    AMISH SANTIAGO MD         SYSTEM ID:  [id number]   Comprehensive metabolic panel     Status: Abnormal   Result Value Ref Range    Sodium 132 (L) 136 - 145 mmol/L    Potassium 4.0 3.4 - 5.3 mmol/L    Chloride 98 98 - 107 mmol/L    Carbon Dioxide (CO2) 20 (L) 22 - 29 mmol/L     Anion Gap 14 7 - 15 mmol/L    Urea Nitrogen 31.2 (H) 6.0 - 20.0 mg/dL    Creatinine 2.85 (H) 0.67 - 1.17 mg/dL    Calcium 9.1 8.6 - 10.0 mg/dL    Glucose 125 (H) 70 - 99 mg/dL    Alkaline Phosphatase 48 40 - 129 U/L    AST 33 10 - 50 U/L    ALT 72 (H) 10 - 50 U/L    Protein Total 7.1 6.4 - 8.3 g/dL    Albumin 4.1 3.5 - 5.2 g/dL    Bilirubin Total 0.4 <=1.2 mg/dL    GFR Estimate 28 (L) >60 mL/min/1.73m2   CBC with platelets and differential     Status: Abnormal   Result Value Ref Range    WBC Count 2.8 (L) 4.0 - 11.0 10e3/uL    RBC Count 3.38 (L) 4.40 - 5.90 10e6/uL    Hemoglobin 10.3 (L) 13.3 - 17.7 g/dL    Hematocrit 31.0 (L) 40.0 - 53.0 %    MCV 92 78 - 100 fL    MCH 30.5 26.5 - 33.0 pg    MCHC 33.2 31.5 - 36.5 g/dL    RDW 13.4 10.0 - 15.0 %    Platelet Count 192 150 - 450 10e3/uL   Manual Differential     Status: Abnormal   Result Value Ref Range    % Neutrophils 72 %    % Lymphocytes 11 %    % Monocytes 15 %    % Eosinophils 1 %    % Basophils 1 %    Absolute Neutrophils 2.0 1.6 - 8.3 10e3/uL    Absolute Lymphocytes 0.3 (L) 0.8 - 5.3 10e3/uL    Absolute Monocytes 0.4 0.0 - 1.3 10e3/uL    Absolute Eosinophils 0.0 0.0 - 0.7 10e3/uL    Absolute Basophils 0.0 0.0 - 0.2 10e3/uL    RBC Morphology Confirmed RBC Indices     Platelet Assessment  Automated Count Confirmed. Platelet morphology is normal.     Automated Count Confirmed. Platelet morphology is normal.   C. difficile Toxin B PCR with reflex to C. difficile Antigen and Toxins A/B EIA     Status: Normal    Specimen: Per Rectum; Stool   Result Value Ref Range    C Difficile Toxin B by PCR Negative Negative    Narrative    The [employer] Xpert C. difficile Assay, performed on the [employer]  Instrument Systems, is a qualitative in vitro diagnostic test for rapid detection of toxin B gene sequences from unformed (liquid or soft) stool specimens collected from patients suspected of having Clostridioides difficile infection (CDI). The test utilizes automated  real-time polymerase chain reaction (PCR) to detect toxin gene sequences associated with toxin producing C. difficile. The Xpert C. difficile Assay is intended as an aid in the diagnosis of CDI.   CBC with platelets differential     Status: Abnormal    Narrative    The following orders were created for panel order CBC with platelets differential.  Procedure                               Abnormality         Status                     ---------                               -----------         ------                     CBC with platelets and d...[[id number]]  Abnormal            Final result               Manual Differential[[id number]]          Abnormal            Final result                 Please view results for these tests on the individual orders.   Results for orders placed or performed in visit on 05/04/23   CBC with platelets     Status: Abnormal   Result Value Ref Range    WBC Count 2.0 (L) 4.0 - 11.0 10e3/uL    RBC Count 3.32 (L) 4.40 - 5.90 10e6/uL    Hemoglobin 10.3 (L) 13.3 - 17.7 g/dL    Hematocrit 30.7 (L) 40.0 - 53.0 %    MCV 93 78 - 100 fL    MCH 31.0 26.5 - 33.0 pg    MCHC 33.6 31.5 - 36.5 g/dL    RDW 13.2 10.0 - 15.0 %    Platelet Count 178 150 - 450 10e3/uL   Basic metabolic panel     Status: Abnormal   Result Value Ref Range    Sodium 129 (L) 136 - 145 mmol/L    Potassium 3.8 3.4 - 5.3 mmol/L    Chloride 95 (L) 98 - 107 mmol/L    Carbon Dioxide (CO2) 19 (L) 22 - 29 mmol/L    Anion Gap 15 7 - 15 mmol/L    Urea Nitrogen 27.2 (H) 6.0 - 20.0 mg/dL    Creatinine 2.56 (H) 0.67 - 1.17 mg/dL    Calcium 8.8 8.6 - 10.0 mg/dL    Glucose 173 (H) 70 - 99 mg/dL    GFR Estimate 32 (L) >60 mL/min/1.73m2   UA with Microscopic reflex to Culture     Status: Abnormal    Specimen: Urine, NOS   Result Value Ref Range    Color Urine Yellow Colorless, Straw, Light Yellow, Yellow    Appearance Urine Slightly Cloudy (A) Clear    Glucose Urine Negative Negative mg/dL    Bilirubin Urine Negative Negative    Ketones Urine  Trace (A) Negative mg/dL    Specific Gravity Urine 1.017 1.003 - 1.035    Blood Urine Negative Negative    pH Urine 6.0 5.0 - 7.0    Protein Albumin Urine 100 (A) Negative mg/dL    Urobilinogen Urine Normal Normal, 2.0 mg/dL    Nitrite Urine Negative Negative    Leukocyte Esterase Urine Large (A) Negative    Bacteria Urine Few (A) None Seen /HPF    WBC Clumps Urine Present (A) None Seen /HPF    Mucus Urine Present (A) None Seen /LPF    RBC Urine 3 (H) <=2 /HPF    WBC Urine 71 (H) <=5 /HPF    Squamous Epithelials Urine 2 (H) <=1 /HPF    Narrative    Urine Culture ordered based on laboratory criteria     Medications   ondansetron (ZOFRAN) injection 4 mg (4 mg Intravenous $Given 5/4/23 2003)   acetaminophen (TYLENOL) tablet 650 mg (has no administration in time range)   dextrose 5% and 0.45% NaCl infusion (has no administration in time range)   acetaminophen (TYLENOL) tablet 325-650 mg (has no administration in time range)   ondansetron (ZOFRAN ODT) ODT tab 4 mg (has no administration in time range)   ondansetron (ZOFRAN) injection 4 mg (has no administration in time range)   pantoprazole (PROTONIX) EC tablet 40 mg (has no administration in time range)   sulfamethoxazole-trimethoprim (BACTRIM) 400-80 MG per tablet 1 tablet (has no administration in time range)   valGANciclovir (VALCYTE) tablet 450 mg (has no administration in time range)   tacrolimus (GENERIC EQUIVALENT) capsule 5 mg (has no administration in time range)   piperacillin-tazobactam (ZOSYN) 3.375 g vial to attach to  mL bag (has no administration in time range)   vancomycin (VANCOCIN) 1,250 mg in 0.9% NaCl 250 mL intermittent infusion (has no administration in time range)   vancomycin place guan - receiving intermittent dosing (has no administration in time range)   0.9% sodium chloride BOLUS (1,000 mLs Intravenous $New Bag 5/4/23 2004)   piperacillin-tazobactam (ZOSYN) 3.375 g vial to attach to  mL bag (0 g Intravenous Stopped 5/4/23 2045)      Labs Ordered and Resulted from Time of ED Arrival to Time of ED Departure   COMPREHENSIVE METABOLIC PANEL - Abnormal       Result Value    Sodium 132 (*)     Potassium 4.0      Chloride 98      Carbon Dioxide (CO2) 20 (*)     Anion Gap 14      Urea Nitrogen 31.2 (*)     Creatinine 2.85 (*)     Calcium 9.1      Glucose 125 (*)     Alkaline Phosphatase 48      AST 33      ALT 72 (*)     Protein Total 7.1      Albumin 4.1      Bilirubin Total 0.4      GFR Estimate 28 (*)    CBC WITH PLATELETS AND DIFFERENTIAL - Abnormal    WBC Count 2.8 (*)     RBC Count 3.38 (*)     Hemoglobin 10.3 (*)     Hematocrit 31.0 (*)     MCV 92      MCH 30.5      MCHC 33.2      RDW 13.4      Platelet Count 192     DIFFERENTIAL - Abnormal    % Neutrophils 72      % Lymphocytes 11      % Monocytes 15      % Eosinophils 1      % Basophils 1      Absolute Neutrophils 2.0      Absolute Lymphocytes 0.3 (*)     Absolute Monocytes 0.4      Absolute Eosinophils 0.0      Absolute Basophils 0.0      RBC Morphology Confirmed RBC Indices      Platelet Assessment        Value: Automated Count Confirmed. Platelet morphology is normal.   C. DIFFICILE TOXIN B PCR WITH REFLEX TO C. DIFFICILE ANTIGEN AND TOXINS A/B EIA - Normal    C Difficile Toxin B by PCR Negative     EBV DNA PCR QUANTITATIVE WHOLE BLOOD   BLOOD CULTURE   BLOOD CULTURE   ENTERIC BACTERIA AND VIRUS PANEL BY MACARIO STOOL   CMV QUANTITATIVE, PCR   BK VIRUS QUANTITATIVE, PCR     US Renal Transplant with Doppler   Final Result   IMPRESSION:    1. Patent antegrade Doppler evaluation of the renal transplant   vasculature.   2. Elevated velocity at the renal artery anastomosis up to 449 cm/s   which may suggest stenosis.   3. Normal grayscale appearance of the right lower quadrant transplant   kidney.      I have personally reviewed the examination and initial interpretation   and I agree with the findings.      AMISH SANTIAGO MD            SYSTEM ID:  [id number]             Critical care was not  performed.     Medical Decision Making  The patient's presentation was of moderate complexity (a chronic illness mild to moderate exacerbation, progression, or side effect of treatment).    The patient's evaluation involved:  review of external note(s) from 2 sources (transplant clinic notes, urgent care notes)  ordering and/or review of 3+ test(s) in this encounter (see separate area of note for details)  review of 3+ test result(s) ordered prior to this encounter (UA, urine culture, BMP, CBC)  discussion of management or test interpretation with another health professional (Discussion with Surgery Transplant Kidney fellow)    The patient's management necessitated high risk (a decision regarding hospitalization).      Assessment & Plan    IMPRESSION: 38 year old male with a  past medical history notable for recent kidney transplant and recurrent urinary tract infection. He presents to ED for admission and further work up due to concerns over urinary tract infection.    Clinically, patient appears ill-appearing. Vital signs notable initially to be afebrile though he did develop a fever of 103 later. Otherwise on examination patient has no abdominal tenderness, and lungs are clear to auscultation and cardiac exam negative for murmurs.    He had a BMP, CBC and UA with culture completed prior to arrival in ED that showed worsening kidney function and hyponatremia and UA with signs of infection. I spoke with the pharmacist and reviewed patient's urine cultures, and at this time it does appear Zosyn should cover for both bacteria that patient's urine cultures have grown in the past.    In the emergency department he had an IV placed, and was given Zofran for nausea as well as a 1 L normal saline bolus and was given the first dose of Zosyn.  After 2030 he was given 650 mg of Tylenol for fever. His CBC today shows stable white cell count and hemoglobin compared to the last 2 weeks.  CMP tonight does show an improved sodium  of 132, but worsening creatinine at 2.85 and worsening of the GFR to 28.  Blood cultures are pending.  The preliminary report on the patient's renal transplant ultrasound shows a patent Doppler evaluation of the transplant vasculature, though some evidence of possible stenosis and normal grayscale appearance of the transplanted kidney. C. Difficile testing negative, and enteric panel is pending.    I spoke with the Transplant Kidney Fellow about patient. Patient will be admitted to transplant surgery for further work up and evaluation.    Following completion of his ED work up patient was admitted to transplant surgery service.      New Prescriptions    No medications on file       Final diagnoses:   Urinary tract infection without hematuria, site unspecified   Kidney replaced by transplant   JACK (acute kidney injury) (H)   Immunosuppressed status (H)   Diarrhea of presumed infectious origin   Sepsis, due to unspecified organism, unspecified whether acute organ dysfunction present (H)       JESSE Palacios CNP  [facility] EMERGENCY DEPARTMENT  5/4/2023     Cipriano Joshi APRN CNP  05/04/23 2205    -------------------------------------------------------------------------------------------------------------    ED Attending Physician Attestation    I Iliana Joshi MD, cared for this patient with the Advanced Practice Provider (DK). I have performed a history and physical examination of the patient independent of the DK. I reviewed the DK's documentation above and agree with the documented findings and plan of care. I personally provided a substantive portion of the care for this patient, including the complete Medical Decision Making. Please see the DK's documentation for full details.    Summary of HPI, PE, ED Course   Patient is a 38 year old male evaluated in the emergency department for fever, chills, and urinary symptoms. Exam and ED course notable for chills/rigors on exam, he did spike  a fever up to 103 here in the emergency department.  Clearly does have sepsis likely secondary to urinary source.  He was given IV fluids as well as antimicrobials.  Patient will be admitted to the transplant surgery service at this time. After the completion of care in the emergency department, the patient was admitted to inpatient.    Critical Care & Procedures  Not applicable.      Iliana Joshi MD  Emergency Medicine          Adi, Iliana Garces MD  05/04/23 [id number]

## 2023-05-04 NOTE — TELEPHONE ENCOUNTER
Chip sent [employer] message regarding UTI symptoms   Called him after work today -- temp 100.3      Franko Cerda MD Huepfel, Mary K, RN; Georgie Pires MD  Needs to go to ED for JACK, workup for pyelonephritis, bacteremia, CMV.     Called again  Chip after Dr Franko Cerda  Message   He verbalized understanding --      Follow up visits with transplant  ID next week recurrent UTI and urology the following week

## 2023-05-05 DIAGNOSIS — Z94.0 TRANSPLANTED KIDNEY: ICD-10-CM

## 2023-05-05 LAB
ANION GAP SERPL CALCULATED.3IONS-SCNC: 11 MMOL/L (ref 7–15)
BACTERIA UR CULT: ABNORMAL
BASOPHILS # BLD MANUAL: 0 10E3/UL (ref 0–0.2)
BASOPHILS NFR BLD MANUAL: 2 %
BKV DNA # SPEC NAA+PROBE: NOT DETECTED COPIES/ML
BUN SERPL-MCNC: 29.4 MG/DL (ref 6–20)
C COLI+JEJUNI+LARI FUSA STL QL NAA+PROBE: NOT DETECTED
CALCIUM SERPL-MCNC: 8 MG/DL (ref 8.6–10)
CHLORIDE SERPL-SCNC: 102 MMOL/L (ref 98–107)
CMV DNA SPEC NAA+PROBE-ACNC: NOT DETECTED IU/ML
CREAT SERPL-MCNC: 2.62 MG/DL (ref 0.67–1.17)
DEPRECATED HCO3 PLAS-SCNC: 17 MMOL/L (ref 22–29)
EBV DNA # SPEC NAA+PROBE: NOT DETECTED COPIES/ML
EC STX1 GENE STL QL NAA+PROBE: NOT DETECTED
EC STX2 GENE STL QL NAA+PROBE: NOT DETECTED
ELLIPTOCYTES BLD QL SMEAR: SLIGHT
EOSINOPHIL # BLD MANUAL: 0 10E3/UL (ref 0–0.7)
EOSINOPHIL NFR BLD MANUAL: 1 %
ERYTHROCYTE [DISTWIDTH] IN BLOOD BY AUTOMATED COUNT: 13.3 % (ref 10–15)
GFR SERPL CREATININE-BSD FRML MDRD: 31 ML/MIN/1.73M2
GLUCOSE SERPL-MCNC: 151 MG/DL (ref 70–99)
HCT VFR BLD AUTO: 26.4 % (ref 40–53)
HGB BLD-MCNC: 8.7 G/DL (ref 13.3–17.7)
HOLD SPECIMEN: NORMAL
LACTATE SERPL-SCNC: 0.6 MMOL/L (ref 0.7–2)
LYMPHOCYTES # BLD MANUAL: 0.2 10E3/UL (ref 0.8–5.3)
LYMPHOCYTES NFR BLD MANUAL: 9 %
MAGNESIUM SERPL-MCNC: 1.4 MG/DL (ref 1.7–2.3)
MCH RBC QN AUTO: 30.2 PG (ref 26.5–33)
MCHC RBC AUTO-ENTMCNC: 33 G/DL (ref 31.5–36.5)
MCV RBC AUTO: 92 FL (ref 78–100)
METAMYELOCYTES # BLD MANUAL: 0.1 10E3/UL
METAMYELOCYTES NFR BLD MANUAL: 4 %
MONOCYTES # BLD MANUAL: 0.6 10E3/UL (ref 0–1.3)
MONOCYTES NFR BLD MANUAL: 26 %
NEUTROPHILS # BLD MANUAL: 1.3 10E3/UL (ref 1.6–8.3)
NEUTROPHILS NFR BLD MANUAL: 58 %
NOROV GI+II ORF1-ORF2 JNC STL QL NAA+PR: NOT DETECTED
PHOSPHATE SERPL-MCNC: 2.9 MG/DL (ref 2.5–4.5)
PLAT MORPH BLD: ABNORMAL
PLATELET # BLD AUTO: 158 10E3/UL (ref 150–450)
POTASSIUM SERPL-SCNC: 3.7 MMOL/L (ref 3.4–5.3)
RBC # BLD AUTO: 2.88 10E6/UL (ref 4.4–5.9)
RBC MORPH BLD: ABNORMAL
RVA NSP5 STL QL NAA+PROBE: NOT DETECTED
SALMONELLA SP RPOD STL QL NAA+PROBE: NOT DETECTED
SHIGELLA SP+EIEC IPAH STL QL NAA+PROBE: NOT DETECTED
SODIUM SERPL-SCNC: 130 MMOL/L (ref 136–145)
TACROLIMUS BLD-MCNC: 12.4 UG/L (ref 5–15)
TACROLIMUS BLD-MCNC: 28.6 UG/L (ref 5–15)
TME LAST DOSE: ABNORMAL H
TME LAST DOSE: ABNORMAL H
TME LAST DOSE: NORMAL H
TME LAST DOSE: NORMAL H
V CHOL+PARA RFBL+TRKH+TNAA STL QL NAA+PR: NOT DETECTED
VANCOMYCIN SERPL-MCNC: 10.1 UG/ML
WBC # BLD AUTO: 2.3 10E3/UL (ref 4–11)
Y ENTERO RECN STL QL NAA+PROBE: NOT DETECTED

## 2023-05-05 PROCEDURE — 80048 BASIC METABOLIC PNL TOTAL CA: CPT

## 2023-05-05 PROCEDURE — 250N000012 HC RX MED GY IP 250 OP 636 PS 637

## 2023-05-05 PROCEDURE — 120N000002 HC R&B MED SURG/OB UMMC

## 2023-05-05 PROCEDURE — 250N000013 HC RX MED GY IP 250 OP 250 PS 637: Performed by: PHYSICIAN ASSISTANT

## 2023-05-05 PROCEDURE — 250N000013 HC RX MED GY IP 250 OP 250 PS 637: Performed by: NURSE PRACTITIONER

## 2023-05-05 PROCEDURE — 86832 HLA CLASS I HIGH DEFIN QUAL: CPT | Performed by: PHYSICIAN ASSISTANT

## 2023-05-05 PROCEDURE — 36415 COLL VENOUS BLD VENIPUNCTURE: CPT | Performed by: PHYSICIAN ASSISTANT

## 2023-05-05 PROCEDURE — 85007 BL SMEAR W/DIFF WBC COUNT: CPT

## 2023-05-05 PROCEDURE — 36415 COLL VENOUS BLD VENIPUNCTURE: CPT

## 2023-05-05 PROCEDURE — 83605 ASSAY OF LACTIC ACID: CPT | Performed by: PHYSICIAN ASSISTANT

## 2023-05-05 PROCEDURE — 86833 HLA CLASS II HIGH DEFIN QUAL: CPT | Performed by: PHYSICIAN ASSISTANT

## 2023-05-05 PROCEDURE — 85027 COMPLETE CBC AUTOMATED: CPT

## 2023-05-05 PROCEDURE — 80202 ASSAY OF VANCOMYCIN: CPT | Performed by: SURGERY

## 2023-05-05 PROCEDURE — 84100 ASSAY OF PHOSPHORUS: CPT

## 2023-05-05 PROCEDURE — 250N000011 HC RX IP 250 OP 636

## 2023-05-05 PROCEDURE — 99232 SBSQ HOSP IP/OBS MODERATE 35: CPT | Mod: 24 | Performed by: SURGERY

## 2023-05-05 PROCEDURE — 250N000012 HC RX MED GY IP 250 OP 636 PS 637: Performed by: PHYSICIAN ASSISTANT

## 2023-05-05 PROCEDURE — 250N000013 HC RX MED GY IP 250 OP 250 PS 637

## 2023-05-05 PROCEDURE — 83735 ASSAY OF MAGNESIUM: CPT

## 2023-05-05 PROCEDURE — 99223 1ST HOSP IP/OBS HIGH 75: CPT | Mod: FS

## 2023-05-05 PROCEDURE — 250N000009 HC RX 250: Performed by: PHYSICIAN ASSISTANT

## 2023-05-05 PROCEDURE — 258N000003 HC RX IP 258 OP 636: Performed by: PHYSICIAN ASSISTANT

## 2023-05-05 PROCEDURE — 80197 ASSAY OF TACROLIMUS: CPT

## 2023-05-05 PROCEDURE — 36415 COLL VENOUS BLD VENIPUNCTURE: CPT | Performed by: SURGERY

## 2023-05-05 PROCEDURE — 99223 1ST HOSP IP/OBS HIGH 75: CPT | Performed by: INTERNAL MEDICINE

## 2023-05-05 RX ORDER — MAGNESIUM OXIDE 400 MG/1
800 TABLET ORAL DAILY
Qty: 60 TABLET | Refills: 3 | Status: ON HOLD | OUTPATIENT
Start: 2023-05-05 | End: 2023-07-08

## 2023-05-05 RX ORDER — CIPROFLOXACIN 250 MG/1
250 TABLET, FILM COATED ORAL
Status: DISCONTINUED | OUTPATIENT
Start: 2023-05-06 | End: 2023-05-06

## 2023-05-05 RX ORDER — MAGNESIUM OXIDE 400 MG/1
800 TABLET ORAL DAILY
Status: DISCONTINUED | OUTPATIENT
Start: 2023-05-05 | End: 2023-05-07 | Stop reason: HOSPADM

## 2023-05-05 RX ORDER — CIPROFLOXACIN 500 MG/1
500 TABLET, FILM COATED ORAL ONCE
Status: COMPLETED | OUTPATIENT
Start: 2023-05-05 | End: 2023-05-05

## 2023-05-05 RX ORDER — MYCOPHENOLIC ACID 360 MG/1
720 TABLET, DELAYED RELEASE ORAL
Status: DISCONTINUED | OUTPATIENT
Start: 2023-05-05 | End: 2023-05-06

## 2023-05-05 RX ORDER — SODIUM CHLORIDE 9 MG/ML
INJECTION, SOLUTION INTRAVENOUS CONTINUOUS
Status: DISCONTINUED | OUTPATIENT
Start: 2023-05-05 | End: 2023-05-07

## 2023-05-05 RX ADMIN — Medication 800 MG: at 22:17

## 2023-05-05 RX ADMIN — PIPERACILLIN AND TAZOBACTAM 3.38 G: 3; .375 INJECTION, POWDER, LYOPHILIZED, FOR SOLUTION INTRAVENOUS at 02:37

## 2023-05-05 RX ADMIN — PANTOPRAZOLE SODIUM 40 MG: 40 TABLET, DELAYED RELEASE ORAL at 09:23

## 2023-05-05 RX ADMIN — SULFAMETHOXAZOLE AND TRIMETHOPRIM 1 TABLET: 400; 80 TABLET ORAL at 09:23

## 2023-05-05 RX ADMIN — SODIUM CHLORIDE: 9 INJECTION, SOLUTION INTRAVENOUS at 23:34

## 2023-05-05 RX ADMIN — PIPERACILLIN AND TAZOBACTAM 3.38 G: 3; .375 INJECTION, POWDER, LYOPHILIZED, FOR SOLUTION INTRAVENOUS at 21:13

## 2023-05-05 RX ADMIN — MYCOPHENOLIC ACID 720 MG: 360 TABLET, DELAYED RELEASE ORAL at 11:19

## 2023-05-05 RX ADMIN — ACETAMINOPHEN 650 MG: 325 TABLET, FILM COATED ORAL at 20:10

## 2023-05-05 RX ADMIN — ACETAMINOPHEN 650 MG: 325 TABLET, FILM COATED ORAL at 15:48

## 2023-05-05 RX ADMIN — MYCOPHENOLIC ACID 720 MG: 360 TABLET, DELAYED RELEASE ORAL at 20:05

## 2023-05-05 RX ADMIN — ACETAMINOPHEN 650 MG: 325 TABLET, FILM COATED ORAL at 02:37

## 2023-05-05 RX ADMIN — CIPROFLOXACIN HYDROCHLORIDE 500 MG: 500 TABLET, FILM COATED ORAL at 18:39

## 2023-05-05 RX ADMIN — TACROLIMUS 5 MG: 5 CAPSULE ORAL at 09:23

## 2023-05-05 RX ADMIN — ACETAMINOPHEN 650 MG: 325 TABLET, FILM COATED ORAL at 09:22

## 2023-05-05 RX ADMIN — PIPERACILLIN AND TAZOBACTAM 3.38 G: 3; .375 INJECTION, POWDER, LYOPHILIZED, FOR SOLUTION INTRAVENOUS at 15:45

## 2023-05-05 RX ADMIN — PIPERACILLIN AND TAZOBACTAM 3.38 G: 3; .375 INJECTION, POWDER, LYOPHILIZED, FOR SOLUTION INTRAVENOUS at 09:22

## 2023-05-05 RX ADMIN — DEXTROSE AND SODIUM CHLORIDE: 5; 450 INJECTION, SOLUTION INTRAVENOUS at 00:34

## 2023-05-05 RX ADMIN — TACROLIMUS 5 MG: 5 CAPSULE ORAL at 20:05

## 2023-05-05 RX ADMIN — VALGANCICLOVIR 450 MG: 450 TABLET, FILM COATED ORAL at 09:23

## 2023-05-05 RX ADMIN — SODIUM BICARBONATE: 84 INJECTION, SOLUTION INTRAVENOUS at 12:15

## 2023-05-05 ASSESSMENT — ACTIVITIES OF DAILY LIVING (ADL)
DIFFICULTY_EATING/SWALLOWING: NO
DOING_ERRANDS_INDEPENDENTLY_DIFFICULTY: NO
ADLS_ACUITY_SCORE: 35
CHANGE_IN_FUNCTIONAL_STATUS_SINCE_ONSET_OF_CURRENT_ILLNESS/INJURY: NO
CONCENTRATING,_REMEMBERING_OR_MAKING_DECISIONS_DIFFICULTY: NO
WALKING_OR_CLIMBING_STAIRS_DIFFICULTY: NO
ADLS_ACUITY_SCORE: 35
DRESSING/BATHING_DIFFICULTY: NO
ADLS_ACUITY_SCORE: 35
FALL_HISTORY_WITHIN_LAST_SIX_MONTHS: NO
TOILETING_ISSUES: NO
ADLS_ACUITY_SCORE: 35
ADLS_ACUITY_SCORE: 35
WEAR_GLASSES_OR_BLIND: NO
ADLS_ACUITY_SCORE: 18
ADLS_ACUITY_SCORE: 35
ADLS_ACUITY_SCORE: 35
ADLS_ACUITY_SCORE: 18
ADLS_ACUITY_SCORE: 35

## 2023-05-05 NOTE — PHARMACY-VANCOMYCIN DOSING SERVICE
"Pharmacy Vancomycin Note  Date of Service May 5, 2023  Patient's  1985   38 year old, male    Indication: Urinary Tract Infection  Day of Therapy: 2  Current vancomycin regimen: intermittent dosing  Current vancomycin monitoring method: Trough (Method 2 = manual dose calculation)  Current vancomycin therapeutic monitoring goal: 15-20 mg/L    Current estimated CrCl = Estimated Creatinine Clearance: 37.2 mL/min (A) (based on SCr of 2.62 mg/dL (H)).    Creatinine for last 3 days  2023:  3:35 PM Creatinine 2.56 mg/dL;  7:24 PM Creatinine 2.85 mg/dL  2023:  5:00 AM Creatinine 2.62 mg/dL    Recent Vancomycin Levels (past 3 days)  2023:  3:50 PM Vancomycin 10.1 ug/mL    Vancomycin IV Administrations (past 72 hours)                   vancomycin (VANCOCIN) 1,250 mg in 0.9% NaCl 250 mL intermittent infusion (mg) 1,250 mg New Bag 23                Nephrotoxins and other renal medications (From now, onward)    Start     Dose/Rate Route Frequency Ordered Stop    23 0800  tacrolimus (GENERIC EQUIVALENT) capsule 5 mg        Note to Pharmacy: PTA Sig:Take 5 capsules (5 mg) by mouth 2 times daily      5 mg Oral 2 TIMES DAILY 23 0200  piperacillin-tazobactam (ZOSYN) 3.375 g vial to attach to  mL bag        Note to Pharmacy: For [facility], [facility] and [facility]: For Zosyn-naive patients, use the \"Zosyn initial dose + extended infusion\" order panel.    3.375 g  over 30 Minutes Intravenous EVERY 6 HOURS 23             Contrast Orders - past 72 hours (72h ago, onward)    None          Plan:  1. Vancomycin therapy was discontinued by primary team prior to vancomycin level resulting.    Mena Santamaria, [facility]    "

## 2023-05-05 NOTE — TELEPHONE ENCOUNTER
DIAGNOSIS: Kidney replaced by transplant [Z94.0] Urinary tract infection [N39.0], Referred by Franko Cerda MD in  [facility], Priority: 1-2 Weeks   DATE RECEIVED: 5.8.23   NOTES (Gather within 2 years) STATUS DETAILS   OFFICE NOTE from referring provider   Internal 4.24.23, 4.12.23  [place]  Nephrology   OFFICE NOTE from other specialist Internal 3.1.23  [facility]  Urology    2.13.23  [place]  [facility]   DISCHARGE SUMMARY from hospital Internal 3.21-3.24.23  [facility]   DISCHARGE REPORT from the ER Internal 4.15.23  [facility]    3.19-3.21.23  Antonietta Means   LABS (any labs) Internal    MEDICATION LIST Internal    IMAGING  (NEED IMAGES AND REPORTS)     Osteomyelitis: Foot imaging      Liver Abscess: Abdominal imagineg     Other (anything related to diagnoses Internal 3.20.23  US Renal Artery    3.19.23  CT Abd/Pelvis

## 2023-05-05 NOTE — CONSULTS
[facility]  Transplant Nephrology Consult  Date of Admission:  5/4/2023  Today's Date: 05/05/2023  Requesting physician: Tez Emerson*    Recommendations:  - Resume MPA.  - Recommend involving transplant ID for guidance on preventive measures.  - Send DSA  - Follow up BK.  - Would plan for biopsy Monday if no improvement and afebrile at that time.  - Give 1L sodium bicarbonate 150 mEq in 1L D5W at 100 ml/hr.  Then start NS.  - Needs urology follow up as outpatient.  - renally dose valcyte ~450 mg po every other day and adjust as renal function improves Estimated Creatinine Clearance: 37.2 mL/min (A) (based on SCr of 2.62 mg/dL (H)).    Assessment & Plan   # DDKT: JACK   - Baseline Creatinine: ~ 1.4-1.6   - Proteinuria: Normal (<0.2 grams) (3/29/23)   - Date DSA Last Checked: Mar/2023      Latest DSA: No    - BK Viremia: No (level in process)   - Kidney Tx Biopsy: No   - Transplant Ureteral Stent: Removed 3/1/23   - Renal ultrasound (5/4) shows elevated velocity at the renal artery anastomosis up to 449 cm/s   which may suggest stenosis.  Transplant surgery team has reviewed.   -  Needs urology follow up as outpatient for recurrent UTIs.    # Immunosuppression: Tacrolimus immediate release (goal 8-10) and Mycophenolic acid (dose 720 mg every 12 hours)   - Induction with Recent Transplant:  Intermediate Intensity   - Continue with intensive monitoring of immunosuppression for efficacy and toxicity.   - Changes: Not at this time.      # Infection Prophylaxis:   - PJP: Sulfa/TMP (Bactrim)  - CMV: Valganciclovir (Valcyte) 900mg daily; Patient is CMV IgG Ab discordant (D+/R-) and will continue on Valcyte x 6 months, then check CMV PCR monthly until 12 months post transplant.    # ID workup: On Zosyn and Vanc   - UA (5/4) shows large leukocyte esterase, 71 WBCs and few bacteria   - Urine culture in process   - Stool enteric bacteria and virus panel negative.   -  C-diff negative.    - BK in process   - CMV in process   - Blood cultures in process    # Diarrhea:    - Pt had diarrhea during previous admission for pyelonephritis with UTI (3/21-3/24).   -Colonoscopy (3/23/23) shows diverticulosis in the entire examined colon and pathology report for biopsies show scattered crypt epithelial apoptosis with no evidence of crypt dropout or ulceration.  No evidence of chronic active or microscopic colitis is noted.  Differential diagnoses include medication effect, infections etc.    # Blood Pressure: Controlled;  Goal BP: < 140/90   - Volume status: Euvolemic   - Changes: Not at this time    # Anemia in Chronic Renal Disease: Hgb: Trend down 8.7 today from 10.3 yesterday      SARA: No   - Iron studies: Low iron saturation 1/31/23     # Mineral Bone Disorder:   - Secondary renal hyperparathyroidism; PTH level: Not checked recently        On treatment: None  - Vitamin D; level: Not checked recently        On supplement: No, on supplement as outpatient  - Calcium; level: Low  8.0       On supplement: No  - Phosphorus; level: Normal        On supplement: No      # Electrolytes:   - Potassium; level: Normal        On supplement: No  - Magnesium; level: Low  1.4 (possibly dilutional after receiving fluids)    On supplement: No   - Bicarbonate; level: Low 17       On supplement: No, give 1L sodium bicarbonate 150 mEq in 1L D5W at 100 ml/hr.  Then start NS.  - Sodium; level: Low 130      # H/o Cardiomyopathy: Normal LVEF at 55-60% with last cardiac echo 9/2022.     # Urethral Stricture: Patient is s/p buccal urethroplasty and diverticulum excision 12/2020. Follows with Urology.     # GERD: Asymptomatic on pantoprazole 40mg daily     # H/o Polysubstance Abuse: Patient with h/o methamphetamine and alcohol abuse.  Now sober.    # Hx of Neuropathy:  B12 elevated 4/12/23    # Transplant History:  Etiology of Kidney Failure: Solitary congenital kidney and h/o urethral calculus and urologic  issues  Tx: DDKT  Transplant: 1/15/2023 (Kidney)  Donor Type: Donation after Circulatory Death  Donor Class:   Crossmatch at time of Tx: negative  DSA at time of Tx: No  Significant changes in immunosuppression: None  CMV IgG Ab High Risk Discordance (D+/R-): Yes  EBV IgG Ab High Risk Discordance (D+/R-): No  Significant transplant-related complications: None    Recommendations were communicated to the primary team verbally.    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  He also has a history of cardiomyopathy, polysubstance abuse (now sober), and recurrent UTI.  He presented to the ED yesterday (5/4) for three days of sore bladder and frequency. He reports diarrhea started a couple days after the urinary symptoms- which he says is common when he has a UTI.  He was found to have a fever.   UA suggestive of UTI.   Urine culture in process. No hydronephrosis on ultrasound of renal transplant.  Stool studies have been negative. Started on Zosyn and Vancomycin.    No SOB on room air.  Denies N/V and appetite is ok.  Reports lower abdominal discomfort over bladder.  Denies pain over transplanted kidney.        Review of Systems    The 10 point Review of Systems is negative other than noted in the HPI or here.     Past Medical History    I have reviewed this patient's medical history and updated it with pertinent information if needed.   Past Medical History:   Diagnosis Date    Bladder stones     Cardiomyopathy (H)     ESRD (end stage renal disease) on dialysis (H)     Hypertension     Kidney replaced by transplant 01/15/2023    DCD DDKT. Intermediate risk induction.    Migraines     Polysubstance abuse (H)     Solitary kidney, congenital     Urethral stone     Urethral  stricture        Past Surgical History   I have reviewed this patient's surgical history and updated it with pertinent information if needed.  Past Surgical History:   Procedure Laterality Date    BENCH KIDNEY  1/15/2023    Procedure: Bench kidney;  Surgeon: Tez Emerson MD;  Location: [facility] OR    BIOPSY  2020    renal, Alevism    COLONOSCOPY N/A 3/23/2023    Procedure: Colonoscopy;  Surgeon: Ana Cardenas MD;  Location: [facility] GI    COMBINED CYSTOSCOPY, LASER HOLMIUM LITHOTRIPSY URETER(S)      CYSTOSCOPY      CYSTOSCOPY FLEXIBLE, CYSTOSTOMY, INSERT TUBE SUPRAPUBIC, COMBINED N/A 2020    Procedure: CYSTOSCOPY, WITH SUPRAPUBIC CATHETER INSERTION;  Surgeon: Sam Mejia MD;  Location: [facility] OR    CYSTOSCOPY, OPEN EXCISION URETHRAL DIVERTICULUM, COMBINED N/A 2020    Procedure: EXCISION OF URETHRAL DIVERTICULUM X2;  Surgeon: Sam Mejia MD;  Location: [facility] OR    HERNIA REPAIR      infant    INSERT CATHETER PERITONEAL DIALYSIS      LASER HOLMIUM LITHOTRIPSY URETER(S), INSERT STENT, COMBINED N/A 2020    Procedure: CYSTOSCOPY, bladder and urethral stone extraction, removal of foreign body, urethral dilation, urethrotomy, laser on standby;  Surgeon: Sam Mejia MD;  Location: [facility] OR    REMOVE CATHETER PERITONEAL N/A 1/15/2023    Procedure: Remove catheter peritoneal;  Surgeon: Tez Emerson MD;  Location: [facility] OR    TRANSPLANT KIDNEY RECIPIENT  DONOR N/A 1/15/2023    Procedure: TRANSPLANT, KIDNEY, RECIPIENT,  DONOR WITH DONOR URETER STENTING;  Surgeon: Tez Emerson MD;  Location: [facility] OR    urethral dilation      URETHROPLASTY WITH BUCCAL GRAFT N/A 2020    Procedure: URETHROPLASTY, USING BUCCAL MUCOSA GRAFT;  Surgeon: Sam Mejia MD;  Location: [facility] OR       Family History   I have reviewed this patient's family history and updated it with pertinent information if needed.   Family History   Problem  Relation Age of Onset    Alzheimer Disease Mother     Unknown/Adopted Father     No Known Problems Sister     No Known Problems Sister     Kidney Disease No family hx of        Social History   I have reviewed this patient's social history and updated it with pertinent information if needed. Chip Fowler  reports that he quit smoking about 3 months ago. His smoking use included cigarettes. He started smoking about 21 years ago. He smoked an average of .5 packs per day. He has never used smokeless tobacco. He reports that he does not currently use alcohol. He reports that he does not currently use drugs after having used the following drugs: Methamphetamines and MDMA (Ecstasy).    Allergies   No Known Allergies  Prior to Admission Medications    pantoprazole  40 mg Oral QAM AC    piperacillin-tazobactam  3.375 g Intravenous Q6H    sulfamethoxazole-trimethoprim  1 tablet Oral Daily    tacrolimus  5 mg Oral BID    valGANciclovir  450 mg Oral Every Other Day    vancomycin place guan - receiving intermittent dosing  1 each Intravenous See Admin Instructions      dextrose 5% and 0.45% NaCl 100 mL/hr at 23 0243       Physical Exam   Temp  Av.5  F (37.5  C)  Min: 96.6  F (35.9  C)  Max: 103  F (39.4  C)      Pulse  Av  Min: 82  Max: 82 Resp  Av  Min: 16  Max: 16  SpO2  Av %  Min: 100 %  Max: 100 %     /72   Pulse 82   Temp 98.9  F (37.2  C)   Resp 16   SpO2 100%     Admit       GENERAL APPEARANCE: alert and no distress  HENT: mouth without ulcers or lesions  RESP: lungs clear to auscultation - no rales, rhonchi or wheezes  CV: regular rhythm, normal rate, no rub, no murmur  EDEMA: no LE edema bilaterally  ABDOMEN: soft, nondistended, lower mid abdomen tender, bowel sounds normal  MS: extremities normal - no gross deformities noted, no evidence of inflammation in joints, no muscle tenderness  SKIN: no rash  PSYCH: mentation appears normal and affect appropriate  DIALYSIS ACCESS:  none    Data   CMP  Recent Labs   Lab 05/05/23  0500 05/04/23 1924 05/04/23  1535 05/01/23  1522   * 132* 129* 135*   POTASSIUM 3.7 4.0 3.8 4.4   CHLORIDE 102 98 95* 105   CO2 17* 20* 19* 22   ANIONGAP 11 14 15 8   * 125* 173* 89   BUN 29.4* 31.2* 27.2* 23.4*   CR 2.62* 2.85* 2.56* 1.58*   GFRESTIMATED 31* 28* 32* 57*   VISHNU 8.0* 9.1 8.8 9.1   MAG 1.4*  --   --   --    PHOS 2.9  --   --   --    PROTTOTAL  --  7.1  --   --    ALBUMIN  --  4.1  --   --    BILITOTAL  --  0.4  --   --    ALKPHOS  --  48  --   --    AST  --  33  --   --    ALT  --  72*  --   --      CBC  Recent Labs   Lab 05/05/23  0500 05/04/23 1924 05/04/23  1535 05/01/23  1522   HGB 8.7* 10.3* 10.3* 9.7*   WBC 2.3* 2.8* 2.0* 2.4*   RBC 2.88* 3.38* 3.32* 3.15*   HCT 26.4* 31.0* 30.7* 29.7*   MCV 92 92 93 94   MCH 30.2 30.5 31.0 30.8   MCHC 33.0 33.2 33.6 32.7   RDW 13.3 13.4 13.2 14.0    192 178 263     INRNo lab results found in last 7 days.  ABGNo lab results found in last 7 days.   Urine Studies  Recent Labs   Lab Test 05/04/23  1535 04/15/23  0918 04/03/23 1530 03/19/23 2011   COLOR Yellow Yellow Straw Yellow   APPEARANCE Slightly Cloudy* Clear Clear Slightly Cloudy*   URINEGLC Negative Negative Negative Negative   URINEBILI Negative Negative Negative Negative   URINEKETONE Trace* Negative Negative Negative   SG 1.017 1.020 1.011 1.019   UBLD Negative Large* Negative Small*   URINEPH 6.0 6.5 6.5 5.5   PROTEIN 100* >=300* Negative 100*   UROBILINOGEN  --  0.2  --   --    NITRITE Negative Negative Negative Negative   LEUKEST Large* Moderate* Negative Moderate*   RBCU 3* 10-25* <1 9*   WBCU 71* * 1 28*     No lab results found.  PTH  Recent Labs   Lab Test 01/31/23  1049 01/20/23  0746 04/18/22  0851   PTHI 73* 101* 315*     Iron Studies  Recent Labs   Lab Test 01/31/23  1049 01/20/23  0746   IRON 70 153    244   IRONSAT 27 63*   DAWSON 430* 717*       IMAGING:  All imaging studies reviewed by me.        Physician  Attestation     I saw and evaluated Chip Fowler as part of a shared APRN/PA visit.     I personally reviewed the vital signs, medications, labs, and imaging.    I personally performed the substantive portion of the medical decision making for this visit - please see the DK's documentation for full details.    Key management decisions made by me and carried out under my direction: 35 yo male with ESKD 2/2 obstructive uropathy in congenital solitary kidney s//p DDKTx 1/2023, hx of recurrent UTIs, non ischemic cardiomyopathy (echo: ef 55-60%) presenting with recurrent UTI,diarrhea and JACK. Initiated on broad spectrum Abx (vanc+zosyn. Suspect JACK likely multifactorial (prerenal, sepsis, +_CNI), US no hydronephrosis, continue IVF, Abx, f/up Fk trough, BK< DSA, CMV pcr. Adjust valcyte dosing to eCrCl as Cr improves. If no improvement in renal function over the next 48h and FK within range, would consider kidney bx. Will hold off on IS changes at this time due to JACK; prior plans to switch MPA to mTOR due to colonoscopy findings suggestive of possible MPA toxicity (crypt apoptosis) done as part of evaluation of chronic diarrhea since transplant with neg infectious w/up    Georgie Herrera MD  Date of Service (when I saw the patient): 05/05/23

## 2023-05-05 NOTE — PROGRESS NOTES
Transplant Surgery  Inpatient Daily Progress Note  05/05/2023    Assessment & Plan: 38 year old male with PMH of ESRD 2/2 congenital solitary kidney and obstruction s/p DCD kidney txp 1/15/23, nephrolithiasis, s/p urethroplasty, recurrent UTIs, polysubstance abuse, cardiomyopathy (EF 55-60%) who presents to the ED with urinary frequency, fever, chills, nausea, and vomiting, reports this is his 3rd episode of such symptoms.     Graft function: Baseline Cr closer to 1.5. Cr 2.9 on admission, 2.6 this AM. Renal US patent, no acute concerns. Concern for pyelonephritis vs possible rejection. Will also send DSA. Will continue to monitor closely.  Immunosuppression management:   Tacrolimus 5 mg BID  Myfortic 720 mg BID  Hematology: No acute issus  Cardiorespiratory: Stable  GI/Nutrition: Regular diet as tolerated  Endocrine: No acute issues  Fluid/Electrolytes: metabolic acidosis: MIVF:Sodium bicarb at 100 mL/hr x 10 hours, followed by NS  : No shaver indicated. Will have patient follow up with urology as an outpatient due to recurrent UTIs.  Infectious disease: Third UTI since transplant, consulted ID. Will switch from vanc/zosyn to PO cipro. Culture with >100k E coli  Disposition: ED, has transfer orders in for 7A     DK/Fellow/Resident Provider: Karyn Morales PA-C    Faculty: Dr. Emerson  Attestation: I saw and examined this patient with Karyn Morales PA-C, and the transplant team. I independently reviewed all pertinent laboratory and imaging results and made independent management decisions including immunosuppression management. I agree with the findings and plan as documented in this note.  Tez Emerson MD  _________________________________________________________________  Transplant History: Admitted 5/4/2023 for Fevers, recurrent UTI.  1/15/2023 (Kidney), Postoperative day: 110     Interval History: History is obtained from the patient and electronic health record  Overnight events: Fever  to 103 overnight. Urine culture growing E coli. Patient denies any urinary symptoms. Myalgias and chills with fevers.    ROS:   A 10-point review of systems was negative except as noted above.    Meds:    mycophenolic acid  720 mg Oral BID IS     pantoprazole  40 mg Oral QAM AC     piperacillin-tazobactam  3.375 g Intravenous Q6H     sulfamethoxazole-trimethoprim  1 tablet Oral Daily     tacrolimus  5 mg Oral BID     valGANciclovir  450 mg Oral Every Other Day     vancomycin place guan - receiving intermittent dosing  1 each Intravenous See Admin Instructions       Physical Exam:     Admit      Current vitals:   BP (!) 145/78 (BP Location: Left arm)   Pulse 107   Temp (!) 101.9  F (38.8  C) (Oral)   Resp 18   SpO2 100%          Vital sign ranges:    Temp:  [96.6  F (35.9  C)-103  F (39.4  C)] 101.9  F (38.8  C)  Pulse:  [] 107  Resp:  [16-18] 18  BP: (127-145)/(72-78) 145/78  SpO2:  [100 %] 100 %  Patient Vitals for the past 24 hrs:   BP Temp Temp src Pulse Resp SpO2   05/05/23 0917 (!) 145/78 (!) 101.9  F (38.8  C) Oral 107 18 100 %   05/05/23 0003 -- 98.9  F (37.2  C) -- -- -- --   05/04/23 2149 -- (!) 103  F (39.4  C) -- -- -- --   05/04/23 1844 127/72 (!) 96.6  F (35.9  C) Oral 82 16 100 %     General Appearance: in no apparent distress.   Skin: normal, warm  Heart: Perfused  Lungs: Nonlabored resps on RA  Abdomen: The abdomen is soft, nontender  : shaver is not present.   Extremities: edema: absent  Neurologic: awake, alert and oriented. Tremor absent.     Data:   CMP  Recent Labs   Lab 05/05/23  0500 05/04/23  1924   * 132*   POTASSIUM 3.7 4.0   CHLORIDE 102 98   CO2 17* 20*   * 125*   BUN 29.4* 31.2*   CR 2.62* 2.85*   GFRESTIMATED 31* 28*   VISHNU 8.0* 9.1   MAG 1.4*  --    PHOS 2.9  --    ALBUMIN  --  4.1   BILITOTAL  --  0.4   ALKPHOS  --  48   AST  --  33   ALT  --  72*     CBC  Recent Labs   Lab 05/05/23  0500 05/04/23  1924   HGB 8.7* 10.3*   WBC 2.3* 2.8*    192      COAGSNo lab results found in last 7 days.    Invalid input(s): XA   Urinalysis  Recent Labs   Lab Test 05/04/23  1535 04/15/23  0918   COLOR Yellow Yellow   APPEARANCE Slightly Cloudy* Clear   URINEGLC Negative Negative   URINEBILI Negative Negative   URINEKETONE Trace* Negative   SG 1.017 1.020   UBLD Negative Large*   URINEPH 6.0 6.5   PROTEIN 100* >=300*   NITRITE Negative Negative   LEUKEST Large* Moderate*   RBCU 3* 10-25*   WBCU 71* *     Virology:  Hepatitis C Antibody   Date Value Ref Range Status   01/15/2023 Nonreactive Nonreactive Final   08/18/2020 Nonreactive NR^Nonreactive Final     Comment:     Assay performance characteristics have not been established for newborns,   infants, and children       BK Virus DNA copies/mL   Date Value Ref Range Status   04/12/2023 Not Detected Not Detected copies/mL Final   03/29/2023 Not Detected Not Detected copies/mL Final   02/20/2023 Not Detected Not Detected copies/mL Final

## 2023-05-05 NOTE — H&P
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 On exam, he is afebrile, though with temp at 96.6 and hemodynamically stable.  Labs significant for Cr of 2.85, WBC is 2.8, and +UA with +leuk esterase. Imaging demonstrates elevated renal artery anastomosis velocity to 449 cm/s, no signs of urinary tract or kidney dilation. We will admit him and treat for UTI, will initiate workup for recurrent UTI in transplant patients.     - admit to transplant surgery  - IVF - D5 / @100cc/hr  - regular diet ok for now, NPO @ mn, no procedure planned at this time, but will discuss with am team.   - IV abx - vanc/zosyn  - blood cultures sent, CMV/EBV/BK virus testing sent  - Continue bactrim ppx, valcyte ppx  - will continue home tacrolimus in am (patient has already taken PM doses), will hold off on cellcept restarting for now   - tacrolimus (goal level 8-10) level in am.      Discussed with Dr. Martinez, tranpslant surgery fellow, who discussed with staff.     Ted Jimenez MD  Surgery PGY2    Attestation: I saw and examined this patient with Leeroy Martinez MD, and the transplant team. I independently reviewed all pertinent laboratory and imaging results and made independent management decisions including immunosuppression management. I agree with the findings and plan as documented in this note.  Tez Emerson MD    History of Present Illness:    Chip Fowler is a 38 year old male that presents with PMH of ESRD 2/2 congenital solitary kidney and obstruction, previously on dialysis  now s/p DCD kidney txp 1/15/23, nephrolithiasis s/p urethroplasty, recurrent UTIs, polysubstance abuse, cardiomyopathy (EF 55-60%) who presents to the ED with urinary frequency, fever, chills, nausea, and vomiting, this is his 3-4th episode of such symptoms. He states his first symptom began 3-4 days ago and it was urinary frequency. He states he was peeing at least once every hour if not more. Then he developed fever and chills, and then yesterday he became nauseous and vomited a few times. No blood in his vomit. He has been taking his medications regularly.     His baseline is Cr. 1.4-1.7. Per chart review, he had previous UTI in 1/2023 with + enterococcus faecalis, pan sensitive, also had on 3/19 UC + enterococcus faecalis 50-100K.    Denies any allergies.   Reports he quit smoking 1/15/23, day of his transplant, 10 pack year smoking history prior.   Quit drinking 10 years ago.   Quit ecstasy 10 years ago  Quit methamphetamines several years ago.     Past Medical History:  Past Medical History:   Diagnosis Date     Bladder stones      Cardiomyopathy (H)      ESRD (end stage renal disease) on dialysis (H)      Hypertension      Kidney replaced by transplant 01/15/2023    DCD DDKT. Intermediate risk induction.     Migraines      Polysubstance abuse (H)      Solitary kidney, congenital      Urethral stone      Urethral stricture        Past Surgical History  Past Surgical History:   Procedure Laterality Date     BENCH KIDNEY  1/15/2023    Procedure: Bench kidney;  Surgeon: Tez Emerson MD;  Location: [facility]     BIOPSY  02/2020    renal, Oriental orthodox     COLONOSCOPY N/A 3/23/2023    Procedure: Colonoscopy;  Surgeon: Ana Cardenas MD;  Location: [facility]     COMBINED CYSTOSCOPY, LASER HOLMIUM LITHOTRIPSY URETER(S)       CYSTOSCOPY       CYSTOSCOPY FLEXIBLE, CYSTOSTOMY, INSERT TUBE SUPRAPUBIC, COMBINED N/A 12/14/2020    Procedure: CYSTOSCOPY, WITH SUPRAPUBIC CATHETER INSERTION;  Surgeon:  Sam Mejia MD;  Location: [facility]     CYSTOSCOPY, OPEN EXCISION URETHRAL DIVERTICULUM, COMBINED N/A 2020    Procedure: EXCISION OF URETHRAL DIVERTICULUM X2;  Surgeon: Sam Mejia MD;  Location: [facility] OR     HERNIA REPAIR      infant     INSERT CATHETER PERITONEAL DIALYSIS       LASER HOLMIUM LITHOTRIPSY URETER(S), INSERT STENT, COMBINED N/A 2020    Procedure: CYSTOSCOPY, bladder and urethral stone extraction, removal of foreign body, urethral dilation, urethrotomy, laser on standby;  Surgeon: Sam Mejia MD;  Location: [facility]     REMOVE CATHETER PERITONEAL N/A 1/15/2023    Procedure: Remove catheter peritoneal;  Surgeon: Tez Emerson MD;  Location: [facility] OR     TRANSPLANT KIDNEY RECIPIENT  DONOR N/A 1/15/2023    Procedure: TRANSPLANT, KIDNEY, RECIPIENT,  DONOR WITH DONOR URETER STENTING;  Surgeon: Tez Emerson MD;  Location: [facility] OR     urethral dilation       URETHROPLASTY WITH BUCCAL GRAFT N/A 2020    Procedure: URETHROPLASTY, USING BUCCAL MUCOSA GRAFT;  Surgeon: Sam Mejia MD;  Location: [facility]       Family History:  Family History   Problem Relation Age of Onset     Alzheimer Disease Mother      Unknown/Adopted Father      No Known Problems Sister      No Known Problems Sister      Kidney Disease No family hx of        Social History:  Social History     Socioeconomic History     Marital status: Single     Spouse name: Not on file     Number of children: Not on file     Years of education: Not on file     Highest education level: Not on file   Occupational History     Occupation: material stager   Tobacco Use     Smoking status: Former     Packs/day: 0.50     Types: Cigarettes     Start date:      Quit date: 1/15/2023     Years since quittin.2     Smokeless tobacco: Never   Vaping Use     Vaping status: Never Used   Substance and Sexual Activity     Alcohol use: Not Currently     Comment: quit alcohol 3-4 yrs ago      Drug use: Not Currently     Types: Methamphetamines, MDMA (Ecstasy)     Sexual activity: Yes   Other Topics Concern     Parent/sibling w/ CABG, MI or angioplasty before 65F 55M? Not Asked   Social History Narrative     Not on file     Social Determinants of Health     Financial Resource Strain: Not on file   Food Insecurity: Not on file   Transportation Needs: Not on file   Physical Activity: Not on file   Stress: Not on file   Social Connections: Not on file   Intimate Partner Violence: Not on file   Housing Stability: Not on file       Medications:  Current Outpatient Medications   Medication Sig Dispense Refill     magnesium oxide (MAG-OX) 400 MG tablet Take 2 tablets (800 mg) by mouth daily 6PM 60 tablet 3     mycophenolic acid (GENERIC EQUIVALENT) 180 MG EC tablet Take 4 tablets (720 mg) by mouth 2 times daily 240 tablet 11     pantoprazole (PROTONIX) 40 MG EC tablet Take 1 tablet (40 mg) by mouth every morning (before breakfast) 30 tablet 2     sodium bicarbonate 650 MG tablet Take 1 tablet (650 mg) by mouth 2 times daily 60 tablet 1     sulfamethoxazole-trimethoprim (BACTRIM) 400-80 MG tablet Take 1 tablet by mouth daily 30 tablet 11     tacrolimus (GENERIC EQUIVALENT) 1 MG capsule Take 5 capsules (5 mg) by mouth 2 times daily 300 capsule 11     valGANciclovir (VALCYTE) 450 MG tablet Take 2 tablets (900 mg) by mouth daily 60 tablet 1     VITAMIN D, CHOLECALCIFEROL, PO Take 2,000 Units by mouth daily         Allergies:   No Known Allergies    Review of Symptoms:  A 10 point review of symptoms has been conducted and is negative except for that mentioned in the above HPI.    Physical Exam:    Blood pressure 127/72, pulse 82, temperature (!) 96.6  F (35.9  C), temperature source Oral, resp. rate 16, SpO2 100 %.  Gen:    Lying in bed in mild distress, shivering diffusely, A&OX3  HEENT: Normocephalic and atraumatic  CV:  RRR per radial pulse  Pulm:  Non-labored breathing on room air  Abd:  Soft, non-tender,  non-distended, incisions well healed.  Ext:  Warm and well perfused, no obvious deformities    Labs:  CBC RESULTS:   Recent Labs   Lab Test 05/04/23 1924   WBC 2.8*   RBC 3.38*   HGB 10.3*   HCT 31.0*   MCV 92   MCH 30.5   MCHC 33.2   RDW 13.4        Last Basic Metabolic Panel:  Lab Results   Component Value Date     05/04/2023     08/21/2020      Lab Results   Component Value Date    POTASSIUM 4.0 05/04/2023    POTASSIUM 4.7 01/15/2023    POTASSIUM 4.9 03/21/2022    POTASSIUM 4.2 12/03/2020     Lab Results   Component Value Date    CHLORIDE 98 05/04/2023    CHLORIDE 109 03/21/2022    CHLORIDE 110 08/21/2020     Lab Results   Component Value Date    VISHNU 9.1 05/04/2023    VISHNU 8.1 08/21/2020     Lab Results   Component Value Date    CO2 20 05/04/2023    CO2 21 03/21/2022    CO2 20 08/21/2020     Lab Results   Component Value Date    BUN 31.2 05/04/2023    BUN 51 03/21/2022    BUN 66 08/21/2020     Lab Results   Component Value Date    CR 2.85 05/04/2023    CR 6.20 12/03/2020     Lab Results   Component Value Date     05/04/2023     01/18/2023    GLC 90 03/21/2022    GLC 85 08/21/2020       Imaging:  US Renal Transplant with Doppler    Result Date: 5/4/2023  RESIDENT PRELIMINARY INTERPRETATION IMPRESSION: 1. Patent antegrade Doppler evaluation of the renal transplant vasculature. 2. Elevated velocity at the renal artery anastomosis up to 449 cm/s which may suggest stenosis. 3. Normal grayscale appearance of the right lower quadrant transplant kidney.

## 2023-05-05 NOTE — PHARMACY-ADMISSION MEDICATION HISTORY
Pharmacist Admission Medication History    Admission medication history is complete. The information provided in this note is only as accurate as the sources available at the time of the update.    Medication reconciliation/reorder completed by provider prior to medication history? Yes    Information Source(s): Patient, Patient's pharmacy and [facility] via in-person    Changes made to PTA medication list:    Added: None    Deleted: None    Changed: None    Pertinent Information:     Patient confirmed dosing of this tacrolimus, mycophenolic acid, and valganciclovir (900 mg daily PTA)    Patient reports he fills his tacrolimus, mycophenolic acid, and valganciclovir thru [facility]     Patient reports he ran out of vitamin D this past week. He is going to discuss with his provider if this is something he should continue     No prescription or OTC medication changes per patient     Prior to Admission medications    Medication Sig Last Dose Taking? Auth Provider Long Term End Date   magnesium oxide (MAG-OX) 400 MG tablet Take 2 tablets (800 mg) by mouth daily 6PM 5/4/2023 at AM Yes Edwin Green MD     mycophenolic acid (GENERIC EQUIVALENT) 180 MG EC tablet Take 4 tablets (720 mg) by mouth 2 times daily 5/4/2023 at AM Yes Franko Cerda MD     pantoprazole (PROTONIX) 40 MG EC tablet Take 1 tablet (40 mg) by mouth every morning (before breakfast) 5/4/2023 at AM Yes Jen Posey PA-C     sodium bicarbonate 650 MG tablet Take 1 tablet (650 mg) by mouth 2 times daily 5/4/2023 at AM Yes Edwin Green MD     sulfamethoxazole-trimethoprim (BACTRIM) 400-80 MG tablet Take 1 tablet by mouth daily 5/4/2023 at AM Yes Franko Cerda MD No    tacrolimus (GENERIC EQUIVALENT) 1 MG capsule Take 5 capsules (5 mg) by mouth 2 times daily 5/4/2023 at AM Yes Franko Cerda MD No    valGANciclovir (VALCYTE) 450 MG tablet Take 2 tablets (900 mg) by mouth daily 5/4/2023 at AM Yes Peter  Edwin Mendiola MD Yes    VITAMIN D, CHOLECALCIFEROL, PO Take 2,000 Units by mouth daily Past Week Yes Reported, Patient         Medication History Completed By: Navid Weaver [facility] 5/5/2023 1:49 PM

## 2023-05-05 NOTE — CONSULTS
[facility]    Transplant Infectious Diseases Inpatient Consultation      Chip Fowler MRN# [id number]   YOB: 1985 Age: 38 year old   Date of Admission and time: 5/4/2023  6:42 PM     Reason for consult: I was asked by Dr. Emerson to evaluate this patient for recurrent UTI.             Recommendations:   1. I would discontinue vancomycin and zosyn.   2. I would treat with cipro  mg x1 followed by 250 mg daily (adjusted to kidney function) for additional 13 days (total of 14 days).   3. Keep appointment with ID 5/8/23.   4. Keep appointment with urology 5/16/23.   5. Due for the seasonal influenza vaccine and COVID-19 bivalent.     Dr. Davenport is available over the weekend for questions. Dr. Pizarro will assume the patient's care on Monday if still inpatient.         Summary of Presentation:   Transplants:  1/15/2023 (Kidney), Postoperative day:  110     This patient is a 38 year old male with congenital kidney disease and HTN s/p KT IN 1/2023. Received ATG for induction. Currently on TAC/MMF.   Has history of urethral stenosis s/p urethral reconstruction prior to KT.   With recurrent UTI.   Presented with dysuria, fever and diarrhea.         Active Problems and Infectious Diseases Issues:   1. Severe sepsis.   2. UTI with recurrent UTI.   The UTI is mostly with E faecalis since 1/2023 except last infection in 4/2023 with E faecalis and E coli.   The current UTI seems also to be due to E coli.   I would treat with cipro  mg x1 followed by 250 mg daily for 14 days.   I would keep the appointment with Dr. Willis on 5/8/23 to determine the course of therapy.   I would keep the appointment with urology on 5/16/23.     3. Diarrhea.   Maybe contributing to UTI since diarrhea may contaminate the  tract.   Could be due to sepsis.   C diff negative.   Supportive care.     4. JACK   Due to 1. 2. And 3.   Adjust ABx accordingly.         Old Problems and Infectious  Diseases Issues:   1. Eosinophilia. The workup for eosinophilia and diarrhea was negative for fungal and parasitic infections that would account for the eosinophilia. Evaluated by hematology. The eosinophilia is likely reactive to foreign objects; the PD catheter was thought to be potentially the source as noted by hematology. The relatively increased tryptase level favors allergy (likely to PD catheter) to be the etiology of the eosinophilia rather than infectious processes or malignancies.   2. Chronic diarrhea since he was started on PD in 7/2020. Workup has been negative.   3. Positive Salmonella serology. 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 The patient was counseled against keeping the snakes after transplantation as they are source of recurrent Salmonella infection.  4. E coli in urine in 8/2020.   5. Group B Strep in urine in 2/2020.   6. Urethral stenosis s/p reconstruction.     Other Infectious Disease issues include:  - QTc: 416 as of 3/23/23.   - PJP prophylaxis: bactrim.   - Serostatus: CMV D+/R-, EBV D+/R-, HSV1?/2?, VZV +  - Immunization status: This patient received the third dose of the COVID-19 vaccine on 1/26/2022. Otherwise due for the seasonal influenza vaccine and COVID-19 bivalent.      Thank you very much Dr. Emerson for involving me in the care of Mr. Chip Fowler. Please do not hesitate to call me for any question.     Attestation:  Total duration of visit including chart review, reviewing labs and imaging, interviewing and examining the patient, documentation, and sending communication to the primary treating team, all at the same day of this encounter, is: 60 minutes.     Tamara Desir MD  [facility]  Contact information available via [facility]  Paging/Directory    05/05/2023             History of Present Illness:   Transplants:  1/15/2023 (Kidney), Postoperative day:  110     This patient is a 38 year old male s/p KT with recurrent UTI since transplant, mostly with E faecalis and mostly treated with augmentin or amoxicillin.   In 4/2023 he had symptoms of UTI for which he presented to urgent care and was treated with augmentin however E coli and E faecalis grew so two days later he was switched to cefdinir for 7 days.   One week after he finished cefdinir, he started to develop polyuria and dysuria. Few days later he started to experience diarrhea then fever so he presented to ED.   He denies pain at he allograft site.   No other complaints.     History of urethral stenosis s/p reconstruction prior to KT with resolution of difficulty urinating.               Review of Systems:      As mentioned in the HPI otherwise negative by reviewing constitutional symptoms, central and peripheral neurological systems, respiratory system, cardiac system, GI system,  system, musculoskeletal, skin, allergy, and lymphatics.                  Past Medical History:     Past Medical History:   Diagnosis Date     Bladder stones      Cardiomyopathy (H)      ESRD (end stage renal disease) on dialysis (H)      Hypertension      Kidney replaced by transplant 01/15/2023    DCD DDKT. Intermediate risk induction.     Migraines      Polysubstance abuse (H)      Solitary kidney, congenital      Urethral stone      Urethral stricture             Past Surgical History:     Past Surgical History:   Procedure Laterality Date     BENCH KIDNEY  1/15/2023    Procedure: Bench kidney;  Surgeon: Tez Emerson MD;  Location: [facility]     BIOPSY  02/2020    renal, Hinduism     COLONOSCOPY N/A 3/23/2023    Procedure: Colonoscopy;  Surgeon: Ana Cardenas MD;  Location: [facility] GI     COMBINED CYSTOSCOPY, LASER HOLMIUM LITHOTRIPSY URETER(S)       CYSTOSCOPY        CYSTOSCOPY FLEXIBLE, CYSTOSTOMY, INSERT TUBE SUPRAPUBIC, COMBINED N/A 2020    Procedure: CYSTOSCOPY, WITH SUPRAPUBIC CATHETER INSERTION;  Surgeon: Sam Mejia MD;  Location: [facility] OR     CYSTOSCOPY, OPEN EXCISION URETHRAL DIVERTICULUM, COMBINED N/A 2020    Procedure: EXCISION OF URETHRAL DIVERTICULUM X2;  Surgeon: Sam Mejia MD;  Location: [facility] OR     HERNIA REPAIR      infant     INSERT CATHETER PERITONEAL DIALYSIS       LASER HOLMIUM LITHOTRIPSY URETER(S), INSERT STENT, COMBINED N/A 2020    Procedure: CYSTOSCOPY, bladder and urethral stone extraction, removal of foreign body, urethral dilation, urethrotomy, laser on standby;  Surgeon: Sam Mejia MD;  Location: [facility] OR     REMOVE CATHETER PERITONEAL N/A 1/15/2023    Procedure: Remove catheter peritoneal;  Surgeon: Tez Emerson MD;  Location: [facility] OR     TRANSPLANT KIDNEY RECIPIENT  DONOR N/A 1/15/2023    Procedure: TRANSPLANT, KIDNEY, RECIPIENT,  DONOR WITH DONOR URETER STENTING;  Surgeon: Tez Emerson MD;  Location: [facility] OR     urethral dilation       URETHROPLASTY WITH BUCCAL GRAFT N/A 2020    Procedure: URETHROPLASTY, USING BUCCAL MUCOSA GRAFT;  Surgeon: Sam Mejia MD;  Location: [facility] OR            Social History:     Social History     Tobacco Use     Smoking status: Former     Packs/day: 0.50     Types: Cigarettes     Start date:      Quit date: 1/15/2023     Years since quittin.3     Smokeless tobacco: Never   Vaping Use     Vaping status: Never Used   Substance Use Topics     Alcohol use: Not Currently     Comment: quit alcohol 3-4 yrs ago            Family History:   I have reviewed this patient's family history  Family History   Problem Relation Age of Onset     Alzheimer Disease Mother      Unknown/Adopted Father      No Known Problems Sister      No Known Problems Sister      Kidney Disease No family hx of             Immunizations:     Immunization History    Administered Date(s) Administered     COVID-19 Monovalent 18+ (Moderna) 01/25/2021, 02/22/2021, 01/26/2022     DTAP (<7y) 1985, 1984, 1984, 10/04/1985, 03/21/1989     Flu, Unspecified 11/12/1997, 03/11/2020, 10/06/2020, 10/27/2021     Hepatits B (Peds <19Y) 02/19/1996, 06/17/1996, 09/04/1996     Hib, Unspecified 04/02/1986     Historical DTP/aP 1984, 1984, 1984, 10/04/1985, 03/21/1989     Influenza (intradermal) 03/11/2020     Influenza Vaccine >6 months (Alfuria,Fluzone) 11/06/2016     MMR 07/03/1985, 02/19/1996     Mantoux Tuberculin Skin Test 03/11/2020     Pneumo Conj 13-V (2010&after) 11/24/2020     Pneumococcal 23 valent 03/11/2020     Pneumococcal, Unspecified 03/11/2020, 04/22/2021     Poliovirus, inactivated (IPV) 1984, 1984, 1984, 03/21/1989     TD,PF 7+ (Tenivac) 06/17/1996     TDAP Vaccine (Adacel) 04/28/2020     Td (Adult), Adsorbed 06/17/1996            Allergies:   No Known Allergies          Medications:   Medications that Require Transfusion:     sodium bicarbonate 150 mEq in D5W 1,000 mL infusion       sodium chloride       Scheduled Medications:     mycophenolic acid  720 mg Oral BID IS     pantoprazole  40 mg Oral QAM AC     piperacillin-tazobactam  3.375 g Intravenous Q6H     sulfamethoxazole-trimethoprim  1 tablet Oral Daily     tacrolimus  5 mg Oral BID     valGANciclovir  450 mg Oral Every Other Day     vancomycin place guan - receiving intermittent dosing  1 each Intravenous See Admin Instructions               Physical Exam:   Temp: (!) 101.9  F (38.8  C) Temp src: Oral BP: (!) 145/78 Pulse: 107   Resp: 18 SpO2: 100 % O2 Device: None (Room air)      Wt Readings from Last 4 Encounters:   04/12/23 69.5 kg (153 lb 4.8 oz)   03/23/23 71.9 kg (158 lb 8 oz)   03/19/23 68 kg (150 lb)   03/14/23 71.6 kg (157 lb 14.4 oz)     Constitutional: awake, alert, cooperative, no apparent distress and appears at stated age, well nourished.   Head, ENT,  Eyes, and Neck: Normocephalic, sinuses non-tender to palpation, external ears without lesions, moist buccal mucosa without oral thrush or ulcers, tonsils without swelling, erythema, or exudate, no tenderness palpating teeth, good dentition, gums without necrosis or abscesses.   PERRL, EOMI, pink conjunctivae, non-icteric sclera.   Neck supple without rigidity.   Lymphatics: no cervical/axillary/inguinal LA bilaterally.    Neurologic: Patient is moving all extremities without focal deficit, no focal sensory loss.   Lungs: CTA bilaterally, no accessory muscle use, no dullness to percussion and no abnormal tactile fremitus.   CVS: RRR, normal S1/S2, no murmur, PMI was not displaced.   Abdomen: non-tender, non-distended, no masses, no bruit, no shifting dullness, normal BS.   Musculoskeletal: no pitting edema of bilateral lower extremities, no ulcers, normal ROM of all joints, no swelling or erythema of any of joints and no tenderness to palpation.   Skin: no induration, fluctuation or discharge, and no rash            Data:     Absolute CD4   Date Value Ref Range Status   12/03/2020 1,325 441 - 2,156 cells/uL Final       Inflammatory Markers    No lab results found.    Immune Globulin Studies     Recent Labs   Lab Test 12/03/20  1647   IGG 1,152   IGM 64   IGE 18          Metabolic Studies       Recent Labs   Lab Test 05/05/23  1020 05/05/23  0500 05/04/23  1924 05/04/23  1535 05/01/23  1522 04/27/23  1533 04/24/23  1519 04/20/23  1520 04/17/23  1519 03/20/23  1956 03/20/23  0652 01/15/23  0904 01/15/23  0540   NA  --  130* 132* 129* 135* 136 137   < > 134*   < >  --    < > 142   POTASSIUM  --  3.7 4.0 3.8 4.4 4.4 4.5   < > 4.2   < >  --    < > 4.1   CHLORIDE  --  102 98 95* 105 105 107   < > 101   < >  --    < > 107   CO2  --  17* 20* 19* 22 22 21*   < > 22   < >  --    < > 19*   ANIONGAP  --  11 14 15 8 9 9   < > 11   < >  --    < > 16*   BUN  --  29.4* 31.2* 27.2* 23.4* 28.0* 30.4*   < > 22.5*   < >  --    <  > 47.5*   CR  --  2.62* 2.85* 2.56* 1.58* 1.57* 1.60*   < > 1.89*   < >  --    < > 4.62*   GFRESTIMATED  --  31* 28* 32* 57* 57* 56*   < > 46*   < >  --    < > 16*   GLC  --  151* 125* 173* 89 91 98   < > 116*   < >  --    < > 90   A1C  --   --   --   --   --   --   --   --   --   --   --   --  5.2   VISHNU  --  8.0* 9.1 8.8 9.1 9.2 9.3   < > 9.0   < >  --    < > 9.1   PHOS  --  2.9  --   --   --   --   --   --  2.4*   < >  --    < >  --    MAG  --  1.4*  --   --   --   --   --   --  1.5*   < >  --    < >  --    LACT 0.6*  --   --   --   --   --   --   --   --   --  0.9   < >  --     < > = values in this interval not displayed.       Hepatic Studies    Recent Labs   Lab Test 05/04/23 1924 04/06/23  1543 03/19/23 2155 03/06/23  1550 01/23/23  0748 01/21/23  0715 01/20/23  0746 01/18/23  0606 01/17/23  0553   BILITOTAL 0.4 0.2 0.4 0.3  --   --   --  <0.2 <0.2   ALKPHOS 48 65 41 58  --   --   --  44 45   ALBUMIN 4.1 4.5 4.1 4.0  --   --   --  2.7* 2.8*   AST 33 30 34 18 24 48   < > 50 94*   ALT 72* 42 40 19 96* 134*   < > 59* 68*    < > = values in this interval not displayed.       Pancreatitis testing    Recent Labs   Lab Test 03/19/23  2155 01/15/23  0540 04/18/22  0851   LIPASE 20  --   --    TRIG  --  115 78       Hematology Studies      Recent Labs   Lab Test 05/05/23  0500 05/04/23 1924 05/04/23  1535 05/01/23  1522 04/27/23  1533 04/24/23  1519 04/20/23  1520 04/17/23  1519 04/10/23  1532 04/06/23  1543   WBC 2.3* 2.8* 2.0* 2.4* 2.8* 3.7* 3.6* 6.2   < > 4.0   ANEU 1.3* 2.0  --   --   --  2.4 2.0 4.7  --  2.6   ALYM 0.2* 0.3*  --   --   --  0.6* 0.8 0.6*  --  1.1   TY 0.6 0.4  --   --   --  0.2 0.4 0.6  --  0.2   AEOS 0.0 0.0  --   --   --  0.4 0.4 0.2  --  0.0   HGB 8.7* 10.3* 10.3* 9.7* 10.0* 9.5* 8.9* 9.0*   < > 9.9*   HCT 26.4* 31.0* 30.7* 29.7* 30.5* 29.4* 27.6* 27.5*   < > 31.0*    192 178 263 260 249 173 154   < > 381    < > = values in this interval not displayed.       Clotting Studies     Recent Labs   Lab Test 01/15/23  0540 08/09/21  1124 08/18/20  1307   INR 0.90 0.96 1.08   PTT 28  --  29       Arterial Blood Gas Testing    Recent Labs   Lab Test 03/22/23  1550 03/20/23  0652 01/15/23  1711   PH  --  7.42  --    O2PER 20  --  45.0        Urine Studies     Recent Labs   Lab Test 05/04/23  1535 04/15/23  0918 04/03/23  1530 03/19/23 2011 02/24/23  1445   URINEPH 6.0 6.5 6.5 5.5 6.5   NITRITE Negative Negative Negative Negative Negative   LEUKEST Large* Moderate* Negative Moderate* Negative   WBCU 71* * 1 28* 1       Vancomycin Levels     No lab results found.    Invalid input(s): VANCO    Tobramycin levels     No lab results found.    Gentamicin levels    No lab results found.    Tacrolimus levels    Invalid input(s): TACROLIMUS, TAC, TACR      Latest Ref Rng & Units 5/5/2023     5:00 AM 5/4/2023     7:24 PM 5/4/2023     3:35 PM 5/1/2023     3:22 PM 4/27/2023     3:33 PM   Transplant Immunosuppression Labs   Creat 0.67 - 1.17 mg/dL 2.62   2.85   2.56   1.58   1.57     Urea Nitrogen 6.0 - 20.0 mg/dL 29.4   31.2   27.2   23.4   28.0     WBC 4.0 - 11.0 10e3/uL 2.3   2.8   2.0   2.4   2.8     Neutrophil % 58   72        ANEU 1.6 - 8.3 10e3/uL 1.3   2.0            Cyclosporine levels    Invalid input(s): CYCLOSPORINE, CYC    Mycophenolate levels    Invalid input(s): MYPA, MYP    Sirolimus levels    Invalid input(s): SIROLIMUS, SIR, RAPA    CSF testing   No lab results found.      Microbiology:  Ucx with E coli.   Last check of C difficile  C Difficile Toxin B by PCR   Date Value Ref Range Status   05/04/2023 Negative Negative Final     Comment:     A negative result does not exclude actual disease due to C. difficile and may be due to improper collection, handling and storage of the specimen or the number of organisms in the specimen is below the detection limit of the assay.       Virology:  CMV viral loads    CMV DNA IU/mL   Date Value Ref Range Status   05/04/2023 Not Detected Not Detected  IU/mL Final     CMV viral loads    Recent Labs   Lab Test 05/04/23  2159   CMVQNT Not Detected       CMV viral loads    CMV DNA IU/mL   Date Value Ref Range Status   05/04/2023 Not Detected Not Detected IU/mL Final   03/20/2023 Not Detected Not Detected IU/mL Final   03/06/2023 Not Detected Not Detected IU/mL Final   01/15/2023 Not Detected Not Detected IU/mL Final       CMV resistance testing  No lab results found.  No results found for: CMVCID, CMVFOS, CMVGAN     No results found for: H6RES    EBV DNA Copies/mL   Date Value Ref Range Status   03/20/2023 Not Detected Not Detected copies/mL Final       CMV Antibody IgG   Date Value Ref Range Status   01/15/2023 No detectable antibody. No detectable antibody.  Final   08/18/2020 <0.2 0.0 - 0.8 AI Final     Comment:     Negative  Antibody index (AI) values reflect qualitative changes in antibody   concentration that cannot be directly associated with clinical condition or   disease state.         No results found for: EBIG2, EBIGM, TOXG      Imaging:  CT abdomen W 3/19/23  IMPRESSION:   1.  The right kidney is not seen. The left kidney is moderately atrophic with no hydronephrosis. There is a transplant kidney seen in the right hemipelvis. There is some mild stranding of the fat seen within the renal pelvis and adjacent to the renal   parenchyma which is nonspecific, but could represent changes of inflammation/pyelonephritis. Given patient's history of recent transplantation, this may also be most surgical in nature. Do not appreciate any evidence for dilatation of the right   transplant kidney urinary tract.       Tamara Desir MD  [facility]  Contact information available via [facility] Paging/Directory     05/05/2023

## 2023-05-05 NOTE — PHARMACY-VANCOMYCIN DOSING SERVICE
"Pharmacy Vancomycin Initial Note  Date of Service May 4, 2023  Patient's  1985  38 year old, male    Indication: Urinary Tract Infection    Current estimated CrCl = Estimated Creatinine Clearance: 34.2 mL/min (A) (based on SCr of 2.85 mg/dL (H)).    Creatinine for last 3 days  2023:  3:35 PM Creatinine 2.56 mg/dL;  7:24 PM Creatinine 2.85 mg/dL    Recent Vancomycin Level(s) for last 3 days  No results found for requested labs within last 3 days.      Vancomycin IV Administrations (past 72 hours)      No vancomycin orders with administrations in past 72 hours.                Nephrotoxins and other renal medications (From now, onward)    Start     Dose/Rate Route Frequency Ordered Stop    23 0800  tacrolimus (GENERIC EQUIVALENT) capsule 5 mg        Note to Pharmacy: PTA Sig:Take 5 capsules (5 mg) by mouth 2 times daily      5 mg Oral 2 TIMES DAILY 23 0200  piperacillin-tazobactam (ZOSYN) 3.375 g vial to attach to  mL bag        Note to Pharmacy: For [facility], [facility] and [facility]: For Zosyn-naive patients, use the \"Zosyn initial dose + extended infusion\" order panel.    3.375 g  over 30 Minutes Intravenous EVERY 6 HOURS 23 215  vancomycin (VANCOCIN) 1,250 mg in 0.9% NaCl 250 mL intermittent infusion         1,250 mg  over 90 Minutes Intravenous ONCE 23  vancomycin place guan - receiving intermittent dosing         1 each Intravenous SEE ADMIN INSTRUCTIONS 23 214            Contrast Orders - past 72 hours (72h ago, onward)    None              Plan:  1. Start vancomycin 1250 mg IV x1, followed by intermittent dosing based on levels.  2. Vancomycin monitoring method: Trough (Method 2 = manual dose calculation)  3. Vancomycin therapeutic monitoring goal: 15-20 mg/L  4. Pharmacy will check vancomycin levels as appropriate in 1-3 Days.    5. Serum creatinine levels will be ordered daily for the first week of " therapy and at least twice weekly for subsequent weeks.      Mena Santamaria, [facility]

## 2023-05-06 LAB
ANION GAP SERPL CALCULATED.3IONS-SCNC: 8 MMOL/L (ref 7–15)
BASOPHILS # BLD MANUAL: 0.2 10E3/UL (ref 0–0.2)
BASOPHILS NFR BLD MANUAL: 6 %
BUN SERPL-MCNC: 21.7 MG/DL (ref 6–20)
CALCIUM SERPL-MCNC: 8 MG/DL (ref 8.6–10)
CHLORIDE SERPL-SCNC: 109 MMOL/L (ref 98–107)
CREAT SERPL-MCNC: 2.22 MG/DL (ref 0.67–1.17)
DEPRECATED HCO3 PLAS-SCNC: 21 MMOL/L (ref 22–29)
EOSINOPHIL # BLD MANUAL: 0.1 10E3/UL (ref 0–0.7)
EOSINOPHIL NFR BLD MANUAL: 5 %
ERYTHROCYTE [DISTWIDTH] IN BLOOD BY AUTOMATED COUNT: 13.5 % (ref 10–15)
GFR SERPL CREATININE-BSD FRML MDRD: 38 ML/MIN/1.73M2
GLUCOSE SERPL-MCNC: 110 MG/DL (ref 70–99)
HCT VFR BLD AUTO: 26.5 % (ref 40–53)
HGB BLD-MCNC: 8.9 G/DL (ref 13.3–17.7)
LACTATE SERPL-SCNC: 0.7 MMOL/L (ref 0.7–2)
LACTATE SERPL-SCNC: 1.1 MMOL/L (ref 0.7–2)
LYMPHOCYTES # BLD MANUAL: 0.4 10E3/UL (ref 0.8–5.3)
LYMPHOCYTES NFR BLD MANUAL: 16 %
MAGNESIUM SERPL-MCNC: 1.6 MG/DL (ref 1.7–2.3)
MCH RBC QN AUTO: 30.3 PG (ref 26.5–33)
MCHC RBC AUTO-ENTMCNC: 33.6 G/DL (ref 31.5–36.5)
MCV RBC AUTO: 90 FL (ref 78–100)
MONOCYTES # BLD MANUAL: 0.7 10E3/UL (ref 0–1.3)
MONOCYTES NFR BLD MANUAL: 25 %
NEUTROPHILS # BLD MANUAL: 1.2 10E3/UL (ref 1.6–8.3)
NEUTROPHILS NFR BLD MANUAL: 48 %
NRBC # BLD AUTO: 0.1 10E3/UL
NRBC BLD MANUAL-RTO: 4 %
PHOSPHATE SERPL-MCNC: 1.9 MG/DL (ref 2.5–4.5)
PLAT MORPH BLD: ABNORMAL
PLATELET # BLD AUTO: 159 10E3/UL (ref 150–450)
POTASSIUM SERPL-SCNC: 3.6 MMOL/L (ref 3.4–5.3)
RBC # BLD AUTO: 2.94 10E6/UL (ref 4.4–5.9)
RBC MORPH BLD: ABNORMAL
SODIUM SERPL-SCNC: 138 MMOL/L (ref 136–145)
WBC # BLD AUTO: 2.6 10E3/UL (ref 4–11)

## 2023-05-06 PROCEDURE — 250N000011 HC RX IP 250 OP 636: Performed by: NURSE PRACTITIONER

## 2023-05-06 PROCEDURE — 120N000002 HC R&B MED SURG/OB UMMC

## 2023-05-06 PROCEDURE — 250N000012 HC RX MED GY IP 250 OP 636 PS 637: Performed by: PHYSICIAN ASSISTANT

## 2023-05-06 PROCEDURE — 250N000013 HC RX MED GY IP 250 OP 250 PS 637

## 2023-05-06 PROCEDURE — 99232 SBSQ HOSP IP/OBS MODERATE 35: CPT | Performed by: STUDENT IN AN ORGANIZED HEALTH CARE EDUCATION/TRAINING PROGRAM

## 2023-05-06 PROCEDURE — 82310 ASSAY OF CALCIUM: CPT

## 2023-05-06 PROCEDURE — 85007 BL SMEAR W/DIFF WBC COUNT: CPT

## 2023-05-06 PROCEDURE — 83735 ASSAY OF MAGNESIUM: CPT

## 2023-05-06 PROCEDURE — 258N000003 HC RX IP 258 OP 636: Performed by: PHYSICIAN ASSISTANT

## 2023-05-06 PROCEDURE — 250N000013 HC RX MED GY IP 250 OP 250 PS 637: Performed by: NURSE PRACTITIONER

## 2023-05-06 PROCEDURE — 250N000013 HC RX MED GY IP 250 OP 250 PS 637: Performed by: SURGERY

## 2023-05-06 PROCEDURE — 99232 SBSQ HOSP IP/OBS MODERATE 35: CPT | Mod: 24 | Performed by: SURGERY

## 2023-05-06 PROCEDURE — 83605 ASSAY OF LACTIC ACID: CPT | Performed by: STUDENT IN AN ORGANIZED HEALTH CARE EDUCATION/TRAINING PROGRAM

## 2023-05-06 PROCEDURE — 250N000013 HC RX MED GY IP 250 OP 250 PS 637: Performed by: PHYSICIAN ASSISTANT

## 2023-05-06 PROCEDURE — 250N000011 HC RX IP 250 OP 636

## 2023-05-06 PROCEDURE — 85027 COMPLETE CBC AUTOMATED: CPT

## 2023-05-06 PROCEDURE — 36415 COLL VENOUS BLD VENIPUNCTURE: CPT | Performed by: STUDENT IN AN ORGANIZED HEALTH CARE EDUCATION/TRAINING PROGRAM

## 2023-05-06 PROCEDURE — 250N000012 HC RX MED GY IP 250 OP 636 PS 637

## 2023-05-06 PROCEDURE — 36415 COLL VENOUS BLD VENIPUNCTURE: CPT

## 2023-05-06 PROCEDURE — 250N000012 HC RX MED GY IP 250 OP 636 PS 637: Performed by: NURSE PRACTITIONER

## 2023-05-06 PROCEDURE — 84100 ASSAY OF PHOSPHORUS: CPT

## 2023-05-06 RX ORDER — PSYLLIUM SEED (WITH DEXTROSE)
2 POWDER (GRAM) ORAL 2 TIMES DAILY
Status: DISCONTINUED | OUTPATIENT
Start: 2023-05-06 | End: 2023-05-07 | Stop reason: HOSPADM

## 2023-05-06 RX ORDER — MAGNESIUM SULFATE HEPTAHYDRATE 40 MG/ML
2 INJECTION, SOLUTION INTRAVENOUS ONCE
Status: COMPLETED | OUTPATIENT
Start: 2023-05-06 | End: 2023-05-06

## 2023-05-06 RX ORDER — SODIUM BICARBONATE 325 MG/1
650 TABLET ORAL 2 TIMES DAILY
Status: DISCONTINUED | OUTPATIENT
Start: 2023-05-06 | End: 2023-05-06

## 2023-05-06 RX ORDER — METHOCARBAMOL 500 MG/1
500 TABLET, FILM COATED ORAL ONCE
Status: COMPLETED | OUTPATIENT
Start: 2023-05-06 | End: 2023-05-06

## 2023-05-06 RX ORDER — CIPROFLOXACIN 250 MG/1
250 TABLET, FILM COATED ORAL EVERY 12 HOURS SCHEDULED
Status: DISCONTINUED | OUTPATIENT
Start: 2023-05-06 | End: 2023-05-07

## 2023-05-06 RX ORDER — VALGANCICLOVIR 450 MG/1
450 TABLET, FILM COATED ORAL DAILY
Status: DISCONTINUED | OUTPATIENT
Start: 2023-05-06 | End: 2023-05-07

## 2023-05-06 RX ORDER — DIPHENOXYLATE HCL/ATROPINE 2.5-.025MG
1 TABLET ORAL 4 TIMES DAILY PRN
Status: DISCONTINUED | OUTPATIENT
Start: 2023-05-06 | End: 2023-05-07 | Stop reason: HOSPADM

## 2023-05-06 RX ADMIN — ACETAMINOPHEN 650 MG: 325 TABLET, FILM COATED ORAL at 06:51

## 2023-05-06 RX ADMIN — CIPROFLOXACIN 250 MG: 250 TABLET, FILM COATED ORAL at 08:28

## 2023-05-06 RX ADMIN — ACETAMINOPHEN 650 MG: 325 TABLET, FILM COATED ORAL at 00:31

## 2023-05-06 RX ADMIN — VALGANCICLOVIR 450 MG: 450 TABLET, FILM COATED ORAL at 10:04

## 2023-05-06 RX ADMIN — MAGNESIUM SULFATE HEPTAHYDRATE 2 G: 40 INJECTION, SOLUTION INTRAVENOUS at 14:41

## 2023-05-06 RX ADMIN — Medication 2 WAFER: at 19:43

## 2023-05-06 RX ADMIN — SULFAMETHOXAZOLE AND TRIMETHOPRIM 1 TABLET: 400; 80 TABLET ORAL at 08:27

## 2023-05-06 RX ADMIN — MYCOPHENOLIC ACID 720 MG: 360 TABLET, DELAYED RELEASE ORAL at 08:27

## 2023-05-06 RX ADMIN — SODIUM CHLORIDE: 9 INJECTION, SOLUTION INTRAVENOUS at 20:33

## 2023-05-06 RX ADMIN — METHOCARBAMOL 500 MG: 500 TABLET ORAL at 08:58

## 2023-05-06 RX ADMIN — PANTOPRAZOLE SODIUM 40 MG: 40 TABLET, DELAYED RELEASE ORAL at 08:26

## 2023-05-06 RX ADMIN — ACETAMINOPHEN 650 MG: 325 TABLET, FILM COATED ORAL at 14:32

## 2023-05-06 RX ADMIN — Medication 800 MG: at 12:17

## 2023-05-06 RX ADMIN — SODIUM PHOSPHATE, DIBASIC, ANHYDROUS, POTASSIUM PHOSPHATE, MONOBASIC, AND SODIUM PHOSPHATE, MONOBASIC, MONOHYDRATE 250 MG: 852; 155; 130 TABLET, COATED ORAL at 21:35

## 2023-05-06 RX ADMIN — CIPROFLOXACIN 250 MG: 250 TABLET, FILM COATED ORAL at 19:43

## 2023-05-06 RX ADMIN — PIPERACILLIN AND TAZOBACTAM 3.38 G: 3; .375 INJECTION, POWDER, LYOPHILIZED, FOR SOLUTION INTRAVENOUS at 02:08

## 2023-05-06 RX ADMIN — SODIUM CHLORIDE: 9 INJECTION, SOLUTION INTRAVENOUS at 09:42

## 2023-05-06 RX ADMIN — ACETAMINOPHEN 650 MG: 325 TABLET, FILM COATED ORAL at 21:51

## 2023-05-06 RX ADMIN — SODIUM PHOSPHATE, DIBASIC, ANHYDROUS, POTASSIUM PHOSPHATE, MONOBASIC, AND SODIUM PHOSPHATE, MONOBASIC, MONOHYDRATE 250 MG: 852; 155; 130 TABLET, COATED ORAL at 17:59

## 2023-05-06 RX ADMIN — TACROLIMUS 5 MG: 5 CAPSULE ORAL at 18:10

## 2023-05-06 RX ADMIN — TACROLIMUS 5 MG: 5 CAPSULE ORAL at 08:27

## 2023-05-06 RX ADMIN — MYCOPHENOLIC ACID 540 MG: 360 TABLET, DELAYED RELEASE ORAL at 18:11

## 2023-05-06 ASSESSMENT — ACTIVITIES OF DAILY LIVING (ADL)
ADLS_ACUITY_SCORE: 18

## 2023-05-06 NOTE — CONSULTS
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     Since being admitted, Mr. Fowler was started on vanc/zosyn, transitioned to ciprofloxacin for 13 days for total abx treatment of 14 days.     UTI hx: 03/19/2023: pyelonephritis of transplant kidney   04/15/2023: ecoli, enterococcus faecalis   05/04: ecoli     States that he has presented with fevers, chills, dysuria. Concerned it may be related to sex as he has these infections a few days after intercourse.            Past Medical History:     Past Medical History:   Diagnosis Date     Bladder stones      Cardiomyopathy (H)      ESRD (end stage renal disease) on dialysis (H)      Hypertension      Kidney replaced by transplant 01/15/2023    DCD DDKT. Intermediate risk induction.     Migraines      Polysubstance abuse (H)      Solitary kidney, congenital      Urethral stone      Urethral stricture             Past Surgical History:     Past Surgical History:   Procedure Laterality Date     BENCH KIDNEY  1/15/2023    Procedure: Bench kidney;  Surgeon: Tez Emerson MD;  Location: [facility]     BIOPSY  02/2020    renal, Congregation     COLONOSCOPY N/A 3/23/2023    Procedure: Colonoscopy;  Surgeon: Ana Cardenas MD;  Location: [facility]     COMBINED CYSTOSCOPY, LASER HOLMIUM LITHOTRIPSY URETER(S)       CYSTOSCOPY       CYSTOSCOPY FLEXIBLE, CYSTOSTOMY, INSERT TUBE SUPRAPUBIC, COMBINED N/A 12/14/2020    Procedure: CYSTOSCOPY, WITH SUPRAPUBIC  CATHETER INSERTION;  Surgeon: Sam Mejia MD;  Location: [facility]     CYSTOSCOPY, OPEN EXCISION URETHRAL DIVERTICULUM, COMBINED N/A 2020    Procedure: EXCISION OF URETHRAL DIVERTICULUM X2;  Surgeon: Sam Mejia MD;  Location: [facility]     HERNIA REPAIR      infant     INSERT CATHETER PERITONEAL DIALYSIS       LASER HOLMIUM LITHOTRIPSY URETER(S), INSERT STENT, COMBINED N/A 2020    Procedure: CYSTOSCOPY, bladder and urethral stone extraction, removal of foreign body, urethral dilation, urethrotomy, laser on standby;  Surgeon: Sam Mejia MD;  Location: [facility]     REMOVE CATHETER PERITONEAL N/A 1/15/2023    Procedure: Remove catheter peritoneal;  Surgeon: Tez Emerson MD;  Location: [facility]     TRANSPLANT KIDNEY RECIPIENT  DONOR N/A 1/15/2023    Procedure: TRANSPLANT, KIDNEY, RECIPIENT,  DONOR WITH DONOR URETER STENTING;  Surgeon: Tez Emerson MD;  Location: [facility]     urethral dilation       URETHROPLASTY WITH BUCCAL GRAFT N/A 2020    Procedure: URETHROPLASTY, USING BUCCAL MUCOSA GRAFT;  Surgeon: Sam Mejia MD;  Location: [facility]            Social History:     Social History     Tobacco Use     Smoking status: Former     Packs/day: 0.50     Types: Cigarettes     Start date:      Quit date: 1/15/2023     Years since quittin.3     Smokeless tobacco: Never   Vaping Use     Vaping status: Never Used   Substance Use Topics     Alcohol use: Not Currently     Comment: quit alcohol 3-4 yrs ago            Family History:     Family History   Problem Relation Age of Onset     Alzheimer Disease Mother      Unknown/Adopted Father      No Known Problems Sister      No Known Problems Sister      Kidney Disease No family hx of             Allergies:   No Known Allergies         Medications:     Current Facility-Administered Medications   Medication     acetaminophen (TYLENOL) tablet 325-650 mg     acetaminophen (TYLENOL) tablet 650 mg      ciprofloxacin (CIPRO) tablet 250 mg     magnesium oxide (MAG-OX) tablet 800 mg     mycophenolic acid (MYFORTIC BRAND) EC tablet 720 mg     ondansetron (ZOFRAN ODT) ODT tab 4 mg     ondansetron (ZOFRAN) injection 4 mg     ondansetron (ZOFRAN) injection 4 mg     pantoprazole (PROTONIX) EC tablet 40 mg     piperacillin-tazobactam (ZOSYN) 3.375 g vial to attach to  mL bag     sodium chloride 0.9% infusion     sulfamethoxazole-trimethoprim (BACTRIM) 400-80 MG per tablet 1 tablet     tacrolimus (GENERIC EQUIVALENT) capsule 5 mg     valGANciclovir (VALCYTE) tablet 450 mg             Review of Systems:    ROS: 10 point ROS neg other than the symptoms noted above in the HPI           Physical Exam:   VS:  T: 98.8    HR: 70    BP: 114/70    RR: 18   GEN:  AOx3.  NAD.    CV:  RRR  LUNGS: Non-labored breathing.   BACK:  No midline or CVA tenderness.  ABD:  Soft.  NT.  ND.  No rebound or guarding.  No masses.  :  uncircumcised.  Normal penile shaft.  Testicles descended bilaterally, no nodules or tenderness.  No inguinal hernias.         Data:   All laboratory data reviewed:    Recent Labs   Lab 05/05/23  0500 05/04/23 1924 05/04/23 1535 05/01/23  1522   WBC 2.3* 2.8* 2.0* 2.4*   HGB 8.7* 10.3* 10.3* 9.7*    192 178 263     Recent Labs   Lab 05/05/23  0500 05/04/23 1924 05/04/23  1535 05/01/23  1522   * 132* 129* 135*   POTASSIUM 3.7 4.0 3.8 4.4   CHLORIDE 102 98 95* 105   CO2 17* 20* 19* 22   BUN 29.4* 31.2* 27.2* 23.4*   CR 2.62* 2.85* 2.56* 1.58*   * 125* 173* 89   VISHNU 8.0* 9.1 8.8 9.1   MAG 1.4*  --   --   --    PHOS 2.9  --   --   --      Recent Labs   Lab 05/04/23  1535   COLOR Yellow   APPEARANCE Slightly Cloudy*   URINEGLC Negative   URINEBILI Negative   URINEKETONE Trace*   SG 1.017   URINEPH 6.0   PROTEIN 100*   NITRITE Negative   LEUKEST Large*   RBCU 3*   WBCU 71*       All pertinent imaging reviewed:    CT scan of the abdomen:        Renal ultrasound:      RBUS 05/04 w/o  hydronephrosis          Impression and Plan:   Impression:     38 year old male with PMH of ESRD 2/2 congenital solitary kidney 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     No evidence of hydronephrosis at this time. If pt is emptying, there is no acute urologic intervention indicated. Further workup for recurrent UTI would include cystoscopy, as last cystoscopy was performed just prior to his inial UTI. Could consider a circumcision as this can increase the incidence of uti as well. Otherwise, would recommend follow-up with ID for management of recurrent UTIs medically.      Plan:  -serial bladder scans to ensure patient is emptying, page urology on call if PVRs are >200mL  -agree with involvement of ID for management of UTI in the setting of immunocompromised state  -no indication for acute surgical management  -urology will coordinate f/u outpatient for cystoscopy, possible conversation of circumcision   -urology will s/o, please page/call with questions    This patient's exam findings, labs, and imaging discussed with urology staff surgeon Dr. David MD, who developed the treatment plan.    Seth Dong MD  Urology Resident

## 2023-05-06 NOTE — PLAN OF CARE
Goal Outcome Evaluation: 1900-0700      Plan of Care Reviewed With: patient    Overall Patient Progress: no change    Outcome Evaluation:   Respiratory: WDL RA, Denies SOB  Cardiac:WDL. Denies Chest pain.  Neuro: A&Ox4. Calls appropriately.   GI/: Voiding spontaneously, last BM 5/5  Diet: Regular  Skin: intact  Lines/drains: R PIV infusing  Pain:Denies  Labs: lactic 0.7  Activity: independent   Plan:  continue plan of care    Tmax 100.8 tylenol given, pt triggered sepsis protocol lactic was 0.7

## 2023-05-06 NOTE — PROVIDER NOTIFICATION
Provider notified    pt asking about tacrolimus level and other labs prior to antirejection meds

## 2023-05-06 NOTE — PROVIDER NOTIFICATION
Test paged [facility] NP/PA #[id number] with update/request:  Pt c/o neck strain, can he get an order for muscle relaxer?   Thanks, Pita RESENDEZ [phone]

## 2023-05-06 NOTE — PROGRESS NOTES
[facility]    Transplant Infectious Diseases Inpatient Progress Note      Chip Fowler MRN# [id number]   YOB: 1985 Age: 38 year old   Date of Admission and time: 5/4/2023  6:42 PM  Date of Encounter: 5/6/23            Recommendations:   1. Continue Cipro PO, however increase dose to 250 mg PO q12h (adjusted for CrCl 43.9 ml/min)  2. If CrCl > 50 ml/min, increase dose further to 500mg PO q12h  3. Duration of 14 days total  4. Keep appointment with ID 5/8/23.   5. Keep appointment with urology 5/16/23.   6. Due for the seasonal influenza vaccine and COVID-19 bivalent.     Dr. Pizarro will assume the patient's care on Monday if still inpatient.         Summary of Presentation:   Transplants:  1/15/2023 (Kidney), Postoperative day:  111     This patient is a 38 year old male with congenital kidney disease and HTN s/p KT IN 1/2023. Received ATG for induction. Currently on TAC/MMF.   Has history of urethral stenosis s/p urethral reconstruction prior to KT.   With recurrent UTI.   Presented with dysuria, fever and diarrhea.         Active Problems and Infectious Diseases Issues:   1. Severe sepsis.   2. UTI with recurrent UTI.   The UTI is mostly with E faecalis since 1/2023 except last infection in 4/2023 with E faecalis and E coli.   The current UTI is also due to E coli. Susceptible to Cipro  Based on current CrCl of 43/9 ml/min, increase dose of Cipro to 250mg PO q12h  If CrCl > 50 ml/min, increase dose further to 500mg PO q12h  I would keep the appointment with Dr. Willis on 5/8/23 to determine the course of therapy.   I would keep the appointment with urology on 5/16/23.     3. Diarrhea.   Maybe contributing to UTI since diarrhea may contaminate the  tract.   Could be due to sepsis.   C diff and enteric panel negative.   Supportive care.     4. JACK   Due to 1. 2. And 3.   Improving, CrCl 43.9 ml/min  Adjust ABx accordingly.         Old Problems and Infectious Diseases  Issues:   1. Eosinophilia. The workup for eosinophilia and diarrhea was negative for fungal and parasitic infections that would account for the eosinophilia. Evaluated by hematology. The eosinophilia is likely reactive to foreign objects; the PD catheter was thought to be potentially the source as noted by hematology. The relatively increased tryptase level favors allergy (likely to PD catheter) to be the etiology of the eosinophilia rather than infectious processes or malignancies.   2. Chronic diarrhea since he was started on PD in 7/2020. Workup has been negative.   3. Positive Salmonella serology. 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 The patient was counseled against keeping the snakes after transplantation as they are source of recurrent Salmonella infection.  4. E coli in urine in 8/2020.   5. Group B Strep in urine in 2/2020.   6. Urethral stenosis s/p reconstruction.     Other Infectious Disease issues include:  - QTc: 416 as of 3/23/23.   - PJP prophylaxis: bactrim.   - Serostatus: CMV D+/R-, EBV D+/R-, HSV1?/2?, VZV +  - Immunization status: This patient received the third dose of the COVID-19 vaccine on 1/26/2022. Otherwise due for the seasonal influenza vaccine and COVID-19 bivalent.      Attestation:  Total duration of visit including chart review, reviewing labs and imaging, interviewing and examining the patient, documentation, and sending communication to the primary treating team, all at the same day of this encounter, is: 40 minutes.     Brennan Davenport MD  [facility]  Contact information available via [facility] Paging/Directory    05/06/2023          Interval History :   Seen and examined. Chip is doing better today  Slight fever overnight but fever curve is trending  down, afebrile this AM    He reports improvement in urinary symptoms, less frequent urination today  No dysuria  No hematuria  Tolerating Cipro    Latest Creat 2.22, CrCl 43.9 ml/min             History of Present Illness:   Transplants:  1/15/2023 (Kidney), Postoperative day:  111     This patient is a 38 year old male s/p KT with recurrent UTI since transplant, mostly with E faecalis and mostly treated with augmentin or amoxicillin.   In 4/2023 he had symptoms of UTI for which he presented to urgent care and was treated with augmentin however E coli and E faecalis grew so two days later he was switched to cefdinir for 7 days.   One week after he finished cefdinir, he started to develop polyuria and dysuria. Few days later he started to experience diarrhea then fever so he presented to ED.   He denies pain at he allograft site.   No other complaints.     History of urethral stenosis s/p reconstruction prior to KT with resolution of difficulty urinating.               Review of Systems:      As mentioned in the HPI otherwise negative by reviewing constitutional symptoms, central and peripheral neurological systems, respiratory system, cardiac system, GI system,  system, musculoskeletal, skin, allergy, and lymphatics.                  Past Medical History:     Past Medical History:   Diagnosis Date     Bladder stones      Cardiomyopathy (H)      ESRD (end stage renal disease) on dialysis (H)      Hypertension      Kidney replaced by transplant 01/15/2023    DCD DDKT. Intermediate risk induction.     Migraines      Polysubstance abuse (H)      Solitary kidney, congenital      Urethral stone      Urethral stricture             Past Surgical History:     Past Surgical History:   Procedure Laterality Date     BENCH KIDNEY  1/15/2023    Procedure: Bench kidney;  Surgeon: Tez Emerson MD;  [facility]     BIOPSY  02/2020    renal, [facility]     COLONOSCOPY N/A 3/23/2023    Procedure: Colonoscopy;   Surgeon: Ana Cardenas MD;  Location: [facility] GI     COMBINED CYSTOSCOPY, LASER HOLMIUM LITHOTRIPSY URETER(S)       CYSTOSCOPY       CYSTOSCOPY FLEXIBLE, CYSTOSTOMY, INSERT TUBE SUPRAPUBIC, COMBINED N/A 2020    Procedure: CYSTOSCOPY, WITH SUPRAPUBIC CATHETER INSERTION;  Surgeon: Sam Mejia MD;  Location: [facility] OR     CYSTOSCOPY, OPEN EXCISION URETHRAL DIVERTICULUM, COMBINED N/A 2020    Procedure: EXCISION OF URETHRAL DIVERTICULUM X2;  Surgeon: Sam Mejia MD;  Location: [facility] OR     HERNIA REPAIR      infant     INSERT CATHETER PERITONEAL DIALYSIS       LASER HOLMIUM LITHOTRIPSY URETER(S), INSERT STENT, COMBINED N/A 2020    Procedure: CYSTOSCOPY, bladder and urethral stone extraction, removal of foreign body, urethral dilation, urethrotomy, laser on standby;  Surgeon: Sam Mejia MD;  Location[facility] OR     REMOVE CATHETER PERITONEAL N/A 1/15/2023    Procedure: Remove catheter peritoneal;  Surgeon: Tez Emerson MD;  Location: [facility] OR     TRANSPLANT KIDNEY RECIPIENT  DONOR N/A 1/15/2023    Procedure: TRANSPLANT, KIDNEY, RECIPIENT,  DONOR WITH DONOR URETER STENTING;  Surgeon: Tez Emerson MD;  Location: [facility] OR     urethral dilation       URETHROPLASTY WITH BUCCAL GRAFT N/A 2020    Procedure: URETHROPLASTY, USING BUCCAL MUCOSA GRAFT;  Surgeon: Sam Mejia MD;  Location: [facility] OR            Social History:     Social History     Tobacco Use     Smoking status: Former     Packs/day: 0.50     Types: Cigarettes     Start date:      Quit date: 1/15/2023     Years since quittin.3     Smokeless tobacco: Never   Vaping Use     Vaping status: Never Used   Substance Use Topics     Alcohol use: Not Currently     Comment: quit alcohol 3-4 yrs ago            Family History:   I have reviewed this patient's family history  Family History   Problem Relation Age of Onset     Alzheimer Disease Mother      Unknown/Adopted Father       No Known Problems Sister      No Known Problems Sister      Kidney Disease No family hx of             Immunizations:     Immunization History   Administered Date(s) Administered     COVID-19 Monovalent 18+ (Moderna) 01/25/2021, 02/22/2021, 01/26/2022     DTAP (<7y) 1984, 1984, 1984, 10/04/1985, 03/21/1989     Flu, Unspecified 11/12/1997, 03/11/2020, 10/06/2020, 10/27/2021     Hepatits B (Peds <19Y) 02/19/1996, 06/17/1996, 09/04/1996     Hib, Unspecified 04/02/1986     Historical DTP/aP 1984, 1984, 1984, 10/04/1985, 03/21/1989     Influenza (intradermal) 03/11/2020     Influenza Vaccine >6 months (Alfuria,Fluzone) 11/06/2016     MMR 07/03/1985, 02/19/1996     Mantoux Tuberculin Skin Test 03/11/2020     Pneumo Conj 13-V (2010&after) 11/24/2020     Pneumococcal 23 valent 03/11/2020     Pneumococcal, Unspecified 03/11/2020, 04/22/2021     Poliovirus, inactivated (IPV) 1984, 1984, 1984, 03/21/1989     TD,PF 7+ (Tenivac) 06/17/1996     TDAP Vaccine (Adacel) 04/28/2020     Td (Adult), Adsorbed 06/17/1996            Allergies:   No Known Allergies          Medications:   Medications that Require Transfusion:     sodium chloride 100 mL/hr at 05/06/23 0942     Scheduled Medications:     ciprofloxacin  250 mg Oral Q12H [facility] (08/20)     magnesium oxide  800 mg Oral Daily     mycophenolic acid  720 mg Oral BID IS     pantoprazole  40 mg Oral QAM AC     psyllium  2 Wafer Oral BID     sulfamethoxazole-trimethoprim  1 tablet Oral Daily     tacrolimus  5 mg Oral BID     valGANciclovir  450 mg Oral Daily               Physical Exam:   Temp: 97.8  F (36.6  C) Temp src: Oral BP: 105/55 Pulse: 73   Resp: 18 SpO2: 98 % O2 Device: None (Room air)      Wt Readings from Last 4 Encounters:   04/12/23 69.5 kg (153 lb 4.8 oz)   03/23/23 71.9 kg (158 lb 8 oz)   03/19/23 68 kg (150 lb)   03/14/23 71.6 kg (157 lb 14.4 oz)     Constitutional: awake, alert, cooperative, no apparent distress  and appears at stated age, well nourished.   Head, ENT, Eyes, and Neck: Normocephalic, sinuses non-tender to palpation, external ears without lesions, moist buccal mucosa without oral thrush or ulcers, tonsils without swelling, erythema, or exudate, no tenderness palpating teeth, good dentition, gums without necrosis or abscesses.   PERRL, EOMI, pink conjunctivae, non-icteric sclera.   Neck supple without rigidity.   Lymphatics: no cervical/axillary/inguinal LA bilaterally.    Neurologic: Patient is moving all extremities without focal deficit, no focal sensory loss.   Lungs: CTA bilaterally, no accessory muscle use, no dullness to percussion and no abnormal tactile fremitus.   CVS: RRR, normal S1/S2, no murmur, PMI was not displaced.   Abdomen: non-tender, non-distended, no masses, no bruit, no shifting dullness, normal BS.   Musculoskeletal: no pitting edema of bilateral lower extremities, no ulcers, normal ROM of all joints, no swelling or erythema of any of joints and no tenderness to palpation.   Skin: no induration, fluctuation or discharge, and no rash            Data:     Absolute CD4   Date Value Ref Range Status   12/03/2020 1,325 441 - 2,156 cells/uL Final       Inflammatory Markers    No lab results found.    Immune Globulin Studies     Recent Labs   Lab Test 12/03/20  1647   IGG 1,152   IGM 64   IGE 18          Metabolic Studies       Recent Labs   Lab Test 05/06/23  0645 05/06/23  0254 05/05/23  1020 05/05/23  0500 05/04/23  1924 05/04/23  1535 05/01/23  1522 04/27/23  1533 01/15/23  0904 01/15/23  0540     --   --  130* 132* 129* 135* 136   < > 142   POTASSIUM 3.6  --   --  3.7 4.0 3.8 4.4 4.4   < > 4.1   CHLORIDE 109*  --   --  102 98 95* 105 105   < > 107   CO2 21*  --   --  17* 20* 19* 22 22   < > 19*   ANIONGAP 8  --   --  11 14 15 8 9   < > 16*   BUN 21.7*  --   --  29.4* 31.2* 27.2* 23.4* 28.0*   < > 47.5*   CR 2.22*  --   --  2.62* 2.85* 2.56* 1.58* 1.57*   < > 4.62*   GFRESTIMATED  38*  --   --  31* 28* 32* 57* 57*   < > 16*   *  --   --  151* 125* 173* 89 91   < > 90   A1C  --   --   --   --   --   --   --   --   --  5.2   VISHNU 8.0*  --   --  8.0* 9.1 8.8 9.1 9.2   < > 9.1   PHOS 1.9*  --   --  2.9  --   --   --   --    < >  --    MAG 1.6*  --   --  1.4*  --   --   --   --    < >  --    LACT  --  0.7 0.6*  --   --   --   --   --    < >  --     < > = values in this interval not displayed.       Hepatic Studies    Recent Labs   Lab Test 05/04/23 1924 04/06/23  1543 03/19/23 2155 03/06/23  1550 01/23/23  0748 01/21/23  0715 01/20/23  0746 01/18/23  0606 01/17/23  0553   BILITOTAL 0.4 0.2 0.4 0.3  --   --   --  <0.2 <0.2   ALKPHOS 48 65 41 58  --   --   --  44 45   ALBUMIN 4.1 4.5 4.1 4.0  --   --   --  2.7* 2.8*   AST 33 30 34 18 24 48   < > 50 94*   ALT 72* 42 40 19 96* 134*   < > 59* 68*    < > = values in this interval not displayed.       Pancreatitis testing    Recent Labs   Lab Test 03/19/23  2155 01/15/23  0540 04/18/22  0851   LIPASE 20  --   --    TRIG  --  115 78       Hematology Studies      Recent Labs   Lab Test 05/06/23  0645 05/05/23  0500 05/04/23  1924 05/04/23  1535 05/01/23  1522 04/27/23  1533 04/24/23  1519 04/20/23  1520 04/17/23  1519   WBC 2.6* 2.3* 2.8* 2.0* 2.4* 2.8* 3.7* 3.6* 6.2   ANEU 1.2* 1.3* 2.0  --   --   --  2.4 2.0 4.7   ALYM 0.4* 0.2* 0.3*  --   --   --  0.6* 0.8 0.6*   TY 0.7 0.6 0.4  --   --   --  0.2 0.4 0.6   AEOS 0.1 0.0 0.0  --   --   --  0.4 0.4 0.2   HGB 8.9* 8.7* 10.3* 10.3* 9.7* 10.0* 9.5* 8.9* 9.0*   HCT 26.5* 26.4* 31.0* 30.7* 29.7* 30.5* 29.4* 27.6* 27.5*    158 192 178 263 260 249 173 154       Clotting Studies    Recent Labs   Lab Test 01/15/23  0540 08/09/21  1124 08/18/20  1307   INR 0.90 0.96 1.08   PTT 28  --  29       Arterial Blood Gas Testing    Recent Labs   Lab Test 03/22/23  1550 03/20/23  0652 01/15/23  1711   PH  --  7.42  --    O2PER 20  --  45.0        Urine Studies     Recent Labs   Lab Test 05/04/23  1537  04/15/23  0918 04/03/23  1530 03/19/23 2011 02/24/23  1445   URINEPH 6.0 6.5 6.5 5.5 6.5   NITRITE Negative Negative Negative Negative Negative   LEUKEST Large* Moderate* Negative Moderate* Negative   WBCU 71* * 1 28* 1       Vancomycin Levels     Recent Labs   Lab Test 05/05/23  1550   VANCOMYCIN 10.1       Tacrolimus levels    Invalid input(s): TACROLIMUS, TAC, TACR      Latest Ref Rng & Units 5/6/2023     6:45 AM 5/5/2023     5:00 AM 5/4/2023     7:24 PM 5/4/2023     3:35 PM 5/1/2023     3:22 PM   Transplant Immunosuppression Labs   Creat 0.67 - 1.17 mg/dL 2.22   2.62   2.85   2.56   1.58     Urea Nitrogen 6.0 - 20.0 mg/dL 21.7   29.4   31.2   27.2   23.4     WBC 4.0 - 11.0 10e3/uL 2.6   2.3   2.8   2.0   2.4     Neutrophil % 48   58   72       ANEU 1.6 - 8.3 10e3/uL 1.2   1.3   2.0               Microbiology:  5/4/23 Ucx with E coli (Cipro susceptible)  5/4/23 Blood Cx negative to date    5/4/23 C.diff, enteric panel negative    Last check of C difficile  C Difficile Toxin B by PCR   Date Value Ref Range Status   05/04/2023 Negative Negative Final     Comment:     A negative result does not exclude actual disease due to C. difficile and may be due to improper collection, handling and storage of the specimen or the number of organisms in the specimen is below the detection limit of the assay.       Virology:  CMV viral loads    CMV DNA IU/mL   Date Value Ref Range Status   05/04/2023 Not Detected Not Detected IU/mL Final     CMV viral loads    Recent Labs   Lab Test 05/04/23  2159   CMVQNT Not Detected       CMV viral loads    CMV DNA IU/mL   Date Value Ref Range Status   05/04/2023 Not Detected Not Detected IU/mL Final   03/20/2023 Not Detected Not Detected IU/mL Final   03/06/2023 Not Detected Not Detected IU/mL Final   01/15/2023 Not Detected Not Detected IU/mL Final       CMV resistance testing  No lab results found.  No results found for: CMVCID, CMVFOS, CMVGAN     No results found for: H6RES    EBV  DNA Copies/mL   Date Value Ref Range Status   05/04/2023 Not Detected Not Detected copies/mL Final   03/20/2023 Not Detected Not Detected copies/mL Final       CMV Antibody IgG   Date Value Ref Range Status   01/15/2023 No detectable antibody. No detectable antibody.  Final   08/18/2020 <0.2 0.0 - 0.8 AI Final     Comment:     Negative  Antibody index (AI) values reflect qualitative changes in antibody   concentration that cannot be directly associated with clinical condition or   disease state.         No results found for: EBIG2, EBIGM, TOXG      Imaging:  CT abdomen W 3/19/23  IMPRESSION:   1.  The right kidney is not seen. The left kidney is moderately atrophic with no hydronephrosis. There is a transplant kidney seen in the right hemipelvis. There is some mild stranding of the fat seen within the renal pelvis and adjacent to the renal   parenchyma which is nonspecific, but could represent changes of inflammation/pyelonephritis. Given patient's history of recent transplantation, this may also be most surgical in nature. Do not appreciate any evidence for dilatation of the right   transplant kidney urinary tract.       VALDEMAR Vines  Staff Physician, Infectious Diseases  Pager [phone]  [facility]  Contact information available via [facility] Paging/Directory     05/06/2023

## 2023-05-06 NOTE — PROGRESS NOTES
Transplant Surgery  Inpatient Daily Progress Note  05/06/2023    Assessment & Plan: 38 year old male with PMH of ESRD 2/2 congenital solitary kidney and obstruction s/p DCD kidney txp 1/15/23, nephrolithiasis, s/p urethroplasty, recurrent UTIs, polysubstance abuse, cardiomyopathy (EF 55-60%) who presents to the ED with urinary frequency, fever, chills, nausea, and vomiting, reports this is his 3rd episode of such symptoms.     Hx DDKT 1/515/23; JACK of transplanted kidney: Cr trending down, 2.2 (from peak 2.9). Baseline Cr 1.4-1.6. Renal US patent, no acute concerns. Concern for pyelonephritis vs possible rejection. DSA pending. Will continue to monitor closely.    Immunosuppression management:    - Tacrolimus 5 mg BID. Goal level 8-10.    - Myfortic 720 mg BID decreased to 540 mg BID per protocol in the setting of previously high levels, leukopenia, and infection.     Hematology:   Leukopenia: WBC 2-3, Myfortic decreased as above.   Anemia of chronic disease: Hgb ~9-10, monitor.    Cardiorespiratory: Stable    GI/Nutrition: Regular diet as tolerated  Diarrhea: C diff and enteric panel negative. Myfortic reduced as above. Add Fiber BID and Lomotil PRN today.     Endocrine: No acute issues    Fluid/Electrolytes: IVF:  mL/hr  Hypomagnesemia: Mg 1.6, continue PO Mag-Ox 800 mg daily. Give additional 2 grams today.   Hypophosphatemia: Phos 1.9, start phos 250 mg BID.     :  Hx Urethral strictures: s/p buccal urethroplasty + diverticulum excision 12/2020. No hydronephrosis on US. Urology consulted, appreciate recs. Urology appointment scheduled 5/16/23.     Infectious disease:   E coli UTI: Culture with >100k E coli. Third UTI since transplant. Transplant ID consulted. Initially on vanc/zosyn, now transitioned to PO cipro (susceptible). Plan for 14 days treatment. Follow up with ID scheduled for 5/8/23.   Vaccines: Due for seasonal influenza vaccine and COVID-19 Bivalent.     Prophylaxis: PCP (Bactrim), GI (PPI),  viral (Valcyte)    Disposition: ED, has transfer orders in for 7A     DK/Fellow/Resident Provider: Danielle Bach NP [id number]    Faculty: Dr. Emerson    Attestation: I saw and examined this patient with Danielle Bach NP, and the transplant team. I independently reviewed all pertinent laboratory and imaging results and made independent management decisions including immunosuppression management. I agree with the findings and plan as documented in this note.  Tez Emerson MD  _________________________________________________________________  Transplant History: Admitted 5/4/2023 for Fevers, recurrent UTI.  1/15/2023 (Kidney), Postoperative day: 111     Interval History: History is obtained from the patient and electronic health record  Overnight events: Tmax 100.8 overnight. Today he reports ongoing diarrhea 7 x / day.     ROS:   A 10-point review of systems was negative except as noted above.    Meds:    ciprofloxacin  250 mg Oral Q12H FREDRICK (08/20)     magnesium oxide  800 mg Oral Daily     magnesium sulfate  2 g Intravenous Once     mycophenolic acid  540 mg Oral BID IS     pantoprazole  40 mg Oral QAM AC     phosphorus tablet 250 mg  250 mg Oral BID     psyllium  2 Wafer Oral BID     sulfamethoxazole-trimethoprim  1 tablet Oral Daily     tacrolimus  5 mg Oral BID     valGANciclovir  450 mg Oral Daily       Physical Exam:     Admit      Current vitals:   /55 (BP Location: Left arm)   Pulse 73   Temp 97.8  F (36.6  C)   Resp 18   Ht 1.829 m (6')   SpO2 98%   BMI 20.79 kg/m           Vital sign ranges:    Temp:  [97.8  F (36.6  C)-100.8  F (38.2  C)] 97.8  F (36.6  C)  Pulse:  [67-96] 73  Resp:  [16-18] 18  BP: ()/(54-70) 105/55  SpO2:  [97 %-100 %] 98 %  Patient Vitals for the past 24 hrs:   BP Temp Temp src Pulse Resp SpO2 Height   05/06/23 0943 105/55 97.8  F (36.6  C) -- 73 18 98 % --   05/06/23 0536 -- -- -- -- -- -- 1.829 m (6')   05/06/23 0533 114/70 98.8  F (37.1  C) Oral 70 18 99 %  --   05/06/23 0259 111/63 98.7  F (37.1  C) Oral 68 18 98 % --   05/06/23 0230 110/59 98.7  F (37.1  C) Oral 67 16 97 % --   05/06/23 0206 119/65 99  F (37.2  C) Oral 75 16 98 % --   05/05/23 2220 105/58 -- -- -- -- -- --   05/05/23 2203 97/54 99.6  F (37.6  C) Oral 96 16 99 % --   05/05/23 2010 -- (!) 100.8  F (38.2  C) -- -- -- -- --   05/05/23 1847 105/66 98.5  F (36.9  C) Oral 95 16 100 % --   05/05/23 1538 110/64 100  F (37.8  C) Oral 91 16 100 % --     General Appearance: in no apparent distress.   Skin: normal, warm  Heart: Perfused  Lungs: Nonlabored resps on RA  Abdomen: The abdomen is soft, nontender  : shaver is not present.   Extremities: edema: absent  Neurologic: awake, alert and oriented. Tremor absent.     Data:   CMP  Recent Labs   Lab 05/06/23  0645 05/05/23  0500 05/04/23  1924    130* 132*   POTASSIUM 3.6 3.7 4.0   CHLORIDE 109* 102 98   CO2 21* 17* 20*   * 151* 125*   BUN 21.7* 29.4* 31.2*   CR 2.22* 2.62* 2.85*   GFRESTIMATED 38* 31* 28*   VISHNU 8.0* 8.0* 9.1   MAG 1.6* 1.4*  --    PHOS 1.9* 2.9  --    ALBUMIN  --   --  4.1   BILITOTAL  --   --  0.4   ALKPHOS  --   --  48   AST  --   --  33   ALT  --   --  72*     CBC  Recent Labs   Lab 05/06/23 0645 05/05/23  0500   HGB 8.9* 8.7*   WBC 2.6* 2.3*    158     COAGSNo lab results found in last 7 days.    Invalid input(s): XA   Urinalysis  Recent Labs   Lab Test 05/04/23  1535 04/15/23  0918   COLOR Yellow Yellow   APPEARANCE Slightly Cloudy* Clear   URINEGLC Negative Negative   URINEBILI Negative Negative   URINEKETONE Trace* Negative   SG 1.017 1.020   UBLD Negative Large*   URINEPH 6.0 6.5   PROTEIN 100* >=300*   NITRITE Negative Negative   LEUKEST Large* Moderate*   RBCU 3* 10-25*   WBCU 71* *     Virology:  Hepatitis C Antibody   Date Value Ref Range Status   01/15/2023 Nonreactive Nonreactive Final   08/18/2020 Nonreactive NR^Nonreactive Final     Comment:     Assay performance characteristics have not been  established for newborns,   infants, and children       BK Virus DNA copies/mL   Date Value Ref Range Status   05/04/2023 Not Detected Not Detected copies/mL Final   04/12/2023 Not Detected Not Detected copies/mL Final   03/29/2023 Not Detected Not Detected copies/mL Final   02/20/2023 Not Detected Not Detected copies/mL Final

## 2023-05-06 NOTE — PROVIDER NOTIFICATION
Provider notification    critical tacrolimus level from 10 am 28.6. spoke to pharmacy they said it looks like it was collected at the peak level but wanted me to reach out again to make sure it was ok to give.    UPDATE FROM SONNY BUSCH MD, SURGERY CROSS COVER  Spoke with transplant fellow on kidney service, Tac level drawn 1hr after administration, so does not represent true trough. Tac should be given as ordered.

## 2023-05-07 VITALS
RESPIRATION RATE: 16 BRPM | SYSTOLIC BLOOD PRESSURE: 124 MMHG | BODY MASS INDEX: 20.79 KG/M2 | OXYGEN SATURATION: 99 % | DIASTOLIC BLOOD PRESSURE: 76 MMHG | TEMPERATURE: 98.1 F | HEIGHT: 72 IN | HEART RATE: 59 BPM

## 2023-05-07 PROBLEM — D72.819 LEUKOPENIA: Status: ACTIVE | Noted: 2023-05-07

## 2023-05-07 PROBLEM — E87.8 LOW BICARBONATE: Status: ACTIVE | Noted: 2023-05-07

## 2023-05-07 LAB
ANION GAP SERPL CALCULATED.3IONS-SCNC: 9 MMOL/L (ref 7–15)
BASOPHILS # BLD MANUAL: 0 10E3/UL (ref 0–0.2)
BASOPHILS NFR BLD MANUAL: 2 %
BUN SERPL-MCNC: 14.1 MG/DL (ref 6–20)
CALCIUM SERPL-MCNC: 8.1 MG/DL (ref 8.6–10)
CHLORIDE SERPL-SCNC: 115 MMOL/L (ref 98–107)
CREAT SERPL-MCNC: 1.61 MG/DL (ref 0.67–1.17)
DEPRECATED HCO3 PLAS-SCNC: 18 MMOL/L (ref 22–29)
EOSINOPHIL # BLD MANUAL: 0.2 10E3/UL (ref 0–0.7)
EOSINOPHIL NFR BLD MANUAL: 10 %
ERYTHROCYTE [DISTWIDTH] IN BLOOD BY AUTOMATED COUNT: 14.1 % (ref 10–15)
GFR SERPL CREATININE-BSD FRML MDRD: 55 ML/MIN/1.73M2
GLUCOSE SERPL-MCNC: 113 MG/DL (ref 70–99)
HCT VFR BLD AUTO: 25.4 % (ref 40–53)
HGB BLD-MCNC: 8.4 G/DL (ref 13.3–17.7)
LYMPHOCYTES # BLD MANUAL: 0.4 10E3/UL (ref 0.8–5.3)
LYMPHOCYTES NFR BLD MANUAL: 18 %
MAGNESIUM SERPL-MCNC: 1.9 MG/DL (ref 1.7–2.3)
MCH RBC QN AUTO: 30.7 PG (ref 26.5–33)
MCHC RBC AUTO-ENTMCNC: 33.1 G/DL (ref 31.5–36.5)
MCV RBC AUTO: 93 FL (ref 78–100)
METAMYELOCYTES # BLD MANUAL: 0 10E3/UL
METAMYELOCYTES NFR BLD MANUAL: 1 %
MONOCYTES # BLD MANUAL: 0.6 10E3/UL (ref 0–1.3)
MONOCYTES NFR BLD MANUAL: 26 %
NEUTROPHILS # BLD MANUAL: 1 10E3/UL (ref 1.6–8.3)
NEUTROPHILS NFR BLD MANUAL: 43 %
PHOSPHATE SERPL-MCNC: 2.3 MG/DL (ref 2.5–4.5)
PLAT MORPH BLD: ABNORMAL
PLATELET # BLD AUTO: 158 10E3/UL (ref 150–450)
POTASSIUM SERPL-SCNC: 3.5 MMOL/L (ref 3.4–5.3)
RBC # BLD AUTO: 2.74 10E6/UL (ref 4.4–5.9)
RBC MORPH BLD: ABNORMAL
SODIUM SERPL-SCNC: 142 MMOL/L (ref 136–145)
TACROLIMUS BLD-MCNC: 24.8 UG/L (ref 5–15)
TME LAST DOSE: ABNORMAL H
TME LAST DOSE: ABNORMAL H
WBC # BLD AUTO: 2.3 10E3/UL (ref 4–11)

## 2023-05-07 PROCEDURE — 99239 HOSP IP/OBS DSCHRG MGMT >30: CPT | Mod: 24 | Performed by: SURGERY

## 2023-05-07 PROCEDURE — 258N000003 HC RX IP 258 OP 636: Performed by: PHYSICIAN ASSISTANT

## 2023-05-07 PROCEDURE — 36415 COLL VENOUS BLD VENIPUNCTURE: CPT | Performed by: NURSE PRACTITIONER

## 2023-05-07 PROCEDURE — 250N000013 HC RX MED GY IP 250 OP 250 PS 637

## 2023-05-07 PROCEDURE — 83735 ASSAY OF MAGNESIUM: CPT

## 2023-05-07 PROCEDURE — 85027 COMPLETE CBC AUTOMATED: CPT

## 2023-05-07 PROCEDURE — 85007 BL SMEAR W/DIFF WBC COUNT: CPT

## 2023-05-07 PROCEDURE — 80048 BASIC METABOLIC PNL TOTAL CA: CPT

## 2023-05-07 PROCEDURE — 250N000012 HC RX MED GY IP 250 OP 636 PS 637

## 2023-05-07 PROCEDURE — 250N000012 HC RX MED GY IP 250 OP 636 PS 637: Performed by: NURSE PRACTITIONER

## 2023-05-07 PROCEDURE — 84100 ASSAY OF PHOSPHORUS: CPT

## 2023-05-07 PROCEDURE — 80197 ASSAY OF TACROLIMUS: CPT | Performed by: NURSE PRACTITIONER

## 2023-05-07 PROCEDURE — 250N000013 HC RX MED GY IP 250 OP 250 PS 637: Performed by: SURGERY

## 2023-05-07 PROCEDURE — 250N000013 HC RX MED GY IP 250 OP 250 PS 637: Performed by: PHYSICIAN ASSISTANT

## 2023-05-07 PROCEDURE — 250N000013 HC RX MED GY IP 250 OP 250 PS 637: Performed by: NURSE PRACTITIONER

## 2023-05-07 RX ORDER — CIPROFLOXACIN 500 MG/1
500 TABLET, FILM COATED ORAL EVERY 12 HOURS SCHEDULED
Status: DISCONTINUED | OUTPATIENT
Start: 2023-05-07 | End: 2023-05-07 | Stop reason: HOSPADM

## 2023-05-07 RX ORDER — TACROLIMUS 1 MG/1
CAPSULE ORAL
Qty: 300 CAPSULE | Refills: 11
Start: 2023-05-07 | End: 2023-05-19

## 2023-05-07 RX ORDER — MYCOPHENOLIC ACID 180 MG/1
540 TABLET, DELAYED RELEASE ORAL 2 TIMES DAILY
Qty: 240 TABLET | Refills: 11 | Status: SHIPPED | OUTPATIENT
Start: 2023-05-07 | End: 2023-05-11

## 2023-05-07 RX ORDER — CIPROFLOXACIN 500 MG/1
500 TABLET, FILM COATED ORAL EVERY 12 HOURS
Qty: 22 TABLET | Refills: 0 | Status: SHIPPED | OUTPATIENT
Start: 2023-05-07 | End: 2023-05-18

## 2023-05-07 RX ORDER — VALGANCICLOVIR 450 MG/1
900 TABLET, FILM COATED ORAL DAILY
Status: DISCONTINUED | OUTPATIENT
Start: 2023-05-07 | End: 2023-05-07 | Stop reason: HOSPADM

## 2023-05-07 RX ORDER — MULTIVIT-MIN/IRON/FOLIC ACID/K 18-600-40
2000 CAPSULE ORAL DAILY
Qty: 30 CAPSULE | Refills: 0 | Status: SHIPPED | OUTPATIENT
Start: 2023-05-07 | End: 2023-05-09

## 2023-05-07 RX ORDER — SODIUM BICARBONATE 325 MG/1
650 TABLET ORAL 2 TIMES DAILY
Status: DISCONTINUED | OUTPATIENT
Start: 2023-05-07 | End: 2023-05-07 | Stop reason: HOSPADM

## 2023-05-07 RX ADMIN — CIPROFLOXACIN HYDROCHLORIDE 500 MG: 500 TABLET, FILM COATED ORAL at 09:42

## 2023-05-07 RX ADMIN — VALGANCICLOVIR 900 MG: 450 TABLET, FILM COATED ORAL at 11:50

## 2023-05-07 RX ADMIN — Medication 800 MG: at 11:50

## 2023-05-07 RX ADMIN — MYCOPHENOLIC ACID 540 MG: 360 TABLET, DELAYED RELEASE ORAL at 09:38

## 2023-05-07 RX ADMIN — SODIUM PHOSPHATE, DIBASIC, ANHYDROUS, POTASSIUM PHOSPHATE, MONOBASIC, AND SODIUM PHOSPHATE, MONOBASIC, MONOHYDRATE 250 MG: 852; 155; 130 TABLET, COATED ORAL at 09:39

## 2023-05-07 RX ADMIN — TACROLIMUS 5 MG: 5 CAPSULE ORAL at 09:39

## 2023-05-07 RX ADMIN — Medication 2 WAFER: at 09:39

## 2023-05-07 RX ADMIN — SODIUM CHLORIDE: 9 INJECTION, SOLUTION INTRAVENOUS at 06:42

## 2023-05-07 RX ADMIN — SODIUM BICARBONATE 650 MG: 325 TABLET ORAL at 09:41

## 2023-05-07 RX ADMIN — SULFAMETHOXAZOLE AND TRIMETHOPRIM 1 TABLET: 400; 80 TABLET ORAL at 09:42

## 2023-05-07 RX ADMIN — PANTOPRAZOLE SODIUM 40 MG: 40 TABLET, DELAYED RELEASE ORAL at 09:35

## 2023-05-07 RX ADMIN — DIPHENOXYLATE HYDROCHLORIDE AND ATROPINE SULFATE 1 TABLET: 2.5; .025 TABLET ORAL at 09:51

## 2023-05-07 ASSESSMENT — ACTIVITIES OF DAILY LIVING (ADL)
ADLS_ACUITY_SCORE: 18

## 2023-05-07 NOTE — PROVIDER NOTIFICATION
Text paged Kidney Transplant Surgery NP/PA for clarification on discharge instruction:  pls clarify tacrolimus change. Is patient holding afternoon dose today 5/7 and resume am dose tomorrow 5/8, then have labs drawn at 3:30pm on 5/8?  Pita Acevedo [phone]    Provider response:  Hold Tacrolimus until Lab draw tomorrow afternoon 5/8. Ok to take dose after lab is drawn. Patient informed, discharge instructions updated.

## 2023-05-07 NOTE — PROVIDER NOTIFICATION
Text Paged Provider RE:  FYI pt triggered sepsis protocol. Vitals will be taken q 30 minutes and lactic ordered. /74 HR 75.

## 2023-05-07 NOTE — PLAN OF CARE
Goal Outcome Evaluation:  Shift: 0700 - 1400    Plan of Care Reviewed With: patient    Overall Patient Progress: improving  Outcome Evaluation: pt still has critacally elevated Tacrolimus level, but trending down. Instructed to hold Tacrolimus today and tomorrow until Lab draw in the afternoon. Discharging home with followup appointments and lab monitoring.    Reason for admission: UTI, Sepsis  Vitals: Afebrile this shift, VSS, on RA  /76 (BP Location: Left arm)   Pulse 59   Temp 98.1  F (36.7  C) (Oral)   Resp 16   Ht 1.829 m (6')   SpO2 99%   BMI 20.79 kg/m    Activity: Independent in room  Pain: denies    Neuro: A&O, able to make needs known  Cardiac: HR RRR, no murmur noted  Respiratory: LS Clear  GI/: BS active x4, diarrhea x 1 this shift, Lomotal given prn. Voiding spontaneously per urnial. PVR 14 mL  Diet: Regular diet, tolerating with diminished appetite  Lines: Right AC PIV SL - removed prior to discharge   Wounds/Incisions: na  Labs/imaging/Consults: Creatine remains critically elevated, trending down.   Discharge Plan: Home today with follow-up in clinic with lab draw. Hold Tacrolimus until after lab draw tomorrow afternoon.     Pita Fernández RN BSN PHN

## 2023-05-07 NOTE — PLAN OF CARE
"Goal Outcome Evaluation:  Plan of Care Reviewed With: patient  Overall Patient Progress: no change    Shift: 7485-1096  VS: /76 (BP Location: Left arm)   Pulse 59   Temp 98.1  F (36.7  C) (Oral)   Resp 16   Ht 1.829 m (6')   SpO2 99%   BMI 20.79 kg/m    Pain: Denies pain   Neuro: A/O x 4, no sensory loss  Cardiac: Regular rate and rhythm, no murmur detected. Cap refill less that 3 seconds, +2 pulses bilaterally.   Respiratory: LCTAB, normal work of breathing, denies JUNIOR or SOB, sating in the upper 90's on RA.    Diet/Appetite:  Poor appetite on regular diet.   /GI: No BM overnight. Normoactive BS. Voiding spontaneously.   LDA's: Right PVI infusing LR @ 100 mL/hr   Skin: No new deficits noted.   Activity: up ad charles  Pertinent Labs/: Mag and Phos replaced, redraw at 6:00 AM, Lactic Acid 1.1 @ 19:49 after triggering sepsis.      Plan: Continue POC.      Problem: Plan of Care - These are the overarching goals to be used throughout the patient stay.    Goal: Plan of Care Review  Description: The Plan of Care Review/Shift note should be completed every shift.  The Outcome Evaluation is a brief statement about your assessment that the patient is improving, declining, or no change.  This information will be displayed automatically on your shift note.  Outcome: Progressing  Flowsheets (Taken 5/6/2023 1921)  Plan of Care Reviewed With: patient  Overall Patient Progress: no change  Goal: Patient-Specific Goal (Individualized)  Description: You can add care plan individualizations to a care plan. Examples of Individualization might be:  \"Parent requests to be called daily at 9am for status\", \"I have a hard time hearing out of my right ear\", or \"Do not touch me to wake me up as it startles me\".  Outcome: Progressing  Goal: Absence of Hospital-Acquired Illness or Injury  Outcome: Progressing  Goal: Optimal Comfort and Wellbeing  Outcome: Progressing  Goal: Readiness for Transition of Care  Outcome: Progressing   " "Patient states he feels like he has chills and he hasnt been sleeping good last, he says he feels "flustered" he showed me this word on his phone typed out. Said he is tired but cant sleep     He is afebrile at triage. 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

## 2023-05-07 NOTE — PROGRESS NOTES
Home medications returned to patient. Verified with NP/PA that patient does have 1 mg Tacrolimus tabs. Discharge in process.   Pt calling for maged, girlfriend is coming from [place] and has to pu son. ETA 2 pm.

## 2023-05-07 NOTE — PLAN OF CARE
Goal Outcome Evaluation:  Shift: 0700 - 1930  Overall Patient Progress: no change    Reason for admission: UTI, Sepsis  Vitals: Afebrile this shift, VSS, on RA  /74 (BP Location: Left arm)   Pulse 75   Temp 97.6  F (36.4  C) (Oral)   Resp 16   Ht 1.829 m (6')   SpO2 99%   BMI 20.79 kg/m    Activity: Independent in room  Pain: mild neck strain, prn Tylenol and Robaxin once given with relief.   Neuro: A&O, able to make needs known  Cardiac: HR RRR, no murmur noted  Respiratory: LS Clear  GI/: BS active x4, diarrhea x 1 this shift, Lomotal ordered prn. Voiding spontaneously per urnial.   Diet: Regular diet, tolerating with diminished appetite  Lines: Right AC PIV SL  Wounds/Incisions: na  Labs/imaging/Consults: Creatine trending down, see results review.  Discharge Plan: Continue POC      Pita Fernández RN BSN PHN

## 2023-05-07 NOTE — DISCHARGE SUMMARY
[facility]    Discharge Summary  Transplant Surgery    Date of Admission:  5/4/2023  Date of Discharge:  5/7/2023  Discharging Provider: Danielle Bach NP / Tez Emerson MD    Attestation: I saw and examined this patient with Danielle Bach NP, and the transplant team. I independently reviewed all pertinent laboratory and imaging results and made independent management decisions including immunosuppression management. I agree with the findings and plan as documented in this note.  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    Hospital Course   Hx DDKT 1/515/23; JACK of transplanted kidney: Cr now back to baseline (from peak 2.9). Baseline Cr 1.4-1.6. Renal US patent, no acute concerns. Concern for pyelonephritis vs possible rejection (less likely) vs elevated tacrolimus level. DSA pending. Continue to monitor kidney function outpatient.     Immunosuppression status 2/2 medication:   Maintenance:   - Tacrolimus goal level 8-10. Level supratherapeutic, plan to hold dose tonight and recheck tomorrow and resume 4 mg BID.    - Myfortic 720 mg BID decreased to 540 mg BID per protocol in the setting of previously high levels, leukopenia, and infection.   Infection prophylaxis:  viral (Valcyte x 6 months post-transplant), PCP (Bactrim indefinitely)    Transplant coordinator: Veronica Edgar [phone]  Donor type:  DCD  CMV:  Donor + / Recipient -  EBV:  Donor + / Recipient +     Hematology:   Leukopenia: WBC 2-3, Myfortic decreased as above.   Anemia of chronic disease: Hgb ~9-10, monitor.     GI/Nutrition:   Diarrhea: Colonoscopy 3/23/23 with rare crypt epithelial apoptosis, CMV negative. C diff and enteric panel negative. Myfortic reduced as above. Add Fiber BID PRN for diarrhea.     Fluid/Electrolytes:   Hypomagnesemia: Continue PO Mag-Ox 800 mg daily.   Low bicarb: Continue sodium bicarbonate 650 mg BID.     :  Hx Urethral strictures: s/p buccal urethroplasty + diverticulum excision 12/2020. No hydronephrosis on US. Urology consulted while inpatient. PVR negative. Urology appointment scheduled 5/16/23 and possible cystoscopy/conversation about possible circumcision.      Infectious disease:   E coli UTI: Culture with >100k E coli. Third UTI since transplant. Transplant ID consulted. Initially on vanc/zosyn, now transitioned to PO Cipro x 14 days (EOT 5/18/23). Follow up with ID scheduled for 5/8/23.   Vaccines: Due for seasonal influenza vaccine and COVID-19 Bivalent.       Discharge Disposition   Discharged to home   Condition at discharge: Stable    Pending Results   These results will be followed up by transplant team  Unresulted Labs Ordered in the Past 30 Days of this Admission     Date and Time Order Name Status Description    5/4/2023  6:49 PM Blood Culture Line, venous Preliminary     5/4/2023  6:49 PM Blood Culture Peripheral Blood Preliminary     5/4/2023  3:20 PM MYCOPHENOLIC ACID BY TANDEM MASS SPECTROMETRY In process     4/27/2023  3:20 PM ALLOSURE KIDNEY TRANSPLANT MONITORING In process         Final pathology results: No pathology submitted  Primary Care Physician   Physician No Ref-Primary    Physical Exam   Temp: 98.1  F (36.7  C) Temp src: Oral BP: 124/76 Pulse: 59    Resp: 16 SpO2: 99 % O2 Device: None (Room air)    There were no vitals filed for this visit.  Vital Signs with Ranges  Temp:  [97.6  F (36.4  C)-98.7  F (37.1  C)] 98.1  F (36.7  C)  Pulse:  [59-76] 59  Resp:  [16] 16  BP: (107-124)/(48-76) 124/76  SpO2:  [98 %-100 %] 99 %  I/O last 3 completed shifts:  In: 2089 [P.O.:720; I.V.:1369]  Out: 2375 [Urine:2375]    Constitutional: resting comfortably in bed  Eyes: EOMI  Respiratory: unlabored on RA  Cardiovascular: perfused  GI: abdomen non-distended  Genitourinary: no Taylor  Skin: warm, dry  Musculoskeletal: no edema noted  Neurologic: A&Ox4  Neuropsychiatric: behavior appropriate to situation    Consultations This Hospital Stay   [facility]  NEPHROLOGY KIDNEY/PANCREAS TRANSPLANT ADULT IP CONSULT  INFECTIOUS DISEASE TRANSPLANT SOT ADULT IP CONSULT  UROLOGY IP CONSULT    Time Spent on this Encounter   I have spent greater than 30 minutes on this discharge.    Discharge Orders   Discharge Medications   Current Discharge Medication List      START taking these medications    Details   ciprofloxacin (CIPRO) 500 MG tablet Take 1 tablet (500 mg) by mouth every 12 hours for 11 days  Qty: 22 tablet, Refills: 0    Associated Diagnoses: Urinary tract infection without hematuria, site unspecified      psyllium (METAMUCIL/KONSYL) 58.6 % powder Take 18 g (1 Tablespoonful) by mouth 2 times daily as needed (Diarrhea)  Qty: 283 g, Refills: 0    Associated Diagnoses: Diarrhea, unspecified type         CONTINUE these medications which have CHANGED    Details   mycophenolic acid (GENERIC EQUIVALENT) 180 MG EC tablet Take 3 tablets (540 mg) by mouth 2 times daily  Qty: 240 tablet, Refills: 11    Associated Diagnoses: Kidney replaced by transplant      tacrolimus (GENERIC EQUIVALENT) 1 MG capsule Hold dose at 03:00 on 5/8/23 and check level on 5/9/23 at 3 pm, then resume at lower dose 4 mg BID.  Qty: 300 capsule, Refills: 11    Associated Diagnoses: Kidney replaced by transplant       Vitamin D, Cholecalciferol, 50 MCG (2000 UT) CAPS Take 2,000 Units by mouth daily  Qty: 30 capsule, Refills: 0    Associated Diagnoses: Vitamin D deficiency         CONTINUE these medications which have NOT CHANGED    Details   magnesium oxide (MAG-OX) 400 MG tablet Take 2 tablets (800 mg) by mouth daily 6PM  Qty: 60 tablet, Refills: 3    Associated Diagnoses: Transplanted kidney      pantoprazole (PROTONIX) 40 MG EC tablet Take 1 tablet (40 mg) by mouth every morning (before breakfast)  Qty: 30 tablet, Refills: 2    Associated Diagnoses: Kidney replaced by transplant      sodium bicarbonate 650 MG tablet Take 1 tablet (650 mg) by mouth 2 times daily  Qty: 60 tablet, Refills: 1    Associated Diagnoses: Metabolic acidosis      sulfamethoxazole-trimethoprim (BACTRIM) 400-80 MG tablet Take 1 tablet by mouth daily  Qty: 30 tablet, Refills: 11    Associated Diagnoses: Kidney replaced by transplant      valGANciclovir (VALCYTE) 450 MG tablet Take 2 tablets (900 mg) by mouth daily  Qty: 60 tablet, Refills: 1    Associated Diagnoses: Kidney replaced by transplant                Reason for your hospital stay    You were admitted for a UTI. You are discharging home in stable condition with close follow up.    Follow up with Urology and Transplant ID as scheduled.     Activity    Your activity upon discharge: activity encouraged     Adult [facility] Follow-up and recommended labs and tests    Please have labs check twice weekly M, Th starting 5/7 until stable: BMP, CBC, Mg, Phos, Tacrolimus Level     When to contact your care team    WHEN TO CONTACT YOUR  COORDINATOR:     Transplant Coordinator [phone]     Notify your coordinator if you have pain over your kidney, increased redness or drainage from your incision, fever greater than 100F, decreased urine output or new or increased amount of blood in urine.     Notify your coordinator immediately if you are ever unable to take your immunosuppressive medications for any  reason.     If you have URGENT concerns after office hours, please call the hospital switchboard at [phone] and ask to have the organ transplant nurse on-call paged. If you have a life-threatening emergency, go to the nearest emergency room.     Diet    Diet recommendations post-transplant: Heart healthy dietary habits long term (low saturated/trans fat, low sodium). Practice food safety precautions.         Data   Most Recent 3 CBC's:  Recent Labs   Lab Test 05/07/23  0616 05/06/23  0645 05/05/23  0500   WBC 2.3* 2.6* 2.3*   HGB 8.4* 8.9* 8.7*   MCV 93 90 92    159 158      Most Recent 3 BMP's:  Recent Labs   Lab Test 05/07/23  0616 05/06/23  0645 05/05/23  0500    138 130*   POTASSIUM 3.5 3.6 3.7   CHLORIDE 115* 109* 102   CO2 18* 21* 17*   BUN 14.1 21.7* 29.4*   CR 1.61* 2.22* 2.62*   ANIONGAP 9 8 11   VISHNU 8.1* 8.0* 8.0*   * 110* 151*     Most Recent 2 LFT's:  Recent Labs   Lab Test 05/04/23  1924 04/06/23  1543   AST 33 30   ALT 72* 42   ALKPHOS 48 65   BILITOTAL 0.4 0.2     Most Recent INR's and Anticoagulation Dosing History:  Anticoagulation Dose History         Latest Ref Rng & Units 8/18/2020 8/9/2021 1/15/2023   Recent Dosing and Labs   INR 0.85 - 1.15 1.08   0.96   0.90                Most Recent 3 Troponin's:  Recent Labs   Lab Test 12/03/20  1647   TROPI <0.015     Most Recent Cholesterol Panel:  Recent Labs   Lab Test 01/15/23  0540   CHOL 177   *   HDL 52   TRIG 115     Most Recent 6 Bacteria Isolates From Any Culture (See EPIC Reports for Culture Details):  Recent Labs   Lab Test 12/03/20  1705 08/14/20  1415   CULT No growth >100,000 colonies/mL  Escherichia coli  *     Most Recent TSH, T4 and A1c Labs:  Recent Labs   Lab Test 01/15/23  0540 04/18/22  0851   TSH  --  2.34   A1C 5.2  --      Results for orders placed or performed during the hospital encounter of 05/04/23   US Renal Transplant with Doppler    Narrative    EXAMINATION: US RENAL TRANSPLANT,  5/4/2023 7:56  PM     COMPARISON: Renal transplant 3/20/2023    HISTORY: Right sided transplant, elevated creatinine.    TECHNIQUE:  Grey-scale, color Doppler and spectral flow analysis.    FINDINGS:  The transplant kidney is located right lower quadrant, and measures  13.7 cm. Parenchyma is of normal thickness and echogenicity. No focal  lesions. Similar mild prominence of the proximal ureter. No  hydronephrosis. No perinephric fluid collection.    Renal artery flow:   121 cm/sec peak systolic at hilum.  449 cm/s peak systolic at anastomosis.  Arcuate artery resistive indices (upper to lower): 0.59, 0.71, 0.78    Renal Vein Flow:  58 cm/sat hilum.   67 cm/s at anastomosis.    Iliac artery flow:  196 cm/s peak systolic above anastomosis.  181 cm/s peak systolic below anastomosis.    Iliac vein flow:  Patent above and below the anastomosis.      Impression    IMPRESSION:   1. Patent antegrade Doppler evaluation of the renal transplant  vasculature.  2. Elevated velocity at the renal artery anastomosis up to 449 cm/s  which may suggest stenosis.  3. Normal grayscale appearance of the right lower quadrant transplant  kidney.    I have personally reviewed the examination and initial interpretation  and I agree with the findings.    AMISH SANTIAGO MD         SYSTEM ID:  [id number]

## 2023-05-07 NOTE — PROGRESS NOTES
Discharge instruction reviewed.  Patient verbalized understanding. PIV removed, patient ambulated to the discharge pharmacy, then to main lobby. Family awaiting at main lobby. Patient discharged with all belongings.

## 2023-05-08 ENCOUNTER — LAB (OUTPATIENT)
Dept: LAB | Facility: CLINIC | Age: 38
End: 2023-05-08
Payer: MEDICARE

## 2023-05-08 ENCOUNTER — HEALTH MAINTENANCE LETTER (OUTPATIENT)
Age: 38
End: 2023-05-08

## 2023-05-08 ENCOUNTER — PRE VISIT (OUTPATIENT)
Dept: INFECTIOUS DISEASES | Facility: CLINIC | Age: 38
End: 2023-05-08
Payer: MEDICARE

## 2023-05-08 ENCOUNTER — OFFICE VISIT (OUTPATIENT)
Dept: INFECTIOUS DISEASES | Facility: CLINIC | Age: 38
End: 2023-05-08
Attending: INTERNAL MEDICINE
Payer: MEDICARE

## 2023-05-08 VITALS
WEIGHT: 160 LBS | SYSTOLIC BLOOD PRESSURE: 146 MMHG | DIASTOLIC BLOOD PRESSURE: 89 MMHG | HEART RATE: 61 BPM | OXYGEN SATURATION: 100 % | TEMPERATURE: 97.7 F | BODY MASS INDEX: 21.7 KG/M2

## 2023-05-08 DIAGNOSIS — Z79.899 ENCOUNTER FOR LONG-TERM CURRENT USE OF MEDICATION: ICD-10-CM

## 2023-05-08 DIAGNOSIS — E03.9 HYPOTHYROIDISM, UNSPECIFIED TYPE: ICD-10-CM

## 2023-05-08 DIAGNOSIS — N10 ACUTE PYELONEPHRITIS: ICD-10-CM

## 2023-05-08 DIAGNOSIS — Z94.0 KIDNEY REPLACED BY TRANSPLANT: ICD-10-CM

## 2023-05-08 DIAGNOSIS — N25.81 SECONDARY RENAL HYPERPARATHYROIDISM (H): ICD-10-CM

## 2023-05-08 DIAGNOSIS — Z48.298 AFTERCARE FOLLOWING ORGAN TRANSPLANT: ICD-10-CM

## 2023-05-08 DIAGNOSIS — Z94.0 KIDNEY REPLACED BY TRANSPLANT: Primary | ICD-10-CM

## 2023-05-08 DIAGNOSIS — N18.31 STAGE 3A CHRONIC KIDNEY DISEASE (H): ICD-10-CM

## 2023-05-08 DIAGNOSIS — Z20.828 CONTACT WITH AND (SUSPECTED) EXPOSURE TO OTHER VIRAL COMMUNICABLE DISEASES: ICD-10-CM

## 2023-05-08 LAB
ALBUMIN SERPL BCG-MCNC: 3.6 G/DL (ref 3.5–5.2)
ALP SERPL-CCNC: 39 U/L (ref 40–129)
ALT SERPL W P-5'-P-CCNC: 45 U/L (ref 10–50)
ANION GAP SERPL CALCULATED.3IONS-SCNC: 9 MMOL/L (ref 7–15)
AST SERPL W P-5'-P-CCNC: 31 U/L (ref 10–50)
BILIRUB DIRECT SERPL-MCNC: <0.2 MG/DL (ref 0–0.3)
BILIRUB SERPL-MCNC: <0.2 MG/DL
BUN SERPL-MCNC: 16.2 MG/DL (ref 6–20)
CALCIUM SERPL-MCNC: 8.7 MG/DL (ref 8.6–10)
CHLORIDE SERPL-SCNC: 113 MMOL/L (ref 98–107)
CREAT SERPL-MCNC: 1.64 MG/DL (ref 0.67–1.17)
CREAT UR-MCNC: 93.2 MG/DL
DEPRECATED HCO3 PLAS-SCNC: 20 MMOL/L (ref 22–29)
DONOR IDENTIFICATION: NORMAL
DSA COMMENTS: NORMAL
DSA PRESENT: NO
DSA TEST METHOD: NORMAL
ERYTHROCYTE [DISTWIDTH] IN BLOOD BY AUTOMATED COUNT: 14 % (ref 10–15)
GFR SERPL CREATININE-BSD FRML MDRD: 54 ML/MIN/1.73M2
GLUCOSE SERPL-MCNC: 100 MG/DL (ref 70–99)
HCT VFR BLD AUTO: 24.9 % (ref 40–53)
HGB BLD-MCNC: 8.3 G/DL (ref 13.3–17.7)
MAGNESIUM SERPL-MCNC: 1.5 MG/DL (ref 1.7–2.3)
MCH RBC QN AUTO: 30 PG (ref 26.5–33)
MCHC RBC AUTO-ENTMCNC: 33.3 G/DL (ref 31.5–36.5)
MCV RBC AUTO: 90 FL (ref 78–100)
MICROALBUMIN UR-MCNC: 21.8 MG/L
MICROALBUMIN/CREAT UR: 23.39 MG/G CR (ref 0–17)
MYCOPHENOLATE SERPL LC/MS/MS-MCNC: 2.35 MG/L (ref 1–3.5)
MYCOPHENOLATE-G SERPL LC/MS/MS-MCNC: 98.7 MG/L (ref 30–95)
ORGAN: NORMAL
PHOSPHATE SERPL-MCNC: 2.6 MG/DL (ref 2.5–4.5)
PLATELET # BLD AUTO: 185 10E3/UL (ref 150–450)
POTASSIUM SERPL-SCNC: 4.2 MMOL/L (ref 3.4–5.3)
PROT SERPL-MCNC: 6.4 G/DL (ref 6.4–8.3)
RBC # BLD AUTO: 2.77 10E6/UL (ref 4.4–5.9)
SA 1 CELL: NORMAL
SA 1 TEST METHOD: NORMAL
SA 2 CELL: NORMAL
SA 2 TEST METHOD: NORMAL
SA1 HI RISK ABY: NORMAL
SA1 MOD RISK ABY: NORMAL
SA2 HI RISK ABY: NORMAL
SA2 MOD RISK ABY: NORMAL
SODIUM SERPL-SCNC: 142 MMOL/L (ref 136–145)
TACROLIMUS BLD-MCNC: 12.3 UG/L (ref 5–15)
TME LAST DOSE: ABNORMAL H
TME LAST DOSE: ABNORMAL H
TME LAST DOSE: NORMAL H
TME LAST DOSE: NORMAL H
TSH SERPL DL<=0.005 MIU/L-ACNC: 1.57 UIU/ML (ref 0.3–4.2)
UNACCEPTABLE ANTIGENS: NORMAL
UNOS CPRA: 49
WBC # BLD AUTO: 2.2 10E3/UL (ref 4–11)
ZZZSA 1  COMMENTS: NORMAL
ZZZSA 2 COMMENTS: NORMAL

## 2023-05-08 PROCEDURE — 82248 BILIRUBIN DIRECT: CPT | Performed by: PATHOLOGY

## 2023-05-08 PROCEDURE — 83735 ASSAY OF MAGNESIUM: CPT | Performed by: PATHOLOGY

## 2023-05-08 PROCEDURE — 80180 DRUG SCRN QUAN MYCOPHENOLATE: CPT | Performed by: INTERNAL MEDICINE

## 2023-05-08 PROCEDURE — 84443 ASSAY THYROID STIM HORMONE: CPT | Performed by: PATHOLOGY

## 2023-05-08 PROCEDURE — 99213 OFFICE O/P EST LOW 20 MIN: CPT | Performed by: STUDENT IN AN ORGANIZED HEALTH CARE EDUCATION/TRAINING PROGRAM

## 2023-05-08 PROCEDURE — 80053 COMPREHEN METABOLIC PANEL: CPT | Performed by: PATHOLOGY

## 2023-05-08 PROCEDURE — 80197 ASSAY OF TACROLIMUS: CPT | Performed by: INTERNAL MEDICINE

## 2023-05-08 PROCEDURE — 36415 COLL VENOUS BLD VENIPUNCTURE: CPT | Performed by: PATHOLOGY

## 2023-05-08 PROCEDURE — 82570 ASSAY OF URINE CREATININE: CPT | Performed by: FAMILY MEDICINE

## 2023-05-08 PROCEDURE — 85027 COMPLETE CBC AUTOMATED: CPT | Performed by: PATHOLOGY

## 2023-05-08 PROCEDURE — 84100 ASSAY OF PHOSPHORUS: CPT | Performed by: PATHOLOGY

## 2023-05-08 PROCEDURE — G0463 HOSPITAL OUTPT CLINIC VISIT: HCPCS | Performed by: STUDENT IN AN ORGANIZED HEALTH CARE EDUCATION/TRAINING PROGRAM

## 2023-05-08 RX ORDER — NITROFURANTOIN 25; 75 MG/1; MG/1
100 CAPSULE ORAL 2 TIMES DAILY
Qty: 14 CAPSULE | Refills: 0 | Status: SHIPPED | OUTPATIENT
Start: 2023-05-08 | End: 2023-05-16

## 2023-05-08 ASSESSMENT — PAIN SCALES - GENERAL: PAINLEVEL: NO PAIN (0)

## 2023-05-08 NOTE — LETTER
5/8/2023       RE: Chip Fowler  [address] [facility] [address]     Dear Colleague,    Thank you for referring your patient, Chip Fowler, to the [facility] INFECTIOUS DISEASE CLINIC [place] [facility]. Please see a copy of my visit note below.    [facility]  Transplant Infectious Disease Clinic Note:  Follow up Patient     Patient:  Chip Fowler, Date of birth 1985, Medical record number [id number]  Date of Visit:  05/08/2023           Assessment and Recommendations:   Recommendations:  -continue PO Cipro x 14 days (EOT 5/18/23)  -Recommend voiding after sex. He was also recommend to keep hydrated and schedule voiding throughout the day.   -he has had 3 UTIs this year and 2 in the last month.  He pt is very sure that he gets his UTIs after sex, therefore, I encouraged voiding after sex. As he has UTIs only after sex, I am hesitant to start him on chronic abx suppression and will give Nitrofurantoin (as his prior cx grew enterococcus and Ecoli) for 7d to take PRN when he feels like he is about to have a UTI. As the pt is a recent kidney transplant his risk of infections is high for the next several months. As he is farther from transplant, the risk of UTIs decrease.  -will f/u with urology next week  -Cont viral (Valcyte x 6 months post-transplant), PCP (Bactrim indefinitely)  -Vaccines: Due for seasonal influenza vaccine and COVID-19 Bivalent after he recovers from his current issues.      RTC in 3 mo    Assessment:  Hx DDKT 1/515/23 for single kidney and HTN.    -Baseline Cr 1.4-1.6. Renal US patent, no acute concerns.  - Tacrolimus goal level 8-10. Level supratherapeutic, plan to hold dose tonight and recheck tomorrow and resume 4 mg BID.    - Myfortic 720 mg BID decreased to 540 mg BID per protocol in the setting of previously high levels, leukopenia, and infection.       1. Severe sepsis. - resolved   2.  E.Coli UTI with recurrent UTI. It    The UTI is mostly with E faecalis since 1/2023 except last infection in 4/2023 with E faecalis and E coli.   The current UTI is also due to E coli. Susceptible to Cipro       3. Diarrhea.   Maybe contributing to UTI since diarrhea may contaminate the  tract.   Could be due to sepsis.   C diff and enteric panel negative.   Supportive care.      4. JACK   Due to 1. 2. And 3.   Improving, CrCl 43.9 ml/min  Adjust ABx accordingly.     Prior issues:  1. Eosinophilia. The workup for eosinophilia and diarrhea was negative for fungal and parasitic infections that would account for the eosinophilia. Evaluated by hematology. The eosinophilia is likely reactive to foreign objects; the PD catheter was thought to be potentially the source as noted by hematology. The relatively increased tryptase level favors allergy (likely to PD catheter) to be the etiology of the eosinophilia rather than infectious processes or malignancies.   2. Chronic diarrhea since he was started on PD in 7/2020. Workup has been negative.   3. Positive Salmonella serology. 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 The patient was counseled against keeping the snakes after transplantation as they are source of recurrent Salmonella infection.  4. E coli in urine in 8/2020.   5. Group B Strep in urine in 2/2020.   6. Urethral stenosis s/p reconstruction.      Other Infectious Disease issues include:  - QTc: 416 as of 3/23/23.   - PJP prophylaxis: bactrim.   - Serostatus: CMV D+/R-, EBV D+/R-, HSV1?/2?, VZV +  - Immunization status: This patient received the third dose of the COVID-19 vaccine on 1/26/2022. Otherwise due for the seasonal influenza vaccine and COVID-19 bivalent.          History of the Infectious Disease lllness:   The pt is an 38  yo m with with congenital kidney disease and HTN s/p KT IN 1/2023. Received ATG for induction. Currently on TAC/MMF. Has history of urethral stenosis s/p urethral reconstruction prior to KT. He was admitted from 5/4-5/7/23 for sepsis likely E Coli pyelonephritis. He was initially on vanco/pip-tazo and was discharged on cipro for 14d.   He is here for ID follow up after his discharge yesterday from the hospital.     The pt was seen in clinic. He states that he is doing well. He has no fevers, no chills, sob, no cough, n/v/d or dysuria. He has no frequent urination. He states that in the past he would have ache around his bladder and then frequent urination followed by diarrhea. He is taking cipro with no issues. He states that he has UTI after sex. He has tried cranberry juice. He has one partner and uses condoms. Denies any hx of STDs.     Transplants:  1/15/2023 (Kidney); Postoperative day:  113.  Coordinator Veronica Edgar    Review of Systems:  CONSTITUTIONAL:  No fevers or chills. No night sweats.  EYES: negative for icterus or acute vision changes.   ENT:  negative for hearing loss, tinnitus or sore throat  RESPIRATORY:  negative for cough, sputum, dyspnea  CARDIOVASCULAR:  negative for chest pain, heart palpitations  GASTROINTESTINAL:  negative for nausea, vomiting, diarrhea or constipation  GENITOURINARY:  negative for dysuria or hematuria.  HEME:  No easy bruising or bleeding  INTEGUMENT:  negative for rash or pruritus  NEURO:  Negative for headache or tremor.    Past Medical History:   Diagnosis Date    Bladder stones     Cardiomyopathy (H)     ESRD (end stage renal disease) on dialysis (H)     Hypertension     Kidney replaced by transplant 01/15/2023    DCD DDKT. Intermediate risk induction.    Migraines     Polysubstance abuse (H)     Solitary kidney, congenital     Urethral stone     Urethral stricture        Past Surgical History:   Procedure Laterality Date    BENCH KIDNEY  1/15/2023    Procedure:  Bench kidney;  Surgeon: Tez Emerson MD;  Location: [facility] OR    BIOPSY  2020    renal, Christianity    COLONOSCOPY N/A 3/23/2023    Procedure: Colonoscopy;  Surgeon: Ana Cardenas MD;  Location: [facility] GI    COMBINED CYSTOSCOPY, LASER HOLMIUM LITHOTRIPSY URETER(S)      CYSTOSCOPY      CYSTOSCOPY FLEXIBLE, CYSTOSTOMY, INSERT TUBE SUPRAPUBIC, COMBINED N/A 2020    Procedure: CYSTOSCOPY, WITH SUPRAPUBIC CATHETER INSERTION;  Surgeon: Sam Mejia MD;  Location: [facility] OR    CYSTOSCOPY, OPEN EXCISION URETHRAL DIVERTICULUM, COMBINED N/A 2020    Procedure: EXCISION OF URETHRAL DIVERTICULUM X2;  Surgeon: Sam Mejia MD;  Location: [facility] OR    HERNIA REPAIR      infant    INSERT CATHETER PERITONEAL DIALYSIS      LASER HOLMIUM LITHOTRIPSY URETER(S), INSERT STENT, COMBINED N/A 2020    Procedure: CYSTOSCOPY, bladder and urethral stone extraction, removal of foreign body, urethral dilation, urethrotomy, laser on standby;  Surgeon: Sam Mejia MD;  Location: [facility] OR    REMOVE CATHETER PERITONEAL N/A 1/15/2023    Procedure: Remove catheter peritoneal;  Surgeon: Tez Emerson MD;  Location: [facility] OR    TRANSPLANT KIDNEY RECIPIENT  DONOR N/A 1/15/2023    Procedure: TRANSPLANT, KIDNEY, RECIPIENT,  DONOR WITH DONOR URETER STENTING;  Surgeon: Tez Emerson MD;  Location: [facility] OR    urethral dilation      URETHROPLASTY WITH BUCCAL GRAFT N/A 2020    Procedure: URETHROPLASTY, USING BUCCAL MUCOSA GRAFT;  Surgeon: Sam Mejia MD;  Location: [facility] OR       Family History   Problem Relation Age of Onset    Alzheimer Disease Mother     Unknown/Adopted Father     No Known Problems Sister     No Known Problems Sister     Kidney Disease No family hx of        Social History     Social History Narrative    Not on file     Social History     Tobacco Use    Smoking status: Former     Packs/day: 0.50     Types: Cigarettes     Start date:       Quit date: 1/15/2023     Years since quittin.3    Smokeless tobacco: Never   Vaping Use    Vaping status: Never Used   Substance Use Topics    Alcohol use: Not Currently     Comment: quit alcohol 3-4 yrs ago    Drug use: Not Currently     Types: Methamphetamines, MDMA (Ecstasy)       Immunization History   Administered Date(s) Administered    COVID-19 Monovalent 18+ (Moderna) 2021, 2021, 2022    DTAP (<7y) 1984, 1984, 1984, 10/04/1985, 1989    Flu, Unspecified 1997, 2020, 10/06/2020, 10/27/2021    Hepatits B (Peds <19Y) 1996, 1996, 1996    Hib, Unspecified 1986    Historical DTP/aP 1984, 1984, 1984, 10/04/1985, 1989    Influenza (intradermal) 2020    Influenza Vaccine >6 months (Alfuria,Fluzone) 2016    MMR 1985, 1996    Mantoux Tuberculin Skin Test 2020    Pneumo Conj 13-V (2010&after) 2020    Pneumococcal 23 valent 2020    Pneumococcal, Unspecified 2020, 2021    Poliovirus, inactivated (IPV) 1984, 1984, 1984, 1989    TD,PF 7+ (Tenivac) 1996    TDAP Vaccine (Adacel) 2020    Td (Adult), Adsorbed 1996       Patient Active Problem List   Diagnosis    HTN, kidney transplant related    Urethral stricture    Methamphetamine abuse, episodic (H)    Urethral diverticulum    Allergic rhinitis, unspecified seasonality, unspecified trigger    Stage 3a chronic kidney disease (H)    Kidney replaced by transplant    Immunosuppressed status (H)    Aftercare following organ transplant    Anemia of chronic disease    Secondary renal hyperparathyroidism (H)    Vitamin D deficiency    Hypomagnesemia    JACK (acute kidney injury) (H)    Diarrhea    Urinary tract infection without hematuria, site unspecified    Leukopenia    Low bicarbonate       No outpatient medications have been marked as taking for the 23 encounter  (Appointment) with Andrzej Willis MD.       No Known Allergies           Physical Exam:   Vitals were reviewed.  All vitals stable  There were no vitals taken for this visit.  Wt Readings from Last 4 Encounters:   04/12/23 69.5 kg (153 lb 4.8 oz)   03/23/23 71.9 kg (158 lb 8 oz)   03/19/23 68 kg (150 lb)   03/14/23 71.6 kg (157 lb 14.4 oz)       Exam:  GENERAL: well-developed, well-nourished, alert, oriented, in no acute distress.  HEAD: Head is normocephalic, atraumatic   EYES: Eyes have anicteric sclerae.    ENT: Oropharynx is moist without exudates or ulcers.  NECK: Supple.  LUNGS: Clear to auscultation.  CARDIOVASCULAR: Regular rate and rhythm with no murmurs, gallops or rubs.  ABDOMEN: Normal bowel sounds, soft, nontender.n no pain over the transplanted kidney  SKIN: No acute rashes.   NEUROLOGIC: Grossly nonfocal.         Laboratory Data:     Absolute CD4   Date Value Ref Range Status   12/03/2020 1,325 441 - 2,156 cells/uL Final       Inflammatory Markers    Recent Labs   Lab Test 01/20/23  0746   G6PD 15.5       Immune Globulin Studies     Recent Labs   Lab Test 12/03/20  1647   IGG 1,152   IGM 64   IGE 18          Metabolic Studies    Recent Labs   Lab Test 05/07/23  0616 05/06/23  1949 05/06/23  0645 05/05/23  1020 05/05/23  0500     --  138  --  130*   POTASSIUM 3.5  --  3.6  --  3.7   CHLORIDE 115*  --  109*  --  102   CO2 18*  --  21*  --  17*   ANIONGAP 9  --  8  --  11   BUN 14.1  --  21.7*  --  29.4*   CR 1.61*  --  2.22*  --  2.62*   GFRESTIMATED 55*  --  38*  --  31*   *  --  110*  --  151*   VISHNU 8.1*  --  8.0*  --  8.0*   PHOS 2.3*  --  1.9*  --  2.9   MAG 1.9  --  1.6*  --  1.4*   LACT  --  1.1  --    < >  --     < > = values in this interval not displayed.       Hepatic Studies    Recent Labs   Lab Test 05/04/23  1924 04/06/23  1543 03/19/23  2155   BILITOTAL 0.4 0.2 0.4   DBIL  --  <0.20 <0.20   ALKPHOS 48 65 41   PROTTOTAL 7.1 7.2 6.9   ALBUMIN 4.1 4.5 4.1   AST 33 30  34   ALT 72* 42 40       Pancreatitis testing    Recent Labs   Lab Test 03/19/23  2155 01/15/23  0540 04/18/22  0851   LIPASE 20  --   --    TRIG  --  115 78       Lipid testing    Recent Labs   Lab Test 01/15/23  0540 04/18/22  0851   CHOL 177 161   HDL 52 49   * 96   TRIG 115 78       Gout Labs    No lab results found.    Hematology Studies   Recent Labs   Lab Test 05/07/23  0616 05/06/23  0645 05/05/23  0500 05/04/23  1924 04/12/23  1112 04/10/23  1532 04/06/23  1543 04/03/23  1535   WBC 2.3* 2.6* 2.3* 2.8*   < > 5.4   < > 4.4   ANEU 1.0* 1.2* 1.3* 2.0   < >  --    < >  --    ANEUTAUTO  --   --   --   --   --  3.3  --  2.7   ALYM 0.4* 0.4* 0.2* 0.3*   < >  --    < >  --    ALYMPAUTO  --   --   --   --   --  1.3  --  0.9   TY 0.6 0.7 0.6 0.4   < >  --    < >  --    AMONOAUTO  --   --   --   --   --  0.4  --  0.3   AEOS 0.2 0.1 0.0 0.0   < >  --    < >  --    AEOSAUTO  --   --   --   --   --  0.2  --  0.1   ABSBASO  --   --   --   --   --  0.1  --  0.1   HGB 8.4* 8.9* 8.7* 10.3*   < > 9.9*   < > 9.1*   HCT 25.4* 26.5* 26.4* 31.0*   < > 30.8*   < > 29.2*    159 158 192   < > 291   < > 434    < > = values in this interval not displayed.       Clotting Studies    Recent Labs   Lab Test 01/15/23  0540 08/09/21  1124 08/18/20  1307   INR 0.90 0.96 1.08   PTT 28  --  29       Iron Testing    Recent Labs   Lab Test 05/07/23  0616 04/15/23  1002 04/12/23  1112 02/02/23  0840 01/31/23  1049 01/21/23  0715 01/20/23  0746 01/07/21  0825 12/03/20  1647   IRON  --   --   --   --  70  --  153  --   --    FEB  --   --   --   --  258  --  244  --   --    IRONSAT  --   --   --   --  27  --  63*  --   --    DAWSON  --   --   --   --  430*  --  717*  --   --    MCV 93   < > 96   < >  --    < > 93   < > 96   B12  --   --  2,476*  --   --   --   --   --  824    < > = values in this interval not displayed.       Markers  No lab results found.    Invalid input(s): FETOPROTEIN, SERUM, AFP    Autoimmune Testing   No lab  results found.    Invalid input(s): ANCAB, PANCA, CANCA    Arterial Blood Gas Testing    Recent Labs   Lab Test 03/22/23  1550 03/20/23  0652 01/15/23  1711   PH  --  7.42  --    O2PER 20  --  45.0        Thyroid Studies     Recent Labs   Lab Test 04/18/22  0851   TSH 2.34       Urine Studies     Recent Labs   Lab Test 05/04/23  1535 04/15/23  0918 04/03/23  1530 03/19/23 2011 02/24/23  1445   URINEPH 6.0 6.5 6.5 5.5 6.5   NITRITE Negative Negative Negative Negative Negative   LEUKEST Large* Moderate* Negative Moderate* Negative   WBCU 71* * 1 28* 1       Medication levels    Recent Labs   Lab Test 05/07/23  0616 05/05/23  1550 05/05/23  1020 05/04/23  1535   VANCOMYCIN  --  10.1  --   --    TACROL 24.8*  --    < > 12.4   MPACID  --   --   --  2.35   MPAG  --   --   --  98.7*    < > = values in this interval not displayed.       CSF testing   No lab results found.    Invalid input(s): CADAM, EVPCR, ENTPCR, ENTEROVIRUS    Microbiology:  Fungal testing  Recent Labs   Lab Test 12/03/20  1647 10/08/20  1300   AM3 <0.10  --    HIFUNG  --  <1:8   COFUNG  --  <1:2   FUNBL  --  0.3       Beta D Glucan levels (Fungitell assay)    No results found for: FGTL, FGTLI     Last Culture results   Culture   Date Value Ref Range Status   05/04/2023 No growth after 3 days  Preliminary   05/04/2023 No growth after 3 days  Preliminary   05/04/2023 >100,000 CFU/mL Escherichia coli (A)  Final   04/15/2023 50,000-100,000 CFU/mL Escherichia coli (A)  Final   04/15/2023 10,000-50,000 CFU/mL Enterococcus faecalis (A)  Final   03/20/2023 No Growth  Final   03/20/2023 No Growth  Final   03/19/2023 50,000-100,000 CFU/mL Enterococcus faecalis (A)  Final   03/19/2023 <10,000 CFU/mL Urogenital cooper  Final   01/23/2023 50,000-100,000 CFU/mL Enterococcus faecalis (A)  Final     Comment:     Susceptibilities done on previous cultures   01/19/2023 >100,000 CFU/mL Enterococcus faecalis (A)  Final     Culture Micro   Date Value Ref Range Status    12/03/2020 No growth  Final   08/14/2020 >100,000 colonies/mL  Escherichia coli   (A)  Final         Last checks of Clostridioides difficile testing  Recent Labs   Lab Test 05/04/23 2010 03/20/23  0853   CDBPCT Negative Negative       No components found for: AFBSTN    Syphilis Testing  Invalid input(s): GAL1130    Tick Testing  No lab results found.    Invalid input(s): APHAGM    ASO Testing  Invalid input(s): JHU8430    Quantiferon testing   Recent Labs   Lab Test 05/07/23  0616 05/06/23  0645 09/15/20  1347 08/18/20  1307   TBRST  --   --   --  Negative   LYMPH 18 16   < > 23.9    < > = values in this interval not displayed.       Infection Studies to assess Diarrhea  Recent Labs   Lab Test 05/04/23 2010 03/22/23  2257 03/20/23  0853 12/10/20  0800 12/09/20  2000 10/08/20  1339 10/08/20  1339 10/08/20  1338   EPSTX1 Not Detected  --  Not Detected  --  Not Detected   < > Not Detected  --    EPSTX2 Not Detected  --  Not Detected  --  Not Detected   < > Not Detected  --    ADENOVIRUSAG  --   --   --   --   --   --   --  Negative   MSPORT  --   --   --   --   --   --   --  No Microsporidia found  Chromotrope stain reveals no Microsporidia spores in fecal specimen. Consider other causes   for symptoms.  Divya Lugo M.D., Medical Director  10/9/20     CRYSPT  --   --   --   --   --   --  No oocysts of Cryptosporidium species, Cyclospora cayetanensis, or Cystoisospora   (Isospora) nancy found    A modified acid fast stain reveals no oocysts of Cryptosporidium, Cyclospora, or   Cystoisospora (Isospora). Consider other causes for symptoms  Divya Lugo M.D., Medical Director  10/9/20    --    CRYPTR  --   --   --   --   --   --  Negative  --    POPRT  --  Negative  --    < > Routine parasitology exam negative  Cryptosporidium, Cyclospora, and Microsporidia are not readily detected by this method. A   single negative specimen does not rule out parasitic infection.     < > Routine parasitology exam  negative  Cryptosporidium, Cyclospora, and Microsporidia are not readily detected by this method. A   single negative specimen does not rule out parasitic infection.    --    EPCAMP Not Detected  --  Not Detected  --  Not Detected   < > Not Detected  --    EPSALM Not Detected  --  Not Detected   < > Not Detected   < > Not Detected  --    EPSHGL Not Detected  --  Not Detected   < > Not Detected   < > Not Detected  --    EPVIB Not Detected  --  Not Detected   < > Not Detected   < > Not Detected  --    EPROTA Not Detected  --  Not Detected   < > Not Detected   < > Not Detected  --    EPNORO Not Detected  --  Not Detected   < > Not Detected   < > Not Detected  --    EPYER Not Detected  --  Not Detected   < > Not Detected   < > Not Detected  --     < > = values in this interval not displayed.       Virology:  Coronavirus-19 testing    Recent Labs   Lab Test 03/19/23  1919 01/15/23  0538 03/21/22  1911 10/30/21  0816 09/08/21  1400 08/07/21  1057 05/26/21  1134 12/10/20  0959 12/03/20  1647   CD19  --   --   --   --   --   --   --   --  17   ACD19  --   --   --   --   --   --   --   --  532   [id number] Negative Negative Negative  --   --  Negative  --  Not Detected  --    [id number]  --   --   --   --   --   --   --  Nasopharyngeal  --    COVIDPCREXT  --   --   --  Not Detected Not Detected  --  Not Detected  --   --        Respiratory virus (non-coronavirus-19) testing    No lab results found.    CMV viral loads    CMV DNA IU/mL   Date Value Ref Range Status   05/04/2023 Not Detected Not Detected IU/mL Final   03/20/2023 Not Detected Not Detected IU/mL Final   03/06/2023 Not Detected Not Detected IU/mL Final   01/15/2023 Not Detected Not Detected IU/mL Final       CMV resistance testing  No lab results found.  No results found for: CMVCID, CMVFOS, CMVGAN    No results found for: H6RES    EBV DNA Copies/mL   Date Value Ref Range Status   05/04/2023 Not Detected Not Detected copies/mL Final   03/20/2023 Not Detected Not  Detected copies/mL Final       BK viral loads   Recent Labs   Lab Test 05/04/23  2159 04/12/23  1112 03/29/23  1535 02/20/23  1605   BKRES Not Detected Not Detected Not Detected Not Detected       Parvovirus Testing  No lab results found.    Invalid input(s): PRVRES    Adenovirus Testing  No lab results found.    Invalid input(s): ADENAB, ADENOVIRUS, ADQT    Hepatitis B Testing     Recent Labs   Lab Test 01/15/23  0540 08/18/20  1307   AUSAB 134.22 212.08*   HBCAB Nonreactive Nonreactive   HEPBANG Nonreactive Nonreactive     Was the last Hepatitis B E antigen positive?   No results found for: HBEAGN     Hepatitis C Antibody   Date Value Ref Range Status   01/15/2023 Nonreactive Nonreactive Final   08/18/2020 Nonreactive NR^Nonreactive Final     Comment:     Assay performance characteristics have not been established for newborns,   infants, and children         CMV Antibody IgG   Date Value Ref Range Status   01/15/2023 No detectable antibody. No detectable antibody.  Final   08/18/2020 <0.2 0.0 - 0.8 AI Final     Comment:     Negative  Antibody index (AI) values reflect qualitative changes in antibody   concentration that cannot be directly associated with clinical condition or   disease state.       Varicella Zoster Virus Antibody IgG   Date Value Ref Range Status   08/18/2020 4.2 (H) 0.0 - 0.8 AI Final     Comment:     Positive, suggests prev. exposure and probable immunity  Antibody index (AI) values reflect qualitative changes in antibody   concentration that cannot be directly associated with clinical condition or   disease state.       EBV Capsid Antibody IgG   Date Value Ref Range Status   01/15/2023 Positive (A) No detectable antibody. Final     Comment:     Suggests recent or past exposure.   08/18/2020 >8.0 (H) 0.0 - 0.8 AI Final     Comment:     Positive, suggests recent or past exposure  Antibody index (AI) values reflect qualitative changes in antibody   concentration that cannot be directly associated  with clinical condition or   disease state.       EBV Capsid Antibody IgM   Date Value Ref Range Status   01/15/2023 No detectable antibody. No detectable antibody. Final       No components found for: [id number]    Last Pathology Report   Case Report   Date Value Ref Range Status   03/23/2023   Final    Surgical Pathology Report                         Case: [id number]                                  Authorizing Provider:  LalolaAna         Collected:           03/23/2023 09:58 AM                                 MD Spohia                                                                Ordering Location:     [facility]          Received:            03/23/2023 10:25 AM                                 Endoscopy                                                                    Pathologist:           Jesusita Calvert MD                                                          Specimens:   A) - Large Intestine, Colon, right colon                                                            B) - Large Intestine, Colon, left colon                                                     Clinical Information   Date Value Ref Range Status   03/23/2023   Final    Diarrhea        Final Diagnosis   Date Value Ref Range Status   03/23/2023   Final    A. COLON, RIGHT, BIOPSY:  - Colonic mucosa with rare crypt epithelial apoptosis, see comment  - CMV immunohistochemical stain will be reported as an addendum    B. COLON, LEFT, BIOPSY:  - Colonic mucosa with rare crypt epithelial apoptosis, see comment  - CMV immunohistochemical stain will be reported as an addendum           Imaging:  Results for orders placed or performed during the hospital encounter of 05/04/23   US Renal Transplant with Doppler    Narrative    EXAMINATION: US RENAL TRANSPLANT,  5/4/2023 7:56 PM     COMPARISON: Renal transplant 3/20/2023    HISTORY: Right sided transplant, elevated creatinine.    TECHNIQUE:  Grey-scale, color Doppler and spectral flow  analysis.    FINDINGS:  The transplant kidney is located right lower quadrant, and measures  13.7 cm. Parenchyma is of normal thickness and echogenicity. No focal  lesions. Similar mild prominence of the proximal ureter. No  hydronephrosis. No perinephric fluid collection.    Renal artery flow:   121 cm/sec peak systolic at hilum.  449 cm/s peak systolic at anastomosis.  Arcuate artery resistive indices (upper to lower): 0.59, 0.71, 0.78    Renal Vein Flow:  58 cm/sat hilum.   67 cm/s at anastomosis.    Iliac artery flow:  196 cm/s peak systolic above anastomosis.  181 cm/s peak systolic below anastomosis.    Iliac vein flow:  Patent above and below the anastomosis.      Impression    IMPRESSION:   1. Patent antegrade Doppler evaluation of the renal transplant  vasculature.  2. Elevated velocity at the renal artery anastomosis up to 449 cm/s  which may suggest stenosis.  3. Normal grayscale appearance of the right lower quadrant transplant  kidney.    I have personally reviewed the examination and initial interpretation  and I agree with the findings.    AMISH SANTIAGO MD         SYSTEM ID:  [id number]       CAIN TAYLOR MD

## 2023-05-08 NOTE — NURSING NOTE
Chief Complaint   Patient presents with     RECHECK     Recurrent UTI     BP (!) 146/89   Pulse 61   Temp 97.7  F (36.5  C) (Oral)   Wt 72.6 kg (160 lb)   SpO2 100%   BMI 21.70 kg/m

## 2023-05-08 NOTE — PROGRESS NOTES
[facility]  Transplant Infectious Disease Clinic Note:  Follow up Patient     Patient:  Chip Fowler, Date of birth 1985, Medical record number [id number]  Date of Visit:  05/08/2023           Assessment and Recommendations:   Recommendations:  -continue PO Cipro x 14 days (EOT 5/18/23)  -Recommend voiding after sex. He was also recommend to keep hydrated and schedule voiding throughout the day.   -he has had 3 UTIs this year and 2 in the last month.  He pt is very sure that he gets his UTIs after sex, therefore, I encouraged voiding after sex. As he has UTIs only after sex, I am hesitant to start him on chronic abx suppression and will give Nitrofurantoin (as his prior cx grew enterococcus and Ecoli) for 7d to take PRN when he feels like he is about to have a UTI. As the pt is a recent kidney transplant his risk of infections is high for the next several months. As he is farther from transplant, the risk of UTIs decrease.  -will f/u with urology next week  -Cont viral (Valcyte x 6 months post-transplant), PCP (Bactrim indefinitely)  -Vaccines: Due for seasonal influenza vaccine and COVID-19 Bivalent after he recovers from his current issues.      RTC in 3 mo    Assessment:  Hx DDKT 1/515/23 for single kidney and HTN.    -Baseline Cr 1.4-1.6. Renal US patent, no acute concerns.  - Tacrolimus goal level 8-10. Level supratherapeutic, plan to hold dose tonight and recheck tomorrow and resume 4 mg BID.    - Myfortic 720 mg BID decreased to 540 mg BID per protocol in the setting of previously high levels, leukopenia, and infection.       1. Severe sepsis. - resolved   2. E.Coli UTI with recurrent UTI. It    The UTI is mostly with E faecalis since 1/2023 except last infection in 4/2023 with E faecalis and E coli.   The current UTI is also due to E coli. Susceptible to Cipro       3. Diarrhea.   Maybe contributing to UTI since diarrhea may contaminate the  tract.   Could be due to  sepsis.   C diff and enteric panel negative.   Supportive care.      4. JACK   Due to 1. 2. And 3.   Improving, CrCl 43.9 ml/min  Adjust ABx accordingly.     Prior issues:  1. Eosinophilia. The workup for eosinophilia and diarrhea was negative for fungal and parasitic infections that would account for the eosinophilia. Evaluated by hematology. The eosinophilia is likely reactive to foreign objects; the PD catheter was thought to be potentially the source as noted by hematology. The relatively increased tryptase level favors allergy (likely to PD catheter) to be the etiology of the eosinophilia rather than infectious processes or malignancies.   2. Chronic diarrhea since he was started on PD in 7/2020. Workup has been negative.   3. Positive Salmonella serology. 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 The patient was counseled against keeping the snakes after transplantation as they are source of recurrent Salmonella infection.  4. E coli in urine in 8/2020.   5. Group B Strep in urine in 2/2020.   6. Urethral stenosis s/p reconstruction.      Other Infectious Disease issues include:  - QTc: 416 as of 3/23/23.   - PJP prophylaxis: bactrim.   - Serostatus: CMV D+/R-, EBV D+/R-, HSV1?/2?, VZV +  - Immunization status: This patient received the third dose of the COVID-19 vaccine on 1/26/2022. Otherwise due for the seasonal influenza vaccine and COVID-19 bivalent.          History of the Infectious Disease lllness:   The pt is an 37 yo m with with congenital kidney disease and HTN s/p KT IN 1/2023. Received ATG for induction. Currently on TAC/MMF. Has history of urethral stenosis s/p urethral reconstruction prior to KT. He was admitted from 5/4-5/7/23 for sepsis likely E Coli pyelonephritis. He was initially on vanco/pip-tazo and was  discharged on cipro for 14d.   He is here for ID follow up after his discharge yesterday from the hospital.     The pt was seen in clinic. He states that he is doing well. He has no fevers, no chills, sob, no cough, n/v/d or dysuria. He has no frequent urination. He states that in the past he would have ache around his bladder and then frequent urination followed by diarrhea. He is taking cipro with no issues. He states that he has UTI after sex. He has tried cranberry juice. He has one partner and uses condoms. Denies any hx of STDs.     Transplants:  1/15/2023 (Kidney); Postoperative day:  113.  Coordinator Veronica Edgar    Review of Systems:  CONSTITUTIONAL:  No fevers or chills. No night sweats.  EYES: negative for icterus or acute vision changes.   ENT:  negative for hearing loss, tinnitus or sore throat  RESPIRATORY:  negative for cough, sputum, dyspnea  CARDIOVASCULAR:  negative for chest pain, heart palpitations  GASTROINTESTINAL:  negative for nausea, vomiting, diarrhea or constipation  GENITOURINARY:  negative for dysuria or hematuria.  HEME:  No easy bruising or bleeding  INTEGUMENT:  negative for rash or pruritus  NEURO:  Negative for headache or tremor.    Past Medical History:   Diagnosis Date     Bladder stones      Cardiomyopathy (H)      ESRD (end stage renal disease) on dialysis (H)      Hypertension      Kidney replaced by transplant 01/15/2023    DCD DDKT. Intermediate risk induction.     Migraines      Polysubstance abuse (H)      Solitary kidney, congenital      Urethral stone      Urethral stricture        Past Surgical History:   Procedure Laterality Date     BENCH KIDNEY  1/15/2023    Procedure: Bench kidney;  Surgeon: Tez Emerson MD;  Location: [facility]     BIOPSY  02/2020    renal, Jew     COLONOSCOPY N/A 3/23/2023    Procedure: Colonoscopy;  Surgeon: Ana Cardenas MD;  Location: [facility] GI     COMBINED CYSTOSCOPY, LASER HOLMIUM LITHOTRIPSY URETER(S)        CYSTOSCOPY       CYSTOSCOPY FLEXIBLE, CYSTOSTOMY, INSERT TUBE SUPRAPUBIC, COMBINED N/A 2020    Procedure: CYSTOSCOPY, WITH SUPRAPUBIC CATHETER INSERTION;  Surgeon: Sam Mejia MD;  Location: [facility] OR     CYSTOSCOPY, OPEN EXCISION URETHRAL DIVERTICULUM, COMBINED N/A 2020    Procedure: EXCISION OF URETHRAL DIVERTICULUM X2;  Surgeon: Sam Mejia MD;  Location: [facility] OR     HERNIA REPAIR      infant     INSERT CATHETER PERITONEAL DIALYSIS       LASER HOLMIUM LITHOTRIPSY URETER(S), INSERT STENT, COMBINED N/A 2020    Procedure: CYSTOSCOPY, bladder and urethral stone extraction, removal of foreign body, urethral dilation, urethrotomy, laser on standby;  Surgeon: Sam Mejia MD;  Location: [facility] OR     REMOVE CATHETER PERITONEAL N/A 1/15/2023    Procedure: Remove catheter peritoneal;  Surgeon: Tez Emerson MD;  Location: [facility] OR     TRANSPLANT KIDNEY RECIPIENT  DONOR N/A 1/15/2023    Procedure: TRANSPLANT, KIDNEY, RECIPIENT,  DONOR WITH DONOR URETER STENTING;  Surgeon: Tez Emerson MD;  Location: [facility] OR     urethral dilation       URETHROPLASTY WITH BUCCAL GRAFT N/A 2020    Procedure: URETHROPLASTY, USING BUCCAL MUCOSA GRAFT;  Surgeon: Sam Mejia MD;  Location: [facility] OR       Family History   Problem Relation Age of Onset     Alzheimer Disease Mother      Unknown/Adopted Father      No Known Problems Sister      No Known Problems Sister      Kidney Disease No family hx of        Social History     Social History Narrative     Not on file     Social History     Tobacco Use     Smoking status: Former     Packs/day: 0.50     Types: Cigarettes     Start date:      Quit date: 1/15/2023     Years since quittin.3     Smokeless tobacco: Never   Vaping Use     Vaping status: Never Used   Substance Use Topics     Alcohol use: Not Currently     Comment: quit alcohol 3-4 yrs ago     Drug use: Not Currently     Types: Methamphetamines,  MDMA (Ecstasy)       Immunization History   Administered Date(s) Administered     COVID-19 Monovalent 18+ (Moderna) 01/25/2021, 02/22/2021, 01/26/2022     DTAP (<7y) 1985, 1985, 1984, 10/04/1985, 03/21/1989     Flu, Unspecified 11/12/1997, 03/11/2020, 10/06/2020, 10/27/2021     Hepatits B (Peds <19Y) 02/19/1996, 06/17/1996, 09/04/1996     Hib, Unspecified 04/02/1986     Historical DTP/aP 1984, 1984, 1984, 10/04/1985, 03/21/1989     Influenza (intradermal) 03/11/2020     Influenza Vaccine >6 months (Alfuria,Fluzone) 11/06/2016     MMR 07/03/1985, 02/19/1996     Mantoux Tuberculin Skin Test 03/11/2020     Pneumo Conj 13-V (2010&after) 11/24/2020     Pneumococcal 23 valent 03/11/2020     Pneumococcal, Unspecified 03/11/2020, 04/22/2021     Poliovirus, inactivated (IPV) 1984, 1984, 1984, 03/21/1989     TD,PF 7+ (Tenivac) 06/17/1996     TDAP Vaccine (Adacel) 04/28/2020     Td (Adult), Adsorbed 06/17/1996       Patient Active Problem List   Diagnosis     HTN, kidney transplant related     Urethral stricture     Methamphetamine abuse, episodic (H)     Urethral diverticulum     Allergic rhinitis, unspecified seasonality, unspecified trigger     Stage 3a chronic kidney disease (H)     Kidney replaced by transplant     Immunosuppressed status (H)     Aftercare following organ transplant     Anemia of chronic disease     Secondary renal hyperparathyroidism (H)     Vitamin D deficiency     Hypomagnesemia     JACK (acute kidney injury) (H)     Diarrhea     Urinary tract infection without hematuria, site unspecified     Leukopenia     Low bicarbonate       No outpatient medications have been marked as taking for the 5/8/23 encounter (Appointment) with Andrzej Willis MD.       No Known Allergies           Physical Exam:   Vitals were reviewed.  All vitals stable  There were no vitals taken for this visit.  Wt Readings from Last 4 Encounters:   04/12/23 69.5 kg (153 lb 4.8  oz)   03/23/23 71.9 kg (158 lb 8 oz)   03/19/23 68 kg (150 lb)   03/14/23 71.6 kg (157 lb 14.4 oz)       Exam:  GENERAL: well-developed, well-nourished, alert, oriented, in no acute distress.  HEAD: Head is normocephalic, atraumatic   EYES: Eyes have anicteric sclerae.    ENT: Oropharynx is moist without exudates or ulcers.  NECK: Supple.  LUNGS: Clear to auscultation.  CARDIOVASCULAR: Regular rate and rhythm with no murmurs, gallops or rubs.  ABDOMEN: Normal bowel sounds, soft, nontender.n no pain over the transplanted kidney  SKIN: No acute rashes.   NEUROLOGIC: Grossly nonfocal.         Laboratory Data:     Absolute CD4   Date Value Ref Range Status   12/03/2020 1,325 441 - 2,156 cells/uL Final       Inflammatory Markers    Recent Labs   Lab Test 01/20/23  0746   G6PD 15.5       Immune Globulin Studies     Recent Labs   Lab Test 12/03/20  1647   IGG 1,152   IGM 64   IGE 18          Metabolic Studies    Recent Labs   Lab Test 05/07/23  0616 05/06/23  1949 05/06/23  0645 05/05/23  1020 05/05/23  0500     --  138  --  130*   POTASSIUM 3.5  --  3.6  --  3.7   CHLORIDE 115*  --  109*  --  102   CO2 18*  --  21*  --  17*   ANIONGAP 9  --  8  --  11   BUN 14.1  --  21.7*  --  29.4*   CR 1.61*  --  2.22*  --  2.62*   GFRESTIMATED 55*  --  38*  --  31*   *  --  110*  --  151*   VISHNU 8.1*  --  8.0*  --  8.0*   PHOS 2.3*  --  1.9*  --  2.9   MAG 1.9  --  1.6*  --  1.4*   LACT  --  1.1  --    < >  --     < > = values in this interval not displayed.       Hepatic Studies    Recent Labs   Lab Test 05/04/23  1924 04/06/23  1543 03/19/23  2155   BILITOTAL 0.4 0.2 0.4   DBIL  --  <0.20 <0.20   ALKPHOS 48 65 41   PROTTOTAL 7.1 7.2 6.9   ALBUMIN 4.1 4.5 4.1   AST 33 30 34   ALT 72* 42 40       Pancreatitis testing    Recent Labs   Lab Test 03/19/23 2155 01/15/23  0540 04/18/22  0851   LIPASE 20  --   --    TRIG  --  115 78       Lipid testing    Recent Labs   Lab Test 01/15/23  0540 04/18/22  0851   CHOL 177 161    HDL 52 49   * 96   TRIG 115 78       Gout Labs    No lab results found.    Hematology Studies   Recent Labs   Lab Test 05/07/23  0616 05/06/23  0645 05/05/23  0500 05/04/23  1924 04/12/23  1112 04/10/23  1532 04/06/23  1543 04/03/23  1535   WBC 2.3* 2.6* 2.3* 2.8*   < > 5.4   < > 4.4   ANEU 1.0* 1.2* 1.3* 2.0   < >  --    < >  --    ANEUTAUTO  --   --   --   --   --  3.3  --  2.7   ALYM 0.4* 0.4* 0.2* 0.3*   < >  --    < >  --    ALYMPAUTO  --   --   --   --   --  1.3  --  0.9   TY 0.6 0.7 0.6 0.4   < >  --    < >  --    AMONOAUTO  --   --   --   --   --  0.4  --  0.3   AEOS 0.2 0.1 0.0 0.0   < >  --    < >  --    AEOSAUTO  --   --   --   --   --  0.2  --  0.1   ABSBASO  --   --   --   --   --  0.1  --  0.1   HGB 8.4* 8.9* 8.7* 10.3*   < > 9.9*   < > 9.1*   HCT 25.4* 26.5* 26.4* 31.0*   < > 30.8*   < > 29.2*    159 158 192   < > 291   < > 434    < > = values in this interval not displayed.       Clotting Studies    Recent Labs   Lab Test 01/15/23  0540 08/09/21  1124 08/18/20  1307   INR 0.90 0.96 1.08   PTT 28  --  29       Iron Testing    Recent Labs   Lab Test 05/07/23  0616 04/15/23  1002 04/12/23  1112 02/02/23  0840 01/31/23  1049 01/21/23  0715 01/20/23  0746 01/07/21  0825 12/03/20  1647   IRON  --   --   --   --  70  --  153  --   --    FEB  --   --   --   --  258  --  244  --   --    IRONSAT  --   --   --   --  27  --  63*  --   --    DAWSON  --   --   --   --  430*  --  717*  --   --    MCV 93   < > 96   < >  --    < > 93   < > 96   B12  --   --  2,476*  --   --   --   --   --  824    < > = values in this interval not displayed.       Markers  No lab results found.    Invalid input(s): FETOPROTEIN, SERUM, AFP    Autoimmune Testing   No lab results found.    Invalid input(s): ANCAB, PANCA, CANCA    Arterial Blood Gas Testing    Recent Labs   Lab Test 03/22/23  1550 03/20/23  0652 01/15/23  1711   PH  --  7.42  --    O2PER 20  --  45.0        Thyroid Studies     Recent Labs   Lab Test  04/18/22  0851   TSH 2.34       Urine Studies     Recent Labs   Lab Test 05/04/23  1535 04/15/23  0918 04/03/23  1530 03/19/23 2011 02/24/23  1445   URINEPH 6.0 6.5 6.5 5.5 6.5   NITRITE Negative Negative Negative Negative Negative   LEUKEST Large* Moderate* Negative Moderate* Negative   WBCU 71* * 1 28* 1       Medication levels    Recent Labs   Lab Test 05/07/23  0616 05/05/23  1550 05/05/23  1020 05/04/23  1535   VANCOMYCIN  --  10.1  --   --    TACROL 24.8*  --    < > 12.4   MPACID  --   --   --  2.35   MPAG  --   --   --  98.7*    < > = values in this interval not displayed.       CSF testing   No lab results found.    Invalid input(s): CADAM, EVPCR, ENTPCR, ENTEROVIRUS    Microbiology:  Fungal testing  Recent Labs   Lab Test 12/03/20  1647 10/08/20  1300   AM3 <0.10  --    HIFUNG  --  <1:8   COFUNG  --  <1:2   FUNBL  --  0.3       Beta D Glucan levels (Fungitell assay)    No results found for: FGTL, FGTLI     Last Culture results   Culture   Date Value Ref Range Status   05/04/2023 No growth after 3 days  Preliminary   05/04/2023 No growth after 3 days  Preliminary   05/04/2023 >100,000 CFU/mL Escherichia coli (A)  Final   04/15/2023 50,000-100,000 CFU/mL Escherichia coli (A)  Final   04/15/2023 10,000-50,000 CFU/mL Enterococcus faecalis (A)  Final   03/20/2023 No Growth  Final   03/20/2023 No Growth  Final   03/19/2023 50,000-100,000 CFU/mL Enterococcus faecalis (A)  Final   03/19/2023 <10,000 CFU/mL Urogenital cooper  Final   01/23/2023 50,000-100,000 CFU/mL Enterococcus faecalis (A)  Final     Comment:     Susceptibilities done on previous cultures   01/19/2023 >100,000 CFU/mL Enterococcus faecalis (A)  Final     Culture Micro   Date Value Ref Range Status   12/03/2020 No growth  Final   08/14/2020 >100,000 colonies/mL  Escherichia coli   (A)  Final         Last checks of Clostridioides difficile testing  Recent Labs   Lab Test 05/04/23 2010 03/20/23  0853   CDBPCT Negative Negative       No  components found for: AFBSTN    Syphilis Testing  Invalid input(s): AIN2656    Tick Testing  No lab results found.    Invalid input(s): APHAGM    ASO Testing  Invalid input(s): QFA0334    Quantiferon testing   Recent Labs   Lab Test 05/07/23  0616 05/06/23  0645 09/15/20  1347 08/18/20  1307   TBRST  --   --   --  Negative   LYMPH 18 16   < > 23.9    < > = values in this interval not displayed.       Infection Studies to assess Diarrhea  Recent Labs   Lab Test 05/04/23 2010 03/22/23 2257 03/20/23  0853 12/10/20  0800 12/09/20  2000 10/08/20  1339 10/08/20  1339 10/08/20  1338   EPSTX1 Not Detected  --  Not Detected  --  Not Detected   < > Not Detected  --    EPSTX2 Not Detected  --  Not Detected  --  Not Detected   < > Not Detected  --    ADENOVIRUSAG  --   --   --   --   --   --   --  Negative   MSPORT  --   --   --   --   --   --   --  No Microsporidia found  Chromotrope stain reveals no Microsporidia spores in fecal specimen. Consider other causes   for symptoms.  Divya Lugo M.D., Medical Director  10/9/20     CRYSPT  --   --   --   --   --   --  No oocysts of Cryptosporidium species, Cyclospora cayetanensis, or Cystoisospora   (Isospora) nancy found    A modified acid fast stain reveals no oocysts of Cryptosporidium, Cyclospora, or   Cystoisospora (Isospora). Consider other causes for symptoms  Divya Lugo M.D., Medical Director  10/9/20    --    CRYPTR  --   --   --   --   --   --  Negative  --    POPRT  --  Negative  --    < > Routine parasitology exam negative  Cryptosporidium, Cyclospora, and Microsporidia are not readily detected by this method. A   single negative specimen does not rule out parasitic infection.     < > Routine parasitology exam negative  Cryptosporidium, Cyclospora, and Microsporidia are not readily detected by this method. A   single negative specimen does not rule out parasitic infection.    --    EPCAMP Not Detected  --  Not Detected  --  Not Detected   < > Not  Detected  --    EPSALM Not Detected  --  Not Detected   < > Not Detected   < > Not Detected  --    EPSHGL Not Detected  --  Not Detected   < > Not Detected   < > Not Detected  --    EPVIB Not Detected  --  Not Detected   < > Not Detected   < > Not Detected  --    EPROTA Not Detected  --  Not Detected   < > Not Detected   < > Not Detected  --    EPNORO Not Detected  --  Not Detected   < > Not Detected   < > Not Detected  --    EPYER Not Detected  --  Not Detected   < > Not Detected   < > Not Detected  --     < > = values in this interval not displayed.       Virology:  Coronavirus-19 testing    Recent Labs   Lab Test 03/19/23  1919 01/15/23  0538 03/21/22  1911 10/30/21  0816 09/08/21  1400 08/07/21  1057 05/26/21  1134 12/10/20  0959 12/03/20  1647   CD19  --   --   --   --   --   --   --   --  17   ACD19  --   --   --   --   --   --   --   --  532   [id number] Negative Negative Negative  --   --  Negative  --  Not Detected  --    [id number]  --   --   --   --   --   --   --  Nasopharyngeal  --    COVIDPCREXT  --   --   --  Not Detected Not Detected  --  Not Detected  --   --        Respiratory virus (non-coronavirus-19) testing    No lab results found.    CMV viral loads    CMV DNA IU/mL   Date Value Ref Range Status   05/04/2023 Not Detected Not Detected IU/mL Final   03/20/2023 Not Detected Not Detected IU/mL Final   03/06/2023 Not Detected Not Detected IU/mL Final   01/15/2023 Not Detected Not Detected IU/mL Final       CMV resistance testing  No lab results found.  No results found for: CMVCID, CMVFOS, CMVGAN    No results found for: H6RES    EBV DNA Copies/mL   Date Value Ref Range Status   05/04/2023 Not Detected Not Detected copies/mL Final   03/20/2023 Not Detected Not Detected copies/mL Final       BK viral loads   Recent Labs   Lab Test 05/04/23  2159 04/12/23  1112 03/29/23  1535 02/20/23  1605   BKRES Not Detected Not Detected Not Detected Not Detected       Parvovirus Testing  No lab results  found.    Invalid input(s): PRVRES    Adenovirus Testing  No lab results found.    Invalid input(s): ADENAB, ADENOVIRUS, ADQT    Hepatitis B Testing     Recent Labs   Lab Test 01/15/23  0540 08/18/20  1307   AUSAB 134.22 212.08*   HBCAB Nonreactive Nonreactive   HEPBANG Nonreactive Nonreactive     Was the last Hepatitis B E antigen positive?   No results found for: HBEAGN     Hepatitis C Antibody   Date Value Ref Range Status   01/15/2023 Nonreactive Nonreactive Final   08/18/2020 Nonreactive NR^Nonreactive Final     Comment:     Assay performance characteristics have not been established for newborns,   infants, and children         CMV Antibody IgG   Date Value Ref Range Status   01/15/2023 No detectable antibody. No detectable antibody.  Final   08/18/2020 <0.2 0.0 - 0.8 AI Final     Comment:     Negative  Antibody index (AI) values reflect qualitative changes in antibody   concentration that cannot be directly associated with clinical condition or   disease state.       Varicella Zoster Virus Antibody IgG   Date Value Ref Range Status   08/18/2020 4.2 (H) 0.0 - 0.8 AI Final     Comment:     Positive, suggests prev. exposure and probable immunity  Antibody index (AI) values reflect qualitative changes in antibody   concentration that cannot be directly associated with clinical condition or   disease state.       EBV Capsid Antibody IgG   Date Value Ref Range Status   01/15/2023 Positive (A) No detectable antibody. Final     Comment:     Suggests recent or past exposure.   08/18/2020 >8.0 (H) 0.0 - 0.8 AI Final     Comment:     Positive, suggests recent or past exposure  Antibody index (AI) values reflect qualitative changes in antibody   concentration that cannot be directly associated with clinical condition or   disease state.       EBV Capsid Antibody IgM   Date Value Ref Range Status   01/15/2023 No detectable antibody. No detectable antibody. Final       No components found for: [id number]    Last Pathology  Report   Case Report   Date Value Ref Range Status   03/23/2023   Final    Surgical Pathology Report                         Case: [id number]                                  Authorizing Provider:  Ronald Ana         Collected:           03/23/2023 09:58 AM                                 MD Sophia                                                                Ordering Location:     [facility]          Received:            03/23/2023 10:25 AM                                 Endoscopy                                                                    Pathologist:           Jesusita Calvert MD                                                          Specimens:   A) - Large Intestine, Colon, right colon                                                            B) - Large Intestine, Colon, left colon                                                     Clinical Information   Date Value Ref Range Status   03/23/2023   Final    Diarrhea        Final Diagnosis   Date Value Ref Range Status   03/23/2023   Final    A. COLON, RIGHT, BIOPSY:  - Colonic mucosa with rare crypt epithelial apoptosis, see comment  - CMV immunohistochemical stain will be reported as an addendum    B. COLON, LEFT, BIOPSY:  - Colonic mucosa with rare crypt epithelial apoptosis, see comment  - CMV immunohistochemical stain will be reported as an addendum           Imaging:  Results for orders placed or performed during the hospital encounter of 05/04/23   US Renal Transplant with Doppler    Narrative    EXAMINATION: US RENAL TRANSPLANT,  5/4/2023 7:56 PM     COMPARISON: Renal transplant 3/20/2023    HISTORY: Right sided transplant, elevated creatinine.    TECHNIQUE:  Grey-scale, color Doppler and spectral flow analysis.    FINDINGS:  The transplant kidney is located right lower quadrant, and measures  13.7 cm. Parenchyma is of normal thickness and echogenicity. No focal  lesions. Similar mild prominence of the proximal ureter.  No  hydronephrosis. No perinephric fluid collection.    Renal artery flow:   121 cm/sec peak systolic at hilum.  449 cm/s peak systolic at anastomosis.  Arcuate artery resistive indices (upper to lower): 0.59, 0.71, 0.78    Renal Vein Flow:  58 cm/sat hilum.   67 cm/s at anastomosis.    Iliac artery flow:  196 cm/s peak systolic above anastomosis.  181 cm/s peak systolic below anastomosis.    Iliac vein flow:  Patent above and below the anastomosis.      Impression    IMPRESSION:   1. Patent antegrade Doppler evaluation of the renal transplant  vasculature.  2. Elevated velocity at the renal artery anastomosis up to 449 cm/s  which may suggest stenosis.  3. Normal grayscale appearance of the right lower quadrant transplant  kidney.    I have personally reviewed the examination and initial interpretation  and I agree with the findings.    AMISH SANTIAGO MD         SYSTEM ID:  [id number]

## 2023-05-09 ENCOUNTER — TELEPHONE (OUTPATIENT)
Dept: INFECTIOUS DISEASES | Facility: CLINIC | Age: 38
End: 2023-05-09
Payer: MEDICARE

## 2023-05-09 DIAGNOSIS — E55.9 VITAMIN D DEFICIENCY: Primary | ICD-10-CM

## 2023-05-09 LAB
BACTERIA BLD CULT: NO GROWTH
BACTERIA BLD CULT: NO GROWTH
MYCOPHENOLATE SERPL LC/MS/MS-MCNC: <0.25 MG/L (ref 1–3.5)
MYCOPHENOLATE-G SERPL LC/MS/MS-MCNC: 15.7 MG/L (ref 30–95)
TME LAST DOSE: ABNORMAL H
TME LAST DOSE: ABNORMAL H

## 2023-05-09 RX ORDER — MULTIVIT-MIN/IRON/FOLIC ACID/K 18-600-40
2000 CAPSULE ORAL DAILY
Qty: 30 CAPSULE | Refills: 11 | Status: SHIPPED | OUTPATIENT
Start: 2023-05-09 | End: 2024-06-05

## 2023-05-09 NOTE — TELEPHONE ENCOUNTER
EP called 5/9 to sched 3 month follow up with Dr. Willis per checkout notes from 5/8. Appt is set for 8/9 via video.

## 2023-05-10 ENCOUNTER — TELEPHONE (OUTPATIENT)
Dept: TRANSPLANT | Facility: CLINIC | Age: 38
End: 2023-05-10
Payer: MEDICARE

## 2023-05-10 ENCOUNTER — MYC MEDICAL ADVICE (OUTPATIENT)
Dept: TRANSPLANT | Facility: CLINIC | Age: 38
End: 2023-05-10
Payer: MEDICARE

## 2023-05-10 DIAGNOSIS — Z94.0 KIDNEY REPLACED BY TRANSPLANT: Primary | ICD-10-CM

## 2023-05-10 DIAGNOSIS — Z94.0 KIDNEY REPLACED BY TRANSPLANT: ICD-10-CM

## 2023-05-10 NOTE — TELEPHONE ENCOUNTER
MPA now undetectable. Please increase back to 720mg bid. May also be from abx  Dr Franko Cerda

## 2023-05-11 ENCOUNTER — PRE VISIT (OUTPATIENT)
Dept: UROLOGY | Facility: CLINIC | Age: 38
End: 2023-05-11
Payer: MEDICARE

## 2023-05-11 ENCOUNTER — LAB (OUTPATIENT)
Dept: LAB | Facility: CLINIC | Age: 38
End: 2023-05-11
Payer: MEDICARE

## 2023-05-11 DIAGNOSIS — Z94.0 KIDNEY REPLACED BY TRANSPLANT: ICD-10-CM

## 2023-05-11 DIAGNOSIS — Z48.298 AFTERCARE FOLLOWING ORGAN TRANSPLANT: ICD-10-CM

## 2023-05-11 DIAGNOSIS — Z20.828 CONTACT WITH AND (SUSPECTED) EXPOSURE TO OTHER VIRAL COMMUNICABLE DISEASES: ICD-10-CM

## 2023-05-11 DIAGNOSIS — Z79.899 ENCOUNTER FOR LONG-TERM CURRENT USE OF MEDICATION: ICD-10-CM

## 2023-05-11 LAB
ANION GAP SERPL CALCULATED.3IONS-SCNC: 10 MMOL/L (ref 7–15)
BUN SERPL-MCNC: 24.9 MG/DL (ref 6–20)
CALCIUM SERPL-MCNC: 9.1 MG/DL (ref 8.6–10)
CHLORIDE SERPL-SCNC: 106 MMOL/L (ref 98–107)
CREAT SERPL-MCNC: 1.44 MG/DL (ref 0.67–1.17)
DEPRECATED HCO3 PLAS-SCNC: 20 MMOL/L (ref 22–29)
ERYTHROCYTE [DISTWIDTH] IN BLOOD BY AUTOMATED COUNT: 14 % (ref 10–15)
GFR SERPL CREATININE-BSD FRML MDRD: 63 ML/MIN/1.73M2
GLUCOSE SERPL-MCNC: 90 MG/DL (ref 70–99)
HCT VFR BLD AUTO: 29.3 % (ref 40–53)
HGB BLD-MCNC: 9.7 G/DL (ref 13.3–17.7)
MAGNESIUM SERPL-MCNC: 1.4 MG/DL (ref 1.7–2.3)
MCH RBC QN AUTO: 30.6 PG (ref 26.5–33)
MCHC RBC AUTO-ENTMCNC: 33.1 G/DL (ref 31.5–36.5)
MCV RBC AUTO: 92 FL (ref 78–100)
PHOSPHATE SERPL-MCNC: 3 MG/DL (ref 2.5–4.5)
PLATELET # BLD AUTO: 346 10E3/UL (ref 150–450)
POTASSIUM SERPL-SCNC: 5.1 MMOL/L (ref 3.4–5.3)
RBC # BLD AUTO: 3.17 10E6/UL (ref 4.4–5.9)
SODIUM SERPL-SCNC: 136 MMOL/L (ref 136–145)
WBC # BLD AUTO: 2.8 10E3/UL (ref 4–11)

## 2023-05-11 PROCEDURE — 84100 ASSAY OF PHOSPHORUS: CPT

## 2023-05-11 PROCEDURE — 80180 DRUG SCRN QUAN MYCOPHENOLATE: CPT

## 2023-05-11 PROCEDURE — 83735 ASSAY OF MAGNESIUM: CPT

## 2023-05-11 PROCEDURE — 80197 ASSAY OF TACROLIMUS: CPT

## 2023-05-11 PROCEDURE — 85027 COMPLETE CBC AUTOMATED: CPT

## 2023-05-11 PROCEDURE — 80048 BASIC METABOLIC PNL TOTAL CA: CPT

## 2023-05-11 PROCEDURE — 36415 COLL VENOUS BLD VENIPUNCTURE: CPT

## 2023-05-11 RX ORDER — MYCOPHENOLIC ACID 180 MG/1
720 TABLET, DELAYED RELEASE ORAL 2 TIMES DAILY
Qty: 240 TABLET | Refills: 11 | Status: SHIPPED | OUTPATIENT
Start: 2023-05-11 | End: 2023-05-31

## 2023-05-11 NOTE — TELEPHONE ENCOUNTER
Reason for visit: Follow-up    Relevant information: Kidney transplant    Records/imaging/labs/orders: Epic    At Rooming: Laura Weldon  5/11/2023  10:07 AM

## 2023-05-11 NOTE — TELEPHONE ENCOUNTER
Chip Fowler Mary K, RN  Phone Number: [phone]     Well I'm still peeing all the fluids they gave me in the hospital I was up 9 pounds when I came home.  Besides that everything is back to normal.           Message from Dr Montenegro  (Newest Message First)  View All Conversations on this Encounter  Chip Fowler  You 18 hours ago (3:32 PM)       Well I'm still peeing all the fluids they gave me in the hospital I was up 9 pounds when I came home.  Besides that everything is back to normal.        You  Chip Fowler 20 hours ago (2:12 PM)     MH   Please increase back to 720mg bid. Dr Franko Cerda    No changes with tacrolimus  level at this time Dr Franko Suanders         Please review the above message from Dr Franko Cerda    Increasing Myfortic mycophenolic acid  720 mg twice per day      2nd Item      Any fevers ? Any issues with BP   or weight loss or weight gain ?   Do you need any refills on your transplant medications         Please respond to this message to confirm you have received this message  [facility] Trans[facility] Coordinator

## 2023-05-12 LAB
ALLOSURE DD-CFDNA: <0.12 %
MYCOPHENOLATE SERPL LC/MS/MS-MCNC: 0.25 MG/L (ref 1–3.5)
MYCOPHENOLATE-G SERPL LC/MS/MS-MCNC: 42.9 MG/L (ref 30–95)
TACROLIMUS BLD-MCNC: 9.4 UG/L (ref 5–15)
TME LAST DOSE: ABNORMAL H
TME LAST DOSE: ABNORMAL H
TME LAST DOSE: NORMAL H
TME LAST DOSE: NORMAL H

## 2023-05-12 NOTE — TELEPHONE ENCOUNTER
Please increase back to 720mg bid. Dr Franko Cerda    No changes with tacrolimus  level at this time Dr Franko Saunders         Please review the above message from Dr Franko Cerda    Increasing Myfortic mycophenolic acid  720 mg twice per day      2nd Item      Any fevers ? Any issues with BP   or weight loss or weight gain ?   Do you need any refills on your transplant medications

## 2023-05-15 ENCOUNTER — LAB (OUTPATIENT)
Dept: LAB | Facility: CLINIC | Age: 38
End: 2023-05-15
Payer: MEDICARE

## 2023-05-15 DIAGNOSIS — Z94.0 KIDNEY REPLACED BY TRANSPLANT: ICD-10-CM

## 2023-05-15 DIAGNOSIS — E87.20 METABOLIC ACIDOSIS: Primary | ICD-10-CM

## 2023-05-15 LAB
ANION GAP SERPL CALCULATED.3IONS-SCNC: 10 MMOL/L (ref 7–15)
BUN SERPL-MCNC: 21.4 MG/DL (ref 6–20)
CALCIUM SERPL-MCNC: 8.8 MG/DL (ref 8.6–10)
CHLORIDE SERPL-SCNC: 107 MMOL/L (ref 98–107)
CREAT SERPL-MCNC: 1.51 MG/DL (ref 0.67–1.17)
DEPRECATED HCO3 PLAS-SCNC: 19 MMOL/L (ref 22–29)
ERYTHROCYTE [DISTWIDTH] IN BLOOD BY AUTOMATED COUNT: 14.3 % (ref 10–15)
GFR SERPL CREATININE-BSD FRML MDRD: 60 ML/MIN/1.73M2
GLUCOSE SERPL-MCNC: 104 MG/DL (ref 70–99)
HCT VFR BLD AUTO: 30.3 % (ref 40–53)
HGB BLD-MCNC: 9.7 G/DL (ref 13.3–17.7)
MCH RBC QN AUTO: 30.4 PG (ref 26.5–33)
MCHC RBC AUTO-ENTMCNC: 32 G/DL (ref 31.5–36.5)
MCV RBC AUTO: 95 FL (ref 78–100)
PLATELET # BLD AUTO: 371 10E3/UL (ref 150–450)
POTASSIUM SERPL-SCNC: 4.7 MMOL/L (ref 3.4–5.3)
RBC # BLD AUTO: 3.19 10E6/UL (ref 4.4–5.9)
SODIUM SERPL-SCNC: 136 MMOL/L (ref 136–145)
TACROLIMUS BLD-MCNC: 8 UG/L (ref 5–15)
TME LAST DOSE: NORMAL H
TME LAST DOSE: NORMAL H
WBC # BLD AUTO: 3.6 10E3/UL (ref 4–11)

## 2023-05-15 PROCEDURE — 85027 COMPLETE CBC AUTOMATED: CPT

## 2023-05-15 PROCEDURE — 80197 ASSAY OF TACROLIMUS: CPT

## 2023-05-15 PROCEDURE — 80048 BASIC METABOLIC PNL TOTAL CA: CPT

## 2023-05-15 PROCEDURE — 80180 DRUG SCRN QUAN MYCOPHENOLATE: CPT

## 2023-05-15 PROCEDURE — 36415 COLL VENOUS BLD VENIPUNCTURE: CPT

## 2023-05-15 NOTE — PROGRESS NOTES
Consult conducted via real-time audio/video technology by Blair Aguilar PA-C from [facility] to the patient in their home. Patient consented to this billed visit that was started at 10:40 AM and completed at 10:56 AM.    REASON FOR VISIT  Discussion of cystoscopy and circumcision     HISTORY OF PRESENT ILLNESS  Mr. Fowler is a 38 year old male who I am speaking with today virtually in regards to recent admission for his third episode of UTI and pyelonephritis symptoms in the setting of transplant kidney.  He is known to our department for his history of urethral stricture disease status post urethroplasty with buccal graft in 2020 as well as urethral diverticulum excision.  Chip was admitted from 5/4/2023 -5/7/2023 with evidence of urosepsis, and had recent positive urine cultures in the absence of urinary retention.  With this in mind, after urology was consulted in the hospital, he was referred to our department for discussion of cystoscopy and consideration of circumcision.    Today:    3L of water per day     Urinates about 1.5 hrs on average    UTI symptoms   o Groin soreness  o Frequency  o Fever  o Chills  o Diarrhea     Able to retract the foreskin easily, washes that area once per week    Now cleans under foreskin before intercourse     Has tried drinking cranberry juice    Met with infectious disease    REVIEW OF SYSTEMS  Review of Systems   Constitutional: Negative for fatigue and unexpected weight change.   HENT: Negative for ear pain and tinnitus.    Eyes: Negative for visual disturbance.   Respiratory: Negative for shortness of breath.    Cardiovascular: Negative for chest pain.   Gastrointestinal: Negative for abdominal pain and constipation.   Genitourinary: Negative for flank pain.   Musculoskeletal: Negative for back pain.   Neurological: Negative for dizziness and light-headedness.   Hematological: Negative for adenopathy.   Psychiatric/Behavioral: Negative for sleep  disturbance.     SOCIAL HISTORY  Quit when go transplant, 0.5ppd for 10+ years    FAMILY HISTORY   Denies any known family history of urologic malignancy     PHYSICAL EXAMINATION  Deferred given virtual visit.    LABS  Recent positive urine cultures from 4/15/2023 and 5/4/2023 in the absence of urinary retention.  Positive cultures have shown a mixture of E. coli and Enterococcus    IMAGING  Renal ultrasound from 5/4/2023 did not show any evidence of hydronephrosis in transplant kidney    IMPRESSION  1. Recurrent UTIs  2. Reassuring PVRs  3. Uncircumcised     It was my pleasure to speak with Chip virtually in regards to his issues with recurrent urinary tract infections.  After reviewing his clinical history as well as completing a review of systems, it is unclear where the seed of these infections is coming from.  He is drinking an appropriate amount of fluid during the day as well as avoiding at an appropriate schedule.  We did discuss the possibility of adding cranberry extract pills to help avoid infections, but overall I do think it is important for us to complete an anatomic evaluation with a cystoscopy to make sure the urethral diverticulum has not recurred or there is any other anatomic problems within the bladder leading to these recurrent infections.    I am glad to hear that he is cleaning underneath his foreskin more frequently since his most recent admission as I believe this could be contributing to his infections.  I encouraged him to be practicing this clinically this habit, looking to clean under the foreskin at least every 2 days and especially before any sexual encounter.    It is understandable that Chip is looking to avoid any circumcision at this point, so we will plan to have him complete a cystoscopy with Dr. Mejia as he is familiar with the patient first available, then determine if there are any neck steps that could be completed.    Mr. Fowler expressed understanding and agreement to  the above discussion and plan and all of his questions were answered to his satisfaction.      PLAN    Cystoscopy first available with Dr. Mejia    SIGNED    Blair Aguilar PA-C      I spent a total of 25 minutes spent on the date of the encounter doing chart review, history and exam, documentation, and further activities as noted above.

## 2023-05-16 ENCOUNTER — VIRTUAL VISIT (OUTPATIENT)
Dept: UROLOGY | Facility: CLINIC | Age: 38
End: 2023-05-16
Attending: INTERNAL MEDICINE
Payer: MEDICARE

## 2023-05-16 ENCOUNTER — MYC MEDICAL ADVICE (OUTPATIENT)
Dept: TRANSPLANT | Facility: CLINIC | Age: 38
End: 2023-05-16

## 2023-05-16 DIAGNOSIS — N39.0 RECURRENT UTI: ICD-10-CM

## 2023-05-16 DIAGNOSIS — Z94.0 KIDNEY REPLACED BY TRANSPLANT: ICD-10-CM

## 2023-05-16 DIAGNOSIS — Z94.0 KIDNEY REPLACED BY TRANSPLANT: Primary | ICD-10-CM

## 2023-05-16 PROCEDURE — 99214 OFFICE O/P EST MOD 30 MIN: CPT | Mod: VID | Performed by: STUDENT IN AN ORGANIZED HEALTH CARE EDUCATION/TRAINING PROGRAM

## 2023-05-16 ASSESSMENT — ENCOUNTER SYMPTOMS
LIGHT-HEADEDNESS: 0
CONSTIPATION: 0
BACK PAIN: 0
FATIGUE: 0
ADENOPATHY: 0
DIZZINESS: 0
SHORTNESS OF BREATH: 0
FLANK PAIN: 0
ABDOMINAL PAIN: 0
UNEXPECTED WEIGHT CHANGE: 0
SLEEP DISTURBANCE: 0

## 2023-05-16 ASSESSMENT — PAIN SCALES - GENERAL: PAINLEVEL: NO PAIN (0)

## 2023-05-16 NOTE — LETTER
5/16/2023       RE: Chip Fowler  [address] [address] [facility] [address]     Dear Colleague,    Thank you for referring your patient, Chip Fowler, to the [facility] [place] [facility]. Please see a copy of my visit note below.    Consult conducted via real-time audio/video technology by Blair Aguilar PA-C [facility] to the patient in their home. Patient consented to this billed visit that was started at 10:40 AM and completed at 10:56 AM.    REASON FOR VISIT  Discussion of cystoscopy and circumcision     HISTORY OF PRESENT ILLNESS  Mr. Fowler is a 38 year old male who I am speaking with today virtually in regards to recent admission for his third episode of UTI and pyelonephritis symptoms in the setting of transplant kidney.  He is known to our department for his history of urethral stricture disease status post urethroplasty with buccal graft in 2020 as well as urethral diverticulum excision.  Chip was admitted from 5/4/2023 -5/7/2023 with evidence of urosepsis, and had recent positive urine cultures in the absence of urinary retention.  With this in mind, after urology was consulted in the hospital, he was referred to our department for discussion of cystoscopy and consideration of circumcision.    Today:  3L of water per day   Urinates about 1.5 hrs on average  UTI symptoms   Groin soreness  Frequency  Fever  Chills  Diarrhea   Able to retract the foreskin easily, washes that area once per week  Now cleans under foreskin before intercourse   Has tried drinking cranberry juice  Met with infectious disease    REVIEW OF SYSTEMS  Review of Systems   Constitutional: Negative for fatigue and unexpected weight change.   HENT: Negative for ear pain and tinnitus.    Eyes: Negative for visual disturbance.   Respiratory: Negative for shortness of breath.    Cardiovascular: Negative for chest pain.   Gastrointestinal:  Negative for abdominal pain and constipation.   Genitourinary: Negative for flank pain.   Musculoskeletal: Negative for back pain.   Neurological: Negative for dizziness and light-headedness.   Hematological: Negative for adenopathy.   Psychiatric/Behavioral: Negative for sleep disturbance.     SOCIAL HISTORY  Quit when go transplant, 0.5ppd for 10+ years    FAMILY HISTORY   Denies any known family history of urologic malignancy     PHYSICAL EXAMINATION  Deferred given virtual visit.    LABS  Recent positive urine cultures from 4/15/2023 and 5/4/2023 in the absence of urinary retention.  Positive cultures have shown a mixture of E. coli and Enterococcus    IMAGING  Renal ultrasound from 5/4/2023 did not show any evidence of hydronephrosis in transplant kidney    IMPRESSION  Recurrent UTIs  Reassuring PVRs  Uncircumcised     It was my pleasure to speak with Chip hale in regards to his issues with recurrent urinary tract infections.  After reviewing his clinical history as well as completing a review of systems, it is unclear where the seed of these infections is coming from.  He is drinking an appropriate amount of fluid during the day as well as avoiding at an appropriate schedule.  We did discuss the possibility of adding cranberry extract pills to help avoid infections, but overall I do think it is important for us to complete an anatomic evaluation with a cystoscopy to make sure the urethral diverticulum has not recurred or there is any other anatomic problems within the bladder leading to these recurrent infections.    I am glad to hear that he is cleaning underneath his foreskin more frequently since his most recent admission as I believe this could be contributing to his infections.  I encouraged him to be practicing this clinically this habit, looking to clean under the foreskin at least every 2 days and especially before any sexual encounter.    It is understandable that Chip is looking to avoid any  circumcision at this point, so we will plan to have him complete a cystoscopy with Dr. Mejia as he is familiar with the patient first available, then determine if there are any neck steps that could be completed.    Mr. Fowler expressed understanding and agreement to the above discussion and plan and all of his questions were answered to his satisfaction.      PLAN  Cystoscopy first available with Dr. Mejia    SIGNED    Blair Aguilar PA-C      I spent a total of 25 minutes spent on the date of the encounter doing chart review, history and exam, documentation, and further activities as noted above.

## 2023-05-17 LAB
MYCOPHENOLATE SERPL LC/MS/MS-MCNC: 0.29 MG/L (ref 1–3.5)
MYCOPHENOLATE-G SERPL LC/MS/MS-MCNC: 23.9 MG/L (ref 30–95)
TME LAST DOSE: ABNORMAL H
TME LAST DOSE: ABNORMAL H

## 2023-05-17 RX ORDER — SODIUM BICARBONATE 650 MG/1
650 TABLET ORAL 2 TIMES DAILY
Qty: 60 TABLET | Refills: 11 | Status: ON HOLD | OUTPATIENT
Start: 2023-05-17 | End: 2023-07-08

## 2023-05-18 ENCOUNTER — LAB (OUTPATIENT)
Dept: LAB | Facility: CLINIC | Age: 38
End: 2023-05-18
Payer: MEDICARE

## 2023-05-18 DIAGNOSIS — Z79.899 ENCOUNTER FOR LONG-TERM CURRENT USE OF MEDICATION: ICD-10-CM

## 2023-05-18 DIAGNOSIS — Z20.828 CONTACT WITH AND (SUSPECTED) EXPOSURE TO OTHER VIRAL COMMUNICABLE DISEASES: ICD-10-CM

## 2023-05-18 DIAGNOSIS — Z94.0 KIDNEY REPLACED BY TRANSPLANT: ICD-10-CM

## 2023-05-18 DIAGNOSIS — Z48.298 AFTERCARE FOLLOWING ORGAN TRANSPLANT: ICD-10-CM

## 2023-05-18 LAB
ANION GAP SERPL CALCULATED.3IONS-SCNC: 9 MMOL/L (ref 7–15)
BUN SERPL-MCNC: 22.9 MG/DL (ref 6–20)
CALCIUM SERPL-MCNC: 8.9 MG/DL (ref 8.6–10)
CHLORIDE SERPL-SCNC: 108 MMOL/L (ref 98–107)
CREAT SERPL-MCNC: 1.5 MG/DL (ref 0.67–1.17)
DEPRECATED HCO3 PLAS-SCNC: 21 MMOL/L (ref 22–29)
ERYTHROCYTE [DISTWIDTH] IN BLOOD BY AUTOMATED COUNT: 14.6 % (ref 10–15)
GFR SERPL CREATININE-BSD FRML MDRD: 60 ML/MIN/1.73M2
GLUCOSE SERPL-MCNC: 101 MG/DL (ref 70–99)
HCT VFR BLD AUTO: 33.1 % (ref 40–53)
HGB BLD-MCNC: 10.7 G/DL (ref 13.3–17.7)
MCH RBC QN AUTO: 31 PG (ref 26.5–33)
MCHC RBC AUTO-ENTMCNC: 32.3 G/DL (ref 31.5–36.5)
MCV RBC AUTO: 96 FL (ref 78–100)
PLATELET # BLD AUTO: 395 10E3/UL (ref 150–450)
POTASSIUM SERPL-SCNC: 4.7 MMOL/L (ref 3.4–5.3)
RBC # BLD AUTO: 3.45 10E6/UL (ref 4.4–5.9)
SODIUM SERPL-SCNC: 138 MMOL/L (ref 136–145)
WBC # BLD AUTO: 4.5 10E3/UL (ref 4–11)

## 2023-05-18 PROCEDURE — 80180 DRUG SCRN QUAN MYCOPHENOLATE: CPT

## 2023-05-18 PROCEDURE — 36415 COLL VENOUS BLD VENIPUNCTURE: CPT

## 2023-05-18 PROCEDURE — 80197 ASSAY OF TACROLIMUS: CPT

## 2023-05-18 PROCEDURE — 80048 BASIC METABOLIC PNL TOTAL CA: CPT

## 2023-05-18 PROCEDURE — 87799 DETECT AGENT NOS DNA QUANT: CPT

## 2023-05-18 PROCEDURE — 85014 HEMATOCRIT: CPT

## 2023-05-19 ENCOUNTER — MYC MEDICAL ADVICE (OUTPATIENT)
Dept: TRANSPLANT | Facility: CLINIC | Age: 38
End: 2023-05-19
Payer: MEDICARE

## 2023-05-19 DIAGNOSIS — Z94.0 KIDNEY REPLACED BY TRANSPLANT: ICD-10-CM

## 2023-05-19 LAB
BKV DNA # SPEC NAA+PROBE: NOT DETECTED COPIES/ML
MYCOPHENOLATE SERPL LC/MS/MS-MCNC: <0.25 MG/L (ref 1–3.5)
MYCOPHENOLATE-G SERPL LC/MS/MS-MCNC: 18.1 MG/L (ref 30–95)
TACROLIMUS BLD-MCNC: 7.8 UG/L (ref 5–15)
TME LAST DOSE: ABNORMAL H
TME LAST DOSE: ABNORMAL H
TME LAST DOSE: NORMAL H
TME LAST DOSE: NORMAL H

## 2023-05-19 RX ORDER — TACROLIMUS 1 MG/1
5 CAPSULE ORAL 2 TIMES DAILY
Qty: 300 CAPSULE | Refills: 11 | Status: SHIPPED | OUTPATIENT
Start: 2023-05-19 | End: 2023-06-19

## 2023-05-19 NOTE — TELEPHONE ENCOUNTER
Chip Fowler Mary K, RN  Phone Number: [phone]     12 hour and 4mg is correct. I will go up to 5 thanks           Good afternoon  (Newest Message First)  View All Conversations on this Encounter  Chip Fowler  You 13 minutes ago (2:43 PM)       12 hour and 4mg is correct. I will go up to 5 thanks        You  Chip Fowler 58 minutes ago (1:58 PM)     MH  Your tacrolimus  level is slightly below goal level         Please confirm 12 hour trough level   Please confirm your current dose 4 mg twice per day   Please increase your tacrolimus  5 mg twice per day         Please respond that you have reviewed this message     Be well - have a good weekend   [facility] Transplant Coordinator

## 2023-05-22 ENCOUNTER — LAB (OUTPATIENT)
Dept: LAB | Facility: CLINIC | Age: 38
End: 2023-05-22
Payer: MEDICARE

## 2023-05-22 DIAGNOSIS — Z94.0 KIDNEY REPLACED BY TRANSPLANT: Primary | ICD-10-CM

## 2023-05-22 DIAGNOSIS — Z94.0 KIDNEY REPLACED BY TRANSPLANT: ICD-10-CM

## 2023-05-22 LAB
ANION GAP SERPL CALCULATED.3IONS-SCNC: 9 MMOL/L (ref 7–15)
BUN SERPL-MCNC: 24.6 MG/DL (ref 6–20)
CALCIUM SERPL-MCNC: 9.1 MG/DL (ref 8.6–10)
CHLORIDE SERPL-SCNC: 107 MMOL/L (ref 98–107)
CREAT SERPL-MCNC: 1.47 MG/DL (ref 0.67–1.17)
DEPRECATED HCO3 PLAS-SCNC: 20 MMOL/L (ref 22–29)
ERYTHROCYTE [DISTWIDTH] IN BLOOD BY AUTOMATED COUNT: 14.3 % (ref 10–15)
GFR SERPL CREATININE-BSD FRML MDRD: 62 ML/MIN/1.73M2
GLUCOSE SERPL-MCNC: 95 MG/DL (ref 70–99)
HCT VFR BLD AUTO: 31.1 % (ref 40–53)
HGB BLD-MCNC: 10.4 G/DL (ref 13.3–17.7)
MCH RBC QN AUTO: 31.2 PG (ref 26.5–33)
MCHC RBC AUTO-ENTMCNC: 33.4 G/DL (ref 31.5–36.5)
MCV RBC AUTO: 93 FL (ref 78–100)
PLATELET # BLD AUTO: 309 10E3/UL (ref 150–450)
POTASSIUM SERPL-SCNC: 4.4 MMOL/L (ref 3.4–5.3)
RBC # BLD AUTO: 3.33 10E6/UL (ref 4.4–5.9)
SODIUM SERPL-SCNC: 136 MMOL/L (ref 136–145)
TACROLIMUS BLD-MCNC: 8.9 UG/L (ref 5–15)
TME LAST DOSE: NORMAL H
TME LAST DOSE: NORMAL H
WBC # BLD AUTO: 3.2 10E3/UL (ref 4–11)

## 2023-05-22 PROCEDURE — 80197 ASSAY OF TACROLIMUS: CPT

## 2023-05-22 PROCEDURE — 85027 COMPLETE CBC AUTOMATED: CPT

## 2023-05-22 PROCEDURE — 36415 COLL VENOUS BLD VENIPUNCTURE: CPT

## 2023-05-22 PROCEDURE — 82310 ASSAY OF CALCIUM: CPT

## 2023-05-22 PROCEDURE — 80180 DRUG SCRN QUAN MYCOPHENOLATE: CPT

## 2023-05-23 LAB
MYCOPHENOLATE SERPL LC/MS/MS-MCNC: 8 MG/L (ref 1–3.5)
MYCOPHENOLATE-G SERPL LC/MS/MS-MCNC: 99.6 MG/L (ref 30–95)
TME LAST DOSE: ABNORMAL H
TME LAST DOSE: ABNORMAL H

## 2023-05-25 ENCOUNTER — LAB (OUTPATIENT)
Dept: LAB | Facility: CLINIC | Age: 38
End: 2023-05-25
Payer: MEDICARE

## 2023-05-25 DIAGNOSIS — Z48.298 AFTERCARE FOLLOWING ORGAN TRANSPLANT: ICD-10-CM

## 2023-05-25 DIAGNOSIS — Z94.0 KIDNEY REPLACED BY TRANSPLANT: ICD-10-CM

## 2023-05-25 DIAGNOSIS — Z20.828 CONTACT WITH AND (SUSPECTED) EXPOSURE TO OTHER VIRAL COMMUNICABLE DISEASES: ICD-10-CM

## 2023-05-25 DIAGNOSIS — Z79.899 ENCOUNTER FOR LONG-TERM CURRENT USE OF MEDICATION: ICD-10-CM

## 2023-05-25 LAB
ALBUMIN MFR UR ELPH: <4 MG/DL (ref 1–14)
ANION GAP SERPL CALCULATED.3IONS-SCNC: 12 MMOL/L (ref 7–15)
BUN SERPL-MCNC: 28.9 MG/DL (ref 6–20)
CALCIUM SERPL-MCNC: 9.2 MG/DL (ref 8.6–10)
CHLORIDE SERPL-SCNC: 103 MMOL/L (ref 98–107)
CREAT SERPL-MCNC: 1.52 MG/DL (ref 0.67–1.17)
CREAT UR-MCNC: 18.8 MG/DL
DEPRECATED HCO3 PLAS-SCNC: 21 MMOL/L (ref 22–29)
ERYTHROCYTE [DISTWIDTH] IN BLOOD BY AUTOMATED COUNT: 14.6 % (ref 10–15)
GFR SERPL CREATININE-BSD FRML MDRD: 59 ML/MIN/1.73M2
GLUCOSE SERPL-MCNC: 84 MG/DL (ref 70–99)
HCT VFR BLD AUTO: 32.3 % (ref 40–53)
HGB BLD-MCNC: 10.5 G/DL (ref 13.3–17.7)
MCH RBC QN AUTO: 31.1 PG (ref 26.5–33)
MCHC RBC AUTO-ENTMCNC: 32.5 G/DL (ref 31.5–36.5)
MCV RBC AUTO: 96 FL (ref 78–100)
PLATELET # BLD AUTO: 264 10E3/UL (ref 150–450)
POTASSIUM SERPL-SCNC: 4.6 MMOL/L (ref 3.4–5.3)
PROT/CREAT 24H UR: NORMAL MG/G{CREAT}
PTH-INTACT SERPL-MCNC: 52 PG/ML (ref 15–65)
RBC # BLD AUTO: 3.38 10E6/UL (ref 4.4–5.9)
SODIUM SERPL-SCNC: 136 MMOL/L (ref 136–145)
TACROLIMUS BLD-MCNC: 9.9 UG/L (ref 5–15)
TME LAST DOSE: NORMAL H
TME LAST DOSE: NORMAL H
WBC # BLD AUTO: 3.4 10E3/UL (ref 4–11)

## 2023-05-25 PROCEDURE — 83970 ASSAY OF PARATHORMONE: CPT

## 2023-05-25 PROCEDURE — 86832 HLA CLASS I HIGH DEFIN QUAL: CPT

## 2023-05-25 PROCEDURE — 87799 DETECT AGENT NOS DNA QUANT: CPT

## 2023-05-25 PROCEDURE — 84156 ASSAY OF PROTEIN URINE: CPT

## 2023-05-25 PROCEDURE — 86833 HLA CLASS II HIGH DEFIN QUAL: CPT

## 2023-05-25 PROCEDURE — 80197 ASSAY OF TACROLIMUS: CPT

## 2023-05-25 PROCEDURE — 85027 COMPLETE CBC AUTOMATED: CPT

## 2023-05-25 PROCEDURE — 80180 DRUG SCRN QUAN MYCOPHENOLATE: CPT

## 2023-05-25 PROCEDURE — 82310 ASSAY OF CALCIUM: CPT

## 2023-05-25 PROCEDURE — 36415 COLL VENOUS BLD VENIPUNCTURE: CPT

## 2023-05-25 RX ORDER — VALGANCICLOVIR 450 MG/1
900 TABLET, FILM COATED ORAL DAILY
Qty: 60 TABLET | Refills: 1 | Status: SHIPPED | OUTPATIENT
Start: 2023-05-25 | End: 2023-07-20

## 2023-05-25 NOTE — TELEPHONE ENCOUNTER
From: Franko Cerda MD  Sent: 5/24/2023   5:36 AM CDT  To: Veronica Edgar RN    Please repeat MPA level. If still high will back down to 540mg bid. Thanks    Orders updated

## 2023-05-26 LAB
BKV DNA # SPEC NAA+PROBE: NOT DETECTED COPIES/ML
CMV DNA SPEC NAA+PROBE-ACNC: NOT DETECTED IU/ML
DONOR IDENTIFICATION: NORMAL
DSA COMMENTS: NORMAL
DSA PRESENT: NO
DSA TEST METHOD: NORMAL
MYCOPHENOLATE SERPL LC/MS/MS-MCNC: 2.17 MG/L (ref 1–3.5)
MYCOPHENOLATE-G SERPL LC/MS/MS-MCNC: 42.1 MG/L (ref 30–95)
ORGAN: NORMAL
SA 1 CELL: NORMAL
SA 1 TEST METHOD: NORMAL
SA 2 CELL: NORMAL
SA 2 TEST METHOD: NORMAL
SA1 HI RISK ABY: NORMAL
SA1 MOD RISK ABY: NORMAL
SA2 HI RISK ABY: NORMAL
SA2 MOD RISK ABY: NORMAL
TME LAST DOSE: NORMAL H
TME LAST DOSE: NORMAL H
UNACCEPTABLE ANTIGENS: NORMAL
UNOS CPRA: 49
ZZZSA 1  COMMENTS: NORMAL
ZZZSA 2 COMMENTS: NORMAL

## 2023-05-30 ENCOUNTER — LAB (OUTPATIENT)
Dept: LAB | Facility: CLINIC | Age: 38
End: 2023-05-30
Payer: MEDICARE

## 2023-05-30 DIAGNOSIS — Z94.0 KIDNEY REPLACED BY TRANSPLANT: ICD-10-CM

## 2023-05-30 LAB
ANION GAP SERPL CALCULATED.3IONS-SCNC: 13 MMOL/L (ref 7–15)
BUN SERPL-MCNC: 20.9 MG/DL (ref 6–20)
CALCIUM SERPL-MCNC: 8.9 MG/DL (ref 8.6–10)
CHLORIDE SERPL-SCNC: 105 MMOL/L (ref 98–107)
CREAT SERPL-MCNC: 1.6 MG/DL (ref 0.67–1.17)
DEPRECATED HCO3 PLAS-SCNC: 18 MMOL/L (ref 22–29)
ERYTHROCYTE [DISTWIDTH] IN BLOOD BY AUTOMATED COUNT: 14.2 % (ref 10–15)
GFR SERPL CREATININE-BSD FRML MDRD: 56 ML/MIN/1.73M2
GLUCOSE SERPL-MCNC: 110 MG/DL (ref 70–99)
HCT VFR BLD AUTO: 28.2 % (ref 40–53)
HGB BLD-MCNC: 9.5 G/DL (ref 13.3–17.7)
MCH RBC QN AUTO: 31.5 PG (ref 26.5–33)
MCHC RBC AUTO-ENTMCNC: 33.7 G/DL (ref 31.5–36.5)
MCV RBC AUTO: 93 FL (ref 78–100)
PLATELET # BLD AUTO: 163 10E3/UL (ref 150–450)
POTASSIUM SERPL-SCNC: 4.2 MMOL/L (ref 3.4–5.3)
RBC # BLD AUTO: 3.02 10E6/UL (ref 4.4–5.9)
SODIUM SERPL-SCNC: 136 MMOL/L (ref 136–145)
WBC # BLD AUTO: 3 10E3/UL (ref 4–11)

## 2023-05-30 PROCEDURE — 80180 DRUG SCRN QUAN MYCOPHENOLATE: CPT

## 2023-05-30 PROCEDURE — 80197 ASSAY OF TACROLIMUS: CPT

## 2023-05-30 PROCEDURE — 80048 BASIC METABOLIC PNL TOTAL CA: CPT

## 2023-05-30 PROCEDURE — 85027 COMPLETE CBC AUTOMATED: CPT

## 2023-05-30 PROCEDURE — 36415 COLL VENOUS BLD VENIPUNCTURE: CPT

## 2023-05-31 DIAGNOSIS — Z94.0 KIDNEY REPLACED BY TRANSPLANT: Primary | ICD-10-CM

## 2023-05-31 LAB
TACROLIMUS BLD-MCNC: 8.8 UG/L (ref 5–15)
TME LAST DOSE: NORMAL H
TME LAST DOSE: NORMAL H

## 2023-05-31 RX ORDER — MYCOPHENOLIC ACID 180 MG/1
720 TABLET, DELAYED RELEASE ORAL 2 TIMES DAILY
Qty: 240 TABLET | Refills: 11 | Status: SHIPPED | OUTPATIENT
Start: 2023-05-31 | End: 2023-06-02

## 2023-05-31 NOTE — TELEPHONE ENCOUNTER
Hello pt is stating they are taking 4 qam and 2 at noon and 4 in the evening can we please get a new script sent over

## 2023-06-01 ENCOUNTER — LAB (OUTPATIENT)
Dept: LAB | Facility: CLINIC | Age: 38
End: 2023-06-01
Payer: MEDICARE

## 2023-06-01 DIAGNOSIS — Z94.0 KIDNEY REPLACED BY TRANSPLANT: ICD-10-CM

## 2023-06-01 LAB
ANION GAP SERPL CALCULATED.3IONS-SCNC: 14 MMOL/L (ref 7–15)
BUN SERPL-MCNC: 25.9 MG/DL (ref 6–20)
CALCIUM SERPL-MCNC: 9 MG/DL (ref 8.6–10)
CHLORIDE SERPL-SCNC: 97 MMOL/L (ref 98–107)
CREAT SERPL-MCNC: 2.21 MG/DL (ref 0.67–1.17)
DEPRECATED HCO3 PLAS-SCNC: 20 MMOL/L (ref 22–29)
ERYTHROCYTE [DISTWIDTH] IN BLOOD BY AUTOMATED COUNT: 13.9 % (ref 10–15)
GFR SERPL CREATININE-BSD FRML MDRD: 38 ML/MIN/1.73M2
GLUCOSE SERPL-MCNC: 111 MG/DL (ref 70–99)
HCT VFR BLD AUTO: 25.7 % (ref 40–53)
HGB BLD-MCNC: 8.8 G/DL (ref 13.3–17.7)
MCH RBC QN AUTO: 31.4 PG (ref 26.5–33)
MCHC RBC AUTO-ENTMCNC: 34.2 G/DL (ref 31.5–36.5)
MCV RBC AUTO: 92 FL (ref 78–100)
MYCOPHENOLATE SERPL LC/MS/MS-MCNC: 6.35 MG/L (ref 1–3.5)
MYCOPHENOLATE-G SERPL LC/MS/MS-MCNC: 53.2 MG/L (ref 30–95)
PLATELET # BLD AUTO: 150 10E3/UL (ref 150–450)
POTASSIUM SERPL-SCNC: 3.4 MMOL/L (ref 3.4–5.3)
RBC # BLD AUTO: 2.8 10E6/UL (ref 4.4–5.9)
SODIUM SERPL-SCNC: 131 MMOL/L (ref 136–145)
TACROLIMUS BLD-MCNC: 12.4 UG/L (ref 5–15)
TME LAST DOSE: ABNORMAL H
TME LAST DOSE: ABNORMAL H
TME LAST DOSE: NORMAL H
TME LAST DOSE: NORMAL H
WBC # BLD AUTO: 3.8 10E3/UL (ref 4–11)

## 2023-06-01 PROCEDURE — 82248 BILIRUBIN DIRECT: CPT

## 2023-06-01 PROCEDURE — 80180 DRUG SCRN QUAN MYCOPHENOLATE: CPT

## 2023-06-01 PROCEDURE — 85027 COMPLETE CBC AUTOMATED: CPT

## 2023-06-01 PROCEDURE — 80053 COMPREHEN METABOLIC PANEL: CPT

## 2023-06-01 PROCEDURE — 80197 ASSAY OF TACROLIMUS: CPT

## 2023-06-01 PROCEDURE — 83615 LACTATE (LD) (LDH) ENZYME: CPT

## 2023-06-01 PROCEDURE — 36415 COLL VENOUS BLD VENIPUNCTURE: CPT

## 2023-06-02 ENCOUNTER — TELEPHONE (OUTPATIENT)
Dept: TRANSPLANT | Facility: CLINIC | Age: 38
End: 2023-06-02
Payer: MEDICARE

## 2023-06-02 DIAGNOSIS — Z94.0 KIDNEY REPLACED BY TRANSPLANT: Primary | ICD-10-CM

## 2023-06-02 DIAGNOSIS — Z94.0 TRANSPLANTED KIDNEY: ICD-10-CM

## 2023-06-02 DIAGNOSIS — N39.0 URINARY TRACT INFECTION WITHOUT HEMATURIA, SITE UNSPECIFIED: Primary | ICD-10-CM

## 2023-06-02 LAB
ALBUMIN SERPL BCG-MCNC: 4 G/DL (ref 3.5–5.2)
ALP SERPL-CCNC: 52 U/L (ref 40–129)
ALT SERPL W P-5'-P-CCNC: 30 U/L (ref 10–50)
AST SERPL W P-5'-P-CCNC: 28 U/L (ref 10–50)
BILIRUB DIRECT SERPL-MCNC: <0.2 MG/DL (ref 0–0.3)
BILIRUB SERPL-MCNC: 0.3 MG/DL
LDH SERPL L TO P-CCNC: 367 U/L (ref 0–250)
MYCOPHENOLATE SERPL LC/MS/MS-MCNC: 2.53 MG/L (ref 1–3.5)
MYCOPHENOLATE-G SERPL LC/MS/MS-MCNC: 53.6 MG/L (ref 30–95)
PROT SERPL-MCNC: 6.9 G/DL (ref 6.4–8.3)
TME LAST DOSE: NORMAL H
TME LAST DOSE: NORMAL H

## 2023-06-02 RX ORDER — MYCOPHENOLIC ACID 180 MG/1
540 TABLET, DELAYED RELEASE ORAL 2 TIMES DAILY
Qty: 180 TABLET | Refills: 11 | Status: SHIPPED | OUTPATIENT
Start: 2023-06-02 | End: 2023-08-23

## 2023-06-02 RX ORDER — NITROFURANTOIN 25; 75 MG/1; MG/1
100 CAPSULE ORAL 2 TIMES DAILY
Qty: 14 CAPSULE | Refills: 0 | Status: SHIPPED | OUTPATIENT
Start: 2023-06-02 | End: 2023-07-08

## 2023-06-02 NOTE — CONFIDENTIAL NOTE
The pt reports another UTI sx (incresaed urgency and discomfort) after having vaginal sex on late Sunday afteroon. He was reminded to void after a sexual encounter. He took a script of nitrofurantoin and is helping him and his symptoms are improving. He was told to come to the ER if he were to have any systemic signs such as fevers.  I am going to give him another script if he were to have another UTI.

## 2023-06-02 NOTE — TELEPHONE ENCOUNTER
Franko Cerda MD Huepfel, Mary K, RN  Ok to reduce MPA to 540mg bid. Repeat labs Monday please     Lower tacrolimus  To 4 mg twice per day     Franko Cerda MD Huepfel, Mary K, RN  Hb dropping rapidly. Please obtain smear, LDH, haptoglobin, bilirubin, ask if he is having melena. Repeat labs on Monday.      n   Called Chip denies any apparent bleeding - denies change in his stool ,abdonminal pain    States he is working on new

## 2023-06-05 ENCOUNTER — LAB (OUTPATIENT)
Dept: LAB | Facility: CLINIC | Age: 38
End: 2023-06-05
Payer: MEDICARE

## 2023-06-05 DIAGNOSIS — Z94.0 TRANSPLANTED KIDNEY: ICD-10-CM

## 2023-06-05 DIAGNOSIS — Z94.0 KIDNEY REPLACED BY TRANSPLANT: ICD-10-CM

## 2023-06-05 LAB
ALBUMIN SERPL BCG-MCNC: 4 G/DL (ref 3.5–5.2)
ALP SERPL-CCNC: 55 U/L (ref 40–129)
ALT SERPL W P-5'-P-CCNC: 22 U/L (ref 10–50)
ANION GAP SERPL CALCULATED.3IONS-SCNC: 10 MMOL/L (ref 7–15)
AST SERPL W P-5'-P-CCNC: 24 U/L (ref 10–50)
BASOPHILS # BLD MANUAL: 0.1 10E3/UL (ref 0–0.2)
BASOPHILS NFR BLD MANUAL: 2 %
BILIRUB SERPL-MCNC: 0.3 MG/DL
BUN SERPL-MCNC: 25.1 MG/DL (ref 6–20)
CALCIUM SERPL-MCNC: 9.5 MG/DL (ref 8.6–10)
CHLORIDE SERPL-SCNC: 104 MMOL/L (ref 98–107)
CREAT SERPL-MCNC: 1.66 MG/DL (ref 0.67–1.17)
DEPRECATED HCO3 PLAS-SCNC: 21 MMOL/L (ref 22–29)
EOSINOPHIL # BLD MANUAL: 0.4 10E3/UL (ref 0–0.7)
EOSINOPHIL NFR BLD MANUAL: 15 %
ERYTHROCYTE [DISTWIDTH] IN BLOOD BY AUTOMATED COUNT: 13.9 % (ref 10–15)
GFR SERPL CREATININE-BSD FRML MDRD: 53 ML/MIN/1.73M2
GLUCOSE SERPL-MCNC: 86 MG/DL (ref 70–99)
HCT VFR BLD AUTO: 28.2 % (ref 40–53)
HGB BLD-MCNC: 9.5 G/DL (ref 13.3–17.7)
LYMPHOCYTES # BLD MANUAL: 1 10E3/UL (ref 0.8–5.3)
LYMPHOCYTES NFR BLD MANUAL: 39 %
MCH RBC QN AUTO: 31.1 PG (ref 26.5–33)
MCHC RBC AUTO-ENTMCNC: 33.7 G/DL (ref 31.5–36.5)
MCV RBC AUTO: 93 FL (ref 78–100)
MONOCYTES # BLD MANUAL: 0.6 10E3/UL (ref 0–1.3)
MONOCYTES NFR BLD MANUAL: 22 %
NEUTROPHILS # BLD MANUAL: 0.6 10E3/UL (ref 1.6–8.3)
NEUTROPHILS NFR BLD MANUAL: 22 %
PLAT MORPH BLD: ABNORMAL
PLATELET # BLD AUTO: 257 10E3/UL (ref 150–450)
POTASSIUM SERPL-SCNC: 5 MMOL/L (ref 3.4–5.3)
PROT SERPL-MCNC: 6.9 G/DL (ref 6.4–8.3)
RBC # BLD AUTO: 3.05 10E6/UL (ref 4.4–5.9)
RBC MORPH BLD: ABNORMAL
RETICS # AUTO: 0.02 10E6/UL (ref 0.03–0.1)
RETICS/RBC NFR AUTO: 0.6 % (ref 0.5–2)
SODIUM SERPL-SCNC: 135 MMOL/L (ref 136–145)
TACROLIMUS BLD-MCNC: 8.2 UG/L (ref 5–15)
TME LAST DOSE: NORMAL H
TME LAST DOSE: NORMAL H
WBC # BLD AUTO: 2.5 10E3/UL (ref 4–11)

## 2023-06-05 PROCEDURE — 36415 COLL VENOUS BLD VENIPUNCTURE: CPT

## 2023-06-05 PROCEDURE — 80197 ASSAY OF TACROLIMUS: CPT

## 2023-06-05 PROCEDURE — 85027 COMPLETE CBC AUTOMATED: CPT

## 2023-06-05 PROCEDURE — 85045 AUTOMATED RETICULOCYTE COUNT: CPT

## 2023-06-05 PROCEDURE — 83010 ASSAY OF HAPTOGLOBIN QUANT: CPT

## 2023-06-05 PROCEDURE — 80053 COMPREHEN METABOLIC PANEL: CPT

## 2023-06-05 PROCEDURE — 85007 BL SMEAR W/DIFF WBC COUNT: CPT

## 2023-06-05 PROCEDURE — 80180 DRUG SCRN QUAN MYCOPHENOLATE: CPT

## 2023-06-05 PROCEDURE — 99207 BLOOD MORPHOLOGY PATHOLOGIST REVIEW: CPT | Performed by: PATHOLOGY

## 2023-06-06 LAB
HAPTOGLOB SERPL-MCNC: 305 MG/DL (ref 32–197)
MYCOPHENOLATE SERPL LC/MS/MS-MCNC: 2.29 MG/L (ref 1–3.5)
MYCOPHENOLATE-G SERPL LC/MS/MS-MCNC: 53.5 MG/L (ref 30–95)
PATH REPORT.COMMENTS IMP SPEC: NORMAL
PATH REPORT.FINAL DX SPEC: NORMAL
PATH REPORT.MICROSCOPIC SPEC OTHER STN: NORMAL
PATH REPORT.MICROSCOPIC SPEC OTHER STN: NORMAL
TME LAST DOSE: NORMAL H
TME LAST DOSE: NORMAL H

## 2023-06-08 ENCOUNTER — LAB (OUTPATIENT)
Dept: LAB | Facility: CLINIC | Age: 38
End: 2023-06-08
Payer: MEDICARE

## 2023-06-08 DIAGNOSIS — Z79.899 ENCOUNTER FOR LONG-TERM CURRENT USE OF MEDICATION: ICD-10-CM

## 2023-06-08 DIAGNOSIS — Z20.828 CONTACT WITH AND (SUSPECTED) EXPOSURE TO OTHER VIRAL COMMUNICABLE DISEASES: ICD-10-CM

## 2023-06-08 DIAGNOSIS — Z48.298 AFTERCARE FOLLOWING ORGAN TRANSPLANT: ICD-10-CM

## 2023-06-08 DIAGNOSIS — Z94.0 KIDNEY REPLACED BY TRANSPLANT: ICD-10-CM

## 2023-06-08 LAB
ANION GAP SERPL CALCULATED.3IONS-SCNC: 14 MMOL/L (ref 7–15)
BUN SERPL-MCNC: 35.5 MG/DL (ref 6–20)
CALCIUM SERPL-MCNC: 9.4 MG/DL (ref 8.6–10)
CHLORIDE SERPL-SCNC: 104 MMOL/L (ref 98–107)
CREAT SERPL-MCNC: 1.6 MG/DL (ref 0.67–1.17)
DEPRECATED HCO3 PLAS-SCNC: 20 MMOL/L (ref 22–29)
ERYTHROCYTE [DISTWIDTH] IN BLOOD BY AUTOMATED COUNT: 13.6 % (ref 10–15)
GFR SERPL CREATININE-BSD FRML MDRD: 56 ML/MIN/1.73M2
GLUCOSE SERPL-MCNC: 84 MG/DL (ref 70–99)
HCT VFR BLD AUTO: 28.9 % (ref 40–53)
HGB BLD-MCNC: 9.4 G/DL (ref 13.3–17.7)
MAGNESIUM SERPL-MCNC: 1.6 MG/DL (ref 1.7–2.3)
MCH RBC QN AUTO: 30.6 PG (ref 26.5–33)
MCHC RBC AUTO-ENTMCNC: 32.5 G/DL (ref 31.5–36.5)
MCV RBC AUTO: 94 FL (ref 78–100)
PHOSPHATE SERPL-MCNC: 3.3 MG/DL (ref 2.5–4.5)
PLATELET # BLD AUTO: 331 10E3/UL (ref 150–450)
POTASSIUM SERPL-SCNC: 4.5 MMOL/L (ref 3.4–5.3)
RBC # BLD AUTO: 3.07 10E6/UL (ref 4.4–5.9)
SODIUM SERPL-SCNC: 138 MMOL/L (ref 136–145)
WBC # BLD AUTO: 2.5 10E3/UL (ref 4–11)

## 2023-06-08 PROCEDURE — 84100 ASSAY OF PHOSPHORUS: CPT

## 2023-06-08 PROCEDURE — 83735 ASSAY OF MAGNESIUM: CPT

## 2023-06-08 PROCEDURE — 85027 COMPLETE CBC AUTOMATED: CPT

## 2023-06-08 PROCEDURE — 80048 BASIC METABOLIC PNL TOTAL CA: CPT

## 2023-06-08 PROCEDURE — 80197 ASSAY OF TACROLIMUS: CPT

## 2023-06-08 PROCEDURE — 36415 COLL VENOUS BLD VENIPUNCTURE: CPT

## 2023-06-08 PROCEDURE — 80180 DRUG SCRN QUAN MYCOPHENOLATE: CPT

## 2023-06-09 ENCOUNTER — MYC MEDICAL ADVICE (OUTPATIENT)
Dept: TRANSPLANT | Facility: CLINIC | Age: 38
End: 2023-06-09
Payer: MEDICARE

## 2023-06-09 LAB
MYCOPHENOLATE SERPL LC/MS/MS-MCNC: 2.89 MG/L (ref 1–3.5)
MYCOPHENOLATE-G SERPL LC/MS/MS-MCNC: 60.6 MG/L (ref 30–95)
TACROLIMUS BLD-MCNC: 6.9 UG/L (ref 5–15)
TME LAST DOSE: NORMAL H

## 2023-06-12 ENCOUNTER — LAB (OUTPATIENT)
Dept: LAB | Facility: CLINIC | Age: 38
End: 2023-06-12
Payer: MEDICARE

## 2023-06-12 DIAGNOSIS — Z48.298 AFTERCARE FOLLOWING ORGAN TRANSPLANT: ICD-10-CM

## 2023-06-12 DIAGNOSIS — Z79.899 ENCOUNTER FOR LONG-TERM CURRENT USE OF MEDICATION: ICD-10-CM

## 2023-06-12 DIAGNOSIS — Z94.0 KIDNEY REPLACED BY TRANSPLANT: ICD-10-CM

## 2023-06-12 DIAGNOSIS — Z20.828 CONTACT WITH AND (SUSPECTED) EXPOSURE TO OTHER VIRAL COMMUNICABLE DISEASES: ICD-10-CM

## 2023-06-12 DIAGNOSIS — Z94.0 TRANSPLANTED KIDNEY: ICD-10-CM

## 2023-06-12 DIAGNOSIS — Z94.0 KIDNEY REPLACED BY TRANSPLANT: Primary | ICD-10-CM

## 2023-06-12 LAB
ALBUMIN UR-MCNC: 30 MG/DL
ANION GAP SERPL CALCULATED.3IONS-SCNC: 14 MMOL/L (ref 7–15)
APPEARANCE UR: CLEAR
BILIRUB UR QL STRIP: NEGATIVE
BUN SERPL-MCNC: 28.8 MG/DL (ref 6–20)
CALCIUM SERPL-MCNC: 9.4 MG/DL (ref 8.6–10)
CHLORIDE SERPL-SCNC: 100 MMOL/L (ref 98–107)
COLOR UR AUTO: ABNORMAL
CREAT SERPL-MCNC: 1.84 MG/DL (ref 0.67–1.17)
DEPRECATED HCO3 PLAS-SCNC: 20 MMOL/L (ref 22–29)
ERYTHROCYTE [DISTWIDTH] IN BLOOD BY AUTOMATED COUNT: 13.4 % (ref 10–15)
GFR SERPL CREATININE-BSD FRML MDRD: 47 ML/MIN/1.73M2
GLUCOSE SERPL-MCNC: 98 MG/DL (ref 70–99)
GLUCOSE UR STRIP-MCNC: NEGATIVE MG/DL
HCT VFR BLD AUTO: 27.3 % (ref 40–53)
HGB BLD-MCNC: 8.9 G/DL (ref 13.3–17.7)
HGB UR QL STRIP: NEGATIVE
KETONES UR STRIP-MCNC: NEGATIVE MG/DL
LEUKOCYTE ESTERASE UR QL STRIP: NEGATIVE
MCH RBC QN AUTO: 30.3 PG (ref 26.5–33)
MCHC RBC AUTO-ENTMCNC: 32.6 G/DL (ref 31.5–36.5)
MCV RBC AUTO: 93 FL (ref 78–100)
MUCOUS THREADS #/AREA URNS LPF: PRESENT /LPF
NITRATE UR QL: NEGATIVE
PH UR STRIP: 6 [PH] (ref 5–7)
PLATELET # BLD AUTO: 329 10E3/UL (ref 150–450)
POTASSIUM SERPL-SCNC: 3.8 MMOL/L (ref 3.4–5.3)
RBC # BLD AUTO: 2.94 10E6/UL (ref 4.4–5.9)
RBC URINE: 3 /HPF
SODIUM SERPL-SCNC: 134 MMOL/L (ref 136–145)
SP GR UR STRIP: 1.02 (ref 1–1.03)
SQUAMOUS EPITHELIAL: <1 /HPF
TACROLIMUS BLD-MCNC: 8.7 UG/L (ref 5–15)
TME LAST DOSE: NORMAL H
TME LAST DOSE: NORMAL H
UROBILINOGEN UR STRIP-MCNC: NORMAL MG/DL
WBC # BLD AUTO: 2.9 10E3/UL (ref 4–11)
WBC URINE: 7 /HPF

## 2023-06-12 PROCEDURE — 82947 ASSAY GLUCOSE BLOOD QUANT: CPT

## 2023-06-12 PROCEDURE — 36415 COLL VENOUS BLD VENIPUNCTURE: CPT

## 2023-06-12 PROCEDURE — 85027 COMPLETE CBC AUTOMATED: CPT

## 2023-06-12 PROCEDURE — 81001 URINALYSIS AUTO W/SCOPE: CPT

## 2023-06-12 PROCEDURE — 80197 ASSAY OF TACROLIMUS: CPT

## 2023-06-12 PROCEDURE — 82310 ASSAY OF CALCIUM: CPT

## 2023-06-12 NOTE — TELEPHONE ENCOUNTER
Chip Fowler, Veronica SWAN, RN  Phone Number: [phone]     Taking four,will move up to five           Good day  (Newest Message First)  View All Conversations on this Encounter  Chip Fowler  You 3 days ago     Taking four,will move up to five       You  Chip Fowler 3 days ago       Chip         Your MPA level is in process         Your tacrolimus  level is slightly below goal level   6.9   Confirming take tacrolimus   4 mg twice per day    Increase tacrolimus  5 mg twice per day      Thank you   [facility] Transplant Coordinator

## 2023-06-14 NOTE — TELEPHONE ENCOUNTER
ONCOLOGY INTAKE: Records Information      APPT INFORMATION:  Referring provider: Tamara Desir MD  Referring provider s clinic:  Infectious disease  Reason for visit/diagnosis: Eosinophilic leukocytosis, unspecified type  Has patient been notified of appointment date and time?: Yes    RECORDS INFORMATION:  Were the records received with the referral (via [employer])? No    Has patient been seen for any external appt for this diagnosis? No    If yes, where? N/a    Has patient had any imaging or procedures outside of [place] for this condition? No      If Yes, where? N/a    ADDITIONAL INFORMATION:  None     [facility]  Palliative Care Home Visit    Visit Date: 2023  Encounter Provider:  Rylee Cheng DNP, FNP-c  PCP:  Lurdes Navarro MD    Subjective:      Patient ID: Cecile Bowen is a 70 y.o. female.    Consult Requested By:  Emmanuelle Paul  Reason for Consult:  Palliative Care      Chief Complaint: Establish Palliative Care    Outpatient Palliative Care Encounter:    Ms. Bowen is being seen at home due to being seen at home due to physical debility that presents a taxing effort to leave the home, to mitigate high risk of hospital readmission and/or due to the limited availability of reliable or safe options for transportation to the point of access to the provider. Prior to treatment on this visit the chart was reviewed and patient verbal consent was obtained.      Social History:    Ms. Bowen was  to [place] for 1.5 years then . She has no children. Ex  did serve in the  but she does not know if he served during war time. He currently is . She has 2 siblings (1sister and 1 brother); brother passed away 3 years ago from rare blood cancer. She currently lives with her sister Kat. Mother passed away from ALS in  @71 years old. Parents were . Father passed away 6-8 years ago>>he was in [place] when he  under hospice care. 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 Her sister currently cares for her at home.    Ms. Bowen enjoys reading, working puzzles and [place]. She loved to sing>>can't sing anymore>>lost her voice.     Impression:    Ms. Bowen was seen today@home to establish palliative care services. Patient's sister Kat and patient are both present.  is sitting up in her recliner chair in the living room area, and she is AA&Ox2-3; forgetful at times. Sister expresses their [place] [employer]  "recently . C/o pain to "entire spine." Rates pain 8/10. C/o pain to fingers on each hand. Has Xiomara sensor for BG; this morning range was 100-115; during the day 115-130 and this evening >150    -supra pubic catheter in place; no TRED at site  -takes OTC Benadryl for sinuses     -[facility] comes to change catheter/supra pubic catheter>>thick cloudy yellow urine with lots of sediment>>states she is due in a week to have catheter changed.     -appetite is good >>eating well  -non-ambulatory>>transfers via wheelchair    Note:  Ms. Bowen states she has lots of cousins in other states but they don't help her or the family. Cousin, who is paster in a [employer] in [place]>>>they are very close to him. He calls sometimes to check on them.    During visit today, patient's BP is elevated>>>she took her Rx medications at this time.    Bilateral feet with flaking skin>recommend using antifungal /athlete's foot fungus cream TID     Review of Systems   Constitutional:  Positive for fatigue and unexpected weight change (lost >50 pounds over last 1 1/5 years). Negative for activity change and appetite change.   HENT:  Positive for sore throat. Negative for postnasal drip, rhinorrhea and trouble swallowing.    Respiratory:  Positive for apnea and cough (productive phlegm;dark grey). Negative for shortness of breath.    Cardiovascular:  Positive for leg swelling. Negative for chest pain and palpitations.   Gastrointestinal:  Positive for constipation and nausea. Negative for abdominal pain.   Genitourinary:  Positive for difficulty urinating.        Supra pubic catheter   Musculoskeletal:  Positive for arthralgias, back pain, gait problem, joint swelling and myalgias. Negative for neck pain and neck stiffness.        Pain to fingers on right hand and finger on left hand   Skin:  Negative for wound.   Neurological:  Positive for dizziness (dizziness with rolling side to side). Negative for tremors, seizures, syncope, weakness and headaches. "   Hematological:  Bruises/bleeds easily.   Psychiatric/Behavioral:  Positive for sleep disturbance (sleeping in recliner chair at night). Negative for confusion and hallucinations. The patient is not nervous/anxious.      Goals of Care:    I initiated the process of advance care planning today and explained the importance of this process to the patient and family.  I introduced the concept of advance directives to the patient, as well. Then the patient received detailed information about the importance of designating a Health Care Power of  (HCPOA). She was also instructed to communicate with this person about her wishes for future healthcare, should she become sick and lose decision-making capacity.    I Introduced LaPOST form with patient/family, explaining this is the patient's wishes, and this form will be uploaded into the patient's [facility] and the [place] [facility].    6/14/2023 Discussed goals of care with patient and she would like to get stronger and be more independent.     PLAN:  1. Follow-up with Palliative Care NP in 4-6 weeks on  7/28/2023@home visit  2. Continue all medications  3. F/u with PCP as needed  4. In Emergency, call 911 or go to ED; notify PCP or Palliative Care NP  5. No refills needed  6. Take medications as Rx  7. Complete LaPOST and POA forms      Assessments:  Environmental: single story house, cluttered, goog lighting  Functional Status: Totally independent  Safety: Fall precautions and Covid 19 precautions  Nutritional: eats 2 meals, snacks some throughout the day; does drink shake  Home Health/DME/Supplies: Hospital bed and Wheel chair, shower bench, bedside commode, toilet extender    History:  Past Medical History:   Diagnosis Date    Cervicogenic migraine     Chronic pain     CKD (chronic kidney disease) stage 4, GFR 15-29 ml/min     Maribel Lakhani    CKD (chronic kidney disease) stage 4, GFR 15-29 ml/min     Diabetes mellitus     Long term use of Insulin, Diabetic  Neuropathy    Dialysis patient 1/21/2022    Fibromyalgia     Hydronephrosis     Hyperlipidemia     Hypertension 12/12/2012    Hypothyroidism 12/12/2012    ARON (iron deficiency anemia)     Insomnia     Levoscoliosis     Malignant neoplasm of upper-outer quadrant of left breast in female, estrogen receptor positive     Metabolic acidosis     Mobility impaired     Nuclear sclerosis - Both Eyes 3/24/2014    VIJAYA (obstructive sleep apnea)     Osteopenia     Pulmonary nodule     Recurrent UTI     Renal manifestation of secondary diabetes mellitus     Secondary hyperparathyroidism, renal     Urinary retention      Family History   Problem Relation Age of Onset    Diabetes Sister     Kidney disease Sister         CKD III    ALS Mother         d.    Cancer Maternal Grandmother         d. colon    Cancer Paternal Grandfather         d. lung    Scoliosis Brother         increased pain    Prostate cancer Brother         cured s/p surgery    Cancer Brother         rare cancer that got into the bones    Diabetes Maternal Aunt     Kidney disease Maternal Aunt     Diabetes Maternal Uncle     Amblyopia Neg Hx     Blindness Neg Hx     Cataracts Neg Hx     Glaucoma Neg Hx     Macular degeneration Neg Hx     Retinal detachment Neg Hx     Strabismus Neg Hx      Past Surgical History:   Procedure Laterality Date    BIOPSY OF URETER Left 2/18/2022    Procedure: BIOPSY, URETER;  Surgeon: Ronel Whittington MD;  Location: [facility] OR [address] [place] [address];  Service: Urology;  Laterality: Left;    BREAST BIOPSY Right     benign    CHOLECYSTECTOMY      COLONOSCOPY N/A 1/13/2017    Procedure: COLONOSCOPY;  Surgeon: Morris Wiseman MD;  Location: [facility] (4TH FLR);  Service: Endoscopy;  Laterality: N/A;  Renal pt Nephrology advised to avoid phosphate preps    CYSTOSCOPY N/A 10/8/2018    Procedure: CYSTOSCOPY;  Surgeon: Ronel Whittington MD;  Location: [facility] OR [address] [place] [address];  Service: Urology;  Laterality: N/A;  45 min    CYSTOSCOPY N/A 3/25/2019     Procedure: CYSTOSCOPY;  Surgeon: Ronel Whittington MD;  Location: [facility] OR 1ST FLR;  Service: Urology;  Laterality: N/A;  45 min    CYSTOSCOPY N/A 8/26/2019    Procedure: CYSTOSCOPY;  Surgeon: Ronel Whittington MD;  Location: [facility] OR 1ST FLR;  Service: Urology;  Laterality: N/A;  45 min    CYSTOSCOPY N/A 7/2/2021    Procedure: CYSTOSCOPY;  Surgeon: Ronel Whittington MD;  Location: [facility] OR 1ST FLR;  Service: Urology;  Laterality: N/A;    CYSTOSCOPY  12/23/2021    Procedure: CYSTOSCOPY;  Surgeon: Ronel Whittington MD;  [facility];  Service: Urology;;    CYSTOSCOPY  2/18/2022    Procedure: CYSTOSCOPY;  Surgeon: Ronel Whittington MD;  [facility];  Service: Urology;;    CYSTOSCOPY W/ URETERAL STENT PLACEMENT Left 5/15/2021    Procedure: CYSTOSCOPY, WITH URETERAL STENT INSERTION;  Surgeon: Levy Sánchez Jr., MD;  Location: [facility] OR [address] [place] [address];  Service: Urology;  Laterality: Left;    CYSTOSCOPY WITH BIOPSY OF BLADDER N/A 1/27/2020    Procedure: CYSTOSCOPY, WITH BLADDER BIOPSY, WITH FULGURATION IF INDICATED;  Surgeon: Ronel Whittington MD;  Location: [facility] OR [address] [place] [address];  Service: Urology;  Laterality: N/A;    DILATION AND CURETTAGE OF UTERUS      GALLBLADDER SURGERY  2006    HYSTERECTOMY      INJECTION FOR SENTINEL NODE IDENTIFICATION Left 8/1/2019    Procedure: INJECTION, FOR SENTINEL NODE IDENTIFICATION;  Surgeon: Samia Fulton MD;  Location: [facility] OR 2ND [facility];  Service: General;  Laterality: Left;    INJECTION OF BOTULINUM TOXIN TYPE A N/A 10/8/2018    Procedure: INJECTION, BOTULINUM TOXIN, TYPE A 300 UNITS;  Surgeon: Ronel Whittington MD;  Location: [facility] OR [address] [place] [address];  Service: Urology;  Laterality: N/A;    INJECTION OF BOTULINUM TOXIN TYPE A N/A 3/25/2019    Procedure: INJECTION, BOTULINUM TOXIN, TYPE A 300 UNITS;  Surgeon: Ronel Whittington MD;  Location: [facility] OR 1ST FLR;  Service: Urology;  Laterality: N/A;    INJECTION OF BOTULINUM TOXIN TYPE A N/A  8/26/2019    Procedure: INJECTION, BOTULINUM TOXIN, TYPE A 300 UNITS;  Surgeon: Ronel Whittington MD;  [facility];  Service: Urology;  Laterality: N/A;    MASTECTOMY Left 8/1/2019    Procedure: MASTECTOMY 23 hour stay;  Surgeon: Samia Fulton MD;  Location: [facility] OR 2ND FLR;  Service: General;  Laterality: Left;    MEDIPORT REMOVAL Right 6/24/2022    Procedure: REMOVAL, CATHETER, CENTRAL VENOUS, TUNNELED, WITH PORT;  Surgeon: Isauro Anderson MD;  Location: [facility] CATH LAB;  Service: Radiology;  Laterality: Right;    OVARIAN CYST SURGERY  1985    REPLACEMENT OF STENT Left 12/23/2021    Procedure: REPLACEMENT, STENT;  Surgeon: Ronel Whittington MD;  [facility];  Service: Urology;  Laterality: Left;    REPLACEMENT OF STENT Left 2/18/2022    Procedure: REPLACEMENT, STENT;  Surgeon: Ronel Whittington MD;  [facility];  Service: Urology;  Laterality: Left;    RETROGRADE PYELOGRAPHY Left 2/18/2022    Procedure: PYELOGRAM, RETROGRADE;  Surgeon: Ronel Whittington MD;  [facility];  Service: Urology;  Laterality: Left;    SENTINEL LYMPH NODE BIOPSY Left 8/1/2019    Procedure: BIOPSY, LYMPH NODE, SENTINEL;  Surgeon: Samia Fulton MD;  Location: [facility] OR 2ND FLR;  Service: General;  Laterality: Left;    spt placement      TONSILLECTOMY, ADENOIDECTOMY      TOTAL ABDOMINAL HYSTERECTOMY W/ BILATERAL SALPINGOOPHORECTOMY  1985    URETEROSCOPY Left 2/18/2022    Procedure: URETEROSCOPY;  Surgeon: Ronel Whittington MD;  [facility];  Service: Urology;  Laterality: Left;     Review of patient's allergies indicates:  No Known Allergies    Medications:    Current Outpatient Medications:     anastrozole (ARIMIDEX) 1 mg Tab, Take 1 tablet (1 mg total) by mouth once daily., Disp: 90 tablet, Rfl: 2    atorvastatin (LIPITOR) 80 MG tablet, Take 1 tablet (80 mg total) by mouth once daily., Disp: 90 tablet, Rfl: 3    erenumab-aooe (AIMOVIG) 140  "mg/mL autoinjector, Inject 1 mL (140 mg total) into the skin every 30 days., Disp: 1 mL, Rfl: 11    ergocalciferol (ERGOCALCIFEROL) 50,000 unit Cap, TAKE ONE CAPSULE BY MOUTH ONE TIME PER WEEK, TAKES ON TUESDAYS, Disp: 12 capsule, Rfl: 3    insulin (LANTUS SOLOSTAR U-100 INSULIN) glargine 100 units/mL SubQ pen, INJECT 14-15 UNITS UNDER THE SKIN ONCE DAILY (Patient taking differently: INJECT 14-16 UNITS UNDER THE SKIN ONCE DAILY), Disp: 15 each, Rfl: 3    methocarbamoL (ROBAXIN) 750 MG Tab, TAKE 1-2 TABLETS (750-1,500 MG TOTAL) BY MOUTH 3 (THREE) TIMES DAILY AS NEEDED (MUSCLE SPASMS). (Patient taking differently: TAKE 1 TABLET BY MOUTH 3 (THREE) TIMES DAILY AS NEEDED (MUSCLE SPASMS).), Disp: 180 tablet, Rfl: 1    oxybutynin (DITROPAN-XL) 10 MG 24 hr tablet, Take 1 tablet (10 mg total) by mouth once daily., Disp: 100 tablet, Rfl: 3    sodium bicarbonate 650 MG tablet, Take 1 tablet (650 mg total) by mouth 3 (three) times daily. (Patient taking differently: Take 650 mg by mouth 2 (two) times daily.), Disp: 90 tablet, Rfl: 11    SYNTHROID 50 mcg tablet, TAKE 1 TABLET BY MOUTH BEFORE BREAKFAST. ADMINISTER ON AN EMPTY STOMACH AT LEAST 30 MINUTES BEFORE FOOD. IF RECEIVING TUBE FEEDS, HOLD TUBE FEEDS FOR 1 HOUR BEFORE AND AFTER LEVOTHYROXINE ADMINISTRATION., Disp: 90 tablet, Rfl: 2    traZODone (DESYREL) 100 MG tablet, TAKE 1 TABLET BY MOUTH NIGHTLY AS NEEDED FOR INSOMNIA., Disp: 90 tablet, Rfl: 2    amLODIPine (NORVASC) 5 MG tablet, Take 5 mg by mouth once daily., Disp: , Rfl:     apixaban (ELIQUIS) 5 mg Tab, Take 1 tablet (5 mg total) by mouth 2 (two) times daily., Disp: 180 tablet, Rfl: 3    BD INSULIN SYRINGE ULTRA-FINE 0.5 mL 31 gauge x 5/16" Syrg, USE WITH INSULIN 4 TIMES A DAY, Disp: 100 each, Rfl: 12    calcium acetate,phosphat bind, (PHOSLO) 667 mg tablet, Take 1 tablet (667 mg total) by mouth 3 (three) times daily with meals., Disp: 90 tablet, Rfl: 11    carvediloL (COREG) 6.25 MG tablet, Take 1 tablet (6.25 mg " "total) by mouth 2 (two) times daily., Disp: 60 tablet, Rfl: 11    DULoxetine (CYMBALTA) 20 MG capsule, Take 20 mg by mouth 2 (two) times daily., Disp: , Rfl:     furosemide (LASIX) 40 MG tablet, Take 1 tablet (40 mg total) by mouth once daily., Disp: 30 tablet, Rfl: 11    HYDROcodone-acetaminophen (NORCO)  mg per tablet, Take 1 tablet by mouth every 6 (six) hours as needed for Pain., Disp: 28 tablet, Rfl: 0    isosorbide mononitrate (IMDUR) 30 MG 24 hr tablet, Take 1 tablet (30 mg total) by mouth once daily., Disp: 30 tablet, Rfl: 11    magnesium oxide (MAG-OX) 400 mg (241.3 mg magnesium) tablet, Take 1 tablet (400 mg total) by mouth once daily., Disp: 30 tablet, Rfl: 2    NIFEdipine (PROCARDIA-XL) 90 MG (OSM) 24 hr tablet, Take 1 tablet (90 mg total) by mouth once daily., Disp: 30 tablet, Rfl: 11    pen needle, diabetic (BD ULTRA-FINE SHORT PEN NEEDLE) 31 gauge x 5/16" Ndle, USE WITH LANTUS DAILY, e 11.65, Disp: 100 each, Rfl: 3    sumatriptan (IMITREX) 100 MG tablet, Take 1 tablet (100 mg total) by mouth 2 (two) times daily as needed for Migraine., Disp: 30 tablet, Rfl: 1    24h Oral Morphine Equivalents (OME):  n/a    Objective:     Physical Exam:    There is no height or weight on file to calculate BMI.    Physical Exam  Vitals and nursing note reviewed.   Constitutional:       Appearance: Normal appearance.   HENT:      Head: Normocephalic and atraumatic.      Nose: Rhinorrhea present.      Mouth/Throat:      Mouth: Mucous membranes are moist.   Eyes:      Extraocular Movements: Extraocular movements intact.      Conjunctiva/sclera: Conjunctivae normal.   Cardiovascular:      Rate and Rhythm: Normal rate and regular rhythm.      Pulses: Normal pulses.      Heart sounds: Murmur (grade III/VI) heard.   Pulmonary:      Effort: Pulmonary effort is normal.      Breath sounds: Normal breath sounds.      Comments: BBS CTA  Abdominal:      General: Bowel sounds are normal.      Palpations: Abdomen is soft. "   Genitourinary:     Comments: +supra pubic catheter intact; no redness  Musculoskeletal:         General: Swelling present. Normal range of motion.      Cervical back: Normal range of motion and neck supple.      Right lower leg: Edema (BLE edema 2-3+ pitting?) present.      Left lower leg: Edema (2-3+ pitting edema pitting) present.      Comments: +back pain   Skin:     General: Skin is warm and dry.      Capillary Refill: Capillary refill takes 2 to 3 seconds.      Coloration: Skin is pale.      Findings: Rash (on feet) present.      Comments: Dry flaking skin on feet; fungal appearance    Neurological:      Mental Status: She is oriented to person, place, and time.      Motor: Weakness present.      Gait: Gait abnormal.   Psychiatric:         Mood and Affect: Mood normal.         Behavior: Behavior normal.         Thought Content: Thought content normal.         Review of Symptoms      Symptom Assessment (ESAS 0-10 Scale)  Pain:  8  Dyspnea:  0  Anxiety:  0  Nausea:  4  Depression:  0  Anorexia:  0  Fatigue:  7  Insomnia:  6  Restlessness:  2  Agitation:  1     CAM / Delirium:  Negative  Constipation:  Positive  Diarrhea:  Negative    Anxiety:  Is not nervous/anxious  Constipation:  Constipation    Bowel Management Plan (BMP):  Yes (eats 4 prunes a day)      Comments:  LBM 6/12 hard    Pain Assessment:    Location(s): back (back pain)    Back       Location: posterior        Quality: Cramping, sharp, stabbing, throbbing, tingling, shooting, aching and dull        Quantity: 8/10 in intensity        Chronicity: Onset 8 year(s) ago, gradually worsening        Aggravating Factors: Movement and walking        Alleviating Factors: Opiates       Associated Symptoms: None    Performance Status:  40    Karnofsky Performance Scale:  40%    Living Arrangements:  Lives with family    Psychosocial/Cultural:   See Palliative Psychosocial Note: Yes  **Primary  to Follow**  Palliative Care  Consult:  No    Spiritual:  F - Yulia and Belief:  Jessica  I - Importance:  Highly  C - Community:  [facility]  A - Address in Care:  No spiritual needs identified     Time-Based Charting:  No        Advance Care Planning   Advance Directives:   Living Will: No        Oral Declaration: No    LaPOST: No (reviewed with patient and sister; left form at house for pt to complete)    Do Not Resuscitate Status: Yes    Medical Power of : No (reviewed POA form;left at house for patient to complete)        Oral Declaration: No    Agent's Name:  Pooja Sánchez (sister)   Agent's Contact Number:  [phone] cell    Decision Making:  Patient answered questions and Family answered questions  Goals of Care: The patient and family endorses that what is most important right now is to focus on spending time at home, remaining as independent as possible, symptom/pain control, quality of life, even if it means sacrificing a little time, improvement in condition but with limits to invasive therapies, and comfort and QOL     Accordingly, we have decided that the best plan to meet the patient's goals includes continuing with treatment         Labs:  CBC:   WBC   Date Value Ref Range Status   08/18/2023 7.97 3.90 - 12.70 K/uL Final         Hemoglobin   Date Value Ref Range Status   08/18/2023 7.0 (L) 12.0 - 16.0 g/dL Final         POC Hematocrit   Date Value Ref Range Status   08/07/2023 23 (L) 36 - 54 %PCV Final     Hematocrit   Date Value Ref Range Status   08/18/2023 23.4 (L) 37.0 - 48.5 % Final         MCV   Date Value Ref Range Status   08/18/2023 91 82 - 98 fL Final         Platelets   Date Value Ref Range Status   08/18/2023 214 150 - 450 K/uL Final       LFT:   Lab Results   Component Value Date    AST 11 08/18/2023    ALKPHOS 96 08/18/2023    BILITOT 0.2 08/18/2023       Albumin:   Albumin   Date Value Ref Range Status   08/18/2023 2.1 (L) 3.5 - 5.2 g/dL Final   02/09/2022 3.3 (L) 3.5 - 5.2 g/dL Final      Protein:   Total Protein   Date Value Ref Range Status   08/18/2023 4.9 (L) 6.0 - 8.4 g/dL Final       Radiology:  I have reviewed all pertinent imaging results/findings within the past 24 hours.      Assessment:     1. Palliative care encounter    2. Encounter for preventive health examination    3. Counseling regarding advance care planning and goals of care    4. Suprapubic catheter    5. Hypertension associated with diabetes    6. Type 2 diabetes mellitus with stage 4 chronic kidney disease, with long-term current use of insulin    7. Abnormality of gait and mobility    8. Bilateral lower extremity edema        Plan:   1. Palliative care encounter  -palliative care referral placed  -initiate palliative care services today  -Reviewed LaPOST Form & left form at patient's home   -LaPOST Form completed on   -CODE STATUS>>>Full code  -Discussed Palliative care and Hospice  -Patient/family want to continue with Palliative care services  -6/14/2023 admit patient to palliative care>>reviewed LaPOST and POA forms; pt to complete      2. Encounter for preventive health examination  -     Ambulatory referral/consult to [facility]    3. Counseling regarding advance care planning and goals of care  -6/14/2023 discussed goals of care and patient would like to get stronger and pain free    4. Suprapubic catheter  -spoke with [facility] nurse Isauro to change catheter q30 days    5. Hypertension associated with diabetes  -took medications this afternoon  -continue Amlodipine  -continue Coreg 6.25mg BID  -continue Procardia XL 90mg daily  -low sodium diet    6. Type 2 diabetes mellitus with stage 4 chronic kidney disease, with long-term current use of insulin  -Xiomara sensor to monitor BG  -Lantus 14-16 units daily    7. Abnormality of gait and mobility  -fall precautions  -maintain pt safety    8. Bilateral lower extremity edema  -continue lasix   -elevate BLE to reduce edema      Were controlled  substances prescribed?  No    Total clinical care time was 60min. Reviewed Chart and PCP's notes thenThe following issues were discussed: PMHx, PSHx, Social history, reviewed medications, diet, appetite pain issues, spine pain, BLE edema, hypertension, Type 2 DM and monitoring Xiomara, suprapubic catheter, abnormality of gait.     An additional 30 minutes of the visit was spent in advanced care planning.   Explained to patient what palliative care is and palliative care vs home health and hospice. Reviewed LaPOST and AD with patient. Discussed goals of care       Follow Up Appointments:   Future Appointments   Date Time Provider Department Center   8/21/2023 11:30 AM Catherine Mccartney, MAMADOU [facility] IM [place]   8/21/2023  2:00 PM CHAIR 04, [facility] [place]   8/23/2023  2:00 PM CHAIR 26, [facility] [place]   8/25/2023  9:10 AM [facility] [address] [facility]   8/25/2023  2:00 PM CHAIR 21, [facility] [place]   8/28/2023 10:30 AM CHAIR 24, [facility] Kidney [address]   8/28/2023  2:00 PM Brooke Gabriel, DIANN, JOCELINEP [facility] IM [address] PCW   8/30/2023 10:40 AM Tesha Stafford MD [facility] PHYSMED [address]   8/30/2023  2:00 PM CHAIR 21, [facility]ney [address]   9/1/2023  8:00 AM Rylee Cheng NP [address]   9/1/2023  2:00 PM CHAIR 21, [facility] KIDNEY [facility] [address]   9/7/2023  2:20 PM PRIV PRE-ADMIT, ENDO -[facility] ENDOPP4 [facility]   10/11/2023  8:45 AM LALITO Maria II, MD [facility] VEINCL [facility]   10/26/2023 11:15 AM Regan Brock DPM [facility] POD [address]   1/11/2024  2:00 PM [facility]       Patient and family agreed via verbal consent to access medical records and for assessment and treatment during this visit.    Attestation: Screening criteria to assess the level of the patient's risk for infection with COVID-19 as  recommended by the CDC at the time of the above documented home visit concluded appropriateness to proceed.     Universal precautions were maintained at all times, including provider use of >60% alcohol gel hand  immediately prior to entry and upon departing the patient's home as well as cleaning of equipment used in home visit with antibacterial/germicidal disposable wipes.    Patient has not been exposed to anyone with the virus (to the patient's knowledge)  Patient has not been out of the country in the last 14 days.      Rylee Cheng DNP, FNP-C  [facility]  [phone]

## 2023-06-15 ENCOUNTER — LAB (OUTPATIENT)
Dept: LAB | Facility: CLINIC | Age: 38
End: 2023-06-15
Payer: MEDICARE

## 2023-06-15 DIAGNOSIS — Z94.0 KIDNEY REPLACED BY TRANSPLANT: ICD-10-CM

## 2023-06-15 LAB
ANION GAP SERPL CALCULATED.3IONS-SCNC: 9 MMOL/L (ref 7–15)
BUN SERPL-MCNC: 27.6 MG/DL (ref 6–20)
CALCIUM SERPL-MCNC: 9.6 MG/DL (ref 8.6–10)
CHLORIDE SERPL-SCNC: 107 MMOL/L (ref 98–107)
CREAT SERPL-MCNC: 1.55 MG/DL (ref 0.67–1.17)
DEPRECATED HCO3 PLAS-SCNC: 21 MMOL/L (ref 22–29)
ERYTHROCYTE [DISTWIDTH] IN BLOOD BY AUTOMATED COUNT: 13.5 % (ref 10–15)
GFR SERPL CREATININE-BSD FRML MDRD: 58 ML/MIN/1.73M2
GLUCOSE SERPL-MCNC: 85 MG/DL (ref 70–99)
HCT VFR BLD AUTO: 28.9 % (ref 40–53)
HGB BLD-MCNC: 9.3 G/DL (ref 13.3–17.7)
MCH RBC QN AUTO: 30.4 PG (ref 26.5–33)
MCHC RBC AUTO-ENTMCNC: 32.2 G/DL (ref 31.5–36.5)
MCV RBC AUTO: 94 FL (ref 78–100)
PLATELET # BLD AUTO: 360 10E3/UL (ref 150–450)
POTASSIUM SERPL-SCNC: 4.5 MMOL/L (ref 3.4–5.3)
RBC # BLD AUTO: 3.06 10E6/UL (ref 4.4–5.9)
SODIUM SERPL-SCNC: 137 MMOL/L (ref 136–145)
TACROLIMUS BLD-MCNC: 11.1 UG/L (ref 5–15)
TME LAST DOSE: NORMAL H
TME LAST DOSE: NORMAL H
WBC # BLD AUTO: 2.6 10E3/UL (ref 4–11)

## 2023-06-15 PROCEDURE — 80180 DRUG SCRN QUAN MYCOPHENOLATE: CPT

## 2023-06-15 PROCEDURE — 36415 COLL VENOUS BLD VENIPUNCTURE: CPT

## 2023-06-15 PROCEDURE — 82310 ASSAY OF CALCIUM: CPT

## 2023-06-15 PROCEDURE — 80197 ASSAY OF TACROLIMUS: CPT

## 2023-06-15 PROCEDURE — 85027 COMPLETE CBC AUTOMATED: CPT

## 2023-06-16 ENCOUNTER — MYC MEDICAL ADVICE (OUTPATIENT)
Dept: TRANSPLANT | Facility: CLINIC | Age: 38
End: 2023-06-16
Payer: MEDICARE

## 2023-06-16 LAB
CMV DNA SPEC NAA+PROBE-ACNC: NOT DETECTED IU/ML
MYCOPHENOLATE SERPL LC/MS/MS-MCNC: 2.23 MG/L (ref 1–3.5)
MYCOPHENOLATE-G SERPL LC/MS/MS-MCNC: 57.5 MG/L (ref 30–95)
TME LAST DOSE: NORMAL H
TME LAST DOSE: NORMAL H

## 2023-06-19 ENCOUNTER — LAB (OUTPATIENT)
Dept: LAB | Facility: CLINIC | Age: 38
End: 2023-06-19
Payer: MEDICARE

## 2023-06-19 ENCOUNTER — TELEPHONE (OUTPATIENT)
Dept: TRANSPLANT | Facility: CLINIC | Age: 38
End: 2023-06-19
Payer: MEDICARE

## 2023-06-19 DIAGNOSIS — Z94.0 KIDNEY REPLACED BY TRANSPLANT: Primary | ICD-10-CM

## 2023-06-19 DIAGNOSIS — Z94.0 KIDNEY REPLACED BY TRANSPLANT: ICD-10-CM

## 2023-06-19 LAB
ANION GAP SERPL CALCULATED.3IONS-SCNC: 13 MMOL/L (ref 7–15)
BUN SERPL-MCNC: 29.4 MG/DL (ref 6–20)
CALCIUM SERPL-MCNC: 9.1 MG/DL (ref 8.6–10)
CHLORIDE SERPL-SCNC: 105 MMOL/L (ref 98–107)
CREAT SERPL-MCNC: 1.55 MG/DL (ref 0.67–1.17)
DEPRECATED HCO3 PLAS-SCNC: 20 MMOL/L (ref 22–29)
ERYTHROCYTE [DISTWIDTH] IN BLOOD BY AUTOMATED COUNT: 13.7 % (ref 10–15)
GFR SERPL CREATININE-BSD FRML MDRD: 58 ML/MIN/1.73M2
GLUCOSE SERPL-MCNC: 83 MG/DL (ref 70–99)
HCT VFR BLD AUTO: 28.4 % (ref 40–53)
HGB BLD-MCNC: 9.3 G/DL (ref 13.3–17.7)
MCH RBC QN AUTO: 30.8 PG (ref 26.5–33)
MCHC RBC AUTO-ENTMCNC: 32.7 G/DL (ref 31.5–36.5)
MCV RBC AUTO: 94 FL (ref 78–100)
PLATELET # BLD AUTO: 356 10E3/UL (ref 150–450)
POTASSIUM SERPL-SCNC: 4.3 MMOL/L (ref 3.4–5.3)
RBC # BLD AUTO: 3.02 10E6/UL (ref 4.4–5.9)
SODIUM SERPL-SCNC: 138 MMOL/L (ref 136–145)
TACROLIMUS BLD-MCNC: 8.5 UG/L (ref 5–15)
TME LAST DOSE: NORMAL H
TME LAST DOSE: NORMAL H
WBC # BLD AUTO: 3.1 10E3/UL (ref 4–11)

## 2023-06-19 PROCEDURE — 80197 ASSAY OF TACROLIMUS: CPT

## 2023-06-19 PROCEDURE — 36415 COLL VENOUS BLD VENIPUNCTURE: CPT

## 2023-06-19 PROCEDURE — 85027 COMPLETE CBC AUTOMATED: CPT

## 2023-06-19 PROCEDURE — 80048 BASIC METABOLIC PNL TOTAL CA: CPT

## 2023-06-19 PROCEDURE — 80180 DRUG SCRN QUAN MYCOPHENOLATE: CPT

## 2023-06-19 RX ORDER — TACROLIMUS 1 MG/1
4 CAPSULE ORAL 2 TIMES DAILY
Qty: 300 CAPSULE | Refills: 11 | Status: SHIPPED | OUTPATIENT
Start: 2023-06-19 | End: 2023-07-11

## 2023-06-19 RX ORDER — TACROLIMUS 0.5 MG/1
0.5 CAPSULE ORAL 2 TIMES DAILY
Qty: 60 CAPSULE | Refills: 11 | Status: SHIPPED | OUTPATIENT
Start: 2023-06-19 | End: 2023-07-11

## 2023-06-19 NOTE — TELEPHONE ENCOUNTER
"Pt responds to [place] message from DIPTI Edgar:  \"Chip         Do you have any 0.5 mg  tacrolimus  tablets   If yes  lower your tacrolimus  to 4.5 mg tablets  -- if no 0.5 mg tabs\"      Will change TAC RX accordingly.  "

## 2023-06-20 LAB
MYCOPHENOLATE SERPL LC/MS/MS-MCNC: 2.85 MG/L (ref 1–3.5)
MYCOPHENOLATE-G SERPL LC/MS/MS-MCNC: 51 MG/L (ref 30–95)
TME LAST DOSE: NORMAL H
TME LAST DOSE: NORMAL H

## 2023-06-22 ENCOUNTER — LAB (OUTPATIENT)
Dept: LAB | Facility: CLINIC | Age: 38
End: 2023-06-22
Payer: MEDICARE

## 2023-06-22 DIAGNOSIS — Z94.0 KIDNEY REPLACED BY TRANSPLANT: ICD-10-CM

## 2023-06-22 LAB
ANION GAP SERPL CALCULATED.3IONS-SCNC: 11 MMOL/L (ref 7–15)
BUN SERPL-MCNC: 25.4 MG/DL (ref 6–20)
CALCIUM SERPL-MCNC: 9.2 MG/DL (ref 8.6–10)
CHLORIDE SERPL-SCNC: 106 MMOL/L (ref 98–107)
CREAT SERPL-MCNC: 1.61 MG/DL (ref 0.67–1.17)
DEPRECATED HCO3 PLAS-SCNC: 20 MMOL/L (ref 22–29)
ERYTHROCYTE [DISTWIDTH] IN BLOOD BY AUTOMATED COUNT: 14 % (ref 10–15)
GFR SERPL CREATININE-BSD FRML MDRD: 55 ML/MIN/1.73M2
GLUCOSE SERPL-MCNC: 90 MG/DL (ref 70–99)
HCT VFR BLD AUTO: 27.9 % (ref 40–53)
HGB BLD-MCNC: 8.9 G/DL (ref 13.3–17.7)
MCH RBC QN AUTO: 30.2 PG (ref 26.5–33)
MCHC RBC AUTO-ENTMCNC: 31.9 G/DL (ref 31.5–36.5)
MCV RBC AUTO: 95 FL (ref 78–100)
PLATELET # BLD AUTO: 277 10E3/UL (ref 150–450)
POTASSIUM SERPL-SCNC: 4.3 MMOL/L (ref 3.4–5.3)
RBC # BLD AUTO: 2.95 10E6/UL (ref 4.4–5.9)
SODIUM SERPL-SCNC: 137 MMOL/L (ref 136–145)
TACROLIMUS BLD-MCNC: 8.2 UG/L (ref 5–15)
TME LAST DOSE: NORMAL H
TME LAST DOSE: NORMAL H
WBC # BLD AUTO: 2.7 10E3/UL (ref 4–11)

## 2023-06-22 PROCEDURE — 85027 COMPLETE CBC AUTOMATED: CPT

## 2023-06-22 PROCEDURE — 36415 COLL VENOUS BLD VENIPUNCTURE: CPT

## 2023-06-22 PROCEDURE — 80197 ASSAY OF TACROLIMUS: CPT

## 2023-06-22 PROCEDURE — 82310 ASSAY OF CALCIUM: CPT

## 2023-06-22 PROCEDURE — 80180 DRUG SCRN QUAN MYCOPHENOLATE: CPT

## 2023-06-23 LAB
MYCOPHENOLATE SERPL LC/MS/MS-MCNC: 2.9 MG/L (ref 1–3.5)
MYCOPHENOLATE-G SERPL LC/MS/MS-MCNC: 43.2 MG/L (ref 30–95)
TME LAST DOSE: NORMAL H
TME LAST DOSE: NORMAL H

## 2023-06-24 ENCOUNTER — TELEPHONE (OUTPATIENT)
Dept: INFECTIOUS DISEASES | Facility: CLINIC | Age: 38
End: 2023-06-24
Payer: MEDICARE

## 2023-06-24 NOTE — TELEPHONE ENCOUNTER
EP called 6/24 to resched 8/9 video follow up with Dr. Willis; provider not avail. Appt moved to 8/16.

## 2023-06-26 ENCOUNTER — LAB (OUTPATIENT)
Dept: LAB | Facility: CLINIC | Age: 38
End: 2023-06-26
Payer: MEDICARE

## 2023-06-26 DIAGNOSIS — Z20.828 CONTACT WITH AND (SUSPECTED) EXPOSURE TO OTHER VIRAL COMMUNICABLE DISEASES: ICD-10-CM

## 2023-06-26 DIAGNOSIS — Z48.298 AFTERCARE FOLLOWING ORGAN TRANSPLANT: ICD-10-CM

## 2023-06-26 DIAGNOSIS — Z79.899 ENCOUNTER FOR LONG-TERM CURRENT USE OF MEDICATION: ICD-10-CM

## 2023-06-26 DIAGNOSIS — Z94.0 KIDNEY REPLACED BY TRANSPLANT: ICD-10-CM

## 2023-06-26 LAB
ANION GAP SERPL CALCULATED.3IONS-SCNC: 10 MMOL/L (ref 7–15)
BUN SERPL-MCNC: 24.2 MG/DL (ref 6–20)
CALCIUM SERPL-MCNC: 9.1 MG/DL (ref 8.6–10)
CHLORIDE SERPL-SCNC: 105 MMOL/L (ref 98–107)
CREAT SERPL-MCNC: 1.49 MG/DL (ref 0.67–1.17)
DEPRECATED HCO3 PLAS-SCNC: 20 MMOL/L (ref 22–29)
ERYTHROCYTE [DISTWIDTH] IN BLOOD BY AUTOMATED COUNT: 13.8 % (ref 10–15)
GFR SERPL CREATININE-BSD FRML MDRD: 61 ML/MIN/1.73M2
GLUCOSE SERPL-MCNC: 84 MG/DL (ref 70–99)
HCT VFR BLD AUTO: 30.1 % (ref 40–53)
HGB BLD-MCNC: 10 G/DL (ref 13.3–17.7)
MCH RBC QN AUTO: 31.3 PG (ref 26.5–33)
MCHC RBC AUTO-ENTMCNC: 33.2 G/DL (ref 31.5–36.5)
MCV RBC AUTO: 94 FL (ref 78–100)
PLATELET # BLD AUTO: 275 10E3/UL (ref 150–450)
POTASSIUM SERPL-SCNC: 4.2 MMOL/L (ref 3.4–5.3)
RBC # BLD AUTO: 3.2 10E6/UL (ref 4.4–5.9)
SODIUM SERPL-SCNC: 135 MMOL/L (ref 136–145)
WBC # BLD AUTO: 3.9 10E3/UL (ref 4–11)

## 2023-06-26 PROCEDURE — 80197 ASSAY OF TACROLIMUS: CPT

## 2023-06-26 PROCEDURE — 87799 DETECT AGENT NOS DNA QUANT: CPT

## 2023-06-26 PROCEDURE — 80048 BASIC METABOLIC PNL TOTAL CA: CPT

## 2023-06-26 PROCEDURE — 36415 COLL VENOUS BLD VENIPUNCTURE: CPT

## 2023-06-26 PROCEDURE — 85027 COMPLETE CBC AUTOMATED: CPT

## 2023-06-27 ENCOUNTER — MYC MEDICAL ADVICE (OUTPATIENT)
Dept: TRANSPLANT | Facility: CLINIC | Age: 38
End: 2023-06-27
Payer: MEDICARE

## 2023-06-27 LAB
BKV DNA # SPEC NAA+PROBE: NOT DETECTED COPIES/ML
TACROLIMUS BLD-MCNC: 7.8 UG/L (ref 5–15)
TME LAST DOSE: NORMAL H
TME LAST DOSE: NORMAL H

## 2023-07-03 ENCOUNTER — LAB (OUTPATIENT)
Dept: LAB | Facility: CLINIC | Age: 38
DRG: 698 | End: 2023-07-03
Payer: MEDICARE

## 2023-07-03 DIAGNOSIS — Z48.298 AFTERCARE FOLLOWING ORGAN TRANSPLANT: ICD-10-CM

## 2023-07-03 DIAGNOSIS — Z79.899 ENCOUNTER FOR LONG-TERM CURRENT USE OF MEDICATION: ICD-10-CM

## 2023-07-03 DIAGNOSIS — Z94.0 KIDNEY REPLACED BY TRANSPLANT: ICD-10-CM

## 2023-07-03 DIAGNOSIS — Z20.828 CONTACT WITH AND (SUSPECTED) EXPOSURE TO OTHER VIRAL COMMUNICABLE DISEASES: ICD-10-CM

## 2023-07-03 LAB
ANION GAP SERPL CALCULATED.3IONS-SCNC: 12 MMOL/L (ref 7–15)
BUN SERPL-MCNC: 27.1 MG/DL (ref 6–20)
CALCIUM SERPL-MCNC: 9.3 MG/DL (ref 8.6–10)
CHLORIDE SERPL-SCNC: 103 MMOL/L (ref 98–107)
CREAT SERPL-MCNC: 1.78 MG/DL (ref 0.67–1.17)
DEPRECATED HCO3 PLAS-SCNC: 20 MMOL/L (ref 22–29)
ERYTHROCYTE [DISTWIDTH] IN BLOOD BY AUTOMATED COUNT: 13.5 % (ref 10–15)
GFR SERPL CREATININE-BSD FRML MDRD: 49 ML/MIN/1.73M2
GLUCOSE SERPL-MCNC: 99 MG/DL (ref 70–99)
HCT VFR BLD AUTO: 33.6 % (ref 40–53)
HGB BLD-MCNC: 10.9 G/DL (ref 13.3–17.7)
MCH RBC QN AUTO: 30.2 PG (ref 26.5–33)
MCHC RBC AUTO-ENTMCNC: 32.4 G/DL (ref 31.5–36.5)
MCV RBC AUTO: 93 FL (ref 78–100)
PLATELET # BLD AUTO: 287 10E3/UL (ref 150–450)
POTASSIUM SERPL-SCNC: 4.9 MMOL/L (ref 3.4–5.3)
RBC # BLD AUTO: 3.61 10E6/UL (ref 4.4–5.9)
SODIUM SERPL-SCNC: 135 MMOL/L (ref 136–145)
WBC # BLD AUTO: 3.3 10E3/UL (ref 4–11)

## 2023-07-03 PROCEDURE — 80048 BASIC METABOLIC PNL TOTAL CA: CPT

## 2023-07-03 PROCEDURE — 36415 COLL VENOUS BLD VENIPUNCTURE: CPT

## 2023-07-03 PROCEDURE — 85027 COMPLETE CBC AUTOMATED: CPT

## 2023-07-03 PROCEDURE — 80197 ASSAY OF TACROLIMUS: CPT

## 2023-07-04 ENCOUNTER — APPOINTMENT (OUTPATIENT)
Dept: GENERAL RADIOLOGY | Facility: CLINIC | Age: 38
DRG: 698 | End: 2023-07-04
Attending: INTERNAL MEDICINE
Payer: MEDICARE

## 2023-07-04 ENCOUNTER — HOSPITAL ENCOUNTER (INPATIENT)
Facility: CLINIC | Age: 38
LOS: 4 days | Discharge: HOME | DRG: 698 | End: 2023-07-08
Attending: INTERNAL MEDICINE | Admitting: SURGERY
Payer: MEDICARE

## 2023-07-04 ENCOUNTER — APPOINTMENT (OUTPATIENT)
Dept: ULTRASOUND IMAGING | Facility: CLINIC | Age: 38
DRG: 698 | End: 2023-07-04
Attending: INTERNAL MEDICINE
Payer: MEDICARE

## 2023-07-04 DIAGNOSIS — R50.9 HIGH FEVER: ICD-10-CM

## 2023-07-04 DIAGNOSIS — N39.0 FREQUENT UTI: ICD-10-CM

## 2023-07-04 DIAGNOSIS — Z94.0 KIDNEY REPLACED BY TRANSPLANT: ICD-10-CM

## 2023-07-04 DIAGNOSIS — N12 PYELONEPHRITIS OF TRANSPLANTED KIDNEY: Primary | ICD-10-CM

## 2023-07-04 DIAGNOSIS — Z94.0 STATUS POST KIDNEY TRANSPLANT: ICD-10-CM

## 2023-07-04 DIAGNOSIS — T86.19 PYELONEPHRITIS OF TRANSPLANTED KIDNEY: Primary | ICD-10-CM

## 2023-07-04 DIAGNOSIS — R50.9 HYPERTHERMIA-INDUCED DEFECT: ICD-10-CM

## 2023-07-04 DIAGNOSIS — Z94.0 TRANSPLANTED KIDNEY: ICD-10-CM

## 2023-07-04 DIAGNOSIS — E87.20 METABOLIC ACIDOSIS: ICD-10-CM

## 2023-07-04 DIAGNOSIS — R65.10 SIRS (SYSTEMIC INFLAMMATORY RESPONSE SYNDROME) (H): ICD-10-CM

## 2023-07-04 DIAGNOSIS — R65.10 SYSTEMIC INFLAMMATORY RESPONSE SYNDROME DUE TO NON-INFECTIOUS PROCESS WITHOUT ACUTE ORGAN DYSFUNCTION (H): ICD-10-CM

## 2023-07-04 LAB
ALBUMIN SERPL BCG-MCNC: 4.2 G/DL (ref 3.5–5.2)
ALBUMIN UR-MCNC: 70 MG/DL
ALP SERPL-CCNC: 53 U/L (ref 40–129)
ALT SERPL W P-5'-P-CCNC: 14 U/L (ref 0–70)
ANION GAP SERPL CALCULATED.3IONS-SCNC: 14 MMOL/L (ref 7–15)
APPEARANCE UR: CLEAR
AST SERPL W P-5'-P-CCNC: 27 U/L (ref 0–45)
ATRIAL RATE - MUSE: 111 BPM
BASOPHILS # BLD MANUAL: 0.1 10E3/UL (ref 0–0.2)
BASOPHILS NFR BLD MANUAL: 2 %
BILIRUB SERPL-MCNC: 0.4 MG/DL
BILIRUB UR QL STRIP: NEGATIVE
BUN SERPL-MCNC: 26.1 MG/DL (ref 6–20)
CALCIUM SERPL-MCNC: 9.5 MG/DL (ref 8.6–10)
CHLORIDE SERPL-SCNC: 102 MMOL/L (ref 98–107)
CK SERPL-CCNC: 88 U/L (ref 39–308)
COLOR UR AUTO: ABNORMAL
CREAT SERPL-MCNC: 1.86 MG/DL (ref 0.67–1.17)
DEPRECATED HCO3 PLAS-SCNC: 17 MMOL/L (ref 22–29)
DIASTOLIC BLOOD PRESSURE - MUSE: NORMAL MMHG
EOSINOPHIL # BLD MANUAL: 0 10E3/UL (ref 0–0.7)
EOSINOPHIL NFR BLD MANUAL: 0 %
ERYTHROCYTE [DISTWIDTH] IN BLOOD BY AUTOMATED COUNT: 13.3 % (ref 10–15)
FLUAV RNA SPEC QL NAA+PROBE: NEGATIVE
FLUBV RNA RESP QL NAA+PROBE: NEGATIVE
GFR SERPL CREATININE-BSD FRML MDRD: 47 ML/MIN/1.73M2
GLUCOSE SERPL-MCNC: 139 MG/DL (ref 70–99)
GLUCOSE UR STRIP-MCNC: NEGATIVE MG/DL
HCT VFR BLD AUTO: 31.2 % (ref 40–53)
HGB BLD-MCNC: 9.8 G/DL (ref 13.3–17.7)
HGB UR QL STRIP: ABNORMAL
INR PPP: 1.29 (ref 0.85–1.15)
INTERPRETATION ECG - MUSE: NORMAL
KETONES UR STRIP-MCNC: NEGATIVE MG/DL
LACTATE SERPL-SCNC: 1.5 MMOL/L (ref 0.7–2)
LEUKOCYTE ESTERASE UR QL STRIP: NEGATIVE
LYMPHOCYTES # BLD MANUAL: 0.4 10E3/UL (ref 0.8–5.3)
LYMPHOCYTES NFR BLD MANUAL: 14 %
MCH RBC QN AUTO: 29 PG (ref 26.5–33)
MCHC RBC AUTO-ENTMCNC: 31.4 G/DL (ref 31.5–36.5)
MCV RBC AUTO: 92 FL (ref 78–100)
MONOCYTES # BLD MANUAL: 0.7 10E3/UL (ref 0–1.3)
MONOCYTES NFR BLD MANUAL: 23 %
NEUTROPHILS # BLD MANUAL: 1.8 10E3/UL (ref 1.6–8.3)
NEUTROPHILS NFR BLD MANUAL: 61 %
NITRATE UR QL: NEGATIVE
P AXIS - MUSE: 69 DEGREES
PH UR STRIP: 6 [PH] (ref 5–7)
PLAT MORPH BLD: ABNORMAL
PLATELET # BLD AUTO: 219 10E3/UL (ref 150–450)
POTASSIUM SERPL-SCNC: 4 MMOL/L (ref 3.4–5.3)
PR INTERVAL - MUSE: 122 MS
PROCALCITONIN SERPL IA-MCNC: 1.33 NG/ML
PROT SERPL-MCNC: 7.4 G/DL (ref 6.4–8.3)
QRS DURATION - MUSE: 82 MS
QT - MUSE: 288 MS
QTC - MUSE: 391 MS
R AXIS - MUSE: 69 DEGREES
RBC # BLD AUTO: 3.38 10E6/UL (ref 4.4–5.9)
RBC MORPH BLD: ABNORMAL
RBC URINE: 4 /HPF
RSV RNA SPEC NAA+PROBE: NEGATIVE
SARS-COV-2 RNA RESP QL NAA+PROBE: NEGATIVE
SODIUM SERPL-SCNC: 133 MMOL/L (ref 136–145)
SP GR UR STRIP: 1.02 (ref 1–1.03)
SQUAMOUS EPITHELIAL: 1 /HPF
SYSTOLIC BLOOD PRESSURE - MUSE: NORMAL MMHG
T AXIS - MUSE: 51 DEGREES
TACROLIMUS BLD-MCNC: 9 UG/L (ref 5–15)
TME LAST DOSE: NORMAL H
TME LAST DOSE: NORMAL H
TROPONIN T SERPL HS-MCNC: 22 NG/L
UROBILINOGEN UR STRIP-MCNC: NORMAL MG/DL
VENTRICULAR RATE- MUSE: 111 BPM
WBC # BLD AUTO: 2.9 10E3/UL (ref 4–11)
WBC URINE: 11 /HPF

## 2023-07-04 PROCEDURE — 99285 EMERGENCY DEPT VISIT HI MDM: CPT | Mod: 25 | Performed by: INTERNAL MEDICINE

## 2023-07-04 PROCEDURE — 76776 US EXAM K TRANSPL W/DOPPLER: CPT

## 2023-07-04 PROCEDURE — 87040 BLOOD CULTURE FOR BACTERIA: CPT | Performed by: INTERNAL MEDICINE

## 2023-07-04 PROCEDURE — 76776 US EXAM K TRANSPL W/DOPPLER: CPT | Mod: 26 | Performed by: RADIOLOGY

## 2023-07-04 PROCEDURE — 71045 X-RAY EXAM CHEST 1 VIEW: CPT | Mod: 26 | Performed by: RADIOLOGY

## 2023-07-04 PROCEDURE — 87637 SARSCOV2&INF A&B&RSV AMP PRB: CPT | Performed by: INTERNAL MEDICINE

## 2023-07-04 PROCEDURE — 93010 ELECTROCARDIOGRAM REPORT: CPT | Performed by: INTERNAL MEDICINE

## 2023-07-04 PROCEDURE — 71045 X-RAY EXAM CHEST 1 VIEW: CPT

## 2023-07-04 PROCEDURE — 250N000013 HC RX MED GY IP 250 OP 250 PS 637: Performed by: INTERNAL MEDICINE

## 2023-07-04 PROCEDURE — 84484 ASSAY OF TROPONIN QUANT: CPT | Performed by: INTERNAL MEDICINE

## 2023-07-04 PROCEDURE — 87799 DETECT AGENT NOS DNA QUANT: CPT | Performed by: NURSE PRACTITIONER

## 2023-07-04 PROCEDURE — 36415 COLL VENOUS BLD VENIPUNCTURE: CPT | Performed by: INTERNAL MEDICINE

## 2023-07-04 PROCEDURE — 83605 ASSAY OF LACTIC ACID: CPT | Performed by: INTERNAL MEDICINE

## 2023-07-04 PROCEDURE — 84145 PROCALCITONIN (PCT): CPT | Performed by: INTERNAL MEDICINE

## 2023-07-04 PROCEDURE — 96366 THER/PROPH/DIAG IV INF ADDON: CPT | Performed by: INTERNAL MEDICINE

## 2023-07-04 PROCEDURE — 96365 THER/PROPH/DIAG IV INF INIT: CPT | Performed by: INTERNAL MEDICINE

## 2023-07-04 PROCEDURE — 96361 HYDRATE IV INFUSION ADD-ON: CPT | Performed by: INTERNAL MEDICINE

## 2023-07-04 PROCEDURE — 93005 ELECTROCARDIOGRAM TRACING: CPT | Performed by: INTERNAL MEDICINE

## 2023-07-04 PROCEDURE — 85027 COMPLETE CBC AUTOMATED: CPT | Performed by: INTERNAL MEDICINE

## 2023-07-04 PROCEDURE — 87088 URINE BACTERIA CULTURE: CPT | Performed by: INTERNAL MEDICINE

## 2023-07-04 PROCEDURE — 85610 PROTHROMBIN TIME: CPT | Performed by: INTERNAL MEDICINE

## 2023-07-04 PROCEDURE — 250N000011 HC RX IP 250 OP 636: Performed by: INTERNAL MEDICINE

## 2023-07-04 PROCEDURE — 258N000003 HC RX IP 258 OP 636: Performed by: INTERNAL MEDICINE

## 2023-07-04 PROCEDURE — 81001 URINALYSIS AUTO W/SCOPE: CPT | Performed by: INTERNAL MEDICINE

## 2023-07-04 PROCEDURE — 120N000011 HC R&B TRANSPLANT UMMC

## 2023-07-04 PROCEDURE — 80053 COMPREHEN METABOLIC PANEL: CPT | Performed by: INTERNAL MEDICINE

## 2023-07-04 PROCEDURE — 85007 BL SMEAR W/DIFF WBC COUNT: CPT | Performed by: INTERNAL MEDICINE

## 2023-07-04 PROCEDURE — 96367 TX/PROPH/DG ADDL SEQ IV INF: CPT | Performed by: INTERNAL MEDICINE

## 2023-07-04 PROCEDURE — 82550 ASSAY OF CK (CPK): CPT | Performed by: INTERNAL MEDICINE

## 2023-07-04 RX ORDER — CEFEPIME HYDROCHLORIDE 2 G/1
2 INJECTION, POWDER, FOR SOLUTION INTRAVENOUS ONCE
Status: COMPLETED | OUTPATIENT
Start: 2023-07-04 | End: 2023-07-04

## 2023-07-04 RX ORDER — SODIUM CHLORIDE 9 MG/ML
INJECTION, SOLUTION INTRAVENOUS CONTINUOUS
Status: DISCONTINUED | OUTPATIENT
Start: 2023-07-04 | End: 2023-07-08

## 2023-07-04 RX ORDER — ACETAMINOPHEN 500 MG
1000 TABLET ORAL ONCE
Status: COMPLETED | OUTPATIENT
Start: 2023-07-04 | End: 2023-07-04

## 2023-07-04 RX ADMIN — SODIUM CHLORIDE: 9 INJECTION, SOLUTION INTRAVENOUS at 22:52

## 2023-07-04 RX ADMIN — VANCOMYCIN HYDROCHLORIDE 1750 MG: 1 INJECTION, POWDER, LYOPHILIZED, FOR SOLUTION INTRAVENOUS at 20:43

## 2023-07-04 RX ADMIN — SODIUM CHLORIDE 1000 ML: 9 INJECTION, SOLUTION INTRAVENOUS at 20:03

## 2023-07-04 RX ADMIN — CEFEPIME HYDROCHLORIDE 2 G: 2 INJECTION, POWDER, FOR SOLUTION INTRAVENOUS at 20:03

## 2023-07-04 RX ADMIN — ACETAMINOPHEN 1000 MG: 500 TABLET ORAL at 20:00

## 2023-07-04 ASSESSMENT — ENCOUNTER SYMPTOMS
CONFUSION: 0
COUGH: 0
DYSURIA: 1
WEAKNESS: 0
HEMATURIA: 0
RHINORRHEA: 0
PALPITATIONS: 0
VOMITING: 0
SHORTNESS OF BREATH: 0
ABDOMINAL PAIN: 0
ADENOPATHY: 0
NECK PAIN: 0
DIARRHEA: 0
NUMBNESS: 0
WHEEZING: 0
HEADACHES: 0
FEVER: 1
CHILLS: 1
SORE THROAT: 0
LIGHT-HEADEDNESS: 0
NAUSEA: 0

## 2023-07-04 ASSESSMENT — ACTIVITIES OF DAILY LIVING (ADL)
ADLS_ACUITY_SCORE: 35
ADLS_ACUITY_SCORE: 35

## 2023-07-05 ENCOUNTER — APPOINTMENT (OUTPATIENT)
Dept: CARDIOLOGY | Facility: CLINIC | Age: 38
DRG: 698 | End: 2023-07-05
Attending: NURSE PRACTITIONER
Payer: MEDICARE

## 2023-07-05 LAB
C COLI+JEJUNI+LARI FUSA STL QL NAA+PROBE: NOT DETECTED
C DIFF TOX B STL QL: NEGATIVE
EC STX1 GENE STL QL NAA+PROBE: NOT DETECTED
EC STX2 GENE STL QL NAA+PROBE: NOT DETECTED
GLUCOSE BLDC GLUCOMTR-MCNC: 125 MG/DL (ref 70–99)
GLUCOSE BLDC GLUCOMTR-MCNC: 143 MG/DL (ref 70–99)
GLUCOSE BLDC GLUCOMTR-MCNC: 168 MG/DL (ref 70–99)
LACTATE SERPL-SCNC: 0.8 MMOL/L (ref 0.7–2)
LVEF ECHO: NORMAL
NOROV GI+II ORF1-ORF2 JNC STL QL NAA+PR: NOT DETECTED
RVA NSP5 STL QL NAA+PROBE: NOT DETECTED
SALMONELLA SP RPOD STL QL NAA+PROBE: NOT DETECTED
SHIGELLA SP+EIEC IPAH STL QL NAA+PROBE: NOT DETECTED
V CHOL+PARA RFBL+TRKH+TNAA STL QL NAA+PR: NOT DETECTED
Y ENTERO RECN STL QL NAA+PROBE: NOT DETECTED

## 2023-07-05 PROCEDURE — 87506 IADNA-DNA/RNA PROBE TQ 6-11: CPT | Performed by: NURSE PRACTITIONER

## 2023-07-05 PROCEDURE — 87799 DETECT AGENT NOS DNA QUANT: CPT | Performed by: NURSE PRACTITIONER

## 2023-07-05 PROCEDURE — 250N000013 HC RX MED GY IP 250 OP 250 PS 637

## 2023-07-05 PROCEDURE — 250N000013 HC RX MED GY IP 250 OP 250 PS 637: Performed by: EMERGENCY MEDICINE

## 2023-07-05 PROCEDURE — 250N000011 HC RX IP 250 OP 636: Performed by: INTERNAL MEDICINE

## 2023-07-05 PROCEDURE — 250N000012 HC RX MED GY IP 250 OP 636 PS 637: Performed by: SURGERY

## 2023-07-05 PROCEDURE — 250N000012 HC RX MED GY IP 250 OP 636 PS 637: Performed by: NURSE PRACTITIONER

## 2023-07-05 PROCEDURE — 258N000003 HC RX IP 258 OP 636: Performed by: INTERNAL MEDICINE

## 2023-07-05 PROCEDURE — 87493 C DIFF AMPLIFIED PROBE: CPT | Performed by: NURSE PRACTITIONER

## 2023-07-05 PROCEDURE — 120N000011 HC R&B TRANSPLANT UMMC

## 2023-07-05 PROCEDURE — 36415 COLL VENOUS BLD VENIPUNCTURE: CPT | Performed by: NURSE PRACTITIONER

## 2023-07-05 PROCEDURE — 83605 ASSAY OF LACTIC ACID: CPT | Performed by: SURGERY

## 2023-07-05 PROCEDURE — 250N000013 HC RX MED GY IP 250 OP 250 PS 637: Performed by: NURSE PRACTITIONER

## 2023-07-05 PROCEDURE — 250N000011 HC RX IP 250 OP 636: Mod: JZ | Performed by: STUDENT IN AN ORGANIZED HEALTH CARE EDUCATION/TRAINING PROGRAM

## 2023-07-05 PROCEDURE — 250N000012 HC RX MED GY IP 250 OP 636 PS 637: Performed by: STUDENT IN AN ORGANIZED HEALTH CARE EDUCATION/TRAINING PROGRAM

## 2023-07-05 PROCEDURE — 93306 TTE W/DOPPLER COMPLETE: CPT | Mod: 26 | Performed by: INTERNAL MEDICINE

## 2023-07-05 PROCEDURE — 36415 COLL VENOUS BLD VENIPUNCTURE: CPT | Performed by: SURGERY

## 2023-07-05 PROCEDURE — 96361 HYDRATE IV INFUSION ADD-ON: CPT | Performed by: INTERNAL MEDICINE

## 2023-07-05 PROCEDURE — 82962 GLUCOSE BLOOD TEST: CPT

## 2023-07-05 PROCEDURE — 99223 1ST HOSP IP/OBS HIGH 75: CPT | Mod: FS

## 2023-07-05 PROCEDURE — 255N000002 HC RX 255 OP 636: Performed by: INTERNAL MEDICINE

## 2023-07-05 PROCEDURE — 250N000013 HC RX MED GY IP 250 OP 250 PS 637: Performed by: STUDENT IN AN ORGANIZED HEALTH CARE EDUCATION/TRAINING PROGRAM

## 2023-07-05 RX ORDER — ONDANSETRON 2 MG/ML
4 INJECTION INTRAMUSCULAR; INTRAVENOUS EVERY 6 HOURS PRN
Status: DISCONTINUED | OUTPATIENT
Start: 2023-07-05 | End: 2023-07-08 | Stop reason: HOSPADM

## 2023-07-05 RX ORDER — LIDOCAINE 40 MG/G
CREAM TOPICAL
Status: DISCONTINUED | OUTPATIENT
Start: 2023-07-05 | End: 2023-07-08 | Stop reason: HOSPADM

## 2023-07-05 RX ORDER — PIPERACILLIN SODIUM, TAZOBACTAM SODIUM 4; .5 G/20ML; G/20ML
4.5 INJECTION, POWDER, LYOPHILIZED, FOR SOLUTION INTRAVENOUS ONCE
Status: DISCONTINUED | OUTPATIENT
Start: 2023-07-05 | End: 2023-07-05 | Stop reason: ALTCHOICE

## 2023-07-05 RX ORDER — TACROLIMUS 1 MG/1
4 CAPSULE ORAL 2 TIMES DAILY
Status: DISCONTINUED | OUTPATIENT
Start: 2023-07-05 | End: 2023-07-05

## 2023-07-05 RX ORDER — PANTOPRAZOLE SODIUM 40 MG/1
40 TABLET, DELAYED RELEASE ORAL
Status: DISCONTINUED | OUTPATIENT
Start: 2023-07-05 | End: 2023-07-08 | Stop reason: HOSPADM

## 2023-07-05 RX ORDER — VITAMIN B COMPLEX
2000 TABLET ORAL DAILY
Status: DISCONTINUED | OUTPATIENT
Start: 2023-07-05 | End: 2023-07-08 | Stop reason: HOSPADM

## 2023-07-05 RX ORDER — VALGANCICLOVIR 450 MG/1
450 TABLET, FILM COATED ORAL DAILY
Status: DISCONTINUED | OUTPATIENT
Start: 2023-07-05 | End: 2023-07-07

## 2023-07-05 RX ORDER — PIPERACILLIN SODIUM, TAZOBACTAM SODIUM 4; .5 G/20ML; G/20ML
4.5 INJECTION, POWDER, LYOPHILIZED, FOR SOLUTION INTRAVENOUS EVERY 6 HOURS
Status: DISCONTINUED | OUTPATIENT
Start: 2023-07-05 | End: 2023-07-07

## 2023-07-05 RX ORDER — ONDANSETRON 4 MG/1
4 TABLET, ORALLY DISINTEGRATING ORAL EVERY 6 HOURS PRN
Status: DISCONTINUED | OUTPATIENT
Start: 2023-07-05 | End: 2023-07-08 | Stop reason: HOSPADM

## 2023-07-05 RX ORDER — SULFAMETHOXAZOLE AND TRIMETHOPRIM 400; 80 MG/1; MG/1
1 TABLET ORAL DAILY
Status: DISCONTINUED | OUTPATIENT
Start: 2023-07-06 | End: 2023-07-08 | Stop reason: HOSPADM

## 2023-07-05 RX ORDER — ACETAMINOPHEN 325 MG/1
650 TABLET ORAL EVERY 6 HOURS PRN
Status: DISCONTINUED | OUTPATIENT
Start: 2023-07-05 | End: 2023-07-08 | Stop reason: HOSPADM

## 2023-07-05 RX ORDER — PROCHLORPERAZINE 25 MG
25 SUPPOSITORY, RECTAL RECTAL EVERY 12 HOURS PRN
Status: DISCONTINUED | OUTPATIENT
Start: 2023-07-05 | End: 2023-07-08 | Stop reason: HOSPADM

## 2023-07-05 RX ORDER — MAGNESIUM OXIDE 400 MG/1
800 TABLET ORAL DAILY
Status: DISCONTINUED | OUTPATIENT
Start: 2023-07-05 | End: 2023-07-07

## 2023-07-05 RX ORDER — SULFAMETHOXAZOLE AND TRIMETHOPRIM 400; 80 MG/1; MG/1
1 TABLET ORAL DAILY
Status: DISCONTINUED | OUTPATIENT
Start: 2023-07-05 | End: 2023-07-05

## 2023-07-05 RX ORDER — ACETAMINOPHEN 500 MG
1000 TABLET ORAL ONCE
Status: COMPLETED | OUTPATIENT
Start: 2023-07-05 | End: 2023-07-05

## 2023-07-05 RX ORDER — SODIUM BICARBONATE 650 MG/1
650 TABLET ORAL 2 TIMES DAILY
Status: DISCONTINUED | OUTPATIENT
Start: 2023-07-05 | End: 2023-07-05

## 2023-07-05 RX ORDER — MYCOPHENOLIC ACID 360 MG/1
360 TABLET, DELAYED RELEASE ORAL
Status: DISCONTINUED | OUTPATIENT
Start: 2023-07-05 | End: 2023-07-08

## 2023-07-05 RX ORDER — TACROLIMUS 0.5 MG/1
0.5 CAPSULE ORAL 2 TIMES DAILY
Status: DISCONTINUED | OUTPATIENT
Start: 2023-07-05 | End: 2023-07-05

## 2023-07-05 RX ORDER — SODIUM BICARBONATE 650 MG/1
1300 TABLET ORAL 2 TIMES DAILY
Status: DISCONTINUED | OUTPATIENT
Start: 2023-07-05 | End: 2023-07-07

## 2023-07-05 RX ORDER — PROCHLORPERAZINE MALEATE 5 MG
10 TABLET ORAL EVERY 6 HOURS PRN
Status: DISCONTINUED | OUTPATIENT
Start: 2023-07-05 | End: 2023-07-08 | Stop reason: HOSPADM

## 2023-07-05 RX ADMIN — Medication 2000 UNITS: at 08:50

## 2023-07-05 RX ADMIN — SODIUM CHLORIDE: 9 INJECTION, SOLUTION INTRAVENOUS at 06:10

## 2023-07-05 RX ADMIN — PIPERACILLIN AND TAZOBACTAM 4.5 G: 4; .5 INJECTION, POWDER, FOR SOLUTION INTRAVENOUS at 08:50

## 2023-07-05 RX ADMIN — MYCOPHENOLIC ACID 360 MG: 360 TABLET, DELAYED RELEASE ORAL at 18:08

## 2023-07-05 RX ADMIN — MAGNESIUM OXIDE TAB 400 MG (241.3 MG ELEMENTAL MG) 800 MG: 400 (241.3 MG) TAB at 14:45

## 2023-07-05 RX ADMIN — HUMAN ALBUMIN MICROSPHERES AND PERFLUTREN 6 ML: 10; .22 INJECTION, SOLUTION INTRAVENOUS at 15:05

## 2023-07-05 RX ADMIN — ACETAMINOPHEN 650 MG: 325 TABLET, FILM COATED ORAL at 11:46

## 2023-07-05 RX ADMIN — PANTOPRAZOLE SODIUM 40 MG: 40 TABLET, DELAYED RELEASE ORAL at 08:50

## 2023-07-05 RX ADMIN — PIPERACILLIN AND TAZOBACTAM 4.5 G: 4; .5 INJECTION, POWDER, FOR SOLUTION INTRAVENOUS at 14:45

## 2023-07-05 RX ADMIN — PIPERACILLIN AND TAZOBACTAM 4.5 G: 4; .5 INJECTION, POWDER, FOR SOLUTION INTRAVENOUS at 19:53

## 2023-07-05 RX ADMIN — SODIUM BICARBONATE 650 MG TABLET 650 MG: at 08:51

## 2023-07-05 RX ADMIN — VANCOMYCIN HYDROCHLORIDE 1250 MG: 10 INJECTION, POWDER, LYOPHILIZED, FOR SOLUTION INTRAVENOUS at 21:29

## 2023-07-05 RX ADMIN — VALGANCICLOVIR HYDROCHLORIDE 450 MG: 450 TABLET ORAL at 08:51

## 2023-07-05 RX ADMIN — ACETAMINOPHEN 1000 MG: 500 TABLET ORAL at 02:40

## 2023-07-05 RX ADMIN — TACROLIMUS 4.5 MG: 1 CAPSULE ORAL at 08:50

## 2023-07-05 RX ADMIN — TACROLIMUS 4.5 MG: 1 CAPSULE ORAL at 18:08

## 2023-07-05 RX ADMIN — SODIUM BICARBONATE 650 MG TABLET 1300 MG: at 19:55

## 2023-07-05 RX ADMIN — ACETAMINOPHEN 650 MG: 325 TABLET, FILM COATED ORAL at 18:12

## 2023-07-05 RX ADMIN — MYCOPHENOLIC ACID 540 MG: 360 TABLET, DELAYED RELEASE ORAL at 08:52

## 2023-07-05 ASSESSMENT — ACTIVITIES OF DAILY LIVING (ADL)
CONCENTRATING,_REMEMBERING_OR_MAKING_DECISIONS_DIFFICULTY: NO
DOING_ERRANDS_INDEPENDENTLY_DIFFICULTY: NO
FALL_HISTORY_WITHIN_LAST_SIX_MONTHS: NO
ADLS_ACUITY_SCORE: 35
ADLS_ACUITY_SCORE: 35
ADLS_ACUITY_SCORE: 18
DIFFICULTY_COMMUNICATING: NO
CHANGE_IN_FUNCTIONAL_STATUS_SINCE_ONSET_OF_CURRENT_ILLNESS/INJURY: NO
ADLS_ACUITY_SCORE: 18
DRESSING/BATHING_DIFFICULTY: NO
TOILETING_ISSUES: NO
ADLS_ACUITY_SCORE: 35
ADLS_ACUITY_SCORE: 18
WEAR_GLASSES_OR_BLIND: NO
DIFFICULTY_EATING/SWALLOWING: NO
HEARING_DIFFICULTY_OR_DEAF: NO
ADLS_ACUITY_SCORE: 35
ADLS_ACUITY_SCORE: 18
ADLS_ACUITY_SCORE: 35
WALKING_OR_CLIMBING_STAIRS_DIFFICULTY: NO
ADLS_ACUITY_SCORE: 35

## 2023-07-05 NOTE — CONSULTS
[facility]  Transplant Nephrology Consult  Date of Admission:  7/4/2023  Today's Date: 07/05/2023  Requesting physician: Wendy Jung MD    Recommendations:  - Check CMV PCR (ordered)  -Decrease tac goal to 6-8 during this admission given he is almost 6m post txp and has had multiple infections  - Increase sodium bicarbonate to 1300 mg PO BID (ordered).  - Tacrolimus level, phos level, and magnesium level with morning labs.  - Consider checking for tick born diseases if blood and urine cultures negative.    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    - Baseline Creatinine: ~ 1.4-1.7   - Proteinuria: Normal (<0.2 grams)   - Date DSA Last Checked: May/2023      Latest DSA: No cPRA 49%   - BK Viremia: No   - Kidney Tx Biopsy: No    -Recommend IV fluids, empiric antibiotics   -Renal transplant U/S without abscess     # Immunosuppression: Tacrolimus immediate release (goal 8-10) and Mycophenolic acid (dose 540 mg every 12 hours)    - 07/03/23  Tacrolimus level: 9.0 (unknown trough)   - Changes: Yes - decrease tac goal to 6-8 during this admission due to recurrent infections and being close to 6m post txp.     # Infection Prophylaxis:   - PJP: Sulfa/TMP (Bactrim)  MWF  - CMV: Valganciclovir (Valcyte) Patient is CMV IgG Ab discordant (D+/R-) and will continue on Valcyte x 6 months, then check CMV PCR monthly until 12 months post transplant.    #Diarrhea  # Fever of unknown origin: He presented to the ED 7/4 with fever that started Monday (7/3).  He reports having diarrhea in the morning today (7/5).    Unresulted Labs Ordered in the Past 30 Days of this Admission     Date and Time Order Name Status Description    7/5/2023 11:45 AM Enteric Bacteria and Virus Panel by MACARIO Stool In process     7/5/2023 11:45 AM C. difficile Toxin B PCR with reflex to C. difficile Antigen and Toxins A/B EIA In process     7/5/2023 11:45 AM BK Virus Quantitative, PCR In  process     7/5/2023  8:52 AM EBV DNA PCR Quantitative Whole Blood In process     7/5/2023  8:52 AM CMV Quantitative, PCR In process     7/4/2023  8:39 PM Urine Culture In process     7/4/2023  7:36 PM Blood Culture Peripheral Blood Preliminary No growth after 12 hours    7/4/2023  7:36 PM Blood Culture Peripheral Blood Preliminary No growth after 12 hours       -Continue vancomycin and zosyn empirically while awaiting cultures   -CMV PCR   -Consider tick borne illness if workup is negative   -Recommend C.diff testing due to diarrhea   -If this is pyelonephritis I recommend transplant ID Consult to consider ppx antibiotics. He was previously on macrobid in 6/2023 given to him post coitally due to concern for UTI symptoms developing    # Blood Pressure: Controlled, but low at times Goal BP: < 140/90   - Volume status: Euvolemic     - Changes: No    # Anemia in Chronic Renal Disease: Hgb: Decreased 9.8      SARA: No   - Iron studies: Low iron saturation 1/31/23    # Mineral Bone Disorder:   - Secondary renal hyperparathyroidism; PTH level: Normal (15-65 pg/ml)   5/25/23     On treatment: None  - Vitamin D; level: Low        On supplement: Yes  - Calcium; level: Normal        On supplement: No  - Phosphorus; level: Not checked recently        On supplement: No     # Electrolytes:   - Potassium; level: Normal        On supplement: No  - Magnesium; level: Not checked recently        On supplement: Yes, magnesium oxide 800 mg PO daily  - Bicarbonate; level: Low  17      On supplement: Yes, increase sodium bicarbonate to 1300 mg PO BID.  - Sodium; level: Normal                # H/o Cardiomyopathy: Normal LVEF at 55-60% with last cardiac echo 9/2022.     # H/o Urethral Stricture:               -Patient is s/p buccal urethroplasty and diverticulum excision 12/2020. Follows closely with Urology.     # H/o Polysubstance Abuse: Patient with h/o methamphetamine and alcohol abuse.  Now sober.    # Transplant History:  Etiology of  Kidney Failure: Etiology of Kidney Failure: Solitary congenital kidney and h/o urethral calculus and urologic issues  Tx: DDKT  Transplant: 1/15/2023 (Kidney)  Significant changes in immunosuppression: None  Significant transplant-related complications: None    Recommendations were communicated to the primary team via this note.    Seen and discussed with Dr. Cerda.    JESSE Chandra CNP  Pager: [phone]      Physician Attestation     I saw and evaluated Chip Fowler as part of a shared APRN/PA visit.     I personally reviewed the vital signs, medications, labs and imaging.    I personally performed the substantive portion of the medical decision making for this visit - please see the DK's documentation for full details.    Key management decisions made by me and carried out under my direction: Continue broad spectrum abx and fluids, obtain C.diff, follow up urine and blood cultures. Increase bicarb to 1300mg bid, send CMV PCR. If workup negative consider tick borne illness. If he has another episode of pyelo (has already had 3 post txp) would involve transplant ID to ask for ppx recommendations. Decrease tac goal to 6-8 given that he is close to 6 months and recurrent infections    I personally performed the substantive portion of the history for this visit - please see the DK's documentation for full details.  Key additional history findings made by me: Pt developed fever starting 2 days prior to presentation. Mild dysuria that goes away with increasing PO intake. Has had 3 episodes of pyleonephritis since transplant.     Franko Cerda MD  Date of Service (when I saw the patient): 07/05/23      REASON FOR CONSULT   History of Kidney Transplant.    History of Present Illness   Chip Fowler is a 38 year old male with PMH of ESRD 2/2 solitary congenital kidney and obstruction who is s/p DDKT on 1/15/23.  He also has a history of cardiomyopathy, polysubstance abuse (now sober), and recurrent UTI. He  was admitted from 5/4/2023 -5/7/2023 with UTI.  He presented to the ED yesterday with fever that started Monday (7/3).  He reports having diarrhea this morning.  UA without leukocyte esterase or nitrite and only 11 WBCs.  His creatinine last night of 1.86 is above his baseline of  ~ 1.4-1.7.        Denies kidney or bladder pain.  No burning with urination.  No SOB on room air. No N/V. No leg edema. SBP mostly 100s-110s.  Tmax 103 overnight.    Review of Systems    The 10 point Review of Systems is negative other than noted in the HPI or here.     Past Medical History    I have reviewed this patient's medical history and updated it with pertinent information if needed.   Past Medical History:   Diagnosis Date     Bladder stones      Cardiomyopathy (H)      ESRD (end stage renal disease) on dialysis (H)      Hypertension      Kidney replaced by transplant 01/15/2023    DCD DDKT. Intermediate risk induction.     Migraines      Polysubstance abuse (H)      Solitary kidney, congenital      Urethral stone      Urethral stricture        Past Surgical History   I have reviewed this patient's surgical history and updated it with pertinent information if needed.  Past Surgical History:   Procedure Laterality Date     BENCH KIDNEY  1/15/2023    Procedure: Bench kidney;  Surgeon: Tez Emerson MD;  [facility]     BIOPSY  02/2020    renal, Religious     COLONOSCOPY N/A 3/23/2023    Procedure: Colonoscopy;  Surgeon: Ana Cardenas MD;  [facility] GI     COMBINED CYSTOSCOPY, LASER HOLMIUM LITHOTRIPSY URETER(S)       CYSTOSCOPY       CYSTOSCOPY FLEXIBLE, CYSTOSTOMY, INSERT TUBE SUPRAPUBIC, COMBINED N/A 12/14/2020    Procedure: CYSTOSCOPY, WITH SUPRAPUBIC CATHETER INSERTION;  Surgeon: Sam Mejia MD;  [facility]     CYSTOSCOPY, OPEN EXCISION URETHRAL DIVERTICULUM, COMBINED N/A 12/14/2020    Procedure: EXCISION OF URETHRAL DIVERTICULUM X2;  Surgeon: Sam Mejia MD;  [facility] OR     HERNIA REPAIR      infant     INSERT CATHETER PERITONEAL DIALYSIS       LASER HOLMIUM LITHOTRIPSY URETER(S), INSERT STENT, COMBINED N/A 2020    Procedure: CYSTOSCOPY, bladder and urethral stone extraction, removal of foreign body, urethral dilation, urethrotomy, laser on standby;  Surgeon: Sam Mejia MD;  [facility]     REMOVE CATHETER PERITONEAL N/A 1/15/2023    Procedure: Remove catheter peritoneal;  Surgeon: Tez Emerson MD;  [facility]     TRANSPLANT KIDNEY RECIPIENT  DONOR N/A 1/15/2023    Procedure: TRANSPLANT, KIDNEY, RECIPIENT,  DONOR WITH DONOR URETER STENTING;  Surgeon: Tez Emerson MD;  [facility]     urethral dilation       URETHROPLASTY WITH BUCCAL GRAFT N/A 2020    Procedure: URETHROPLASTY, USING BUCCAL MUCOSA GRAFT;  Surgeon: Sam Mejia MD;  [facility]       Family History   I have reviewed this patient's family history and updated it with pertinent information if needed.   Family History   Problem Relation Age of Onset     Alzheimer Disease Mother      Unknown/Adopted Father      No Known Problems Sister      No Known Problems Sister      Kidney Disease No family hx of        Social History   I have reviewed this patient's social history and updated it with pertinent information if needed. Chip Fowler  reports that he quit smoking about 5 months ago. His smoking use included cigarettes. He started smoking about 21 years ago. He smoked an average of .5 packs per day. He has never used smokeless tobacco. He reports that he does not currently use alcohol. He reports that he does not currently use drugs after having used the following drugs: Methamphetamines and MDMA (Ecstasy).    Allergies   No Known Allergies  Prior to Admission Medications     pharmacy alert - intermittent dosing  1 each Other See Admin Instructions     vancomycin  1,250 mg Intravenous Q24H       sodium chloride 150 mL/hr  at 23 0610       Physical Exam   Temp  Av.6  F (38.7  C)  Min: 98.8  F (37.1  C)  Max: 103.1  F (39.5  C)      Pulse  Av.6  Min: 83  Max: 125 Resp  Av.3  Min: 15  Max: 20  SpO2  Av.3 %  Min: 93 %  Max: 100 %     /68   Pulse 86   Temp 99  F (37.2  C)   Resp 15   Ht 1.829 m (6')   Wt 68 kg (150 lb)   SpO2 98%   BMI 20.34 kg/m      Admit Weight: 68 kg (150 lb)     GENERAL APPEARANCE: alert and no distress  HENT: mouth without ulcers or lesions  RESP: lungs clear to auscultation - no rales, rhonchi or wheezes  CV: regular rhythm, normal rate, no rub, no murmur  EDEMA: no LE edema bilaterally  ABDOMEN: soft, nondistended, nontender, bowel sounds normal  MS: extremities normal - no gross deformities noted, no evidence of inflammation in joints, no muscle tenderness  SKIN: no rash  PSYCH: mentation appears normal and affect appropriate.    Data   CMP  Recent Labs   Lab 23  1703   * 135*   POTASSIUM 4.0 4.9   CHLORIDE 102 103   CO2 17* 20*   ANIONGAP 14 12   * 99   BUN 26.1* 27.1*   CR 1.86* 1.78*   GFRESTIMATED 47* 49*   VISHNU 9.5 9.3   PROTTOTAL 7.4  --    ALBUMIN 4.2  --    BILITOTAL 0.4  --    ALKPHOS 53  --    AST 27  --    ALT 14  --      CBC  Recent Labs   Lab 23  1703   HGB 9.8* 10.9*   WBC 2.9* 3.3*   RBC 3.38* 3.61*   HCT 31.2* 33.6*   MCV 92 93   MCH 29.0 30.2   MCHC 31.4* 32.4   RDW 13.3 13.5    287     INR  Recent Labs   Lab 23   INR 1.29*     ABGNo lab results found in last 7 days.   Urine Studies  Recent Labs   Lab Test 23  1514 23  1535 04/15/23  0918   COLOR Light Yellow Light Yellow Yellow Yellow   APPEARANCE Clear Clear Slightly Cloudy* Clear   URINEGLC Negative Negative Negative Negative   URINEBILI Negative Negative Negative Negative   URINEKETONE Negative Negative Trace* Negative   SG 1.018 1.020 1.017 1.020   UBLD Trace* Negative Negative Large*   URINEPH 6.0 6.0  6.0 6.5   PROTEIN 70* 30* 100* >=300*   UROBILINOGEN  --   --   --  0.2   NITRITE Negative Negative Negative Negative   LEUKEST Negative Negative Large* Moderate*   RBCU 4* 3* 3* 10-25*   WBCU 11* 7* 71* *     No lab results found.  PTH  Recent Labs   Lab Test 05/25/23  1552 01/31/23  1049 01/20/23  0746 04/18/22  0851   PTHI 52 73* 101* 315*     Iron Studies  Recent Labs   Lab Test 01/31/23  1049 01/20/23  0746   IRON 70 153    244   IRONSAT 27 63*   DAWSON 430* 717*       IMAGING:  All imaging studies reviewed by me.

## 2023-07-05 NOTE — H&P
[facility] [place]    Transplant Surgery  History and Physical    Chip Fowler  : 1985  MRN # [id number]    ADMIT DATE: 2023    PCP: No Ref-Primary, Physician    CHIEF COMPLAINT: fever    HPI: Chip Fowler is a 38 year old male with kidney transplant in  with recurrent UTIs [facility]. UA negative for UTI. He reports no symtpoms other than subjective and objective fevers along with some mild fatigue.    ROS:   CONSTITUTIONAL: Endorses fever, chills, fatigue, Denies weight fluctuations  HEAD: Denies headache.  EENT: Denies vision changes, hearing changes, dysphagia, or sore throat.   NECK: Denies lymphadenopathy.   CV:Denies chest pain, palpitations, or shortness of breath.   PULMONARY:Denies shortness of breath, cough.   GI:Denies nausea, vomiting, diarrhea, and abdominal pain. Bowel movements are regular.   :Denies urinary alterations, dysuria, urinary frequency, hematuria, and abnormal drainage.   EXT:Denies lower extremity edema.   SKIN:Denies abnormal rashes or lesions.   MUSCULOSKELETAL:Denies upper or lower extremity weakness and pain.   NEUROLOGIC:Denies lightheadedness, dizziness, seizures, or upper or lower extremity paresthesia.   HEMATOLOGICAL:No abnormal bruising or bleeding.   PSYCHIATRIC:Denies any mood alterations.     PMH:  Past Medical History:   Diagnosis Date    Bladder stones     Cardiomyopathy (H)     ESRD (end stage renal disease) on dialysis (H)     Hypertension     Kidney replaced by transplant 01/15/2023    DCD DDKT. Intermediate risk induction.    Migraines     Polysubstance abuse (H)     Solitary kidney, congenital     Urethral stone     Urethral stricture        PSH:  Past Surgical History:   Procedure Laterality Date    BENCH KIDNEY  1/15/2023    Procedure: Bench kidney;  Surgeon: Tez Emerson MD;  [facility]    BIOPSY  2020    renal, Hinduism    COLONOSCOPY N/A 3/23/2023     Procedure: Colonoscopy;  Surgeon: Ana Cardenas MD;  [facility]    COMBINED CYSTOSCOPY, LASER HOLMIUM LITHOTRIPSY URETER(S)      CYSTOSCOPY      CYSTOSCOPY FLEXIBLE, CYSTOSTOMY, INSERT TUBE SUPRAPUBIC, COMBINED N/A 2020    Procedure: CYSTOSCOPY, WITH SUPRAPUBIC CATHETER INSERTION;  Surgeon: Sam Mejia MD;  [facility]    CYSTOSCOPY, OPEN EXCISION URETHRAL DIVERTICULUM, COMBINED N/A 2020    Procedure: EXCISION OF URETHRAL DIVERTICULUM X2;  Surgeon: Sam Mejia MD;  [facility]    HERNIA REPAIR      infant    INSERT CATHETER PERITONEAL DIALYSIS      LASER HOLMIUM LITHOTRIPSY URETER(S), INSERT STENT, COMBINED N/A 2020    Procedure: CYSTOSCOPY, bladder and urethral stone extraction, removal of foreign body, urethral dilation, urethrotomy, laser on standby;  Surgeon: aSm Mejia MD;  [facility]    REMOVE CATHETER PERITONEAL N/A 1/15/2023    Procedure: Remove catheter peritoneal;  Surgeon: Tez Emerson MD;  [facility]    TRANSPLANT KIDNEY RECIPIENT  DONOR N/A 1/15/2023    Procedure: TRANSPLANT, KIDNEY, RECIPIENT,  DONOR WITH DONOR URETER STENTING;  Surgeon: Tez Emerson MD;  [facility]    urethral dilation      URETHROPLASTY WITH BUCCAL GRAFT N/A 2020    Procedure: URETHROPLASTY, USING BUCCAL MUCOSA GRAFT;  Surgeon: Sam Mejia MD;  [facility]       MEDICATIONS:  Prior to Admission Medications   Prescriptions Last Dose Informant Patient Reported? Taking?   Vitamin D, Cholecalciferol, 50 MCG ( UT) CAPS   No No   Sig: Take 2,000 Units by mouth daily   magnesium oxide (MAG-OX) 400 MG tablet   No No   Sig: Take 2 tablets (800 mg) by mouth daily 6PM   mycophenolic acid (GENERIC EQUIVALENT) 180 MG EC tablet   No No   Sig: Take 3 tablets (540 mg) by mouth 2 times daily   pantoprazole (PROTONIX) 40 MG EC tablet   No No   Sig: Take 1 tablet (40 mg) by mouth every morning (before  breakfast)   psyllium (METAMUCIL/KONSYL) 58.6 % powder   No No   Sig: Take 18 g (1 Tablespoonful) by mouth 2 times daily as needed (Diarrhea)   sodium bicarbonate 650 MG tablet   No No   Sig: Take 1 tablet (650 mg) by mouth 2 times daily   sulfamethoxazole-trimethoprim (BACTRIM) 400-80 MG tablet   No No   Sig: Take 1 tablet by mouth daily   tacrolimus (GENERIC EQUIVALENT) 0.5 MG capsule   No No   Sig: Take 1 capsule (0.5 mg) by mouth 2 times daily Total dose = 4.5mg twice a day   tacrolimus (GENERIC EQUIVALENT) 1 MG capsule   No No   Sig: Take 4 capsules (4 mg) by mouth 2 times daily Take with 0.5mg caps = total dose 4.5mg twice a day   valGANciclovir (VALCYTE) 450 MG tablet   No No   Sig: Take 2 tablets (900 mg) by mouth daily      Facility-Administered Medications: None        ALLERGIES:   No Known Allergies    FAMILY HISTORY:  Family History   Problem Relation Age of Onset    Alzheimer Disease Mother     Unknown/Adopted Father     No Known Problems Sister     No Known Problems Sister     Kidney Disease No family hx of        SOCIAL HISTORY:  Social History     Socioeconomic History    Marital status: Single     Spouse name: Not on file    Number of children: Not on file    Years of education: Not on file    Highest education level: Not on file   Occupational History    Occupation: material stager   Tobacco Use    Smoking status: Former     Packs/day: 0.50     Types: Cigarettes     Start date:      Quit date: 1/15/2023     Years since quittin.4    Smokeless tobacco: Never   Vaping Use    Vaping Use: Never used   Substance and Sexual Activity    Alcohol use: Not Currently     Comment: quit alcohol 3-4 yrs ago    Drug use: Not Currently     Types: Methamphetamines, MDMA (Ecstasy)    Sexual activity: Yes   Other Topics Concern    Parent/sibling w/ CABG, MI or angioplasty before 65F 55M? Not Asked   Social History Narrative    Not on file     Social Determinants of Health     Financial Resource Strain: Not on  file   Food Insecurity: Not on file   Transportation Needs: Not on file   Physical Activity: Not on file   Stress: Not on file   Social Connections: Not on file   Intimate Partner Violence: Not on file   Housing Stability: Not on file       PHYSICAL EXAM:  Blood pressure 107/62, pulse 83, temperature 98.8  F (37.1  C), temperature source Oral, resp. rate 16, height 1.829 m (6'), weight 68 kg (150 lb), SpO2 99 %.  GENERAL: Appears alert and oriented times three.   HEENT: Eye symmetrical and free of discharge bilaterally. Mucous membranes moist and without lesions.  NECK: Supple and without lymphadenopathy.  CV: RRR, S1S2 present without murmur, rub, or gallop.   RESPIRATORY: Respirations regular, even, and unlabored. Lungs CTA throughout.   GI: Soft and non distended with normoactive bowel sounds present in all quadrants. No tenderness, rebound, guarding. No organomegaly. Expected surgical incisions appropriately healed without erythema or drainage. Old PD cath incisions healed  EXTREMITIES: No peripheral edema. 2+ bilateral pedal pulses.   NEUROLOGIC: Alert and orientated x 3. CN II-XII grossly intact. No focal deficits.   MUSCULOSKELETAL: No joint swelling or tenderness.   SKIN: No jaundice. No rashes or lesions. Very warm.     LABS:  CBC:  Recent Labs   Lab Test 07/04/23 1944   WBC 2.9*   RBC 3.38*   HGB 9.8*   HCT 31.2*   MCV 92   MCH 29.0   MCHC 31.4*   RDW 13.3          CMP:  Recent Labs   Lab Test 07/04/23 1944   *   POTASSIUM 4.0   CHLORIDE 102   VISHNU 9.5   CO2 17*   BUN 26.1*   CR 1.86*   *   AST 27   ALT 14   BILITOTAL 0.4   ALBUMIN 4.2   PROTTOTAL 7.4   ALKPHOS 53       IMAGING:    ASSESSMENT & PLAN:    FEN: renal diet  PROPHY:  Holding bactrim in setting of JACK. Valcyte, immunosuppression  LINES:  PIV  DISPO:  7A  CODE STATUS:  full    Michael Horner MD  [place] Resident, General Surgery  Overnight SICU/Consult Resident     I have reviewed history, examined patient and discussed  plan with the fellow/resident/DK.    I concur with the findings in this note.    Time spent on admission activities: 45 minutes.

## 2023-07-05 NOTE — PHARMACY-VANCOMYCIN DOSING SERVICE
Pharmacy Vancomycin Initial Note  Date of Service 2023  Patient's  1985  38 year old, male    Indication: Sepsis    Current estimated CrCl = Estimated Creatinine Clearance: 53.6 mL/min (A) (based on SCr of 1.78 mg/dL (H)).    Creatinine for last 3 days  7/3/2023:  5:03 PM Creatinine 1.78 mg/dL    Recent Vancomycin Level(s) for last 3 days  No results found for requested labs within last 3 days.      Vancomycin IV Administrations (past 72 hours)      No vancomycin orders with administrations in past 72 hours.                Nephrotoxins and other renal medications (From now, onward)    Start     Dose/Rate Route Frequency Ordered Stop    23  vancomycin (VANCOCIN) 1,250 mg in 0.9% NaCl 250 mL intermittent infusion         1,250 mg  over 90 Minutes Intravenous EVERY 24 HOURS 23 19423  vancomycin (VANCOCIN) 1,750 mg in sodium chloride 0.9 % 500 mL intermittent infusion         1,750 mg  over 120 Minutes Intravenous ONCE 23            Contrast Orders - past 72 hours (72h ago, onward)    None          [employer] Prediction of Planned Initial Vancomycin Regimen  Loading dose: 1750 mg at 19:43 2023.  Regimen: 1250 mg IV every 24 hours.  Start time: 19:45 on 2023  Exposure target: AUC24 (range)400-600 mg/L.hr   AUC24,ss: 509 mg/L.hr  Probability of AUC24 > 400: 76 %  Ctrough,ss: 15 mg/L  Probability of Ctrough,ss > 20: 25 %  Probability of nephrotoxicity (Lodise JI ): 10 %          Plan:  1. Start vancomycin  1750 mg IV once then 1250 mg Q24H.   2. Vancomycin monitoring method: AUC  3. Vancomycin therapeutic monitoring goal: 400-600 mg*h/L  4. Pharmacy will check vancomycin levels as appropriate in 1-3 Days.    5. Serum creatinine levels will be ordered daily for the first week of therapy and at least twice weekly for subsequent weeks.      Joe Manzano [facility]

## 2023-07-05 NOTE — ED PROVIDER NOTES
[place] EMERGENCY DEPARTMENT ([facility])    7/04/23       ED PROVIDER NOTE      History     Chief Complaint   Patient presents with     Fever     Pt is a kidney transplant patient with a fever of 102.9F in triage. Pt reports fever of 104.5 at home.      HPI  Chip Fowler is a 38 year old male who has a past medical history of HTN, ESRD s/p kidney transplant (1/2023), cardiomyopathy and recurrent UTI's presents to the ED with fevers starting yesterday. He initially thought he was hot due to the temperature outside. This afternoon he felt even worse and had fever >104F at home. He has had some dysuria. He has no blood in the urine. He has no URI symptoms, cough, sputum, chest pain, palpitations, cough, shortness of breath or wheezing. He has no nausea, vomiting, abdominal pain, leg pain or skin rash. He has past history of ESRD with right sided kidney transplant. He has had recurrent UTIs. Past cultures have had E coli and enterococcus.    He has a history of non ischemic cardiomyopathy. He had EF of 55-60% on echocardiogram in 9/22.          Past Medical History  Past Medical History:   Diagnosis Date     Bladder stones      Cardiomyopathy (H)      ESRD (end stage renal disease) on dialysis (H)      Hypertension      Kidney replaced by transplant 01/15/2023    DCD DDKT. Intermediate risk induction.     Migraines      Polysubstance abuse (H)      Solitary kidney, congenital      Urethral stone      Urethral stricture      Past Surgical History:   Procedure Laterality Date     BENCH KIDNEY  1/15/2023    Procedure: Bench kidney;  Surgeon: Tez Emerson MD;  Location: [facility] OR     BIOPSY  02/2020    renal, Caodaism     COLONOSCOPY N/A 3/23/2023    Procedure: Colonoscopy;  Surgeon: Ana Cardenas MD;  Location: [facility] GI     COMBINED CYSTOSCOPY, LASER HOLMIUM LITHOTRIPSY URETER(S)       CYSTOSCOPY       CYSTOSCOPY FLEXIBLE, CYSTOSTOMY, INSERT TUBE SUPRAPUBIC, COMBINED N/A  2020    Procedure: CYSTOSCOPY, WITH SUPRAPUBIC CATHETER INSERTION;  Surgeon: Sam Mejia MD;  Location: [facility] OR     CYSTOSCOPY, OPEN EXCISION URETHRAL DIVERTICULUM, COMBINED N/A 2020    Procedure: EXCISION OF URETHRAL DIVERTICULUM X2;  Surgeon: Sam Mejia MD;  Location: [facility] OR     HERNIA REPAIR      infant     INSERT CATHETER PERITONEAL DIALYSIS       LASER HOLMIUM LITHOTRIPSY URETER(S), INSERT STENT, COMBINED N/A 2020    Procedure: CYSTOSCOPY, bladder and urethral stone extraction, removal of foreign body, urethral dilation, urethrotomy, laser on standby;  Surgeon: Sam Mejia MD;  [facility] OR     REMOVE CATHETER PERITONEAL N/A 1/15/2023    Procedure: Remove catheter peritoneal;  Surgeon: Tez Emerson MD;  Location: [facility] OR     TRANSPLANT KIDNEY RECIPIENT  DONOR N/A 1/15/2023    Procedure: TRANSPLANT, KIDNEY, RECIPIENT,  DONOR WITH DONOR URETER STENTING;  Surgeon: Tez Emerson MD;  Location: [facility] OR     urethral dilation       URETHROPLASTY WITH BUCCAL GRAFT N/A 2020    Procedure: URETHROPLASTY, USING BUCCAL MUCOSA GRAFT;  Surgeon: Sam Mejia MD;  Location: [facility] OR     magnesium oxide (MAG-OX) 400 MG tablet  mycophenolic acid (GENERIC EQUIVALENT) 180 MG EC tablet  pantoprazole (PROTONIX) 40 MG EC tablet  psyllium (METAMUCIL/KONSYL) 58.6 % powder  sodium bicarbonate 650 MG tablet  sulfamethoxazole-trimethoprim (BACTRIM) 400-80 MG tablet  tacrolimus (GENERIC EQUIVALENT) 0.5 MG capsule  tacrolimus (GENERIC EQUIVALENT) 1 MG capsule  valGANciclovir (VALCYTE) 450 MG tablet  Vitamin D, Cholecalciferol, 50 MCG (2000) CAPS      No Known Allergies  Family History  Family History   Problem Relation Age of Onset     Alzheimer Disease Mother      Unknown/Adopted Father      No Known Problems Sister      No Known Problems Sister      Kidney Disease No family hx of      Social History   Social History     Tobacco Use     Smoking  status: Former     Packs/day: 0.50     Types: Cigarettes     Start date:      Quit date: 1/15/2023     Years since quittin.4     Smokeless tobacco: Never   Vaping Use     Vaping Use: Never used   Substance Use Topics     Alcohol use: Not Currently     Comment: quit alcohol 3-4 yrs ago     Drug use: Not Currently     Types: Methamphetamines, MDMA (Ecstasy)         Review of Systems   Constitutional: Positive for chills and fever.   HENT: Negative for congestion, rhinorrhea and sore throat.    Eyes: Negative for visual disturbance.   Respiratory: Negative for cough, shortness of breath and wheezing.    Cardiovascular: Negative for chest pain, palpitations and leg swelling.   Gastrointestinal: Negative for abdominal pain, diarrhea, nausea and vomiting.   Genitourinary: Positive for dysuria. Negative for hematuria.   Musculoskeletal: Negative for gait problem and neck pain.   Skin: Negative for pallor.   Neurological: Negative for weakness, light-headedness, numbness and headaches.   Hematological: Negative for adenopathy.   Psychiatric/Behavioral: Negative for confusion.       Physical Exam   BP: 118/70  Pulse: (!) 125  Temp: (!) 102.9  F (39.4  C)  Resp: 20  Height: 182.9 cm (6')  Weight: 68 kg (150 lb)  SpO2: 93 %  Physical Exam  Vitals and nursing note reviewed.   Constitutional:       Appearance: Normal appearance. He is ill-appearing.   HENT:      Head: Normocephalic and atraumatic.      Right Ear: External ear normal.      Left Ear: External ear normal.      Nose: Nose normal.      Mouth/Throat:      Mouth: Mucous membranes are moist.   Eyes:      General: No scleral icterus.     Extraocular Movements: Extraocular movements intact.      Pupils: Pupils are equal, round, and reactive to light.   Cardiovascular:      Rate and Rhythm: Regular rhythm. Tachycardia present.      Heart sounds: No murmur heard.  Pulmonary:      Effort: Pulmonary effort is normal.      Breath sounds: Normal breath sounds. No  wheezing or rales.   Abdominal:      General: Abdomen is flat.      Palpations: Abdomen is soft.      Tenderness: There is no abdominal tenderness. There is no guarding.   Musculoskeletal:      Cervical back: Normal range of motion and neck supple.      Right lower leg: No edema.      Left lower leg: No edema.   Skin:     General: Skin is warm and dry.   Neurological:      General: No focal deficit present.      Mental Status: He is alert and oriented to person, place, and time.      Cranial Nerves: No cranial nerve deficit.      Sensory: No sensory deficit.      Motor: No weakness.   Psychiatric:         Mood and Affect: Mood normal.         Behavior: Behavior normal.           ED Course, Procedures, & Data      Procedures       ED Course Selections:        EKG Interpretation:      Interpreted by BARRY DAVIS MD  Time reviewed: 2001  Symptoms at time of EKG: None   Rhythm: sinus tachycardia  Rate: 110-120  Axis: Normal  Ectopy: none  Conduction: normal  ST Segments/ T Waves: No ST-T wave changes and No acute ischemic changes  Q Waves: none  Comparison to prior: Unchanged from 3/23/23    Clinical Impression: sinus tachycardia           CXR (My prelim interp) No infiltrates, effusions. Normal heart size.        Results for orders placed or performed during the hospital encounter of 07/04/23   XR Chest Port 1 View     Status: None    Narrative    Exam: XR CHEST PORT 1 VIEW, 7/4/2023 8:15 PM    Indication: fever    Comparison: 1/15/2023    Findings:   The cardiomediastinal silhouette and pulmonary vasculature are within  normal limits. No pleural effusion or pneumothorax. No focal airspace  opacity.      Impression    Impression: No acute airspace disease.    GUS AVILA DO         SYSTEM ID:  [id number]    Renal Transplant with Doppler     Status: None (Preliminary result)    Impression    RESIDENT PRELIMINARY INTERPRETATION  IMPRESSION:   1. Patent intergrade Doppler evaluation of the renal  transplant  vasculature.  2. Elevated velocity at the renal artery anastomosis (490 cm/s) and  mid renal artery (385 cm/s), may represent stenosis.  3. Normal grayscale evaluation of the right lower quadrant transplant  kidney.   INR     Status: Abnormal   Result Value Ref Range    INR 1.29 (H) 0.85 - 1.15   Comprehensive metabolic panel     Status: Abnormal   Result Value Ref Range    Sodium 133 (L) 136 - 145 mmol/L    Potassium 4.0 3.4 - 5.3 mmol/L    Chloride 102 98 - 107 mmol/L    Carbon Dioxide (CO2) 17 (L) 22 - 29 mmol/L    Anion Gap 14 7 - 15 mmol/L    Urea Nitrogen 26.1 (H) 6.0 - 20.0 mg/dL    Creatinine 1.86 (H) 0.67 - 1.17 mg/dL    Calcium 9.5 8.6 - 10.0 mg/dL    Glucose 139 (H) 70 - 99 mg/dL    Alkaline Phosphatase 53 40 - 129 U/L    AST 27 0 - 45 U/L    ALT 14 0 - 70 U/L    Protein Total 7.4 6.4 - 8.3 g/dL    Albumin 4.2 3.5 - 5.2 g/dL    Bilirubin Total 0.4 <=1.2 mg/dL    GFR Estimate 47 (L) >60 mL/min/1.73m2   Lactic acid whole blood     Status: Normal   Result Value Ref Range    Lactic Acid 1.5 0.7 - 2.0 mmol/L   Procalcitonin     Status: Abnormal   Result Value Ref Range    Procalcitonin 1.33 (H) <0.05 ng/mL   Troponin T, High Sensitivity     Status: Normal   Result Value Ref Range    Troponin T, High Sensitivity 22 <=22 ng/L   UA with Microscopic reflex to Culture     Status: Abnormal    Specimen: Urine, Midstream   Result Value Ref Range    Color Urine Light Yellow Colorless, Straw, Light Yellow, Yellow    Appearance Urine Clear Clear    Glucose Urine Negative Negative mg/dL    Bilirubin Urine Negative Negative    Ketones Urine Negative Negative mg/dL    Specific Gravity Urine 1.018 1.003 - 1.035    Blood Urine Trace (A) Negative    pH Urine 6.0 5.0 - 7.0    Protein Albumin Urine 70 (A) Negative mg/dL    Urobilinogen Urine Normal Normal, 2.0 mg/dL    Nitrite Urine Negative Negative    Leukocyte Esterase Urine Negative Negative    RBC Urine 4 (H) <=2 /HPF    WBC Urine 11 (H) <=5 /HPF    Squamous  Epithelials Urine 1 <=1 /HPF    Narrative    Urine Culture ordered based on laboratory criteria   Symptomatic Influenza A/B, RSV, & SARS-CoV2 PCR (COVID-19) Nose     Status: Normal    Specimen: Nose; Swab   Result Value Ref Range    Influenza A PCR Negative Negative    Influenza B PCR Negative Negative    RSV PCR Negative Negative    SARS CoV2 PCR Negative Negative    Narrative    Testing was performed using the Xpert Xpress CoV2/Flu/RSV Assay on the [facility] GeneXpert Instrument. This test should be ordered for the detection of SARS-CoV-2, influenza, and RSV viruses in individuals who meet clinical and/or epidemiological criteria. Test performance is unknown in asymptomatic patients. This test is for in vitro diagnostic use under the FDA EUA for laboratories certified under CLIA to perform high or moderate complexity testing. This test has not been FDA cleared or approved. A negative result does not rule out the presence of PCR inhibitors in the specimen or target RNA in concentration below the limit of detection for the assay. If only one viral target is positive but coinfection with multiple targets is suspected, the sample should be re-tested with another FDA cleared, approved, or authorized test, if coinfection would change clinical management. This test was validated by the [facility]. These laboratories are certified under the Clinical Laboratory Improvement Amendments of 1988 (CLIA-88) as qualified to perform high complexity laboratory testing.   CBC with platelets and differential     Status: Abnormal   Result Value Ref Range    WBC Count 2.9 (L) 4.0 - 11.0 10e3/uL    RBC Count 3.38 (L) 4.40 - 5.90 10e6/uL    Hemoglobin 9.8 (L) 13.3 - 17.7 g/dL    Hematocrit 31.2 (L) 40.0 - 53.0 %    MCV 92 78 - 100 fL    MCH 29.0 26.5 - 33.0 pg    MCHC 31.4 (L) 31.5 - 36.5 g/dL    RDW 13.3 10.0 - 15.0 %    Platelet Count 219 150 - 450 10e3/uL   CK total     Status: Normal   Result Value Ref Range    CK  88 39 - 308 U/L   Manual Differential     Status: Abnormal   Result Value Ref Range    % Neutrophils 61 %    % Lymphocytes 14 %    % Monocytes 23 %    % Eosinophils 0 %    % Basophils 2 %    Absolute Neutrophils 1.8 1.6 - 8.3 10e3/uL    Absolute Lymphocytes 0.4 (L) 0.8 - 5.3 10e3/uL    Absolute Monocytes 0.7 0.0 - 1.3 10e3/uL    Absolute Eosinophils 0.0 0.0 - 0.7 10e3/uL    Absolute Basophils 0.1 0.0 - 0.2 10e3/uL    RBC Morphology Confirmed RBC Indices     Platelet Assessment  Automated Count Confirmed. Platelet morphology is normal.     Automated Count Confirmed. Platelet morphology is normal.   EKG 12-lead, tracing only     Status: None   Result Value Ref Range    Systolic Blood Pressure  mmHg    Diastolic Blood Pressure  mmHg    Ventricular Rate 111 BPM    Atrial Rate 111 BPM    DE Interval 122 ms    QRS Duration 82 ms     ms    QTc 391 ms    P Axis 69 degrees    R AXIS 69 degrees    T Axis 51 degrees    Interpretation ECG       Sinus tachycardia  Otherwise normal ECG  Unconfirmed report - interpretation of this ECG is computer generated - see medical record for final interpretation  Confirmed by - EMERGENCY ROOM, PHYSICIAN ([id number]),  SELIN WEST ([id number]) on 7/4/2023 8:10:25 PM     CBC with platelets differential     Status: Abnormal    Narrative    The following orders were created for panel order CBC with platelets differential.  Procedure                               Abnormality         Status                     ---------                               -----------         ------                     CBC with platelets and d...[[id number]]  Abnormal            Final result               Manual Differential[[id number]]          Abnormal            Final result                 Please view results for these tests on the individual orders.     Medications   pharmacy alert - intermittent dosing (has no administration in time range)   vancomycin (VANCOCIN) 1,750 mg in sodium chloride 0.9 % 500 mL  intermittent infusion (1,750 mg Intravenous $New Bag 7/4/23 2043)   vancomycin (VANCOCIN) 1,250 mg in 0.9% NaCl 250 mL intermittent infusion (has no administration in time range)   sodium chloride 0.9% infusion (has no administration in time range)   0.9% sodium chloride BOLUS (0 mLs Intravenous Stopped 7/4/23 2048)   ceFEPIme (MAXIPIME) 2 g vial to attach to  ml bag for ADULTS or 50 ml bag for PEDS (0 g Intravenous Stopped 7/4/23 2041)   acetaminophen (TYLENOL) tablet 1,000 mg (1,000 mg Oral $Given 7/4/23 2000)        Critical care was not performed.     Medical Decision Making  The patient's presentation was of high complexity (an acute health issue posing potential threat to life or bodily function).    The patient's evaluation involved:  ordering and/or review of 3+ test(s) in this encounter (see separate area of note for details)    The patient's management necessitated high risk (a decision regarding hospitalization).      Assessment & Plan    Impression:  Young man with history of renal transplant, congenital solitary kidney with failure and past dialysis, past polysubstance abuse history of non ischemic cardiomyopathy and recurrent UTIs. Most recent urinary organisms were e coli and enterococcus faecalis. He presents with fevers up to 104.5 over the past 2 days. He has been outdoors in warm weather but has no other definite symptoms of heatstroke. He has had some dysuria. He was febrile with temperature of 103 on arrival He was tachycardic with sinus tachycardia on the EKG. CBC is notable for mild leukopenia. UA has 11 WBC and 4 RBC but is not convincing for UTI. Creatinine is 1.86 compared to baseline of 1.75. Electrolytes are otherwise unremarkable. LFTs are normal and CPK total is normal.  He has no URI symptoms or cough. CXR has no infiltrates. EKG has no acute changes. Troponin is normal. Blood and urine cultures are pending. Viral PCR for influenza, RSV and Covid are negative. He has no definite  symptoms of respiratory infection or gastrointestinal process. Give his past history of recurrent UTI urosepsis seems most likely. Procalcitonin is elevated at 1.29. Lactate was 1.9. Transplant renal US has some elevated arterial velocity which may represent anastomotic stenosis. There was no hydronephrosis.     He was treated with 1 liter bolus of IV saline with decrease of pulse to 90. After cultures he was started on IV cefepime and vancomycin for broad spectrum coverage of UTI organisms including enterococcus. With picture of suspected  urosepsis he will be admitted to the internal medicine service for IV antibiotics pending culture results.     Since renal transplant was in January of 2023 internal medicine requests discussion with the kidney transplant service.    I have been unable to speak with the renal transplant fellow. Discussed with the Surgery consult resident who will try to reach the transplant service to discuss the admission.    I have reviewed the nursing notes. I have reviewed the findings, diagnosis, plan and need for follow up with the patient.    New Prescriptions    No medications on file       Final diagnoses:   SIRS (systemic inflammatory response syndrome) (H)   High fever   Status post kidney transplant         [facility] EMERGENCY DEPARTMENT  7/4/2023     Nayan Marrero MD  07/04/23 2238       Nayan Marrero MD  07/04/23 2336       Nayan Marrero MD  07/05/23 0011

## 2023-07-05 NOTE — PROGRESS NOTES
Transplant Surgery  Inpatient Daily Progress Note  07/05/2023    Assessment & Plan: Chip Fowler is a 38 year old male with a PMH of ESRD 2/2 congenital solitary kidney and obstruction s/p DCD kidney transplant 1/15/23, nephrolithiasis, s/p urethroplasty, recurrent UTIs, polysubstance abuse, cardiomyopathy (EF 55-60%). Recent admission 5/4-5/7/23 with E coli UTI (third since transplant). Presented to ED 7/4/23 with fevers of unknown origin up to 103F.     Hx DDKT 1/515/23; JACK of transplanted kidney: Cr 1.7 < 1.9 (up from baseline Cr 1.4-1.6). Renal US patent, no hydronephrosis.     Immunosuppressed status secondary to medications:    - Myfortic 540 mg BID reduced to 360 mg BID in the setting of infection.    - Tacrolimus 4.5 mg BID. Goal level 8-10.      Hematology:   Leukopenia: WBC 2-3, Myfortic decreased as above.   Anemia of chronic disease: Hgb ~9-10, monitor.    Cardiorespiratory: No issues.     GI/Nutrition:   Diarrhea: Colonoscopy 3/23/23 with rare crypt epithelial apoptosis, CMV negative. CMV, C diff, and enteric panel pending. Myfortic reduced as above. Continue PTA Fiber BID PRN.     Endocrine: No issues.     Fluid/Electrolytes: MIVF:  mL/hr  Hypomagnesemia: Continue PTA Mag-Ox 800 mg daily.   Low bicarb: Sodium bicarbonate increased to 1300 mg BID.    :   Hx Urethral strictures: s/p buccal urethroplasty + diverticulum excision 12/2020. No hydronephrosis on US. Urology saw patient 5/16/23, no circumcision for now but cystoscopy scheduled for 7/19/23.     Infectious disease:   Fevers; Pyelonephritis: Tmax 103F. Infectious workup as below indicative of pyelonephritis. Started on empiric Vanc/Zosyn.     Infectious workup:   - 7/4 BCx NGTD   - 7/4 COVID-19/RSV/Influenza negative   - 7/4 UCx prelim + 10-50k E faecalis   - 7/5 C diff not detected   - 7/5 Enteric panel negative   - 7/5 BK PCR not detected   - 7/5 CMV not detected   - 7/5 EBV pending   - 7/5 TTE no vegetation   - 7/6 CT C/A/P with  contrast: mild nonspecific stranding around transplant kidney; small amount of fluid in pelvis and areas of hypoenhancement representative of mild enhancement.     Prophylaxis: DVT (mechanical), fall, GI (PPI), viral (Valcyte x 6 months post-transplant), PCP (Bactrim indefinitely)    Disposition: 7A     DK/Fellow/Resident Provider: Danielle Bach, NP [id number]    Faculty: Partha Staton M.D.  _________________________________________________________________  Transplant History:   1/15/2023 (Kidney), Postoperative day: 171     Interval History: History is obtained from the patient  Overnight events: Remained febrile overnight up to 102, receiving PRN Tylenol. Denies headaches, chest pain, cough/congestion, dizziness, abdominal pain, pain over graft, swelling.     ROS:   A 10-point review of systems was negative except as noted above.    Meds:    magnesium oxide  800 mg Oral Daily     mycophenolic acid  360 mg Oral BID IS     pantoprazole  40 mg Oral QAM AC     piperacillin-tazobactam  4.5 g Intravenous Q6H     sodium bicarbonate  1,300 mg Oral BID     sodium chloride (PF)  3 mL Intracatheter Q8H     [START ON 7/6/2023] sulfamethoxazole-trimethoprim  1 tablet Oral Daily     tacrolimus  4.5 mg Oral BID IS     valGANciclovir  450 mg Oral Daily     vancomycin  1,250 mg Intravenous Q24H     Vitamin D3  2,000 Units Oral Daily       Physical Exam:     Admit Weight: 68 kg (150 lb)    Current vitals:   /56 (BP Location: Right arm)   Pulse 84   Temp 99.9  F (37.7  C) (Oral)   Resp 16   Ht 1.829 m (6')   Wt 68 kg (150 lb)   SpO2 98%   BMI 20.34 kg/m      Vital sign ranges:    Temp:  [98.8  F (37.1  C)-103.2  F (39.6  C)] 99.9  F (37.7  C)  Pulse:  [] 84  Resp:  [15-20] 16  BP: (105-161)/(55-84) 112/56  SpO2:  [93 %-100 %] 98 %    General Appearance: in no apparent distress.   Skin: Warm, perfused  Heart: RRR  Lungs: unlabored on RA  Abdomen: The abdomen is soft, non-tender to palpation.   : shaver is not  present.  Extremities: edema: none noted, strength 5/5  Neurologic: awake, alert and oriented. Tremor absent.     Data:   CMP  Recent Labs   Lab 07/05/23  1304 07/04/23 1944 07/03/23  1703   NA  --  133* 135*   POTASSIUM  --  4.0 4.9   CHLORIDE  --  102 103   CO2  --  17* 20*   * 139* 99   BUN  --  26.1* 27.1*   CR  --  1.86* 1.78*   GFRESTIMATED  --  47* 49*   VISHNU  --  9.5 9.3   ALBUMIN  --  4.2  --    BILITOTAL  --  0.4  --    ALKPHOS  --  53  --    AST  --  27  --    ALT  --  14  --      CBC  Recent Labs   Lab 07/04/23 1944 07/03/23  1703   HGB 9.8* 10.9*   WBC 2.9* 3.3*    287

## 2023-07-05 NOTE — PROVIDER NOTIFICATION
"On-call paged with the following:  \"[id number] HAROON Fowler - YOUSIF pt's temperature increasing, most recent temp 100.4 F. 650 mg PRN Tylenol given. Will continue to monitor. Thanks! - Emy RESENDEZ, [phone]\"      Awaiting response.  "

## 2023-07-05 NOTE — PROGRESS NOTES
Admitted/transferred from: ED  Time of arrival on unit 1500  2 RN full  skin assessment completed by Emy and Charly RNs  Skin assessment finding:  healed abdominal surgical scars  Interventions/actions: skin interventions none needed     Will continue to monitor.

## 2023-07-06 ENCOUNTER — APPOINTMENT (OUTPATIENT)
Dept: ULTRASOUND IMAGING | Facility: CLINIC | Age: 38
DRG: 698 | End: 2023-07-06
Attending: NURSE PRACTITIONER
Payer: MEDICARE

## 2023-07-06 ENCOUNTER — APPOINTMENT (OUTPATIENT)
Dept: CT IMAGING | Facility: CLINIC | Age: 38
DRG: 698 | End: 2023-07-06
Attending: NURSE PRACTITIONER
Payer: MEDICARE

## 2023-07-06 LAB
ANION GAP SERPL CALCULATED.3IONS-SCNC: 8 MMOL/L (ref 7–15)
BKV DNA # SPEC NAA+PROBE: NOT DETECTED COPIES/ML
BUN SERPL-MCNC: 17.7 MG/DL (ref 6–20)
CALCIUM SERPL-MCNC: 8.8 MG/DL (ref 8.6–10)
CHLORIDE SERPL-SCNC: 112 MMOL/L (ref 98–107)
CMV DNA SPEC NAA+PROBE-ACNC: NOT DETECTED IU/ML
CREAT SERPL-MCNC: 1.69 MG/DL (ref 0.67–1.17)
DEPRECATED HCO3 PLAS-SCNC: 18 MMOL/L (ref 22–29)
ERYTHROCYTE [DISTWIDTH] IN BLOOD BY AUTOMATED COUNT: 13.8 % (ref 10–15)
GFR SERPL CREATININE-BSD FRML MDRD: 52 ML/MIN/1.73M2
GLUCOSE BLDC GLUCOMTR-MCNC: 101 MG/DL (ref 70–99)
GLUCOSE BLDC GLUCOMTR-MCNC: 109 MG/DL (ref 70–99)
GLUCOSE BLDC GLUCOMTR-MCNC: 116 MG/DL (ref 70–99)
GLUCOSE SERPL-MCNC: 120 MG/DL (ref 70–99)
HCT VFR BLD AUTO: 26 % (ref 40–53)
HGB BLD-MCNC: 8.4 G/DL (ref 13.3–17.7)
LACTATE SERPL-SCNC: 0.9 MMOL/L (ref 0.7–2)
MAGNESIUM SERPL-MCNC: 1.6 MG/DL (ref 1.7–2.3)
MCH RBC QN AUTO: 30.3 PG (ref 26.5–33)
MCHC RBC AUTO-ENTMCNC: 32.3 G/DL (ref 31.5–36.5)
MCV RBC AUTO: 94 FL (ref 78–100)
PHOSPHATE SERPL-MCNC: 2.1 MG/DL (ref 2.5–4.5)
PLATELET # BLD AUTO: 181 10E3/UL (ref 150–450)
POTASSIUM SERPL-SCNC: 4.3 MMOL/L (ref 3.4–5.3)
RBC # BLD AUTO: 2.77 10E6/UL (ref 4.4–5.9)
SODIUM SERPL-SCNC: 138 MMOL/L (ref 136–145)
TACROLIMUS BLD-MCNC: 10.3 UG/L (ref 5–15)
TME LAST DOSE: NORMAL H
TME LAST DOSE: NORMAL H
TSH SERPL DL<=0.005 MIU/L-ACNC: 1.55 UIU/ML (ref 0.3–4.2)
VANCOMYCIN SERPL-MCNC: 16.7 UG/ML
WBC # BLD AUTO: 2.7 10E3/UL (ref 4–11)

## 2023-07-06 PROCEDURE — 85027 COMPLETE CBC AUTOMATED: CPT | Performed by: STUDENT IN AN ORGANIZED HEALTH CARE EDUCATION/TRAINING PROGRAM

## 2023-07-06 PROCEDURE — 80202 ASSAY OF VANCOMYCIN: CPT

## 2023-07-06 PROCEDURE — 83605 ASSAY OF LACTIC ACID: CPT | Performed by: SURGERY

## 2023-07-06 PROCEDURE — G1010 CDSM STANSON: HCPCS

## 2023-07-06 PROCEDURE — 250N000013 HC RX MED GY IP 250 OP 250 PS 637: Performed by: STUDENT IN AN ORGANIZED HEALTH CARE EDUCATION/TRAINING PROGRAM

## 2023-07-06 PROCEDURE — 250N000011 HC RX IP 250 OP 636: Mod: JZ | Performed by: STUDENT IN AN ORGANIZED HEALTH CARE EDUCATION/TRAINING PROGRAM

## 2023-07-06 PROCEDURE — 250N000011 HC RX IP 250 OP 636: Performed by: INTERNAL MEDICINE

## 2023-07-06 PROCEDURE — 36415 COLL VENOUS BLD VENIPUNCTURE: CPT | Performed by: STUDENT IN AN ORGANIZED HEALTH CARE EDUCATION/TRAINING PROGRAM

## 2023-07-06 PROCEDURE — 250N000012 HC RX MED GY IP 250 OP 636 PS 637: Performed by: NURSE PRACTITIONER

## 2023-07-06 PROCEDURE — 250N000013 HC RX MED GY IP 250 OP 250 PS 637

## 2023-07-06 PROCEDURE — 93970 EXTREMITY STUDY: CPT

## 2023-07-06 PROCEDURE — 93970 EXTREMITY STUDY: CPT | Mod: 26 | Performed by: RADIOLOGY

## 2023-07-06 PROCEDURE — 84100 ASSAY OF PHOSPHORUS: CPT | Performed by: STUDENT IN AN ORGANIZED HEALTH CARE EDUCATION/TRAINING PROGRAM

## 2023-07-06 PROCEDURE — 258N000003 HC RX IP 258 OP 636: Performed by: INTERNAL MEDICINE

## 2023-07-06 PROCEDURE — 80048 BASIC METABOLIC PNL TOTAL CA: CPT | Performed by: STUDENT IN AN ORGANIZED HEALTH CARE EDUCATION/TRAINING PROGRAM

## 2023-07-06 PROCEDURE — 120N000011 HC R&B TRANSPLANT UMMC

## 2023-07-06 PROCEDURE — G1010 CDSM STANSON: HCPCS | Mod: GC | Performed by: RADIOLOGY

## 2023-07-06 PROCEDURE — 36415 COLL VENOUS BLD VENIPUNCTURE: CPT | Performed by: SURGERY

## 2023-07-06 PROCEDURE — 258N000003 HC RX IP 258 OP 636: Performed by: NURSE PRACTITIONER

## 2023-07-06 PROCEDURE — 99233 SBSQ HOSP IP/OBS HIGH 50: CPT | Mod: FS

## 2023-07-06 PROCEDURE — 999N000128 HC STATISTIC PERIPHERAL IV START W/O US GUIDANCE

## 2023-07-06 PROCEDURE — 84443 ASSAY THYROID STIM HORMONE: CPT | Performed by: NURSE PRACTITIONER

## 2023-07-06 PROCEDURE — 71260 CT THORAX DX C+: CPT | Mod: 26 | Performed by: RADIOLOGY

## 2023-07-06 PROCEDURE — 74177 CT ABD & PELVIS W/CONTRAST: CPT | Mod: 26 | Performed by: RADIOLOGY

## 2023-07-06 PROCEDURE — 250N000013 HC RX MED GY IP 250 OP 250 PS 637: Performed by: NURSE PRACTITIONER

## 2023-07-06 PROCEDURE — 83735 ASSAY OF MAGNESIUM: CPT | Performed by: STUDENT IN AN ORGANIZED HEALTH CARE EDUCATION/TRAINING PROGRAM

## 2023-07-06 PROCEDURE — 250N000011 HC RX IP 250 OP 636: Performed by: SURGERY

## 2023-07-06 PROCEDURE — 74177 CT ABD & PELVIS W/CONTRAST: CPT | Mod: MG

## 2023-07-06 PROCEDURE — 80197 ASSAY OF TACROLIMUS: CPT | Performed by: STUDENT IN AN ORGANIZED HEALTH CARE EDUCATION/TRAINING PROGRAM

## 2023-07-06 PROCEDURE — 250N000012 HC RX MED GY IP 250 OP 636 PS 637: Performed by: SURGERY

## 2023-07-06 RX ORDER — IOPAMIDOL 755 MG/ML
90 INJECTION, SOLUTION INTRAVASCULAR ONCE
Status: COMPLETED | OUTPATIENT
Start: 2023-07-06 | End: 2023-07-06

## 2023-07-06 RX ADMIN — PIPERACILLIN AND TAZOBACTAM 4.5 G: 4; .5 INJECTION, POWDER, FOR SOLUTION INTRAVENOUS at 08:20

## 2023-07-06 RX ADMIN — MAGNESIUM OXIDE TAB 400 MG (241.3 MG ELEMENTAL MG) 800 MG: 400 (241.3 MG) TAB at 11:04

## 2023-07-06 RX ADMIN — VANCOMYCIN HYDROCHLORIDE 1250 MG: 10 INJECTION, POWDER, LYOPHILIZED, FOR SOLUTION INTRAVENOUS at 21:55

## 2023-07-06 RX ADMIN — SODIUM CHLORIDE: 9 INJECTION, SOLUTION INTRAVENOUS at 16:21

## 2023-07-06 RX ADMIN — IOPAMIDOL 90 ML: 755 INJECTION, SOLUTION INTRAVENOUS at 11:39

## 2023-07-06 RX ADMIN — SODIUM BICARBONATE 650 MG TABLET 1300 MG: at 08:20

## 2023-07-06 RX ADMIN — TACROLIMUS 4.5 MG: 1 CAPSULE ORAL at 18:00

## 2023-07-06 RX ADMIN — Medication 2000 UNITS: at 08:20

## 2023-07-06 RX ADMIN — Medication 1200 MG: at 12:34

## 2023-07-06 RX ADMIN — MYCOPHENOLIC ACID 360 MG: 360 TABLET, DELAYED RELEASE ORAL at 08:21

## 2023-07-06 RX ADMIN — Medication 1200 MG: at 20:47

## 2023-07-06 RX ADMIN — PANTOPRAZOLE SODIUM 40 MG: 40 TABLET, DELAYED RELEASE ORAL at 08:20

## 2023-07-06 RX ADMIN — PIPERACILLIN AND TAZOBACTAM 4.5 G: 4; .5 INJECTION, POWDER, FOR SOLUTION INTRAVENOUS at 13:44

## 2023-07-06 RX ADMIN — SODIUM BICARBONATE 650 MG TABLET 1300 MG: at 20:47

## 2023-07-06 RX ADMIN — ACETAMINOPHEN 650 MG: 325 TABLET, FILM COATED ORAL at 00:26

## 2023-07-06 RX ADMIN — PIPERACILLIN AND TAZOBACTAM 4.5 G: 4; .5 INJECTION, POWDER, FOR SOLUTION INTRAVENOUS at 02:01

## 2023-07-06 RX ADMIN — PIPERACILLIN AND TAZOBACTAM 4.5 G: 4; .5 INJECTION, POWDER, FOR SOLUTION INTRAVENOUS at 20:48

## 2023-07-06 RX ADMIN — VALGANCICLOVIR HYDROCHLORIDE 450 MG: 450 TABLET ORAL at 08:21

## 2023-07-06 RX ADMIN — TACROLIMUS 4.5 MG: 1 CAPSULE ORAL at 08:21

## 2023-07-06 RX ADMIN — SULFAMETHOXAZOLE AND TRIMETHOPRIM 1 TABLET: 400; 80 TABLET ORAL at 08:20

## 2023-07-06 RX ADMIN — MYCOPHENOLIC ACID 360 MG: 360 TABLET, DELAYED RELEASE ORAL at 18:00

## 2023-07-06 RX ADMIN — SODIUM CHLORIDE: 9 INJECTION, SOLUTION INTRAVENOUS at 05:37

## 2023-07-06 ASSESSMENT — ACTIVITIES OF DAILY LIVING (ADL)
ADLS_ACUITY_SCORE: 18
DEPENDENT_IADLS:: INDEPENDENT
ADLS_ACUITY_SCORE: 18

## 2023-07-06 NOTE — CONSULTS
Care Management Initial Consult    General Information  Assessment completed with: Patient,    Type of CM/SW Visit: Initial Assessment    Primary Care Provider verified and updated as needed: Yes   Readmission within the last 30 days: no previous admission in last 30 days         Advance Care Planning: Advance Care Planning Reviewed: present on chart          Communication Assessment  Patient's communication style: spoken language (English or Bilingual)    Hearing Difficulty or Deaf: no   Wear Glasses or Blind: no    Cognitive  Cognitive/Neuro/Behavioral: WDL                      Living Environment:   People in home: child(paul), dependent, significant other  Girlfriend Ashleigh  Current living Arrangements: house      Able to return to prior arrangements: yes       Family/Social Support:  Care provided by: self  Provides care for: child(paul)  Marital Status: Lives with Significant Other  Significant Other, Parent(s)          Description of Support System: Supportive    Support Assessment: Adequate family and caregiver support    Current Resources:   Patient receiving home care services: No     Community Resources: None  Equipment currently used at home: none  Supplies currently used at home: None    Employment/Financial:  Employment Status: employed full-time        Financial Concerns: No concerns identified           Does the patient's insurance plan have a 3 day qualifying hospital stay waiver?  No    Lifestyle & Psychosocial Needs:  Social Determinants of Health     Tobacco Use: Medium Risk (5/16/2023)    Patient History      Smoking Tobacco Use: Former      Smokeless Tobacco Use: Never      Passive Exposure: Not on file   Alcohol Use: Not on file   Financial Resource Strain: Not on file   Food Insecurity: Not on file   Transportation Needs: Not on file   Physical Activity: Not on file   Stress: Not on file   Social Connections: Not on file   Intimate Partner Violence: Not on file   Depression: Not at risk  (5/8/2023)    PHQ-2      PHQ-2 Score: 0   Housing Stability: Not on file       Functional Status:  Prior to admission patient needed assistance:   Dependent ADLs:: Independent  Dependent IADLs:: Independent  Assesssment of Functional Status: At functional baseline    Mental Health Status:  Mental Health Status: No Current Concerns       Chemical Dependency Status:  Chemical Dependency Status: No Current Concerns             Values/Beliefs:  Spiritual, Cultural Beliefs, Caodaism Practices, Values that affect care: no               Additional Information:  Chip Fowler is a 38 year old male with kidney transplant in Jan with recurrent UTIs who presents to [facility] with neutropenic fever. UA negative for UTI. He reports no symtpoms other than subjective and objective fevers along with some mild fatigue.    Discussed patient's insurance coverage, ESRD Medicare and [place].  Provided this writer's business card and encouraged patient to contact writer with any further questions or concerns.  Patient indicated understanding.  He indicated his girlfriend, Ashleigh, will provide transportation home when patient is medically able to discharge.      Fadumo Truong, PEEWEESW

## 2023-07-06 NOTE — PLAN OF CARE
Time 9972-2593  Goal Outcome Evaluation:      Plan of Care Reviewed With: patient    Overall Patient Progress: no change    Pt A&O. VSS on RA. Afebrile this shift. Denies pain. NS infusing at 100 mL/hr. . No acute changes this shift.

## 2023-07-06 NOTE — PROGRESS NOTES
[facility]   Transplant Nephrology Progress Note  Date of Admission:  7/4/2023  Today's Date: 07/06/2023    Recommendations:  - Reduce Myfortic to 360 mg PO BID given frequent urinary tract infections (ordered).  - I recommend transplant ID Consult to consider ppx antibiotics.  - Continue maintenance fluid.    Assessment & Plan   # DDKT: Trend down but increased from baseline due to infection.    - Baseline Creatinine: ~ 1.4-1.7   - Proteinuria: Normal (<0.2 grams)   - Date DSA Last Checked: May/2023      Latest DSA: No cPRA 49%   - BK Viremia: No   - Kidney Tx Biopsy: No    -Recommend IV fluids, empiric antibiotics while awaiting sensitivities   -Renal transplant U/S without abscess. CT C/A/P with mild stranding around the kidney transplant, patchy hypoenhancement within the kidney transplant.      # Immunosuppression Prior to Admission: Tacrolimus immediate release (goal 8-10) and Mycophenolic acid (dose 540 mg every 12 hours)    - Present Immunosuppression: Tacrolimus immediate release (goal 8-10) and Mycophenolic acid (dose 360 mg every 12 hours)   - Patient is in an immunosuppressed state and will continue to monitor for efficacy and toxicity of immunosuppression medications.   - 07/06/23  Tacrolimus level: 10.3 (~ 11.75 hour trough)   - Changes: Yes - - Reduce Myfortic to 360 mg PO BID given frequent urinary tract infections    # Infection Prophylaxis:   - PJP: Sulfa/TMP (Bactrim)  MWF  - CMV: Valganciclovir (Valcyte) Patient is CMV IgG Ab discordant (D+/R-) and will continue on Valcyte x 6 months, then check CMV PCR monthly until 12 months post transplant.    # Diarrhea  # Fever  # UTI: He presented to the ED 7/4 with fever that started Monday (7/3).  He reports having diarrhea in the morning today (7/5).  Started on vancomycin and zosyn empirically.     Date and Time Order Name     7/5/2023  Enteric Bacteria and Virus Panel by MACARIO Stool negative    7/5/2023  C.  difficile Toxin B PCR with reflex to C. difficile Antigen and Toxins A/B EIA negative    7/5/2023  BK Virus Quantitative, PCR In process    7/5/2023   EBV DNA PCR Quantitative Whole Blood In process    7/5/2023   CMV Quantitative, PCR Negative    7/4/2023 Urine Culture 10,000-50,000 CFU/mL Enterococcus faecalis     7/4/2023   Blood Culture Peripheral Blood No growth to date    7/4/2023   Blood Culture Peripheral Blood No growth to date       - 7/6 CT c/a/p: (in process)   - 7/6 US LE venous duplex:: no evidence of DVT   - I recommend transplant ID Consult to consider ppx antibiotics. He was previously on macrobid in 6/2023 PRN post coital.    # Blood Pressure: Controlled, but low at times Goal BP: < 140/90   - Volume status: Euvolemic     - Changes: No    # Anemia in Chronic Renal Disease: Hgb: Decreased 8.4      SARA: No   - Iron studies: Low iron saturation 1/31/23    # Mineral Bone Disorder:   - Secondary renal hyperparathyroidism; PTH level: Normal (15-65 pg/ml)   5/25/23     On treatment: None  - Vitamin D; level: Low          On supplement: Yes  - Calcium; level: Normal          On supplement: No  - Phosphorus; level: Low 2.1 (will monitor)        On supplement: No     # Electrolytes:  - Potassium; level: Normal        On supplement: No  - Magnesium; level: Low 1.6         On supplement: Yes, magnesium oxide 800 mg PO daily  - Bicarbonate; level: Low  18 (improving)      On supplement: Yes, sodium bicarbonate to 1300 mg PO BID.  - Sodium; level: Normal                # H/o Cardiomyopathy: Normal LVEF at 55-60% with last cardiac echo 9/2022.     # H/o Urethral Stricture:               -Patient is s/p buccal urethroplasty and diverticulum excision 12/2020. Follows closely with Urology.     # H/o Polysubstance Abuse: Patient with h/o methamphetamine and alcohol abuse.  Now sober.    # Transplant History:  Etiology of Kidney Failure: Etiology of Kidney Failure: Solitary congenital kidney and h/o urethral calculus  and urologic issues  Tx: DDKT  Transplant: 1/15/2023 (Kidney)  Significant changes in immunosuppression: None  Significant transplant-related complications: None    Recommendations were communicated to the primary team verbally.    Seen and discussed with Dr. Cerda.    JESSE Chandra CNP   Pager: [phone]      Physician Attestation     I saw and evaluated Chip Fowler as part of a shared APRN/PA visit.     I personally reviewed the vital signs, medications, labs and imaging.    I personally performed the substantive portion of the medical decision making for this visit - please see the DK's documentation for full details.    Key management decisions made by me and carried out under my direction: Follow up sensitivities for E.faecalis. Recommend transplant ID consult to determine if patient could be started on prophylactic antibiotics. Do not recommend mucomist prior to CT scan. The data on pre exposure prophylactic is equivocal. Decrease MPA to 360mg bid given recurrent infections and high normal MPA level     I personally performed the substantive portion of the history for this visit - please see the DK's documentation for full details.  Key additional history findings made by me: Pt feels the same today. Found to have E.faecalis in urine. Stool negative for c.diff and enteric panel    Franko Cerda MD  Date of Service (when I saw the patient): 07/06/23      Interval History    Mr. Fowler's creatinine is 1.69 (07/06 0547); Decreased from 1.86 yesterday.  I/O last 3 completed shifts:  In: 1200 [I.V.:1200]  Out: 2025 [Urine:2025]   Other significant labs/tests/vitals: SBP 100s - 120s overnight. Tmax 102.4.   - 7/4 UC: 10,000-50,000 CFU/mL Enterococcus faecalis   No acute events overnight.  No chest pain or shortness of breath.  No leg swelling.  No nausea and vomiting.      Review of Systems   4 point ROS was obtained and negative except as noted in the Interval History.    MEDICATIONS:    magnesium  oxide  800 mg Oral Daily     mycophenolic acid  360 mg Oral BID IS     pantoprazole  40 mg Oral QAM AC     piperacillin-tazobactam  4.5 g Intravenous Q6H     sodium bicarbonate  1,300 mg Oral BID     sodium chloride (PF)  3 mL Intracatheter Q8H     sulfamethoxazole-trimethoprim  1 tablet Oral Daily     tacrolimus  4.5 mg Oral BID IS     valGANciclovir  450 mg Oral Daily     vancomycin  1,250 mg Intravenous Q24H     Vitamin D3  2,000 Units Oral Daily       sodium chloride 150 mL/hr at 23 0537       Physical Exam   Temp  Av  F (38.3  C)  Min: 98.6  F (37  C)  Max: 103.2  F (39.6  C)      Pulse  Av.4  Min: 69  Max: 125 Resp  Av.4  Min: 15  Max: 20  SpO2  Av.1 %  Min: 93 %  Max: 100 %     /71 (BP Location: Right arm)   Pulse 69   Temp 98.6  F (37  C) (Oral)   Resp 18   Ht 1.829 m (6')   Wt 68 kg (150 lb)   SpO2 99%   BMI 20.34 kg/m      Admit Weight: 68 kg (150 lb)     GENERAL APPEARANCE: alert and no distress  RESP: lungs clear to auscultation - no rales, rhonchi or wheezes  CV: regular rhythm, normal rate, no rub, no murmur  EDEMA: no LE edema bilaterally  ABDOMEN: soft, nondistended, nontender, bowel sounds normal  MS: extremities normal - no gross deformities noted, no evidence of inflammation in joints, no muscle tenderness  SKIN: no rash  PSYCH: mentation appears normal and affect normal/bright    Data   All labs reviewed by me.  CMP  Recent Labs   Lab 23  0200 23  2242 23  1714 23  1304 23  1944 23  1703   NA  --   --   --   --  133* 135*   POTASSIUM  --   --   --   --  4.0 4.9   CHLORIDE  --   --   --   --  102 103   CO2  --   --   --   --  17* 20*   ANIONGAP  --   --   --   --  14 12   * 125* 143* 168* 139* 99   BUN  --   --   --   --  26.1* 27.1*   CR  --   --   --   --  1.86* 1.78*   GFRESTIMATED  --   --   --   --  47* 49*   VISHNU  --   --   --   --  9.5 9.3   PROTTOTAL  --   --   --   --  7.4  --    ALBUMIN  --   --   --   --   4.2  --    BILITOTAL  --   --   --   --  0.4  --    ALKPHOS  --   --   --   --  53  --    AST  --   --   --   --  27  --    ALT  --   --   --   --  14  --      CBC  Recent Labs   Lab 07/06/23  0547 07/04/23 1944 07/03/23  1703   HGB 8.4* 9.8* 10.9*   WBC 2.7* 2.9* 3.3*   RBC 2.77* 3.38* 3.61*   HCT 26.0* 31.2* 33.6*   MCV 94 92 93   MCH 30.3 29.0 30.2   MCHC 32.3 31.4* 32.4   RDW 13.8 13.3 13.5    219 287     INR  Recent Labs   Lab 07/04/23  1944   INR 1.29*     ABGNo lab results found in last 7 days.   Urine Studies  Recent Labs   Lab Test 07/04/23  2015 06/12/23  1514 05/04/23  1535 04/15/23  0918   COLOR Light Yellow Light Yellow Yellow Yellow   APPEARANCE Clear Clear Slightly Cloudy* Clear   URINEGLC Negative Negative Negative Negative   URINEBILI Negative Negative Negative Negative   URINEKETONE Negative Negative Trace* Negative   SG 1.018 1.020 1.017 1.020   UBLD Trace* Negative Negative Large*   URINEPH 6.0 6.0 6.0 6.5   PROTEIN 70* 30* 100* >=300*   UROBILINOGEN  --   --   --  0.2   NITRITE Negative Negative Negative Negative   LEUKEST Negative Negative Large* Moderate*   RBCU 4* 3* 3* 10-25*   WBCU 11* 7* 71* *     No lab results found.  PTH  Recent Labs   Lab Test 05/25/23  1552 01/31/23  1049 01/20/23  0746 04/18/22  0851   PTHI 52 73* 101* 315*     Iron Studies  Recent Labs   Lab Test 01/31/23  1049 01/20/23  0746   IRON 70 153    244   IRONSAT 27 63*   DAWSON 430* 717*       IMAGING:  All imaging studies reviewed by me.

## 2023-07-06 NOTE — PLAN OF CARE
/63 (BP Location: Right arm)   Pulse 80   Temp 100.4  F (38  C) (Oral)   Resp 18   Ht 1.829 m (6')   Wt 68 kg (150 lb)   SpO2 100%   BMI 20.34 kg/m      Shift: 5274-7890  Isolation Status: None - Standard Precautions  VS: VSS on room air, febrile Tmax 100.4 F  Neuro: A&O x 4, able to make needs known  Behaviors: Calm, cooperative, resting between cares  BG: ACHS (143, 125)  Labs: Lactic acid 0.8, Na 133, Cr 1.86, WBC 2.9, Hgb 9.8, INR 1.29  Respiratory: WDL room air  Cardiac: WDL  Pain/Nausea: Denies  PRN: Tylenol - given for fever (see MAR)  Diet: Regular diet, fair appetite  IV Access: L PIV infusing  Infusion(s): MIVF at 150 mL/hr + vancomycin piggyback at 187 mL/hr  Lines/Drains: N/A  GI/: Voiding spontaneously without difficulty. LBM prior to admission.  Skin: Healed incision from kidney transplant in January 2023, otherwise WDL  Mobility: UAL  Events/Education: Admitted from ED this afternoon. Admission questions completed. C. diff panel came back negative - isolation precautions discontinued. Note: Patient has home medications, sent to Pharmacy.  Plan: Monitor temperature values. Continue with plan of care and notify team with any changes.

## 2023-07-06 NOTE — PLAN OF CARE
/71 (BP Location: Right arm)   Pulse 80   Temp (!) 102.4  F (39.1  C)   Resp 17   Ht 1.829 m (6')   Wt 68 kg (150 lb)   SpO2 100%   BMI 20.34 kg/m      Shift: 6113-8641  Isolation Status: neutropenic   VS: stable on RA, TMAX 102.4  Neuro: Aox4  Behaviors: none  BG: AC HS 2AM- 109  Labs: pending AM   Respiratory: RA  Cardiac: WNL  Pain/Nausea: denied   PRN: Tylenol was given for temp and it was 100.9 afterwards, 98.6 at 0600.   Diet: Regular   IV Access: PIV  Infusion(s): NS at 150 with abx in between   GI/: continent of bowel and bladder and urinal is at bedside voided 1L+   Skin: intact   Mobility: UAL in the room   Plan: continue with IV ABX

## 2023-07-06 NOTE — PHARMACY-VANCOMYCIN DOSING SERVICE
"Pharmacy Vancomycin Note  Date of Service 2023  Patient's  1985   38 year old, male    Indication: Sepsis  Day of Therapy: 3  Current vancomycin regimen:  1250 mg IV q24h  Current vancomycin monitoring method: AUC  Current vancomycin therapeutic monitoring goal: 400-600 mg*h/L    InsightRX Prediction of Current Vancomycin Regimen    Regimen: 1250 mg IV every 24 hours.  Start time: 21:29 on 2023  Exposure target: AUC24 (range)400-600 mg/L.hr   AUC24,ss: 466 mg/L.hr  Probability of AUC24 > 400: 75 %  Ctrough,ss: 13.8 mg/L  Probability of Ctrough,ss > 20: 14 %  Probability of nephrotoxicity (Lodise JI ): 9 %    Current estimated CrCl = Estimated Creatinine Clearance: 57.8 mL/min (A) (based on SCr of 1.69 mg/dL (H)).    Creatinine for last 3 days  7/3/2023:  5:03 PM Creatinine 1.78 mg/dL  2023:  7:44 PM Creatinine 1.86 mg/dL  2023:  5:47 AM Creatinine 1.69 mg/dL    Recent Vancomycin Levels (past 3 days)  2023:  5:47 AM Vancomycin 16.7 ug/mL    Vancomycin IV Administrations (past 72 hours)                   vancomycin (VANCOCIN) 1,250 mg in 0.9% NaCl 250 mL intermittent infusion (mg) 1,250 mg New Bag 239    vancomycin (VANCOCIN) 1,750 mg in sodium chloride 0.9 % 500 mL intermittent infusion (mg) 1,750 mg New Bag 23 2043                Nephrotoxins and other renal medications (From now, onward)    Start     Dose/Rate Route Frequency Ordered Stop    23 2100  vancomycin (VANCOCIN) 1,250 mg in 0.9% NaCl 250 mL intermittent infusion         1,250 mg  over 90 Minutes Intravenous EVERY 24 HOURS 23 1944      23 0800  piperacillin-tazobactam (ZOSYN) 4.5 g vial to attach to  mL bag        Note to Pharmacy: For [facility], [facility] and [facility]: For Zosyn-naive patients, use the \"Zosyn initial dose + extended infusion\" order panel.    4.5 g  over 30 Minutes Intravenous EVERY 6 HOURS 23 0733      23 0800  tacrolimus (GENERIC EQUIVALENT) capsule 4.5 mg         " 4.5 mg Oral 2 TIMES DAILY. 07/05/23 0758               Contrast Orders - past 72 hours (72h ago, onward)    Start     Dose/Rate Route Frequency Stop    07/05/23 1510  perflutren diluted 1mL to 2mL with saline (OPTISON) diluted injection 6 mL         6 mL Intravenous ONCE 07/05/23 1505          Interpretation of levels and current regimen:  Vancomycin level is reflective of -600    Has serum creatinine changed greater than 50% in last 72 hours: No    Urine output:  unable to determine    Renal Function: Stable    Plan:  1. Continue Current Dose  2. Vancomycin monitoring method: AUC  3. Vancomycin therapeutic monitoring goal: 400-600 mg*h/L  4. Pharmacy will check vancomycin levels as appropriate in 1-3 Days.  5. Serum creatinine levels will be ordered daily for the first week of therapy and at least twice weekly for subsequent weeks.    Herb Serna [facility]

## 2023-07-06 NOTE — PLAN OF CARE
VS: /71   Pulse 83   Temp 99.1  F (37.3  C) (Oral)   Resp 18   Ht 1.829 m (6')   Wt 69.6 kg (153 lb 6.4 oz)   SpO2 100%   BMI 20.80 kg/m      Cares: 1256-0397    Current condition: VSS on RA, afebrile all shift with no tylenol  Neuro: A&O x4  Cardio: WNL  Respiratory: WNL   GI/: Voiding in urinal, last BM 7/5  Skin: Intact  Diet: Reg, good appetite    Labs: Creatinine 1.69 down from 1.86  BG: ACHS (120) no insulin  LDA: R PIV infusing NS @ 100   Mobility: UAL   Pain: Denies  Changes: US done, CT done  Plan of Care: Continue to monitor, wait for CT results and sensitivities to come back

## 2023-07-06 NOTE — PHARMACY-ADMISSION MEDICATION HISTORY
Pharmacist Admission Medication History    Admission medication history is complete. The information provided in this note is only as accurate as the sources available at the time of the update.    Medication reconciliation/reorder completed by provider prior to medication history? Yes    Information Source(s): Patient and CareEverywhere/SureScripts via in-person    Pertinent Information: Patient reports completing pantoprazole (Protonix) about a month ago per provider instructions.     Changes made to PTA medication list:    Added: None    Deleted: -Pantoprazole (Protonix) 40 mg daily     Changed: None    Allergies reviewed with patient and updates made in EHR: yes    Medication History Completed By: Franko Yanez PharmD 7/6/2023 5:08 PM    Prior to Admission medications    Medication Sig Last Dose Taking? Auth Provider Long Term End Date   magnesium oxide (MAG-OX) 400 MG tablet Take 2 tablets (800 mg) by mouth daily 6PM 7/4/2023  Edwin Green MD     mycophenolic acid (GENERIC EQUIVALENT) 180 MG EC tablet Take 3 tablets (540 mg) by mouth 2 times daily 7/4/2023  Franko Cerda MD     psyllium (METAMUCIL/KONSYL) 58.6 % powder Take 18 g (1 Tablespoonful) by mouth 2 times daily as needed (Diarrhea) 7/4/2023  Danielle Bach, NP     sodium bicarbonate 650 MG tablet Take 1 tablet (650 mg) by mouth 2 times daily 7/4/2023  Edwin Green MD     sulfamethoxazole-trimethoprim (BACTRIM) 400-80 MG tablet Take 1 tablet by mouth daily 7/4/2023  Franko Cedra MD No    tacrolimus (GENERIC EQUIVALENT) 0.5 MG capsule Take 1 capsule (0.5 mg) by mouth 2 times daily Total dose = 4.5mg twice a day 7/4/2023  Franko Cerda MD No    tacrolimus (GENERIC EQUIVALENT) 1 MG capsule Take 4 capsules (4 mg) by mouth 2 times daily Take with 0.5mg caps = total dose 4.5mg twice a day 7/4/2023  Franko Cerda MD No    valGANciclovir (VALCYTE) 450 MG tablet Take 2 tablets (900 mg) by mouth daily 7/4/2023  Edwin Green  MD Maury Yes    Vitamin D, Cholecalciferol, 50 MCG (2000 UT) CAPS Take 2,000 Units by mouth daily 7/4/2023  Franko Cerda MD

## 2023-07-07 ENCOUNTER — HOME INFUSION (PRE-WILLOW HOME INFUSION) (OUTPATIENT)
Dept: PHARMACY | Facility: CLINIC | Age: 38
End: 2023-07-07
Payer: MEDICARE

## 2023-07-07 LAB
ANION GAP SERPL CALCULATED.3IONS-SCNC: 11 MMOL/L (ref 7–15)
BUN SERPL-MCNC: 13.9 MG/DL (ref 6–20)
CALCIUM SERPL-MCNC: 9.1 MG/DL (ref 8.6–10)
CHLORIDE SERPL-SCNC: 115 MMOL/L (ref 98–107)
CREAT SERPL-MCNC: 1.51 MG/DL (ref 0.67–1.17)
DEPRECATED HCO3 PLAS-SCNC: 17 MMOL/L (ref 22–29)
EBV DNA # SPEC NAA+PROBE: <500 COPIES/ML
EBV DNA SPEC NAA+PROBE-LOG#: <2.7 {LOG_COPIES}/ML
GFR SERPL CREATININE-BSD FRML MDRD: 60 ML/MIN/1.73M2
GLUCOSE BLDC GLUCOMTR-MCNC: 105 MG/DL (ref 70–99)
GLUCOSE BLDC GLUCOMTR-MCNC: 133 MG/DL (ref 70–99)
GLUCOSE SERPL-MCNC: 128 MG/DL (ref 70–99)
HOLD SPECIMEN: NORMAL
MAGNESIUM SERPL-MCNC: 1.4 MG/DL (ref 1.7–2.3)
PHOSPHATE SERPL-MCNC: 2.7 MG/DL (ref 2.5–4.5)
POTASSIUM SERPL-SCNC: 4.1 MMOL/L (ref 3.4–5.3)
SODIUM SERPL-SCNC: 143 MMOL/L (ref 136–145)

## 2023-07-07 PROCEDURE — 258N000003 HC RX IP 258 OP 636: Performed by: NURSE PRACTITIONER

## 2023-07-07 PROCEDURE — 250N000011 HC RX IP 250 OP 636: Mod: JZ | Performed by: SURGERY

## 2023-07-07 PROCEDURE — 84100 ASSAY OF PHOSPHORUS: CPT | Performed by: STUDENT IN AN ORGANIZED HEALTH CARE EDUCATION/TRAINING PROGRAM

## 2023-07-07 PROCEDURE — 120N000011 HC R&B TRANSPLANT UMMC

## 2023-07-07 PROCEDURE — 250N000013 HC RX MED GY IP 250 OP 250 PS 637

## 2023-07-07 PROCEDURE — 250N000013 HC RX MED GY IP 250 OP 250 PS 637: Performed by: STUDENT IN AN ORGANIZED HEALTH CARE EDUCATION/TRAINING PROGRAM

## 2023-07-07 PROCEDURE — 83735 ASSAY OF MAGNESIUM: CPT | Performed by: STUDENT IN AN ORGANIZED HEALTH CARE EDUCATION/TRAINING PROGRAM

## 2023-07-07 PROCEDURE — 36415 COLL VENOUS BLD VENIPUNCTURE: CPT | Performed by: STUDENT IN AN ORGANIZED HEALTH CARE EDUCATION/TRAINING PROGRAM

## 2023-07-07 PROCEDURE — 80048 BASIC METABOLIC PNL TOTAL CA: CPT | Performed by: STUDENT IN AN ORGANIZED HEALTH CARE EDUCATION/TRAINING PROGRAM

## 2023-07-07 PROCEDURE — 250N000013 HC RX MED GY IP 250 OP 250 PS 637: Performed by: NURSE PRACTITIONER

## 2023-07-07 PROCEDURE — 250N000012 HC RX MED GY IP 250 OP 636 PS 637: Performed by: NURSE PRACTITIONER

## 2023-07-07 PROCEDURE — 87799 DETECT AGENT NOS DNA QUANT: CPT | Performed by: NURSE PRACTITIONER

## 2023-07-07 PROCEDURE — 99233 SBSQ HOSP IP/OBS HIGH 50: CPT

## 2023-07-07 PROCEDURE — 250N000012 HC RX MED GY IP 250 OP 636 PS 637: Performed by: SURGERY

## 2023-07-07 PROCEDURE — 250N000011 HC RX IP 250 OP 636: Mod: JZ | Performed by: STUDENT IN AN ORGANIZED HEALTH CARE EDUCATION/TRAINING PROGRAM

## 2023-07-07 RX ORDER — PIPERACILLIN SODIUM, TAZOBACTAM SODIUM 3; .375 G/15ML; G/15ML
3.38 INJECTION, POWDER, LYOPHILIZED, FOR SOLUTION INTRAVENOUS EVERY 6 HOURS
Status: DISCONTINUED | OUTPATIENT
Start: 2023-07-07 | End: 2023-07-08

## 2023-07-07 RX ORDER — VALGANCICLOVIR 450 MG/1
900 TABLET, FILM COATED ORAL DAILY
Status: DISCONTINUED | OUTPATIENT
Start: 2023-07-08 | End: 2023-07-08 | Stop reason: HOSPADM

## 2023-07-07 RX ORDER — SODIUM BICARBONATE 650 MG/1
1300 TABLET ORAL 3 TIMES DAILY
Status: DISCONTINUED | OUTPATIENT
Start: 2023-07-07 | End: 2023-07-08 | Stop reason: HOSPADM

## 2023-07-07 RX ORDER — MAGNESIUM OXIDE 400 MG/1
800 TABLET ORAL 2 TIMES DAILY
Status: DISCONTINUED | OUTPATIENT
Start: 2023-07-07 | End: 2023-07-08 | Stop reason: HOSPADM

## 2023-07-07 RX ORDER — GUAR GUM
1 PACKET (EA) ORAL 2 TIMES DAILY
Status: DISCONTINUED | OUTPATIENT
Start: 2023-07-07 | End: 2023-07-08 | Stop reason: HOSPADM

## 2023-07-07 RX ADMIN — Medication 2000 UNITS: at 07:49

## 2023-07-07 RX ADMIN — PIPERACILLIN AND TAZOBACTAM 4.5 G: 4; .5 INJECTION, POWDER, FOR SOLUTION INTRAVENOUS at 07:49

## 2023-07-07 RX ADMIN — Medication 1200 MG: at 07:49

## 2023-07-07 RX ADMIN — PIPERACILLIN AND TAZOBACTAM 4.5 G: 4; .5 INJECTION, POWDER, FOR SOLUTION INTRAVENOUS at 14:03

## 2023-07-07 RX ADMIN — MYCOPHENOLIC ACID 360 MG: 360 TABLET, DELAYED RELEASE ORAL at 07:49

## 2023-07-07 RX ADMIN — MAGNESIUM OXIDE TAB 400 MG (241.3 MG ELEMENTAL MG) 800 MG: 400 (241.3 MG) TAB at 08:55

## 2023-07-07 RX ADMIN — SODIUM CHLORIDE: 9 INJECTION, SOLUTION INTRAVENOUS at 16:31

## 2023-07-07 RX ADMIN — TACROLIMUS 4.5 MG: 1 CAPSULE ORAL at 07:49

## 2023-07-07 RX ADMIN — PANTOPRAZOLE SODIUM 40 MG: 40 TABLET, DELAYED RELEASE ORAL at 07:49

## 2023-07-07 RX ADMIN — TACROLIMUS 4.5 MG: 1 CAPSULE ORAL at 18:23

## 2023-07-07 RX ADMIN — SODIUM BICARBONATE 650 MG TABLET 1300 MG: at 07:49

## 2023-07-07 RX ADMIN — SULFAMETHOXAZOLE AND TRIMETHOPRIM 1 TABLET: 400; 80 TABLET ORAL at 07:49

## 2023-07-07 RX ADMIN — SODIUM BICARBONATE 650 MG TABLET 1300 MG: at 14:03

## 2023-07-07 RX ADMIN — MYCOPHENOLIC ACID 360 MG: 360 TABLET, DELAYED RELEASE ORAL at 18:23

## 2023-07-07 RX ADMIN — SODIUM CHLORIDE: 9 INJECTION, SOLUTION INTRAVENOUS at 04:39

## 2023-07-07 RX ADMIN — Medication 1200 MG: at 21:30

## 2023-07-07 RX ADMIN — PIPERACILLIN AND TAZOBACTAM 3.38 G: 3; .375 INJECTION, POWDER, LYOPHILIZED, FOR SOLUTION INTRAVENOUS at 21:30

## 2023-07-07 RX ADMIN — MAGNESIUM OXIDE TAB 400 MG (241.3 MG ELEMENTAL MG) 800 MG: 400 (241.3 MG) TAB at 21:30

## 2023-07-07 RX ADMIN — PIPERACILLIN AND TAZOBACTAM 4.5 G: 4; .5 INJECTION, POWDER, FOR SOLUTION INTRAVENOUS at 02:27

## 2023-07-07 RX ADMIN — SODIUM BICARBONATE 650 MG TABLET 1300 MG: at 21:29

## 2023-07-07 RX ADMIN — VALGANCICLOVIR HYDROCHLORIDE 450 MG: 450 TABLET ORAL at 07:49

## 2023-07-07 ASSESSMENT — ACTIVITIES OF DAILY LIVING (ADL)
ADLS_ACUITY_SCORE: 18

## 2023-07-07 NOTE — PROGRESS NOTES
Status: E coli UTI (third since transplant). Presented to ED 7/4/23 with fevers up to 103F.   VS: VSS on RA, afebrile  Neuro: AOx4  BG: ACHS (no insulin orders)  Labs: am labs  Respiratory: WNL  Pain/Nausea/PRN: denies  Diet: regular  LDA: PIV with NS @ 100 (zosyn and vanco)  GI/: voids in urinal, LBM 7/6  Skin: WNL  Mobility: IND  Plan: continue POC

## 2023-07-07 NOTE — PROGRESS NOTES
Therapy: IV abx  [place]  Ded: $3.80 monthly ded    100% coverage, however may have a copay per dispense on the drug through pharmacy plan    In reference to admission on 7/4/23 to check IV abx coverage      Please contact Intake with any questions, [phone] or In Basket pool, [facility] ([phone]).

## 2023-07-07 NOTE — PROGRESS NOTES
[facility]   Transplant Nephrology Progress Note  Date of Admission:  7/4/2023  Today's Date: 07/07/2023    Recommendations:  - Increase sodium bicarbonate to 1300 mg PO TID (ordered).  - Agree with increasing magnesium oxide to 800 mg PO BID.  - Recommend psyllium fiber for loose stools.  - Recommend discharging on amoxicillin.  - Dr. Cerda discussed UTI ppx antibiotics with Dr. Parker, and plan will be to start nitrofurantoin 100 mg PO daily after completion of amoxicillin.   has an appointment with Dr. Parker from ID on 8/16.  - Next follow up appointment with transplant nephrology on 7/20 with Dr. Green.    Assessment & Plan   # DDKT: Trend down but increased from baseline due to infection.    - Baseline Creatinine: ~ 1.4-1.7   - Proteinuria: Normal (<0.2 grams)   - Date DSA Last Checked: May/2023      Latest DSA: No cPRA 49%   - BK Viremia: No   - Kidney Tx Biopsy: No    -Recommend IV fluids, empiric antibiotics while awaiting sensitivities   -Renal transplant U/S without abscess. CT C/A/P with mild stranding around the kidney transplant, patchy hypoenhancement within the kidney transplant.      # Immunosuppression Prior to Admission: Tacrolimus immediate release (goal 8-10) and Mycophenolic acid (dose 540 mg every 12 hours)    - Present Immunosuppression: Tacrolimus immediate release (goal 8-10) and Mycophenolic acid (dose 360 mg every 12 hours)   (MPA reduced given frequent urinary tract infections).   - Patient is in an immunosuppressed state and will continue to monitor for efficacy and toxicity of immunosuppression medications.   - Changes: No    # Infection Prophylaxis:   - PJP: Sulfa/TMP (Bactrim)  MWF  - CMV: Valganciclovir (Valcyte) Patient is CMV IgG Ab discordant (D+/R-) and will continue on Valcyte x 6 months, then check CMV PCR monthly until 12 months post transplant.    # Diarrhea  # Fever  # UTI: He presented to the ED 7/4 with fever that  started Monday (7/3).  He reports having diarrhea in the morning today (7/5).  Started on vancomycin and zosyn empirically.     7/5/2023  Enteric Bacteria and Virus Panel by [facility] Stool negative    7/5/2023  C. difficile Toxin B PCR with reflex to C. difficile Antigen and Toxins A/B EIA negative    7/5/2023  BK Virus Quantitative, PCR Not detected    7/5/2023   EBV DNA PCR Quantitative Whole Blood <500     7/5/2023   CMV Quantitative, PCR Negative    7/4/2023 Urine Culture 10,000-50,000 CFU/mL Enterococcus faecalis     7/4/2023   Blood Culture Peripheral Blood No growth to date    7/4/2023   Blood Culture Peripheral Blood No growth to date       - 7/6 CT c/a/p:  small amount of fluid in the pelvis and areas of patchy hypoenhancement within the transplant kidney.    - 7/6 US LE venous duplex: no evidence of DVT   - I recommend transplant ID consult to consider ppx antibiotics. He was previously on macrobid in 6/2023 PRN post coital.    # Blood Pressure: Controlled, but low at times Goal BP: < 140/90   - Volume status: Euvolemic     - Changes: No    # Anemia in Chronic Renal Disease: Hgb: Decreased 8.4      SARA: No   - Iron studies: Low iron saturation 1/31/23    # Mineral Bone Disorder:   - Secondary renal hyperparathyroidism; PTH level: Normal (15-65 pg/ml)   5/25/23     On treatment: None  - Vitamin D; level: Low          On supplement: Yes  - Calcium; level: Normal          On supplement: No  - Phosphorus; level: Normal         On supplement: No     # Electrolytes:  - Potassium; level: Normal        On supplement: No  - Magnesium; level: Low 1.4         On supplement: Yes, agree with increasing magnesium oxide to 800 mg PO BID  - Bicarbonate; level: Low  18        On supplement: Yes, increase sodium bicarbonate to 1300 mg PO TID.  - Sodium; level: Normal     # H/o Cardiomyopathy: Normal LVEF at 55-60% with last cardiac echo 9/2022.     # H/o Urethral Stricture:               -Patient is s/p buccal urethroplasty and  diverticulum excision 2020. Follows closely with Urology.     # H/o Polysubstance Abuse: Patient with h/o methamphetamine and alcohol abuse.  Now sober.    # Transplant History:  Etiology of Kidney Failure: Etiology of Kidney Failure: Solitary congenital kidney and h/o urethral calculus and urologic issues  Tx: DDKT  Transplant: 1/15/2023 (Kidney)  Significant changes in immunosuppression: None  Significant transplant-related complications: None    Recommendations were communicated to the primary team verbally.    Seen and discussed with Dr. Cerda.    JESSE Chandra CNP   Pager: [phone]      Interval History    Mr. Valencia creatinine is 1.51 ( 0601); Decreased from 1.69 yesterday.  I/O last 3 completed shifts:  In: 1620 [P.O.:720; I.V.:900]  Out: 900 [Urine:900]   Other significant labs/tests/vitals: SBP mostly 100s - 110s overnight. Afebrile. Hypomagnesemia (mg 1.4).  Bicarb level low, but improving.   -  CT c/a/p:  small amount of fluid in the pelvis and areas of patchy hypoenhancement within the transplant kidney.     No acute events overnight.  No chest pain or shortness of breath.  No leg swelling.  No nausea and vomiting.  Bowel movements are loose.        Review of Systems   4 point ROS was obtained and negative except as noted in the Interval History.    MEDICATIONS:    acetylcysteine  1,200 mg Oral BID     magnesium oxide  800 mg Oral BID     mycophenolic acid  360 mg Oral BID IS     pantoprazole  40 mg Oral QAM AC     piperacillin-tazobactam  4.5 g Intravenous Q6H     sodium bicarbonate  1,300 mg Oral TID     sodium chloride (PF)  3 mL Intracatheter Q8H     sulfamethoxazole-trimethoprim  1 tablet Oral Daily     tacrolimus  4.5 mg Oral BID IS     valGANciclovir  450 mg Oral Daily     vancomycin  1,250 mg Intravenous Q24H     Vitamin D3  2,000 Units Oral Daily       sodium chloride 100 mL/hr at 23 0800       Physical Exam   Temp  Av  F (38.3  C)  Min: 98.6  F (37  C)  Max:  103.2  F (39.6  C)      Pulse  Av.4  Min: 69  Max: 125 Resp  Av.4  Min: 15  Max: 20  SpO2  Av.1 %  Min: 93 %  Max: 100 %     /75 (BP Location: Right arm)   Pulse 60   Temp 97.9  F (36.6  C) (Oral)   Resp 18   Ht 1.829 m (6')   Wt 69.6 kg (153 lb 6.4 oz)   SpO2 100%   BMI 20.80 kg/m      Admit Weight: 68 kg (150 lb)     GENERAL APPEARANCE: alert and no distress  RESP: lungs clear to auscultation - no rales, rhonchi or wheezes  CV: regular rhythm, normal rate, no rub, no murmur  EDEMA: no LE edema bilaterally  ABDOMEN: soft, nondistended, nontender, bowel sounds normal  MS: extremities normal - no gross deformities noted, no evidence of inflammation in joints, no muscle tenderness  SKIN: no rash  PSYCH: mentation appears normal and affect normal/bright    Data   All labs reviewed by me.  CMP  Recent Labs   Lab 23  0801 23  0601 23  0206 23  2156 23  1807 23  0547 23  1304 23  1944 23  1703   NA  --  143  --   --   --  138  --  133* 135*   POTASSIUM  --  4.1  --   --   --  4.3  --  4.0 4.9   CHLORIDE  --  115*  --   --   --  112*  --  102 103   CO2  --  17*  --   --   --  18*  --  17* 20*   ANIONGAP  --  11  --   --   --  8  --  14 12   * 128* 133* 116*   < > 120*   < > 139* 99   BUN  --  13.9  --   --   --  17.7  --  26.1* 27.1*   CR  --  1.51*  --   --   --  1.69*  --  1.86* 1.78*   GFRESTIMATED  --  60*  --   --   --  52*  --  47* 49*   VISHNU  --  9.1  --   --   --  8.8  --  9.5 9.3   MAG  --  1.4*  --   --   --  1.6*  --   --   --    PHOS  --  2.7  --   --   --  2.1*  --   --   --    PROTTOTAL  --   --   --   --   --   --   --  7.4  --    ALBUMIN  --   --   --   --   --   --   --  4.2  --    BILITOTAL  --   --   --   --   --   --   --  0.4  --    ALKPHOS  --   --   --   --   --   --   --  53  --    AST  --   --   --   --   --   --   --  27  --    ALT  --   --   --   --   --   --   --  14  --     < > = values in this interval not  displayed.     CBC  Recent Labs   Lab 07/06/23  0547 07/04/23  1944 07/03/23  1703   HGB 8.4* 9.8* 10.9*   WBC 2.7* 2.9* 3.3*   RBC 2.77* 3.38* 3.61*   HCT 26.0* 31.2* 33.6*   MCV 94 92 93   MCH 30.3 29.0 30.2   MCHC 32.3 31.4* 32.4   RDW 13.8 13.3 13.5    219 287     INR  Recent Labs   Lab 07/04/23  1944   INR 1.29*     ABGNo lab results found in last 7 days.   Urine Studies  Recent Labs   Lab Test 07/04/23  2015 06/12/23  1514 05/04/23  1535 04/15/23  0918   COLOR Light Yellow Light Yellow Yellow Yellow   APPEARANCE Clear Clear Slightly Cloudy* Clear   URINEGLC Negative Negative Negative Negative   URINEBILI Negative Negative Negative Negative   URINEKETONE Negative Negative Trace* Negative   SG 1.018 1.020 1.017 1.020   UBLD Trace* Negative Negative Large*   URINEPH 6.0 6.0 6.0 6.5   PROTEIN 70* 30* 100* >=300*   UROBILINOGEN  --   --   --  0.2   NITRITE Negative Negative Negative Negative   LEUKEST Negative Negative Large* Moderate*   RBCU 4* 3* 3* 10-25*   WBCU 11* 7* 71* *     No lab results found.  PTH  Recent Labs   Lab Test 05/25/23  1552 01/31/23  1049 01/20/23  0746 04/18/22  0851   PTHI 52 73* 101* 315*     Iron Studies  Recent Labs   Lab Test 01/31/23  1049 01/20/23  0746   IRON 70 153    244   IRONSAT 27 63*   DAWSON 430* 717*       IMAGING:  All imaging studies reviewed by me.

## 2023-07-07 NOTE — PROGRESS NOTES
Transplant Surgery  Inpatient Daily Progress Note  07/07/2023    Assessment & Plan: Chip Fowler is a 38 year old male with a PMH of ESRD 2/2 congenital solitary kidney and obstruction s/p DCD kidney transplant 1/15/23, nephrolithiasis, s/p urethroplasty, recurrent UTIs, polysubstance abuse, cardiomyopathy (EF 55-60%). Recent admission 5/4-5/7/23 with E coli UTI (third since transplant). Presented to ED 7/4/23 with fevers up to 103F.     Hx DDKT 1/515/23; JACK of transplanted kidney: JACK resolved. Cr 1.5 (baseline Cr 1.4-1.6). Renal US patent, no hydronephrosis.     Immunosuppressed status secondary to medications:    - Myfortic 540 mg BID reduced to 360 mg BID in the setting of infection.    - Tacrolimus goal level 8-10.      Hematology:   Leukopenia: WBC 2-3, Myfortic decreased as above.   Anemia of chronic disease: Hgb ~9-10, monitor.    Cardiorespiratory: No issues.     GI/Nutrition:   Diarrhea: Colonoscopy 3/23/23 with rare crypt epithelial apoptosis, CMV negative. CMV, C diff, and enteric panel pending. Myfortic reduced as above. Continue PTA Fiber BID.     Endocrine: No issues.     Fluid/Electrolytes: MIVF:  mL/hr  Hypomagnesemia: Continue PTA Mag-Ox 800 mg daily.   Low bicarb: Sodium bicarbonate increased to 1300 mg BID.    :   Hx Urethral strictures: s/p buccal urethroplasty + diverticulum excision 12/2020. No hydronephrosis on US. Urology saw patient 5/16/23, no circumcision for now but cystoscopy scheduled for 7/19/23.     Infectious disease:   Fevers; Pyelonephritis: Tmax 103F. Infectious workup as below indicative of pyelonephritis. Started on empiric Vanc/Zosyn. Stop Vanco today.    Infectious workup:   - 7/4 BCx NGTD   - 7/4 COVID-19/RSV/Influenza negative   - 7/4 UCx prelim + 10-50k E faecalis   - 7/5 C diff not detected   - 7/5 Enteric panel negative   - 7/5 BK PCR not detected   - 7/5 CMV not detected   - 7/5 EBV pending   - 7/5 TTE no vegetation   - 7/6 CT C/A/P with contrast: mild  nonspecific stranding around transplant kidney; small amount of fluid in pelvis and areas of hypoenhancement representative of mild enhancement.     Prophylaxis: DVT (mechanical), fall, GI (PPI), viral (Valcyte x 6 months post-transplant), PCP (Bactrim indefinitely)    Disposition: 7A     DK/Fellow/Resident Provider: Divina Ho NP  [id number]    Faculty: Partha Staton M.D.  _________________________________________________________________  Transplant History:   1/15/2023 (Kidney), Postoperative day: 173     Interval History: History is obtained from the patient  Overnight events: Feeling well today.    ROS:   A 10-point review of systems was negative except as noted above.    Meds:    acetylcysteine  1,200 mg Oral BID     fiber modular (NUTRISOURCE FIBER)  1 packet Oral BID     magnesium oxide  800 mg Oral BID     mycophenolic acid  360 mg Oral BID IS     pantoprazole  40 mg Oral QAM AC     piperacillin-tazobactam  4.5 g Intravenous Q6H     sodium bicarbonate  1,300 mg Oral TID     sodium chloride (PF)  3 mL Intracatheter Q8H     sulfamethoxazole-trimethoprim  1 tablet Oral Daily     tacrolimus  4.5 mg Oral BID IS     [START ON 7/8/2023] valGANciclovir  900 mg Oral Daily     Vitamin D3  2,000 Units Oral Daily       Physical Exam:     Admit Weight: 68 kg (150 lb)    Current vitals:   /82 (BP Location: Right arm)   Pulse 58   Temp 97.8  F (36.6  C) (Oral)   Resp 16   Ht 1.829 m (6')   Wt 69.6 kg (153 lb 6.4 oz)   SpO2 99%   BMI 20.80 kg/m      Vital sign ranges:    Temp:  [97.8  F (36.6  C)-99.1  F (37.3  C)] 97.8  F (36.6  C)  Pulse:  [58-79] 58  Resp:  [16-18] 16  BP: (107-130)/(63-82) 121/82  SpO2:  [99 %-100 %] 99 %    General Appearance: in no apparent distress.   Skin: Warm, perfused  Heart: Perfused  Lungs: unlabored on RA  Abdomen: The abdomen is soft, non-tender to palpation.   : shaver is not present.  Extremities: edema: none noted, strength 5/5  Neurologic: awake, alert and oriented x4.  Tremor absent.     Data:   CMP  Recent Labs   Lab 07/07/23  0801 07/07/23  0601 07/06/23  1807 07/06/23  0547 07/05/23  1304 07/04/23 1944   NA  --  143  --  138  --  133*   POTASSIUM  --  4.1  --  4.3  --  4.0   CHLORIDE  --  115*  --  112*  --  102   CO2  --  17*  --  18*  --  17*   * 128*   < > 120*   < > 139*   BUN  --  13.9  --  17.7  --  26.1*   CR  --  1.51*  --  1.69*  --  1.86*   GFRESTIMATED  --  60*  --  52*  --  47*   VISHNU  --  9.1  --  8.8  --  9.5   MAG  --  1.4*  --  1.6*  --   --    PHOS  --  2.7  --  2.1*  --   --    ALBUMIN  --   --   --   --   --  4.2   BILITOTAL  --   --   --   --   --  0.4   ALKPHOS  --   --   --   --   --  53   AST  --   --   --   --   --  27   ALT  --   --   --   --   --  14    < > = values in this interval not displayed.     CBC  Recent Labs   Lab 07/06/23 0547 07/04/23 1944   HGB 8.4* 9.8*   WBC 2.7* 2.9*    219

## 2023-07-07 NOTE — PLAN OF CARE
/64 (BP Location: Right arm)   Pulse 71   Temp 98.5  F (36.9  C) (Oral)   Resp 18   Ht 1.829 m (6')   Wt 69.6 kg (153 lb 6.4 oz)   SpO2 100%   BMI 20.80 kg/m      Shift: 1225-1728  VS: VSS on RA, afebrile  Neuro: AOx4  BG: ACHS (no insulin orders)  Labs: am labs  Respiratory: WNL  Pain/Nausea/PRN: denies  Diet: regular  LDA: PIV with NS @ 100 (zosyn and vanco)  GI/: voids in urinal, LBM 7/6  Skin: WNL  Mobility: IND  Plan: continue POC    Handoff given to following RN.

## 2023-07-07 NOTE — PROGRESS NOTES
Care Management Follow Up    Length of Stay (days): 3    Expected Discharge Date: 07/08/2023     Concerns to be Addressed: discharge planning     Patient plan of care discussed at interdisciplinary rounds: Yes    Anticipated Discharge Disposition: Home, Home Infusion     Anticipated Discharge Services: None  Anticipated Discharge DME: None    Patient/family educated on Medicare website which has current facility and service quality ratings: no  Education Provided on the Discharge Plan: Yes  Patient/Family in Agreement with the Plan: yes    Referrals Placed by CM/SW: Home Infusion  Private pay costs discussed: Not applicable    Additional Information:  Per care team rounds anticipate possible need for IV antibiotics at discharge.  [facility] benefit check initiated.  Met with pt. Introduced RNCC role.  Reviewed anticipated need for IV antibiotics.  Pt notes no concerns with learning to manage IV at home but is hopeful that oral antibiotic will be an option as he works full time and has a 2 year old at home.  Agreed to have weekend RNCC f/u with pt/care team.  PLC for IV education placed.       [facility] Benefit Check: Pt has 100%  coverage for IV abx through [place] secondary with a monthly ded of $3.80, may also has a copay per dispense on the drug through pharmacy plan.    Erin Thompson RN BSN, PHN, ACM-RN  7A RN Care Coordinator  Phone: [phone]  Pager [phone]    To contact the weekend RNCC  [place] (0800 - 1630) Saturday and Sunday    Units: 4A, 4C, 4E, 5A and 5B- Pager 1: [phone]    Units: 6A, 6B, 6C, 6D- Pager 2: [phone]    Units: 7A, 7B, 7C, 7D, and 5C-Pager 3: [phone]    [facility] ([id number]) Saturday and Sunday    Units: 5 Ortho, 8A, 10 ICU, & Pediatric Units-Pager 4: [phone]    7/7/2023 2:58 PM

## 2023-07-08 VITALS
TEMPERATURE: 98.1 F | RESPIRATION RATE: 16 BRPM | DIASTOLIC BLOOD PRESSURE: 86 MMHG | OXYGEN SATURATION: 100 % | HEART RATE: 52 BPM | HEIGHT: 72 IN | BODY MASS INDEX: 20.78 KG/M2 | WEIGHT: 153.4 LBS | SYSTOLIC BLOOD PRESSURE: 126 MMHG

## 2023-07-08 PROBLEM — B27.00 EBV (EPSTEIN-BARR VIRUS) VIREMIA: Status: ACTIVE | Noted: 2023-07-08

## 2023-07-08 PROBLEM — N12 PYELONEPHRITIS OF TRANSPLANTED KIDNEY: Status: ACTIVE | Noted: 2023-07-08

## 2023-07-08 PROBLEM — N39.0 FREQUENT UTI: Status: ACTIVE | Noted: 2023-07-08

## 2023-07-08 PROBLEM — T86.19 PYELONEPHRITIS OF TRANSPLANTED KIDNEY: Status: ACTIVE | Noted: 2023-07-08

## 2023-07-08 LAB
ANION GAP SERPL CALCULATED.3IONS-SCNC: 10 MMOL/L (ref 7–15)
BACTERIA UR CULT: ABNORMAL
BACTERIA UR CULT: ABNORMAL
BASOPHILS # BLD AUTO: 0.1 10E3/UL (ref 0–0.2)
BASOPHILS # BLD MANUAL: 0.1 10E3/UL (ref 0–0.2)
BASOPHILS NFR BLD AUTO: 3 %
BASOPHILS NFR BLD MANUAL: 4 %
BUN SERPL-MCNC: 14.6 MG/DL (ref 6–20)
CALCIUM SERPL-MCNC: 8.8 MG/DL (ref 8.6–10)
CHLORIDE SERPL-SCNC: 115 MMOL/L (ref 98–107)
CREAT SERPL-MCNC: 1.52 MG/DL (ref 0.67–1.17)
DEPRECATED HCO3 PLAS-SCNC: 18 MMOL/L (ref 22–29)
EOSINOPHIL # BLD AUTO: 0.5 10E3/UL (ref 0–0.7)
EOSINOPHIL # BLD MANUAL: 0.6 10E3/UL (ref 0–0.7)
EOSINOPHIL NFR BLD AUTO: 26 %
EOSINOPHIL NFR BLD MANUAL: 30 %
ERYTHROCYTE [DISTWIDTH] IN BLOOD BY AUTOMATED COUNT: 14.1 % (ref 10–15)
ERYTHROCYTE [DISTWIDTH] IN BLOOD BY AUTOMATED COUNT: 14.2 % (ref 10–15)
GFR SERPL CREATININE-BSD FRML MDRD: 59 ML/MIN/1.73M2
GLUCOSE SERPL-MCNC: 93 MG/DL (ref 70–99)
HCT VFR BLD AUTO: 27.8 % (ref 40–53)
HCT VFR BLD AUTO: 30 % (ref 40–53)
HGB BLD-MCNC: 8.8 G/DL (ref 13.3–17.7)
HGB BLD-MCNC: 9.5 G/DL (ref 13.3–17.7)
IMM GRANULOCYTES # BLD: 0.1 10E3/UL
IMM GRANULOCYTES NFR BLD: 4 %
LYMPHOCYTES # BLD AUTO: 0.7 10E3/UL (ref 0.8–5.3)
LYMPHOCYTES # BLD MANUAL: 0.9 10E3/UL (ref 0.8–5.3)
LYMPHOCYTES NFR BLD AUTO: 37 %
LYMPHOCYTES NFR BLD MANUAL: 41 %
MAGNESIUM SERPL-MCNC: 1.3 MG/DL (ref 1.7–2.3)
MCH RBC QN AUTO: 29.5 PG (ref 26.5–33)
MCH RBC QN AUTO: 29.7 PG (ref 26.5–33)
MCHC RBC AUTO-ENTMCNC: 31.7 G/DL (ref 31.5–36.5)
MCHC RBC AUTO-ENTMCNC: 31.7 G/DL (ref 31.5–36.5)
MCV RBC AUTO: 93 FL (ref 78–100)
MCV RBC AUTO: 94 FL (ref 78–100)
MONOCYTES # BLD AUTO: 0.3 10E3/UL (ref 0–1.3)
MONOCYTES # BLD MANUAL: 0.2 10E3/UL (ref 0–1.3)
MONOCYTES NFR BLD AUTO: 16 %
MONOCYTES NFR BLD MANUAL: 10 %
NEUTROPHILS # BLD AUTO: 0.3 10E3/UL (ref 1.6–8.3)
NEUTROPHILS # BLD MANUAL: 0.3 10E3/UL (ref 1.6–8.3)
NEUTROPHILS NFR BLD AUTO: 14 %
NEUTROPHILS NFR BLD MANUAL: 15 %
NRBC # BLD AUTO: 0 10E3/UL
NRBC BLD AUTO-RTO: 0 /100
PHOSPHATE SERPL-MCNC: 3.7 MG/DL (ref 2.5–4.5)
PLAT MORPH BLD: ABNORMAL
PLAT MORPH BLD: NORMAL
PLATELET # BLD AUTO: 215 10E3/UL (ref 150–450)
PLATELET # BLD AUTO: 237 10E3/UL (ref 150–450)
POTASSIUM SERPL-SCNC: 4.5 MMOL/L (ref 3.4–5.3)
RBC # BLD AUTO: 2.98 10E6/UL (ref 4.4–5.9)
RBC # BLD AUTO: 3.2 10E6/UL (ref 4.4–5.9)
RBC MORPH BLD: ABNORMAL
RBC MORPH BLD: NORMAL
SODIUM SERPL-SCNC: 143 MMOL/L (ref 136–145)
WBC # BLD AUTO: 2 10E3/UL (ref 4–11)
WBC # BLD AUTO: 2.1 10E3/UL (ref 4–11)
WBC # BLD AUTO: 2.5 10E3/UL (ref 4–11)

## 2023-07-08 PROCEDURE — 80048 BASIC METABOLIC PNL TOTAL CA: CPT | Performed by: STUDENT IN AN ORGANIZED HEALTH CARE EDUCATION/TRAINING PROGRAM

## 2023-07-08 PROCEDURE — 250N000013 HC RX MED GY IP 250 OP 250 PS 637: Performed by: STUDENT IN AN ORGANIZED HEALTH CARE EDUCATION/TRAINING PROGRAM

## 2023-07-08 PROCEDURE — 99232 SBSQ HOSP IP/OBS MODERATE 35: CPT | Performed by: INTERNAL MEDICINE

## 2023-07-08 PROCEDURE — 85048 AUTOMATED LEUKOCYTE COUNT: CPT | Performed by: SURGERY

## 2023-07-08 PROCEDURE — 250N000012 HC RX MED GY IP 250 OP 636 PS 637: Performed by: NURSE PRACTITIONER

## 2023-07-08 PROCEDURE — 250N000012 HC RX MED GY IP 250 OP 636 PS 637: Performed by: SURGERY

## 2023-07-08 PROCEDURE — 85027 COMPLETE CBC AUTOMATED: CPT | Performed by: NURSE PRACTITIONER

## 2023-07-08 PROCEDURE — 83735 ASSAY OF MAGNESIUM: CPT | Performed by: STUDENT IN AN ORGANIZED HEALTH CARE EDUCATION/TRAINING PROGRAM

## 2023-07-08 PROCEDURE — 85007 BL SMEAR W/DIFF WBC COUNT: CPT | Performed by: SURGERY

## 2023-07-08 PROCEDURE — 85048 AUTOMATED LEUKOCYTE COUNT: CPT | Performed by: NURSE PRACTITIONER

## 2023-07-08 PROCEDURE — 250N000013 HC RX MED GY IP 250 OP 250 PS 637: Performed by: NURSE PRACTITIONER

## 2023-07-08 PROCEDURE — 250N000011 HC RX IP 250 OP 636: Mod: JZ | Performed by: NURSE PRACTITIONER

## 2023-07-08 PROCEDURE — 258N000003 HC RX IP 258 OP 636: Performed by: NURSE PRACTITIONER

## 2023-07-08 PROCEDURE — 250N000013 HC RX MED GY IP 250 OP 250 PS 637: Performed by: SURGERY

## 2023-07-08 PROCEDURE — 84100 ASSAY OF PHOSPHORUS: CPT | Performed by: STUDENT IN AN ORGANIZED HEALTH CARE EDUCATION/TRAINING PROGRAM

## 2023-07-08 PROCEDURE — 36415 COLL VENOUS BLD VENIPUNCTURE: CPT | Performed by: STUDENT IN AN ORGANIZED HEALTH CARE EDUCATION/TRAINING PROGRAM

## 2023-07-08 PROCEDURE — 36415 COLL VENOUS BLD VENIPUNCTURE: CPT | Performed by: SURGERY

## 2023-07-08 PROCEDURE — 250N000011 HC RX IP 250 OP 636: Mod: JZ | Performed by: SURGERY

## 2023-07-08 PROCEDURE — 36415 COLL VENOUS BLD VENIPUNCTURE: CPT | Performed by: NURSE PRACTITIONER

## 2023-07-08 RX ORDER — MAGNESIUM OXIDE 400 MG/1
800 TABLET ORAL 2 TIMES DAILY
Qty: 120 TABLET | Refills: 11 | Status: SHIPPED | OUTPATIENT
Start: 2023-07-08 | End: 2023-07-08

## 2023-07-08 RX ORDER — MAGNESIUM SULFATE HEPTAHYDRATE 40 MG/ML
2 INJECTION, SOLUTION INTRAVENOUS ONCE
Status: COMPLETED | OUTPATIENT
Start: 2023-07-08 | End: 2023-07-08

## 2023-07-08 RX ORDER — MAGNESIUM OXIDE 400 MG/1
800 TABLET ORAL 2 TIMES DAILY
Qty: 120 TABLET | Refills: 11 | Status: SHIPPED | OUTPATIENT
Start: 2023-07-08 | End: 2024-07-08

## 2023-07-08 RX ORDER — NITROFURANTOIN 25; 75 MG/1; MG/1
100 CAPSULE ORAL DAILY
Qty: 30 CAPSULE | Refills: 3 | Status: SHIPPED | OUTPATIENT
Start: 2023-07-19 | End: 2023-08-16

## 2023-07-08 RX ORDER — SODIUM BICARBONATE 650 MG/1
1300 TABLET ORAL 3 TIMES DAILY
Qty: 180 TABLET | Refills: 11 | Status: SHIPPED | OUTPATIENT
Start: 2023-07-08 | End: 2023-07-20

## 2023-07-08 RX ADMIN — Medication 2000 UNITS: at 07:41

## 2023-07-08 RX ADMIN — SODIUM BICARBONATE 650 MG TABLET 1300 MG: at 15:55

## 2023-07-08 RX ADMIN — PANTOPRAZOLE SODIUM 40 MG: 40 TABLET, DELAYED RELEASE ORAL at 07:42

## 2023-07-08 RX ADMIN — MAGNESIUM OXIDE TAB 400 MG (241.3 MG ELEMENTAL MG) 800 MG: 400 (241.3 MG) TAB at 07:41

## 2023-07-08 RX ADMIN — SODIUM BICARBONATE 650 MG TABLET 1300 MG: at 07:41

## 2023-07-08 RX ADMIN — MAGNESIUM SULFATE IN WATER 2 G: 40 INJECTION, SOLUTION INTRAVENOUS at 11:02

## 2023-07-08 RX ADMIN — PIPERACILLIN AND TAZOBACTAM 3.38 G: 3; .375 INJECTION, POWDER, LYOPHILIZED, FOR SOLUTION INTRAVENOUS at 07:37

## 2023-07-08 RX ADMIN — TACROLIMUS 4.5 MG: 1 CAPSULE ORAL at 07:41

## 2023-07-08 RX ADMIN — MYCOPHENOLIC ACID 360 MG: 360 TABLET, DELAYED RELEASE ORAL at 07:41

## 2023-07-08 RX ADMIN — AMOXICILLIN AND CLAVULANATE POTASSIUM 1 TABLET: 875; 125 TABLET, FILM COATED ORAL at 11:02

## 2023-07-08 RX ADMIN — PIPERACILLIN AND TAZOBACTAM 3.38 G: 3; .375 INJECTION, POWDER, LYOPHILIZED, FOR SOLUTION INTRAVENOUS at 02:09

## 2023-07-08 RX ADMIN — VALGANCICLOVIR 900 MG: 450 TABLET, FILM COATED ORAL at 07:41

## 2023-07-08 RX ADMIN — SULFAMETHOXAZOLE AND TRIMETHOPRIM 1 TABLET: 400; 80 TABLET ORAL at 07:41

## 2023-07-08 RX ADMIN — FILGRASTIM-AAFI 300 MCG: 300 INJECTION, SOLUTION INTRAVENOUS; SUBCUTANEOUS at 15:55

## 2023-07-08 RX ADMIN — SODIUM CHLORIDE: 9 INJECTION, SOLUTION INTRAVENOUS at 03:19

## 2023-07-08 ASSESSMENT — ACTIVITIES OF DAILY LIVING (ADL)
ADLS_ACUITY_SCORE: 18

## 2023-07-08 NOTE — DISCHARGE SUMMARY
[facility]    Discharge Summary  Transplant Surgery    Date of Admission:  7/4/2023  Date of Discharge:  7/8/2023  Discharging Provider: JESSE Pedro CNP / Partha Staton MD     Discharge Diagnoses   Principal Problem:    Pyelonephritis of transplanted kidney  Active Problems:    Immunosuppressed status (H)    Hypomagnesemia    High fever    SIRS (systemic inflammatory response syndrome) (H)    Status post kidney transplant    EBV (Aaron-Barr virus) viremia    Frequent UTI      History of Present Illness   Chip Fowler is a 38 year old male with a PMH of ESRD secondary to congenital solitary kidney and obstruction s/p DCD kidney transplant 1/15/23, nephrolithiasis, s/p urethroplasty, recurrent UTIs, polysubstance abuse, cardiomyopathy (EF 55-60%). Recent admission 5/4-5/7/23 with E coli UTI (third since transplant). Presented to ED 7/4/23 with fevers up to 103F and found to have pyelonephritis.     Hospital Course   JACK of transplanted kidney: Present on admission, resolved with fluid and antibiotics. Cr 1.5 on discharge (baseline Cr 1.4-1.6). Renal US patent vessels, no hydronephrosis. Labs Monday.     Immunosuppressed status secondary to medications:    - Myfortic 540 mg BID briefly reduced to 360 mg BID in the setting of infection. Resume 540mg BID on discharge.   - Tacrolimus goal level 8-10.       Hematology:   Leukopenia: WBC 2-3, monitor. ANC 0.3, possibly due to Zosyn. Received GCSF 300mcg before discharge. Recheck on Monday's labs.  Anemia of chronic disease: Hgb 8.8, stable.     GI/Nutrition:   Diarrhea: Colonoscopy 3/23/23 with rare crypt epithelial apoptosis, CMV negative. CMV, C diff, and enteric panel negative. Continue fiber.      Fluid/Electrolytes:  Hypomagnesemia: Increased Mag-Ox 800 mg to BID. Mg 1.3 on day of discharge, received 2g mag sulfate.  Low bicarb: Sodium bicarbonate increased to 1300 mg TID.     :   History of urethral  strictures: s/p buccal urethroplasty and diverticulum excision 12/2020. No hydronephrosis on US. Urology saw patient 5/16/23, and cystoscopy scheduled for 7/19/23.      Infectious disease:   Fevers; Pyelonephritis: Tmax 103F. Infectious workup as below indicative of pyelonephritis. Initially treated with empiric Vanc/Zosyn. Switched to Augmentin to complete 14 days on discharge.     Infectious workup:   - 7/4 BCx NGTD   - 7/4 COVID-19/RSV/Influenza negative   - 7/4 UC + 10-50k E faecalis   - 7/5 C diff not detected   - 7/5 Enteric panel negative   - 7/5 BK PCR not detected   - 7/5 CMV not detected   - 7/5 TTE no vegetation   - 7/6 CT C/A/P with contrast: mild nonspecific stranding around transplant kidney; small amount of fluid in pelvis and areas of hypoenhancement representative of mild enhancement.     Recurrent UTI: Discussed with ID. Will discharge on Macrobid daily after Augmentin course. Follow up with ID as scheduled in August.    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 Recheck in 1 month.    Pending Results   Unresulted Labs Ordered in the Past 30 Days of this Admission       Date and Time Order Name Status Description    7/7/2023  2:09 PM EBV DNA PCR Quantitative Whole Blood In process     7/4/2023  7:36 PM Blood Culture Peripheral Blood Preliminary     7/4/2023  7:36 PM Blood Culture Peripheral Blood Preliminary             Code Status   Full    Primary Care Physician   Tato Ralph    Physical Exam   Temp: 98.1  F (36.7  C) Temp src: Oral BP: 126/86 Pulse: 52   Resp: 16 SpO2: 100 % O2 Device: None (Room air)    Vitals:    07/04/23 1925 07/06/23 1031   Weight: 68 kg (150 lb) 69.6 kg (153 lb 6.4 oz)     Vital Signs with Ranges  Temp:  [98  F (36.7  C)-98.8  F (37.1  C)] 98.1  F (36.7  C)  Pulse:  [52-66] 52  Resp:  [16-18] 16  BP: (126-128)/(78-87) 126/86  SpO2:  [100 %] 100 %  I/O last 3 completed shifts:  In: 1700 [P.O.:100; I.V.:1600]  Out: 650 [Urine:650]    General Appearance: in no apparent distress.   Skin: Warm,  perfused  Heart: Perfused  Lungs: unlabored on RA  Abdomen: The abdomen is soft, non-tender to palpation.   : shaver is not present.  Extremities: edema: none noted, strength 5/5  Neurologic: awake, alert and oriented x4. Tremor absent.     Time Spent on this Encounter   Divina DUFFY APRN CNP, personally saw the patient today and spent greater than 30 minutes discharging this patient.    Discharge Disposition   Discharged to home  Condition at discharge: Stable    Consultations This Hospital Stay   PHARMACY TO DOSE [facility]  NEPHROLOGY KIDNEY/PANCREAS TRANSPLANT ADULT IP CONSULT  NURSING TO CONSULT FOR VASCULAR ACCESS CARE IP CONSULT  CARE MANAGEMENT / SOCIAL WORK IP CONSULT  PATIENT LEARNING CENTER IP CONSULT    Discharge Orders      Primary Care - Care Coordination Referral      Reason for your hospital stay    Pyelonephritis of transplant kidney     Activity    Your activity upon discharge: activity as tolerated     [facility] Follow-up and recommended labs and tests    1. Get a set of transplant labs drawn on Monday July 10 at your usual lab, and then resume normal lab schedule.  2. Follow up Dr. Green 7/20 as scheduled.  3. Follow up Dr. Willis 8/16 as scheduled.  4. Lab: EBV PCR in 1 month.     When to contact your care team    WHEN TO CONTACT YOUR  COORDINATOR:     Transplant Coordinator [phone]     Notify your coordinator if you have pain over your kidney, fever greater than 100F, decreased urine output or new or increased amount of blood in urine.     Notify your coordinator immediately if you are ever unable to take your immunosuppressive medications for any reason.     If you have URGENT concerns after office hours, please call the hospital switchboard at [phone] and ask to have the organ transplant nurse on-call paged. If you have a life-threatening emergency, go to the nearest emergency room.     Diet    Follow this diet upon discharge: Regular     Discharge Medications   Current  Discharge Medication List        START taking these medications    Details   amoxicillin-clavulanate (AUGMENTIN) 875-125 MG tablet Take 1 tablet by mouth every 12 hours for 10 days  Qty: 20 tablet, Refills: 0    Associated Diagnoses: Pyelonephritis of transplanted kidney           CONTINUE these medications which have CHANGED    Details   magnesium oxide (MAG-OX) 400 MG tablet Take 2 tablets (800 mg) by mouth 2 times daily 6PM  Qty: 120 tablet, Refills: 11    Associated Diagnoses: Transplanted kidney      nitroFURantoin macrocrystal-monohydrate (MACROBID) 100 MG capsule Take 1 capsule (100 mg) by mouth daily  Qty: 30 capsule, Refills: 3    Associated Diagnoses: Frequent UTI      sodium bicarbonate 650 MG tablet Take 2 tablets (1,300 mg) by mouth 3 times daily  Qty: 180 tablet, Refills: 11    Associated Diagnoses: Metabolic acidosis           CONTINUE these medications which have NOT CHANGED    Details   mycophenolic acid (GENERIC EQUIVALENT) 180 MG EC tablet Take 3 tablets (540 mg) by mouth 2 times daily  Qty: 180 tablet, Refills: 11    Comments: TXP DT 1/15/2023 (Kidney) TXP Dischg DT 1/18/2023 DX Kidney replaced by transplant Z94.0 TX [facility] ([place], [place])  Associated Diagnoses: Kidney replaced by transplant      psyllium (METAMUCIL/KONSYL) 58.6 % powder Take 18 g (1 Tablespoonful) by mouth 2 times daily as needed (Diarrhea)  Qty: 283 g, Refills: 0    Associated Diagnoses: Diarrhea, unspecified type      sulfamethoxazole-trimethoprim (BACTRIM) 400-80 MG tablet Take 1 tablet by mouth daily  Qty: 30 tablet, Refills: 11    Associated Diagnoses: Kidney replaced by transplant      tacrolimus (GENERIC EQUIVALENT) 0.5 MG capsule Take 1 capsule (0.5 mg) by mouth 2 times daily Total dose = 4.5mg twice a day  Qty: 60 capsule, Refills: 11    Comments: TXP DT 1/15/2023 (Kidney) TXP Dischg DT 1/18/2023 DX Kidney replaced by transplant Z94.0 [facility] ([place], [place])  Associated Diagnoses: Kidney replaced by transplant      tacrolimus (GENERIC EQUIVALENT) 1 MG capsule Take 4 capsules (4 mg) by mouth 2 times daily Take with 0.5mg caps = total dose 4.5mg twice a day  Qty: 300 capsule, Refills: 11    Comments: TXP DT 1/15/2023 (Kidney) TXP Dischg DT 1/18/2023 DX Kidney replaced by transplant Z94.0 TX [facility] ([place], [place])  Associated Diagnoses: Kidney replaced by transplant      valGANciclovir (VALCYTE) 450 MG tablet Take 2 tablets (900 mg) by mouth daily  Qty: 60 tablet, Refills: 1    Associated Diagnoses: Kidney replaced by transplant      Vitamin D, Cholecalciferol, 50 MCG (2000 UT) CAPS Take 2,000 Units by mouth daily  Qty: 30 capsule, Refills: 11    Associated Diagnoses: Vitamin D deficiency           STOP taking these medications       pantoprazole (PROTONIX) 40 MG EC tablet Comments:   Reason for Stopping:             Allergies   No Known Allergies  Data   Most Recent 3 CBC's:  Recent Labs   Lab Test 07/08/23  0605 07/06/23  0547 07/04/23  1944   WBC 2.5* 2.7* 2.9*   HGB 8.8* 8.4* 9.8*   MCV 93 94 92    181 219     Most Recent 3 BMP's:  Recent Labs   Lab Test 07/08/23  0605 07/07/23  0801 07/07/23  0601 07/06/23  1807 07/06/23  0547     --  143  --  138   POTASSIUM 4.5  --  4.1  --  4.3   CHLORIDE 115*  --  115*  --  112*   CO2 18*  --  17*  --  18*   BUN 14.6  --  13.9  --  17.7   CR 1.52*  --  1.51*  --  1.69*   ANIONGAP 10  --  11  --  8   VISHNU 8.8  --  9.1  --  8.8   GLC 93 105* 128*   < > 120*    < > = values in this interval not displayed.    I have reviewed history, examined patient and discussed plan with the fellow/resident/DK.    I concur with the findings in this note.    Time spent on discharge activities: 45 minutes.

## 2023-07-08 NOTE — PLAN OF CARE
/86 (BP Location: Right arm)   Pulse 52   Temp 98.1  F (36.7  C) (Oral)   Resp 16   Ht 1.829 m (6')   Wt 69.6 kg (153 lb 6.4 oz)   SpO2 100%   BMI 20.80 kg/m      Shift: 0475-0130  VS: Stable on RA, afebrile  Neuro: AOx4  BG: N/A  Labs: Mag 1.3, replaced this shift. ANC 0.3 critical result, lab redrawn at 1400 to double check result.   Respiratory: WDL  Pain/Nausea/PRN: Denies pain this shift.  Diet: Regular, good appetite.  LDA: R PIV - SL  GI/: Up independently to bathroom, urinal at bedside.  Skin: WDL  Mobility: UAL  Plan: Patient had ANC critical lab result of 0.3. Labs redrawn at 1400, waiting for results. Patient still hoping to go home today after lab results. AVS printed and passed on to evening shift RN.     Handoff given to following RN.

## 2023-07-08 NOTE — PLAN OF CARE
/78 (BP Location: Right arm)   Pulse 63   Temp 98.8  F (37.1  C) (Oral)   Resp 18   Ht 1.829 m (6')   Wt 69.6 kg (153 lb 6.4 oz)   SpO2 100%   BMI 20.80 kg/m      Shift: 3121-2134  Isolation Status: None - Standard Precautions  VS: VSS on RA, afebrile  Neuro: A&O x 4, able to make needs known  Behaviors: Calm, cooperative, resting comfortably between cares  BG: N/A (discontinued)  Labs: AM labs pending  Respiratory: WDL room air  Cardiac: WDL  Pain/Nausea: Denies  PRN: N/A  Diet: Regular diet  IV Access: R PIV infusing  Infusion(s): NS at 100 mL/hr, Zosyn q6h  Lines/Drains: N/A  GI/: Voiding spontaneously without difficulty, no BM reported overnight (LBM 7/7)  Skin: WDL  Mobility: UAL  Events/Education: Rested promoted  Plan: Continue to monitor. Potential discharge in AM.

## 2023-07-08 NOTE — PLAN OF CARE
/78 (BP Location: Right arm)   Pulse 63   Temp 98.8  F (37.1  C) (Oral)   Resp 18   Ht 1.829 m (6')   Wt 69.6 kg (153 lb 6.4 oz)   SpO2 100%   BMI 20.80 kg/m      Shift: 5994-4605  Isolation Status: None  VS: VSS on RA, afebrile  Neuro: Aox4  Behaviors: Calm, pleasant. Bummed he could not discharge today  BG: Discontinued BG checks  Labs: Mg 1.4  Respiratory: WDL  Cardiac: WDL  Pain/Nausea: No complaints of pain or nausea  PRN: None  Diet: Regular  IV Access: LR PIV  Infusion(s): NS at 100ml/hr  Lines/Drains: None  GI/: None  Skin: WDI  Mobility: Independent  Events/Education: N/a  Plan: Continue to monitor. Possible discharge tomorrow, 7/8 pending HI education

## 2023-07-08 NOTE — PLAN OF CARE
DISCHARGE:  Patient with orders to discharge to home.     Education Provided:   Med Card - n/a  Lab Book - n/a  Handouts - n/a  Specialty Pharmacy - n/a  LDAs - n/a    Discharge instructions, medications & follow ups reviewed with patient. Copy of discharge summary given to patient. PIV removed. Belongings returned from security n/a.    Patient in stable condition. AVSS. Patient had no further questions regarding discharge instructions and medications. Patient transferred out by self & left with belongings. Patient sent to discharge pharmacy to  medications.

## 2023-07-08 NOTE — PROGRESS NOTES
Care Management Discharge Note    Discharge Date: 07/08/2023       Discharge Disposition: Home, Home Infusion    Discharge Services: None    Discharge DME: None    Discharge Transportation: family or friend will provide    Private pay costs discussed: Not applicable    Does the patient's insurance plan have a 3 day qualifying hospital stay waiver?  No    PAS Confirmation Code:    Patient/family educated on Medicare website which has current facility and service quality ratings: no    Education Provided on the Discharge Plan: Yes  Persons Notified of Discharge Plans: Pt  Patient/Family in Agreement with the Plan: yes    Handoff Referral Completed: Yes    Additional Information:  Per team, pt will discharge on oral antibiotics. Handoff completed. No other discharge needs.     Lázaro Hagan RNCC  Covering for 7th floor/5C  Phone [phone]    SEARCHABLE in [facility] - search CARE COORDINATOR    [place] [employer] [place] ([phone]) Saturday & Sunday; ([phone]) [facility] Recognized Holidays    Units: 4A, 4C, 4E, 5A & 5B   Pager: [phone]    Units: 6A & 6B    Pager: [phone]    Units: 6C & 6D   Pager: [phone]    Units: 7A, 7B, 7C, 7D & 5C    Pager: [phone]    Units: [facility] ED, 5 Ortho, 5 Med/Surg, 6 Med/Surg, 8A & 10 ICU

## 2023-07-08 NOTE — PROGRESS NOTES
[facility]   Transplant Nephrology Progress Note  Date of Admission:  7/4/2023  Today's Date: 07/08/2023    Recommendations:  -  Continue sodium bicarbonate to 1300 mg PO TID.  - continue magnesium oxide to 800 mg PO BID.  - Recommend psyllium fiber for loose stools.  - Recommend discharging on amoxicillin.  - Dr. Cerda discussed UTI ppx antibiotics with Dr. Parker, and plan will be to start nitrofurantoin 100 mg PO daily after completion of amoxicillin (around 7/19).  has an appointment with Dr. Parker from ID on 8/16.  - Next follow up appointment with transplant nephrology on 7/20 with Dr. Green.  - discharging on  mg BID (initially decreased to 320 mg BID)    Assessment & Plan   # DDKT: Stable but increased from baseline due to infection.    - Baseline Creatinine: ~ 1.4-1.7   - Proteinuria: Normal (<0.2 grams)   - Date DSA Last Checked: May/2023      Latest DSA: No cPRA 49%   - BK Viremia: No   - Kidney Tx Biopsy: No    -Recommend IV fluids, empiric antibiotics while awaiting sensitivities   -Renal transplant U/S without abscess. CT C/A/P with mild stranding around the kidney transplant, patchy hypoenhancement within the kidney transplant.      # Immunosuppression Prior to Admission: Tacrolimus immediate release (goal 8-10) and Mycophenolic acid (dose 540 mg every 12 hours)    - Present Immunosuppression: Tacrolimus immediate release (goal 8-10) and Mycophenolic acid (dose 360 mg every 12 hours)   (MPA reduced given frequent urinary tract infections).   - Patient is in an immunosuppressed state and will continue to monitor for efficacy and toxicity of immunosuppression medications.   - Changes: No    # Infection Prophylaxis:   - PJP: Sulfa/TMP (Bactrim)  MWF  - CMV: Valganciclovir (Valcyte) Patient is CMV IgG Ab discordant (D+/R-) and will continue on Valcyte x 6 months, then check CMV PCR monthly until 12 months post transplant.    # Diarrhea  #  Fever  # UTI: He presented to the ED 7/4 with fever that started Monday (7/3).  He reports having diarrhea in the morning today (7/5).  Started on vancomycin and zosyn empirically.     7/5/2023  Enteric Bacteria and Virus Panel by [facility] Stool negative    7/5/2023  C. difficile Toxin B PCR with reflex to C. difficile Antigen and Toxins A/B EIA negative    7/5/2023  BK Virus Quantitative, PCR Not detected    7/5/2023   EBV DNA PCR Quantitative Whole Blood <500     7/5/2023   CMV Quantitative, PCR Negative    7/4/2023 Urine Culture 10,000-50,000 CFU/mL Enterococcus faecalis     7/4/2023   Blood Culture Peripheral Blood No growth to date    7/4/2023   Blood Culture Peripheral Blood No growth to date       - 7/6 CT c/a/p:  small amount of fluid in the pelvis and areas of patchy hypoenhancement within the transplant kidney.    - 7/6 US LE venous duplex: no evidence of DVT   - I recommend transplant ID consult to consider ppx antibiotics. He was previously on macrobid in 6/2023 PRN post coital.    # Blood Pressure: Controlled, but low at times Goal BP: < 140/90   - Volume status: Euvolemic     - Changes: No    # Anemia in Chronic Renal Disease: Hgb: Decreased 8.4      SARA: No   - Iron studies: Low iron saturation 1/31/23    # Mineral Bone Disorder:   - Secondary renal hyperparathyroidism; PTH level: Normal (15-65 pg/ml)   5/25/23     On treatment: None  - Vitamin D; level: Low          On supplement: Yes  - Calcium; level: Normal          On supplement: No  - Phosphorus; level: Normal         On supplement: No     # Electrolytes:  - Potassium; level: Normal        On supplement: No  - Magnesium; level: Low 1.4         On supplement: Yes, agree with increasing magnesium oxide to 800 mg PO BID  - Bicarbonate; level: Low  18        On supplement: Yes, increase sodium bicarbonate to 1300 mg PO TID.  - Sodium; level: Normal     # H/o Cardiomyopathy: Normal LVEF at 55-60% with last cardiac echo 9/2022.     # H/o Urethral  Stricture:               -Patient is s/p buccal urethroplasty and diverticulum excision 2020. Follows closely with Urology.     # H/o Polysubstance Abuse: Patient with h/o methamphetamine and alcohol abuse.  Now sober.    # Transplant History:  Etiology of Kidney Failure: Etiology of Kidney Failure: Solitary congenital kidney and h/o urethral calculus and urologic issues  Tx: DDKT  Transplant: 1/15/2023 (Kidney)  Significant changes in immunosuppression: None  Significant transplant-related complications: None    Recommendations were communicated to the primary team verbally.    Seen and discussed with Dr. Cerda.    Janet Robison MD   Pager: [phone]      Interval History    Mr. Fowler's creatinine is 1.51 ( 0601); Decreased from 1.69 yesterday.  I/O last 3 completed shifts:  In: 250 [P.O.:100; I.V.:150]  Out: 1050 [Urine:1050]   Other significant labs/tests/vitals: SBP's stable overnight. Afebrile.   No acute events overnight.  No chest pain or shortness of breath.  No leg swelling.  No nausea and vomiting.  Bowel movements are loose.    Review of Systems   4 point ROS was obtained and negative except as noted in the Interval History.    MEDICATIONS:    amoxicillin-clavulanate  1 tablet Oral Q12H formerly [facility] ()     fiber modular (NUTRISOURCE FIBER)  1 packet Oral BID     magnesium oxide  800 mg Oral BID     mycophenolic acid  540 mg Oral BID IS     pantoprazole  40 mg Oral QAM AC     sodium bicarbonate  1,300 mg Oral TID     sodium chloride (PF)  3 mL Intracatheter Q8H     sulfamethoxazole-trimethoprim  1 tablet Oral Daily     tacrolimus  4.5 mg Oral BID IS     valGANciclovir  900 mg Oral Daily     Vitamin D3  2,000 Units Oral Daily         Physical Exam   Temp  Av  F (38.3  C)  Min: 98.6  F (37  C)  Max: 103.2  F (39.6  C)      Pulse  Av.4  Min: 69  Max: 125 Resp  Av.4  Min: 15  Max: 20  SpO2  Av.1 %  Min: 93 %  Max: 100 %     /86 (BP Location: Right arm)   Pulse 52   Temp  98.1  F (36.7  C) (Oral)   Resp 16   Ht 1.829 m (6')   Wt 69.6 kg (153 lb 6.4 oz)   SpO2 100%   BMI 20.80 kg/m      Admit Weight: 68 kg (150 lb)     GENERAL APPEARANCE: alert and no distress  RESP: lungs clear to auscultation   CV: regular rhythm, normal rate,  EDEMA: no LE edema bilaterally  ABDOMEN: soft, nondistended, nontender  MS: extremities normal - no gross deformities noted, no evidence of inflammation in joints, no muscle tenderness  SKIN: no rash  PSYCH: mentation appears normal and affect normal/bright    Data   All labs reviewed by me.  CMP  Recent Labs   Lab 07/08/23  0605 07/07/23  0801 07/07/23  0601 07/07/23  0206 07/06/23  1807 07/06/23  0547 07/05/23  1304 07/04/23  1944     --  143  --   --  138  --  133*   POTASSIUM 4.5  --  4.1  --   --  4.3  --  4.0   CHLORIDE 115*  --  115*  --   --  112*  --  102   CO2 18*  --  17*  --   --  18*  --  17*   ANIONGAP 10  --  11  --   --  8  --  14   GLC 93 105* 128* 133*   < > 120*   < > 139*   BUN 14.6  --  13.9  --   --  17.7  --  26.1*   CR 1.52*  --  1.51*  --   --  1.69*  --  1.86*   GFRESTIMATED 59*  --  60*  --   --  52*  --  47*   VISHNU 8.8  --  9.1  --   --  8.8  --  9.5   MAG 1.3*  --  1.4*  --   --  1.6*  --   --    PHOS 3.7  --  2.7  --   --  2.1*  --   --    PROTTOTAL  --   --   --   --   --   --   --  7.4   ALBUMIN  --   --   --   --   --   --   --  4.2   BILITOTAL  --   --   --   --   --   --   --  0.4   ALKPHOS  --   --   --   --   --   --   --  53   AST  --   --   --   --   --   --   --  27   ALT  --   --   --   --   --   --   --  14    < > = values in this interval not displayed.     CBC  Recent Labs   Lab 07/08/23  1400 07/08/23  1133 07/08/23  0605 07/06/23  0547 07/04/23  5292   HGB 9.5*  --  8.8* 8.4* 9.8*   WBC 2.0* 2.1* 2.5* 2.7* 2.9*   RBC 3.20*  --  2.98* 2.77* 3.38*   HCT 30.0*  --  27.8* 26.0* 31.2*   MCV 94  --  93 94 92   MCH 29.7  --  29.5 30.3 29.0   MCHC 31.7  --  31.7 32.3 31.4*   RDW 14.1  --  14.2 13.8 13.3   PLT  237  --  215 181 219     INR  Recent Labs   Lab 07/04/23  1944   INR 1.29*     ABGNo lab results found in last 7 days.   Urine Studies  Recent Labs   Lab Test 07/04/23 2015 06/12/23  1514 05/04/23  1535 04/15/23  0918   COLOR Light Yellow Light Yellow Yellow Yellow   APPEARANCE Clear Clear Slightly Cloudy* Clear   URINEGLC Negative Negative Negative Negative   URINEBILI Negative Negative Negative Negative   URINEKETONE Negative Negative Trace* Negative   SG 1.018 1.020 1.017 1.020   UBLD Trace* Negative Negative Large*   URINEPH 6.0 6.0 6.0 6.5   PROTEIN 70* 30* 100* >=300*   UROBILINOGEN  --   --   --  0.2   NITRITE Negative Negative Negative Negative   LEUKEST Negative Negative Large* Moderate*   RBCU 4* 3* 3* 10-25*   WBCU 11* 7* 71* *     No lab results found.  PTH  Recent Labs   Lab Test 05/25/23  1552 01/31/23  1049 01/20/23  0746 04/18/22  0851   PTHI 52 73* 101* 315*     Iron Studies  Recent Labs   Lab Test 01/31/23  1049 01/20/23  0746   IRON 70 153    244   IRONSAT 27 63*   DAWSON 430* 717*       IMAGING:  All imaging studies reviewed by me.

## 2023-07-09 LAB
BACTERIA BLD CULT: NO GROWTH
BACTERIA BLD CULT: NO GROWTH

## 2023-07-10 ENCOUNTER — TELEPHONE (OUTPATIENT)
Dept: TRANSPLANT | Facility: CLINIC | Age: 38
End: 2023-07-10

## 2023-07-10 ENCOUNTER — LAB (OUTPATIENT)
Dept: LAB | Facility: CLINIC | Age: 38
End: 2023-07-10
Payer: MEDICARE

## 2023-07-10 ENCOUNTER — PATIENT OUTREACH (OUTPATIENT)
Dept: CARE COORDINATION | Facility: CLINIC | Age: 38
End: 2023-07-10
Payer: MEDICARE

## 2023-07-10 DIAGNOSIS — Z48.298 AFTERCARE FOLLOWING ORGAN TRANSPLANT: ICD-10-CM

## 2023-07-10 DIAGNOSIS — Z20.828 CONTACT WITH AND (SUSPECTED) EXPOSURE TO OTHER VIRAL COMMUNICABLE DISEASES: ICD-10-CM

## 2023-07-10 DIAGNOSIS — Z79.899 ENCOUNTER FOR LONG-TERM CURRENT USE OF MEDICATION: ICD-10-CM

## 2023-07-10 DIAGNOSIS — Z94.0 KIDNEY REPLACED BY TRANSPLANT: ICD-10-CM

## 2023-07-10 LAB
ANION GAP SERPL CALCULATED.3IONS-SCNC: 11 MMOL/L (ref 7–15)
BUN SERPL-MCNC: 28.6 MG/DL (ref 6–20)
CALCIUM SERPL-MCNC: 9.4 MG/DL (ref 8.6–10)
CHLORIDE SERPL-SCNC: 105 MMOL/L (ref 98–107)
CREAT SERPL-MCNC: 1.63 MG/DL (ref 0.67–1.17)
DEPRECATED HCO3 PLAS-SCNC: 22 MMOL/L (ref 22–29)
EBV DNA COPIES/ML, INSTRUMENT: 1218 COPIES/ML
EBV DNA SPEC NAA+PROBE-LOG#: 3.1 {LOG_COPIES}/ML
ERYTHROCYTE [DISTWIDTH] IN BLOOD BY AUTOMATED COUNT: 13.9 % (ref 10–15)
GFR SERPL CREATININE-BSD FRML MDRD: 55 ML/MIN/1.73M2
GLUCOSE SERPL-MCNC: 87 MG/DL (ref 70–99)
HCT VFR BLD AUTO: 28.9 % (ref 40–53)
HGB BLD-MCNC: 9.4 G/DL (ref 13.3–17.7)
MAGNESIUM SERPL-MCNC: 1.4 MG/DL (ref 1.7–2.3)
MCH RBC QN AUTO: 30.1 PG (ref 26.5–33)
MCHC RBC AUTO-ENTMCNC: 32.5 G/DL (ref 31.5–36.5)
MCV RBC AUTO: 93 FL (ref 78–100)
PHOSPHATE SERPL-MCNC: 2.7 MG/DL (ref 2.5–4.5)
PLATELET # BLD AUTO: 287 10E3/UL (ref 150–450)
POTASSIUM SERPL-SCNC: 4.5 MMOL/L (ref 3.4–5.3)
RBC # BLD AUTO: 3.12 10E6/UL (ref 4.4–5.9)
SODIUM SERPL-SCNC: 138 MMOL/L (ref 136–145)
WBC # BLD AUTO: 3.2 10E3/UL (ref 4–11)

## 2023-07-10 PROCEDURE — 83735 ASSAY OF MAGNESIUM: CPT

## 2023-07-10 PROCEDURE — 84100 ASSAY OF PHOSPHORUS: CPT

## 2023-07-10 PROCEDURE — 36415 COLL VENOUS BLD VENIPUNCTURE: CPT

## 2023-07-10 PROCEDURE — 80197 ASSAY OF TACROLIMUS: CPT

## 2023-07-10 PROCEDURE — 85014 HEMATOCRIT: CPT

## 2023-07-10 PROCEDURE — 80048 BASIC METABOLIC PNL TOTAL CA: CPT

## 2023-07-10 NOTE — PROGRESS NOTES
Clinic Care Coordination Contact  [facility]: Post-Discharge Note  SITUATION                                                      Admission:    Admission Date: 07/04/23   Reason for Admission: Pyelonephritis of transplanted kidney  Discharge:   Discharge Date: 07/08/23  Discharge Diagnosis: Pyelonephritis of transplanted kidney    BACKGROUND                                                      Per hospital discharge summary and inpatient provider notes:  JACK of transplanted kidney: Present on admission, resolved with fluid and antibiotics. Cr 1.5 on discharge (baseline Cr 1.4-1.6). Renal US patent vessels, no hydronephrosis. Labs Monday.     Immunosuppressed status secondary to medications:    - Myfortic 540 mg BID briefly reduced to 360 mg BID in the setting of infection. Resume 540mg BID on discharge.   - Tacrolimus goal level 8-10.       Hematology:   Leukopenia: WBC 2-3, monitor. ANC 0.3, possibly due to Zosyn. Received GCSF 300mcg before discharge. Recheck on Monday's labs.  Anemia of chronic disease: Hgb 8.8, stable.     GI/Nutrition:   Diarrhea: Colonoscopy 3/23/23 with rare crypt epithelial apoptosis, CMV negative. CMV, C diff, and enteric panel negative. Continue fiber.      Fluid/Electrolytes:  Hypomagnesemia: Increased Mag-Ox 800 mg to BID. Mg 1.3 on day of discharge, received 2g mag sulfate.  Low bicarb: Sodium bicarbonate increased to 1300 mg TID.     :   History of urethral strictures: s/p buccal urethroplasty and diverticulum excision 12/2020. No hydronephrosis on US. Urology saw patient 5/16/23, and cystoscopy scheduled for 7/19/23.      Infectious disease:   Fevers; Pyelonephritis: Tmax 103F. Infectious workup as below indicative of pyelonephritis. Initially treated with empiric Vanc/Zosyn. Switched to Augmentin to complete 14 days on discharge.     Infectious workup:   - 7/4 BCx NGTD   - 7/4 COVID-19/RSV/Influenza negative   - 7/4 UC + 10-50k E faecalis   - 7/5 C diff not detected   - 7/5  Enteric panel negative   - 7/5 BK PCR not detected   - 7/5 CMV not detected   - 7/5 TTE no vegetation   - 7/6 CT C/A/P with contrast: mild nonspecific stranding around transplant kidney; small amount of fluid in pelvis and areas of hypoenhancement representative of mild enhancement.      Recurrent UTI: Discussed with ID. Will discharge on Macrobid daily after Augmentin course. Follow up with ID as scheduled in August.     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 Recheck in 1 month.    ASSESSMENT      Discharge Assessment  How are you doing now that you are home?: Doing good.  How are your symptoms? (Red Flag symptoms escalate to triage hotline per guidelines): Improved  Do you feel your condition is stable enough to be safe at home until your provider visit?: Yes  Does the patient have their discharge instructions? : Yes  Does the patient have questions regarding their discharge instructions? : No  Were you started on any new medications or were there changes to any of your previous medications? : Yes  Does the patient have all of their medications?: Yes  Do you have questions regarding any of your medications? : No  Do you have all of your needed medical supplies or equipment (DME)?  (i.e. oxygen tank, CPAP, cane, etc.): Yes  Discharge follow-up appointment scheduled within 14 calendar days? : Yes  Discharge Follow Up Appointment Date: 07/19/23  Discharge Follow Up Appointment Scheduled with?: Specialty Care Provider (Urologist)    Post-op ([facility] CTA Only)  If the patient had a surgery or procedure, do they have any questions for a nurse?: No    Post-op (Clinicians Only)  Did the patient have surgery or a procedure: No  Fever: No  Chills: No  Eating & Drinking: eating and drinking without complaints/concerns  PO Intake: regular diet  Bowel Function: normal  Date of last BM: 07/09/23  Urinary Status: voiding without complaint/concerns    PLAN                                                      Outpatient Plan:     1. Get a set of  transplant labs drawn on Monday July 10 at your usual lab, and then resume normal lab schedule.  2. Follow up Dr. Green 7/20 as scheduled.  3. Follow up Dr. Willis 8/16 as scheduled.  4. Lab: EBV PCR in 1 month     Future Appointments   Date Time Provider Department Center   7/19/2023 10:30 AM Sam Mejia MD UROP [facility]   7/20/2023  2:05 PM Edwin Green MD TXO [facility]   7/31/2023  9:00 AM Tato Ralph DO LV[facility]   8/16/2023  4:00 PM Andrzej Willis MD [facility]   9/15/2023  8:00 AM [id number] [facility]   9/15/2023  9:15 AM Tiffany Sun APRN [facility]     For any urgent concerns, please contact our 24 hour nurse triage line: [phone] ([phone])       RN CC contacted patient for post-hospital follow up and to introduce Care Coordination. Patient is not interested at this time. He is agreeable to RN CC sending introduction letter and information about Care Coordination to his [employer] account. Verified patient does access his [employer] account.     Khloe Waite RN, BSN, CPHN, [facility]  [facility]  Phone: [phone]  Email: [email or web address]

## 2023-07-10 NOTE — TELEPHONE ENCOUNTER
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 Willis on August 16th.     Nathaniel

## 2023-07-10 NOTE — LETTER
[facility] CARE COORDINATION  [address]  [place] [id number]    July 10, 2023    Chip Fowler  [address] [address] [address]      Dear Chip,    I am a  clinic care coordinator who works with Tato Ralph DO with the [facility]. I wanted to introduce myself and provide you with my contact information for you to be able to call me with any questions or concerns. I wanted to thank you for spending the time to talk with me.  Below is a description of clinic care coordination and how I can further assist you.       The clinic care coordination team is made up of a registered nurse, , financial resource worker and community health worker who understand the health care system. The goal of clinic care coordination is to help you manage your health and improve access to the health care system. Our team works alongside your provider to assist you in determining your health and social needs. We can help you obtain health care and community resources, providing you with necessary information and education. We can work with you through any barriers and develop a care plan that helps coordinate and strengthen the communication between you and your care team.  Our services are voluntary and are offered without charge to you personally.    Please feel free to contact me with any questions or concerns regarding care coordination and what we can offer.      We are focused on providing you with the highest-quality healthcare experience possible.    Sincerely,     Khloe Waite RN, BSN, CPHN, CM  [facility] Management  [facility]  Phone: [phone]  Email: [email or web address]      Enclosed: Information about Care Coordination.               WHAT IS CARE COORDINATION?    [facility] Care Coordination supports patients  and families dealing with chronic or complex health  conditions, developmental issues, and  social service  needs. This service is available to patients of all ages,  from babies to seniors.  When you re facing a difficult decision about caring  for yourself or someone you love, we can help you  understand your options. We identify and refer you to  community resources that help with financial, legal,  mental health, transportation, and other issues. We also  help with your medical and related education needs.    IS CARE COORDINATION RIGHT FOR ME?  Discuss a referral to Care Coordination with your  primary care provider or care team member.    HOW CAN I CONNECT WITH CARE COORDINATION?   Contact your clinic.   Speak with your doctor or clinic staff.   Discuss care coordination with hospital  staff before discharge.    MEET YOUR CARE COORDINATION TEAM    Registered Nurse Care Coordinator   Provides education on medications,  disease management, and new diagnoses.   Addresses concerns about medical conditions  and connects you to resources.   Develops patient-centered goals and  communicates with patient s care team.    Social Work Care Coordinator   Provides education on emotional wellbeing.   Connects you to a variety of  community-based resources.   Communicates with care team  and community partners.    Community Health Worker   Identifies health and social barriers and  connects you with community resources.   Develops nonclinical patient and  family centered goals.   Enhances communication between  patients and care teams.    Financial Resource Worker   Assists patients with applying for health  insurance ([place], [place], [place]   Helps patients with applying for  county benefits.   Connects patients with [facility]

## 2023-07-11 DIAGNOSIS — Z94.0 KIDNEY REPLACED BY TRANSPLANT: Primary | ICD-10-CM

## 2023-07-11 LAB
TACROLIMUS BLD-MCNC: 11 UG/L (ref 5–15)
TME LAST DOSE: NORMAL H
TME LAST DOSE: NORMAL H

## 2023-07-11 RX ORDER — TACROLIMUS 0.5 MG/1
0.5 CAPSULE ORAL 2 TIMES DAILY
Qty: 60 CAPSULE | Refills: 11 | Status: SHIPPED | OUTPATIENT
Start: 2023-07-11 | End: 2023-12-20

## 2023-07-11 RX ORDER — TACROLIMUS 1 MG/1
3 CAPSULE ORAL 2 TIMES DAILY
Qty: 180 CAPSULE | Refills: 11 | Status: SHIPPED | OUTPATIENT
Start: 2023-07-11 | End: 2023-12-20

## 2023-07-11 NOTE — TELEPHONE ENCOUNTER
[employer] message sent to patient regarding:  Tacrolimus IR level 11 on 7/10, goal 6-8, dose 4.5 mg BID.     PLAN:   Please call patient and confirm this was an accurate 12-hour trough. Verify Tacrolimus IR dose 4.5 mg BID. Confirm no new medications or illness. Confirm no missed doses. If accurate trough and accurate dose, decrease Tacrolimus IR dose to 3.5 mg BID and repeat labs in 1 weeks

## 2023-07-11 NOTE — TELEPHONE ENCOUNTER
ISSUE:   Tacrolimus IR level 11 on 7/10, goal 6-8, dose 4.5 mg BID.    PLAN:   Please call patient and confirm this was an accurate 12-hour trough. Verify Tacrolimus IR dose 4.5 mg BID. Confirm no new medications or illness. Confirm no missed doses. If accurate trough and accurate dose, decrease Tacrolimus IR dose to 3.5 mg BID and repeat labs in 1 weeks    Danielle Eubanks RN

## 2023-07-11 NOTE — TELEPHONE ENCOUNTER
Patient confirmed this was an accurate 12-hour trough. Verified Tacrolimus IR dose 4.5 mg BID. Confirmed no new medications or illness. Confirmed no missed doses.patient confirms decrease Tacrolimus IR dose to 3.5 mg BID and repeat labs in 1 weeks

## 2023-07-13 ENCOUNTER — TELEPHONE (OUTPATIENT)
Dept: PHARMACY | Facility: CLINIC | Age: 38
End: 2023-07-13
Payer: MEDICARE

## 2023-07-13 NOTE — TELEPHONE ENCOUNTER
Clinical Pharmacy Consult:                                                      Transplant Specific: 6 Month Post Transplant Call  Date of Transplant: 1/15/2023  Type of Transplant: kidney  First Transplant: yes  History of rejection: no    Immunosuppression Regimen   TAC 3.5 mg qAM & 3.5 mg qPM and  mg qAM & 540 mg qPM  Patient specific goal: 8-10  Most recent level: 11 on 07/10/2023  Immunosuppressant Levels:  Supratherapeutic, dose decreased  Pt adherent to lab draws: yes  Scr:   Lab Results   Component Value Date    CR 1.65 02/24/2023    CR 6.20 12/03/2020     Side effects: none    Prophylactic Medications  Antibacterial:  Bactrim 400-80 QD  Scheduled Discontinue Date: Lifelong    Antifungal: Not needed thus far  Scheduled Discontinue Date: N/A    Antiviral: CrCl 25 to 39 mL/minute: Valcyte 900 mg daily (CrCl borderline)  Scheduled Discontinue Date: 6 months    Acid Reducer: Protonix (pantoprazole)  Scheduled Reviewed Date: Followed by clinic    Thrombosis Prevention: Not on   Scheduled Discontinue Date: N/A    Blood Pressure Management  Frequency of home Blood Pressure checks: once daily  Most recent home BP: 130's / 80's  Patient Blood pressure goal: <140/90  Patient blood pressure at goal:  Yes    Hospitalizations/ER visits since last assessment: 1 for uti    Med rec/DUR performed: yes  Med Rec Discrepancies: no    Spoke with Chip via phone, he is doing well today. Recently admitted for UTI - finishing a course of amoxicillin-clavulanate, then will proceed to nitrofurantoin. He is having looser stools with the extended antibiotic course. He asked about over the counter options for helping to prevent reinfection. He was reading about probiotics.  Advised probiotics may or may not help, don't have any hard data to say it will help, but relatively benign risk profile. He thinks he will try a course. Also offered cranberry extract as another OTC options which may or may not help. Otherwise he is  tolerating his regimen fine, denies side effects. He is using a pill organizer which he maintains himself. Phone alarms as a reminder to take doses. Denies missed doses.     No outside hospitalizations or visits to urgent care. BP checked daily, at goal. He has no questions at this time.     Time Spent: 10 minutes    Bashir Cooper, PharmD, BCACP  [facility]  [address] [place] [id number]  Specialty - [phone]  Transplant - [phone]  Mail - [phone]

## 2023-07-14 ENCOUNTER — PRE VISIT (OUTPATIENT)
Dept: UROLOGY | Facility: CLINIC | Age: 38
End: 2023-07-14
Payer: MEDICARE

## 2023-07-14 NOTE — TELEPHONE ENCOUNTER
Reason for visit: cystoscopy      Dx/Hx/Sx: recurrent UTI     Records/imaging/labs/orders: in epic     At Rooming: standard

## 2023-07-17 ENCOUNTER — LAB (OUTPATIENT)
Dept: LAB | Facility: CLINIC | Age: 38
End: 2023-07-17
Payer: MEDICARE

## 2023-07-17 ENCOUNTER — TELEPHONE (OUTPATIENT)
Dept: TRANSPLANT | Facility: CLINIC | Age: 38
End: 2023-07-17

## 2023-07-17 DIAGNOSIS — Z94.0 KIDNEY REPLACED BY TRANSPLANT: ICD-10-CM

## 2023-07-17 DIAGNOSIS — Z79.899 ENCOUNTER FOR LONG-TERM CURRENT USE OF MEDICATION: ICD-10-CM

## 2023-07-17 DIAGNOSIS — Z20.828 CONTACT WITH AND (SUSPECTED) EXPOSURE TO OTHER VIRAL COMMUNICABLE DISEASES: ICD-10-CM

## 2023-07-17 DIAGNOSIS — Z48.298 AFTERCARE FOLLOWING ORGAN TRANSPLANT: ICD-10-CM

## 2023-07-17 LAB
ANION GAP SERPL CALCULATED.3IONS-SCNC: 12 MMOL/L (ref 7–15)
BUN SERPL-MCNC: 24.2 MG/DL (ref 6–20)
CALCIUM SERPL-MCNC: 9.3 MG/DL (ref 8.6–10)
CHLORIDE SERPL-SCNC: 105 MMOL/L (ref 98–107)
CREAT SERPL-MCNC: 1.79 MG/DL (ref 0.67–1.17)
DEPRECATED HCO3 PLAS-SCNC: 23 MMOL/L (ref 22–29)
ERYTHROCYTE [DISTWIDTH] IN BLOOD BY AUTOMATED COUNT: 14.6 % (ref 10–15)
GFR SERPL CREATININE-BSD FRML MDRD: 49 ML/MIN/1.73M2
GLUCOSE SERPL-MCNC: 82 MG/DL (ref 70–99)
HCT VFR BLD AUTO: 31.8 % (ref 40–53)
HGB BLD-MCNC: 10.2 G/DL (ref 13.3–17.7)
MCH RBC QN AUTO: 30.4 PG (ref 26.5–33)
MCHC RBC AUTO-ENTMCNC: 32.1 G/DL (ref 31.5–36.5)
MCV RBC AUTO: 95 FL (ref 78–100)
PLATELET # BLD AUTO: 318 10E3/UL (ref 150–450)
POTASSIUM SERPL-SCNC: 4.3 MMOL/L (ref 3.4–5.3)
RBC # BLD AUTO: 3.36 10E6/UL (ref 4.4–5.9)
SODIUM SERPL-SCNC: 140 MMOL/L (ref 136–145)
TACROLIMUS BLD-MCNC: 7.9 UG/L (ref 5–15)
TME LAST DOSE: NORMAL H
TME LAST DOSE: NORMAL H
WBC # BLD AUTO: 3.2 10E3/UL (ref 4–11)

## 2023-07-17 PROCEDURE — 36415 COLL VENOUS BLD VENIPUNCTURE: CPT

## 2023-07-17 PROCEDURE — 80180 DRUG SCRN QUAN MYCOPHENOLATE: CPT

## 2023-07-17 PROCEDURE — 80197 ASSAY OF TACROLIMUS: CPT

## 2023-07-17 PROCEDURE — 85027 COMPLETE CBC AUTOMATED: CPT

## 2023-07-17 PROCEDURE — 82310 ASSAY OF CALCIUM: CPT

## 2023-07-17 NOTE — TELEPHONE ENCOUNTER
[facility] lab would like new standing orders put into epic patient was there for lab draw today used the last standing oder

## 2023-07-18 LAB — CMV DNA SPEC NAA+PROBE-ACNC: NOT DETECTED IU/ML

## 2023-07-19 ENCOUNTER — MYC MEDICAL ADVICE (OUTPATIENT)
Dept: TRANSPLANT | Facility: CLINIC | Age: 38
End: 2023-07-19

## 2023-07-19 ENCOUNTER — OFFICE VISIT (OUTPATIENT)
Dept: UROLOGY | Facility: CLINIC | Age: 38
End: 2023-07-19
Payer: MEDICARE

## 2023-07-19 VITALS
HEART RATE: 64 BPM | DIASTOLIC BLOOD PRESSURE: 85 MMHG | BODY MASS INDEX: 20.32 KG/M2 | WEIGHT: 150 LBS | SYSTOLIC BLOOD PRESSURE: 134 MMHG | HEIGHT: 72 IN

## 2023-07-19 DIAGNOSIS — N39.0 RECURRENT UTI: Primary | ICD-10-CM

## 2023-07-19 DIAGNOSIS — Z94.0 KIDNEY REPLACED BY TRANSPLANT: Primary | ICD-10-CM

## 2023-07-19 LAB
MYCOPHENOLATE SERPL LC/MS/MS-MCNC: <0.25 MG/L (ref 1–3.5)
MYCOPHENOLATE-G SERPL LC/MS/MS-MCNC: 14.4 MG/L (ref 30–95)
TME LAST DOSE: ABNORMAL H
TME LAST DOSE: ABNORMAL H

## 2023-07-19 PROCEDURE — 52000 CYSTOURETHROSCOPY: CPT | Performed by: UROLOGY

## 2023-07-19 RX ORDER — LIDOCAINE HYDROCHLORIDE 20 MG/ML
JELLY TOPICAL ONCE
Status: COMPLETED | OUTPATIENT
Start: 2023-07-19 | End: 2023-07-19

## 2023-07-19 RX ADMIN — LIDOCAINE HYDROCHLORIDE: 20 JELLY TOPICAL at 11:23

## 2023-07-19 ASSESSMENT — PAIN SCALES - GENERAL: PAINLEVEL: NO PAIN (0)

## 2023-07-19 NOTE — NURSING NOTE
Chief Complaint   Patient presents with     Cystoscopy       Blood pressure 134/85, pulse 64, height 1.829 m (6'), weight 68 kg (150 lb). Body mass index is 20.34 kg/m .    Patient Active Problem List   Diagnosis     HTN, kidney transplant related     Urethral stricture     Methamphetamine abuse, episodic (H)     Urethral diverticulum     Allergic rhinitis, unspecified seasonality, unspecified trigger     Stage 3a chronic kidney disease (H)     Kidney replaced by transplant     Immunosuppressed status (H)     Aftercare following organ transplant     Anemia of chronic disease     Secondary renal hyperparathyroidism (H)     Vitamin D deficiency     Hypomagnesemia     JACK (acute kidney injury) (H)     Diarrhea     Urinary tract infection without hematuria, site unspecified     Leukopenia     Low bicarbonate     High fever     SIRS (systemic inflammatory response syndrome) (H)     Status post kidney transplant     EBV (Aaron-Barr virus) viremia     Pyelonephritis of transplanted kidney     Frequent UTI       No Known Allergies    Current Outpatient Medications   Medication Sig Dispense Refill     magnesium oxide (MAG-OX) 400 MG tablet Take 2 tablets (800 mg) by mouth 2 times daily 120 tablet 11     mycophenolic acid (GENERIC EQUIVALENT) 180 MG EC tablet Take 3 tablets (540 mg) by mouth 2 times daily 180 tablet 11     nitroFURantoin macrocrystal-monohydrate (MACROBID) 100 MG capsule Take 1 capsule (100 mg) by mouth daily 30 capsule 3     psyllium (METAMUCIL/KONSYL) 58.6 % powder Take 18 g (1 Tablespoonful) by mouth 2 times daily as needed (Diarrhea) 283 g 0     sodium bicarbonate 650 MG tablet Take 2 tablets (1,300 mg) by mouth 3 times daily 180 tablet 11     sulfamethoxazole-trimethoprim (BACTRIM) 400-80 MG tablet Take 1 tablet by mouth daily 30 tablet 11     tacrolimus (GENERIC EQUIVALENT) 0.5 MG capsule Take 1 capsule (0.5 mg) by mouth 2 times daily Total dose = 3.5 mg twice a day 60 capsule 11     tacrolimus  (GENERIC EQUIVALENT) 1 MG capsule Take 3 capsules (3 mg) by mouth 2 times daily Total dose = 3.5 mg twice per day 180 capsule 11     valGANciclovir (VALCYTE) 450 MG tablet Take 2 tablets (900 mg) by mouth daily 60 tablet 1     Vitamin D, Cholecalciferol, 50 MCG (2000 UT) CAPS Take 2,000 Units by mouth daily 30 capsule 11       Social History     Tobacco Use     Smoking status: Former     Packs/day: 0.50     Types: Cigarettes     Start date:      Quit date: 1/15/2023     Years since quittin.5     Smokeless tobacco: Never   Vaping Use     Vaping Use: Never used   Substance Use Topics     Alcohol use: Not Currently     Comment: quit alcohol 3-4 yrs ago     Drug use: Not Currently     Types: Methamphetamines, MDMA (Ecstasy)       Invasive Procedure Safety Checklist:    Procedure: Cystoscopy    Action: Complete sections and checkboxes as appropriate.    Pre-procedure:  1. Patient ID Verified with 2 identifiers (Janice and  or MRN) : YES    2. Procedure and site verified with patient/designee (when able) : YES    3. Accurate consent documentation in medical record : YES    4. H&P (or appropriate assessment) documented in medical record : N/A  H&P must be up to 30 days prior to procedure an updated within 24 hours of                 Procedure as applicable.     5. Relevant diagnostic and radiology test results appropriately labeled and displayed as applicable : YES    6. Blood products, implants, devices, and/or special equipment available for the procedure as applicable : YES    7. Procedure site(s) marked with provider initials [Exclusions: none] : NO    8. Marking not required. Reason : Yes  Procedure does not require site marking    Time Out:     Time-Out performed immediately prior to starting procedure, including verbal and active participation of all team members addressing: YES    1. Correct patient identity.  2. Confirmed that the correct side and site are marked.  3. An accurate procedure to be done.  4.  Agreement on the procedure to be done.  5. Correct patient position.  6. Relevant images and results are properly labeled and appropriately displayed.  7. The need to administer antibiotics or fluids for irrigation purposes during the procedure as applicable.  8. Safety precautions based on patient history or medication use.    During Procedure: Verification of correct person, site, and procedure occurs any time the responsibility for care of the patient is transferred to another member of the care team.    The following medication was given:     MEDICATION:  Lidocaine without epinephrine 2% jelly  ROUTE: urethral   SITE: urethral   DOSE: 10 mL  LOT #: [id number]  : [employer]  EXPIRATION DATE: 2-25  NDC#: [address]-5   Was there drug waste? No    Prior to med admin, verified patient identity using patient's name and date of birth.  Due to med administration, patient instructed to remain in clinic for 15 minutes  afterwards, and to report any adverse reaction to me immediately.    Drug Amount Wasted:  None.  Vial/Syringe: Syringe      Daniel Dale  7/19/2023  11:23 AM

## 2023-07-19 NOTE — LETTER
7/19/2023       RE: Chip Fowler  [address] [address] [facility] [address] [facility] [address]     Dear Colleague,    Thank you for referring your patient, Chip Fowler, to the [facility] [place] at [facility]. Please see a copy of my visit note below.    Cystoscopy    PRE-PROCEDURE DIAGNOSIS:   Recurrent uti  Urethral reconstruction    POST-PROCEDURE DIAGNOSIS:   Recurrent uti  Urethral reconstruction    PROCEDURE: Cystoscopy     HISTORY: Chip Fowler is a 38 year old man with history of buccal urethroplasty + diverticulum excision in Dec 2020.  He is doing well.  Voiding without issues.  Happy with sexual function  He  had a kidney transplant 12/2020.    He has recurrent UTIs/ urosepsis.  He took a variety of abx and continues on them now.  Cultures were positive.  Here to rule out anatomic cause      DESCRIPTION OF PROCEDURE:  After informed consent was obtained, the patient was brought to the procedure room where he was placed in the supine position with all pressure points well padded.  He was prepped and draped in a sterile fashion. A flexible cystoscope was introduced through a well-lubricated urethra.  The urethra showed signs of urethral reconstruction. There was some irregularity and outpouchings. However, the scope passes. The urethra is ~20 Fr. There is some stony debris in the urethra. I entered bladder. Prostate normal  Bladder media is clear.  I see the transplant ureteral orifice which appears healthy.  No foreign bodies or tumors in the bladder.    ASSESSMENT AND PLAN:  His urethra is open.  Bladder is clean and no cause for UTI noted.  Unclear etiology of UTIs though I see no indication for surgery  He should continue to void frequently and followup with ID to consider suppressive abx.  He does not desire circumcision. There is no phimosis and he has good external hygiene.    Sam Mejia MD

## 2023-07-19 NOTE — PROGRESS NOTES
Cystoscopy    PRE-PROCEDURE DIAGNOSIS:   Recurrent uti  Urethral reconstruction    POST-PROCEDURE DIAGNOSIS:   Recurrent uti  Urethral reconstruction    PROCEDURE: Cystoscopy     HISTORY: Chip Fowler is a 38 year old man with history of buccal urethroplasty + diverticulum excision in Dec 2020.  He is doing well.  Voiding without issues.  Happy with sexual function  He  had a kidney transplant 12/2020.    He has recurrent UTIs/ urosepsis.  He took a variety of abx and continues on them now.  Cultures were positive.  Here to rule out anatomic cause      DESCRIPTION OF PROCEDURE:  After informed consent was obtained, the patient was brought to the procedure room where he was placed in the supine position with all pressure points well padded.  He was prepped and draped in a sterile fashion. A flexible cystoscope was introduced through a well-lubricated urethra.  The urethra showed signs of urethral reconstruction. There was some irregularity and outpouchings. However, the scope passes. The urethra is ~20 Fr. There is some stony debris in the urethra. I entered bladder. Prostate normal  Bladder media is clear.  I see the transplant ureteral orifice which appears healthy.  No foreign bodies or tumors in the bladder.    ASSESSMENT AND PLAN:  His urethra is open.  Bladder is clean and no cause for UTI noted.  Unclear etiology of UTIs though I see no indication for surgery  He should continue to void frequently and followup with ID to consider suppressive abx.  He does not desire circumcision. There is no phimosis and he has good external hygiene.    Sam Mejia MD

## 2023-07-20 ENCOUNTER — OFFICE VISIT (OUTPATIENT)
Dept: TRANSPLANT | Facility: CLINIC | Age: 38
End: 2023-07-20
Attending: INTERNAL MEDICINE
Payer: MEDICARE

## 2023-07-20 VITALS
SYSTOLIC BLOOD PRESSURE: 125 MMHG | DIASTOLIC BLOOD PRESSURE: 74 MMHG | OXYGEN SATURATION: 99 % | HEART RATE: 63 BPM | HEIGHT: 72 IN | RESPIRATION RATE: 14 BRPM | BODY MASS INDEX: 21.01 KG/M2 | WEIGHT: 155.1 LBS | TEMPERATURE: 98.6 F

## 2023-07-20 DIAGNOSIS — Z94.0 KIDNEY REPLACED BY TRANSPLANT: ICD-10-CM

## 2023-07-20 DIAGNOSIS — Z94.0 HTN, KIDNEY TRANSPLANT RELATED: ICD-10-CM

## 2023-07-20 DIAGNOSIS — E55.9 VITAMIN D DEFICIENCY: ICD-10-CM

## 2023-07-20 DIAGNOSIS — N18.31 STAGE 3A CHRONIC KIDNEY DISEASE (H): ICD-10-CM

## 2023-07-20 DIAGNOSIS — D84.9 IMMUNOSUPPRESSED STATUS (H): ICD-10-CM

## 2023-07-20 DIAGNOSIS — D63.1 ANEMIA IN STAGE 3A CHRONIC KIDNEY DISEASE (H): ICD-10-CM

## 2023-07-20 DIAGNOSIS — Z48.298 AFTERCARE FOLLOWING ORGAN TRANSPLANT: Primary | ICD-10-CM

## 2023-07-20 DIAGNOSIS — I15.1 HTN, KIDNEY TRANSPLANT RELATED: ICD-10-CM

## 2023-07-20 DIAGNOSIS — E83.42 HYPOMAGNESEMIA: ICD-10-CM

## 2023-07-20 DIAGNOSIS — E87.20 METABOLIC ACIDOSIS: ICD-10-CM

## 2023-07-20 DIAGNOSIS — N18.31 ANEMIA IN STAGE 3A CHRONIC KIDNEY DISEASE (H): ICD-10-CM

## 2023-07-20 DIAGNOSIS — B27.00 EBV (EPSTEIN-BARR VIRUS) VIREMIA: ICD-10-CM

## 2023-07-20 PROCEDURE — 99214 OFFICE O/P EST MOD 30 MIN: CPT | Performed by: INTERNAL MEDICINE

## 2023-07-20 PROCEDURE — G0463 HOSPITAL OUTPT CLINIC VISIT: HCPCS | Performed by: INTERNAL MEDICINE

## 2023-07-20 ASSESSMENT — PAIN SCALES - GENERAL: PAINLEVEL: NO PAIN (0)

## 2023-07-20 NOTE — NURSING NOTE
"Chief Complaint   Patient presents with     RECHECK     S/P Kidney TX 1/15/2023     Vital signs:  Temp: 98.6  F (37  C) Temp src: Oral BP: 125/74 Pulse: 63   Resp: 14 SpO2: 99 %     Height: 182.9 cm (6' 0.01\") Weight: 70.4 kg (155 lb 1.6 oz)  Estimated body mass index is 21.03 kg/m  as calculated from the following:    Height as of this encounter: 1.829 m (6' 0.01\").    Weight as of this encounter: 70.4 kg (155 lb 1.6 oz).        Ramona Solano, [facility]  7/20/2023 2:04 PM      "

## 2023-07-20 NOTE — PATIENT INSTRUCTIONS
Patient Recommendations:  - Stop Valcyte.  - Decrease sodium bicarbonate to 1300 mg twice daily.    Transplant Patient Information  Your Post Transplant Coordinator is: Veronica Edgar  For non urgent items, we encourage you to contact your coordinator/care team online via [employer]  You and your care team can also contact your transplant coordinator Monday - Friday, 8am - 5pm at [phone] (Option 2 to reach the coordinator or Option 4 to schedule an appointment).  After hours for urgent matters, please call [facility] at [phone].

## 2023-07-20 NOTE — LETTER
7/20/2023      RE: Chip Fowler  [address] [facility] [address]       TRANSPLANT NEPHROLOGY EARLY POST TRANSPLANT VISIT    Assessment & Plan  # DDKT: Increased creatinine with recent pyelonephritis episode.  Will follow.   - Baseline Creatinine: ~ 1.3-1.5   - Proteinuria: Normal (<0.2 grams)   - Date DSA Last Checked: May/2023      Latest DSA: No cPRA: 49%   - BK Viremia: No   - Kidney Tx Biopsy: No   - Transplant Ureteral Stent: Removed    # Immunosuppression: Tacrolimus immediate release (goal 8-10) and Mycophenolic acid (dose 900 mg every 12 hours)    - Induction with Recent Transplant:  Intermediate Intensity   - Continue with intensive monitoring of immunosuppression for efficacy and toxicity.   - Changes: Yes - Now at 6 months post transplant, will lower tacrolimus to 6-8 goal.     # Infection Prophylaxis:   - PJP: Sulfa/TMP (Bactrim)  - CMV: Valganciclovir (Valcyte); Patient is CMV IgG Ab discordant (D+/R-) and can now stop Valcyte being 6 months post transplant.  Will check CMV PCR monthly until 12 months post transplant.     # Blood Pressure: Controlled;  Goal BP: < 130/80   - Volume status: Euvolemic   - Changes: Not at this time    # Anemia in Chronic Renal Disease: Hgb: Stable      SARA: No   - Iron studies: Low iron saturation, but high ferritin    # Mineral Bone Disorder:   - Secondary renal hyperparathyroidism; PTH level: Minimally elevated ( pg/ml)        On treatment: None  - Vitamin D; level: Low        On supplement: Yes  - Calcium; level: Normal        On supplement: No    # Electrolytes:   - Potassium; level: Normal        On supplement: No  - Magnesium; level: Stable low        On supplement: Yes  - Bicarbonate; level: Normal        On supplement: Yes; Will decrease bicarbonate to 1300 mg bid.    # H/o Cardiomyopathy: Normal LVEF at 55-60% with last cardiac echo 9/2022.    # Recurrent UTI/Pyelonephritis: Patient recurrent infections, now just completed a course of antibiotics  for pyelonephritis.  He was started on nitrofurantoin for prophylaxis.  Patient has a follow up with Transplant ID.    # Urethral Stricture: Patient is s/p buccal urethroplasty and diverticulum excision 12/2020. Now s/p cystoscopy 7/2023 due to recurrent UTIs and this was unremarkable.  Follows with Urology.     # EBV Viremia: Minimal EBV PCR at ~ 1200, likely of no clinical significance.    # Diarrhea: Worsened with antibiotics, but now improved, near baseline.  He is taking fiber.    # H/o Polysubstance Abuse: Patient with h/o methamphetamine and alcohol abuse.  Now sober.    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    Return visit: Return in about 3 months (around 10/20/2023).    Edwin Green MD    Chief Complaint  Mr. Fowler is a 38 year old here for kidney transplant and immunosuppression management.     History of Present Illness   Mr. Fowler reports feeling good overall with some medical complaints.  He was recently hospitalized for allograft pyelonephritis and now just completed his antibiotic course.  Symptoms are resolved.  He underwent a cystoscopy because of the frequent UTIs and that was unremarkable.  He has an appointment scheduled with Transplant ID.    His energy level is good and remains normal.  He is active and does get  some exercise.  Denies any chest pain or shortness of breath with exertion.  No leg swelling.    Appetite is good and weight has been stable  No nausea or vomiting.  Some loose stools with the antibiotics, but now more formed after he completed the treatment.  No fever, sweats or chills.  Slight night sweats at times.    Home BP:  120-130/70-80s    Problem List  Patient Active Problem List   Diagnosis     HTN, kidney transplant related     Urethral stricture     Methamphetamine abuse, episodic (H)     Urethral diverticulum     Allergic rhinitis, unspecified seasonality, unspecified trigger     Stage 3a chronic kidney disease (H)     Kidney replaced by transplant     Immunosuppressed status (H)     Aftercare following organ transplant     Need for pneumocystis prophylaxis     Anemia in chronic renal disease     Secondary renal hyperparathyroidism (H)     Vitamin D deficiency     Hypomagnesemia     Diarrhea     Urinary tract infection without hematuria, site unspecified     Metabolic acidosis     EBV (Aaron-Barr virus) viremia     Pyelonephritis of transplanted kidney       Allergies  No Known Allergies    Medications  Current Outpatient Medications   Medication Sig     magnesium oxide (MAG-OX) 400 MG tablet Take 2 tablets (800 mg) by mouth 2 times daily     mycophenolic acid (GENERIC EQUIVALENT) 180 MG EC tablet Take 3 tablets (540 mg) by mouth 2 times daily     psyllium (METAMUCIL/KONSYL) 58.6 % powder Take 18 g (1 Tablespoonful) by mouth 2 times daily as needed (Diarrhea)     sodium bicarbonate 650 MG tablet Take 2 tablets (1,300 mg) by mouth 2 times daily     sulfamethoxazole-trimethoprim (BACTRIM) 400-80 MG tablet Take 1 tablet by mouth daily     tacrolimus (GENERIC EQUIVALENT) 0.5 MG capsule Take 1 capsule (0.5 mg) by mouth 2 times daily Total dose = 3.5 mg twice a day     tacrolimus (GENERIC EQUIVALENT) 1 MG capsule Take 3 capsules (3 mg) by mouth 2 times daily Total dose = 3.5 mg twice per day     Vitamin D,  "Cholecalciferol, 50 MCG (2000 UT) CAPS Take 2,000 Units by mouth daily     nitroFURantoin macrocrystal-monohydrate (MACROBID) 100 MG capsule Take 1 capsule (100 mg) by mouth daily     No current facility-administered medications for this visit.     Medications Discontinued During This Encounter   Medication Reason     valGANciclovir (VALCYTE) 450 MG tablet      sodium bicarbonate 650 MG tablet        Physical Exam  Vital Signs: /74 (BP Location: Right arm, Patient Position: Sitting, Cuff Size: Adult Regular)   Pulse 63   Temp 98.6  F (37  C) (Oral)   Resp 14   Ht 1.829 m (6' 0.01\")   Wt 70.4 kg (155 lb 1.6 oz)   SpO2 99%   BMI 21.03 kg/m      GENERAL APPEARANCE: alert and no distress  HENT: mouth without ulcers or lesions  RESP: lungs clear to auscultation - no rales, rhonchi or wheezes  CV: regular rhythm, normal rate, no rub, no murmur  EDEMA: no LE edema bilaterally  ABDOMEN: soft, nondistended, nontender, bowel sounds normal  MS: extremities normal - no gross deformities noted, no evidence of inflammation in joints, no muscle tenderness  SKIN: no rash  TX KIDNEY: normal  DIALYSIS ACCESS: none    Data        Latest Ref Rng & Units 8/7/2023     2:41 PM 7/31/2023     3:12 PM 7/24/2023     3:19 PM   Renal   Sodium 136 - 145 mmol/L 138  137  139    K 3.4 - 5.3 mmol/L 4.3  4.4  4.4    Cl 98 - 107 mmol/L 106  103  104    Cl (external) 98 - 107 mmol/L 106  103  104    CO2 22 - 29 mmol/L 21  23  24    Urea Nitrogen 6.0 - 20.0 mg/dL 25.1  25.4  23.4    Creatinine 0.67 - 1.17 mg/dL 1.57  1.61  1.65    Glucose 70 - 99 mg/dL 79  89  68    Calcium 8.6 - 10.0 mg/dL 9.4  9.3  9.7          Latest Ref Rng & Units 7/10/2023     2:52 PM 7/8/2023     6:05 AM 7/7/2023     6:01 AM   Bone Health   Phosphorus 2.5 - 4.5 mg/dL 2.7  3.7  2.7          Latest Ref Rng & Units 8/7/2023     2:41 PM 7/31/2023     3:12 PM 7/24/2023     3:19 PM   Heme   WBC 4.0 - 11.0 10e3/uL 4.2  4.5  3.5    Hgb 13.3 - 17.7 g/dL 11.2  11.2  10.7  "   Plt 150 - 450 10e3/uL 218  201  254    ABSOLUTE NEUTROPHIL 1.6 - 8.3 10e3/uL  1.7     ABSOLUTE LYMPHOCYTES 0.8 - 5.3 10e3/uL  1.5     ABSOLUTE MONOCYTES 0.0 - 1.3 10e3/uL  0.6     ABSOLUTE EOSINOPHILS 0.0 - 0.7 10e3/uL  0.5           Latest Ref Rng & Units 7/24/2023     3:19 PM 7/4/2023     7:44 PM 6/5/2023     3:27 PM   Liver   AP 40 - 129 U/L 59  53  55    TBili <=1.2 mg/dL 0.3  0.4  0.3    Bilirubin Direct 0.00 - 0.30 mg/dL <0.20      ALT 0 - 70 U/L 20  14  22    AST 0 - 45 U/L 29  27  24    Tot Protein 6.4 - 8.3 g/dL 7.2  7.4  6.9    Albumin 3.5 - 5.2 g/dL 4.3  4.2  4.0          Latest Ref Rng & Units 7/24/2023     3:19 PM 3/19/2023     9:55 PM 1/15/2023     5:40 AM   Pancreas   A1C <5.7 % 5.5   5.2    Lipase (Roche) 13 - 60 U/L  20           Latest Ref Rng & Units 1/31/2023    10:49 AM 1/20/2023     7:46 AM   Iron studies   Iron 61 - 157 ug/dL 70  153    Iron Sat Index 15 - 46 % 27  63    Ferritin 31 - 409 ng/mL 430  717          Latest Ref Rng & Units 8/7/2023     2:41 PM 7/17/2023     3:12 PM 7/7/2023     7:09 AM   [facility] T[facility] Virology   CMV QUANT IU/ML Not Detected IU/mL  Not Detected     EBV DNA LOG OF COPIES  3.1   3.1        Recent Labs   Lab Test 07/24/23  1519 07/31/23  1512 08/07/23  1441   DOSTAC 7/24/2023 7/31/2023 8/7/2023   TACROL 6.7 6.8 7.2     Recent Labs   Lab Test 07/24/23  1519 07/31/23  1512 08/07/23  1441   DOSMPA 7/24/2023   3:50 AM 7/31/2023   3:30 AM 8/7/2023   3:10 AM   MPACID 2.67 4.61* 2.30   MPAG 31.5 32.6 25.8*       Edwin Green MD

## 2023-07-20 NOTE — LETTER
7/20/2023         RE: Chip Fowler  [address] [facility] [address]        Dear Colleague,    Thank you for referring your patient, Chip Fowler, to the [facility]PL[facility]. Please see a copy of my visit note below.    TRANSPLANT NEPHROLOGY EARLY POST TRANSPLANT VISIT    Assessment & Plan  # DDKT: Increased creatinine with recent pyelonephritis episode.  Will follow.   - Baseline Creatinine: ~ 1.3-1.5   - Proteinuria: Normal (<0.2 grams)   - Date DSA Last Checked: May/2023      Latest DSA: No cPRA: 49%   - BK Viremia: No   - Kidney Tx Biopsy: No   - Transplant Ureteral Stent: Removed    # Immunosuppression: Tacrolimus immediate release (goal 8-10) and Mycophenolic acid (dose 900 mg every 12 hours)    - Induction with Recent Transplant:  Intermediate Intensity   - Continue with intensive monitoring of immunosuppression for efficacy and toxicity.   - Changes: Yes - Now at 6 months post transplant, will lower tacrolimus to 6-8 goal.     # Infection Prophylaxis:   - PJP: Sulfa/TMP (Bactrim)  - CMV: Valganciclovir (Valcyte); Patient is CMV IgG Ab discordant (D+/R-) and can now stop Valcyte being 6 months post transplant.  Will check CMV PCR monthly until 12 months post transplant.     # Blood Pressure: Controlled;  Goal BP: < 130/80   - Volume status: Euvolemic   - Changes: Not at this time    # Anemia in Chronic Renal Disease: Hgb: Stable      SARA: No   - Iron studies: Low iron saturation, but high ferritin    # Mineral Bone Disorder:   - Secondary renal hyperparathyroidism; PTH level: Minimally elevated ( pg/ml)        On treatment: None  - Vitamin D; level: Low        On supplement: Yes  - Calcium; level: Normal        On supplement: No    # Electrolytes:   - Potassium; level: Normal        On supplement: No  - Magnesium; level: Stable low        On supplement: Yes  - Bicarbonate; level: Normal        On supplement: Yes; Will decrease bicarbonate to 1300 mg bid.    # H/o  Cardiomyopathy: Normal LVEF at 55-60% with last cardiac echo 9/2022.    # Recurrent UTI/Pyelonephritis: Patient recurrent infections, now just completed a course of antibiotics for pyelonephritis.  He was started on nitrofurantoin for prophylaxis.  Patient has a follow up with Transplant ID.    # Urethral Stricture: Patient is s/p buccal urethroplasty and diverticulum excision 12/2020. Now s/p cystoscopy 7/2023 due to recurrent UTIs and this was unremarkable.  Follows with Urology.     # EBV Viremia: Minimal EBV PCR at ~ 1200, likely of no clinical significance.    # Diarrhea: Worsened with antibiotics, but now improved, near baseline.  He is taking fiber.    # H/o Polysubstance Abuse: Patient with h/o methamphetamine and alcohol abuse.  Now sober.    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    Return visit: Return in about 3 months (around 10/20/2023).    Edwin Green MD    Chief Complaint  Mr. Fowler is a 38 year old here for kidney transplant and immunosuppression management.     History of Present Illness   Mr. Fowler reports feeling good overall with some medical complaints.  He was recently hospitalized for allograft pyelonephritis and now just completed his antibiotic course.  Symptoms are resolved.  He underwent a cystoscopy because of  the frequent UTIs and that was unremarkable.  He has an appointment scheduled with Transplant ID.    His energy level is good and remains normal.  He is active and does get some exercise.  Denies any chest pain or shortness of breath with exertion.  No leg swelling.    Appetite is good and weight has been stable  No nausea or vomiting.  Some loose stools with the antibiotics, but now more formed after he completed the treatment.  No fever, sweats or chills.  Slight night sweats at times.    Home BP:  120-130/70-80s    Problem List  Patient Active Problem List   Diagnosis    HTN, kidney transplant related    Urethral stricture    Methamphetamine abuse, episodic (H)    Urethral diverticulum    Allergic rhinitis, unspecified seasonality, unspecified trigger    Stage 3a chronic kidney disease (H)    Kidney replaced by transplant    Immunosuppressed status (H)    Aftercare following organ transplant    Need for pneumocystis prophylaxis    Anemia in chronic renal disease    Secondary renal hyperparathyroidism (H)    Vitamin D deficiency    Hypomagnesemia    Diarrhea    Urinary tract infection without hematuria, site unspecified    Metabolic acidosis    EBV (Aaron-Barr virus) viremia    Pyelonephritis of transplanted kidney       Allergies  No Known Allergies    Medications  Current Outpatient Medications   Medication Sig    magnesium oxide (MAG-OX) 400 MG tablet Take 2 tablets (800 mg) by mouth 2 times daily    mycophenolic acid (GENERIC EQUIVALENT) 180 MG EC tablet Take 3 tablets (540 mg) by mouth 2 times daily    psyllium (METAMUCIL/KONSYL) 58.6 % powder Take 18 g (1 Tablespoonful) by mouth 2 times daily as needed (Diarrhea)    sodium bicarbonate 650 MG tablet Take 2 tablets (1,300 mg) by mouth 2 times daily    sulfamethoxazole-trimethoprim (BACTRIM) 400-80 MG tablet Take 1 tablet by mouth daily    tacrolimus (GENERIC EQUIVALENT) 0.5 MG capsule Take 1 capsule (0.5 mg) by mouth 2 times daily Total dose = 3.5 mg twice a  "day    tacrolimus (GENERIC EQUIVALENT) 1 MG capsule Take 3 capsules (3 mg) by mouth 2 times daily Total dose = 3.5 mg twice per day    Vitamin D, Cholecalciferol, 50 MCG (2000 UT) CAPS Take 2,000 Units by mouth daily    nitroFURantoin macrocrystal-monohydrate (MACROBID) 100 MG capsule Take 1 capsule (100 mg) by mouth daily     No current facility-administered medications for this visit.     Medications Discontinued During This Encounter   Medication Reason    valGANciclovir (VALCYTE) 450 MG tablet     sodium bicarbonate 650 MG tablet        Physical Exam  Vital Signs: /74 (BP Location: Right arm, Patient Position: Sitting, Cuff Size: Adult Regular)   Pulse 63   Temp 98.6  F (37  C) (Oral)   Resp 14   Ht 1.829 m (6' 0.01\")   Wt 70.4 kg (155 lb 1.6 oz)   SpO2 99%   BMI 21.03 kg/m      GENERAL APPEARANCE: alert and no distress  HENT: mouth without ulcers or lesions  RESP: lungs clear to auscultation - no rales, rhonchi or wheezes  CV: regular rhythm, normal rate, no rub, no murmur  EDEMA: no LE edema bilaterally  ABDOMEN: soft, nondistended, nontender, bowel sounds normal  MS: extremities normal - no gross deformities noted, no evidence of inflammation in joints, no muscle tenderness  SKIN: no rash  TX KIDNEY: normal  DIALYSIS ACCESS: none    Data        Latest Ref Rng & Units 8/7/2023     2:41 PM 7/31/2023     3:12 PM 7/24/2023     3:19 PM   Renal   Sodium 136 - 145 mmol/L 138  137  139    K 3.4 - 5.3 mmol/L 4.3  4.4  4.4    Cl 98 - 107 mmol/L 106  103  104    Cl (external) 98 - 107 mmol/L 106  103  104    CO2 22 - 29 mmol/L 21  23  24    Urea Nitrogen 6.0 - 20.0 mg/dL 25.1  25.4  23.4    Creatinine 0.67 - 1.17 mg/dL 1.57  1.61  1.65    Glucose 70 - 99 mg/dL 79  89  68    Calcium 8.6 - 10.0 mg/dL 9.4  9.3  9.7          Latest Ref Rng & Units 7/10/2023     2:52 PM 7/8/2023     6:05 AM 7/7/2023     6:01 AM   Bone Health   Phosphorus 2.5 - 4.5 mg/dL 2.7  3.7  2.7          Latest Ref Rng & Units 8/7/2023     " 2:41 PM 7/31/2023     3:12 PM 7/24/2023     3:19 PM   Heme   WBC 4.0 - 11.0 10e3/uL 4.2  4.5  3.5    Hgb 13.3 - 17.7 g/dL 11.2  11.2  10.7    Plt 150 - 450 10e3/uL 218  201  254    ABSOLUTE NEUTROPHIL 1.6 - 8.3 10e3/uL  1.7     ABSOLUTE LYMPHOCYTES 0.8 - 5.3 10e3/uL  1.5     ABSOLUTE MONOCYTES 0.0 - 1.3 10e3/uL  0.6     ABSOLUTE EOSINOPHILS 0.0 - 0.7 10e3/uL  0.5           Latest Ref Rng & Units 7/24/2023     3:19 PM 7/4/2023     7:44 PM 6/5/2023     3:27 PM   Liver   AP 40 - 129 U/L 59  53  55    TBili <=1.2 mg/dL 0.3  0.4  0.3    Bilirubin Direct 0.00 - 0.30 mg/dL <0.20      ALT 0 - 70 U/L 20  14  22    AST 0 - 45 U/L 29  27  24    Tot Protein 6.4 - 8.3 g/dL 7.2  7.4  6.9    Albumin 3.5 - 5.2 g/dL 4.3  4.2  4.0          Latest Ref Rng & Units 7/24/2023     3:19 PM 3/19/2023     9:55 PM 1/15/2023     5:40 AM   Pancreas   A1C <5.7 % 5.5   5.2    Lipase (Roche) 13 - 60 U/L  20           Latest Ref Rng & Units 1/31/2023    10:49 AM 1/20/2023     7:46 AM   Iron studies   Iron 61 - 157 ug/dL 70  153    Iron Sat Index 15 - 46 % 27  63    Ferritin 31 - 409 ng/mL 430  717          Latest Ref Rng & Units 8/7/2023     2:41 PM 7/17/2023     3:12 PM 7/7/2023     7:09 AM   [facility] Txp Virology   CMV QUANT IU/ML Not Detected IU/mL  Not Detected     EBV DNA LOG OF COPIES  3.1   3.1        Recent Labs   Lab Test 07/24/23  1519 07/31/23  1512 08/07/23  1441   DOSTAC 7/24/2023 7/31/2023 8/7/2023   TACROL 6.7 6.8 7.2     Recent Labs   Lab Test 07/24/23  1519 07/31/23  1512 08/07/23  1441   DOSMPA 7/24/2023   3:50 AM 7/31/2023   3:30 AM 8/7/2023   3:10 AM   MPACID 2.67 4.61* 2.30   MPAG 31.5 32.6 25.8*         Again, thank you for allowing me to participate in the care of your patient.        Sincerely,        Edwin Green MD

## 2023-07-23 NOTE — TELEPHONE ENCOUNTER
Issue low MPA level = no missed doses     Chip Fowler Mary K, RN  Phone Number: [phone]     Yes the antibiotic I just finished yesterday Amoxicillin. It was causing diarrhea. I have just started nurtofull.(I didn't spell it correctly) but have taken it before without issues  Franko Cerda MD Huepfel, Mary K, RN  Wait for repeat as he is now done with abx.

## 2023-07-24 ENCOUNTER — LAB (OUTPATIENT)
Dept: LAB | Facility: CLINIC | Age: 38
End: 2023-07-24
Payer: MEDICARE

## 2023-07-24 ENCOUNTER — TELEPHONE (OUTPATIENT)
Dept: TRANSPLANT | Facility: CLINIC | Age: 38
End: 2023-07-24

## 2023-07-24 DIAGNOSIS — Z79.899 ENCOUNTER FOR LONG-TERM CURRENT USE OF MEDICATION: ICD-10-CM

## 2023-07-24 DIAGNOSIS — Z94.0 KIDNEY REPLACED BY TRANSPLANT: ICD-10-CM

## 2023-07-24 DIAGNOSIS — Z76.82 ORGAN TRANSPLANT CANDIDATE: ICD-10-CM

## 2023-07-24 DIAGNOSIS — Z20.828 CONTACT WITH AND (SUSPECTED) EXPOSURE TO OTHER VIRAL COMMUNICABLE DISEASES: ICD-10-CM

## 2023-07-24 DIAGNOSIS — N18.6 ESRD (END STAGE RENAL DISEASE) (H): ICD-10-CM

## 2023-07-24 DIAGNOSIS — Z48.298 AFTERCARE FOLLOWING ORGAN TRANSPLANT: ICD-10-CM

## 2023-07-24 LAB
ALBUMIN SERPL BCG-MCNC: 4.3 G/DL (ref 3.5–5.2)
ALP SERPL-CCNC: 59 U/L (ref 40–129)
ALT SERPL W P-5'-P-CCNC: 20 U/L (ref 0–70)
ANION GAP SERPL CALCULATED.3IONS-SCNC: 11 MMOL/L (ref 7–15)
AST SERPL W P-5'-P-CCNC: 29 U/L (ref 0–45)
BASOPHILS # BLD AUTO: 0.1 10E3/UL (ref 0–0.2)
BASOPHILS NFR BLD AUTO: 3 %
BILIRUB DIRECT SERPL-MCNC: <0.2 MG/DL (ref 0–0.3)
BILIRUB SERPL-MCNC: 0.3 MG/DL
BUN SERPL-MCNC: 23.4 MG/DL (ref 6–20)
CALCIUM SERPL-MCNC: 9.7 MG/DL (ref 8.6–10)
CHLORIDE SERPL-SCNC: 104 MMOL/L (ref 98–107)
CREAT SERPL-MCNC: 1.65 MG/DL (ref 0.67–1.17)
DEPRECATED HCO3 PLAS-SCNC: 24 MMOL/L (ref 22–29)
EOSINOPHIL # BLD AUTO: 0.7 10E3/UL (ref 0–0.7)
EOSINOPHIL NFR BLD AUTO: 21 %
ERYTHROCYTE [DISTWIDTH] IN BLOOD BY AUTOMATED COUNT: 14.7 % (ref 10–15)
GFR SERPL CREATININE-BSD FRML MDRD: 54 ML/MIN/1.73M2
GLUCOSE SERPL-MCNC: 68 MG/DL (ref 70–99)
HBA1C MFR BLD: 5.5 %
HCT VFR BLD AUTO: 33.7 % (ref 40–53)
HGB BLD-MCNC: 10.7 G/DL (ref 13.3–17.7)
IMM GRANULOCYTES # BLD: 0.1 10E3/UL
IMM GRANULOCYTES NFR BLD: 4 %
LYMPHOCYTES # BLD AUTO: 1.1 10E3/UL (ref 0.8–5.3)
LYMPHOCYTES NFR BLD AUTO: 32 %
MCH RBC QN AUTO: 30.4 PG (ref 26.5–33)
MCHC RBC AUTO-ENTMCNC: 31.8 G/DL (ref 31.5–36.5)
MCV RBC AUTO: 96 FL (ref 78–100)
MONOCYTES # BLD AUTO: 0.4 10E3/UL (ref 0–1.3)
MONOCYTES NFR BLD AUTO: 12 %
NEUTROPHILS # BLD AUTO: 1 10E3/UL (ref 1.6–8.3)
NEUTROPHILS NFR BLD AUTO: 28 %
NRBC # BLD AUTO: 0 10E3/UL
NRBC BLD AUTO-RTO: 0 /100
PLATELET # BLD AUTO: 254 10E3/UL (ref 150–450)
POTASSIUM SERPL-SCNC: 4.4 MMOL/L (ref 3.4–5.3)
PROT SERPL-MCNC: 7.2 G/DL (ref 6.4–8.3)
RBC # BLD AUTO: 3.52 10E6/UL (ref 4.4–5.9)
SODIUM SERPL-SCNC: 139 MMOL/L (ref 136–145)
URATE SERPL-MCNC: 5.5 MG/DL (ref 3.4–7)
WBC # BLD AUTO: 3.5 10E3/UL (ref 4–11)

## 2023-07-24 PROCEDURE — 86833 HLA CLASS II HIGH DEFIN QUAL: CPT

## 2023-07-24 PROCEDURE — 80180 DRUG SCRN QUAN MYCOPHENOLATE: CPT

## 2023-07-24 PROCEDURE — 36415 COLL VENOUS BLD VENIPUNCTURE: CPT

## 2023-07-24 PROCEDURE — 82248 BILIRUBIN DIRECT: CPT

## 2023-07-24 PROCEDURE — 80197 ASSAY OF TACROLIMUS: CPT

## 2023-07-24 PROCEDURE — 85004 AUTOMATED DIFF WBC COUNT: CPT

## 2023-07-24 PROCEDURE — 83036 HEMOGLOBIN GLYCOSYLATED A1C: CPT

## 2023-07-24 PROCEDURE — 87799 DETECT AGENT NOS DNA QUANT: CPT

## 2023-07-24 PROCEDURE — 84550 ASSAY OF BLOOD/URIC ACID: CPT

## 2023-07-24 PROCEDURE — 86832 HLA CLASS I HIGH DEFIN QUAL: CPT

## 2023-07-25 LAB
BKV DNA # SPEC NAA+PROBE: NOT DETECTED COPIES/ML
MYCOPHENOLATE SERPL LC/MS/MS-MCNC: 2.67 MG/L (ref 1–3.5)
MYCOPHENOLATE-G SERPL LC/MS/MS-MCNC: 31.5 MG/L (ref 30–95)
TACROLIMUS BLD-MCNC: 6.7 UG/L (ref 5–15)
TME LAST DOSE: NORMAL H

## 2023-07-29 ENCOUNTER — MYC MEDICAL ADVICE (OUTPATIENT)
Dept: TRANSPLANT | Facility: CLINIC | Age: 38
End: 2023-07-29
Payer: MEDICARE

## 2023-07-31 ENCOUNTER — LAB (OUTPATIENT)
Dept: LAB | Facility: CLINIC | Age: 38
End: 2023-07-31
Payer: MEDICARE

## 2023-07-31 DIAGNOSIS — Z94.0 KIDNEY REPLACED BY TRANSPLANT: ICD-10-CM

## 2023-07-31 LAB
ANION GAP SERPL CALCULATED.3IONS-SCNC: 11 MMOL/L (ref 7–15)
BASOPHILS # BLD MANUAL: 0.2 10E3/UL (ref 0–0.2)
BASOPHILS NFR BLD MANUAL: 5 %
BUN SERPL-MCNC: 25.4 MG/DL (ref 6–20)
CALCIUM SERPL-MCNC: 9.3 MG/DL (ref 8.6–10)
CHLORIDE SERPL-SCNC: 103 MMOL/L (ref 98–107)
CREAT SERPL-MCNC: 1.61 MG/DL (ref 0.67–1.17)
DEPRECATED HCO3 PLAS-SCNC: 23 MMOL/L (ref 22–29)
DONOR IDENTIFICATION: NORMAL
DSA COMMENTS: NORMAL
DSA PRESENT: NO
DSA TEST METHOD: NORMAL
EOSINOPHIL # BLD MANUAL: 0.5 10E3/UL (ref 0–0.7)
EOSINOPHIL NFR BLD MANUAL: 11 %
ERYTHROCYTE [DISTWIDTH] IN BLOOD BY AUTOMATED COUNT: 14.6 % (ref 10–15)
GFR SERPL CREATININE-BSD FRML MDRD: 55 ML/MIN/1.73M2
GLUCOSE SERPL-MCNC: 89 MG/DL (ref 70–99)
HCT VFR BLD AUTO: 34.9 % (ref 40–53)
HGB BLD-MCNC: 11.2 G/DL (ref 13.3–17.7)
LYMPHOCYTES # BLD MANUAL: 1.5 10E3/UL (ref 0.8–5.3)
LYMPHOCYTES NFR BLD MANUAL: 33 %
MCH RBC QN AUTO: 30.5 PG (ref 26.5–33)
MCHC RBC AUTO-ENTMCNC: 32.1 G/DL (ref 31.5–36.5)
MCV RBC AUTO: 95 FL (ref 78–100)
MONOCYTES # BLD MANUAL: 0.6 10E3/UL (ref 0–1.3)
MONOCYTES NFR BLD MANUAL: 13 %
NEUTROPHILS # BLD MANUAL: 1.7 10E3/UL (ref 1.6–8.3)
NEUTROPHILS NFR BLD MANUAL: 38 %
ORGAN: NORMAL
PLAT MORPH BLD: NORMAL
PLATELET # BLD AUTO: 201 10E3/UL (ref 150–450)
POTASSIUM SERPL-SCNC: 4.4 MMOL/L (ref 3.4–5.3)
RBC # BLD AUTO: 3.67 10E6/UL (ref 4.4–5.9)
RBC MORPH BLD: NORMAL
SA 1 CELL: NORMAL
SA 1 TEST METHOD: NORMAL
SA 2 CELL: NORMAL
SA 2 TEST METHOD: NORMAL
SA1 HI RISK ABY: NORMAL
SA1 MOD RISK ABY: NORMAL
SA2 HI RISK ABY: NORMAL
SA2 MOD RISK ABY: NORMAL
SODIUM SERPL-SCNC: 137 MMOL/L (ref 136–145)
TACROLIMUS BLD-MCNC: 6.8 UG/L (ref 5–15)
TME LAST DOSE: NORMAL H
TME LAST DOSE: NORMAL H
UNACCEPTABLE ANTIGENS: NORMAL
UNOS CPRA: 49
WBC # BLD AUTO: 4.5 10E3/UL (ref 4–11)
ZZZSA 1  COMMENTS: NORMAL
ZZZSA 2 COMMENTS: NORMAL

## 2023-07-31 PROCEDURE — 82310 ASSAY OF CALCIUM: CPT

## 2023-07-31 PROCEDURE — 85007 BL SMEAR W/DIFF WBC COUNT: CPT

## 2023-07-31 PROCEDURE — 36415 COLL VENOUS BLD VENIPUNCTURE: CPT

## 2023-07-31 PROCEDURE — 85027 COMPLETE CBC AUTOMATED: CPT

## 2023-07-31 PROCEDURE — 80180 DRUG SCRN QUAN MYCOPHENOLATE: CPT

## 2023-07-31 PROCEDURE — 80197 ASSAY OF TACROLIMUS: CPT

## 2023-08-02 LAB
MYCOPHENOLATE SERPL LC/MS/MS-MCNC: 4.61 MG/L (ref 1–3.5)
MYCOPHENOLATE-G SERPL LC/MS/MS-MCNC: 32.6 MG/L (ref 30–95)
TME LAST DOSE: ABNORMAL H
TME LAST DOSE: ABNORMAL H

## 2023-08-07 ENCOUNTER — LAB (OUTPATIENT)
Dept: LAB | Facility: CLINIC | Age: 38
End: 2023-08-07
Payer: MEDICARE

## 2023-08-07 DIAGNOSIS — Z94.0 KIDNEY REPLACED BY TRANSPLANT: ICD-10-CM

## 2023-08-07 LAB
ANION GAP SERPL CALCULATED.3IONS-SCNC: 11 MMOL/L (ref 7–15)
BASOPHILS # BLD AUTO: 0.1 10E3/UL (ref 0–0.2)
BASOPHILS NFR BLD AUTO: 3 %
BUN SERPL-MCNC: 25.1 MG/DL (ref 6–20)
CALCIUM SERPL-MCNC: 9.4 MG/DL (ref 8.6–10)
CHLORIDE SERPL-SCNC: 106 MMOL/L (ref 98–107)
CREAT SERPL-MCNC: 1.57 MG/DL (ref 0.67–1.17)
DEPRECATED HCO3 PLAS-SCNC: 21 MMOL/L (ref 22–29)
EOSINOPHIL # BLD AUTO: 0.7 10E3/UL (ref 0–0.7)
EOSINOPHIL NFR BLD AUTO: 17 %
ERYTHROCYTE [DISTWIDTH] IN BLOOD BY AUTOMATED COUNT: 14 % (ref 10–15)
GFR SERPL CREATININE-BSD FRML MDRD: 57 ML/MIN/1.73M2
GLUCOSE SERPL-MCNC: 79 MG/DL (ref 70–99)
HCT VFR BLD AUTO: 33.1 % (ref 40–53)
HGB BLD-MCNC: 11.2 G/DL (ref 13.3–17.7)
IMM GRANULOCYTES # BLD: 0.1 10E3/UL
IMM GRANULOCYTES NFR BLD: 2 %
LYMPHOCYTES # BLD AUTO: 1.1 10E3/UL (ref 0.8–5.3)
LYMPHOCYTES NFR BLD AUTO: 27 %
MCH RBC QN AUTO: 30.9 PG (ref 26.5–33)
MCHC RBC AUTO-ENTMCNC: 33.8 G/DL (ref 31.5–36.5)
MCV RBC AUTO: 91 FL (ref 78–100)
MONOCYTES # BLD AUTO: 0.7 10E3/UL (ref 0–1.3)
MONOCYTES NFR BLD AUTO: 17 %
NEUTROPHILS # BLD AUTO: 1.5 10E3/UL (ref 1.6–8.3)
NEUTROPHILS NFR BLD AUTO: 34 %
NRBC # BLD AUTO: 0 10E3/UL
NRBC BLD AUTO-RTO: 0 /100
PLATELET # BLD AUTO: 218 10E3/UL (ref 150–450)
POTASSIUM SERPL-SCNC: 4.3 MMOL/L (ref 3.4–5.3)
RBC # BLD AUTO: 3.62 10E6/UL (ref 4.4–5.9)
SODIUM SERPL-SCNC: 138 MMOL/L (ref 136–145)
WBC # BLD AUTO: 4.2 10E3/UL (ref 4–11)

## 2023-08-07 PROCEDURE — 80048 BASIC METABOLIC PNL TOTAL CA: CPT

## 2023-08-07 PROCEDURE — 85004 AUTOMATED DIFF WBC COUNT: CPT

## 2023-08-07 PROCEDURE — 87799 DETECT AGENT NOS DNA QUANT: CPT

## 2023-08-07 PROCEDURE — 80197 ASSAY OF TACROLIMUS: CPT

## 2023-08-07 PROCEDURE — 36415 COLL VENOUS BLD VENIPUNCTURE: CPT

## 2023-08-07 PROCEDURE — 80180 DRUG SCRN QUAN MYCOPHENOLATE: CPT

## 2023-08-08 ENCOUNTER — MYC MEDICAL ADVICE (OUTPATIENT)
Dept: TRANSPLANT | Facility: CLINIC | Age: 38
End: 2023-08-08
Payer: MEDICARE

## 2023-08-08 DIAGNOSIS — Z94.0 KIDNEY REPLACED BY TRANSPLANT: Primary | ICD-10-CM

## 2023-08-08 LAB
MYCOPHENOLATE SERPL LC/MS/MS-MCNC: 2.3 MG/L (ref 1–3.5)
MYCOPHENOLATE-G SERPL LC/MS/MS-MCNC: 25.8 MG/L (ref 30–95)
TACROLIMUS BLD-MCNC: 7.2 UG/L (ref 5–15)
TME LAST DOSE: ABNORMAL H
TME LAST DOSE: ABNORMAL H
TME LAST DOSE: NORMAL H
TME LAST DOSE: NORMAL H

## 2023-08-09 ENCOUNTER — MYC MEDICAL ADVICE (OUTPATIENT)
Dept: TRANSPLANT | Facility: CLINIC | Age: 38
End: 2023-08-09
Payer: MEDICARE

## 2023-08-09 DIAGNOSIS — Z94.0 KIDNEY REPLACED BY TRANSPLANT: Primary | ICD-10-CM

## 2023-08-09 LAB
EBV DNA COPIES/ML, INSTRUMENT: 1302 COPIES/ML
EBV DNA SPEC NAA+PROBE-LOG#: 3.1 {LOG_COPIES}/ML

## 2023-08-10 ENCOUNTER — MYC MEDICAL ADVICE (OUTPATIENT)
Dept: TRANSPLANT | Facility: CLINIC | Age: 38
End: 2023-08-10
Payer: MEDICARE

## 2023-08-16 ENCOUNTER — VIRTUAL VISIT (OUTPATIENT)
Dept: INFECTIOUS DISEASES | Facility: CLINIC | Age: 38
End: 2023-08-16
Attending: STUDENT IN AN ORGANIZED HEALTH CARE EDUCATION/TRAINING PROGRAM
Payer: MEDICARE

## 2023-08-16 DIAGNOSIS — B27.00 EBV (EPSTEIN-BARR VIRUS) VIREMIA: Primary | ICD-10-CM

## 2023-08-16 DIAGNOSIS — N39.0 FREQUENT UTI: ICD-10-CM

## 2023-08-16 PROCEDURE — 99214 OFFICE O/P EST MOD 30 MIN: CPT | Mod: VID | Performed by: STUDENT IN AN ORGANIZED HEALTH CARE EDUCATION/TRAINING PROGRAM

## 2023-08-16 RX ORDER — NITROFURANTOIN 25; 75 MG/1; MG/1
100 CAPSULE ORAL DAILY
Qty: 30 CAPSULE | Refills: 3 | Status: SHIPPED | OUTPATIENT
Start: 2023-08-16 | End: 2023-09-13

## 2023-08-16 NOTE — PROGRESS NOTES
[facility]  Transplant Infectious Disease Clinic Note: Virtual Follow up      Patient:  Chip Fowler, Date of birth 1985, Medical record number [id number]  Date of Visit:  08/16/2023    Virtual Visit Details    Type of service:  Video Visit   Video Start Time: 4:00 PM  Video End Time:4:18 PM    Originating Location (pt. Location): Home    Distant Location (provider location):  On-site  Platform used for Video Visit: Chela         Assessment and Recommendations:   Recommendations:  -He was also recommend to keep hydrated and schedule voiding throughout the day.   -he has had recurrent several UTIs after sex,and was in the hospital in July for pyelo from enterococcus faecalis and was treated with Augmentin and was started on suppression with nitrofurantoin daily after for suppression. He was seen by urology on 7/19/23 and had a Cystoscopy with no source of UTIs are noted.   Since he has been on nitrofurantoin for suppression or 1 mo, can give another 1 mo and then assess with keeping nitrofurantoin, stopping or changing it to another agent. While on this he has been doing very well with no urinary issues. He has also held off on sexual activity Prolonged nitrofurantoin use can lead to pulmonary issues therefore hesitant to continue it indefinite therapy.   -f/u EBV VL per protocol.   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    -Baseline Cr 1.4-1.6. Renal US patent  - Tacrolimus goal level 8-10.   - Myfortic 540 mg BID per protocol in the setting of previously high levels, leukopenia, and infection.         1. Severe sepsis. - resolved   2. E.Coli UTI with recurrent UTI.     The UTI is mostly with E faecalis since 1/2023 except last infection in 4/2023 with E faecalis and E coli.   The current UTI is also due to E coli. Susceptible to Cipro and finished 14d course.    -on 6/2/23 The pt reports another  UTI sx (incresaed urgency and discomfort) after having vaginal sex on late Sunday afteroon. He was reminded to void after a sexual encounter. He took a script of nitrofurantoin BID for 7d and is helping him and his symptoms are improving    3. E Faecalis Pyelonephritis 7/4/23  Admitted on 7/4/23 for pyelonephritis UC + 10-50k E faecalis he was given Augmentin for 10 and started on prophy nitrofurantoin 100mg daily. Unlike his prior UTIs this occur ace was not related to sexual activity. He was seen by urology on 7/19/23 and had a Cystoscopy with no source of UTIs are noted.       4 low level EBV Viremia   -7/6/23 CT chest Lymph nodes: Multiple sub centimeter para-aortic lymph nodes.  -7/4/23 <500, 7/7/23 was 1218 (log 3.1), 8/7/23 was 1302 (log 3.1)    Prior issues:  1. Eosinophilia. The workup for eosinophilia and diarrhea was negative for fungal and parasitic infections that would account for the eosinophilia. Evaluated by hematology. The eosinophilia is likely reactive to foreign objects; the PD catheter was thought to be potentially the source as noted by hematology. The relatively increased tryptase level favors allergy (likely to PD catheter) to be the etiology of the eosinophilia rather than infectious processes or malignancies.   2. Chronic diarrhea since he was started on PD in 7/2020. Workup has been negative.   3. Positive Salmonella serology. 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 The patient was counseled against keeping the snakes after transplantation as they are source of recurrent Salmonella infection.  4. E coli in urine in 8/2020.   5. Group B Strep in urine in 2/2020.   6. Urethral stenosis s/p reconstruction.      Other Infectious Disease issues include:  - QTc: 416 as of 3/23/23.   - PJP prophylaxis: bactrim.   -  Serostatus: CMV D+/R- (s/p 6mo of valcye), EBV D+/R-, HSV1?/2?, VZV +  - Immunization status: This patient received the third dose of the COVID-19 vaccine on 1/26/2022. Otherwise due for the seasonal influenza vaccine and COVID-19 bivalent.      Interval History:   The pt was in the hospital on 7/4/213 for Admitted on 7/4/23 for pyelonephritis UC + 10-50k E faecalis he was given Augmentin for 10 and started on prophy nitrofurantoin 100mg daily since. He was seen by urologyon 7/19/23 and had a Cystoscopy with no source of UTIs are noted.       He was seen over video. He has been well. He states that the nitrofurantoin has been helping and has no urinary issues or UTIs. No fevers, no chills, no chest pain, abd pain, n/v/d or dysuria. He has no weight loss, night sweats or enlarged lymphoid or fatigue.     Transplants:  1/15/2023 (Kidney); Postoperative day:  213.  Coordinator Veronica Edgar    Review of Systems:  CONSTITUTIONAL:  No fevers or chills. No night sweats.  EYES: negative for icterus or acute vision changes.   ENT:  negative for hearing loss, tinnitus or sore throat  RESPIRATORY:  negative for cough, sputum, dyspnea  CARDIOVASCULAR:  negative for chest pain, heart palpitations  GASTROINTESTINAL:  negative for nausea, vomiting, diarrhea or constipation  GENITOURINARY:  negative for dysuria or hematuria.  HEME:  No easy bruising or bleeding  INTEGUMENT:  negative for rash or pruritus  NEURO:  Negative for headache or tremor.    Past Medical History:   Diagnosis Date     Bladder stones      Cardiomyopathy (H)      ESRD (end stage renal disease) on dialysis (H)      Hypertension      Kidney replaced by transplant 01/15/2023    DCD DDKT. Intermediate risk induction.     Migraines      Polysubstance abuse (H)      Solitary kidney, congenital      Urethral stone      Urethral stricture        Past Surgical History:   Procedure Laterality Date     BENCH KIDNEY  1/15/2023    Procedure: Bench kidney;  Surgeon:  Tez Emerson MD;  Location: [facility] OR     BIOPSY  2020    renal, Nondenominational     COLONOSCOPY N/A 3/23/2023    Procedure: Colonoscopy;  Surgeon: Ana Cardenas MD;  Location: [facility] GI     COMBINED CYSTOSCOPY, LASER HOLMIUM LITHOTRIPSY URETER(S)       CYSTOSCOPY       CYSTOSCOPY FLEXIBLE, CYSTOSTOMY, INSERT TUBE SUPRAPUBIC, COMBINED N/A 2020    Procedure: CYSTOSCOPY, WITH SUPRAPUBIC CATHETER INSERTION;  Surgeon: Sam Mejia MD;  Location: [facility] OR     CYSTOSCOPY, OPEN EXCISION URETHRAL DIVERTICULUM, COMBINED N/A 2020    Procedure: EXCISION OF URETHRAL DIVERTICULUM X2;  Surgeon: Sam Mejia MD;  Location: [facility] OR     HERNIA REPAIR      infant     INSERT CATHETER PERITONEAL DIALYSIS       LASER HOLMIUM LITHOTRIPSY URETER(S), INSERT STENT, COMBINED N/A 2020    Procedure: CYSTOSCOPY, bladder and urethral stone extraction, removal of foreign body, urethral dilation, urethrotomy, laser on standby;  Surgeon: Sam Mejia MD;  [facility]     REMOVE CATHETER PERITONEAL N/A 1/15/2023    Procedure: Remove catheter peritoneal;  Surgeon: Tez Emerson MD;  Location: [facility] OR     TRANSPLANT KIDNEY RECIPIENT  DONOR N/A 1/15/2023    Procedure: TRANSPLANT, KIDNEY, RECIPIENT,  DONOR WITH DONOR URETER STENTING;  Surgeon: Tez Emerson MD;  Location: [facility] OR     urethral dilation       URETHROPLASTY WITH BUCCAL GRAFT N/A 2020    Procedure: URETHROPLASTY, USING BUCCAL MUCOSA GRAFT;  Surgeon: Sam Mejia MD;  Location: [facility] OR       Family History   Problem Relation Age of Onset     Alzheimer Disease Mother      Unknown/Adopted Father      No Known Problems Sister      No Known Problems Sister      Kidney Disease No family hx of        Social History     Social History Narrative     Not on file     Social History     Tobacco Use     Smoking status: Former     Packs/day: 0.50     Types: Cigarettes     Start date:       Quit date: 1/15/2023     Years since quittin.5     Smokeless tobacco: Never   Vaping Use     Vaping Use: Never used   Substance Use Topics     Alcohol use: Not Currently     Comment: quit alcohol 3-4 yrs ago     Drug use: Not Currently     Types: Methamphetamines, MDMA (Ecstasy)       Immunization History   Administered Date(s) Administered     COVID-19 Monovalent 18+ (Moderna) 2021, 2021, 2022     DTAP (<7y) 1984, 1984, 1984, 10/04/1985, 1989     Flu, Unspecified 1997, 2020, 10/06/2020, 10/27/2021     Hepatitis B (Peds <19Y) 1996, 1996, 1996     Hib, Unspecified 1986     Historical DTP/aP 1984, 1984, 1984, 10/04/1985, 1989     Influenza (intradermal) 2020     Influenza Vaccine >6 months (Alfuria,Fluzone) 2016     MMR 1985, 1996     Mantoux Tuberculin Skin Test 2020     Pneumo Conj 13-V (2010&after) 2020     Pneumococcal 23 valent 2020     Pneumococcal, Unspecified 2020, 2021     Poliovirus, inactivated (IPV) 1984, 1984, 1984, 1989     TD,PF 7+ (Tenivac) 1996     TDAP Vaccine (Adacel) 2020     Td (Adult), Adsorbed 1996       Patient Active Problem List   Diagnosis     HTN, kidney transplant related     Urethral stricture     Methamphetamine abuse, episodic (H)     Urethral diverticulum     Allergic rhinitis, unspecified seasonality, unspecified trigger     Stage 3a chronic kidney disease (H)     Kidney replaced by transplant     Immunosuppressed status (H)     Aftercare following organ transplant     Anemia of chronic disease     Secondary renal hyperparathyroidism (H)     Vitamin D deficiency     Hypomagnesemia     JACK (acute kidney injury) (H)     Diarrhea     Urinary tract infection without hematuria, site unspecified     Leukopenia     Low bicarbonate     High fever     SIRS (systemic inflammatory response  syndrome) (H)     Status post kidney transplant     EBV (Aaron-Barr virus) viremia     Pyelonephritis of transplanted kidney     Frequent UTI       No outpatient medications have been marked as taking for the 8/16/23 encounter (Appointment) with Andrzej Willis MD.       No Known Allergies           Physical Exam:   Vitals were reviewed.  All vitals stable  There were no vitals taken for this visit.  Wt Readings from Last 4 Encounters:   07/20/23 70.4 kg (155 lb 1.6 oz)   07/19/23 68 kg (150 lb)   07/06/23 69.6 kg (153 lb 6.4 oz)   05/08/23 72.6 kg (160 lb)       Exam:  GENERAL: well-developed, well-nourished, alert, oriented, in no acute distress over video.  HEAD: Head is normocephalic, atraumatic   EYES: Eyes have anicteric sclerae.    NEUROLOGIC: Grossly nonfocal.         Laboratory Data:     Absolute CD4   Date Value Ref Range Status   12/03/2020 1,325 441 - 2,156 cells/uL Final       Inflammatory Markers    Recent Labs   Lab Test 01/20/23  0746   G6PD 15.5       Immune Globulin Studies     Recent Labs   Lab Test 12/03/20  1647   IGG 1,152   IGM 64   IGE 18          Metabolic Studies    Recent Labs   Lab Test 08/07/23  1441 07/31/23  1512 07/24/23  1519 07/17/23  1512 07/10/23  1452 07/06/23  1807 07/06/23  0933 07/05/23  1304 07/04/23  1944    137 139   < > 138   < >  --    < > 133*   POTASSIUM 4.3 4.4 4.4   < > 4.5   < >  --    < > 4.0   CHLORIDE 106 103 104   < > 105   < >  --    < > 102   CO2 21* 23 24   < > 22   < >  --    < > 17*   ANIONGAP 11 11 11   < > 11   < >  --    < > 14   BUN 25.1* 25.4* 23.4*   < > 28.6*   < >  --    < > 26.1*   CR 1.57* 1.61* 1.65*   < > 1.63*   < >  --    < > 1.86*   GFRESTIMATED 57* 55* 54*   < > 55*   < >  --    < > 47*   GLC 79 89 68*   < > 87   < >  --    < > 139*   VISHNU 9.4 9.3 9.7   < > 9.4   < >  --    < > 9.5   PHOS  --   --   --   --  2.7   < >  --    < >  --    MAG  --   --   --   --  1.4*   < >  --    < >  --    URIC  --   --  5.5  --   --   --   --    --   --    LACT  --   --   --   --   --   --  0.9   < > 1.5   CKT  --   --   --   --   --   --   --   --  88    < > = values in this interval not displayed.       Hepatic Studies    Recent Labs   Lab Test 07/24/23  1519 07/04/23  1944 06/05/23  1527 06/01/23  1518   BILITOTAL 0.3 0.4 0.3 0.3   DBIL <0.20  --   --  <0.20   ALKPHOS 59 53 55 52   PROTTOTAL 7.2 7.4 6.9 6.9   ALBUMIN 4.3 4.2 4.0 4.0   AST 29 27 24 28   ALT 20 14 22 30   LDH  --   --   --  367*       Pancreatitis testing    Recent Labs   Lab Test 03/19/23  2155 01/15/23  0540 04/18/22  0851   LIPASE 20  --   --    TRIG  --  115 78       Lipid testing    Recent Labs   Lab Test 01/15/23  0540 04/18/22  0851   CHOL 177 161   HDL 52 49   * 96   TRIG 115 78       Gout Labs      Recent Labs   Lab Test 07/24/23  1519   URIC 5.5       Hematology Studies   Recent Labs   Lab Test 08/07/23  1441 07/31/23  1512 07/24/23  1519 07/17/23  1512 07/08/23  1400 07/08/23  1133   WBC 4.2 4.5 3.5* 3.2*   < > 2.1*   ANEU  --  1.7  --   --   --  0.3*   ANEUTAUTO 1.5*  --  1.0*  --    < >  --    ALYM  --  1.5  --   --   --  0.9   ALYMPAUTO 1.1  --  1.1  --    < >  --    TY  --  0.6  --   --   --  0.2   AMONOAUTO 0.7  --  0.4  --    < >  --    AEOS  --  0.5  --   --   --  0.6   AEOSAUTO 0.7  --  0.7  --    < >  --    ABSBASO 0.1  --  0.1  --    < >  --    HGB 11.2* 11.2* 10.7* 10.2*   < >  --    HCT 33.1* 34.9* 33.7* 31.8*   < >  --     201 254 318   < >  --     < > = values in this interval not displayed.       Clotting Studies    Recent Labs   Lab Test 07/04/23  1944 01/15/23  0540 08/09/21  1124 08/18/20  1307   INR 1.29* 0.90 0.96 1.08   PTT  --  28  --  29       Iron Testing    Recent Labs   Lab Test 08/07/23  1441 04/15/23  1002 04/12/23  1112 02/02/23  0840 01/31/23  1049 01/21/23  0715 01/20/23  0746 01/07/21  0825 12/03/20  1647   IRON  --   --   --   --  70  --  153  --   --    FEB  --   --   --   --  258  --  244  --   --    IRONSAT  --   --   --   --   27  --  63*  --   --    DAWSON  --   --   --   --  430*  --  717*  --   --    MCV 91   < > 96   < >  --    < > 93   < > 96   B12  --   --  2,476*  --   --   --   --   --  824    < > = values in this interval not displayed.       Markers  No lab results found.    Invalid input(s): FETOPROTEIN, SERUM, AFP    Autoimmune Testing   No lab results found.    Invalid input(s): ANCAB, PANCA, CANCA    Arterial Blood Gas Testing    Recent Labs   Lab Test 03/22/23  1550 03/20/23  0652 01/15/23  1711   PH  --  7.42  --    O2PER 20  --  45.0        Thyroid Studies     Recent Labs   Lab Test 07/06/23  0547 05/08/23  1532 04/18/22  0851   TSH 1.55 1.57 2.34       Urine Studies     Recent Labs   Lab Test 07/04/23 2015 06/12/23  1514 05/04/23  1535 04/15/23  0918 04/03/23  1530   URINEPH 6.0 6.0 6.0 6.5 6.5   NITRITE Negative Negative Negative Negative Negative   LEUKEST Negative Negative Large* Moderate* Negative   WBCU 11* 7* 71* * 1       Medication levels    Recent Labs   Lab Test 08/07/23  1441 07/10/23  1452 07/06/23  0547   VANCOMYCIN  --   --  16.7   TACROL 7.2   < > 10.3   MPACID 2.30   < >  --    MPAG 25.8*   < >  --     < > = values in this interval not displayed.       CSF testing   No lab results found.    Invalid input(s): CADAM, EVPCR, ENTPCR, ENTEROVIRUS    Microbiology:  Fungal testing  Recent Labs   Lab Test 12/03/20  1647 10/08/20  1300   AM3 <0.10  --    HIFUNG  --  <1:8   COFUNG  --  <1:2   FUNBL  --  0.3       Beta D Glucan levels (Fungitell assay)    No results found for: FGTL, FGTLI     Last Culture results   Culture   Date Value Ref Range Status   07/04/2023 10,000-50,000 CFU/mL Enterococcus faecalis (A)  Final   07/04/2023 <10,000 CFU/mL Urogenital cooper  Final   07/04/2023 No Growth  Final   07/04/2023 No Growth  Final   05/04/2023 No Growth  Final   05/04/2023 No Growth  Final   05/04/2023 >100,000 CFU/mL Escherichia coli (A)  Final   04/15/2023 50,000-100,000 CFU/mL Escherichia coli (A)  Final    04/15/2023 10,000-50,000 CFU/mL Enterococcus faecalis (A)  Final   03/20/2023 No Growth  Final   03/20/2023 No Growth  Final   03/19/2023 50,000-100,000 CFU/mL Enterococcus faecalis (A)  Final   03/19/2023 <10,000 CFU/mL Urogenital cooper  Final   01/23/2023 50,000-100,000 CFU/mL Enterococcus faecalis (A)  Final     Comment:     Susceptibilities done on previous cultures   01/19/2023 >100,000 CFU/mL Enterococcus faecalis (A)  Final     Culture Micro   Date Value Ref Range Status   12/03/2020 No growth  Final   08/14/2020 >100,000 colonies/mL  Escherichia coli   (A)  Final         Last checks of Clostridioides difficile testing  Recent Labs   Lab Test 07/05/23  1318 05/04/23 2010 03/20/23  0853   CDBPCT Negative Negative Negative       No components found for: AFBSTN    Syphilis Testing  Invalid input(s): ZSQ2720    Tick Testing  No lab results found.    Invalid input(s): APHAGM    ASO Testing  Invalid input(s): PJV2019    Quantiferon testing   Recent Labs   Lab Test 08/07/23  1441 07/31/23  1512 09/15/20  1347 08/18/20  1307   TBRST  --   --   --  Negative   LYMPH 27 33   < > 23.9    < > = values in this interval not displayed.       Infection Studies to assess Diarrhea  Recent Labs   Lab Test 07/05/23  1318 05/04/23 2010 03/22/23  2257 03/20/23  0853 12/09/20  2000 10/08/20  1339 10/08/20  1338   EPSTX1 Not Detected Not Detected  --  Not Detected   < > Not Detected  --    EPSTX2 Not Detected Not Detected  --  Not Detected   < > Not Detected  --    ADENOVIRUSAG  --   --   --   --   --   --  Negative   MSPORT  --   --   --   --   --   --  No Microsporidia found  Chromotrope stain reveals no Microsporidia spores in fecal specimen. Consider other causes   for symptoms.  Divya Lugo M.D., Medical Director  10/9/20     CRYSPT  --   --   --   --   --  No oocysts of Cryptosporidium species, Cyclospora cayetanensis, or Cystoisospora   (Isospora) nancy found    A modified acid fast stain reveals no oocysts of  Cryptosporidium, Cyclospora, or   Cystoisospora (Isospora). Consider other causes for symptoms  Divya Lugo M.D., Medical Director  10/9/20    --    CRYPTR  --   --   --   --   --  Negative  --    POPRT  --   --  Negative  --    < > Routine parasitology exam negative  Cryptosporidium, Cyclospora, and Microsporidia are not readily detected by this method. A   single negative specimen does not rule out parasitic infection.    --    EPCAMP Not Detected Not Detected  --  Not Detected   < > Not Detected  --    EPSALM Not Detected Not Detected  --  Not Detected   < > Not Detected  --    EPSHGL Not Detected Not Detected  --  Not Detected   < > Not Detected  --    EPVIB Not Detected Not Detected  --  Not Detected   < > Not Detected  --    EPROTA Not Detected Not Detected  --  Not Detected   < > Not Detected  --    EPNORO Not Detected Not Detected  --  Not Detected   < > Not Detected  --    EPYER Not Detected Not Detected  --  Not Detected   < > Not Detected  --     < > = values in this interval not displayed.       Virology:  Coronavirus-19 testing    Recent Labs   Lab Test 07/04/23  1952 03/19/23  1919 01/15/23  0538 03/21/22  1911 10/30/21  0816 09/08/21  1400 08/07/21  1057 05/26/21  1134 12/10/20  0959 12/03/20  1647   CD19  --   --   --   --   --   --   --   --   --  17   ACD19  --   --   --   --   --   --   --   --   --  532   [id number] Negative Negative Negative Negative  --   --    < >  --  Not Detected  --    [id number]  --   --   --   --   --   --   --   --  Nasopharyngeal  --    COVIDPCREXT  --   --   --   --  Not Detected Not Detected  --  Not Detected  --   --     < > = values in this interval not displayed.       Respiratory virus (non-coronavirus-19) testing    No lab results found.    CMV viral loads    CMV DNA IU/mL   Date Value Ref Range Status   07/17/2023 Not Detected Not Detected IU/mL Final   07/05/2023 Not Detected Not Detected IU/mL Final   06/15/2023 Not Detected Not Detected IU/mL Final    05/25/2023 Not Detected Not Detected IU/mL Final   05/04/2023 Not Detected Not Detected IU/mL Final   03/20/2023 Not Detected Not Detected IU/mL Final   03/06/2023 Not Detected Not Detected IU/mL Final   01/15/2023 Not Detected Not Detected IU/mL Final       CMV resistance testing  No lab results found.  No results found for: CMVCID, CMVFOS, CMVGAN    No results found for: H6RES    EBV DNA Copies/mL   Date Value Ref Range Status   07/04/2023 <500 (A) Not Detected copies/mL Final     Comment:     EBV DNA Detected below the reportable range of 500 copies/mL   05/04/2023 Not Detected Not Detected copies/mL Final   03/20/2023 Not Detected Not Detected copies/mL Final       BK viral loads   Recent Labs   Lab Test 07/24/23  1509 07/05/23  1203 06/26/23  1507 05/25/23  1552 05/18/23  1535 05/04/23  2159 04/12/23  1112 03/29/23  1535 02/20/23  1605   BKRES Not Detected Not Detected Not Detected Not Detected Not Detected Not Detected Not Detected Not Detected Not Detected       Parvovirus Testing  No lab results found.    Invalid input(s): PRVRES    Adenovirus Testing  No lab results found.    Invalid input(s): ADENAB, ADENOVIRUS, ADQT    Hepatitis B Testing     Recent Labs   Lab Test 01/15/23  0540 08/18/20  1307   AUSAB 134.22 212.08*   HBCAB Nonreactive Nonreactive   HEPBANG Nonreactive Nonreactive     Was the last Hepatitis B E antigen positive?   No results found for: HBEAGN     Hepatitis C Antibody   Date Value Ref Range Status   01/15/2023 Nonreactive Nonreactive Final   08/18/2020 Nonreactive NR^Nonreactive Final     Comment:     Assay performance characteristics have not been established for newborns,   infants, and children         CMV Antibody IgG   Date Value Ref Range Status   01/15/2023 No detectable antibody. No detectable antibody.  Final   08/18/2020 <0.2 0.0 - 0.8 AI Final     Comment:     Negative  Antibody index (AI) values reflect qualitative changes in antibody   concentration that cannot be directly  associated with clinical condition or   disease state.       Varicella Zoster Virus Antibody IgG   Date Value Ref Range Status   08/18/2020 4.2 (H) 0.0 - 0.8 AI Final     Comment:     Positive, suggests prev. exposure and probable immunity  Antibody index (AI) values reflect qualitative changes in antibody   concentration that cannot be directly associated with clinical condition or   disease state.       EBV Capsid Antibody IgG   Date Value Ref Range Status   01/15/2023 Positive (A) No detectable antibody. Final     Comment:     Suggests recent or past exposure.   08/18/2020 >8.0 (H) 0.0 - 0.8 AI Final     Comment:     Positive, suggests recent or past exposure  Antibody index (AI) values reflect qualitative changes in antibody   concentration that cannot be directly associated with clinical condition or   disease state.       EBV Capsid Antibody IgM   Date Value Ref Range Status   01/15/2023 No detectable antibody. No detectable antibody. Final       No components found for: [id number]    Last Pathology Report   Case Report   Date Value Ref Range Status   03/23/2023   Final    Surgical Pathology Report                         Case: [id number]                                  Authorizing Provider:  Ana Cardenas         Collected:           03/23/2023 09:58 AM                                 MD Sophia                                                                Ordering Location:     [facility]          Received:            03/23/2023 10:25 AM                                 Endoscopy                                                                    Pathologist:           Jesusita Calvert MD                                                          Specimens:   A) - Large Intestine, Colon, right colon                                                            B) - Large Intestine, Colon, left colon                                                     Clinical Information   Date Value Ref Range Status    03/23/2023   Final    Diarrhea        Final Diagnosis   Date Value Ref Range Status   06/05/2023   Final    Peripheral blood, morphology:  - Moderate anemia, normocytic and normochromic.  - Moderate leukopenia with neutropenia.  - Platelets quantitatively within normal limits and without diagnostic morphologic abnormality.  - Negative for schistocytes or definitive morphologic features of hemolysis.  - Negative for overt features of myelodysplasia or circulating blasts.    See comment.    COMMENT:  Normocytic anemia is nonspecific and may be attributable to multiple overlapping etiologies, including chronic disease, renal and/or endocrine disease, blood loss, iron deficiency (in some patients), and/or bone marrow suppression (by underlying infection, various nutritional deficiencies, toxicities, alcohol use, or medications).  Neutropenia may be idiopathic or attributable to multiple overlapping etiologies, including medications, infection, alcohol use, autoimmune disorders, nutritional deficiencies, and/or endocrinopathies.  Neutropenia may also be normally seen in some ethnic backgrounds.  Clinical correlation is recommended.         Imaging:  Results for orders placed or performed during the hospital encounter of 07/04/23   XR Chest Port 1 View    Narrative    Exam: XR CHEST PORT 1 VIEW, 7/4/2023 8:15 PM    Indication: fever    Comparison: 1/15/2023    Findings:   The cardiomediastinal silhouette and pulmonary vasculature are within  normal limits. No pleural effusion or pneumothorax. No focal airspace  opacity.      Impression    Impression: No acute airspace disease.    GUS AVILA DO         SYSTEM ID:  [id number]   US Renal Transplant with Doppler    Narrative    EXAMINATION: US RENAL TRANSPLANT,  7/4/2023 10:07 PM     COMPARISON: Renal transplant ultrasounds 5/4/2023    HISTORY: fever, sepsis, dysuria    TECHNIQUE:  Grey-scale, color Doppler and spectral flow analysis.    FINDINGS:  The transplant kidney  is located right lower quadrant, and measures  14.1 cm. Parenchyma is of normal thickness and echogenicity. No focal  lesions. Prominent proximal ureter without significant hydronephrosis.  No perinephric fluid collection.    Renal artery flow:   220 cm/sec peak systolic at hilum (previously 121 cm/s)  490 cm/sec peak systolic at anastomosis. (Previously 449 cm/s)  385 cm/sec peak systolic at mid renal artery (previously 141 cm/s)  Arcuate artery resistive indices (upper to lower): 0.67, 0.60, 0.58    Renal Vein Flow:  42 cm/sec at hilum.   67 cm/sec at anastomosis.  42 cm/s at mid renal vein    Iliac artery flow:  182 cm/sec peak systolic above anastomosis.  151 cm/sec peak systolic below anastomosis.    Iliac vein flow:  Patent above and below the anastomosis.      Impression    IMPRESSION:   1. Patent  Doppler evaluation of the renal transplant vasculature.  2. Elevated velocity at the renal artery anastomosis (490 cm/s) and  mid renal artery (385 cm/s), may represent stenosis.  3. Normal grayscale evaluation of the right lower quadrant transplant  kidney.    I have personally reviewed the examination and initial interpretation  and I agree with the findings.    DAR BISWAS MD         SYSTEM ID:  [id number]   US Lower Extremity Venous Duplex Bilateral    Narrative    EXAMINATION: DOPPLER VENOUS ULTRASOUND OF BILATERAL LOWER EXTREMITIES,  7/6/2023 10:18 AM     COMPARISON: None.    HISTORY: Fevers    TECHNIQUE:  Gray-scale evaluation with compression, spectral flow and  color Doppler assessment of the deep venous system of both legs from  groin to knee, and then at the ankles.    FINDINGS:  In both lower extremities, the common femoral, femoral, popliteal,  peroneal, and posterior tibial veins demonstrate normal  compressibility and blood flow.      Impression    IMPRESSION:  No evidence of deep venous thrombosis in either lower extremity.       I have personally reviewed the examination and initial  interpretation  and I agree with the findings.    GUS AVILA DO         SYSTEM ID:  [id number]   CT Chest/Abdomen/Pelvis w Contrast    Narrative    EXAMINATION: CT CHEST/ABDOMEN/PELVIS W CONTRAST, 7/6/2023 12:07 PM    INDICATION: Hx kidney transplant 1/2023; immunosuppressed. Fevers of  unknown origing >103F. Evaluate for infection.    COMPARISON STUDY: CT abdomen and pelvis stone protocol 3/19/2023    TECHNIQUE: CT scan of the chest, abdomen and pelvis was performed on  multidetector CT scanner using volumetric acquisition technique and  images were reconstructed in multiple planes with variable thickness  and reviewed on dedicated workstations.     CONTRAST: iopamidol (ISOVUE-370) solution 90 mL.   Without oral  contrast.    CT scan radiation dose is optimized to minimum requisite dose using  automated dose modulation techniques.    FINDINGS:    Chest:   Mediastinum: Visualized thyroid gland is within normal limits. Cardiac  size is within normal limits. No thoracic lymphadenopathy.    Pleura: No pleural effusion or pneumothorax.     Lungs: No suspicious nodule or consolidation.    Abdomen and Pelvis:  Liver: No mass. No intrahepatic biliary ductal dilation.    Biliary System: Normal gallbladder. No extrahepatic biliary ductal  dilation.    Pancreas: No mass or pancreatic ductal dilation.    Adrenal glands: No mass or nodules    Spleen: Normal.    Kidneys: Atrophic native left kidney with a few too small to  characterize hypodensities. The native right kidney is absent Right  lower quadrant transplant kidney demonstrates nonspecific perinephric  stranding without significant change compared to 3/19/2023, although  comparison is limited due to stone protocol without contrast on prior  CT. Patchy areas of hypoenhancement at the superior and inferior poles  of the transplant kidney. Expected postoperative changes with surgical  clips in the right lower quadrant. No suspicious mass, obstructing  calculus or  hydronephrosis.    Gastrointestinal tract: Normal appendix. Normal caliber small bowel.    Mesentery/peritoneum/retroperitoneum: No mass. Trace fluid in the  pelvis.    Lymph nodes: Multiple sub centimeter para-aortic lymph nodes.    Vasculature: No aneurysm of the abdominal aorta.     Pelvis: Urinary bladder is normal.  Prostate is within normal limits.    Osseous structures: No aggressive or acute osseous lesion.      Soft tissues: Small fat containing ventral/umbilical hernia.   Unchanged soft tissue thickening/scar over the right lower quadrant  and at the midline lower abdomen.      Impression    IMPRESSION: Postoperative changes of kidney transplant. There is mild  nonspecific stranding around transplant kidney with a small amount of  fluid in the pelvis and areas of patchy hypoenhancement within the  transplant kidney. These likely represents chronic/benign findings,  however in the appropriate clinical context findings may represent  mild infection.    I have personally reviewed the examination and initial interpretation  and I agree with the findings.    DIEGO PRICE MD         SYSTEM ID:  [id number]   Echo Complete     Value    LVEF  55-60%    Narrative    [id number]  [id number]  [id number]  [id number]^VITOR^LINA^RODRIGUE     [facility],[place]  Echocardiography Laboratory  [address] [place] [id number]     Name: CAROLYN HIGHTOWER  MRN: [id number]  : 1985  Study Date: 2023 02:30 PM  Age: 38 yrs  Gender: Male  Patient Location: [facility]  Reason For Study: Fever  Ordering Physician: LINA ADLER  Performed By: Ezekiel Manzano     BSA: 1.9 m2  Height: 72 in  Weight: 150 lb  HR: 83  BP: 115/77 mmHg  ______________________________________________________________________________  Procedure  Complete Portable Echo Adult. Optison (NDC #3621-1603-10) given intravenously.  Patient was given 6 ml mixture of 3 ml Optison and 6 ml saline. 3 ml  wasted.  ______________________________________________________________________________  Interpretation Summary  No vegetation identified, however this does not exclude endocarditis. Left  ventricular size, wall motion and function are normal. The ejection fraction  is 55-60%.     Right ventricular function, chamber size, wall motion, and thickness are  normal.     No vegetation identified, however this does not exclude endocarditis.     No pericardial effusion is present.     No significant valvular abnormalities present.     The inferior vena cava is normal.     Comapred to 9/23/2022 there are no signficant differences.  ______________________________________________________________________________  Left Ventricle  Left ventricular size, wall motion and function are normal. The ejection  fraction is 55-60%. Left ventricular diastolic function is normal.     Right Ventricle  Right ventricular function, chamber size, wall motion, and thickness are  normal.     Atria  The right atria appears normal. The left atrium appears normal.     Mitral Valve  The mitral valve is normal.     Aortic Valve  Aortic valve is normal in structure and function. The aortic valve is  tricuspid.     Tricuspid Valve  The tricuspid valve is normal. The peak velocity of the tricuspid regurgitant  jet is not obtainable. Pulmonary artery systolic pressure cannot be assessed.     Pulmonic Valve  The pulmonic valve is normal.     Vessels  The inferior vena cava is normal. Sinuses of Valsalva 2.9 cm. Ascending aorta  3.0 cm. IVC diameter <2.1 cm collapsing >50% with sniff suggests a normal RA  pressure of 3 mmHg.     Pericardium  No pericardial effusion is present.     Miscellaneous  No significant valvular abnormalities present.     Compared to Previous Study  Comapred to 9/23/2022 there are no signficant differences.  ______________________________________________________________________________  MMode/2D Measurements & Calculations  IVSd: 0.84  cm  LVIDd: 4.9 cm  LVIDs: 3.0 cm  LVPWd: 0.94 cm  FS: 40.1 %  LV mass(C)d: 152.0 grams  LV mass(C)dI: 80.6 grams/m2  Ao root diam: 2.9 cm  asc Aorta Diam: 3.0 cm  LVOT diam: 2.0 cm  LVOT area: 3.2 cm2  LA Volume (BP): 48.3 ml     LA Volume Index (BP): 25.6 ml/m2  RWT: 0.38  TAPSE: 2.5 cm     Doppler Measurements & Calculations  MV E max ash: 101.0 cm/sec  MV A max ash: 78.1 cm/sec  MV E/A: 1.3  MV dec slope: 486.9 cm/sec2  MV dec time: 0.21 sec  Ao V2 max: 177.1 cm/sec  Ao max P.5 mmHg  Ao V2 mean: 128.7 cm/sec  Ao mean P.6 mmHg  Ao V2 VTI: 34.9 cm  SHANNAN(I,D): 2.7 cm2  SHANNAN(V,D): 2.6 cm2  LV V1 max P.5 mmHg  LV V1 max: 145.7 cm/sec  LV V1 VTI: 29.4 cm  SV(LVOT): 92.6 ml  SI(LVOT): 49.1 ml/m2  PA V2 max: 123.2 cm/sec  PA max P.1 mmHg  PA acc time: 0.16 sec  AV Ash Ratio (DI): 0.82  SHANNAN Index (cm2/m2): 1.4  E/E' av.6  Lateral E/e': 5.9     Medial E/e': 13.3  RV S Ash: 17.8 cm/sec     ______________________________________________________________________________  Report approved by: Aleena Shelton 2023 03:17 PM

## 2023-08-16 NOTE — NURSING NOTE
Is the patient currently in the state of [place]? YES    Visit mode:VIDEO    If the visit is dropped, the patient can be reconnected by: VIDEO VISIT: Text to cell phone: [phone]    Will anyone else be joining the visit? NO      How would you like to obtain your AVS? MyChart    Are changes needed to the allergy or medication list? NO    Reason for visit: BHARATH MONTES

## 2023-08-16 NOTE — LETTER
8/16/2023       RE: Chip Fowler  [address] [facility] [address]     Dear Colleague,    Thank you for referring your patient, Chip Fowler, to the [facility] INFECTIOUS DISEASE CLINIC [place] [facility]. Please see a copy of my visit note below.    [facility]  Transplant Infectious Disease Clinic Note: Virtual Follow up      Patient:  Chip Fowler, Date of birth 1985, Medical record number [id number]  Date of Visit:  08/16/2023    Virtual Visit Details    Type of service:  Video Visit   Video Start Time: 4:00 PM  Video End Time:4:18 PM    Originating Location (pt. Location): Home    Distant Location (provider location):  On-site  Platform used for Video Visit: Chela         Assessment and Recommendations:   Recommendations:  -He was also recommend to keep hydrated and schedule voiding throughout the day.   -he has had recurrent several UTIs after sex,and was in the hospital in July for pyelo from enterococcus faecalis and was treated with Augmentin and was started on suppression with nitrofurantoin daily after for suppression. He was seen by urology on 7/19/23 and had a Cystoscopy with no source of UTIs are noted.   Since he has been on nitrofurantoin for suppression or 1 mo, can give another 1 mo and then assess with keeping nitrofurantoin, stopping or changing it to another agent. While on this he has been doing very well with no urinary issues. He has also held off on sexual activity Prolonged nitrofurantoin use can lead to pulmonary issues therefore hesitant to continue it indefinite therapy.   -f/u EBV VL per protocol.   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    -Baseline Cr 1.4-1.6. Renal US patent  - Tacrolimus goal level 8-10.   - Myfortic 540 mg BID per protocol in the setting of previously high levels, leukopenia, and  infection.         1. Severe sepsis. - resolved   2. E.Coli UTI with recurrent UTI.     The UTI is mostly with E faecalis since 1/2023 except last infection in 4/2023 with E faecalis and E coli.   The current UTI is also due to E coli. Susceptible to Cipro and finished 14d course.    -on 6/2/23 The pt reports another UTI sx (incresaed urgency and discomfort) after having vaginal sex on late Sunday afteroon. He was reminded to void after a sexual encounter. He took a script of nitrofurantoin BID for 7d and is helping him and his symptoms are improving    3. E Faecalis Pyelonephritis 7/4/23  Admitted on 7/4/23 for pyelonephritis UC + 10-50k E faecalis he was given Augmentin for 10 and started on prophy nitrofurantoin 100mg daily. Unlike his prior UTIs this occur ace was not related to sexual activity. He was seen by urology on 7/19/23 and had a Cystoscopy with no source of UTIs are noted.       4 low level EBV Viremia   -7/6/23 CT chest Lymph nodes: Multiple sub centimeter para-aortic lymph nodes.  -7/4/23 <500, 7/7/23 was 1218 (log 3.1), 8/7/23 was 1302 (log 3.1)    Prior issues:  1. Eosinophilia. The workup for eosinophilia and diarrhea was negative for fungal and parasitic infections that would account for the eosinophilia. Evaluated by hematology. The eosinophilia is likely reactive to foreign objects; the PD catheter was thought to be potentially the source as noted by hematology. The relatively increased tryptase level favors allergy (likely to PD catheter) to be the etiology of the eosinophilia rather than infectious processes or malignancies.   2. Chronic diarrhea since he was started on PD in 7/2020. Workup has been negative.   3. Positive Salmonella serology. 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 The patient was counseled  against keeping the snakes after transplantation as they are source of recurrent Salmonella infection.  4. E coli in urine in 8/2020.   5. Group B Strep in urine in 2/2020.   6. Urethral stenosis s/p reconstruction.      Other Infectious Disease issues include:  - QTc: 416 as of 3/23/23.   - PJP prophylaxis: bactrim.   - Serostatus: CMV D+/R- (s/p 6mo of valcye), EBV D+/R-, HSV1?/2?, VZV +  - Immunization status: This patient received the third dose of the COVID-19 vaccine on 1/26/2022. Otherwise due for the seasonal influenza vaccine and COVID-19 bivalent.      Interval History:   The pt was in the hospital on 7/4/213 for Admitted on 7/4/23 for pyelonephritis UC + 10-50k E faecalis he was given Augmentin for 10 and started on prophy nitrofurantoin 100mg daily since. He was seen by urologyon 7/19/23 and had a Cystoscopy with no source of UTIs are noted.       He was seen over video. He has been well. He states that the nitrofurantoin has been helping and has no urinary issues or UTIs. No fevers, no chills, no chest pain, abd pain, n/v/d or dysuria. He has no weight loss, night sweats or enlarged lymphoid or fatigue.     Transplants:  1/15/2023 (Kidney); Postoperative day:  213.  Coordinator Veronica Edgar    Review of Systems:  CONSTITUTIONAL:  No fevers or chills. No night sweats.  EYES: negative for icterus or acute vision changes.   ENT:  negative for hearing loss, tinnitus or sore throat  RESPIRATORY:  negative for cough, sputum, dyspnea  CARDIOVASCULAR:  negative for chest pain, heart palpitations  GASTROINTESTINAL:  negative for nausea, vomiting, diarrhea or constipation  GENITOURINARY:  negative for dysuria or hematuria.  HEME:  No easy bruising or bleeding  INTEGUMENT:  negative for rash or pruritus  NEURO:  Negative for headache or tremor.    Past Medical History:   Diagnosis Date    Bladder stones     Cardiomyopathy (H)     ESRD (end stage renal disease) on dialysis (H)     Hypertension     Kidney replaced  by transplant 01/15/2023    DCD DDKT. Intermediate risk induction.    Migraines     Polysubstance abuse (H)     Solitary kidney, congenital     Urethral stone     Urethral stricture        Past Surgical History:   Procedure Laterality Date    BENCH KIDNEY  1/15/2023    Procedure: Bench kidney;  Surgeon: Tez Emerson MD;  [facility]: [facility]    BIOPSY  2020    renal, Mandaeism    COLONOSCOPY N/A 3/23/2023    Procedure: Colonoscopy;  Surgeon: Ana Cardenas MD;  Location: [facility] GI    COMBINED CYSTOSCOPY, LASER HOLMIUM LITHOTRIPSY URETER(S)      CYSTOSCOPY      CYSTOSCOPY FLEXIBLE, CYSTOSTOMY, INSERT TUBE SUPRAPUBIC, COMBINED N/A 2020    Procedure: CYSTOSCOPY, WITH SUPRAPUBIC CATHETER INSERTION;  Surgeon: Sam Mejia MD;  Location: [facility] OR    CYSTOSCOPY, OPEN EXCISION URETHRAL DIVERTICULUM, COMBINED N/A 2020    Procedure: EXCISION OF URETHRAL DIVERTICULUM X2;  Surgeon: Sam Mejia MD;  Location: [facility] OR    HERNIA REPAIR      infant    INSERT CATHETER PERITONEAL DIALYSIS      LASER HOLMIUM LITHOTRIPSY URETER(S), INSERT STENT, COMBINED N/A 2020    Procedure: CYSTOSCOPY, bladder and urethral stone extraction, removal of foreign body, urethral dilation, urethrotomy, laser on standby;  Surgeon: Sam Mejia MD;  Location[facility] OR    REMOVE CATHETER PERITONEAL N/A 1/15/2023    Procedure: Remove catheter peritoneal;  Surgeon: Tez Emerson MD;  Location: [facility] OR    TRANSPLANT KIDNEY RECIPIENT  DONOR N/A 1/15/2023    Procedure: TRANSPLANT, KIDNEY, RECIPIENT,  DONOR WITH DONOR URETER STENTING;  Surgeon: Tez Emerson MD;  Location: [facility] OR    urethral dilation      URETHROPLASTY WITH BUCCAL GRAFT N/A 2020    Procedure: URETHROPLASTY, USING BUCCAL MUCOSA GRAFT;  Surgeon: Sam Mejia MD;  Location: [facility] OR       Family History   Problem Relation Age of Onset    Alzheimer Disease Mother     Unknown/Adopted Father      No Known Problems Sister     No Known Problems Sister     Kidney Disease No family hx of        Social History     Social History Narrative    Not on file     Social History     Tobacco Use    Smoking status: Former     Packs/day: 0.50     Types: Cigarettes     Start date:      Quit date: 1/15/2023     Years since quittin.5    Smokeless tobacco: Never   Vaping Use    Vaping Use: Never used   Substance Use Topics    Alcohol use: Not Currently     Comment: quit alcohol 3-4 yrs ago    Drug use: Not Currently     Types: Methamphetamines, MDMA (Ecstasy)       Immunization History   Administered Date(s) Administered    COVID-19 Monovalent 18+ (Moderna) 2021, 2021, 2022    DTAP (<7y) 1984, 1984, 1984, 10/04/1985, 1989    Flu, Unspecified 1997, 2020, 10/06/2020, 10/27/2021    Hepatitis B (Peds <19Y) 1996, 1996, 1996    Hib, Unspecified 1986    Historical DTP/aP 1984, 1984, 1984, 10/04/1985, 1989    Influenza (intradermal) 2020    Influenza Vaccine >6 months (Alfuria,Fluzone) 2016    MMR 1985, 1996    Mantoux Tuberculin Skin Test 2020    Pneumo Conj 13-V (2010&after) 2020    Pneumococcal 23 valent 2020    Pneumococcal, Unspecified 2020, 2021    Poliovirus, inactivated (IPV) 1984, 1984, 1984, 1989    TD,PF 7+ (Tenivac) 1996    TDAP Vaccine (Adacel) 2020    Td (Adult), Adsorbed 1996       Patient Active Problem List   Diagnosis    HTN, kidney transplant related    Urethral stricture    Methamphetamine abuse, episodic (H)    Urethral diverticulum    Allergic rhinitis, unspecified seasonality, unspecified trigger    Stage 3a chronic kidney disease (H)    Kidney replaced by transplant    Immunosuppressed status (H)    Aftercare following organ transplant    Anemia of chronic disease    Secondary renal hyperparathyroidism (H)     Vitamin D deficiency    Hypomagnesemia    JACK (acute kidney injury) (H)    Diarrhea    Urinary tract infection without hematuria, site unspecified    Leukopenia    Low bicarbonate    High fever    SIRS (systemic inflammatory response syndrome) (H)    Status post kidney transplant    EBV (Aaron-Barr virus) viremia    Pyelonephritis of transplanted kidney    Frequent UTI       No outpatient medications have been marked as taking for the 8/16/23 encounter (Appointment) with Andrzej Willis MD.       No Known Allergies           Physical Exam:   Vitals were reviewed.  All vitals stable  There were no vitals taken for this visit.  Wt Readings from Last 4 Encounters:   07/20/23 70.4 kg (155 lb 1.6 oz)   07/19/23 68 kg (150 lb)   07/06/23 69.6 kg (153 lb 6.4 oz)   05/08/23 72.6 kg (160 lb)       Exam:  GENERAL: well-developed, well-nourished, alert, oriented, in no acute distress over video.  HEAD: Head is normocephalic, atraumatic   EYES: Eyes have anicteric sclerae.    NEUROLOGIC: Grossly nonfocal.         Laboratory Data:     Absolute CD4   Date Value Ref Range Status   12/03/2020 1,325 441 - 2,156 cells/uL Final       Inflammatory Markers    Recent Labs   Lab Test 01/20/23  0746   G6PD 15.5       Immune Globulin Studies     Recent Labs   Lab Test 12/03/20  1647   IGG 1,152   IGM 64   IGE 18          Metabolic Studies    Recent Labs   Lab Test 08/07/23  1441 07/31/23  1512 07/24/23  1519 07/17/23  1512 07/10/23  1452 07/06/23  1807 07/06/23  0933 07/05/23  1304 07/04/23  1944    137 139   < > 138   < >  --    < > 133*   POTASSIUM 4.3 4.4 4.4   < > 4.5   < >  --    < > 4.0   CHLORIDE 106 103 104   < > 105   < >  --    < > 102   CO2 21* 23 24   < > 22   < >  --    < > 17*   ANIONGAP 11 11 11   < > 11   < >  --    < > 14   BUN 25.1* 25.4* 23.4*   < > 28.6*   < >  --    < > 26.1*   CR 1.57* 1.61* 1.65*   < > 1.63*   < >  --    < > 1.86*   GFRESTIMATED 57* 55* 54*   < > 55*   < >  --    < > 47*   GLC 79  89 68*   < > 87   < >  --    < > 139*   VISHNU 9.4 9.3 9.7   < > 9.4   < >  --    < > 9.5   PHOS  --   --   --   --  2.7   < >  --    < >  --    MAG  --   --   --   --  1.4*   < >  --    < >  --    URIC  --   --  5.5  --   --   --   --   --   --    LACT  --   --   --   --   --   --  0.9   < > 1.5   CKT  --   --   --   --   --   --   --   --  88    < > = values in this interval not displayed.       Hepatic Studies    Recent Labs   Lab Test 07/24/23  1519 07/04/23  1944 06/05/23  1527 06/01/23  1518   BILITOTAL 0.3 0.4 0.3 0.3   DBIL <0.20  --   --  <0.20   ALKPHOS 59 53 55 52   PROTTOTAL 7.2 7.4 6.9 6.9   ALBUMIN 4.3 4.2 4.0 4.0   AST 29 27 24 28   ALT 20 14 22 30   LDH  --   --   --  367*       Pancreatitis testing    Recent Labs   Lab Test 03/19/23  2155 01/15/23  0540 04/18/22  0851   LIPASE 20  --   --    TRIG  --  115 78       Lipid testing    Recent Labs   Lab Test 01/15/23  0540 04/18/22  0851   CHOL 177 161   HDL 52 49   * 96   TRIG 115 78       Gout Labs      Recent Labs   Lab Test 07/24/23  1519   URIC 5.5       Hematology Studies   Recent Labs   Lab Test 08/07/23  1441 07/31/23  1512 07/24/23  1519 07/17/23  1512 07/08/23  1400 07/08/23  1133   WBC 4.2 4.5 3.5* 3.2*   < > 2.1*   ANEU  --  1.7  --   --   --  0.3*   ANEUTAUTO 1.5*  --  1.0*  --    < >  --    ALYM  --  1.5  --   --   --  0.9   ALYMPAUTO 1.1  --  1.1  --    < >  --    TY  --  0.6  --   --   --  0.2   AMONOAUTO 0.7  --  0.4  --    < >  --    AEOS  --  0.5  --   --   --  0.6   AEOSAUTO 0.7  --  0.7  --    < >  --    ABSBASO 0.1  --  0.1  --    < >  --    HGB 11.2* 11.2* 10.7* 10.2*   < >  --    HCT 33.1* 34.9* 33.7* 31.8*   < >  --     201 254 318   < >  --     < > = values in this interval not displayed.       Clotting Studies    Recent Labs   Lab Test 07/04/23  1944 01/15/23  0540 08/09/21  1124 08/18/20  1307   INR 1.29* 0.90 0.96 1.08   PTT  --  28  --  29       Iron Testing    Recent Labs   Lab Test 08/07/23  1441  04/15/23  1002 04/12/23  1112 02/02/23  0840 01/31/23  1049 01/21/23  0715 01/20/23  0746 01/07/21  0825 12/03/20  1647   IRON  --   --   --   --  70  --  153  --   --    FEB  --   --   --   --  258  --  244  --   --    IRONSAT  --   --   --   --  27  --  63*  --   --    DAWSON  --   --   --   --  430*  --  717*  --   --    MCV 91   < > 96   < >  --    < > 93   < > 96   B12  --   --  2,476*  --   --   --   --   --  824    < > = values in this interval not displayed.       Markers  No lab results found.    Invalid input(s): FETOPROTEIN, SERUM, AFP    Autoimmune Testing   No lab results found.    Invalid input(s): ANCAB, PANCA, CANCA    Arterial Blood Gas Testing    Recent Labs   Lab Test 03/22/23  1550 03/20/23  0652 01/15/23  1711   PH  --  7.42  --    O2PER 20  --  45.0        Thyroid Studies     Recent Labs   Lab Test 07/06/23  0547 05/08/23  1532 04/18/22  0851   TSH 1.55 1.57 2.34       Urine Studies     Recent Labs   Lab Test 07/04/23  2015 06/12/23  1514 05/04/23  1535 04/15/23  0918 04/03/23  1530   URINEPH 6.0 6.0 6.0 6.5 6.5   NITRITE Negative Negative Negative Negative Negative   LEUKEST Negative Negative Large* Moderate* Negative   WBCU 11* 7* 71* * 1       Medication levels    Recent Labs   Lab Test 08/07/23  1441 07/10/23  1452 07/06/23  0547   VANCOMYCIN  --   --  16.7   TACROL 7.2   < > 10.3   MPACID 2.30   < >  --    MPAG 25.8*   < >  --     < > = values in this interval not displayed.       CSF testing   No lab results found.    Invalid input(s): CADAM, EVPCR, ENTPCR, ENTEROVIRUS    Microbiology:  Fungal testing  Recent Labs   Lab Test 12/03/20  1647 10/08/20  1300   AM3 <0.10  --    HIFUNG  --  <1:8   COFUNG  --  <1:2   FUNBL  --  0.3       Beta D Glucan levels (Fungitell assay)    No results found for: FGTL, FGTLI     Last Culture results   Culture   Date Value Ref Range Status   07/04/2023 10,000-50,000 CFU/mL Enterococcus faecalis (A)  Final   07/04/2023 <10,000 CFU/mL Urogenital cooper  Final    07/04/2023 No Growth  Final   07/04/2023 No Growth  Final   05/04/2023 No Growth  Final   05/04/2023 No Growth  Final   05/04/2023 >100,000 CFU/mL Escherichia coli (A)  Final   04/15/2023 50,000-100,000 CFU/mL Escherichia coli (A)  Final   04/15/2023 10,000-50,000 CFU/mL Enterococcus faecalis (A)  Final   03/20/2023 No Growth  Final   03/20/2023 No Growth  Final   03/19/2023 50,000-100,000 CFU/mL Enterococcus faecalis (A)  Final   03/19/2023 <10,000 CFU/mL Urogenital cooper  Final   01/23/2023 50,000-100,000 CFU/mL Enterococcus faecalis (A)  Final     Comment:     Susceptibilities done on previous cultures   01/19/2023 >100,000 CFU/mL Enterococcus faecalis (A)  Final     Culture Micro   Date Value Ref Range Status   12/03/2020 No growth  Final   08/14/2020 >100,000 colonies/mL  Escherichia coli   (A)  Final         Last checks of Clostridioides difficile testing  Recent Labs   Lab Test 07/05/23  1318 05/04/23 2010 03/20/23  0853   CDBPCT Negative Negative Negative       No components found for: AFBSTN    Syphilis Testing  Invalid input(s): [id number]    Tick Testing  No lab results found.    Invalid input(s): APHAGM    ASO Testing  Invalid input(s): [id number]    Quantiferon testing   Recent Labs   Lab Test 08/07/23  1441 07/31/23  1512 09/15/20  1347 08/18/20  1307   TBRST  --   --   --  Negative   LYMPH 27 33   < > 23.9    < > = values in this interval not displayed.       Infection Studies to assess Diarrhea  Recent Labs   Lab Test 07/05/23  1318 05/04/23 2010 03/22/23  2257 03/20/23  0853 12/09/20  2000 10/08/20  1339 10/08/20  1338   EPSTX1 Not Detected Not Detected  --  Not Detected   < > Not Detected  --    EPSTX2 Not Detected Not Detected  --  Not Detected   < > Not Detected  --    ADENOVIRUSAG  --   --   --   --   --   --  Negative   MSPORT  --   --   --   --   --   --  No Microsporidia found  Chromotrope stain reveals no Microsporidia spores in fecal specimen. Consider other causes   for symptoms.  Divya  WALTER Lugo, Medical Director  10/9/20     CRYSPT  --   --   --   --   --  No oocysts of Cryptosporidium species, Cyclospora cayetanensis, or Cystoisospora   (Isospora) nancy found    A modified acid fast stain reveals no oocysts of Cryptosporidium, Cyclospora, or   Cystoisospora (Isospora). Consider other causes for symptoms  Divya Lugo M.D., Medical Director  10/9/20    --    CRYPTR  --   --   --   --   --  Negative  --    POPRT  --   --  Negative  --    < > Routine parasitology exam negative  Cryptosporidium, Cyclospora, and Microsporidia are not readily detected by this method. A   single negative specimen does not rule out parasitic infection.    --    EPCAMP Not Detected Not Detected  --  Not Detected   < > Not Detected  --    EPSALM Not Detected Not Detected  --  Not Detected   < > Not Detected  --    EPSHGL Not Detected Not Detected  --  Not Detected   < > Not Detected  --    EPVIB Not Detected Not Detected  --  Not Detected   < > Not Detected  --    EPROTA Not Detected Not Detected  --  Not Detected   < > Not Detected  --    EPNORO Not Detected Not Detected  --  Not Detected   < > Not Detected  --    EPYER Not Detected Not Detected  --  Not Detected   < > Not Detected  --     < > = values in this interval not displayed.       Virology:  Coronavirus-19 testing    Recent Labs   Lab Test 07/04/23  1952 03/19/23  1919 01/15/23  0538 03/21/22  1911 10/30/21  0816 09/08/21  1400 08/07/21  1057 05/26/21  1134 12/10/20  0959 12/03/20  1647   CD19  --   --   --   --   --   --   --   --   --  17   ACD19  --   --   --   --   --   --   --   --   --  532   FOTTY18VNO Negative Negative Negative Negative  --   --    < >  --  Not Detected  --    [id number]  --   --   --   --   --   --   --   --  Nasopharyngeal  --    COVIDPCREXT  --   --   --   --  Not Detected Not Detected  --  Not Detected  --   --     < > = values in this interval not displayed.       Respiratory virus (non-coronavirus-19) testing    No  lab results found.    CMV viral loads    CMV DNA IU/mL   Date Value Ref Range Status   07/17/2023 Not Detected Not Detected IU/mL Final   07/05/2023 Not Detected Not Detected IU/mL Final   06/15/2023 Not Detected Not Detected IU/mL Final   05/25/2023 Not Detected Not Detected IU/mL Final   05/04/2023 Not Detected Not Detected IU/mL Final   03/20/2023 Not Detected Not Detected IU/mL Final   03/06/2023 Not Detected Not Detected IU/mL Final   01/15/2023 Not Detected Not Detected IU/mL Final       CMV resistance testing  No lab results found.  No results found for: CMVCID, CMVFOS, CMVGAN    No results found for: H6RES    EBV DNA Copies/mL   Date Value Ref Range Status   07/04/2023 <500 (A) Not Detected copies/mL Final     Comment:     EBV DNA Detected below the reportable range of 500 copies/mL   05/04/2023 Not Detected Not Detected copies/mL Final   03/20/2023 Not Detected Not Detected copies/mL Final       BK viral loads   Recent Labs   Lab Test 07/24/23  1509 07/05/23  1203 06/26/23  1507 05/25/23  1552 05/18/23  1535 05/04/23  2159 04/12/23  1112 03/29/23  1535 02/20/23  1605   BKRES Not Detected Not Detected Not Detected Not Detected Not Detected Not Detected Not Detected Not Detected Not Detected       Parvovirus Testing  No lab results found.    Invalid input(s): PRVRES    Adenovirus Testing  No lab results found.    Invalid input(s): ADENAB, ADENOVIRUS, ADQT    Hepatitis B Testing     Recent Labs   Lab Test 01/15/23  0540 08/18/20  1307   AUSAB 134.22 212.08*   HBCAB Nonreactive Nonreactive   HEPBANG Nonreactive Nonreactive     Was the last Hepatitis B E antigen positive?   No results found for: HBEAGN     Hepatitis C Antibody   Date Value Ref Range Status   01/15/2023 Nonreactive Nonreactive Final   08/18/2020 Nonreactive NR^Nonreactive Final     Comment:     Assay performance characteristics have not been established for newborns,   infants, and children         CMV Antibody IgG   Date Value Ref Range Status    01/15/2023 No detectable antibody. No detectable antibody.  Final   08/18/2020 <0.2 0.0 - 0.8 AI Final     Comment:     Negative  Antibody index (AI) values reflect qualitative changes in antibody   concentration that cannot be directly associated with clinical condition or   disease state.       Varicella Zoster Virus Antibody IgG   Date Value Ref Range Status   08/18/2020 4.2 (H) 0.0 - 0.8 AI Final     Comment:     Positive, suggests prev. exposure and probable immunity  Antibody index (AI) values reflect qualitative changes in antibody   concentration that cannot be directly associated with clinical condition or   disease state.       EBV Capsid Antibody IgG   Date Value Ref Range Status   01/15/2023 Positive (A) No detectable antibody. Final     Comment:     Suggests recent or past exposure.   08/18/2020 >8.0 (H) 0.0 - 0.8 AI Final     Comment:     Positive, suggests recent or past exposure  Antibody index (AI) values reflect qualitative changes in antibody   concentration that cannot be directly associated with clinical condition or   disease state.       EBV Capsid Antibody IgM   Date Value Ref Range Status   01/15/2023 No detectable antibody. No detectable antibody. Final       No components found for: [id number]    Last Pathology Report   Case Report   Date Value Ref Range Status   03/23/2023   Final    Surgical Pathology Report                         Case: [id number]                                  Authorizing Provider:  Ana Cardenas         Collected:           03/23/2023 09:58 AM                                 MD Sophia                                                                Ordering Location:     [facility]          Received:            03/23/2023 10:25 AM                                 Endoscopy                                                                    Pathologist:           Jesusita Calvert MD                                                          Specimens:   A) -  Large Intestine, Colon, right colon                                                            B) - Large Intestine, Colon, left colon                                                     Clinical Information   Date Value Ref Range Status   03/23/2023   Final    Diarrhea        Final Diagnosis   Date Value Ref Range Status   06/05/2023   Final    Peripheral blood, morphology:  - Moderate anemia, normocytic and normochromic.  - Moderate leukopenia with neutropenia.  - Platelets quantitatively within normal limits and without diagnostic morphologic abnormality.  - Negative for schistocytes or definitive morphologic features of hemolysis.  - Negative for overt features of myelodysplasia or circulating blasts.    See comment.    COMMENT:  Normocytic anemia is nonspecific and may be attributable to multiple overlapping etiologies, including chronic disease, renal and/or endocrine disease, blood loss, iron deficiency (in some patients), and/or bone marrow suppression (by underlying infection, various nutritional deficiencies, toxicities, alcohol use, or medications).  Neutropenia may be idiopathic or attributable to multiple overlapping etiologies, including medications, infection, alcohol use, autoimmune disorders, nutritional deficiencies, and/or endocrinopathies.  Neutropenia may also be normally seen in some ethnic backgrounds.  Clinical correlation is recommended.         Imaging:  Results for orders placed or performed during the hospital encounter of 07/04/23   XR Chest Port 1 View    Narrative    Exam: XR CHEST PORT 1 VIEW, 7/4/2023 8:15 PM    Indication: fever    Comparison: 1/15/2023    Findings:   The cardiomediastinal silhouette and pulmonary vasculature are within  normal limits. No pleural effusion or pneumothorax. No focal airspace  opacity.      Impression    Impression: No acute airspace disease.    GUS AVILA DO         SYSTEM ID:  [id number]    Renal Transplant with Doppler    Narrative     EXAMINATION: US RENAL TRANSPLANT,  7/4/2023 10:07 PM     COMPARISON: Renal transplant ultrasounds 5/4/2023    HISTORY: fever, sepsis, dysuria    TECHNIQUE:  Grey-scale, color Doppler and spectral flow analysis.    FINDINGS:  The transplant kidney is located right lower quadrant, and measures  14.1 cm. Parenchyma is of normal thickness and echogenicity. No focal  lesions. Prominent proximal ureter without significant hydronephrosis.  No perinephric fluid collection.    Renal artery flow:   220 cm/sec peak systolic at hilum (previously 121 cm/s)  490 cm/sec peak systolic at anastomosis. (Previously 449 cm/s)  385 cm/sec peak systolic at mid renal artery (previously 141 cm/s)  Arcuate artery resistive indices (upper to lower): 0.67, 0.60, 0.58    Renal Vein Flow:  42 cm/sec at hilum.   67 cm/sec at anastomosis.  42 cm/s at mid renal vein    Iliac artery flow:  182 cm/sec peak systolic above anastomosis.  151 cm/sec peak systolic below anastomosis.    Iliac vein flow:  Patent above and below the anastomosis.      Impression    IMPRESSION:   1. Patent  Doppler evaluation of the renal transplant vasculature.  2. Elevated velocity at the renal artery anastomosis (490 cm/s) and  mid renal artery (385 cm/s), may represent stenosis.  3. Normal grayscale evaluation of the right lower quadrant transplant  kidney.    I have personally reviewed the examination and initial interpretation  and I agree with the findings.    DAR BISWAS MD         SYSTEM ID:  [id number]   US Lower Extremity Venous Duplex Bilateral    Narrative    EXAMINATION: DOPPLER VENOUS ULTRASOUND OF BILATERAL LOWER EXTREMITIES,  7/6/2023 10:18 AM     COMPARISON: None.    HISTORY: Fevers    TECHNIQUE:  Gray-scale evaluation with compression, spectral flow and  color Doppler assessment of the deep venous system of both legs from  groin to knee, and then at the ankles.    FINDINGS:  In both lower extremities, the common femoral, femoral, popliteal,  peroneal, and  posterior tibial veins demonstrate normal  compressibility and blood flow.      Impression    IMPRESSION:  No evidence of deep venous thrombosis in either lower extremity.       I have personally reviewed the examination and initial interpretation  and I agree with the findings.    GUS AVILA DO         SYSTEM ID:  [id number]   CT Chest/Abdomen/Pelvis w Contrast    Narrative    EXAMINATION: CT CHEST/ABDOMEN/PELVIS W CONTRAST, 7/6/2023 12:07 PM    INDICATION: Hx kidney transplant 1/2023; immunosuppressed. Fevers of  unknown origing >103F. Evaluate for infection.    COMPARISON STUDY: CT abdomen and pelvis stone protocol 3/19/2023    TECHNIQUE: CT scan of the chest, abdomen and pelvis was performed on  multidetector CT scanner using volumetric acquisition technique and  images were reconstructed in multiple planes with variable thickness  and reviewed on dedicated workstations.     CONTRAST: iopamidol (ISOVUE-370) solution 90 mL.   Without oral  contrast.    CT scan radiation dose is optimized to minimum requisite dose using  automated dose modulation techniques.    FINDINGS:    Chest:   Mediastinum: Visualized thyroid gland is within normal limits. Cardiac  size is within normal limits. No thoracic lymphadenopathy.    Pleura: No pleural effusion or pneumothorax.     Lungs: No suspicious nodule or consolidation.    Abdomen and Pelvis:  Liver: No mass. No intrahepatic biliary ductal dilation.    Biliary System: Normal gallbladder. No extrahepatic biliary ductal  dilation.    Pancreas: No mass or pancreatic ductal dilation.    Adrenal glands: No mass or nodules    Spleen: Normal.    Kidneys: Atrophic native left kidney with a few too small to  characterize hypodensities. The native right kidney is absent Right  lower quadrant transplant kidney demonstrates nonspecific perinephric  stranding without significant change compared to 3/19/2023, although  comparison is limited due to stone protocol without contrast on  prior  CT. Patchy areas of hypoenhancement at the superior and inferior poles  of the transplant kidney. Expected postoperative changes with surgical  clips in the right lower quadrant. No suspicious mass, obstructing  calculus or hydronephrosis.    Gastrointestinal tract: Normal appendix. Normal caliber small bowel.    Mesentery/peritoneum/retroperitoneum: No mass. Trace fluid in the  pelvis.    Lymph nodes: Multiple sub centimeter para-aortic lymph nodes.    Vasculature: No aneurysm of the abdominal aorta.     Pelvis: Urinary bladder is normal.  Prostate is within normal limits.    Osseous structures: No aggressive or acute osseous lesion.      Soft tissues: Small fat containing ventral/umbilical hernia.   Unchanged soft tissue thickening/scar over the right lower quadrant  and at the midline lower abdomen.      Impression    IMPRESSION: Postoperative changes of kidney transplant. There is mild  nonspecific stranding around transplant kidney with a small amount of  fluid in the pelvis and areas of patchy hypoenhancement within the  transplant kidney. These likely represents chronic/benign findings,  however in the appropriate clinical context findings may represent  mild infection.    I have personally reviewed the examination and initial interpretation  and I agree with the findings.    DIEGO PRICE MD         SYSTEM ID:  [id number]   Echo Complete     Value    LVEF  55-60%    Narrative    [id number]  [id number]  [id number]  [id number]^VITOR^LINA^RODRIGUE     [facility],[place]  Echocardiography Laboratory  [address] [place] [address]     Name: CAROLYN HIGHTOWER  MRN: [id number]  : 1985  Study Date: 2023 02:30 PM  Age: 38 yrs  Gender: Male  Patient Location: [facility]  Reason For Study: Fever  Ordering Physician: LINA ADLER  Performed By: Ezekiel Manzano     BSA: 1.9 m2  Height: 72 in  Weight: 150 lb  HR: 83  BP: 115/77  mmHg  ______________________________________________________________________________  Procedure  Complete Portable Echo Adult. Optison (NDC #1728-8062-38) given intravenously.  Patient was given 6 ml mixture of 3 ml Optison and 6 ml saline. 3 ml wasted.  ______________________________________________________________________________  Interpretation Summary  No vegetation identified, however this does not exclude endocarditis. Left  ventricular size, wall motion and function are normal. The ejection fraction  is 55-60%.     Right ventricular function, chamber size, wall motion, and thickness are  normal.     No vegetation identified, however this does not exclude endocarditis.     No pericardial effusion is present.     No significant valvular abnormalities present.     The inferior vena cava is normal.     Comapred to 9/23/2022 there are no signficant differences.  ______________________________________________________________________________  Left Ventricle  Left ventricular size, wall motion and function are normal. The ejection  fraction is 55-60%. Left ventricular diastolic function is normal.     Right Ventricle  Right ventricular function, chamber size, wall motion, and thickness are  normal.     Atria  The right atria appears normal. The left atrium appears normal.     Mitral Valve  The mitral valve is normal.     Aortic Valve  Aortic valve is normal in structure and function. The aortic valve is  tricuspid.     Tricuspid Valve  The tricuspid valve is normal. The peak velocity of the tricuspid regurgitant  jet is not obtainable. Pulmonary artery systolic pressure cannot be assessed.     Pulmonic Valve  The pulmonic valve is normal.     Vessels  The inferior vena cava is normal. Sinuses of Valsalva 2.9 cm. Ascending aorta  3.0 cm. IVC diameter <2.1 cm collapsing >50% with sniff suggests a normal RA  pressure of 3 mmHg.     Pericardium  No pericardial effusion is present.     Miscellaneous  No significant  valvular abnormalities present.     Compared to Previous Study  Comapred to 2022 there are no signficant differences.  ______________________________________________________________________________  MMode/2D Measurements & Calculations  IVSd: 0.84 cm  LVIDd: 4.9 cm  LVIDs: 3.0 cm  LVPWd: 0.94 cm  FS: 40.1 %  LV mass(C)d: 152.0 grams  LV mass(C)dI: 80.6 grams/m2  Ao root diam: 2.9 cm  asc Aorta Diam: 3.0 cm  LVOT diam: 2.0 cm  LVOT area: 3.2 cm2  LA Volume (BP): 48.3 ml     LA Volume Index (BP): 25.6 ml/m2  RWT: 0.38  TAPSE: 2.5 cm     Doppler Measurements & Calculations  MV E max ash: 101.0 cm/sec  MV A max ash: 78.1 cm/sec  MV E/A: 1.3  MV dec slope: 486.9 cm/sec2  MV dec time: 0.21 sec  Ao V2 max: 177.1 cm/sec  Ao max P.5 mmHg  Ao V2 mean: 128.7 cm/sec  Ao mean P.6 mmHg  Ao V2 VTI: 34.9 cm  SHANNAN(I,D): 2.7 cm2  SHANNAN(V,D): 2.6 cm2  LV V1 max P.5 mmHg  LV V1 max: 145.7 cm/sec  LV V1 VTI: 29.4 cm  SV(LVOT): 92.6 ml  SI(LVOT): 49.1 ml/m2  PA V2 max: 123.2 cm/sec  PA max P.1 mmHg  PA acc time: 0.16 sec  AV Ash Ratio (DI): 0.82  SHANNAN Index (cm2/m2): 1.4  E/E' av.6  Lateral E/e': 5.9     Medial E/e': 13.3  RV S Ash: 17.8 cm/sec     ______________________________________________________________________________  Report approved by: Aleena Shelton 2023 03:17 PM           CAIN TAYLOR MD

## 2023-08-17 ENCOUNTER — TELEPHONE (OUTPATIENT)
Dept: INFECTIOUS DISEASES | Facility: CLINIC | Age: 38
End: 2023-08-17
Payer: MEDICARE

## 2023-08-17 NOTE — TELEPHONE ENCOUNTER
EP called 8/17 to sched a 4 week follow up with Dr. Willis per checkout notes from 8/16. The appt is set for 9/13 via video.

## 2023-08-18 PROBLEM — R65.10 SIRS (SYSTEMIC INFLAMMATORY RESPONSE SYNDROME) (H): Status: RESOLVED | Noted: 2023-07-04 | Resolved: 2023-08-18

## 2023-08-18 PROBLEM — D72.819 LEUKOPENIA: Status: RESOLVED | Noted: 2023-05-07 | Resolved: 2023-08-18

## 2023-08-18 PROBLEM — N17.9 AKI (ACUTE KIDNEY INJURY) (H): Status: RESOLVED | Noted: 2023-03-21 | Resolved: 2023-08-18

## 2023-08-18 PROBLEM — E87.20 METABOLIC ACIDOSIS: Status: ACTIVE | Noted: 2023-05-07

## 2023-08-18 PROBLEM — R50.9 HIGH FEVER: Status: RESOLVED | Noted: 2023-07-04 | Resolved: 2023-08-18

## 2023-08-18 PROBLEM — Z94.0 STATUS POST KIDNEY TRANSPLANT: Status: RESOLVED | Noted: 2023-07-04 | Resolved: 2023-08-18

## 2023-08-18 PROBLEM — N39.0 FREQUENT UTI: Status: RESOLVED | Noted: 2023-07-08 | Resolved: 2023-08-18

## 2023-08-18 RX ORDER — SODIUM BICARBONATE 650 MG/1
1300 TABLET ORAL 2 TIMES DAILY
Qty: 180 TABLET | Refills: 11 | COMMUNITY
Start: 2023-07-20 | End: 2023-08-21

## 2023-08-19 ENCOUNTER — MYC MEDICAL ADVICE (OUTPATIENT)
Dept: TRANSPLANT | Facility: CLINIC | Age: 38
End: 2023-08-19
Payer: MEDICARE

## 2023-08-19 DIAGNOSIS — Z94.0 KIDNEY REPLACED BY TRANSPLANT: Primary | ICD-10-CM

## 2023-08-19 NOTE — PROGRESS NOTES
TRANSPLANT NEPHROLOGY EARLY POST TRANSPLANT VISIT    Assessment & Plan   # DDKT: Increased creatinine with recent pyelonephritis episode.  Will follow.   - Baseline Creatinine: ~ 1.3-1.5   - Proteinuria: Normal (<0.2 grams)   - Date DSA Last Checked: May/2023      Latest DSA: No cPRA: 49%   - BK Viremia: No   - Kidney Tx Biopsy: No   - Transplant Ureteral Stent: Removed    # Immunosuppression: Tacrolimus immediate release (goal 8-10) and Mycophenolic acid (dose 900 mg every 12 hours)    - Induction with Recent Transplant:  Intermediate Intensity   - Continue with intensive monitoring of immunosuppression for efficacy and toxicity.   - Changes: Yes - Now at 6 months post transplant, will lower tacrolimus to 6-8 goal.     # Infection Prophylaxis:   - PJP: Sulfa/TMP (Bactrim)  - CMV: Valganciclovir (Valcyte); Patient is CMV IgG Ab discordant (D+/R-) and can now stop Valcyte being 6 months post transplant.  Will check CMV PCR monthly until 12 months post transplant.     # Blood Pressure: Controlled;  Goal BP: < 130/80   - Volume status: Euvolemic   - Changes: Not at this time    # Anemia in Chronic Renal Disease: Hgb: Stable      SARA: No   - Iron studies: Low iron saturation, but high ferritin    # Mineral Bone Disorder:   - Secondary renal hyperparathyroidism; PTH level: Minimally elevated ( pg/ml)        On treatment: None  - Vitamin D; level: Low        On supplement: Yes  - Calcium; level: Normal        On supplement: No    # Electrolytes:   - Potassium; level: Normal        On supplement: No  - Magnesium; level: Stable low        On supplement: Yes  - Bicarbonate; level: Normal        On supplement: Yes; Will decrease bicarbonate to 1300 mg bid.    # H/o Cardiomyopathy: Normal LVEF at 55-60% with last cardiac echo 9/2022.    # Recurrent UTI/Pyelonephritis: Patient recurrent infections, now just completed a course of antibiotics for pyelonephritis.  He was started on nitrofurantoin for prophylaxis.  Patient  has a follow up with Transplant ID.    # Urethral Stricture: Patient is s/p buccal urethroplasty and diverticulum excision 12/2020. Now s/p cystoscopy 7/2023 due to recurrent UTIs and this was unremarkable.  Follows with Urology.     # EBV Viremia: Minimal EBV PCR at ~ 1200, likely of no clinical significance.    # Diarrhea: Worsened with antibiotics, but now improved, near baseline.  He is taking fiber.    # H/o Polysubstance Abuse: Patient with h/o methamphetamine and alcohol abuse.  Now sober.    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    Return visit: Return in about 3 months (around 10/20/2023).    Edwin Green MD    Chief Complaint   Mr. Fowler is a 38 year old here for kidney transplant and immunosuppression management.     History of Present Illness    Mr. Fowler reports feeling good overall with some medical complaints.  He was recently hospitalized for allograft pyelonephritis and now just completed his antibiotic course.  Symptoms are resolved.  He underwent a cystoscopy because of the frequent UTIs and that was unremarkable.  He has an appointment scheduled with Transplant ID.    His energy level is good and remains normal.  He is active and does get some exercise.  Denies any chest pain or shortness of breath with exertion.  No  leg swelling.    Appetite is good and weight has been stable  No nausea or vomiting.  Some loose stools with the antibiotics, but now more formed after he completed the treatment.  No fever, sweats or chills.  Slight night sweats at times.    Home BP:  120-130/70-80s    Problem List   Patient Active Problem List   Diagnosis    HTN, kidney transplant related    Urethral stricture    Methamphetamine abuse, episodic (H)    Urethral diverticulum    Allergic rhinitis, unspecified seasonality, unspecified trigger    Stage 3a chronic kidney disease (H)    Kidney replaced by transplant    Immunosuppressed status (H)    Aftercare following organ transplant    Need for pneumocystis prophylaxis    Anemia in chronic renal disease    Secondary renal hyperparathyroidism (H)    Vitamin D deficiency    Hypomagnesemia    Diarrhea    Urinary tract infection without hematuria, site unspecified    Metabolic acidosis    EBV (Aaron-Barr virus) viremia    Pyelonephritis of transplanted kidney       Allergies   No Known Allergies    Medications   Current Outpatient Medications   Medication Sig    magnesium oxide (MAG-OX) 400 MG tablet Take 2 tablets (800 mg) by mouth 2 times daily    mycophenolic acid (GENERIC EQUIVALENT) 180 MG EC tablet Take 3 tablets (540 mg) by mouth 2 times daily    psyllium (METAMUCIL/KONSYL) 58.6 % powder Take 18 g (1 Tablespoonful) by mouth 2 times daily as needed (Diarrhea)    sodium bicarbonate 650 MG tablet Take 2 tablets (1,300 mg) by mouth 2 times daily    sulfamethoxazole-trimethoprim (BACTRIM) 400-80 MG tablet Take 1 tablet by mouth daily    tacrolimus (GENERIC EQUIVALENT) 0.5 MG capsule Take 1 capsule (0.5 mg) by mouth 2 times daily Total dose = 3.5 mg twice a day    tacrolimus (GENERIC EQUIVALENT) 1 MG capsule Take 3 capsules (3 mg) by mouth 2 times daily Total dose = 3.5 mg twice per day    Vitamin D, Cholecalciferol, 50 MCG (2000 UT) CAPS Take 2,000 Units by mouth daily    nitroFURantoin  "macrocrystal-monohydrate (MACROBID) 100 MG capsule Take 1 capsule (100 mg) by mouth daily     No current facility-administered medications for this visit.     Medications Discontinued During This Encounter   Medication Reason    valGANciclovir (VALCYTE) 450 MG tablet     sodium bicarbonate 650 MG tablet        Physical Exam   Vital Signs: /74 (BP Location: Right arm, Patient Position: Sitting, Cuff Size: Adult Regular)   Pulse 63   Temp 98.6  F (37  C) (Oral)   Resp 14   Ht 1.829 m (6' 0.01\")   Wt 70.4 kg (155 lb 1.6 oz)   SpO2 99%   BMI 21.03 kg/m      GENERAL APPEARANCE: alert and no distress  HENT: mouth without ulcers or lesions  RESP: lungs clear to auscultation - no rales, rhonchi or wheezes  CV: regular rhythm, normal rate, no rub, no murmur  EDEMA: no LE edema bilaterally  ABDOMEN: soft, nondistended, nontender, bowel sounds normal  MS: extremities normal - no gross deformities noted, no evidence of inflammation in joints, no muscle tenderness  SKIN: no rash  TX KIDNEY: normal  DIALYSIS ACCESS: none    Data         Latest Ref Rng & Units 8/7/2023     2:41 PM 7/31/2023     3:12 PM 7/24/2023     3:19 PM   Renal   Sodium 136 - 145 mmol/L 138  137  139    K 3.4 - 5.3 mmol/L 4.3  4.4  4.4    Cl 98 - 107 mmol/L 106  103  104    Cl (external) 98 - 107 mmol/L 106  103  104    CO2 22 - 29 mmol/L 21  23  24    Urea Nitrogen 6.0 - 20.0 mg/dL 25.1  25.4  23.4    Creatinine 0.67 - 1.17 mg/dL 1.57  1.61  1.65    Glucose 70 - 99 mg/dL 79  89  68    Calcium 8.6 - 10.0 mg/dL 9.4  9.3  9.7          Latest Ref Rng & Units 7/10/2023     2:52 PM 7/8/2023     6:05 AM 7/7/2023     6:01 AM   Bone Health   Phosphorus 2.5 - 4.5 mg/dL 2.7  3.7  2.7          Latest Ref Rng & Units 8/7/2023     2:41 PM 7/31/2023     3:12 PM 7/24/2023     3:19 PM   Heme   WBC 4.0 - 11.0 10e3/uL 4.2  4.5  3.5    Hgb 13.3 - 17.7 g/dL 11.2  11.2  10.7    Plt 150 - 450 10e3/uL 218  201  254    ABSOLUTE NEUTROPHIL 1.6 - 8.3 10e3/uL  1.7   "   ABSOLUTE LYMPHOCYTES 0.8 - 5.3 10e3/uL  1.5     ABSOLUTE MONOCYTES 0.0 - 1.3 10e3/uL  0.6     ABSOLUTE EOSINOPHILS 0.0 - 0.7 10e3/uL  0.5           Latest Ref Rng & Units 7/24/2023     3:19 PM 7/4/2023     7:44 PM 6/5/2023     3:27 PM   Liver   AP 40 - 129 U/L 59  53  55    TBili <=1.2 mg/dL 0.3  0.4  0.3    Bilirubin Direct 0.00 - 0.30 mg/dL <0.20      ALT 0 - 70 U/L 20  14  22    AST 0 - 45 U/L 29  27  24    Tot Protein 6.4 - 8.3 g/dL 7.2  7.4  6.9    Albumin 3.5 - 5.2 g/dL 4.3  4.2  4.0          Latest Ref Rng & Units 7/24/2023     3:19 PM 3/19/2023     9:55 PM 1/15/2023     5:40 AM   Pancreas   A1C <5.7 % 5.5   5.2    Lipase (Roche) 13 - 60 U/L  20           Latest Ref Rng & Units 1/31/2023    10:49 AM 1/20/2023     7:46 AM   Iron studies   Iron 61 - 157 ug/dL 70  153    Iron Sat Index 15 - 46 % 27  63    Ferritin 31 - 409 ng/mL 430  717          Latest Ref Rng & Units 8/7/2023     2:41 PM 7/17/2023     3:12 PM 7/7/2023     7:09 AM   [facility] Txp Virology   CMV QUANT IU/ML Not Detected IU/mL  Not Detected     EBV DNA LOG OF COPIES  3.1   3.1        Recent Labs   Lab Test 07/24/23  1519 07/31/23  1512 08/07/23  1441   DOSTAC 7/24/2023 7/31/2023 8/7/2023   TACROL 6.7 6.8 7.2     Recent Labs   Lab Test 07/24/23  1519 07/31/23  1512 08/07/23  1441   DOSMPA 7/24/2023   3:50 AM 7/31/2023   3:30 AM 8/7/2023   3:10 AM   MPACID 2.67 4.61* 2.30   MPAG 31.5 32.6 25.8*

## 2023-08-21 DIAGNOSIS — E87.20 METABOLIC ACIDOSIS: Primary | ICD-10-CM

## 2023-08-21 RX ORDER — SODIUM BICARBONATE 650 MG/1
1300 TABLET ORAL 2 TIMES DAILY
Qty: 120 TABLET | Refills: 11 | Status: SHIPPED | OUTPATIENT
Start: 2023-08-21 | End: 2023-08-22

## 2023-08-22 ENCOUNTER — LAB (OUTPATIENT)
Dept: LAB | Facility: CLINIC | Age: 38
End: 2023-08-22
Payer: MEDICARE

## 2023-08-22 DIAGNOSIS — Z48.298 AFTERCARE FOLLOWING ORGAN TRANSPLANT: ICD-10-CM

## 2023-08-22 DIAGNOSIS — E87.20 METABOLIC ACIDOSIS: ICD-10-CM

## 2023-08-22 DIAGNOSIS — Z79.899 ENCOUNTER FOR LONG-TERM CURRENT USE OF MEDICATION: ICD-10-CM

## 2023-08-22 DIAGNOSIS — Z20.828 CONTACT WITH AND (SUSPECTED) EXPOSURE TO OTHER VIRAL COMMUNICABLE DISEASES: ICD-10-CM

## 2023-08-22 DIAGNOSIS — Z94.0 KIDNEY REPLACED BY TRANSPLANT: ICD-10-CM

## 2023-08-22 LAB
ANION GAP SERPL CALCULATED.3IONS-SCNC: 9 MMOL/L (ref 7–15)
BASOPHILS # BLD MANUAL: 0 10E3/UL (ref 0–0.2)
BASOPHILS NFR BLD MANUAL: 1 %
BUN SERPL-MCNC: 20.6 MG/DL (ref 6–20)
CALCIUM SERPL-MCNC: 9.1 MG/DL (ref 8.6–10)
CHLORIDE SERPL-SCNC: 105 MMOL/L (ref 98–107)
CREAT SERPL-MCNC: 1.5 MG/DL (ref 0.67–1.17)
DEPRECATED HCO3 PLAS-SCNC: 23 MMOL/L (ref 22–29)
EOSINOPHIL # BLD MANUAL: 0.5 10E3/UL (ref 0–0.7)
EOSINOPHIL NFR BLD MANUAL: 14 %
ERYTHROCYTE [DISTWIDTH] IN BLOOD BY AUTOMATED COUNT: 13.2 % (ref 10–15)
GFR SERPL CREATININE-BSD FRML MDRD: 60 ML/MIN/1.73M2
GLUCOSE SERPL-MCNC: 72 MG/DL (ref 70–99)
HCT VFR BLD AUTO: 34.5 % (ref 40–53)
HGB BLD-MCNC: 11.5 G/DL (ref 13.3–17.7)
LYMPHOCYTES # BLD MANUAL: 0.7 10E3/UL (ref 0.8–5.3)
LYMPHOCYTES NFR BLD MANUAL: 22 %
MAGNESIUM SERPL-MCNC: 1.5 MG/DL (ref 1.7–2.3)
MCH RBC QN AUTO: 30.1 PG (ref 26.5–33)
MCHC RBC AUTO-ENTMCNC: 33.3 G/DL (ref 31.5–36.5)
MCV RBC AUTO: 90 FL (ref 78–100)
METAMYELOCYTES # BLD MANUAL: 0 10E3/UL
METAMYELOCYTES NFR BLD MANUAL: 1 %
MONOCYTES # BLD MANUAL: 0.4 10E3/UL (ref 0–1.3)
MONOCYTES NFR BLD MANUAL: 11 %
NEUTROPHILS # BLD MANUAL: 1.7 10E3/UL (ref 1.6–8.3)
NEUTROPHILS NFR BLD MANUAL: 51 %
PHOSPHATE SERPL-MCNC: 2.6 MG/DL (ref 2.5–4.5)
PLAT MORPH BLD: ABNORMAL
PLATELET # BLD AUTO: 160 10E3/UL (ref 150–450)
POTASSIUM SERPL-SCNC: 4.2 MMOL/L (ref 3.4–5.3)
RBC # BLD AUTO: 3.82 10E6/UL (ref 4.4–5.9)
RBC MORPH BLD: ABNORMAL
SODIUM SERPL-SCNC: 137 MMOL/L (ref 136–145)
TACROLIMUS BLD-MCNC: 6.7 UG/L (ref 5–15)
TME LAST DOSE: NORMAL H
TME LAST DOSE: NORMAL H
WBC # BLD AUTO: 3.3 10E3/UL (ref 4–11)

## 2023-08-22 PROCEDURE — 85007 BL SMEAR W/DIFF WBC COUNT: CPT

## 2023-08-22 PROCEDURE — 87799 DETECT AGENT NOS DNA QUANT: CPT | Mod: 59

## 2023-08-22 PROCEDURE — 85027 COMPLETE CBC AUTOMATED: CPT

## 2023-08-22 PROCEDURE — 36415 COLL VENOUS BLD VENIPUNCTURE: CPT

## 2023-08-22 PROCEDURE — 84100 ASSAY OF PHOSPHORUS: CPT

## 2023-08-22 PROCEDURE — 87799 DETECT AGENT NOS DNA QUANT: CPT

## 2023-08-22 PROCEDURE — 80048 BASIC METABOLIC PNL TOTAL CA: CPT

## 2023-08-22 PROCEDURE — 80180 DRUG SCRN QUAN MYCOPHENOLATE: CPT

## 2023-08-22 PROCEDURE — 80061 LIPID PANEL: CPT | Performed by: FAMILY MEDICINE

## 2023-08-22 PROCEDURE — 80197 ASSAY OF TACROLIMUS: CPT

## 2023-08-22 PROCEDURE — 83735 ASSAY OF MAGNESIUM: CPT

## 2023-08-23 ENCOUNTER — TELEPHONE (OUTPATIENT)
Dept: TRANSPLANT | Facility: CLINIC | Age: 38
End: 2023-08-23

## 2023-08-23 ENCOUNTER — MYC MEDICAL ADVICE (OUTPATIENT)
Dept: TRANSPLANT | Facility: CLINIC | Age: 38
End: 2023-08-23
Payer: MEDICARE

## 2023-08-23 ENCOUNTER — OFFICE VISIT (OUTPATIENT)
Dept: FAMILY MEDICINE | Facility: CLINIC | Age: 38
End: 2023-08-23
Payer: MEDICARE

## 2023-08-23 VITALS
HEART RATE: 70 BPM | RESPIRATION RATE: 14 BRPM | BODY MASS INDEX: 21.28 KG/M2 | OXYGEN SATURATION: 98 % | HEIGHT: 71 IN | SYSTOLIC BLOOD PRESSURE: 122 MMHG | WEIGHT: 152 LBS | TEMPERATURE: 98.7 F | DIASTOLIC BLOOD PRESSURE: 70 MMHG

## 2023-08-23 DIAGNOSIS — B25.9 CMV (CYTOMEGALOVIRUS INFECTION) (H): ICD-10-CM

## 2023-08-23 DIAGNOSIS — Z00.00 ENCOUNTER FOR MEDICARE ANNUAL WELLNESS EXAM: Primary | ICD-10-CM

## 2023-08-23 DIAGNOSIS — Z13.220 SCREENING FOR HYPERLIPIDEMIA: ICD-10-CM

## 2023-08-23 DIAGNOSIS — Z94.0 KIDNEY REPLACED BY TRANSPLANT: Primary | ICD-10-CM

## 2023-08-23 LAB
BKV DNA # SPEC NAA+PROBE: NOT DETECTED COPIES/ML
CHOLEST SERPL-MCNC: 100 MG/DL
CMV DNA SPEC NAA+PROBE-ACNC: 2340 IU/ML
CMV DNA SPEC NAA+PROBE-LOG#: 3.4 {LOG_COPIES}/ML
EBV DNA # SPEC NAA+PROBE: <500 COPIES/ML
EBV DNA SPEC NAA+PROBE-LOG#: <2.7 {LOG_COPIES}/ML
HDLC SERPL-MCNC: 32 MG/DL
LDLC SERPL CALC-MCNC: 45 MG/DL
MYCOPHENOLATE SERPL LC/MS/MS-MCNC: 2.34 MG/L (ref 1–3.5)
MYCOPHENOLATE-G SERPL LC/MS/MS-MCNC: 24.6 MG/L (ref 30–95)
NONHDLC SERPL-MCNC: 68 MG/DL
TME LAST DOSE: ABNORMAL H
TME LAST DOSE: ABNORMAL H
TRIGL SERPL-MCNC: 114 MG/DL

## 2023-08-23 PROCEDURE — 99395 PREV VISIT EST AGE 18-39: CPT | Performed by: FAMILY MEDICINE

## 2023-08-23 RX ORDER — VALGANCICLOVIR 450 MG/1
900 TABLET, FILM COATED ORAL 2 TIMES DAILY
Qty: 120 TABLET | Refills: 3 | Status: SHIPPED | OUTPATIENT
Start: 2023-08-23 | End: 2023-10-11

## 2023-08-23 RX ORDER — MYCOPHENOLIC ACID 180 MG/1
360 TABLET, DELAYED RELEASE ORAL 2 TIMES DAILY
Qty: 120 TABLET | Refills: 11 | Status: SHIPPED | OUTPATIENT
Start: 2023-08-23 | End: 2023-10-11

## 2023-08-23 SDOH — ECONOMIC STABILITY: FOOD INSECURITY: WITHIN THE PAST 12 MONTHS, THE FOOD YOU BOUGHT JUST DIDN'T LAST AND YOU DIDN'T HAVE MONEY TO GET MORE.: NEVER TRUE

## 2023-08-23 SDOH — HEALTH STABILITY: PHYSICAL HEALTH: ON AVERAGE, HOW MANY DAYS PER WEEK DO YOU ENGAGE IN MODERATE TO STRENUOUS EXERCISE (LIKE A BRISK WALK)?: 2 DAYS

## 2023-08-23 SDOH — HEALTH STABILITY: PHYSICAL HEALTH: ON AVERAGE, HOW MANY MINUTES DO YOU ENGAGE IN EXERCISE AT THIS LEVEL?: 40 MIN

## 2023-08-23 SDOH — ECONOMIC STABILITY: INCOME INSECURITY: IN THE LAST 12 MONTHS, WAS THERE A TIME WHEN YOU WERE NOT ABLE TO PAY THE MORTGAGE OR RENT ON TIME?: NO

## 2023-08-23 SDOH — ECONOMIC STABILITY: FOOD INSECURITY: WITHIN THE PAST 12 MONTHS, YOU WORRIED THAT YOUR FOOD WOULD RUN OUT BEFORE YOU GOT MONEY TO BUY MORE.: OFTEN TRUE

## 2023-08-23 SDOH — ECONOMIC STABILITY: INCOME INSECURITY: HOW HARD IS IT FOR YOU TO PAY FOR THE VERY BASICS LIKE FOOD, HOUSING, MEDICAL CARE, AND HEATING?: HARD

## 2023-08-23 SDOH — ECONOMIC STABILITY: TRANSPORTATION INSECURITY
IN THE PAST 12 MONTHS, HAS LACK OF TRANSPORTATION KEPT YOU FROM MEETINGS, WORK, OR FROM GETTING THINGS NEEDED FOR DAILY LIVING?: NO

## 2023-08-23 SDOH — ECONOMIC STABILITY: TRANSPORTATION INSECURITY
IN THE PAST 12 MONTHS, HAS THE LACK OF TRANSPORTATION KEPT YOU FROM MEDICAL APPOINTMENTS OR FROM GETTING MEDICATIONS?: NO

## 2023-08-23 ASSESSMENT — LIFESTYLE VARIABLES
HOW OFTEN DO YOU HAVE SIX OR MORE DRINKS ON ONE OCCASION: NEVER
AUDIT-C TOTAL SCORE: 0
HOW MANY STANDARD DRINKS CONTAINING ALCOHOL DO YOU HAVE ON A TYPICAL DAY: PATIENT DOES NOT DRINK
HOW OFTEN DO YOU HAVE A DRINK CONTAINING ALCOHOL: NEVER
SKIP TO QUESTIONS 9-10: 1

## 2023-08-23 ASSESSMENT — ENCOUNTER SYMPTOMS
MYALGIAS: 0
CONSTIPATION: 0
SHORTNESS OF BREATH: 0
HEADACHES: 0
PALPITATIONS: 0
HEMATOCHEZIA: 0
JOINT SWELLING: 0
PARESTHESIAS: 0
HEMATURIA: 0
DIARRHEA: 1
ARTHRALGIAS: 0
HEARTBURN: 0
DIZZINESS: 0
FREQUENCY: 0
EYE PAIN: 0
SORE THROAT: 0
NERVOUS/ANXIOUS: 1
NAUSEA: 0
COUGH: 0
WEAKNESS: 0
CHILLS: 0
ABDOMINAL PAIN: 0
FEVER: 0
DYSURIA: 0

## 2023-08-23 ASSESSMENT — SOCIAL DETERMINANTS OF HEALTH (SDOH)
HOW OFTEN DO YOU GET TOGETHER WITH FRIENDS OR RELATIVES?: NEVER
HOW OFTEN DO YOU ATTEND CHURCH OR RELIGIOUS SERVICES?: PATIENT DECLINED
IN A TYPICAL WEEK, HOW MANY TIMES DO YOU TALK ON THE PHONE WITH FAMILY, FRIENDS, OR NEIGHBORS?: NEVER
ARE YOU MARRIED, WIDOWED, DIVORCED, SEPARATED, NEVER MARRIED, OR LIVING WITH A PARTNER?: LIVING WITH PARTNER
DO YOU BELONG TO ANY CLUBS OR ORGANIZATIONS SUCH AS CHURCH GROUPS UNIONS, FRATERNAL OR ATHLETIC GROUPS, OR SCHOOL GROUPS?: NO

## 2023-08-23 ASSESSMENT — ACTIVITIES OF DAILY LIVING (ADL): CURRENT_FUNCTION: NO ASSISTANCE NEEDED

## 2023-08-23 NOTE — TELEPHONE ENCOUNTER
Message  Received: Today  Franko Cedra MD Huepfel, Veronica SWAN, RN  Please start valcyte 900mg bid, decrease MPA to 360mg bid, send total IgG level. CMV PCR weekly.          CMV 2340 PCR       Called Chip reviewed the above medication

## 2023-08-23 NOTE — PATIENT INSTRUCTIONS
I will review your studies via Amind when they are available. If you have any questions or concerns please let me know via Amind, or call the clinic at  [phone].       Patient Education   Personalized Prevention Plan  You are due for the preventive services outlined below.  Your care team is available to assist you in scheduling these services.  If you have already completed any of these items, please share that information with your care team to update in your medical record.  Health Maintenance Due   Topic Date Due    Annual Wellness Visit  04/02/2002

## 2023-08-23 NOTE — PROGRESS NOTES
"SUBJECTIVE:   CC: Chip is an 38 year old who presents for preventative health visit.       2023     2:26 PM   Additional Questions   Roomed by Jelly FLEMING CMA       Healthy Habits:     In general, how would you rate your overall health?  Good    Frequency of exercise:  2-3 days/week    Duration of exercise:  15-30 minutes    Do you usually eat at least 4 servings of fruit and vegetables a day, include whole grains    & fiber and avoid regularly eating high fat or \"junk\" foods?  No    Taking medications regularly:  Yes    Medication side effects:  None    Ability to successfully perform activities of daily living:  No assistance needed    Home Safety:  No safety concerns identified    Hearing Impairment:  No hearing concerns    In the past 6 months, have you been bothered by leaking of urine? Yes    In general, how would you rate your overall mental or emotional health?  Good    Additional concerns today:  No      Today's PHQ-2 Score:       2023     2:27 PM   PHQ-2 (  [employer])   Q1: Little interest or pleasure in doing things 0   Q2: Feeling down, depressed or hopeless 0   PHQ-2 Score 0   Q1: Little interest or pleasure in doing things Not at all   Q2: Feeling down, depressed or hopeless Not at all   PHQ-2 Score 0           Annual Wellness Visit    Patient has been advised of split billing requirements and indicates understanding: Yes     Are you in the first 12 months of your Medicare Part B coverage?  No      Do you feel safe in your environment? Yes    Have you ever done Advance Care Planning? (For example, a Health Directive, POLST, or a discussion with a medical provider or your loved ones about your wishes)? Yes, advance care planning is on file.    Fall risk:  Fallen 2 or more times in the past year?: No  Any fall with injury in the past year?: No    Cognitive Screenin) Repeat 3 items (Leader, Season, Table)    2) Clock draw: NORMAL  3) 3 item recall: Recalls 3 objects  Results: 3 items " recalled: COGNITIVE IMPAIRMENT LESS LIKELY    Mini-CogTM Copyright SANDRA Zamarripa. Licensed by the author for use in [facility]; reprinted with permission (janet@[facility]). All rights reserved.      Do you have a current opioid prescription? No  Do you use any other controlled substances or medications that are not prescribed by a provider? None    Current providers sharing in care for this patient include:   Patient Care Team:  Tato Ralph DO as PCP - General (Family Medicine)  Ele Joshi PA-C (Cardiology)  Les Jaime MD as MD (Nephrology)  Mena Calabrese PA-C as Physician Assistant (Physician Assistant)  Sam Mejia MD as MD (Urology)  Isabel Walker, RN as Specialty Care Coordinator (Urology)  Bashir Lawson MD as MD (Cardiovascular Disease)  Lucero Moreno RN as Specialty Care Coordinator  Bashir Lawson MD as Assigned Heart and Vascular Provider  Oleksandr Ashraf RPH as Pharmacist (Pharmacist)  Oleksandr Ashraf RPH as Assigned MTM Pharmacist  Tato Ralph DO as Assigned PCP  Blair Aguilar PA-C as Physician Assistant (Physician Assistant - Medical)  Franko Cerda MD as Assigned Nephrology Provider  Andrzej Willis MD as Assigned Infectious Disease Provider  Sam Mejia MD as Assigned Surgical Provider    The following health maintenance items are reviewed in Epic and correct as of today:  Health Maintenance   Topic Date Due    MEDICARE ANNUAL WELLNESS VISIT  04/02/2002    HF ACTION PLAN  02/01/2024 (Originally 1984)    COVID-19 Vaccine (4 - Booster for Moderna series) 02/01/2024 (Originally 3/23/2022)    INFLUENZA VACCINE (1) 09/01/2023    LIPID  01/15/2024    ANNUAL REVIEW OF HM ORDERS  01/31/2024    BMP  02/22/2024    MICROALBUMIN  05/08/2024    ALT  07/24/2024    CBC  08/22/2024    HEMOGLOBIN  08/22/2024    Pneumococcal Vaccine: Pediatrics (0 to 5 Years) and At-Risk Patients (6 to 64 Years) (3 - PPSV23 if available, else PCV20)  2025    ADVANCE CARE PLANNING  01/15/2026    DTAP/TDAP/TD IMMUNIZATION (7 - Td or Tdap) 2030    TSH W/FREE T4 REFLEX  Completed    HEPATITIS C SCREENING  Completed    HIV SCREENING  Completed    PHQ-2 (once per calendar year)  Completed    URINALYSIS  Completed    IPV IMMUNIZATION  Completed    MENINGITIS IMMUNIZATION  Aged Out    HEPATITIS B IMMUNIZATION  Discontinued       Patient has been advised of split billing requirements and indicates understanding: Yes    Appropriate preventive services were discussed with this patient, including applicable screening as appropriate for fall prevention, nutrition, physical activity, Tobacco-use cessation, weight loss and cognition.  Checklist reviewing preventive services available has been given to the patient.            Social History     Tobacco Use    Smoking status: Former     Packs/day: 0.50     Types: Cigarettes     Start date:      Quit date: 1/15/2023     Years since quittin.6    Smokeless tobacco: Never   Substance Use Topics    Alcohol use: Not Currently     Comment: quit alcohol 3-4 yrs ago           2023     2:26 PM   Alcohol Use   Prescreen: >3 drinks/day or >7 drinks/week? Not Applicable       Last PSA: No results found for: PSA    Reviewed orders with patient. Reviewed health maintenance and updated orders accordingly - Yes      Reviewed and updated as needed this visit by clinical staff   Tobacco  Allergies  Meds              Reviewed and updated as needed this visit by Provider                     Review of Systems   Constitutional:  Negative for chills and fever.   HENT:  Negative for congestion, ear pain, hearing loss and sore throat.    Eyes:  Negative for pain and visual disturbance.   Respiratory:  Negative for cough and shortness of breath.    Cardiovascular:  Negative for chest pain, palpitations and peripheral edema.   Gastrointestinal:  Positive for diarrhea. Negative for abdominal pain, constipation, heartburn,  "hematochezia and nausea.   Genitourinary:  Negative for dysuria, frequency, genital sores, hematuria, impotence, penile discharge and urgency.   Musculoskeletal:  Negative for arthralgias, joint swelling and myalgias.   Skin:  Negative for rash.   Neurological:  Negative for dizziness, weakness, headaches and paresthesias.   Psychiatric/Behavioral:  Negative for mood changes. The patient is nervous/anxious.      Diarrhea has been chronic since his organ transplant.  He does not feel it needs new evaluation.    Takes a daily probiotic.     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     He does not feel he needs treatment for anxiety.     Works in plastic molding injection.       OBJECTIVE:   /70   Pulse 70   Temp 98.7  F (37.1  C) (Oral)   Resp 14   Ht 1.803 m (5' 11\")   Wt 68.9 kg (152 lb)   SpO2 98%   BMI 21.20 kg/m      Physical Exam  General: Vital signs reviewed.  Patient is in no acute appearing distress.  Breathing appears nonlabored.  Patient is alert and oriented ×3.      ENT: Ear exam shows bilateral tympanic membranes to be clear without injection, nasal turbinates show no injection or edema, no pharyngeal injection or exudate.    Neck: supple with no adenoapthy, palpable abnormal masses, or thyroid abnormality.    Eyes: No scleral, lid, or periorbital injection or edema noted.  No eye mattering noted.  Corneas are clear. Pupils are equal round and reactive to light with normal consensual eye movement.    Heart: Heart rate is regular without murmur.    Lungs: Lungs are clear to auscultation with good airflow bilaterally.    Abdomen:  Abdomen is soft, nontender.  No palpable abnormal masses or organomegaly.  Bowel sounds are normal.    Genital exam: Patient declined exam for possible hernia.    Back: No areas of tenderness.    Skin: Warm and dry, with no rash or abnormal lesions noted.    Extremities: No lower leg edema noted.  No joint edema or " restricted range of motion noted.    Neuro: No acute focal deficits or other abnormalities noted.    Psych: Patient is very pleasant, making good eye contact, with clear and fluent speech.  Answers questions appropriately. No psychomotor agitation.         ASSESSMENT/PLAN:   Chip was seen today for wellness visit.    Diagnoses and all orders for this visit:  See after visit summary and result note for helpful information and advice given to patient.    Encounter for Medicare annual wellness exam  -     PRIMARY CARE FOLLOW-UP SCHEDULING; Future    Screening for hyperlipidemia  -     Lipid Profile              COUNSELING:   Reviewed preventive health counseling, as reflected in patient instructions        He reports that he quit smoking about 7 months ago. His smoking use included cigarettes. He started smoking about 21 years ago. He smoked an average of .5 packs per day. He has never used smokeless tobacco.            Tato Ralph DO  [facility]

## 2023-08-28 ENCOUNTER — LAB (OUTPATIENT)
Dept: LAB | Facility: CLINIC | Age: 38
End: 2023-08-28
Payer: MEDICARE

## 2023-08-28 DIAGNOSIS — Z20.828 CONTACT WITH AND (SUSPECTED) EXPOSURE TO OTHER VIRAL COMMUNICABLE DISEASES: ICD-10-CM

## 2023-08-28 DIAGNOSIS — B25.9 CMV (CYTOMEGALOVIRUS INFECTION) (H): ICD-10-CM

## 2023-08-28 DIAGNOSIS — Z48.298 AFTERCARE FOLLOWING ORGAN TRANSPLANT: ICD-10-CM

## 2023-08-28 DIAGNOSIS — Z94.0 KIDNEY REPLACED BY TRANSPLANT: ICD-10-CM

## 2023-08-28 DIAGNOSIS — Z79.899 ENCOUNTER FOR LONG-TERM CURRENT USE OF MEDICATION: ICD-10-CM

## 2023-08-28 LAB
ANION GAP SERPL CALCULATED.3IONS-SCNC: 9 MMOL/L (ref 7–15)
BUN SERPL-MCNC: 24.8 MG/DL (ref 6–20)
CALCIUM SERPL-MCNC: 8.6 MG/DL (ref 8.6–10)
CHLORIDE SERPL-SCNC: 105 MMOL/L (ref 98–107)
CREAT SERPL-MCNC: 1.54 MG/DL (ref 0.67–1.17)
DEPRECATED HCO3 PLAS-SCNC: 23 MMOL/L (ref 22–29)
ERYTHROCYTE [DISTWIDTH] IN BLOOD BY AUTOMATED COUNT: 12.9 % (ref 10–15)
GFR SERPL CREATININE-BSD FRML MDRD: 58 ML/MIN/1.73M2
GLUCOSE SERPL-MCNC: 84 MG/DL (ref 70–99)
HCT VFR BLD AUTO: 33.5 % (ref 40–53)
HGB BLD-MCNC: 11.1 G/DL (ref 13.3–17.7)
MCH RBC QN AUTO: 29.8 PG (ref 26.5–33)
MCHC RBC AUTO-ENTMCNC: 33.1 G/DL (ref 31.5–36.5)
MCV RBC AUTO: 90 FL (ref 78–100)
PLATELET # BLD AUTO: 169 10E3/UL (ref 150–450)
POTASSIUM SERPL-SCNC: 4.6 MMOL/L (ref 3.4–5.3)
RBC # BLD AUTO: 3.72 10E6/UL (ref 4.4–5.9)
SODIUM SERPL-SCNC: 137 MMOL/L (ref 136–145)
TACROLIMUS BLD-MCNC: 8.6 UG/L (ref 5–15)
TME LAST DOSE: NORMAL H
TME LAST DOSE: NORMAL H
WBC # BLD AUTO: 3.3 10E3/UL (ref 4–11)

## 2023-08-28 PROCEDURE — 36415 COLL VENOUS BLD VENIPUNCTURE: CPT

## 2023-08-28 PROCEDURE — 85014 HEMATOCRIT: CPT

## 2023-08-28 PROCEDURE — 86833 HLA CLASS II HIGH DEFIN QUAL: CPT

## 2023-08-28 PROCEDURE — 87799 DETECT AGENT NOS DNA QUANT: CPT

## 2023-08-28 PROCEDURE — 86832 HLA CLASS I HIGH DEFIN QUAL: CPT

## 2023-08-28 PROCEDURE — 80048 BASIC METABOLIC PNL TOTAL CA: CPT

## 2023-08-28 PROCEDURE — 80197 ASSAY OF TACROLIMUS: CPT

## 2023-08-28 PROCEDURE — 82784 ASSAY IGA/IGD/IGG/IGM EACH: CPT

## 2023-08-28 PROCEDURE — 80180 DRUG SCRN QUAN MYCOPHENOLATE: CPT

## 2023-08-29 LAB
BKV DNA # SPEC NAA+PROBE: NOT DETECTED COPIES/ML
IGG SERPL-MCNC: 1004 MG/DL (ref 610–1616)
MYCOPHENOLATE SERPL LC/MS/MS-MCNC: 2.45 MG/L (ref 1–3.5)
MYCOPHENOLATE-G SERPL LC/MS/MS-MCNC: 25.2 MG/L (ref 30–95)
TME LAST DOSE: ABNORMAL H
TME LAST DOSE: ABNORMAL H

## 2023-08-30 LAB
DONOR IDENTIFICATION: NORMAL
DSA COMMENTS: NORMAL
DSA PRESENT: NO
DSA TEST METHOD: NORMAL
ORGAN: NORMAL
SA 1 CELL: NORMAL
SA 1 TEST METHOD: NORMAL
SA 2 CELL: NORMAL
SA 2 TEST METHOD: NORMAL
SA1 HI RISK ABY: NORMAL
SA1 MOD RISK ABY: NORMAL
SA2 HI RISK ABY: NORMAL
SA2 MOD RISK ABY: NORMAL
UNACCEPTABLE ANTIGENS: NORMAL
UNOS CPRA: 49
ZZZSA 1  COMMENTS: NORMAL
ZZZSA 2 COMMENTS: NORMAL

## 2023-09-05 ENCOUNTER — LAB (OUTPATIENT)
Dept: LAB | Facility: CLINIC | Age: 38
End: 2023-09-05
Payer: MEDICARE

## 2023-09-05 DIAGNOSIS — Z94.0 KIDNEY REPLACED BY TRANSPLANT: ICD-10-CM

## 2023-09-05 DIAGNOSIS — B25.9 CMV (CYTOMEGALOVIRUS INFECTION) (H): ICD-10-CM

## 2023-09-05 LAB
ANION GAP SERPL CALCULATED.3IONS-SCNC: 9 MMOL/L (ref 7–15)
BASOPHILS # BLD AUTO: 0 10E3/UL (ref 0–0.2)
BASOPHILS NFR BLD AUTO: 2 %
BUN SERPL-MCNC: 31.5 MG/DL (ref 6–20)
CALCIUM SERPL-MCNC: 9 MG/DL (ref 8.6–10)
CHLORIDE SERPL-SCNC: 107 MMOL/L (ref 98–107)
CREAT SERPL-MCNC: 1.61 MG/DL (ref 0.67–1.17)
DEPRECATED HCO3 PLAS-SCNC: 21 MMOL/L (ref 22–29)
EOSINOPHIL # BLD AUTO: 0.5 10E3/UL (ref 0–0.7)
EOSINOPHIL NFR BLD AUTO: 22 %
ERYTHROCYTE [DISTWIDTH] IN BLOOD BY AUTOMATED COUNT: 13.2 % (ref 10–15)
GFR SERPL CREATININE-BSD FRML MDRD: 55 ML/MIN/1.73M2
GLUCOSE SERPL-MCNC: 75 MG/DL (ref 70–99)
HCT VFR BLD AUTO: 32.8 % (ref 40–53)
HGB BLD-MCNC: 11.1 G/DL (ref 13.3–17.7)
IMM GRANULOCYTES # BLD: 0.1 10E3/UL
IMM GRANULOCYTES NFR BLD: 3 %
LYMPHOCYTES # BLD AUTO: 0.8 10E3/UL (ref 0.8–5.3)
LYMPHOCYTES NFR BLD AUTO: 33 %
MCH RBC QN AUTO: 30.2 PG (ref 26.5–33)
MCHC RBC AUTO-ENTMCNC: 33.8 G/DL (ref 31.5–36.5)
MCV RBC AUTO: 89 FL (ref 78–100)
MONOCYTES # BLD AUTO: 0.1 10E3/UL (ref 0–1.3)
MONOCYTES NFR BLD AUTO: 4 %
NEUTROPHILS # BLD AUTO: 0.9 10E3/UL (ref 1.6–8.3)
NEUTROPHILS NFR BLD AUTO: 36 %
NRBC # BLD AUTO: 0 10E3/UL
NRBC BLD AUTO-RTO: 0 /100
PLATELET # BLD AUTO: 200 10E3/UL (ref 150–450)
POTASSIUM SERPL-SCNC: 4.3 MMOL/L (ref 3.4–5.3)
RBC # BLD AUTO: 3.68 10E6/UL (ref 4.4–5.9)
SODIUM SERPL-SCNC: 137 MMOL/L (ref 136–145)
WBC # BLD AUTO: 2.4 10E3/UL (ref 4–11)

## 2023-09-05 PROCEDURE — 36415 COLL VENOUS BLD VENIPUNCTURE: CPT

## 2023-09-05 PROCEDURE — 80197 ASSAY OF TACROLIMUS: CPT

## 2023-09-05 PROCEDURE — 80048 BASIC METABOLIC PNL TOTAL CA: CPT

## 2023-09-05 PROCEDURE — 85025 COMPLETE CBC W/AUTO DIFF WBC: CPT

## 2023-09-05 PROCEDURE — 80180 DRUG SCRN QUAN MYCOPHENOLATE: CPT

## 2023-09-06 LAB
CMV DNA SPEC NAA+PROBE-ACNC: 4580 IU/ML
CMV DNA SPEC NAA+PROBE-LOG#: 3.7 {LOG_COPIES}/ML
TACROLIMUS BLD-MCNC: 6.7 UG/L (ref 5–15)
TME LAST DOSE: NORMAL H
TME LAST DOSE: NORMAL H

## 2023-09-07 ENCOUNTER — TELEPHONE (OUTPATIENT)
Dept: TRANSPLANT | Facility: CLINIC | Age: 38
End: 2023-09-07
Payer: MEDICARE

## 2023-09-07 DIAGNOSIS — B25.9 CMV (CYTOMEGALOVIRUS INFECTION) (H): ICD-10-CM

## 2023-09-07 DIAGNOSIS — Z94.0 KIDNEY REPLACED BY TRANSPLANT: Primary | ICD-10-CM

## 2023-09-07 LAB
MYCOPHENOLATE SERPL LC/MS/MS-MCNC: 2.54 MG/L (ref 1–3.5)
MYCOPHENOLATE-G SERPL LC/MS/MS-MCNC: 27.8 MG/L (ref 30–95)
TME LAST DOSE: ABNORMAL H
TME LAST DOSE: ABNORMAL H

## 2023-09-07 RX ORDER — SODIUM BICARBONATE 650 MG/1
1300 TABLET ORAL 2 TIMES DAILY
Qty: 120 TABLET | Refills: 11 | Status: SHIPPED | OUTPATIENT
Start: 2023-09-07 | End: 2024-06-03

## 2023-09-07 NOTE — TELEPHONE ENCOUNTER
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

## 2023-09-11 ENCOUNTER — LAB (OUTPATIENT)
Dept: LAB | Facility: CLINIC | Age: 38
End: 2023-09-11
Payer: MEDICARE

## 2023-09-11 DIAGNOSIS — B25.9 CMV (CYTOMEGALOVIRUS INFECTION) (H): ICD-10-CM

## 2023-09-11 DIAGNOSIS — Z94.0 KIDNEY REPLACED BY TRANSPLANT: ICD-10-CM

## 2023-09-11 LAB
ANION GAP SERPL CALCULATED.3IONS-SCNC: 8 MMOL/L (ref 7–15)
BASOPHILS # BLD AUTO: 0.1 10E3/UL (ref 0–0.2)
BASOPHILS NFR BLD AUTO: 3 %
BUN SERPL-MCNC: 25.5 MG/DL (ref 6–20)
CALCIUM SERPL-MCNC: 9.2 MG/DL (ref 8.6–10)
CHLORIDE SERPL-SCNC: 107 MMOL/L (ref 98–107)
CREAT SERPL-MCNC: 1.54 MG/DL (ref 0.67–1.17)
DEPRECATED HCO3 PLAS-SCNC: 23 MMOL/L (ref 22–29)
EGFRCR SERPLBLD CKD-EPI 2021: 58 ML/MIN/1.73M2
EOSINOPHIL # BLD AUTO: 0.4 10E3/UL (ref 0–0.7)
EOSINOPHIL NFR BLD AUTO: 24 %
ERYTHROCYTE [DISTWIDTH] IN BLOOD BY AUTOMATED COUNT: 13.2 % (ref 10–15)
GLUCOSE SERPL-MCNC: 73 MG/DL (ref 70–99)
HCT VFR BLD AUTO: 33.1 % (ref 40–53)
HGB BLD-MCNC: 11.2 G/DL (ref 13.3–17.7)
IMM GRANULOCYTES # BLD: 0 10E3/UL
IMM GRANULOCYTES NFR BLD: 2 %
LYMPHOCYTES # BLD AUTO: 0.8 10E3/UL (ref 0.8–5.3)
LYMPHOCYTES NFR BLD AUTO: 44 %
MCH RBC QN AUTO: 30.1 PG (ref 26.5–33)
MCHC RBC AUTO-ENTMCNC: 33.8 G/DL (ref 31.5–36.5)
MCV RBC AUTO: 89 FL (ref 78–100)
MONOCYTES # BLD AUTO: 0.1 10E3/UL (ref 0–1.3)
MONOCYTES NFR BLD AUTO: 5 %
NEUTROPHILS # BLD AUTO: 0.4 10E3/UL (ref 1.6–8.3)
NEUTROPHILS NFR BLD AUTO: 22 %
NRBC # BLD AUTO: 0 10E3/UL
NRBC BLD AUTO-RTO: 0 /100
PLAT MORPH BLD: NORMAL
PLATELET # BLD AUTO: 214 10E3/UL (ref 150–450)
POTASSIUM SERPL-SCNC: 4.6 MMOL/L (ref 3.4–5.3)
RBC # BLD AUTO: 3.72 10E6/UL (ref 4.4–5.9)
RBC MORPH BLD: NORMAL
SODIUM SERPL-SCNC: 138 MMOL/L (ref 136–145)
WBC # BLD AUTO: 1.7 10E3/UL (ref 4–11)

## 2023-09-11 PROCEDURE — 80180 DRUG SCRN QUAN MYCOPHENOLATE: CPT

## 2023-09-11 PROCEDURE — 36415 COLL VENOUS BLD VENIPUNCTURE: CPT

## 2023-09-11 PROCEDURE — 85025 COMPLETE CBC W/AUTO DIFF WBC: CPT

## 2023-09-11 PROCEDURE — 80048 BASIC METABOLIC PNL TOTAL CA: CPT

## 2023-09-11 PROCEDURE — 87900 PHENOTYPE INFECT AGENT DRUG: CPT

## 2023-09-11 PROCEDURE — 80197 ASSAY OF TACROLIMUS: CPT

## 2023-09-12 LAB
CMV DNA SPEC NAA+PROBE-ACNC: 468 IU/ML
CMV DNA SPEC NAA+PROBE-LOG#: 2.7 {LOG_COPIES}/ML
TACROLIMUS BLD-MCNC: 6.3 UG/L (ref 5–15)
TME LAST DOSE: NORMAL H
TME LAST DOSE: NORMAL H

## 2023-09-13 ENCOUNTER — VIRTUAL VISIT (OUTPATIENT)
Dept: INFECTIOUS DISEASES | Facility: CLINIC | Age: 38
End: 2023-09-13
Attending: STUDENT IN AN ORGANIZED HEALTH CARE EDUCATION/TRAINING PROGRAM
Payer: MEDICARE

## 2023-09-13 DIAGNOSIS — N39.0 FREQUENT UTI: ICD-10-CM

## 2023-09-13 DIAGNOSIS — N39.0 CHRONIC URINARY TRACT INFECTION, SUPPRESSED: Primary | ICD-10-CM

## 2023-09-13 LAB
MYCOPHENOLATE SERPL LC/MS/MS-MCNC: 2.82 MG/L (ref 1–3.5)
MYCOPHENOLATE-G SERPL LC/MS/MS-MCNC: 23.3 MG/L (ref 30–95)
TME LAST DOSE: ABNORMAL H
TME LAST DOSE: ABNORMAL H

## 2023-09-13 PROCEDURE — 99215 OFFICE O/P EST HI 40 MIN: CPT | Mod: VID | Performed by: STUDENT IN AN ORGANIZED HEALTH CARE EDUCATION/TRAINING PROGRAM

## 2023-09-13 RX ORDER — GRANULES FOR ORAL 3 G/1
3 POWDER ORAL
Qty: 1 PACKET | Refills: 0 | Status: SHIPPED | OUTPATIENT
Start: 2023-09-13 | End: 2023-09-14

## 2023-09-13 NOTE — LETTER
9/13/2023       RE: Chip Fowler  [id number] [facility] [address]     Dear Colleague,    Thank you for referring your patient, Chip Fowler, to the [facility] INFECTIOUS DISEASE CLINIC [place] at [facility]. Please see a copy of my visit note below.    [facility]  Transplant Infectious Disease Clinic Note:  Follow UP Patient     Patient:  Chip Fowler, Date of birth 1985, Medical record number [id number]  Date of Visit:  09/13/2023           Assessment and Recommendations:   Recommendations:  -He was also recommend to keep hydrated and schedule voiding throughout the day.   -he has been on nitrofurantion for 2 mo with no UTIs. Ideally I would like to continue it longer but there are serious side effects associated with any prolonged abx and with nitrofurantoin, could cause diffuse interstitial pneumonitis and pulmonary fibrosis. Therefore, I will switch him to  Fosfomycin as he really wants to be on suppression to prevent another UTI. Will give for 2 mo and reassess. He is also likely colonized with Ecoli and E. Faecalis.  -Continue valcyte 900mg bid daily (renal adjust). Will continue until CMV VL <200 2x one week apart, after which we can stop the valcyte. There is no strong evidence that secondary prophylaxis is any benefit and given his leukopenia, will like to stop it soon. Trend WBC while on valcyte and if continues to decrease, will recommend G-CSF as he is close to completing therapy.   -f/u EBV VL per protocol.   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    -Baseline Cr 1.4-1.6. Renal US patent  - Tacrolimus goal level 8-10.   - Myfortic 540 mg BID per protocol in the setting of previously high levels, leukopenia, and infection.        1. Severe sepsis. - resolved   2. E.Coli UTI with recurrent UTI, on suppression with  nitrofurantonin  The UTI is mostly with E faecalis since 1/2023 except last infection in 4/2023 with E faecalis and E coli.   The current UTI is also due to E coli. Susceptible to Cipro and finished 14d course.    -on 6/2/23 The pt reports another UTI sx (incresaed urgency and discomfort) after having vaginal sex on late Sunday afteroon. He was reminded to void after a sexual encounter. He took a script of nitrofurantoin BID for 7d and is helping him and his symptoms are improving     3. E Faecalis Pyelonephritis 7/4/23, on suppression with nitrofurantonin  Admitted on 7/4/23 for pyelonephritis UC + 10-50k E faecalis he was given Augmentin for 10 and started on prophy nitrofurantoin 100mg daily after. Unlike his prior UTIs this occur ace was not related to sexual activity. He was seen by urology on 7/19/23 and had a Cystoscopy with no source of UTIs are noted.      4. 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 9/5/23 it was 4580 with 468 VL on 9/11/23.     low level EBV Viremia   -7/6/23 CT chest Lymph nodes: Multiple sub centimeter para-aortic lymph nodes.  -at last check it was 8/22/23 <500     Prior issues:  1. Eosinophilia. The workup for eosinophilia and diarrhea was negative for fungal and parasitic infections that would account for the eosinophilia. Evaluated by hematology. The eosinophilia is likely reactive to foreign objects; the PD catheter was thought to be potentially the source as noted by hematology. The relatively increased tryptase level favors allergy (likely to PD catheter) to be the etiology of the eosinophilia rather than infectious processes or malignancies.   2. Chronic diarrhea since he was started on PD in 7/2020. Workup has been negative.   3. Positive Salmonella serology. 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 The patient was counseled against keeping the snakes after transplantation as they are source of recurrent Salmonella infection.  4. E coli in urine in 8/2020.   5. Group B Strep in urine in 2/2020.   6. Urethral stenosis s/p reconstruction.      Other Infectious Disease issues include:  - QTc: 416 as of 3/23/23.   - PJP prophylaxis: bactrim.   - Serostatus: CMV D+/R- (s/p 6mo of valcye), EBV D+/R-, HSV1?/2?, VZV +  - Immunization status: This patient received the third dose of the COVID-19 vaccine on 1/26/2022. Otherwise due for the seasonal influenza vaccine and COVID-19 bivalent         Interval history:   The pt was in the hospital on 7/4/213  for pyelonephritis from E faecalis he was given Augmentin for 10 and started on prophy nitrofurantoin 100mg daily after and has been on that since. He was seen by urologyon 7/19/23 and had a Cystoscopy with no source of UTIs are noted.        He was followed up by me on 8/17/23 over video and at that that time, he was doing well with no issues issues or UTIs while on nitrofurantonin. He is being seen by me again today for follow up.     He was seen again today on 9/13/23 over video. He had CMV D+/R- and was started on valcyte on 8/23 as his CMV VL the day prior was elevated on routine check per protocol. His VL is decreasing at 468 on 9/11/23. He is feeling well with no issues. He has no fevers, chills, no sob, vision issues, chest pain, cough, abd pain, n/v/d or dysuria. No fatigue. He continues to abstain from sexual activity as most of his prior UTIs were associated with sex.         Transplants:  1/15/2023 (Kidney); Postoperative day:  241.  Coordinator Veronica Edgar    Review of Systems:  CONSTITUTIONAL:  No fevers or chills. No night sweats.  EYES: negative for icterus or acute vision changes.   ENT:  negative for hearing loss,  tinnitus or sore throat  RESPIRATORY:  negative for cough, sputum, dyspnea  CARDIOVASCULAR:  negative for chest pain, heart palpitations  GASTROINTESTINAL:  negative for nausea, vomiting, diarrhea or constipation  GENITOURINARY:  negative for dysuria or hematuria.  HEME:  No easy bruising or bleeding  INTEGUMENT:  negative for rash or pruritus  NEURO:  Negative for headache or tremor.    Past Medical History:   Diagnosis Date    Bladder stones     Cardiomyopathy (H)     ESRD (end stage renal disease) on dialysis (H)     Hypertension     Kidney replaced by transplant 01/15/2023    DCD DDKT. Intermediate risk induction.    Migraines     Polysubstance abuse (H)     Solitary kidney, congenital     Urethral stone     Urethral stricture        Past Surgical History:   Procedure Laterality Date    BENCH KIDNEY  1/15/2023    Procedure: Bench kidney;  Surgeon: Tez Emerson MD;  Location: [facility] OR    BIOPSY  02/2020    renal, Hinduism    COLONOSCOPY N/A 3/23/2023    Procedure: Colonoscopy;  Surgeon: Ana Cardenas MD;  Location: [facility] GI    COMBINED CYSTOSCOPY, LASER HOLMIUM LITHOTRIPSY URETER(S)      CYSTOSCOPY      CYSTOSCOPY FLEXIBLE, CYSTOSTOMY, INSERT TUBE SUPRAPUBIC, COMBINED N/A 12/14/2020    Procedure: CYSTOSCOPY, WITH SUPRAPUBIC CATHETER INSERTION;  Surgeon: Sam Mejia MD;  Location: [facility] OR    CYSTOSCOPY, OPEN EXCISION URETHRAL DIVERTICULUM, COMBINED N/A 12/14/2020    Procedure: EXCISION OF URETHRAL DIVERTICULUM X2;  Surgeon: Sam Mejia MD;  Location: [facility] OR    HERNIA REPAIR      infant    INSERT CATHETER PERITONEAL DIALYSIS      LASER HOLMIUM LITHOTRIPSY URETER(S), INSERT STENT, COMBINED N/A 8/21/2020    Procedure: CYSTOSCOPY, bladder and urethral stone extraction, removal of foreign body, urethral dilation, urethrotomy, laser on standby;  Surgeon: Sam Mejia MD;  [facility] OR    REMOVE CATHETER PERITONEAL N/A 1/15/2023    Procedure: Remove catheter peritoneal;   Surgeon: Tez Emerson MD;  Location: [facility] OR    TRANSPLANT KIDNEY RECIPIENT  DONOR N/A 1/15/2023    Procedure: TRANSPLANT, KIDNEY, RECIPIENT,  DONOR WITH DONOR URETER STENTING;  Surgeon: Tez Emerson MD;  Location: [facility] OR    urethral dilation      URETHROPLASTY WITH BUCCAL GRAFT N/A 2020    Procedure: URETHROPLASTY, USING BUCCAL MUCOSA GRAFT;  Surgeon: Sam Mejia MD;  Location: [facility] OR       Family History   Problem Relation Age of Onset    Alzheimer Disease Mother     Unknown/Adopted Father     No Known Problems Sister     No Known Problems Sister     Kidney Disease No family hx of        Social History     Social History Narrative    Not on file     Social History     Tobacco Use    Smoking status: Former     Packs/day: 0.50     Types: Cigarettes     Start date:      Quit date: 1/15/2023     Years since quittin.6    Smokeless tobacco: Never   Vaping Use    Vaping Use: Never used   Substance Use Topics    Alcohol use: Not Currently     Comment: quit alcohol 3-4 yrs ago    Drug use: Not Currently     Types: Methamphetamines, MDMA (Ecstasy)       Immunization History   Administered Date(s) Administered    COVID-19 Monovalent 18+ (Moderna) 2021, 2021, 2022    DTAP (<7y) 1984, 1984, 1984, 10/04/1985, 1989    Flu, Unspecified 1997, 2020, 10/06/2020, 10/27/2021    Hepatitis B (Peds <19Y) 1996, 1996, 1996    Hib, Unspecified 1986    Historical DTP/aP 1984, 1984, 1984, 10/04/1985, 1989    Influenza (intradermal) 2020    Influenza Vaccine >6 months (Alfuria,Fluzone) 2016    MMR 1985, 1996    Mantoux Tuberculin Skin Test 2020    Pneumo Conj 13-V (2010&after) 2020    Pneumococcal 23 valent 2020    Pneumococcal, Unspecified 2020, 2021    Poliovirus, inactivated (IPV) 1984, 1984, 1984,  03/21/1989    TD,PF 7+ (Tenivac) 06/17/1996    TDAP Vaccine (Adacel) 04/28/2020    Td (Adult), Adsorbed 06/17/1996       Patient Active Problem List   Diagnosis    HTN, kidney transplant related    Urethral stricture    Methamphetamine abuse, episodic (H)    Urethral diverticulum    Allergic rhinitis, unspecified seasonality, unspecified trigger    Stage 3a chronic kidney disease (H)    Kidney replaced by transplant    Immunosuppressed status (H)    Aftercare following organ transplant    Need for pneumocystis prophylaxis    Anemia in chronic renal disease    Secondary renal hyperparathyroidism (H)    Vitamin D deficiency    Hypomagnesemia    Diarrhea    Urinary tract infection without hematuria, site unspecified    Metabolic acidosis    EBV (Aaron-Barr virus) viremia    Pyelonephritis of transplanted kidney       No outpatient medications have been marked as taking for the 9/13/23 encounter (Appointment) with Andrzej Willis MD.       No Known Allergies           Physical Exam:   Vitals were reviewed.  All vitals stable  There were no vitals taken for this visit.  Wt Readings from Last 4 Encounters:   08/23/23 68.9 kg (152 lb)   07/20/23 70.4 kg (155 lb 1.6 oz)   07/19/23 68 kg (150 lb)   07/06/23 69.6 kg (153 lb 6.4 oz)       Exam:  GENERAL: well-developed, well-nourished, alert, oriented, in no acute distress over video.  HEAD: Head is normocephalic, atraumatic   EYES: Eyes have anicteric sclerae.    NEUROLOGIC: Grossly nonfocal.         Laboratory Data:     Absolute CD4   Date Value Ref Range Status   12/03/2020 1,325 441 - 2,156 cells/uL Final       Inflammatory Markers    Recent Labs   Lab Test 01/20/23  0746   G6PD 15.5       Immune Globulin Studies     Recent Labs   Lab Test 08/28/23  1453 12/03/20  1647   IGG 1,004 1,152   IGM  --  64   IGE  --  18   IGA  --  268       Metabolic Studies    Recent Labs   Lab Test 09/11/23  1513 09/05/23  1451 08/28/23  1453 08/22/23  1448 07/31/23  1512 07/24/23  1519  07/06/23  1807 07/06/23  0933 07/05/23  1304 07/04/23  1944    137 137 137   < > 139   < >  --    < > 133*   POTASSIUM 4.6 4.3 4.6 4.2   < > 4.4   < >  --    < > 4.0   CHLORIDE 107 107 105 105   < > 104   < >  --    < > 102   CO2 23 21* 23 23   < > 24   < >  --    < > 17*   ANIONGAP 8 9 9 9   < > 11   < >  --    < > 14   BUN 25.5* 31.5* 24.8* 20.6*   < > 23.4*   < >  --    < > 26.1*   CR 1.54* 1.61* 1.54* 1.50*   < > 1.65*   < >  --    < > 1.86*   GFRESTIMATED 58* 55* 58* 60*   < > 54*   < >  --    < > 47*   GLC 73 75 84 72   < > 68*   < >  --    < > 139*   VISHNU 9.2 9.0 8.6 9.1   < > 9.7   < >  --    < > 9.5   PHOS  --   --   --  2.6  --   --    < >  --    < >  --    MAG  --   --   --  1.5*  --   --    < >  --    < >  --    URIC  --   --   --   --   --  5.5  --   --   --   --    LACT  --   --   --   --   --   --   --  0.9   < > 1.5   CKT  --   --   --   --   --   --   --   --   --  88    < > = values in this interval not displayed.       Hepatic Studies    Recent Labs   Lab Test 07/24/23  1519 07/04/23  1944 06/05/23  1527 06/01/23  1518   BILITOTAL 0.3 0.4 0.3 0.3   DBIL <0.20  --   --  <0.20   ALKPHOS 59 53 55 52   PROTTOTAL 7.2 7.4 6.9 6.9   ALBUMIN 4.3 4.2 4.0 4.0   AST 29 27 24 28   ALT 20 14 22 30   LDH  --   --   --  367*       Pancreatitis testing    Recent Labs   Lab Test 08/22/23  1448 03/19/23  2155 01/15/23  0540   LIPASE  --  20  --    TRIG 114  --  115       Lipid testing    Recent Labs   Lab Test 08/22/23  1448 01/15/23  0540 04/18/22  0851   CHOL 100 177 161   HDL 32* 52 49   LDL 45 102* 96   TRIG 114 115 78       Gout Labs      Recent Labs   Lab Test 07/24/23  1519   URIC 5.5       Hematology Studies   Recent Labs   Lab Test 09/11/23  1513 09/05/23  1451 08/28/23  1453 08/22/23  1448 08/07/23  1441 07/31/23  1512   WBC 1.7* 2.4* 3.3* 3.3*   < > 4.5   ANEU  --   --   --  1.7  --  1.7   ANEUTAUTO 0.4* 0.9*  --   --    < >  --    ALYM  --   --   --  0.7*  --  1.5   ALYMPAUTO 0.8 0.8  --   --    <  >  --    TY  --   --   --  0.4  --  0.6   AMONOAUTO 0.1 0.1  --   --    < >  --    AEOS  --   --   --  0.5  --  0.5   AEOSAUTO 0.4 0.5  --   --    < >  --    ABSBASO 0.1 0.0  --   --    < >  --    HGB 11.2* 11.1* 11.1* 11.5*   < > 11.2*   HCT 33.1* 32.8* 33.5* 34.5*   < > 34.9*    200 169 160   < > 201    < > = values in this interval not displayed.       Clotting Studies    Recent Labs   Lab Test 07/04/23  1944 01/15/23  0540 08/09/21  1124 08/18/20  1307   INR 1.29* 0.90 0.96 1.08   PTT  --  28  --  29       Iron Testing    Recent Labs   Lab Test 09/11/23  1513 04/15/23  1002 04/12/23  1112 02/02/23  0840 01/31/23  1049 01/21/23  0715 01/20/23  0746 01/07/21  0825 12/03/20  1647   IRON  --   --   --   --  70  --  153  --   --    FEB  --   --   --   --  258  --  244  --   --    IRONSAT  --   --   --   --  27  --  63*  --   --    DAWSON  --   --   --   --  430*  --  717*  --   --    MCV 89   < > 96   < >  --    < > 93   < > 96   B12  --   --  2,476*  --   --   --   --   --  824    < > = values in this interval not displayed.       Markers  No lab results found.    Invalid input(s): FETOPROTEIN, SERUM, AFP    Autoimmune Testing   No lab results found.    Invalid input(s): ANCAB, PANCA, CANCA    Arterial Blood Gas Testing    Recent Labs   Lab Test 03/22/23  1550 03/20/23  0652 01/15/23  1711   PH  --  7.42  --    O2PER 20  --  45.0        Thyroid Studies     Recent Labs   Lab Test 07/06/23  0547 05/08/23  1532 04/18/22  0851   TSH 1.55 1.57 2.34       Urine Studies     Recent Labs   Lab Test 07/04/23 2015 06/12/23  1514 05/04/23  1535 04/15/23  0918 04/03/23  1530   URINEPH 6.0 6.0 6.0 6.5 6.5   NITRITE Negative Negative Negative Negative Negative   LEUKEST Negative Negative Large* Moderate* Negative   WBCU 11* 7* 71* * 1       Medication levels    Recent Labs   Lab Test 09/11/23  1513 07/10/23  1452 07/06/23  0547   VANCOMYCIN  --   --  16.7   TACROL 6.3   < > 10.3   MPACID 2.82   < >  --    MPAG 23.3*    < >  --     < > = values in this interval not displayed.       CSF testing   No lab results found.    Invalid input(s): CADAM, EVPCR, ENTPCR, ENTEROVIRUS    Microbiology:  Fungal testing  Recent Labs   Lab Test 12/03/20  1647 10/08/20  1300   AM3 <0.10  --    HIFUNG  --  <1:8   COFUNG  --  <1:2   FUNBL  --  0.3       Beta D Glucan levels (Fungitell assay)    No results found for: FGTL, FGTLI     Last Culture results   Culture   Date Value Ref Range Status   07/04/2023 10,000-50,000 CFU/mL Enterococcus faecalis (A)  Final   07/04/2023 <10,000 CFU/mL Urogenital cooper  Final   07/04/2023 No Growth  Final   07/04/2023 No Growth  Final   05/04/2023 No Growth  Final   05/04/2023 No Growth  Final   05/04/2023 >100,000 CFU/mL Escherichia coli (A)  Final   04/15/2023 50,000-100,000 CFU/mL Escherichia coli (A)  Final   04/15/2023 10,000-50,000 CFU/mL Enterococcus faecalis (A)  Final   03/20/2023 No Growth  Final   03/20/2023 No Growth  Final   03/19/2023 50,000-100,000 CFU/mL Enterococcus faecalis (A)  Final   03/19/2023 <10,000 CFU/mL Urogenital cooper  Final   01/23/2023 50,000-100,000 CFU/mL Enterococcus faecalis (A)  Final     Comment:     Susceptibilities done on previous cultures   01/19/2023 >100,000 CFU/mL Enterococcus faecalis (A)  Final     Culture Micro   Date Value Ref Range Status   12/03/2020 No growth  Final   08/14/2020 >100,000 colonies/mL  Escherichia coli   (A)  Final         Last checks of Clostridioides difficile testing  Recent Labs   Lab Test 07/05/23  1318 05/04/23 2010 03/20/23  0853   CDBPCT Negative Negative Negative       No components found for: AFBSTN    Syphilis Testing  Invalid input(s): [id number]    Tick Testing  No lab results found.    Invalid input(s): APHAGM    ASO Testing  Invalid input(s): [id number]    Quantiferon testing   Recent Labs   Lab Test 09/11/23  1513 09/05/23  1451 09/15/20  1347 08/18/20  1307   TBRST  --   --   --  Negative   LYMPH 44 33   < > 23.9    < > = values in this  interval not displayed.       Infection Studies to assess Diarrhea  Recent Labs   Lab Test 07/05/23  1318 05/04/23 2010 03/22/23  2257 03/20/23  0853 12/09/20  2000 10/08/20  1339 10/08/20  1338   EPSTX1 Not Detected Not Detected  --  Not Detected   < > Not Detected  --    EPSTX2 Not Detected Not Detected  --  Not Detected   < > Not Detected  --    ADENOVIRUSAG  --   --   --   --   --   --  Negative   MSPORT  --   --   --   --   --   --  No Microsporidia found  Chromotrope stain reveals no Microsporidia spores in fecal specimen. Consider other causes   for symptoms.  Divya Lugo M.D., Medical Director  10/9/20     CRYSPT  --   --   --   --   --  No oocysts of Cryptosporidium species, Cyclospora cayetanensis, or Cystoisospora   (Isospora) nancy found    A modified acid fast stain reveals no oocysts of Cryptosporidium, Cyclospora, or   Cystoisospora (Isospora). Consider other causes for symptoms  Divya Lugo M.D., Medical Director  10/9/20    --    CRYPTR  --   --   --   --   --  Negative  --    POPRT  --   --  Negative  --    < > Routine parasitology exam negative  Cryptosporidium, Cyclospora, and Microsporidia are not readily detected by this method. A   single negative specimen does not rule out parasitic infection.    --    EPCAMP Not Detected Not Detected  --  Not Detected   < > Not Detected  --    EPSALM Not Detected Not Detected  --  Not Detected   < > Not Detected  --    EPSHGL Not Detected Not Detected  --  Not Detected   < > Not Detected  --    EPVIB Not Detected Not Detected  --  Not Detected   < > Not Detected  --    EPROTA Not Detected Not Detected  --  Not Detected   < > Not Detected  --    EPNORO Not Detected Not Detected  --  Not Detected   < > Not Detected  --    EPYER Not Detected Not Detected  --  Not Detected   < > Not Detected  --     < > = values in this interval not displayed.       Virology:  Coronavirus-19 testing    Recent Labs   Lab Test 07/04/23 1952 03/19/23  1918  01/15/23  0538 03/21/22  1911 10/30/21  0816 09/08/21  1400 08/07/21  1057 05/26/21  1134 12/10/20  0959 12/03/20  1647   CD19  --   --   --   --   --   --   --   --   --  17   ACD19  --   --   --   --   --   --   --   --   --  532   [id number] Negative Negative Negative Negative  --   --    < >  --  Not Detected  --    [id number]  --   --   --   --   --   --   --   --  Nasopharyngeal  --    COVIDPCREXT  --   --   --   --  Not Detected Not Detected  --  Not Detected  --   --     < > = values in this interval not displayed.       Respiratory virus (non-coronavirus-19) testing    No lab results found.    CMV viral loads    CMV DNA IU/mL   Date Value Ref Range Status   07/17/2023 Not Detected Not Detected IU/mL Final   07/05/2023 Not Detected Not Detected IU/mL Final   06/15/2023 Not Detected Not Detected IU/mL Final   05/25/2023 Not Detected Not Detected IU/mL Final   05/04/2023 Not Detected Not Detected IU/mL Final   03/20/2023 Not Detected Not Detected IU/mL Final   03/06/2023 Not Detected Not Detected IU/mL Final   01/15/2023 Not Detected Not Detected IU/mL Final     CMV DNA IU/mL, Instrument   Date Value Ref Range Status   09/11/2023 468 (H) <1 IU/mL Final   09/05/2023 4,580 (H) <1 IU/mL Final   08/22/2023 2,340 (H) <1 IU/mL Final     CMV log   Date Value Ref Range Status   09/11/2023 2.7  Final   09/05/2023 3.7  Final   08/22/2023 3.4  Final       CMV resistance testing  No lab results found.  No results found for: CMVCID, CMVFOS, CMVGAN    No results found for: H6RES    EBV DNA Copies/mL   Date Value Ref Range Status   08/22/2023 <500 (A) Not Detected copies/mL Final     Comment:     EBV DNA Detected below the reportable range of 500 copies/mL   07/04/2023 <500 (A) Not Detected copies/mL Final     Comment:     EBV DNA Detected below the reportable range of 500 copies/mL   05/04/2023 Not Detected Not Detected copies/mL Final   03/20/2023 Not Detected Not Detected copies/mL Final       BK viral loads   Recent  Labs   Lab Test 08/28/23  1453 08/22/23  1448 07/24/23  1509 07/05/23  1203 06/26/23  1507 05/25/23  1552 05/18/23  1535 05/04/23  2159 04/12/23  1112   BKRES Not Detected Not Detected Not Detected Not Detected Not Detected Not Detected Not Detected Not Detected Not Detected       Parvovirus Testing  No lab results found.    Invalid input(s): PRVRES    Adenovirus Testing  No lab results found.    Invalid input(s): ADENAB, ADENOVIRUS, ADQT    Hepatitis B Testing     Recent Labs   Lab Test 01/15/23  0540 08/18/20  1307   AUSAB 134.22 212.08*   HBCAB Nonreactive Nonreactive   HEPBANG Nonreactive Nonreactive     Was the last Hepatitis B E antigen positive?   No results found for: HBEAGN     Hepatitis C Antibody   Date Value Ref Range Status   01/15/2023 Nonreactive Nonreactive Final   08/18/2020 Nonreactive NR^Nonreactive Final     Comment:     Assay performance characteristics have not been established for newborns,   infants, and children         CMV Antibody IgG   Date Value Ref Range Status   01/15/2023 No detectable antibody. No detectable antibody.  Final   08/18/2020 <0.2 0.0 - 0.8 AI Final     Comment:     Negative  Antibody index (AI) values reflect qualitative changes in antibody   concentration that cannot be directly associated with clinical condition or   disease state.       Varicella Zoster Virus Antibody IgG   Date Value Ref Range Status   08/18/2020 4.2 (H) 0.0 - 0.8 AI Final     Comment:     Positive, suggests prev. exposure and probable immunity  Antibody index (AI) values reflect qualitative changes in antibody   concentration that cannot be directly associated with clinical condition or   disease state.       EBV Capsid Antibody IgG   Date Value Ref Range Status   01/15/2023 Positive (A) No detectable antibody. Final     Comment:     Suggests recent or past exposure.   08/18/2020 >8.0 (H) 0.0 - 0.8 AI Final     Comment:     Positive, suggests recent or past exposure  Antibody index (AI) values  reflect qualitative changes in antibody   concentration that cannot be directly associated with clinical condition or   disease state.       EBV Capsid Antibody IgM   Date Value Ref Range Status   01/15/2023 No detectable antibody. No detectable antibody. Final       No components found for: [id number]    Last Pathology Report   Case Report   Date Value Ref Range Status   03/23/2023   Final    Surgical Pathology Report                         Case: [id number]                                  Authorizing Provider:  Ana Cardenas         Collected:           03/23/2023 09:58 AM                                 MD Sophia                                                                Ordering Location:     [facility]          Received:            03/23/2023 10:25 AM                                 Endoscopy                                                                    Pathologist:           Jesusita Calvert MD                                                          Specimens:   A) - Large Intestine, Colon, right colon                                                            B) - Large Intestine, Colon, left colon                                                     Clinical Information   Date Value Ref Range Status   03/23/2023   Final    Diarrhea        Final Diagnosis   Date Value Ref Range Status   06/05/2023   Final    Peripheral blood, morphology:  - Moderate anemia, normocytic and normochromic.  - Moderate leukopenia with neutropenia.  - Platelets quantitatively within normal limits and without diagnostic morphologic abnormality.  - Negative for schistocytes or definitive morphologic features of hemolysis.  - Negative for overt features of myelodysplasia or circulating blasts.    See comment.    COMMENT:  Normocytic anemia is nonspecific and may be attributable to multiple overlapping etiologies, including chronic disease, renal and/or endocrine disease, blood loss, iron deficiency (in some  patients), and/or bone marrow suppression (by underlying infection, various nutritional deficiencies, toxicities, alcohol use, or medications).  Neutropenia may be idiopathic or attributable to multiple overlapping etiologies, including medications, infection, alcohol use, autoimmune disorders, nutritional deficiencies, and/or endocrinopathies.  Neutropenia may also be normally seen in some ethnic backgrounds.  Clinical correlation is recommended.         Imaging:  Results for orders placed or performed during the hospital encounter of 07/04/23   XR Chest Port 1 View    Narrative    Exam: XR CHEST PORT 1 VIEW, 7/4/2023 8:15 PM    Indication: fever    Comparison: 1/15/2023    Findings:   The cardiomediastinal silhouette and pulmonary vasculature are within  normal limits. No pleural effusion or pneumothorax. No focal airspace  opacity.      Impression    Impression: No acute airspace disease.    GUS AVILA DO         SYSTEM ID:  [id number]   US Renal Transplant with Doppler    Narrative    EXAMINATION: US RENAL TRANSPLANT,  7/4/2023 10:07 PM     COMPARISON: Renal transplant ultrasounds 5/4/2023    HISTORY: fever, sepsis, dysuria    TECHNIQUE:  Grey-scale, color Doppler and spectral flow analysis.    FINDINGS:  The transplant kidney is located right lower quadrant, and measures  14.1 cm. Parenchyma is of normal thickness and echogenicity. No focal  lesions. Prominent proximal ureter without significant hydronephrosis.  No perinephric fluid collection.    Renal artery flow:   220 cm/sec peak systolic at hilum (previously 121 cm/s)  490 cm/sec peak systolic at anastomosis. (Previously 449 cm/s)  385 cm/sec peak systolic at mid renal artery (previously 141 cm/s)  Arcuate artery resistive indices (upper to lower): 0.67, 0.60, 0.58    Renal Vein Flow:  42 cm/sec at hilum.   67 cm/sec at anastomosis.  42 cm/s at mid renal vein    Iliac artery flow:  182 cm/sec peak systolic above anastomosis.  151 cm/sec peak systolic  below anastomosis.    Iliac vein flow:  Patent above and below the anastomosis.      Impression    IMPRESSION:   1. Patent  Doppler evaluation of the renal transplant vasculature.  2. Elevated velocity at the renal artery anastomosis (490 cm/s) and  mid renal artery (385 cm/s), may represent stenosis.  3. Normal grayscale evaluation of the right lower quadrant transplant  kidney.    I have personally reviewed the examination and initial interpretation  and I agree with the findings.    DAR BISWAS MD         SYSTEM ID:  [id number]   US Lower Extremity Venous Duplex Bilateral    Narrative    EXAMINATION: DOPPLER VENOUS ULTRASOUND OF BILATERAL LOWER EXTREMITIES,  7/6/2023 10:18 AM     COMPARISON: None.    HISTORY: Fevers    TECHNIQUE:  Gray-scale evaluation with compression, spectral flow and  color Doppler assessment of the deep venous system of both legs from  groin to knee, and then at the ankles.    FINDINGS:  In both lower extremities, the common femoral, femoral, popliteal,  peroneal, and posterior tibial veins demonstrate normal  compressibility and blood flow.      Impression    IMPRESSION:  No evidence of deep venous thrombosis in either lower extremity.       I have personally reviewed the examination and initial interpretation  and I agree with the findings.    GUS AVILA DO         SYSTEM ID:  [id number]   CT Chest/Abdomen/Pelvis w Contrast    Narrative    EXAMINATION: CT CHEST/ABDOMEN/PELVIS W CONTRAST, 7/6/2023 12:07 PM    INDICATION: Hx kidney transplant 1/2023; immunosuppressed. Fevers of  unknown origing >103F. Evaluate for infection.    COMPARISON STUDY: CT abdomen and pelvis stone protocol 3/19/2023    TECHNIQUE: CT scan of the chest, abdomen and pelvis was performed on  multidetector CT scanner using volumetric acquisition technique and  images were reconstructed in multiple planes with variable thickness  and reviewed on dedicated workstations.     CONTRAST: iopamidol (ISOVUE-370) solution 90  mL.   Without oral  contrast.    CT scan radiation dose is optimized to minimum requisite dose using  automated dose modulation techniques.    FINDINGS:    Chest:   Mediastinum: Visualized thyroid gland is within normal limits. Cardiac  size is within normal limits. No thoracic lymphadenopathy.    Pleura: No pleural effusion or pneumothorax.     Lungs: No suspicious nodule or consolidation.    Abdomen and Pelvis:  Liver: No mass. No intrahepatic biliary ductal dilation.    Biliary System: Normal gallbladder. No extrahepatic biliary ductal  dilation.    Pancreas: No mass or pancreatic ductal dilation.    Adrenal glands: No mass or nodules    Spleen: Normal.    Kidneys: Atrophic native left kidney with a few too small to  characterize hypodensities. The native right kidney is absent Right  lower quadrant transplant kidney demonstrates nonspecific perinephric  stranding without significant change compared to 3/19/2023, although  comparison is limited due to stone protocol without contrast on prior  CT. Patchy areas of hypoenhancement at the superior and inferior poles  of the transplant kidney. Expected postoperative changes with surgical  clips in the right lower quadrant. No suspicious mass, obstructing  calculus or hydronephrosis.    Gastrointestinal tract: Normal appendix. Normal caliber small bowel.    Mesentery/peritoneum/retroperitoneum: No mass. Trace fluid in the  pelvis.    Lymph nodes: Multiple sub centimeter para-aortic lymph nodes.    Vasculature: No aneurysm of the abdominal aorta.     Pelvis: Urinary bladder is normal.  Prostate is within normal limits.    Osseous structures: No aggressive or acute osseous lesion.      Soft tissues: Small fat containing ventral/umbilical hernia.   Unchanged soft tissue thickening/scar over the right lower quadrant  and at the midline lower abdomen.      Impression    IMPRESSION: Postoperative changes of kidney transplant. There is mild  nonspecific stranding around  transplant kidney with a small amount of  fluid in the pelvis and areas of patchy hypoenhancement within the  transplant kidney. These likely represents chronic/benign findings,  however in the appropriate clinical context findings may represent  mild infection.    I have personally reviewed the examination and initial interpretation  and I agree with the findings.    DIEGO PRICE MD         SYSTEM ID:  [id number]   Echo Complete     Value    LVEF  55-60%    Narrative    [id number]  [id number]  [id number]  [id number]^VITOR^LINA^RODRIGUE     [facility],[place]  Echocardiography Laboratory  [address] [place] [id number]     Name: CAROLYN HIGHTOWER  MRN: [id number]  : 1985  Study Date: 2023 02:30 PM  Age: 38 yrs  Gender: Male  Patient Location: [facility]  Reason For Study: Fever  Ordering Physician: LINA ADLER  Performed By: Ezekiel Manzano     BSA: 1.9 m2  Height: 72 in  Weight: 150 lb  HR: 83  BP: 115/77 mmHg  ______________________________________________________________________________  Procedure  Complete Portable Echo Adult. Optison (NDC #7714-2979-84) given intravenously.  Patient was given 6 ml mixture of 3 ml Optison and 6 ml saline. 3 ml wasted.  ______________________________________________________________________________  Interpretation Summary  No vegetation identified, however this does not exclude endocarditis. Left  ventricular size, wall motion and function are normal. The ejection fraction  is 55-60%.     Right ventricular function, chamber size, wall motion, and thickness are  normal.     No vegetation identified, however this does not exclude endocarditis.     No pericardial effusion is present.     No significant valvular abnormalities present.     The inferior vena cava is normal.     Comapred to 2022 there are no signficant differences.  ______________________________________________________________________________  Left Ventricle  Left  ventricular size, wall motion and function are normal. The ejection  fraction is 55-60%. Left ventricular diastolic function is normal.     Right Ventricle  Right ventricular function, chamber size, wall motion, and thickness are  normal.     Atria  The right atria appears normal. The left atrium appears normal.     Mitral Valve  The mitral valve is normal.     Aortic Valve  Aortic valve is normal in structure and function. The aortic valve is  tricuspid.     Tricuspid Valve  The tricuspid valve is normal. The peak velocity of the tricuspid regurgitant  jet is not obtainable. Pulmonary artery systolic pressure cannot be assessed.     Pulmonic Valve  The pulmonic valve is normal.     Vessels  The inferior vena cava is normal. Sinuses of Valsalva 2.9 cm. Ascending aorta  3.0 cm. IVC diameter <2.1 cm collapsing >50% with sniff suggests a normal RA  pressure of 3 mmHg.     Pericardium  No pericardial effusion is present.     Miscellaneous  No significant valvular abnormalities present.     Compared to Previous Study  Comapred to 2022 there are no signficant differences.  ______________________________________________________________________________  MMode/2D Measurements & Calculations  IVSd: 0.84 cm  LVIDd: 4.9 cm  LVIDs: 3.0 cm  LVPWd: 0.94 cm  FS: 40.1 %  LV mass(C)d: 152.0 grams  LV mass(C)dI: 80.6 grams/m2  Ao root diam: 2.9 cm  asc Aorta Diam: 3.0 cm  LVOT diam: 2.0 cm  LVOT area: 3.2 cm2  LA Volume (BP): 48.3 ml     LA Volume Index (BP): 25.6 ml/m2  RWT: 0.38  TAPSE: 2.5 cm     Doppler Measurements & Calculations  MV E max corrie: 101.0 cm/sec  MV A max corrie: 78.1 cm/sec  MV E/A: 1.3  MV dec slope: 486.9 cm/sec2  MV dec time: 0.21 sec  Ao V2 max: 177.1 cm/sec  Ao max P.5 mmHg  Ao V2 mean: 128.7 cm/sec  Ao mean P.6 mmHg  Ao V2 VTI: 34.9 cm  SHANNAN(I,D): 2.7 cm2  SHANNAN(V,D): 2.6 cm2  LV V1 max P.5 mmHg  LV V1 max: 145.7 cm/sec  LV V1 VTI: 29.4 cm  SV(LVOT): 92.6 ml  SI(LVOT): 49.1 ml/m2  PA V2 max: 123.2  cm/sec  PA max P.1 mmHg  PA acc time: 0.16 sec  AV Ash Ratio (DI): 0.82  SHANNAN Index (cm2/m2): 1.4  E/E' av.6  Lateral E/e': 5.9     Medial E/e': 13.3  RV S Ash: 17.8 cm/sec     ______________________________________________________________________________  Report approved by: Aleena Shelton 2023 03:17 PM               Virtual Visit Details    Type of service:  Video Visit   Video Start Time: 3:55  Video End Time:4:17pm    Originating Location (pt. Location): Home    Distant Location (provider location):  On-site  Platform used for Video Visit: Chela    Unable to edit existing provider note, new note created    Sincerely,    CAIN TAYLOR MD

## 2023-09-13 NOTE — PROGRESS NOTES
Virtual Visit Details    Type of service:  Video Visit   Video Start Time: 3:55  Video End Time:4:17pm    Originating Location (pt. Location): Home    Distant Location (provider location):  On-site  Platform used for Video Visit: Chela    Unable to edit existing provider note, new note created

## 2023-09-13 NOTE — NURSING NOTE
Is the patient currently in the state of [place]? YES    Visit mode:VIDEO    If the visit is dropped, the patient can be reconnected by: VIDEO VISIT: Text to cell phone:   Telephone Information:   Mobile [phone]       Will anyone else be joining the visit? NO  (If patient encounters technical issues they should call [phone]    How would you like to obtain your AVS? MyChart    Are changes needed to the allergy or medication list? Pt stated no changes to allergies and Pt stated no med changes    Reason for visit: BHARATH MONTES

## 2023-09-13 NOTE — PROGRESS NOTES
[facility]  Transplant Infectious Disease Clinic Note:  Follow UP Patient     Patient:  Chip Fowler, Date of birth 1985, Medical record number [id number]  Date of Visit:  09/13/2023           Assessment and Recommendations:   Recommendations:  -He was also recommend to keep hydrated and schedule voiding throughout the day.   -he has been on nitrofurantion for 2 mo with no UTIs. Ideally I would like to continue it longer but there are serious side effects associated with any prolonged abx and with nitrofurantoin, could cause diffuse interstitial pneumonitis and pulmonary fibrosis. Therefore, I will switch him to  Fosfomycin as he really wants to be on suppression to prevent another UTI. Will give for 2 mo and reassess. He is also likely colonized with Ecoli and E. Faecalis.  -Continue valcyte 900mg bid daily (renal adjust). Will continue until CMV VL <200 2x one week apart, after which we can stop the valcyte. There is no strong evidence that secondary prophylaxis is any benefit and given his leukopenia, will like to stop it soon. Trend WBC while on valcyte and if continues to decrease, will recommend G-CSF as he is close to completing therapy.   -f/u EBV VL per protocol.   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    -Baseline Cr 1.4-1.6. Renal US patent  - Tacrolimus goal level 8-10.   - Myfortic 540 mg BID per protocol in the setting of previously high levels, leukopenia, and infection.        1. Severe sepsis. - resolved   2. E.Coli UTI with recurrent UTI, on suppression with nitrofurantonin  The UTI is mostly with E faecalis since 1/2023 except last infection in 4/2023 with E faecalis and E coli.   The current UTI is also due to E coli. Susceptible to Cipro and finished 14d course.    -on 6/2/23 The pt reports another UTI sx (incresaed urgency and discomfort) after having vaginal sex on late  Sunday afteroon. He was reminded to void after a sexual encounter. He took a script of nitrofurantoin BID for 7d and is helping him and his symptoms are improving     3. E Faecalis Pyelonephritis 7/4/23, on suppression with nitrofurantonin  Admitted on 7/4/23 for pyelonephritis UC + 10-50k E faecalis he was given Augmentin for 10 and started on prophy nitrofurantoin 100mg daily after. Unlike his prior UTIs this occur ace was not related to sexual activity. He was seen by urology on 7/19/23 and had a Cystoscopy with no source of UTIs are noted.      4. 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 9/5/23 it was 4580 with 468 VL on 9/11/23.     low level EBV Viremia   -7/6/23 CT chest Lymph nodes: Multiple sub centimeter para-aortic lymph nodes.  -at last check it was 8/22/23 <500     Prior issues:  1. Eosinophilia. The workup for eosinophilia and diarrhea was negative for fungal and parasitic infections that would account for the eosinophilia. Evaluated by hematology. The eosinophilia is likely reactive to foreign objects; the PD catheter was thought to be potentially the source as noted by hematology. The relatively increased tryptase level favors allergy (likely to PD catheter) to be the etiology of the eosinophilia rather than infectious processes or malignancies.   2. Chronic diarrhea since he was started on PD in 7/2020. Workup has been negative.   3. Positive Salmonella serology. 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 The patient was counseled against keeping the snakes after transplantation as they are source of recurrent Salmonella infection.  4. E coli in urine in 8/2020.   5. Group B Strep in urine  in 2/2020.   6. Urethral stenosis s/p reconstruction.      Other Infectious Disease issues include:  - QTc: 416 as of 3/23/23.   - PJP prophylaxis: bactrim.   - Serostatus: CMV D+/R- (s/p 6mo of valcye), EBV D+/R-, HSV1?/2?, VZV +  - Immunization status: This patient received the third dose of the COVID-19 vaccine on 1/26/2022. Otherwise due for the seasonal influenza vaccine and COVID-19 bivalent    50 minutes spent on the date of the encounter doing chart review, review of test results, interpretation of tests, patient visit and documentation          Interval history:   The pt was in the hospital on 7/4/213  for pyelonephritis from E faecalis he was given Augmentin for 10 and started on prophy nitrofurantoin 100mg daily after and has been on that since. He was seen by urologyon 7/19/23 and had a Cystoscopy with no source of UTIs are noted.        He was followed up by me on 8/17/23 over video and at that that time, he was doing well with no issues issues or UTIs while on nitrofurantonin. He is being seen by me again today for follow up.     He was seen again today on 9/13/23 over video. He had CMV D+/R- and was started on valcyte on 8/23 as his CMV VL the day prior was elevated on routine check per protocol. His VL is decreasing at 468 on 9/11/23. He is feeling well with no issues. He has no fevers, chills, no sob, vision issues, chest pain, cough, abd pain, n/v/d or dysuria. No fatigue. He continues to abstain from sexual activity as most of his prior UTIs were associated with sex.         Transplants:  1/15/2023 (Kidney); Postoperative day:  241.  Coordinator Veronica Edgar    Review of Systems:  CONSTITUTIONAL:  No fevers or chills. No night sweats.  EYES: negative for icterus or acute vision changes.   ENT:  negative for hearing loss, tinnitus or sore throat  RESPIRATORY:  negative for cough, sputum, dyspnea  CARDIOVASCULAR:  negative for chest pain, heart palpitations  GASTROINTESTINAL:  negative for nausea,  vomiting, diarrhea or constipation  GENITOURINARY:  negative for dysuria or hematuria.  HEME:  No easy bruising or bleeding  INTEGUMENT:  negative for rash or pruritus  NEURO:  Negative for headache or tremor.    Past Medical History:   Diagnosis Date     Bladder stones      Cardiomyopathy (H)      ESRD (end stage renal disease) on dialysis (H)      Hypertension      Kidney replaced by transplant 01/15/2023    DCD DDKT. Intermediate risk induction.     Migraines      Polysubstance abuse (H)      Solitary kidney, congenital      Urethral stone      Urethral stricture        Past Surgical History:   Procedure Laterality Date     BENCH KIDNEY  1/15/2023    Procedure: Bench kidney;  Surgeon: Tez Emerson MD;  Location: [facility] OR     BIOPSY  2020    renal, [facility]     COLONOSCOPY N/A 3/23/2023    Procedure: Colonoscopy;  Surgeon: Ana Cardenas MD;  Location: [facility] GI     COMBINED CYSTOSCOPY, LASER HOLMIUM LITHOTRIPSY URETER(S)       CYSTOSCOPY       CYSTOSCOPY FLEXIBLE, CYSTOSTOMY, INSERT TUBE SUPRAPUBIC, COMBINED N/A 2020    Procedure: CYSTOSCOPY, WITH SUPRAPUBIC CATHETER INSERTION;  Surgeon: Sam Mejia MD;  Location: [facility] OR     CYSTOSCOPY, OPEN EXCISION URETHRAL DIVERTICULUM, COMBINED N/A 2020    Procedure: EXCISION OF URETHRAL DIVERTICULUM X2;  Surgeon: Sam Mejia MD;  Location: [facility] OR     HERNIA REPAIR      infant     INSERT CATHETER PERITONEAL DIALYSIS       LASER HOLMIUM LITHOTRIPSY URETER(S), INSERT STENT, COMBINED N/A 2020    Procedure: CYSTOSCOPY, bladder and urethral stone extraction, removal of foreign body, urethral dilation, urethrotomy, laser on standby;  Surgeon: Sam Mejia MD;  [facility]: [facility]     REMOVE CATHETER PERITONEAL N/A 1/15/2023    Procedure: Remove catheter peritoneal;  Surgeon: Tez Emerson MD;  Location: [facility] OR     TRANSPLANT KIDNEY RECIPIENT  DONOR N/A 1/15/2023    Procedure: TRANSPLANT, KIDNEY,  RECIPIENT,  DONOR WITH DONOR URETER STENTING;  Surgeon: Tez Emerson MD;  Location: [facility] OR     urethral dilation       URETHROPLASTY WITH BUCCAL GRAFT N/A 2020    Procedure: URETHROPLASTY, USING BUCCAL MUCOSA GRAFT;  Surgeon: Sam Mejia MD;  Location: [facility] OR       Family History   Problem Relation Age of Onset     Alzheimer Disease Mother      Unknown/Adopted Father      No Known Problems Sister      No Known Problems Sister      Kidney Disease No family hx of        Social History     Social History Narrative     Not on file     Social History     Tobacco Use     Smoking status: Former     Packs/day: 0.50     Types: Cigarettes     Start date:      Quit date: 1/15/2023     Years since quittin.6     Smokeless tobacco: Never   Vaping Use     Vaping Use: Never used   Substance Use Topics     Alcohol use: Not Currently     Comment: quit alcohol 3-4 yrs ago     Drug use: Not Currently     Types: Methamphetamines, MDMA (Ecstasy)       Immunization History   Administered Date(s) Administered     COVID-19 Monovalent 18+ (Moderna) 2021, 2021, 2022     DTAP (<7y) 1984, 1984, 1984, 10/04/1985, 1989     Flu, Unspecified 1997, 2020, 10/06/2020, 10/27/2021     Hepatitis B (Peds <19Y) 1996, 1996, 1996     Hib, Unspecified 1986     Historical DTP/aP 1984, 1984, 1984, 10/04/1985, 1989     Influenza (intradermal) 2020     Influenza Vaccine >6 months (Alfuria,Fluzone) 2016     MMR 1985, 1996     Mantoux Tuberculin Skin Test 2020     Pneumo Conj 13-V (2010&after) 2020     Pneumococcal 23 valent 2020     Pneumococcal, Unspecified 2020, 2021     Poliovirus, inactivated (IPV) 1984, 1984, 1984, 1989     TD,PF 7+ (Tenivac) 1996     TDAP Vaccine (Adacel) 2020     Td (Adult), Adsorbed 1996        Patient Active Problem List   Diagnosis     HTN, kidney transplant related     Urethral stricture     Methamphetamine abuse, episodic (H)     Urethral diverticulum     Allergic rhinitis, unspecified seasonality, unspecified trigger     Stage 3a chronic kidney disease (H)     Kidney replaced by transplant     Immunosuppressed status (H)     Aftercare following organ transplant     Need for pneumocystis prophylaxis     Anemia in chronic renal disease     Secondary renal hyperparathyroidism (H)     Vitamin D deficiency     Hypomagnesemia     Diarrhea     Urinary tract infection without hematuria, site unspecified     Metabolic acidosis     EBV (Aaron-Barr virus) viremia     Pyelonephritis of transplanted kidney       No outpatient medications have been marked as taking for the 9/13/23 encounter (Appointment) with Andrzej Willis MD.       No Known Allergies           Physical Exam:   Vitals were reviewed.  All vitals stable  There were no vitals taken for this visit.  Wt Readings from Last 4 Encounters:   08/23/23 68.9 kg (152 lb)   07/20/23 70.4 kg (155 lb 1.6 oz)   07/19/23 68 kg (150 lb)   07/06/23 69.6 kg (153 lb 6.4 oz)       Exam:  GENERAL: well-developed, well-nourished, alert, oriented, in no acute distress over video.  HEAD: Head is normocephalic, atraumatic   EYES: Eyes have anicteric sclerae.    NEUROLOGIC: Grossly nonfocal.         Laboratory Data:     Absolute CD4   Date Value Ref Range Status   12/03/2020 1,325 441 - 2,156 cells/uL Final       Inflammatory Markers    Recent Labs   Lab Test 01/20/23  0746   G6PD 15.5       Immune Globulin Studies     Recent Labs   Lab Test 08/28/23  1453 12/03/20  1647   IGG 1,004 1,152   IGM  --  64   IGE  --  18   IGA  --  268       Metabolic Studies    Recent Labs   Lab Test 09/11/23  1513 09/05/23  1451 08/28/23  1453 08/22/23  1448 07/31/23  1512 07/24/23  1519 07/06/23  1807 07/06/23  0933 07/05/23  1304 07/04/23  1944    137 137 137   < > 139   < >   --    < > 133*   POTASSIUM 4.6 4.3 4.6 4.2   < > 4.4   < >  --    < > 4.0   CHLORIDE 107 107 105 105   < > 104   < >  --    < > 102   CO2 23 21* 23 23   < > 24   < >  --    < > 17*   ANIONGAP 8 9 9 9   < > 11   < >  --    < > 14   BUN 25.5* 31.5* 24.8* 20.6*   < > 23.4*   < >  --    < > 26.1*   CR 1.54* 1.61* 1.54* 1.50*   < > 1.65*   < >  --    < > 1.86*   GFRESTIMATED 58* 55* 58* 60*   < > 54*   < >  --    < > 47*   GLC 73 75 84 72   < > 68*   < >  --    < > 139*   VISHNU 9.2 9.0 8.6 9.1   < > 9.7   < >  --    < > 9.5   PHOS  --   --   --  2.6  --   --    < >  --    < >  --    MAG  --   --   --  1.5*  --   --    < >  --    < >  --    URIC  --   --   --   --   --  5.5  --   --   --   --    LACT  --   --   --   --   --   --   --  0.9   < > 1.5   CKT  --   --   --   --   --   --   --   --   --  88    < > = values in this interval not displayed.       Hepatic Studies    Recent Labs   Lab Test 07/24/23  1519 07/04/23  1944 06/05/23  1527 06/01/23  1518   BILITOTAL 0.3 0.4 0.3 0.3   DBIL <0.20  --   --  <0.20   ALKPHOS 59 53 55 52   PROTTOTAL 7.2 7.4 6.9 6.9   ALBUMIN 4.3 4.2 4.0 4.0   AST 29 27 24 28   ALT 20 14 22 30   LDH  --   --   --  367*       Pancreatitis testing    Recent Labs   Lab Test 08/22/23 1448 03/19/23  2155 01/15/23  0540   LIPASE  --  20  --    TRIG 114  --  115       Lipid testing    Recent Labs   Lab Test 08/22/23  1448 01/15/23  0540 04/18/22  0851   CHOL 100 177 161   HDL 32* 52 49   LDL 45 102* 96   TRIG 114 115 78       Gout Labs      Recent Labs   Lab Test 07/24/23  1519   URIC 5.5       Hematology Studies   Recent Labs   Lab Test 09/11/23  1513 09/05/23  1451 08/28/23  1453 08/22/23  1448 08/07/23  1441 07/31/23  1512   WBC 1.7* 2.4* 3.3* 3.3*   < > 4.5   ANEU  --   --   --  1.7  --  1.7   ANEUTAUTO 0.4* 0.9*  --   --    < >  --    ALYM  --   --   --  0.7*  --  1.5   ALYMPAUTO 0.8 0.8  --   --    < >  --    TY  --   --   --  0.4  --  0.6   AMONOAUTO 0.1 0.1  --   --    < >  --    AEOS  --   --    --  0.5  --  0.5   AEOSAUTO 0.4 0.5  --   --    < >  --    ABSBASO 0.1 0.0  --   --    < >  --    HGB 11.2* 11.1* 11.1* 11.5*   < > 11.2*   HCT 33.1* 32.8* 33.5* 34.5*   < > 34.9*    200 169 160   < > 201    < > = values in this interval not displayed.       Clotting Studies    Recent Labs   Lab Test 07/04/23  1944 01/15/23  0540 08/09/21  1124 08/18/20  1307   INR 1.29* 0.90 0.96 1.08   PTT  --  28  --  29       Iron Testing    Recent Labs   Lab Test 09/11/23  1513 04/15/23  1002 04/12/23  1112 02/02/23  0840 01/31/23  1049 01/21/23  0715 01/20/23  0746 01/07/21  0825 12/03/20  1647   IRON  --   --   --   --  70  --  153  --   --    FEB  --   --   --   --  258  --  244  --   --    IRONSAT  --   --   --   --  27  --  63*  --   --    DAWSON  --   --   --   --  430*  --  717*  --   --    MCV 89   < > 96   < >  --    < > 93   < > 96   B12  --   --  2,476*  --   --   --   --   --  824    < > = values in this interval not displayed.       Markers  No lab results found.    Invalid input(s): FETOPROTEIN, SERUM, AFP    Autoimmune Testing   No lab results found.    Invalid input(s): ANCAB, PANCA, CANCA    Arterial Blood Gas Testing    Recent Labs   Lab Test 03/22/23  1550 03/20/23  0652 01/15/23  1711   PH  --  7.42  --    O2PER 20  --  45.0        Thyroid Studies     Recent Labs   Lab Test 07/06/23  0547 05/08/23  1532 04/18/22  0851   TSH 1.55 1.57 2.34       Urine Studies     Recent Labs   Lab Test 07/04/23  2015 06/12/23  1514 05/04/23  1535 04/15/23  0918 04/03/23  1530   URINEPH 6.0 6.0 6.0 6.5 6.5   NITRITE Negative Negative Negative Negative Negative   LEUKEST Negative Negative Large* Moderate* Negative   WBCU 11* 7* 71* * 1       Medication levels    Recent Labs   Lab Test 09/11/23  1513 07/10/23  1452 07/06/23  0547   VANCOMYCIN  --   --  16.7   TACROL 6.3   < > 10.3   MPACID 2.82   < >  --    MPAG 23.3*   < >  --     < > = values in this interval not displayed.       CSF testing   No lab results  found.    Invalid input(s): CADAM, EVPCR, ENTPCR, ENTEROVIRUS    Microbiology:  Fungal testing  Recent Labs   Lab Test 12/03/20  1647 10/08/20  1300   AM3 <0.10  --    HIFUNG  --  <1:8   COFUNG  --  <1:2   FUNBL  --  0.3       Beta D Glucan levels (Fungitell assay)    No results found for: FGTL, FGTLI     Last Culture results   Culture   Date Value Ref Range Status   07/04/2023 10,000-50,000 CFU/mL Enterococcus faecalis (A)  Final   07/04/2023 <10,000 CFU/mL Urogenital cooper  Final   07/04/2023 No Growth  Final   07/04/2023 No Growth  Final   05/04/2023 No Growth  Final   05/04/2023 No Growth  Final   05/04/2023 >100,000 CFU/mL Escherichia coli (A)  Final   04/15/2023 50,000-100,000 CFU/mL Escherichia coli (A)  Final   04/15/2023 10,000-50,000 CFU/mL Enterococcus faecalis (A)  Final   03/20/2023 No Growth  Final   03/20/2023 No Growth  Final   03/19/2023 50,000-100,000 CFU/mL Enterococcus faecalis (A)  Final   03/19/2023 <10,000 CFU/mL Urogenital cooper  Final   01/23/2023 50,000-100,000 CFU/mL Enterococcus faecalis (A)  Final     Comment:     Susceptibilities done on previous cultures   01/19/2023 >100,000 CFU/mL Enterococcus faecalis (A)  Final     Culture Micro   Date Value Ref Range Status   12/03/2020 No growth  Final   08/14/2020 >100,000 colonies/mL  Escherichia coli   (A)  Final         Last checks of Clostridioides difficile testing  Recent Labs   Lab Test 07/05/23  1318 05/04/23 2010 03/20/23  0853   CDBPCT Negative Negative Negative       No components found for: AFBSTN    Syphilis Testing  Invalid input(s): [id number]    Tick Testing  No lab results found.    Invalid input(s): APHAGM    ASO Testing  Invalid input(s): [id number]    Quantiferon testing   Recent Labs   Lab Test 09/11/23  1513 09/05/23  1451 09/15/20  1347 08/18/20  1307   TBRST  --   --   --  Negative   LYMPH 44 33   < > 23.9    < > = values in this interval not displayed.       Infection Studies to assess Diarrhea  Recent Labs   Lab Test  07/05/23  1318 05/04/23 2010 03/22/23  2257 03/20/23  0853 12/09/20  2000 10/08/20  1339 10/08/20  1338   EPSTX1 Not Detected Not Detected  --  Not Detected   < > Not Detected  --    EPSTX2 Not Detected Not Detected  --  Not Detected   < > Not Detected  --    ADENOVIRUSAG  --   --   --   --   --   --  Negative   MSPORT  --   --   --   --   --   --  No Microsporidia found  Chromotrope stain reveals no Microsporidia spores in fecal specimen. Consider other causes   for symptoms.  Divya Lugo M.D., Medical Director  10/9/20     CRYSPT  --   --   --   --   --  No oocysts of Cryptosporidium species, Cyclospora cayetanensis, or Cystoisospora   (Isospora) nancy found    A modified acid fast stain reveals no oocysts of Cryptosporidium, Cyclospora, or   Cystoisospora (Isospora). Consider other causes for symptoms  Divya Lugo M.D., Medical Director  10/9/20    --    CRYPTR  --   --   --   --   --  Negative  --    POPRT  --   --  Negative  --    < > Routine parasitology exam negative  Cryptosporidium, Cyclospora, and Microsporidia are not readily detected by this method. A   single negative specimen does not rule out parasitic infection.    --    EPCAMP Not Detected Not Detected  --  Not Detected   < > Not Detected  --    EPSALM Not Detected Not Detected  --  Not Detected   < > Not Detected  --    EPSHGL Not Detected Not Detected  --  Not Detected   < > Not Detected  --    EPVIB Not Detected Not Detected  --  Not Detected   < > Not Detected  --    EPROTA Not Detected Not Detected  --  Not Detected   < > Not Detected  --    EPNORO Not Detected Not Detected  --  Not Detected   < > Not Detected  --    EPYER Not Detected Not Detected  --  Not Detected   < > Not Detected  --     < > = values in this interval not displayed.       Virology:  Coronavirus-19 testing    Recent Labs   Lab Test 07/04/23  1952 03/19/23  1919 01/15/23  0538 03/21/22  1911 10/30/21  0816 09/08/21  1400 08/07/21  1057 05/26/21  1134  12/10/20  0959 12/03/20  1647   CD19  --   --   --   --   --   --   --   --   --  17   ACD19  --   --   --   --   --   --   --   --   --  532   [id number] Negative Negative Negative Negative  --   --    < >  --  Not Detected  --    [id number]  --   --   --   --   --   --   --   --  Nasopharyngeal  --    COVIDPCREXT  --   --   --   --  Not Detected Not Detected  --  Not Detected  --   --     < > = values in this interval not displayed.       Respiratory virus (non-coronavirus-19) testing    No lab results found.    CMV viral loads    CMV DNA IU/mL   Date Value Ref Range Status   07/17/2023 Not Detected Not Detected IU/mL Final   07/05/2023 Not Detected Not Detected IU/mL Final   06/15/2023 Not Detected Not Detected IU/mL Final   05/25/2023 Not Detected Not Detected IU/mL Final   05/04/2023 Not Detected Not Detected IU/mL Final   03/20/2023 Not Detected Not Detected IU/mL Final   03/06/2023 Not Detected Not Detected IU/mL Final   01/15/2023 Not Detected Not Detected IU/mL Final     CMV DNA IU/mL, Instrument   Date Value Ref Range Status   09/11/2023 468 (H) <1 IU/mL Final   09/05/2023 4,580 (H) <1 IU/mL Final   08/22/2023 2,340 (H) <1 IU/mL Final     CMV log   Date Value Ref Range Status   09/11/2023 2.7  Final   09/05/2023 3.7  Final   08/22/2023 3.4  Final       CMV resistance testing  No lab results found.  No results found for: CMVCID, CMVFOS, CMVGAN    No results found for: H6RES    EBV DNA Copies/mL   Date Value Ref Range Status   08/22/2023 <500 (A) Not Detected copies/mL Final     Comment:     EBV DNA Detected below the reportable range of 500 copies/mL   07/04/2023 <500 (A) Not Detected copies/mL Final     Comment:     EBV DNA Detected below the reportable range of 500 copies/mL   05/04/2023 Not Detected Not Detected copies/mL Final   03/20/2023 Not Detected Not Detected copies/mL Final       BK viral loads   Recent Labs   Lab Test 08/28/23  1453 08/22/23  1448 07/24/23  1509 07/05/23  1203 06/26/23  1507  05/25/23  1552 05/18/23  1535 05/04/23  2159 04/12/23  1112   BKRES Not Detected Not Detected Not Detected Not Detected Not Detected Not Detected Not Detected Not Detected Not Detected       Parvovirus Testing  No lab results found.    Invalid input(s): PRVRES    Adenovirus Testing  No lab results found.    Invalid input(s): ADENAB, ADENOVIRUS, ADQT    Hepatitis B Testing     Recent Labs   Lab Test 01/15/23  0540 08/18/20  1307   AUSAB 134.22 212.08*   HBCAB Nonreactive Nonreactive   HEPBANG Nonreactive Nonreactive     Was the last Hepatitis B E antigen positive?   No results found for: HBEAGN     Hepatitis C Antibody   Date Value Ref Range Status   01/15/2023 Nonreactive Nonreactive Final   08/18/2020 Nonreactive NR^Nonreactive Final     Comment:     Assay performance characteristics have not been established for newborns,   infants, and children         CMV Antibody IgG   Date Value Ref Range Status   01/15/2023 No detectable antibody. No detectable antibody.  Final   08/18/2020 <0.2 0.0 - 0.8 AI Final     Comment:     Negative  Antibody index (AI) values reflect qualitative changes in antibody   concentration that cannot be directly associated with clinical condition or   disease state.       Varicella Zoster Virus Antibody IgG   Date Value Ref Range Status   08/18/2020 4.2 (H) 0.0 - 0.8 AI Final     Comment:     Positive, suggests prev. exposure and probable immunity  Antibody index (AI) values reflect qualitative changes in antibody   concentration that cannot be directly associated with clinical condition or   disease state.       EBV Capsid Antibody IgG   Date Value Ref Range Status   01/15/2023 Positive (A) No detectable antibody. Final     Comment:     Suggests recent or past exposure.   08/18/2020 >8.0 (H) 0.0 - 0.8 AI Final     Comment:     Positive, suggests recent or past exposure  Antibody index (AI) values reflect qualitative changes in antibody   concentration that cannot be directly associated with  clinical condition or   disease state.       EBV Capsid Antibody IgM   Date Value Ref Range Status   01/15/2023 No detectable antibody. No detectable antibody. Final       No components found for: [id number]    Last Pathology Report   Case Report   Date Value Ref Range Status   03/23/2023   Final    Surgical Pathology Report                         Case: [id number]                                  Authorizing Provider:  Ronald Ana         Collected:           03/23/2023 09:58 AM                                 MD Sophia                                                                Ordering Location:     [facility]          Received:            03/23/2023 10:25 AM                                 Endoscopy                                                                    Pathologist:           Jesusita Calvert MD                                                          Specimens:   A) - Large Intestine, Colon, right colon                                                            B) - Large Intestine, Colon, left colon                                                     Clinical Information   Date Value Ref Range Status   03/23/2023   Final    Diarrhea        Final Diagnosis   Date Value Ref Range Status   06/05/2023   Final    Peripheral blood, morphology:  - Moderate anemia, normocytic and normochromic.  - Moderate leukopenia with neutropenia.  - Platelets quantitatively within normal limits and without diagnostic morphologic abnormality.  - Negative for schistocytes or definitive morphologic features of hemolysis.  - Negative for overt features of myelodysplasia or circulating blasts.    See comment.    COMMENT:  Normocytic anemia is nonspecific and may be attributable to multiple overlapping etiologies, including chronic disease, renal and/or endocrine disease, blood loss, iron deficiency (in some patients), and/or bone marrow suppression (by underlying infection, various nutritional  deficiencies, toxicities, alcohol use, or medications).  Neutropenia may be idiopathic or attributable to multiple overlapping etiologies, including medications, infection, alcohol use, autoimmune disorders, nutritional deficiencies, and/or endocrinopathies.  Neutropenia may also be normally seen in some ethnic backgrounds.  Clinical correlation is recommended.         Imaging:  Results for orders placed or performed during the hospital encounter of 07/04/23   XR Chest Port 1 View    Narrative    Exam: XR CHEST PORT 1 VIEW, 7/4/2023 8:15 PM    Indication: fever    Comparison: 1/15/2023    Findings:   The cardiomediastinal silhouette and pulmonary vasculature are within  normal limits. No pleural effusion or pneumothorax. No focal airspace  opacity.      Impression    Impression: No acute airspace disease.    GUS AVILA DO         SYSTEM ID:  [id number]   US Renal Transplant with Doppler    Narrative    EXAMINATION: US RENAL TRANSPLANT,  7/4/2023 10:07 PM     COMPARISON: Renal transplant ultrasounds 5/4/2023    HISTORY: fever, sepsis, dysuria    TECHNIQUE:  Grey-scale, color Doppler and spectral flow analysis.    FINDINGS:  The transplant kidney is located right lower quadrant, and measures  14.1 cm. Parenchyma is of normal thickness and echogenicity. No focal  lesions. Prominent proximal ureter without significant hydronephrosis.  No perinephric fluid collection.    Renal artery flow:   220 cm/sec peak systolic at hilum (previously 121 cm/s)  490 cm/sec peak systolic at anastomosis. (Previously 449 cm/s)  385 cm/sec peak systolic at mid renal artery (previously 141 cm/s)  Arcuate artery resistive indices (upper to lower): 0.67, 0.60, 0.58    Renal Vein Flow:  42 cm/sec at hilum.   67 cm/sec at anastomosis.  42 cm/s at mid renal vein    Iliac artery flow:  182 cm/sec peak systolic above anastomosis.  151 cm/sec peak systolic below anastomosis.    Iliac vein flow:  Patent above and below the anastomosis.       Impression    IMPRESSION:   1. Patent  Doppler evaluation of the renal transplant vasculature.  2. Elevated velocity at the renal artery anastomosis (490 cm/s) and  mid renal artery (385 cm/s), may represent stenosis.  3. Normal grayscale evaluation of the right lower quadrant transplant  kidney.    I have personally reviewed the examination and initial interpretation  and I agree with the findings.    DAR BISWAS MD         SYSTEM ID:  [id number]   US Lower Extremity Venous Duplex Bilateral    Narrative    EXAMINATION: DOPPLER VENOUS ULTRASOUND OF BILATERAL LOWER EXTREMITIES,  7/6/2023 10:18 AM     COMPARISON: None.    HISTORY: Fevers    TECHNIQUE:  Gray-scale evaluation with compression, spectral flow and  color Doppler assessment of the deep venous system of both legs from  groin to knee, and then at the ankles.    FINDINGS:  In both lower extremities, the common femoral, femoral, popliteal,  peroneal, and posterior tibial veins demonstrate normal  compressibility and blood flow.      Impression    IMPRESSION:  No evidence of deep venous thrombosis in either lower extremity.       I have personally reviewed the examination and initial interpretation  and I agree with the findings.    GUS AVILA DO         SYSTEM ID:  [id number]   CT Chest/Abdomen/Pelvis w Contrast    Narrative    EXAMINATION: CT CHEST/ABDOMEN/PELVIS W CONTRAST, 7/6/2023 12:07 PM    INDICATION: Hx kidney transplant 1/2023; immunosuppressed. Fevers of  unknown origing >103F. Evaluate for infection.    COMPARISON STUDY: CT abdomen and pelvis stone protocol 3/19/2023    TECHNIQUE: CT scan of the chest, abdomen and pelvis was performed on  multidetector CT scanner using volumetric acquisition technique and  images were reconstructed in multiple planes with variable thickness  and reviewed on dedicated workstations.     CONTRAST: iopamidol (ISOVUE-370) solution 90 mL.   Without oral  contrast.    CT scan radiation dose is optimized to minimum  requisite dose using  automated dose modulation techniques.    FINDINGS:    Chest:   Mediastinum: Visualized thyroid gland is within normal limits. Cardiac  size is within normal limits. No thoracic lymphadenopathy.    Pleura: No pleural effusion or pneumothorax.     Lungs: No suspicious nodule or consolidation.    Abdomen and Pelvis:  Liver: No mass. No intrahepatic biliary ductal dilation.    Biliary System: Normal gallbladder. No extrahepatic biliary ductal  dilation.    Pancreas: No mass or pancreatic ductal dilation.    Adrenal glands: No mass or nodules    Spleen: Normal.    Kidneys: Atrophic native left kidney with a few too small to  characterize hypodensities. The native right kidney is absent Right  lower quadrant transplant kidney demonstrates nonspecific perinephric  stranding without significant change compared to 3/19/2023, although  comparison is limited due to stone protocol without contrast on prior  CT. Patchy areas of hypoenhancement at the superior and inferior poles  of the transplant kidney. Expected postoperative changes with surgical  clips in the right lower quadrant. No suspicious mass, obstructing  calculus or hydronephrosis.    Gastrointestinal tract: Normal appendix. Normal caliber small bowel.    Mesentery/peritoneum/retroperitoneum: No mass. Trace fluid in the  pelvis.    Lymph nodes: Multiple sub centimeter para-aortic lymph nodes.    Vasculature: No aneurysm of the abdominal aorta.     Pelvis: Urinary bladder is normal.  Prostate is within normal limits.    Osseous structures: No aggressive or acute osseous lesion.      Soft tissues: Small fat containing ventral/umbilical hernia.   Unchanged soft tissue thickening/scar over the right lower quadrant  and at the midline lower abdomen.      Impression    IMPRESSION: Postoperative changes of kidney transplant. There is mild  nonspecific stranding around transplant kidney with a small amount of  fluid in the pelvis and areas of patchy  hypoenhancement within the  transplant kidney. These likely represents chronic/benign findings,  however in the appropriate clinical context findings may represent  mild infection.    I have personally reviewed the examination and initial interpretation  and I agree with the findings.    DIEGO PRICE MD         SYSTEM ID:  [id number]   Echo Complete     Value    LVEF  55-60%    Narrative    [id number]  [id number]  [id number]  [id number]^VITOR^LINA^RODRIGUE     [facility]  Echocardiography Laboratory  [address] [place] [id number]     Name: CAROLYN HIGHTOWER  MRN: [id number]  : 1985  Study Date: 2023 02:30 PM  Age: 38 yrs  Gender: Male  Patient Location: [facility]  Reason For Study: Fever  Ordering Physician: LINA ADLER  Performed By: Ezekiel Manzano     BSA: 1.9 m2  Height: 72 in  Weight: 150 lb  HR: 83  BP: 115/77 mmHg  ______________________________________________________________________________  Procedure  Complete Portable Echo Adult. Optison (NDC #5440-4557-85) given intravenously.  Patient was given 6 ml mixture of 3 ml Optison and 6 ml saline. 3 ml wasted.  ______________________________________________________________________________  Interpretation Summary  No vegetation identified, however this does not exclude endocarditis. Left  ventricular size, wall motion and function are normal. The ejection fraction  is 55-60%.     Right ventricular function, chamber size, wall motion, and thickness are  normal.     No vegetation identified, however this does not exclude endocarditis.     No pericardial effusion is present.     No significant valvular abnormalities present.     The inferior vena cava is normal.     Comapred to 2022 there are no signficant differences.  ______________________________________________________________________________  Left Ventricle  Left ventricular size, wall motion and function are normal. The ejection  fraction is 55-60%.  Left ventricular diastolic function is normal.     Right Ventricle  Right ventricular function, chamber size, wall motion, and thickness are  normal.     Atria  The right atria appears normal. The left atrium appears normal.     Mitral Valve  The mitral valve is normal.     Aortic Valve  Aortic valve is normal in structure and function. The aortic valve is  tricuspid.     Tricuspid Valve  The tricuspid valve is normal. The peak velocity of the tricuspid regurgitant  jet is not obtainable. Pulmonary artery systolic pressure cannot be assessed.     Pulmonic Valve  The pulmonic valve is normal.     Vessels  The inferior vena cava is normal. Sinuses of Valsalva 2.9 cm. Ascending aorta  3.0 cm. IVC diameter <2.1 cm collapsing >50% with sniff suggests a normal RA  pressure of 3 mmHg.     Pericardium  No pericardial effusion is present.     Miscellaneous  No significant valvular abnormalities present.     Compared to Previous Study  Comapred to 2022 there are no signficant differences.  ______________________________________________________________________________  MMode/2D Measurements & Calculations  IVSd: 0.84 cm  LVIDd: 4.9 cm  LVIDs: 3.0 cm  LVPWd: 0.94 cm  FS: 40.1 %  LV mass(C)d: 152.0 grams  LV mass(C)dI: 80.6 grams/m2  Ao root diam: 2.9 cm  asc Aorta Diam: 3.0 cm  LVOT diam: 2.0 cm  LVOT area: 3.2 cm2  LA Volume (BP): 48.3 ml     LA Volume Index (BP): 25.6 ml/m2  RWT: 0.38  TAPSE: 2.5 cm     Doppler Measurements & Calculations  MV E max ash: 101.0 cm/sec  MV A max ash: 78.1 cm/sec  MV E/A: 1.3  MV dec slope: 486.9 cm/sec2  MV dec time: 0.21 sec  Ao V2 max: 177.1 cm/sec  Ao max P.5 mmHg  Ao V2 mean: 128.7 cm/sec  Ao mean P.6 mmHg  Ao V2 VTI: 34.9 cm  SHANNAN(I,D): 2.7 cm2  SHANNAN(V,D): 2.6 cm2  LV V1 max P.5 mmHg  LV V1 max: 145.7 cm/sec  LV V1 VTI: 29.4 cm  SV(LVOT): 92.6 ml  SI(LVOT): 49.1 ml/m2  PA V2 max: 123.2 cm/sec  PA max P.1 mmHg  PA acc time: 0.16 sec  AV Ash Ratio (DI): 0.82  SHANNAN Index  (cm2/m2): 1.4  E/E' av.6  Lateral E/e': 5.9     Medial E/e': 13.3  RV S Ash: 17.8 cm/sec     ______________________________________________________________________________  Report approved by: Aleena Shelton 2023 03:17 PM

## 2023-09-14 RX ORDER — GRANULES FOR ORAL 3 G/1
3 POWDER ORAL
Qty: 21 PACKET | Refills: 0 | Status: SHIPPED | OUTPATIENT
Start: 2023-09-14 | End: 2023-11-14

## 2023-09-15 ENCOUNTER — TELEPHONE (OUTPATIENT)
Dept: INFECTIOUS DISEASES | Facility: CLINIC | Age: 38
End: 2023-09-15

## 2023-09-15 DIAGNOSIS — Z94.0 KIDNEY REPLACED BY TRANSPLANT: ICD-10-CM

## 2023-09-15 DIAGNOSIS — D70.9 NEUTROPENIA (H): Primary | ICD-10-CM

## 2023-09-15 PROBLEM — B25.9 CMV (CYTOMEGALOVIRUS INFECTION) (H): Status: ACTIVE | Noted: 2023-08-23

## 2023-09-15 NOTE — LETTER
"2023        RE: Chip Fowler  [address] [facility] [address]  : 1985  MRN: [id number]      To Whom It May Concern,    I am writing on behalf of my patient, Chip Fowler to document the medical necessity of FOSFOMYCIN TROMETHAMINE 3 G PO  for the treatment of recurrent UTI's. This diagnosis code was denied as an acceptable diagnosis code for use of this treatment, so this letter provides information about the patient's medical history and diagnosis and a statement summarizing my treatment rationale.     Patient has had 3 hospitalizations for UTIs in the last 6 months complicated by pyelonephritis and sepsis.  Since he is post-kidney transplant, pyelonephritis is dangerous to his kidneys and can lead to recurrent complications like sepsis and expensive hospitalizations for IV antibiotics.     Nitrofurantoin has a risk for diffuse interstitial pneumonitis and pulmonary fibrosis with long-term use. This could lead to expensive work-ups and treatments and possibly hospitalizations. Fosfomycin is well tolerated and already FDA approved for treating urinary tract infections (UTIs). Fosfomycin is effective against E. Faecalis and E. Coli which he has been infected with in the past 6 months.     Fosfomycin has been shown to be effective for preventing the need for IV antibiotics in women with recurrent UTIs (Gama T et al. ;28(2):109-114).  Patients received Fosfomycin 1g every 72 hours for 1 to 6 doses. \"Fosfomycin success\" was achieved in 74% (78/105) of patients overall at a median follow-up of 1.7 years (IQR, 0.3-5.8 years; 76% success in the low-use group; 70% success in the high-use group; P = 0.61) (Table 1). The Hidalgo-Rosalba curve (Supplemental Digital Content 1, https://links.lww.com/FPMRS/A234) revealed a failure-free survival of approximately 80% in the low-use and high-use groups (62/78 and 22/27, respectively) at 1 year after fosfomycin; this survival rate was not " different between the 2 groups (P = 0.97), even at longer follow-up times. Diarrhea was the most common side effect (4%, 4/105).15     While this study did not include males, this evidence supports that the benefits outweigh risks for using it in our patient due to his hospitalization history and post-kidney transplant status.          Summary  In summary, Fosfomycin is medically necessary for this patient s medical condition. Please call my office at  [phone] if I can provide you with any additional information to approve my request. I look forward to receiving your timely response and approval of this request.     Sincerely,    Andrzej Willis MD    References:   PIPER Malloy et al. Fosfomycin prevents intravenous antibiotic therapy in women with recurrent urinary tract infections: a retrospective review. 2022;28(2):109-114. https://journals.lww.com/fpmrs/fulltext/2022/79447/fosf omycin_prevents_intravenous_antibiotic_therapy.8.aspx

## 2023-09-15 NOTE — TELEPHONE ENCOUNTER
PRIOR AUTHORIZATION DENIED    Medication: FOSFOMYCIN TROMETHAMINE 3 G PO PACK  Insurance Company: [employer] - Phone [phone] Fax [phone]  Denial Date: 9/15/2023  Denial Rational: Please See Attached  Appeal Information: Please See Attached  Patient Notified: Yes                            [facility] Prior Authorization Team   Phone: [phone]  Fax: [phone]

## 2023-09-15 NOTE — TELEPHONE ENCOUNTER
PA Initiation    Medication: FOSFOMYCIN TROMETHAMINE 3 G PO PACK  Insurance Company: [employer] - Phone [phone] Fax [phone]  Pharmacy Filling the Rx: [facility] - [place], [place] - [facility] [address]  Filling Pharmacy Phone: [phone]  Filling Pharmacy Fax: [phone]  Start Date: 9/14/2023    [facility] Prior Authorization Team   Phone: [phone]  Fax: [phone]

## 2023-09-16 RX ORDER — NITROFURANTOIN 25; 75 MG/1; MG/1
100 CAPSULE ORAL DAILY
Qty: 14 CAPSULE | Refills: 0 | Status: SHIPPED | OUTPATIENT
Start: 2023-09-16 | End: 2023-10-11

## 2023-09-18 ENCOUNTER — INFUSION THERAPY VISIT (OUTPATIENT)
Dept: INFUSION THERAPY | Facility: CLINIC | Age: 38
End: 2023-09-18
Attending: INTERNAL MEDICINE
Payer: MEDICARE

## 2023-09-18 VITALS
HEART RATE: 60 BPM | OXYGEN SATURATION: 100 % | RESPIRATION RATE: 16 BRPM | TEMPERATURE: 98.1 F | SYSTOLIC BLOOD PRESSURE: 127 MMHG | DIASTOLIC BLOOD PRESSURE: 79 MMHG

## 2023-09-18 DIAGNOSIS — D70.9 NEUTROPENIA, UNSPECIFIED TYPE (H): Primary | ICD-10-CM

## 2023-09-18 DIAGNOSIS — D70.9 NEUTROPENIA (H): ICD-10-CM

## 2023-09-18 DIAGNOSIS — Z94.0 KIDNEY REPLACED BY TRANSPLANT: ICD-10-CM

## 2023-09-18 DIAGNOSIS — B25.9 CMV (CYTOMEGALOVIRUS INFECTION) (H): ICD-10-CM

## 2023-09-18 DIAGNOSIS — Z94.0 KIDNEY REPLACED BY TRANSPLANT: Primary | ICD-10-CM

## 2023-09-18 LAB
ANION GAP SERPL CALCULATED.3IONS-SCNC: 9 MMOL/L (ref 7–15)
BASOPHILS # BLD MANUAL: 0.1 10E3/UL (ref 0–0.2)
BASOPHILS NFR BLD MANUAL: 6 %
BUN SERPL-MCNC: 25.4 MG/DL (ref 6–20)
CALCIUM SERPL-MCNC: 9 MG/DL (ref 8.6–10)
CHLORIDE SERPL-SCNC: 107 MMOL/L (ref 98–107)
CREAT SERPL-MCNC: 1.42 MG/DL (ref 0.67–1.17)
DEPRECATED HCO3 PLAS-SCNC: 22 MMOL/L (ref 22–29)
EGFRCR SERPLBLD CKD-EPI 2021: 64 ML/MIN/1.73M2
EOSINOPHIL # BLD MANUAL: 0.3 10E3/UL (ref 0–0.7)
EOSINOPHIL NFR BLD MANUAL: 20 %
ERYTHROCYTE [DISTWIDTH] IN BLOOD BY AUTOMATED COUNT: 13.9 % (ref 10–15)
GLUCOSE SERPL-MCNC: 94 MG/DL (ref 70–99)
HCT VFR BLD AUTO: 34.4 % (ref 40–53)
HGB BLD-MCNC: 11.8 G/DL (ref 13.3–17.7)
LYMPHOCYTES # BLD MANUAL: 0.9 10E3/UL (ref 0.8–5.3)
LYMPHOCYTES NFR BLD MANUAL: 57 %
MCH RBC QN AUTO: 30.6 PG (ref 26.5–33)
MCHC RBC AUTO-ENTMCNC: 34.3 G/DL (ref 31.5–36.5)
MCV RBC AUTO: 89 FL (ref 78–100)
MONOCYTES # BLD MANUAL: 0.1 10E3/UL (ref 0–1.3)
MONOCYTES NFR BLD MANUAL: 4 %
NEUTROPHILS # BLD MANUAL: 0.2 10E3/UL (ref 1.6–8.3)
NEUTROPHILS NFR BLD MANUAL: 13 %
PLAT MORPH BLD: ABNORMAL
PLATELET # BLD AUTO: 230 10E3/UL (ref 150–450)
POTASSIUM SERPL-SCNC: 4.2 MMOL/L (ref 3.4–5.3)
RBC # BLD AUTO: 3.86 10E6/UL (ref 4.4–5.9)
RBC MORPH BLD: ABNORMAL
SODIUM SERPL-SCNC: 138 MMOL/L (ref 136–145)
TACROLIMUS BLD-MCNC: 6.7 UG/L (ref 5–15)
TME LAST DOSE: NORMAL H
TME LAST DOSE: NORMAL H
WBC # BLD AUTO: 1.5 10E3/UL (ref 4–11)

## 2023-09-18 PROCEDURE — 80197 ASSAY OF TACROLIMUS: CPT

## 2023-09-18 PROCEDURE — 80180 DRUG SCRN QUAN MYCOPHENOLATE: CPT

## 2023-09-18 PROCEDURE — 82374 ASSAY BLOOD CARBON DIOXIDE: CPT

## 2023-09-18 PROCEDURE — 96372 THER/PROPH/DIAG INJ SC/IM: CPT | Performed by: INTERNAL MEDICINE

## 2023-09-18 PROCEDURE — 85014 HEMATOCRIT: CPT

## 2023-09-18 PROCEDURE — 36415 COLL VENOUS BLD VENIPUNCTURE: CPT

## 2023-09-18 PROCEDURE — 250N000011 HC RX IP 250 OP 636: Mod: JZ | Performed by: INTERNAL MEDICINE

## 2023-09-18 PROCEDURE — 85007 BL SMEAR W/DIFF WBC COUNT: CPT

## 2023-09-18 RX ORDER — MEPERIDINE HYDROCHLORIDE 25 MG/ML
25 INJECTION INTRAMUSCULAR; INTRAVENOUS; SUBCUTANEOUS EVERY 30 MIN PRN
Status: CANCELLED | OUTPATIENT
Start: 2023-09-22

## 2023-09-18 RX ORDER — METHYLPREDNISOLONE SODIUM SUCCINATE 125 MG/2ML
125 INJECTION, POWDER, LYOPHILIZED, FOR SOLUTION INTRAMUSCULAR; INTRAVENOUS
Status: CANCELLED
Start: 2023-09-22

## 2023-09-18 RX ORDER — DIPHENHYDRAMINE HYDROCHLORIDE 50 MG/ML
50 INJECTION INTRAMUSCULAR; INTRAVENOUS
Status: CANCELLED
Start: 2023-09-22

## 2023-09-18 RX ORDER — MEPERIDINE HYDROCHLORIDE 25 MG/ML
25 INJECTION INTRAMUSCULAR; INTRAVENOUS; SUBCUTANEOUS EVERY 30 MIN PRN
Status: CANCELLED | OUTPATIENT
Start: 2023-09-18

## 2023-09-18 RX ORDER — ALBUTEROL SULFATE 0.83 MG/ML
2.5 SOLUTION RESPIRATORY (INHALATION)
Status: CANCELLED | OUTPATIENT
Start: 2023-09-18

## 2023-09-18 RX ORDER — EPINEPHRINE 1 MG/ML
0.3 INJECTION, SOLUTION, CONCENTRATE INTRAVENOUS EVERY 5 MIN PRN
Status: CANCELLED | OUTPATIENT
Start: 2023-09-22

## 2023-09-18 RX ORDER — ALBUTEROL SULFATE 90 UG/1
1-2 AEROSOL, METERED RESPIRATORY (INHALATION)
Status: CANCELLED
Start: 2023-09-22

## 2023-09-18 RX ORDER — ALBUTEROL SULFATE 90 UG/1
1-2 AEROSOL, METERED RESPIRATORY (INHALATION)
Status: CANCELLED
Start: 2023-09-18

## 2023-09-18 RX ORDER — METHYLPREDNISOLONE SODIUM SUCCINATE 125 MG/2ML
125 INJECTION, POWDER, LYOPHILIZED, FOR SOLUTION INTRAMUSCULAR; INTRAVENOUS
Status: CANCELLED
Start: 2023-09-18

## 2023-09-18 RX ORDER — EPINEPHRINE 1 MG/ML
0.3 INJECTION, SOLUTION INTRAMUSCULAR; SUBCUTANEOUS EVERY 5 MIN PRN
Status: CANCELLED | OUTPATIENT
Start: 2023-09-18

## 2023-09-18 RX ORDER — ALBUTEROL SULFATE 0.83 MG/ML
2.5 SOLUTION RESPIRATORY (INHALATION)
Status: CANCELLED | OUTPATIENT
Start: 2023-09-22

## 2023-09-18 RX ORDER — DIPHENHYDRAMINE HYDROCHLORIDE 50 MG/ML
50 INJECTION INTRAMUSCULAR; INTRAVENOUS
Status: CANCELLED
Start: 2023-09-18

## 2023-09-18 RX ADMIN — FILGRASTIM-AAFI 300 MCG: 300 INJECTION, SOLUTION SUBCUTANEOUS at 14:36

## 2023-09-18 NOTE — PROGRESS NOTES
Nursing Note  Chip Fowler presents today to [facility] for:   Chief Complaint   Patient presents with    Injections     Filgrastim; labs     During today's [facility] appointment, orders from Dr. Green were completed.  Frequency: once    Progress note:  Patient identification verified by name and date of birth.  Assessment completed.  Vitals recorded in Doc Flowsheets.  Patient was provided with education regarding medication/procedure and possible side effects.  Patient verbalized understanding.     present during visit today: Not Applicable.    Treatment Conditions: ANC from last week meets parameters to proceed    Labs: were drawn per orders.     Injection given in EMMANUEL    Is the next appt scheduled? No-nothing planned for infusion    Post Infusion Assessment:  Patient tolerated injection without incident.     Discharge Plan:   Follow up plan of care with: transplant coordinator.  Discharge instructions were reviewed with patient.  Patient/representative verbalized understanding of discharge instructions and all questions answered.  Patient discharged from [facility] in stable condition.    Carlyn Faulkner RN    Administrations This Visit       filgrastim-aafi (NIVESTYM) injection 300 mcg       Admin Date  09/18/2023 Action  $Given Dose  300 mcg Route  Subcutaneous Administered By  Carlyn Faulkner RN                    /79 (BP Location: Right arm)   Pulse 60   Temp 98.1  F (36.7  C) (Oral)   Resp 16   SpO2 100%

## 2023-09-19 ENCOUNTER — VIRTUAL VISIT (OUTPATIENT)
Dept: PHARMACY | Facility: CLINIC | Age: 38
End: 2023-09-19
Payer: MEDICAID

## 2023-09-19 ENCOUNTER — LAB (OUTPATIENT)
Dept: LAB | Facility: CLINIC | Age: 38
End: 2023-09-19
Payer: MEDICARE

## 2023-09-19 DIAGNOSIS — B25.9 CYTOMEGALOVIRUS INFECTION, UNSPECIFIED CYTOMEGALOVIRAL INFECTION TYPE (H): ICD-10-CM

## 2023-09-19 DIAGNOSIS — Z94.0 KIDNEY REPLACED BY TRANSPLANT: ICD-10-CM

## 2023-09-19 DIAGNOSIS — Z78.9 TAKES DIETARY SUPPLEMENTS: ICD-10-CM

## 2023-09-19 DIAGNOSIS — N39.0 RECURRENT UTI: Primary | ICD-10-CM

## 2023-09-19 LAB
BASOPHILS # BLD MANUAL: 0 10E3/UL (ref 0–0.2)
BASOPHILS NFR BLD MANUAL: 0 %
CMV DNA SPEC NAA+PROBE-ACNC: 61 IU/ML
CMV DNA SPEC NAA+PROBE-LOG#: 1.8 {LOG_COPIES}/ML
EOSINOPHIL # BLD MANUAL: 0.3 10E3/UL (ref 0–0.7)
EOSINOPHIL NFR BLD MANUAL: 16 %
LYMPHOCYTES # BLD MANUAL: 0.7 10E3/UL (ref 0.8–5.3)
LYMPHOCYTES NFR BLD MANUAL: 34 %
MONOCYTES # BLD MANUAL: 0.1 10E3/UL (ref 0–1.3)
MONOCYTES NFR BLD MANUAL: 5 %
NEUTROPHILS # BLD MANUAL: 0.9 10E3/UL (ref 1.6–8.3)
NEUTROPHILS NFR BLD MANUAL: 45 %
PLAT MORPH BLD: ABNORMAL
RBC MORPH BLD: ABNORMAL
WBC # BLD AUTO: 2.1 10E3/UL (ref 4–11)

## 2023-09-19 PROCEDURE — 99207 PR NO CHARGE LOS: CPT | Performed by: PHARMACIST

## 2023-09-19 PROCEDURE — 36415 COLL VENOUS BLD VENIPUNCTURE: CPT

## 2023-09-19 PROCEDURE — 85048 AUTOMATED LEUKOCYTE COUNT: CPT

## 2023-09-19 PROCEDURE — 85007 BL SMEAR W/DIFF WBC COUNT: CPT

## 2023-09-19 NOTE — PATIENT INSTRUCTIONS
"Recommendations from today's MTM visit:                                                      Will start appeal for fosfomycin     Follow-up: on coverage then 1 month    It was great speaking with you today.  I value your experience and would be very thankful for your time in providing feedback in our clinic survey. In the next few days, you may receive an email or text message from [facility] with a link to a survey related to your  clinical pharmacist.\"     To schedule another MTM appointment, please call the clinic directly or you may call the MTM scheduling line at [phone] or toll-free at [phone].     My Clinical Pharmacist's contact information:                                                      Please feel free to contact me with any questions or concerns you have.      Gina Block, PharmD, AA[facility]  Medication Therapy Management Pharmacist       "

## 2023-09-19 NOTE — PROGRESS NOTES
Medication Therapy Management (MTM) Encounter    ASSESSMENT:                            Medication Adherence/Access: see below    Recurrent UTIs: fosfomycin is preferred over nitrofurantoin because limited systemic absorption and safer to use long-term compared to nitrofurantoin. One study showed effectiveness of fosfomycin for UTI prophylaxis (preventing need for IV antibiotics) in women. Will start an appeal.     S/p Kidney transplant: monitor, see above    CMV: stable, ID monitoring ANC and WBC     Supplements: stable    PLAN:                            Will start appeal for fosfomycin     Follow-up: on coverage then 1 month    SUBJECTIVE/OBJECTIVE:                          Chip Fowler is a 38 year old male called for an initial visit. He was referred to me from Dr. Parker.      Reason for visit: fosfomycin.    Allergies/ADRs: Reviewed in chart  Past Medical History: Reviewed in chart  Tobacco: He reports that he quit smoking about 8 months ago. His smoking use included cigarettes. He started smoking about 21 years ago. He smoked an average of .5 packs per day. He has never used smokeless tobacco.  Alcohol: not currently using    Medication Adherence/Access: fosfomycin PA denied    Recurrent UTIs:   Has had 4 UTIs within the last 6 months (hospitalized 3 times; once with sepsis and twice with pyelonephritis). No UTIs since starting UTI prophylaxis with nitrofurantoin 2 months ago. Nitrofurantoin has a risk for diffuse interstitial pneumonitis and pulmonary fibrosis with long-term use. Elected to start fosfomycin 1g every 72 hours instead which is safer to use long term.     Previous trials:   Cranberry juice - ineffective   Probiotic - ineffective      S/p Kidney transplant:   Tacrolimus 3.5 mg 2 times daily, goal 8-10.    Mycophenolate 360 mg 2 times daily - dose due to previously high levels, leukopenia, and infection  Bactrim 400/80 mg once daily     CMV:   Valganciclovir 900 mg 2 times daily   2-3 stools  per day   Per ID transplant, may stop soon based on cytopenias and CMV level     Supplements:   Sodium bicarbonate 1,300 mg 2 times daily   Vitamin D 2000 units once daily   Magnesium oxide 800 mg 2 times daily   Psyllium fiber once daily   2-3 BM per day which is normal for him   No reported issues at this time.     Component      Latest Ref Rng 7/8/2023  6:05 AM 7/10/2023  2:52 PM 8/22/2023  2:48 PM   Magnesium      1.7 - 2.3 mg/dL 1.3 (L)  1.4 (L)  1.5 (L)       Legend:  (L) Low      Today's Vitals: There were no vitals taken for this visit.  ----------------    I spent 16 minutes with this patient today. All changes were made via collaborative practice agreement with Dr. Parker. A copy of the visit note was provided to the patient's provider(s).    A summary of these recommendations was sent via [facility].    Telemedicine Visit Details  Type of service:  Telephone visit  Start Time: 2:42 PM  End Time: 2:58 PM     Medication Therapy Recommendations  Recurrent UTI    Current Medication: fosfomycin (MONUROL) 3 g Packet   Rationale: Medication product not available - Adherence - Adherence   Recommendation: Provide Adherence Intervention   Status: Resolved Med Access Issue

## 2023-09-20 ENCOUNTER — MYC MEDICAL ADVICE (OUTPATIENT)
Dept: FAMILY MEDICINE | Facility: CLINIC | Age: 38
End: 2023-09-20
Payer: MEDICARE

## 2023-09-20 LAB
ANNOTATION COMMENT IMP: NORMAL
MYCOPHENOLATE SERPL LC/MS/MS-MCNC: 2.7 MG/L (ref 1–3.5)
MYCOPHENOLATE-G SERPL LC/MS/MS-MCNC: 24.7 MG/L (ref 30–95)
TME LAST DOSE: ABNORMAL H
TME LAST DOSE: ABNORMAL H

## 2023-09-20 NOTE — TELEPHONE ENCOUNTER
Medication Appeal Initiation    We have initiated an appeal for the requested medication:  Medication: FOSFOMYCIN TROMETHAMINE 3 G PO PACK  Appeal Start Date:  9/20/2023  Insurance Company: [employer] Phone: [phone]  Insurance Fax: [phone]  Comments:

## 2023-09-21 NOTE — TELEPHONE ENCOUNTER
Writer called patient to schedule follow up appointment. Patient states he will call back when he knows his schedule.

## 2023-09-21 NOTE — TELEPHONE ENCOUNTER
Patient will need to discuss it with his transplant team .  Or he can make a follow up in clinic to discuss .  I do not see transplant as a reason for Tinting .    Yesika

## 2023-09-21 NOTE — TELEPHONE ENCOUNTER
Appointment scheduled 9/27/23.     Himanshu Morales RN [facility]

## 2023-09-24 ENCOUNTER — HOSPITAL ENCOUNTER (EMERGENCY)
Facility: CLINIC | Age: 38
End: 2023-09-24
Payer: MEDICARE

## 2023-09-24 ENCOUNTER — OFFICE VISIT (OUTPATIENT)
Dept: URGENT CARE | Facility: URGENT CARE | Age: 38
End: 2023-09-24
Payer: MEDICARE

## 2023-09-24 ENCOUNTER — MYC MEDICAL ADVICE (OUTPATIENT)
Dept: TRANSPLANT | Facility: CLINIC | Age: 38
End: 2023-09-24

## 2023-09-24 VITALS
TEMPERATURE: 99.9 F | OXYGEN SATURATION: 100 % | SYSTOLIC BLOOD PRESSURE: 147 MMHG | WEIGHT: 153 LBS | DIASTOLIC BLOOD PRESSURE: 98 MMHG | HEART RATE: 89 BPM | BODY MASS INDEX: 21.34 KG/M2

## 2023-09-24 DIAGNOSIS — R39.89 URINARY PROBLEM: Primary | ICD-10-CM

## 2023-09-24 DIAGNOSIS — N39.0 URINARY TRACT INFECTION WITHOUT HEMATURIA, SITE UNSPECIFIED: ICD-10-CM

## 2023-09-24 LAB
ALBUMIN UR-MCNC: 30 MG/DL
APPEARANCE UR: CLEAR
BACTERIA #/AREA URNS HPF: ABNORMAL /HPF
BILIRUB UR QL STRIP: NEGATIVE
COLOR UR AUTO: YELLOW
GLUCOSE UR STRIP-MCNC: NEGATIVE MG/DL
HGB UR QL STRIP: NEGATIVE
KETONES UR STRIP-MCNC: NEGATIVE MG/DL
LEUKOCYTE ESTERASE UR QL STRIP: ABNORMAL
NITRATE UR QL: NEGATIVE
PH UR STRIP: 7 [PH] (ref 5–7)
RBC #/AREA URNS AUTO: ABNORMAL /HPF
SP GR UR STRIP: 1.01 (ref 1–1.03)
SQUAMOUS #/AREA URNS AUTO: ABNORMAL /LPF
UROBILINOGEN UR STRIP-ACNC: 0.2 E.U./DL
WBC #/AREA URNS AUTO: ABNORMAL /HPF

## 2023-09-24 PROCEDURE — 87186 SC STD MICRODIL/AGAR DIL: CPT | Performed by: FAMILY MEDICINE

## 2023-09-24 PROCEDURE — 99214 OFFICE O/P EST MOD 30 MIN: CPT | Performed by: FAMILY MEDICINE

## 2023-09-24 PROCEDURE — 81001 URINALYSIS AUTO W/SCOPE: CPT

## 2023-09-24 PROCEDURE — 87086 URINE CULTURE/COLONY COUNT: CPT | Performed by: FAMILY MEDICINE

## 2023-09-25 ENCOUNTER — LAB (OUTPATIENT)
Dept: LAB | Facility: CLINIC | Age: 38
End: 2023-09-25
Payer: MEDICARE

## 2023-09-25 DIAGNOSIS — Z94.0 KIDNEY REPLACED BY TRANSPLANT: ICD-10-CM

## 2023-09-25 DIAGNOSIS — B25.9 CMV (CYTOMEGALOVIRUS INFECTION) (H): ICD-10-CM

## 2023-09-25 LAB
ANION GAP SERPL CALCULATED.3IONS-SCNC: 10 MMOL/L (ref 7–15)
BASOPHILS # BLD MANUAL: 0 10E3/UL (ref 0–0.2)
BASOPHILS NFR BLD MANUAL: 0 %
BUN SERPL-MCNC: 19.2 MG/DL (ref 6–20)
CALCIUM SERPL-MCNC: 9 MG/DL (ref 8.6–10)
CHLORIDE SERPL-SCNC: 103 MMOL/L (ref 98–107)
CREAT SERPL-MCNC: 1.82 MG/DL (ref 0.67–1.17)
DEPRECATED HCO3 PLAS-SCNC: 22 MMOL/L (ref 22–29)
EGFRCR SERPLBLD CKD-EPI 2021: 48 ML/MIN/1.73M2
EOSINOPHIL # BLD MANUAL: 0.1 10E3/UL (ref 0–0.7)
EOSINOPHIL NFR BLD MANUAL: 2 %
ERYTHROCYTE [DISTWIDTH] IN BLOOD BY AUTOMATED COUNT: 14.3 % (ref 10–15)
GLUCOSE SERPL-MCNC: 100 MG/DL (ref 70–99)
HCT VFR BLD AUTO: 35.3 % (ref 40–53)
HGB BLD-MCNC: 11.9 G/DL (ref 13.3–17.7)
LYMPHOCYTES # BLD MANUAL: 0.8 10E3/UL (ref 0.8–5.3)
LYMPHOCYTES NFR BLD MANUAL: 30 %
MCH RBC QN AUTO: 30.4 PG (ref 26.5–33)
MCHC RBC AUTO-ENTMCNC: 33.7 G/DL (ref 31.5–36.5)
MCV RBC AUTO: 90 FL (ref 78–100)
MONOCYTES # BLD MANUAL: 0.5 10E3/UL (ref 0–1.3)
MONOCYTES NFR BLD MANUAL: 17 %
NEUTROPHILS # BLD MANUAL: 1.4 10E3/UL (ref 1.6–8.3)
NEUTROPHILS NFR BLD MANUAL: 51 %
PLAT MORPH BLD: ABNORMAL
PLATELET # BLD AUTO: 200 10E3/UL (ref 150–450)
POTASSIUM SERPL-SCNC: 4.3 MMOL/L (ref 3.4–5.3)
RBC # BLD AUTO: 3.91 10E6/UL (ref 4.4–5.9)
RBC MORPH BLD: ABNORMAL
SODIUM SERPL-SCNC: 135 MMOL/L (ref 136–145)
TACROLIMUS BLD-MCNC: 6.3 UG/L (ref 5–15)
TME LAST DOSE: NORMAL H
TME LAST DOSE: NORMAL H
VARIANT LYMPHS BLD QL SMEAR: PRESENT
WBC # BLD AUTO: 2.8 10E3/UL (ref 4–11)

## 2023-09-25 PROCEDURE — 85007 BL SMEAR W/DIFF WBC COUNT: CPT

## 2023-09-25 PROCEDURE — 87799 DETECT AGENT NOS DNA QUANT: CPT

## 2023-09-25 PROCEDURE — 80048 BASIC METABOLIC PNL TOTAL CA: CPT

## 2023-09-25 PROCEDURE — 80197 ASSAY OF TACROLIMUS: CPT

## 2023-09-25 PROCEDURE — 36415 COLL VENOUS BLD VENIPUNCTURE: CPT

## 2023-09-25 PROCEDURE — 85027 COMPLETE CBC AUTOMATED: CPT

## 2023-09-25 PROCEDURE — 80180 DRUG SCRN QUAN MYCOPHENOLATE: CPT

## 2023-09-25 NOTE — TELEPHONE ENCOUNTER
MEDICATION APPEAL DENIED    Medication: FOSFOMYCIN TROMETHAMINE 3 G PO PACK  Insurance Company: [employer]  Denial Date:  09/22/2023  Denial Rational:       Second Level Appeal Information:               Patient Notified: yes  Central Prior Authorization Team ONLY: Second level appeals will be managed by the clinic staff and provider. Please contact the [place] Prior Authorization Team if additional information about the denial is needed.

## 2023-09-25 NOTE — TELEPHONE ENCOUNTER
Jean Pimentel, Andrzej Willis MD  Cc: Veronica Edgar RN  Phone Number: [phone]               Jean Pimentel, RN routed conversation to You; Andrzej Willis MD12 minutes ago (7:27 AM)     Chip [facility] [place] (supporting Andrzej Willis MD)16 hours ago (2:53 PM)       Hello,     I woke up Sunday morning with frequent urination with burning sensation and rising temperature.  I went to urgent care received Amox-clav antibiotics, please watch for the final results of the urine culture and I'll see if I can make it without hospitalization. Let me   Know if you have any questions.

## 2023-09-26 LAB
BACTERIA UR CULT: ABNORMAL
BACTERIA UR CULT: ABNORMAL
BKV DNA # SPEC NAA+PROBE: NOT DETECTED COPIES/ML
CMV DNA SPEC NAA+PROBE-ACNC: NOT DETECTED IU/ML
EBV DNA # SPEC NAA+PROBE: NOT DETECTED COPIES/ML

## 2023-09-27 ENCOUNTER — TELEPHONE (OUTPATIENT)
Dept: TRANSPLANT | Facility: CLINIC | Age: 38
End: 2023-09-27

## 2023-09-27 ENCOUNTER — VIRTUAL VISIT (OUTPATIENT)
Dept: INFECTIOUS DISEASES | Facility: CLINIC | Age: 38
End: 2023-09-27
Attending: STUDENT IN AN ORGANIZED HEALTH CARE EDUCATION/TRAINING PROGRAM
Payer: MEDICARE

## 2023-09-27 ENCOUNTER — TELEPHONE (OUTPATIENT)
Dept: URGENT CARE | Facility: URGENT CARE | Age: 38
End: 2023-09-27

## 2023-09-27 DIAGNOSIS — N39.0 URINARY TRACT INFECTION WITHOUT HEMATURIA, SITE UNSPECIFIED: Primary | ICD-10-CM

## 2023-09-27 DIAGNOSIS — B25.9 CYTOMEGALOVIRUS INFECTION, UNSPECIFIED CYTOMEGALOVIRAL INFECTION TYPE (H): ICD-10-CM

## 2023-09-27 LAB
MYCOPHENOLATE SERPL LC/MS/MS-MCNC: 0.51 MG/L (ref 1–3.5)
MYCOPHENOLATE-G SERPL LC/MS/MS-MCNC: 9.2 MG/L (ref 30–95)
TME LAST DOSE: ABNORMAL H
TME LAST DOSE: ABNORMAL H

## 2023-09-27 PROCEDURE — 99214 OFFICE O/P EST MOD 30 MIN: CPT | Mod: VID | Performed by: STUDENT IN AN ORGANIZED HEALTH CARE EDUCATION/TRAINING PROGRAM

## 2023-09-27 RX ORDER — CEFDINIR 300 MG/1
300 CAPSULE ORAL DAILY
Qty: 30 CAPSULE | Refills: 1 | Status: SHIPPED | OUTPATIENT
Start: 2023-09-27 | End: 2023-10-27

## 2023-09-27 RX ORDER — CEFDINIR 300 MG/1
300 CAPSULE ORAL 2 TIMES DAILY
Qty: 14 CAPSULE | Refills: 0 | Status: SHIPPED | OUTPATIENT
Start: 2023-09-27 | End: 2023-10-04

## 2023-09-27 NOTE — TELEPHONE ENCOUNTER
Called and spoke to patient regarding results     12:51 Pm 09/27/23     Rosanna Laboy Certified Medical Assistant

## 2023-09-27 NOTE — TELEPHONE ENCOUNTER
Franko Cerda MD Huepfel, Mary K RN  In 1 week if CMV still negative would decrease valcyte to 900mg daily. Continue that for 3 weeks. If weekly CMV remains neg or <35 would trial stopping. If not done already please obtain CMV IgG.          Franko Cerda MD Huepfel, Mary K, RN  Would repeat MPA in 1 week please.

## 2023-09-27 NOTE — LETTER
9/27/2023       RE: Chip Fowler  [address] [facility] [address]     Dear Colleague,    Thank you for referring your patient, Chip Fowler, to the [facility] INFECTIOUS DISEASE CLINIC [place] at [facility]. Please see a copy of my visit note below.    [facility]  Transplant Infectious Disease Clinic Note:  Virutal Follow up Patient     Patient:  Chip Fowler, Date of birth 1985, Medical record number [id number]  Date of Visit:  09/27/2023    Virtual Visit Details    Type of service:  Video Visit   Video Start Time: 4:00  Video End Time:4:25 PM    Originating Location (pt. Location): Home    Distant Location (provider location):  Off-site  Platform used for Video Visit: Chela         Assessment and Recommendations:   Recommendations:  -He was also recommend to keep hydrated and schedule voiding throughout the day.     -continue Cefdinir for 7d for UTI treatment.    -Stop nitrofurantoin. He broke though while on Nitrofurantoin on 9/24/23 with a E Coli (S to nitrofurantonin) UTI. However, Nitrofurantoin did decrease his frequency and severity of UTIs as he did not need to be admitted.  As per prophy (after he completes the treatment dose of cefdinir) Nitrofurantoin and fosfomycin will cover his Enterococcus and E Coli. Long term Nitrofurantoin can lead to plum fibrosis and therefore will recommend fosfomycin (more likely to cover his enterococcus as well, compared to other abx) but his insurance rejected this.  Therefore  we can try to use cefdinir at half dose for prophy. This may not cover enterococcus well but I am hesitant to give him two abx for prophylaxis as it may lead to more side effects and more resistance from abx expoure. We will see how he does with cefdinir prophy.     -stop treatment dose valctye as his last 2 CMV VL <200. Start prophy dose of valcyte 900 Q24hrs.  Will try to obtain Letermovir (if  approved, please adjust tac) for secondary prophy and if it is approved will stop valcyte. Letermovir will be better for him as it does not cause bone marrow suppression like valcyte. He will likely need propy  for 3mo given his high risk CMV status.     -f/u EBV VL per protocol.   -Cont PJP porphy with Bactrim per protocol.   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    -Baseline Cr 1.4-1.6. Renal US patent  - Tacrolimus goal level 8-10.   - Myfortic 540 mg BID per protocol in the setting of previously high levels, leukopenia, and infection.      Recurrent UTIs with Ecoli and E. Faecalis on suppression with macrobid.   The UTI is mostly with E faecalis since 1/2023 except last infection in 4/2023 with E faecalis and E coli.   The current UTI is also due to E coli. Susceptible to Cipro and finished 14d course.    -on 6/2/23 The pt reports another UTI sx (incresaed urgency and discomfort) after having vaginal sex on late Sunday afteroon. He was reminded to void after a sexual encounter. He took a script of nitrofurantoin BID for 7d and is helping him and his symptoms are improving  Admitted on 7/4/23 for pyelonephritis UC + 10-50k E faecalis he was given Augmentin for 10 and started on prophy nitrofurantoin 100mg daily after. Unlike his prior UTIs this occur ace was not related to sexual activity. He was seen by urology on 7/19/23 and had a Cystoscopy with no source of UTIs are noted.     He was on nitrofurantion and on 9/24/23 he had dysuria and increased urination, after and went to urgent care. It was not related to sexual activity. He was given Augmentin and cultures are now growing Ecoli which is I to augmentin but still S to nitrofurantoin. 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 9/5/23 it  was 4580 with 468 VL on 9/11/23. On 9/18 and 9/25 his VL was 61 and undetected. While there is no strong evidence for secondary prophy, we can consider it for 3 mo given his high risk serostatus.       low level EBV Viremia   -7/6/23 CT chest Lymph nodes: Multiple sub centimeter para-aortic lymph nodes.  -at last check it was 8/22/23 <500     Prior issues:  1. Eosinophilia. The workup for eosinophilia and diarrhea was negative for fungal and parasitic infections that would account for the eosinophilia. Evaluated by hematology. The eosinophilia is likely reactive to foreign objects; the PD catheter was thought to be potentially the source as noted by hematology. The relatively increased tryptase level favors allergy (likely to PD catheter) to be the etiology of the eosinophilia rather than infectious processes or malignancies.   2. Chronic diarrhea since he was started on PD in 7/2020. Workup has been negative.   3. Positive Salmonella serology. 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 The patient was counseled against keeping the snakes after transplantation as they are source of recurrent Salmonella infection.  4. E coli in urine in 8/2020.   5. Group B Strep in urine in 2/2020.   6. Urethral stenosis s/p reconstruction.      Other Infectious Disease issues include:  - QTc: 416 as of 3/23/23.   - PJP prophylaxis: bactrim.   - Serostatus: CMV D+/R- (s/p 6mo of valcye), EBV D+/R-, HSV1?/2?, VZV +  - Immunization status: This patient received the third dose of the COVID-19 vaccine on 1/26/2022. Otherwise due for the seasonal influenza vaccine and COVID-19 bivalent         interval history :     He was on nitrofurantion and on 9/24/23 he had dysuria and increased urination,  and went to urgent care. He was given Augmentin and cultures are  now growing Ecoli which is I to augmentin but still S to nitrofurantoin. He states that his fevers and dysuria have resolved on the augmentin. He was started on cefdinir for 7d which he will start today.        Transplants:  1/15/2023 (Kidney); Postoperative day:  255.  Coordinator Veronica Edgar    Review of Systems:  CONSTITUTIONAL:  No fevers or chills. No night sweats.  EYES: negative for icterus or acute vision changes.   ENT:  negative for hearing loss, tinnitus or sore throat  RESPIRATORY:  negative for cough, sputum, dyspnea  CARDIOVASCULAR:  negative for chest pain, heart palpitations  GASTROINTESTINAL:  negative for nausea, vomiting, diarrhea or constipation  GENITOURINARY:  negative for dysuria or hematuria.  HEME:  No easy bruising or bleeding  INTEGUMENT:  negative for rash or pruritus  NEURO:  Negative for headache or tremor.    Past Medical History:   Diagnosis Date    Bladder stones     Cardiomyopathy (H)     CMV (cytomegalovirus infection) (H) 08/23/2023 08/23/23:  CMV-Blood    ESRD (end stage renal disease) on dialysis (H)     Hypertension     Kidney replaced by transplant 01/15/2023    DCD DDKT. Intermediate risk induction.    Migraines     Polysubstance abuse (H)     Solitary kidney, congenital     Urethral stone     Urethral stricture        Past Surgical History:   Procedure Laterality Date    BENCH KIDNEY  1/15/2023    Procedure: Bench kidney;  Surgeon: Tez Emerson MD;  [facility]: [facility] OR    BIOPSY  02/2020    renal, [facility]    COLONOSCOPY N/A 3/23/2023    Procedure: Colonoscopy;  Surgeon: Ana Cardenas MD;  Location: [facility] GI    COMBINED CYSTOSCOPY, LASER HOLMIUM LITHOTRIPSY URETER(S)      CYSTOSCOPY      CYSTOSCOPY FLEXIBLE, CYSTOSTOMY, INSERT TUBE SUPRAPUBIC, COMBINED N/A 12/14/2020    Procedure: CYSTOSCOPY, WITH SUPRAPUBIC CATHETER INSERTION;  Surgeon: Sam Mejia MD;  Location: [facility] OR    CYSTOSCOPY, OPEN EXCISION URETHRAL DIVERTICULUM, COMBINED N/A  2020    Procedure: EXCISION OF URETHRAL DIVERTICULUM X2;  Surgeon: Sam Mejia MD;  Location: [facility] OR    HERNIA REPAIR      infant    INSERT CATHETER PERITONEAL DIALYSIS      LASER HOLMIUM LITHOTRIPSY URETER(S), INSERT STENT, COMBINED N/A 2020    Procedure: CYSTOSCOPY, bladder and urethral stone extraction, removal of foreign body, urethral dilation, urethrotomy, laser on standby;  Surgeon: Sam Mejia MD;  [facility] OR    REMOVE CATHETER PERITONEAL N/A 1/15/2023    Procedure: Remove catheter peritoneal;  Surgeon: Tez Emerson MD;  Location: [facility] OR    TRANSPLANT KIDNEY RECIPIENT  DONOR N/A 1/15/2023    Procedure: TRANSPLANT, KIDNEY, RECIPIENT,  DONOR WITH DONOR URETER STENTING;  Surgeon: Tez Emerson MD;  Location: [facility] OR    urethral dilation      URETHROPLASTY WITH BUCCAL GRAFT N/A 2020    Procedure: URETHROPLASTY, USING BUCCAL MUCOSA GRAFT;  Surgeon: Sam Mejia MD;  Location: [facility] OR       Family History   Problem Relation Age of Onset    Alzheimer Disease Mother     Unknown/Adopted Father     No Known Problems Sister     No Known Problems Sister     Kidney Disease No family hx of        Social History     Social History Narrative    Not on file     Social History     Tobacco Use    Smoking status: Former     Packs/day: 0.50     Types: Cigarettes     Start date:      Quit date: 1/15/2023     Years since quittin.6    Smokeless tobacco: Never   Vaping Use    Vaping Use: Never used   Substance Use Topics    Alcohol use: Not Currently     Comment: quit alcohol 3-4 yrs ago    Drug use: Not Currently     Types: Methamphetamines, MDMA (Ecstasy)       Immunization History   Administered Date(s) Administered    COVID-19 Monovalent 18+ (Moderna) 2021, 2021, 2022    DTAP (<7y) 1984, 1984, 1984, 10/04/1985, 1989    Flu, Unspecified 1997, 2020, 10/06/2020, 10/27/2021    Hepatitis  B, Peds 02/19/1996, 06/17/1996, 09/04/1996    Hib, Unspecified 04/02/1986    Historical DTP/aP 1984, 1984, 1984, 10/04/1985, 03/21/1989    Influenza (intradermal) 03/11/2020    Influenza Vaccine >6 months (Alfuria,Fluzone) 11/06/2016    MMR 07/03/1985, 02/19/1996    Mantoux Tuberculin Skin Test 03/11/2020    Pneumo Conj 13-V (2010&after) 11/24/2020    Pneumococcal 23 valent 03/11/2020    Pneumococcal, Unspecified 03/11/2020, 04/22/2021    Poliovirus, inactivated (IPV) 1984, 1984, 1984, 03/21/1989    TD,PF 7+ (Tenivac) 06/17/1996    TDAP Vaccine (Adacel) 04/28/2020    Td (Adult), Adsorbed 06/17/1996       Patient Active Problem List   Diagnosis    HTN, kidney transplant related    Urethral stricture    Methamphetamine abuse, episodic (H)    Urethral diverticulum    Allergic rhinitis, unspecified seasonality, unspecified trigger    Stage 3a chronic kidney disease (H)    Kidney replaced by transplant    Immunosuppressed status (H)    Aftercare following organ transplant    Need for pneumocystis prophylaxis    Anemia in chronic renal disease    Secondary renal hyperparathyroidism (H)    Vitamin D deficiency    Hypomagnesemia    Diarrhea    Urinary tract infection without hematuria, site unspecified    Metabolic acidosis    EBV (Aaron-Barr virus) viremia    Pyelonephritis of transplanted kidney    CMV (cytomegalovirus infection) (H)    Neutropenia (H)       No outpatient medications have been marked as taking for the 9/27/23 encounter (Appointment) with Andrzej Willis MD.       No Known Allergies           Physical Exam:   Vitals were reviewed.  All vitals stable  There were no vitals taken for this visit.  Wt Readings from Last 4 Encounters:   09/24/23 69.4 kg (153 lb)   08/23/23 68.9 kg (152 lb)   07/20/23 70.4 kg (155 lb 1.6 oz)   07/19/23 68 kg (150 lb)       Exam:  GENERAL: well-developed, well-nourished, alert, oriented, in no acute distress over video.  HEAD: Head is  normocephalic, atraumatic   EYES: Eyes have anicteric sclerae.  NEUROLOGIC: Grossly nonfocal.         Laboratory Data:     Absolute CD4   Date Value Ref Range Status   12/03/2020 1,325 441 - 2,156 cells/uL Final       Inflammatory Markers    Recent Labs   Lab Test 01/20/23  0746   G6PD 15.5       Immune Globulin Studies     Recent Labs   Lab Test 08/28/23  1453 12/03/20  1647   IGG 1,004 1,152   IGM  --  64   IGE  --  18   IGA  --  268       Metabolic Studies    Recent Labs   Lab Test 09/25/23  1541 09/18/23  1435 09/11/23  1513 08/28/23  1453 08/22/23  1448 07/31/23  1512 07/24/23  1519 07/06/23  1807 07/06/23  0933 07/05/23  1304 07/04/23  1944   * 138 138   < > 137   < > 139   < >  --    < > 133*   POTASSIUM 4.3 4.2 4.6   < > 4.2   < > 4.4   < >  --    < > 4.0   CHLORIDE 103 107 107   < > 105   < > 104   < >  --    < > 102   CO2 22 22 23   < > 23   < > 24   < >  --    < > 17*   ANIONGAP 10 9 8   < > 9   < > 11   < >  --    < > 14   BUN 19.2 25.4* 25.5*   < > 20.6*   < > 23.4*   < >  --    < > 26.1*   CR 1.82* 1.42* 1.54*   < > 1.50*   < > 1.65*   < >  --    < > 1.86*   GFRESTIMATED 48* 64 58*   < > 60*   < > 54*   < >  --    < > 47*   * 94 73   < > 72   < > 68*   < >  --    < > 139*   VISHNU 9.0 9.0 9.2   < > 9.1   < > 9.7   < >  --    < > 9.5   PHOS  --   --   --   --  2.6  --   --    < >  --    < >  --    MAG  --   --   --   --  1.5*  --   --    < >  --    < >  --    URIC  --   --   --   --   --   --  5.5  --   --   --   --    LACT  --   --   --   --   --   --   --   --  0.9   < > 1.5   CKT  --   --   --   --   --   --   --   --   --   --  88    < > = values in this interval not displayed.       Hepatic Studies    Recent Labs   Lab Test 07/24/23  1519 07/04/23  1944 06/05/23  1527 06/01/23  1518   BILITOTAL 0.3 0.4 0.3 0.3   DBIL <0.20  --   --  <0.20   ALKPHOS 59 53 55 52   PROTTOTAL 7.2 7.4 6.9 6.9   ALBUMIN 4.3 4.2 4.0 4.0   AST 29 27 24 28   ALT 20 14 22 30   LDH  --   --   --  367*        Pancreatitis testing    Recent Labs   Lab Test 08/22/23  1448 03/19/23  2155 01/15/23  0540   LIPASE  --  20  --    TRIG 114  --  115       Lipid testing    Recent Labs   Lab Test 08/22/23  1448 01/15/23  0540 04/18/22  0851   CHOL 100 177 161   HDL 32* 52 49   LDL 45 102* 96   TRIG 114 115 78       Gout Labs      Recent Labs   Lab Test 07/24/23  1519   URIC 5.5       Hematology Studies   Recent Labs   Lab Test 09/25/23  1541 09/19/23  1427 09/18/23  1435 09/11/23  1513 09/05/23  1451   WBC 2.8* 2.1* 1.5* 1.7* 2.4*   ANEU 1.4* 0.9* 0.2*  --   --    ANEUTAUTO  --   --   --  0.4* 0.9*   ALYM 0.8 0.7* 0.9  --   --    ALYMPAUTO  --   --   --  0.8 0.8   TY 0.5 0.1 0.1  --   --    AMONOAUTO  --   --   --  0.1 0.1   AEOS 0.1 0.3 0.3  --   --    AEOSAUTO  --   --   --  0.4 0.5   ABSBASO  --   --   --  0.1 0.0   HGB 11.9*  --  11.8* 11.2* 11.1*   HCT 35.3*  --  34.4* 33.1* 32.8*     --  230 214 200       Clotting Studies    Recent Labs   Lab Test 07/04/23  1944 01/15/23  0540 08/09/21  1124 08/18/20  1307   INR 1.29* 0.90 0.96 1.08   PTT  --  28  --  29       Iron Testing    Recent Labs   Lab Test 09/25/23  1541 04/15/23  1002 04/12/23  1112 02/02/23  0840 01/31/23  1049 01/21/23  0715 01/20/23  0746 01/07/21  0825 12/03/20  1647   IRON  --   --   --   --  70  --  153  --   --    FEB  --   --   --   --  258  --  244  --   --    IRONSAT  --   --   --   --  27  --  63*  --   --    DAWSON  --   --   --   --  430*  --  717*  --   --    MCV 90   < > 96   < >  --    < > 93   < > 96   B12  --   --  2,476*  --   --   --   --   --  824    < > = values in this interval not displayed.       Markers  No lab results found.    Invalid input(s): FETOPROTEIN, SERUM, AFP    Autoimmune Testing   No lab results found.    Invalid input(s): ANCAB, PANCA, CANCA    Arterial Blood Gas Testing    Recent Labs   Lab Test 03/22/23  1550 03/20/23  0652 01/15/23  1711   PH  --  7.42  --    O2PER 20  --  45.0        Thyroid Studies     Recent  Labs   Lab Test 07/06/23  0547 05/08/23  1532 04/18/22  0851   TSH 1.55 1.57 2.34       Urine Studies     Recent Labs   Lab Test 09/24/23  1133 07/04/23 2015 06/12/23  1514 05/04/23  1535 04/15/23  0918   URINEPH 7.0 6.0 6.0 6.0 6.5   NITRITE Negative Negative Negative Negative Negative   LEUKEST Small* Negative Negative Large* Moderate*   WBCU 10-25* 11* 7* 71* *       Medication levels    Recent Labs   Lab Test 09/25/23  1541 09/18/23  1435 07/10/23  1452 07/06/23  0547   VANCOMYCIN  --   --   --  16.7   TACROL 6.3 6.7   < > 10.3   MPACID  --  2.70   < >  --    MPAG  --  24.7*   < >  --     < > = values in this interval not displayed.       CSF testing   No lab results found.    Invalid input(s): CADAM, EVPCR, ENTPCR, ENTEROVIRUS    Microbiology:  Fungal testing  Recent Labs   Lab Test 12/03/20  1647 10/08/20  1300   AM3 <0.10  --    HIFUNG  --  <1:8   COFUNG  --  <1:2   FUNBL  --  0.3       Beta D Glucan levels (Fungitell assay)    No results found for: FGTL, FGTLI     Last Culture results   Culture   Date Value Ref Range Status   09/24/2023 10,000-50,000 CFU/mL Escherichia coli (A)  Final   09/24/2023 <10,000 CFU/mL Urogenital cooper  Final   07/04/2023 10,000-50,000 CFU/mL Enterococcus faecalis (A)  Final   07/04/2023 <10,000 CFU/mL Urogenital cooper  Final   07/04/2023 No Growth  Final   07/04/2023 No Growth  Final   05/04/2023 No Growth  Final   05/04/2023 No Growth  Final   05/04/2023 >100,000 CFU/mL Escherichia coli (A)  Final   04/15/2023 50,000-100,000 CFU/mL Escherichia coli (A)  Final   04/15/2023 10,000-50,000 CFU/mL Enterococcus faecalis (A)  Final   03/20/2023 No Growth  Final   03/20/2023 No Growth  Final   03/19/2023 50,000-100,000 CFU/mL Enterococcus faecalis (A)  Final   03/19/2023 <10,000 CFU/mL Urogenital cooper  Final   01/23/2023 50,000-100,000 CFU/mL Enterococcus faecalis (A)  Final     Comment:     Susceptibilities done on previous cultures   01/19/2023 >100,000 CFU/mL Enterococcus  faecalis (A)  Final     Culture Micro   Date Value Ref Range Status   12/03/2020 No growth  Final   08/14/2020 >100,000 colonies/mL  Escherichia coli   (A)  Final         Last checks of Clostridioides difficile testing  Recent Labs   Lab Test 07/05/23  1318 05/04/23 2010 03/20/23  0853   CDBPCT Negative Negative Negative       No components found for: AFBSTN    Syphilis Testing  Invalid input(s): [id number]    Tick Testing  No lab results found.    Invalid input(s): APHAGM    ASO Testing  Invalid input(s): [id number]    Quantiferon testing   Recent Labs   Lab Test 09/25/23  1541 09/19/23  1427 09/15/20  1347 08/18/20  1307   TBRST  --   --   --  Negative   LYMPH 30 34   < > 23.9    < > = values in this interval not displayed.       Infection Studies to assess Diarrhea  Recent Labs   Lab Test 07/05/23  1318 05/04/23 2010 03/22/23  2257 03/20/23  0853 12/09/20  2000 10/08/20  1339 10/08/20  1338   EPSTX1 Not Detected Not Detected  --  Not Detected   < > Not Detected  --    EPSTX2 Not Detected Not Detected  --  Not Detected   < > Not Detected  --    ADENOVIRUSAG  --   --   --   --   --   --  Negative   MSPORT  --   --   --   --   --   --  No Microsporidia found  Chromotrope stain reveals no Microsporidia spores in fecal specimen. Consider other causes   for symptoms.  Divya Lugo M.D., Medical Director  10/9/20     CRYSPT  --   --   --   --   --  No oocysts of Cryptosporidium species, Cyclospora cayetanensis, or Cystoisospora   (Isospora) nancy found    A modified acid fast stain reveals no oocysts of Cryptosporidium, Cyclospora, or   Cystoisospora (Isospora). Consider other causes for symptoms  Divya Lugo M.D., Medical Director  10/9/20    --    CRYPTR  --   --   --   --   --  Negative  --    POPRT  --   --  Negative  --    < > Routine parasitology exam negative  Cryptosporidium, Cyclospora, and Microsporidia are not readily detected by this method. A   single negative specimen does not rule out  parasitic infection.    --    EPCAMP Not Detected Not Detected  --  Not Detected   < > Not Detected  --    EPSALM Not Detected Not Detected  --  Not Detected   < > Not Detected  --    EPSHGL Not Detected Not Detected  --  Not Detected   < > Not Detected  --    EPVIB Not Detected Not Detected  --  Not Detected   < > Not Detected  --    EPROTA Not Detected Not Detected  --  Not Detected   < > Not Detected  --    EPNORO Not Detected Not Detected  --  Not Detected   < > Not Detected  --    EPYER Not Detected Not Detected  --  Not Detected   < > Not Detected  --     < > = values in this interval not displayed.       Virology:  Coronavirus-19 testing    Recent Labs   Lab Test 07/04/23  1952 03/19/23  1919 01/15/23  0538 03/21/22  1911 10/30/21  0816 09/08/21  1400 08/07/21  1057 05/26/21  1134 12/10/20  0959 12/03/20  1647   CD19  --   --   --   --   --   --   --   --   --  17   ACD19  --   --   --   --   --   --   --   --   --  532   [id number] Negative Negative Negative Negative  --   --    < >  --  Not Detected  --    [id number]  --   --   --   --   --   --   --   --  Nasopharyngeal  --    COVIDPCREXT  --   --   --   --  Not Detected Not Detected  --  Not Detected  --   --     < > = values in this interval not displayed.       Respiratory virus (non-coronavirus-19) testing    No lab results found.    CMV viral loads    CMV DNA IU/mL   Date Value Ref Range Status   09/25/2023 Not Detected Not Detected IU/mL Final   07/17/2023 Not Detected Not Detected IU/mL Final   07/05/2023 Not Detected Not Detected IU/mL Final   06/15/2023 Not Detected Not Detected IU/mL Final   05/25/2023 Not Detected Not Detected IU/mL Final   05/04/2023 Not Detected Not Detected IU/mL Final   03/20/2023 Not Detected Not Detected IU/mL Final   03/06/2023 Not Detected Not Detected IU/mL Final   01/15/2023 Not Detected Not Detected IU/mL Final     CMV DNA IU/mL, Instrument   Date Value Ref Range Status   09/18/2023 61 (H) <1 IU/mL Final   09/11/2023  468 (H) <1 IU/mL Final   09/05/2023 4,580 (H) <1 IU/mL Final   08/22/2023 2,340 (H) <1 IU/mL Final     CMV log   Date Value Ref Range Status   09/18/2023 1.8  Final   09/11/2023 2.7  Final   09/05/2023 3.7  Final   08/22/2023 3.4  Final       CMV resistance testing  No lab results found.  No results found for: CMVCID, CMVFOS, CMVGAN    No results found for: H6RES    EBV DNA Copies/mL   Date Value Ref Range Status   09/25/2023 Not Detected Not Detected copies/mL Final   08/22/2023 <500 (A) Not Detected copies/mL Final     Comment:     EBV DNA Detected below the reportable range of 500 copies/mL   07/04/2023 <500 (A) Not Detected copies/mL Final     Comment:     EBV DNA Detected below the reportable range of 500 copies/mL   05/04/2023 Not Detected Not Detected copies/mL Final   03/20/2023 Not Detected Not Detected copies/mL Final       BK viral loads   Recent Labs   Lab Test 09/25/23  1541 08/28/23  1453 08/22/23  1448 07/24/23  1509 07/05/23  1203 06/26/23  1507 05/25/23  1552 05/18/23  1535 05/04/23  2159   BKRES Not Detected Not Detected Not Detected Not Detected Not Detected Not Detected Not Detected Not Detected Not Detected       Parvovirus Testing  No lab results found.    Invalid input(s): PRVRES    Adenovirus Testing  No lab results found.    Invalid input(s): ADENAB, ADENOVIRUS, ADQT    Hepatitis B Testing     Recent Labs   Lab Test 01/15/23  0540 08/18/20  1307   AUSAB 134.22 212.08*   HBCAB Nonreactive Nonreactive   HEPBANG Nonreactive Nonreactive     Was the last Hepatitis B E antigen positive?   No results found for: HBEAGN     Hepatitis C Antibody   Date Value Ref Range Status   01/15/2023 Nonreactive Nonreactive Final   08/18/2020 Nonreactive NR^Nonreactive Final     Comment:     Assay performance characteristics have not been established for newborns,   infants, and children         CMV Antibody IgG   Date Value Ref Range Status   01/15/2023 No detectable antibody. No detectable antibody.  Final    08/18/2020 <0.2 0.0 - 0.8 AI Final     Comment:     Negative  Antibody index (AI) values reflect qualitative changes in antibody   concentration that cannot be directly associated with clinical condition or   disease state.       Varicella Zoster Virus Antibody IgG   Date Value Ref Range Status   08/18/2020 4.2 (H) 0.0 - 0.8 AI Final     Comment:     Positive, suggests prev. exposure and probable immunity  Antibody index (AI) values reflect qualitative changes in antibody   concentration that cannot be directly associated with clinical condition or   disease state.       EBV Capsid Antibody IgG   Date Value Ref Range Status   01/15/2023 Positive (A) No detectable antibody. Final     Comment:     Suggests recent or past exposure.   08/18/2020 >8.0 (H) 0.0 - 0.8 AI Final     Comment:     Positive, suggests recent or past exposure  Antibody index (AI) values reflect qualitative changes in antibody   concentration that cannot be directly associated with clinical condition or   disease state.       EBV Capsid Antibody IgM   Date Value Ref Range Status   01/15/2023 No detectable antibody. No detectable antibody. Final       No components found for: [id number]    Last Pathology Report   Case Report   Date Value Ref Range Status   03/23/2023   Final    Surgical Pathology Report                         Case: [id number]                                  Authorizing Provider:  Ana Cardenas         Collected:           03/23/2023 09:58 AM                                 MD Sophia                                                                Ordering Location:     [facility]          Received:            03/23/2023 10:25 AM                                 Endoscopy                                                                    Pathologist:           Jesusita Calvert MD                                                          Specimens:   A) - Large Intestine, Colon, right colon                                                             B) - Large Intestine, Colon, left colon                                                     Clinical Information   Date Value Ref Range Status   03/23/2023   Final    Diarrhea        Final Diagnosis   Date Value Ref Range Status   06/05/2023   Final    Peripheral blood, morphology:  - Moderate anemia, normocytic and normochromic.  - Moderate leukopenia with neutropenia.  - Platelets quantitatively within normal limits and without diagnostic morphologic abnormality.  - Negative for schistocytes or definitive morphologic features of hemolysis.  - Negative for overt features of myelodysplasia or circulating blasts.    See comment.    COMMENT:  Normocytic anemia is nonspecific and may be attributable to multiple overlapping etiologies, including chronic disease, renal and/or endocrine disease, blood loss, iron deficiency (in some patients), and/or bone marrow suppression (by underlying infection, various nutritional deficiencies, toxicities, alcohol use, or medications).  Neutropenia may be idiopathic or attributable to multiple overlapping etiologies, including medications, infection, alcohol use, autoimmune disorders, nutritional deficiencies, and/or endocrinopathies.  Neutropenia may also be normally seen in some ethnic backgrounds.  Clinical correlation is recommended.         Imaging:  Results for orders placed or performed during the hospital encounter of 07/04/23   XR Chest Port 1 View    Narrative    Exam: XR CHEST PORT 1 VIEW, 7/4/2023 8:15 PM    Indication: fever    Comparison: 1/15/2023    Findings:   The cardiomediastinal silhouette and pulmonary vasculature are within  normal limits. No pleural effusion or pneumothorax. No focal airspace  opacity.      Impression    Impression: No acute airspace disease.    GUS AVILA DO         SYSTEM ID:  [id number]   US Renal Transplant with Doppler    Narrative    EXAMINATION: US RENAL TRANSPLANT,  7/4/2023 10:07 PM     COMPARISON: Renal  transplant ultrasounds 5/4/2023    HISTORY: fever, sepsis, dysuria    TECHNIQUE:  Grey-scale, color Doppler and spectral flow analysis.    FINDINGS:  The transplant kidney is located right lower quadrant, and measures  14.1 cm. Parenchyma is of normal thickness and echogenicity. No focal  lesions. Prominent proximal ureter without significant hydronephrosis.  No perinephric fluid collection.    Renal artery flow:   220 cm/sec peak systolic at hilum (previously 121 cm/s)  490 cm/sec peak systolic at anastomosis. (Previously 449 cm/s)  385 cm/sec peak systolic at mid renal artery (previously 141 cm/s)  Arcuate artery resistive indices (upper to lower): 0.67, 0.60, 0.58    Renal Vein Flow:  42 cm/sec at hilum.   67 cm/sec at anastomosis.  42 cm/s at mid renal vein    Iliac artery flow:  182 cm/sec peak systolic above anastomosis.  151 cm/sec peak systolic below anastomosis.    Iliac vein flow:  Patent above and below the anastomosis.      Impression    IMPRESSION:   1. Patent  Doppler evaluation of the renal transplant vasculature.  2. Elevated velocity at the renal artery anastomosis (490 cm/s) and  mid renal artery (385 cm/s), may represent stenosis.  3. Normal grayscale evaluation of the right lower quadrant transplant  kidney.    I have personally reviewed the examination and initial interpretation  and I agree with the findings.    DAR BISWAS MD         SYSTEM ID:  [id number]   US Lower Extremity Venous Duplex Bilateral    Narrative    EXAMINATION: DOPPLER VENOUS ULTRASOUND OF BILATERAL LOWER EXTREMITIES,  7/6/2023 10:18 AM     COMPARISON: None.    HISTORY: Fevers    TECHNIQUE:  Gray-scale evaluation with compression, spectral flow and  color Doppler assessment of the deep venous system of both legs from  groin to knee, and then at the ankles.    FINDINGS:  In both lower extremities, the common femoral, femoral, popliteal,  peroneal, and posterior tibial veins demonstrate normal  compressibility and blood  flow.      Impression    IMPRESSION:  No evidence of deep venous thrombosis in either lower extremity.       I have personally reviewed the examination and initial interpretation  and I agree with the findings.    GUS AVILA DO         SYSTEM ID:  [id number]   CT Chest/Abdomen/Pelvis w Contrast    Narrative    EXAMINATION: CT CHEST/ABDOMEN/PELVIS W CONTRAST, 7/6/2023 12:07 PM    INDICATION: Hx kidney transplant 1/2023; immunosuppressed. Fevers of  unknown origing >103F. Evaluate for infection.    COMPARISON STUDY: CT abdomen and pelvis stone protocol 3/19/2023    TECHNIQUE: CT scan of the chest, abdomen and pelvis was performed on  multidetector CT scanner using volumetric acquisition technique and  images were reconstructed in multiple planes with variable thickness  and reviewed on dedicated workstations.     CONTRAST: iopamidol (ISOVUE-370) solution 90 mL.   Without oral  contrast.    CT scan radiation dose is optimized to minimum requisite dose using  automated dose modulation techniques.    FINDINGS:    Chest:   Mediastinum: Visualized thyroid gland is within normal limits. Cardiac  size is within normal limits. No thoracic lymphadenopathy.    Pleura: No pleural effusion or pneumothorax.     Lungs: No suspicious nodule or consolidation.    Abdomen and Pelvis:  Liver: No mass. No intrahepatic biliary ductal dilation.    Biliary System: Normal gallbladder. No extrahepatic biliary ductal  dilation.    Pancreas: No mass or pancreatic ductal dilation.    Adrenal glands: No mass or nodules    Spleen: Normal.    Kidneys: Atrophic native left kidney with a few too small to  characterize hypodensities. The native right kidney is absent Right  lower quadrant transplant kidney demonstrates nonspecific perinephric  stranding without significant change compared to 3/19/2023, although  comparison is limited due to stone protocol without contrast on prior  CT. Patchy areas of hypoenhancement at the superior and inferior  poles  of the transplant kidney. Expected postoperative changes with surgical  clips in the right lower quadrant. No suspicious mass, obstructing  calculus or hydronephrosis.    Gastrointestinal tract: Normal appendix. Normal caliber small bowel.    Mesentery/peritoneum/retroperitoneum: No mass. Trace fluid in the  pelvis.    Lymph nodes: Multiple sub centimeter para-aortic lymph nodes.    Vasculature: No aneurysm of the abdominal aorta.     Pelvis: Urinary bladder is normal.  Prostate is within normal limits.    Osseous structures: No aggressive or acute osseous lesion.      Soft tissues: Small fat containing ventral/umbilical hernia.   Unchanged soft tissue thickening/scar over the right lower quadrant  and at the midline lower abdomen.      Impression    IMPRESSION: Postoperative changes of kidney transplant. There is mild  nonspecific stranding around transplant kidney with a small amount of  fluid in the pelvis and areas of patchy hypoenhancement within the  transplant kidney. These likely represents chronic/benign findings,  however in the appropriate clinical context findings may represent  mild infection.    I have personally reviewed the examination and initial interpretation  and I agree with the findings.    DIEGO PRICE MD         SYSTEM ID:  [id number]   Echo Complete     Value    LVEF  55-60%    Narrative    [id number]  [id number]  [id number]  [id number]^VITOR^LINA^RODRIGUE     [facility],[place]  Echocardiography Laboratory  [address] [place] [id number]     Name: CAROLYN HIGHTOWER  MRN: [id number]  : 1985  Study Date: 2023 02:30 PM  Age: 38 yrs  Gender: Male  Patient Location: [facility]  Reason For Study: Fever  Ordering Physician: LINA ADLER  Performed By: Ezekiel Manzano     BSA: 1.9 m2  Height: 72 in  Weight: 150 lb  HR: 83  BP: 115/77 mmHg  ______________________________________________________________________________  Procedure  Complete Portable  Echo Adult. Optison (NDC #5657-7689-43) given intravenously.  Patient was given 6 ml mixture of 3 ml Optison and 6 ml saline. 3 ml wasted.  ______________________________________________________________________________  Interpretation Summary  No vegetation identified, however this does not exclude endocarditis. Left  ventricular size, wall motion and function are normal. The ejection fraction  is 55-60%.     Right ventricular function, chamber size, wall motion, and thickness are  normal.     No vegetation identified, however this does not exclude endocarditis.     No pericardial effusion is present.     No significant valvular abnormalities present.     The inferior vena cava is normal.     Comapred to 9/23/2022 there are no signficant differences.  ______________________________________________________________________________  Left Ventricle  Left ventricular size, wall motion and function are normal. The ejection  fraction is 55-60%. Left ventricular diastolic function is normal.     Right Ventricle  Right ventricular function, chamber size, wall motion, and thickness are  normal.     Atria  The right atria appears normal. The left atrium appears normal.     Mitral Valve  The mitral valve is normal.     Aortic Valve  Aortic valve is normal in structure and function. The aortic valve is  tricuspid.     Tricuspid Valve  The tricuspid valve is normal. The peak velocity of the tricuspid regurgitant  jet is not obtainable. Pulmonary artery systolic pressure cannot be assessed.     Pulmonic Valve  The pulmonic valve is normal.     Vessels  The inferior vena cava is normal. Sinuses of Valsalva 2.9 cm. Ascending aorta  3.0 cm. IVC diameter <2.1 cm collapsing >50% with sniff suggests a normal RA  pressure of 3 mmHg.     Pericardium  No pericardial effusion is present.     Miscellaneous  No significant valvular abnormalities present.     Compared to Previous Study  Comapred to 9/23/2022 there are no signficant  differences.  ______________________________________________________________________________  MMode/2D Measurements & Calculations  IVSd: 0.84 cm  LVIDd: 4.9 cm  LVIDs: 3.0 cm  LVPWd: 0.94 cm  FS: 40.1 %  LV mass(C)d: 152.0 grams  LV mass(C)dI: 80.6 grams/m2  Ao root diam: 2.9 cm  asc Aorta Diam: 3.0 cm  LVOT diam: 2.0 cm  LVOT area: 3.2 cm2  LA Volume (BP): 48.3 ml     LA Volume Index (BP): 25.6 ml/m2  RWT: 0.38  TAPSE: 2.5 cm     Doppler Measurements & Calculations  MV E max ash: 101.0 cm/sec  MV A max ash: 78.1 cm/sec  MV E/A: 1.3  MV dec slope: 486.9 cm/sec2  MV dec time: 0.21 sec  Ao V2 max: 177.1 cm/sec  Ao max P.5 mmHg  Ao V2 mean: 128.7 cm/sec  Ao mean P.6 mmHg  Ao V2 VTI: 34.9 cm  SHANNAN(I,D): 2.7 cm2  SHANNAN(V,D): 2.6 cm2  LV V1 max P.5 mmHg  LV V1 max: 145.7 cm/sec  LV V1 VTI: 29.4 cm  SV(LVOT): 92.6 ml  SI(LVOT): 49.1 ml/m2  PA V2 max: 123.2 cm/sec  PA max P.1 mmHg  PA acc time: 0.16 sec  AV Ash Ratio (DI): 0.82  SHANNAN Index (cm2/m2): 1.4  E/E' av.6  Lateral E/e': 5.9     Medial E/e': 13.3  RV S Ash: 17.8 cm/sec     ______________________________________________________________________________  Report approved by: Aleena Shelton 2023 03:17 PM                 Again, thank you for allowing me to participate in the care of your patient.      Sincerely,    CAIN TAYLOR MD

## 2023-09-27 NOTE — NURSING NOTE
Is the patient currently in the state of [place]? YES    Visit mode:VIDEO    If the visit is dropped, the patient can be reconnected by: VIDEO VISIT: Text to cell phone:   Telephone Information:   Mobile [phone]       Will anyone else be joining the visit? NO  (If patient encounters technical issues they should call [phone]    How would you like to obtain your AVS? MyChart    Are changes needed to the allergy or medication list? Pt stated no changes to allergies and Pt stated no med changes    Reason for visit: BHARATH MONTES

## 2023-09-27 NOTE — PROGRESS NOTES
[facility]  Transplant Infectious Disease Clinic Note:  Virutal Follow up Patient     Patient:  Chip Fowler, Date of birth 1985, Medical record number [id number]  Date of Visit:  09/27/2023    Virtual Visit Details    Type of service:  Video Visit   Video Start Time: 4:00  Video End Time:4:25 PM    Originating Location (pt. Location): Home    Distant Location (provider location):  Off-site  Platform used for Video Visit: Chela         Assessment and Recommendations:   Recommendations:  -He was also recommend to keep hydrated and schedule voiding throughout the day.     -continue Cefdinir for 7d for UTI treatment.    -Stop nitrofurantoin. He broke though while on Nitrofurantoin on 9/24/23 with a E Coli (S to nitrofurantonin) UTI. However, Nitrofurantoin did decrease his frequency and severity of UTIs as he did not need to be admitted.  As per prophy (after he completes the treatment dose of cefdinir) Nitrofurantoin and fosfomycin will cover his Enterococcus and E Coli. Long term Nitrofurantoin can lead to plum fibrosis and therefore will recommend fosfomycin (more likely to cover his enterococcus as well, compared to other abx) but his insurance rejected this.  Therefore  we can try to use cefdinir at half dose for prophy. This may not cover enterococcus well but I am hesitant to give him two abx for prophylaxis as it may lead to more side effects and more resistance from abx expoure. We will see how he does with cefdinir prophy.     -stop treatment dose valctye as his last 2 CMV VL <200. Start prophy dose of valcyte 900 Q24hrs.  Will try to obtain Letermovir (if approved, please adjust tac) for secondary prophy and if it is approved will stop valcyte. Letermovir will be better for him as it does not cause bone marrow suppression like valcyte. He will likely need propy  for 3mo given his high risk CMV status.     -f/u EBV VL per protocol.   -Cont PJP porphy with Bactrim per  protocol.   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    -Baseline Cr 1.4-1.6. Renal US patent  - Tacrolimus goal level 8-10.   - Myfortic 540 mg BID per protocol in the setting of previously high levels, leukopenia, and infection.      Recurrent UTIs with Ecoli and E. Faecalis on suppression with macrobid.   The UTI is mostly with E faecalis since 1/2023 except last infection in 4/2023 with E faecalis and E coli.   The current UTI is also due to E coli. Susceptible to Cipro and finished 14d course.    -on 6/2/23 The pt reports another UTI sx (incresaed urgency and discomfort) after having vaginal sex on late Sunday afteroon. He was reminded to void after a sexual encounter. He took a script of nitrofurantoin BID for 7d and is helping him and his symptoms are improving  Admitted on 7/4/23 for pyelonephritis UC + 10-50k E faecalis he was given Augmentin for 10 and started on prophy nitrofurantoin 100mg daily after. Unlike his prior UTIs this occur ace was not related to sexual activity. He was seen by urology on 7/19/23 and had a Cystoscopy with no source of UTIs are noted.     He was on nitrofurantion and on 9/24/23 he had dysuria and increased urination, after and went to urgent care. It was not related to sexual activity. He was given Augmentin and cultures are now growing Ecoli which is I to augmentin but still S to nitrofurantoin. 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 9/5/23 it was 4580 with 468 VL on 9/11/23. On 9/18 and 9/25 his VL was 61 and undetected. While there is no strong evidence for secondary prophy, we can consider it for 3 mo given his high risk serostatus.       low level EBV Viremia   -7/6/23 CT chest Lymph nodes: Multiple sub centimeter para-aortic lymph nodes.  -at last  check it was 8/22/23 <500     Prior issues:  1. Eosinophilia. The workup for eosinophilia and diarrhea was negative for fungal and parasitic infections that would account for the eosinophilia. Evaluated by hematology. The eosinophilia is likely reactive to foreign objects; the PD catheter was thought to be potentially the source as noted by hematology. The relatively increased tryptase level favors allergy (likely to PD catheter) to be the etiology of the eosinophilia rather than infectious processes or malignancies.   2. Chronic diarrhea since he was started on PD in 7/2020. Workup has been negative.   3. Positive Salmonella serology. 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 The patient was counseled against keeping the snakes after transplantation as they are source of recurrent Salmonella infection.  4. E coli in urine in 8/2020.   5. Group B Strep in urine in 2/2020.   6. Urethral stenosis s/p reconstruction.      Other Infectious Disease issues include:  - QTc: 416 as of 3/23/23.   - PJP prophylaxis: bactrim.   - Serostatus: CMV D+/R- (s/p 6mo of valcye), EBV D+/R-, HSV1?/2?, VZV +  - Immunization status: This patient received the third dose of the COVID-19 vaccine on 1/26/2022. Otherwise due for the seasonal influenza vaccine and COVID-19 bivalent         interval history :     He was on nitrofurantion and on 9/24/23 he had dysuria and increased urination,  and went to urgent care. He was given Augmentin and cultures are now growing Ecoli which is I to augmentin but still S to nitrofurantoin. He states that his fevers and dysuria have resolved on the augmentin. He was started on cefdinir for 7d which he will start today.        Transplants:  1/15/2023 (Kidney); Postoperative day:  255.  Coordinator Veronica Edgar    Review of  Systems:  CONSTITUTIONAL:  No fevers or chills. No night sweats.  EYES: negative for icterus or acute vision changes.   ENT:  negative for hearing loss, tinnitus or sore throat  RESPIRATORY:  negative for cough, sputum, dyspnea  CARDIOVASCULAR:  negative for chest pain, heart palpitations  GASTROINTESTINAL:  negative for nausea, vomiting, diarrhea or constipation  GENITOURINARY:  negative for dysuria or hematuria.  HEME:  No easy bruising or bleeding  INTEGUMENT:  negative for rash or pruritus  NEURO:  Negative for headache or tremor.    Past Medical History:   Diagnosis Date     Bladder stones      Cardiomyopathy (H)      CMV (cytomegalovirus infection) (H) 08/23/2023 08/23/23:  CMV-Blood     ESRD (end stage renal disease) on dialysis (H)      Hypertension      Kidney replaced by transplant 01/15/2023    DCD DDKT. Intermediate risk induction.     Migraines      Polysubstance abuse (H)      Solitary kidney, congenital      Urethral stone      Urethral stricture        Past Surgical History:   Procedure Laterality Date     BENCH KIDNEY  1/15/2023    Procedure: Bench kidney;  Surgeon: Tez Emerson MD;  Location: [facility] OR     BIOPSY  02/2020    renal, Muslim     COLONOSCOPY N/A 3/23/2023    Procedure: Colonoscopy;  Surgeon: Ana Cardenas MD;  Location: [facility] GI     COMBINED CYSTOSCOPY, LASER HOLMIUM LITHOTRIPSY URETER(S)       CYSTOSCOPY       CYSTOSCOPY FLEXIBLE, CYSTOSTOMY, INSERT TUBE SUPRAPUBIC, COMBINED N/A 12/14/2020    Procedure: CYSTOSCOPY, WITH SUPRAPUBIC CATHETER INSERTION;  Surgeon: Sam Mejia MD;  Location: [facility] OR     CYSTOSCOPY, OPEN EXCISION URETHRAL DIVERTICULUM, COMBINED N/A 12/14/2020    Procedure: EXCISION OF URETHRAL DIVERTICULUM X2;  Surgeon: Sam Mejia MD;  Location: [facility] OR     HERNIA REPAIR      infant     INSERT CATHETER PERITONEAL DIALYSIS       LASER HOLMIUM LITHOTRIPSY URETER(S), INSERT STENT, COMBINED N/A 8/21/2020    Procedure: CYSTOSCOPY,  bladder and urethral stone extraction, removal of foreign body, urethral dilation, urethrotomy, laser on standby;  Surgeon: Sam Mejia MD;  Location: [facility]     REMOVE CATHETER PERITONEAL N/A 1/15/2023    Procedure: Remove catheter peritoneal;  Surgeon: Tez Emerson MD;  Location: [facility] OR     TRANSPLANT KIDNEY RECIPIENT  DONOR N/A 1/15/2023    Procedure: TRANSPLANT, KIDNEY, RECIPIENT,  DONOR WITH DONOR URETER STENTING;  Surgeon: Tez Emerson MD;  Location: [facility] OR     urethral dilation       URETHROPLASTY WITH BUCCAL GRAFT N/A 2020    Procedure: URETHROPLASTY, USING BUCCAL MUCOSA GRAFT;  Surgeon: Sam Mejia MD;  Location: [facility] OR       Family History   Problem Relation Age of Onset     Alzheimer Disease Mother      Unknown/Adopted Father      No Known Problems Sister      No Known Problems Sister      Kidney Disease No family hx of        Social History     Social History Narrative     Not on file     Social History     Tobacco Use     Smoking status: Former     Packs/day: 0.50     Types: Cigarettes     Start date:      Quit date: 1/15/2023     Years since quittin.6     Smokeless tobacco: Never   Vaping Use     Vaping Use: Never used   Substance Use Topics     Alcohol use: Not Currently     Comment: quit alcohol 3-4 yrs ago     Drug use: Not Currently     Types: Methamphetamines, MDMA (Ecstasy)       Immunization History   Administered Date(s) Administered     COVID-19 Monovalent 18+ (Moderna) 2021, 2021, 2022     DTAP (<7y) 1984, 1984, 1984, 10/04/1985, 1989     Flu, Unspecified 1997, 2020, 10/06/2020, 10/27/2021     Hepatitis B, Peds 1996, 1996, 1996     Hib, Unspecified 1986     Historical DTP/aP 1984, 1984, 1984, 10/04/1985, 1989     Influenza (intradermal) 2020     Influenza Vaccine >6 months (Alfuria,Fluzone) 2016     MMR  07/03/1985, 02/19/1996     Mantoux Tuberculin Skin Test 03/11/2020     Pneumo Conj 13-V (2010&after) 11/24/2020     Pneumococcal 23 valent 03/11/2020     Pneumococcal, Unspecified 03/11/2020, 04/22/2021     Poliovirus, inactivated (IPV) 1984, 1984, 1984, 03/21/1989     TD,PF 7+ (Tenivac) 06/17/1996     TDAP Vaccine (Adacel) 04/28/2020     Td (Adult), Adsorbed 06/17/1996       Patient Active Problem List   Diagnosis     HTN, kidney transplant related     Urethral stricture     Methamphetamine abuse, episodic (H)     Urethral diverticulum     Allergic rhinitis, unspecified seasonality, unspecified trigger     Stage 3a chronic kidney disease (H)     Kidney replaced by transplant     Immunosuppressed status (H)     Aftercare following organ transplant     Need for pneumocystis prophylaxis     Anemia in chronic renal disease     Secondary renal hyperparathyroidism (H)     Vitamin D deficiency     Hypomagnesemia     Diarrhea     Urinary tract infection without hematuria, site unspecified     Metabolic acidosis     EBV (Aaron-Barr virus) viremia     Pyelonephritis of transplanted kidney     CMV (cytomegalovirus infection) (H)     Neutropenia (H)       No outpatient medications have been marked as taking for the 9/27/23 encounter (Appointment) with Andrzej Willis MD.       No Known Allergies           Physical Exam:   Vitals were reviewed.  All vitals stable  There were no vitals taken for this visit.  Wt Readings from Last 4 Encounters:   09/24/23 69.4 kg (153 lb)   08/23/23 68.9 kg (152 lb)   07/20/23 70.4 kg (155 lb 1.6 oz)   07/19/23 68 kg (150 lb)       Exam:  GENERAL: well-developed, well-nourished, alert, oriented, in no acute distress over video.  HEAD: Head is normocephalic, atraumatic   EYES: Eyes have anicteric sclerae.  NEUROLOGIC: Grossly nonfocal.         Laboratory Data:     Absolute CD4   Date Value Ref Range Status   12/03/2020 1,325 441 - 2,156 cells/uL Final       Inflammatory  Markers    Recent Labs   Lab Test 01/20/23  0746   G6PD 15.5       Immune Globulin Studies     Recent Labs   Lab Test 08/28/23  1453 12/03/20  1647   IGG 1,004 1,152   IGM  --  64   IGE  --  18   IGA  --  268       Metabolic Studies    Recent Labs   Lab Test 09/25/23  1541 09/18/23  1435 09/11/23  1513 08/28/23  1453 08/22/23  1448 07/31/23  1512 07/24/23  1519 07/06/23  1807 07/06/23  0933 07/05/23  1304 07/04/23  1944   * 138 138   < > 137   < > 139   < >  --    < > 133*   POTASSIUM 4.3 4.2 4.6   < > 4.2   < > 4.4   < >  --    < > 4.0   CHLORIDE 103 107 107   < > 105   < > 104   < >  --    < > 102   CO2 22 22 23   < > 23   < > 24   < >  --    < > 17*   ANIONGAP 10 9 8   < > 9   < > 11   < >  --    < > 14   BUN 19.2 25.4* 25.5*   < > 20.6*   < > 23.4*   < >  --    < > 26.1*   CR 1.82* 1.42* 1.54*   < > 1.50*   < > 1.65*   < >  --    < > 1.86*   GFRESTIMATED 48* 64 58*   < > 60*   < > 54*   < >  --    < > 47*   * 94 73   < > 72   < > 68*   < >  --    < > 139*   VISHNU 9.0 9.0 9.2   < > 9.1   < > 9.7   < >  --    < > 9.5   PHOS  --   --   --   --  2.6  --   --    < >  --    < >  --    MAG  --   --   --   --  1.5*  --   --    < >  --    < >  --    URIC  --   --   --   --   --   --  5.5  --   --   --   --    LACT  --   --   --   --   --   --   --   --  0.9   < > 1.5   CKT  --   --   --   --   --   --   --   --   --   --  88    < > = values in this interval not displayed.       Hepatic Studies    Recent Labs   Lab Test 07/24/23  1519 07/04/23  1944 06/05/23  1527 06/01/23  1518   BILITOTAL 0.3 0.4 0.3 0.3   DBIL <0.20  --   --  <0.20   ALKPHOS 59 53 55 52   PROTTOTAL 7.2 7.4 6.9 6.9   ALBUMIN 4.3 4.2 4.0 4.0   AST 29 27 24 28   ALT 20 14 22 30   LDH  --   --   --  367*       Pancreatitis testing    Recent Labs   Lab Test 08/22/23  1448 03/19/23  2155 01/15/23  0540   LIPASE  --  20  --    TRIG 114  --  115       Lipid testing    Recent Labs   Lab Test 08/22/23  1448 01/15/23  0540 04/18/22  0851   CHOL 100 177  161   HDL 32* 52 49   LDL 45 102* 96   TRIG 114 115 78       Gout Labs      Recent Labs   Lab Test 07/24/23  1519   URIC 5.5       Hematology Studies   Recent Labs   Lab Test 09/25/23  1541 09/19/23  1427 09/18/23  1435 09/11/23  1513 09/05/23  1451   WBC 2.8* 2.1* 1.5* 1.7* 2.4*   ANEU 1.4* 0.9* 0.2*  --   --    ANEUTAUTO  --   --   --  0.4* 0.9*   ALYM 0.8 0.7* 0.9  --   --    ALYMPAUTO  --   --   --  0.8 0.8   TY 0.5 0.1 0.1  --   --    AMONOAUTO  --   --   --  0.1 0.1   AEOS 0.1 0.3 0.3  --   --    AEOSAUTO  --   --   --  0.4 0.5   ABSBASO  --   --   --  0.1 0.0   HGB 11.9*  --  11.8* 11.2* 11.1*   HCT 35.3*  --  34.4* 33.1* 32.8*     --  230 214 200       Clotting Studies    Recent Labs   Lab Test 07/04/23  1944 01/15/23  0540 08/09/21  1124 08/18/20  1307   INR 1.29* 0.90 0.96 1.08   PTT  --  28  --  29       Iron Testing    Recent Labs   Lab Test 09/25/23  1541 04/15/23  1002 04/12/23  1112 02/02/23  0840 01/31/23  1049 01/21/23  0715 01/20/23  0746 01/07/21  0825 12/03/20  1647   IRON  --   --   --   --  70  --  153  --   --    FEB  --   --   --   --  258  --  244  --   --    IRONSAT  --   --   --   --  27  --  63*  --   --    DAWSON  --   --   --   --  430*  --  717*  --   --    MCV 90   < > 96   < >  --    < > 93   < > 96   B12  --   --  2,476*  --   --   --   --   --  824    < > = values in this interval not displayed.       Markers  No lab results found.    Invalid input(s): FETOPROTEIN, SERUM, AFP    Autoimmune Testing   No lab results found.    Invalid input(s): ANCAB, PANCA, CANCA    Arterial Blood Gas Testing    Recent Labs   Lab Test 03/22/23  1550 03/20/23  0652 01/15/23  1711   PH  --  7.42  --    O2PER 20  --  45.0        Thyroid Studies     Recent Labs   Lab Test 07/06/23  0547 05/08/23  1532 04/18/22  0851   TSH 1.55 1.57 2.34       Urine Studies     Recent Labs   Lab Test 09/24/23  1133 07/04/23 2015 06/12/23  1514 05/04/23  1535 04/15/23  0918   URINEPH 7.0 6.0 6.0 6.0 6.5   NITRITE  Negative Negative Negative Negative Negative   LEUKEST Small* Negative Negative Large* Moderate*   WBCU 10-25* 11* 7* 71* *       Medication levels    Recent Labs   Lab Test 09/25/23  1541 09/18/23  1435 07/10/23  1452 07/06/23  0547   VANCOMYCIN  --   --   --  16.7   TACROL 6.3 6.7   < > 10.3   MPACID  --  2.70   < >  --    MPAG  --  24.7*   < >  --     < > = values in this interval not displayed.       CSF testing   No lab results found.    Invalid input(s): CADAM, EVPCR, ENTPCR, ENTEROVIRUS    Microbiology:  Fungal testing  Recent Labs   Lab Test 12/03/20  1647 10/08/20  1300   AM3 <0.10  --    HIFUNG  --  <1:8   COFUNG  --  <1:2   FUNBL  --  0.3       Beta D Glucan levels (Fungitell assay)    No results found for: FGTL, FGTLI     Last Culture results   Culture   Date Value Ref Range Status   09/24/2023 10,000-50,000 CFU/mL Escherichia coli (A)  Final   09/24/2023 <10,000 CFU/mL Urogenital cooper  Final   07/04/2023 10,000-50,000 CFU/mL Enterococcus faecalis (A)  Final   07/04/2023 <10,000 CFU/mL Urogenital cooper  Final   07/04/2023 No Growth  Final   07/04/2023 No Growth  Final   05/04/2023 No Growth  Final   05/04/2023 No Growth  Final   05/04/2023 >100,000 CFU/mL Escherichia coli (A)  Final   04/15/2023 50,000-100,000 CFU/mL Escherichia coli (A)  Final   04/15/2023 10,000-50,000 CFU/mL Enterococcus faecalis (A)  Final   03/20/2023 No Growth  Final   03/20/2023 No Growth  Final   03/19/2023 50,000-100,000 CFU/mL Enterococcus faecalis (A)  Final   03/19/2023 <10,000 CFU/mL Urogenital cooper  Final   01/23/2023 50,000-100,000 CFU/mL Enterococcus faecalis (A)  Final     Comment:     Susceptibilities done on previous cultures   01/19/2023 >100,000 CFU/mL Enterococcus faecalis (A)  Final     Culture Micro   Date Value Ref Range Status   12/03/2020 No growth  Final   08/14/2020 >100,000 colonies/mL  Escherichia coli   (A)  Final         Last checks of Clostridioides difficile testing  Recent Labs   Lab Test  07/05/23  1318 05/04/23 2010 03/20/23  0853   CDBPCT Negative Negative Negative       No components found for: AFBSTN    Syphilis Testing  Invalid input(s): [id number]    Tick Testing  No lab results found.    Invalid input(s): APHAGM    ASO Testing  Invalid input(s): [id number]    Quantiferon testing   Recent Labs   Lab Test 09/25/23  1541 09/19/23  1427 09/15/20  1347 08/18/20  1307   TBRST  --   --   --  Negative   LYMPH 30 34   < > 23.9    < > = values in this interval not displayed.       Infection Studies to assess Diarrhea  Recent Labs   Lab Test 07/05/23  1318 05/04/23 2010 03/22/23  2257 03/20/23  0853 12/09/20  2000 10/08/20  1339 10/08/20  1338   EPSTX1 Not Detected Not Detected  --  Not Detected   < > Not Detected  --    EPSTX2 Not Detected Not Detected  --  Not Detected   < > Not Detected  --    ADENOVIRUSAG  --   --   --   --   --   --  Negative   MSPORT  --   --   --   --   --   --  No Microsporidia found  Chromotrope stain reveals no Microsporidia spores in fecal specimen. Consider other causes   for symptoms.  Divya Lugo M.D., Medical Director  10/9/20     CRYSPT  --   --   --   --   --  No oocysts of Cryptosporidium species, Cyclospora cayetanensis, or Cystoisospora   (Isospora) nancy found    A modified acid fast stain reveals no oocysts of Cryptosporidium, Cyclospora, or   Cystoisospora (Isospora). Consider other causes for symptoms  Divya Lugo M.D., Medical Director  10/9/20    --    CRYPTR  --   --   --   --   --  Negative  --    POPRT  --   --  Negative  --    < > Routine parasitology exam negative  Cryptosporidium, Cyclospora, and Microsporidia are not readily detected by this method. A   single negative specimen does not rule out parasitic infection.    --    EPCAMP Not Detected Not Detected  --  Not Detected   < > Not Detected  --    EPSALM Not Detected Not Detected  --  Not Detected   < > Not Detected  --    EPSHGL Not Detected Not Detected  --  Not Detected   < > Not  Detected  --    EPVIB Not Detected Not Detected  --  Not Detected   < > Not Detected  --    EPROTA Not Detected Not Detected  --  Not Detected   < > Not Detected  --    EPNORO Not Detected Not Detected  --  Not Detected   < > Not Detected  --    EPYER Not Detected Not Detected  --  Not Detected   < > Not Detected  --     < > = values in this interval not displayed.       Virology:  Coronavirus-19 testing    Recent Labs   Lab Test 07/04/23  1952 03/19/23  1919 01/15/23  0538 03/21/22  1911 10/30/21  0816 09/08/21  1400 08/07/21  1057 05/26/21  1134 12/10/20  0959 12/03/20  1647   CD19  --   --   --   --   --   --   --   --   --  17   ACD19  --   --   --   --   --   --   --   --   --  532   [id number] Negative Negative Negative Negative  --   --    < >  --  Not Detected  --    [id number]  --   --   --   --   --   --   --   --  Nasopharyngeal  --    COVIDPCREXT  --   --   --   --  Not Detected Not Detected  --  Not Detected  --   --     < > = values in this interval not displayed.       Respiratory virus (non-coronavirus-19) testing    No lab results found.    CMV viral loads    CMV DNA IU/mL   Date Value Ref Range Status   09/25/2023 Not Detected Not Detected IU/mL Final   07/17/2023 Not Detected Not Detected IU/mL Final   07/05/2023 Not Detected Not Detected IU/mL Final   06/15/2023 Not Detected Not Detected IU/mL Final   05/25/2023 Not Detected Not Detected IU/mL Final   05/04/2023 Not Detected Not Detected IU/mL Final   03/20/2023 Not Detected Not Detected IU/mL Final   03/06/2023 Not Detected Not Detected IU/mL Final   01/15/2023 Not Detected Not Detected IU/mL Final     CMV DNA IU/mL, Instrument   Date Value Ref Range Status   09/18/2023 61 (H) <1 IU/mL Final   09/11/2023 468 (H) <1 IU/mL Final   09/05/2023 4,580 (H) <1 IU/mL Final   08/22/2023 2,340 (H) <1 IU/mL Final     CMV log   Date Value Ref Range Status   09/18/2023 1.8  Final   09/11/2023 2.7  Final   09/05/2023 3.7  Final   08/22/2023 3.4  Final        CMV resistance testing  No lab results found.  No results found for: CMVCID, CMVFOS, CMVGAN    No results found for: H6RES    EBV DNA Copies/mL   Date Value Ref Range Status   09/25/2023 Not Detected Not Detected copies/mL Final   08/22/2023 <500 (A) Not Detected copies/mL Final     Comment:     EBV DNA Detected below the reportable range of 500 copies/mL   07/04/2023 <500 (A) Not Detected copies/mL Final     Comment:     EBV DNA Detected below the reportable range of 500 copies/mL   05/04/2023 Not Detected Not Detected copies/mL Final   03/20/2023 Not Detected Not Detected copies/mL Final       BK viral loads   Recent Labs   Lab Test 09/25/23  1541 08/28/23  1453 08/22/23  1448 07/24/23  1509 07/05/23  1203 06/26/23  1507 05/25/23  1552 05/18/23  1535 05/04/23  2159   BKRES Not Detected Not Detected Not Detected Not Detected Not Detected Not Detected Not Detected Not Detected Not Detected       Parvovirus Testing  No lab results found.    Invalid input(s): PRVRES    Adenovirus Testing  No lab results found.    Invalid input(s): ADENAB, ADENOVIRUS, ADQT    Hepatitis B Testing     Recent Labs   Lab Test 01/15/23  0540 08/18/20  1307   AUSAB 134.22 212.08*   HBCAB Nonreactive Nonreactive   HEPBANG Nonreactive Nonreactive     Was the last Hepatitis B E antigen positive?   No results found for: HBEAGN     Hepatitis C Antibody   Date Value Ref Range Status   01/15/2023 Nonreactive Nonreactive Final   08/18/2020 Nonreactive NR^Nonreactive Final     Comment:     Assay performance characteristics have not been established for newborns,   infants, and children         CMV Antibody IgG   Date Value Ref Range Status   01/15/2023 No detectable antibody. No detectable antibody.  Final   08/18/2020 <0.2 0.0 - 0.8 AI Final     Comment:     Negative  Antibody index (AI) values reflect qualitative changes in antibody   concentration that cannot be directly associated with clinical condition or   disease state.       Varicella  Zoster Virus Antibody IgG   Date Value Ref Range Status   08/18/2020 4.2 (H) 0.0 - 0.8 AI Final     Comment:     Positive, suggests prev. exposure and probable immunity  Antibody index (AI) values reflect qualitative changes in antibody   concentration that cannot be directly associated with clinical condition or   disease state.       EBV Capsid Antibody IgG   Date Value Ref Range Status   01/15/2023 Positive (A) No detectable antibody. Final     Comment:     Suggests recent or past exposure.   08/18/2020 >8.0 (H) 0.0 - 0.8 AI Final     Comment:     Positive, suggests recent or past exposure  Antibody index (AI) values reflect qualitative changes in antibody   concentration that cannot be directly associated with clinical condition or   disease state.       EBV Capsid Antibody IgM   Date Value Ref Range Status   01/15/2023 No detectable antibody. No detectable antibody. Final       No components found for: [id number]    Last Pathology Report   Case Report   Date Value Ref Range Status   03/23/2023   Final    Surgical Pathology Report                         Case: [id number]                                  Authorizing Provider:  Ana Cardenas         Collected:           03/23/2023 09:58 AM                                 MD Sophia                                                                Ordering Location:     [facility]          Received:            03/23/2023 10:25 AM                                 Endoscopy                                                                    Pathologist:           Jesusita Calvert MD                                                          Specimens:   A) - Large Intestine, Colon, right colon                                                            B) - Large Intestine, Colon, left colon                                                     Clinical Information   Date Value Ref Range Status   03/23/2023   Final    Diarrhea        Final Diagnosis   Date Value  Ref Range Status   06/05/2023   Final    Peripheral blood, morphology:  - Moderate anemia, normocytic and normochromic.  - Moderate leukopenia with neutropenia.  - Platelets quantitatively within normal limits and without diagnostic morphologic abnormality.  - Negative for schistocytes or definitive morphologic features of hemolysis.  - Negative for overt features of myelodysplasia or circulating blasts.    See comment.    COMMENT:  Normocytic anemia is nonspecific and may be attributable to multiple overlapping etiologies, including chronic disease, renal and/or endocrine disease, blood loss, iron deficiency (in some patients), and/or bone marrow suppression (by underlying infection, various nutritional deficiencies, toxicities, alcohol use, or medications).  Neutropenia may be idiopathic or attributable to multiple overlapping etiologies, including medications, infection, alcohol use, autoimmune disorders, nutritional deficiencies, and/or endocrinopathies.  Neutropenia may also be normally seen in some ethnic backgrounds.  Clinical correlation is recommended.         Imaging:  Results for orders placed or performed during the hospital encounter of 07/04/23   XR Chest Port 1 View    Narrative    Exam: XR CHEST PORT 1 VIEW, 7/4/2023 8:15 PM    Indication: fever    Comparison: 1/15/2023    Findings:   The cardiomediastinal silhouette and pulmonary vasculature are within  normal limits. No pleural effusion or pneumothorax. No focal airspace  opacity.      Impression    Impression: No acute airspace disease.    GUS AVILA DO         SYSTEM ID:  [id number]   US Renal Transplant with Doppler    Narrative    EXAMINATION: US RENAL TRANSPLANT,  7/4/2023 10:07 PM     COMPARISON: Renal transplant ultrasounds 5/4/2023    HISTORY: fever, sepsis, dysuria    TECHNIQUE:  Grey-scale, color Doppler and spectral flow analysis.    FINDINGS:  The transplant kidney is located right lower quadrant, and measures  14.1 cm. Parenchyma is  of normal thickness and echogenicity. No focal  lesions. Prominent proximal ureter without significant hydronephrosis.  No perinephric fluid collection.    Renal artery flow:   220 cm/sec peak systolic at hilum (previously 121 cm/s)  490 cm/sec peak systolic at anastomosis. (Previously 449 cm/s)  385 cm/sec peak systolic at mid renal artery (previously 141 cm/s)  Arcuate artery resistive indices (upper to lower): 0.67, 0.60, 0.58    Renal Vein Flow:  42 cm/sec at hilum.   67 cm/sec at anastomosis.  42 cm/s at mid renal vein    Iliac artery flow:  182 cm/sec peak systolic above anastomosis.  151 cm/sec peak systolic below anastomosis.    Iliac vein flow:  Patent above and below the anastomosis.      Impression    IMPRESSION:   1. Patent  Doppler evaluation of the renal transplant vasculature.  2. Elevated velocity at the renal artery anastomosis (490 cm/s) and  mid renal artery (385 cm/s), may represent stenosis.  3. Normal grayscale evaluation of the right lower quadrant transplant  kidney.    I have personally reviewed the examination and initial interpretation  and I agree with the findings.    DAR BISWAS MD         SYSTEM ID:  [id number]   US Lower Extremity Venous Duplex Bilateral    Narrative    EXAMINATION: DOPPLER VENOUS ULTRASOUND OF BILATERAL LOWER EXTREMITIES,  7/6/2023 10:18 AM     COMPARISON: None.    HISTORY: Fevers    TECHNIQUE:  Gray-scale evaluation with compression, spectral flow and  color Doppler assessment of the deep venous system of both legs from  groin to knee, and then at the ankles.    FINDINGS:  In both lower extremities, the common femoral, femoral, popliteal,  peroneal, and posterior tibial veins demonstrate normal  compressibility and blood flow.      Impression    IMPRESSION:  No evidence of deep venous thrombosis in either lower extremity.       I have personally reviewed the examination and initial interpretation  and I agree with the findings.    GUS AVILA, DO         SYSTEM  ID:  [id number]   CT Chest/Abdomen/Pelvis w Contrast    Narrative    EXAMINATION: CT CHEST/ABDOMEN/PELVIS W CONTRAST, 7/6/2023 12:07 PM    INDICATION: Hx kidney transplant 1/2023; immunosuppressed. Fevers of  unknown origing >103F. Evaluate for infection.    COMPARISON STUDY: CT abdomen and pelvis stone protocol 3/19/2023    TECHNIQUE: CT scan of the chest, abdomen and pelvis was performed on  multidetector CT scanner using volumetric acquisition technique and  images were reconstructed in multiple planes with variable thickness  and reviewed on dedicated workstations.     CONTRAST: iopamidol (ISOVUE-370) solution 90 mL.   Without oral  contrast.    CT scan radiation dose is optimized to minimum requisite dose using  automated dose modulation techniques.    FINDINGS:    Chest:   Mediastinum: Visualized thyroid gland is within normal limits. Cardiac  size is within normal limits. No thoracic lymphadenopathy.    Pleura: No pleural effusion or pneumothorax.     Lungs: No suspicious nodule or consolidation.    Abdomen and Pelvis:  Liver: No mass. No intrahepatic biliary ductal dilation.    Biliary System: Normal gallbladder. No extrahepatic biliary ductal  dilation.    Pancreas: No mass or pancreatic ductal dilation.    Adrenal glands: No mass or nodules    Spleen: Normal.    Kidneys: Atrophic native left kidney with a few too small to  characterize hypodensities. The native right kidney is absent Right  lower quadrant transplant kidney demonstrates nonspecific perinephric  stranding without significant change compared to 3/19/2023, although  comparison is limited due to stone protocol without contrast on prior  CT. Patchy areas of hypoenhancement at the superior and inferior poles  of the transplant kidney. Expected postoperative changes with surgical  clips in the right lower quadrant. No suspicious mass, obstructing  calculus or hydronephrosis.    Gastrointestinal tract: Normal appendix. Normal caliber small  bowel.    Mesentery/peritoneum/retroperitoneum: No mass. Trace fluid in the  pelvis.    Lymph nodes: Multiple sub centimeter para-aortic lymph nodes.    Vasculature: No aneurysm of the abdominal aorta.     Pelvis: Urinary bladder is normal.  Prostate is within normal limits.    Osseous structures: No aggressive or acute osseous lesion.      Soft tissues: Small fat containing ventral/umbilical hernia.   Unchanged soft tissue thickening/scar over the right lower quadrant  and at the midline lower abdomen.      Impression    IMPRESSION: Postoperative changes of kidney transplant. There is mild  nonspecific stranding around transplant kidney with a small amount of  fluid in the pelvis and areas of patchy hypoenhancement within the  transplant kidney. These likely represents chronic/benign findings,  however in the appropriate clinical context findings may represent  mild infection.    I have personally reviewed the examination and initial interpretation  and I agree with the findings.    DIEGO PRICE MD         SYSTEM ID:  [id number]   Echo Complete     Value    LVEF  55-60%    Narrative    [id number]  [id number]  [id number]  [id number]^VITOR^LINA^RODRIGUE     [facility],[place]  Echocardiography Laboratory  [address] [place] [id number]     Name: CAROLYN HIGHTOWER  MRN: [id number]  : 1985  Study Date: 2023 02:30 PM  Age: 38 yrs  Gender: Male  Patient Location: [facility]  Reason For Study: Fever  Ordering Physician: LINA ADLER  Performed By: Ezekiel Manzano     BSA: 1.9 m2  Height: 72 in  Weight: 150 lb  HR: 83  BP: 115/77 mmHg  ______________________________________________________________________________  Procedure  Complete Portable Echo Adult. Optison (NDC #8264-9391-17) given intravenously.  Patient was given 6 ml mixture of 3 ml Optison and 6 ml saline. 3 ml wasted.  ______________________________________________________________________________  Interpretation  Summary  No vegetation identified, however this does not exclude endocarditis. Left  ventricular size, wall motion and function are normal. The ejection fraction  is 55-60%.     Right ventricular function, chamber size, wall motion, and thickness are  normal.     No vegetation identified, however this does not exclude endocarditis.     No pericardial effusion is present.     No significant valvular abnormalities present.     The inferior vena cava is normal.     Comapred to 9/23/2022 there are no signficant differences.  ______________________________________________________________________________  Left Ventricle  Left ventricular size, wall motion and function are normal. The ejection  fraction is 55-60%. Left ventricular diastolic function is normal.     Right Ventricle  Right ventricular function, chamber size, wall motion, and thickness are  normal.     Atria  The right atria appears normal. The left atrium appears normal.     Mitral Valve  The mitral valve is normal.     Aortic Valve  Aortic valve is normal in structure and function. The aortic valve is  tricuspid.     Tricuspid Valve  The tricuspid valve is normal. The peak velocity of the tricuspid regurgitant  jet is not obtainable. Pulmonary artery systolic pressure cannot be assessed.     Pulmonic Valve  The pulmonic valve is normal.     Vessels  The inferior vena cava is normal. Sinuses of Valsalva 2.9 cm. Ascending aorta  3.0 cm. IVC diameter <2.1 cm collapsing >50% with sniff suggests a normal RA  pressure of 3 mmHg.     Pericardium  No pericardial effusion is present.     Miscellaneous  No significant valvular abnormalities present.     Compared to Previous Study  Comapred to 9/23/2022 there are no signficant differences.  ______________________________________________________________________________  MMode/2D Measurements & Calculations  IVSd: 0.84 cm  LVIDd: 4.9 cm  LVIDs: 3.0 cm  LVPWd: 0.94 cm  FS: 40.1 %  LV mass(C)d: 152.0 grams  LV mass(C)dI:  80.6 grams/m2  Ao root diam: 2.9 cm  asc Aorta Diam: 3.0 cm  LVOT diam: 2.0 cm  LVOT area: 3.2 cm2  LA Volume (BP): 48.3 ml     LA Volume Index (BP): 25.6 ml/m2  RWT: 0.38  TAPSE: 2.5 cm     Doppler Measurements & Calculations  MV E max ash: 101.0 cm/sec  MV A max ash: 78.1 cm/sec  MV E/A: 1.3  MV dec slope: 486.9 cm/sec2  MV dec time: 0.21 sec  Ao V2 max: 177.1 cm/sec  Ao max P.5 mmHg  Ao V2 mean: 128.7 cm/sec  Ao mean P.6 mmHg  Ao V2 VTI: 34.9 cm  SHANNAN(I,D): 2.7 cm2  SHANNAN(V,D): 2.6 cm2  LV V1 max P.5 mmHg  LV V1 max: 145.7 cm/sec  LV V1 VTI: 29.4 cm  SV(LVOT): 92.6 ml  SI(LVOT): 49.1 ml/m2  PA V2 max: 123.2 cm/sec  PA max P.1 mmHg  PA acc time: 0.16 sec  AV Ash Ratio (DI): 0.82  SHANNAN Index (cm2/m2): 1.4  E/E' av.6  Lateral E/e': 5.9     Medial E/e': 13.3  RV S Ash: 17.8 cm/sec     ______________________________________________________________________________  Report approved by: Aleena Shelton 2023 03:17 PM

## 2023-09-27 NOTE — TELEPHONE ENCOUNTER
Please let patient know that his urine culture shows that the Augmentin may not be the best antibiotic for this strain of E coli.  I would recommend changing to cefdinir 300 mg twice daily for 7 days.  I sent this new Rx to the [facility] in [place] that was listed as his preferred pharmacy in [facility].

## 2023-09-28 ENCOUNTER — TELEPHONE (OUTPATIENT)
Dept: CARDIOLOGY | Facility: CLINIC | Age: 38
End: 2023-09-28
Payer: MEDICARE

## 2023-10-02 ENCOUNTER — LAB (OUTPATIENT)
Dept: LAB | Facility: CLINIC | Age: 38
End: 2023-10-02
Payer: MEDICARE

## 2023-10-02 DIAGNOSIS — Z94.0 KIDNEY REPLACED BY TRANSPLANT: ICD-10-CM

## 2023-10-02 DIAGNOSIS — B25.9 CMV (CYTOMEGALOVIRUS INFECTION) (H): ICD-10-CM

## 2023-10-02 LAB
ANION GAP SERPL CALCULATED.3IONS-SCNC: 10 MMOL/L (ref 7–15)
BASOPHILS # BLD MANUAL: 0.1 10E3/UL (ref 0–0.2)
BASOPHILS NFR BLD MANUAL: 4 %
BUN SERPL-MCNC: 24.6 MG/DL (ref 6–20)
CALCIUM SERPL-MCNC: 9 MG/DL (ref 8.6–10)
CHLORIDE SERPL-SCNC: 103 MMOL/L (ref 98–107)
CREAT SERPL-MCNC: 1.57 MG/DL (ref 0.67–1.17)
DEPRECATED HCO3 PLAS-SCNC: 22 MMOL/L (ref 22–29)
EGFRCR SERPLBLD CKD-EPI 2021: 57 ML/MIN/1.73M2
EOSINOPHIL # BLD MANUAL: 0.3 10E3/UL (ref 0–0.7)
EOSINOPHIL NFR BLD MANUAL: 14 %
ERYTHROCYTE [DISTWIDTH] IN BLOOD BY AUTOMATED COUNT: 13.8 % (ref 10–15)
GLUCOSE SERPL-MCNC: 85 MG/DL (ref 70–99)
HCT VFR BLD AUTO: 32.6 % (ref 40–53)
HGB BLD-MCNC: 11 G/DL (ref 13.3–17.7)
LYMPHOCYTES # BLD MANUAL: 0.9 10E3/UL (ref 0.8–5.3)
LYMPHOCYTES NFR BLD MANUAL: 38 %
MCH RBC QN AUTO: 30.6 PG (ref 26.5–33)
MCHC RBC AUTO-ENTMCNC: 33.7 G/DL (ref 31.5–36.5)
MCV RBC AUTO: 91 FL (ref 78–100)
MONOCYTES # BLD MANUAL: 0.3 10E3/UL (ref 0–1.3)
MONOCYTES NFR BLD MANUAL: 14 %
MYELOCYTES # BLD MANUAL: 0 10E3/UL
MYELOCYTES NFR BLD MANUAL: 1 %
NEUTROPHILS # BLD MANUAL: 0.7 10E3/UL (ref 1.6–8.3)
NEUTROPHILS NFR BLD MANUAL: 29 %
PLAT MORPH BLD: ABNORMAL
PLATELET # BLD AUTO: 303 10E3/UL (ref 150–450)
POTASSIUM SERPL-SCNC: 4.7 MMOL/L (ref 3.4–5.3)
RBC # BLD AUTO: 3.59 10E6/UL (ref 4.4–5.9)
RBC MORPH BLD: ABNORMAL
SODIUM SERPL-SCNC: 135 MMOL/L (ref 135–145)
TACROLIMUS BLD-MCNC: 8.3 UG/L (ref 5–15)
TME LAST DOSE: NORMAL H
TME LAST DOSE: NORMAL H
WBC # BLD AUTO: 2.3 10E3/UL (ref 4–11)

## 2023-10-02 PROCEDURE — 85027 COMPLETE CBC AUTOMATED: CPT

## 2023-10-02 PROCEDURE — 80180 DRUG SCRN QUAN MYCOPHENOLATE: CPT

## 2023-10-02 PROCEDURE — 85007 BL SMEAR W/DIFF WBC COUNT: CPT

## 2023-10-02 PROCEDURE — 80197 ASSAY OF TACROLIMUS: CPT

## 2023-10-02 PROCEDURE — 36415 COLL VENOUS BLD VENIPUNCTURE: CPT

## 2023-10-02 PROCEDURE — 80048 BASIC METABOLIC PNL TOTAL CA: CPT

## 2023-10-03 LAB — CMV DNA SPEC NAA+PROBE-ACNC: NOT DETECTED IU/ML

## 2023-10-04 LAB
MYCOPHENOLATE SERPL LC/MS/MS-MCNC: 0.85 MG/L (ref 1–3.5)
MYCOPHENOLATE-G SERPL LC/MS/MS-MCNC: 23 MG/L (ref 30–95)
TME LAST DOSE: ABNORMAL H
TME LAST DOSE: ABNORMAL H

## 2023-10-09 ENCOUNTER — LAB (OUTPATIENT)
Dept: LAB | Facility: CLINIC | Age: 38
End: 2023-10-09
Payer: MEDICARE

## 2023-10-09 ENCOUNTER — TELEPHONE (OUTPATIENT)
Dept: INFECTIOUS DISEASES | Facility: CLINIC | Age: 38
End: 2023-10-09
Payer: MEDICARE

## 2023-10-09 ENCOUNTER — TELEPHONE (OUTPATIENT)
Dept: TRANSPLANT | Facility: CLINIC | Age: 38
End: 2023-10-09

## 2023-10-09 DIAGNOSIS — Z94.0 KIDNEY REPLACED BY TRANSPLANT: Primary | ICD-10-CM

## 2023-10-09 DIAGNOSIS — R19.7 DIARRHEA: ICD-10-CM

## 2023-10-09 DIAGNOSIS — Z94.0 KIDNEY REPLACED BY TRANSPLANT: ICD-10-CM

## 2023-10-09 DIAGNOSIS — B25.9 CMV (CYTOMEGALOVIRUS INFECTION) (H): ICD-10-CM

## 2023-10-09 LAB
ANION GAP SERPL CALCULATED.3IONS-SCNC: 10 MMOL/L (ref 7–15)
BASO+EOS+MONOS # BLD AUTO: ABNORMAL 10*3/UL
BASO+EOS+MONOS NFR BLD AUTO: ABNORMAL %
BASOPHILS # BLD AUTO: ABNORMAL 10*3/UL
BASOPHILS # BLD MANUAL: 0.1 10E3/UL (ref 0–0.2)
BASOPHILS NFR BLD AUTO: ABNORMAL %
BASOPHILS NFR BLD MANUAL: 4 %
BUN SERPL-MCNC: 25 MG/DL (ref 6–20)
CALCIUM SERPL-MCNC: 9.3 MG/DL (ref 8.6–10)
CHLORIDE SERPL-SCNC: 106 MMOL/L (ref 98–107)
CREAT SERPL-MCNC: 1.9 MG/DL (ref 0.67–1.17)
DEPRECATED HCO3 PLAS-SCNC: 23 MMOL/L (ref 22–29)
EGFRCR SERPLBLD CKD-EPI 2021: 45 ML/MIN/1.73M2
EOSINOPHIL # BLD AUTO: ABNORMAL 10*3/UL
EOSINOPHIL # BLD MANUAL: 0.2 10E3/UL (ref 0–0.7)
EOSINOPHIL NFR BLD AUTO: ABNORMAL %
EOSINOPHIL NFR BLD MANUAL: 5 %
ERYTHROCYTE [DISTWIDTH] IN BLOOD BY AUTOMATED COUNT: 14.6 % (ref 10–15)
GLUCOSE SERPL-MCNC: 66 MG/DL (ref 70–99)
HCT VFR BLD AUTO: 35.4 % (ref 40–53)
HGB BLD-MCNC: 11.7 G/DL (ref 13.3–17.7)
IMM GRANULOCYTES # BLD: ABNORMAL 10*3/UL
IMM GRANULOCYTES NFR BLD: ABNORMAL %
LYMPHOCYTES # BLD AUTO: ABNORMAL 10*3/UL
LYMPHOCYTES # BLD MANUAL: 0.8 10E3/UL (ref 0.8–5.3)
LYMPHOCYTES NFR BLD AUTO: ABNORMAL %
LYMPHOCYTES NFR BLD MANUAL: 28 %
MCH RBC QN AUTO: 30.3 PG (ref 26.5–33)
MCHC RBC AUTO-ENTMCNC: 33.1 G/DL (ref 31.5–36.5)
MCV RBC AUTO: 92 FL (ref 78–100)
MONOCYTES # BLD AUTO: ABNORMAL 10*3/UL
MONOCYTES # BLD MANUAL: 0.3 10E3/UL (ref 0–1.3)
MONOCYTES NFR BLD AUTO: ABNORMAL %
MONOCYTES NFR BLD MANUAL: 11 %
NEUTROPHILS # BLD AUTO: ABNORMAL 10*3/UL
NEUTROPHILS # BLD MANUAL: 1.6 10E3/UL (ref 1.6–8.3)
NEUTROPHILS NFR BLD AUTO: ABNORMAL %
NEUTROPHILS NFR BLD MANUAL: 52 %
NRBC # BLD AUTO: 0 10E3/UL
NRBC BLD AUTO-RTO: 0 /100
PLAT MORPH BLD: ABNORMAL
PLATELET # BLD AUTO: 309 10E3/UL (ref 150–450)
POTASSIUM SERPL-SCNC: 4 MMOL/L (ref 3.4–5.3)
RBC # BLD AUTO: 3.86 10E6/UL (ref 4.4–5.9)
RBC MORPH BLD: ABNORMAL
SODIUM SERPL-SCNC: 139 MMOL/L (ref 135–145)
TACROLIMUS BLD-MCNC: 7.7 UG/L (ref 5–15)
TME LAST DOSE: NORMAL H
TME LAST DOSE: NORMAL H
TOXIC GRANULES BLD QL SMEAR: PRESENT
WBC # BLD AUTO: 3 10E3/UL (ref 4–11)

## 2023-10-09 PROCEDURE — 80048 BASIC METABOLIC PNL TOTAL CA: CPT

## 2023-10-09 PROCEDURE — 80180 DRUG SCRN QUAN MYCOPHENOLATE: CPT

## 2023-10-09 PROCEDURE — 85007 BL SMEAR W/DIFF WBC COUNT: CPT

## 2023-10-09 PROCEDURE — 80197 ASSAY OF TACROLIMUS: CPT

## 2023-10-09 PROCEDURE — 85027 COMPLETE CBC AUTOMATED: CPT

## 2023-10-09 PROCEDURE — 36415 COLL VENOUS BLD VENIPUNCTURE: CPT

## 2023-10-09 NOTE — TELEPHONE ENCOUNTER
EP called 10/9 to sched a next avail follow up per Kaylyn, follow up per Dr. Willis. Appt scheduled with Dr. Davenport.

## 2023-10-10 ENCOUNTER — MYC MEDICAL ADVICE (OUTPATIENT)
Dept: TRANSPLANT | Facility: CLINIC | Age: 38
End: 2023-10-10
Payer: MEDICARE

## 2023-10-10 ENCOUNTER — LAB (OUTPATIENT)
Dept: LAB | Facility: CLINIC | Age: 38
End: 2023-10-10
Payer: MEDICARE

## 2023-10-10 DIAGNOSIS — Z94.0 KIDNEY REPLACED BY TRANSPLANT: Primary | ICD-10-CM

## 2023-10-10 DIAGNOSIS — Z94.0 KIDNEY REPLACED BY TRANSPLANT: ICD-10-CM

## 2023-10-10 DIAGNOSIS — R19.7 DIARRHEA: ICD-10-CM

## 2023-10-10 LAB
ADV 40+41 DNA STL QL NAA+NON-PROBE: NEGATIVE
ASTRO TYP 1-8 RNA STL QL NAA+NON-PROBE: NEGATIVE
C CAYETANENSIS DNA STL QL NAA+NON-PROBE: NEGATIVE
C DIFF GDH STL QL IA: POSITIVE
C DIFF TOX A+B STL QL IA: POSITIVE
C DIFF TOX B STL QL: POSITIVE
CAMPYLOBACTER DNA SPEC NAA+PROBE: NEGATIVE
CMV DNA SPEC NAA+PROBE-ACNC: <35 IU/ML
CMV DNA SPEC NAA+PROBE-LOG#: <1.5 {LOG_COPIES}/ML
CRYPTOSP DNA STL QL NAA+NON-PROBE: NEGATIVE
E COLI O157 DNA STL QL NAA+NON-PROBE: NORMAL
E HISTOLYT DNA STL QL NAA+NON-PROBE: NEGATIVE
EAEC ASTA GENE ISLT QL NAA+PROBE: NEGATIVE
EC STX1+STX2 GENES STL QL NAA+NON-PROBE: NEGATIVE
EPEC EAE GENE STL QL NAA+NON-PROBE: NEGATIVE
ETEC LTA+ST1A+ST1B TOX ST NAA+NON-PROBE: NEGATIVE
G LAMBLIA DNA STL QL NAA+NON-PROBE: NEGATIVE
NOROVIRUS GI+II RNA STL QL NAA+NON-PROBE: NEGATIVE
P SHIGELLOIDES DNA STL QL NAA+NON-PROBE: NEGATIVE
RVA RNA STL QL NAA+NON-PROBE: NEGATIVE
SALMONELLA SP RPOD STL QL NAA+PROBE: NEGATIVE
SAPO I+II+IV+V RNA STL QL NAA+NON-PROBE: NEGATIVE
SHIGELLA SP+EIEC IPAH ST NAA+NON-PROBE: NEGATIVE
V CHOLERAE DNA SPEC QL NAA+PROBE: NEGATIVE
VIBRIO DNA SPEC NAA+PROBE: NEGATIVE
Y ENTEROCOL DNA STL QL NAA+PROBE: NEGATIVE

## 2023-10-10 PROCEDURE — 87324 CLOSTRIDIUM AG IA: CPT

## 2023-10-10 PROCEDURE — 87507 IADNA-DNA/RNA PROBE TQ 12-25: CPT

## 2023-10-10 PROCEDURE — 87493 C DIFF AMPLIFIED PROBE: CPT | Mod: XU

## 2023-10-10 NOTE — TELEPHONE ENCOUNTER
Stool sample  Received: Today  Chip Fowler, Veronica SWAN RN  Phone Number: [phone]     Ok          Stool sample  (Newest Message First)  View All Conversations on this Encounter  Chip Fowler  You3 hours ago (1:04 PM)       Ok       You  Chip Fowler3 hours ago (1:03 PM)       Stool sample orders are entered for your local [facility]  Lab      Dr Franko Cerda  is requesting to check another Creatinine or transplant  labs this week   I have placed orders for Thurs  - to double check your creatinine   Thank you   [facility] Kidney Transplant Coordinator         Chip Fowler  You5 hours ago (11:49 AM)       Did you ever hear back,want me to go in for a stool sample after work?

## 2023-10-10 NOTE — TELEPHONE ENCOUNTER
----- Message from Franko Cerda MD sent at 10/9/2023  4:42 PM CDT -----  Regarding: RE: Increase creatinine  Thanks. Recommend IVF, c.diff, enteric panel, CMV.   ----- Message -----  From: Veronica Edgar RN  Sent: 10/9/2023   4:32 PM CDT  To: Franko Cerda MD  Subject: Increase creatinine                               Called  Chip with increase creatinine    - confirms diarrhea for the past 5 days      I am waiting for his tacrolimus  and cmv levels     ==============================================  Called Chip  confirmed lab orders in [facility] for stool sample and to repeat labs

## 2023-10-11 ENCOUNTER — TELEPHONE (OUTPATIENT)
Dept: TRANSPLANT | Facility: CLINIC | Age: 38
End: 2023-10-11
Payer: MEDICARE

## 2023-10-11 DIAGNOSIS — B25.9 CMV (CYTOMEGALOVIRUS INFECTION) (H): ICD-10-CM

## 2023-10-11 DIAGNOSIS — Z94.0 KIDNEY REPLACED BY TRANSPLANT: ICD-10-CM

## 2023-10-11 DIAGNOSIS — A49.8 CLOSTRIDIUM DIFFICILE INFECTION: ICD-10-CM

## 2023-10-11 DIAGNOSIS — A04.72 C. DIFFICILE DIARRHEA: Primary | ICD-10-CM

## 2023-10-11 LAB
MYCOPHENOLATE SERPL LC/MS/MS-MCNC: 0.88 MG/L (ref 1–3.5)
MYCOPHENOLATE-G SERPL LC/MS/MS-MCNC: 11.9 MG/L (ref 30–95)
TME LAST DOSE: ABNORMAL H
TME LAST DOSE: ABNORMAL H

## 2023-10-11 RX ORDER — MYCOPHENOLIC ACID 180 MG/1
540 TABLET, DELAYED RELEASE ORAL 2 TIMES DAILY
Qty: 180 TABLET | Refills: 11 | Status: SHIPPED | OUTPATIENT
Start: 2023-10-11 | End: 2024-09-27

## 2023-10-11 RX ORDER — VANCOMYCIN HYDROCHLORIDE 125 MG/1
125 CAPSULE ORAL 4 TIMES DAILY
Qty: 56 CAPSULE | Refills: 0 | Status: SHIPPED | OUTPATIENT
Start: 2023-10-11 | End: 2023-10-25

## 2023-10-11 RX ORDER — VALGANCICLOVIR 450 MG/1
900 TABLET, FILM COATED ORAL DAILY
Qty: 120 TABLET | Refills: 3 | Status: SHIPPED | OUTPATIENT
Start: 2023-10-11 | End: 2023-10-24

## 2023-10-11 RX ORDER — VALGANCICLOVIR 450 MG/1
900 TABLET, FILM COATED ORAL DAILY
Qty: 120 TABLET | Refills: 3 | Status: SHIPPED | OUTPATIENT
Start: 2023-10-11 | End: 2023-10-11

## 2023-10-11 RX ORDER — MYCOPHENOLIC ACID 180 MG/1
540 TABLET, DELAYED RELEASE ORAL 2 TIMES DAILY
Qty: 180 TABLET | Refills: 11 | Status: SHIPPED | OUTPATIENT
Start: 2023-10-11 | End: 2023-10-11

## 2023-10-11 NOTE — TELEPHONE ENCOUNTER
ISSUE:  + CDiff infection.  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      PLAN:  Franko Cerda MD Huepfel, Mary K, RN  Please start PO vanc 125mg q6h x14d    Franko Cerda MD Huepfel, Mary K, RN  Increase MPA to 540mg bid       OUTCOME -   Discussed C diff infection, how to prevent spread (wash hands, bleach surface areas) and new Rx.   Orders sent to preferred pharmacy for vanco therapy as above.    Chip voiced understanding to INCREASE MPA level to 540mg BID, script updated.    CMV - continues on preventative dose 900mg daily, as this was reduced after two negative tests.  Will repeat next week.      Did reduce valcyte to 900mg daily.  Will continue labs weekly.      Denies any lightheadedness /dizziness or low BPs.  Continues to drink up to 3L daily. Will continue to push oral hydration.  Repeating labs on Thursday, 10/12. Chip understands that if creatinine remain elevated, may need IV hydration.

## 2023-10-12 ENCOUNTER — LAB (OUTPATIENT)
Dept: LAB | Facility: CLINIC | Age: 38
End: 2023-10-12
Payer: MEDICARE

## 2023-10-12 DIAGNOSIS — Z94.0 KIDNEY REPLACED BY TRANSPLANT: ICD-10-CM

## 2023-10-12 LAB
ANION GAP SERPL CALCULATED.3IONS-SCNC: 8 MMOL/L (ref 7–15)
BUN SERPL-MCNC: 20.9 MG/DL (ref 6–20)
CALCIUM SERPL-MCNC: 9.1 MG/DL (ref 8.6–10)
CHLORIDE SERPL-SCNC: 105 MMOL/L (ref 98–107)
CREAT SERPL-MCNC: 1.66 MG/DL (ref 0.67–1.17)
DEPRECATED HCO3 PLAS-SCNC: 23 MMOL/L (ref 22–29)
EGFRCR SERPLBLD CKD-EPI 2021: 53 ML/MIN/1.73M2
GLUCOSE SERPL-MCNC: 73 MG/DL (ref 70–99)
POTASSIUM SERPL-SCNC: 4 MMOL/L (ref 3.4–5.3)
SODIUM SERPL-SCNC: 136 MMOL/L (ref 135–145)

## 2023-10-12 PROCEDURE — 36415 COLL VENOUS BLD VENIPUNCTURE: CPT

## 2023-10-12 PROCEDURE — 80048 BASIC METABOLIC PNL TOTAL CA: CPT

## 2023-10-13 ENCOUNTER — MYC MEDICAL ADVICE (OUTPATIENT)
Dept: TRANSPLANT | Facility: CLINIC | Age: 38
End: 2023-10-13
Payer: MEDICARE

## 2023-10-16 ENCOUNTER — LAB (OUTPATIENT)
Dept: LAB | Facility: CLINIC | Age: 38
End: 2023-10-16
Payer: MEDICARE

## 2023-10-16 DIAGNOSIS — Z48.298 AFTERCARE FOLLOWING ORGAN TRANSPLANT: ICD-10-CM

## 2023-10-16 DIAGNOSIS — Z20.828 CONTACT WITH AND (SUSPECTED) EXPOSURE TO OTHER VIRAL COMMUNICABLE DISEASES: ICD-10-CM

## 2023-10-16 DIAGNOSIS — Z79.899 ENCOUNTER FOR LONG-TERM CURRENT USE OF MEDICATION: ICD-10-CM

## 2023-10-16 DIAGNOSIS — Z94.0 KIDNEY REPLACED BY TRANSPLANT: ICD-10-CM

## 2023-10-16 DIAGNOSIS — B25.9 CMV (CYTOMEGALOVIRUS INFECTION) (H): ICD-10-CM

## 2023-10-16 LAB
ALBUMIN MFR UR ELPH: <4 MG/DL
ANION GAP SERPL CALCULATED.3IONS-SCNC: 9 MMOL/L (ref 7–15)
BASO+EOS+MONOS # BLD AUTO: ABNORMAL 10*3/UL
BASO+EOS+MONOS NFR BLD AUTO: ABNORMAL %
BASOPHILS # BLD AUTO: ABNORMAL 10*3/UL
BASOPHILS # BLD MANUAL: 0.1 10E3/UL (ref 0–0.2)
BASOPHILS NFR BLD AUTO: ABNORMAL %
BASOPHILS NFR BLD MANUAL: 2 %
BUN SERPL-MCNC: 24.6 MG/DL (ref 6–20)
CALCIUM SERPL-MCNC: 9.1 MG/DL (ref 8.6–10)
CHLORIDE SERPL-SCNC: 105 MMOL/L (ref 98–107)
CREAT SERPL-MCNC: 1.53 MG/DL (ref 0.67–1.17)
CREAT UR-MCNC: 16.1 MG/DL
DEPRECATED HCO3 PLAS-SCNC: 24 MMOL/L (ref 22–29)
EGFRCR SERPLBLD CKD-EPI 2021: 59 ML/MIN/1.73M2
EOSINOPHIL # BLD AUTO: ABNORMAL 10*3/UL
EOSINOPHIL # BLD MANUAL: 0.4 10E3/UL (ref 0–0.7)
EOSINOPHIL NFR BLD AUTO: ABNORMAL %
EOSINOPHIL NFR BLD MANUAL: 14 %
ERYTHROCYTE [DISTWIDTH] IN BLOOD BY AUTOMATED COUNT: 14.5 % (ref 10–15)
GLUCOSE SERPL-MCNC: 71 MG/DL (ref 70–99)
HCT VFR BLD AUTO: 34.7 % (ref 40–53)
HGB BLD-MCNC: 11.6 G/DL (ref 13.3–17.7)
IMM GRANULOCYTES # BLD: ABNORMAL 10*3/UL
IMM GRANULOCYTES NFR BLD: ABNORMAL %
LYMPHOCYTES # BLD AUTO: ABNORMAL 10*3/UL
LYMPHOCYTES # BLD MANUAL: 1.1 10E3/UL (ref 0.8–5.3)
LYMPHOCYTES NFR BLD AUTO: ABNORMAL %
LYMPHOCYTES NFR BLD MANUAL: 34 %
MCH RBC QN AUTO: 31.3 PG (ref 26.5–33)
MCHC RBC AUTO-ENTMCNC: 33.4 G/DL (ref 31.5–36.5)
MCV RBC AUTO: 94 FL (ref 78–100)
MONOCYTES # BLD AUTO: ABNORMAL 10*3/UL
MONOCYTES # BLD MANUAL: 0.4 10E3/UL (ref 0–1.3)
MONOCYTES NFR BLD AUTO: ABNORMAL %
MONOCYTES NFR BLD MANUAL: 13 %
NEUTROPHILS # BLD AUTO: ABNORMAL 10*3/UL
NEUTROPHILS # BLD MANUAL: 1.2 10E3/UL (ref 1.6–8.3)
NEUTROPHILS NFR BLD AUTO: ABNORMAL %
NEUTROPHILS NFR BLD MANUAL: 37 %
NRBC # BLD AUTO: 0 10E3/UL
NRBC BLD AUTO-RTO: 0 /100
PLAT MORPH BLD: ABNORMAL
PLATELET # BLD AUTO: 214 10E3/UL (ref 150–450)
POTASSIUM SERPL-SCNC: 4.5 MMOL/L (ref 3.4–5.3)
PROT/CREAT 24H UR: NORMAL MG/G{CREAT}
RBC # BLD AUTO: 3.71 10E6/UL (ref 4.4–5.9)
RBC MORPH BLD: ABNORMAL
SODIUM SERPL-SCNC: 138 MMOL/L (ref 135–145)
TACROLIMUS BLD-MCNC: 6.4 UG/L (ref 5–15)
TME LAST DOSE: NORMAL H
TME LAST DOSE: NORMAL H
WBC # BLD AUTO: 3.2 10E3/UL (ref 4–11)

## 2023-10-16 PROCEDURE — 85027 COMPLETE CBC AUTOMATED: CPT

## 2023-10-16 PROCEDURE — 80197 ASSAY OF TACROLIMUS: CPT

## 2023-10-16 PROCEDURE — 80180 DRUG SCRN QUAN MYCOPHENOLATE: CPT

## 2023-10-16 PROCEDURE — 80048 BASIC METABOLIC PNL TOTAL CA: CPT

## 2023-10-16 PROCEDURE — 84156 ASSAY OF PROTEIN URINE: CPT

## 2023-10-16 PROCEDURE — 36415 COLL VENOUS BLD VENIPUNCTURE: CPT

## 2023-10-16 PROCEDURE — 85007 BL SMEAR W/DIFF WBC COUNT: CPT

## 2023-10-17 LAB
CMV DNA SPEC NAA+PROBE-ACNC: <35 IU/ML
CMV DNA SPEC NAA+PROBE-LOG#: <1.5 {LOG_COPIES}/ML

## 2023-10-18 LAB
MYCOPHENOLATE SERPL LC/MS/MS-MCNC: <0.25 MG/L (ref 1–3.5)
MYCOPHENOLATE-G SERPL LC/MS/MS-MCNC: 12.2 MG/L (ref 30–95)
TME LAST DOSE: ABNORMAL H
TME LAST DOSE: ABNORMAL H

## 2023-10-20 ENCOUNTER — TELEPHONE (OUTPATIENT)
Dept: TRANSPLANT | Facility: CLINIC | Age: 38
End: 2023-10-20
Payer: MEDICARE

## 2023-10-20 DIAGNOSIS — Z94.0 KIDNEY REPLACED BY TRANSPLANT: Primary | ICD-10-CM

## 2023-10-20 NOTE — TELEPHONE ENCOUNTER
Franko Cerda MD Huepfel, Veronica SWAN RN  Would stop checking MPA levels. He has had so many infections and has neutropenia. I don't plan to increase dose from 540mg bid.      Issue Low MPA level     Called Chip with the above plan per Dr Franko Saunders verbalized understanding

## 2023-10-23 ENCOUNTER — LAB (OUTPATIENT)
Dept: LAB | Facility: CLINIC | Age: 38
End: 2023-10-23
Payer: MEDICARE

## 2023-10-23 DIAGNOSIS — B25.9 CMV (CYTOMEGALOVIRUS INFECTION) (H): ICD-10-CM

## 2023-10-23 DIAGNOSIS — Z94.0 KIDNEY REPLACED BY TRANSPLANT: ICD-10-CM

## 2023-10-23 LAB
ANION GAP SERPL CALCULATED.3IONS-SCNC: 7 MMOL/L (ref 7–15)
BASOPHILS # BLD AUTO: 0.1 10E3/UL (ref 0–0.2)
BASOPHILS NFR BLD AUTO: 3 %
BUN SERPL-MCNC: 21.4 MG/DL (ref 6–20)
CALCIUM SERPL-MCNC: 9.2 MG/DL (ref 8.6–10)
CHLORIDE SERPL-SCNC: 105 MMOL/L (ref 98–107)
CREAT SERPL-MCNC: 1.66 MG/DL (ref 0.67–1.17)
DEPRECATED HCO3 PLAS-SCNC: 26 MMOL/L (ref 22–29)
EGFRCR SERPLBLD CKD-EPI 2021: 53 ML/MIN/1.73M2
EOSINOPHIL # BLD AUTO: 0.6 10E3/UL (ref 0–0.7)
EOSINOPHIL NFR BLD AUTO: 19 %
ERYTHROCYTE [DISTWIDTH] IN BLOOD BY AUTOMATED COUNT: 14.5 % (ref 10–15)
GLUCOSE SERPL-MCNC: 79 MG/DL (ref 70–99)
HCT VFR BLD AUTO: 35 % (ref 40–53)
HGB BLD-MCNC: 12.1 G/DL (ref 13.3–17.7)
IMM GRANULOCYTES # BLD: 0 10E3/UL
IMM GRANULOCYTES NFR BLD: 1 %
LYMPHOCYTES # BLD AUTO: 1.1 10E3/UL (ref 0.8–5.3)
LYMPHOCYTES NFR BLD AUTO: 31 %
MCH RBC QN AUTO: 31.8 PG (ref 26.5–33)
MCHC RBC AUTO-ENTMCNC: 34.6 G/DL (ref 31.5–36.5)
MCV RBC AUTO: 92 FL (ref 78–100)
MONOCYTES # BLD AUTO: 0.5 10E3/UL (ref 0–1.3)
MONOCYTES NFR BLD AUTO: 14 %
NEUTROPHILS # BLD AUTO: 1.1 10E3/UL (ref 1.6–8.3)
NEUTROPHILS NFR BLD AUTO: 32 %
NRBC # BLD AUTO: 0 10E3/UL
NRBC BLD AUTO-RTO: 0 /100
PLATELET # BLD AUTO: 210 10E3/UL (ref 150–450)
POTASSIUM SERPL-SCNC: 4.3 MMOL/L (ref 3.4–5.3)
RBC # BLD AUTO: 3.81 10E6/UL (ref 4.4–5.9)
SODIUM SERPL-SCNC: 138 MMOL/L (ref 135–145)
WBC # BLD AUTO: 3.4 10E3/UL (ref 4–11)

## 2023-10-23 PROCEDURE — 36415 COLL VENOUS BLD VENIPUNCTURE: CPT

## 2023-10-23 PROCEDURE — 80197 ASSAY OF TACROLIMUS: CPT

## 2023-10-23 PROCEDURE — 85004 AUTOMATED DIFF WBC COUNT: CPT

## 2023-10-23 PROCEDURE — 82310 ASSAY OF CALCIUM: CPT

## 2023-10-23 PROCEDURE — 87799 DETECT AGENT NOS DNA QUANT: CPT

## 2023-10-24 ENCOUNTER — TELEPHONE (OUTPATIENT)
Dept: TRANSPLANT | Facility: CLINIC | Age: 38
End: 2023-10-24
Payer: MEDICARE

## 2023-10-24 ENCOUNTER — MYC MEDICAL ADVICE (OUTPATIENT)
Dept: TRANSPLANT | Facility: CLINIC | Age: 38
End: 2023-10-24
Payer: MEDICARE

## 2023-10-24 DIAGNOSIS — Z94.0 KIDNEY REPLACED BY TRANSPLANT: Primary | ICD-10-CM

## 2023-10-24 LAB
BKV DNA # SPEC NAA+PROBE: NOT DETECTED COPIES/ML
CMV DNA SPEC NAA+PROBE-ACNC: NOT DETECTED IU/ML
TACROLIMUS BLD-MCNC: 8.3 UG/L (ref 5–15)
TME LAST DOSE: NORMAL H
TME LAST DOSE: NORMAL H

## 2023-11-05 ENCOUNTER — MYC MEDICAL ADVICE (OUTPATIENT)
Dept: TRANSPLANT | Facility: CLINIC | Age: 38
End: 2023-11-05
Payer: MEDICARE

## 2023-11-06 ENCOUNTER — LAB (OUTPATIENT)
Dept: LAB | Facility: CLINIC | Age: 38
End: 2023-11-06
Payer: MEDICARE

## 2023-11-06 ENCOUNTER — TELEPHONE (OUTPATIENT)
Dept: TRANSPLANT | Facility: CLINIC | Age: 38
End: 2023-11-06

## 2023-11-06 DIAGNOSIS — B25.9 CMV (CYTOMEGALOVIRUS INFECTION) (H): ICD-10-CM

## 2023-11-06 DIAGNOSIS — Z94.0 KIDNEY REPLACED BY TRANSPLANT: ICD-10-CM

## 2023-11-06 DIAGNOSIS — Z94.0 KIDNEY REPLACED BY TRANSPLANT: Primary | ICD-10-CM

## 2023-11-06 DIAGNOSIS — R19.7 DIARRHEA: ICD-10-CM

## 2023-11-06 LAB
ANION GAP SERPL CALCULATED.3IONS-SCNC: 8 MMOL/L (ref 7–15)
BASOPHILS # BLD AUTO: 0.1 10E3/UL (ref 0–0.2)
BASOPHILS NFR BLD AUTO: 1 %
BUN SERPL-MCNC: 19.3 MG/DL (ref 6–20)
CALCIUM SERPL-MCNC: 9 MG/DL (ref 8.6–10)
CHLORIDE SERPL-SCNC: 103 MMOL/L (ref 98–107)
CREAT SERPL-MCNC: 1.74 MG/DL (ref 0.67–1.17)
DEPRECATED HCO3 PLAS-SCNC: 25 MMOL/L (ref 22–29)
EGFRCR SERPLBLD CKD-EPI 2021: 51 ML/MIN/1.73M2
EOSINOPHIL # BLD AUTO: 0.7 10E3/UL (ref 0–0.7)
EOSINOPHIL NFR BLD AUTO: 9 %
ERYTHROCYTE [DISTWIDTH] IN BLOOD BY AUTOMATED COUNT: 13.8 % (ref 10–15)
GLUCOSE SERPL-MCNC: 86 MG/DL (ref 70–99)
HCT VFR BLD AUTO: 34.2 % (ref 40–53)
HGB BLD-MCNC: 11.6 G/DL (ref 13.3–17.7)
IMM GRANULOCYTES # BLD: 0 10E3/UL
IMM GRANULOCYTES NFR BLD: 0 %
LYMPHOCYTES # BLD AUTO: 1.1 10E3/UL (ref 0.8–5.3)
LYMPHOCYTES NFR BLD AUTO: 14 %
MCH RBC QN AUTO: 31.2 PG (ref 26.5–33)
MCHC RBC AUTO-ENTMCNC: 33.9 G/DL (ref 31.5–36.5)
MCV RBC AUTO: 92 FL (ref 78–100)
MONOCYTES # BLD AUTO: 0.7 10E3/UL (ref 0–1.3)
MONOCYTES NFR BLD AUTO: 10 %
NEUTROPHILS # BLD AUTO: 4.9 10E3/UL (ref 1.6–8.3)
NEUTROPHILS NFR BLD AUTO: 66 %
NRBC # BLD AUTO: 0 10E3/UL
NRBC BLD AUTO-RTO: 0 /100
PLATELET # BLD AUTO: 199 10E3/UL (ref 150–450)
POTASSIUM SERPL-SCNC: 4.1 MMOL/L (ref 3.4–5.3)
RBC # BLD AUTO: 3.72 10E6/UL (ref 4.4–5.9)
SODIUM SERPL-SCNC: 136 MMOL/L (ref 135–145)
TACROLIMUS BLD-MCNC: 5.6 UG/L (ref 5–15)
TME LAST DOSE: NORMAL H
TME LAST DOSE: NORMAL H
WBC # BLD AUTO: 7.5 10E3/UL (ref 4–11)

## 2023-11-06 PROCEDURE — 80048 BASIC METABOLIC PNL TOTAL CA: CPT

## 2023-11-06 PROCEDURE — 80197 ASSAY OF TACROLIMUS: CPT

## 2023-11-06 PROCEDURE — 87493 C DIFF AMPLIFIED PROBE: CPT | Performed by: INTERNAL MEDICINE

## 2023-11-06 PROCEDURE — 85004 AUTOMATED DIFF WBC COUNT: CPT

## 2023-11-06 PROCEDURE — 87324 CLOSTRIDIUM AG IA: CPT | Performed by: INTERNAL MEDICINE

## 2023-11-06 PROCEDURE — 36415 COLL VENOUS BLD VENIPUNCTURE: CPT

## 2023-11-06 PROCEDURE — 87507 IADNA-DNA/RNA PROBE TQ 12-25: CPT | Performed by: INTERNAL MEDICINE

## 2023-11-07 ENCOUNTER — TELEPHONE (OUTPATIENT)
Dept: TRANSPLANT | Facility: CLINIC | Age: 38
End: 2023-11-07
Payer: MEDICARE

## 2023-11-07 ENCOUNTER — LAB (OUTPATIENT)
Dept: LAB | Facility: CLINIC | Age: 38
End: 2023-11-07
Payer: MEDICARE

## 2023-11-07 ENCOUNTER — MYC MEDICAL ADVICE (OUTPATIENT)
Dept: TRANSPLANT | Facility: CLINIC | Age: 38
End: 2023-11-07

## 2023-11-07 DIAGNOSIS — Z94.0 KIDNEY REPLACED BY TRANSPLANT: Primary | ICD-10-CM

## 2023-11-07 LAB
ADV 40+41 DNA STL QL NAA+NON-PROBE: NEGATIVE
ASTRO TYP 1-8 RNA STL QL NAA+NON-PROBE: NEGATIVE
C CAYETANENSIS DNA STL QL NAA+NON-PROBE: NEGATIVE
C DIFF GDH STL QL IA: POSITIVE
C DIFF TOX A+B STL QL IA: POSITIVE
C DIFF TOX B STL QL: POSITIVE
CAMPYLOBACTER DNA SPEC NAA+PROBE: NEGATIVE
CRYPTOSP DNA STL QL NAA+NON-PROBE: NEGATIVE
E COLI O157 DNA STL QL NAA+NON-PROBE: NORMAL
E HISTOLYT DNA STL QL NAA+NON-PROBE: NEGATIVE
EAEC ASTA GENE ISLT QL NAA+PROBE: NEGATIVE
EC STX1+STX2 GENES STL QL NAA+NON-PROBE: NEGATIVE
EPEC EAE GENE STL QL NAA+NON-PROBE: NEGATIVE
ETEC LTA+ST1A+ST1B TOX ST NAA+NON-PROBE: NEGATIVE
G LAMBLIA DNA STL QL NAA+NON-PROBE: NEGATIVE
NOROVIRUS GI+II RNA STL QL NAA+NON-PROBE: NEGATIVE
P SHIGELLOIDES DNA STL QL NAA+NON-PROBE: NEGATIVE
RVA RNA STL QL NAA+NON-PROBE: NEGATIVE
SALMONELLA SP RPOD STL QL NAA+PROBE: NEGATIVE
SAPO I+II+IV+V RNA STL QL NAA+NON-PROBE: NEGATIVE
SHIGELLA SP+EIEC IPAH ST NAA+NON-PROBE: NEGATIVE
V CHOLERAE DNA SPEC QL NAA+PROBE: NEGATIVE
VIBRIO DNA SPEC NAA+PROBE: NEGATIVE
Y ENTEROCOL DNA STL QL NAA+PROBE: NEGATIVE

## 2023-11-07 RX ORDER — VANCOMYCIN HYDROCHLORIDE 125 MG/1
125 CAPSULE ORAL 4 TIMES DAILY
Qty: 56 CAPSULE | Refills: 0 | Status: SHIPPED | OUTPATIENT
Start: 2023-11-07 | End: 2023-11-09

## 2023-11-07 NOTE — TELEPHONE ENCOUNTER
Franko Cerda MD Huepfel, Mary K, RN  I would just give him another Vanco  14d of treatment, then would taper slowly as follows: 125mg q8 x1 week, q12 x 1 week, daily x1 week, every other day x2 weeks, then stop.      Franko Cerda MD Huepfel, Mary K, RN  He has had so many infections and has neutropenia. I don't plan to increase dose from Myfortic mycophenolic acid  540mg bid.

## 2023-11-09 DIAGNOSIS — Z94.0 KIDNEY REPLACED BY TRANSPLANT: ICD-10-CM

## 2023-11-09 DIAGNOSIS — A04.72 C. DIFFICILE DIARRHEA: Primary | ICD-10-CM

## 2023-11-09 RX ORDER — VANCOMYCIN HYDROCHLORIDE 125 MG/1
CAPSULE ORAL
Qty: 56 CAPSULE | Refills: 0 | Status: SHIPPED | OUTPATIENT
Start: 2023-11-09 | End: 2024-01-23

## 2023-11-10 ENCOUNTER — TELEPHONE (OUTPATIENT)
Dept: NEPHROLOGY | Facility: CLINIC | Age: 38
End: 2023-11-10

## 2023-11-10 NOTE — TELEPHONE ENCOUNTER
[facility]    Fax: [phone]    Spec: [phone]    MO: [phone]

## 2023-11-10 NOTE — TELEPHONE ENCOUNTER
Central Prior Authorization Team   Phone: [phone]    PA Initiation    Medication: Vancomycin Hcl 125mg caps  Insurance Company: [employer] - Phone [phone] Fax [phone]  Pharmacy Filling the Rx: [facility] - [place], [place] - [address]  Filling Pharmacy Phone: [phone]  Filling Pharmacy Fax:    Start Date: 11/10/2023    This was marked for urgent review

## 2023-11-10 NOTE — TELEPHONE ENCOUNTER
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  Sent RX to local [facility] to start treatment

## 2023-11-12 NOTE — TELEPHONE ENCOUNTER
Return diarrhea after treated for C/Diff - with oral Vanco  Entered order to repeat stool cutlure

## 2023-11-13 NOTE — TELEPHONE ENCOUNTER
Prior Authorization Approval    Medication: VANCOMYCIN  MG PO CAPS  Authorization Effective Date: 11/9/2023  Authorization Expiration Date:    Approved Dose/Quantity:   Reference #:     Insurance Company: [employer] - Phone [phone] Fax [phone]  Expected CoPay: $    CoPay Card Available:      Financial Assistance Needed:   Which Pharmacy is filling the prescription: [facility] - [place] [address]   Pharmacy Notified: YES  Patient Notified: **Instructed pharmacy to notify patient when script is ready to /ship.**

## 2023-11-13 NOTE — TELEPHONE ENCOUNTER
Chip Fowler Mary K, RN  Phone Number: [phone]     Hello    Figure I give an update of situation and maybe you know the issue. I had jostin get the prescription ready but when they billed the insurance it says [facility] good till February meaning I can't get more till February. [facility] said they think [facility] billed a 90 day supply and to talk to [facility] to fix. [facility] says they billed 24 and I probably have to wait till my 24 runs out on Monday then run through insurance.  I even had both on the phone and they didn't figure out. So jostin is going to talk to insurance and see what they say, at this point I'm going to be out on Monday. As it's looking I'm probably going to run out before I get more.          Sending RX for Vanco  (Newest Message First)  View All Conversations on this Encounter  Chip Fowler  You2 days ago       Hello      Figure I give an update of situation and maybe you know the issue. I had jostin get the prescription ready but when they billed the insurance it says [facility] good till February meaning I can't get more till February. [facility] said they think [facility] billed a 90 day supply and to talk to [facility] to fix. [facility] says they billed 24 and I probably have to wait till my 24 runs out on Monday then run through insurance.  I even had both on the phone and they didn't figure out. So jostin is going to talk to insurance and see what they say, at this point I'm going to be out on Monday. As it's looking I'm probably going to run out before I get more.       You  Chip Fowler3 days ago       Chip Hannah -  If I have sent this prior to message -        The following  is the month long wean of your Va

## 2023-11-14 ENCOUNTER — OFFICE VISIT (OUTPATIENT)
Dept: TRANSPLANT | Facility: CLINIC | Age: 38
End: 2023-11-14
Attending: INTERNAL MEDICINE
Payer: MEDICARE

## 2023-11-14 VITALS
WEIGHT: 158.5 LBS | OXYGEN SATURATION: 100 % | SYSTOLIC BLOOD PRESSURE: 123 MMHG | BODY MASS INDEX: 22.11 KG/M2 | DIASTOLIC BLOOD PRESSURE: 74 MMHG | HEART RATE: 68 BPM | TEMPERATURE: 98.3 F

## 2023-11-14 DIAGNOSIS — D63.1 ANEMIA IN STAGE 3A CHRONIC KIDNEY DISEASE (H): ICD-10-CM

## 2023-11-14 DIAGNOSIS — E87.20 METABOLIC ACIDOSIS: ICD-10-CM

## 2023-11-14 DIAGNOSIS — E83.42 HYPOMAGNESEMIA: ICD-10-CM

## 2023-11-14 DIAGNOSIS — Z94.0 KIDNEY REPLACED BY TRANSPLANT: ICD-10-CM

## 2023-11-14 DIAGNOSIS — N18.31 ANEMIA IN STAGE 3A CHRONIC KIDNEY DISEASE (H): ICD-10-CM

## 2023-11-14 DIAGNOSIS — Z29.89 NEED FOR PNEUMOCYSTIS PROPHYLAXIS: ICD-10-CM

## 2023-11-14 DIAGNOSIS — D84.9 IMMUNOSUPPRESSION (H): ICD-10-CM

## 2023-11-14 DIAGNOSIS — A04.72 C. DIFFICILE DIARRHEA: ICD-10-CM

## 2023-11-14 DIAGNOSIS — B27.00 EBV (EPSTEIN-BARR VIRUS) VIREMIA: ICD-10-CM

## 2023-11-14 DIAGNOSIS — A49.8 CLOSTRIDIUM DIFFICILE INFECTION: ICD-10-CM

## 2023-11-14 DIAGNOSIS — N18.31 STAGE 3A CHRONIC KIDNEY DISEASE (H): ICD-10-CM

## 2023-11-14 DIAGNOSIS — Z48.298 AFTERCARE FOLLOWING ORGAN TRANSPLANT: Primary | ICD-10-CM

## 2023-11-14 PROCEDURE — G0463 HOSPITAL OUTPT CLINIC VISIT: HCPCS | Performed by: INTERNAL MEDICINE

## 2023-11-14 PROCEDURE — 99214 OFFICE O/P EST MOD 30 MIN: CPT | Performed by: INTERNAL MEDICINE

## 2023-11-14 ASSESSMENT — PAIN SCALES - GENERAL: PAINLEVEL: NO PAIN (0)

## 2023-11-14 NOTE — PATIENT INSTRUCTIONS
Recommend Flu and COVID, RSV vaccines    Labs next week    Drink>2 liters/day    Continue vancomycin per taper      Transplant Patient Information  Your Post Transplant Coordinator is: Veronica Edgar  You and your care team can contact your transplant coordinator Monday - Friday, 8am - 5pm at [phone] (Option 2 to reach the coordinator or Option 4 to schedule an appointment).  You can also reach your care team online via [facility].  After hours for urgent matters, please call [facility] at [phone].

## 2023-11-14 NOTE — NURSING NOTE
Chief Complaint   Patient presents with    RECHECK     3 mo f/u       /74   Pulse 68   Temp 98.3  F (36.8  C) (Oral)   Wt 71.9 kg (158 lb 8 oz)   SpO2 100%   BMI 22.11 kg/m      Braeden Morales on 11/14/2023 at 2:01 PM

## 2023-11-14 NOTE — LETTER
11/14/2023         RE: Chip Fowler  [address] [facility] [address]        Dear Colleague,    Thank you for referring your patient, Chip Fowler, to the [facility]. Please see a copy of my visit note below.    TRANSPLANT NEPHROLOGY CHRONIC POST TRANSPLANT VISIT    Assessment & Plan  # DDKT: uptrend in Cr likely prerenal azotemia in the setting of diarrhea, repeat next week, due for DSA, last FK subtx, UA/Ucx, CMV, BK              - Baseline Creatinine: ~ 1.4-1.6              - Proteinuria: Normal (<0.2 grams)              - Date DSA Last Checked: May/2023      Latest DSA: No     cPRA: 49%              - BK Viremia: No              - Kidney Tx Biopsy: No              - Transplant Ureteral Stent: Removed     # Immunosuppression: Tacrolimus immediate release (goal 6-8) and Mycophenolic acid (dose 540 mg every 12 hours)               - Induction with Recent Transplant:  Intermediate Intensity              - Continue with intensive monitoring of immunosuppression for efficacy and toxicity.              - Changes: no     # Infection Prophylaxis:   - PJP: Sulfa/TMP (Bactrim)  - CMV: completed Valganciclovir (Valcyte); Patient is CMV IgG Ab discordant (D+/R-) and can now stop Valcyte being 6 months post transplant.  Will check CMV PCR monthly until 12 months post transplant.      # Blood Pressure: Controlled;          Goal BP: < 130/80              - Volume status: Euvolemic              - Changes: Not at this time     # Anemia in Chronic Renal Disease: Hgb: Stable      SARA: No              - Iron studies: Low iron saturation, but high ferritin     # Mineral Bone Disorder:   - Secondary renal hyperparathyroidism; PTH level: Minimally elevated ( pg/ml)        On treatment: None  - Vitamin D; level: Low        On supplement: Yes  - Calcium; level: Normal        On supplement: No     # Electrolytes:   - Potassium; level: Normal        On supplement: No  - Magnesium; level: Stable  low        On supplement: Yes  - Bicarbonate; level: Normal        On supplement: Yes; sodium bicarbonate 1300 mg bid.     # H/o Cardiomyopathy: Normal LVEF at 55-60% with last cardiac echo 9/2022.     # Recurrent UTI/Pyelonephritis: asymptomatic at present. cystoscopy 7/2023 unremarkable, seen by urology. Follows with ID. He was previously on nitrofurantoin for prophylaxis then switched to fosfomycin per TID recs. He is no longer taking     # Urethral Stricture: Patient is s/p buccal urethroplasty and diverticulum excision 12/2020. Now s/p cystoscopy 7/2023 due to recurrent UTIs and this was unremarkable.  Follows with Urology.      # Recurrent Cdiff: completing vancomycin taper as of 11/10, symptoms improved, now down to 2 BM s a day.     # EBV Viremia: Minimal EBV PCR at ~ 1200, likely of no clinical significance.     # Diarrhea: Worsened with antibiotics, but now improved, near baseline.  He is taking fiber.     # H/o Polysubstance Abuse: Patient with h/o methamphetamine and alcohol abuse.  Now sober.     # Medical Compliance: Yes     # Health Maintenance and Vaccination Review: recommend COVID Flu RSV vaccines, he will wait till recovery from cdiff     # Transplant History:  Etiology of Kidney Failure: Solitary congenital kidney and h/o urethral calculus and urologic issues  Tx: DDKT  Transplant: 1/15/2023 (Kidney)  Donor Type: Donation after Circulatory Death  Donor Class:   Crossmatch at time of Tx: negative  DSA at time of Tx: No  Significant changes in immunosuppression: None  CMV IgG Ab High Risk Discordance (D+/R-): Yes  EBV IgG Ab High Risk Discordance (D+/R-): No  Significant transplant-related complications: None    Transplant Office Phone Number: [phone]    Assessment and plan was discussed with the patient and he voiced his understanding and agreement.    Return visit:2 months    Georgie Herrera MD    Chief Complaint  Mr. Fowler is a 38 year old here for kidney transplant, immunosuppression  management, and JACK.    History of Present Illness  Feels better overall. 2nd episode of Cdiff colitis started vancomycin 11/9 with severe watery diarrhea now improved, down to twice a day using fibers, ran out 1 day and will get refills. He had 1 episode of chills but no fevers. Had transient abdominal cramps but no abdominal pain at present. No nausea or vomiting, no weight loss. Denies any dysuria or graft pain  Denies any missed IS meds    IS: Fk 3.5/3.5 /540  Ppx: bactrim  Home BP: 132/84    Problem List  Patient Active Problem List   Diagnosis     HTN, kidney transplant related     Urethral stricture     Methamphetamine abuse, episodic (H)     Urethral diverticulum     Allergic rhinitis, unspecified seasonality, unspecified trigger     Stage 3a chronic kidney disease (H)     Kidney replaced by transplant     Immunosuppressed status (H24)     Aftercare following organ transplant     Need for pneumocystis prophylaxis     Anemia in chronic renal disease     Secondary renal hyperparathyroidism (H24)     Vitamin D deficiency     Hypomagnesemia     Diarrhea     Urinary tract infection without hematuria, site unspecified     Metabolic acidosis     EBV (Aaron-Barr virus) viremia     Pyelonephritis of transplanted kidney     CMV (cytomegalovirus infection) (H)     Neutropenia (H24)       Allergies  No Known Allergies    Medications  Current Outpatient Medications   Medication Sig     magnesium oxide (MAG-OX) 400 MG tablet Take 2 tablets (800 mg) by mouth 2 times daily     mycophenolic acid (GENERIC EQUIVALENT) 180 MG EC tablet Take 3 tablets (540 mg) by mouth 2 times daily     psyllium (METAMUCIL/KONSYL) 58.6 % powder Take 18 g (1 Tablespoonful) by mouth 2 times daily as needed (Diarrhea)     sodium bicarbonate 650 MG tablet Take 2 tablets (1,300 mg) by mouth 2 times daily     sulfamethoxazole-trimethoprim (BACTRIM) 400-80 MG tablet Take 1 tablet by mouth daily     tacrolimus (GENERIC EQUIVALENT) 0.5 MG  capsule Take 1 capsule (0.5 mg) by mouth 2 times daily Total dose = 3.5 mg twice a day     tacrolimus (GENERIC EQUIVALENT) 1 MG capsule Take 3 capsules (3 mg) by mouth 2 times daily Total dose = 3.5 mg twice per day     vancomycin (VANCOCIN) 125 MG capsule Vanco  14d of treatment,125 mg QID  then would taper slowly as follows: 125mg q8 x1 week, q12 x 1 week, daily x1 week, every other day x2 weeks     Vitamin D, Cholecalciferol, 50 MCG (2000 UT) CAPS Take 2,000 Units by mouth daily     fosfomycin (MONUROL) 3 g Packet Take 1 packet (3 g) by mouth every 72 hours (Patient not taking: Reported on 9/24/2023)     No current facility-administered medications for this visit.     There are no discontinued medications.    Physical Exam  Vital Signs: /74   Pulse 68   Temp 98.3  F (36.8  C) (Oral)   Wt 71.9 kg (158 lb 8 oz)   SpO2 100%   BMI 22.11 kg/m      GENERAL APPEARANCE: alert and no distress  HENT: mouth without ulcers or lesions  RESP: lungs clear to auscultation - no rales, rhonchi or wheezes  CV: regular rhythm, normal rate, no rub, no murmur  EDEMA: no LE edema bilaterally  ABDOMEN: soft, nondistended, nontender, bowel sounds normal  MS: extremities normal - no gross deformities noted, no evidence of inflammation in joints, no muscle tenderness  SKIN: no rash  Access none previously on PD      Data        Latest Ref Rng & Units 11/6/2023     2:46 PM 10/23/2023     2:45 PM 10/16/2023     3:05 PM   Renal   Sodium 135 - 145 mmol/L 136  138  138    K 3.4 - 5.3 mmol/L 4.1  4.3  4.5    Cl 98 - 107 mmol/L 103  105  105    Cl (external) 98 - 107 mmol/L 103  105  105    CO2 22 - 29 mmol/L 25  26  24    Urea Nitrogen 6.0 - 20.0 mg/dL 19.3  21.4  24.6    Creatinine 0.67 - 1.17 mg/dL 1.74  1.66  1.53    Glucose 70 - 99 mg/dL 86  79  71    Calcium 8.6 - 10.0 mg/dL 9.0  9.2  9.1          Latest Ref Rng & Units 8/22/2023     2:48 PM 7/10/2023     2:52 PM 7/8/2023     6:05 AM   Bone Health   Phosphorus 2.5 - 4.5 mg/dL 2.6   2.7  3.7          Latest Ref Rng & Units 11/6/2023     2:46 PM 10/23/2023     2:45 PM 10/16/2023     3:05 PM   Heme   WBC 4.0 - 11.0 10e3/uL 7.5  3.4  3.2    Hgb 13.3 - 17.7 g/dL 11.6  12.1  11.6    Plt 150 - 450 10e3/uL 199  210  214    ABSOLUTE NEUTROPHIL 1.6 - 8.3 10e3/uL   1.2    ABSOLUTE LYMPHOCYTES 0.8 - 5.3 10e3/uL   1.1    ABSOLUTE MONOCYTES 0.0 - 1.3 10e3/uL   0.4    ABSOLUTE EOSINOPHILS 0.0 - 0.7 10e3/uL   0.4          Latest Ref Rng & Units 7/24/2023     3:19 PM 7/4/2023     7:44 PM 6/5/2023     3:27 PM   Liver   AP 40 - 129 U/L 59  53  55    TBili <=1.2 mg/dL 0.3  0.4  0.3    Bilirubin Direct 0.00 - 0.30 mg/dL <0.20      ALT 0 - 70 U/L 20  14  22    AST 0 - 45 U/L 29  27  24    Tot Protein 6.4 - 8.3 g/dL 7.2  7.4  6.9    Albumin 3.5 - 5.2 g/dL 4.3  4.2  4.0          Latest Ref Rng & Units 7/24/2023     3:19 PM 3/19/2023     9:55 PM 1/15/2023     5:40 AM   Pancreas   A1C <5.7 % 5.5   5.2    Lipase (Roche) 13 - 60 U/L  20           Latest Ref Rng & Units 1/31/2023    10:49 AM 1/20/2023     7:46 AM   Iron studies   Iron 61 - 157 ug/dL 70  153    Iron Sat Index 15 - 46 % 27  63    Ferritin 31 - 409 ng/mL 430  717          Latest Ref Rng & Units 10/16/2023     3:05 PM 10/9/2023     2:40 PM 9/25/2023     3:41 PM   [facility] Txp Virology   LOG IU/ML OF CMVQNT  <1.5  <1.5     EBV DNA COPIES/ML Not Detected copies/mL   Not Detected        Recent Labs   Lab Test 10/16/23  1505 10/23/23  1445 11/06/23  1446   DOSTAC 10/16/2023 10/23/2023 11/6/2023   TACROL 6.4 8.3 5.6     Recent Labs   Lab Test 10/02/23  1503 10/09/23  1440 10/16/23  1505   DOSMPA 10/2/2023   3:50 AM 10/9/2023   3:20 AM 10/16/2023   3:20 AM   MPACID 0.85* 0.88* <0.25*   MPAG 23.0* 11.9* 12.2*         Again, thank you for allowing me to participate in the care of your patient.        Sincerely,        Georgie Herrera MD

## 2023-11-17 ENCOUNTER — TELEPHONE (OUTPATIENT)
Dept: TRANSPLANT | Facility: CLINIC | Age: 38
End: 2023-11-17
Payer: MEDICARE

## 2023-11-17 NOTE — TELEPHONE ENCOUNTER
Patient Contacted   Appointment type: RKT VIDEO  Provider: EULALIA CARRINGTON  Return date: 1/23/24  Specialty phone number: [phone]  Additional appointment(s) needed: NA  Additonal Notes: per checkout note.

## 2023-11-20 ENCOUNTER — LAB (OUTPATIENT)
Dept: LAB | Facility: CLINIC | Age: 38
End: 2023-11-20
Payer: MEDICARE

## 2023-11-20 DIAGNOSIS — B25.9 CMV (CYTOMEGALOVIRUS INFECTION) (H): ICD-10-CM

## 2023-11-20 DIAGNOSIS — Z94.0 KIDNEY REPLACED BY TRANSPLANT: ICD-10-CM

## 2023-11-20 LAB
ANION GAP SERPL CALCULATED.3IONS-SCNC: 7 MMOL/L (ref 7–15)
BASOPHILS # BLD AUTO: 0 10E3/UL (ref 0–0.2)
BASOPHILS NFR BLD AUTO: 1 %
BUN SERPL-MCNC: 19.2 MG/DL (ref 6–20)
CALCIUM SERPL-MCNC: 8.9 MG/DL (ref 8.6–10)
CHLORIDE SERPL-SCNC: 102 MMOL/L (ref 98–107)
CREAT SERPL-MCNC: 1.58 MG/DL (ref 0.67–1.17)
DEPRECATED HCO3 PLAS-SCNC: 27 MMOL/L (ref 22–29)
EGFRCR SERPLBLD CKD-EPI 2021: 57 ML/MIN/1.73M2
EOSINOPHIL # BLD AUTO: 0.5 10E3/UL (ref 0–0.7)
EOSINOPHIL NFR BLD AUTO: 11 %
ERYTHROCYTE [DISTWIDTH] IN BLOOD BY AUTOMATED COUNT: 13.8 % (ref 10–15)
GLUCOSE SERPL-MCNC: 83 MG/DL (ref 70–99)
HCT VFR BLD AUTO: 35.5 % (ref 40–53)
HGB BLD-MCNC: 11.9 G/DL (ref 13.3–17.7)
IMM GRANULOCYTES # BLD: 0 10E3/UL
IMM GRANULOCYTES NFR BLD: 0 %
LYMPHOCYTES # BLD AUTO: 1.5 10E3/UL (ref 0.8–5.3)
LYMPHOCYTES NFR BLD AUTO: 34 %
MCH RBC QN AUTO: 31.1 PG (ref 26.5–33)
MCHC RBC AUTO-ENTMCNC: 33.5 G/DL (ref 31.5–36.5)
MCV RBC AUTO: 93 FL (ref 78–100)
MONOCYTES # BLD AUTO: 0.5 10E3/UL (ref 0–1.3)
MONOCYTES NFR BLD AUTO: 10 %
NEUTROPHILS # BLD AUTO: 1.9 10E3/UL (ref 1.6–8.3)
NEUTROPHILS NFR BLD AUTO: 44 %
NRBC # BLD AUTO: 0 10E3/UL
NRBC BLD AUTO-RTO: 0 /100
PLATELET # BLD AUTO: 235 10E3/UL (ref 150–450)
POTASSIUM SERPL-SCNC: 4.2 MMOL/L (ref 3.4–5.3)
RBC # BLD AUTO: 3.83 10E6/UL (ref 4.4–5.9)
SODIUM SERPL-SCNC: 136 MMOL/L (ref 135–145)
TACROLIMUS BLD-MCNC: 8 UG/L (ref 5–15)
TME LAST DOSE: NORMAL H
TME LAST DOSE: NORMAL H
WBC # BLD AUTO: 4.4 10E3/UL (ref 4–11)

## 2023-11-20 PROCEDURE — 80048 BASIC METABOLIC PNL TOTAL CA: CPT

## 2023-11-20 PROCEDURE — 80197 ASSAY OF TACROLIMUS: CPT

## 2023-11-20 PROCEDURE — 36415 COLL VENOUS BLD VENIPUNCTURE: CPT

## 2023-11-20 PROCEDURE — 87799 DETECT AGENT NOS DNA QUANT: CPT

## 2023-11-20 PROCEDURE — 86833 HLA CLASS II HIGH DEFIN QUAL: CPT

## 2023-11-20 PROCEDURE — 85014 HEMATOCRIT: CPT

## 2023-11-20 PROCEDURE — 86832 HLA CLASS I HIGH DEFIN QUAL: CPT

## 2023-11-21 ENCOUNTER — MYC MEDICAL ADVICE (OUTPATIENT)
Dept: TRANSPLANT | Facility: CLINIC | Age: 38
End: 2023-11-21
Payer: MEDICARE

## 2023-11-21 ENCOUNTER — TELEPHONE (OUTPATIENT)
Dept: TRANSPLANT | Facility: CLINIC | Age: 38
End: 2023-11-21
Payer: MEDICARE

## 2023-11-21 DIAGNOSIS — A04.72 C. DIFFICILE DIARRHEA: ICD-10-CM

## 2023-11-21 DIAGNOSIS — B25.9 CMV (CYTOMEGALOVIRUS INFECTION) (H): Primary | ICD-10-CM

## 2023-11-21 DIAGNOSIS — Z94.0 KIDNEY REPLACED BY TRANSPLANT: Primary | ICD-10-CM

## 2023-11-21 DIAGNOSIS — Z94.0 KIDNEY TRANSPLANTED: ICD-10-CM

## 2023-11-21 LAB
BKV DNA # SPEC NAA+PROBE: NOT DETECTED COPIES/ML
CMV DNA SPEC NAA+PROBE-ACNC: 648 IU/ML
CMV DNA SPEC NAA+PROBE-LOG#: 2.8 {LOG_COPIES}/ML

## 2023-11-21 RX ORDER — VANCOMYCIN HYDROCHLORIDE 125 MG/1
125 CAPSULE ORAL 2 TIMES DAILY
Qty: 26 CAPSULE | Refills: 0 | Status: SHIPPED | OUTPATIENT
Start: 2023-11-21 | End: 2024-01-23

## 2023-11-21 NOTE — TELEPHONE ENCOUNTER
Clarifying  Vanco Taper 26 should be all he needs left, and thats starting at the 1 twice daily x 7 days part of the taper

## 2023-11-21 NOTE — TELEPHONE ENCOUNTER
Spoke to patient regarding   ISSUE:  New CMV  Patient denies having diarrhea  Patient agrees to start Valcyte 900 mg BID  Monitor CMV weekly  Check IgG level     Patient also has question regarding abx that he was taking to prevent UTI's.  Patient states that ID had originally prescribed Rx but it is not listed on med list any longer.  Please advise.

## 2023-11-21 NOTE — TELEPHONE ENCOUNTER
ISSUE:  New CMV    PLAN:  Call and ask if he has been having diarrhea  If  not, start Valcyte 900 mg BID  Monitor CMV weekly  Check IgG level    LPN TASK:  Call with above instructions  Update rx   Update lab orders     Franko Cerda MD Huepfel, Veronica SWAN RN  Is he having diarrhea? If not please restart valcyte 900mg BID, continue weekly CMV. Recheck IgG total as it has been 3 months.

## 2023-11-22 RX ORDER — VALGANCICLOVIR 450 MG/1
900 TABLET, FILM COATED ORAL 2 TIMES DAILY
Qty: 120 TABLET | Refills: 3 | Status: SHIPPED | OUTPATIENT
Start: 2023-11-22 | End: 2023-12-13

## 2023-11-22 NOTE — TELEPHONE ENCOUNTER
Chip Edgar, RN16 hours ago (6:03 PM)        To shanice/ rishi The UTI antibiotic is CEFDINIR that I'm taking       Per notes, patient is to follow up with ID to determine if he needs to continue taking this mediation.

## 2023-11-27 ENCOUNTER — LAB (OUTPATIENT)
Dept: LAB | Facility: CLINIC | Age: 38
End: 2023-11-27
Payer: MEDICARE

## 2023-11-27 ENCOUNTER — TELEPHONE (OUTPATIENT)
Dept: TRANSPLANT | Facility: CLINIC | Age: 38
End: 2023-11-27

## 2023-11-27 DIAGNOSIS — Z94.0 KIDNEY TRANSPLANTED: Primary | ICD-10-CM

## 2023-11-27 DIAGNOSIS — Z94.0 KIDNEY REPLACED BY TRANSPLANT: ICD-10-CM

## 2023-11-27 DIAGNOSIS — Z20.828 CONTACT WITH AND (SUSPECTED) EXPOSURE TO OTHER VIRAL COMMUNICABLE DISEASES: ICD-10-CM

## 2023-11-27 DIAGNOSIS — Z48.298 AFTERCARE FOLLOWING ORGAN TRANSPLANT: ICD-10-CM

## 2023-11-27 DIAGNOSIS — Z79.899 ENCOUNTER FOR LONG-TERM CURRENT USE OF MEDICATION: ICD-10-CM

## 2023-11-27 DIAGNOSIS — Z94.0 KIDNEY TRANSPLANTED: ICD-10-CM

## 2023-11-27 LAB
ANION GAP SERPL CALCULATED.3IONS-SCNC: 8 MMOL/L (ref 7–15)
BUN SERPL-MCNC: 24.5 MG/DL (ref 6–20)
CALCIUM SERPL-MCNC: 9.3 MG/DL (ref 8.6–10)
CHLORIDE SERPL-SCNC: 105 MMOL/L (ref 98–107)
CREAT SERPL-MCNC: 1.58 MG/DL (ref 0.67–1.17)
DEPRECATED HCO3 PLAS-SCNC: 24 MMOL/L (ref 22–29)
EGFRCR SERPLBLD CKD-EPI 2021: 57 ML/MIN/1.73M2
ERYTHROCYTE [DISTWIDTH] IN BLOOD BY AUTOMATED COUNT: 13.8 % (ref 10–15)
GLUCOSE SERPL-MCNC: 91 MG/DL (ref 70–99)
HCT VFR BLD AUTO: 34.7 % (ref 40–53)
HGB BLD-MCNC: 11.7 G/DL (ref 13.3–17.7)
MCH RBC QN AUTO: 31.4 PG (ref 26.5–33)
MCHC RBC AUTO-ENTMCNC: 33.7 G/DL (ref 31.5–36.5)
MCV RBC AUTO: 93 FL (ref 78–100)
PLATELET # BLD AUTO: 229 10E3/UL (ref 150–450)
POTASSIUM SERPL-SCNC: 5 MMOL/L (ref 3.4–5.3)
RBC # BLD AUTO: 3.73 10E6/UL (ref 4.4–5.9)
SODIUM SERPL-SCNC: 137 MMOL/L (ref 135–145)
TACROLIMUS BLD-MCNC: 9.1 UG/L (ref 5–15)
TME LAST DOSE: NORMAL H
TME LAST DOSE: NORMAL H
WBC # BLD AUTO: 4.8 10E3/UL (ref 4–11)

## 2023-11-27 PROCEDURE — 80048 BASIC METABOLIC PNL TOTAL CA: CPT

## 2023-11-27 PROCEDURE — 80197 ASSAY OF TACROLIMUS: CPT

## 2023-11-27 PROCEDURE — 36415 COLL VENOUS BLD VENIPUNCTURE: CPT

## 2023-11-27 PROCEDURE — 85027 COMPLETE CBC AUTOMATED: CPT

## 2023-11-27 PROCEDURE — 82784 ASSAY IGA/IGD/IGG/IGM EACH: CPT

## 2023-11-27 RX ORDER — CEFDINIR 300 MG/1
300 CAPSULE ORAL DAILY
Qty: 7 CAPSULE | Refills: 0 | Status: SHIPPED | OUTPATIENT
Start: 2023-11-27 | End: 2023-12-04

## 2023-11-27 NOTE — TELEPHONE ENCOUNTER
[facility] called;  They are having conflicting lab order.  Chip is telling them he is every other week plus what he is needing for CMV and BKV.,  ?   Lab phone # [phone]. Yudelka in lab.

## 2023-11-27 NOTE — TELEPHONE ENCOUNTER
Take 1 capsule (300 mg) by mouth daily for 30 days Start this prophy dose after finishing the treatment dose of cefdinir., Disp-30 capsule, R-1, E-Prescribe Brennan Davenport MD Bregman, Adam, MD; Veronica Edgar RN  Hello,    Can he have this extended until he sees me in clinic next week? It looks like he has an appointment 12/4

## 2023-11-28 LAB
CMV DNA SPEC NAA+PROBE-ACNC: 105 IU/ML
CMV DNA SPEC NAA+PROBE-LOG#: 2 {LOG_COPIES}/ML
IGG SERPL-MCNC: 1187 MG/DL (ref 610–1616)

## 2023-11-29 ENCOUNTER — ANCILLARY PROCEDURE (OUTPATIENT)
Dept: CARDIOLOGY | Facility: CLINIC | Age: 38
End: 2023-11-29
Attending: INTERNAL MEDICINE
Payer: MEDICARE

## 2023-11-29 ENCOUNTER — OFFICE VISIT (OUTPATIENT)
Dept: CARDIOLOGY | Facility: CLINIC | Age: 38
End: 2023-11-29
Attending: CASE MANAGER/CARE COORDINATOR
Payer: MEDICARE

## 2023-11-29 VITALS
BODY MASS INDEX: 21.88 KG/M2 | DIASTOLIC BLOOD PRESSURE: 76 MMHG | HEART RATE: 61 BPM | HEIGHT: 72 IN | SYSTOLIC BLOOD PRESSURE: 132 MMHG | OXYGEN SATURATION: 96 % | WEIGHT: 161.5 LBS

## 2023-11-29 DIAGNOSIS — Z94.0 HTN, KIDNEY TRANSPLANT RELATED: ICD-10-CM

## 2023-11-29 DIAGNOSIS — I15.1 HTN, KIDNEY TRANSPLANT RELATED: ICD-10-CM

## 2023-11-29 DIAGNOSIS — I42.8 NONISCHEMIC CARDIOMYOPATHY (H): Primary | ICD-10-CM

## 2023-11-29 DIAGNOSIS — I42.8 NONISCHEMIC CARDIOMYOPATHY (H): ICD-10-CM

## 2023-11-29 LAB — LVEF ECHO: NORMAL

## 2023-11-29 PROCEDURE — 93306 TTE W/DOPPLER COMPLETE: CPT | Performed by: INTERNAL MEDICINE

## 2023-11-29 PROCEDURE — G0463 HOSPITAL OUTPT CLINIC VISIT: HCPCS | Performed by: CASE MANAGER/CARE COORDINATOR

## 2023-11-29 PROCEDURE — 99213 OFFICE O/P EST LOW 20 MIN: CPT | Mod: 25 | Performed by: CASE MANAGER/CARE COORDINATOR

## 2023-11-29 ASSESSMENT — PAIN SCALES - GENERAL: PAINLEVEL: NO PAIN (0)

## 2023-11-29 NOTE — PATIENT INSTRUCTIONS
You were seen today in the Cardiovascular Clinic at the [facility] by:       JESSE WORKMAN CNP    Your visit summary and instructions are as follows:    Continue checking BP - contact clinic if consistently 140 of over      Return to cardiology clinic as needed     Thank you for your visit today!     Please MyChart message me or call my nurse if you have any questions or concerns.      During Business Hours:  [phone], option # 1 (University) then option # 4 (medical questions) and ask to speak with my nurse.     After hours, weekends or holidays:   [phone], Option #4  Ask to speak to the On-Call Cardiologist. Inform them you are a cardiology patient at the [facility].

## 2023-11-29 NOTE — NURSING NOTE
Chief Complaint   Patient presents with    Follow Up     6 month follow-up for Dr. Lawson         Vitals were taken, medications reconciled.    Freda Hobbs, Facilitator   3:34 PM

## 2023-11-29 NOTE — LETTER
2023      RE: Chip Fowler   [facility] [address]       Dear Colleague,    Thank you for the opportunity to participate in the care of your patient, Chip Fowler, at the [facility] [place] at [facility]. Please see a copy of my visit note below.      [facility] [place]   Cardiology Clinic Note      HPI:   Mr. 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 He presents to cardiology clinic for 6 month follow up.    He was last seen in clinic by Dr. Lawson in 2023 at which point he was feeling well and no changes were made to medications.     Today in clinic, he denies chest pain, palpitations, dizziness, syncope, or lower extremity edema. His home BP has been 120-130/80s, home baseline weight ~153 lbs.      PAST MEDICAL HISTORY:  Past Medical History:   Diagnosis Date    Bladder stones     Cardiomyopathy (H)     CMV (cytomegalovirus infection) (H) 2023:  CMV-Blood    ESRD (end stage renal disease) on dialysis (H)     Hypertension     Kidney replaced by transplant 01/15/2023    DCD DDKT. Intermediate risk induction.    Migraines     Polysubstance abuse (H)     Solitary kidney, congenital     Urethral stone     Urethral stricture        FAMILY HISTORY:  Family History   Problem Relation Age of Onset    Alzheimer Disease Mother     Unknown/Adopted Father     No Known Problems Sister     No Known Problems Sister     Kidney Disease No family hx of        SOCIAL HISTORY:  Social History     Socioeconomic History    Marital status: Single   Occupational History    Occupation: material stager   Tobacco Use    Smoking status: Former     Packs/day: .5     Types: Cigarettes     Start date:      Quit date: 1/15/2023     Years since quittin.8    Smokeless tobacco: Never    Vaping Use    Vaping Use: Never used   Substance and Sexual Activity    Alcohol use: Not Currently     Comment: quit alcohol 3-4 yrs ago    Drug use: Not Currently     Types: Methamphetamines, MDMA (Ecstasy)    Sexual activity: Yes     Social Determinants of Health     Financial Resource Strain: High Risk (8/23/2023)    Overall Financial Resource Strain (CARDIA)     Difficulty of Paying Living Expenses: Hard   Food Insecurity: Food Insecurity Present (8/23/2023)    Hunger Vital Sign     Worried About Running Out of Food in the Last Year: Often true     Ran Out of Food in the Last Year: Never true   Transportation Needs: No Transportation Needs (8/23/2023)    PRAPARE - Transportation     Lack of Transportation (Medical): No     Lack of Transportation (Non-Medical): No   Physical Activity: Insufficiently Active (8/23/2023)    Exercise Vital Sign     Days of Exercise per Week: 2 days     Minutes of Exercise per Session: 40 min   Stress: No Stress Concern Present (8/23/2023)    [employer] - Occupational Stress Questionnaire     Feeling of Stress : Not at all   Social Connections: Unknown (8/23/2023)    Social Connection and Isolation Panel [NHANES]     Frequency of Communication with Friends and Family: Never     Frequency of Social Gatherings with Friends and Family: Never     Attends Nondenominational Services: Patient refused     Active Member of Clubs or Organizations: No     Marital Status: Living with partner   Housing Stability: Low Risk  (8/23/2023)    Housing Stability Vital Sign     Unable to Pay for Housing in the Last Year: No     Number of Places Lived in the Last Year: 0     Unstable Housing in the Last Year: No       CURRENT MEDICATIONS:  cefdinir (OMNICEF) 300 MG capsule, Take 1 capsule (300 mg) by mouth daily  magnesium oxide (MAG-OX) 400 MG tablet, Take 2 tablets (800 mg) by mouth 2 times daily  mycophenolic acid (GENERIC EQUIVALENT) 180 MG EC tablet, Take 3 tablets (540 mg) by  "mouth 2 times daily  psyllium (METAMUCIL/KONSYL) 58.6 % powder, Take 18 g (1 Tablespoonful) by mouth 2 times daily as needed (Diarrhea)  sodium bicarbonate 650 MG tablet, Take 2 tablets (1,300 mg) by mouth 2 times daily  sulfamethoxazole-trimethoprim (BACTRIM) 400-80 MG tablet, Take 1 tablet by mouth daily  tacrolimus (GENERIC EQUIVALENT) 0.5 MG capsule, Take 1 capsule (0.5 mg) by mouth 2 times daily Total dose = 3.5 mg twice a day  tacrolimus (GENERIC EQUIVALENT) 1 MG capsule, Take 3 capsules (3 mg) by mouth 2 times daily Total dose = 3.5 mg twice per day  valGANciclovir (VALCYTE) 450 MG tablet, Take 2 tablets (900 mg) by mouth 2 times daily  vancomycin (VANCOCIN) 125 MG capsule, Take 1 capsule (125 mg) by mouth 2 times daily  vancomycin (VANCOCIN) 125 MG capsule, Vanco  14d of treatment,125 mg QID  then would taper slowly as follows: 125mg q8 x1 week, q12 x 1 week, daily x1 week, every other day x2 weeks  Vitamin D, Cholecalciferol, 50 MCG (2000 UT) CAPS, Take 2,000 Units by mouth daily    No current facility-administered medications on file prior to visit.      ROS:   Refer to HPI    EXAM:  /76 (BP Location: Right arm, Patient Position: Chair, Cuff Size: Adult Regular)   Pulse 61   Ht 1.839 m (6' 0.4\")   Wt 73.3 kg (161 lb 8 oz)   SpO2 96%   BMI 21.66 kg/m    GENERAL: Appears comfortable, in no acute distress.   HEENT: Eye symmetrical, no discharge or icterus bilaterally. Mucous membranes moist and without lesions.  CV: RRR, +S1S2, no murmur, rub, or gallop.  RESPIRATORY: Respirations regular, even, and unlabored. Lungs CTA throughout.   GI: Soft and non distended with normoactive bowel sounds present in all quadrants. No tenderness, rebound, guarding.   EXTREMITIES: no peripheral edema. 2+ bilateral pedal pulses.   NEUROLOGIC: Alert and oriented x 3. No focal deficits.   MUSCULOSKELETAL: No joint swelling or tenderness.   SKIN: No jaundice. No rashes or lesions.     Labs, reviewed with patient in " "clinic today:  CBC RESULTS:  Lab Results   Component Value Date    WBC 4.8 11/27/2023    WBC 11.2 (H) 01/07/2021    RBC 3.73 (L) 11/27/2023    RBC 3.72 (L) 01/07/2021    HGB 11.7 (L) 11/27/2023    HGB 11.7 (L) 01/07/2021    HCT 34.7 (L) 11/27/2023    HCT 35.0 (L) 01/07/2021    MCV 93 11/27/2023    MCV 94 01/07/2021    MCH 31.4 11/27/2023    MCH 31.5 01/07/2021    MCHC 33.7 11/27/2023    MCHC 33.4 01/07/2021    RDW 13.8 11/27/2023    RDW 13.7 01/07/2021     11/27/2023     01/07/2021       CMP RESULTS:  Lab Results   Component Value Date     11/27/2023     08/21/2020    POTASSIUM 5.0 11/27/2023    POTASSIUM 4.7 01/15/2023    POTASSIUM 4.9 03/21/2022    POTASSIUM 4.2 12/03/2020    CHLORIDE 105 11/27/2023    CHLORIDE 109 03/21/2022    CHLORIDE 110 (H) 08/21/2020    CO2 24 11/27/2023    CO2 21 03/21/2022    CO2 20 08/21/2020    ANIONGAP 8 11/27/2023    ANIONGAP 9 03/21/2022    ANIONGAP 8 08/21/2020    GLC 91 11/27/2023     (H) 07/07/2023    GLC 90 03/21/2022    GLC 85 08/21/2020    BUN 24.5 (H) 11/27/2023    BUN 51 (H) 03/21/2022    BUN 66 (H) 08/21/2020    CR 1.58 (H) 11/27/2023    CR 6.20 (H) 12/03/2020    GFRESTIMATED 57 (L) 11/27/2023    GFRESTIMATED 11 (L) 12/03/2020    GFRESTBLACK 12 (L) 12/03/2020    VISHNU 9.3 11/27/2023    VISHNU 8.1 (L) 08/21/2020    BILITOTAL 0.3 07/24/2023    BILITOTAL 0.3 08/18/2020    ALBUMIN 4.3 07/24/2023    ALBUMIN 3.4 04/18/2022    ALBUMIN 3.5 08/18/2020    ALKPHOS 59 07/24/2023    ALKPHOS 76 08/18/2020    ALT 20 07/24/2023    ALT 46 08/18/2020    AST 29 07/24/2023    AST 18 08/18/2020        INR RESULTS:  Lab Results   Component Value Date    INR 1.29 (H) 07/04/2023    INR 1.08 08/18/2020       Lab Results   Component Value Date    MAG 1.5 (L) 08/22/2023     Lab Results   Component Value Date    NTBNPI 174 03/21/2022     No results found for: \"NTBNP\"    LIPIDS:  Lab Results   Component Value Date    CHOL 100 08/22/2023     Lab Results   Component Value Date " "   HDL 32 2023     Lab Results   Component Value Date    LDL 45 2023     Lab Results   Component Value Date    TRIG 114 2023     No results found for: \"CHOLHDLRATIO\"    Echocardiogram:  Recent Results (from the past 4320 hour(s))   Echo Complete   Result Value    LVEF  55-60%    [facility]    [id number]  [id number]  [id number]  [id number]^VITOR^LINA^RODRIGUE     [facility],[place]  Echocardiography Laboratory  [address] [place] [id number]     Name: CAROLYN HIGHTOWER  MRN: [id number]  : 1985  Study Date: 2023 02:30 PM  Age: 38 yrs  Gender: Male  Patient Location: [facility]  Reason For Study: Fever  Ordering Physician: LINA ADLER  Performed By: Ezekiel Manzano     BSA: 1.9 m2  Height: 72 in  Weight: 150 lb  HR: 83  BP: 115/77 mmHg  ______________________________________________________________________________  Procedure  Complete Portable Echo Adult. Optison (NDC #3019-0495-00) given intravenously.  Patient was given 6 ml mixture of 3 ml Optison and 6 ml saline. 3 ml wasted.  ______________________________________________________________________________  Interpretation Summary  No vegetation identified, however this does not exclude endocarditis. Left  ventricular size, wall motion and function are normal. The ejection fraction  is 55-60%.     Right ventricular function, chamber size, wall motion, and thickness are  normal.     No vegetation identified, however this does not exclude endocarditis.     No pericardial effusion is present.     No significant valvular abnormalities present.     The inferior vena cava is normal.     Comapred to 2022 there are no signficant differences.  ______________________________________________________________________________  Left Ventricle  Left ventricular size, wall motion and function are normal. The ejection  fraction is 55-60%. Left ventricular diastolic function is normal.     Right Ventricle  Right ventricular " function, chamber size, wall motion, and thickness are  normal.     Atria  The right atria appears normal. The left atrium appears normal.     Mitral Valve  The mitral valve is normal.     Aortic Valve  Aortic valve is normal in structure and function. The aortic valve is  tricuspid.     Tricuspid Valve  The tricuspid valve is normal. The peak velocity of the tricuspid regurgitant  jet is not obtainable. Pulmonary artery systolic pressure cannot be assessed.     Pulmonic Valve  The pulmonic valve is normal.     Vessels  The inferior vena cava is normal. Sinuses of Valsalva 2.9 cm. Ascending aorta  3.0 cm. IVC diameter <2.1 cm collapsing >50% with sniff suggests a normal RA  pressure of 3 mmHg.     Pericardium  No pericardial effusion is present.     Miscellaneous  No significant valvular abnormalities present.     Compared to Previous Study  Comapred to 2022 there are no signficant differences.  ______________________________________________________________________________  MMode/2D Measurements & Calculations  IVSd: 0.84 cm  LVIDd: 4.9 cm  LVIDs: 3.0 cm  LVPWd: 0.94 cm  FS: 40.1 %  LV mass(C)d: 152.0 grams  LV mass(C)dI: 80.6 grams/m2  Ao root diam: 2.9 cm  asc Aorta Diam: 3.0 cm  LVOT diam: 2.0 cm  LVOT area: 3.2 cm2  LA Volume (BP): 48.3 ml     LA Volume Index (BP): 25.6 ml/m2  RWT: 0.38  TAPSE: 2.5 cm     Doppler Measurements & Calculations  MV E max ash: 101.0 cm/sec  MV A max ash: 78.1 cm/sec  MV E/A: 1.3  MV dec slope: 486.9 cm/sec2  MV dec time: 0.21 sec  Ao V2 max: 177.1 cm/sec  Ao max P.5 mmHg  Ao V2 mean: 128.7 cm/sec  Ao mean P.6 mmHg  Ao V2 VTI: 34.9 cm  SHANNAN(I,D): 2.7 cm2  SHANNAN(V,D): 2.6 cm2  LV V1 max P.5 mmHg  LV V1 max: 145.7 cm/sec  LV V1 VTI: 29.4 cm  SV(LVOT): 92.6 ml  SI(LVOT): 49.1 ml/m2  PA V2 max: 123.2 cm/sec  PA max P.1 mmHg  PA acc time: 0.16 sec  AV Ash Ratio (DI): 0.82  SHANNAN Index (cm2/m2): 1.4  E/E' av.6  Lateral E/e': 5.9     Medial E/e': 13.3  RV S Ash: 17.8  cm/sec     ______________________________________________________________________________  Report approved by: Aleena Shelton 07/05/2023 03:17 PM             Coronary Angiogram:    Assessment and Plan:   Mr. Fowler is a 38 year old male with a PMH of ESRD s/p kidney transplant (1/2023), HTN,  and HF recovered EF 2/2 NICM (55-60%, vanessa 45-50%) 2/2 ESRD.      # Chronic systolic heart failure with recovered EF (55-60%, vanessa 45-50%)  # HTN  Likely cardiorenal in s/o ESRD and with past history of substance use disorder (meth). Echos over the past year have showed EF normal at 55-60%. Echo today shows normal EF 55-60% with no structural abnormalities.  Has been off ACE & BB since kidney transplant in January, normotensive in clinic today.  - Continue checking BP, call clinic if SBP consisently >140.    # ESRD s/p kidney transplant 1/2023  Doing well post-transplant, most recent creatinine 1.58.  - follows with transplant nephrology     Follow up:  as needed  Chart review time today: 10 minutes  Visit time today: 12 minutes  Total time spent today: 22 minutes        JESSE GALLEGOS CNP  General Cardiology   11/29/23

## 2023-11-29 NOTE — PROGRESS NOTES
[facility] - [facility] [place]   Cardiology Clinic Note      HPI:   Mr. 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 He presents to cardiology clinic for 6 month follow up.    He was last seen in clinic by Dr. Lawson in 2023 at which point he was feeling well and no changes were made to medications.     Today in clinic, he denies chest pain, palpitations, dizziness, syncope, or lower extremity edema. His home BP has been 120-130/80s, home baseline weight ~153 lbs.      PAST MEDICAL HISTORY:  Past Medical History:   Diagnosis Date    Bladder stones     Cardiomyopathy (H)     CMV (cytomegalovirus infection) (H) 2023:  CMV-Blood    ESRD (end stage renal disease) on dialysis (H)     Hypertension     Kidney replaced by transplant 01/15/2023    DCD DDKT. Intermediate risk induction.    Migraines     Polysubstance abuse (H)     Solitary kidney, congenital     Urethral stone     Urethral stricture        FAMILY HISTORY:  Family History   Problem Relation Age of Onset    Alzheimer Disease Mother     Unknown/Adopted Father     No Known Problems Sister     No Known Problems Sister     Kidney Disease No family hx of        SOCIAL HISTORY:  Social History     Socioeconomic History    Marital status: Single   Occupational History    Occupation: material stager   Tobacco Use    Smoking status: Former     Packs/day: .5     Types: Cigarettes     Start date:      Quit date: 1/15/2023     Years since quittin.8    Smokeless tobacco: Never   Vaping Use    Vaping Use: Never used   Substance and Sexual Activity    Alcohol use: Not Currently     Comment: quit alcohol 3-4 yrs ago    Drug use: Not Currently     Types: Methamphetamines, MDMA (Ecstasy)    Sexual activity: Yes     Social Determinants of Health     Financial Resource Strain: High Risk (2023)    Overall Financial  Resource Strain (CARDIA)     Difficulty of Paying Living Expenses: Hard   Food Insecurity: Food Insecurity Present (8/23/2023)    Hunger Vital Sign     Worried About Running Out of Food in the Last Year: Often true     Ran Out of Food in the Last Year: Never true   Transportation Needs: No Transportation Needs (8/23/2023)    PRAPARE - Transportation     Lack of Transportation (Medical): No     Lack of Transportation (Non-Medical): No   Physical Activity: Insufficiently Active (8/23/2023)    Exercise Vital Sign     Days of Exercise per Week: 2 days     Minutes of Exercise per Session: 40 min   Stress: No Stress Concern Present (8/23/2023)    [employer] - Occupational Stress Questionnaire     Feeling of Stress : Not at all   Social Connections: Unknown (8/23/2023)    Social Connection and Isolation Panel [NHANES]     Frequency of Communication with Friends and Family: Never     Frequency of Social Gatherings with Friends and Family: Never     Attends Sikhism Services: Patient refused     Active Member of Clubs or Organizations: No     Marital Status: Living with partner   Housing Stability: Low Risk  (8/23/2023)    Housing Stability Vital Sign     Unable to Pay for Housing in the Last Year: No     Number of Places Lived in the Last Year: 0     Unstable Housing in the Last Year: No       CURRENT MEDICATIONS:  cefdinir (OMNICEF) 300 MG capsule, Take 1 capsule (300 mg) by mouth daily  magnesium oxide (MAG-OX) 400 MG tablet, Take 2 tablets (800 mg) by mouth 2 times daily  mycophenolic acid (GENERIC EQUIVALENT) 180 MG EC tablet, Take 3 tablets (540 mg) by mouth 2 times daily  psyllium (METAMUCIL/KONSYL) 58.6 % powder, Take 18 g (1 Tablespoonful) by mouth 2 times daily as needed (Diarrhea)  sodium bicarbonate 650 MG tablet, Take 2 tablets (1,300 mg) by mouth 2 times daily  sulfamethoxazole-trimethoprim (BACTRIM) 400-80 MG tablet, Take 1 tablet by mouth daily  tacrolimus (GENERIC EQUIVALENT)  "0.5 MG capsule, Take 1 capsule (0.5 mg) by mouth 2 times daily Total dose = 3.5 mg twice a day  tacrolimus (GENERIC EQUIVALENT) 1 MG capsule, Take 3 capsules (3 mg) by mouth 2 times daily Total dose = 3.5 mg twice per day  valGANciclovir (VALCYTE) 450 MG tablet, Take 2 tablets (900 mg) by mouth 2 times daily  vancomycin (VANCOCIN) 125 MG capsule, Take 1 capsule (125 mg) by mouth 2 times daily  vancomycin (VANCOCIN) 125 MG capsule, Vanco  14d of treatment,125 mg QID  then would taper slowly as follows: 125mg q8 x1 week, q12 x 1 week, daily x1 week, every other day x2 weeks  Vitamin D, Cholecalciferol, 50 MCG (2000 UT) CAPS, Take 2,000 Units by mouth daily    No current facility-administered medications on file prior to visit.      ROS:   Refer to HPI    EXAM:  /76 (BP Location: Right arm, Patient Position: Chair, Cuff Size: Adult Regular)   Pulse 61   Ht 1.839 m (6' 0.4\")   Wt 73.3 kg (161 lb 8 oz)   SpO2 96%   BMI 21.66 kg/m    GENERAL: Appears comfortable, in no acute distress.   HEENT: Eye symmetrical, no discharge or icterus bilaterally. Mucous membranes moist and without lesions.  CV: RRR, +S1S2, no murmur, rub, or gallop.  RESPIRATORY: Respirations regular, even, and unlabored. Lungs CTA throughout.   GI: Soft and non distended with normoactive bowel sounds present in all quadrants. No tenderness, rebound, guarding.   EXTREMITIES: no peripheral edema. 2+ bilateral pedal pulses.   NEUROLOGIC: Alert and oriented x 3. No focal deficits.   MUSCULOSKELETAL: No joint swelling or tenderness.   SKIN: No jaundice. No rashes or lesions.     Labs, reviewed with patient in clinic today:  CBC RESULTS:  Lab Results   Component Value Date    WBC 4.8 11/27/2023    WBC 11.2 (H) 01/07/2021    RBC 3.73 (L) 11/27/2023    RBC 3.72 (L) 01/07/2021    HGB 11.7 (L) 11/27/2023    HGB 11.7 (L) 01/07/2021    HCT 34.7 (L) 11/27/2023    HCT 35.0 (L) 01/07/2021    MCV 93 11/27/2023    MCV 94 01/07/2021    MCH 31.4 11/27/2023    " "MCH 31.5 01/07/2021    MCHC 33.7 11/27/2023    MCHC 33.4 01/07/2021    RDW 13.8 11/27/2023    RDW 13.7 01/07/2021     11/27/2023     01/07/2021       CMP RESULTS:  Lab Results   Component Value Date     11/27/2023     08/21/2020    POTASSIUM 5.0 11/27/2023    POTASSIUM 4.7 01/15/2023    POTASSIUM 4.9 03/21/2022    POTASSIUM 4.2 12/03/2020    CHLORIDE 105 11/27/2023    CHLORIDE 109 03/21/2022    CHLORIDE 110 (H) 08/21/2020    CO2 24 11/27/2023    CO2 21 03/21/2022    CO2 20 08/21/2020    ANIONGAP 8 11/27/2023    ANIONGAP 9 03/21/2022    ANIONGAP 8 08/21/2020    GLC 91 11/27/2023     (H) 07/07/2023    GLC 90 03/21/2022    GLC 85 08/21/2020    BUN 24.5 (H) 11/27/2023    BUN 51 (H) 03/21/2022    BUN 66 (H) 08/21/2020    CR 1.58 (H) 11/27/2023    CR 6.20 (H) 12/03/2020    GFRESTIMATED 57 (L) 11/27/2023    GFRESTIMATED 11 (L) 12/03/2020    GFRESTBLACK 12 (L) 12/03/2020    VISHNU 9.3 11/27/2023    VISHNU 8.1 (L) 08/21/2020    BILITOTAL 0.3 07/24/2023    BILITOTAL 0.3 08/18/2020    ALBUMIN 4.3 07/24/2023    ALBUMIN 3.4 04/18/2022    ALBUMIN 3.5 08/18/2020    ALKPHOS 59 07/24/2023    ALKPHOS 76 08/18/2020    ALT 20 07/24/2023    ALT 46 08/18/2020    AST 29 07/24/2023    AST 18 08/18/2020        INR RESULTS:  Lab Results   Component Value Date    INR 1.29 (H) 07/04/2023    INR 1.08 08/18/2020       Lab Results   Component Value Date    MAG 1.5 (L) 08/22/2023     Lab Results   Component Value Date    NTBNPI 174 03/21/2022     No results found for: \"NTBNP\"    LIPIDS:  Lab Results   Component Value Date    CHOL 100 08/22/2023     Lab Results   Component Value Date    HDL 32 08/22/2023     Lab Results   Component Value Date    LDL 45 08/22/2023     Lab Results   Component Value Date    TRIG 114 08/22/2023     No results found for: \"CHOLHDLRATIO\"    Echocardiogram:  Recent Results (from the past 4320 hour(s))   Echo Complete   Result Value    LVEF  55-60%    Narrative    " [id number]  [id number]  [id number]  [id number]^VITOR^LINA^RODRIGUE     [facility]  Echocardiography Laboratory  [address] [place] [id number]     Name: CAROLYN HIGHTOWER  MRN: [id number]  : 1985  Study Date: 2023 02:30 PM  Age: 38 yrs  Gender: Male  Patient Location: [facility]  Reason For Study: Fever  Ordering Physician: LINA ADLER  Performed By: Ezekiel Manzano     BSA: 1.9 m2  Height: 72 in  Weight: 150 lb  HR: 83  BP: 115/77 mmHg  ______________________________________________________________________________  Procedure  Complete Portable Echo Adult. Optison (NDC #2455-5505-23) given intravenously.  Patient was given 6 ml mixture of 3 ml Optison and 6 ml saline. 3 ml wasted.  ______________________________________________________________________________  Interpretation Summary  No vegetation identified, however this does not exclude endocarditis. Left  ventricular size, wall motion and function are normal. The ejection fraction  is 55-60%.     Right ventricular function, chamber size, wall motion, and thickness are  normal.     No vegetation identified, however this does not exclude endocarditis.     No pericardial effusion is present.     No significant valvular abnormalities present.     The inferior vena cava is normal.     Comapred to 2022 there are no signficant differences.  ______________________________________________________________________________  Left Ventricle  Left ventricular size, wall motion and function are normal. The ejection  fraction is 55-60%. Left ventricular diastolic function is normal.     Right Ventricle  Right ventricular function, chamber size, wall motion, and thickness are  normal.     Atria  The right atria appears normal. The left atrium appears normal.     Mitral Valve  The mitral valve is normal.     Aortic Valve  Aortic valve is normal in structure and function. The aortic valve is  tricuspid.     Tricuspid Valve  The  tricuspid valve is normal. The peak velocity of the tricuspid regurgitant  jet is not obtainable. Pulmonary artery systolic pressure cannot be assessed.     Pulmonic Valve  The pulmonic valve is normal.     Vessels  The inferior vena cava is normal. Sinuses of Valsalva 2.9 cm. Ascending aorta  3.0 cm. IVC diameter <2.1 cm collapsing >50% with sniff suggests a normal RA  pressure of 3 mmHg.     Pericardium  No pericardial effusion is present.     Miscellaneous  No significant valvular abnormalities present.     Compared to Previous Study  Comapred to 2022 there are no signficant differences.  ______________________________________________________________________________  MMode/2D Measurements & Calculations  IVSd: 0.84 cm  LVIDd: 4.9 cm  LVIDs: 3.0 cm  LVPWd: 0.94 cm  FS: 40.1 %  LV mass(C)d: 152.0 grams  LV mass(C)dI: 80.6 grams/m2  Ao root diam: 2.9 cm  asc Aorta Diam: 3.0 cm  LVOT diam: 2.0 cm  LVOT area: 3.2 cm2  LA Volume (BP): 48.3 ml     LA Volume Index (BP): 25.6 ml/m2  RWT: 0.38  TAPSE: 2.5 cm     Doppler Measurements & Calculations  MV E max ash: 101.0 cm/sec  MV A max ash: 78.1 cm/sec  MV E/A: 1.3  MV dec slope: 486.9 cm/sec2  MV dec time: 0.21 sec  Ao V2 max: 177.1 cm/sec  Ao max P.5 mmHg  Ao V2 mean: 128.7 cm/sec  Ao mean P.6 mmHg  Ao V2 VTI: 34.9 cm  SHANNAN(I,D): 2.7 cm2  SHANNAN(V,D): 2.6 cm2  LV V1 max P.5 mmHg  LV V1 max: 145.7 cm/sec  LV V1 VTI: 29.4 cm  SV(LVOT): 92.6 ml  SI(LVOT): 49.1 ml/m2  PA V2 max: 123.2 cm/sec  PA max P.1 mmHg  PA acc time: 0.16 sec  AV Ash Ratio (DI): 0.82  SHANNAN Index (cm2/m2): 1.4  E/E' av.6  Lateral E/e': 5.9     Medial E/e': 13.3  RV S Ash: 17.8 cm/sec     ______________________________________________________________________________  Report approved by: Aleena Shelton 2023 03:17 PM             Coronary Angiogram:    Assessment and Plan:   Mr. Fowler is a 38 year old male with a PMH of ESRD s/p kidney transplant (2023), HTN,  and HF  recovered EF 2/2 NICM (55-60%, vanessa 45-50%) 2/2 ESRD.      # Chronic systolic heart failure with recovered EF (55-60%, vanessa 45-50%)  # HTN  Likely cardiorenal in s/o ESRD and with past history of substance use disorder (meth). Echos over the past year have showed EF normal at 55-60%. Echo today shows normal EF 55-60% with no structural abnormalities.  Has been off ACE & BB since kidney transplant in January, normotensive in clinic today.  - Continue checking BP, call clinic if SBP consisently >140.    # ESRD s/p kidney transplant 1/2023  Doing well post-transplant, most recent creatinine 1.58.  - follows with transplant nephrology     Follow up:  as needed  Chart review time today: 10 minutes  Visit time today: 12 minutes  Total time spent today: 22 minutes        JESSE GALLEGOS CNP  General Cardiology   11/29/23

## 2023-12-04 ENCOUNTER — LAB (OUTPATIENT)
Dept: LAB | Facility: CLINIC | Age: 38
End: 2023-12-04
Payer: MEDICARE

## 2023-12-04 ENCOUNTER — VIRTUAL VISIT (OUTPATIENT)
Dept: INFECTIOUS DISEASES | Facility: CLINIC | Age: 38
End: 2023-12-04
Attending: STUDENT IN AN ORGANIZED HEALTH CARE EDUCATION/TRAINING PROGRAM
Payer: MEDICARE

## 2023-12-04 DIAGNOSIS — Z20.828 CONTACT WITH AND (SUSPECTED) EXPOSURE TO OTHER VIRAL COMMUNICABLE DISEASES: ICD-10-CM

## 2023-12-04 DIAGNOSIS — N39.0 FREQUENT UTI: ICD-10-CM

## 2023-12-04 DIAGNOSIS — Z94.0 KIDNEY TRANSPLANTED: ICD-10-CM

## 2023-12-04 DIAGNOSIS — A04.72 C. DIFFICILE ENTERITIS: ICD-10-CM

## 2023-12-04 DIAGNOSIS — Z48.298 AFTERCARE FOLLOWING ORGAN TRANSPLANT: ICD-10-CM

## 2023-12-04 DIAGNOSIS — Z79.899 ENCOUNTER FOR LONG-TERM CURRENT USE OF MEDICATION: ICD-10-CM

## 2023-12-04 DIAGNOSIS — B25.9 CYTOMEGALOVIRUS INFECTION, UNSPECIFIED CYTOMEGALOVIRAL INFECTION TYPE (H): Primary | ICD-10-CM

## 2023-12-04 DIAGNOSIS — Z94.0 KIDNEY REPLACED BY TRANSPLANT: ICD-10-CM

## 2023-12-04 LAB
ANION GAP SERPL CALCULATED.3IONS-SCNC: 9 MMOL/L (ref 7–15)
BUN SERPL-MCNC: 25.8 MG/DL (ref 6–20)
CALCIUM SERPL-MCNC: 8.8 MG/DL (ref 8.6–10)
CHLORIDE SERPL-SCNC: 104 MMOL/L (ref 98–107)
CREAT SERPL-MCNC: 1.63 MG/DL (ref 0.67–1.17)
DEPRECATED HCO3 PLAS-SCNC: 23 MMOL/L (ref 22–29)
DONOR IDENTIFICATION: NORMAL
DSA COMMENTS: NORMAL
DSA PRESENT: NO
DSA TEST METHOD: NORMAL
EGFRCR SERPLBLD CKD-EPI 2021: 55 ML/MIN/1.73M2
ERYTHROCYTE [DISTWIDTH] IN BLOOD BY AUTOMATED COUNT: 14.1 % (ref 10–15)
GLUCOSE SERPL-MCNC: 66 MG/DL (ref 70–99)
HCT VFR BLD AUTO: 33.4 % (ref 40–53)
HGB BLD-MCNC: 11.3 G/DL (ref 13.3–17.7)
MCH RBC QN AUTO: 31.6 PG (ref 26.5–33)
MCHC RBC AUTO-ENTMCNC: 33.8 G/DL (ref 31.5–36.5)
MCV RBC AUTO: 93 FL (ref 78–100)
ORGAN: NORMAL
PLATELET # BLD AUTO: 213 10E3/UL (ref 150–450)
POTASSIUM SERPL-SCNC: 4.5 MMOL/L (ref 3.4–5.3)
RBC # BLD AUTO: 3.58 10E6/UL (ref 4.4–5.9)
SA 1 CELL: NORMAL
SA 1 TEST METHOD: NORMAL
SA 2 CELL: NORMAL
SA 2 TEST METHOD: NORMAL
SA1 HI RISK ABY: NORMAL
SA1 MOD RISK ABY: NORMAL
SA2 HI RISK ABY: NORMAL
SA2 MOD RISK ABY: NORMAL
SODIUM SERPL-SCNC: 136 MMOL/L (ref 135–145)
TACROLIMUS BLD-MCNC: 7.2 UG/L (ref 5–15)
TME LAST DOSE: NORMAL H
TME LAST DOSE: NORMAL H
UNACCEPTABLE ANTIGENS: NORMAL
UNOS CPRA: 54
WBC # BLD AUTO: 5.2 10E3/UL (ref 4–11)
ZZZSA 1  COMMENTS: NORMAL
ZZZSA 2 COMMENTS: NORMAL

## 2023-12-04 PROCEDURE — 82310 ASSAY OF CALCIUM: CPT

## 2023-12-04 PROCEDURE — 85014 HEMATOCRIT: CPT

## 2023-12-04 PROCEDURE — 99215 OFFICE O/P EST HI 40 MIN: CPT | Mod: VID | Performed by: STUDENT IN AN ORGANIZED HEALTH CARE EDUCATION/TRAINING PROGRAM

## 2023-12-04 PROCEDURE — 36415 COLL VENOUS BLD VENIPUNCTURE: CPT

## 2023-12-04 PROCEDURE — 80197 ASSAY OF TACROLIMUS: CPT

## 2023-12-04 RX ORDER — CEFDINIR 300 MG/1
300 CAPSULE ORAL DAILY
Qty: 30 CAPSULE | Refills: 2 | Status: SHIPPED | OUTPATIENT
Start: 2023-12-04 | End: 2024-02-16

## 2023-12-04 NOTE — LETTER
12/4/2023       RE: Chip Fowler  [id number] [facility] [address]     Dear Colleague,    Thank you for referring your patient, Chip Fowler, to the [facility] INFECTIOUS DISEASE CLINIC [place] at [facility]. Please see a copy of my visit note below.    Virtual Visit Details  Type of service:  Video Visit   Video Start Time: 3:32 PM  Video End Time:3:57 PM  Originating Location (pt. Location): Home    Distant Location (provider location):  On-site  Platform used for Video Visit: [facility]  Transplant Infectious Disease Clinic Note:  Virtual Follow up Patient     Patient:  Chip Fowler, Date of birth 1985, Medical record number [id number]  Date of Visit:  12/04/2023           Assessment and Recommendations:   Recommendations:  Continue and complete Vancomycin taper for C.diff  Continue Cefdinir 300mg PO daily for UTI prophylaxis  Given need for suppressive antibiotics and 2 episodes of C.diff with higher risk for recurrence with cefdinir compared to Nitrofurantoin, explained following options to patient:  A. Completing taper and stopping Vanco PO but switching Cefdinir to Nitrofurantoin  B. Continuing Cefdinir for UTI prophylaxis but keeping Vanco 125mg PO qday on as C.diff prophylais  C. Completing taper and stopping Vanco PO but continuing Cefdinir - any additional C.diff recurrences should prompt change in management  Patient prefers option C for now  Follow CMV VL - if <100, anticipate switching to once daily Valcyte for prophylaxis. Continue weekly VL checks for now. 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 Renal US patent) on Tacrolium (goal level 8-10) and Myfortic 540 mg BID. ID issues include    #Recurrent UTIs with Ecoli and E. Faecalis on suppression with Cefdinir:  The UTI is mostly with E faecalis since 1/2023 except infection in 4/2023 with E faecalis and E coli. Admitted on 7/4/23 for pyelonephritis UC + 10-50k E faecalis he was given Augmentin for 10 and started on prophy nitrofurantoin 100mg daily after. Latest E.coli infection 9/2023. Most infections related to sexual intercourse. Given latest E.coli UTI (on 9/24/23) broke through despite Nitrofurantoin prophylaxis, treated with cefdinir and switched to the same for prophylaxis after at 300mg daily dosing. 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 On 8/22/23 it was 2,300 (started to have a fever but was checked per protocol) started on valcyte on 8/23/23. 9/5/23 it was 4580 and down to undetectable VL on 9/25/23. Secondary ppx through 10/24/23. Low level recurrence on 11/20 - restarted Valcyte 900mg BID 11/20, latest . VL from 12/4 pending     #C.diff:  Reported diarrhea 10/10/23. C.diff testing triple positive (Toxin, GDH and PCR). Started Vancomycin 125mg PO q6h x 14 days. Recurrent diarrhea early November, tested positive 11/6/23. Restarted Vancomycin PO with plans for prolonged taper. Overall improved, but feels stool frequency has slightly increased in the last few days, still having predominantly formed Bms, taking metamucil    #low level EBV Viremia   7/6/23 CT chest Lymph nodes: Multiple sub centimeter para-aortic lymph nodes. At last check it was 8/22/23 <500     Prior issues:  1. Eosinophilia. The workup for eosinophilia and diarrhea was negative for fungal  and parasitic infections that would account for the eosinophilia. Evaluated by hematology. The eosinophilia is likely reactive to foreign objects; the PD catheter was thought to be potentially the source as noted by hematology. The relatively increased tryptase level favors allergy (likely to PD catheter) to be the etiology of the eosinophilia rather than infectious processes or malignancies.   2. Chronic diarrhea since he was started on PD in 7/2020. Workup has been negative.   3. Positive Salmonella serology. 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 The patient was counseled against keeping the snakes after transplantation as they are source of recurrent Salmonella infection.  4. E coli in urine in 8/2020.   5. Group B Strep in urine in 2/2020.   6. Urethral stenosis s/p reconstruction.      Other Infectious Disease issues include:  - QTc: 416 as of 3/23/23.   - PJP prophylaxis: bactrim.   - Serostatus: CMV D+/R- (s/p 6mo of valcye), EBV D+/R-, HSV1?/2?, VZV +  - Immunization status: This patient received the third dose of the COVID-19 vaccine on 1/26/2022. Otherwise due for the seasonal influenza vaccine and COVID-19 bivalent     Interval history :   Last seen by Dr Willis 9/27/23    1) CMV viremia: Last episode in 9/2023. VL was controlled until 11/20/23. Had stopped Valcyte 10/24/23.  on 11/20 - started Valcyte 900mg PO BID, repeat from 11/27 at 105    2) C.diff: Reported diarrhea 10/10/23. C.diff testing triple positive (Toxin, GDH and PCR). Started Vancomycin 125mg PO q6h x 14 days. Recurrent diarrhea early November, tested positive 11/6/23. Restarted Vancomycin PO with plans for prolonged taper. Doing better overall, but feels stool frequency has slightly increased in the last few days, still having predominantly formed Bms,  taking metamucil    3) Recurrent UTI: Prescribed Cefdinir by Dr Willis (300mg/d) remains on the same. No UTIs since 9/2023    Transplants:  1/15/2023 (Kidney); Postoperative day:  323.  Coordinator Veronica Edgar    Review of Systems:  Remaining systems reviewed and negative    Immunization History   Administered Date(s) Administered    COVID-19 Monovalent 18+ (Moderna) 01/25/2021, 02/22/2021, 01/26/2022    DTAP (<7y) 1984, 1984, 1984, 10/04/1985, 03/21/1989    Flu, Unspecified 11/12/1997, 03/11/2020, 10/06/2020, 10/27/2021    Hepatitis B, Peds 02/19/1996, 06/17/1996, 09/04/1996    Hib, Unspecified 04/02/1986    Historical DTP/aP 1984, 1984, 1984, 10/04/1985, 03/21/1989    Influenza (intradermal) 03/11/2020    Influenza Vaccine >6 months,quad, PF 11/06/2016    MMR 07/03/1985, 02/19/1996    Mantoux Tuberculin Skin Test 03/11/2020    Pneumo Conj 13-V (2010&after) 11/24/2020    Pneumococcal 23 valent 03/11/2020    Pneumococcal, Unspecified 03/11/2020, 04/22/2021    Poliovirus, inactivated (IPV) 1984, 1984, 1984, 03/21/1989    TD,PF 7+ (Tenivac) 06/17/1996    TDAP Vaccine (Adacel) 04/28/2020    Td (Adult), Adsorbed 06/17/1996       No outpatient medications have been marked as taking for the 12/4/23 encounter (Appointment) with Brennan Davenport MD.       No Known Allergies           Physical Exam:   There were no vitals taken for this visit.  Wt Readings from Last 4 Encounters:   11/29/23 73.3 kg (161 lb 8 oz)   11/14/23 71.9 kg (158 lb 8 oz)   09/24/23 69.4 kg (153 lb)   08/23/23 68.9 kg (152 lb)       Exam:  GENERAL: well-developed, well-nourished, alert, oriented, in no acute distress over video.  HEAD: Head is normocephalic, atraumatic   EYES: Eyes have anicteric sclerae.  NEUROLOGIC: Grossly nonfocal.         Laboratory Data:     Absolute CD4   Date Value Ref Range Status   12/03/2020 1,325 441 - 2,156 cells/uL Final       Inflammatory Markers    Recent Labs    Lab Test 01/20/23  0746   G6PD 15.5       Immune Globulin Studies     Recent Labs   Lab Test 11/27/23  1512 08/28/23  1453 12/03/20  1647   IGG 1,187 1,004 1,152   IGM  --   --  64   IGE  --   --  18   IGA  --   --  268       Metabolic Studies    Recent Labs   Lab Test 11/27/23  1512 11/20/23  1454 11/06/23  1446 08/28/23  1453 08/22/23  1448 07/31/23  1512 07/24/23  1519 07/06/23  1807 07/06/23  0933 07/05/23  1304 07/04/23  1944    136 136   < > 137   < > 139   < >  --    < > 133*   POTASSIUM 5.0 4.2 4.1   < > 4.2   < > 4.4   < >  --    < > 4.0   CHLORIDE 105 102 103   < > 105   < > 104   < >  --    < > 102   CO2 24 27 25   < > 23   < > 24   < >  --    < > 17*   ANIONGAP 8 7 8   < > 9   < > 11   < >  --    < > 14   BUN 24.5* 19.2 19.3   < > 20.6*   < > 23.4*   < >  --    < > 26.1*   CR 1.58* 1.58* 1.74*   < > 1.50*   < > 1.65*   < >  --    < > 1.86*   GFRESTIMATED 57* 57* 51*   < > 60*   < > 54*   < >  --    < > 47*   GLC 91 83 86   < > 72   < > 68*   < >  --    < > 139*   VISHNU 9.3 8.9 9.0   < > 9.1   < > 9.7   < >  --    < > 9.5   PHOS  --   --   --   --  2.6  --   --    < >  --    < >  --    MAG  --   --   --   --  1.5*  --   --    < >  --    < >  --    URIC  --   --   --   --   --   --  5.5  --   --   --   --    LACT  --   --   --   --   --   --   --   --  0.9   < > 1.5   CKT  --   --   --   --   --   --   --   --   --   --  88    < > = values in this interval not displayed.       Hepatic Studies    Recent Labs   Lab Test 07/24/23  1519 07/04/23  1944 06/05/23  1527 06/01/23  1518   BILITOTAL 0.3 0.4 0.3 0.3   DBIL <0.20  --   --  <0.20   ALKPHOS 59 53 55 52   PROTTOTAL 7.2 7.4 6.9 6.9   ALBUMIN 4.3 4.2 4.0 4.0   AST 29 27 24 28   ALT 20 14 22 30   LDH  --   --   --  367*     Hematology Studies   Recent Labs   Lab Test 11/27/23  1512 11/20/23  1454 11/06/23  1446 10/23/23  1445 10/16/23  1505 10/09/23  1440   WBC 4.8 4.4 7.5 3.4* 3.2* 3.0*   ANEU  --   --   --   --  1.2* 1.6   ANEUTAUTO  --  1.9 4.9 1.1*   --   --    ALYM  --   --   --   --  1.1 0.8   ALYMPAUTO  --  1.5 1.1 1.1  --   --    TY  --   --   --   --  0.4 0.3   AMONOAUTO  --  0.5 0.7 0.5  --   --    AEOS  --   --   --   --  0.4 0.2   AEOSAUTO  --  0.5 0.7 0.6  --   --    ABSBASO  --  0.0 0.1 0.1  --   --    HGB 11.7* 11.9* 11.6* 12.1* 11.6* 11.7*   HCT 34.7* 35.5* 34.2* 35.0* 34.7* 35.4*    235 199 210 214 309       Clotting Studies    Recent Labs   Lab Test 07/04/23  1944 01/15/23  0540 08/09/21  1124 08/18/20  1307   INR 1.29* 0.90 0.96 1.08   PTT  --  28  --  29     Urine Studies     Recent Labs   Lab Test 09/24/23  1133 07/04/23 2015 06/12/23  1514 05/04/23  1535 04/15/23  0918   URINEPH 7.0 6.0 6.0 6.0 6.5   NITRITE Negative Negative Negative Negative Negative   LEUKEST Small* Negative Negative Large* Moderate*   WBCU 10-25* 11* 7* 71* *       Medication levels    Recent Labs   Lab Test 11/27/23  1512 10/23/23  1445 10/16/23  1505 07/10/23  1452 07/06/23  0547   VANCOMYCIN  --   --   --   --  16.7   TACROL 9.1   < > 6.4   < > 10.3   MPACID  --   --  <0.25*   < >  --    MPAG  --   --  12.2*   < >  --     < > = values in this interval not displayed.       Microbiology:  Fungal testing  Recent Labs   Lab Test 12/03/20  1647 10/08/20  1300   AM3 <0.10  --    HIFUNG  --  <1:8   COFUNG  --  <1:2   FUNBL  --  0.3     Last Culture results   Culture   Date Value Ref Range Status   09/24/2023 10,000-50,000 CFU/mL Escherichia coli (A)  Final   09/24/2023 <10,000 CFU/mL Urogenital cooper  Final   07/04/2023 10,000-50,000 CFU/mL Enterococcus faecalis (A)  Final   07/04/2023 <10,000 CFU/mL Urogenital cooper  Final   07/04/2023 No Growth  Final   07/04/2023 No Growth  Final   05/04/2023 No Growth  Final   05/04/2023 No Growth  Final   05/04/2023 >100,000 CFU/mL Escherichia coli (A)  Final   04/15/2023 50,000-100,000 CFU/mL Escherichia coli (A)  Final   04/15/2023 10,000-50,000 CFU/mL Enterococcus faecalis (A)  Final   03/20/2023 No Growth  Final    03/20/2023 No Growth  Final   03/19/2023 50,000-100,000 CFU/mL Enterococcus faecalis (A)  Final   03/19/2023 <10,000 CFU/mL Urogenital cooper  Final   01/23/2023 50,000-100,000 CFU/mL Enterococcus faecalis (A)  Final     Comment:     Susceptibilities done on previous cultures   01/19/2023 >100,000 CFU/mL Enterococcus faecalis (A)  Final     Culture Micro   Date Value Ref Range Status   12/03/2020 No growth  Final   08/14/2020 >100,000 colonies/mL  Escherichia coli   (A)  Final         Last checks of Clostridioides difficile testing  Recent Labs   Lab Test 11/06/23  1852 10/10/23  1600 07/05/23  1318 05/04/23 2010   CDBPCT Positive* Positive* Negative Negative   CDIFFGDH Positive* Positive*  --   --    CDIFFTOX Positive* Positive*  --   --        Quantiferon testing   Recent Labs   Lab Test 11/20/23  1454 11/06/23  1446 09/15/20  1347 08/18/20  1307   TBRST  --   --   --  Negative   LYMPH 34 14   < > 23.9    < > = values in this interval not displayed.       Infection Studies to assess Diarrhea  Recent Labs   Lab Test 07/05/23  1318 05/04/23 2010 03/22/23  2257 03/20/23  0853 12/09/20  2000 10/08/20  1339 10/08/20  1338   EPSTX1 Not Detected Not Detected  --  Not Detected   < > Not Detected  --    EPSTX2 Not Detected Not Detected  --  Not Detected   < > Not Detected  --    ADENOVIRUSAG  --   --   --   --   --   --  Negative   MSPORT  --   --   --   --   --   --  No Microsporidia found  Chromotrope stain reveals no Microsporidia spores in fecal specimen. Consider other causes   for symptoms.  Divya Lugo M.D., Medical Director  10/9/20     CRYSPT  --   --   --   --   --  No oocysts of Cryptosporidium species, Cyclospora cayetanensis, or Cystoisospora   (Isospora) nancy found    A modified acid fast stain reveals no oocysts of Cryptosporidium, Cyclospora, or   Cystoisospora (Isospora). Consider other causes for symptoms  Divya Lugo M.D., Medical Director  10/9/20    --    CRYPTR  --   --   --   --    --  Negative  --    POPRT  --   --  Negative  --    < > Routine parasitology exam negative  Cryptosporidium, Cyclospora, and Microsporidia are not readily detected by this method. A   single negative specimen does not rule out parasitic infection.    --    EPCAMP Not Detected Not Detected  --  Not Detected   < > Not Detected  --    EPSALM Not Detected Not Detected  --  Not Detected   < > Not Detected  --    EPSHGL Not Detected Not Detected  --  Not Detected   < > Not Detected  --    EPVIB Not Detected Not Detected  --  Not Detected   < > Not Detected  --    EPROTA Not Detected Not Detected  --  Not Detected   < > Not Detected  --    EPNORO Not Detected Not Detected  --  Not Detected   < > Not Detected  --    EPYER Not Detected Not Detected  --  Not Detected   < > Not Detected  --     < > = values in this interval not displayed.       Virology:  Coronavirus-19 testing    Recent Labs   Lab Test 07/04/23  1952 03/19/23  1919 01/15/23  0538 03/21/22  1911 10/30/21  0816 09/08/21  1400 08/07/21  1057 05/26/21  1134 12/10/20  0959 12/03/20  1647   CD19  --   --   --   --   --   --   --   --   --  17   ACD19  --   --   --   --   --   --   --   --   --  532   [id number] Negative Negative Negative Negative  --   --    < >  --  Not Detected  --    [id number]  --   --   --   --   --   --   --   --  Nasopharyngeal  --    COVIDPCREXT  --   --   --   --  Not Detected Not Detected  --  Not Detected  --   --     < > = values in this interval not displayed.       Respiratory virus (non-coronavirus-19) testing    No lab results found.    CMV viral loads    CMV DNA IU/mL   Date Value Ref Range Status   10/23/2023 Not Detected Not Detected IU/mL Final   10/16/2023 <35 (A) Not Detected IU/mL Final     Comment:     CMV DNA detected, less than 35 IU/mL   10/09/2023 <35 (A) Not Detected IU/mL Final     Comment:     CMV DNA detected, less than 35 IU/mL   10/02/2023 Not Detected Not Detected IU/mL Final   09/25/2023 Not Detected Not  Detected IU/mL Final   07/17/2023 Not Detected Not Detected IU/mL Final   07/05/2023 Not Detected Not Detected IU/mL Final   06/15/2023 Not Detected Not Detected IU/mL Final   05/25/2023 Not Detected Not Detected IU/mL Final   05/04/2023 Not Detected Not Detected IU/mL Final   03/20/2023 Not Detected Not Detected IU/mL Final   03/06/2023 Not Detected Not Detected IU/mL Final   01/15/2023 Not Detected Not Detected IU/mL Final     CMV DNA IU/mL, Instrument   Date Value Ref Range Status   11/27/2023 105 (H) <1 IU/mL Final   11/20/2023 648 (H) <1 IU/mL Final   09/18/2023 61 (H) <1 IU/mL Final   09/11/2023 468 (H) <1 IU/mL Final   09/05/2023 4,580 (H) <1 IU/mL Final   08/22/2023 2,340 (H) <1 IU/mL Final     CMV log   Date Value Ref Range Status   11/27/2023 2.0  Final   11/20/2023 2.8  Final   10/16/2023 <1.5  Final   10/09/2023 <1.5  Final   09/18/2023 1.8  Final   09/11/2023 2.7  Final   09/05/2023 3.7  Final   08/22/2023 3.4  Final       EBV DNA Copies/mL   Date Value Ref Range Status   09/25/2023 Not Detected Not Detected copies/mL Final   08/22/2023 <500 (A) Not Detected copies/mL Final     Comment:     EBV DNA Detected below the reportable range of 500 copies/mL   07/04/2023 <500 (A) Not Detected copies/mL Final     Comment:     EBV DNA Detected below the reportable range of 500 copies/mL   05/04/2023 Not Detected Not Detected copies/mL Final   03/20/2023 Not Detected Not Detected copies/mL Final       BK viral loads   Recent Labs   Lab Test 11/20/23  1454 10/23/23  1445 09/25/23  1541 08/28/23  1453 08/22/23  1448 07/24/23  1509 07/05/23  1203 06/26/23  1507 05/25/23  1552   BKRES Not Detected Not Detected Not Detected Not Detected Not Detected Not Detected Not Detected Not Detected Not Detected       Hepatitis B Testing     Recent Labs   Lab Test 01/15/23  0540 08/18/20  1307   AUSAB 134.22 212.08*   HBCAB Nonreactive Nonreactive   HEPBANG Nonreactive Nonreactive     HCV Ab Negative 1/15/23  CMV IgG Negative  1/15/23  VZV IgG Positive 8/18/20  EBV Capsid IgG Positive 1/15/23      Imaging:    CT C/A/P with contrast 7/6/23:  IMPRESSION: Postoperative changes of kidney transplant. There is mild nonspecific stranding around transplant kidney with a small amount of  fluid in the pelvis and areas of patchy hypoenhancement within the transplant kidney. These likely represents chronic/benign findings,  however in the appropriate clinical context findings may represent mild infection.    Brennan Davenport MD

## 2023-12-04 NOTE — NURSING NOTE
Is the patient currently in the state of [place]? YES    Visit mode:VIDEO    If the visit is dropped, the patient can be reconnected by: VIDEO VISIT: Text to cell phone:   Telephone Information:   Mobile [phone]       Will anyone else be joining the visit? NO  (If patient encounters technical issues they should call [phone]    How would you like to obtain your AVS? MyChart    Are changes needed to the allergy or medication list? Pt stated no changes to allergies and Pt stated no med changes    Reason for visit: HBARATH MONTES

## 2023-12-05 LAB
CMV DNA SPEC NAA+PROBE-ACNC: <35 IU/ML
CMV DNA SPEC NAA+PROBE-LOG#: <1.5 {LOG_COPIES}/ML

## 2023-12-07 ENCOUNTER — MYC MEDICAL ADVICE (OUTPATIENT)
Dept: INFECTIOUS DISEASES | Facility: CLINIC | Age: 38
End: 2023-12-07
Payer: MEDICARE

## 2023-12-07 ENCOUNTER — TELEPHONE (OUTPATIENT)
Dept: TRANSPLANT | Facility: CLINIC | Age: 38
End: 2023-12-07
Payer: MEDICARE

## 2023-12-07 DIAGNOSIS — Z94.0 KIDNEY TRANSPLANTED: Primary | ICD-10-CM

## 2023-12-07 NOTE — TELEPHONE ENCOUNTER
From: Brennan Davenport MD   Sent: 12/7/2023   9:21 AM CST       Good morning,     Chip's CMV VL <35. With 2 consecutive weeks <137, ok to switch to maintenance dosing of 900mg daily Valcyte   Have asked him to continue with the weekly VL checks for now - if stable, can switch to every other week after 2-3 weeks   repeat next week, due for DSA, last FK subtx, UA/Ucx, CMV, BK     C. Completing taper and stopping Vanco PO but continuing Cefdinir - any additional C.diff recurrences should prompt change in management     Updated orders epic

## 2023-12-09 ENCOUNTER — TELEPHONE (OUTPATIENT)
Dept: TRANSPLANT | Facility: CLINIC | Age: 38
End: 2023-12-09
Payer: MEDICARE

## 2023-12-09 ASSESSMENT — ENCOUNTER SYMPTOMS
NEW SYMPTOMS OF CORONARY ARTERY DISEASE: 0
NEW SYMPTOMS OF CORONARY ARTERY DISEASE: 0

## 2023-12-11 ENCOUNTER — LAB (OUTPATIENT)
Dept: LAB | Facility: CLINIC | Age: 38
End: 2023-12-11
Payer: MEDICARE

## 2023-12-11 DIAGNOSIS — Z94.0 KIDNEY TRANSPLANTED: ICD-10-CM

## 2023-12-11 LAB
ALBUMIN UR-MCNC: NEGATIVE MG/DL
ANION GAP SERPL CALCULATED.3IONS-SCNC: 8 MMOL/L (ref 7–15)
APPEARANCE UR: CLEAR
BASOPHILS # BLD AUTO: 0 10E3/UL (ref 0–0.2)
BASOPHILS NFR BLD AUTO: 1 %
BILIRUB UR QL STRIP: NEGATIVE
BUN SERPL-MCNC: 32.2 MG/DL (ref 6–20)
CALCIUM SERPL-MCNC: 8.5 MG/DL (ref 8.6–10)
CHLORIDE SERPL-SCNC: 104 MMOL/L (ref 98–107)
COLOR UR AUTO: ABNORMAL
CREAT SERPL-MCNC: 1.57 MG/DL (ref 0.67–1.17)
DEPRECATED HCO3 PLAS-SCNC: 23 MMOL/L (ref 22–29)
EGFRCR SERPLBLD CKD-EPI 2021: 57 ML/MIN/1.73M2
EOSINOPHIL # BLD AUTO: 0.5 10E3/UL (ref 0–0.7)
EOSINOPHIL NFR BLD AUTO: 10 %
ERYTHROCYTE [DISTWIDTH] IN BLOOD BY AUTOMATED COUNT: 13.9 % (ref 10–15)
GLUCOSE SERPL-MCNC: 73 MG/DL (ref 70–99)
GLUCOSE UR STRIP-MCNC: NEGATIVE MG/DL
HCT VFR BLD AUTO: 33.1 % (ref 40–53)
HGB BLD-MCNC: 11.3 G/DL (ref 13.3–17.7)
HGB UR QL STRIP: NEGATIVE
IMM GRANULOCYTES # BLD: 0 10E3/UL
IMM GRANULOCYTES NFR BLD: 0 %
KETONES UR STRIP-MCNC: NEGATIVE MG/DL
LEUKOCYTE ESTERASE UR QL STRIP: ABNORMAL
LYMPHOCYTES # BLD AUTO: 1.5 10E3/UL (ref 0.8–5.3)
LYMPHOCYTES NFR BLD AUTO: 32 %
MCH RBC QN AUTO: 31.9 PG (ref 26.5–33)
MCHC RBC AUTO-ENTMCNC: 34.1 G/DL (ref 31.5–36.5)
MCV RBC AUTO: 94 FL (ref 78–100)
MONOCYTES # BLD AUTO: 0.2 10E3/UL (ref 0–1.3)
MONOCYTES NFR BLD AUTO: 4 %
NEUTROPHILS # BLD AUTO: 2.6 10E3/UL (ref 1.6–8.3)
NEUTROPHILS NFR BLD AUTO: 53 %
NITRATE UR QL: NEGATIVE
NRBC # BLD AUTO: 0 10E3/UL
NRBC BLD AUTO-RTO: 0 /100
PH UR STRIP: 6 [PH] (ref 5–7)
PLATELET # BLD AUTO: 213 10E3/UL (ref 150–450)
POTASSIUM SERPL-SCNC: 4.3 MMOL/L (ref 3.4–5.3)
RBC # BLD AUTO: 3.54 10E6/UL (ref 4.4–5.9)
RBC URINE: 0 /HPF
SODIUM SERPL-SCNC: 135 MMOL/L (ref 135–145)
SP GR UR STRIP: 1 (ref 1–1.03)
TACROLIMUS BLD-MCNC: 11.8 UG/L (ref 5–15)
TME LAST DOSE: NORMAL H
TME LAST DOSE: NORMAL H
UROBILINOGEN UR STRIP-MCNC: NORMAL MG/DL
WBC # BLD AUTO: 4.8 10E3/UL (ref 4–11)
WBC URINE: 6 /HPF

## 2023-12-11 PROCEDURE — 85025 COMPLETE CBC W/AUTO DIFF WBC: CPT

## 2023-12-11 PROCEDURE — 86833 HLA CLASS II HIGH DEFIN QUAL: CPT

## 2023-12-11 PROCEDURE — 81003 URINALYSIS AUTO W/O SCOPE: CPT

## 2023-12-11 PROCEDURE — 86832 HLA CLASS I HIGH DEFIN QUAL: CPT

## 2023-12-11 PROCEDURE — 80048 BASIC METABOLIC PNL TOTAL CA: CPT

## 2023-12-11 PROCEDURE — 36415 COLL VENOUS BLD VENIPUNCTURE: CPT

## 2023-12-11 PROCEDURE — 80197 ASSAY OF TACROLIMUS: CPT

## 2023-12-12 ENCOUNTER — TELEPHONE (OUTPATIENT)
Dept: INFECTIOUS DISEASES | Facility: CLINIC | Age: 38
End: 2023-12-12
Payer: MEDICARE

## 2023-12-12 LAB
CMV DNA SPEC NAA+PROBE-ACNC: <35 IU/ML
CMV DNA SPEC NAA+PROBE-LOG#: <1.5 {LOG_COPIES}/ML

## 2023-12-12 NOTE — TELEPHONE ENCOUNTER
EP called 12/12 to sched a 3 month follow up with Dr. Davenport via in-person or video per checkout notes from 12/4.

## 2023-12-13 ENCOUNTER — TELEPHONE (OUTPATIENT)
Dept: TRANSPLANT | Facility: CLINIC | Age: 38
End: 2023-12-13
Payer: MEDICARE

## 2023-12-13 DIAGNOSIS — Z94.0 KIDNEY TRANSPLANTED: ICD-10-CM

## 2023-12-13 DIAGNOSIS — B25.9 CMV (CYTOMEGALOVIRUS INFECTION) (H): ICD-10-CM

## 2023-12-13 RX ORDER — VALGANCICLOVIR 450 MG/1
900 TABLET, FILM COATED ORAL DAILY
Qty: 60 TABLET | Refills: 3 | Status: SHIPPED | OUTPATIENT
Start: 2023-12-13 | End: 2024-01-23

## 2023-12-13 NOTE — TELEPHONE ENCOUNTER
They did not update your medication list - I will change  to keep it updated     Have a nice day   Veronica [facility] Transplant Coordinator    ===View-only below this line===      ----- Message -----       From:Chip Fowler       Sent:12/13/2023  8:46 AM CST         To:Patient Medical Advice Request Message List    Subject:Good morning    Yes I actually already decreased to 900 after last labs, I message ID.      ----- Message -----       From:Veronica TUCKER       Sent:12/13/2023  8:43 AM CST         To:Chip Fowler    Subject:Good morning      Franko Cerda MD  1       Please decrease valcyte to 900mg daily. Continue this for 3 weeks along with weekly testing. If CMV remains negative or <35 will stop valcyte at that graeme       Good morning        The above message is from Dr Franko Cerda    Please confirm you have received this message or call me at [phone]     Veronica [facility] Transplant Coordinator

## 2023-12-13 NOTE — TELEPHONE ENCOUNTER
Franko Cerda MD  12/12/2023 11:08 AM CST Back to Top      Please decrease valcyte to 900mg daily. Continue this for 3 weeks along with weekly testing. If CMV remains negative or <35 will stop valcyte at that graeme

## 2023-12-18 ENCOUNTER — LAB (OUTPATIENT)
Dept: LAB | Facility: CLINIC | Age: 38
End: 2023-12-18
Payer: MEDICARE

## 2023-12-18 DIAGNOSIS — Z94.0 KIDNEY TRANSPLANTED: ICD-10-CM

## 2023-12-18 DIAGNOSIS — Z94.0 KIDNEY REPLACED BY TRANSPLANT: ICD-10-CM

## 2023-12-18 DIAGNOSIS — Z20.828 CONTACT WITH AND (SUSPECTED) EXPOSURE TO OTHER VIRAL COMMUNICABLE DISEASES: ICD-10-CM

## 2023-12-18 DIAGNOSIS — Z79.899 ENCOUNTER FOR LONG-TERM CURRENT USE OF MEDICATION: ICD-10-CM

## 2023-12-18 DIAGNOSIS — Z48.298 AFTERCARE FOLLOWING ORGAN TRANSPLANT: ICD-10-CM

## 2023-12-18 LAB
ANION GAP SERPL CALCULATED.3IONS-SCNC: 9 MMOL/L (ref 7–15)
BASOPHILS # BLD AUTO: 0.1 10E3/UL (ref 0–0.2)
BASOPHILS NFR BLD AUTO: 1 %
BUN SERPL-MCNC: 23.7 MG/DL (ref 6–20)
CALCIUM SERPL-MCNC: 8.9 MG/DL (ref 8.6–10)
CHLORIDE SERPL-SCNC: 103 MMOL/L (ref 98–107)
CREAT SERPL-MCNC: 1.54 MG/DL (ref 0.67–1.17)
DEPRECATED HCO3 PLAS-SCNC: 23 MMOL/L (ref 22–29)
EGFRCR SERPLBLD CKD-EPI 2021: 58 ML/MIN/1.73M2
EOSINOPHIL # BLD AUTO: 0.5 10E3/UL (ref 0–0.7)
EOSINOPHIL NFR BLD AUTO: 9 %
ERYTHROCYTE [DISTWIDTH] IN BLOOD BY AUTOMATED COUNT: 13.8 % (ref 10–15)
GLUCOSE SERPL-MCNC: 85 MG/DL (ref 70–99)
HCT VFR BLD AUTO: 33.8 % (ref 40–53)
HGB BLD-MCNC: 11.4 G/DL (ref 13.3–17.7)
IMM GRANULOCYTES # BLD: 0 10E3/UL
IMM GRANULOCYTES NFR BLD: 0 %
LYMPHOCYTES # BLD AUTO: 1.6 10E3/UL (ref 0.8–5.3)
LYMPHOCYTES NFR BLD AUTO: 28 %
MCH RBC QN AUTO: 31.2 PG (ref 26.5–33)
MCHC RBC AUTO-ENTMCNC: 33.7 G/DL (ref 31.5–36.5)
MCV RBC AUTO: 93 FL (ref 78–100)
MONOCYTES # BLD AUTO: 0.4 10E3/UL (ref 0–1.3)
MONOCYTES NFR BLD AUTO: 8 %
NEUTROPHILS # BLD AUTO: 3.1 10E3/UL (ref 1.6–8.3)
NEUTROPHILS NFR BLD AUTO: 54 %
NRBC # BLD AUTO: 0 10E3/UL
NRBC BLD AUTO-RTO: 0 /100
PLATELET # BLD AUTO: 269 10E3/UL (ref 150–450)
POTASSIUM SERPL-SCNC: 4.6 MMOL/L (ref 3.4–5.3)
RBC # BLD AUTO: 3.65 10E6/UL (ref 4.4–5.9)
SODIUM SERPL-SCNC: 135 MMOL/L (ref 135–145)
TACROLIMUS BLD-MCNC: 10.4 UG/L (ref 5–15)
TME LAST DOSE: NORMAL H
TME LAST DOSE: NORMAL H
WBC # BLD AUTO: 5.7 10E3/UL (ref 4–11)

## 2023-12-18 PROCEDURE — 85025 COMPLETE CBC W/AUTO DIFF WBC: CPT

## 2023-12-18 PROCEDURE — 36415 COLL VENOUS BLD VENIPUNCTURE: CPT

## 2023-12-18 PROCEDURE — 80197 ASSAY OF TACROLIMUS: CPT

## 2023-12-18 PROCEDURE — 80048 BASIC METABOLIC PNL TOTAL CA: CPT

## 2023-12-19 LAB — CMV DNA SPEC NAA+PROBE-ACNC: NOT DETECTED IU/ML

## 2023-12-20 DIAGNOSIS — Z94.0 KIDNEY REPLACED BY TRANSPLANT: Primary | ICD-10-CM

## 2023-12-20 RX ORDER — TACROLIMUS 0.5 MG/1
CAPSULE ORAL
Qty: 60 CAPSULE | Refills: 11 | Status: SHIPPED | OUTPATIENT
Start: 2023-12-20 | End: 2024-01-16

## 2023-12-20 RX ORDER — TACROLIMUS 1 MG/1
3 CAPSULE ORAL 2 TIMES DAILY
Qty: 180 CAPSULE | Refills: 11 | Status: SHIPPED | OUTPATIENT
Start: 2023-12-20 | End: 2024-01-16

## 2023-12-20 NOTE — TELEPHONE ENCOUNTER
Tacrolimus  level 10.4 above goal level          PLAN:   Call Patientand confirm this was an accurate 12-hour trough.   Verify Tacrolimus IR dose 3.5 mg BID.   Confirm no new medications or illness.   Confirm no missed doses.   If accurate trough and accurate dose, decrease Tacrolimus IR dose to 3.0 mg BID     Is this more than a 50% increase or decrease in current IS dose: No  Repeat labs in 1 to 2 weeks

## 2023-12-20 NOTE — TELEPHONE ENCOUNTER
Cymbet message sent to patient regarding:  Tacrolimus  level 10.4 above goal level            PLAN:   Call Patientand confirm this was an accurate 12-hour trough.   Verify Tacrolimus IR dose 3.5 mg BID.   Confirm no new medications or illness.   Confirm no missed doses.   If accurate trough and accurate dose, decrease Tacrolimus IR dose to 3.0 mg BID

## 2023-12-20 NOTE — TELEPHONE ENCOUNTER
Patient confirms this was an accurate 12-hour trough.   Verified Tacrolimus IR dose 3.5 mg BID.   Confirmed no new medications or illness.   Confirmed no missed doses.   Patient confirms decrease Tacrolimus IR dose to 3.0 mg BID

## 2023-12-25 ENCOUNTER — OFFICE VISIT (OUTPATIENT)
Dept: URGENT CARE | Facility: URGENT CARE | Age: 38
End: 2023-12-25
Payer: MEDICARE

## 2023-12-25 ENCOUNTER — MYC MEDICAL ADVICE (OUTPATIENT)
Dept: TRANSPLANT | Facility: CLINIC | Age: 38
End: 2023-12-25

## 2023-12-25 VITALS
DIASTOLIC BLOOD PRESSURE: 87 MMHG | WEIGHT: 153 LBS | TEMPERATURE: 98.9 F | SYSTOLIC BLOOD PRESSURE: 129 MMHG | BODY MASS INDEX: 20.72 KG/M2 | HEIGHT: 72 IN | HEART RATE: 70 BPM | OXYGEN SATURATION: 100 %

## 2023-12-25 DIAGNOSIS — R30.0 DYSURIA: ICD-10-CM

## 2023-12-25 DIAGNOSIS — Z94.0 HISTORY OF KIDNEY TRANSPLANT: ICD-10-CM

## 2023-12-25 DIAGNOSIS — N30.00 ACUTE CYSTITIS WITHOUT HEMATURIA: Primary | ICD-10-CM

## 2023-12-25 LAB
ALBUMIN UR-MCNC: NEGATIVE MG/DL
APPEARANCE UR: CLEAR
BACTERIA #/AREA URNS HPF: ABNORMAL /HPF
BILIRUB UR QL STRIP: NEGATIVE
COLOR UR AUTO: YELLOW
GLUCOSE UR STRIP-MCNC: NEGATIVE MG/DL
HGB UR QL STRIP: ABNORMAL
KETONES UR STRIP-MCNC: NEGATIVE MG/DL
LEUKOCYTE ESTERASE UR QL STRIP: ABNORMAL
NITRATE UR QL: NEGATIVE
PH UR STRIP: 7 [PH] (ref 5–7)
RBC #/AREA URNS AUTO: ABNORMAL /HPF
SP GR UR STRIP: 1.01 (ref 1–1.03)
SQUAMOUS #/AREA URNS AUTO: ABNORMAL /LPF
TRANS CELLS #/AREA URNS HPF: ABNORMAL /HPF
UROBILINOGEN UR STRIP-ACNC: 0.2 E.U./DL
WBC #/AREA URNS AUTO: ABNORMAL /HPF
WBC CLUMPS #/AREA URNS HPF: PRESENT /HPF

## 2023-12-25 PROCEDURE — 99213 OFFICE O/P EST LOW 20 MIN: CPT | Mod: 25 | Performed by: PHYSICIAN ASSISTANT

## 2023-12-25 PROCEDURE — 81001 URINALYSIS AUTO W/SCOPE: CPT | Performed by: PHYSICIAN ASSISTANT

## 2023-12-25 PROCEDURE — 96372 THER/PROPH/DIAG INJ SC/IM: CPT | Performed by: PHYSICIAN ASSISTANT

## 2023-12-25 PROCEDURE — 87086 URINE CULTURE/COLONY COUNT: CPT | Performed by: PHYSICIAN ASSISTANT

## 2023-12-25 RX ORDER — CEFTRIAXONE SODIUM 1 G
1 VIAL (EA) INJECTION ONCE
Status: COMPLETED | OUTPATIENT
Start: 2023-12-25 | End: 2023-12-25

## 2023-12-25 RX ADMIN — Medication 1 G: at 16:01

## 2023-12-25 NOTE — PROGRESS NOTES
Assessment & Plan          1. Acute cystitis without hematuria    -Patient with a high risk of sepsis.  He will be treated for complicated acute cystitis.  Patient was given Rocephin 1 g in the clinic.  He was sent home to start Augmentin twice daily for 7 days.  Patient will follow-up with infectious disease provider for further instructions.  - cefTRIAXone (ROCEPHIN) in lidocaine 1% (PF) for IM administration 1 g  - amoxicillin-clavulanate (AUGMENTIN) 875-125 MG tablet; Take 1 tablet by mouth 2 times daily for 7 days  Dispense: 14 tablet; Refill: 0    2. History of kidney transplant    - cefTRIAXone (ROCEPHIN) in lidocaine 1% (PF) for IM administration 1 g  - amoxicillin-clavulanate (AUGMENTIN) 875-125 MG tablet; Take 1 tablet by mouth 2 times daily for 7 days  Dispense: 14 tablet; Refill: 0    3. Dysuria    -UA is suggestive of acute cystitis  -Urine culture is pending    - UA Macroscopic with reflex to Microscopic and Culture  - Urine Microscopic Exam  - Urine Culture  Results for orders placed or performed in visit on 12/25/23   UA Macroscopic with reflex to Microscopic and Culture     Status: Abnormal    Specimen: Urine, Midstream   Result Value Ref Range    Color Urine Yellow Colorless, Straw, Light Yellow, Yellow    Appearance Urine Clear Clear    Glucose Urine Negative Negative mg/dL    Bilirubin Urine Negative Negative    Ketones Urine Negative Negative mg/dL    Specific Gravity Urine 1.010 1.003 - 1.035    Blood Urine Trace (A) Negative    pH Urine 7.0 5.0 - 7.0    Protein Albumin Urine Negative Negative mg/dL    Urobilinogen Urine 0.2 0.2, 1.0 E.U./dL    Nitrite Urine Negative Negative    Leukocyte Esterase Urine Moderate (A) Negative   Urine Microscopic Exam     Status: Abnormal   Result Value Ref Range    Bacteria Urine Few (A) None Seen /HPF    RBC Urine 0-2 0-2 /HPF /HPF    WBC Urine 25-50 (A) 0-5 /HPF /HPF    Squamous Epithelials Urine Few (A) None Seen /LPF    WBC Clumps Urine Present (A) None Seen  /HPF    Transitional Epithelials Urine Few (A) None Seen /HPF       Patient Instructions   Follow up with Infectious disease doctor for further instructions      Return if symptoms worsen or fail to improve.    At the end of the encounter, I discussed results, diagnosis, medications. Discussed red flags for immediate return to clinic/ER, as well as indications for follow up if no improvement. Patient understood and agreed to plan. Patient was stable for discharge.    Vineet Saunders is a 38 year old male who presents to clinic today for the following health issues:  Chief Complaint   Patient presents with    Urgent Care     Frequent urination and burning since this morning.     HPI    Patient reports dysuria and frequent urination which started this morning.  Patient has a history of a kidney transplant from January 15, 2023.  Patient is on prophylactic antibiotic treatment for UTI and other infections.  He takes cefdinir 300 mg once daily.  He is taking Bactrim 400 -80 mg once daily.  He is also on a tapered dose of vancomycin for C. difficile infection.  Patient sees an infectious disease provider from [facility] physicians.  He denies fever or chills, nausea, vomiting or flank pain.      Review of Systems    Problem List:  2023-09: Neutropenia (H24)  2023-08: CMV (cytomegalovirus infection) (H)  2023-07: EBV (Aaron-Barr virus) viremia  2023-07: Pyelonephritis of transplanted kidney  2023-07: Frequent UTI  2023-07: High fever  2023-07: SIRS (systemic inflammatory response syndrome) (H)  2023-07: Status post kidney transplant  2023-05: Leukopenia  2023-05: Metabolic acidosis  2023-05: Urinary tract infection without hematuria, site unspecified  2023-03: Pyelonephritis of transplanted kidney  2023-03: Diarrhea  2023-03: Hypovolemia  2023-03: JACK (acute kidney injury) (H24)  2023-03: Aftercare following organ transplant  2023-03: Need for pneumocystis prophylaxis  2023-03: Anemia in chronic renal  disease  2023: Secondary renal hyperparathyroidism (H24)  2023: Vitamin D deficiency  2023: Hypomagnesemia  2023: Dehydration  2023: Immunosuppressed status (H24)  2023: Elevated LFTs  2023: Hyperkalemia  2023: Kidney replaced by transplant  2023: Transplant, organ  2022: Pre-operative cardiovascular examination  2021: Stage 3a chronic kidney disease (H)  2020: Leukocytosis  2020: Allergic rhinitis, unspecified seasonality, unspecified   trigger  2020: Other dysphagia  2020: Post-traumatic male urethral stricture  2020: Urethral diverticulum  2020: Urethral calculus  2020: ESRD (end stage renal disease) on dialysis (H)  2020: Tobacco use  2020: Unilateral congenital absence of kidney  2020: Abnormal urinalysis  2020: Acute renal failure superimposed on chronic kidney disease   2020: Congestive heart failure of unknown etiology (H)  2020: Methamphetamine abuse, episodic (H)  2020: Migraine  2014: Nephrolithiasis  2014: Acute retention of urine  HTN, kidney transplant related  Solitary kidney, congenital  Nonischemic cardiomyopathy (H)  Bladder stones  Urethral stone  Urethral stricture  Polysubstance abuse (H)      Past Medical History:   Diagnosis Date    Bladder stones     Cardiomyopathy (H)     CMV (cytomegalovirus infection) (H) 2023:  CMV-Blood    ESRD (end stage renal disease) on dialysis (H)     Hypertension     Kidney replaced by transplant 01/15/2023    DCD DDKT. Intermediate risk induction.    Migraines     Polysubstance abuse (H)     Solitary kidney, congenital     Urethral stone     Urethral stricture        Social History     Tobacco Use    Smoking status: Former     Packs/day: .5     Types: Cigarettes     Start date:      Quit date: 1/15/2023     Years since quittin.9    Smokeless tobacco: Never   Substance Use Topics    Alcohol use: Not Currently     Comment: quit alcohol 3-4 yrs ago            Objective    /87   Pulse 70   Temp 98.9  F (37.2  C) (Oral)   Ht 1.829 m (6')   Wt 69.4 kg (153 lb)   SpO2 100%   BMI 20.75 kg/m    Physical Exam  Vitals and nursing note reviewed.   Constitutional:       Appearance: Normal appearance.   HENT:      Head: Normocephalic.   Cardiovascular:      Rate and Rhythm: Normal rate and regular rhythm.   Pulmonary:      Effort: Pulmonary effort is normal.      Breath sounds: Normal breath sounds.   Abdominal:      General: Abdomen is flat.      Palpations: Abdomen is soft.      Tenderness: There is no abdominal tenderness. There is no right CVA tenderness or left CVA tenderness.   Musculoskeletal:      Cervical back: Normal range of motion and neck supple.   Neurological:      Mental Status: He is alert.   Psychiatric:         Behavior: Behavior normal.              Aisha Estrada PA-C

## 2023-12-26 ENCOUNTER — LAB (OUTPATIENT)
Dept: LAB | Facility: CLINIC | Age: 38
End: 2023-12-26
Payer: MEDICARE

## 2023-12-26 DIAGNOSIS — Z94.0 KIDNEY TRANSPLANTED: ICD-10-CM

## 2023-12-26 DIAGNOSIS — N39.0 FREQUENT UTI: Primary | ICD-10-CM

## 2023-12-26 LAB
ACANTHOCYTES BLD QL SMEAR: NORMAL
ANION GAP SERPL CALCULATED.3IONS-SCNC: 13 MMOL/L (ref 7–15)
AUER BODIES BLD QL SMEAR: NORMAL
BASO STIPL BLD QL SMEAR: NORMAL
BASOPHILS # BLD AUTO: 0.1 10E3/UL (ref 0–0.2)
BASOPHILS NFR BLD AUTO: 1 %
BITE CELLS BLD QL SMEAR: NORMAL
BLISTER CELLS BLD QL SMEAR: NORMAL
BUN SERPL-MCNC: 24.8 MG/DL (ref 6–20)
BURR CELLS BLD QL SMEAR: NORMAL
CALCIUM SERPL-MCNC: 9.1 MG/DL (ref 8.6–10)
CHLORIDE SERPL-SCNC: 101 MMOL/L (ref 98–107)
CREAT SERPL-MCNC: 1.55 MG/DL (ref 0.67–1.17)
DACRYOCYTES BLD QL SMEAR: NORMAL
DEPRECATED HCO3 PLAS-SCNC: 22 MMOL/L (ref 22–29)
EGFRCR SERPLBLD CKD-EPI 2021: 58 ML/MIN/1.73M2
ELLIPTOCYTES BLD QL SMEAR: NORMAL
EOSINOPHIL # BLD AUTO: 0.8 10E3/UL (ref 0–0.7)
EOSINOPHIL NFR BLD AUTO: 11 %
ERYTHROCYTE [DISTWIDTH] IN BLOOD BY AUTOMATED COUNT: 13.2 % (ref 10–15)
FRAGMENTS BLD QL SMEAR: NORMAL
GLUCOSE SERPL-MCNC: 74 MG/DL (ref 70–99)
HCT VFR BLD AUTO: 36.2 % (ref 40–53)
HGB BLD-MCNC: 12.2 G/DL (ref 13.3–17.7)
HGB C CRYSTALS: NORMAL
HOWELL-JOLLY BOD BLD QL SMEAR: NORMAL
IMM GRANULOCYTES # BLD: 0 10E3/UL
IMM GRANULOCYTES NFR BLD: 0 %
LYMPHOCYTES # BLD AUTO: 1.2 10E3/UL (ref 0.8–5.3)
LYMPHOCYTES NFR BLD AUTO: 17 %
MCH RBC QN AUTO: 32 PG (ref 26.5–33)
MCHC RBC AUTO-ENTMCNC: 33.7 G/DL (ref 31.5–36.5)
MCV RBC AUTO: 95 FL (ref 78–100)
MONOCYTES # BLD AUTO: 0.4 10E3/UL (ref 0–1.3)
MONOCYTES NFR BLD AUTO: 6 %
NEUTROPHILS # BLD AUTO: 4.5 10E3/UL (ref 1.6–8.3)
NEUTROPHILS NFR BLD AUTO: 65 %
NEUTS HYPERSEG BLD QL SMEAR: NORMAL
NRBC # BLD AUTO: 0 10E3/UL
NRBC BLD AUTO-RTO: 0 /100
PLAT MORPH BLD: NORMAL
PLATELET # BLD AUTO: 324 10E3/UL (ref 150–450)
POLYCHROMASIA BLD QL SMEAR: NORMAL
POTASSIUM SERPL-SCNC: 4 MMOL/L (ref 3.4–5.3)
RBC # BLD AUTO: 3.81 10E6/UL (ref 4.4–5.9)
RBC AGGLUT BLD QL: NORMAL
RBC MORPH BLD: NORMAL
ROULEAUX BLD QL SMEAR: NORMAL
SICKLE CELLS BLD QL SMEAR: NORMAL
SMUDGE CELLS BLD QL SMEAR: NORMAL
SODIUM SERPL-SCNC: 136 MMOL/L (ref 135–145)
SPHEROCYTES BLD QL SMEAR: NORMAL
STOMATOCYTES BLD QL SMEAR: NORMAL
TACROLIMUS BLD-MCNC: 7.7 UG/L (ref 5–15)
TARGETS BLD QL SMEAR: NORMAL
TME LAST DOSE: NORMAL H
TME LAST DOSE: NORMAL H
TOXIC GRANULES BLD QL SMEAR: NORMAL
VARIANT LYMPHS BLD QL SMEAR: NORMAL
WBC # BLD AUTO: 6.9 10E3/UL (ref 4–11)

## 2023-12-26 PROCEDURE — 80048 BASIC METABOLIC PNL TOTAL CA: CPT

## 2023-12-26 PROCEDURE — 85025 COMPLETE CBC W/AUTO DIFF WBC: CPT

## 2023-12-26 PROCEDURE — 36415 COLL VENOUS BLD VENIPUNCTURE: CPT

## 2023-12-26 PROCEDURE — 80197 ASSAY OF TACROLIMUS: CPT

## 2023-12-26 RX ORDER — NITROFURANTOIN 25; 75 MG/1; MG/1
100 CAPSULE ORAL 2 TIMES DAILY
Qty: 14 CAPSULE | Refills: 0 | Status: SHIPPED | OUTPATIENT
Start: 2023-12-26 | End: 2024-01-02

## 2023-12-27 LAB
BACTERIA UR CULT: NORMAL
CMV DNA SPEC NAA+PROBE-ACNC: NOT DETECTED IU/ML
DONOR IDENTIFICATION: NORMAL
DSA COMMENTS: NORMAL
DSA PRESENT: NO
DSA TEST METHOD: NORMAL
ORGAN: NORMAL
SA 1 CELL: NORMAL
SA 1 TEST METHOD: NORMAL
SA 2 CELL: NORMAL
SA 2 TEST METHOD: NORMAL
SA1 HI RISK ABY: NORMAL
SA1 MOD RISK ABY: NORMAL
SA2 HI RISK ABY: NORMAL
SA2 MOD RISK ABY: NORMAL
UNACCEPTABLE ANTIGENS: NORMAL
UNOS CPRA: 64
ZZZSA 1  COMMENTS: NORMAL
ZZZSA 2 COMMENTS: NORMAL

## 2023-12-28 ENCOUNTER — TELEPHONE (OUTPATIENT)
Dept: TRANSPLANT | Facility: CLINIC | Age: 38
End: 2023-12-28
Payer: MEDICARE

## 2023-12-28 NOTE — TELEPHONE ENCOUNTER
Transplant Social Work Services Phone Call      Data: Patient indicated he may no longer be eligible for MA.  Intervention: Patient indicated he received a letter to update for MA.  Patient indicated he doesn't think he will qualify as he is working full time.  Is wondering about insurance coverage.  Patient is no longer eligible for SSDI and is paying his Part B premiums.  Assessment: Discussed options of Advantage Plan vs Supplement Plan.  Patient indicated he is eligible for his employer insurance with [employer] being out of network but his deductible would be $3000 a year which he indicated he could pay.  Discussed Medicare is primary now as he has been on Medicare since 2020.  Encouraged patient to discuss with his employer insurance is he needs to learn if he needs to have Medicare Part B to ensure he continues to pay his premiums.  Education provided by [facility]: Insurance  coverage  Plan:  SW will remain available to assist as indicated.    SHAUN Moise, [facility]  Kidney/Pancreas Transplant   [phone]

## 2024-01-02 ENCOUNTER — LAB (OUTPATIENT)
Dept: LAB | Facility: CLINIC | Age: 39
End: 2024-01-02
Payer: MEDICARE

## 2024-01-02 DIAGNOSIS — Z94.0 KIDNEY REPLACED BY TRANSPLANT: ICD-10-CM

## 2024-01-02 DIAGNOSIS — Z94.0 KIDNEY TRANSPLANTED: ICD-10-CM

## 2024-01-02 LAB
ANION GAP SERPL CALCULATED.3IONS-SCNC: 10 MMOL/L (ref 7–15)
BASOPHILS # BLD AUTO: 0.1 10E3/UL (ref 0–0.2)
BASOPHILS NFR BLD AUTO: 1 %
BUN SERPL-MCNC: 23.1 MG/DL (ref 6–20)
CALCIUM SERPL-MCNC: 9.2 MG/DL (ref 8.6–10)
CHLORIDE SERPL-SCNC: 104 MMOL/L (ref 98–107)
CREAT SERPL-MCNC: 1.49 MG/DL (ref 0.67–1.17)
DEPRECATED HCO3 PLAS-SCNC: 22 MMOL/L (ref 22–29)
EGFRCR SERPLBLD CKD-EPI 2021: 61 ML/MIN/1.73M2
EOSINOPHIL # BLD AUTO: 0.6 10E3/UL (ref 0–0.7)
EOSINOPHIL NFR BLD AUTO: 9 %
ERYTHROCYTE [DISTWIDTH] IN BLOOD BY AUTOMATED COUNT: 13.1 % (ref 10–15)
GLUCOSE SERPL-MCNC: 88 MG/DL (ref 70–99)
HCT VFR BLD AUTO: 34.2 % (ref 40–53)
HGB BLD-MCNC: 11.7 G/DL (ref 13.3–17.7)
IMM GRANULOCYTES # BLD: 0 10E3/UL
IMM GRANULOCYTES NFR BLD: 0 %
LYMPHOCYTES # BLD AUTO: 1.3 10E3/UL (ref 0.8–5.3)
LYMPHOCYTES NFR BLD AUTO: 20 %
MCH RBC QN AUTO: 31.4 PG (ref 26.5–33)
MCHC RBC AUTO-ENTMCNC: 34.2 G/DL (ref 31.5–36.5)
MCV RBC AUTO: 92 FL (ref 78–100)
MONOCYTES # BLD AUTO: 0.5 10E3/UL (ref 0–1.3)
MONOCYTES NFR BLD AUTO: 8 %
NEUTROPHILS # BLD AUTO: 4.1 10E3/UL (ref 1.6–8.3)
NEUTROPHILS NFR BLD AUTO: 62 %
NRBC # BLD AUTO: 0 10E3/UL
NRBC BLD AUTO-RTO: 0 /100
PLATELET # BLD AUTO: 284 10E3/UL (ref 150–450)
POTASSIUM SERPL-SCNC: 4.2 MMOL/L (ref 3.4–5.3)
RBC # BLD AUTO: 3.73 10E6/UL (ref 4.4–5.9)
SODIUM SERPL-SCNC: 136 MMOL/L (ref 135–145)
TACROLIMUS BLD-MCNC: 7.4 UG/L (ref 5–15)
TME LAST DOSE: NORMAL H
TME LAST DOSE: NORMAL H
WBC # BLD AUTO: 6.6 10E3/UL (ref 4–11)

## 2024-01-02 PROCEDURE — 80048 BASIC METABOLIC PNL TOTAL CA: CPT

## 2024-01-02 PROCEDURE — 87799 DETECT AGENT NOS DNA QUANT: CPT

## 2024-01-02 PROCEDURE — 85025 COMPLETE CBC W/AUTO DIFF WBC: CPT

## 2024-01-02 PROCEDURE — 36415 COLL VENOUS BLD VENIPUNCTURE: CPT

## 2024-01-02 PROCEDURE — 80197 ASSAY OF TACROLIMUS: CPT

## 2024-01-03 ENCOUNTER — MYC MEDICAL ADVICE (OUTPATIENT)
Dept: TRANSPLANT | Facility: CLINIC | Age: 39
End: 2024-01-03
Payer: MEDICARE

## 2024-01-03 LAB
BKV DNA # SPEC NAA+PROBE: NOT DETECTED COPIES/ML
CMV DNA SPEC NAA+PROBE-ACNC: NOT DETECTED IU/ML

## 2024-01-05 ENCOUNTER — TELEPHONE (OUTPATIENT)
Dept: TRANSPLANT | Facility: CLINIC | Age: 39
End: 2024-01-05
Payer: MEDICARE

## 2024-01-11 ENCOUNTER — TELEPHONE (OUTPATIENT)
Dept: UROLOGY | Facility: CLINIC | Age: 39
End: 2024-01-11
Payer: MEDICARE

## 2024-01-11 NOTE — TELEPHONE ENCOUNTER
Spoke with pt to schedule yearly follow up, pt advised not needed. Will call to schedule if follow up is needed with urology

## 2024-01-12 NOTE — TELEPHONE ENCOUNTER
Updated  lab orders in EPIC    Plan to continue labs every 2 weeks then change to monthly after appointment  with   Dr Georgie Powers  Jan 2024

## 2024-01-15 ENCOUNTER — LAB (OUTPATIENT)
Dept: LAB | Facility: CLINIC | Age: 39
End: 2024-01-15
Payer: MEDICARE

## 2024-01-15 DIAGNOSIS — Z94.0 KIDNEY TRANSPLANTED: ICD-10-CM

## 2024-01-15 LAB
ANION GAP SERPL CALCULATED.3IONS-SCNC: 10 MMOL/L (ref 7–15)
BASOPHILS # BLD AUTO: 0.1 10E3/UL (ref 0–0.2)
BASOPHILS NFR BLD AUTO: 1 %
BUN SERPL-MCNC: 21.2 MG/DL (ref 6–20)
CALCIUM SERPL-MCNC: 9.3 MG/DL (ref 8.6–10)
CHLORIDE SERPL-SCNC: 104 MMOL/L (ref 98–107)
CREAT SERPL-MCNC: 1.36 MG/DL (ref 0.67–1.17)
DEPRECATED HCO3 PLAS-SCNC: 22 MMOL/L (ref 22–29)
EGFRCR SERPLBLD CKD-EPI 2021: 68 ML/MIN/1.73M2
EOSINOPHIL # BLD AUTO: 0.4 10E3/UL (ref 0–0.7)
EOSINOPHIL NFR BLD AUTO: 6 %
ERYTHROCYTE [DISTWIDTH] IN BLOOD BY AUTOMATED COUNT: 12.7 % (ref 10–15)
GLUCOSE SERPL-MCNC: 91 MG/DL (ref 70–99)
HCT VFR BLD AUTO: 34.3 % (ref 40–53)
HGB BLD-MCNC: 11.7 G/DL (ref 13.3–17.7)
IMM GRANULOCYTES # BLD: 0 10E3/UL
IMM GRANULOCYTES NFR BLD: 0 %
LYMPHOCYTES # BLD AUTO: 1.2 10E3/UL (ref 0.8–5.3)
LYMPHOCYTES NFR BLD AUTO: 19 %
MCH RBC QN AUTO: 31.5 PG (ref 26.5–33)
MCHC RBC AUTO-ENTMCNC: 34.1 G/DL (ref 31.5–36.5)
MCV RBC AUTO: 93 FL (ref 78–100)
MONOCYTES # BLD AUTO: 0.5 10E3/UL (ref 0–1.3)
MONOCYTES NFR BLD AUTO: 7 %
NEUTROPHILS # BLD AUTO: 4.3 10E3/UL (ref 1.6–8.3)
NEUTROPHILS NFR BLD AUTO: 67 %
NRBC # BLD AUTO: 0 10E3/UL
NRBC BLD AUTO-RTO: 0 /100
PLATELET # BLD AUTO: 260 10E3/UL (ref 150–450)
POTASSIUM SERPL-SCNC: 4.4 MMOL/L (ref 3.4–5.3)
RBC # BLD AUTO: 3.71 10E6/UL (ref 4.4–5.9)
SODIUM SERPL-SCNC: 136 MMOL/L (ref 135–145)
TACROLIMUS BLD-MCNC: 7.9 UG/L (ref 5–15)
TME LAST DOSE: NORMAL H
TME LAST DOSE: NORMAL H
WBC # BLD AUTO: 6.5 10E3/UL (ref 4–11)

## 2024-01-15 PROCEDURE — 36415 COLL VENOUS BLD VENIPUNCTURE: CPT

## 2024-01-15 PROCEDURE — 85025 COMPLETE CBC W/AUTO DIFF WBC: CPT

## 2024-01-15 PROCEDURE — 80048 BASIC METABOLIC PNL TOTAL CA: CPT

## 2024-01-15 PROCEDURE — 80197 ASSAY OF TACROLIMUS: CPT

## 2024-01-16 ENCOUNTER — TELEPHONE (OUTPATIENT)
Dept: INFECTIOUS DISEASES | Facility: CLINIC | Age: 39
End: 2024-01-16
Payer: MEDICARE

## 2024-01-16 ENCOUNTER — MYC MEDICAL ADVICE (OUTPATIENT)
Dept: TRANSPLANT | Facility: CLINIC | Age: 39
End: 2024-01-16
Payer: MEDICARE

## 2024-01-16 ENCOUNTER — TELEPHONE (OUTPATIENT)
Dept: TRANSPLANT | Facility: CLINIC | Age: 39
End: 2024-01-16
Payer: MEDICARE

## 2024-01-16 DIAGNOSIS — Z94.0 KIDNEY REPLACED BY TRANSPLANT: Primary | ICD-10-CM

## 2024-01-16 LAB — CMV DNA SPEC NAA+PROBE-ACNC: NOT DETECTED IU/ML

## 2024-01-16 RX ORDER — TACROLIMUS 1 MG/1
2 CAPSULE ORAL 2 TIMES DAILY
Qty: 120 CAPSULE | Refills: 11 | Status: SHIPPED | OUTPATIENT
Start: 2024-01-16

## 2024-01-16 RX ORDER — TACROLIMUS 0.5 MG/1
0.5 CAPSULE ORAL 2 TIMES DAILY
Qty: 60 CAPSULE | Refills: 11 | Status: SHIPPED | OUTPATIENT
Start: 2024-01-16

## 2024-01-16 ASSESSMENT — ENCOUNTER SYMPTOMS: NEW SYMPTOMS OF CORONARY ARTERY DISEASE: 0

## 2024-01-16 NOTE — TELEPHONE ENCOUNTER
Issue tacrolimus  level 7.9 above goal level  one year post kidney transplant            PLAN:   Call Patientand confirm this was an accurate 24-hour trough.   Verify Tacrolimus IR dose 3.0 mg BID.   Confirm no new medications or illness.   Confirm no missed doses.   If accurate trough and accurate dose, decrease Tacrolimus IR dose to 2.5 mg BID     Is this more than a 50% increase or decrease in current IS dose: No        Repeat labs in 2 weeks.

## 2024-01-16 NOTE — TELEPHONE ENCOUNTER
[employer] message sent to patient regarding:  tacrolimus  level 7.9 above goal level  one year post kidney transplant             PLAN:   Call Patientand confirm this was an accurate 24-hour trough.   Verify Tacrolimus IR dose 3.0 mg BID.   Confirm no new medications or illness.   Confirm no missed doses.   If accurate trough and accurate dose, decrease Tacrolimus IR dose to 2.5 mg BID      Is this more than a 50% increase or decrease in current IS dose: No         Repeat labs in 2 weeks.

## 2024-01-16 NOTE — TELEPHONE ENCOUNTER
Patient confirmed this was an accurate 12-hour trough.   Verified Tacrolimus IR dose 3.0 mg BID.   Confirmed no new medications or illness.   Confirmed no missed doses.   Patient confirms decrease Tacrolimus IR dose to 2.5 mg BID and repeat labs in 2 weeks.

## 2024-01-18 ENCOUNTER — MYC MEDICAL ADVICE (OUTPATIENT)
Dept: TRANSPLANT | Facility: CLINIC | Age: 39
End: 2024-01-18
Payer: MEDICARE

## 2024-01-18 ENCOUNTER — TELEPHONE (OUTPATIENT)
Dept: PHARMACY | Facility: CLINIC | Age: 39
End: 2024-01-18

## 2024-01-18 ENCOUNTER — TELEPHONE (OUTPATIENT)
Dept: TRANSPLANT | Facility: CLINIC | Age: 39
End: 2024-01-18
Payer: MEDICARE

## 2024-01-18 DIAGNOSIS — Z94.0 KIDNEY REPLACED BY TRANSPLANT: Primary | ICD-10-CM

## 2024-01-18 NOTE — TELEPHONE ENCOUNTER
Clinical Pharmacy Consult:                                                      Transplant Specific: 12 Month Post Transplant Call  Date of Transplant: 1/15/2023  Type of Transplant: kidney  First Transplant: yes  History of rejection: no    Immunosuppression Regimen   TAC 2.5 mg qAM & 2.5 mg qPM and  mg qAM & 540 mg qPM  Patient specific goal: 4-6  Most recent level: 7.9 on 01/15/2024  Immunosuppressant Levels:  Supratherapeutic, dose decreased  Pt adherent to lab draws: yes  Scr:   Lab Results   Component Value Date    CR 1.36 01/15/2024    CR 1.49 01/02/2024    CR 1.65 02/24/2023    CR 6.20 12/03/2020     Side effects: none    Prophylactic Medications  Antibacterial:  Bactrim 400-80 QD  Scheduled Discontinue Date: Lifelong    Antifungal: Not needed thus far  Scheduled Discontinue Date: N/A    Antiviral: CrCl 25 to 39 mL/minute: Discontinued Valcyte  Scheduled Discontinue Date: 6 months    Acid Reducer: Discontinued pantoprazole  Scheduled Reviewed Date:  N/A    Thrombosis Prevention: Not on   Scheduled Discontinue Date:  N/A    Blood Pressure Management  Frequency of home Blood Pressure checks: once daily  Most recent home BP: 150/95 while standing, retried at 135/93. Sitting at 124/88  Patient Blood pressure goal: <140/90  Patient blood pressure at goal:  Yes    Hospitalizations/ER visits since last assessment: 1 for suspected UTI and 1 for C. difficile infection    Med rec/DUR performed: yes  Med Rec Discrepancies: yes, Chip states he is taking Sodium bicarbonate 2 tabs once daily, not twice daily.    Spoke to Chip via phone. He states he is overall doing well. He noted a recent urgent care visit on 12/25/2023 due to a suspected UTI. Pt was administered IM Rocephin in clinic and was discharged with Augentin twice daily for 7 days. Care was followed up by ID and he was later instructed not to take the Augmentin as his culture was not consistent with UTI. He also noted he has finished taking several  "weeks of vancomycin for a C. Difficile infection reported on 10/10/2023.    Patient noted he usually takes his blood pressure multiple times standing up. He thinks by taking them standing up that he will get a more \"accurate\" result since he is up and walking around all day at work. His most recent BP was 150/95 for his initial check, but when he checked it again a few minutes later it dropped to 135/93. Chip states this is normal for him to check multiple times and for the results to drop like that. Encouraged patient to take blood pressure sitting down just like they do in the doctor's office.    Discussed med list. The only discrepancy was he noted he takes sodium bicarbonate 650mg tabs, 2 tabs once daily. His most recent prescription states 2 tabs twice daily. Tacrolimus was recently reduced from 3 mg twice daily to 2.5 mg twice daily due to a supratherapeutic tacrolimus trough level. He denies any missed doses and still uses phone reminders, pill box, and medication card.    Informed Chip we will check in with him again in one year. Patient had no additional concerns or questions for me.    Time Spent: 20 minutes    Tyrell Samuel, Theresa  [facility]  [address] [place] [id number]  Specialty - [phone]  Transplant - [phone]  Mail - [phone]      "

## 2024-01-18 NOTE — TELEPHONE ENCOUNTER
Franko Cerda MD Huepfel, Veronica SWAN RN  Stop valcyte now as he is on ppx dose already. Check CmV PCR weekly x3 weeks. If negative, check q2 weeks x2, then go to monthly labs. Change bicarb to 650mg bid.          Franko Cerda MD    Stop valcyte now  Check CmV PCR weekly x3 weeks. If negative, check q2 weeks x2, then go to monthly labs. Sodium  bicarb to 650mg twice per day

## 2024-01-22 ENCOUNTER — LAB (OUTPATIENT)
Dept: LAB | Facility: CLINIC | Age: 39
End: 2024-01-22
Payer: MEDICARE

## 2024-01-22 DIAGNOSIS — Z48.298 AFTERCARE FOLLOWING ORGAN TRANSPLANT: ICD-10-CM

## 2024-01-22 DIAGNOSIS — Z94.0 KIDNEY REPLACED BY TRANSPLANT: ICD-10-CM

## 2024-01-22 DIAGNOSIS — Z79.899 ENCOUNTER FOR LONG-TERM CURRENT USE OF MEDICATION: ICD-10-CM

## 2024-01-22 DIAGNOSIS — Z20.828 CONTACT WITH AND (SUSPECTED) EXPOSURE TO OTHER VIRAL COMMUNICABLE DISEASES: ICD-10-CM

## 2024-01-22 DIAGNOSIS — Z94.0 KIDNEY TRANSPLANTED: ICD-10-CM

## 2024-01-22 LAB
CHOLEST SERPL-MCNC: 107 MG/DL
FASTING STATUS PATIENT QL REPORTED: YES
HDLC SERPL-MCNC: 45 MG/DL
LDLC SERPL CALC-MCNC: 51 MG/DL
NONHDLC SERPL-MCNC: 62 MG/DL
TRIGL SERPL-MCNC: 54 MG/DL

## 2024-01-22 PROCEDURE — 36415 COLL VENOUS BLD VENIPUNCTURE: CPT

## 2024-01-22 PROCEDURE — 80061 LIPID PANEL: CPT

## 2024-01-23 ENCOUNTER — VIRTUAL VISIT (OUTPATIENT)
Dept: TRANSPLANT | Facility: CLINIC | Age: 39
End: 2024-01-23
Attending: INTERNAL MEDICINE
Payer: MEDICARE

## 2024-01-23 VITALS — WEIGHT: 152 LBS | BODY MASS INDEX: 20.61 KG/M2

## 2024-01-23 DIAGNOSIS — Z94.0 KIDNEY REPLACED BY TRANSPLANT: ICD-10-CM

## 2024-01-23 DIAGNOSIS — Z94.0 HTN, KIDNEY TRANSPLANT RELATED: ICD-10-CM

## 2024-01-23 DIAGNOSIS — Z29.89 NEED FOR PNEUMOCYSTIS PROPHYLAXIS: ICD-10-CM

## 2024-01-23 DIAGNOSIS — I15.1 HTN, KIDNEY TRANSPLANT RELATED: ICD-10-CM

## 2024-01-23 DIAGNOSIS — E87.20 METABOLIC ACIDOSIS: ICD-10-CM

## 2024-01-23 DIAGNOSIS — D84.9 IMMUNOSUPPRESSION (H): ICD-10-CM

## 2024-01-23 DIAGNOSIS — N18.31 STAGE 3A CHRONIC KIDNEY DISEASE (H): ICD-10-CM

## 2024-01-23 DIAGNOSIS — Z48.298 AFTERCARE FOLLOWING ORGAN TRANSPLANT: Primary | ICD-10-CM

## 2024-01-23 DIAGNOSIS — N18.31 ANEMIA IN STAGE 3A CHRONIC KIDNEY DISEASE (H): ICD-10-CM

## 2024-01-23 DIAGNOSIS — B25.9 CYTOMEGALOVIRUS INFECTION, UNSPECIFIED CYTOMEGALOVIRAL INFECTION TYPE (H): ICD-10-CM

## 2024-01-23 DIAGNOSIS — D63.1 ANEMIA IN STAGE 3A CHRONIC KIDNEY DISEASE (H): ICD-10-CM

## 2024-01-23 DIAGNOSIS — B27.00 EBV (EPSTEIN-BARR VIRUS) VIREMIA: ICD-10-CM

## 2024-01-23 LAB — CMV DNA SPEC NAA+PROBE-ACNC: NOT DETECTED IU/ML

## 2024-01-23 PROCEDURE — 99214 OFFICE O/P EST MOD 30 MIN: CPT | Mod: 95 | Performed by: INTERNAL MEDICINE

## 2024-01-23 PROCEDURE — G2211 COMPLEX E/M VISIT ADD ON: HCPCS | Mod: 95 | Performed by: INTERNAL MEDICINE

## 2024-01-23 ASSESSMENT — PAIN SCALES - GENERAL: PAINLEVEL: NO PAIN (0)

## 2024-01-23 NOTE — NURSING NOTE
Is the patient currently in the state of [place]? YES    Visit mode:VIDEO    If the visit is dropped, the patient can be reconnected by: VIDEO VISIT: Text to cell phone:   Telephone Information:   Mobile [phone]       Will anyone else be joining the visit? NO  (If patient encounters technical issues they should call [phone]    How would you like to obtain your AVS? MyChart    Are changes needed to the allergy or medication list? No    Reason for visit: Video Visit (Recheck)    Devi MONTES

## 2024-01-23 NOTE — PROGRESS NOTES
TRANSPLANT NEPHROLOGY CHRONIC POST TRANSPLANT VISIT    Assessment & Plan   # DDKT: stable              - Baseline Creatinine: ~ 1.4-1.6              - Proteinuria: Normal (<0.2 grams)              - Date DSA Last Checked: May/2023      Latest DSA: No     cPRA: 49%              - BK Viremia: No              - Kidney Tx Biopsy: No              - Transplant Ureteral Stent: Removed     # Immunosuppression: Tacrolimus immediate release (goal 6-8) and Mycophenolic acid (dose 540 mg every 12 hours)               - Induction with Recent Transplant:  Intermediate Intensity              - Continue with intensive monitoring of immunosuppression for efficacy and toxicity.              - Changes: yes reduce FK goal to 4-6 now at 1 yr post Tx     # Infection Prophylaxis:   - PJP: Sulfa/TMP (Bactrim)  - CMV: low grade CMV viremia, completed Valganciclovir (Valcyte); Patient is CMV IgG Ab discordant (D+/R-) and can now stop Valcyte being 6 months post transplant.  Will check CMV PCR monthly until 12 months post transplant.     - recurrent UTIs: cefdinir    # CMV viremia:   - peak ~4 K copies/ml in Sep 2023 cleared in  Dec 2023   - completed induction dose valcyte then maintenance, last dose ~1/16,2024   - weekly CMV pcr till neg x 2    # Blood Pressure: Controlled;          Goal BP: < 130/80              - Volume status: Euvolemic              - Changes: Not at this time     # Anemia in Chronic Renal Disease: Hgb: Stable      SARA: No              - Iron studies: Low iron saturation, but high ferritin     # Mineral Bone Disorder:   - Secondary renal hyperparathyroidism; PTH level: Minimally elevated ( pg/ml)        On treatment: None  - Vitamin D; level: Low        On supplement: Yes  - Calcium; level: Normal        On supplement: No     # Electrolytes:   - Potassium; level: Normal        On supplement: No  - Magnesium; level: Stable low        On supplement: Yes  - Bicarbonate; level: Normal        On supplement: Yes; sodium  bicarbonate 1300 mg bid.     # H/o Cardiomyopathy: Normal LVEF at 55-60% with last cardiac echo 11/2023     # Recurrent UTI/Pyelonephritis: asymptomatic at present. cystoscopy 7/2023 unremarkable, seen by urology. He was previously on nitrofurantoin for prophylaxis then switched to fosfomycin. Currently on cefdinir 300 mg po every day  per TID recs.     # Urethral Stricture: Patient is s/p buccal urethroplasty and diverticulum excision 12/2020. Now s/p cystoscopy 7/2023 due to recurrent UTIs and this was unremarkable.  Follows with Urology.      # Recurrent Cdiff: completed vanc po taper Nov 2023. Asymptomatic at present    # EBV Viremia: Minimal EBV PCR at ~ 1200, likely of no clinical significance.     # Diarrhea: resolved after Cdiff Tx Nov 2023     # H/o Polysubstance Abuse: Patient with h/o methamphetamine and alcohol abuse.  Now sober.     # Medical Compliance: Yes     # Health Maintenance and Vaccination Review: recommend COVID Flu      # Transplant History:  Etiology of Kidney Failure: Solitary congenital kidney and h/o urethral calculus and urologic issues  Tx: DDKT  Transplant: 1/15/2023 (Kidney)  Donor Type: Donation after Circulatory Death  Donor Class:   Crossmatch at time of Tx: negative  DSA at time of Tx: No  Significant changes in immunosuppression: None  CMV IgG Ab High Risk Discordance (D+/R-): Yes  EBV IgG Ab High Risk Discordance (D+/R-): No  Significant transplant-related complications: None    Transplant Office Phone Number: [phone]    Assessment and plan was discussed with the patient and he voiced his understanding and agreement.    Return visit: 6 months    Georgie Herrera MD    Chief Complaint   Mr. Fowler is a 38 year old here for kidney transplant, immunosuppression management    History of Present Illness     Feels good overall  +URI symptoms past few weeks nasal congestion, no cough, no fevers/chills, no shortness of breath  Remains on cefdinir ppx 300 mg po every day, denies any  UTI sx's  No diarrhea completely resolved since completing Cdiff Tx with po vanc taper in Nov 2023  He just came off prophylactic valcyte about a week ago, CMV viremia has cleared since Nov 2023    IS: Fk 2.5/2.5 /540  Ppx: bactrim  Home BP: 130-140s/70-80s    Problem List   Patient Active Problem List   Diagnosis    HTN, kidney transplant related    Urethral stricture    Methamphetamine abuse, episodic (H)    Urethral diverticulum    Allergic rhinitis, unspecified seasonality, unspecified trigger    Stage 3a chronic kidney disease (H)    Kidney replaced by transplant    Immunosuppressed status (H24)    Aftercare following organ transplant    Need for pneumocystis prophylaxis    Anemia in chronic renal disease    Secondary renal hyperparathyroidism (H24)    Vitamin D deficiency    Hypomagnesemia    Diarrhea    Urinary tract infection without hematuria, site unspecified    Metabolic acidosis    EBV (Aaron-Barr virus) viremia    Pyelonephritis of transplanted kidney    CMV (cytomegalovirus infection) (H)    Neutropenia (H24)       Allergies   No Known Allergies    Medications   Current Outpatient Medications   Medication Sig    cefdinir (OMNICEF) 300 MG capsule Take 1 capsule (300 mg) by mouth daily for 90 days    magnesium oxide (MAG-OX) 400 MG tablet Take 2 tablets (800 mg) by mouth 2 times daily    mycophenolic acid (GENERIC EQUIVALENT) 180 MG EC tablet Take 3 tablets (540 mg) by mouth 2 times daily    psyllium (METAMUCIL/KONSYL) 58.6 % powder Take 18 g (1 Tablespoonful) by mouth 2 times daily as needed (Diarrhea)    sodium bicarbonate 650 MG tablet Take 2 tablets (1,300 mg) by mouth 2 times daily    sulfamethoxazole-trimethoprim (BACTRIM) 400-80 MG tablet Take 1 tablet by mouth daily    tacrolimus (GENERIC) 0.5 MG capsule Take 1 capsule (0.5 mg) by mouth 2 times daily Total dose = 2.5 mg twice per day    tacrolimus (GENERIC) 1 MG capsule Take 2 capsules (2 mg) by mouth 2 times daily Total dose = 2.5 mg  twice per day    valGANciclovir (VALCYTE) 450 MG tablet Take 2 tablets (900 mg) by mouth daily    vancomycin (VANCOCIN) 125 MG capsule Take 1 capsule (125 mg) by mouth 2 times daily    vancomycin (VANCOCIN) 125 MG capsule Vanco  14d of treatment,125 mg QID  then would taper slowly as follows: 125mg q8 x1 week, q12 x 1 week, daily x1 week, every other day x2 weeks    Vitamin D, Cholecalciferol, 50 MCG (2000 UT) CAPS Take 2,000 Units by mouth daily     No current facility-administered medications for this visit.     There are no discontinued medications.    Physical Exam   Vital Signs: Wt 68.9 kg (152 lb)   BMI 20.61 kg/m      GENERAL: alert and no distress  EYES: Eyes grossly normal to inspection.  No discharge or erythema, or obvious scleral/conjunctival abnormalities.  RESP: No audible wheeze, cough, or visible cyanosis.    SKIN: Visible skin clear. No significant rash, abnormal pigmentation or lesions.  NEURO: Cranial nerves grossly intact.  Mentation and speech appropriate for age.  PSYCH: Appropriate affect, tone, and pace of words    Access none previously on PD      Data         Latest Ref Rng & Units 1/15/2024     3:06 PM 1/2/2024     3:06 PM 12/26/2023     3:06 PM   Renal   Sodium 135 - 145 mmol/L 136  136  136    K 3.4 - 5.3 mmol/L 4.4  4.2  4.0    Cl 98 - 107 mmol/L 104  104  101    Cl (external) 98 - 107 mmol/L 104  104  101    CO2 22 - 29 mmol/L 22  22  22    Urea Nitrogen 6.0 - 20.0 mg/dL 21.2  23.1  24.8    Creatinine 0.67 - 1.17 mg/dL 1.36  1.49  1.55    Glucose 70 - 99 mg/dL 91  88  74    Calcium 8.6 - 10.0 mg/dL 9.3  9.2  9.1          Latest Ref Rng & Units 8/22/2023     2:48 PM 7/10/2023     2:52 PM 7/8/2023     6:05 AM   Bone Health   Phosphorus 2.5 - 4.5 mg/dL 2.6  2.7  3.7          Latest Ref Rng & Units 1/15/2024     3:06 PM 1/2/2024     3:06 PM 12/26/2023     3:06 PM   Heme   WBC 4.0 - 11.0 10e3/uL 6.5  6.6  6.9    Hgb 13.3 - 17.7 g/dL 11.7  11.7  12.2    Plt 150 - 450 10e3/uL 260  284  324           Latest Ref Rng & Units 7/24/2023     3:19 PM 7/4/2023     7:44 PM 6/5/2023     3:27 PM   Liver   AP 40 - 129 U/L 59  53  55    TBili <=1.2 mg/dL 0.3  0.4  0.3    Bilirubin Direct 0.00 - 0.30 mg/dL <0.20      ALT 0 - 70 U/L 20  14  22    AST 0 - 45 U/L 29  27  24    Tot Protein 6.4 - 8.3 g/dL 7.2  7.4  6.9    Albumin 3.5 - 5.2 g/dL 4.3  4.2  4.0          Latest Ref Rng & Units 7/24/2023     3:19 PM 3/19/2023     9:55 PM 1/15/2023     5:40 AM   Pancreas   A1C <5.7 % 5.5   5.2    Lipase (Roche) 13 - 60 U/L  20           Latest Ref Rng & Units 1/31/2023    10:49 AM 1/20/2023     7:46 AM   Iron studies   Iron 61 - 157 ug/dL 70  153    Iron Sat Index 15 - 46 % 27  63    Ferritin 31 - 409 ng/mL 430  717          12/11/2023     2:43 PM 12/4/2023     2:30 PM 11/27/2023     3:13 PM   [facility] Txp Virology   LOG IU/ML OF CMVQNT <1.5  <1.5  2.0        Recent Labs   Lab Test 12/26/23  1506 01/02/24  1506 01/15/24  1506   DOSTAC 12/26/2023 1/2/2024 1/15/2024   TACROL 7.7 7.4 7.9     Recent Labs   Lab Test 10/02/23  1503 10/09/23  1440 10/16/23  1505   DOSMPA 10/2/2023   3:50 AM 10/9/2023   3:20 AM 10/16/2023   3:20 AM   MPACID 0.85* 0.88* <0.25*   MPAG 23.0* 11.9* 12.2*     The longitudinal plan of care for Chip Fowler was addressed during this visit. Due to the added complexity in care, I will continue to support Chip in the subsequent management of this condition(s) and with the ongoing continuity of care of this condition(s).

## 2024-01-23 NOTE — PROGRESS NOTES
Virtual Visit Details    Type of service:  Video Visit   Video Start Time: 2:41  Video End Time:2:56    Originating Location (pt. Location): Home    Distant Location (provider location):  On-site  Platform used for Video Visit: Chela

## 2024-01-23 NOTE — LETTER
1/23/2024         RE: Chip Fowler  [address] [facility] [address]        Dear Colleague,    Thank you for referring your patient, Chip Fowler, to the [facility]. Please see a copy of my visit note below.    Virtual Visit Details    Type of service:  Video Visit   Video Start Time: 2:41  Video End Time:2:56    Originating Location (pt. Location): Home    Distant Location (provider location):  On-site  Platform used for Video Visit: [facility]    TRANSPLANT NEPHROLOGY CHRONIC POST TRANSPLANT VISIT    Assessment & Plan  # DDKT: stable              - Baseline Creatinine: ~ 1.4-1.6              - Proteinuria: Normal (<0.2 grams)              - Date DSA Last Checked: May/2023      Latest DSA: No     cPRA: 49%              - BK Viremia: No              - Kidney Tx Biopsy: No              - Transplant Ureteral Stent: Removed     # Immunosuppression: Tacrolimus immediate release (goal 6-8) and Mycophenolic acid (dose 540 mg every 12 hours)               - Induction with Recent Transplant:  Intermediate Intensity              - Continue with intensive monitoring of immunosuppression for efficacy and toxicity.              - Changes: yes reduce FK goal to 4-6 now at 1 yr post Tx     # Infection Prophylaxis:   - PJP: Sulfa/TMP (Bactrim)  - CMV: low grade CMV viremia, completed Valganciclovir (Valcyte); Patient is CMV IgG Ab discordant (D+/R-) and can now stop Valcyte being 6 months post transplant.  Will check CMV PCR monthly until 12 months post transplant.     - recurrent UTIs: cefdinir    # CMV viremia:   - peak ~4 K copies/ml in Sep 2023 cleared in  Dec 2023   - completed induction dose valcyte then maintenance, last dose ~1/16,2024   - weekly CMV pcr till neg x 2    # Blood Pressure: Controlled;          Goal BP: < 130/80              - Volume status: Euvolemic              - Changes: Not at this time     # Anemia in Chronic Renal Disease: Hgb: Stable      SARA: No              - Iron  studies: Low iron saturation, but high ferritin     # Mineral Bone Disorder:   - Secondary renal hyperparathyroidism; PTH level: Minimally elevated ( pg/ml)        On treatment: None  - Vitamin D; level: Low        On supplement: Yes  - Calcium; level: Normal        On supplement: No     # Electrolytes:   - Potassium; level: Normal        On supplement: No  - Magnesium; level: Stable low        On supplement: Yes  - Bicarbonate; level: Normal        On supplement: Yes; sodium bicarbonate 1300 mg bid.     # H/o Cardiomyopathy: Normal LVEF at 55-60% with last cardiac echo 11/2023     # Recurrent UTI/Pyelonephritis: asymptomatic at present. cystoscopy 7/2023 unremarkable, seen by urology. He was previously on nitrofurantoin for prophylaxis then switched to fosfomycin. Currently on cefdinir 300 mg po every day  per TID recs.     # Urethral Stricture: Patient is s/p buccal urethroplasty and diverticulum excision 12/2020. Now s/p cystoscopy 7/2023 due to recurrent UTIs and this was unremarkable.  Follows with Urology.      # Recurrent Cdiff: completed vanc po taper Nov 2023. Asymptomatic at present    # EBV Viremia: Minimal EBV PCR at ~ 1200, likely of no clinical significance.     # Diarrhea: resolved after Cdiff Tx Nov 2023     # H/o Polysubstance Abuse: Patient with h/o methamphetamine and alcohol abuse.  Now sober.     # Medical Compliance: Yes     # Health Maintenance and Vaccination Review: recommend COVID Flu      # Transplant History:  Etiology of Kidney Failure: Solitary congenital kidney and h/o urethral calculus and urologic issues  Tx: DDKT  Transplant: 1/15/2023 (Kidney)  Donor Type: Donation after Circulatory Death  Donor Class:   Crossmatch at time of Tx: negative  DSA at time of Tx: No  Significant changes in immunosuppression: None  CMV IgG Ab High Risk Discordance (D+/R-): Yes  EBV IgG Ab High Risk Discordance (D+/R-): No  Significant transplant-related complications: None    Transplant Office  Phone Number: [phone]    Assessment and plan was discussed with the patient and he voiced his understanding and agreement.    Return visit: 6 months    Georgie Herrera MD    Chief Complaint  Mr. Fowler is a 38 year old here for kidney transplant, immunosuppression management    History of Present Illness    Feels good overall  +URI symptoms past few weeks nasal congestion, no cough, no fevers/chills, no shortness of breath  Remains on cefdinir ppx 300 mg po every day, denies any UTI sx's  No diarrhea completely resolved since completing Cdiff Tx with po vanc taper in Nov 2023  He just came off prophylactic valcyte about a week ago, CMV viremia has cleared since Nov 2023    IS: Fk 2.5/2.5 /540  Ppx: bactrim  Home BP: 130-140s/70-80s    Problem List  Patient Active Problem List   Diagnosis     HTN, kidney transplant related     Urethral stricture     Methamphetamine abuse, episodic (H)     Urethral diverticulum     Allergic rhinitis, unspecified seasonality, unspecified trigger     Stage 3a chronic kidney disease (H)     Kidney replaced by transplant     Immunosuppressed status (H24)     Aftercare following organ transplant     Need for pneumocystis prophylaxis     Anemia in chronic renal disease     Secondary renal hyperparathyroidism (H24)     Vitamin D deficiency     Hypomagnesemia     Diarrhea     Urinary tract infection without hematuria, site unspecified     Metabolic acidosis     EBV (Aaron-Barr virus) viremia     Pyelonephritis of transplanted kidney     CMV (cytomegalovirus infection) (H)     Neutropenia (H24)       Allergies  No Known Allergies    Medications  Current Outpatient Medications   Medication Sig     cefdinir (OMNICEF) 300 MG capsule Take 1 capsule (300 mg) by mouth daily for 90 days     magnesium oxide (MAG-OX) 400 MG tablet Take 2 tablets (800 mg) by mouth 2 times daily     mycophenolic acid (GENERIC EQUIVALENT) 180 MG EC tablet Take 3 tablets (540 mg) by mouth 2 times daily      psyllium (METAMUCIL/KONSYL) 58.6 % powder Take 18 g (1 Tablespoonful) by mouth 2 times daily as needed (Diarrhea)     sodium bicarbonate 650 MG tablet Take 2 tablets (1,300 mg) by mouth 2 times daily     sulfamethoxazole-trimethoprim (BACTRIM) 400-80 MG tablet Take 1 tablet by mouth daily     tacrolimus (GENERIC) 0.5 MG capsule Take 1 capsule (0.5 mg) by mouth 2 times daily Total dose = 2.5 mg twice per day     tacrolimus (GENERIC) 1 MG capsule Take 2 capsules (2 mg) by mouth 2 times daily Total dose = 2.5 mg twice per day     valGANciclovir (VALCYTE) 450 MG tablet Take 2 tablets (900 mg) by mouth daily     vancomycin (VANCOCIN) 125 MG capsule Take 1 capsule (125 mg) by mouth 2 times daily     vancomycin (VANCOCIN) 125 MG capsule Vanco  14d of treatment,125 mg QID  then would taper slowly as follows: 125mg q8 x1 week, q12 x 1 week, daily x1 week, every other day x2 weeks     Vitamin D, Cholecalciferol, 50 MCG (2000 UT) CAPS Take 2,000 Units by mouth daily     No current facility-administered medications for this visit.     There are no discontinued medications.    Physical Exam  Vital Signs: Wt 68.9 kg (152 lb)   BMI 20.61 kg/m      GENERAL: alert and no distress  EYES: Eyes grossly normal to inspection.  No discharge or erythema, or obvious scleral/conjunctival abnormalities.  RESP: No audible wheeze, cough, or visible cyanosis.    SKIN: Visible skin clear. No significant rash, abnormal pigmentation or lesions.  NEURO: Cranial nerves grossly intact.  Mentation and speech appropriate for age.  PSYCH: Appropriate affect, tone, and pace of words    Access none previously on PD      Data        Latest Ref Rng & Units 1/15/2024     3:06 PM 1/2/2024     3:06 PM 12/26/2023     3:06 PM   Renal   Sodium 135 - 145 mmol/L 136  136  136    K 3.4 - 5.3 mmol/L 4.4  4.2  4.0    Cl 98 - 107 mmol/L 104  104  101    Cl (external) 98 - 107 mmol/L 104  104  101    CO2 22 - 29 mmol/L 22  22  22    Urea Nitrogen 6.0 - 20.0 mg/dL 21.2   23.1  24.8    Creatinine 0.67 - 1.17 mg/dL 1.36  1.49  1.55    Glucose 70 - 99 mg/dL 91  88  74    Calcium 8.6 - 10.0 mg/dL 9.3  9.2  9.1          Latest Ref Rng & Units 8/22/2023     2:48 PM 7/10/2023     2:52 PM 7/8/2023     6:05 AM   Bone Health   Phosphorus 2.5 - 4.5 mg/dL 2.6  2.7  3.7          Latest Ref Rng & Units 1/15/2024     3:06 PM 1/2/2024     3:06 PM 12/26/2023     3:06 PM   Heme   WBC 4.0 - 11.0 10e3/uL 6.5  6.6  6.9    Hgb 13.3 - 17.7 g/dL 11.7  11.7  12.2    Plt 150 - 450 10e3/uL 260  284  324          Latest Ref Rng & Units 7/24/2023     3:19 PM 7/4/2023     7:44 PM 6/5/2023     3:27 PM   Liver   AP 40 - 129 U/L 59  53  55    TBili <=1.2 mg/dL 0.3  0.4  0.3    Bilirubin Direct 0.00 - 0.30 mg/dL <0.20      ALT 0 - 70 U/L 20  14  22    AST 0 - 45 U/L 29  27  24    Tot Protein 6.4 - 8.3 g/dL 7.2  7.4  6.9    Albumin 3.5 - 5.2 g/dL 4.3  4.2  4.0          Latest Ref Rng & Units 7/24/2023     3:19 PM 3/19/2023     9:55 PM 1/15/2023     5:40 AM   Pancreas   A1C <5.7 % 5.5   5.2    Lipase (Roche) 13 - 60 U/L  20           Latest Ref Rng & Units 1/31/2023    10:49 AM 1/20/2023     7:46 AM   Iron studies   Iron 61 - 157 ug/dL 70  153    Iron Sat Index 15 - 46 % 27  63    Ferritin 31 - 409 ng/mL 430  717          12/11/2023     2:43 PM 12/4/2023     2:30 PM 11/27/2023     3:13 PM   [facility] Txp Virology   LOG IU/ML OF CMVQNT <1.5  <1.5  2.0        Recent Labs   Lab Test 12/26/23  1506 01/02/24  1506 01/15/24  1506   DOSTAC 12/26/2023 1/2/2024 1/15/2024   TACROL 7.7 7.4 7.9     Recent Labs   Lab Test 10/02/23  1503 10/09/23  1440 10/16/23  1505   DOSMPA 10/2/2023   3:50 AM 10/9/2023   3:20 AM 10/16/2023   3:20 AM   MPACID 0.85* 0.88* <0.25*   MPAG 23.0* 11.9* 12.2*     The longitudinal plan of care for Chip Fowler was addressed during this visit. Due to the added complexity in care, I will continue to support Chip in the subsequent management of this condition(s) and with the ongoing continuity of care of this  condition(s).      Again, thank you for allowing me to participate in the care of your patient.        Sincerely,        Georgie Herrera MD

## 2024-01-29 ENCOUNTER — LAB (OUTPATIENT)
Dept: LAB | Facility: CLINIC | Age: 39
End: 2024-01-29
Payer: MEDICARE

## 2024-01-29 DIAGNOSIS — Z94.0 KIDNEY TRANSPLANTED: ICD-10-CM

## 2024-01-29 DIAGNOSIS — Z94.0 KIDNEY REPLACED BY TRANSPLANT: ICD-10-CM

## 2024-01-29 LAB
ANION GAP SERPL CALCULATED.3IONS-SCNC: 7 MMOL/L (ref 7–15)
BASOPHILS # BLD AUTO: 0.1 10E3/UL (ref 0–0.2)
BASOPHILS NFR BLD AUTO: 1 %
BUN SERPL-MCNC: 22.5 MG/DL (ref 6–20)
CALCIUM SERPL-MCNC: 8.9 MG/DL (ref 8.6–10)
CHLORIDE SERPL-SCNC: 101 MMOL/L (ref 98–107)
CREAT SERPL-MCNC: 1.51 MG/DL (ref 0.67–1.17)
DEPRECATED HCO3 PLAS-SCNC: 25 MMOL/L (ref 22–29)
EGFRCR SERPLBLD CKD-EPI 2021: 60 ML/MIN/1.73M2
EOSINOPHIL # BLD AUTO: 0.5 10E3/UL (ref 0–0.7)
EOSINOPHIL NFR BLD AUTO: 5 %
ERYTHROCYTE [DISTWIDTH] IN BLOOD BY AUTOMATED COUNT: 12.3 % (ref 10–15)
GLUCOSE SERPL-MCNC: 71 MG/DL (ref 70–99)
HCT VFR BLD AUTO: 32 % (ref 40–53)
HGB BLD-MCNC: 10.9 G/DL (ref 13.3–17.7)
IMM GRANULOCYTES # BLD: 0 10E3/UL
IMM GRANULOCYTES NFR BLD: 1 %
LYMPHOCYTES # BLD AUTO: 1.2 10E3/UL (ref 0.8–5.3)
LYMPHOCYTES NFR BLD AUTO: 14 %
MCH RBC QN AUTO: 31.7 PG (ref 26.5–33)
MCHC RBC AUTO-ENTMCNC: 34.1 G/DL (ref 31.5–36.5)
MCV RBC AUTO: 93 FL (ref 78–100)
MONOCYTES # BLD AUTO: 0.9 10E3/UL (ref 0–1.3)
MONOCYTES NFR BLD AUTO: 11 %
NEUTROPHILS # BLD AUTO: 5.9 10E3/UL (ref 1.6–8.3)
NEUTROPHILS NFR BLD AUTO: 68 %
NRBC # BLD AUTO: 0 10E3/UL
NRBC BLD AUTO-RTO: 0 /100
PLATELET # BLD AUTO: 278 10E3/UL (ref 150–450)
POTASSIUM SERPL-SCNC: 4.6 MMOL/L (ref 3.4–5.3)
RBC # BLD AUTO: 3.44 10E6/UL (ref 4.4–5.9)
SODIUM SERPL-SCNC: 133 MMOL/L (ref 135–145)
TACROLIMUS BLD-MCNC: 5.3 UG/L (ref 5–15)
TME LAST DOSE: NORMAL H
TME LAST DOSE: NORMAL H
WBC # BLD AUTO: 8.6 10E3/UL (ref 4–11)

## 2024-01-29 PROCEDURE — 85025 COMPLETE CBC W/AUTO DIFF WBC: CPT

## 2024-01-29 PROCEDURE — 36415 COLL VENOUS BLD VENIPUNCTURE: CPT

## 2024-01-29 PROCEDURE — 80197 ASSAY OF TACROLIMUS: CPT

## 2024-01-29 PROCEDURE — 80048 BASIC METABOLIC PNL TOTAL CA: CPT

## 2024-01-30 LAB — CMV DNA SPEC NAA+PROBE-ACNC: NOT DETECTED IU/ML

## 2024-02-12 ENCOUNTER — LAB (OUTPATIENT)
Dept: LAB | Facility: CLINIC | Age: 39
End: 2024-02-12
Payer: MEDICARE

## 2024-02-12 DIAGNOSIS — Z94.0 KIDNEY TRANSPLANTED: ICD-10-CM

## 2024-02-12 DIAGNOSIS — Z94.0 KIDNEY REPLACED BY TRANSPLANT: ICD-10-CM

## 2024-02-12 LAB
ANION GAP SERPL CALCULATED.3IONS-SCNC: 9 MMOL/L (ref 7–15)
BASOPHILS # BLD AUTO: 0.1 10E3/UL (ref 0–0.2)
BASOPHILS NFR BLD AUTO: 1 %
BUN SERPL-MCNC: 22 MG/DL (ref 6–20)
CALCIUM SERPL-MCNC: 9.4 MG/DL (ref 8.6–10)
CHLORIDE SERPL-SCNC: 102 MMOL/L (ref 98–107)
CREAT SERPL-MCNC: 1.47 MG/DL (ref 0.67–1.17)
DEPRECATED HCO3 PLAS-SCNC: 26 MMOL/L (ref 22–29)
EGFRCR SERPLBLD CKD-EPI 2021: 62 ML/MIN/1.73M2
EOSINOPHIL # BLD AUTO: 0.6 10E3/UL (ref 0–0.7)
EOSINOPHIL NFR BLD AUTO: 10 %
ERYTHROCYTE [DISTWIDTH] IN BLOOD BY AUTOMATED COUNT: 12.4 % (ref 10–15)
GLUCOSE SERPL-MCNC: 78 MG/DL (ref 70–99)
HCT VFR BLD AUTO: 36.7 % (ref 40–53)
HGB BLD-MCNC: 12.1 G/DL (ref 13.3–17.7)
IMM GRANULOCYTES # BLD: 0 10E3/UL
IMM GRANULOCYTES NFR BLD: 0 %
LYMPHOCYTES # BLD AUTO: 1.3 10E3/UL (ref 0.8–5.3)
LYMPHOCYTES NFR BLD AUTO: 22 %
MCH RBC QN AUTO: 30.5 PG (ref 26.5–33)
MCHC RBC AUTO-ENTMCNC: 33 G/DL (ref 31.5–36.5)
MCV RBC AUTO: 92 FL (ref 78–100)
MONOCYTES # BLD AUTO: 0.7 10E3/UL (ref 0–1.3)
MONOCYTES NFR BLD AUTO: 11 %
NEUTROPHILS # BLD AUTO: 3.4 10E3/UL (ref 1.6–8.3)
NEUTROPHILS NFR BLD AUTO: 56 %
NRBC # BLD AUTO: 0 10E3/UL
NRBC BLD AUTO-RTO: 0 /100
PLATELET # BLD AUTO: 274 10E3/UL (ref 150–450)
POTASSIUM SERPL-SCNC: 4.4 MMOL/L (ref 3.4–5.3)
RBC # BLD AUTO: 3.97 10E6/UL (ref 4.4–5.9)
SODIUM SERPL-SCNC: 137 MMOL/L (ref 135–145)
TACROLIMUS BLD-MCNC: 4.9 UG/L (ref 5–15)
TME LAST DOSE: ABNORMAL H
TME LAST DOSE: ABNORMAL H
WBC # BLD AUTO: 6.1 10E3/UL (ref 4–11)

## 2024-02-12 PROCEDURE — 80048 BASIC METABOLIC PNL TOTAL CA: CPT

## 2024-02-12 PROCEDURE — 36415 COLL VENOUS BLD VENIPUNCTURE: CPT

## 2024-02-12 PROCEDURE — 87799 DETECT AGENT NOS DNA QUANT: CPT

## 2024-02-12 PROCEDURE — 80197 ASSAY OF TACROLIMUS: CPT

## 2024-02-12 PROCEDURE — 85025 COMPLETE CBC W/AUTO DIFF WBC: CPT

## 2024-02-13 LAB
BK VIRUS SPECIMEN TYPE: NORMAL
BKV DNA # SPEC NAA+PROBE: NOT DETECTED IU/ML
CMV DNA SPEC NAA+PROBE-ACNC: NOT DETECTED IU/ML

## 2024-02-15 ENCOUNTER — MYC MEDICAL ADVICE (OUTPATIENT)
Dept: INFECTIOUS DISEASES | Facility: CLINIC | Age: 39
End: 2024-02-15
Payer: MEDICARE

## 2024-02-15 DIAGNOSIS — Z94.0 KIDNEY TRANSPLANTED: ICD-10-CM

## 2024-02-16 RX ORDER — CEFDINIR 300 MG/1
300 CAPSULE ORAL DAILY
Qty: 90 CAPSULE | Refills: 1 | Status: SHIPPED | OUTPATIENT
Start: 2024-02-16 | End: 2024-03-25

## 2024-02-16 NOTE — TELEPHONE ENCOUNTER
Per [facility] Ambulatory Care Protocol, Pt is due for refill based on last refill date.   Pt has seen provider within the past year, or has appt scheduled with provider.     Med refill script E-sent to pt's pharmacy.    Signed Prescriptions:                        Disp   Refills    cefdinir (OMNICEF) 300 MG capsule          90 cap*1        Sig: Take 1 capsule (300 mg) by mouth daily  Authorizing Provider: MCKINLEY BRANDT  Ordering User: SUSI GUTIERREZ RN  Infectious Disease 02/16/24 7:06 AM

## 2024-03-04 ENCOUNTER — LAB (OUTPATIENT)
Dept: LAB | Facility: CLINIC | Age: 39
End: 2024-03-04
Payer: MEDICARE

## 2024-03-04 DIAGNOSIS — Z94.0 KIDNEY TRANSPLANTED: ICD-10-CM

## 2024-03-04 DIAGNOSIS — Z94.0 KIDNEY REPLACED BY TRANSPLANT: ICD-10-CM

## 2024-03-04 LAB
ANION GAP SERPL CALCULATED.3IONS-SCNC: 10 MMOL/L (ref 7–15)
BASOPHILS # BLD AUTO: 0.1 10E3/UL (ref 0–0.2)
BASOPHILS NFR BLD AUTO: 1 %
BUN SERPL-MCNC: 27 MG/DL (ref 6–20)
CALCIUM SERPL-MCNC: 9.1 MG/DL (ref 8.6–10)
CHLORIDE SERPL-SCNC: 101 MMOL/L (ref 98–107)
CREAT SERPL-MCNC: 1.52 MG/DL (ref 0.67–1.17)
DEPRECATED HCO3 PLAS-SCNC: 25 MMOL/L (ref 22–29)
EGFRCR SERPLBLD CKD-EPI 2021: 59 ML/MIN/1.73M2
EOSINOPHIL # BLD AUTO: 0.6 10E3/UL (ref 0–0.7)
EOSINOPHIL NFR BLD AUTO: 8 %
ERYTHROCYTE [DISTWIDTH] IN BLOOD BY AUTOMATED COUNT: 12.2 % (ref 10–15)
GLUCOSE SERPL-MCNC: 80 MG/DL (ref 70–99)
HCT VFR BLD AUTO: 34.2 % (ref 40–53)
HGB BLD-MCNC: 11.5 G/DL (ref 13.3–17.7)
IMM GRANULOCYTES # BLD: 0 10E3/UL
IMM GRANULOCYTES NFR BLD: 0 %
LYMPHOCYTES # BLD AUTO: 1.3 10E3/UL (ref 0.8–5.3)
LYMPHOCYTES NFR BLD AUTO: 18 %
MCH RBC QN AUTO: 29.9 PG (ref 26.5–33)
MCHC RBC AUTO-ENTMCNC: 33.6 G/DL (ref 31.5–36.5)
MCV RBC AUTO: 89 FL (ref 78–100)
MONOCYTES # BLD AUTO: 0.6 10E3/UL (ref 0–1.3)
MONOCYTES NFR BLD AUTO: 8 %
NEUTROPHILS # BLD AUTO: 4.6 10E3/UL (ref 1.6–8.3)
NEUTROPHILS NFR BLD AUTO: 65 %
NRBC # BLD AUTO: 0 10E3/UL
NRBC BLD AUTO-RTO: 0 /100
PLATELET # BLD AUTO: 227 10E3/UL (ref 150–450)
POTASSIUM SERPL-SCNC: 4.3 MMOL/L (ref 3.4–5.3)
RBC # BLD AUTO: 3.85 10E6/UL (ref 4.4–5.9)
SODIUM SERPL-SCNC: 136 MMOL/L (ref 135–145)
TACROLIMUS BLD-MCNC: 6.6 UG/L (ref 5–15)
TME LAST DOSE: NORMAL H
TME LAST DOSE: NORMAL H
WBC # BLD AUTO: 7.2 10E3/UL (ref 4–11)

## 2024-03-04 PROCEDURE — 36415 COLL VENOUS BLD VENIPUNCTURE: CPT

## 2024-03-04 PROCEDURE — 80048 BASIC METABOLIC PNL TOTAL CA: CPT

## 2024-03-04 PROCEDURE — 85004 AUTOMATED DIFF WBC COUNT: CPT

## 2024-03-04 PROCEDURE — 80197 ASSAY OF TACROLIMUS: CPT

## 2024-03-04 PROCEDURE — 86833 HLA CLASS II HIGH DEFIN QUAL: CPT

## 2024-03-04 PROCEDURE — 86832 HLA CLASS I HIGH DEFIN QUAL: CPT

## 2024-03-05 LAB — CMV DNA SPEC NAA+PROBE-ACNC: NOT DETECTED IU/ML

## 2024-03-08 ENCOUNTER — TELEPHONE (OUTPATIENT)
Dept: INFECTIOUS DISEASES | Facility: CLINIC | Age: 39
End: 2024-03-08
Payer: MEDICARE

## 2024-03-08 NOTE — TELEPHONE ENCOUNTER
Patient Contacted for the patient to call back and schedule the following:    Appointment type: Return Infectious Disease (Video)  Provider: Dr. Davenport  Return date: March 25th, 2024  Specialty phone number: [phone]  Additional appointment(s) needed: NA  Additonal Notes: Rescheduled from 3/11 as provider unavailable

## 2024-03-14 LAB
DONOR IDENTIFICATION: NORMAL
DSA COMMENTS: NORMAL
DSA PRESENT: NO
DSA TEST METHOD: NORMAL
ORGAN: NORMAL
SA 1 CELL: NORMAL
SA 1 TEST METHOD: NORMAL
SA 2 CELL: NORMAL
SA 2 TEST METHOD: NORMAL
SA1 HI RISK ABY: NORMAL
SA1 MOD RISK ABY: NORMAL
SA2 HI RISK ABY: NORMAL
SA2 MOD RISK ABY: NORMAL
UNACCEPTABLE ANTIGENS: NORMAL
UNOS CPRA: 64
ZZZSA 1  COMMENTS: NORMAL
ZZZSA 2 COMMENTS: NORMAL

## 2024-03-25 ENCOUNTER — VIRTUAL VISIT (OUTPATIENT)
Dept: INFECTIOUS DISEASES | Facility: CLINIC | Age: 39
End: 2024-03-25
Attending: STUDENT IN AN ORGANIZED HEALTH CARE EDUCATION/TRAINING PROGRAM
Payer: MEDICARE

## 2024-03-25 DIAGNOSIS — B25.9 CYTOMEGALOVIRUS INFECTION, UNSPECIFIED CYTOMEGALOVIRAL INFECTION TYPE (H): ICD-10-CM

## 2024-03-25 DIAGNOSIS — Z94.0 KIDNEY TRANSPLANTED: ICD-10-CM

## 2024-03-25 DIAGNOSIS — A04.72 C. DIFFICILE ENTERITIS: ICD-10-CM

## 2024-03-25 DIAGNOSIS — N39.0 RECURRENT UTI: Primary | ICD-10-CM

## 2024-03-25 PROCEDURE — 99215 OFFICE O/P EST HI 40 MIN: CPT | Mod: 95 | Performed by: STUDENT IN AN ORGANIZED HEALTH CARE EDUCATION/TRAINING PROGRAM

## 2024-03-25 RX ORDER — CEFDINIR 300 MG/1
300 CAPSULE ORAL DAILY
Qty: 90 CAPSULE | Refills: 1 | Status: SHIPPED | OUTPATIENT
Start: 2024-03-25 | End: 2024-09-09

## 2024-03-25 NOTE — NURSING NOTE
Is the patient currently in the state of [place]? YES    Visit mode:VIDEO    If the visit is dropped, the patient can be reconnected by: VIDEO VISIT: Text to cell phone:   Telephone Information:   Mobile [phone]       Will anyone else be joining the visit? NO  (If patient encounters technical issues they should call [phone]    How would you like to obtain your AVS? MyChart    Are changes needed to the allergy or medication list? No    Reason for visit: BHARATH Rojas, CMA

## 2024-03-25 NOTE — PROGRESS NOTES
Virtual Visit Details  Type of service:  Video Visit   Video Start Time: 4:28 PM  Video End Time:4:51 PM  Originating Location (pt. Location): Home    Distant Location (provider location):  On-site  Platform used for Video Visit: [facility]  Transplant Infectious Disease Clinic Note:  Virtual Follow up Patient     Patient:  Chip Fowler, Date of birth 1985, Medical record number [id number]  Date of Visit:  03/25/2024         Assessment and Recommendations:   Recommendations:  Continue Cefdinir 300mg PO daily for UTI prophylaxis - refilled for 6 months. Will discuss in September 2024 whether he can come off the Cefdinir and be monitored  Given need for suppressive antibiotics and 2 episodes of C.diff with higher risk for recurrence with cefdinir compared to Nitrofurantoin, discussed management with patient who preferred continuing Cefdinir and monitoring for recurrent diarrhea/ C.diff. Any additional C.diff recurrences should prompt change in management  Monitor off Valcyte - repeat CMV testing for symptoms. 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 Renal US patent) on Tacrolium (goal level 8-10) and Myfortic 540 mg BID. ID issues include    #Recurrent UTIs with Ecoli and E. Faecalis on suppression with Cefdinir:  The UTI is mostly with E faecalis since 1/2023 except infection in 4/2023 with E faecalis and E coli. Admitted on 7/4/23 for pyelonephritis UC + 10-50k E faecalis he was given Augmentin for 10 and started on  prophy nitrofurantoin 100mg daily after. Latest E.coli infection 9/2023. Most infections related to sexual intercourse. Given latest E.coli UTI (on 9/24/23) broke through despite Nitrofurantoin prophylaxis, treated with cefdinir and switched to the same for prophylaxis after at 300mg daily dosing. 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 On 8/22/23 it was 2,300 (started to have a fever but was checked per protocol) started on valcyte on 8/23/23. 9/5/23 it was 4580 and down to undetectable VL on 9/25/23. Secondary ppx through 10/24/23. Low level recurrence on 11/20 - restarted Valcyte 900mg BID 11/20, remained on maintenance through 1/16/24. No recurrence since, latest VL undetectable 3/6/24      #C.diff:  Reported diarrhea 10/10/23. C.diff testing triple positive (Toxin, GDH and PCR). Started Vancomycin 125mg PO q6h x 14 days. Recurrent diarrhea early November, tested positive 11/6/23. Restarted Vancomycin PO with prolonged taper. No recurrences since    #low level EBV Viremia   7/6/23 CT chest Lymph nodes: Multiple sub centimeter para-aortic lymph nodes. At last check it was 8/22/23 <500     Prior issues:  1. Eosinophilia. The workup for eosinophilia and diarrhea was negative for fungal and parasitic infections that would account for the eosinophilia. Evaluated by hematology. The eosinophilia is likely reactive to foreign objects; the PD catheter was thought to be potentially the source as noted by hematology. The relatively increased tryptase level favors allergy (likely to PD catheter) to be the etiology of the eosinophilia rather than infectious processes or malignancies.   2. Chronic diarrhea since he was  started on PD in 7/2020. Workup has been negative.   3. Positive Salmonella serology. 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 The patient was counseled against keeping the snakes after transplantation as they are source of recurrent Salmonella infection.  4. E coli in urine in 8/2020.   5. Group B Strep in urine in 2/2020.   6. Urethral stenosis s/p reconstruction.      Other Infectious Disease issues include:  - QTc: 416 as of 3/23/23.   - PJP prophylaxis: bactrim.   - Serostatus: CMV D+/R- (s/p 6mo of valcye), EBV D+/R-, HSV1?/2?, VZV +  - Immunization status: This patient received the third dose of the COVID-19 vaccine on 1/26/2022. Otherwise due for the seasonal influenza vaccine and COVID-19 bivalent     Interval history :   Last seen in ID clinic 12/2023    1) CMV viremia: Was on maintenance Valcyte through 1/16/24. Last detectable CMV VL was on 11/27/23 (at 105 international unit(s)/ml). Since then, VL <35 or undetectable, most recently 3/4/24    2) C.diff: Last tested positive 11/6/23. Received prolonged PO Vanco taper. No recurrences since, no diarrhea since    3) Recurrent UTI: Prescribed Cefdinir by Dr Willis (300mg/d) remains on the same. No UTIs since 9/2023. 12/25/23 urine Cx <10k urogenital cooper    Patient does note having some urinary symptoms ~ few hours to a day every week, but they resolve soon by themselves. Attributes these to difference in water intake over the weekend when he sleeps more    Transplants:  1/15/2023 (Kidney); Postoperative day:  435.  Coordinator Veronica Edgar    Review of Systems:  Remaining systems reviewed and negative    Immunization History   Administered Date(s) Administered    COVID-19 Monovalent 18+ (Moderna) 01/25/2021, 02/22/2021, 01/26/2022    DTAP (<7y) 1984, 1984,  1984, 10/04/1985, 03/21/1989    Flu, Unspecified 11/12/1997, 03/11/2020, 10/06/2020, 10/27/2021    Hepatitis B, Peds 02/19/1996, 06/17/1996, 09/04/1996    Hib, Unspecified 04/02/1986    Historical DTP/aP 1984, 1984, 1984, 10/04/1985, 03/21/1989    Influenza (intradermal) 03/11/2020    Influenza Vaccine >6 months,quad, PF 11/06/2016    MMR 07/03/1985, 02/19/1996    Mantoux Tuberculin Skin Test 03/11/2020    Pneumo Conj 13-V (2010&after) 11/24/2020    Pneumococcal 23 valent 03/11/2020    Pneumococcal, Unspecified 03/11/2020, 04/22/2021    Poliovirus, inactivated (IPV) 1984, 1984, 1984, 03/21/1989    TD,PF 7+ (Tenivac) 06/17/1996    TDAP Vaccine (Adacel) 04/28/2020    Td (Adult), Adsorbed 06/17/1996       No outpatient medications have been marked as taking for the 3/25/24 encounter (Appointment) with Brennan Davenport MD.       No Known Allergies           Physical Exam:   There were no vitals taken for this visit.  Wt Readings from Last 4 Encounters:   01/23/24 68.9 kg (152 lb)   12/25/23 69.4 kg (153 lb)   11/29/23 73.3 kg (161 lb 8 oz)   11/14/23 71.9 kg (158 lb 8 oz)       Exam:  GENERAL: well-developed, well-nourished, alert, oriented, in no acute distress over video.  HEAD: Head is normocephalic, atraumatic   EYES: Eyes have anicteric sclerae.    NEUROLOGIC: Grossly nonfocal.         Laboratory Data:     Absolute CD4   Date Value Ref Range Status   12/03/2020 1,325 441 - 2,156 cells/uL Final       Inflammatory Markers    Recent Labs   Lab Test 01/20/23  0746   G6PD 15.5       Immune Globulin Studies     Recent Labs   Lab Test 11/27/23  1512 08/28/23  1453 12/03/20  1647   IGG 1,187 1,004 1,152   IGM  --   --  64   IGE  --   --  18   IGA  --   --  268       Metabolic Studies    Recent Labs   Lab Test 03/04/24  1459 02/12/24  1458 01/29/24  1442 08/28/23  1453 08/22/23  1448 07/31/23  1512 07/24/23  1519 07/06/23  1807 07/06/23  0933 07/05/23  1304 07/04/23  1944   NA  136 137 133*   < > 137   < > 139   < >  --    < > 133*   POTASSIUM 4.3 4.4 4.6   < > 4.2   < > 4.4   < >  --    < > 4.0   CHLORIDE 101 102 101   < > 105   < > 104   < >  --    < > 102   CO2 25 26 25   < > 23   < > 24   < >  --    < > 17*   ANIONGAP 10 9 7   < > 9   < > 11   < >  --    < > 14   BUN 27.0* 22.0* 22.5*   < > 20.6*   < > 23.4*   < >  --    < > 26.1*   CR 1.52* 1.47* 1.51*   < > 1.50*   < > 1.65*   < >  --    < > 1.86*   GFRESTIMATED 59* 62 60*   < > 60*   < > 54*   < >  --    < > 47*   GLC 80 78 71   < > 72   < > 68*   < >  --    < > 139*   VISHNU 9.1 9.4 8.9   < > 9.1   < > 9.7   < >  --    < > 9.5   PHOS  --   --   --   --  2.6  --   --    < >  --    < >  --    MAG  --   --   --   --  1.5*  --   --    < >  --    < >  --    URIC  --   --   --   --   --   --  5.5  --   --   --   --    LACT  --   --   --   --   --   --   --   --  0.9   < > 1.5   CKT  --   --   --   --   --   --   --   --   --   --  88    < > = values in this interval not displayed.       Hepatic Studies    Recent Labs   Lab Test 07/24/23  1519 07/04/23  1944 06/05/23  1527 06/01/23  1518   BILITOTAL 0.3 0.4 0.3 0.3   DBIL <0.20  --   --  <0.20   ALKPHOS 59 53 55 52   PROTTOTAL 7.2 7.4 6.9 6.9   ALBUMIN 4.3 4.2 4.0 4.0   AST 29 27 24 28   ALT 20 14 22 30   LDH  --   --   --  367*     Hematology Studies   Recent Labs   Lab Test 03/04/24  1459 02/12/24  1458 01/29/24  1442 01/15/24  1506 10/23/23  1445 10/16/23  1505 10/09/23  1440   WBC 7.2 6.1 8.6 6.5   < > 3.2* 3.0*   ANEU  --   --   --   --   --  1.2* 1.6   ANEUTAUTO 4.6 3.4 5.9 4.3   < >  --   --    ALYM  --   --   --   --   --  1.1 0.8   ALYMPAUTO 1.3 1.3 1.2 1.2   < >  --   --    TY  --   --   --   --   --  0.4 0.3   AMONOAUTO 0.6 0.7 0.9 0.5   < >  --   --    AEOS  --   --   --   --   --  0.4 0.2   AEOSAUTO 0.6 0.6 0.5 0.4   < >  --   --    ABSBASO 0.1 0.1 0.1 0.1   < >  --   --    HGB 11.5* 12.1* 10.9* 11.7*   < > 11.6* 11.7*   HCT 34.2* 36.7* 32.0* 34.3*   < > 34.7* 35.4*     274 278 260   < > 214 309    < > = values in this interval not displayed.       Clotting Studies    Recent Labs   Lab Test 07/04/23  1944 01/15/23  0540 08/09/21  1124 08/18/20  1307   INR 1.29* 0.90 0.96 1.08   PTT  --  28  --  29     Urine Studies     Recent Labs   Lab Test 12/25/23  1506 12/11/23  1437 09/24/23  1133 07/04/23  2015 06/12/23  1514   URINEPH 7.0 6.0 7.0 6.0 6.0   NITRITE Negative Negative Negative Negative Negative   LEUKEST Moderate* Small* Small* Negative Negative   WBCU 25-50* 6* 10-25* 11* 7*       Medication levels    Recent Labs   Lab Test 03/04/24  1459 10/23/23  1445 10/16/23  1505 07/10/23  1452 07/06/23  0547   VANCOMYCIN  --   --   --   --  16.7   TACROL 6.6   < > 6.4   < > 10.3   MPACID  --   --  <0.25*   < >  --    MPAG  --   --  12.2*   < >  --     < > = values in this interval not displayed.       Microbiology:  Fungal testing  Recent Labs   Lab Test 12/03/20  1647 10/08/20  1300   AM3 <0.10  --    HIFUNG  --  <1:8   COFUNG  --  <1:2   FUNBL  --  0.3     Last Culture results   Culture   Date Value Ref Range Status   12/25/2023 <10,000 CFU/mL Urogenital cooper  Final   09/24/2023 10,000-50,000 CFU/mL Escherichia coli (A)  Final   09/24/2023 <10,000 CFU/mL Urogenital cooper  Final   07/04/2023 10,000-50,000 CFU/mL Enterococcus faecalis (A)  Final   07/04/2023 <10,000 CFU/mL Urogenital cooper  Final   07/04/2023 No Growth  Final   07/04/2023 No Growth  Final   05/04/2023 No Growth  Final   05/04/2023 No Growth  Final   05/04/2023 >100,000 CFU/mL Escherichia coli (A)  Final   04/15/2023 50,000-100,000 CFU/mL Escherichia coli (A)  Final   04/15/2023 10,000-50,000 CFU/mL Enterococcus faecalis (A)  Final   03/20/2023 No Growth  Final   03/20/2023 No Growth  Final   03/19/2023 50,000-100,000 CFU/mL Enterococcus faecalis (A)  Final   03/19/2023 <10,000 CFU/mL Urogenital cooper  Final   01/23/2023 50,000-100,000 CFU/mL Enterococcus faecalis (A)  Final     Comment:     Susceptibilities done on  previous cultures   01/19/2023 >100,000 CFU/mL Enterococcus faecalis (A)  Final     Culture Micro   Date Value Ref Range Status   12/03/2020 No growth  Final   08/14/2020 >100,000 colonies/mL  Escherichia coli   (A)  Final         Last checks of Clostridioides difficile testing  Recent Labs   Lab Test 11/06/23  1852 10/10/23  1600 07/05/23  1318 05/04/23 2010   CDBPCT Positive* Positive* Negative Negative   CDIFFGDH Positive* Positive*  --   --    CDIFFTOX Positive* Positive*  --   --        Quantiferon testing   Recent Labs   Lab Test 03/04/24  1459 02/12/24  1458 09/15/20  1347 08/18/20  1307   TBRST  --   --   --  Negative   LYMPH 18 22   < > 23.9    < > = values in this interval not displayed.       Infection Studies to assess Diarrhea  Recent Labs   Lab Test 07/05/23  1318 05/04/23 2010 03/22/23  2257 03/20/23  0853 12/09/20  2000 10/08/20  1339 10/08/20  1338   EPSTX1 Not Detected Not Detected  --  Not Detected   < > Not Detected  --    EPSTX2 Not Detected Not Detected  --  Not Detected   < > Not Detected  --    ADENOVIRUSAG  --   --   --   --   --   --  Negative   MSPORT  --   --   --   --   --   --  No Microsporidia found  Chromotrope stain reveals no Microsporidia spores in fecal specimen. Consider other causes   for symptoms.  Divya Lugo M.D., Medical Director  10/9/20     CRYSPT  --   --   --   --   --  No oocysts of Cryptosporidium species, Cyclospora cayetanensis, or Cystoisospora   (Isospora) nancy found    A modified acid fast stain reveals no oocysts of Cryptosporidium, Cyclospora, or   Cystoisospora (Isospora). Consider other causes for symptoms  Divya Lugo M.D., Medical Director  10/9/20    --    CRYPTR  --   --   --   --   --  Negative  --    POPRT  --   --  Negative  --    < > Routine parasitology exam negative  Cryptosporidium, Cyclospora, and Microsporidia are not readily detected by this method. A   single negative specimen does not rule out parasitic infection.    --     EPCAMP Not Detected Not Detected  --  Not Detected   < > Not Detected  --    EPSALM Not Detected Not Detected  --  Not Detected   < > Not Detected  --    EPSHGL Not Detected Not Detected  --  Not Detected   < > Not Detected  --    EPVIB Not Detected Not Detected  --  Not Detected   < > Not Detected  --    EPROTA Not Detected Not Detected  --  Not Detected   < > Not Detected  --    EPNORO Not Detected Not Detected  --  Not Detected   < > Not Detected  --    EPYER Not Detected Not Detected  --  Not Detected   < > Not Detected  --     < > = values in this interval not displayed.       Virology:  Coronavirus-19 testing    Recent Labs   Lab Test 07/04/23  1952 03/19/23  1919 01/15/23  0538 03/21/22  1911 10/30/21  0816 09/08/21  1400 08/07/21  1057 05/26/21  1134 12/10/20  0959 12/03/20  1647   CD19  --   --   --   --   --   --   --   --   --  17   ACD19  --   --   --   --   --   --   --   --   --  532   [id number] Negative Negative Negative Negative  --   --    < >  --  Not Detected  --    [id number]  --   --   --   --   --   --   --   --  Nasopharyngeal  --    COVIDPCREXT  --   --   --   --  Not Detected Not Detected  --  Not Detected  --   --     < > = values in this interval not displayed.       Respiratory virus (non-coronavirus-19) testing    No lab results found.    CMV viral loads    CMV DNA IU/mL   Date Value Ref Range Status   03/04/2024 Not Detected Not Detected IU/mL Final   02/12/2024 Not Detected Not Detected IU/mL Final   01/29/2024 Not Detected Not Detected IU/mL Final   01/22/2024 Not Detected Not Detected IU/mL Final   01/15/2024 Not Detected Not Detected IU/mL Final   01/02/2024 Not Detected Not Detected IU/mL Final   12/26/2023 Not Detected Not Detected IU/mL Final   12/18/2023 Not Detected Not Detected IU/mL Final   12/11/2023 <35 (A) Not Detected IU/mL Final     Comment:     CMV DNA detected, less than 35 IU/mL   07/17/2023 Not Detected Not Detected IU/mL Final   07/05/2023 Not Detected Not  Detected IU/mL Final   06/15/2023 Not Detected Not Detected IU/mL Final   05/25/2023 Not Detected Not Detected IU/mL Final   05/04/2023 Not Detected Not Detected IU/mL Final   03/20/2023 Not Detected Not Detected IU/mL Final   03/06/2023 Not Detected Not Detected IU/mL Final   01/15/2023 Not Detected Not Detected IU/mL Final     CMV DNA IU/mL, Instrument   Date Value Ref Range Status   11/27/2023 105 (H) <1 IU/mL Final   11/20/2023 648 (H) <1 IU/mL Final   09/18/2023 61 (H) <1 IU/mL Final   09/11/2023 468 (H) <1 IU/mL Final   09/05/2023 4,580 (H) <1 IU/mL Final   08/22/2023 2,340 (H) <1 IU/mL Final     CMV log   Date Value Ref Range Status   12/11/2023 <1.5  Final   12/04/2023 <1.5  Final   11/27/2023 2.0  Final   11/20/2023 2.8  Final   10/16/2023 <1.5  Final   10/09/2023 <1.5  Final   09/18/2023 1.8  Final   09/11/2023 2.7  Final   09/05/2023 3.7  Final   08/22/2023 3.4  Final       EBV DNA Copies/mL   Date Value Ref Range Status   09/25/2023 Not Detected Not Detected copies/mL Final   08/22/2023 <500 (A) Not Detected copies/mL Final     Comment:     EBV DNA Detected below the reportable range of 500 copies/mL   07/04/2023 <500 (A) Not Detected copies/mL Final     Comment:     EBV DNA Detected below the reportable range of 500 copies/mL   05/04/2023 Not Detected Not Detected copies/mL Final   03/20/2023 Not Detected Not Detected copies/mL Final       BK viral loads   Recent Labs   Lab Test 02/12/24  1458 01/02/24  1506 11/20/23  1454 10/23/23  1445 09/25/23  1541 08/28/23  1453 08/22/23  1448 07/24/23  1509 07/05/23  1203   BKRES Not Detected Not Detected Not Detected Not Detected Not Detected Not Detected Not Detected Not Detected Not Detected       Hepatitis B Testing     Recent Labs   Lab Test 01/15/23  0540 08/18/20  1307   AUSAB 134.22 212.08*   HBCAB Nonreactive Nonreactive   HEPBANG Nonreactive Nonreactive     HCV Ab Negative 1/15/23  CMV IgG Negative 1/15/23  VZV IgG Positive 8/18/20  EBV Capsid IgG  Positive 1/15/23      Imaging:    CT C/A/P with contrast 7/6/23:  IMPRESSION: Postoperative changes of kidney transplant. There is mild nonspecific stranding around transplant kidney with a small amount of  fluid in the pelvis and areas of patchy hypoenhancement within the transplant kidney. These likely represents chronic/benign findings,  however in the appropriate clinical context findings may represent mild infection.

## 2024-03-25 NOTE — LETTER
3/25/2024       RE: Chip Fowler  [id number] [facility] [id number]     Dear Colleague,    Thank you for referring your patient, Chip Fowler, to the [facility] INFECTIOUS DISEASE CLINIC [place] at [facility]. Please see a copy of my visit note below.    Virtual Visit Details  Type of service:  Video Visit   Video Start Time: 4:28 PM  Video End Time:4:51 PM  Originating Location (pt. Location): Home    Distant Location (provider location):  On-site  Platform used for Video Visit: [facility]  Transplant Infectious Disease Clinic Note:  Virtual Follow up Patient     Patient:  Chip Fowler, Date of birth 1985, Medical record number [id number]  Date of Visit:  03/25/2024         Assessment and Recommendations:   Recommendations:  Continue Cefdinir 300mg PO daily for UTI prophylaxis - refilled for 6 months. Will discuss in September 2024 whether he can come off the Cefdinir and be monitored  Given need for suppressive antibiotics and 2 episodes of C.diff with higher risk for recurrence with cefdinir compared to Nitrofurantoin, discussed management with patient who preferred continuing Cefdinir and monitoring for recurrent diarrhea/ C.diff. Any additional C.diff recurrences should prompt change in management  Monitor off Valcyte - repeat CMV testing for symptoms. 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 Renal US patent) on Tacrolium (goal level 8-10) and  Myfortic 540 mg BID. ID issues include    #Recurrent UTIs with Ecoli and E. Faecalis on suppression with Cefdinir:  The UTI is mostly with E faecalis since 1/2023 except infection in 4/2023 with E faecalis and E coli. Admitted on 7/4/23 for pyelonephritis UC + 10-50k E faecalis he was given Augmentin for 10 and started on prophy nitrofurantoin 100mg daily after. Latest E.coli infection 9/2023. Most infections related to sexual intercourse. Given latest E.coli UTI (on 9/24/23) broke through despite Nitrofurantoin prophylaxis, treated with cefdinir and switched to the same for prophylaxis after at 300mg daily dosing. 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 On 8/22/23 it was 2,300 (started to have a fever but was checked per protocol) started on valcyte on 8/23/23. 9/5/23 it was 4580 and down to undetectable VL on 9/25/23. Secondary ppx through 10/24/23. Low level recurrence on 11/20 - restarted Valcyte 900mg BID 11/20, remained on maintenance through 1/16/24. No recurrence since, latest VL undetectable 3/6/24      #C.diff:  Reported diarrhea 10/10/23. C.diff testing triple positive (Toxin, GDH and PCR). Started Vancomycin 125mg PO q6h x 14 days. Recurrent diarrhea early November, tested positive 11/6/23. Restarted Vancomycin PO with prolonged taper. No recurrences since    #low level EBV Viremia   7/6/23 CT chest Lymph nodes: Multiple sub centimeter para-aortic lymph nodes. At last check it was 8/22/23 <500     Prior issues:  1. Eosinophilia. The workup for eosinophilia and diarrhea was negative for fungal and parasitic infections that would account for the eosinophilia. Evaluated by hematology. The eosinophilia is  likely reactive to foreign objects; the PD catheter was thought to be potentially the source as noted by hematology. The relatively increased tryptase level favors allergy (likely to PD catheter) to be the etiology of the eosinophilia rather than infectious processes or malignancies.   2. Chronic diarrhea since he was started on PD in 7/2020. Workup has been negative.   3. Positive Salmonella serology. 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 The patient was counseled against keeping the snakes after transplantation as they are source of recurrent Salmonella infection.  4. E coli in urine in 8/2020.   5. Group B Strep in urine in 2/2020.   6. Urethral stenosis s/p reconstruction.      Other Infectious Disease issues include:  - QTc: 416 as of 3/23/23.   - PJP prophylaxis: bactrim.   - Serostatus: CMV D+/R- (s/p 6mo of valcye), EBV D+/R-, HSV1?/2?, VZV +  - Immunization status: This patient received the third dose of the COVID-19 vaccine on 1/26/2022. Otherwise due for the seasonal influenza vaccine and COVID-19 bivalent     Interval history :   Last seen in ID clinic 12/2023    1) CMV viremia: Was on maintenance Valcyte through 1/16/24. Last detectable CMV VL was on 11/27/23 (at 105 international unit(s)/ml). Since then, VL <35 or undetectable, most recently 3/4/24    2) C.diff: Last tested positive 11/6/23. Received prolonged PO Vanco taper. No recurrences since, no diarrhea since    3) Recurrent UTI: Prescribed Cefdinir by Dr Willis (300mg/d) remains on the same. No UTIs since 9/2023. 12/25/23 urine Cx <10k urogenital cooper    Patient does note having some urinary symptoms ~ few hours to a day every week, but they resolve soon by themselves. Attributes these to difference in water intake over the weekend when he sleeps  more    Transplants:  1/15/2023 (Kidney); Postoperative day:  435.  Coordinator Veronica Edgar    Review of Systems:  Remaining systems reviewed and negative    Immunization History   Administered Date(s) Administered    COVID-19 Monovalent 18+ (Moderna) 01/25/2021, 02/22/2021, 01/26/2022    DTAP (<7y) 1984, 1984, 1984, 10/04/1985, 03/21/1989    Flu, Unspecified 11/12/1997, 03/11/2020, 10/06/2020, 10/27/2021    Hepatitis B, Peds 02/19/1996, 06/17/1996, 09/04/1996    Hib, Unspecified 04/02/1986    Historical DTP/aP 1984, 1984, 1984, 10/04/1985, 03/21/1989    Influenza (intradermal) 03/11/2020    Influenza Vaccine >6 months,quad, PF 11/06/2016    MMR 07/03/1985, 02/19/1996    Mantoux Tuberculin Skin Test 03/11/2020    Pneumo Conj 13-V (2010&after) 11/24/2020    Pneumococcal 23 valent 03/11/2020    Pneumococcal, Unspecified 03/11/2020, 04/22/2021    Poliovirus, inactivated (IPV) 1984, 1984, 1984, 03/21/1989    TD,PF 7+ (Tenivac) 06/17/1996    TDAP Vaccine (Adacel) 04/28/2020    Td (Adult), Adsorbed 06/17/1996       No outpatient medications have been marked as taking for the 3/25/24 encounter (Appointment) with Brennan Davenport MD.       No Known Allergies           Physical Exam:   There were no vitals taken for this visit.  Wt Readings from Last 4 Encounters:   01/23/24 68.9 kg (152 lb)   12/25/23 69.4 kg (153 lb)   11/29/23 73.3 kg (161 lb 8 oz)   11/14/23 71.9 kg (158 lb 8 oz)       Exam:  GENERAL: well-developed, well-nourished, alert, oriented, in no acute distress over video.  HEAD: Head is normocephalic, atraumatic   EYES: Eyes have anicteric sclerae.    NEUROLOGIC: Grossly nonfocal.         Laboratory Data:     Absolute CD4   Date Value Ref Range Status   12/03/2020 1,325 441 - 2,156 cells/uL Final       Inflammatory Markers    Recent Labs   Lab Test 01/20/23  0746   G6PD 15.5       Immune Globulin Studies     Recent Labs   Lab Test 11/27/23  1512  08/28/23  1453 12/03/20  1647   IGG 1,187 1,004 1,152   IGM  --   --  64   IGE  --   --  18   IGA  --   --  268       Metabolic Studies    Recent Labs   Lab Test 03/04/24  1459 02/12/24  1458 01/29/24  1442 08/28/23  1453 08/22/23  1448 07/31/23  1512 07/24/23  1519 07/06/23  1807 07/06/23  0933 07/05/23  1304 07/04/23  1944    137 133*   < > 137   < > 139   < >  --    < > 133*   POTASSIUM 4.3 4.4 4.6   < > 4.2   < > 4.4   < >  --    < > 4.0   CHLORIDE 101 102 101   < > 105   < > 104   < >  --    < > 102   CO2 25 26 25   < > 23   < > 24   < >  --    < > 17*   ANIONGAP 10 9 7   < > 9   < > 11   < >  --    < > 14   BUN 27.0* 22.0* 22.5*   < > 20.6*   < > 23.4*   < >  --    < > 26.1*   CR 1.52* 1.47* 1.51*   < > 1.50*   < > 1.65*   < >  --    < > 1.86*   GFRESTIMATED 59* 62 60*   < > 60*   < > 54*   < >  --    < > 47*   GLC 80 78 71   < > 72   < > 68*   < >  --    < > 139*   VISHNU 9.1 9.4 8.9   < > 9.1   < > 9.7   < >  --    < > 9.5   PHOS  --   --   --   --  2.6  --   --    < >  --    < >  --    MAG  --   --   --   --  1.5*  --   --    < >  --    < >  --    URIC  --   --   --   --   --   --  5.5  --   --   --   --    LACT  --   --   --   --   --   --   --   --  0.9   < > 1.5   CKT  --   --   --   --   --   --   --   --   --   --  88    < > = values in this interval not displayed.       Hepatic Studies    Recent Labs   Lab Test 07/24/23  1519 07/04/23  1944 06/05/23  1527 06/01/23  1518   BILITOTAL 0.3 0.4 0.3 0.3   DBIL <0.20  --   --  <0.20   ALKPHOS 59 53 55 52   PROTTOTAL 7.2 7.4 6.9 6.9   ALBUMIN 4.3 4.2 4.0 4.0   AST 29 27 24 28   ALT 20 14 22 30   LDH  --   --   --  367*     Hematology Studies   Recent Labs   Lab Test 03/04/24  1459 02/12/24  1458 01/29/24  1442 01/15/24  1506 10/23/23  1445 10/16/23  1505 10/09/23  1440   WBC 7.2 6.1 8.6 6.5   < > 3.2* 3.0*   ANEU  --   --   --   --   --  1.2* 1.6   ANEUTAUTO 4.6 3.4 5.9 4.3   < >  --   --    ALYM  --   --   --   --   --  1.1 0.8   ALYMPAUTO 1.3 1.3 1.2 1.2    < >  --   --    TY  --   --   --   --   --  0.4 0.3   AMONOAUTO 0.6 0.7 0.9 0.5   < >  --   --    AEOS  --   --   --   --   --  0.4 0.2   AEOSAUTO 0.6 0.6 0.5 0.4   < >  --   --    ABSBASO 0.1 0.1 0.1 0.1   < >  --   --    HGB 11.5* 12.1* 10.9* 11.7*   < > 11.6* 11.7*   HCT 34.2* 36.7* 32.0* 34.3*   < > 34.7* 35.4*    274 278 260   < > 214 309    < > = values in this interval not displayed.       Clotting Studies    Recent Labs   Lab Test 07/04/23  1944 01/15/23  0540 08/09/21  1124 08/18/20  1307   INR 1.29* 0.90 0.96 1.08   PTT  --  28  --  29     Urine Studies     Recent Labs   Lab Test 12/25/23  1506 12/11/23  1437 09/24/23  1133 07/04/23 2015 06/12/23  1514   URINEPH 7.0 6.0 7.0 6.0 6.0   NITRITE Negative Negative Negative Negative Negative   LEUKEST Moderate* Small* Small* Negative Negative   WBCU 25-50* 6* 10-25* 11* 7*       Medication levels    Recent Labs   Lab Test 03/04/24  1459 10/23/23  1445 10/16/23  1505 07/10/23  1452 07/06/23  0547   VANCOMYCIN  --   --   --   --  16.7   TACROL 6.6   < > 6.4   < > 10.3   MPACID  --   --  <0.25*   < >  --    MPAG  --   --  12.2*   < >  --     < > = values in this interval not displayed.       Microbiology:  Fungal testing  Recent Labs   Lab Test 12/03/20  1647 10/08/20  1300   AM3 <0.10  --    HIFUNG  --  <1:8   COFUNG  --  <1:2   FUNBL  --  0.3     Last Culture results   Culture   Date Value Ref Range Status   12/25/2023 <10,000 CFU/mL Urogenital cooper  Final   09/24/2023 10,000-50,000 CFU/mL Escherichia coli (A)  Final   09/24/2023 <10,000 CFU/mL Urogenital cooper  Final   07/04/2023 10,000-50,000 CFU/mL Enterococcus faecalis (A)  Final   07/04/2023 <10,000 CFU/mL Urogenital cooper  Final   07/04/2023 No Growth  Final   07/04/2023 No Growth  Final   05/04/2023 No Growth  Final   05/04/2023 No Growth  Final   05/04/2023 >100,000 CFU/mL Escherichia coli (A)  Final   04/15/2023 50,000-100,000 CFU/mL Escherichia coli (A)  Final   04/15/2023 10,000-50,000  CFU/mL Enterococcus faecalis (A)  Final   03/20/2023 No Growth  Final   03/20/2023 No Growth  Final   03/19/2023 50,000-100,000 CFU/mL Enterococcus faecalis (A)  Final   03/19/2023 <10,000 CFU/mL Urogenital cooper  Final   01/23/2023 50,000-100,000 CFU/mL Enterococcus faecalis (A)  Final     Comment:     Susceptibilities done on previous cultures   01/19/2023 >100,000 CFU/mL Enterococcus faecalis (A)  Final     Culture Micro   Date Value Ref Range Status   12/03/2020 No growth  Final   08/14/2020 >100,000 colonies/mL  Escherichia coli   (A)  Final         Last checks of Clostridioides difficile testing  Recent Labs   Lab Test 11/06/23  1852 10/10/23  1600 07/05/23  1318 05/04/23 2010   CDBPCT Positive* Positive* Negative Negative   CDIFFGDH Positive* Positive*  --   --    CDIFFTOX Positive* Positive*  --   --        Quantiferon testing   Recent Labs   Lab Test 03/04/24  1459 02/12/24  1458 09/15/20  1347 08/18/20  1307   TBRST  --   --   --  Negative   LYMPH 18 22   < > 23.9    < > = values in this interval not displayed.       Infection Studies to assess Diarrhea  Recent Labs   Lab Test 07/05/23  1318 05/04/23 2010 03/22/23  2257 03/20/23  0853 12/09/20  2000 10/08/20  1339 10/08/20  1338   EPSTX1 Not Detected Not Detected  --  Not Detected   < > Not Detected  --    EPSTX2 Not Detected Not Detected  --  Not Detected   < > Not Detected  --    ADENOVIRUSAG  --   --   --   --   --   --  Negative   MSPORT  --   --   --   --   --   --  No Microsporidia found  Chromotrope stain reveals no Microsporidia spores in fecal specimen. Consider other causes   for symptoms.  Divya Lugo M.D., Medical Director  10/9/20     FAISALSPPIPER  --   --   --   --   --  No oocysts of Cryptosporidium species, Cyclospora cayetanensis, or Cystoisospora   (Isospora) nancy found    A modified acid fast stain reveals no oocysts of Cryptosporidium, Cyclospora, or   Cystoisospora (Isospora). Consider other causes for symptoms  Divya  WALTER Lugo, Medical Director  10/9/20    --    CRYPTR  --   --   --   --   --  Negative  --    POPRT  --   --  Negative  --    < > Routine parasitology exam negative  Cryptosporidium, Cyclospora, and Microsporidia are not readily detected by this method. A   single negative specimen does not rule out parasitic infection.    --    EPCAMP Not Detected Not Detected  --  Not Detected   < > Not Detected  --    EPSALM Not Detected Not Detected  --  Not Detected   < > Not Detected  --    EPSHGL Not Detected Not Detected  --  Not Detected   < > Not Detected  --    EPVIB Not Detected Not Detected  --  Not Detected   < > Not Detected  --    EPROTA Not Detected Not Detected  --  Not Detected   < > Not Detected  --    EPNORO Not Detected Not Detected  --  Not Detected   < > Not Detected  --    EPYER Not Detected Not Detected  --  Not Detected   < > Not Detected  --     < > = values in this interval not displayed.       Virology:  Coronavirus-19 testing    Recent Labs   Lab Test 07/04/23  1952 03/19/23  1919 01/15/23  0538 03/21/22  1911 10/30/21  0816 09/08/21  1400 08/07/21  1057 05/26/21  1134 12/10/20  0959 12/03/20  1647   CD19  --   --   --   --   --   --   --   --   --  17   ACD19  --   --   --   --   --   --   --   --   --  532   [id number] Negative Negative Negative Negative  --   --    < >  --  Not Detected  --    [id number]  --   --   --   --   --   --   --   --  Nasopharyngeal  --    COVIDPCREXT  --   --   --   --  Not Detected Not Detected  --  Not Detected  --   --     < > = values in this interval not displayed.       Respiratory virus (non-coronavirus-19) testing    No lab results found.    CMV viral loads    CMV DNA IU/mL   Date Value Ref Range Status   03/04/2024 Not Detected Not Detected IU/mL Final   02/12/2024 Not Detected Not Detected IU/mL Final   01/29/2024 Not Detected Not Detected IU/mL Final   01/22/2024 Not Detected Not Detected IU/mL Final   01/15/2024 Not Detected Not Detected IU/mL Final    01/02/2024 Not Detected Not Detected IU/mL Final   12/26/2023 Not Detected Not Detected IU/mL Final   12/18/2023 Not Detected Not Detected IU/mL Final   12/11/2023 <35 (A) Not Detected IU/mL Final     Comment:     CMV DNA detected, less than 35 IU/mL   07/17/2023 Not Detected Not Detected IU/mL Final   07/05/2023 Not Detected Not Detected IU/mL Final   06/15/2023 Not Detected Not Detected IU/mL Final   05/25/2023 Not Detected Not Detected IU/mL Final   05/04/2023 Not Detected Not Detected IU/mL Final   03/20/2023 Not Detected Not Detected IU/mL Final   03/06/2023 Not Detected Not Detected IU/mL Final   01/15/2023 Not Detected Not Detected IU/mL Final     CMV DNA IU/mL, Instrument   Date Value Ref Range Status   11/27/2023 105 (H) <1 IU/mL Final   11/20/2023 648 (H) <1 IU/mL Final   09/18/2023 61 (H) <1 IU/mL Final   09/11/2023 468 (H) <1 IU/mL Final   09/05/2023 4,580 (H) <1 IU/mL Final   08/22/2023 2,340 (H) <1 IU/mL Final     CMV log   Date Value Ref Range Status   12/11/2023 <1.5  Final   12/04/2023 <1.5  Final   11/27/2023 2.0  Final   11/20/2023 2.8  Final   10/16/2023 <1.5  Final   10/09/2023 <1.5  Final   09/18/2023 1.8  Final   09/11/2023 2.7  Final   09/05/2023 3.7  Final   08/22/2023 3.4  Final       EBV DNA Copies/mL   Date Value Ref Range Status   09/25/2023 Not Detected Not Detected copies/mL Final   08/22/2023 <500 (A) Not Detected copies/mL Final     Comment:     EBV DNA Detected below the reportable range of 500 copies/mL   07/04/2023 <500 (A) Not Detected copies/mL Final     Comment:     EBV DNA Detected below the reportable range of 500 copies/mL   05/04/2023 Not Detected Not Detected copies/mL Final   03/20/2023 Not Detected Not Detected copies/mL Final       BK viral loads   Recent Labs   Lab Test 02/12/24  1458 01/02/24  1506 11/20/23  1454 10/23/23  1445 09/25/23  1541 08/28/23  1453 08/22/23  1448 07/24/23  1509 07/05/23  1203   BKRES Not Detected Not Detected Not Detected Not Detected Not  Detected Not Detected Not Detected Not Detected Not Detected       Hepatitis B Testing     Recent Labs   Lab Test 01/15/23  0540 08/18/20  1307   AUSAB 134.22 212.08*   HBCAB Nonreactive Nonreactive   HEPBANG Nonreactive Nonreactive     HCV Ab Negative 1/15/23  CMV IgG Negative 1/15/23  VZV IgG Positive 8/18/20  EBV Capsid IgG Positive 1/15/23      Imaging:    CT C/A/P with contrast 7/6/23:  IMPRESSION: Postoperative changes of kidney transplant. There is mild nonspecific stranding around transplant kidney with a small amount of  fluid in the pelvis and areas of patchy hypoenhancement within the transplant kidney. These likely represents chronic/benign findings,  however in the appropriate clinical context findings may represent mild infection.      Brennan Davenport MD

## 2024-03-26 NOTE — TELEPHONE ENCOUNTER
Dorie Saunders  reviewed to discontinue Valcyte  Updated lab orders for weekly  CMV labs  Clarified NaBicarb dose    157.48

## 2024-04-01 ENCOUNTER — LAB (OUTPATIENT)
Dept: LAB | Facility: CLINIC | Age: 39
End: 2024-04-01
Payer: MEDICARE

## 2024-04-01 DIAGNOSIS — Z94.0 KIDNEY REPLACED BY TRANSPLANT: ICD-10-CM

## 2024-04-01 DIAGNOSIS — Z94.0 KIDNEY TRANSPLANTED: ICD-10-CM

## 2024-04-01 LAB
ANION GAP SERPL CALCULATED.3IONS-SCNC: 9 MMOL/L (ref 7–15)
BASOPHILS # BLD AUTO: 0.1 10E3/UL (ref 0–0.2)
BASOPHILS NFR BLD AUTO: 1 %
BUN SERPL-MCNC: 19.7 MG/DL (ref 6–20)
CALCIUM SERPL-MCNC: 9.2 MG/DL (ref 8.6–10)
CHLORIDE SERPL-SCNC: 104 MMOL/L (ref 98–107)
CREAT SERPL-MCNC: 1.51 MG/DL (ref 0.67–1.17)
DEPRECATED HCO3 PLAS-SCNC: 26 MMOL/L (ref 22–29)
EGFRCR SERPLBLD CKD-EPI 2021: 60 ML/MIN/1.73M2
EOSINOPHIL # BLD AUTO: 0.8 10E3/UL (ref 0–0.7)
EOSINOPHIL NFR BLD AUTO: 12 %
ERYTHROCYTE [DISTWIDTH] IN BLOOD BY AUTOMATED COUNT: 12.6 % (ref 10–15)
GLUCOSE SERPL-MCNC: 71 MG/DL (ref 70–99)
HCT VFR BLD AUTO: 35.2 % (ref 40–53)
HGB BLD-MCNC: 11.4 G/DL (ref 13.3–17.7)
IMM GRANULOCYTES # BLD: 0 10E3/UL
IMM GRANULOCYTES NFR BLD: 0 %
LYMPHOCYTES # BLD AUTO: 1.5 10E3/UL (ref 0.8–5.3)
LYMPHOCYTES NFR BLD AUTO: 24 %
MCH RBC QN AUTO: 28.8 PG (ref 26.5–33)
MCHC RBC AUTO-ENTMCNC: 32.4 G/DL (ref 31.5–36.5)
MCV RBC AUTO: 89 FL (ref 78–100)
MONOCYTES # BLD AUTO: 0.6 10E3/UL (ref 0–1.3)
MONOCYTES NFR BLD AUTO: 9 %
NEUTROPHILS # BLD AUTO: 3.4 10E3/UL (ref 1.6–8.3)
NEUTROPHILS NFR BLD AUTO: 54 %
NRBC # BLD AUTO: 0 10E3/UL
NRBC BLD AUTO-RTO: 0 /100
PLATELET # BLD AUTO: 259 10E3/UL (ref 150–450)
POTASSIUM SERPL-SCNC: 4.3 MMOL/L (ref 3.4–5.3)
RBC # BLD AUTO: 3.96 10E6/UL (ref 4.4–5.9)
SODIUM SERPL-SCNC: 139 MMOL/L (ref 135–145)
TACROLIMUS BLD-MCNC: 4.8 UG/L (ref 5–15)
TME LAST DOSE: ABNORMAL H
TME LAST DOSE: ABNORMAL H
WBC # BLD AUTO: 6.3 10E3/UL (ref 4–11)

## 2024-04-01 PROCEDURE — 36415 COLL VENOUS BLD VENIPUNCTURE: CPT

## 2024-04-01 PROCEDURE — 85025 COMPLETE CBC W/AUTO DIFF WBC: CPT

## 2024-04-01 PROCEDURE — 80197 ASSAY OF TACROLIMUS: CPT

## 2024-04-01 PROCEDURE — 80048 BASIC METABOLIC PNL TOTAL CA: CPT

## 2024-04-02 ENCOUNTER — TELEPHONE (OUTPATIENT)
Dept: TRANSPLANT | Facility: CLINIC | Age: 39
End: 2024-04-02
Payer: MEDICARE

## 2024-04-02 DIAGNOSIS — Z79.899 ENCOUNTER FOR LONG-TERM CURRENT USE OF MEDICATION: ICD-10-CM

## 2024-04-02 DIAGNOSIS — Z48.298 AFTERCARE FOLLOWING ORGAN TRANSPLANT: ICD-10-CM

## 2024-04-02 DIAGNOSIS — Z98.890 OTHER SPECIFIED POSTPROCEDURAL STATES: ICD-10-CM

## 2024-04-02 DIAGNOSIS — Z94.0 KIDNEY REPLACED BY TRANSPLANT: Primary | ICD-10-CM

## 2024-04-02 LAB
CMV DNA SPEC NAA+PROBE-ACNC: <35 IU/ML
CMV DNA SPEC NAA+PROBE-LOG#: <1.5 {LOG_COPIES}/ML

## 2024-04-05 ENCOUNTER — TELEPHONE (OUTPATIENT)
Dept: INFECTIOUS DISEASES | Facility: CLINIC | Age: 39
End: 2024-04-05
Payer: MEDICARE

## 2024-04-05 NOTE — TELEPHONE ENCOUNTER
Patient Contacted for the patient to call back and schedule the following:    Appointment type: Return  Provider: Ethel  Return date: 09/12/2024  Specialty phone number: [phone]  Additional appointment(s) needed: na  Additonal Notes: In person or video

## 2024-04-08 DIAGNOSIS — Z94.0 KIDNEY REPLACED BY TRANSPLANT: Primary | ICD-10-CM

## 2024-04-08 RX ORDER — SULFAMETHOXAZOLE AND TRIMETHOPRIM 400; 80 MG/1; MG/1
1 TABLET ORAL DAILY
Qty: 30 TABLET | Refills: 11 | Status: SHIPPED | OUTPATIENT
Start: 2024-04-08

## 2024-04-12 ENCOUNTER — MYC MEDICAL ADVICE (OUTPATIENT)
Dept: TRANSPLANT | Facility: CLINIC | Age: 39
End: 2024-04-12
Payer: MEDICARE

## 2024-04-12 DIAGNOSIS — Z79.899 ENCOUNTER FOR LONG-TERM CURRENT USE OF MEDICATION: ICD-10-CM

## 2024-04-12 DIAGNOSIS — Z94.0 KIDNEY REPLACED BY TRANSPLANT: Primary | ICD-10-CM

## 2024-04-15 NOTE — TELEPHONE ENCOUNTER
Chip Fowler Mary K, RN  Phone Number: [phone]     Ok thanks.             View All Conversations on this Encounter  Chip Fowler  You3 days ago       Ok thanks.                You need to follow up with dermatologist   or follow up with your primary care provider            [facility] Transplant Coordinator               Chip Martin,        I have a few questions.     I have a red bump on the side of my head and I read some things how skin cancer can be likely for Immunosuppression. So I  just wanted to confirm its not  basal cell carcinoma or something else I'm not aware of. The location on the side of my head seems odd to me. So I  was wondering if I had someone in my care team or a dermatologist I could show the picture too or if I need to setup appointment on my own?         I attach a picture in case you know what to look for or if you think it needs to be checked.

## 2024-05-01 PROBLEM — N39.0 E. COLI UTI: Status: ACTIVE | Noted: 2023-09-24

## 2024-05-01 PROBLEM — A49.8 CLOSTRIDIUM DIFFICILE INFECTION: Status: ACTIVE | Noted: 2023-10-10

## 2024-05-01 PROBLEM — B96.20 E. COLI UTI: Status: ACTIVE | Noted: 2023-09-24

## 2024-05-06 ENCOUNTER — LAB (OUTPATIENT)
Dept: LAB | Facility: CLINIC | Age: 39
End: 2024-05-06
Payer: MEDICARE

## 2024-05-06 DIAGNOSIS — Z94.0 KIDNEY REPLACED BY TRANSPLANT: ICD-10-CM

## 2024-05-06 DIAGNOSIS — Z98.890 OTHER SPECIFIED POSTPROCEDURAL STATES: ICD-10-CM

## 2024-05-06 DIAGNOSIS — Z79.899 ENCOUNTER FOR LONG-TERM CURRENT USE OF MEDICATION: ICD-10-CM

## 2024-05-06 DIAGNOSIS — Z48.298 AFTERCARE FOLLOWING ORGAN TRANSPLANT: ICD-10-CM

## 2024-05-06 LAB
ANION GAP SERPL CALCULATED.3IONS-SCNC: 8 MMOL/L (ref 7–15)
BUN SERPL-MCNC: 20.8 MG/DL (ref 6–20)
CALCIUM SERPL-MCNC: 9.1 MG/DL (ref 8.6–10)
CHLORIDE SERPL-SCNC: 103 MMOL/L (ref 98–107)
CREAT SERPL-MCNC: 1.74 MG/DL (ref 0.67–1.17)
DEPRECATED HCO3 PLAS-SCNC: 26 MMOL/L (ref 22–29)
EGFRCR SERPLBLD CKD-EPI 2021: 50 ML/MIN/1.73M2
ERYTHROCYTE [DISTWIDTH] IN BLOOD BY AUTOMATED COUNT: 13.3 % (ref 10–15)
GLUCOSE SERPL-MCNC: 85 MG/DL (ref 70–99)
HCT VFR BLD AUTO: 36 % (ref 40–53)
HGB BLD-MCNC: 11.9 G/DL (ref 13.3–17.7)
MCH RBC QN AUTO: 28.3 PG (ref 26.5–33)
MCHC RBC AUTO-ENTMCNC: 33.1 G/DL (ref 31.5–36.5)
MCV RBC AUTO: 86 FL (ref 78–100)
PLATELET # BLD AUTO: 218 10E3/UL (ref 150–450)
POTASSIUM SERPL-SCNC: 4.4 MMOL/L (ref 3.4–5.3)
RBC # BLD AUTO: 4.21 10E6/UL (ref 4.4–5.9)
SODIUM SERPL-SCNC: 137 MMOL/L (ref 135–145)
TACROLIMUS BLD-MCNC: 6.4 UG/L (ref 5–15)
TME LAST DOSE: NORMAL H
TME LAST DOSE: NORMAL H
WBC # BLD AUTO: 6.9 10E3/UL (ref 4–11)

## 2024-05-06 PROCEDURE — 80048 BASIC METABOLIC PNL TOTAL CA: CPT

## 2024-05-06 PROCEDURE — 36415 COLL VENOUS BLD VENIPUNCTURE: CPT

## 2024-05-06 PROCEDURE — 80197 ASSAY OF TACROLIMUS: CPT

## 2024-05-06 PROCEDURE — 87799 DETECT AGENT NOS DNA QUANT: CPT

## 2024-05-06 PROCEDURE — 85027 COMPLETE CBC AUTOMATED: CPT

## 2024-05-07 LAB
BK VIRUS SPECIMEN TYPE: NORMAL
BKV DNA # SPEC NAA+PROBE: NOT DETECTED IU/ML
CMV DNA SPEC NAA+PROBE-ACNC: <35 IU/ML
CMV DNA SPEC NAA+PROBE-LOG#: <1.5 {LOG_COPIES}/ML

## 2024-05-13 ENCOUNTER — LAB (OUTPATIENT)
Dept: LAB | Facility: CLINIC | Age: 39
End: 2024-05-13
Payer: MEDICARE

## 2024-05-13 DIAGNOSIS — Z94.0 KIDNEY REPLACED BY TRANSPLANT: ICD-10-CM

## 2024-05-13 LAB
ANION GAP SERPL CALCULATED.3IONS-SCNC: 13 MMOL/L (ref 7–15)
BUN SERPL-MCNC: 21.2 MG/DL (ref 6–20)
CALCIUM SERPL-MCNC: 9 MG/DL (ref 8.6–10)
CHLORIDE SERPL-SCNC: 102 MMOL/L (ref 98–107)
CREAT SERPL-MCNC: 1.44 MG/DL (ref 0.67–1.17)
DEPRECATED HCO3 PLAS-SCNC: 23 MMOL/L (ref 22–29)
EGFRCR SERPLBLD CKD-EPI 2021: 63 ML/MIN/1.73M2
GLUCOSE SERPL-MCNC: 77 MG/DL (ref 70–99)
POTASSIUM SERPL-SCNC: 4.7 MMOL/L (ref 3.4–5.3)
SODIUM SERPL-SCNC: 138 MMOL/L (ref 135–145)

## 2024-05-13 PROCEDURE — 36415 COLL VENOUS BLD VENIPUNCTURE: CPT

## 2024-05-13 PROCEDURE — 80048 BASIC METABOLIC PNL TOTAL CA: CPT

## 2024-05-14 ENCOUNTER — OFFICE VISIT (OUTPATIENT)
Dept: FAMILY MEDICINE | Facility: CLINIC | Age: 39
End: 2024-05-14
Payer: MEDICARE

## 2024-05-14 VITALS
TEMPERATURE: 98.4 F | OXYGEN SATURATION: 99 % | HEART RATE: 67 BPM | SYSTOLIC BLOOD PRESSURE: 111 MMHG | DIASTOLIC BLOOD PRESSURE: 72 MMHG | RESPIRATION RATE: 18 BRPM | HEIGHT: 73 IN | BODY MASS INDEX: 21.01 KG/M2 | WEIGHT: 158.5 LBS

## 2024-05-14 DIAGNOSIS — D18.01 CHERRY ANGIOMA: Primary | ICD-10-CM

## 2024-05-14 PROCEDURE — 99213 OFFICE O/P EST LOW 20 MIN: CPT | Performed by: PHYSICIAN ASSISTANT

## 2024-05-14 ASSESSMENT — PAIN SCALES - GENERAL: PAINLEVEL: NO PAIN (0)

## 2024-05-14 NOTE — PROGRESS NOTES
"  Assessment & Plan     Cherry angioma  No concerns and reassurances given but advised a skin cancer screen and he has one coming up.               Subjective   Chip is a 39 year old, presenting for the following health issues:  Mole (Wants mole checked for cancer)    Has history of  kidney transplant and just notice a red spot on left side of scalp after he had a hair cut.         5/14/2024     2:36 PM   Additional Questions   Roomed by Carlito BHAT - Student MA   Accompanied by Self     History of Present Illness       Reason for visit:  Skin check    He eats 0-1 servings of fruits and vegetables daily.He consumes 0 sweetened beverage(s) daily.He exercises with enough effort to increase his heart rate 9 or less minutes per day.  He exercises with enough effort to increase his heart rate 7 days per week.   He is taking medications regularly.                 Review of Systems  Constitutional, HEENT, cardiovascular, pulmonary, gi and gu systems are negative, except as otherwise noted.      Objective    /72 (BP Location: Right arm, Patient Position: Sitting, Cuff Size: Adult Regular)   Pulse 67   Temp 98.4  F (36.9  C) (Oral)   Resp 18   Ht 1.842 m (6' 0.5\")   Wt 71.9 kg (158 lb 8 oz)   SpO2 99%   BMI 21.20 kg/m    Body mass index is 21.2 kg/m .  Physical Exam   GENERAL: alert and no distress  HENT: ear canals and TM's normal, nose and mouth without ulcers or lesions  RESP: lungs clear to auscultation - no rales, rhonchi or wheezes  CV: regular rate and rhythm, normal S1 S2, no S3 or S4, no murmur, click or rub, no peripheral edema   SKIN: 5 mm round red raised papule        Signed Electronically by: Ramona Ann Aaseby-Aguilera, PA-C    "

## 2024-05-22 NOTE — PLAN OF CARE
Goal Outcome Evaluation:       /70 (BP Location: Left arm)   Pulse 75   Temp 98.2  F (36.8  C) (Oral)   Resp 16   Wt 70.4 kg (155 lb 3.3 oz)   SpO2 98%   BMI 21.42 kg/m      Shift: 5120-1697  VS: Vitals stable on room air, afebrile  Neuro: Alert and oriented x4  BG: Next check 0600   Labs: Potassium 4.6  Pain/Nausea: Denies nausea, prn oxy given x1   Diet: 2 g k diet   IV Access: R triple lumen w/ LR at 100, other lumens saline locked, R PIV saline locked   Lines/Drains: R TOM with minimal output   GI/: Taylor cath in place with 700 output, no bm, not passing gas   Skin: Abdominal incision dermabonded, VERNON   Mobility: SBA w/ IV pole   Plan: Continue with POC and notify team with any changes                   Thanks for letting us take care of you today!  It is our goal to give you courteous care and to keep you comfortable and informed, if you have any questions before you leave I will be happy to try and answer them.    Here is some advice after your visit:      Your visit in the emergency department is NOT definitive care - please follow-up with your primary care doctor and/or specialist within 1 week.  Please return if you have any worsening in your condition or if you have any other concerns.    If you had radiology exams like an XRAY or CT in the emergency Department the interpreation on them may be preliminary - there may be less time sensitive findings on the reports please obtain these reports within 24 hours from the hospital or by using your out on your mobile phone to access records.  Bring these to your primary care doctor and/or specialist for further review of incidental findings.    Please review any LAB WORK from your visit today with your primary care physician.

## 2024-06-03 DIAGNOSIS — E87.20 METABOLIC ACIDOSIS: ICD-10-CM

## 2024-06-03 RX ORDER — SODIUM BICARBONATE 650 MG/1
1300 TABLET ORAL 2 TIMES DAILY
Qty: 120 TABLET | Refills: 11 | Status: SHIPPED | OUTPATIENT
Start: 2024-06-03 | End: 2024-07-09

## 2024-06-05 DIAGNOSIS — E55.9 VITAMIN D DEFICIENCY: ICD-10-CM

## 2024-06-05 RX ORDER — MULTIVIT-MIN/IRON/FOLIC ACID/K 18-600-40
2000 CAPSULE ORAL DAILY
Qty: 30 CAPSULE | Refills: 11 | Status: SHIPPED | OUTPATIENT
Start: 2024-06-05

## 2024-06-10 ENCOUNTER — LAB (OUTPATIENT)
Dept: LAB | Facility: CLINIC | Age: 39
End: 2024-06-10
Payer: MEDICARE

## 2024-06-10 DIAGNOSIS — Z94.0 KIDNEY REPLACED BY TRANSPLANT: ICD-10-CM

## 2024-06-10 LAB
ANION GAP SERPL CALCULATED.3IONS-SCNC: 10 MMOL/L (ref 7–15)
BUN SERPL-MCNC: 23.7 MG/DL (ref 6–20)
CALCIUM SERPL-MCNC: 9.1 MG/DL (ref 8.6–10)
CHLORIDE SERPL-SCNC: 103 MMOL/L (ref 98–107)
CREAT SERPL-MCNC: 1.42 MG/DL (ref 0.67–1.17)
DEPRECATED HCO3 PLAS-SCNC: 23 MMOL/L (ref 22–29)
EGFRCR SERPLBLD CKD-EPI 2021: 64 ML/MIN/1.73M2
ERYTHROCYTE [DISTWIDTH] IN BLOOD BY AUTOMATED COUNT: 13.8 % (ref 10–15)
GLUCOSE SERPL-MCNC: 80 MG/DL (ref 70–99)
HCT VFR BLD AUTO: 35.4 % (ref 40–53)
HGB BLD-MCNC: 11.5 G/DL (ref 13.3–17.7)
MCH RBC QN AUTO: 28 PG (ref 26.5–33)
MCHC RBC AUTO-ENTMCNC: 32.5 G/DL (ref 31.5–36.5)
MCV RBC AUTO: 86 FL (ref 78–100)
PLATELET # BLD AUTO: 199 10E3/UL (ref 150–450)
POTASSIUM SERPL-SCNC: 4.5 MMOL/L (ref 3.4–5.3)
RBC # BLD AUTO: 4.1 10E6/UL (ref 4.4–5.9)
SODIUM SERPL-SCNC: 136 MMOL/L (ref 135–145)
TACROLIMUS BLD-MCNC: 5.1 UG/L (ref 5–15)
TME LAST DOSE: NORMAL H
TME LAST DOSE: NORMAL H
WBC # BLD AUTO: 6.6 10E3/UL (ref 4–11)

## 2024-06-10 PROCEDURE — 36415 COLL VENOUS BLD VENIPUNCTURE: CPT

## 2024-06-10 PROCEDURE — 85027 COMPLETE CBC AUTOMATED: CPT

## 2024-06-10 PROCEDURE — 80197 ASSAY OF TACROLIMUS: CPT

## 2024-06-10 PROCEDURE — 82374 ASSAY BLOOD CARBON DIOXIDE: CPT

## 2024-07-08 DIAGNOSIS — Z94.0 TRANSPLANTED KIDNEY: ICD-10-CM

## 2024-07-08 RX ORDER — MAGNESIUM OXIDE 400 MG/1
800 TABLET ORAL 2 TIMES DAILY
Qty: 120 TABLET | Refills: 11 | Status: SHIPPED | OUTPATIENT
Start: 2024-07-08

## 2024-07-09 ENCOUNTER — VIRTUAL VISIT (OUTPATIENT)
Dept: TRANSPLANT | Facility: CLINIC | Age: 39
End: 2024-07-09
Attending: INTERNAL MEDICINE
Payer: MEDICARE

## 2024-07-09 ENCOUNTER — TELEPHONE (OUTPATIENT)
Dept: INFECTIOUS DISEASES | Facility: CLINIC | Age: 39
End: 2024-07-09
Payer: MEDICARE

## 2024-07-09 DIAGNOSIS — B25.9 CYTOMEGALOVIRUS INFECTION, UNSPECIFIED CYTOMEGALOVIRAL INFECTION TYPE (H): ICD-10-CM

## 2024-07-09 DIAGNOSIS — E83.42 HYPOMAGNESEMIA: ICD-10-CM

## 2024-07-09 DIAGNOSIS — N12 PYELONEPHRITIS OF TRANSPLANTED KIDNEY: ICD-10-CM

## 2024-07-09 DIAGNOSIS — E87.20 METABOLIC ACIDOSIS: ICD-10-CM

## 2024-07-09 DIAGNOSIS — T86.19 PYELONEPHRITIS OF TRANSPLANTED KIDNEY: ICD-10-CM

## 2024-07-09 DIAGNOSIS — E55.9 VITAMIN D DEFICIENCY: ICD-10-CM

## 2024-07-09 DIAGNOSIS — Z94.0 KIDNEY REPLACED BY TRANSPLANT: ICD-10-CM

## 2024-07-09 DIAGNOSIS — N18.31 ANEMIA IN STAGE 3A CHRONIC KIDNEY DISEASE (H): ICD-10-CM

## 2024-07-09 DIAGNOSIS — B27.00 EBV (EPSTEIN-BARR VIRUS) VIREMIA: ICD-10-CM

## 2024-07-09 DIAGNOSIS — Z48.298 AFTERCARE FOLLOWING ORGAN TRANSPLANT: ICD-10-CM

## 2024-07-09 DIAGNOSIS — N25.81 SECONDARY RENAL HYPERPARATHYROIDISM (H): ICD-10-CM

## 2024-07-09 DIAGNOSIS — N35.016 POST-TRAUMATIC STRICTURE OF OVERLAPPING SITES OF URETHRA IN MALE: ICD-10-CM

## 2024-07-09 DIAGNOSIS — E55.9 HYPOVITAMINOSIS D: Primary | ICD-10-CM

## 2024-07-09 DIAGNOSIS — D63.1 ANEMIA IN STAGE 3A CHRONIC KIDNEY DISEASE (H): ICD-10-CM

## 2024-07-09 PROCEDURE — 99214 OFFICE O/P EST MOD 30 MIN: CPT | Mod: 95 | Performed by: INTERNAL MEDICINE

## 2024-07-09 PROCEDURE — G2211 COMPLEX E/M VISIT ADD ON: HCPCS | Mod: 95 | Performed by: INTERNAL MEDICINE

## 2024-07-09 RX ORDER — SODIUM BICARBONATE 650 MG/1
650 TABLET ORAL 2 TIMES DAILY
Qty: 120 TABLET | Refills: 11 | Status: SHIPPED | OUTPATIENT
Start: 2024-07-09

## 2024-07-09 NOTE — TELEPHONE ENCOUNTER
EP called 7/9 to resched 9/5 video follow up with Dr. Davenport; provider not avail. Patient stated that he'll be in [place] for this visit.

## 2024-07-09 NOTE — PROGRESS NOTES
Virtual Visit Details    Type of service:  Video Visit   Video Start Time: 2:34 PM  Video End Time:2: 48 PM    Originating Location (pt. Location): Home    Distant Location (provider location):  On-site  Platform used for Video Visit: [facility]    TRANSPLANT NEPHROLOGY CLINIC VISIT     Assessment & Plan   # DDKT: CKD Stage 2 - Stable   - Baseline Creatinine: ~ 1.4-1.6   - Proteinuria: Normal (<0.2 grams)   - DSA Hx: No DSA   - Last cPRA: 49%   - BK Viremia: No   - Kidney Tx Biopsy Hx: No biopsy history.    # Immunosuppression: Tacrolimus immediate release (goal 4-6) and Mycophenolic acid (dose 540 mg every 12 hours)   - Induction with Recent Transplant:  Not known due to time from transplant   - Continue with intensive monitoring of immunosuppression for efficacy and toxicity.   - Historical Changes in Immunosuppression: None   - Changes: No    # Infection Prevention:      - PJP: Sulfa/TMP (Bactrim)  - CMV: None  - Recurrent UTI : cefdinir 300 mg daily  - CMV IgG Ab High Risk Discordance (D+/R-): Yes  - EBV IgG Ab High Risk Discordance (D+/R-): No    # Hypertension: Controlled;  Goal BP: < 130/80   - Changes: No    # Anemia in Chronic Renal Disease: Hgb: Stable      SARA: No   - Iron studies: Replete    # Mineral Bone Disorder:    - Secondary renal hyperparathyroidism; PTH level: Normal (15-65 pg/ml)        On treatment: None  - Vitamin D; level: Low        On supplement: Yes  - Calcium; level: Normal        On supplement: No  - Phosphorus; level: Not checked recently, but was normal last check        On supplement: No    # Electrolytes:   - Potassium; level: Normal        On supplement: No  - Magnesium; level: Low        On supplement: Yes  - Bicarbonate; level: Normal        On supplement: Yes  - Sodium; level: Normal    # Other Significant PMH:   - # H/o Cardiomyopathy: Normal LVEF at 55-60% with last cardiac echo 11/2023     # Recurrent UTI/Pyelonephritis: asymptomatic at present. cystoscopy 7/2023 unremarkable,  seen by urology. He was previously on nitrofurantoin for prophylaxis then switched to fosfomycin. Currently on cefdinir 300 mg po every day  per ID recs. Follow up in September 2024. Likely need cefdinir refill.     # Urethral Stricture: Patient is s/p buccal urethroplasty and diverticulum excision 12/2020. Now s/p cystoscopy 7/2023 due to recurrent UTIs and this was unremarkable.  Follows with Urology.      # Recurrent Cdiff: completed vanc po taper Nov 2023. Asymptomatic at present     # EBV Viremia: Minimal EBV PCR at ~ 1200, likely of no clinical significance. Will repeat with symptom     # Diarrhea: resolved after Cdiff Tx Nov 2023     # H/o Polysubstance Abuse: Patient with h/o methamphetamine and alcohol abuse.  Now sober     # Skin Cancer Risk:    - Discussed sun protection and recommend regular follow up with Dermatology.    # Transplant History:  Etiology of Kidney Failure: Solitary congenital kidney and h/o urethral calculus and urologic issues   Tx: DDKT  Transplant: 1/15/2023 (Kidney)  Significant transplant-related complications: EBV Viremia    Transplant Office Phone Number: [phone]    Assessment and plan was discussed with the patient and he voiced his understanding and agreement.    Return visit: No follow-ups on file.    Janet Robison MD    The longitudinal plan of care for the diagnosis(es)/condition(s) as documented were addressed during this visit. Due to the added complexity in care, I will continue to support Chip in the subsequent management and with ongoing continuity of care.      Chief Complaint   Mr. Fowler is a 39 year old here for kidney transplant and immunosuppression management.     History of Present Illness     Mr. Fowler reports feeling stable overall.  Since last clinic visit:   Hospitalizations: No   New Medical Issues: No  Chest pain or shortness of breath: No  Lower extremity swelling: No  Weight change: No  Nausea and vomiting: No  Diarrhea: No  Heartburn symptoms:  No  Fever, sweats or chills: No  Urinary complaints: No    Home BP:  110's systolic mostly     Problem List   Patient Active Problem List   Diagnosis    HTN, kidney transplant related    Urethral stricture    Methamphetamine abuse, episodic (H)    Urethral diverticulum    Allergic rhinitis, unspecified seasonality, unspecified trigger    Stage 3a chronic kidney disease (H)    Kidney replaced by transplant    Immunosuppressed status (H24)    Aftercare following organ transplant    Need for pneumocystis prophylaxis    Anemia in chronic renal disease    Secondary renal hyperparathyroidism (H24)    Vitamin D deficiency    Hypomagnesemia    Diarrhea    Urinary tract infection without hematuria, site unspecified    Metabolic acidosis    EBV (Aaron-Barr virus) viremia    Pyelonephritis of transplanted kidney    CMV (cytomegalovirus infection) (H)    Neutropenia (H24)    E. coli UTI    Clostridium difficile infection       Allergies   No Known Allergies    Medications   Current Outpatient Medications   Medication Sig Dispense Refill    cefdinir (OMNICEF) 300 MG capsule Take 1 capsule (300 mg) by mouth daily 90 capsule 1    magnesium oxide (MAG-OX) 400 MG tablet Take 2 tablets (800 mg) by mouth 2 times daily 120 tablet 11    mycophenolic acid (GENERIC EQUIVALENT) 180 MG EC tablet Take 3 tablets (540 mg) by mouth 2 times daily 180 tablet 11    psyllium (METAMUCIL/KONSYL) 58.6 % powder Take 18 g (1 Tablespoonful) by mouth 2 times daily as needed (Diarrhea) 283 g 0    sodium bicarbonate 650 MG tablet Take 2 tablets (1,300 mg) by mouth 2 times daily 120 tablet 11    sulfamethoxazole-trimethoprim (BACTRIM) 400-80 MG tablet Take 1 tablet by mouth daily 30 tablet 11    tacrolimus (GENERIC) 0.5 MG capsule Take 1 capsule (0.5 mg) by mouth 2 times daily Total dose = 2.5 mg twice per day 60 capsule 11    tacrolimus (GENERIC) 1 MG capsule Take 2 capsules (2 mg) by mouth 2 times daily Total dose = 2.5 mg twice per day 120 capsule 11     Vitamin D, Cholecalciferol, 50 MCG (2000 UT) CAPS Take 2,000 Units by mouth daily 30 capsule 11     No current facility-administered medications for this visit.     There are no discontinued medications.    Physical Exam   Vital Signs: There were no vitals taken for this visit.    GENERAL APPEARANCE: alert and no distress  EYES: eyes grossly normal to inspection  HENT: normal cephalic/atraumatic  RESP: able to speak in full sentences. No audible wheezing and no accessory muscle usage  EDEMA: denied LE edema bilaterally  MS: extremities normal - no gross deformities noted  SKIN: no visible facial rash  NEURO: mentation intact and speech normal  PSYCH: mentation appears normal and affect normal/bright    Data         Latest Ref Rng & Units 6/10/2024     3:00 PM 5/13/2024     2:47 PM 5/6/2024     2:57 PM   Renal   Sodium 135 - 145 mmol/L 136  138  137    K 3.4 - 5.3 mmol/L 4.5  4.7  4.4    Cl 98 - 107 mmol/L 103  102  103    Cl (external) 98 - 107 mmol/L 103  102  103    CO2 22 - 29 mmol/L 23  23  26    Urea Nitrogen 6.0 - 20.0 mg/dL 23.7  21.2  20.8    Creatinine 0.67 - 1.17 mg/dL 1.42  1.44  1.74    Glucose 70 - 99 mg/dL 80  77  85    Calcium 8.6 - 10.0 mg/dL 9.1  9.0  9.1          Latest Ref Rng & Units 8/22/2023     2:48 PM 7/10/2023     2:52 PM 7/8/2023     6:05 AM   Bone Health   Phosphorus 2.5 - 4.5 mg/dL 2.6  2.7  3.7          Latest Ref Rng & Units 6/10/2024     3:00 PM 5/6/2024     2:57 PM 4/1/2024     2:53 PM   Heme   WBC 4.0 - 11.0 10e3/uL 6.6  6.9  6.3    Hgb 13.3 - 17.7 g/dL 11.5  11.9  11.4    Plt 150 - 450 10e3/uL 199  218  259          Latest Ref Rng & Units 7/24/2023     3:19 PM 7/4/2023     7:44 PM 6/5/2023     3:27 PM   Liver   AP 40 - 129 U/L 59  53  55    TBili <=1.2 mg/dL 0.3  0.4  0.3    Bilirubin Direct 0.00 - 0.30 mg/dL <0.20      ALT 0 - 70 U/L 20  14  22    AST 0 - 45 U/L 29  27  24    Tot Protein 6.4 - 8.3 g/dL 7.2  7.4  6.9    Albumin 3.5 - 5.2 g/dL 4.3  4.2  4.0          Latest Ref Rng &  Units 7/24/2023     3:19 PM 3/19/2023     9:55 PM 1/15/2023     5:40 AM   Pancreas   A1C <5.7 % 5.5   5.2    Lipase (Roche) 13 - 60 U/L  20           Latest Ref Rng & Units 1/31/2023    10:49 AM 1/20/2023     7:46 AM   Iron studies   Iron 61 - 157 ug/dL 70  153    Iron Sat Index 15 - 46 % 27  63    Ferritin 31 - 409 ng/mL 430  717          5/6/2024     2:57 PM 4/1/2024     2:53 PM 12/11/2023     2:43 PM   [facility] Txp Virology   LOG IU/ML OF CMVQNT <1.5  <1.5  <1.5      Failed to redirect to the Timeline version of the REVFS SmartLink.  Recent Labs   Lab Test 04/01/24  1453 05/06/24  1457 06/10/24  1500   DOSTAC 4/1/2024 5/6/2024 6/10/2024   TACROL 4.8* 6.4 5.1     Recent Labs   Lab Test 10/02/23  1503 10/09/23  1440 10/16/23  1505   DOSMPA 10/2/2023   3:50 AM 10/9/2023   3:20 AM 10/16/2023   3:20 AM   MPACID 0.85* 0.88* <0.25*   MPAG 23.0* 11.9* 12.2*

## 2024-07-09 NOTE — LETTER
7/9/2024      Chip Fowler  [address] [facility] [address]      Dear Colleague,    Thank you for referring your patient, Chip Fowler, to the [facility]. Please see a copy of my visit note below.    Virtual Visit Details    Type of service:  Video Visit   Video Start Time: 2:34 PM  Video End Time:2: 48 PM    Originating Location (pt. Location): Home    Distant Location (provider location):  On-site  Platform used for Video Visit: [facility]    TRANSPLANT NEPHROLOGY CLINIC VISIT     Assessment & Plan  # DDKT: CKD Stage 2 - Stable   - Baseline Creatinine: ~ 1.4-1.6   - Proteinuria: Normal (<0.2 grams)   - DSA Hx: No DSA   - Last cPRA: 49%   - BK Viremia: No   - Kidney Tx Biopsy Hx: No biopsy history.    # Immunosuppression: Tacrolimus immediate release (goal 4-6) and Mycophenolic acid (dose 540 mg every 12 hours)   - Induction with Recent Transplant:  Not known due to time from transplant   - Continue with intensive monitoring of immunosuppression for efficacy and toxicity.   - Historical Changes in Immunosuppression: None   - Changes: No    # Infection Prevention:      - PJP: Sulfa/TMP (Bactrim)  - CMV: None  - Recurrent UTI : cefdinir 300 mg daily  - CMV IgG Ab High Risk Discordance (D+/R-): Yes  - EBV IgG Ab High Risk Discordance (D+/R-): No    # Hypertension: Controlled;  Goal BP: < 130/80   - Changes: No    # Anemia in Chronic Renal Disease: Hgb: Stable      SARA: No   - Iron studies: Replete    # Mineral Bone Disorder:    - Secondary renal hyperparathyroidism; PTH level: Normal (15-65 pg/ml)        On treatment: None  - Vitamin D; level: Low        On supplement: Yes  - Calcium; level: Normal        On supplement: No  - Phosphorus; level: Not checked recently, but was normal last check        On supplement: No    # Electrolytes:   - Potassium; level: Normal        On supplement: No  - Magnesium; level: Low        On supplement: Yes  - Bicarbonate; level: Normal        On  supplement: Yes  - Sodium; level: Normal    # Other Significant PMH:   - # H/o Cardiomyopathy: Normal LVEF at 55-60% with last cardiac echo 11/2023     # Recurrent UTI/Pyelonephritis: asymptomatic at present. cystoscopy 7/2023 unremarkable, seen by urology. He was previously on nitrofurantoin for prophylaxis then switched to fosfomycin. Currently on cefdinir 300 mg po every day  per ID recs. Follow up in September 2024. Likely need cefdinir refill.     # Urethral Stricture: Patient is s/p buccal urethroplasty and diverticulum excision 12/2020. Now s/p cystoscopy 7/2023 due to recurrent UTIs and this was unremarkable.  Follows with Urology.      # Recurrent Cdiff: completed vanc po taper Nov 2023. Asymptomatic at present     # EBV Viremia: Minimal EBV PCR at ~ 1200, likely of no clinical significance. Will repeat with symptom     # Diarrhea: resolved after Cdiff Tx Nov 2023     # H/o Polysubstance Abuse: Patient with h/o methamphetamine and alcohol abuse.  Now sober     # Skin Cancer Risk:    - Discussed sun protection and recommend regular follow up with Dermatology.    # Transplant History:  Etiology of Kidney Failure: Solitary congenital kidney and h/o urethral calculus and urologic issues   Tx: DDKT  Transplant: 1/15/2023 (Kidney)  Significant transplant-related complications: EBV Viremia    Transplant Office Phone Number: [phone]    Assessment and plan was discussed with the patient and he voiced his understanding and agreement.    Return visit: No follow-ups on file.    Janet Robison MD    The longitudinal plan of care for the diagnosis(es)/condition(s) as documented were addressed during this visit. Due to the added complexity in care, I will continue to support Chip in the subsequent management and with ongoing continuity of care.      Chief Complaint  Mr. Fowler is a 39 year old here for kidney transplant and immunosuppression management.     History of Present Illness    Mr. Fowler reports feeling  stable overall.  Since last clinic visit:   Hospitalizations: No   New Medical Issues: No  Chest pain or shortness of breath: No  Lower extremity swelling: No  Weight change: No  Nausea and vomiting: No  Diarrhea: No  Heartburn symptoms: No  Fever, sweats or chills: No  Urinary complaints: No    Home BP:  110's systolic mostly     Problem List  Patient Active Problem List   Diagnosis     HTN, kidney transplant related     Urethral stricture     Methamphetamine abuse, episodic (H)     Urethral diverticulum     Allergic rhinitis, unspecified seasonality, unspecified trigger     Stage 3a chronic kidney disease (H)     Kidney replaced by transplant     Immunosuppressed status (H24)     Aftercare following organ transplant     Need for pneumocystis prophylaxis     Anemia in chronic renal disease     Secondary renal hyperparathyroidism (H24)     Vitamin D deficiency     Hypomagnesemia     Diarrhea     Urinary tract infection without hematuria, site unspecified     Metabolic acidosis     EBV (Aaron-Barr virus) viremia     Pyelonephritis of transplanted kidney     CMV (cytomegalovirus infection) (H)     Neutropenia (H24)     E. coli UTI     Clostridium difficile infection       Allergies  No Known Allergies    Medications  Current Outpatient Medications   Medication Sig Dispense Refill     cefdinir (OMNICEF) 300 MG capsule Take 1 capsule (300 mg) by mouth daily 90 capsule 1     magnesium oxide (MAG-OX) 400 MG tablet Take 2 tablets (800 mg) by mouth 2 times daily 120 tablet 11     mycophenolic acid (GENERIC EQUIVALENT) 180 MG EC tablet Take 3 tablets (540 mg) by mouth 2 times daily 180 tablet 11     psyllium (METAMUCIL/KONSYL) 58.6 % powder Take 18 g (1 Tablespoonful) by mouth 2 times daily as needed (Diarrhea) 283 g 0     sodium bicarbonate 650 MG tablet Take 2 tablets (1,300 mg) by mouth 2 times daily 120 tablet 11     sulfamethoxazole-trimethoprim (BACTRIM) 400-80 MG tablet Take 1 tablet by mouth daily 30 tablet 11      tacrolimus (GENERIC) 0.5 MG capsule Take 1 capsule (0.5 mg) by mouth 2 times daily Total dose = 2.5 mg twice per day 60 capsule 11     tacrolimus (GENERIC) 1 MG capsule Take 2 capsules (2 mg) by mouth 2 times daily Total dose = 2.5 mg twice per day 120 capsule 11     Vitamin D, Cholecalciferol, 50 MCG (2000 UT) CAPS Take 2,000 Units by mouth daily 30 capsule 11     No current facility-administered medications for this visit.     There are no discontinued medications.    Physical Exam  Vital Signs: There were no vitals taken for this visit.    GENERAL APPEARANCE: alert and no distress  EYES: eyes grossly normal to inspection  HENT: normal cephalic/atraumatic  RESP: able to speak in full sentences. No audible wheezing and no accessory muscle usage  EDEMA: denied LE edema bilaterally  MS: extremities normal - no gross deformities noted  SKIN: no visible facial rash  NEURO: mentation intact and speech normal  PSYCH: mentation appears normal and affect normal/bright    Data        Latest Ref Rng & Units 6/10/2024     3:00 PM 5/13/2024     2:47 PM 5/6/2024     2:57 PM   Renal   Sodium 135 - 145 mmol/L 136  138  137    K 3.4 - 5.3 mmol/L 4.5  4.7  4.4    Cl 98 - 107 mmol/L 103  102  103    Cl (external) 98 - 107 mmol/L 103  102  103    CO2 22 - 29 mmol/L 23  23  26    Urea Nitrogen 6.0 - 20.0 mg/dL 23.7  21.2  20.8    Creatinine 0.67 - 1.17 mg/dL 1.42  1.44  1.74    Glucose 70 - 99 mg/dL 80  77  85    Calcium 8.6 - 10.0 mg/dL 9.1  9.0  9.1          Latest Ref Rng & Units 8/22/2023     2:48 PM 7/10/2023     2:52 PM 7/8/2023     6:05 AM   Bone Health   Phosphorus 2.5 - 4.5 mg/dL 2.6  2.7  3.7          Latest Ref Rng & Units 6/10/2024     3:00 PM 5/6/2024     2:57 PM 4/1/2024     2:53 PM   Heme   WBC 4.0 - 11.0 10e3/uL 6.6  6.9  6.3    Hgb 13.3 - 17.7 g/dL 11.5  11.9  11.4    Plt 150 - 450 10e3/uL 199  218  259          Latest Ref Rng & Units 7/24/2023     3:19 PM 7/4/2023     7:44 PM 6/5/2023     3:27 PM   Liver   AP 40 -  129 U/L 59  53  55    TBili <=1.2 mg/dL 0.3  0.4  0.3    Bilirubin Direct 0.00 - 0.30 mg/dL <0.20      ALT 0 - 70 U/L 20  14  22    AST 0 - 45 U/L 29  27  24    Tot Protein 6.4 - 8.3 g/dL 7.2  7.4  6.9    Albumin 3.5 - 5.2 g/dL 4.3  4.2  4.0          Latest Ref Rng & Units 7/24/2023     3:19 PM 3/19/2023     9:55 PM 1/15/2023     5:40 AM   Pancreas   A1C <5.7 % 5.5   5.2    Lipase (Roche) 13 - 60 U/L  20           Latest Ref Rng & Units 1/31/2023    10:49 AM 1/20/2023     7:46 AM   Iron studies   Iron 61 - 157 ug/dL 70  153    Iron Sat Index 15 - 46 % 27  63    Ferritin 31 - 409 ng/mL 430  717          5/6/2024     2:57 PM 4/1/2024     2:53 PM 12/11/2023     2:43 PM   [facility] Txp Virology   LOG IU/ML OF CMVQNT <1.5  <1.5  <1.5      Failed to redirect to the Timeline version of the REVFS SmartLink.  Recent Labs   Lab Test 04/01/24  1453 05/06/24  1457 06/10/24  1500   DOSTAC 4/1/2024 5/6/2024 6/10/2024   TACROL 4.8* 6.4 5.1     Recent Labs   Lab Test 10/02/23  1503 10/09/23  1440 10/16/23  1505   DOSMPA 10/2/2023   3:50 AM 10/9/2023   3:20 AM 10/16/2023   3:20 AM   MPACID 0.85* 0.88* <0.25*   MPAG 23.0* 11.9* 12.2*         Again, thank you for allowing me to participate in the care of your patient.        Sincerely,        Janet Robison MD

## 2024-07-09 NOTE — NURSING NOTE
Current patient location: [address] [place] [id number]    Is the patient currently in the state of [place]? YES    Visit mode:VIDEO    If the visit is dropped, the patient can be reconnected by: VIDEO VISIT: Text to cell phone:   Telephone Information:   Mobile [phone]       Will anyone else be joining the visit? NO  (If patient encounters technical issues they should call [phone]    How would you like to obtain your AVS? MyChart    Are changes needed to the allergy or medication list? No    Are refills needed on medications prescribed by this physician? NO    Reason for visit: RECHECK    Carrington MONTES

## 2024-07-15 ENCOUNTER — LAB (OUTPATIENT)
Dept: LAB | Facility: CLINIC | Age: 39
End: 2024-07-15
Payer: MEDICARE

## 2024-07-15 DIAGNOSIS — Z98.890 OTHER SPECIFIED POSTPROCEDURAL STATES: ICD-10-CM

## 2024-07-15 DIAGNOSIS — Z79.899 ENCOUNTER FOR LONG-TERM CURRENT USE OF MEDICATION: ICD-10-CM

## 2024-07-15 DIAGNOSIS — Z94.0 KIDNEY REPLACED BY TRANSPLANT: ICD-10-CM

## 2024-07-15 DIAGNOSIS — Z48.298 AFTERCARE FOLLOWING ORGAN TRANSPLANT: ICD-10-CM

## 2024-07-15 DIAGNOSIS — E55.9 HYPOVITAMINOSIS D: ICD-10-CM

## 2024-07-15 LAB
ANION GAP SERPL CALCULATED.3IONS-SCNC: 10 MMOL/L (ref 7–15)
BUN SERPL-MCNC: 21.8 MG/DL (ref 6–20)
CALCIUM SERPL-MCNC: 9.4 MG/DL (ref 8.6–10)
CHLORIDE SERPL-SCNC: 104 MMOL/L (ref 98–107)
CREAT SERPL-MCNC: 1.56 MG/DL (ref 0.67–1.17)
EGFRCR SERPLBLD CKD-EPI 2021: 57 ML/MIN/1.73M2
ERYTHROCYTE [DISTWIDTH] IN BLOOD BY AUTOMATED COUNT: 13.5 % (ref 10–15)
GLUCOSE SERPL-MCNC: 85 MG/DL (ref 70–99)
HCO3 SERPL-SCNC: 23 MMOL/L (ref 22–29)
HCT VFR BLD AUTO: 35.7 % (ref 40–53)
HGB BLD-MCNC: 11.5 G/DL (ref 13.3–17.7)
MCH RBC QN AUTO: 28 PG (ref 26.5–33)
MCHC RBC AUTO-ENTMCNC: 32.2 G/DL (ref 31.5–36.5)
MCV RBC AUTO: 87 FL (ref 78–100)
PLATELET # BLD AUTO: 217 10E3/UL (ref 150–450)
POTASSIUM SERPL-SCNC: 4.5 MMOL/L (ref 3.4–5.3)
RBC # BLD AUTO: 4.1 10E6/UL (ref 4.4–5.9)
SODIUM SERPL-SCNC: 137 MMOL/L (ref 135–145)
WBC # BLD AUTO: 6.2 10E3/UL (ref 4–11)

## 2024-07-15 PROCEDURE — 82306 VITAMIN D 25 HYDROXY: CPT

## 2024-07-15 PROCEDURE — 86832 HLA CLASS I HIGH DEFIN QUAL: CPT

## 2024-07-15 PROCEDURE — 86833 HLA CLASS II HIGH DEFIN QUAL: CPT

## 2024-07-15 PROCEDURE — 85027 COMPLETE CBC AUTOMATED: CPT

## 2024-07-15 PROCEDURE — 80197 ASSAY OF TACROLIMUS: CPT

## 2024-07-15 PROCEDURE — 87799 DETECT AGENT NOS DNA QUANT: CPT

## 2024-07-15 PROCEDURE — 80048 BASIC METABOLIC PNL TOTAL CA: CPT

## 2024-07-15 PROCEDURE — 36415 COLL VENOUS BLD VENIPUNCTURE: CPT

## 2024-07-16 LAB
BK VIRUS SPECIMEN TYPE: NORMAL
BKV DNA # SPEC NAA+PROBE: NOT DETECTED IU/ML
TACROLIMUS BLD-MCNC: 5.6 UG/L (ref 5–15)
TME LAST DOSE: NORMAL H
TME LAST DOSE: NORMAL H
VIT D+METAB SERPL-MCNC: 40 NG/ML (ref 20–50)

## 2024-07-18 LAB
DONOR IDENTIFICATION: NORMAL
DSA COMMENTS: NORMAL
DSA PRESENT: NO
DSA TEST METHOD: NORMAL
ORGAN: NORMAL
SA 1  COMMENTS: NORMAL
SA 1 CELL: NORMAL
SA 1 TEST METHOD: NORMAL
SA 2 CELL: NORMAL
SA 2 COMMENTS: NORMAL
SA 2 TEST METHOD: NORMAL
SA1 HI RISK ABY: NORMAL
SA1 MOD RISK ABY: NORMAL
SA2 HI RISK ABY: NORMAL
SA2 MOD RISK ABY: NORMAL
UNACCEPTABLE ANTIGENS: NORMAL
UNOS CPRA: 64

## 2024-08-06 ENCOUNTER — LAB (OUTPATIENT)
Dept: LAB | Facility: CLINIC | Age: 39
End: 2024-08-06
Payer: MEDICARE

## 2024-08-06 DIAGNOSIS — Z94.0 KIDNEY REPLACED BY TRANSPLANT: ICD-10-CM

## 2024-08-06 DIAGNOSIS — Z98.890 OTHER SPECIFIED POSTPROCEDURAL STATES: ICD-10-CM

## 2024-08-06 DIAGNOSIS — Z48.298 AFTERCARE FOLLOWING ORGAN TRANSPLANT: ICD-10-CM

## 2024-08-06 DIAGNOSIS — Z79.899 ENCOUNTER FOR LONG-TERM CURRENT USE OF MEDICATION: ICD-10-CM

## 2024-08-06 LAB
ANION GAP SERPL CALCULATED.3IONS-SCNC: 9 MMOL/L (ref 7–15)
BUN SERPL-MCNC: 24.9 MG/DL (ref 6–20)
CALCIUM SERPL-MCNC: 9.6 MG/DL (ref 8.8–10.4)
CHLORIDE SERPL-SCNC: 104 MMOL/L (ref 98–107)
CREAT SERPL-MCNC: 1.44 MG/DL (ref 0.67–1.17)
EGFRCR SERPLBLD CKD-EPI 2021: 63 ML/MIN/1.73M2
ERYTHROCYTE [DISTWIDTH] IN BLOOD BY AUTOMATED COUNT: 13.3 % (ref 10–15)
GLUCOSE SERPL-MCNC: 82 MG/DL (ref 70–99)
HCO3 SERPL-SCNC: 24 MMOL/L (ref 22–29)
HCT VFR BLD AUTO: 37 % (ref 40–53)
HGB BLD-MCNC: 12 G/DL (ref 13.3–17.7)
MCH RBC QN AUTO: 28 PG (ref 26.5–33)
MCHC RBC AUTO-ENTMCNC: 32.4 G/DL (ref 31.5–36.5)
MCV RBC AUTO: 86 FL (ref 78–100)
PLATELET # BLD AUTO: 221 10E3/UL (ref 150–450)
POTASSIUM SERPL-SCNC: 4.5 MMOL/L (ref 3.4–5.3)
RBC # BLD AUTO: 4.29 10E6/UL (ref 4.4–5.9)
SODIUM SERPL-SCNC: 137 MMOL/L (ref 135–145)
TACROLIMUS BLD-MCNC: 13.7 UG/L (ref 5–15)
TME LAST DOSE: NORMAL H
TME LAST DOSE: NORMAL H
WBC # BLD AUTO: 5.3 10E3/UL (ref 4–11)

## 2024-08-06 PROCEDURE — 36415 COLL VENOUS BLD VENIPUNCTURE: CPT

## 2024-08-06 PROCEDURE — 87799 DETECT AGENT NOS DNA QUANT: CPT

## 2024-08-06 PROCEDURE — 80197 ASSAY OF TACROLIMUS: CPT

## 2024-08-06 PROCEDURE — 85027 COMPLETE CBC AUTOMATED: CPT

## 2024-08-06 PROCEDURE — 82374 ASSAY BLOOD CARBON DIOXIDE: CPT

## 2024-08-07 LAB
BK VIRUS SPECIMEN TYPE: NORMAL
BKV DNA # SPEC NAA+PROBE: NOT DETECTED IU/ML

## 2024-08-09 ENCOUNTER — TELEPHONE (OUTPATIENT)
Dept: TRANSPLANT | Facility: CLINIC | Age: 39
End: 2024-08-09
Payer: MEDICARE

## 2024-08-09 DIAGNOSIS — Z94.0 KIDNEY REPLACED BY TRANSPLANT: Primary | ICD-10-CM

## 2024-08-09 NOTE — TELEPHONE ENCOUNTER
Issue tacrolimus  level 13.7     Previous tacrolimus  levels are  within goal level  for several months  Contacted Chip  who reports diarrhea for 1 to 2  days but have resolved   No changes in creatinine   Plan to repeat transplant tacrolimus  level in 1 to 2 weeks

## 2024-08-21 ENCOUNTER — LAB (OUTPATIENT)
Dept: LAB | Facility: CLINIC | Age: 39
End: 2024-08-21
Payer: MEDICARE

## 2024-08-21 DIAGNOSIS — Z94.0 KIDNEY REPLACED BY TRANSPLANT: ICD-10-CM

## 2024-08-21 LAB
TACROLIMUS BLD-MCNC: 5.1 UG/L (ref 5–15)
TME LAST DOSE: NORMAL H
TME LAST DOSE: NORMAL H

## 2024-08-21 PROCEDURE — 80197 ASSAY OF TACROLIMUS: CPT

## 2024-08-21 PROCEDURE — 36415 COLL VENOUS BLD VENIPUNCTURE: CPT

## 2024-09-09 ENCOUNTER — VIRTUAL VISIT (OUTPATIENT)
Dept: INFECTIOUS DISEASES | Facility: CLINIC | Age: 39
End: 2024-09-09
Attending: STUDENT IN AN ORGANIZED HEALTH CARE EDUCATION/TRAINING PROGRAM
Payer: MEDICARE

## 2024-09-09 ENCOUNTER — MYC MEDICAL ADVICE (OUTPATIENT)
Dept: INFECTIOUS DISEASES | Facility: CLINIC | Age: 39
End: 2024-09-09

## 2024-09-09 VITALS
BODY MASS INDEX: 20.28 KG/M2 | TEMPERATURE: 97 F | HEIGHT: 73 IN | DIASTOLIC BLOOD PRESSURE: 85 MMHG | SYSTOLIC BLOOD PRESSURE: 129 MMHG | WEIGHT: 153 LBS

## 2024-09-09 DIAGNOSIS — N39.0 RECURRENT UTI: Primary | ICD-10-CM

## 2024-09-09 DIAGNOSIS — Z94.0 KIDNEY TRANSPLANTED: ICD-10-CM

## 2024-09-09 DIAGNOSIS — A04.72 C. DIFFICILE ENTERITIS: ICD-10-CM

## 2024-09-09 DIAGNOSIS — B25.9 CYTOMEGALOVIRUS INFECTION, UNSPECIFIED CYTOMEGALOVIRAL INFECTION TYPE (H): ICD-10-CM

## 2024-09-09 DIAGNOSIS — Z79.2 LONG TERM (CURRENT) USE OF ANTIBIOTICS: ICD-10-CM

## 2024-09-09 PROCEDURE — 99214 OFFICE O/P EST MOD 30 MIN: CPT | Mod: 95 | Performed by: STUDENT IN AN ORGANIZED HEALTH CARE EDUCATION/TRAINING PROGRAM

## 2024-09-09 ASSESSMENT — PAIN SCALES - GENERAL: PAINLEVEL: NO PAIN (0)

## 2024-09-09 NOTE — PATIENT INSTRUCTIONS
Stop Cefdinir at this time  Have provided you with a standing order for UA/urine culture  If you develop urinary symptoms in the future (dysuria, frequency, urgency, etc), recommend submitting urine sample at Urgent care and starting Cefdinir 300mg twice a day (from your home supply)  Follow up with me in 6 months

## 2024-09-09 NOTE — LETTER
9/9/2024       RE: Chip Fowler  [address] [address] [facility] [address]     Dear Colleague,    Thank you for referring your patient, Chip Fowler, to the [facility] INFECTIOUS DISEASE CLINIC [place] at [facility]. Please see a copy of my visit note below.    Virtual Visit Details  Type of service:  Video Visit   Video Start Time: 3:02 PM  Video End Time:3:16 PM  Originating Location (pt. Location): Home    Distant Location (provider location):  On-site  Platform used for Video Visit: [facility]  Transplant Infectious Disease Clinic Note:  Virtual Follow up Patient  Patient:  Chip Fowler, Date of birth 1985, Medical record number [id number]  Date of Visit:  09/09/2024         Assessment and Recommendations:   Recommendations:  Stop Cefdinir at this time  Have provided patient with a standing order for UA/urine culture  If patient develops urinary symptoms in the future (dysuria, frequency, urgency, etc), recommended submitting urine sample at Urgent care and starting Cefdinir 300mg twice a day (from his home supply)  Based on future occurrences, can discuss whether or not to resume suppression  Monitor off Valcyte - repeat CMV testing for symptoms. 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 Renal US patent) on Tacrolium (goal level 8-10) and Myfortic 540 mg BID. ID issues include    #Recurrent UTIs with Ecoli and E. Faecalis on suppression with  Cefdinir:  The UTI is mostly with E faecalis since 1/2023 except infection in 4/2023 with E faecalis and E coli. Admitted on 7/4/23 for pyelonephritis UC + 10-50k E faecalis he was given Augmentin for 10 and started on prophy nitrofurantoin 100mg daily after. Latest E.coli infection 9/2023. Most infections related to sexual intercourse. Given last E.coli UTI (on 9/24/23) broke through despite Nitrofurantoin prophylaxis, treated with cefdinir and switched to the same for prophylaxis after at 300mg daily dosing. Remains on the same since with no recurrent infections - has completed ~ 1 year of suppression. At this point, reasonable to trial off antibiotics - have provided patient with a standing order for UA.urine culture. 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 On 8/22/23 it was 2,300 (started to have a fever but was checked per protocol) started on valcyte on 8/23/23. 9/5/23 it was 4580 and down to undetectable VL on 9/25/23. Secondary ppx through 10/24/23. Low level recurrence on 11/20 - restarted Valcyte 900mg BID 11/20, remained on maintenance through 1/16/24. No recurrence since, latest VL undetectable 5/2024      #C.diff:  Reported diarrhea 10/10/23. C.diff testing triple positive (Toxin, GDH and PCR). Started Vancomycin 125mg PO q6h x 14 days. Recurrent diarrhea early November, tested positive 11/6/23. Restarted Vancomycin PO with prolonged taper. No recurrences since    #low level EBV Viremia   7/6/23 CT chest Lymph nodes: Multiple sub centimeter para-aortic lymph nodes. At last check it was 8/22/23 <500     Prior issues:  1. Eosinophilia. The workup for eosinophilia and diarrhea was negative for fungal and parasitic infections that would account for the eosinophilia. Evaluated by hematology. The eosinophilia is likely reactive to foreign objects; the PD catheter was thought  to be potentially the source as noted by hematology. The relatively increased tryptase level favors allergy (likely to PD catheter) to be the etiology of the eosinophilia rather than infectious processes or malignancies.   2. Chronic diarrhea since he was started on PD in 7/2020. Workup has been negative.   3. Positive Salmonella serology. 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 The patient was counseled against keeping the snakes after transplantation as they are source of recurrent Salmonella infection.  4. E coli in urine in 8/2020.   5. Group B Strep in urine in 2/2020.   6. Urethral stenosis s/p reconstruction.      Other Infectious Disease issues include:  - QTc: 416 as of 3/23/23.   - PJP prophylaxis: bactrim.   - Serostatus: CMV D+/R- (s/p 6mo of valcye), EBV D+/R-, HSV1?/2?, VZV +  - Immunization status: This patient received the third dose of the COVID-19 vaccine on 1/26/2022. Otherwise due for the seasonal influenza vaccine and COVID-19 bivalent     Interval history :   Last seen in ID clinic 3/25/24    1) CMV viremia: Was on maintenance Valcyte through 1/16/24. Last detectable CMV VL was on 11/27/23 (at 105 international unit(s)/ml). Since then, VL <35 or undetectable, most recently 5/6/24 - no subsequent results available    2) C.diff: Last tested positive 11/6/23. Received prolonged PO Vanco taper. No recurrences since, no diarrhea since    3) Recurrent UTI: Prescribed Cefdinir by Dr Willis (300mg/d) remains on the same, tolerating it well. No UTIs since 9/2023    No major issues over the last 6 months    Transplants:  1/15/2023 (Kidney); Postoperative day:  603.  Coordinator Veronica Edgar    Review of Systems:  Remaining systems reviewed and negative    Immunization History   Administered Date(s) Administered     COVID-19  Monovalent 18+ (Moderna) 01/25/2021, 02/22/2021, 01/26/2022     DTAP (<7y) 1984, 1984, 1984, 10/04/1985, 03/21/1989     Flu, Unspecified 11/12/1997, 03/11/2020, 10/06/2020, 10/27/2021     HIB, Unspecified 04/02/1986     Hepatitis B, Peds 02/19/1996, 06/17/1996, 09/04/1996     Historical DTP/aP 1984, 1984, 1984, 10/04/1985, 03/21/1989     Influenza (intradermal) 03/11/2020     Influenza Vaccine >6 months,quad, PF 11/06/2016     MMR 07/03/1985, 02/19/1996     Mantoux Tuberculin Skin Test 03/11/2020     Pneumo Conj 13-V (2010&after) 11/24/2020     Pneumococcal 23 valent 03/11/2020     Pneumococcal, Unspecified 03/11/2020, 04/22/2021     Poliovirus, inactivated (IPV) 1984, 1984, 1984, 03/21/1989     TD,PF 7+ (Tenivac) 06/17/1996     TDAP Vaccine (Adacel) 04/28/2020     Td (Adult), Adsorbed 06/17/1996       Current Outpatient Medications   Medication Sig Dispense Refill     cefdinir (OMNICEF) 300 MG capsule Take 1 capsule (300 mg) by mouth daily 90 capsule 1     magnesium oxide (MAG-OX) 400 MG tablet Take 2 tablets (800 mg) by mouth 2 times daily 120 tablet 11     mycophenolic acid (GENERIC EQUIVALENT) 180 MG EC tablet Take 3 tablets (540 mg) by mouth 2 times daily 180 tablet 11     psyllium (METAMUCIL/KONSYL) 58.6 % powder Take 18 g (1 Tablespoonful) by mouth 2 times daily as needed (Diarrhea) 283 g 0     sodium bicarbonate 650 MG tablet Take 1 tablet (650 mg) by mouth 2 times daily 120 tablet 11     sulfamethoxazole-trimethoprim (BACTRIM) 400-80 MG tablet Take 1 tablet by mouth daily 30 tablet 11     tacrolimus (GENERIC) 0.5 MG capsule Take 1 capsule (0.5 mg) by mouth 2 times daily Total dose = 2.5 mg twice per day 60 capsule 11     tacrolimus (GENERIC) 1 MG capsule Take 2 capsules (2 mg) by mouth 2 times daily Total dose = 2.5 mg twice per day 120 capsule 11     Vitamin D, Cholecalciferol, 50 MCG (2000 UT) CAPS Take 2,000 Units by mouth daily 30 capsule 11     No  current facility-administered medications for this visit.       No Known Allergies           Physical Exam:   There were no vitals taken for this visit.  Wt Readings from Last 4 Encounters:   05/14/24 71.9 kg (158 lb 8 oz)   01/23/24 68.9 kg (152 lb)   12/25/23 69.4 kg (153 lb)   11/29/23 73.3 kg (161 lb 8 oz)       Exam: Limited exam as visit via [facility]  GENERAL: well-developed, well-nourished, alert, oriented, in no acute distress over video.  HEAD: Head is normocephalic, atraumatic   EYES: Eyes have anicteric sclerae.    NEUROLOGIC: Grossly nonfocal.         Laboratory Data:     Absolute CD4   Date Value Ref Range Status   12/03/2020 1,325 441 - 2,156 cells/uL Final       Inflammatory Markers    Recent Labs   Lab Test 01/20/23  0746   G6PD 15.5       Immune Globulin Studies     Recent Labs   Lab Test 11/27/23  1512 08/28/23  1453 12/03/20  1647   IGG 1,187 1,004 1,152   IGM  --   --  64   IGE  --   --  18   IGA  --   --  268       Metabolic Studies    Recent Labs   Lab Test 08/06/24  1447 07/15/24  1458 06/10/24  1500 08/28/23  1453 08/22/23  1448 07/31/23  1512 07/24/23  1519 07/06/23  1807 07/06/23  0933 07/05/23  1304 07/04/23  1944    137 136   < > 137   < > 139   < >  --    < > 133*   POTASSIUM 4.5 4.5 4.5   < > 4.2   < > 4.4   < >  --    < > 4.0   CHLORIDE 104 104 103   < > 105   < > 104   < >  --    < > 102   CO2 24 23 23   < > 23   < > 24   < >  --    < > 17*   ANIONGAP 9 10 10   < > 9   < > 11   < >  --    < > 14   BUN 24.9* 21.8* 23.7*   < > 20.6*   < > 23.4*   < >  --    < > 26.1*   CR 1.44* 1.56* 1.42*   < > 1.50*   < > 1.65*   < >  --    < > 1.86*   GFRESTIMATED 63 57* 64   < > 60*   < > 54*   < >  --    < > 47*   GLC 82 85 80   < > 72   < > 68*   < >  --    < > 139*   VISHNU 9.6 9.4 9.1   < > 9.1   < > 9.7   < >  --    < > 9.5   PHOS  --   --   --   --  2.6  --   --    < >  --    < >  --    MAG  --   --   --   --  1.5*  --   --    < >  --    < >  --    URIC  --   --   --   --   --   --  5.5  --    --   --   --    LACT  --   --   --   --   --   --   --   --  0.9   < > 1.5   CKT  --   --   --   --   --   --   --   --   --   --  88    < > = values in this interval not displayed.       Hepatic Studies    Recent Labs   Lab Test 07/24/23  1519 07/04/23  1944 06/05/23  1527 06/01/23  1518   BILITOTAL 0.3 0.4 0.3 0.3   DBIL <0.20  --   --  <0.20   ALKPHOS 59 53 55 52   PROTTOTAL 7.2 7.4 6.9 6.9   ALBUMIN 4.3 4.2 4.0 4.0   AST 29 27 24 28   ALT 20 14 22 30   LDH  --   --   --  367*     Hematology Studies   Recent Labs   Lab Test 08/06/24  1447 07/15/24  1458 06/10/24  1500 05/06/24  1457 04/01/24  1453 03/04/24  1459 10/23/23  1445 10/16/23  1505 10/09/23  1440   WBC 5.3 6.2 6.6 6.9 6.3 7.2   < > 3.2* 3.0*   ANEU  --   --   --   --   --   --   --  1.2* 1.6   ANEUTAUTO  --   --   --   --  3.4 4.6   < >  --   --    ALYM  --   --   --   --   --   --   --  1.1 0.8   ALYMPAUTO  --   --   --   --  1.5 1.3   < >  --   --    TY  --   --   --   --   --   --   --  0.4 0.3   AMONOAUTO  --   --   --   --  0.6 0.6   < >  --   --    AEOS  --   --   --   --   --   --   --  0.4 0.2   AEOSAUTO  --   --   --   --  0.8* 0.6   < >  --   --    ABSBASO  --   --   --   --  0.1 0.1   < >  --   --    HGB 12.0* 11.5* 11.5* 11.9* 11.4* 11.5*   < > 11.6* 11.7*   HCT 37.0* 35.7* 35.4* 36.0* 35.2* 34.2*   < > 34.7* 35.4*    217 199 218 259 227   < > 214 309    < > = values in this interval not displayed.       Clotting Studies    Recent Labs   Lab Test 07/04/23  1944 01/15/23  0540 08/09/21  1124 08/18/20  1307   INR 1.29* 0.90 0.96 1.08   PTT  --  28  --  29     Urine Studies     Recent Labs   Lab Test 12/25/23  1506 12/11/23  1437 09/24/23  1133 07/04/23 2015 06/12/23  1514   URINEPH 7.0 6.0 7.0 6.0 6.0   NITRITE Negative Negative Negative Negative Negative   LEUKEST Moderate* Small* Small* Negative Negative   WBCU 25-50* 6* 10-25* 11* 7*       Medication levels    Recent Labs   Lab Test 08/21/24  1443 10/23/23  1445 10/16/23  1505  07/10/23  1452 07/06/23  0547   VANCOMYCIN  --   --   --   --  16.7   TACROL 5.1   < > 6.4   < > 10.3   MPACID  --   --  <0.25*   < >  --    MPAG  --   --  12.2*   < >  --     < > = values in this interval not displayed.       Microbiology:  Fungal testing  Recent Labs   Lab Test 12/03/20  1647 10/08/20  1300   AM3 <0.10  --    HIFUNG  --  <1:8   COFUNG  --  <1:2   FUNBL  --  0.3     Last Culture results   Culture   Date Value Ref Range Status   12/25/2023 <10,000 CFU/mL Urogenital cooper  Final   09/24/2023 10,000-50,000 CFU/mL Escherichia coli (A)  Final   09/24/2023 <10,000 CFU/mL Urogenital cooper  Final   07/04/2023 10,000-50,000 CFU/mL Enterococcus faecalis (A)  Final   07/04/2023 <10,000 CFU/mL Urogenital cooper  Final   07/04/2023 No Growth  Final   07/04/2023 No Growth  Final   05/04/2023 No Growth  Final   05/04/2023 No Growth  Final   05/04/2023 >100,000 CFU/mL Escherichia coli (A)  Final   04/15/2023 50,000-100,000 CFU/mL Escherichia coli (A)  Final   04/15/2023 10,000-50,000 CFU/mL Enterococcus faecalis (A)  Final   03/20/2023 No Growth  Final   03/20/2023 No Growth  Final   03/19/2023 50,000-100,000 CFU/mL Enterococcus faecalis (A)  Final   03/19/2023 <10,000 CFU/mL Urogenital cooper  Final   01/23/2023 50,000-100,000 CFU/mL Enterococcus faecalis (A)  Final     Comment:     Susceptibilities done on previous cultures   01/19/2023 >100,000 CFU/mL Enterococcus faecalis (A)  Final     Culture Micro   Date Value Ref Range Status   12/03/2020 No growth  Final   08/14/2020 >100,000 colonies/mL  Escherichia coli   (A)  Final         Last checks of Clostridioides difficile testing  Recent Labs   Lab Test 11/06/23  1852 10/10/23  1600 07/05/23  1318 05/04/23 2010   CDBPCT Positive* Positive* Negative Negative   CDIFFGDH Positive* Positive*  --   --    CDIFFTOX Positive* Positive*  --   --        Quantiferon testing   Recent Labs   Lab Test 04/01/24  1453 03/04/24  1459 09/15/20  1347 08/18/20  1307   TBRST  --   --    --  Negative   LYMPH 24 18   < > 23.9    < > = values in this interval not displayed.       Virology:  Coronavirus-19 testing    Recent Labs   Lab Test 07/04/23  1952 03/19/23  1919 01/15/23  0538 03/21/22  1911 10/30/21  0816 09/08/21  1400 08/07/21  1057 05/26/21  1134 12/10/20  0959 12/03/20  1647   CD19  --   --   --   --   --   --   --   --   --  17   ACD19  --   --   --   --   --   --   --   --   --  532   [id number] Negative Negative Negative Negative  --   --    < >  --  Not Detected  --    [id number]  --   --   --   --   --   --   --   --  Nasopharyngeal  --    COVIDPCREXT  --   --   --   --  Not Detected Not Detected  --  Not Detected  --   --     < > = values in this interval not displayed.       CMV viral loads    CMV DNA IU/mL   Date Value Ref Range Status   05/06/2024 <35 (A) Not Detected IU/mL Final     Comment:     CMV DNA detected, less than 35 IU/mL   04/01/2024 <35 (A) Not Detected IU/mL Final     Comment:     CMV DNA detected, less than 35 IU/mL   03/04/2024 Not Detected Not Detected IU/mL Final   02/12/2024 Not Detected Not Detected IU/mL Final   01/29/2024 Not Detected Not Detected IU/mL Final   01/22/2024 Not Detected Not Detected IU/mL Final   01/15/2024 Not Detected Not Detected IU/mL Final   01/02/2024 Not Detected Not Detected IU/mL Final   12/26/2023 Not Detected Not Detected IU/mL Final   07/17/2023 Not Detected Not Detected IU/mL Final   07/05/2023 Not Detected Not Detected IU/mL Final   06/15/2023 Not Detected Not Detected IU/mL Final   05/25/2023 Not Detected Not Detected IU/mL Final   05/04/2023 Not Detected Not Detected IU/mL Final   03/20/2023 Not Detected Not Detected IU/mL Final   03/06/2023 Not Detected Not Detected IU/mL Final   01/15/2023 Not Detected Not Detected IU/mL Final     CMV DNA IU/mL, Instrument   Date Value Ref Range Status   11/27/2023 105 (H) <1 IU/mL Final   11/20/2023 648 (H) <1 IU/mL Final   09/18/2023 61 (H) <1 IU/mL Final   09/11/2023 468 (H) <1 IU/mL  Final   09/05/2023 4,580 (H) <1 IU/mL Final   08/22/2023 2,340 (H) <1 IU/mL Final     CMV log   Date Value Ref Range Status   05/06/2024 <1.5  Final   04/01/2024 <1.5  Final   12/11/2023 <1.5  Final   12/04/2023 <1.5  Final   11/27/2023 2.0  Final   11/20/2023 2.8  Final   10/16/2023 <1.5  Final   10/09/2023 <1.5  Final   09/18/2023 1.8  Final   09/11/2023 2.7  Final   09/05/2023 3.7  Final   08/22/2023 3.4  Final       EBV DNA Copies/mL   Date Value Ref Range Status   09/25/2023 Not Detected Not Detected copies/mL Final   08/22/2023 <500 (A) Not Detected copies/mL Final     Comment:     EBV DNA Detected below the reportable range of 500 copies/mL   07/04/2023 <500 (A) Not Detected copies/mL Final     Comment:     EBV DNA Detected below the reportable range of 500 copies/mL   05/04/2023 Not Detected Not Detected copies/mL Final   03/20/2023 Not Detected Not Detected copies/mL Final       BK viral loads   Recent Labs   Lab Test 08/06/24  1447 07/15/24  1458 05/06/24  1457 02/12/24  1458 01/02/24  1506 11/20/23  1454 10/23/23  1445 09/25/23  1541 08/28/23  1453   BKRES Not Detected Not Detected Not Detected Not Detected Not Detected Not Detected Not Detected Not Detected Not Detected       Hepatitis B Testing     Recent Labs   Lab Test 01/15/23  0540 08/18/20  1307   AUSAB 134.22 212.08*   HBCAB Nonreactive Nonreactive   HEPBANG Nonreactive Nonreactive     HCV Ab Negative 1/15/23  CMV IgG Negative 1/15/23  VZV IgG Positive 8/18/20  EBV Capsid IgG Positive 1/15/23      Imaging:    CT C/A/P with contrast 7/6/23:  IMPRESSION: Postoperative changes of kidney transplant. There is mild nonspecific stranding around transplant kidney with a small amount of  fluid in the pelvis and areas of patchy hypoenhancement within the transplant kidney. These likely represents chronic/benign findings,  however in the appropriate clinical context findings may represent mild infection.      Again, thank you for allowing me to participate  in the care of your patient.      Sincerely,    Brennan Davenport MD

## 2024-09-09 NOTE — NURSING NOTE
Current patient location: [address] [place] [id number]    Is the patient currently in the state of [place]? YES    Visit mode:VIDEO    If the visit is dropped, the patient can be reconnected by: VIDEO VISIT: Text to cell phone:   Telephone Information:   Mobile [phone]    and VIDEO VISIT: Send to e-mail at: [email or web address]    Will anyone else be joining the visit? NO  (If patient encounters technical issues they should call [phone]    How would you like to obtain your AVS? MyChart    Are changes needed to the allergy or medication list? No    Patient denies any changes and states that all information remains accurate since last reviewed/verified.     Are refills needed on medications prescribed by this physician? NO    Rooming Documentation:  Questionnaire(s) not pre-assigned      Reason for visit: ELIOT SeguraF

## 2024-09-09 NOTE — PROGRESS NOTES
Virtual Visit Details  Type of service:  Video Visit   Video Start Time: 3:02 PM  Video End Time:3:16 PM  Originating Location (pt. Location): Home    Distant Location (provider location):  On-site  Platform used for Video Visit: [facility]  Transplant Infectious Disease Clinic Note:  Virtual Follow up Patient  Patient:  Chip Fowler, Date of birth 1985, Medical record number [id number]  Date of Visit:  09/09/2024         Assessment and Recommendations:   Recommendations:  Stop Cefdinir at this time  Have provided patient with a standing order for UA/urine culture  If patient develops urinary symptoms in the future (dysuria, frequency, urgency, etc), recommended submitting urine sample at Urgent care and starting Cefdinir 300mg twice a day (from his home supply)  Based on future occurrences, can discuss whether or not to resume suppression  Monitor off Valcyte - repeat CMV testing for symptoms. 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 Renal US patent) on Tacrolium (goal level 8-10) and Myfortic 540 mg BID. ID issues include    #Recurrent UTIs with Ecoli and E. Faecalis on suppression with Cefdinir:  The UTI is mostly with E faecalis since 1/2023 except infection in 4/2023 with E faecalis and E coli. Admitted on 7/4/23 for pyelonephritis UC + 10-50k E faecalis he was given Augmentin for 10 and started on prophy nitrofurantoin 100mg daily after. Latest E.coli infection 9/2023. Most infections related to  sexual intercourse. Given last E.coli UTI (on 9/24/23) broke through despite Nitrofurantoin prophylaxis, treated with cefdinir and switched to the same for prophylaxis after at 300mg daily dosing. Remains on the same since with no recurrent infections - has completed ~ 1 year of suppression. At this point, reasonable to trial off antibiotics - have provided patient with a standing order for UA.urine culture. 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 On 8/22/23 it was 2,300 (started to have a fever but was checked per protocol) started on valcyte on 8/23/23. 9/5/23 it was 4580 and down to undetectable VL on 9/25/23. Secondary ppx through 10/24/23. Low level recurrence on 11/20 - restarted Valcyte 900mg BID 11/20, remained on maintenance through 1/16/24. No recurrence since, latest VL undetectable 5/2024      #C.diff:  Reported diarrhea 10/10/23. C.diff testing triple positive (Toxin, GDH and PCR). Started Vancomycin 125mg PO q6h x 14 days. Recurrent diarrhea early November, tested positive 11/6/23. Restarted Vancomycin PO with prolonged taper. No recurrences since    #low level EBV Viremia   7/6/23 CT chest Lymph nodes: Multiple sub centimeter para-aortic lymph nodes. At last check it was 8/22/23 <500     Prior issues:  1. Eosinophilia. The workup for eosinophilia and diarrhea was negative for fungal and parasitic infections that would account for the eosinophilia. Evaluated by hematology. The eosinophilia is likely reactive to foreign objects; the PD catheter was thought to be potentially the source as noted by hematology. The relatively increased tryptase level favors allergy (likely to PD catheter) to be the etiology of the eosinophilia rather than infectious processes or malignancies.   2. Chronic diarrhea since he was started on PD in 7/2020. Workup has been negative.   3.  Positive Salmonella serology. 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 The patient was counseled against keeping the snakes after transplantation as they are source of recurrent Salmonella infection.  4. E coli in urine in 8/2020.   5. Group B Strep in urine in 2/2020.   6. Urethral stenosis s/p reconstruction.      Other Infectious Disease issues include:  - QTc: 416 as of 3/23/23.   - PJP prophylaxis: bactrim.   - Serostatus: CMV D+/R- (s/p 6mo of valcye), EBV D+/R-, HSV1?/2?, VZV +  - Immunization status: This patient received the third dose of the COVID-19 vaccine on 1/26/2022. Otherwise due for the seasonal influenza vaccine and COVID-19 bivalent     Interval history :   Last seen in ID clinic 3/25/24    1) CMV viremia: Was on maintenance Valcyte through 1/16/24. Last detectable CMV VL was on 11/27/23 (at 105 international unit(s)/ml). Since then, VL <35 or undetectable, most recently 5/6/24 - no subsequent results available    2) C.diff: Last tested positive 11/6/23. Received prolonged PO Vanco taper. No recurrences since, no diarrhea since    3) Recurrent UTI: Prescribed Cefdinir by Dr Willis (300mg/d) remains on the same, tolerating it well. No UTIs since 9/2023    No major issues over the last 6 months    Transplants:  1/15/2023 (Kidney); Postoperative day:  603.  Coordinator Veronica Edgar    Review of Systems:  Remaining systems reviewed and negative    Immunization History   Administered Date(s) Administered    COVID-19 Monovalent 18+ (Moderna) 01/25/2021, 02/22/2021, 01/26/2022    DTAP (<7y) 1985, 1985, 1984, 10/04/1985, 03/21/1989    Flu, Unspecified 11/12/1997, 03/11/2020, 10/06/2020, 10/27/2021    HIB, Unspecified 04/02/1986    Hepatitis B, Peds 02/19/1996, 06/17/1996, 09/04/1996    Historical DTP/aP  1984, 1984, 1984, 10/04/1985, 03/21/1989    Influenza (intradermal) 03/11/2020    Influenza Vaccine >6 months,quad, PF 11/06/2016    MMR 07/03/1985, 02/19/1996    Mantoux Tuberculin Skin Test 03/11/2020    Pneumo Conj 13-V (2010&after) 11/24/2020    Pneumococcal 23 valent 03/11/2020    Pneumococcal, Unspecified 03/11/2020, 04/22/2021    Poliovirus, inactivated (IPV) 1984, 1984, 1984, 03/21/1989    TD,PF 7+ (Tenivac) 06/17/1996    TDAP Vaccine (Adacel) 04/28/2020    Td (Adult), Adsorbed 06/17/1996       Current Outpatient Medications   Medication Sig Dispense Refill    cefdinir (OMNICEF) 300 MG capsule Take 1 capsule (300 mg) by mouth daily 90 capsule 1    magnesium oxide (MAG-OX) 400 MG tablet Take 2 tablets (800 mg) by mouth 2 times daily 120 tablet 11    mycophenolic acid (GENERIC EQUIVALENT) 180 MG EC tablet Take 3 tablets (540 mg) by mouth 2 times daily 180 tablet 11    psyllium (METAMUCIL/KONSYL) 58.6 % powder Take 18 g (1 Tablespoonful) by mouth 2 times daily as needed (Diarrhea) 283 g 0    sodium bicarbonate 650 MG tablet Take 1 tablet (650 mg) by mouth 2 times daily 120 tablet 11    sulfamethoxazole-trimethoprim (BACTRIM) 400-80 MG tablet Take 1 tablet by mouth daily 30 tablet 11    tacrolimus (GENERIC) 0.5 MG capsule Take 1 capsule (0.5 mg) by mouth 2 times daily Total dose = 2.5 mg twice per day 60 capsule 11    tacrolimus (GENERIC) 1 MG capsule Take 2 capsules (2 mg) by mouth 2 times daily Total dose = 2.5 mg twice per day 120 capsule 11    Vitamin D, Cholecalciferol, 50 MCG (2000 UT) CAPS Take 2,000 Units by mouth daily 30 capsule 11     No current facility-administered medications for this visit.       No Known Allergies           Physical Exam:   There were no vitals taken for this visit.  Wt Readings from Last 4 Encounters:   05/14/24 71.9 kg (158 lb 8 oz)   01/23/24 68.9 kg (152 lb)   12/25/23 69.4 kg (153 lb)   11/29/23 73.3 kg (161 lb 8 oz)       Exam: Limited  exam as visit via [facility]: well-developed, well-nourished, alert, oriented, in no acute distress over video.  HEAD: Head is normocephalic, atraumatic   EYES: Eyes have anicteric sclerae.    NEUROLOGIC: Grossly nonfocal.         Laboratory Data:     Absolute CD4   Date Value Ref Range Status   12/03/2020 1,325 441 - 2,156 cells/uL Final       Inflammatory Markers    Recent Labs   Lab Test 01/20/23  0746   G6PD 15.5       Immune Globulin Studies     Recent Labs   Lab Test 11/27/23  1512 08/28/23  1453 12/03/20  1647   IGG 1,187 1,004 1,152   IGM  --   --  64   IGE  --   --  18   IGA  --   --  268       Metabolic Studies    Recent Labs   Lab Test 08/06/24  1447 07/15/24  1458 06/10/24  1500 08/28/23  1453 08/22/23  1448 07/31/23  1512 07/24/23  1519 07/06/23  1807 07/06/23  0933 07/05/23  1304 07/04/23  1944    137 136   < > 137   < > 139   < >  --    < > 133*   POTASSIUM 4.5 4.5 4.5   < > 4.2   < > 4.4   < >  --    < > 4.0   CHLORIDE 104 104 103   < > 105   < > 104   < >  --    < > 102   CO2 24 23 23   < > 23   < > 24   < >  --    < > 17*   ANIONGAP 9 10 10   < > 9   < > 11   < >  --    < > 14   BUN 24.9* 21.8* 23.7*   < > 20.6*   < > 23.4*   < >  --    < > 26.1*   CR 1.44* 1.56* 1.42*   < > 1.50*   < > 1.65*   < >  --    < > 1.86*   GFRESTIMATED 63 57* 64   < > 60*   < > 54*   < >  --    < > 47*   GLC 82 85 80   < > 72   < > 68*   < >  --    < > 139*   VISHNU 9.6 9.4 9.1   < > 9.1   < > 9.7   < >  --    < > 9.5   PHOS  --   --   --   --  2.6  --   --    < >  --    < >  --    MAG  --   --   --   --  1.5*  --   --    < >  --    < >  --    URIC  --   --   --   --   --   --  5.5  --   --   --   --    LACT  --   --   --   --   --   --   --   --  0.9   < > 1.5   CKT  --   --   --   --   --   --   --   --   --   --  88    < > = values in this interval not displayed.       Hepatic Studies    Recent Labs   Lab Test 07/24/23  1519 07/04/23  1944 06/05/23  1527 06/01/23  1518   BILITOTAL 0.3 0.4 0.3 0.3   DBIL <0.20   --   --  <0.20   ALKPHOS 59 53 55 52   PROTTOTAL 7.2 7.4 6.9 6.9   ALBUMIN 4.3 4.2 4.0 4.0   AST 29 27 24 28   ALT 20 14 22 30   LDH  --   --   --  367*     Hematology Studies   Recent Labs   Lab Test 08/06/24  1447 07/15/24  1458 06/10/24  1500 05/06/24  1457 04/01/24  1453 03/04/24  1459 10/23/23  1445 10/16/23  1505 10/09/23  1440   WBC 5.3 6.2 6.6 6.9 6.3 7.2   < > 3.2* 3.0*   ANEU  --   --   --   --   --   --   --  1.2* 1.6   ANEUTAUTO  --   --   --   --  3.4 4.6   < >  --   --    ALYM  --   --   --   --   --   --   --  1.1 0.8   ALYMPAUTO  --   --   --   --  1.5 1.3   < >  --   --    TY  --   --   --   --   --   --   --  0.4 0.3   AMONOAUTO  --   --   --   --  0.6 0.6   < >  --   --    AEOS  --   --   --   --   --   --   --  0.4 0.2   AEOSAUTO  --   --   --   --  0.8* 0.6   < >  --   --    ABSBASO  --   --   --   --  0.1 0.1   < >  --   --    HGB 12.0* 11.5* 11.5* 11.9* 11.4* 11.5*   < > 11.6* 11.7*   HCT 37.0* 35.7* 35.4* 36.0* 35.2* 34.2*   < > 34.7* 35.4*    217 199 218 259 227   < > 214 309    < > = values in this interval not displayed.       Clotting Studies    Recent Labs   Lab Test 07/04/23  1944 01/15/23  0540 08/09/21  1124 08/18/20  1307   INR 1.29* 0.90 0.96 1.08   PTT  --  28  --  29     Urine Studies     Recent Labs   Lab Test 12/25/23  1506 12/11/23  1437 09/24/23  1133 07/04/23 2015 06/12/23  1514   URINEPH 7.0 6.0 7.0 6.0 6.0   NITRITE Negative Negative Negative Negative Negative   LEUKEST Moderate* Small* Small* Negative Negative   WBCU 25-50* 6* 10-25* 11* 7*       Medication levels    Recent Labs   Lab Test 08/21/24  1443 10/23/23  1445 10/16/23  1505 07/10/23  1452 07/06/23  0547   VANCOMYCIN  --   --   --   --  16.7   TACROL 5.1   < > 6.4   < > 10.3   MPACID  --   --  <0.25*   < >  --    MPAG  --   --  12.2*   < >  --     < > = values in this interval not displayed.       Microbiology:  Fungal testing  Recent Labs   Lab Test 12/03/20  1647 10/08/20  1300   AM3 <0.10  --     HIFUNG  --  <1:8   COFUNG  --  <1:2   FUNBL  --  0.3     Last Culture results   Culture   Date Value Ref Range Status   12/25/2023 <10,000 CFU/mL Urogenital cooper  Final   09/24/2023 10,000-50,000 CFU/mL Escherichia coli (A)  Final   09/24/2023 <10,000 CFU/mL Urogenital cooper  Final   07/04/2023 10,000-50,000 CFU/mL Enterococcus faecalis (A)  Final   07/04/2023 <10,000 CFU/mL Urogenital cooper  Final   07/04/2023 No Growth  Final   07/04/2023 No Growth  Final   05/04/2023 No Growth  Final   05/04/2023 No Growth  Final   05/04/2023 >100,000 CFU/mL Escherichia coli (A)  Final   04/15/2023 50,000-100,000 CFU/mL Escherichia coli (A)  Final   04/15/2023 10,000-50,000 CFU/mL Enterococcus faecalis (A)  Final   03/20/2023 No Growth  Final   03/20/2023 No Growth  Final   03/19/2023 50,000-100,000 CFU/mL Enterococcus faecalis (A)  Final   03/19/2023 <10,000 CFU/mL Urogenital cooper  Final   01/23/2023 50,000-100,000 CFU/mL Enterococcus faecalis (A)  Final     Comment:     Susceptibilities done on previous cultures   01/19/2023 >100,000 CFU/mL Enterococcus faecalis (A)  Final     Culture Micro   Date Value Ref Range Status   12/03/2020 No growth  Final   08/14/2020 >100,000 colonies/mL  Escherichia coli   (A)  Final         Last checks of Clostridioides difficile testing  Recent Labs   Lab Test 11/06/23  1852 10/10/23  1600 07/05/23  1318 05/04/23 2010   CDBPCT Positive* Positive* Negative Negative   CDIFFGDH Positive* Positive*  --   --    CDIFFTOX Positive* Positive*  --   --        Quantiferon testing   Recent Labs   Lab Test 04/01/24  1453 03/04/24  1459 09/15/20  1347 08/18/20  1307   TBRST  --   --   --  Negative   LYMPH 24 18   < > 23.9    < > = values in this interval not displayed.       Virology:  Coronavirus-19 testing    Recent Labs   Lab Test 07/04/23  1952 03/19/23  1919 01/15/23  0538 03/21/22  1911 10/30/21  0816 09/08/21  1400 08/07/21  1057 05/26/21  1134 12/10/20  0959 12/03/20  1647   CD19  --   --   --    --   --   --   --   --   --  17   ACD19  --   --   --   --   --   --   --   --   --  532   [id number] Negative Negative Negative Negative  --   --    < >  --  Not Detected  --    [id number]  --   --   --   --   --   --   --   --  Nasopharyngeal  --    COVIDPCREXT  --   --   --   --  Not Detected Not Detected  --  Not Detected  --   --     < > = values in this interval not displayed.       CMV viral loads    CMV DNA IU/mL   Date Value Ref Range Status   05/06/2024 <35 (A) Not Detected IU/mL Final     Comment:     CMV DNA detected, less than 35 IU/mL   04/01/2024 <35 (A) Not Detected IU/mL Final     Comment:     CMV DNA detected, less than 35 IU/mL   03/04/2024 Not Detected Not Detected IU/mL Final   02/12/2024 Not Detected Not Detected IU/mL Final   01/29/2024 Not Detected Not Detected IU/mL Final   01/22/2024 Not Detected Not Detected IU/mL Final   01/15/2024 Not Detected Not Detected IU/mL Final   01/02/2024 Not Detected Not Detected IU/mL Final   12/26/2023 Not Detected Not Detected IU/mL Final   07/17/2023 Not Detected Not Detected IU/mL Final   07/05/2023 Not Detected Not Detected IU/mL Final   06/15/2023 Not Detected Not Detected IU/mL Final   05/25/2023 Not Detected Not Detected IU/mL Final   05/04/2023 Not Detected Not Detected IU/mL Final   03/20/2023 Not Detected Not Detected IU/mL Final   03/06/2023 Not Detected Not Detected IU/mL Final   01/15/2023 Not Detected Not Detected IU/mL Final     CMV DNA IU/mL, Instrument   Date Value Ref Range Status   11/27/2023 105 (H) <1 IU/mL Final   11/20/2023 648 (H) <1 IU/mL Final   09/18/2023 61 (H) <1 IU/mL Final   09/11/2023 468 (H) <1 IU/mL Final   09/05/2023 4,580 (H) <1 IU/mL Final   08/22/2023 2,340 (H) <1 IU/mL Final     CMV log   Date Value Ref Range Status   05/06/2024 <1.5  Final   04/01/2024 <1.5  Final   12/11/2023 <1.5  Final   12/04/2023 <1.5  Final   11/27/2023 2.0  Final   11/20/2023 2.8  Final   10/16/2023 <1.5  Final   10/09/2023 <1.5  Final    09/18/2023 1.8  Final   09/11/2023 2.7  Final   09/05/2023 3.7  Final   08/22/2023 3.4  Final       EBV DNA Copies/mL   Date Value Ref Range Status   09/25/2023 Not Detected Not Detected copies/mL Final   08/22/2023 <500 (A) Not Detected copies/mL Final     Comment:     EBV DNA Detected below the reportable range of 500 copies/mL   07/04/2023 <500 (A) Not Detected copies/mL Final     Comment:     EBV DNA Detected below the reportable range of 500 copies/mL   05/04/2023 Not Detected Not Detected copies/mL Final   03/20/2023 Not Detected Not Detected copies/mL Final       BK viral loads   Recent Labs   Lab Test 08/06/24  1447 07/15/24  1458 05/06/24  1457 02/12/24  1458 01/02/24  1506 11/20/23  1454 10/23/23  1445 09/25/23  1541 08/28/23  1453   BKRES Not Detected Not Detected Not Detected Not Detected Not Detected Not Detected Not Detected Not Detected Not Detected       Hepatitis B Testing     Recent Labs   Lab Test 01/15/23  0540 08/18/20  1307   AUSAB 134.22 212.08*   HBCAB Nonreactive Nonreactive   HEPBANG Nonreactive Nonreactive     HCV Ab Negative 1/15/23  CMV IgG Negative 1/15/23  VZV IgG Positive 8/18/20  EBV Capsid IgG Positive 1/15/23      Imaging:    CT C/A/P with contrast 7/6/23:  IMPRESSION: Postoperative changes of kidney transplant. There is mild nonspecific stranding around transplant kidney with a small amount of  fluid in the pelvis and areas of patchy hypoenhancement within the transplant kidney. These likely represents chronic/benign findings,  however in the appropriate clinical context findings may represent mild infection.

## 2024-09-10 ENCOUNTER — LAB (OUTPATIENT)
Dept: LAB | Facility: CLINIC | Age: 39
End: 2024-09-10
Payer: MEDICARE

## 2024-09-10 ENCOUNTER — TELEPHONE (OUTPATIENT)
Dept: INFECTIOUS DISEASES | Facility: CLINIC | Age: 39
End: 2024-09-10

## 2024-09-10 DIAGNOSIS — Z94.0 KIDNEY TRANSPLANTED: Primary | ICD-10-CM

## 2024-09-10 DIAGNOSIS — Z94.0 KIDNEY REPLACED BY TRANSPLANT: ICD-10-CM

## 2024-09-10 DIAGNOSIS — Z79.899 ENCOUNTER FOR LONG-TERM CURRENT USE OF MEDICATION: ICD-10-CM

## 2024-09-10 DIAGNOSIS — Z98.890 OTHER SPECIFIED POSTPROCEDURAL STATES: ICD-10-CM

## 2024-09-10 DIAGNOSIS — Z48.298 AFTERCARE FOLLOWING ORGAN TRANSPLANT: ICD-10-CM

## 2024-09-10 LAB
ANION GAP SERPL CALCULATED.3IONS-SCNC: 7 MMOL/L (ref 7–15)
BUN SERPL-MCNC: 20.8 MG/DL (ref 6–20)
CALCIUM SERPL-MCNC: 9.4 MG/DL (ref 8.8–10.4)
CHLORIDE SERPL-SCNC: 103 MMOL/L (ref 98–107)
CREAT SERPL-MCNC: 1.5 MG/DL (ref 0.67–1.17)
EGFRCR SERPLBLD CKD-EPI 2021: 60 ML/MIN/1.73M2
ERYTHROCYTE [DISTWIDTH] IN BLOOD BY AUTOMATED COUNT: 13.2 % (ref 10–15)
GLUCOSE SERPL-MCNC: 78 MG/DL (ref 70–99)
HCO3 SERPL-SCNC: 24 MMOL/L (ref 22–29)
HCT VFR BLD AUTO: 36.8 % (ref 40–53)
HGB BLD-MCNC: 12.1 G/DL (ref 13.3–17.7)
MCH RBC QN AUTO: 28.5 PG (ref 26.5–33)
MCHC RBC AUTO-ENTMCNC: 32.9 G/DL (ref 31.5–36.5)
MCV RBC AUTO: 87 FL (ref 78–100)
PLATELET # BLD AUTO: 217 10E3/UL (ref 150–450)
POTASSIUM SERPL-SCNC: 4.4 MMOL/L (ref 3.4–5.3)
RBC # BLD AUTO: 4.24 10E6/UL (ref 4.4–5.9)
SODIUM SERPL-SCNC: 134 MMOL/L (ref 135–145)
WBC # BLD AUTO: 6.2 10E3/UL (ref 4–11)

## 2024-09-10 PROCEDURE — 36415 COLL VENOUS BLD VENIPUNCTURE: CPT

## 2024-09-10 PROCEDURE — 85027 COMPLETE CBC AUTOMATED: CPT

## 2024-09-10 PROCEDURE — 82374 ASSAY BLOOD CARBON DIOXIDE: CPT

## 2024-09-10 PROCEDURE — 80197 ASSAY OF TACROLIMUS: CPT

## 2024-09-10 PROCEDURE — 87799 DETECT AGENT NOS DNA QUANT: CPT

## 2024-09-10 PROCEDURE — 82565 ASSAY OF CREATININE: CPT

## 2024-09-10 NOTE — TELEPHONE ENCOUNTER
EP called 9/10 to sched a 6 month follow up with Dr. Davenport per checkout notes from 9/10. Patient stated that he'll be in [place] for this appt.

## 2024-09-10 NOTE — TELEPHONE ENCOUNTER
"Received [email or web address] message from patient asking about the dosage for his cefdinir. Per Dr. Davenport's OV note from yesterday 09/09,   \"Recommendations:  Stop Cefdinir at this time  Have provided patient with a standing order for UA/urine culture  If patient develops urinary symptoms in the future (dysuria, frequency, urgency, etc), recommended submitting urine sample at Urgent care and starting Cefdinir 300mg twice a day (from his home supply)\"    Will send these recommendations to patient.   "

## 2024-09-11 LAB
BK VIRUS SPECIMEN TYPE: NORMAL
BKV DNA # SPEC NAA+PROBE: NOT DETECTED IU/ML
TACROLIMUS BLD-MCNC: 6.7 UG/L (ref 5–15)
TME LAST DOSE: NORMAL H
TME LAST DOSE: NORMAL H

## 2024-09-12 ENCOUNTER — OFFICE VISIT (OUTPATIENT)
Dept: FAMILY MEDICINE | Facility: CLINIC | Age: 39
End: 2024-09-12
Payer: MEDICARE

## 2024-09-12 VITALS
DIASTOLIC BLOOD PRESSURE: 76 MMHG | BODY MASS INDEX: 21.39 KG/M2 | WEIGHT: 157.9 LBS | HEART RATE: 59 BPM | OXYGEN SATURATION: 98 % | TEMPERATURE: 97.9 F | SYSTOLIC BLOOD PRESSURE: 117 MMHG | HEIGHT: 72 IN | RESPIRATION RATE: 20 BRPM

## 2024-09-12 DIAGNOSIS — Z23 NEED FOR PROPHYLACTIC VACCINATION AND INOCULATION AGAINST INFLUENZA: ICD-10-CM

## 2024-09-12 DIAGNOSIS — F15.11 HISTORY OF METHAMPHETAMINE ABUSE (H): ICD-10-CM

## 2024-09-12 DIAGNOSIS — Z00.00 ROUTINE GENERAL MEDICAL EXAMINATION AT A HEALTH CARE FACILITY: Primary | ICD-10-CM

## 2024-09-12 DIAGNOSIS — Z86.19 HISTORY OF CLOSTRIDIUM DIFFICILE INFECTION: ICD-10-CM

## 2024-09-12 PROBLEM — J30.9 ALLERGIC RHINITIS, UNSPECIFIED SEASONALITY, UNSPECIFIED TRIGGER: Status: RESOLVED | Noted: 2020-12-03 | Resolved: 2024-09-12

## 2024-09-12 PROBLEM — A49.8 CLOSTRIDIUM DIFFICILE INFECTION: Status: RESOLVED | Noted: 2023-10-10 | Resolved: 2024-09-12

## 2024-09-12 PROBLEM — F15.10 METHAMPHETAMINE ABUSE, EPISODIC (H): Status: RESOLVED | Noted: 2020-02-24 | Resolved: 2024-09-12

## 2024-09-12 PROCEDURE — 90673 RIV3 VACCINE NO PRESERV IM: CPT | Performed by: NURSE PRACTITIONER

## 2024-09-12 PROCEDURE — 99396 PREV VISIT EST AGE 40-64: CPT | Mod: 25 | Performed by: NURSE PRACTITIONER

## 2024-09-12 PROCEDURE — G0008 ADMIN INFLUENZA VIRUS VAC: HCPCS | Performed by: NURSE PRACTITIONER

## 2024-09-12 NOTE — PROGRESS NOTES
Assessment and Plan  Routine general medical examination at a health care facility    History of Clostridium difficile infection  No current symptoms    History of methamphetamine abuse (H)  Remains in remission    Need for prophylactic vaccination and inoculation against influenza  Flu shot today    Return in about 1 year (around 9/12/2025) for Routine Visit.    JESSE Eric CNP  [facility]    ============================================  Subjective   Chip Fowler is a 39 year old male   here for a Physical Exam    No problem-specific Assessment & Plan notes found for this encounter.    Health Care Directive    Patient Care Team:  Clinic - [facility] as PCP - General  Ele Joshi PA-C (Cardiology)  Les Jaime MD as MD (Nephrology)  Mena Calabrese PA-C as Physician Assistant (Physician Assistant)  Sam Mejia MD as MD (Urology)  Isabel Walker, RN as Specialty Care Coordinator (Urology)  Bashir Lawson MD as MD (Cardiovascular Disease)  Lucero Moreno RN as Specialty Care Coordinator  Oleksandr Ashraf RP as Pharmacist (Pharmacist)  Tato Ralph DO as Assigned PCP  Blair Aguilar PA-C as Physician Assistant (Physician Assistant - Medical)  Andrzej Willis MD as Physician (Infectious Diseases)  Gina Block RPH as Pharmacist (Pharmacist Ambulatory Care)  Gina Block RPH as Assigned [facility] Pharmacist  Brennan Davenport MD as Assigned Infectious Disease Provider  Georgie Pires MD as Assigned Nephrology Provider  Yaa Henson APRN CNP as Assigned Heart and Vascular Provider  Janet Robison MD as MD (Nephrology)  Jeferson Lira MD as MD (Dermatology)  Sam Mejia MD as Assigned Surgical Provider        9/12/2024   General Health   How would you rate your overall physical health? Good   Feel stress (tense, anxious, or unable to sleep) Not at all            9/12/2024   Nutrition   Diet: Regular (no  restrictions)            9/12/2024   Exercise   Days per week of moderate/strenous exercise 5 days   Average minutes spent exercising at this level 90 min              9/12/2024   Social Factors   Frequency of gathering with friends or relatives Never   Worry food won't last until get money to buy more No   Food not last or not have enough money for food? No   Do you have housing? (Housing is defined as stable permanent housing and does not include staying ouside in a car, in a tent, in an abandoned building, in an overnight shelter, or couch-surfing.) Yes   Are you worried about losing your housing? No   Lack of transportation? No   Unable to get utilities (heat,electricity)? No            9/12/2024   Fall Risk   Fallen 2 or more times in the past year? No   Trouble with walking or balance? No             9/12/2024   Activities of Daily Living- Home Safety   Needs help with the following daily activites None of the above   Safety concerns in the home None of the above            9/12/2024   Dental   Dentist two times every year? Yes            9/12/2024   Hearing Screening   Hearing concerns? None of the above              9/12/2024   Urinary Incontinence Screening   Bothered by leaking urine in past 6 months Yes               Today's PHQ-2 Score:       9/12/2024     2:30 PM   PHQ-2 ( 1999 Pf[employer])   Q1: Little interest or pleasure in doing things 0   Q2: Feeling down, depressed or hopeless 0   PHQ-2 Score 0   Q1: Little interest or pleasure in doing things Not at all   Q2: Feeling down, depressed or hopeless Not at all   PHQ-2 Score 0             9/12/2024   Substance Use   Alcohol more than 3/day or more than 7/wk No   Do you have a current opioid prescription? No   How severe/bad is pain from 1 to 10? 0/10 (No Pain)   Do you use any other substances recreationally? No          Social History     Tobacco Use    Smoking status: Former     Current packs/day: 0.00     Average packs/day: 0.5 packs/day for 21.0 years  (10.5 ttl pk-yrs)     Types: Cigarettes     Start date:      Quit date: 1/15/2023     Years since quittin.6    Smokeless tobacco: Never   Vaping Use    Vaping status: Never Used   Substance Use Topics    Alcohol use: Not Currently     Comment: quit alcohol 3-4 yrs ago    Drug use: Not Currently     Types: Methamphetamines, MDMA (Ecstasy)       ASCVD Risk   The ASCVD Risk score (Dl JOHNSON, et al., 2019) failed to calculate for the following reasons:    The valid total cholesterol range is 130 to 320 mg/dL    Lab Results   Component Value Date    CHOL 107 2024    HDL 45 2024    LDL 51 2024    TRIG 54 2024   Contraception/Family Planning   Questions about contraception or family planning No            The Following Health Maintenance Topics are reviewed and are correct as of today:  Health Maintenance   Topic Date Due    HF ACTION PLAN  Never done    MICROALBUMIN  2024    ALT  2024    MEDICARE ANNUAL WELLNESS VISIT  2024    COVID-19 Vaccine ( season) 2024    LIPID  2025    BMP  03/10/2025    Pneumococcal Vaccine: Pediatrics (0 to 5 Years) and At-Risk Patients (6 to 64 Years) (3 of 3 - PPSV23 or PCV20) 2025    ANNUAL REVIEW OF HM ORDERS  2025    CBC  09/10/2025    HEMOGLOBIN  09/10/2025    GLUCOSE  09/10/2027    ADVANCE CARE PLANNING  2028    DTAP/TDAP/TD IMMUNIZATION (7 - Td or Tdap) 2030    RSV VACCINE (1 - 1-dose 75+ series) 2059    TSH W/FREE T4 REFLEX  Completed    HEPATITIS C SCREENING  Completed    HIV SCREENING  Completed    PHQ-2 (once per calendar year)  Completed    INFLUENZA VACCINE  Completed    URINALYSIS  Completed    HPV IMMUNIZATION  Aged Out    MENINGITIS IMMUNIZATION  Aged Out    RSV MONOCLONAL ANTIBODY  Aged Out    HEPATITIS B IMMUNIZATION  Discontinued     Reviewed and updated as needed this visit by Provider   Tobacco  Allergies  Meds  Problems  Med Hx  Surg Hx   Fam Hx         HPI  Review of Systems   Constitutional:  Negative for activity change, chills, fatigue and fever.   HENT: Negative.     Respiratory: Negative.     Cardiovascular: Negative.    Gastrointestinal:  Negative for constipation, diarrhea and vomiting.   Endocrine: Negative.    Musculoskeletal:  Negative for joint swelling.   Skin:  Negative for rash.   Neurological:  Negative for weakness and numbness.   Psychiatric/Behavioral: Negative.       ============================================  Objective    Exam  /76 (BP Location: Right arm, Patient Position: Sitting, Cuff Size: Adult Regular)   Pulse 59   Temp 97.9  F (36.6  C) (Oral)   Resp 20   Ht 1.829 m (6')   Wt 71.6 kg (157 lb 14.4 oz)   SpO2 98%   BMI 21.42 kg/m    Physical Exam  Constitutional:       Appearance: Normal appearance.   HENT:      Right Ear: Tympanic membrane normal.      Left Ear: Tympanic membrane normal.      Nose: Nose normal.      Mouth/Throat:      Mouth: Mucous membranes are moist.      Pharynx: Oropharynx is clear.   Eyes:      Conjunctiva/sclera: Conjunctivae normal.   Cardiovascular:      Rate and Rhythm: Normal rate and regular rhythm.      Heart sounds: Normal heart sounds.   Pulmonary:      Effort: Pulmonary effort is normal.      Breath sounds: Normal breath sounds.   Abdominal:      General: Abdomen is flat. Bowel sounds are normal.      Palpations: Abdomen is soft.      Tenderness: There is no abdominal tenderness.   Musculoskeletal:      Cervical back: Neck supple.      Right lower leg: No edema.      Left lower leg: No edema.   Skin:     General: Skin is warm and dry.      Findings: No rash.   Neurological:      Mental Status: He is alert.   Psychiatric:         Mood and Affect: Mood normal.

## 2024-09-24 ASSESSMENT — ENCOUNTER SYMPTOMS
PSYCHIATRIC NEGATIVE: 1
VOMITING: 0
FEVER: 0
CHILLS: 0
NUMBNESS: 0
WEAKNESS: 0
CONSTIPATION: 0
ENDOCRINE NEGATIVE: 1
RESPIRATORY NEGATIVE: 1
CARDIOVASCULAR NEGATIVE: 1
FATIGUE: 0
ACTIVITY CHANGE: 0
DIARRHEA: 0
JOINT SWELLING: 0

## 2024-09-27 DIAGNOSIS — Z94.0 KIDNEY REPLACED BY TRANSPLANT: ICD-10-CM

## 2024-09-27 RX ORDER — MYCOPHENOLIC ACID 180 MG/1
540 TABLET, DELAYED RELEASE ORAL 2 TIMES DAILY
Qty: 180 TABLET | Refills: 11 | Status: SHIPPED | OUTPATIENT
Start: 2024-09-27

## 2024-10-04 ENCOUNTER — VIRTUAL VISIT (OUTPATIENT)
Dept: FAMILY MEDICINE | Facility: CLINIC | Age: 39
End: 2024-10-04
Payer: MEDICARE

## 2024-10-04 DIAGNOSIS — M25.511 ACUTE PAIN OF RIGHT SHOULDER: Primary | ICD-10-CM

## 2024-10-04 PROCEDURE — 99213 OFFICE O/P EST LOW 20 MIN: CPT | Mod: 95 | Performed by: PHYSICIAN ASSISTANT

## 2024-10-04 NOTE — PROGRESS NOTES
Chip is a 39 year old who is being evaluated via a billable video visit.    How would you like to obtain your AVS? MyChart  If the video visit is dropped, the invitation should be resent by: Text to cell phone: [phone]  Will anyone else be joining your video visit? No      Assessment & Plan     Acute pain of right shoulder  -referral placed  -recommend ice, gentle ROM, Tylenol for pain  - Physical Therapy  Referral; Future        Subjective   Chip is a 39 year old, presenting for the following health issues:  PT referral      10/4/2024     4:21 PM   Additional Questions   Roomed by Kelsea   Accompanied by self     Video Start Time:     HPI     -ongoing right shoulder pain  -no injury  -has been seeing his chiropractor for this and she recommended PT at the facility.  He needs a referral    -pain with abduction and FF  -pain located on the top of the shoulder and anterior shoulder.  No radiation of the pain  -has not been taking anything for the pain        Objective           Vitals:  No vitals were obtained today due to virtual visit.    Physical Exam   GENERAL: alert and no distress  EYES: Eyes grossly normal to inspection.  No discharge or erythema, or obvious scleral/conjunctival abnormalities.  RESP: No audible wheeze, cough, or visible cyanosis.    MS: FROM of the right shoulder, some pain with FF and abduction.    SKIN: Visible skin clear. No significant rash, abnormal pigmentation or lesions.  NEURO: Cranial nerves grossly intact.  Mentation and speech appropriate for age.  PSYCH: Appropriate affect, tone, and pace of words        Video-Visit Details    Type of service:  Video Visit   Video End Time:  Originating Location (pt. Location): Home    Distant Location (provider location):  On-site  Platform used for Video Visit: Chela  Signed Electronically by: Danielle Lehman PA-C

## 2024-10-07 ENCOUNTER — LAB (OUTPATIENT)
Dept: LAB | Facility: CLINIC | Age: 39
End: 2024-10-07
Payer: MEDICARE

## 2024-10-07 DIAGNOSIS — Z94.0 KIDNEY REPLACED BY TRANSPLANT: ICD-10-CM

## 2024-10-07 DIAGNOSIS — Z94.0 KIDNEY TRANSPLANTED: ICD-10-CM

## 2024-10-07 DIAGNOSIS — Z98.890 OTHER SPECIFIED POSTPROCEDURAL STATES: ICD-10-CM

## 2024-10-07 DIAGNOSIS — Z79.899 ENCOUNTER FOR LONG-TERM CURRENT USE OF MEDICATION: ICD-10-CM

## 2024-10-07 DIAGNOSIS — Z48.298 AFTERCARE FOLLOWING ORGAN TRANSPLANT: ICD-10-CM

## 2024-10-07 LAB
ANION GAP SERPL CALCULATED.3IONS-SCNC: 10 MMOL/L (ref 7–15)
BUN SERPL-MCNC: 21.1 MG/DL (ref 6–20)
CALCIUM SERPL-MCNC: 9.3 MG/DL (ref 8.8–10.4)
CHLORIDE SERPL-SCNC: 104 MMOL/L (ref 98–107)
CREAT SERPL-MCNC: 1.45 MG/DL (ref 0.67–1.17)
EGFRCR SERPLBLD CKD-EPI 2021: 62 ML/MIN/1.73M2
ERYTHROCYTE [DISTWIDTH] IN BLOOD BY AUTOMATED COUNT: 12.9 % (ref 10–15)
GLUCOSE SERPL-MCNC: 81 MG/DL (ref 70–99)
HCO3 SERPL-SCNC: 24 MMOL/L (ref 22–29)
HCT VFR BLD AUTO: 37 % (ref 40–53)
HGB BLD-MCNC: 11.9 G/DL (ref 13.3–17.7)
MCH RBC QN AUTO: 28.2 PG (ref 26.5–33)
MCHC RBC AUTO-ENTMCNC: 32.2 G/DL (ref 31.5–36.5)
MCV RBC AUTO: 88 FL (ref 78–100)
PLATELET # BLD AUTO: 267 10E3/UL (ref 150–450)
POTASSIUM SERPL-SCNC: 4.7 MMOL/L (ref 3.4–5.3)
RBC # BLD AUTO: 4.22 10E6/UL (ref 4.4–5.9)
SODIUM SERPL-SCNC: 138 MMOL/L (ref 135–145)
TACROLIMUS BLD-MCNC: 5.1 UG/L (ref 5–15)
TME LAST DOSE: NORMAL H
TME LAST DOSE: NORMAL H
WBC # BLD AUTO: 5.5 10E3/UL (ref 4–11)

## 2024-10-07 PROCEDURE — 86832 HLA CLASS I HIGH DEFIN QUAL: CPT

## 2024-10-07 PROCEDURE — 80048 BASIC METABOLIC PNL TOTAL CA: CPT

## 2024-10-07 PROCEDURE — 36415 COLL VENOUS BLD VENIPUNCTURE: CPT

## 2024-10-07 PROCEDURE — 85027 COMPLETE CBC AUTOMATED: CPT

## 2024-10-07 PROCEDURE — 86833 HLA CLASS II HIGH DEFIN QUAL: CPT

## 2024-10-07 PROCEDURE — 80197 ASSAY OF TACROLIMUS: CPT

## 2024-10-07 PROCEDURE — 87799 DETECT AGENT NOS DNA QUANT: CPT

## 2024-10-08 ENCOUNTER — TRANSFERRED RECORDS (OUTPATIENT)
Dept: HEALTH INFORMATION MANAGEMENT | Facility: CLINIC | Age: 39
End: 2024-10-08
Payer: MEDICARE

## 2024-10-08 ENCOUNTER — TELEPHONE (OUTPATIENT)
Dept: FAMILY MEDICINE | Facility: CLINIC | Age: 39
End: 2024-10-08
Payer: MEDICARE

## 2024-10-08 LAB
BK VIRUS SPECIMEN TYPE: NORMAL
BKV DNA # SPEC NAA+PROBE: NOT DETECTED IU/ML
EBV DNA SERPL NAA+PROBE-ACNC: NOT DETECTED IU/ML

## 2024-10-09 ENCOUNTER — TELEPHONE (OUTPATIENT)
Dept: FAMILY MEDICINE | Facility: CLINIC | Age: 39
End: 2024-10-09

## 2024-10-09 NOTE — TELEPHONE ENCOUNTER
Forms/Letter Request    Type of form/letter: Therapy Plan      Do we have the form/letter: Yes: via right fax    Who is the form from? [facility] (if other please explain)    Where did/will the form come from? form was faxed in    When is form/letter needed by: ASAP    How would you like the form/letter returned: Fax : [phone]    Patient Notified form requests are processed in 5-7 business days:No

## 2024-10-31 ENCOUNTER — OFFICE VISIT (OUTPATIENT)
Dept: DERMATOLOGY | Facility: CLINIC | Age: 39
End: 2024-10-31
Payer: MEDICARE

## 2024-10-31 DIAGNOSIS — D22.9 MULTIPLE BENIGN NEVI: ICD-10-CM

## 2024-10-31 DIAGNOSIS — Z94.9 HISTORY OF ORGAN TRANSPLANTATION: ICD-10-CM

## 2024-10-31 DIAGNOSIS — Z94.0 KIDNEY REPLACED BY TRANSPLANT: ICD-10-CM

## 2024-10-31 DIAGNOSIS — L81.4 LENTIGINES: ICD-10-CM

## 2024-10-31 DIAGNOSIS — D18.01 CHERRY ANGIOMA: Primary | ICD-10-CM

## 2024-10-31 PROCEDURE — 99203 OFFICE O/P NEW LOW 30 MIN: CPT | Mod: GC | Performed by: STUDENT IN AN ORGANIZED HEALTH CARE EDUCATION/TRAINING PROGRAM

## 2024-10-31 ASSESSMENT — PAIN SCALES - GENERAL: PAINLEVEL_OUTOF10: NO PAIN (0)

## 2024-10-31 NOTE — LETTER
10/31/2024       RE: Chip Fowler  [address] [facility] [address]     Dear Colleague,    Thank you for referring your patient, Chip Fowler, to the [facility] [place] at [facility]. Please see a copy of my visit note below.    I have personally examined this patient and agree with the resident doctor's documentation and plan of care. I have reviewed and amended the resident's note. The documentation accurately reflects my clinical observations, diagnoses, treatment and follow-up plans.     Jeferson Lira MD  Dermatology Staff      [facility] Dermatology Note  Encounter Date: Oct 31, 2024  Office Visit     Dermatology Problem List:  FBSE 10/31/24  # Hx kidney transplant (MMF and tacrolimus)  # Multiple benign appearing nevi  - Monitoring photos of back 10/31/24.     ____________________________________________    Assessment & Plan:   # Hx kidney transplant  - Reviewed increased risk of skin cancer. SUNTRAC risk calculator estimates 6% 5yr risk, which we reviewed today.  - Discussed signs/symptoms of skin cancer.  - Discussed importance of diligent SPF, 30 or higher every 2hrs.  - Handout provided.       # Benign skin lesions: seborrheic keratoses, cherry angiomas, multiple benign nevi, lentigines  - Multiple clinically benign nevi, a few larger ones on the back, photos today for monitoring.  - Blue/gray macule on the R lower back c/w blue nevus v exogenous pigment, reassured benign etiology.   - L scalp lesion is cherry angioma, reassured benign etiology.  - Reassured benign etiology.  - No treatment required today.   - Recommended diligent sun protection with SPF 30 or higher. Handout provided.  - Discussed signs and symptoms of skin cancers and ABCDEs of melanoma.        Procedures Performed:   None      Follow-up: 12mo for FBSE    Staff and Resident:     Resident:  I saw and discussed the patient with the  attending physician, Dr. Lira.       Parviz Mata MD, PhD  PGY-4 Dermatology Resident   \  ____________________________________________    CC: No chief complaint on file.      HPI:  Mr. Chip Fowler is a(n) 39 year old male who presents today as a new patient for skin check, hx kidney transplant, referred by Dr. Franko Cerda.     Transplant in early 2023. On MMF and tacrolimus. Doing well. No hx skin cancer. Wears sunscreen most of the time. One red spot on the L scalp he's wondering about.     Patient is otherwise feeling well, without additional skin concerns.    Labs Reviewed:  N/A    Physical Exam:  Vitals: There were no vitals taken for this visit.  SKIN: Full skin, which includes the head/face, both arms, chest, back, abdomen,both legs, buttocks, digits and/or nails, was examined.  - Dome shaped bright red papules on the head and trunk, sparse.   - Scattered brown macules on sun exposed areas.  - Light to dark brown symmetric macules and papules with normal pigment networks on dermoscopy   - Blue gray 5mm macule, structureless on dermoscopy, on the R lower back.   - No other lesions of concern on areas examined.                           Medications:  Current Outpatient Medications   Medication Sig Dispense Refill     magnesium oxide (MAG-OX) 400 MG tablet Take 2 tablets (800 mg) by mouth 2 times daily 120 tablet 11     mycophenolic acid (GENERIC EQUIVALENT) 180 MG EC tablet Take 3 tablets (540 mg) by mouth 2 times daily. 180 tablet 11     psyllium (METAMUCIL/KONSYL) 58.6 % powder Take 18 g (1 Tablespoonful) by mouth 2 times daily as needed (Diarrhea) 283 g 0     sodium bicarbonate 650 MG tablet Take 1 tablet (650 mg) by mouth 2 times daily 120 tablet 11     sulfamethoxazole-trimethoprim (BACTRIM) 400-80 MG tablet Take 1 tablet by mouth daily 30 tablet 11     tacrolimus (GENERIC) 0.5 MG capsule Take 1 capsule (0.5 mg) by mouth 2 times daily Total dose = 2.5 mg twice per day 60 capsule 11      tacrolimus (GENERIC) 1 MG capsule Take 2 capsules (2 mg) by mouth 2 times daily Total dose = 2.5 mg twice per day 120 capsule 11     Vitamin D, Cholecalciferol, 50 MCG (2000 UT) CAPS Take 2,000 Units by mouth daily 30 capsule 11     No current facility-administered medications for this visit.        Past Medical History:   Patient Active Problem List   Diagnosis     HTN, kidney transplant related     Urethral stricture     Urethral diverticulum     Stage 3a chronic kidney disease (H)     Kidney replaced by transplant     Immunosuppressed status (H)     Aftercare following organ transplant     Need for pneumocystis prophylaxis     Anemia in chronic renal disease     Secondary renal hyperparathyroidism (H)     Vitamin D deficiency     Hypomagnesemia     Diarrhea     Urinary tract infection without hematuria, site unspecified     Metabolic acidosis     EBV (Aaron-Barr virus) viremia     Pyelonephritis of transplanted kidney     CMV (cytomegalovirus infection) (H)     Neutropenia (H)     E. coli UTI     History of Clostridium difficile infection     History of methamphetamine abuse (H)     Past Medical History:   Diagnosis Date     Bladder stones      Cardiomyopathy (H)      Clostridium difficile infection 10/10/2023     CMV (cytomegalovirus infection) (H) 08/23/2023 08/23/23:  CMV-Blood     E. coli UTI 09/24/2023     ESRD (end stage renal disease) on dialysis (H)      Hypertension      Kidney replaced by transplant 01/15/2023    DCD DDKT. Intermediate risk induction.     Migraines      Polysubstance abuse (H)      Solitary kidney, congenital      Urethral stone      Urethral stricture        CC Referred Self, MD  No address on file on close of this encounter.        Again, thank you for allowing me to participate in the care of your patient.      Sincerely,    Jeferson Lira MD

## 2024-10-31 NOTE — NURSING NOTE
[facility] PROFESSIONAL [facility]S  [facility]  [address] [facility] [address]  Dept: [phone]  Dept Fax: [phone]  Loc: [phone]     Visit Date:  10/31/2024      Patient:  Babar Haddad  YOB: 1958    HPI:     Chief Complaint   Patient presents with    Hematuria       Pt presents to the office today for hematuria.  Noticed it a few weeks ago in his ejaculate.  Today he noticed it his urine again.  More frequent night waking to urinate.  3 per night.  He denies any pain with urination.  He is not having any fevers or flank pain.      Hematuria  This is a new problem. The current episode started 1 to 4 weeks ago. He describes the hematuria as gross hematuria. He reports no clotting in his urine stream. He is experiencing no pain. Irritative symptoms do not include frequency, nocturia or urgency. Obstructive symptoms do not include dribbling or incomplete emptying. Associated symptoms include hematospermia. Pertinent negatives include no abdominal pain, bladder pain, bone pain, chills, dysuria, facial swelling, fever, flank pain, genital pain, hesitancy, inability to urinate, nausea, urinary retention, vomiting or weight loss. He is sexually active. His past medical history is significant for hypertension. There is no history of BPH,  trauma, kidney stones, prostatitis, recent infection, sickle cell disease, STDs or tobacco use.       Medications    Current Outpatient Medications:     ciprofloxacin (CIPRO) 500 MG tablet, Take 1 tablet by mouth 2 times daily for 7 days, Disp: 14 tablet, Rfl: 0    amLODIPine (NORVASC) 5 MG tablet, Take 2 tablets by mouth daily - if your top BP number is less than 110 do not take, Disp: 180 tablet, Rfl: 3    labetalol (NORMODYNE) 200 MG tablet, Take 1 tablet by mouth 2 times daily, Disp: 180 tablet, Rfl: 2    eplerenone (INSPRA) 50 MG tablet, Take 1 tablet by mouth daily, Disp: 90 tablet, Rfl: 3    potassium chloride (KLOR-CON  Chief Complaint   Patient presents with     Blood Draw     Labs drawn with PIV start by RN. Vitals taken.     Labs drawn with PIV start by RN. Pt tolerated well. Vitals taken. Pt checked into next appointment.    Corinne Medina RN

## 2024-10-31 NOTE — NURSING NOTE
Dermatology Rooming Note    Chip Fowler's goals for this visit include:   Chief Complaint   Patient presents with    Derm Problem     FBSE - no areas of concern     Azra Agosto, EMT

## 2024-10-31 NOTE — PATIENT INSTRUCTIONS

## 2024-10-31 NOTE — PROGRESS NOTES
I have personally examined this patient and agree with the resident doctor's documentation and plan of care. I have reviewed and amended the resident's note. The documentation accurately reflects my clinical observations, diagnoses, treatment and follow-up plans.     Jeferson Lira MD  Dermatology Staff      [facility] Dermatology Note  Encounter Date: Oct 31, 2024  Office Visit     Dermatology Problem List:  FBSE 10/31/24  # Hx kidney transplant (MMF and tacrolimus)  # Multiple benign appearing nevi  - Monitoring photos of back 10/31/24.     ____________________________________________    Assessment & Plan:   # Hx kidney transplant  - Reviewed increased risk of skin cancer. SUNTRAC risk calculator estimates 6% 5yr risk, which we reviewed today.  - Discussed signs/symptoms of skin cancer.  - Discussed importance of diligent SPF, 30 or higher every 2hrs.  - Handout provided.       # Benign skin lesions: seborrheic keratoses, cherry angiomas, multiple benign nevi, lentigines  - Multiple clinically benign nevi, a few larger ones on the back, photos today for monitoring.  - Blue/gray macule on the R lower back c/w blue nevus v exogenous pigment, reassured benign etiology.   - L scalp lesion is cherry angioma, reassured benign etiology.  - Reassured benign etiology.  - No treatment required today.   - Recommended diligent sun protection with SPF 30 or higher. Handout provided.  - Discussed signs and symptoms of skin cancers and ABCDEs of melanoma.        Procedures Performed:   None      [facility]    Staff and Resident:     Resident:  I saw and discussed the patient with the attending physician, Dr. Lira.       Parviz Mata MD, PhD  PGY-4 Dermatology Resident   \  ____________________________________________    CC: No chief complaint on file.      HPI:  Mr. Chip Fowler is a(n) 39 year old male who presents today as a new patient for skin check, hx kidney transplant, referred  by Dr. Franko Cerda.     Transplant in early 2023. On MMF and tacrolimus. Doing well. No hx skin cancer. Wears sunscreen most of the time. One red spot on the L scalp he's wondering about.     Patient is otherwise feeling well, without additional skin concerns.    Labs Reviewed:  N/A    Physical Exam:  Vitals: There were no vitals taken for this visit.  SKIN: Full skin, which includes the head/face, both arms, chest, back, abdomen,both legs, buttocks, digits and/or nails, was examined.  - Dome shaped bright red papules on the head and trunk, sparse.   - Scattered brown macules on sun exposed areas.  - Light to dark brown symmetric macules and papules with normal pigment networks on dermoscopy   - Blue gray 5mm macule, structureless on dermoscopy, on the R lower back.   - No other lesions of concern on areas examined.                           Medications:  Current Outpatient Medications   Medication Sig Dispense Refill    magnesium oxide (MAG-OX) 400 MG tablet Take 2 tablets (800 mg) by mouth 2 times daily 120 tablet 11    mycophenolic acid (GENERIC EQUIVALENT) 180 MG EC tablet Take 3 tablets (540 mg) by mouth 2 times daily. 180 tablet 11    psyllium (METAMUCIL/KONSYL) 58.6 % powder Take 18 g (1 Tablespoonful) by mouth 2 times daily as needed (Diarrhea) 283 g 0    sodium bicarbonate 650 MG tablet Take 1 tablet (650 mg) by mouth 2 times daily 120 tablet 11    sulfamethoxazole-trimethoprim (BACTRIM) 400-80 MG tablet Take 1 tablet by mouth daily 30 tablet 11    tacrolimus (GENERIC) 0.5 MG capsule Take 1 capsule (0.5 mg) by mouth 2 times daily Total dose = 2.5 mg twice per day 60 capsule 11    tacrolimus (GENERIC) 1 MG capsule Take 2 capsules (2 mg) by mouth 2 times daily Total dose = 2.5 mg twice per day 120 capsule 11    Vitamin D, Cholecalciferol, 50 MCG (2000 UT) CAPS Take 2,000 Units by mouth daily 30 capsule 11     No current facility-administered medications for this visit.        Past Medical History:   Patient  Active Problem List   Diagnosis    HTN, kidney transplant related    Urethral stricture    Urethral diverticulum    Stage 3a chronic kidney disease (H)    Kidney replaced by transplant    Immunosuppressed status (H)    Aftercare following organ transplant    Need for pneumocystis prophylaxis    Anemia in chronic renal disease    Secondary renal hyperparathyroidism (H)    Vitamin D deficiency    Hypomagnesemia    Diarrhea    Urinary tract infection without hematuria, site unspecified    Metabolic acidosis    EBV (Aaron-Barr virus) viremia    Pyelonephritis of transplanted kidney    CMV (cytomegalovirus infection) (H)    Neutropenia (H)    E. coli UTI    History of Clostridium difficile infection    History of methamphetamine abuse (H)     Past Medical History:   Diagnosis Date    Bladder stones     Cardiomyopathy (H)     Clostridium difficile infection 10/10/2023    CMV (cytomegalovirus infection) (H) 08/23/2023 08/23/23:  CMV-Blood    E. coli UTI 09/24/2023    ESRD (end stage renal disease) on dialysis (H)     Hypertension     Kidney replaced by transplant 01/15/2023    DCD DDKT. Intermediate risk induction.    Migraines     Polysubstance abuse (H)     Solitary kidney, congenital     Urethral stone     Urethral stricture        CC Referred Self, MD  No address on file on close of this encounter.

## 2024-11-05 ENCOUNTER — LAB (OUTPATIENT)
Dept: LAB | Facility: CLINIC | Age: 39
End: 2024-11-05
Payer: MEDICARE

## 2024-11-05 DIAGNOSIS — Z48.298 AFTERCARE FOLLOWING ORGAN TRANSPLANT: ICD-10-CM

## 2024-11-05 DIAGNOSIS — Z79.899 ENCOUNTER FOR LONG-TERM CURRENT USE OF MEDICATION: ICD-10-CM

## 2024-11-05 DIAGNOSIS — Z98.890 OTHER SPECIFIED POSTPROCEDURAL STATES: ICD-10-CM

## 2024-11-05 DIAGNOSIS — Z94.0 KIDNEY REPLACED BY TRANSPLANT: ICD-10-CM

## 2024-11-05 LAB
ANION GAP SERPL CALCULATED.3IONS-SCNC: 13 MMOL/L (ref 7–15)
BUN SERPL-MCNC: 18.8 MG/DL (ref 6–20)
CALCIUM SERPL-MCNC: 9.1 MG/DL (ref 8.8–10.4)
CHLORIDE SERPL-SCNC: 104 MMOL/L (ref 98–107)
CREAT SERPL-MCNC: 1.46 MG/DL (ref 0.67–1.17)
EGFRCR SERPLBLD CKD-EPI 2021: 62 ML/MIN/1.73M2
ERYTHROCYTE [DISTWIDTH] IN BLOOD BY AUTOMATED COUNT: 13.1 % (ref 10–15)
GLUCOSE SERPL-MCNC: 70 MG/DL (ref 70–99)
HCO3 SERPL-SCNC: 24 MMOL/L (ref 22–29)
HCT VFR BLD AUTO: 37.4 % (ref 40–53)
HGB BLD-MCNC: 11.9 G/DL (ref 13.3–17.7)
MCH RBC QN AUTO: 27.9 PG (ref 26.5–33)
MCHC RBC AUTO-ENTMCNC: 31.8 G/DL (ref 31.5–36.5)
MCV RBC AUTO: 88 FL (ref 78–100)
PLATELET # BLD AUTO: 267 10E3/UL (ref 150–450)
POTASSIUM SERPL-SCNC: 4.6 MMOL/L (ref 3.4–5.3)
RBC # BLD AUTO: 4.26 10E6/UL (ref 4.4–5.9)
SODIUM SERPL-SCNC: 141 MMOL/L (ref 135–145)
TACROLIMUS BLD-MCNC: 5.4 UG/L (ref 5–15)
TME LAST DOSE: NORMAL H
TME LAST DOSE: NORMAL H
WBC # BLD AUTO: 5.7 10E3/UL (ref 4–11)

## 2024-11-05 PROCEDURE — 80197 ASSAY OF TACROLIMUS: CPT

## 2024-11-05 PROCEDURE — 87799 DETECT AGENT NOS DNA QUANT: CPT

## 2024-11-05 PROCEDURE — 85014 HEMATOCRIT: CPT

## 2024-11-05 PROCEDURE — 80048 BASIC METABOLIC PNL TOTAL CA: CPT

## 2024-11-05 PROCEDURE — 36415 COLL VENOUS BLD VENIPUNCTURE: CPT

## 2024-11-07 ENCOUNTER — TRANSFERRED RECORDS (OUTPATIENT)
Dept: HEALTH INFORMATION MANAGEMENT | Facility: CLINIC | Age: 39
End: 2024-11-07
Payer: MEDICARE

## 2024-11-07 LAB
BK VIRUS SPECIMEN TYPE: NORMAL
BKV DNA # SPEC NAA+PROBE: NOT DETECTED IU/ML

## 2024-11-13 ENCOUNTER — TELEPHONE (OUTPATIENT)
Dept: FAMILY MEDICINE | Facility: CLINIC | Age: 39
End: 2024-11-13
Payer: MEDICARE

## 2024-11-13 NOTE — TELEPHONE ENCOUNTER
Forms/Letter Request    Type of form/letter: OTHER: Plan of care       Do we have the form/letter: Yes: placed in Danielle inbox    Who is the form from? [facility] (if other please explain)    Where did/will the form come from? form was faxed in    When is form/letter needed by: asap    How would you like the form/letter returned: Fax : [phone]

## 2024-11-30 ENCOUNTER — OFFICE VISIT (OUTPATIENT)
Dept: URGENT CARE | Facility: URGENT CARE | Age: 39
End: 2024-11-30
Payer: MEDICARE

## 2024-11-30 VITALS
WEIGHT: 157 LBS | HEART RATE: 100 BPM | DIASTOLIC BLOOD PRESSURE: 76 MMHG | OXYGEN SATURATION: 100 % | RESPIRATION RATE: 18 BRPM | TEMPERATURE: 101.9 F | SYSTOLIC BLOOD PRESSURE: 134 MMHG | BODY MASS INDEX: 21.29 KG/M2

## 2024-11-30 DIAGNOSIS — R50.9 FEBRILE ILLNESS, ACUTE: ICD-10-CM

## 2024-11-30 DIAGNOSIS — N39.0 COMPLICATED UTI (URINARY TRACT INFECTION): Primary | ICD-10-CM

## 2024-11-30 DIAGNOSIS — Z87.440 HISTORY OF RECURRENT UTIS: ICD-10-CM

## 2024-11-30 LAB
ALBUMIN UR-MCNC: 30 MG/DL
APPEARANCE UR: CLEAR
BACTERIA #/AREA URNS HPF: ABNORMAL /HPF
BASOPHILS # BLD AUTO: 0 10E3/UL (ref 0–0.2)
BASOPHILS NFR BLD AUTO: 0 %
BILIRUB UR QL STRIP: NEGATIVE
COLOR UR AUTO: YELLOW
DEPRECATED S PYO AG THROAT QL EIA: NEGATIVE
EOSINOPHIL # BLD AUTO: 0.2 10E3/UL (ref 0–0.7)
EOSINOPHIL NFR BLD AUTO: 1 %
ERYTHROCYTE [DISTWIDTH] IN BLOOD BY AUTOMATED COUNT: 13.2 % (ref 10–15)
FLUAV AG SPEC QL IA: NEGATIVE
FLUBV AG SPEC QL IA: NEGATIVE
GLUCOSE UR STRIP-MCNC: NEGATIVE MG/DL
HCT VFR BLD AUTO: 36.5 % (ref 40–53)
HGB BLD-MCNC: 12 G/DL (ref 13.3–17.7)
HGB UR QL STRIP: ABNORMAL
IMM GRANULOCYTES # BLD: 0 10E3/UL
IMM GRANULOCYTES NFR BLD: 0 %
KETONES UR STRIP-MCNC: NEGATIVE MG/DL
LEUKOCYTE ESTERASE UR QL STRIP: ABNORMAL
LYMPHOCYTES # BLD AUTO: 1 10E3/UL (ref 0.8–5.3)
LYMPHOCYTES NFR BLD AUTO: 8 %
MCH RBC QN AUTO: 28.6 PG (ref 26.5–33)
MCHC RBC AUTO-ENTMCNC: 32.9 G/DL (ref 31.5–36.5)
MCV RBC AUTO: 87 FL (ref 78–100)
MONOCYTES # BLD AUTO: 1.4 10E3/UL (ref 0–1.3)
MONOCYTES NFR BLD AUTO: 11 %
NEUTROPHILS # BLD AUTO: 10 10E3/UL (ref 1.6–8.3)
NEUTROPHILS NFR BLD AUTO: 79 %
NITRATE UR QL: NEGATIVE
PH UR STRIP: 7 [PH] (ref 5–7)
PLATELET # BLD AUTO: 206 10E3/UL (ref 150–450)
RBC # BLD AUTO: 4.2 10E6/UL (ref 4.4–5.9)
RBC #/AREA URNS AUTO: ABNORMAL /HPF
SP GR UR STRIP: 1.01 (ref 1–1.03)
SQUAMOUS #/AREA URNS AUTO: ABNORMAL /LPF
UROBILINOGEN UR STRIP-ACNC: 0.2 E.U./DL
WBC # BLD AUTO: 12.7 10E3/UL (ref 4–11)
WBC #/AREA URNS AUTO: ABNORMAL /HPF

## 2024-11-30 PROCEDURE — 87804 INFLUENZA ASSAY W/OPTIC: CPT | Performed by: PHYSICIAN ASSISTANT

## 2024-11-30 PROCEDURE — 87086 URINE CULTURE/COLONY COUNT: CPT | Performed by: PHYSICIAN ASSISTANT

## 2024-11-30 PROCEDURE — 87088 URINE BACTERIA CULTURE: CPT | Performed by: PHYSICIAN ASSISTANT

## 2024-11-30 PROCEDURE — 85025 COMPLETE CBC W/AUTO DIFF WBC: CPT | Performed by: PHYSICIAN ASSISTANT

## 2024-11-30 PROCEDURE — 81001 URINALYSIS AUTO W/SCOPE: CPT

## 2024-11-30 PROCEDURE — 87186 SC STD MICRODIL/AGAR DIL: CPT | Performed by: PHYSICIAN ASSISTANT

## 2024-11-30 PROCEDURE — 87651 STREP A DNA AMP PROBE: CPT | Performed by: PHYSICIAN ASSISTANT

## 2024-11-30 PROCEDURE — 99214 OFFICE O/P EST MOD 30 MIN: CPT | Performed by: PHYSICIAN ASSISTANT

## 2024-11-30 PROCEDURE — 36415 COLL VENOUS BLD VENIPUNCTURE: CPT | Performed by: PHYSICIAN ASSISTANT

## 2024-11-30 PROCEDURE — 87635 SARS-COV-2 COVID-19 AMP PRB: CPT | Performed by: PHYSICIAN ASSISTANT

## 2024-11-30 RX ORDER — CEFDINIR 300 MG/1
300 CAPSULE ORAL 2 TIMES DAILY
Qty: 20 CAPSULE | Refills: 0 | Status: SHIPPED | OUTPATIENT
Start: 2024-11-30 | End: 2024-12-10

## 2024-11-30 ASSESSMENT — ENCOUNTER SYMPTOMS
CONSTIPATION: 0
VOMITING: 0
FREQUENCY: 0
COUGH: 0
FEVER: 1
JOINT SWELLING: 0
DIARRHEA: 0
SORE THROAT: 0
RHINORRHEA: 0
ABDOMINAL PAIN: 0

## 2024-11-30 NOTE — PROGRESS NOTES
Assessment & Plan:        ICD-10-CM    1. Complicated UTI (urinary tract infection)  N39.0 UA Macroscopic with reflex to Microscopic and Culture - Lab Collect     UA Macroscopic with reflex to Microscopic and Culture - Lab Collect     Urine Microscopic Exam     Urine Culture     cefdinir (OMNICEF) 300 MG capsule      2. Febrile illness, acute  R50.9 CBC with platelets and differential     Influenza A/B antigen     Streptococcus A Rapid Screen w/Reflex to PCR - Clinic Collect     COVID-19 Virus (Coronavirus) by PCR Nose     CBC with platelets and differential     Group A Streptococcus PCR Throat Swab      3. History of recurrent UTIs  Z87.440             Plan/Clinical Decision Making:    Patient presents with acute fever for two days. Had some burning sensation in bladder 1-2 times today, otherwise no other symptoms. Hx of renal transplant and hx of recurrent UTIs. Febrile today with otherwise normal exam.   Strep, flu negative. UA WBCs 25-50.   CBC: WBCs 12.7, neutrophils 10.0, monocytes: 1.4. hemoglobin 12.0 similar to past.   Treated with course of cefdinir. UC pending.     Return if symptoms worsen or fail to improve, for in 2-3 days.     At the end of the encounter, I discussed results, diagnosis, medications. Discussed red flags for immediate return to clinic/ER, as well as indications for follow up if no improvement. Patient understood and agreed to plan. Patient was stable for discharge.        Luz Gaffney PA-C on 11/30/2024 at 4:06 PM          Subjective:     HPI:    Chip is a 39 year old male who presents to clinic today for the following health issues:  Chief Complaint   Patient presents with    Urgent Care     Intermittent fever/chills x 3 days, took tylenol this morning at 9 am. No frequency- had a history of UTI's, kidney transplant patient. Was on a daily antibiotic which he stopped back in September.      HPI    Patient complains of fever that started Thursday night.   Temp at home: 100-103.    Son recently ill. He had cough, runny nose. Had some nasal congestion that is cleared now.   Burning sensation in bladder 1-2 times today. No other urinary symptoms.   Travel- [place] for Thanksgiving.       Review of Systems   Constitutional:  Positive for fever.   HENT:  Negative for congestion, ear pain, rhinorrhea and sore throat.    Respiratory:  Negative for cough.    Gastrointestinal:  Negative for abdominal pain, constipation, diarrhea and vomiting.   Genitourinary:  Negative for frequency, scrotal swelling, testicular pain and urgency.   Musculoskeletal:  Negative for joint swelling.   Skin:  Negative for rash.         Patient Active Problem List   Diagnosis    HTN, kidney transplant related    Urethral stricture    Urethral diverticulum    Stage 3a chronic kidney disease (H)    Kidney replaced by transplant    Immunosuppressed status (H)    Aftercare following organ transplant    Need for pneumocystis prophylaxis    Anemia in chronic renal disease    Secondary renal hyperparathyroidism (H)    Vitamin D deficiency    Hypomagnesemia    Diarrhea    Urinary tract infection without hematuria, site unspecified    Metabolic acidosis    EBV (Aaron-Barr virus) viremia    Pyelonephritis of transplanted kidney    CMV (cytomegalovirus infection) (H)    Neutropenia (H)    E. coli UTI    History of Clostridium difficile infection    History of methamphetamine abuse (H)        Past Medical History:   Diagnosis Date    Bladder stones     Cardiomyopathy (H)     Clostridium difficile infection 10/10/2023    CMV (cytomegalovirus infection) (H) 08/23/2023 08/23/23:  CMV-Blood    E. coli UTI 09/24/2023    ESRD (end stage renal disease) on dialysis (H)     Hypertension     Kidney replaced by transplant 01/15/2023    DCD DDKT. Intermediate risk induction.    Migraines     Polysubstance abuse (H)     Solitary kidney, congenital     Urethral stone     Urethral stricture        Social History     Tobacco Use    Smoking  status: Former     Current packs/day: 0.00     Average packs/day: 0.5 packs/day for 21.0 years (10.5 ttl pk-yrs)     Types: Cigarettes     Start date:      Quit date: 1/15/2023     Years since quittin.8    Smokeless tobacco: Never   Substance Use Topics    Alcohol use: Not Currently     Comment: quit alcohol 3-4 yrs ago             Objective:     Vitals:    24 1528   BP: 134/76   Pulse: 100   Resp: 18   Temp: (!) 101.9  F (38.8  C)   SpO2: 100%   Weight: 71.2 kg (157 lb)         Physical Exam   EXAM:   Pleasant, alert, appropriate appearance. NAD.  Head Exam: Normocephalic, atraumatic.  Eye Exam:  PERRLA, EOMI, non icteric/injection.    Ear Exam: TMs grey without bulging. Normal canals.  Normal pinna.  Nose Exam: Normal external nose.    OroPharynx Exam:  Moist mucous membranes. No erythema, pharynx without exudate or hypertrophy.  Neck/Thyroid Exam:  No LAD.    Chest/Respiratory Exam: CTAB.  Cardiovascular Exam: RRR. No murmur or rubs.  ABD: soft, Non-tender, normal bowel sounds, no rebound/guarding.  No masses/organomegaly.  No CVA tenderness.       Results:  Results for orders placed or performed in visit on 24   UA Macroscopic with reflex to Microscopic and Culture - Lab Collect     Status: Abnormal    Specimen: Urine, Clean Catch   Result Value Ref Range    Color Urine Yellow Colorless, Straw, Light Yellow, Yellow    Appearance Urine Clear Clear    Glucose Urine Negative Negative mg/dL    Bilirubin Urine Negative Negative    Ketones Urine Negative Negative mg/dL    Specific Gravity Urine 1.015 1.003 - 1.035    Blood Urine Small (A) Negative    pH Urine 7.0 5.0 - 7.0    Protein Albumin Urine 30 (A) Negative mg/dL    Urobilinogen Urine 0.2 0.2, 1.0 E.U./dL    Nitrite Urine Negative Negative    Leukocyte Esterase Urine Moderate (A) Negative   Urine Microscopic Exam     Status: Abnormal   Result Value Ref Range    Bacteria Urine Moderate (A) None Seen /HPF    RBC Urine 0-2 0-2 /HPF /HPF    WBC  Urine 25-50 (A) 0-5 /HPF /HPF    Squamous Epithelials Urine Few (A) None Seen /LPF   COVID-19 Virus (Coronavirus) by PCR Nose     Status: Normal    Specimen: Nose; Swab   Result Value Ref Range    SARS CoV2 PCR Negative Negative    Narrative    Testing was performed using the samantha SARS-CoV-2 assay on the samantha  Custora0 System. This test should be ordered for the detection of  SARS-CoV-2 in individuals who meet SARS-CoV-2 clinical and/or  epidemiological criteria. Test performance is unknown in asymptomatic  patients. This test is for in vitro diagnostic use under the FDA EUA  for laboratories certified under CLIA to perform high and/or moderate  complexity testing. This test has not been FDA cleared or approved. A  negative result does not rule out the presence of PCR inhibitors in  the specimen or target RNA in concentration below the limit of  detection for the assay. The possibility of a false negative should  be considered if the patient's recent exposure or clinical  presentation suggests COVID-19. This test was validated by the [facility]. This  laboratory is certified under the Clinical Laboratory Improvement  Amendments of 1988 (CLIA-88) as qualified to perform high and/or  moderate complexity laboratory testing.   CBC with platelets and differential     Status: Abnormal   Result Value Ref Range    WBC Count 12.7 (H) 4.0 - 11.0 10e3/uL    RBC Count 4.20 (L) 4.40 - 5.90 10e6/uL    Hemoglobin 12.0 (L) 13.3 - 17.7 g/dL    Hematocrit 36.5 (L) 40.0 - 53.0 %    MCV 87 78 - 100 fL    MCH 28.6 26.5 - 33.0 pg    MCHC 32.9 31.5 - 36.5 g/dL    RDW 13.2 10.0 - 15.0 %    Platelet Count 206 150 - 450 10e3/uL    % Neutrophils 79 %    % Lymphocytes 8 %    % Monocytes 11 %    % Eosinophils 1 %    % Basophils 0 %    % Immature Granulocytes 0 %    Absolute Neutrophils 10.0 (H) 1.6 - 8.3 10e3/uL    Absolute Lymphocytes 1.0 0.8 - 5.3 10e3/uL    Absolute Monocytes 1.4 (H) 0.0 - 1.3  10e3/uL    Absolute Eosinophils 0.2 0.0 - 0.7 10e3/uL    Absolute Basophils 0.0 0.0 - 0.2 10e3/uL    Absolute Immature Granulocytes 0.0 <=0.4 10e3/uL   Urine Culture     Status: Abnormal    Specimen: Urine, Clean Catch   Result Value Ref Range    Culture 10,000-50,000 CFU/mL Klebsiella oxytoca ESBL (A)        Susceptibility    Klebsiella oxytoca ESBL - ALEXANDER*     Ampicillin*  Resistant       * Intrinsically Resistant     Piperacillin/Tazobactam <=4 Susceptible ug/mL     Cefazolin* 16 Susceptible ug/mL      * Cefazolin ALEXANDER breakpoints are for the treatment of uncomplicated urinary tract infections. For the treatment of systemic infections, please contact the laboratory for additional testing.     Cefoxitin <=4 Susceptible ug/mL     Ceftazidime  Resistant      Ceftriaxone  Resistant      Cefepime  Resistant      Meropenem* <=0.25 Susceptible ug/mL      * Enterobacterales that are susceptible to meropenem are usually susceptible to ertapenem.     Gentamicin <=1 Susceptible ug/mL     Tobramycin <=1 Susceptible ug/mL     Ciprofloxacin <=0.25 Susceptible ug/mL     Levofloxacin <=0.12 Susceptible ug/mL     Nitrofurantoin 128 Resistant ug/mL     Trimethoprim/Sulfamethoxazole >16/304 Resistant ug/mL     * ESBL (extended spectrum beta lactamase) producing organisms require contact precautions.   Influenza A/B antigen     Status: Normal    Specimen: Nose; Swab   Result Value Ref Range    Influenza A antigen Negative Negative    Influenza B antigen Negative Negative    Narrative    Test results must be correlated with clinical data. If necessary, results should be confirmed by a molecular assay or viral culture.   Streptococcus A Rapid Screen w/Reflex to PCR - Clinic Collect     Status: Normal    Specimen: Throat; Swab   Result Value Ref Range    Group A Strep antigen Negative Negative   Group A Streptococcus PCR Throat Swab     Status: Normal    Specimen: Throat; Swab   Result Value Ref Range    Group A strep by PCR Not Detected  Not Detected    Narrative    The Xpert Xpress Strep A test, performed on the [employer] Systems, is a rapid, qualitative in vitro diagnostic test for the detection of Streptococcus pyogenes (Group A ß-hemolytic Streptococcus, Strep A) in throat swab specimens from patients with signs and symptoms of pharyngitis. The Xpert Xpress Strep A test can be used as an aid in the diagnosis of Group A Streptococcal pharyngitis. The assay is not intended to monitor treatment for Group A Streptococcus infections. The Xpert Xpress Strep A test utilizes an automated real-time polymerase chain reaction (PCR) to detect Streptococcus pyogenes DNA.   CBC with platelets and differential     Status: Abnormal    Narrative    The following orders were created for panel order CBC with platelets and differential.  Procedure                               Abnormality         Status                     ---------                               -----------         ------                     CBC with platelets and d...[[id number]]  Abnormal            Final result                 Please view results for these tests on the individual orders.

## 2024-12-01 LAB — GROUP A STREP BY PCR: NOT DETECTED

## 2024-12-02 LAB — SARS-COV-2 RNA RESP QL NAA+PROBE: NEGATIVE

## 2024-12-03 ENCOUNTER — TELEPHONE (OUTPATIENT)
Dept: URGENT CARE | Facility: URGENT CARE | Age: 39
End: 2024-12-03
Payer: MEDICARE

## 2024-12-03 DIAGNOSIS — N39.0 URINARY TRACT INFECTION WITHOUT HEMATURIA, SITE UNSPECIFIED: Primary | ICD-10-CM

## 2024-12-03 DIAGNOSIS — Z94.0 KIDNEY REPLACED BY TRANSPLANT: Primary | ICD-10-CM

## 2024-12-03 DIAGNOSIS — B25.9 CYTOMEGALOVIRUS INFECTION, UNSPECIFIED CYTOMEGALOVIRAL INFECTION TYPE (H): ICD-10-CM

## 2024-12-03 LAB — BACTERIA UR CULT: ABNORMAL

## 2024-12-03 RX ORDER — LEVOFLOXACIN 500 MG/1
500 TABLET, FILM COATED ORAL DAILY
Qty: 7 TABLET | Refills: 0 | Status: SHIPPED | OUTPATIENT
Start: 2024-12-03

## 2024-12-03 RX ORDER — CEPHALEXIN 500 MG/1
500 CAPSULE ORAL 3 TIMES DAILY
Qty: 21 CAPSULE | Refills: 0 | Status: SHIPPED | OUTPATIENT
Start: 2024-12-03 | End: 2024-12-10

## 2024-12-03 NOTE — TELEPHONE ENCOUNTER
Please notify patient    Urine culture positive for Klebsiella  Given RX Omnicef which may be resistant (resistant to ceftriaxone)    New RX keflex 500 mg three times daily for 7 days efaxed to pharmacy on file

## 2024-12-09 ENCOUNTER — LAB (OUTPATIENT)
Dept: LAB | Facility: CLINIC | Age: 39
End: 2024-12-09
Payer: MEDICARE

## 2024-12-09 DIAGNOSIS — Z79.899 ENCOUNTER FOR LONG-TERM CURRENT USE OF MEDICATION: ICD-10-CM

## 2024-12-09 DIAGNOSIS — Z94.0 KIDNEY REPLACED BY TRANSPLANT: ICD-10-CM

## 2024-12-09 DIAGNOSIS — Z98.890 OTHER SPECIFIED POSTPROCEDURAL STATES: ICD-10-CM

## 2024-12-09 DIAGNOSIS — Z48.298 AFTERCARE FOLLOWING ORGAN TRANSPLANT: ICD-10-CM

## 2024-12-09 LAB
ANION GAP SERPL CALCULATED.3IONS-SCNC: 13 MMOL/L (ref 7–15)
BUN SERPL-MCNC: 21.9 MG/DL (ref 6–20)
CALCIUM SERPL-MCNC: 8.9 MG/DL (ref 8.8–10.4)
CHLORIDE SERPL-SCNC: 103 MMOL/L (ref 98–107)
CREAT SERPL-MCNC: 1.51 MG/DL (ref 0.67–1.17)
EGFRCR SERPLBLD CKD-EPI 2021: 60 ML/MIN/1.73M2
ERYTHROCYTE [DISTWIDTH] IN BLOOD BY AUTOMATED COUNT: 13.4 % (ref 10–15)
GLUCOSE SERPL-MCNC: 83 MG/DL (ref 70–99)
HCO3 SERPL-SCNC: 22 MMOL/L (ref 22–29)
HCT VFR BLD AUTO: 37.3 % (ref 40–53)
HGB BLD-MCNC: 12 G/DL (ref 13.3–17.7)
MCH RBC QN AUTO: 27.8 PG (ref 26.5–33)
MCHC RBC AUTO-ENTMCNC: 32.2 G/DL (ref 31.5–36.5)
MCV RBC AUTO: 86 FL (ref 78–100)
PLATELET # BLD AUTO: 336 10E3/UL (ref 150–450)
POTASSIUM SERPL-SCNC: 4.2 MMOL/L (ref 3.4–5.3)
RBC # BLD AUTO: 4.32 10E6/UL (ref 4.4–5.9)
SODIUM SERPL-SCNC: 138 MMOL/L (ref 135–145)
WBC # BLD AUTO: 6.1 10E3/UL (ref 4–11)

## 2024-12-09 PROCEDURE — 84520 ASSAY OF UREA NITROGEN: CPT

## 2024-12-09 PROCEDURE — 80197 ASSAY OF TACROLIMUS: CPT

## 2024-12-09 PROCEDURE — 85041 AUTOMATED RBC COUNT: CPT

## 2024-12-09 PROCEDURE — 85014 HEMATOCRIT: CPT

## 2024-12-09 PROCEDURE — 82565 ASSAY OF CREATININE: CPT

## 2024-12-09 PROCEDURE — 80048 BASIC METABOLIC PNL TOTAL CA: CPT

## 2024-12-09 PROCEDURE — 87799 DETECT AGENT NOS DNA QUANT: CPT

## 2024-12-09 PROCEDURE — 36415 COLL VENOUS BLD VENIPUNCTURE: CPT

## 2024-12-10 ENCOUNTER — TRANSFERRED RECORDS (OUTPATIENT)
Dept: HEALTH INFORMATION MANAGEMENT | Facility: CLINIC | Age: 39
End: 2024-12-10
Payer: MEDICARE

## 2024-12-10 LAB
BK VIRUS SPECIMEN TYPE: NORMAL
BKV DNA # SPEC NAA+PROBE: NOT DETECTED IU/ML

## 2024-12-11 LAB
TACROLIMUS BLD-MCNC: 6.4 UG/L (ref 5–15)
TME LAST DOSE: NORMAL H
TME LAST DOSE: NORMAL H

## 2024-12-17 ENCOUNTER — TELEPHONE (OUTPATIENT)
Dept: FAMILY MEDICINE | Facility: CLINIC | Age: 39
End: 2024-12-17
Payer: MEDICARE

## 2024-12-17 NOTE — TELEPHONE ENCOUNTER
Forms/Letter Request    Type of form/letter: OTHER: Plan of care       Do we have the form/letter: Yes: placed in PCP inbox    Who is the form from? [facility]  (if other please explain)    Where did/will the form come from? form was faxed in    When is form/letter needed by: asap    How would you like the form/letter returned: Fax : [phone]

## 2025-01-02 ENCOUNTER — LAB (OUTPATIENT)
Dept: LAB | Facility: CLINIC | Age: 40
End: 2025-01-02
Payer: MEDICARE

## 2025-01-02 DIAGNOSIS — Z48.298 AFTERCARE FOLLOWING ORGAN TRANSPLANT: ICD-10-CM

## 2025-01-02 DIAGNOSIS — Z94.0 KIDNEY REPLACED BY TRANSPLANT: ICD-10-CM

## 2025-01-02 DIAGNOSIS — Z79.899 ENCOUNTER FOR LONG-TERM CURRENT USE OF MEDICATION: ICD-10-CM

## 2025-01-02 DIAGNOSIS — Z98.890 OTHER SPECIFIED POSTPROCEDURAL STATES: ICD-10-CM

## 2025-01-02 LAB
ANION GAP SERPL CALCULATED.3IONS-SCNC: 10 MMOL/L (ref 7–15)
BUN SERPL-MCNC: 23 MG/DL (ref 6–20)
CALCIUM SERPL-MCNC: 9.1 MG/DL (ref 8.8–10.4)
CHLORIDE SERPL-SCNC: 106 MMOL/L (ref 98–107)
CREAT SERPL-MCNC: 1.49 MG/DL (ref 0.67–1.17)
EGFRCR SERPLBLD CKD-EPI 2021: 60 ML/MIN/1.73M2
ERYTHROCYTE [DISTWIDTH] IN BLOOD BY AUTOMATED COUNT: 13.7 % (ref 10–15)
GLUCOSE SERPL-MCNC: 74 MG/DL (ref 70–99)
HCO3 SERPL-SCNC: 22 MMOL/L (ref 22–29)
HCT VFR BLD AUTO: 38.3 % (ref 40–53)
HGB BLD-MCNC: 12.3 G/DL (ref 13.3–17.7)
MCH RBC QN AUTO: 27.9 PG (ref 26.5–33)
MCHC RBC AUTO-ENTMCNC: 32.1 G/DL (ref 31.5–36.5)
MCV RBC AUTO: 87 FL (ref 78–100)
PLATELET # BLD AUTO: 225 10E3/UL (ref 150–450)
POTASSIUM SERPL-SCNC: 4.2 MMOL/L (ref 3.4–5.3)
RBC # BLD AUTO: 4.41 10E6/UL (ref 4.4–5.9)
SODIUM SERPL-SCNC: 138 MMOL/L (ref 135–145)
TACROLIMUS BLD-MCNC: 5.1 UG/L (ref 5–15)
TME LAST DOSE: NORMAL H
TME LAST DOSE: NORMAL H
WBC # BLD AUTO: 5.9 10E3/UL (ref 4–11)

## 2025-01-02 PROCEDURE — 85018 HEMOGLOBIN: CPT

## 2025-01-02 PROCEDURE — 82435 ASSAY OF BLOOD CHLORIDE: CPT

## 2025-01-02 PROCEDURE — 85041 AUTOMATED RBC COUNT: CPT

## 2025-01-02 PROCEDURE — 80048 BASIC METABOLIC PNL TOTAL CA: CPT

## 2025-01-02 PROCEDURE — 36415 COLL VENOUS BLD VENIPUNCTURE: CPT

## 2025-01-02 PROCEDURE — 80197 ASSAY OF TACROLIMUS: CPT

## 2025-01-02 PROCEDURE — 87799 DETECT AGENT NOS DNA QUANT: CPT

## 2025-01-03 LAB
BK VIRUS SPECIMEN TYPE: NORMAL
BKV DNA # SPEC NAA+PROBE: NOT DETECTED IU/ML

## 2025-01-13 ENCOUNTER — TELEPHONE (OUTPATIENT)
Dept: TRANSPLANT | Facility: CLINIC | Age: 40
End: 2025-01-13
Payer: MEDICARE

## 2025-01-13 DIAGNOSIS — B25.9 CYTOMEGALOVIRUS INFECTION, UNSPECIFIED CYTOMEGALOVIRAL INFECTION TYPE (H): ICD-10-CM

## 2025-01-13 DIAGNOSIS — Z94.0 KIDNEY REPLACED BY TRANSPLANT: ICD-10-CM

## 2025-01-13 RX ORDER — TACROLIMUS 0.5 MG/1
0.5 CAPSULE ORAL 2 TIMES DAILY
Qty: 60 CAPSULE | Refills: 11 | Status: SHIPPED | OUTPATIENT
Start: 2025-01-13

## 2025-01-13 NOTE — TELEPHONE ENCOUNTER
2 years post kidney transplant        Questions asked are standardized questions that the [employer] requires all transplant centers to report back to the [employer].  [employer] requests this information for all transplant recipients (kidney, liver, heart, lung, pancreas, etc).  [employer] utilizes this information to best understand recipient outcomes and equitable distribution of organs.       Patient status form completed.       Confirmed lab orders are up to date -  Confirmed follow up with transplant  nephrology     Continues to follow with ID for UTI'ss

## 2025-01-19 ASSESSMENT — ENCOUNTER SYMPTOMS: NEW SYMPTOMS OF CORONARY ARTERY DISEASE: 0

## 2025-01-28 ENCOUNTER — TRANSFERRED RECORDS (OUTPATIENT)
Dept: HEALTH INFORMATION MANAGEMENT | Facility: CLINIC | Age: 40
End: 2025-01-28
Payer: MEDICARE

## 2025-01-30 ENCOUNTER — TELEPHONE (OUTPATIENT)
Dept: FAMILY MEDICINE | Facility: CLINIC | Age: 40
End: 2025-01-30
Payer: MEDICARE

## 2025-01-30 NOTE — TELEPHONE ENCOUNTER
Forms/Letter Request     Type of form/letter: OTHER: Plan of care     Do we have the form/letter: Yes: placed in Danielle inbox     Who is the form from? [facility] (if other please explain)     Where did/will the form come from? form was faxed in     When is form/letter needed by: asap     How would you like the form/letter returned: Fax : [phone]

## 2025-02-03 ENCOUNTER — LAB (OUTPATIENT)
Dept: LAB | Facility: CLINIC | Age: 40
End: 2025-02-03
Payer: MEDICARE

## 2025-02-03 ENCOUNTER — TELEPHONE (OUTPATIENT)
Dept: TRANSPLANT | Facility: CLINIC | Age: 40
End: 2025-02-03

## 2025-02-03 DIAGNOSIS — Z94.0 KIDNEY REPLACED BY TRANSPLANT: ICD-10-CM

## 2025-02-03 DIAGNOSIS — Z98.890 OTHER SPECIFIED POSTPROCEDURAL STATES: ICD-10-CM

## 2025-02-03 DIAGNOSIS — B25.9 CYTOMEGALOVIRUS INFECTION, UNSPECIFIED CYTOMEGALOVIRAL INFECTION TYPE (H): ICD-10-CM

## 2025-02-03 DIAGNOSIS — Z79.899 ENCOUNTER FOR LONG-TERM CURRENT USE OF MEDICATION: ICD-10-CM

## 2025-02-03 DIAGNOSIS — Z48.298 AFTERCARE FOLLOWING ORGAN TRANSPLANT: ICD-10-CM

## 2025-02-03 LAB
ANION GAP SERPL CALCULATED.3IONS-SCNC: 9 MMOL/L (ref 7–15)
BUN SERPL-MCNC: 19 MG/DL (ref 6–20)
CALCIUM SERPL-MCNC: 9.5 MG/DL (ref 8.8–10.4)
CHLORIDE SERPL-SCNC: 104 MMOL/L (ref 98–107)
CREAT SERPL-MCNC: 1.48 MG/DL (ref 0.67–1.17)
EGFRCR SERPLBLD CKD-EPI 2021: 61 ML/MIN/1.73M2
ERYTHROCYTE [DISTWIDTH] IN BLOOD BY AUTOMATED COUNT: 14.3 % (ref 10–15)
GLUCOSE SERPL-MCNC: 72 MG/DL (ref 70–99)
HCO3 SERPL-SCNC: 26 MMOL/L (ref 22–29)
HCT VFR BLD AUTO: 39.9 % (ref 40–53)
HGB BLD-MCNC: 12.8 G/DL (ref 13.3–17.7)
MCH RBC QN AUTO: 27.8 PG (ref 26.5–33)
MCHC RBC AUTO-ENTMCNC: 32.1 G/DL (ref 31.5–36.5)
MCV RBC AUTO: 87 FL (ref 78–100)
PLATELET # BLD AUTO: 217 10E3/UL (ref 150–450)
POTASSIUM SERPL-SCNC: 4.4 MMOL/L (ref 3.4–5.3)
RBC # BLD AUTO: 4.6 10E6/UL (ref 4.4–5.9)
SODIUM SERPL-SCNC: 139 MMOL/L (ref 135–145)
WBC # BLD AUTO: 5.2 10E3/UL (ref 4–11)

## 2025-02-03 PROCEDURE — 80048 BASIC METABOLIC PNL TOTAL CA: CPT

## 2025-02-03 PROCEDURE — 85014 HEMATOCRIT: CPT

## 2025-02-03 PROCEDURE — 36415 COLL VENOUS BLD VENIPUNCTURE: CPT

## 2025-02-03 PROCEDURE — 87799 DETECT AGENT NOS DNA QUANT: CPT

## 2025-02-03 PROCEDURE — 85048 AUTOMATED LEUKOCYTE COUNT: CPT

## 2025-02-03 PROCEDURE — 80197 ASSAY OF TACROLIMUS: CPT

## 2025-02-03 NOTE — TELEPHONE ENCOUNTER
Medication Refill  Route to [facility]    Pharmacy Name:   [facility] - [place], [place] - [address] Phone: [phone]   Fax: [phone]          Name of Medication: Sulfamethoxazole-trimethoprim (BACTRIM) 400-80 MG tablet    Quantity / Dose: 30 / 400-80MG

## 2025-02-04 ENCOUNTER — TELEPHONE (OUTPATIENT)
Dept: TRANSPLANT | Facility: CLINIC | Age: 40
End: 2025-02-04
Payer: MEDICARE

## 2025-02-04 DIAGNOSIS — Z79.899 ENCOUNTER FOR LONG-TERM CURRENT USE OF MEDICATION: ICD-10-CM

## 2025-02-04 DIAGNOSIS — B25.9 CYTOMEGALOVIRUS INFECTION, UNSPECIFIED CYTOMEGALOVIRAL INFECTION TYPE (H): ICD-10-CM

## 2025-02-04 DIAGNOSIS — Z20.828 CONTACT WITH AND (SUSPECTED) EXPOSURE TO OTHER VIRAL COMMUNICABLE DISEASES: ICD-10-CM

## 2025-02-04 DIAGNOSIS — Z94.0 KIDNEY REPLACED BY TRANSPLANT: Primary | ICD-10-CM

## 2025-02-04 DIAGNOSIS — Z48.298 AFTERCARE FOLLOWING ORGAN TRANSPLANT: ICD-10-CM

## 2025-02-04 DIAGNOSIS — Z98.890 OTHER SPECIFIED POSTPROCEDURAL STATES: ICD-10-CM

## 2025-02-04 LAB
BK VIRUS SPECIMEN TYPE: NORMAL
BKV DNA # SPEC NAA+PROBE: NOT DETECTED IU/ML
TACROLIMUS BLD-MCNC: 4.9 UG/L (ref 5–15)
TME LAST DOSE: ABNORMAL H
TME LAST DOSE: ABNORMAL H

## 2025-02-04 RX ORDER — SULFAMETHOXAZOLE AND TRIMETHOPRIM 400; 80 MG/1; MG/1
1 TABLET ORAL DAILY
Qty: 30 TABLET | Refills: 11 | Status: SHIPPED | OUTPATIENT
Start: 2025-02-04

## 2025-02-04 NOTE — TELEPHONE ENCOUNTER
2 years post kidney transplant          Task    Please update EPIC lab orders and lab letter -send to the patient via [facility]  Dr Palacios  ordering provider

## 2025-02-04 NOTE — LETTER
OUTPATIENT LABORATORY TEST ORDER     Patient Name: Chip Fowler   YOB: 1985     [facility] MR# [if applicable]: [id number]   Date & Time: February 4, 2025  1:58 PM  Expiration Date: 1 year after date issued      Diagnoses: Kidney Transplant (ICD-10 Z94.0)   Long term use of medications (ICD-10 Z79.899)    Other specified postprocedural states (Z98.890)     We ask your assistance in obtaining the following laboratory tests, which are part of our routine surveillance program for Solid Organ Transplant patients.     Please fax each result to [phone], same day as resulted/available    Critical lab results page [phone]    Kidney Transplant: 1/15/23    Years 2-5 post-transplant:   Every other month   CBC with platelets  Basic Metabolic Panel (Sodium, Potassium, Chloride, CO2, Creatinine, Urea Nitrogen, glucose, Calcium)  Tacrolimus drug level - 12-hour trough, please document time of last dose      Every 6 Months   Urine for protein/creatinine    At 2 years post-transplant (1/15/25)  PRA/DSA level (mailers provided by the patient)  AlloSure (kits will be mailed to lab by [facility])  T cell subset (CD4)     At 2 1/2  years post-transplant  PRA/DSA level (mailers provided by the patient)    At 3 years post-transplant (1/15/26)  PRA/DSA level (mailers provided by the patient)  T cell subset (CD4) if on PJP Prophylaxis other than Bactrim        If you have any questions, please call [facility] [phone] or [phone], Fax [phone].      Erlin Guido M.D   of Medicine  Nephrology and Hypertension  Department of Medicine

## 2025-02-10 NOTE — PROGRESS NOTES
TRANSPLANT NEPHROLOGY CLINIC VISIT     Virtual Visit Details    Type of service:  Video Visit   Video Start Time: 11:30 AM  Video End Time:11:45 AM    Originating Location (pt. Location): Home    Distant Location (provider location):  On-site  Platform used for Video Visit: Chela    Assessment & Plan   Add T cell subset, if CD4 above 200, then stop Bactrim. There's unlikely a benefit from UTI perspective as most bacteria he's had are resistant to Bactrim.  Stop sodium bicarbonate.   Check magnesium with labs.    # DDKT: CKD Stage 2 - Stable   - Baseline Creatinine: ~ 1.4-1.6   - Proteinuria: Normal (<0.2 grams)   - DSA Hx: No DSA   - Last cPRA: 49%   - BK Viremia: No   - Kidney Tx Biopsy Hx: No biopsy history.    # Immunosuppression: Tacrolimus immediate release (goal 4-6) and Mycophenolic acid (dose 540 mg every 12 hours)   - Induction with Recent Transplant:  Intermediate Intensity Protocol   - Continue with intensive monitoring of immunosuppression for efficacy and toxicity.   - Historical Changes in Immunosuppression: None   - Changes: No    # Infection Prevention:  - PJP: Sulfa/TMP (Bactrim)  - Recurrent UTI: None yet.      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 Completed induction dose valcyte, with last maintenance last dose on 1/16,2024. Negative x 2 at the end of 2023. Only check if clinically indicated.    # Hypertension: Borderline control;  Goal BP: < 130/80   - Changes: Not at this time, but he'll keep checking and let us know if BP is above 130/80 as a few weeks ago it had been at goal.     # Anemia in Chronic Renal Disease: Hgb: Trend up      SARA: No   - Iron studies: Replete    # Mineral Bone Disorder:    - Vitamin D; level: Normal        On supplement: Yes cholecalciferol 50 mcg every day.  - Calcium; level: Normal         On supplement: No    # Electrolytes:   - Potassium; level: Normal        On supplement: No  - Magnesium; level: Normal        On supplement: Yes magnesium oxide 800 mg twice a day  - Bicarbonate; level: Normal        On supplement: Yes stop sodium bicarbonate    # Other Significant PMH:  - H/o Cardiomyopathy: Normal LVEF at 55-60% with last cardiac echo 11/2023     - Recurrent UTI/Pyelonephritis: asymptomatic at present. cystoscopy 7/2023 unremarkable, seen by urology. He was previously on nitrofurantoin for prophylaxis then switched to fosfomycin. Last UTI in December, 2024. He's now off Cefdinir.   - Following ID.      - Urethral Stricture: Patient is s/p buccal urethroplasty and diverticulum excision 12/2020. s/p cystoscopy 7/2023 due to recurrent UTIs and this was unremarkable.  Follows with Urology.      - Recurrent Cdiff: completed vanc po taper Nov 2023. Asymptomatic at present.     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 Will repeat with symptom     - Diarrhea: resolved after Cdiff Tx Nov 2023     - H/o Polysubstance Abuse: Patient with h/o methamphetamine and alcohol abuse.  Now sober     # Skin Cancer Risk:    - Discussed sun protection and recommend regular follow up with Dermatology.    # Transplant History:  Etiology of Kidney Failure: Solitary congenital kidney and h/o urethral calculus and urologic issues   Tx: DDKT  Transplant: 1/15/2023 (Kidney)  Significant transplant-related complications: CMV Viremia and EBV Viremia    Transplant Office Phone Number: [phone]    Assessment and plan was discussed with the patient and he voiced his understanding and agreement.    Return visit: Return in about 6 months (around 8/11/2025).    Erlin Guido MD    The longitudinal plan of care for the diagnosis(es)/condition(s) as documented were addressed during this visit. Due to the added complexity in care, I will continue to support Chip in the subsequent  management and with ongoing continuity of care.      Chief Complaint   Mr. Fowler is a 39 year old here for kidney transplant and immunosuppression management.     History of Present Illness    Mr. Fowler reports feeling great overall.    Since last clinic visit,     Chest pain or shortness of breath: No  Lower extremity swelling: No  Weight change: No  Nausea and vomiting: No  Diarrhea: No  Heartburn symptoms: No  Fever, sweats or chills: No  Night sweats: No  Urinary complaints: No    Home BP:  Usually in the 120s/70s, but more recently 130s/80s     Problem List   Patient Active Problem List   Diagnosis    HTN, kidney transplant related    Urethral stricture    Urethral diverticulum    Stage 3a chronic kidney disease (H)    Kidney replaced by transplant    Immunosuppressed status    Aftercare following organ transplant    Need for pneumocystis prophylaxis    Anemia in chronic renal disease    Secondary renal hyperparathyroidism    Vitamin D deficiency    Hypomagnesemia    Diarrhea    Urinary tract infection without hematuria, site unspecified    Metabolic acidosis    EBV (Aaron-Barr virus) viremia    Pyelonephritis of transplanted kidney    CMV (cytomegalovirus infection) (H)    Neutropenia    E. coli UTI    History of Clostridium difficile infection    History of methamphetamine abuse (H)       Allergies   No Known Allergies    Medications   Current Outpatient Medications   Medication Sig Dispense Refill    levofloxacin (LEVAQUIN) 500 MG tablet Take 1 tablet (500 mg) by mouth daily. 7 tablet 0    magnesium oxide (MAG-OX) 400 MG tablet Take 2 tablets (800 mg) by mouth 2 times daily 120 tablet 11    mycophenolic acid (GENERIC EQUIVALENT) 180 MG EC tablet Take 3 tablets (540 mg) by mouth 2 times daily. 180 tablet 11    psyllium (METAMUCIL/KONSYL) 58.6 % powder Take 18 g (1 Tablespoonful) by mouth 2 times daily as needed (Diarrhea) 283 g 0    sodium bicarbonate 650 MG tablet Take 1 tablet (650 mg) by mouth 2 times  daily 120 tablet 11    sulfamethoxazole-trimethoprim (BACTRIM) 400-80 MG tablet Take 1 tablet by mouth daily. 30 tablet 11    tacrolimus (GENERIC EQUIVALENT) 0.5 MG capsule Take 1 capsule (0.5 mg) by mouth 2 times daily. Total dose = 2.5 mg twice per day 60 capsule 11    tacrolimus (GENERIC) 1 MG capsule Take 2 capsules (2 mg) by mouth 2 times daily Total dose = 2.5 mg twice per day 120 capsule 11    Vitamin D, Cholecalciferol, 50 MCG (2000 UT) CAPS Take 2,000 Units by mouth daily 30 capsule 11     No current facility-administered medications for this visit.     There are no discontinued medications.    Physical Exam   Vital Signs: There were no vitals taken for this visit.    GENERAL: alert and no distress  EYES: Eyes grossly normal to inspection.  No discharge or erythema, or obvious scleral/conjunctival abnormalities.  RESP: No audible wheeze, cough, or visible cyanosis.    SKIN: Visible skin clear. No significant rash, abnormal pigmentation or lesions.  NEURO: Cranial nerves grossly intact.  Mentation and speech appropriate for age.  PSYCH: Appropriate affect, tone, and pace of words      Data         Latest Ref Rng & Units 2/3/2025     2:59 PM 1/2/2025     2:51 PM 12/9/2024     3:36 PM   Renal   Sodium 135 - 145 mmol/L 139  138  138    K 3.4 - 5.3 mmol/L 4.4  4.2  4.2    Cl 98 - 107 mmol/L 104  106  103    Cl (external) 98 - 107 mmol/L 104  106  103    CO2 22 - 29 mmol/L 26  22  22    Urea Nitrogen 6.0 - 20.0 mg/dL 19.0  23.0  21.9    Creatinine 0.67 - 1.17 mg/dL 1.48  1.49  1.51    Glucose 70 - 99 mg/dL 72  74  83    Calcium 8.8 - 10.4 mg/dL 9.5  9.1  8.9          Latest Ref Rng & Units 7/15/2024     2:58 PM 8/22/2023     2:48 PM 7/10/2023     2:52 PM   Bone Health   Phosphorus 2.5 - 4.5 mg/dL  2.6  2.7    Vit D Def 20 - 50 ng/mL 40            Latest Ref Rng & Units 2/3/2025     2:59 PM 1/2/2025     2:51 PM 12/9/2024     3:36 PM   Heme   WBC 4.0 - 11.0 10e3/uL 5.2  5.9  6.1    Hgb 13.3 - 17.7 g/dL 12.8  12.3   12.0    Plt 150 - 450 10e3/uL 217  225  336          Latest Ref Rng & Units 7/24/2023     3:19 PM 7/4/2023     7:44 PM 6/5/2023     3:27 PM   Liver   AP 40 - 129 U/L 59  53  55    TBili <=1.2 mg/dL 0.3  0.4  0.3    Bilirubin Direct 0.00 - 0.30 mg/dL <0.20      ALT 0 - 70 U/L 20  14  22    AST 0 - 45 U/L 29  27  24    Tot Protein 6.4 - 8.3 g/dL 7.2  7.4  6.9    Albumin 3.5 - 5.2 g/dL 4.3  4.2  4.0          Latest Ref Rng & Units 7/24/2023     3:19 PM 3/19/2023     9:55 PM 1/15/2023     5:40 AM   Pancreas   A1C <5.7 % 5.5   5.2    Lipase (Roche) 13 - 60 U/L  20           Latest Ref Rng & Units 1/31/2023    10:49 AM 1/20/2023     7:46 AM   Iron studies   Iron 61 - 157 ug/dL 70  153    Iron Sat Index 15 - 46 % 27  63    Ferritin 31 - 409 ng/mL 430  717          5/6/2024     2:57 PM 4/1/2024     2:53 PM 12/11/2023     2:43 PM   [facility] Txp Virology   LOG IU/ML OF CMVQNT <1.5  <1.5  <1.5      Failed to redirect to the Timeline version of the REVFS SmartLink.  Recent Labs   Lab Test 12/09/24  1536 01/02/25  1451 02/03/25  1459   DOSTAC 12/9/2024 1/2/2025 2/2/2025   TACROL 6.4 5.1 4.9*     Recent Labs   Lab Test 10/02/23  1503 10/09/23  1440 10/16/23  1505   DOSMPA 10/2/2023   3:50 AM 10/9/2023   3:20 AM 10/16/2023   3:20 AM   MPACID 0.85* 0.88* <0.25*   MPAG 23.0* 11.9* 12.2*

## 2025-02-11 ENCOUNTER — VIRTUAL VISIT (OUTPATIENT)
Dept: TRANSPLANT | Facility: CLINIC | Age: 40
End: 2025-02-11
Attending: INTERNAL MEDICINE
Payer: MEDICARE

## 2025-02-11 DIAGNOSIS — Z94.0 KIDNEY REPLACED BY TRANSPLANT: ICD-10-CM

## 2025-02-11 DIAGNOSIS — B25.9 CYTOMEGALOVIRUS INFECTION, UNSPECIFIED CYTOMEGALOVIRAL INFECTION TYPE (H): ICD-10-CM

## 2025-02-11 RX ORDER — TACROLIMUS 1 MG/1
2 CAPSULE ORAL 2 TIMES DAILY
Qty: 120 CAPSULE | Refills: 11 | Status: SHIPPED | OUTPATIENT
Start: 2025-02-11

## 2025-02-11 NOTE — PATIENT INSTRUCTIONS
Patient Recommendations:  - We'll let you know if we can stop Bactrim.  - Stop sodium bicarbonate.    Transplant Patient Information  Your Post Transplant Coordinator is: Veronica Edgar  For non urgent items, we encourage you to contact your coordinator/care team online via [employer]  You and your care team can also contact your transplant coordinator Monday - Friday, 8am - 5pm at [phone] (Option 2 to reach the coordinator or Option 4 to schedule an appointment).  After hours for urgent matters, please call [facility] at [phone].    Kidney Transplant Lab Frequency Protocol  2 to 5 Years:  Every 2 months  > 5 Years:  Every 3 months

## 2025-02-11 NOTE — NURSING NOTE
Current patient location: [address] [place] [id number]    Is the patient currently in the state of [place]? YES    Visit mode: VIDEO    If the visit is dropped, the patient can be reconnected by:VIDEO VISIT: Text to cell phone:   Telephone Information:   Mobile [phone]       Will anyone else be joining the visit? NO  (If patient encounters technical issues they should call [phone]    Are changes needed to the allergy or medication list? No    Are refills needed on medications prescribed by this physician? NO    Rooming Documentation:  Questionnaire(s) completed    Reason for visit: RECHECK    Carrington MONTES

## 2025-02-11 NOTE — LETTER
2/11/2025      Chip Fowler  [address]  [facility] [address]      Dear Colleague,    Thank you for referring your patient, Chip Fowler, to the [facility]PL[facility]. Please see a copy of my visit note below.    TRANSPLANT NEPHROLOGY CLINIC VISIT     Virtual Visit Details    Type of service:  Video Visit   Video Start Time: 11:30 AM  Video End Time:11:45 AM    Originating Location (pt. Location): Home    Distant Location (provider location):  On-site  Platform used for Video Visit: [employer]    Assessment & Plan  Add T cell subset, if CD4 above 200, then stop Bactrim. There's unlikely a benefit from UTI perspective as most bacteria he's had are resistant to Bactrim.  Stop sodium bicarbonate.   Check magnesium with labs.    # DDKT: CKD Stage 2 - Stable   - Baseline Creatinine: ~ 1.4-1.6   - Proteinuria: Normal (<0.2 grams)   - DSA Hx: No DSA   - Last cPRA: 49%   - BK Viremia: No   - Kidney Tx Biopsy Hx: No biopsy history.    # Immunosuppression: Tacrolimus immediate release (goal 4-6) and Mycophenolic acid (dose 540 mg every 12 hours)   - Induction with Recent Transplant:  Intermediate Intensity Protocol   - Continue with intensive monitoring of immunosuppression for efficacy and toxicity.   - Historical Changes in Immunosuppression: None   - Changes: No    # Infection Prevention:  - PJP: Sulfa/TMP (Bactrim)  - Recurrent UTI: None yet.      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 Completed induction dose valcyte, with last maintenance last dose on 1/16,2024. Negative x 2 at the end of 2023. Only check if clinically indicated.    # Hypertension: Borderline control;  Goal BP: < 130/80   - Changes: Not at this time, but he'll keep checking and let us know if BP is above 130/80 as a few weeks ago it had been at goal.      # Anemia in Chronic Renal Disease: Hgb: Trend up      SARA: No   - Iron studies: Replete    # Mineral Bone Disorder:    - Vitamin D; level: Normal        On supplement: Yes cholecalciferol 50 mcg every day.  - Calcium; level: Normal        On supplement: No    # Electrolytes:   - Potassium; level: Normal        On supplement: No  - Magnesium; level: Normal        On supplement: Yes magnesium oxide 800 mg twice a day  - Bicarbonate; level: Normal        On supplement: Yes stop sodium bicarbonate    # Other Significant PMH:  - H/o Cardiomyopathy: Normal LVEF at 55-60% with last cardiac echo 11/2023     - Recurrent UTI/Pyelonephritis: asymptomatic at present. cystoscopy 7/2023 unremarkable, seen by urology. He was previously on nitrofurantoin for prophylaxis then switched to fosfomycin. Last UTI in December, 2024. He's now off Cefdinir.   - Following ID.      - Urethral Stricture: Patient is s/p buccal urethroplasty and diverticulum excision 12/2020. s/p cystoscopy 7/2023 due to recurrent UTIs and this was unremarkable.  Follows with Urology.      - Recurrent Cdiff: completed vanc po taper Nov 2023. Asymptomatic at present.     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 Will repeat with symptom     - Diarrhea: resolved after Cdiff Tx Nov 2023     - H/o Polysubstance Abuse: Patient with h/o methamphetamine and alcohol abuse.  Now sober     # Skin Cancer Risk:    - Discussed sun protection and recommend regular follow up with Dermatology.    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    Return visit: Return in about 6 months (around  8/11/2025).    Erlin Guido MD    The longitudinal plan of care for the diagnosis(es)/condition(s) as documented were addressed during this visit. Due to the added complexity in care, I will continue to support Chip in the subsequent management and with ongoing continuity of care.      Chief Complaint  Mr. Fowler is a 39 year old here for kidney transplant and immunosuppression management.     History of Present Illness   Mr. Fowler reports feeling great overall.    Since last clinic visit,     Chest pain or shortness of breath: No  Lower extremity swelling: No  Weight change: No  Nausea and vomiting: No  Diarrhea: No  Heartburn symptoms: No  Fever, sweats or chills: No  Night sweats: No  Urinary complaints: No    Home BP:  Usually in the 120s/70s, but more recently 130s/80s     Problem List  Patient Active Problem List   Diagnosis     HTN, kidney transplant related     Urethral stricture     Urethral diverticulum     Stage 3a chronic kidney disease (H)     Kidney replaced by transplant     Immunosuppressed status     Aftercare following organ transplant     Need for pneumocystis prophylaxis     Anemia in chronic renal disease     Secondary renal hyperparathyroidism     Vitamin D deficiency     Hypomagnesemia     Diarrhea     Urinary tract infection without hematuria, site unspecified     Metabolic acidosis     EBV (Aaron-Barr virus) viremia     Pyelonephritis of transplanted kidney     CMV (cytomegalovirus infection) (H)     Neutropenia     E. coli UTI     History of Clostridium difficile infection     History of methamphetamine abuse (H)       Allergies  No Known Allergies    Medications  Current Outpatient Medications   Medication Sig Dispense Refill     levofloxacin (LEVAQUIN) 500 MG tablet Take 1 tablet (500 mg) by mouth daily. 7 tablet 0     magnesium oxide (MAG-OX) 400 MG tablet Take 2 tablets (800 mg) by mouth 2 times daily 120 tablet 11     mycophenolic acid (GENERIC EQUIVALENT) 180 MG EC  tablet Take 3 tablets (540 mg) by mouth 2 times daily. 180 tablet 11     psyllium (METAMUCIL/KONSYL) 58.6 % powder Take 18 g (1 Tablespoonful) by mouth 2 times daily as needed (Diarrhea) 283 g 0     sodium bicarbonate 650 MG tablet Take 1 tablet (650 mg) by mouth 2 times daily 120 tablet 11     sulfamethoxazole-trimethoprim (BACTRIM) 400-80 MG tablet Take 1 tablet by mouth daily. 30 tablet 11     tacrolimus (GENERIC EQUIVALENT) 0.5 MG capsule Take 1 capsule (0.5 mg) by mouth 2 times daily. Total dose = 2.5 mg twice per day 60 capsule 11     tacrolimus (GENERIC) 1 MG capsule Take 2 capsules (2 mg) by mouth 2 times daily Total dose = 2.5 mg twice per day 120 capsule 11     Vitamin D, Cholecalciferol, 50 MCG (2000 UT) CAPS Take 2,000 Units by mouth daily 30 capsule 11     No current facility-administered medications for this visit.     There are no discontinued medications.    Physical Exam  Vital Signs: There were no vitals taken for this visit.    GENERAL: alert and no distress  EYES: Eyes grossly normal to inspection.  No discharge or erythema, or obvious scleral/conjunctival abnormalities.  RESP: No audible wheeze, cough, or visible cyanosis.    SKIN: Visible skin clear. No significant rash, abnormal pigmentation or lesions.  NEURO: Cranial nerves grossly intact.  Mentation and speech appropriate for age.  PSYCH: Appropriate affect, tone, and pace of words      Data        Latest Ref Rng & Units 2/3/2025     2:59 PM 1/2/2025     2:51 PM 12/9/2024     3:36 PM   Renal   Sodium 135 - 145 mmol/L 139  138  138    K 3.4 - 5.3 mmol/L 4.4  4.2  4.2    Cl 98 - 107 mmol/L 104  106  103    Cl (external) 98 - 107 mmol/L 104  106  103    CO2 22 - 29 mmol/L 26  22  22    Urea Nitrogen 6.0 - 20.0 mg/dL 19.0  23.0  21.9    Creatinine 0.67 - 1.17 mg/dL 1.48  1.49  1.51    Glucose 70 - 99 mg/dL 72  74  83    Calcium 8.8 - 10.4 mg/dL 9.5  9.1  8.9          Latest Ref Rng & Units 7/15/2024     2:58 PM 8/22/2023     2:48 PM 7/10/2023      2:52 PM   Bone Health   Phosphorus 2.5 - 4.5 mg/dL  2.6  2.7    Vit D Def 20 - 50 ng/mL 40            Latest Ref Rng & Units 2/3/2025     2:59 PM 1/2/2025     2:51 PM 12/9/2024     3:36 PM   Heme   WBC 4.0 - 11.0 10e3/uL 5.2  5.9  6.1    Hgb 13.3 - 17.7 g/dL 12.8  12.3  12.0    Plt 150 - 450 10e3/uL 217  225  336          Latest Ref Rng & Units 7/24/2023     3:19 PM 7/4/2023     7:44 PM 6/5/2023     3:27 PM   Liver   AP 40 - 129 U/L 59  53  55    TBili <=1.2 mg/dL 0.3  0.4  0.3    Bilirubin Direct 0.00 - 0.30 mg/dL <0.20      ALT 0 - 70 U/L 20  14  22    AST 0 - 45 U/L 29  27  24    Tot Protein 6.4 - 8.3 g/dL 7.2  7.4  6.9    Albumin 3.5 - 5.2 g/dL 4.3  4.2  4.0          Latest Ref Rng & Units 7/24/2023     3:19 PM 3/19/2023     9:55 PM 1/15/2023     5:40 AM   Pancreas   A1C <5.7 % 5.5   5.2    Lipase (Roche) 13 - 60 U/L  20           Latest Ref Rng & Units 1/31/2023    10:49 AM 1/20/2023     7:46 AM   Iron studies   Iron 61 - 157 ug/dL 70  153    Iron Sat Index 15 - 46 % 27  63    Ferritin 31 - 409 ng/mL 430  717          5/6/2024     2:57 PM 4/1/2024     2:53 PM 12/11/2023     2:43 PM   [facility] Virology   LOG IU/ML OF CMVQNT <1.5  <1.5  <1.5      Failed to redirect to the Timeline version of the REVFS SmartLink.  Recent Labs   Lab Test 12/09/24  1536 01/02/25  1451 02/03/25  1459   DOSTAC 12/9/2024 1/2/2025 2/2/2025   TACROL 6.4 5.1 4.9*     Recent Labs   Lab Test 10/02/23  1503 10/09/23  1440 10/16/23  1505   DOSMPA 10/2/2023   3:50 AM 10/9/2023   3:20 AM 10/16/2023   3:20 AM   MPACID 0.85* 0.88* <0.25*   MPAG 23.0* 11.9* 12.2*       Again, thank you for allowing me to participate in the care of your patient.        Sincerely,        Erlin Guido MD    Electronically signed

## 2025-03-10 ENCOUNTER — VIRTUAL VISIT (OUTPATIENT)
Dept: INFECTIOUS DISEASES | Facility: CLINIC | Age: 40
End: 2025-03-10
Attending: STUDENT IN AN ORGANIZED HEALTH CARE EDUCATION/TRAINING PROGRAM
Payer: MEDICARE

## 2025-03-10 ENCOUNTER — TELEPHONE (OUTPATIENT)
Dept: INFECTIOUS DISEASES | Facility: CLINIC | Age: 40
End: 2025-03-10
Payer: MEDICARE

## 2025-03-10 VITALS
TEMPERATURE: 97 F | BODY MASS INDEX: 21.29 KG/M2 | DIASTOLIC BLOOD PRESSURE: 79 MMHG | WEIGHT: 157 LBS | SYSTOLIC BLOOD PRESSURE: 120 MMHG

## 2025-03-10 DIAGNOSIS — Z79.2 LONG TERM (CURRENT) USE OF ANTIBIOTICS: ICD-10-CM

## 2025-03-10 DIAGNOSIS — B25.9 CYTOMEGALOVIRUS INFECTION, UNSPECIFIED CYTOMEGALOVIRAL INFECTION TYPE (H): ICD-10-CM

## 2025-03-10 DIAGNOSIS — Z94.0 KIDNEY TRANSPLANTED: ICD-10-CM

## 2025-03-10 DIAGNOSIS — N39.0 RECURRENT UTI: Primary | ICD-10-CM

## 2025-03-10 DIAGNOSIS — A04.72 C. DIFFICILE ENTERITIS: ICD-10-CM

## 2025-03-10 PROCEDURE — 3074F SYST BP LT 130 MM HG: CPT | Mod: 95 | Performed by: STUDENT IN AN ORGANIZED HEALTH CARE EDUCATION/TRAINING PROGRAM

## 2025-03-10 PROCEDURE — 1126F AMNT PAIN NOTED NONE PRSNT: CPT | Mod: 95 | Performed by: STUDENT IN AN ORGANIZED HEALTH CARE EDUCATION/TRAINING PROGRAM

## 2025-03-10 PROCEDURE — 3078F DIAST BP <80 MM HG: CPT | Mod: 95 | Performed by: STUDENT IN AN ORGANIZED HEALTH CARE EDUCATION/TRAINING PROGRAM

## 2025-03-10 PROCEDURE — 98005 SYNCH AUDIO-VIDEO EST LOW 20: CPT | Performed by: STUDENT IN AN ORGANIZED HEALTH CARE EDUCATION/TRAINING PROGRAM

## 2025-03-10 ASSESSMENT — PAIN SCALES - GENERAL: PAINLEVEL_OUTOF10: NO PAIN (0)

## 2025-03-10 NOTE — PATIENT INSTRUCTIONS
No indication to resume suppressive antibiotics  If you develop urinary symptoms in the future (dysuria, frequency, urgency, etc), recommended submitting urine sample at Urgent care  Based on future occurrences, can discuss whether or not to resume suppression  Monitor off Valcyte - repeat CMV testing for symptoms. Recommend threshold of ~ 1500 international unit(s)/ml for resumption of Valcyte unless symptoms  Bactrim for PJP ppx per transplant team. Planned for CD4 counts to be checked this coming month and might be taken off Bactrim if CD4 >200  Follow up in 1 year

## 2025-03-10 NOTE — NURSING NOTE
Current patient location: [address] [place] [id number]    Is the patient currently in the state of [place]? YES    Visit mode: VIDEO    If the visit is dropped, the patient can be reconnected by:VIDEO VISIT: Text to cell phone:   Telephone Information:   Mobile [phone]       Will anyone else be joining the visit? NO  (If patient encounters technical issues they should call [phone]    Are changes needed to the allergy or medication list? No    Are refills needed on medications prescribed by this physician? NO    Rooming Documentation:  Questionnaire(s) completed    Reason for visit: RECHECK    Elizabeth GAGNONF

## 2025-03-10 NOTE — LETTER
3/10/2025       RE: Chip Fowler  [address] [address] [facility] [address]     Dear Colleague,    Thank you for referring your patient, Chip Fowler, to the [facility] INFECTIOUS DISEASE CLINIC [place] at [facility]. Please see a copy of my visit note below.    Virtual Visit Details  Type of service:  Video Visit   Video Start Time: 2:31 PM  Video End Time:2:40 PM  Originating Location (pt. Location): Other In a car  Distant Location (provider location):  On-site  Platform used for Video Visit: [facility]  Transplant Infectious Disease Clinic Note:  Virtual Follow up Patient  Patient:  Chip Fowler, Date of birth 1985, Medical record number [id number]  Date of Visit:  03/10/2025       Assessment and Recommendations:   Recommendations:  No indication to resume suppressive antibiotics  Have provided patient with a standing order for UA/urine culture  If patient develops urinary symptoms in the future (dysuria, frequency, urgency, etc), recommended submitting urine sample at Urgent care  Based on future occurrences, can discuss whether or not to resume suppression  Monitor off Valcyte - repeat CMV testing for symptoms. Recommend threshold of ~ 1500 international unit(s)/ml for resumption of Valcyte unless symptoms  Follow up EBV per protocol/when indicated  Bactrim for PJP ppx per transplant team. 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 Renal US patent) on  Tacrolium (goal level 8-10) and Myfortic 540 mg BID. ID issues include    #Recurrent UTIs with E. coli and E. faecalis on suppression with Cefdinir:  The UTI is mostly with E faecalis since 1/2023 except infection in 4/2023 with E faecalis and E coli. Admitted on 7/4/23 for pyelonephritis UC + 10-50k E faecalis he was given Augmentin for 10 and started on prophy nitrofurantoin 100mg daily after. Latest E.coli infection 9/2023. Most infections related to sexual intercourse. Given last E.coli UTI (on 9/24/23) broke through despite Nitrofurantoin prophylaxis, treated with cefdinir and switched to the same for prophylaxis after at 300mg daily dosing. Remains on the same since with no recurrent infections - has completed ~ 1 year of suppression and was taken off Cefdinir 9/2024  Since then he has had 1 UTI which was treated with Levaquin x 7 days. 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 On 8/22/23 it was 2,300 (started to have a fever but was checked per protocol) started on valcyte on 8/23/23. 9/5/23 it was 4580 and down to undetectable VL on 9/25/23. Secondary ppx through 10/24/23. Low level recurrence on 11/20 - restarted Valcyte 900mg BID 11/20, remained on maintenance through 1/16/24. No recurrence since, latest VL undetectable 2/2025     #C.diff:  Reported diarrhea 10/10/23. C.diff testing triple positive (Toxin, GDH and PCR). Started Vancomycin 125mg PO q6h x 14 days. Recurrent diarrhea early November, tested positive 11/6/23. Restarted Vancomycin PO with prolonged taper. No recurrences since    #low level EBV Viremia   7/6/23 CT chest Lymph nodes: Multiple sub centimeter para-aortic lymph nodes. At last check it was 8/22/23 <500     Prior issues:  1. Eosinophilia. The workup for eosinophilia and diarrhea was negative for fungal and parasitic infections that would account for the eosinophilia. Evaluated by hematology. The  eosinophilia is likely reactive to foreign objects; the PD catheter was thought to be potentially the source as noted by hematology. The relatively increased tryptase level favors allergy (likely to PD catheter) to be the etiology of the eosinophilia rather than infectious processes or malignancies.   2. Chronic diarrhea since he was started on PD in 7/2020. Workup has been negative.   3. Positive Salmonella serology. 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 The patient was counseled against keeping the snakes after transplantation as they are source of recurrent Salmonella infection.  4. E coli in urine in 8/2020.   5. Group B Strep in urine in 2/2020.   6. Urethral stenosis s/p reconstruction.      Other Infectious Disease issues include:  - QTc: 416 as of 3/23/23.   - PJP prophylaxis: bactrim.   - Serostatus: CMV D+/R- (s/p 6mo of valcye), EBV D+/R-, HSV1?/2?, VZV +  - Immunization status: This patient received the third dose of the COVID-19 vaccine on 1/26/2022. Otherwise due for the seasonal influenza vaccine and COVID-19 bivalent     Interval history :   Last seen in ID clinic 9/9/24    1) CMV viremia: Was on maintenance Valcyte through 1/16/24. Last detectable CMV VL was on 11/27/23 (at 105 international unit(s)/ml). Since then, VL <35 or undetectable, most recently 2/3/25 - undetectable    2) C.diff: Last tested positive 11/6/23. Received prolonged PO Vanco taper. No recurrences since, no diarrhea since    3) Recurrent UTI: Cefdinir stopped 9/2024. On 11/30/24, patient presented with intermittent fever/chills x 3 days. Urine Cx with 10-50k ESBL Klebsiella oxytoca. Initially prescribed Cefdinir and then Keflex. Based on susceptibilities, sent in a prescription for Levaquin 500mg/d x 7 days    No other issues over the last 6 months.  No urinary symptoms whatsoever - previously used to get occasional burning which has now resolved    Transplants:  1/15/2023 (Kidney); Postoperative day:  785.  Coordinator Veronica Edgar    Review of Systems:  Remaining systems reviewed and negative    Immunization History   Administered Date(s) Administered     COVID-19 Monovalent 18+ (Moderna) 01/25/2021, 02/22/2021, 01/26/2022     DTAP (<7y) 1984, 1984, 1984, 10/04/1985, 03/21/1989     Flu, Unspecified 11/12/1997, 03/11/2020, 10/06/2020, 10/27/2021     HIB, Unspecified 04/02/1986     Hepatitis B, Peds 02/19/1996, 06/17/1996, 09/04/1996     Historical DTP/aP 1984, 1984, 1984, 10/04/1985, 03/21/1989     Influenza (intradermal) 03/11/2020     Influenza Vaccine >6 months,quad, PF 11/06/2016     Influenza Vaccine Trivalent (FluBlok) 09/12/2024     MMR 07/03/1985, 02/19/1996     Mantoux Tuberculin Skin Test 03/11/2020     Pneumo Conj 13-V (2010&after) 11/24/2020     Pneumococcal 23 valent 03/11/2020     Pneumococcal, Unspecified 03/11/2020, 04/22/2021     Poliovirus, inactivated (IPV) 1984, 1984, 1984, 03/21/1989     TD,PF 7+ (Tenivac) 06/17/1996     TDAP Vaccine (Adacel) 04/28/2020     Td (Adult), Adsorbed 06/17/1996       Current Outpatient Medications   Medication Sig Dispense Refill     levofloxacin (LEVAQUIN) 500 MG tablet Take 1 tablet (500 mg) by mouth daily. 7 tablet 0     magnesium oxide (MAG-OX) 400 MG tablet Take 2 tablets (800 mg) by mouth 2 times daily 120 tablet 11     mycophenolic acid (GENERIC EQUIVALENT) 180 MG EC tablet Take 3 tablets (540 mg) by mouth 2 times daily. 180 tablet 11     psyllium (METAMUCIL/KONSYL) 58.6 % powder Take 18 g (1 Tablespoonful) by mouth 2 times daily as needed (Diarrhea) 283 g 0     sodium bicarbonate 650 MG tablet Take 1 tablet (650 mg) by mouth 2 times daily 120 tablet 11     sulfamethoxazole-trimethoprim (BACTRIM) 400-80 MG tablet Take 1 tablet by mouth daily. 30  tablet 11     tacrolimus (GENERIC EQUIVALENT) 0.5 MG capsule Take 1 capsule (0.5 mg) by mouth 2 times daily. Total dose = 2.5 mg twice per day 60 capsule 11     tacrolimus (GENERIC EQUIVALENT) 1 MG capsule Take 2 capsules (2 mg) by mouth 2 times daily. Total dose = 2.5 mg twice per day 120 capsule 11     Vitamin D, Cholecalciferol, 50 MCG (2000 UT) CAPS Take 2,000 Units by mouth daily 30 capsule 11     No current facility-administered medications for this visit.       No Known Allergies           Physical Exam:   There were no vitals taken for this visit.  Wt Readings from Last 4 Encounters:   11/30/24 71.2 kg (157 lb)   09/12/24 71.6 kg (157 lb 14.4 oz)   09/09/24 69.4 kg (153 lb)   05/14/24 71.9 kg (158 lb 8 oz)       Exam: Limited exam as visit via [facility]  GENERAL: well-developed, well-nourished, alert, oriented, in no acute distress over video.  HEAD: Head is normocephalic, atraumatic   EYES: Eyes have anicteric sclerae.    NEUROLOGIC: Grossly nonfocal.         Laboratory Data:   Metabolic Studies    Recent Labs   Lab Test 02/03/25  1459 01/02/25  1451 12/09/24  1536 08/28/23  1453 08/22/23  1448 07/31/23  1512 07/24/23  1519 07/06/23  1807 07/06/23  0933 07/05/23  1304 07/04/23  1944    138 138   < > 137   < > 139   < >  --    < > 133*   POTASSIUM 4.4 4.2 4.2   < > 4.2   < > 4.4   < >  --    < > 4.0   CHLORIDE 104 106 103   < > 105   < > 104   < >  --    < > 102   CO2 26 22 22   < > 23   < > 24   < >  --    < > 17*   ANIONGAP 9 10 13   < > 9   < > 11   < >  --    < > 14   BUN 19.0 23.0* 21.9*   < > 20.6*   < > 23.4*   < >  --    < > 26.1*   CR 1.48* 1.49* 1.51*   < > 1.50*   < > 1.65*   < >  --    < > 1.86*   GFRESTIMATED 61 60* 60*   < > 60*   < > 54*   < >  --    < > 47*   GLC 72 74 83   < > 72   < > 68*   < >  --    < > 139*   VISHNU 9.5 9.1 8.9   < > 9.1   < > 9.7   < >  --    < > 9.5   PHOS  --   --   --   --  2.6  --   --    < >  --    < >  --    MAG  --   --   --   --  1.5*  --   --    < >  --    < >   --    URIC  --   --   --   --   --   --  5.5  --   --   --   --    LACT  --   --   --   --   --   --   --   --  0.9   < > 1.5   CKT  --   --   --   --   --   --   --   --   --   --  88    < > = values in this interval not displayed.       Hepatic Studies    Recent Labs   Lab Test 07/24/23  1519 07/04/23  1944 06/05/23  1527 06/01/23  1518   BILITOTAL 0.3 0.4 0.3 0.3   DBIL <0.20  --   --  <0.20   ALKPHOS 59 53 55 52   PROTTOTAL 7.2 7.4 6.9 6.9   ALBUMIN 4.3 4.2 4.0 4.0   AST 29 27 24 28   ALT 20 14 22 30   LDH  --   --   --  367*     Hematology Studies   Recent Labs   Lab Test 02/03/25  1459 01/02/25  1451 12/09/24  1536 11/30/24  1626 05/06/24  1457 04/01/24  1453 10/23/23  1445 10/16/23  1505 10/09/23  1440   WBC 5.2 5.9 6.1 12.7*   < > 6.3   < > 3.2* 3.0*   ANEU  --   --   --   --   --   --   --  1.2* 1.6   ANEUTAUTO  --   --   --  10.0*  --  3.4   < >  --   --    ALYM  --   --   --   --   --   --   --  1.1 0.8   ALYMPAUTO  --   --   --  1.0  --  1.5   < >  --   --    TY  --   --   --   --   --   --   --  0.4 0.3   AMONOAUTO  --   --   --  1.4*  --  0.6   < >  --   --    AEOS  --   --   --   --   --   --   --  0.4 0.2   AEOSAUTO  --   --   --  0.2  --  0.8*   < >  --   --    ABSBASO  --   --   --  0.0  --  0.1   < >  --   --    HGB 12.8* 12.3* 12.0* 12.0*   < > 11.4*   < > 11.6* 11.7*   HCT 39.9* 38.3* 37.3* 36.5*   < > 35.2*   < > 34.7* 35.4*    225 336 206   < > 259   < > 214 309    < > = values in this interval not displayed.     Urine Studies     Recent Labs   Lab Test 11/30/24  1508 12/25/23  1506 12/11/23  1437 09/24/23  1133 07/04/23 2015   URINEPH 7.0 7.0 6.0 7.0 6.0   NITRITE Negative Negative Negative Negative Negative   LEUKEST Moderate* Moderate* Small* Small* Negative   WBCU 25-50* 25-50* 6* 10-25* 11*       Medication levels    Recent Labs   Lab Test 02/03/25  1459 10/23/23  1445 10/16/23  1505 07/10/23  1452 07/06/23  0547   VANCOMYCIN  --   --   --   --  16.7   TACROL 4.9*   < > 6.4   <  > 10.3   MPACID  --   --  <0.25*   < >  --    MPAG  --   --  12.2*   < >  --     < > = values in this interval not displayed.       Microbiology:  Fungal testing  Recent Labs   Lab Test 12/03/20  1647 10/08/20  1300   AM3 <0.10  --    HIFUNG  --  <1:8   COFUNG  --  <1:2   FUNBL  --  0.3     Last Culture results   Group A Strep antigen   Date Value Ref Range Status   11/30/2024 Negative Negative Final     Culture   Date Value Ref Range Status   11/30/2024 10,000-50,000 CFU/mL Klebsiella oxytoca ESBL (A)  Final   12/25/2023 <10,000 CFU/mL Urogenital cooper  Final   09/24/2023 10,000-50,000 CFU/mL Escherichia coli (A)  Final   09/24/2023 <10,000 CFU/mL Urogenital cooper  Final   07/04/2023 10,000-50,000 CFU/mL Enterococcus faecalis (A)  Final   07/04/2023 <10,000 CFU/mL Urogenital cooper  Final   07/04/2023 No Growth  Final   07/04/2023 No Growth  Final   05/04/2023 No Growth  Final   05/04/2023 No Growth  Final   05/04/2023 >100,000 CFU/mL Escherichia coli (A)  Final   04/15/2023 50,000-100,000 CFU/mL Escherichia coli (A)  Final   04/15/2023 10,000-50,000 CFU/mL Enterococcus faecalis (A)  Final   03/20/2023 No Growth  Final   03/20/2023 No Growth  Final   03/19/2023 50,000-100,000 CFU/mL Enterococcus faecalis (A)  Final   03/19/2023 <10,000 CFU/mL Urogenital cooper  Final   01/23/2023 50,000-100,000 CFU/mL Enterococcus faecalis (A)  Final     Comment:     Susceptibilities done on previous cultures   01/19/2023 >100,000 CFU/mL Enterococcus faecalis (A)  Final     Culture Micro   Date Value Ref Range Status   12/03/2020 No growth  Final   08/14/2020 >100,000 colonies/mL  Escherichia coli   (A)  Final         Last checks of Clostridioides difficile testing  Recent Labs   Lab Test 11/06/23  1852 10/10/23  1600 07/05/23  1318 05/04/23 2010   CDBPCT Positive* Positive* Negative Negative   CDIFFGDH Positive* Positive*  --   --    CDIFFTOX Positive* Positive*  --   --        Quantiferon testing   Recent Labs   Lab Test  11/30/24  1626 04/01/24  1453 09/15/20  1347 08/18/20  1307   TBRST  --   --   --  Negative   LYMPH 8 24   < > 23.9    < > = values in this interval not displayed.       Virology:  Coronavirus-19 testing    Recent Labs   Lab Test 11/30/24  1613 07/04/23  1952 03/19/23  1919 01/15/23  0538 03/21/22  1911 10/30/21  0816 09/08/21  1400 08/07/21  1057 05/26/21  1134 12/10/20  0959 12/03/20  1647   CD19  --   --   --   --   --   --   --   --   --   --  17   ACD19  --   --   --   --   --   --   --   --   --   --  532   [id number] Negative Negative Negative Negative   < >  --   --    < >  --  Not Detected  --    [id number]  --   --   --   --   --   --   --   --   --  Nasopharyngeal  --    COVIDPCREXT  --   --   --   --   --  Not Detected Not Detected  --  Not Detected  --   --     < > = values in this interval not displayed.       CMV viral loads    CMV DNA IU/mL   Date Value Ref Range Status   05/06/2024 <35 (A) Not Detected IU/mL Final     Comment:     CMV DNA detected, less than 35 IU/mL   04/01/2024 <35 (A) Not Detected IU/mL Final     Comment:     CMV DNA detected, less than 35 IU/mL   03/04/2024 Not Detected Not Detected IU/mL Final   02/12/2024 Not Detected Not Detected IU/mL Final   01/29/2024 Not Detected Not Detected IU/mL Final   01/22/2024 Not Detected Not Detected IU/mL Final   01/15/2024 Not Detected Not Detected IU/mL Final   01/02/2024 Not Detected Not Detected IU/mL Final   12/26/2023 Not Detected Not Detected IU/mL Final   07/17/2023 Not Detected Not Detected IU/mL Final   07/05/2023 Not Detected Not Detected IU/mL Final   06/15/2023 Not Detected Not Detected IU/mL Final   05/25/2023 Not Detected Not Detected IU/mL Final   05/04/2023 Not Detected Not Detected IU/mL Final   03/20/2023 Not Detected Not Detected IU/mL Final   03/06/2023 Not Detected Not Detected IU/mL Final   01/15/2023 Not Detected Not Detected IU/mL Final     CMV DNA IU/mL, Instrument   Date Value Ref Range Status   11/27/2023 105 (H) <1  IU/mL Final   11/20/2023 648 (H) <1 IU/mL Final   09/18/2023 61 (H) <1 IU/mL Final   09/11/2023 468 (H) <1 IU/mL Final   09/05/2023 4,580 (H) <1 IU/mL Final   08/22/2023 2,340 (H) <1 IU/mL Final     CMV log   Date Value Ref Range Status   05/06/2024 <1.5  Final   04/01/2024 <1.5  Final   12/11/2023 <1.5  Final   12/04/2023 <1.5  Final   11/27/2023 2.0  Final   11/20/2023 2.8  Final   10/16/2023 <1.5  Final   10/09/2023 <1.5  Final   09/18/2023 1.8  Final   09/11/2023 2.7  Final   09/05/2023 3.7  Final   08/22/2023 3.4  Final       EBV DNA Copies/mL   Date Value Ref Range Status   09/25/2023 Not Detected Not Detected copies/mL Final   08/22/2023 <500 (A) Not Detected copies/mL Final     Comment:     EBV DNA Detected below the reportable range of 500 copies/mL   07/04/2023 <500 (A) Not Detected copies/mL Final     Comment:     EBV DNA Detected below the reportable range of 500 copies/mL   05/04/2023 Not Detected Not Detected copies/mL Final   03/20/2023 Not Detected Not Detected copies/mL Final       BK viral loads   Recent Labs   Lab Test 02/03/25  1459 01/02/25  1451 12/09/24  1536 11/05/24  1448 10/07/24  1533 09/10/24  1451 08/06/24  1447 07/15/24  1458 05/06/24  1457   BKRES Not Detected Not Detected Not Detected Not Detected Not Detected Not Detected Not Detected Not Detected Not Detected       Hepatitis B Testing     Recent Labs   Lab Test 01/15/23  0540 08/18/20  1307   AUSAB 134.22 212.08*   HBCAB Nonreactive Nonreactive   HEPBANG Nonreactive Nonreactive     HCV Ab Negative 1/15/23  CMV IgG Negative 1/15/23  VZV IgG Positive 8/18/20  EBV Capsid IgG Positive 1/15/23      Imaging:  CT C/A/P with contrast 7/6/23:  IMPRESSION: Postoperative changes of kidney transplant. There is mild nonspecific stranding around transplant kidney with a small amount of  fluid in the pelvis and areas of patchy hypoenhancement within the transplant kidney. These likely represents chronic/benign findings,  however in the appropriate  clinical context findings may represent mild infection      Again, thank you for allowing me to participate in the care of your patient.      Sincerely,    Brennan Davenport MD

## 2025-03-10 NOTE — PROGRESS NOTES
Virtual Visit Details  Type of service:  Video Visit   Video Start Time: 2:31 PM  Video End Time:2:40 PM  Originating Location (pt. Location): Other In a car  Distant Location (provider location):  On-site  Platform used for Video Visit: [facility]  Transplant Infectious Disease Clinic Note:  Virtual Follow up Patient  Patient:  Chip Fowler, Date of birth 1985, Medical record number [id number]  Date of Visit:  03/10/2025       Assessment and Recommendations:   Recommendations:  No indication to resume suppressive antibiotics  Have provided patient with a standing order for UA/urine culture  If patient develops urinary symptoms in the future (dysuria, frequency, urgency, etc), recommended submitting urine sample at Urgent care  Based on future occurrences, can discuss whether or not to resume suppression  Monitor off Valcyte - repeat CMV testing for symptoms. Recommend threshold of ~ 1500 international unit(s)/ml for resumption of Valcyte unless symptoms  Follow up EBV per protocol/when indicated  Bactrim for PJP ppx per transplant team. 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 Renal US patent) on Tacrolium (goal level 8-10) and Myfortic 540 mg BID. ID issues include    #Recurrent UTIs with E. coli and E. faecalis on suppression with Cefdinir:  The UTI is mostly with E faecalis since 1/2023 except infection in 4/2023 with E faecalis and E coli. Admitted on 7/4/23 for pyelonephritis UC + 10-50k E faecalis he was  given Augmentin for 10 and started on prophy nitrofurantoin 100mg daily after. Latest E.coli infection 9/2023. Most infections related to sexual intercourse. Given last E.coli UTI (on 9/24/23) broke through despite Nitrofurantoin prophylaxis, treated with cefdinir and switched to the same for prophylaxis after at 300mg daily dosing. Remains on the same since with no recurrent infections - has completed ~ 1 year of suppression and was taken off Cefdinir 9/2024  Since then he has had 1 UTI which was treated with Levaquin x 7 days. 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 On 8/22/23 it was 2,300 (started to have a fever but was checked per protocol) started on valcyte on 8/23/23. 9/5/23 it was 4580 and down to undetectable VL on 9/25/23. Secondary ppx through 10/24/23. Low level recurrence on 11/20 - restarted Valcyte 900mg BID 11/20, remained on maintenance through 1/16/24. No recurrence since, latest VL undetectable 2/2025     #C.diff:  Reported diarrhea 10/10/23. C.diff testing triple positive (Toxin, GDH and PCR). Started Vancomycin 125mg PO q6h x 14 days. Recurrent diarrhea early November, tested positive 11/6/23. Restarted Vancomycin PO with prolonged taper. No recurrences since    #low level EBV Viremia   7/6/23 CT chest Lymph nodes: Multiple sub centimeter para-aortic lymph nodes. At last check it was 8/22/23 <500     Prior issues:  1. Eosinophilia. The workup for eosinophilia and diarrhea was negative for fungal and parasitic infections that would account for the eosinophilia. Evaluated by hematology. The eosinophilia is likely reactive to foreign objects; the PD catheter was thought to be potentially the source as noted by hematology. The relatively increased tryptase level favors allergy (likely to PD catheter) to be the etiology of the eosinophilia rather than infectious processes or malignancies.   2. Chronic diarrhea since he  was started on PD in 7/2020. Workup has been negative.   3. Positive Salmonella serology. 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 The patient was counseled against keeping the snakes after transplantation as they are source of recurrent Salmonella infection.  4. E coli in urine in 8/2020.   5. Group B Strep in urine in 2/2020.   6. Urethral stenosis s/p reconstruction.      Other Infectious Disease issues include:  - QTc: 416 as of 3/23/23.   - PJP prophylaxis: bactrim.   - Serostatus: CMV D+/R- (s/p 6mo of valcye), EBV D+/R-, HSV1?/2?, VZV +  - Immunization status: This patient received the third dose of the COVID-19 vaccine on 1/26/2022. Otherwise due for the seasonal influenza vaccine and COVID-19 bivalent     Interval history :   Last seen in ID clinic 9/9/24    1) CMV viremia: Was on maintenance Valcyte through 1/16/24. Last detectable CMV VL was on 11/27/23 (at 105 international unit(s)/ml). Since then, VL <35 or undetectable, most recently 2/3/25 - undetectable    2) C.diff: Last tested positive 11/6/23. Received prolonged PO Vanco taper. No recurrences since, no diarrhea since    3) Recurrent UTI: Cefdinir stopped 9/2024. On 11/30/24, patient presented with intermittent fever/chills x 3 days. Urine Cx with 10-50k ESBL Klebsiella oxytoca. Initially prescribed Cefdinir and then Keflex. Based on susceptibilities, sent in a prescription for Levaquin 500mg/d x 7 days    No other issues over the last 6 months. No urinary symptoms whatsoever - previously used to get occasional burning which has now resolved    Transplants:  1/15/2023 (Kidney); Postoperative day:  785.  Coordinator Veronica Edgar    Review of Systems:  Remaining systems reviewed and negative    Immunization History   Administered Date(s) Administered    COVID-19  Monovalent 18+ (Moderna) 01/25/2021, 02/22/2021, 01/26/2022    DTAP (<7y) 1985, 1985, 1984, 10/04/1985, 03/21/1989    Flu, Unspecified 11/12/1997, 03/11/2020, 10/06/2020, 10/27/2021    HIB, Unspecified 04/02/1986    Hepatitis B, Peds 02/19/1996, 06/17/1996, 09/04/1996    Historical DTP/aP 1984, 1984, 1984, 10/04/1985, 03/21/1989    Influenza (intradermal) 03/11/2020    Influenza Vaccine >6 months,quad, PF 11/06/2016    Influenza Vaccine Trivalent (FluBlok) 09/12/2024    MMR 07/03/1985, 02/19/1996    Mantoux Tuberculin Skin Test 03/11/2020    Pneumo Conj 13-V (2010&after) 11/24/2020    Pneumococcal 23 valent 03/11/2020    Pneumococcal, Unspecified 03/11/2020, 04/22/2021    Poliovirus, inactivated (IPV) 1984, 1984, 1984, 03/21/1989    TD,PF 7+ (Tenivac) 06/17/1996    TDAP Vaccine (Adacel) 04/28/2020    Td (Adult), Adsorbed 06/17/1996       Current Outpatient Medications   Medication Sig Dispense Refill    levofloxacin (LEVAQUIN) 500 MG tablet Take 1 tablet (500 mg) by mouth daily. 7 tablet 0    magnesium oxide (MAG-OX) 400 MG tablet Take 2 tablets (800 mg) by mouth 2 times daily 120 tablet 11    mycophenolic acid (GENERIC EQUIVALENT) 180 MG EC tablet Take 3 tablets (540 mg) by mouth 2 times daily. 180 tablet 11    psyllium (METAMUCIL/KONSYL) 58.6 % powder Take 18 g (1 Tablespoonful) by mouth 2 times daily as needed (Diarrhea) 283 g 0    sodium bicarbonate 650 MG tablet Take 1 tablet (650 mg) by mouth 2 times daily 120 tablet 11    sulfamethoxazole-trimethoprim (BACTRIM) 400-80 MG tablet Take 1 tablet by mouth daily. 30 tablet 11    tacrolimus (GENERIC EQUIVALENT) 0.5 MG capsule Take 1 capsule (0.5 mg) by mouth 2 times daily. Total dose = 2.5 mg twice per day 60 capsule 11    tacrolimus (GENERIC EQUIVALENT) 1 MG capsule Take 2 capsules (2 mg) by mouth 2 times daily. Total dose = 2.5 mg twice per day 120 capsule 11    Vitamin D, Cholecalciferol, 50 MCG (2000 UT) CAPS  Take 2,000 Units by mouth daily 30 capsule 11     No current facility-administered medications for this visit.       No Known Allergies           Physical Exam:   There were no vitals taken for this visit.  Wt Readings from Last 4 Encounters:   11/30/24 71.2 kg (157 lb)   09/12/24 71.6 kg (157 lb 14.4 oz)   09/09/24 69.4 kg (153 lb)   05/14/24 71.9 kg (158 lb 8 oz)       Exam: Limited exam as visit via [facility]: well-developed, well-nourished, alert, oriented, in no acute distress over video.  HEAD: Head is normocephalic, atraumatic   EYES: Eyes have anicteric sclerae.    NEUROLOGIC: Grossly nonfocal.         Laboratory Data:   Metabolic Studies    Recent Labs   Lab Test 02/03/25  1459 01/02/25  1451 12/09/24  1536 08/28/23  1453 08/22/23  1448 07/31/23  1512 07/24/23  1519 07/06/23  1807 07/06/23  0933 07/05/23  1304 07/04/23  1944    138 138   < > 137   < > 139   < >  --    < > 133*   POTASSIUM 4.4 4.2 4.2   < > 4.2   < > 4.4   < >  --    < > 4.0   CHLORIDE 104 106 103   < > 105   < > 104   < >  --    < > 102   CO2 26 22 22   < > 23   < > 24   < >  --    < > 17*   ANIONGAP 9 10 13   < > 9   < > 11   < >  --    < > 14   BUN 19.0 23.0* 21.9*   < > 20.6*   < > 23.4*   < >  --    < > 26.1*   CR 1.48* 1.49* 1.51*   < > 1.50*   < > 1.65*   < >  --    < > 1.86*   GFRESTIMATED 61 60* 60*   < > 60*   < > 54*   < >  --    < > 47*   GLC 72 74 83   < > 72   < > 68*   < >  --    < > 139*   VISHNU 9.5 9.1 8.9   < > 9.1   < > 9.7   < >  --    < > 9.5   PHOS  --   --   --   --  2.6  --   --    < >  --    < >  --    MAG  --   --   --   --  1.5*  --   --    < >  --    < >  --    URIC  --   --   --   --   --   --  5.5  --   --   --   --    LACT  --   --   --   --   --   --   --   --  0.9   < > 1.5   CKT  --   --   --   --   --   --   --   --   --   --  88    < > = values in this interval not displayed.       Hepatic Studies    Recent Labs   Lab Test 07/24/23  1519 07/04/23  1944 06/05/23  1527 06/01/23  1518   BILITOTAL  0.3 0.4 0.3 0.3   DBIL <0.20  --   --  <0.20   ALKPHOS 59 53 55 52   PROTTOTAL 7.2 7.4 6.9 6.9   ALBUMIN 4.3 4.2 4.0 4.0   AST 29 27 24 28   ALT 20 14 22 30   LDH  --   --   --  367*     Hematology Studies   Recent Labs   Lab Test 02/03/25  1459 01/02/25  1451 12/09/24  1536 11/30/24  1626 05/06/24  1457 04/01/24  1453 10/23/23  1445 10/16/23  1505 10/09/23  1440   WBC 5.2 5.9 6.1 12.7*   < > 6.3   < > 3.2* 3.0*   ANEU  --   --   --   --   --   --   --  1.2* 1.6   ANEUTAUTO  --   --   --  10.0*  --  3.4   < >  --   --    ALYM  --   --   --   --   --   --   --  1.1 0.8   ALYMPAUTO  --   --   --  1.0  --  1.5   < >  --   --    TY  --   --   --   --   --   --   --  0.4 0.3   AMONOAUTO  --   --   --  1.4*  --  0.6   < >  --   --    AEOS  --   --   --   --   --   --   --  0.4 0.2   AEOSAUTO  --   --   --  0.2  --  0.8*   < >  --   --    ABSBASO  --   --   --  0.0  --  0.1   < >  --   --    HGB 12.8* 12.3* 12.0* 12.0*   < > 11.4*   < > 11.6* 11.7*   HCT 39.9* 38.3* 37.3* 36.5*   < > 35.2*   < > 34.7* 35.4*    225 336 206   < > 259   < > 214 309    < > = values in this interval not displayed.     Urine Studies     Recent Labs   Lab Test 11/30/24  1508 12/25/23  1506 12/11/23  1437 09/24/23  1133 07/04/23 2015   URINEPH 7.0 7.0 6.0 7.0 6.0   NITRITE Negative Negative Negative Negative Negative   LEUKEST Moderate* Moderate* Small* Small* Negative   WBCU 25-50* 25-50* 6* 10-25* 11*       Medication levels    Recent Labs   Lab Test 02/03/25  1459 10/23/23  1445 10/16/23  1505 07/10/23  1452 07/06/23  0547   VANCOMYCIN  --   --   --   --  16.7   TACROL 4.9*   < > 6.4   < > 10.3   MPACID  --   --  <0.25*   < >  --    MPAG  --   --  12.2*   < >  --     < > = values in this interval not displayed.       Microbiology:  Fungal testing  Recent Labs   Lab Test 12/03/20  1647 10/08/20  1300   AM3 <0.10  --    HIFUNG  --  <1:8   COFUNG  --  <1:2   FUNBL  --  0.3     Last Culture results   Group A Strep antigen   Date Value Ref  Range Status   11/30/2024 Negative Negative Final     Culture   Date Value Ref Range Status   11/30/2024 10,000-50,000 CFU/mL Klebsiella oxytoca ESBL (A)  Final   12/25/2023 <10,000 CFU/mL Urogenital cooper  Final   09/24/2023 10,000-50,000 CFU/mL Escherichia coli (A)  Final   09/24/2023 <10,000 CFU/mL Urogenital cooper  Final   07/04/2023 10,000-50,000 CFU/mL Enterococcus faecalis (A)  Final   07/04/2023 <10,000 CFU/mL Urogenital cooper  Final   07/04/2023 No Growth  Final   07/04/2023 No Growth  Final   05/04/2023 No Growth  Final   05/04/2023 No Growth  Final   05/04/2023 >100,000 CFU/mL Escherichia coli (A)  Final   04/15/2023 50,000-100,000 CFU/mL Escherichia coli (A)  Final   04/15/2023 10,000-50,000 CFU/mL Enterococcus faecalis (A)  Final   03/20/2023 No Growth  Final   03/20/2023 No Growth  Final   03/19/2023 50,000-100,000 CFU/mL Enterococcus faecalis (A)  Final   03/19/2023 <10,000 CFU/mL Urogenital cooper  Final   01/23/2023 50,000-100,000 CFU/mL Enterococcus faecalis (A)  Final     Comment:     Susceptibilities done on previous cultures   01/19/2023 >100,000 CFU/mL Enterococcus faecalis (A)  Final     Culture Micro   Date Value Ref Range Status   12/03/2020 No growth  Final   08/14/2020 >100,000 colonies/mL  Escherichia coli   (A)  Final         Last checks of Clostridioides difficile testing  Recent Labs   Lab Test 11/06/23  1852 10/10/23  1600 07/05/23  1318 05/04/23 2010   CDBPCT Positive* Positive* Negative Negative   CDIFFGDH Positive* Positive*  --   --    CDIFFTOX Positive* Positive*  --   --        Quantiferon testing   Recent Labs   Lab Test 11/30/24  1626 04/01/24  1453 09/15/20  1347 08/18/20  1307   TBRST  --   --   --  Negative   LYMPH 8 24   < > 23.9    < > = values in this interval not displayed.       Virology:  Coronavirus-19 testing    Recent Labs   Lab Test 11/30/24  1613 07/04/23  1952 03/19/23  1919 01/15/23  0538 03/21/22  1911 10/30/21  0816 09/08/21  1400 08/07/21  1057  05/26/21  1134 12/10/20  0959 12/03/20  1647   CD19  --   --   --   --   --   --   --   --   --   --  17   ACD19  --   --   --   --   --   --   --   --   --   --  532   [id number] Negative Negative Negative Negative   < >  --   --    < >  --  Not Detected  --    [id number]  --   --   --   --   --   --   --   --   --  Nasopharyngeal  --    COVIDPCREXT  --   --   --   --   --  Not Detected Not Detected  --  Not Detected  --   --     < > = values in this interval not displayed.       CMV viral loads    CMV DNA IU/mL   Date Value Ref Range Status   05/06/2024 <35 (A) Not Detected IU/mL Final     Comment:     CMV DNA detected, less than 35 IU/mL   04/01/2024 <35 (A) Not Detected IU/mL Final     Comment:     CMV DNA detected, less than 35 IU/mL   03/04/2024 Not Detected Not Detected IU/mL Final   02/12/2024 Not Detected Not Detected IU/mL Final   01/29/2024 Not Detected Not Detected IU/mL Final   01/22/2024 Not Detected Not Detected IU/mL Final   01/15/2024 Not Detected Not Detected IU/mL Final   01/02/2024 Not Detected Not Detected IU/mL Final   12/26/2023 Not Detected Not Detected IU/mL Final   07/17/2023 Not Detected Not Detected IU/mL Final   07/05/2023 Not Detected Not Detected IU/mL Final   06/15/2023 Not Detected Not Detected IU/mL Final   05/25/2023 Not Detected Not Detected IU/mL Final   05/04/2023 Not Detected Not Detected IU/mL Final   03/20/2023 Not Detected Not Detected IU/mL Final   03/06/2023 Not Detected Not Detected IU/mL Final   01/15/2023 Not Detected Not Detected IU/mL Final     CMV DNA IU/mL, Instrument   Date Value Ref Range Status   11/27/2023 105 (H) <1 IU/mL Final   11/20/2023 648 (H) <1 IU/mL Final   09/18/2023 61 (H) <1 IU/mL Final   09/11/2023 468 (H) <1 IU/mL Final   09/05/2023 4,580 (H) <1 IU/mL Final   08/22/2023 2,340 (H) <1 IU/mL Final     CMV log   Date Value Ref Range Status   05/06/2024 <1.5  Final   04/01/2024 <1.5  Final   12/11/2023 <1.5  Final   12/04/2023 <1.5  Final   11/27/2023  2.0  Final   11/20/2023 2.8  Final   10/16/2023 <1.5  Final   10/09/2023 <1.5  Final   09/18/2023 1.8  Final   09/11/2023 2.7  Final   09/05/2023 3.7  Final   08/22/2023 3.4  Final       EBV DNA Copies/mL   Date Value Ref Range Status   09/25/2023 Not Detected Not Detected copies/mL Final   08/22/2023 <500 (A) Not Detected copies/mL Final     Comment:     EBV DNA Detected below the reportable range of 500 copies/mL   07/04/2023 <500 (A) Not Detected copies/mL Final     Comment:     EBV DNA Detected below the reportable range of 500 copies/mL   05/04/2023 Not Detected Not Detected copies/mL Final   03/20/2023 Not Detected Not Detected copies/mL Final       BK viral loads   Recent Labs   Lab Test 02/03/25  1459 01/02/25  1451 12/09/24  1536 11/05/24  1448 10/07/24  1533 09/10/24  1451 08/06/24  1447 07/15/24  1458 05/06/24  1457   BKRES Not Detected Not Detected Not Detected Not Detected Not Detected Not Detected Not Detected Not Detected Not Detected       Hepatitis B Testing     Recent Labs   Lab Test 01/15/23  0540 08/18/20  1307   AUSAB 134.22 212.08*   HBCAB Nonreactive Nonreactive   HEPBANG Nonreactive Nonreactive     HCV Ab Negative 1/15/23  CMV IgG Negative 1/15/23  VZV IgG Positive 8/18/20  EBV Capsid IgG Positive 1/15/23      Imaging:  CT C/A/P with contrast 7/6/23:  IMPRESSION: Postoperative changes of kidney transplant. There is mild nonspecific stranding around transplant kidney with a small amount of  fluid in the pelvis and areas of patchy hypoenhancement within the transplant kidney. These likely represents chronic/benign findings,  however in the appropriate clinical context findings may represent mild infection

## 2025-03-13 ENCOUNTER — TELEPHONE (OUTPATIENT)
Dept: FAMILY MEDICINE | Facility: CLINIC | Age: 40
End: 2025-03-13
Payer: MEDICARE

## 2025-03-13 NOTE — TELEPHONE ENCOUNTER
Forms/Letter Request     Type of form/letter: OTHER: Plan of care     Do we have the form/letter: Yes: placed in Danielle inbox     Who is the form from? [facility] (if other please explain)     Where did/will the form come from? form was faxed in     When is form/letter needed by: asap     How would you like the form/letter returned: Fax : [phone]

## 2025-03-15 ENCOUNTER — MYC MEDICAL ADVICE (OUTPATIENT)
Dept: TRANSPLANT | Facility: CLINIC | Age: 40
End: 2025-03-15
Payer: MEDICARE

## 2025-03-15 DIAGNOSIS — Z94.0 KIDNEY REPLACED BY TRANSPLANT: Primary | ICD-10-CM

## 2025-03-15 DIAGNOSIS — B25.9 CYTOMEGALOVIRUS INFECTION, UNSPECIFIED CYTOMEGALOVIRAL INFECTION TYPE (H): ICD-10-CM

## 2025-03-22 ENCOUNTER — LAB (OUTPATIENT)
Dept: LAB | Facility: CLINIC | Age: 40
End: 2025-03-22
Payer: MEDICARE

## 2025-03-22 DIAGNOSIS — Z94.0 KIDNEY REPLACED BY TRANSPLANT: ICD-10-CM

## 2025-03-22 LAB
ALBUMIN MFR UR ELPH: 12.9 MG/DL
ALBUMIN UR-MCNC: NEGATIVE MG/DL
ANION GAP SERPL CALCULATED.3IONS-SCNC: 10 MMOL/L (ref 7–15)
APPEARANCE UR: CLEAR
BILIRUB UR QL STRIP: NEGATIVE
BUN SERPL-MCNC: 18.8 MG/DL (ref 6–20)
CALCIUM SERPL-MCNC: 9 MG/DL (ref 8.8–10.4)
CHLORIDE SERPL-SCNC: 105 MMOL/L (ref 98–107)
COLOR UR AUTO: NORMAL
CREAT SERPL-MCNC: 1.42 MG/DL (ref 0.67–1.17)
CREAT UR-MCNC: 62.7 MG/DL
EGFRCR SERPLBLD CKD-EPI 2021: 64 ML/MIN/1.73M2
GLUCOSE SERPL-MCNC: 98 MG/DL (ref 70–99)
GLUCOSE UR STRIP-MCNC: NEGATIVE MG/DL
HCO3 SERPL-SCNC: 24 MMOL/L (ref 22–29)
HGB UR QL STRIP: NEGATIVE
KETONES UR STRIP-MCNC: NEGATIVE MG/DL
LEUKOCYTE ESTERASE UR QL STRIP: NEGATIVE
NITRATE UR QL: NEGATIVE
PH UR STRIP: 6.5 [PH] (ref 5–7)
POTASSIUM SERPL-SCNC: 4.7 MMOL/L (ref 3.4–5.3)
PROT/CREAT 24H UR: 0.21 MG/MG CR (ref 0–0.2)
RBC URINE: <1 /HPF
SODIUM SERPL-SCNC: 139 MMOL/L (ref 135–145)
SP GR UR STRIP: 1.01 (ref 1–1.03)
SQUAMOUS EPITHELIAL: 1 /HPF
UROBILINOGEN UR STRIP-MCNC: NORMAL MG/DL
WBC URINE: <1 /HPF

## 2025-03-22 PROCEDURE — 84156 ASSAY OF PROTEIN URINE: CPT

## 2025-03-22 PROCEDURE — 80048 BASIC METABOLIC PNL TOTAL CA: CPT

## 2025-03-22 PROCEDURE — 36415 COLL VENOUS BLD VENIPUNCTURE: CPT

## 2025-03-22 PROCEDURE — 81001 URINALYSIS AUTO W/SCOPE: CPT

## 2025-04-07 ENCOUNTER — TRANSFERRED RECORDS (OUTPATIENT)
Dept: HEALTH INFORMATION MANAGEMENT | Facility: CLINIC | Age: 40
End: 2025-04-07

## 2025-04-08 ENCOUNTER — TELEPHONE (OUTPATIENT)
Dept: FAMILY MEDICINE | Facility: CLINIC | Age: 40
End: 2025-04-08
Payer: MEDICARE

## 2025-04-08 NOTE — TELEPHONE ENCOUNTER
Forms/Letter Request    Type of form/letter: Therapy Plan      Do we have the form/letter: Yes: Danielle [facility] in basket    Who is the form from? [facility] (if other please explain)    Where did/will the form come from? form was faxed in    When is form/letter needed by: ASAP    How would you like the form/letter returned: Fax : [phone]    Patient Notified form requests are processed in 5-7 business days:No    Could we send this information to you in [place] [employer] or would you prefer to receive a phone call?:   Patient would prefer a phone call     Okay to leave a detailed message?: Yes at Home number on file [phone] (home)

## 2025-04-09 ENCOUNTER — LAB (OUTPATIENT)
Dept: LAB | Facility: CLINIC | Age: 40
End: 2025-04-09
Payer: MEDICARE

## 2025-04-09 DIAGNOSIS — Z94.0 KIDNEY REPLACED BY TRANSPLANT: ICD-10-CM

## 2025-04-09 LAB
ANION GAP SERPL CALCULATED.3IONS-SCNC: 11 MMOL/L (ref 7–15)
BUN SERPL-MCNC: 21.9 MG/DL (ref 6–20)
CALCIUM SERPL-MCNC: 9.3 MG/DL (ref 8.8–10.4)
CHLORIDE SERPL-SCNC: 103 MMOL/L (ref 98–107)
CREAT SERPL-MCNC: 1.48 MG/DL (ref 0.67–1.17)
EGFRCR SERPLBLD CKD-EPI 2021: 61 ML/MIN/1.73M2
ERYTHROCYTE [DISTWIDTH] IN BLOOD BY AUTOMATED COUNT: 13.2 % (ref 10–15)
GLUCOSE SERPL-MCNC: 79 MG/DL (ref 70–99)
HCO3 SERPL-SCNC: 23 MMOL/L (ref 22–29)
HCT VFR BLD AUTO: 36.3 % (ref 40–53)
HGB BLD-MCNC: 12.3 G/DL (ref 13.3–17.7)
MCH RBC QN AUTO: 29.1 PG (ref 26.5–33)
MCHC RBC AUTO-ENTMCNC: 33.9 G/DL (ref 31.5–36.5)
MCV RBC AUTO: 86 FL (ref 78–100)
PLATELET # BLD AUTO: 265 10E3/UL (ref 150–450)
POTASSIUM SERPL-SCNC: 4.7 MMOL/L (ref 3.4–5.3)
RBC # BLD AUTO: 4.22 10E6/UL (ref 4.4–5.9)
SODIUM SERPL-SCNC: 137 MMOL/L (ref 135–145)
WBC # BLD AUTO: 6.5 10E3/UL (ref 4–11)

## 2025-04-09 PROCEDURE — 82435 ASSAY OF BLOOD CHLORIDE: CPT

## 2025-04-09 PROCEDURE — 80197 ASSAY OF TACROLIMUS: CPT

## 2025-04-09 PROCEDURE — 80048 BASIC METABOLIC PNL TOTAL CA: CPT

## 2025-04-09 PROCEDURE — 36415 COLL VENOUS BLD VENIPUNCTURE: CPT

## 2025-04-09 PROCEDURE — 85014 HEMATOCRIT: CPT

## 2025-04-10 DIAGNOSIS — B25.9 CYTOMEGALOVIRUS INFECTION, UNSPECIFIED CYTOMEGALOVIRAL INFECTION TYPE (H): ICD-10-CM

## 2025-04-10 LAB
TACROLIMUS BLD-MCNC: 4.9 UG/L (ref 5–15)
TME LAST DOSE: ABNORMAL H
TME LAST DOSE: ABNORMAL H

## 2025-04-28 DIAGNOSIS — Z94.0 KIDNEY REPLACED BY TRANSPLANT: Primary | ICD-10-CM

## 2025-04-28 DIAGNOSIS — B25.9 CYTOMEGALOVIRUS INFECTION, UNSPECIFIED CYTOMEGALOVIRAL INFECTION TYPE (H): ICD-10-CM

## 2025-04-29 DIAGNOSIS — E55.9 VITAMIN D DEFICIENCY: ICD-10-CM

## 2025-04-29 RX ORDER — MULTIVIT-MIN/IRON/FOLIC ACID/K 18-600-40
2000 CAPSULE ORAL DAILY
Qty: 30 CAPSULE | Refills: 3 | Status: SHIPPED | OUTPATIENT
Start: 2025-04-29

## 2025-06-05 ENCOUNTER — LAB (OUTPATIENT)
Dept: LAB | Facility: CLINIC | Age: 40
End: 2025-06-05
Payer: MEDICARE

## 2025-06-05 DIAGNOSIS — Z94.0 KIDNEY REPLACED BY TRANSPLANT: ICD-10-CM

## 2025-06-05 LAB
ANION GAP SERPL CALCULATED.3IONS-SCNC: 9 MMOL/L (ref 7–15)
BUN SERPL-MCNC: 23.8 MG/DL (ref 6–20)
CALCIUM SERPL-MCNC: 9.4 MG/DL (ref 8.8–10.4)
CHLORIDE SERPL-SCNC: 103 MMOL/L (ref 98–107)
CREAT SERPL-MCNC: 1.47 MG/DL (ref 0.67–1.17)
EGFRCR SERPLBLD CKD-EPI 2021: 61 ML/MIN/1.73M2
ERYTHROCYTE [DISTWIDTH] IN BLOOD BY AUTOMATED COUNT: 12.9 % (ref 10–15)
GLUCOSE SERPL-MCNC: 88 MG/DL (ref 70–99)
HCO3 SERPL-SCNC: 25 MMOL/L (ref 22–29)
HCT VFR BLD AUTO: 38.5 % (ref 40–53)
HGB BLD-MCNC: 13.1 G/DL (ref 13.3–17.7)
MCH RBC QN AUTO: 29.8 PG (ref 26.5–33)
MCHC RBC AUTO-ENTMCNC: 34 G/DL (ref 31.5–36.5)
MCV RBC AUTO: 88 FL (ref 78–100)
PLATELET # BLD AUTO: 222 10E3/UL (ref 150–450)
POTASSIUM SERPL-SCNC: 4.8 MMOL/L (ref 3.4–5.3)
RBC # BLD AUTO: 4.39 10E6/UL (ref 4.4–5.9)
SODIUM SERPL-SCNC: 137 MMOL/L (ref 135–145)
TACROLIMUS BLD-MCNC: 4.7 UG/L (ref 5–15)
TME LAST DOSE: ABNORMAL H
TME LAST DOSE: ABNORMAL H
WBC # BLD AUTO: 5.8 10E3/UL (ref 4–11)

## 2025-06-05 PROCEDURE — 85014 HEMATOCRIT: CPT

## 2025-06-05 PROCEDURE — 80197 ASSAY OF TACROLIMUS: CPT

## 2025-06-05 PROCEDURE — 36415 COLL VENOUS BLD VENIPUNCTURE: CPT

## 2025-06-05 PROCEDURE — 80048 BASIC METABOLIC PNL TOTAL CA: CPT

## 2025-06-06 ENCOUNTER — RESULTS FOLLOW-UP (OUTPATIENT)
Dept: TRANSPLANT | Facility: CLINIC | Age: 40
End: 2025-06-06

## 2025-06-06 ENCOUNTER — MYC MEDICAL ADVICE (OUTPATIENT)
Dept: TRANSPLANT | Facility: CLINIC | Age: 40
End: 2025-06-06
Payer: MEDICARE

## 2025-06-06 DIAGNOSIS — B25.9 CYTOMEGALOVIRUS INFECTION, UNSPECIFIED CYTOMEGALOVIRAL INFECTION TYPE (H): ICD-10-CM

## 2025-06-10 ENCOUNTER — TELEPHONE (OUTPATIENT)
Dept: FAMILY MEDICINE | Facility: CLINIC | Age: 40
End: 2025-06-10
Payer: MEDICARE

## 2025-06-10 NOTE — TELEPHONE ENCOUNTER
Forms/Letter Request     Type of form/letter: OTHER: Plan of care     Do we have the form/letter: Yes: placed in Danielle inbox     Who is the form from? [facility] (if other please explain)     Where did/will the form come from? form was faxed in     When is form/letter needed by: asap     How would you like the form/letter returned: Fax : [phone]

## 2025-06-11 ENCOUNTER — OFFICE VISIT (OUTPATIENT)
Dept: URGENT CARE | Facility: URGENT CARE | Age: 40
End: 2025-06-11
Payer: MEDICARE

## 2025-06-11 VITALS
SYSTOLIC BLOOD PRESSURE: 130 MMHG | RESPIRATION RATE: 16 BRPM | BODY MASS INDEX: 20.86 KG/M2 | OXYGEN SATURATION: 99 % | DIASTOLIC BLOOD PRESSURE: 81 MMHG | HEIGHT: 72 IN | TEMPERATURE: 100.4 F | WEIGHT: 154 LBS | HEART RATE: 78 BPM

## 2025-06-11 DIAGNOSIS — N39.0 RECURRENT UTI: Primary | ICD-10-CM

## 2025-06-11 DIAGNOSIS — N30.00 ACUTE CYSTITIS WITHOUT HEMATURIA: Primary | ICD-10-CM

## 2025-06-11 LAB
ALBUMIN UR-MCNC: 30 MG/DL
APPEARANCE UR: CLEAR
BACTERIA #/AREA URNS HPF: ABNORMAL /HPF
BASOPHILS # BLD AUTO: 0 10E3/UL (ref 0–0.2)
BASOPHILS NFR BLD AUTO: 0 %
BILIRUB UR QL STRIP: NEGATIVE
COLOR UR AUTO: YELLOW
EOSINOPHIL # BLD AUTO: 0.1 10E3/UL (ref 0–0.7)
EOSINOPHIL NFR BLD AUTO: 1 %
ERYTHROCYTE [DISTWIDTH] IN BLOOD BY AUTOMATED COUNT: 12.9 % (ref 10–15)
GLUCOSE UR STRIP-MCNC: NEGATIVE MG/DL
HCT VFR BLD AUTO: 39.5 % (ref 40–53)
HGB BLD-MCNC: 13.1 G/DL (ref 13.3–17.7)
HGB UR QL STRIP: ABNORMAL
IMM GRANULOCYTES # BLD: 0 10E3/UL
IMM GRANULOCYTES NFR BLD: 0 %
KETONES UR STRIP-MCNC: NEGATIVE MG/DL
LEUKOCYTE ESTERASE UR QL STRIP: ABNORMAL
LYMPHOCYTES # BLD AUTO: 1.3 10E3/UL (ref 0.8–5.3)
LYMPHOCYTES NFR BLD AUTO: 10 %
MCH RBC QN AUTO: 29.8 PG (ref 26.5–33)
MCHC RBC AUTO-ENTMCNC: 33.2 G/DL (ref 31.5–36.5)
MCV RBC AUTO: 90 FL (ref 78–100)
MONOCYTES # BLD AUTO: 1.3 10E3/UL (ref 0–1.3)
MONOCYTES NFR BLD AUTO: 10 %
NEUTROPHILS # BLD AUTO: 10.1 10E3/UL (ref 1.6–8.3)
NEUTROPHILS NFR BLD AUTO: 79 %
NITRATE UR QL: NEGATIVE
PH UR STRIP: 7 [PH] (ref 5–7)
PLATELET # BLD AUTO: 198 10E3/UL (ref 150–450)
RBC # BLD AUTO: 4.39 10E6/UL (ref 4.4–5.9)
RBC #/AREA URNS AUTO: ABNORMAL /HPF
SP GR UR STRIP: 1.01 (ref 1–1.03)
SQUAMOUS #/AREA URNS AUTO: ABNORMAL /LPF
UROBILINOGEN UR STRIP-ACNC: 0.2 E.U./DL
WBC # BLD AUTO: 12.9 10E3/UL (ref 4–11)
WBC #/AREA URNS AUTO: ABNORMAL /HPF
WBC CLUMPS #/AREA URNS HPF: PRESENT /HPF

## 2025-06-11 PROCEDURE — 36415 COLL VENOUS BLD VENIPUNCTURE: CPT | Performed by: PHYSICIAN ASSISTANT

## 2025-06-11 PROCEDURE — 85025 COMPLETE CBC W/AUTO DIFF WBC: CPT | Performed by: PHYSICIAN ASSISTANT

## 2025-06-11 PROCEDURE — 3079F DIAST BP 80-89 MM HG: CPT | Performed by: PHYSICIAN ASSISTANT

## 2025-06-11 PROCEDURE — 3075F SYST BP GE 130 - 139MM HG: CPT | Performed by: PHYSICIAN ASSISTANT

## 2025-06-11 PROCEDURE — 81001 URINALYSIS AUTO W/SCOPE: CPT | Performed by: PHYSICIAN ASSISTANT

## 2025-06-11 PROCEDURE — 96372 THER/PROPH/DIAG INJ SC/IM: CPT | Performed by: PHYSICIAN ASSISTANT

## 2025-06-11 PROCEDURE — 99214 OFFICE O/P EST MOD 30 MIN: CPT | Mod: 25 | Performed by: PHYSICIAN ASSISTANT

## 2025-06-11 PROCEDURE — 1125F AMNT PAIN NOTED PAIN PRSNT: CPT | Performed by: PHYSICIAN ASSISTANT

## 2025-06-11 RX ORDER — ACETAMINOPHEN 325 MG/1
650 TABLET ORAL ONCE
Status: COMPLETED | OUTPATIENT
Start: 2025-06-11 | End: 2025-06-11

## 2025-06-11 RX ORDER — CEFDINIR 300 MG/1
300 CAPSULE ORAL 2 TIMES DAILY
Qty: 20 CAPSULE | Refills: 0 | Status: SHIPPED | OUTPATIENT
Start: 2025-06-11 | End: 2025-06-21

## 2025-06-11 RX ORDER — CEFTRIAXONE SODIUM 1 G
1 VIAL (EA) INJECTION ONCE
Status: COMPLETED | OUTPATIENT
Start: 2025-06-11 | End: 2025-06-11

## 2025-06-11 RX ADMIN — Medication 1 G: at 16:35

## 2025-06-11 RX ADMIN — ACETAMINOPHEN 650 MG: 325 TABLET ORAL at 16:34

## 2025-06-11 ASSESSMENT — PAIN SCALES - GENERAL: PAINLEVEL_OUTOF10: MILD PAIN (2)

## 2025-06-11 NOTE — PATIENT INSTRUCTIONS
You were given one dose of Rocephin  Take full course of cefdinir - you can pick this up and start tonight  Fluids  Follow-up if you are feeling weak, fever does not resolve in 24 hours, vomiting, worsening symptoms etc

## 2025-06-11 NOTE — Clinical Note
Hi, I saw your patient, Chip, in the clinic for a UTI. He was looking a bit ill, (but apparently how he has looked in the past and down well on oral abx) I treated him with Rocephin, and the cefdinir prescribed by you. His urine culture is in process.  Thank you, Ivette Ibrahim PA-C

## 2025-06-11 NOTE — PROGRESS NOTES
Urgent Care Clinic Visit  {Rapid Rooming (Optional):767832}  Chief Complaint   Patient presents with    Urgent Care     Transplant pt-  frequency, urgency x 1 day=                6/11/2025     3:30 PM   Additional Questions   Roomed by Yoli FLEMING     Pre-Provider Visit Orders- Urinalysis UA/UC  Patient reports the following symptoms:  possible urinary tract infection (UTI)   Does the patient report any of the following symptoms: has a urinary catheter in place, or unable to void in a specimen cup?  {Exclusions:089164}

## 2025-06-11 NOTE — PROGRESS NOTES
Assessment & Plan     1. Acute cystitis without hematuria (Primary)  - UA Macroscopic with reflex to Microscopic and Culture  - Urine Microscopic Exam  - Urine Culture  - CBC with platelets and differential; Future  - CBC with platelets and differential  - acetaminophen (TYLENOL) tablet 650 mg  - cefTRIAXone (ROCEPHIN) in lidocaine 1% for IM administration 1 g      Urine is grossly positive today.  He does have low grade fever and rigors in clinic, this is typically how his UTIs have presented in the past, but he does have hx of Urosepsis requiring admission. Currently working with infectious disease.   He was given 1g of rocephin in clinic, and cefdinir two times per day for 10 days from his infectious disease doctor, His urine culture is in process.  Push fluids  Discussed with patient if development of vomiting unable to hold down fluids, abdominal pain or weakness/dizziness/ lightheadedness to follow-up in clinic or ER right away.     Return in about 3 days (around 6/14/2025), or if symptoms worsen or fail to improve.    Patient verbalizes understanding. All questions were addressed and answered.     Ivette Ibrahim PA-C  [facility]    CHIEF COMPLAINT:   Chief Complaint   Patient presents with    Urgent Care     Transplant pt-  frequency, urgency x 1 day=      Subjective     Chip is a 40 year old male with a history of who presents to clinic today for evaluation of possible UTI. Developed some discomfort with urination yesterday, today developed temperature around 100. This is typical for his UTI. Denies having vomiting, hematuria or weakness.     He recently went off Bactrim 1.5-2 weeks ago - was using it daily for infection prophylaxis.     Past Medical History:   Diagnosis Date    Bladder stones     Cardiomyopathy (H)     Clostridium difficile infection 10/10/2023    CMV (cytomegalovirus infection) (H) 08/23/2023 08/23/23:  CMV-Blood    E. coli UTI 09/24/2023    ESRD (end  stage renal disease) on dialysis (H)     Hypertension     Kidney replaced by transplant 01/15/2023    DCD DDKT. Intermediate risk induction.    Migraines     Polysubstance abuse (H)     Solitary kidney, congenital     Urethral stone     Urethral stricture      Past Surgical History:   Procedure Laterality Date    BENCH KIDNEY  1/15/2023    Procedure: Bench kidney;  Surgeon: Tez Emerson MD;  [facility]    BIOPSY  2020    renal, Taoist    COLONOSCOPY N/A 3/23/2023    Procedure: Colonoscopy;  Surgeon: Ana Cardenas MD;  Location: [facility]    COMBINED CYSTOSCOPY, LASER HOLMIUM LITHOTRIPSY URETER(S)      CYSTOSCOPY      CYSTOSCOPY FLEXIBLE, CYSTOSTOMY, INSERT TUBE SUPRAPUBIC, COMBINED N/A 2020    Procedure: CYSTOSCOPY, WITH SUPRAPUBIC CATHETER INSERTION;  Surgeon: Sam Mejia MD;  Location: [facility]    CYSTOSCOPY, OPEN EXCISION URETHRAL DIVERTICULUM, COMBINED N/A 2020    Procedure: EXCISION OF URETHRAL DIVERTICULUM X2;  Surgeon: Sam Mejia MD;  Location: [facility] OR    HERNIA REPAIR      infant    INSERT CATHETER PERITONEAL DIALYSIS      IR CVC TUNNEL W2 CATH W/O PORT  2020    LASER HOLMIUM LITHOTRIPSY URETER(S), INSERT STENT, COMBINED N/A 2020    Procedure: CYSTOSCOPY, bladder and urethral stone extraction, removal of foreign body, urethral dilation, urethrotomy, laser on standby;  Surgeon: Sam Mejia MD;  [facility]    REMOVE CATHETER PERITONEAL N/A 1/15/2023    Procedure: Remove catheter peritoneal;  Surgeon: Tez Emerson MD;  [facility]    TRANSPLANT KIDNEY RECIPIENT  DONOR N/A 1/15/2023    Procedure: TRANSPLANT, KIDNEY, RECIPIENT,  DONOR WITH DONOR URETER STENTING;  Surgeon: Tez Emerson MD;  [facility]    urethral dilation      URETHROPLASTY WITH BUCCAL GRAFT N/A 2020    Procedure: URETHROPLASTY, USING BUCCAL MUCOSA GRAFT;  Surgeon: Sam Mejia MD;   Location: [facility]     Social History     Tobacco Use    Smoking status: Former     Current packs/day: 0.00     Average packs/day: 0.5 packs/day for 21.0 years (10.5 ttl pk-yrs)     Types: Cigarettes     Start date:      Quit date: 1/15/2023     Years since quittin.4    Smokeless tobacco: Never   Substance Use Topics    Alcohol use: Not Currently     Comment: quit alcohol 3-4 yrs ago     Current Outpatient Medications   Medication Sig Dispense Refill    magnesium oxide (MAG-OX) 400 MG tablet Take 2 tablets (800 mg) by mouth 2 times daily 120 tablet 11    mycophenolic acid (GENERIC EQUIVALENT) 180 MG EC tablet Take 3 tablets (540 mg) by mouth 2 times daily. 180 tablet 11    tacrolimus (GENERIC EQUIVALENT) 0.5 MG capsule Take 1 capsule (0.5 mg) by mouth 2 times daily. Total dose = 2.5 mg twice per day 60 capsule 11    tacrolimus (GENERIC EQUIVALENT) 1 MG capsule Take 2 capsules (2 mg) by mouth 2 times daily. Total dose = 2.5 mg twice per day 120 capsule 11    Vitamin D, Cholecalciferol, 50 MCG (2000 UT) CAPS Take 2,000 Units by mouth daily. 30 capsule 3    cefdinir (OMNICEF) 300 MG capsule Take 1 capsule (300 mg) by mouth 2 times daily for 10 days. (Patient not taking: Reported on 2025) 20 capsule 0    levofloxacin (LEVAQUIN) 500 MG tablet Take 1 tablet (500 mg) by mouth daily. (Patient not taking: Reported on 2025) 7 tablet 0    psyllium (METAMUCIL/KONSYL) 58.6 % powder Take 18 g (1 Tablespoonful) by mouth 2 times daily as needed (Diarrhea) (Patient not taking: Reported on 2025) 283 g 0     No current facility-administered medications for this visit.     No Known Allergies    10 point ROS of systems were all negative except for pertinent positives noted in my HPI.      Exam:   /81   Pulse 78   Temp 100.4  F (38  C) (Tympanic)   Resp 16   Ht 1.829 m (6')   Wt 69.9 kg (154 lb)   SpO2 99%   BMI 20.89 kg/m    Constitutional: alert, but appears ill  ENT: MMM  Cardiovascular:  RRR  Respiratory: CTA bilaterally, no rhonchi or rales  Gastrointestinal: soft and nontender  Back: No CVA tenderness B/L  Skin: no rashes    Results for orders placed or performed in visit on 06/11/25   UA Macroscopic with reflex to Microscopic and Culture     Status: Abnormal    Specimen: Urine, Clean Catch   Result Value Ref Range    Color Urine Yellow Colorless, Straw, Light Yellow, Yellow    Appearance Urine Clear Clear    Glucose Urine Negative Negative mg/dL    Bilirubin Urine Negative Negative    Ketones Urine Negative Negative mg/dL    Specific Gravity Urine 1.015 1.003 - 1.035    Blood Urine Small (A) Negative    pH Urine 7.0 5.0 - 7.0    Protein Albumin Urine 30 (A) Negative mg/dL    Urobilinogen Urine 0.2 0.2, 1.0 E.U./dL    Nitrite Urine Negative Negative    Leukocyte Esterase Urine Moderate (A) Negative   Urine Microscopic Exam     Status: Abnormal   Result Value Ref Range    Bacteria Urine Moderate (A) None Seen /HPF    RBC Urine 2-5 (A) 0-2 /HPF /HPF    WBC Urine 25-50 (A) 0-5 /HPF /HPF    Squamous Epithelials Urine Moderate (A) None Seen /LPF    WBC Clumps Urine Present (A) None Seen /HPF   CBC with platelets and differential     Status: Abnormal   Result Value Ref Range    WBC Count 12.9 (H) 4.0 - 11.0 10e3/uL    RBC Count 4.39 (L) 4.40 - 5.90 10e6/uL    Hemoglobin 13.1 (L) 13.3 - 17.7 g/dL    Hematocrit 39.5 (L) 40.0 - 53.0 %    MCV 90 78 - 100 fL    MCH 29.8 26.5 - 33.0 pg    MCHC 33.2 31.5 - 36.5 g/dL    RDW 12.9 10.0 - 15.0 %    Platelet Count 198 150 - 450 10e3/uL    % Neutrophils 79 %    % Lymphocytes 10 %    % Monocytes 10 %    % Eosinophils 1 %    % Basophils 0 %    % Immature Granulocytes 0 %    Absolute Neutrophils 10.1 (H) 1.6 - 8.3 10e3/uL    Absolute Lymphocytes 1.3 0.8 - 5.3 10e3/uL    Absolute Monocytes 1.3 0.0 - 1.3 10e3/uL    Absolute Eosinophils 0.1 0.0 - 0.7 10e3/uL    Absolute Basophils 0.0 0.0 - 0.2 10e3/uL    Absolute Immature Granulocytes 0.0 <=0.4 10e3/uL   CBC with  platelets and differential     Status: Abnormal    Narrative    The following orders were created for panel order CBC with platelets and differential.  Procedure                               Abnormality         Status                     ---------                               -----------         ------                     CBC with platelets and ...[[id number]]  Abnormal            Final result                 Please view results for these tests on the individual orders.

## 2025-06-13 ENCOUNTER — RESULTS FOLLOW-UP (OUTPATIENT)
Dept: URGENT CARE | Facility: URGENT CARE | Age: 40
End: 2025-06-13

## 2025-06-24 DIAGNOSIS — Z94.0 TRANSPLANTED KIDNEY: ICD-10-CM

## 2025-06-24 DIAGNOSIS — B25.9 CYTOMEGALOVIRUS INFECTION, UNSPECIFIED CYTOMEGALOVIRAL INFECTION TYPE (H): ICD-10-CM

## 2025-06-25 RX ORDER — MAGNESIUM OXIDE 400 MG/1
800 TABLET ORAL 2 TIMES DAILY
Qty: 120 TABLET | Refills: 11 | Status: SHIPPED | OUTPATIENT
Start: 2025-06-25

## 2025-07-01 ENCOUNTER — RESULTS FOLLOW-UP (OUTPATIENT)
Dept: MULTI SPECIALTY CLINIC | Facility: CLINIC | Age: 40
End: 2025-07-01

## 2025-07-01 ENCOUNTER — TELEPHONE (OUTPATIENT)
Dept: TRANSPLANT | Facility: CLINIC | Age: 40
End: 2025-07-01
Payer: MEDICARE

## 2025-07-01 ENCOUNTER — LAB (OUTPATIENT)
Dept: LAB | Facility: CLINIC | Age: 40
End: 2025-07-01
Payer: MEDICARE

## 2025-07-01 DIAGNOSIS — N39.0 RECURRENT UTI: ICD-10-CM

## 2025-07-01 DIAGNOSIS — N39.0 RECURRENT UTI: Primary | ICD-10-CM

## 2025-07-01 DIAGNOSIS — B25.9 CYTOMEGALOVIRUS INFECTION, UNSPECIFIED CYTOMEGALOVIRAL INFECTION TYPE (H): ICD-10-CM

## 2025-07-01 LAB
ALBUMIN UR-MCNC: NEGATIVE MG/DL
APPEARANCE UR: CLEAR
BILIRUB UR QL STRIP: NEGATIVE
COLOR UR AUTO: ABNORMAL
GLUCOSE UR STRIP-MCNC: NEGATIVE MG/DL
HGB UR QL STRIP: ABNORMAL
KETONES UR STRIP-MCNC: NEGATIVE MG/DL
LEUKOCYTE ESTERASE UR QL STRIP: ABNORMAL
NITRATE UR QL: NEGATIVE
PH UR STRIP: 6.5 [PH] (ref 5–7)
RBC URINE: 2 /HPF
SP GR UR STRIP: 1.01 (ref 1–1.03)
SQUAMOUS EPITHELIAL: <1 /HPF
UROBILINOGEN UR STRIP-MCNC: NORMAL MG/DL
WBC URINE: 63 /HPF

## 2025-07-01 PROCEDURE — 81001 URINALYSIS AUTO W/SCOPE: CPT

## 2025-07-01 PROCEDURE — 87086 URINE CULTURE/COLONY COUNT: CPT

## 2025-07-01 RX ORDER — CEFDINIR 300 MG/1
300 CAPSULE ORAL 2 TIMES DAILY
Qty: 20 CAPSULE | Refills: 0 | Status: SHIPPED | OUTPATIENT
Start: 2025-07-01 | End: 2025-07-11

## 2025-07-01 NOTE — TELEPHONE ENCOUNTER
Chip stated he's sent a couple of Mychart messages today to his care team, and hasn't heard back, patient stated he feels like he's starting to get a UTI and wanting order put into epic and wanting to touch base with coordinator.

## 2025-07-03 LAB — BACTERIA UR CULT: NORMAL

## 2025-07-07 ENCOUNTER — TRANSFERRED RECORDS (OUTPATIENT)
Dept: HEALTH INFORMATION MANAGEMENT | Facility: CLINIC | Age: 40
End: 2025-07-07
Payer: MEDICARE

## 2025-07-08 ENCOUNTER — TELEPHONE (OUTPATIENT)
Dept: FAMILY MEDICINE | Facility: CLINIC | Age: 40
End: 2025-07-08
Payer: MEDICARE

## 2025-07-08 NOTE — TELEPHONE ENCOUNTER
Forms/Letter Request     Type of form/letter: OTHER: Plan of care     Do we have the form/letter: Yes: placed in Danielle inbox     Who is the form from? [facility] (if other please explain)     Where did/will the form come from? form was faxed in     When is form/letter needed by: asap     How would you like the form/letter returned: Fax : [phone]

## 2025-07-09 ENCOUNTER — APPOINTMENT (OUTPATIENT)
Dept: ULTRASOUND IMAGING | Facility: CLINIC | Age: 40
DRG: 872 | End: 2025-07-09
Attending: EMERGENCY MEDICINE
Payer: MEDICARE

## 2025-07-09 ENCOUNTER — OFFICE VISIT (OUTPATIENT)
Dept: URGENT CARE | Facility: URGENT CARE | Age: 40
End: 2025-07-09
Payer: MEDICARE

## 2025-07-09 ENCOUNTER — HOSPITAL ENCOUNTER (INPATIENT)
Facility: CLINIC | Age: 40
End: 2025-07-09
Attending: EMERGENCY MEDICINE | Admitting: INTERNAL MEDICINE
Payer: MEDICARE

## 2025-07-09 VITALS
BODY MASS INDEX: 20.89 KG/M2 | SYSTOLIC BLOOD PRESSURE: 115 MMHG | OXYGEN SATURATION: 96 % | DIASTOLIC BLOOD PRESSURE: 73 MMHG | HEART RATE: 118 BPM | RESPIRATION RATE: 17 BRPM | WEIGHT: 154 LBS | TEMPERATURE: 104.4 F

## 2025-07-09 DIAGNOSIS — Z94.0 KIDNEY REPLACED BY TRANSPLANT: ICD-10-CM

## 2025-07-09 DIAGNOSIS — N12 PYELONEPHRITIS OF TRANSPLANTED KIDNEY: ICD-10-CM

## 2025-07-09 DIAGNOSIS — R50.9 FEVER, UNSPECIFIED FEVER CAUSE: ICD-10-CM

## 2025-07-09 DIAGNOSIS — N30.00 ACUTE CYSTITIS WITHOUT HEMATURIA: ICD-10-CM

## 2025-07-09 DIAGNOSIS — D84.9 IMMUNOSUPPRESSED STATUS: ICD-10-CM

## 2025-07-09 DIAGNOSIS — N18.31 STAGE 3A CHRONIC KIDNEY DISEASE (H): ICD-10-CM

## 2025-07-09 DIAGNOSIS — R68.83 CHILLS: Primary | ICD-10-CM

## 2025-07-09 DIAGNOSIS — R50.9 FEVER IN ADULT: ICD-10-CM

## 2025-07-09 DIAGNOSIS — T86.19 PYELONEPHRITIS OF TRANSPLANTED KIDNEY: ICD-10-CM

## 2025-07-09 LAB
ALBUMIN SERPL BCG-MCNC: 4 G/DL (ref 3.5–5.2)
ALBUMIN SERPL-MCNC: 3.8 G/DL (ref 3.4–5)
ALBUMIN UR-MCNC: 100 MG/DL
ALBUMIN UR-MCNC: 70 MG/DL
ALP SERPL-CCNC: 70 U/L (ref 40–150)
ALP SERPL-CCNC: 70 U/L (ref 40–150)
ALT SERPL W P-5'-P-CCNC: 10 U/L (ref 0–70)
ALT SERPL W P-5'-P-CCNC: 17 U/L (ref 0–70)
ANION GAP SERPL CALCULATED.3IONS-SCNC: 2 MMOL/L (ref 3–14)
ANION GAP SERPL CALCULATED.3IONS-SCNC: 9 MMOL/L (ref 7–15)
APPEARANCE UR: ABNORMAL
APPEARANCE UR: CLEAR
AST SERPL W P-5'-P-CCNC: 19 U/L (ref 0–45)
AST SERPL W P-5'-P-CCNC: 24 U/L (ref 0–45)
BACTERIA #/AREA URNS HPF: ABNORMAL /HPF
BACTERIA #/AREA URNS HPF: ABNORMAL /HPF
BASOPHILS # BLD MANUAL: 0 10E3/UL (ref 0–0.2)
BASOPHILS NFR BLD MANUAL: 0 %
BILIRUB SERPL-MCNC: 0.6 MG/DL
BILIRUB SERPL-MCNC: 0.9 MG/DL (ref 0.2–1.3)
BILIRUB UR QL STRIP: NEGATIVE
BILIRUB UR QL STRIP: NEGATIVE
BUN SERPL-MCNC: 21 MG/DL (ref 7–30)
BUN SERPL-MCNC: 23 MG/DL (ref 6–20)
CALCIUM SERPL-MCNC: 9.2 MG/DL (ref 8.8–10.4)
CALCIUM SERPL-MCNC: 9.5 MG/DL (ref 8.5–10.1)
CHLORIDE BLD-SCNC: 104 MMOL/L (ref 94–109)
CHLORIDE SERPL-SCNC: 101 MMOL/L (ref 98–107)
CO2 SERPL-SCNC: 27 MMOL/L (ref 20–32)
COLOR UR AUTO: ABNORMAL
COLOR UR AUTO: YELLOW
CREAT SERPL-MCNC: 1.93 MG/DL (ref 0.67–1.17)
CREAT SERPL-MCNC: 2 MG/DL (ref 0.66–1.25)
EGFRCR SERPLBLD CKD-EPI 2021: 42 ML/MIN/1.73M2
EGFRCR SERPLBLD CKD-EPI 2021: 44 ML/MIN/1.73M2
EOSINOPHIL # BLD MANUAL: 0 10E3/UL (ref 0–0.7)
EOSINOPHIL NFR BLD MANUAL: 0 %
ERYTHROCYTE [DISTWIDTH] IN BLOOD BY AUTOMATED COUNT: 13 % (ref 10–15)
GLUCOSE BLD-MCNC: 126 MG/DL (ref 70–99)
GLUCOSE SERPL-MCNC: 109 MG/DL (ref 70–99)
GLUCOSE UR STRIP-MCNC: NEGATIVE MG/DL
GLUCOSE UR STRIP-MCNC: NEGATIVE MG/DL
HCO3 SERPL-SCNC: 22 MMOL/L (ref 22–29)
HCT VFR BLD AUTO: 40.2 % (ref 40–53)
HGB BLD-MCNC: 13.6 G/DL (ref 13.3–17.7)
HGB UR QL STRIP: ABNORMAL
HGB UR QL STRIP: ABNORMAL
KETONES UR STRIP-MCNC: NEGATIVE MG/DL
KETONES UR STRIP-MCNC: NEGATIVE MG/DL
LACTATE SERPL-SCNC: 0.9 MMOL/L (ref 0.7–2)
LEUKOCYTE ESTERASE UR QL STRIP: ABNORMAL
LEUKOCYTE ESTERASE UR QL STRIP: ABNORMAL
LIPASE SERPL-CCNC: 37 U/L (ref 13–60)
LYMPHOCYTES # BLD MANUAL: 1 10E3/UL (ref 0.8–5.3)
LYMPHOCYTES NFR BLD MANUAL: 5 %
MCH RBC QN AUTO: 29.7 PG (ref 26.5–33)
MCHC RBC AUTO-ENTMCNC: 33.8 G/DL (ref 31.5–36.5)
MCV RBC AUTO: 88 FL (ref 78–100)
MONOCYTES # BLD MANUAL: 1 10E3/UL (ref 0–1.3)
MONOCYTES NFR BLD MANUAL: 5 %
MUCOUS THREADS #/AREA URNS LPF: PRESENT /LPF
NEUTROPHILS # BLD MANUAL: 17.6 10E3/UL (ref 1.6–8.3)
NEUTROPHILS NFR BLD MANUAL: 90 %
NITRATE UR QL: POSITIVE
NITRATE UR QL: POSITIVE
PH UR STRIP: 6.5 [PH] (ref 5–7)
PH UR STRIP: 7 [PH] (ref 5–7)
PLAT MORPH BLD: NORMAL
PLATELET # BLD AUTO: 171 10E3/UL (ref 150–450)
POTASSIUM BLD-SCNC: 4.8 MMOL/L (ref 3.4–5.3)
POTASSIUM SERPL-SCNC: 4.4 MMOL/L (ref 3.4–5.3)
PROT SERPL-MCNC: 7.3 G/DL (ref 6.4–8.3)
PROT SERPL-MCNC: 7.3 G/DL (ref 6.8–8.8)
RBC # BLD AUTO: 4.58 10E6/UL (ref 4.4–5.9)
RBC #/AREA URNS AUTO: ABNORMAL /HPF
RBC MORPH BLD: NORMAL
RBC URINE: 8 /HPF
SODIUM SERPL-SCNC: 132 MMOL/L (ref 135–145)
SODIUM SERPL-SCNC: 133 MMOL/L (ref 135–145)
SP GR UR STRIP: 1.01 (ref 1–1.03)
SP GR UR STRIP: 1.01 (ref 1–1.03)
SQUAMOUS #/AREA URNS AUTO: ABNORMAL /LPF
SQUAMOUS EPITHELIAL: <1 /HPF
TRANSITIONAL EPI: 1 /HPF
UROBILINOGEN UR STRIP-ACNC: 0.2 E.U./DL
UROBILINOGEN UR STRIP-MCNC: NORMAL MG/DL
WBC # BLD AUTO: 19.7 10E3/UL (ref 4–11)
WBC #/AREA URNS AUTO: ABNORMAL /HPF
WBC CLUMPS #/AREA URNS HPF: PRESENT /HPF
WBC URINE: >182 /HPF

## 2025-07-09 PROCEDURE — 36415 COLL VENOUS BLD VENIPUNCTURE: CPT | Performed by: EMERGENCY MEDICINE

## 2025-07-09 PROCEDURE — 120N000011 HC R&B TRANSPLANT UMMC

## 2025-07-09 PROCEDURE — 3074F SYST BP LT 130 MM HG: CPT | Performed by: PHYSICIAN ASSISTANT

## 2025-07-09 PROCEDURE — 87040 BLOOD CULTURE FOR BACTERIA: CPT | Performed by: EMERGENCY MEDICINE

## 2025-07-09 PROCEDURE — 99223 1ST HOSP IP/OBS HIGH 75: CPT | Mod: AI | Performed by: INTERNAL MEDICINE

## 2025-07-09 PROCEDURE — 85025 COMPLETE CBC W/AUTO DIFF WBC: CPT | Performed by: EMERGENCY MEDICINE

## 2025-07-09 PROCEDURE — 96372 THER/PROPH/DIAG INJ SC/IM: CPT | Performed by: PHYSICIAN ASSISTANT

## 2025-07-09 PROCEDURE — 250N000013 HC RX MED GY IP 250 OP 250 PS 637: Performed by: EMERGENCY MEDICINE

## 2025-07-09 PROCEDURE — 99285 EMERGENCY DEPT VISIT HI MDM: CPT | Mod: 25 | Performed by: EMERGENCY MEDICINE

## 2025-07-09 PROCEDURE — 85027 COMPLETE CBC AUTOMATED: CPT | Performed by: PHYSICIAN ASSISTANT

## 2025-07-09 PROCEDURE — 80197 ASSAY OF TACROLIMUS: CPT | Performed by: PHYSICIAN ASSISTANT

## 2025-07-09 PROCEDURE — 3078F DIAST BP <80 MM HG: CPT | Performed by: PHYSICIAN ASSISTANT

## 2025-07-09 PROCEDURE — 84155 ASSAY OF PROTEIN SERUM: CPT | Performed by: EMERGENCY MEDICINE

## 2025-07-09 PROCEDURE — 83690 ASSAY OF LIPASE: CPT | Performed by: EMERGENCY MEDICINE

## 2025-07-09 PROCEDURE — 81001 URINALYSIS AUTO W/SCOPE: CPT | Performed by: EMERGENCY MEDICINE

## 2025-07-09 PROCEDURE — 81001 URINALYSIS AUTO W/SCOPE: CPT

## 2025-07-09 PROCEDURE — 85007 BL SMEAR W/DIFF WBC COUNT: CPT | Performed by: EMERGENCY MEDICINE

## 2025-07-09 PROCEDURE — 99215 OFFICE O/P EST HI 40 MIN: CPT | Mod: 25 | Performed by: PHYSICIAN ASSISTANT

## 2025-07-09 PROCEDURE — 80053 COMPREHEN METABOLIC PANEL: CPT | Performed by: PHYSICIAN ASSISTANT

## 2025-07-09 PROCEDURE — 258N000003 HC RX IP 258 OP 636: Performed by: EMERGENCY MEDICINE

## 2025-07-09 PROCEDURE — 76776 US EXAM K TRANSPL W/DOPPLER: CPT

## 2025-07-09 PROCEDURE — 76776 US EXAM K TRANSPL W/DOPPLER: CPT | Mod: 26 | Performed by: RADIOLOGY

## 2025-07-09 PROCEDURE — 250N000011 HC RX IP 250 OP 636: Performed by: EMERGENCY MEDICINE

## 2025-07-09 PROCEDURE — 36415 COLL VENOUS BLD VENIPUNCTURE: CPT | Performed by: PHYSICIAN ASSISTANT

## 2025-07-09 PROCEDURE — 87186 SC STD MICRODIL/AGAR DIL: CPT | Performed by: EMERGENCY MEDICINE

## 2025-07-09 PROCEDURE — 83605 ASSAY OF LACTIC ACID: CPT | Performed by: EMERGENCY MEDICINE

## 2025-07-09 RX ORDER — PIPERACILLIN SODIUM, TAZOBACTAM SODIUM 4; .5 G/20ML; G/20ML
4.5 INJECTION, POWDER, LYOPHILIZED, FOR SOLUTION INTRAVENOUS ONCE
Status: COMPLETED | OUTPATIENT
Start: 2025-07-09 | End: 2025-07-09

## 2025-07-09 RX ORDER — CEFTRIAXONE SODIUM 1 G
1 VIAL (EA) INJECTION ONCE
Status: COMPLETED | OUTPATIENT
Start: 2025-07-09 | End: 2025-07-09

## 2025-07-09 RX ORDER — ACETAMINOPHEN 500 MG
1000 TABLET ORAL ONCE
Status: COMPLETED | OUTPATIENT
Start: 2025-07-09 | End: 2025-07-09

## 2025-07-09 RX ORDER — CEFTRIAXONE 1 G/1
1 INJECTION, POWDER, FOR SOLUTION INTRAMUSCULAR; INTRAVENOUS ONCE
Status: DISCONTINUED | OUTPATIENT
Start: 2025-07-09 | End: 2025-07-09

## 2025-07-09 RX ADMIN — SODIUM CHLORIDE 1000 ML: 0.9 INJECTION, SOLUTION INTRAVENOUS at 22:06

## 2025-07-09 RX ADMIN — ACETAMINOPHEN 1000 MG: 500 TABLET ORAL at 22:10

## 2025-07-09 RX ADMIN — PIPERACILLIN AND TAZOBACTAM 4.5 G: 4; .5 INJECTION, POWDER, LYOPHILIZED, FOR SOLUTION INTRAVENOUS at 22:03

## 2025-07-09 RX ADMIN — Medication 1 G: at 18:45

## 2025-07-09 ASSESSMENT — ACTIVITIES OF DAILY LIVING (ADL)
ADLS_ACUITY_SCORE: 54

## 2025-07-09 ASSESSMENT — COLUMBIA-SUICIDE SEVERITY RATING SCALE - C-SSRS
2. HAVE YOU ACTUALLY HAD ANY THOUGHTS OF KILLING YOURSELF IN THE PAST MONTH?: NO
6. HAVE YOU EVER DONE ANYTHING, STARTED TO DO ANYTHING, OR PREPARED TO DO ANYTHING TO END YOUR LIFE?: NO
1. IN THE PAST MONTH, HAVE YOU WISHED YOU WERE DEAD OR WISHED YOU COULD GO TO SLEEP AND NOT WAKE UP?: NO

## 2025-07-09 NOTE — PROGRESS NOTES
Urgent Care Clinic Visit    Chief Complaint   Patient presents with    Generalized Body Aches     Chills, body aches,  when urinating T-1  kidney pain     Tx- tylenol last dose at 1pm  wants a tac level test  immune system is depleted                7/9/2025     6:21 PM   Additional Questions   Roomed by Ju   Accompanied by self           Urgent Care Clinic Visit  No chief complaint on file.

## 2025-07-10 ENCOUNTER — APPOINTMENT (OUTPATIENT)
Dept: ULTRASOUND IMAGING | Facility: CLINIC | Age: 40
End: 2025-07-10
Payer: MEDICARE

## 2025-07-10 VITALS
WEIGHT: 157.2 LBS | RESPIRATION RATE: 18 BRPM | DIASTOLIC BLOOD PRESSURE: 67 MMHG | HEART RATE: 84 BPM | OXYGEN SATURATION: 98 % | SYSTOLIC BLOOD PRESSURE: 115 MMHG | TEMPERATURE: 99.8 F | BODY MASS INDEX: 21.29 KG/M2 | HEIGHT: 72 IN

## 2025-07-10 LAB
ANION GAP SERPL CALCULATED.3IONS-SCNC: 9 MMOL/L (ref 7–15)
BACTERIA SPEC CULT: NORMAL
BACTERIA SPEC CULT: NORMAL
BACTERIA UR CULT: ABNORMAL
BACTERIA UR CULT: ABNORMAL
BASOPHILS # BLD AUTO: 0.1 10E3/UL (ref 0–0.2)
BASOPHILS NFR BLD AUTO: 0 %
BUN SERPL-MCNC: 22.2 MG/DL (ref 6–20)
C CAYETANENSIS DNA STL QL NAA+NON-PROBE: NEGATIVE
C DIFF TOX B STL QL: NEGATIVE
C PNEUM DNA SPEC QL NAA+PROBE: NOT DETECTED
CALCIUM SERPL-MCNC: 8.7 MG/DL (ref 8.8–10.4)
CAMPYLOBACTER DNA SPEC NAA+PROBE: NEGATIVE
CHLORIDE SERPL-SCNC: 106 MMOL/L (ref 98–107)
CREAT SERPL-MCNC: 1.96 MG/DL (ref 0.67–1.17)
CRYPTOSP DNA STL QL NAA+NON-PROBE: NEGATIVE
EC STX1+STX2 GENES STL QL NAA+NON-PROBE: NEGATIVE
EGFRCR SERPLBLD CKD-EPI 2021: 43 ML/MIN/1.73M2
EOSINOPHIL # BLD AUTO: 0 10E3/UL (ref 0–0.7)
EOSINOPHIL NFR BLD AUTO: 0 %
ERYTHROCYTE [DISTWIDTH] IN BLOOD BY AUTOMATED COUNT: 12.9 % (ref 10–15)
ERYTHROCYTE [DISTWIDTH] IN BLOOD BY AUTOMATED COUNT: 13.2 % (ref 10–15)
FLUAV H1 2009 PAND RNA SPEC QL NAA+PROBE: NOT DETECTED
FLUAV H1 RNA SPEC QL NAA+PROBE: NOT DETECTED
FLUAV H3 RNA SPEC QL NAA+PROBE: NOT DETECTED
FLUAV RNA SPEC QL NAA+PROBE: NOT DETECTED
FLUBV RNA SPEC QL NAA+PROBE: NOT DETECTED
G LAMBLIA DNA STL QL NAA+NON-PROBE: NEGATIVE
GLUCOSE SERPL-MCNC: 116 MG/DL (ref 70–99)
HADV DNA SPEC QL NAA+PROBE: NOT DETECTED
HCO3 SERPL-SCNC: 19 MMOL/L (ref 22–29)
HCOV PNL SPEC NAA+PROBE: NOT DETECTED
HCT VFR BLD AUTO: 35.6 % (ref 40–53)
HCT VFR BLD AUTO: 40.5 % (ref 40–53)
HGB BLD-MCNC: 11.8 G/DL (ref 13.3–17.7)
HGB BLD-MCNC: 13.6 G/DL (ref 13.3–17.7)
HMPV RNA SPEC QL NAA+PROBE: NOT DETECTED
HPIV1 RNA SPEC QL NAA+PROBE: NOT DETECTED
HPIV2 RNA SPEC QL NAA+PROBE: NOT DETECTED
HPIV3 RNA SPEC QL NAA+PROBE: NOT DETECTED
HPIV4 RNA SPEC QL NAA+PROBE: NOT DETECTED
IMM GRANULOCYTES # BLD: 0.2 10E3/UL
IMM GRANULOCYTES NFR BLD: 1 %
LYMPHOCYTES # BLD AUTO: 0.9 10E3/UL (ref 0.8–5.3)
LYMPHOCYTES NFR BLD AUTO: 4 %
M PNEUMO DNA SPEC QL NAA+PROBE: NOT DETECTED
MAGNESIUM SERPL-MCNC: 1.6 MG/DL (ref 1.7–2.3)
MCH RBC QN AUTO: 29.5 PG (ref 26.5–33)
MCH RBC QN AUTO: 29.8 PG (ref 26.5–33)
MCHC RBC AUTO-ENTMCNC: 33.1 G/DL (ref 31.5–36.5)
MCHC RBC AUTO-ENTMCNC: 33.6 G/DL (ref 31.5–36.5)
MCV RBC AUTO: 89 FL (ref 78–100)
MCV RBC AUTO: 89 FL (ref 78–100)
MONOCYTES # BLD AUTO: 1.8 10E3/UL (ref 0–1.3)
MONOCYTES NFR BLD AUTO: 9 %
NEUTROPHILS # BLD AUTO: 18.5 10E3/UL (ref 1.6–8.3)
NEUTROPHILS NFR BLD AUTO: 86 %
NOROVIRUS GI+II RNA STL QL NAA+NON-PROBE: NEGATIVE
NRBC # BLD AUTO: 0 10E3/UL
NRBC BLD AUTO-RTO: 0 /100
PHOSPHATE SERPL-MCNC: 2.4 MG/DL (ref 2.5–4.5)
PLAT MORPH BLD: ABNORMAL
PLATELET # BLD AUTO: 143 10E3/UL (ref 150–450)
PLATELET # BLD AUTO: 153 10E3/UL (ref 150–450)
POTASSIUM SERPL-SCNC: 4.2 MMOL/L (ref 3.4–5.3)
RBC # BLD AUTO: 4 10E6/UL (ref 4.4–5.9)
RBC # BLD AUTO: 4.57 10E6/UL (ref 4.4–5.9)
RBC MORPH BLD: ABNORMAL
RSV RNA SPEC QL NAA+PROBE: NOT DETECTED
RSV RNA SPEC QL NAA+PROBE: NOT DETECTED
RV+EV RNA SPEC QL NAA+PROBE: NOT DETECTED
SALMONELLA SP RPOD STL QL NAA+PROBE: NEGATIVE
SHIGELLA SP+EIEC IPAH ST NAA+NON-PROBE: NEGATIVE
SODIUM SERPL-SCNC: 134 MMOL/L (ref 135–145)
TACROLIMUS BLD-MCNC: 3.5 UG/L (ref 5–15)
TACROLIMUS BLD-MCNC: 6.8 UG/L (ref 5–15)
TME LAST DOSE: ABNORMAL H
TME LAST DOSE: ABNORMAL H
TME LAST DOSE: NORMAL H
TME LAST DOSE: NORMAL H
VARIANT LYMPHS BLD QL SMEAR: PRESENT
VIBRIO DNA SPEC NAA+PROBE: NEGATIVE
WBC # BLD AUTO: 17.4 10E3/UL (ref 4–11)
WBC # BLD AUTO: 21.5 10E3/UL (ref 4–11)
Y ENTEROCOL DNA STL QL NAA+PROBE: NEGATIVE

## 2025-07-10 PROCEDURE — 84100 ASSAY OF PHOSPHORUS: CPT | Performed by: NURSE PRACTITIONER

## 2025-07-10 PROCEDURE — 85027 COMPLETE CBC AUTOMATED: CPT

## 2025-07-10 PROCEDURE — 76770 US EXAM ABDO BACK WALL COMP: CPT

## 2025-07-10 PROCEDURE — 80048 BASIC METABOLIC PNL TOTAL CA: CPT

## 2025-07-10 PROCEDURE — 99222 1ST HOSP IP/OBS MODERATE 55: CPT | Performed by: STUDENT IN AN ORGANIZED HEALTH CARE EDUCATION/TRAINING PROGRAM

## 2025-07-10 PROCEDURE — 258N000003 HC RX IP 258 OP 636

## 2025-07-10 PROCEDURE — 250N000011 HC RX IP 250 OP 636: Performed by: EMERGENCY MEDICINE

## 2025-07-10 PROCEDURE — 99232 SBSQ HOSP IP/OBS MODERATE 35: CPT | Mod: GC | Performed by: STUDENT IN AN ORGANIZED HEALTH CARE EDUCATION/TRAINING PROGRAM

## 2025-07-10 PROCEDURE — 250N000012 HC RX MED GY IP 250 OP 636 PS 637: Performed by: INTERNAL MEDICINE

## 2025-07-10 PROCEDURE — 250N000012 HC RX MED GY IP 250 OP 636 PS 637

## 2025-07-10 PROCEDURE — 120N000011 HC R&B TRANSPLANT UMMC

## 2025-07-10 PROCEDURE — 87581 M.PNEUMON DNA AMP PROBE: CPT

## 2025-07-10 PROCEDURE — 99222 1ST HOSP IP/OBS MODERATE 55: CPT | Mod: FS | Performed by: NURSE PRACTITIONER

## 2025-07-10 PROCEDURE — 76770 US EXAM ABDO BACK WALL COMP: CPT | Mod: 26 | Performed by: STUDENT IN AN ORGANIZED HEALTH CARE EDUCATION/TRAINING PROGRAM

## 2025-07-10 PROCEDURE — 250N000013 HC RX MED GY IP 250 OP 250 PS 637

## 2025-07-10 PROCEDURE — 87506 IADNA-DNA/RNA PROBE TQ 6-11: CPT

## 2025-07-10 PROCEDURE — 80197 ASSAY OF TACROLIMUS: CPT

## 2025-07-10 PROCEDURE — 250N000011 HC RX IP 250 OP 636

## 2025-07-10 PROCEDURE — 36415 COLL VENOUS BLD VENIPUNCTURE: CPT

## 2025-07-10 PROCEDURE — 87493 C DIFF AMPLIFIED PROBE: CPT

## 2025-07-10 PROCEDURE — 83735 ASSAY OF MAGNESIUM: CPT | Performed by: NURSE PRACTITIONER

## 2025-07-10 PROCEDURE — G0545 PR INHRENT VISIT TO INPT/OBS W CNFRM/SUSPCT INFCT DIS BY INFCT DIS SPCIALST: HCPCS | Performed by: STUDENT IN AN ORGANIZED HEALTH CARE EDUCATION/TRAINING PROGRAM

## 2025-07-10 RX ORDER — POTASSIUM CHLORIDE 750 MG/1
20 TABLET, EXTENDED RELEASE ORAL ONCE
Status: COMPLETED | OUTPATIENT
Start: 2025-07-10 | End: 2025-07-10

## 2025-07-10 RX ORDER — ACETAMINOPHEN 325 MG/1
650 TABLET ORAL EVERY 4 HOURS PRN
Status: DISCONTINUED | OUTPATIENT
Start: 2025-07-10 | End: 2025-07-11 | Stop reason: HOSPADM

## 2025-07-10 RX ORDER — PIPERACILLIN SODIUM, TAZOBACTAM SODIUM 4; .5 G/20ML; G/20ML
4.5 INJECTION, POWDER, LYOPHILIZED, FOR SOLUTION INTRAVENOUS EVERY 6 HOURS
Status: DISCONTINUED | OUTPATIENT
Start: 2025-07-10 | End: 2025-07-10

## 2025-07-10 RX ORDER — TACROLIMUS 1 MG/1
2 CAPSULE ORAL 2 TIMES DAILY
Status: DISCONTINUED | OUTPATIENT
Start: 2025-07-10 | End: 2025-07-10 | Stop reason: DRUGHIGH

## 2025-07-10 RX ORDER — SODIUM CHLORIDE 9 MG/ML
INJECTION, SOLUTION INTRAVENOUS CONTINUOUS
Status: DISCONTINUED | OUTPATIENT
Start: 2025-07-10 | End: 2025-07-11

## 2025-07-10 RX ORDER — AMOXICILLIN 250 MG
2 CAPSULE ORAL 2 TIMES DAILY PRN
Status: DISCONTINUED | OUTPATIENT
Start: 2025-07-10 | End: 2025-07-11 | Stop reason: HOSPADM

## 2025-07-10 RX ORDER — TACROLIMUS 0.5 MG/1
0.5 CAPSULE ORAL 2 TIMES DAILY
Status: DISCONTINUED | OUTPATIENT
Start: 2025-07-10 | End: 2025-07-10 | Stop reason: DRUGHIGH

## 2025-07-10 RX ORDER — MYCOPHENOLIC ACID 360 MG/1
360 TABLET, DELAYED RELEASE ORAL
Status: DISCONTINUED | OUTPATIENT
Start: 2025-07-10 | End: 2025-07-11 | Stop reason: HOSPADM

## 2025-07-10 RX ORDER — AMOXICILLIN 250 MG
1 CAPSULE ORAL 2 TIMES DAILY PRN
Status: DISCONTINUED | OUTPATIENT
Start: 2025-07-10 | End: 2025-07-11 | Stop reason: HOSPADM

## 2025-07-10 RX ORDER — CALCIUM CARBONATE 500 MG/1
1000 TABLET, CHEWABLE ORAL 4 TIMES DAILY PRN
Status: DISCONTINUED | OUTPATIENT
Start: 2025-07-10 | End: 2025-07-11 | Stop reason: HOSPADM

## 2025-07-10 RX ORDER — MAGNESIUM OXIDE 400 MG/1
800 TABLET ORAL 2 TIMES DAILY
Status: DISCONTINUED | OUTPATIENT
Start: 2025-07-10 | End: 2025-07-11 | Stop reason: HOSPADM

## 2025-07-10 RX ORDER — MAGNESIUM OXIDE 400 MG/1
400 TABLET ORAL EVERY 4 HOURS
Status: COMPLETED | OUTPATIENT
Start: 2025-07-10 | End: 2025-07-11

## 2025-07-10 RX ORDER — PIPERACILLIN SODIUM, TAZOBACTAM SODIUM 3; .375 G/15ML; G/15ML
3.38 INJECTION, POWDER, LYOPHILIZED, FOR SOLUTION INTRAVENOUS EVERY 6 HOURS
Status: DISCONTINUED | OUTPATIENT
Start: 2025-07-10 | End: 2025-07-10

## 2025-07-10 RX ORDER — SODIUM CHLORIDE, SODIUM LACTATE, POTASSIUM CHLORIDE, CALCIUM CHLORIDE 600; 310; 30; 20 MG/100ML; MG/100ML; MG/100ML; MG/100ML
INJECTION, SOLUTION INTRAVENOUS CONTINUOUS
Status: DISCONTINUED | OUTPATIENT
Start: 2025-07-10 | End: 2025-07-10

## 2025-07-10 RX ORDER — MEROPENEM 500 MG/1
500 INJECTION, POWDER, FOR SOLUTION INTRAVENOUS EVERY 6 HOURS
Status: DISCONTINUED | OUTPATIENT
Start: 2025-07-10 | End: 2025-07-11

## 2025-07-10 RX ORDER — LIDOCAINE 40 MG/G
CREAM TOPICAL
Status: DISCONTINUED | OUTPATIENT
Start: 2025-07-10 | End: 2025-07-11 | Stop reason: HOSPADM

## 2025-07-10 RX ORDER — ONDANSETRON 4 MG/1
4 TABLET, ORALLY DISINTEGRATING ORAL EVERY 6 HOURS PRN
Status: DISCONTINUED | OUTPATIENT
Start: 2025-07-10 | End: 2025-07-11 | Stop reason: HOSPADM

## 2025-07-10 RX ORDER — MAGNESIUM SULFATE HEPTAHYDRATE 40 MG/ML
2 INJECTION, SOLUTION INTRAVENOUS ONCE
Status: DISCONTINUED | OUTPATIENT
Start: 2025-07-10 | End: 2025-07-10

## 2025-07-10 RX ORDER — ONDANSETRON 2 MG/ML
4 INJECTION INTRAMUSCULAR; INTRAVENOUS EVERY 6 HOURS PRN
Status: DISCONTINUED | OUTPATIENT
Start: 2025-07-10 | End: 2025-07-11 | Stop reason: HOSPADM

## 2025-07-10 RX ORDER — ACETAMINOPHEN 650 MG/1
650 SUPPOSITORY RECTAL EVERY 4 HOURS PRN
Status: DISCONTINUED | OUTPATIENT
Start: 2025-07-10 | End: 2025-07-11 | Stop reason: HOSPADM

## 2025-07-10 RX ADMIN — MEROPENEM 500 MG: 500 INJECTION, POWDER, FOR SOLUTION INTRAVENOUS at 20:20

## 2025-07-10 RX ADMIN — ACETAMINOPHEN 650 MG: 325 TABLET ORAL at 04:36

## 2025-07-10 RX ADMIN — SODIUM CHLORIDE: 0.9 INJECTION, SOLUTION INTRAVENOUS at 21:29

## 2025-07-10 RX ADMIN — POTASSIUM CHLORIDE 20 MEQ: 750 TABLET, EXTENDED RELEASE ORAL at 20:18

## 2025-07-10 RX ADMIN — ACETAMINOPHEN 650 MG: 325 TABLET ORAL at 20:18

## 2025-07-10 RX ADMIN — MAGNESIUM OXIDE TAB 400 MG (241.3 MG ELEMENTAL MG) 800 MG: 400 (241.3 MG) TAB at 08:57

## 2025-07-10 RX ADMIN — ACETAMINOPHEN 650 MG: 325 TABLET ORAL at 12:07

## 2025-07-10 RX ADMIN — TACROLIMUS 2.5 MG: 1 CAPSULE ORAL at 08:57

## 2025-07-10 RX ADMIN — MYCOPHENOLIC ACID 360 MG: 360 TABLET, DELAYED RELEASE ORAL at 08:58

## 2025-07-10 RX ADMIN — TACROLIMUS 2.5 MG: 1 CAPSULE ORAL at 18:23

## 2025-07-10 RX ADMIN — MEROPENEM 500 MG: 500 INJECTION, POWDER, FOR SOLUTION INTRAVENOUS at 15:53

## 2025-07-10 RX ADMIN — PIPERACILLIN AND TAZOBACTAM 3.38 G: 3; .375 INJECTION, POWDER, FOR SOLUTION INTRAVENOUS at 04:31

## 2025-07-10 RX ADMIN — PIPERACILLIN AND TAZOBACTAM 3.38 G: 3; .375 INJECTION, POWDER, FOR SOLUTION INTRAVENOUS at 10:30

## 2025-07-10 RX ADMIN — SODIUM CHLORIDE, SODIUM LACTATE, POTASSIUM CHLORIDE, AND CALCIUM CHLORIDE: .6; .31; .03; .02 INJECTION, SOLUTION INTRAVENOUS at 15:53

## 2025-07-10 RX ADMIN — MAGNESIUM OXIDE TAB 400 MG (241.3 MG ELEMENTAL MG) 400 MG: 400 (241.3 MG) TAB at 21:29

## 2025-07-10 RX ADMIN — MYCOPHENOLIC ACID 360 MG: 360 TABLET, DELAYED RELEASE ORAL at 18:24

## 2025-07-10 RX ADMIN — MAGNESIUM OXIDE TAB 400 MG (241.3 MG ELEMENTAL MG) 800 MG: 400 (241.3 MG) TAB at 20:18

## 2025-07-10 ASSESSMENT — ACTIVITIES OF DAILY LIVING (ADL)
ADLS_ACUITY_SCORE: 62
ADLS_ACUITY_SCORE: 54
ADLS_ACUITY_SCORE: 54
ADLS_ACUITY_SCORE: 62
ADLS_ACUITY_SCORE: 54
ADLS_ACUITY_SCORE: 62
ADLS_ACUITY_SCORE: 62
ADLS_ACUITY_SCORE: 54
ADLS_ACUITY_SCORE: 58
ADLS_ACUITY_SCORE: 54
ADLS_ACUITY_SCORE: 62
ADLS_ACUITY_SCORE: 54
ADLS_ACUITY_SCORE: 62
ADLS_ACUITY_SCORE: 54
ADLS_ACUITY_SCORE: 54
ADLS_ACUITY_SCORE: 62
ADLS_ACUITY_SCORE: 54
ADLS_ACUITY_SCORE: 62
ADLS_ACUITY_SCORE: 54

## 2025-07-10 NOTE — PROGRESS NOTES
[facility]    Progress Note - Medicine Service, [facility] TEAM 2       Date of Admission:  7/9/2025    Assessment & Plan   Chip Fowler is a 40-year-old male with history DDKT for history of solitary single kidney on chronic suppression with tacrolimus and mycophenolate with history of recurrent UTIs who presented for worsening left flank pain, dysuria, and cloudy urine. Patient started on zosyn overnight and transitioned to meropenem due to recent history of cefdinir use. Patient endorses clinical improvement however notes persistent diarrhea. Continuing to monitor urine cultures to narrow antibiotics.     Updates today:  - Change zosyn to meropenem  - GI viral panel  - Respiratory viral panel  - C. Diff testing  - Started low rate fluid resuscitation due to GI losses   - urine culture not yet resulted from outside [facility], but being processed at [facility] ID lab    # Acute complicated urinary tract infection with systemic signs  #JACK  # Recurrent urinary tract infections  # History of Single kidney transplant (2023)   #Chronic immunosuppression with tacrolimus and mycophenolate   Presents with typical symptoms of pyelonephritis, including fever, dysuria, and left-sided flank pain (patient has transplanted kidney located in right pelvis, and negative atrophic kidney on the left side) in the setting of renal transplant and immunosuppression. Renal ultrasound with Doppler showed no hydronephrosis, obstruction, or vascular concern.  He received IM ceftriaxone at an urgent care prior to [facility] ED presentation and was escalated to IV piperacillin/tazobactam as previous positive cultures were susceptible to it. Due to recent cefazolin treatment, patient escalated to meropenem.   Diagnostic:   - UA with positive LE, WBC, bacteria, nitrates  - Cr increase to 2 on admission from b/l 1.5  - Blood cultures negative  - Ucx results pending  - Renal ultrasound for evidence  of hydronephrosis/pyelonephritis   Treatment:   - Meropenem  - Awaiting blood and urine cultures  - Continue IV piperacillin/tazobactam  - Transplant ID consulted, recs appreciated.     # Acute kidney injury, likely prerenal  # History of kidney transplant (2023)  Presented with acute rise in serum creatinine to 2 from baseline 1.5.  Denies any recent NSAID use.  No significant hypotension noted.  Renal ultrasound did not show any hydronephrosis.  Patient has no signs of fluid overload.  No signs of rejection so far.  Likely multifactorial JACK due to sepsis-related hypoperfusion, already on tacrolimus, and/or intrarenal involvement in the setting of concern for pyelonephritis, but less likely given patient complains of left-sided flank pain (whereas transplanted kidney that is located in right pelvis).  - S/p 1L NS in ED; ensure adequate hydration  - Monitor urine output with I's and O's  - Daily renal labs  - Transplant nephrology consulted, appreciate assistance  - Continue PTA tacrolimus for now; serum tacrolimus level ordered  - Continue PTA mycophenolate    #Diarrhea  #Hx of C. Diff (2023)  - Patient with prior history of C.diff colitis with new onset diarrhea since initiating IV antibiotics. Patient history additionally notable for recent sick contacts.  PLAN:  - C. Diff stool studies  - GI PCR panel  - Respiratory panel  - Holding imodium in the setting of concern for infectious diarrhea    #Hyponatremia  #Hypophosphatemia  #Hypomagnesemia  Losses acutely likely secondary to diarrhea. Will CTM          Diet: Combination Diet Regular Diet Adult    DVT Prophylaxis: Ambulate every shift  Taylor Catheter: Not present  Fluids: s/p 1L NS  Lines: None     Cardiac Monitoring: None  Code Status: Full Code      Clinically Significant Risk Factors Present on Admission         # Hyponatremia: Lowest Na = 132 mmol/L in last 2 days, will monitor as appropriate           # Hypertension: Noted on problem list                       Social Drivers of Health   Tobacco Use: Medium Risk (6/11/2025)    Patient History     Smoking Tobacco Use: Former     Smokeless Tobacco Use: Never   Social Connections: Unknown (9/12/2024)    Social Connection and Isolation Panel [NHANES]     Frequency of Social Gatherings with Friends and Family: Never         Disposition Plan   Medically Ready for Discharge: Anticipated in 2-4 Days         The patient's care was discussed with the Attending Physician, Dr. Gray.    Nathan Song, DO  Medicine Service, [facility] TEAM 2  [facility]  Securely message with Vserv (more info)  Text page via [facility] Paging/Directory   See signed in provider for up to date coverage information  ______________________________________________________________________    Interval History   NAEO. Patient endorses persistent diarrhea since starting IV abx. Patient endorses improvement in aches and denies feeling febrile.     Physical Exam   Vital Signs: Temp: (!) 102.3  F (39.1  C) Temp src: Oral BP: 119/66 Pulse: 96   Resp: 20 SpO2: 100 % O2 Device: None (Room air)    Weight: 159 lbs 9.6 oz    General: NAD  Cardiac: RRR no m/r/g  Pulm: CTAB no w/r/c  Abd: Soft non-tender, BS+  Extremities: No peripheral edema  Neuro: Alert and oriented x4, MSI    Medical Decision Making       Please see A&P for additional details of medical decision making.      Data     I have personally reviewed the following data over the past 24 hrs:    17.4 (H)  \   11.8 (L)   / 143 (L)     134 (L) 106 22.2 (H) /  116 (H)   4.2 19 (L) 1.96 (H) \     ALT: 10 AST: 19 AP: 70 TBILI: 0.6   ALB: 4.0 TOT PROTEIN: 7.3 LIPASE: 37     Procal: N/A CRP: N/A Lactic Acid: 0.9         Imaging results reviewed over the past 24 hrs:   Recent Results (from the past 24 hours)   US Renal Transplant with Doppler    Narrative    EXAM: US RENAL TRANSPLANT WITH DOPPLER  LOCATION: [facility]  DATE:  7/9/2025    INDICATION: fever, transplant history, abd pain  COMPARISON: CT chest/abdomen/pelvis with contrast there are 07/06/2023, renal transplant ultrasound 07/04/2023.  TECHNIQUE: Ultrasound of the renal transplant with Doppler waveform spectral analysis.    FINDINGS: The transplant kidney is located in the right lower quadrant. The transplant kidney is normal in echogenicity. There is no cortical thinning. There is no hydronephrosis, calculus, cyst or mass. There is no perinephric fluid.    The urinary bladder is unremarkable.    TRANSPLANT DOPPLER:    The main renal artery peak systolic velocity is normal (less than 200 cm/sec). The intra-renal transplant resistive index (RI) is normal (0.7 or less).       Impression    IMPRESSION:   Normal renal transplant ultrasound.

## 2025-07-10 NOTE — PROGRESS NOTES
Handoff report given to DIPTI Hahn. Patient is moving to  room [id number]    /67   Pulse 79   Temp 99.9  F (37.7  C) (Oral)   Resp 18   Ht 1.829 m (6')   Wt 72.4 kg (159 lb 9.6 oz)   SpO2 100%   BMI 21.65 kg/m

## 2025-07-10 NOTE — CONSULTS
[facility]  Transplant Nephrology Consult Note  Date of Admission:  7/9/2025  Today's Date: 07/10/2025  Requesting physician: Marcelino Gray MD    Reason for Consult:  DDKT    Recommendations:   - Recommend gentle hydration.  - Recommend Enteric Panel and C-Diff given ongoing diarrhea.   - Appreciate ID input.   - Agree with native kidney ultrasound.   - Repeat tac level tomorrow. Ordered.   - No acute indication for dialysis.   - Continue current immunosuppression.     Assessment & Plan   # DDKT: Trend up; good urine output. Elevated Cr likely from ongoing GI losses and infection. Recommend gentle hydration today.    - Baseline Creatinine: ~ 1.4-1.6   - Proteinuria: Minimal (0.2-0.5 grams)   - DSA Hx: No DSA   - Last cPRA: 64%   - BK Viremia: No   - Kidney Tx Biopsy Hx: No biopsy history.    # Immunosuppression: Tacrolimus immediate release (goal 4-6) and Mycophenolic acid (dose 540 mg every 12 hours)   - Induction with Recent Transplant:  Intermediate Intensity Protocol   - Continue with intensive monitoring of immunosuppression for efficacy and toxicity.   - Historical Changes in Immunosuppression: None   - Changes: No    # Infection Prevention:   Last CD4 Level: 408 (6/2025)  - PJP: None  - Recurrent UTI: Not currently on ppx abx       - CMV IgG Ab High Risk Discordance (D+/R-) at time of transplant: Yes  Present CMV Serostatus: Negative  - EBV IgG Ab High Risk Discordance (D+/R-) at time of transplant: No  Present EBV Serostatus: Positive    # Hypertension: Controlled;  Goal BP: < 140/90 (Hospitalization goal)   - Changes: Not at this time    # Anemia in Chronic Renal Disease: Hgb: Stable, low      SARA: No   - Iron studies: Replete    # Mineral Bone Disorder:    - Secondary renal hyperparathyroidism; PTH level: Normal (15-65 pg/ml)        On treatment: None  - Vitamin D; level: Normal        On supplement: Yes  - Calcium; level: Low        On supplement: No   -  Phosphorus; level: Low        On supplement: No    # Electrolytes:  - Potassium; level: Normal        On supplement: No  - Magnesium; level: Low        On supplement: Yes - 400mg BID  - Bicarbonate; level: Low        On supplement: No    # Recurrent UTI/ Pyelonephritis:  Last + Urine Culture 06/11/2025 (Strep Anginosus). Was still on Cefdenir for previous UTI when symptoms began to worsen this past Tuesday. Blood cultures and urine cultures pending.    - ID on board. Abx now Meropenem     # Diarrhea  Diarrhea multiple times a day since being on Cefdenir for recent UTI. ID on board. Last C-Diff + 11/2023.    - Enteric Panel and C-Diff pending.     # Other Significant PMH:   - H/o Cardiomyopathy: Normal LVEF at 55-60% with last cardiac echo 11/2023  - Recurrent UTI/Pyelonephritis: asymptomatic at present. cystoscopy 7/2023 unremarkable, seen by urology. He was previously on nitrofurantoin for prophylaxis then switched to fosfomycin then Cefdinir. Follows with ID.   - Urethral Stricture: Patient is s/p buccal urethroplasty and diverticulum excision 12/2020. s/p cystoscopy 7/2023 due to recurrent UTIs and this was unremarkable.  Follows with Urology. Last seen 2023.   - Recurrent Cdiff: completed vanc po taper Nov 2023. Asymptomatic at present. Cdiff Tx Nov 2023.   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 Will repeat with symptom    - H/o Polysubstance Abuse: Patient with h/o methamphetamine and alcohol abuse.  Now sober    # Transplant History:  Etiology of Kidney Failure: Solitary congenital kidney and h/o urethral calculus  Tx: DDKT  Transplant: 1/15/2023 (Kidney)  Significant transplant-related complications: CMV Viremia, EBV Viremia, and Recurrent UTIs    Recommendations were communicated to the primary team via this note.    Seen and discussed with [facility]  Transplant Nephrology  Contact information via Vocera Web Console     Physician Attestation      I saw and evaluated Chip Fowler as part of a shared APRN/PA visit.     I personally reviewed the vital signs, medications, and labs.    I personally provided a substantive portion of care for this patient and I approve the care plan as written by the DK.  I was involved with Medical Decision Making including: Please see A&P for additional details of medical decision making.  MANAGEMENT DISCUSSED with the following over the past 24 hours: No acute indications for dialysis.  Would make no changes with immunosuppression.  Appreciate Transplant ID input.  Agree with antibiotics.     Edwin Green MD  Date of Service (when I saw the patient): 07/10/25      History of Present Illness  Mr Fowler is a 40 year old male with a past medical history of solitary congenital kidney s/p DDKT in 2023 c/b recurrent UTIs, EBV, CMV viremia. Other past medical history includes cardiomyopathy and recurrent C-Diff. He presented to ER yesterday for fevers, left flank pain and UTI symptoms. ID on board, now on meropenem. Blood cultures and urine cultures pending.  Will get Left renal ultrasound of native kidney.     Mr Fowler states he had worsening UTI symptoms over the past day- dysuria, left flank pain, frequency, cloudy urine. Denies hematuria or stones. Reports fever and chills. Reports he was still taking cefdenir from prior UTI when symptoms worsened. Denies LE swelling. Denies nausea or vomiting. Reports diarrhea for the past week or two. Denies any missed IS. CMV and EBV both negative recently.     Review of Systems   The 10 point Review of Systems is negative other than noted in the HPI or here.      MEDICATIONS:  Current Facility-Administered Medications   Medication Dose Route Frequency Provider Last Rate Last Admin    magnesium oxide (MAG-OX) tablet 800 mg  800 mg Oral BID Niko Kauffman MD   800 mg at 07/10/25 0857    mycophenolic acid (GENERIC EQUIVALENT) EC tablet 360 mg  360 mg Oral BID IS Nathan Song DO    360 mg at 07/10/25 0858    piperacillin-tazobactam (ZOSYN) 3.375 g vial to attach to  mL bag  3.375 g Intravenous Q6H Les Dejesus MD 0 mL/hr at 07/10/25 0533 3.375 g at 07/10/25 1030    sodium chloride (PF) 0.9% PF flush 3 mL  3 mL Intracatheter Q8H [facility] Niko Kauffman MD        tacrolimus (GENERIC EQUIVALENT) capsule 2.5 mg  2.5 mg Oral BID Richard Avilez MD   2.5 mg at 07/10/25 0857     Current Facility-Administered Medications   Medication Dose Route Frequency Provider Last Rate Last Admin       Physical Exam   Temp  Av.7  F (38.7  C)  Min: 98.8  F (37.1  C)  Max: 104.4  F (40.2  C)      Pulse  Av.2  Min: 81  Max: 118 Resp  Av.8  Min: 16  Max: 20  SpO2  Av.5 %  Min: 96 %  Max: 100 %     /66   Pulse 96   Temp 98.9  F (37.2  C) (Oral)   Resp 16   Ht 1.829 m (6')   Wt 72.4 kg (159 lb 9.6 oz)   SpO2 100%   BMI 21.65 kg/m      Admit Weight: 72.4 kg (159 lb 9.6 oz)     GENERAL APPEARANCE: alert and no distress  HENT: mouth without ulcers or lesions  RESP: lungs clear to auscultation - no rales, rhonchi or wheezes  CV: regular rhythm, normal rate, no rub, no murmur  EDEMA: no LE edema bilaterally  ABDOMEN: tender L flank  MS: extremities normal - no gross deformities noted, no evidence of inflammation in joints, no muscle tenderness  SKIN: no rash  TX KIDNEY: normal  DIALYSIS ACCESS: none    Data   All labs reviewed by me.  CMP  Recent Labs   Lab 07/10/25  0542 25  2032 25  1834   * 132* 133*   POTASSIUM 4.2 4.4 4.8   CHLORIDE 106 101 104   CO2 19* 22 27   ANIONGAP 9 9 2*   * 109* 126*   BUN 22.2* 23.0* 21   CR 1.96* 1.93* 2.00*   GFRESTIMATED 43* 44* 42*   VISHNU 8.7* 9.2 9.5   MAG 1.6*  --   --    PHOS 2.4*  --   --    PROTTOTAL  --  7.3 7.3   ALBUMIN  --  4.0 3.8   BILITOTAL  --  0.6 0.9   ALKPHOS  --  70 70   AST  --  19 24   ALT  --  10 17     CBC  Recent Labs   Lab 07/10/25  0542 25  1834   HGB 11.8* 13.6 13.5   WBC 17.4*  19.7* 20.9*   RBC 4.00* 4.58 4.51   HCT 35.6* 40.2 40.1   MCV 89 88 89   MCH 29.5 29.7 29.9   MCHC 33.1 33.8 33.7   RDW 13.2 13.0 12.9   * 171 181     INRNo lab results found in last 7 days.  ABGNo lab results found in last 7 days.   Urine Studies  Recent Labs   Lab Test 07/09/25  2033 07/09/25  1810 07/01/25  1536 06/11/25  1529 03/22/25  1158 11/30/24  1508 12/25/23  1506   COLOR Light Yellow Yellow Straw Yellow   < > Yellow Yellow   APPEARANCE Slightly Cloudy* Clear Clear Clear   < > Clear Clear   URINEGLC Negative Negative Negative Negative   < > Negative Negative   URINEBILI Negative Negative Negative Negative   < > Negative Negative   URINEKETONE Negative Negative Negative Negative   < > Negative Negative   SG 1.014 1.015 1.007 1.015   < > 1.015 1.010   UBLD Moderate* Moderate* Small* Small*   < > Small* Trace*   URINEPH 6.5 7.0 6.5 7.0   < > 7.0 7.0   PROTEIN 70* 100* Negative 30*   < > 30* Negative   UROBILINOGEN  --  0.2  --  0.2  --  0.2 0.2   NITRITE Positive* Positive* Negative Negative   < > Negative Negative   LEUKEST Large* Small* Large* Moderate*   < > Moderate* Moderate*   RBCU 8* 0-2 2 2-5*   < > 0-2 0-2   WBCU >182* * 63* 25-50*   < > 25-50* 25-50*    < > = values in this interval not displayed.     No lab results found.  PTH  Recent Labs   Lab Test 05/25/23  1552 01/31/23  1049 01/20/23  0746 04/18/22  0851   PTHI 52 73* 101* 315*     Iron Studies  Recent Labs   Lab Test 01/31/23  1049 01/20/23  0746   IRON 70 153    244   IRONSAT 27 63*   DAWSON 430* 717*       IMAGING:  All imaging studies reviewed by me.    Past Medical History    I have reviewed this patient's medical history and updated it with pertinent information if needed.   Past Medical History:   Diagnosis Date    Bladder stones     Cardiomyopathy (H)     Clostridium difficile infection 10/10/2023    CMV (cytomegalovirus infection) (H) 08/23/2023 08/23/23:  CMV-Blood    E. coli UTI 09/24/2023    ESRD (end stage  renal disease) on dialysis (H)     Hypertension     Kidney replaced by transplant 01/15/2023    DCD DDKT. Intermediate risk induction.    Migraines     Polysubstance abuse (H)     Solitary kidney, congenital     Urethral stone     Urethral stricture        Past Surgical History   I have reviewed this patient's surgical history and updated it with pertinent information if needed.  Past Surgical History:   Procedure Laterality Date    BENCH KIDNEY  1/15/2023    Procedure: Bench kidney;  Surgeon: Tez Emerson MD;  [facility]    BIOPSY  2020    renal, [facility]    COLONOSCOPY N/A 3/23/2023    Procedure: Colonoscopy;  Surgeon: Ana Cardenas MD;  Location: [facility] GI    COMBINED CYSTOSCOPY, LASER HOLMIUM LITHOTRIPSY URETER(S)      CYSTOSCOPY      CYSTOSCOPY FLEXIBLE, CYSTOSTOMY, INSERT TUBE SUPRAPUBIC, COMBINED N/A 2020    Procedure: CYSTOSCOPY, WITH SUPRAPUBIC CATHETER INSERTION;  Surgeon: Sam Mejia MD;  [facility]    CYSTOSCOPY, OPEN EXCISION URETHRAL DIVERTICULUM, COMBINED N/A 2020    Procedure: EXCISION OF URETHRAL DIVERTICULUM X2;  Surgeon: Sam Mejia MD;  [facility] OR    HERNIA REPAIR      infant    INSERT CATHETER PERITONEAL DIALYSIS      IR CVC TUNNEL W2 CATH W/O PORT  2020    LASER HOLMIUM LITHOTRIPSY URETER(S), INSERT STENT, COMBINED N/A 2020    Procedure: CYSTOSCOPY, bladder and urethral stone extraction, removal of foreign body, urethral dilation, urethrotomy, laser on standby;  Surgeon: Sam Mejia MD;  [facility]    REMOVE CATHETER PERITONEAL N/A 1/15/2023    Procedure: Remove catheter peritoneal;  Surgeon: Tez Emerson MD;  [facility]: [facility] OR    TRANSPLANT KIDNEY RECIPIENT  DONOR N/A 1/15/2023    Procedure: TRANSPLANT, KIDNEY, RECIPIENT,  DONOR WITH DONOR URETER STENTING;  Surgeon: Tez Emerson MD;  Location: [facility] OR    urethral dilation      URETHROPLASTY WITH  BUCCAL GRAFT N/A 12/14/2020    Procedure: URETHROPLASTY, USING BUCCAL MUCOSA GRAFT;  Surgeon: Sam Mejia MD;  [facility]       Family History   I have reviewed this patient's family history and updated it with pertinent information if needed.   Family History   Problem Relation Age of Onset    Alzheimer Disease Mother     Unknown/Adopted Father     No Known Problems Sister     No Known Problems Sister     Kidney Disease No family hx of     Skin Cancer No family hx of     Melanoma No family hx of        Social History   I have reviewed this patient's social history and updated it with pertinent information if needed. Chip Fowler  reports that he quit smoking about 2 years ago. His smoking use included cigarettes. He started smoking about 23 years ago. He has a 10.5 pack-year smoking history. He has never used smokeless tobacco. He reports that he does not currently use alcohol. He reports that he does not currently use drugs after having used the following drugs: Methamphetamines and MDMA (Ecstasy).    Prior to Admission Medications   (Not in a hospital admission)

## 2025-07-10 NOTE — H&P
[facility]    History and Physical - Medicine Service, ERVIN TEAM        Date of Admission:  7/9/2025    Assessment & Plan      Chip Fowler is a 40-year-old male with history of single kidney transplant in 2023 (still has native left kidney), chronic immunosuppression on tacrolimus and mycophenolate, and recurrent complicated UTIs who presents with fever, left-sided flank pain, and dysuria, found to have labs consistent with complicated UTI, no signs of pyelonephritis with a transplanted kidney on imaging, and JACK, admitted for further management.      # Acute complicated urinary tract infection with systemic signs  # History of recurrent urinary tract infections, previously on suppressive therapy  # History of Single kidney transplant (2023)  Presents with typical symptoms of pyelonephritis, including fever, dysuria, and left-sided flank pain (patient has transplanted kidney located in right pelvis, and negative atrophic kidney on the left side) in the setting of renal transplant and immunosuppression.  UA with significant pyuria, WBC up in 20s, and acute rise in serum creatinine to 2 from baseline 1.5.  Renal ultrasound with Doppler showed no hydronephrosis, obstruction, or vascular concern.  He received IM ceftriaxone at an urgent care prior to [facility] ED presentation and was appropriately escalated to IV piperacillin/tazobactam as previous positive cultures were susceptible to it.  - Awaiting blood and urine cultures  - Continue IV piperacillin/tazobactam  - Defer decision of further imaging (e.g., CTAP with contrast) to look for septic stone or perirenal abscess to the transplant ID team  - Transplant ID consulted, appreciate assistance    # Acute kidney injury, likely prerenal  # History of kidney transplant (2023)  Presented with acute rise in serum creatinine to 2 from baseline 1.5.  Denies any recent NSAID use.  No significant hypotension noted.  Renal  ultrasound did not show any hydronephrosis.  Patient has no signs of fluid overload.  No signs of rejection so far.  Likely multifactorial JACK due to sepsis-related hypoperfusion, already on tacrolimus, and/or intrarenal involvement in the setting of concern for pyelonephritis, but less likely given patient complains of left-sided flank pain (whereas transplanted kidney that is located in right pelvis).  - S/p 1L NS in ED; ensure adequate hydration  - Monitor urine output with I's and O's  - Daily renal labs  - Transplant nephrology consulted, appreciate assistance  - Continue PTA tacrolimus for now; serum tacrolimus level ordered  - Continue PTA mycophenolate    # Mild hyponatremia  In the setting of acute illness / sepsis and pain  - Daily BMP    Chronic/resolved problems:    # Hx of Cdiff colitis (11/2023)  Per chart, no recurrence since prolonged oral vancomycin taper.  Currently has no diarrhea.          Diet:  Regular  DVT Prophylaxis: Ambulate every shift  Taylor Catheter: Not present  Fluids: S/p 1L NS  Lines: None     Cardiac Monitoring: None  Code Status:  Full code    Clinically Significant Risk Factors Present on Admission         # Hyponatremia: Lowest Na = 132 mmol/L in last 2 days, will monitor as appropriate           # Hypertension: Noted on problem list                      Disposition Plan      Expected Discharge Date: 07/11/2025                The patient's care was discussed with the Attending Physician, Dr. Avilez.    Niko Kauffman MD  Resident Physician PGY-3  Department of Medicine  [facility]    ______________________________________________________________________    Chief Complaint   Fever, left flank pain, dysuria    History is obtained from the patient    History of Present Illness   Chip Fowler is a 40-year-old with history of single right-sided kidney transplant (1/2023), atrophic native left kidney, and recurrent urinary tract infections who presents with fevers up to 104,  dysuria, and left-sided flank pain since yesterday.    He is currently following with infectious disease for UTI prophylaxis and most recently treated for acute complicated cystitis around a month ago with CTX x1, followed by cefdinir for 10 days.    Then, on July 1, patient contacted again the transplant ID team for recurrence of UTI symptoms. I am not sure if he was prescribed anything.    Earlier yesterday (7/9), he was seen at urgent care where he was noted to be febrile (104) with mild tachycardia, leukocytosis (WBC 20), and an elevated creatinine of 2 (baseline ~1.5).  Urinalysis showed pyuria with 100 WBC, urine and blood cultures were obtained.  He received 1 g dose of IM ceftriaxone and was referred to [facility] ED due to concern for pyelonephritis involving the transplanted kidney.    At [facility] ED, his symptoms persisted, and he was started on pip-tazo and 1 L bolus.  Renal transplant ultrasound with Doppler showed no structural abnormalities or signs of obstruction. Admitted to medicine for further management.      Past Medical History    Past Medical History:   Diagnosis Date    Bladder stones     Cardiomyopathy (H)     Clostridium difficile infection 10/10/2023    CMV (cytomegalovirus infection) (H) 08/23/2023 08/23/23:  CMV-Blood    E. coli UTI 09/24/2023    ESRD (end stage renal disease) on dialysis (H)     Hypertension     Kidney replaced by transplant 01/15/2023    DCD DDKT. Intermediate risk induction.    Migraines     Polysubstance abuse (H)     Solitary kidney, congenital     Urethral stone     Urethral stricture        Past Surgical History   Past Surgical History:   Procedure Laterality Date    BENCH KIDNEY  1/15/2023    Procedure: Bench kidney;  Surgeon: Tez Emerson MD;  [facility]    BIOPSY  02/2020    renal, [facility]    COLONOSCOPY N/A 3/23/2023    Procedure: Colonoscopy;  Surgeon: Ana Cardenas MD;  [facility]    COMBINED CYSTOSCOPY, LASER  HOLMIUM LITHOTRIPSY URETER(S)      CYSTOSCOPY      CYSTOSCOPY FLEXIBLE, CYSTOSTOMY, INSERT TUBE SUPRAPUBIC, COMBINED N/A 2020    Procedure: CYSTOSCOPY, WITH SUPRAPUBIC CATHETER INSERTION;  Surgeon: Sam Mejia MD;  [facility]    CYSTOSCOPY, OPEN EXCISION URETHRAL DIVERTICULUM, COMBINED N/A 2020    Procedure: EXCISION OF URETHRAL DIVERTICULUM X2;  Surgeon: Sam Mejia MD;  [facility]    HERNIA REPAIR      infant    INSERT CATHETER PERITONEAL DIALYSIS      IR CVC TUNNEL W2 CATH W/O PORT  2020    LASER HOLMIUM LITHOTRIPSY URETER(S), INSERT STENT, COMBINED N/A 2020    Procedure: CYSTOSCOPY, bladder and urethral stone extraction, removal of foreign body, urethral dilation, urethrotomy, laser on standby;  Surgeon: Sam Mejia MD;  [facility]    REMOVE CATHETER PERITONEAL N/A 1/15/2023    Procedure: Remove catheter peritoneal;  Surgeon: Tez Emerson MD;  [facility]    TRANSPLANT KIDNEY RECIPIENT  DONOR N/A 1/15/2023    Procedure: TRANSPLANT, KIDNEY, RECIPIENT,  DONOR WITH DONOR URETER STENTING;  Surgeon: Tez Emerson MD;  [facility] OR    urethral dilation      URETHROPLASTY WITH BUCCAL GRAFT N/A 2020    Procedure: URETHROPLASTY, USING BUCCAL MUCOSA GRAFT;  Surgeon: Sam Mejia MD;  [facility]       Prior to Admission Medications   Prior to Admission Medications   Prescriptions Last Dose Informant Patient Reported? Taking?   Vitamin D, Cholecalciferol, 50 MCG (2000 UT) CAPS   No No   Sig: Take 2,000 Units by mouth daily.   cefdinir (OMNICEF) 300 MG capsule   No No   Sig: Take 1 capsule (300 mg) by mouth 2 times daily for 10 days.   magnesium oxide (MAG-OX) 400 MG tablet   No No   Sig: Take 2 tablets (800 mg) by mouth 2 times daily.   mycophenolic acid (GENERIC EQUIVALENT) 180 MG EC tablet   No No   Sig: Take 3 tablets (540 mg) by mouth 2 times daily.   tacrolimus (GENERIC EQUIVALENT) 0.5 MG  capsule   No No   Sig: Take 1 capsule (0.5 mg) by mouth 2 times daily. Total dose = 2.5 mg twice per day   tacrolimus (GENERIC EQUIVALENT) 1 MG capsule   No No   Sig: Take 2 capsules (2 mg) by mouth 2 times daily. Total dose = 2.5 mg twice per day      Facility-Administered Medications Last Administration Doses Remaining   cefTRIAXone (ROCEPHIN) in lidocaine 1% for IM administration 1 g 7/9/2025  6:45 PM 0           Review of Systems    The 10 point Review of Systems is negative other than noted above.    Physical Exam   Vital Signs: Temp: 99  F (37.2  C) Temp src: Oral BP: 102/54 Pulse: 98   Resp: 20 SpO2: 98 % O2 Device: None (Room air)    Weight: 159 lbs 9.6 oz    General appearance: Looks comfortable, lying in bed, in no apparent distress  HEENT: Neck supple, sclera anicteric  CV: S1 and S2 well heard without any added sounds  Resp: Normal respiratory effort, clear to ausculation bilaterally without any added sounds  Abd: Soft, non-tender, non-distended  Extr: No peripheral edema  Skin: Warm and dry  Neuro: Alert and oriented x4, appeared non-focal      Medical Decision Making       Please see A&P for additional details of medical decision making.      Data     I have personally reviewed the following data over the past 24 hrs:    19.7 (H)  \   13.6   / 171     132 (L) 101 23.0 (H) /  109 (H)   4.4 22 1.93 (H) \     ALT: 10 AST: 19 AP: 70 TBILI: 0.6   ALB: 4.0 TOT PROTEIN: 7.3 LIPASE: 37     Procal: N/A CRP: N/A Lactic Acid: 0.9         Imaging results reviewed over the past 24 hrs:   Recent Results (from the past 24 hours)   US Renal Transplant with Doppler    Narrative    EXAM: US RENAL TRANSPLANT WITH DOPPLER  LOCATION: [facility]  DATE: 7/9/2025    INDICATION: fever, transplant history, abd pain  COMPARISON: CT chest/abdomen/pelvis with contrast there are 07/06/2023, renal transplant ultrasound 07/04/2023.  TECHNIQUE: Ultrasound of the renal transplant with  Doppler waveform spectral analysis.    FINDINGS: The transplant kidney is located in the right lower quadrant. The transplant kidney is normal in echogenicity. There is no cortical thinning. There is no hydronephrosis, calculus, cyst or mass. There is no perinephric fluid.    The urinary bladder is unremarkable.    TRANSPLANT DOPPLER:    The main renal artery peak systolic velocity is normal (less than 200 cm/sec). The intra-renal transplant resistive index (RI) is normal (0.7 or less).       Impression    IMPRESSION:   Normal renal transplant ultrasound.

## 2025-07-10 NOTE — ED PROVIDER NOTES
ED Provider Note  [facility] EMERGENCY DEPARTMENT ([facility])    7/09/25       ED PROVIDER NOTE     History     Chief Complaint   Patient presents with    Abdominal Pain    Fever    Dysuria     HPI  Chip Fowler is a 40 year old male with a notable history of s/p kidney transplant (1/15/2023) and recurrent UTIs who presents to the ED for evaluation of fever, left flank pain, and dysuria. ***     Past Medical History  Past Medical History:   Diagnosis Date    Bladder stones     Cardiomyopathy (H)     Clostridium difficile infection 10/10/2023    CMV (cytomegalovirus infection) (H) 08/23/2023 08/23/23:  CMV-Blood    E. coli UTI 09/24/2023    ESRD (end stage renal disease) on dialysis (H)     Hypertension     Kidney replaced by transplant 01/15/2023    DCD DDKT. Intermediate risk induction.    Migraines     Polysubstance abuse (H)     Solitary kidney, congenital     Urethral stone     Urethral stricture      Past Surgical History:   Procedure Laterality Date    BENCH KIDNEY  1/15/2023    Procedure: Bench kidney;  Surgeon: Tez Emerson MD;  [facility]    BIOPSY  02/2020    renal, [employer]    COLONOSCOPY N/A 3/23/2023    Procedure: Colonoscopy;  Surgeon: Ana Cardenas MD;  Location: [facility] GI    COMBINED CYSTOSCOPY, LASER HOLMIUM LITHOTRIPSY URETER(S)      CYSTOSCOPY      CYSTOSCOPY FLEXIBLE, CYSTOSTOMY, INSERT TUBE SUPRAPUBIC, COMBINED N/A 12/14/2020    Procedure: CYSTOSCOPY, WITH SUPRAPUBIC CATHETER INSERTION;  Surgeon: Sam Mejia MD;  Location: [facility] OR    CYSTOSCOPY, OPEN EXCISION URETHRAL DIVERTICULUM, COMBINED N/A 12/14/2020    Procedure: EXCISION OF URETHRAL DIVERTICULUM X2;  Surgeon: Sam Mejia MD;  Location: [facility] OR    HERNIA REPAIR      infant    INSERT CATHETER PERITONEAL DIALYSIS      IR CVC TUNNEL W2 CATH W/O PORT  2/29/2020    LASER HOLMIUM LITHOTRIPSY URETER(S), INSERT STENT, COMBINED N/A 8/21/2020     Procedure: CYSTOSCOPY, bladder and urethral stone extraction, removal of foreign body, urethral dilation, urethrotomy, laser on standby;  Surgeon: Sam Mejia MD;  [facility]    REMOVE CATHETER PERITONEAL N/A 1/15/2023    Procedure: Remove catheter peritoneal;  Surgeon: Tez Emerson MD;  [facility] OR    TRANSPLANT KIDNEY RECIPIENT  DONOR N/A 1/15/2023    Procedure: TRANSPLANT, KIDNEY, RECIPIENT,  DONOR WITH DONOR URETER STENTING;  Surgeon: Tez Emerson MD;  Location[facility] OR    urethral dilation      URETHROPLASTY WITH BUCCAL GRAFT N/A 2020    Procedure: URETHROPLASTY, USING BUCCAL MUCOSA GRAFT;  Surgeon: Sam Mejia MD;  [facility] OR     cefdinir (OMNICEF) 300 MG capsule  magnesium oxide (MAG-OX) 400 MG tablet  mycophenolic acid (GENERIC EQUIVALENT) 180 MG EC tablet  tacrolimus (GENERIC EQUIVALENT) 0.5 MG capsule  tacrolimus (GENERIC EQUIVALENT) 1 MG capsule  Vitamin D, Cholecalciferol, 50 MCG (2000) CAPS      No Known Allergies  Family History  Family History   Problem Relation Age of Onset    Alzheimer Disease Mother     Unknown/Adopted Father     No Known Problems Sister     No Known Problems Sister     Kidney Disease No family hx of     Skin Cancer No family hx of     Melanoma No family hx of      Social History   Social History     Tobacco Use    Smoking status: Former     Current packs/day: 0.00     Average packs/day: 0.5 packs/day for 21.0 years (10.5 ttl pk-yrs)     Types: Cigarettes     Start date:      Quit date: 1/15/2023     Years since quittin.4    Smokeless tobacco: Never   Vaping Use    Vaping status: Never Used   Substance Use Topics    Alcohol use: Not Currently     Comment: quit alcohol 3-4 yrs ago    Drug use: Not Currently     Types: Methamphetamines, MDMA (Ecstasy)      A medically appropriate review of systems was performed with pertinent positives and negatives noted in the HPI, and all other systems  "negative.    Physical Exam      Physical Exam  ***    ED Course, Procedures, & Data      Procedures       {ED Course Selections (Optional):509470}  {ED Sepsis CMS Documentation (Optional):528938::\" \"}          Medications - No data to display       {Critical Care Performed?:463742}    Assessment & Plan    ***    I have reviewed the nursing notes. I have reviewed the findings, diagnosis, plan and need for follow up with the patient.    New Prescriptions    No medications on file       Final diagnoses:   None       ***  [facility] EMERGENCY DEPARTMENT  7/9/2025  "

## 2025-07-10 NOTE — CONSULTS
[facility]  Transplant & Immunocompromised Infectious Disease Consult Note  Patient:  Chip Fowler Date of birth 1985 MRN [id number]  Date of Visit:  07/10/2025  Reason for Consult Pyelonephritis   Assessment & Plan    Recommendations/Plan     Given this is breakthrough infection on Cefdinir please escalate to meropenem  Would check focused enteric pathogen panel and C Diff PCR of stool  Would obtain respiratory viral testing to check for co-infection given sick contacts.   Await nontransplant renal ultrasound in process    Infectious Disease will follow with you, reach out anytime with questions or updates    Signed:  Mil Lizama MD, Available on [employer]  Staff Physician, Transplant and General Infectious Diseases   For On Call and Coverage info see [facility]-> Infectious Disease Medicine Adult/[facility]    Patient Summary:   Transplants:  1/15/2023 (Kidney); POD  907.  Coordinator Veronica Edgar  This gentleman with history of kidney transplantation has been dealing with recurrent UTIs since transplant.  He is here with worsening urinary symptoms fevers and clinical signs of transplant pyelonephritis  Assessment and Discussion     # Suspected Native Kidney Pyelonephritis    He had localized urinary symptoms that transiently responded to cefdinir before progressive left native kidney pain nausea vomiting and high-grade fevers along with diarrhea.  This is suggestive of a resistant or refractory negative kidney pyelonephritis.    Imaging of the native kidney is pending.  He stabilized on Zosyn but given the history of cephalosporin breakthrough and prior history of ESBL we can treat with meropenem pending further information.    Levofloxacin could be an option for empiric treatment if an oral agent was needed.    # Sepsis  # Sick contact  # Diarrhea, history of C. difficile  I suspect the sepsis was secondary to the urologic infection.  However, there could be other coinfection such  as C. difficile, enteric infection or a respiratory viral infection from his young son who recently had a URI.  We can do some workup to rule out these coinfection's and streamline his care.    # Recurrent UTIs  Discussing as a separate problem from the acute infection.  He does have urethral stenosis s/p reconstruction, a cross-sectional imaging was obtained in 2023 and there was signs of transplant pyelonephritis but no stones. Renal transplant ultrasound appears normal without stones or hydronephrosis.  And the native kidneys are being assessed with an ultrasound which is still in process.    The exact cause of the recurrent UTIs is still cryptogenic, we will follow with the ultrasound, obtain more imaging if needed and the patient can continue to follow-up with the infectious disease clinic.    Prior issues:  - CMV viremia in 2023 negative when last checked in 2024.  No risk factors for reactivation at present  - Eosinophilia. The workup for eosinophilia and diarrhea was negative for fungal and parasitic infections that would account for the eosinophilia. Evaluated by hematology. The eosinophilia is likely reactive to foreign objects; the PD catheter was thought to be potentially the source as noted by hematology. The relatively increased tryptase level favors allergy (likely to PD catheter) to be the etiology of the eosinophilia rather than infectious processes or malignancies.   - Chronic diarrhea since he was started on PD in 7/2020. Workup has been negative.   - Positive Salmonella serology. 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 The patient was counseled against keeping the snakes after transplantation as they are source of recurrent Salmonella infection.  - E coli in urine in 8/2020.   - Group B Strep in urine in 2/2020.   -  Urethral stenosis s/p reconstruction.     Transplant Checklist  - Bacterial prophylaxis: None, see elsewhere for treatment Abx  - Pneumocystis (and possibly Toxo) prophylaxis: None  - Viral serostatus & prophylaxis: CMV D+/R- (s/p 6mo of valcye), EBV D+/R-, HSV1?/2?, VZV +   - Fungal prophylaxis: none  - Additional endemic disease testing/Serology: No   - Immunization status: eligible for updated Covid booster, PCV 20   - Gamma globulin status:  Recent Labs   Lab Test 11/27/23  1512   IGG 1,187     - Last Qtc 391 in 2023    Recent Urine Cultures:    6/11/25- Strep Anginosus  11/30/24- ESBL Kleb Oxytoca- Zosyn ALEXANDER <4, Bactrim R  9/24/2023- E Coli, Bactrim Resistant  7/4/2023- E Fecalis  5/2023- E Coli/E Fecalis    Selected Antibiotics  - Cefdinir ~ 7/1- present per patient  - Cefdinir 6/11/2025 x 10 days  - Macrobid +Augmentin 12/26/2024 x 7 days   - Cefdinir supression 2023 x 90 days      Subjective and Objective Data     History of Present Illness        History of Presenting Illness    Chip Fowler is a 40 year old patient who presented for nausea, vomiting, diarrhea, left-sided flank pain and fevers.    He began to have UTI symptoms around July 2, he had some burning and frequency along with cloudy urine.  He was started on cefdinir and continued this with initial response and resolution of his symptoms.  After about 3 to 4 days he then began to develop worsening symptoms along with diarrhea and worsening fevers which progressed and then he had 103 fevers at home leading to him presenting to the hospital.  The fevers were accompanied by chills and rigors and left-sided native kidney pain and tenderness.  He was started on Zosyn and given Tylenol and his fevers are subsiding.  Of note his young son had a upper respiratory tract infection after going to  a few days ago.    Review of Symptoms.  A comprehensive review of symptoms was conducted and was otherwise negative (unless mentioned above)    Physical  Exam     Vital signs Temp: 98.9  F (37.2  C) Temp src: Oral BP: 119/66 Pulse: 96   Resp: 16 SpO2: 100 % O2 Device: None (Room air)       GENERAL APPEARANCE: Not in acute distress    PHYSICAL EXAM:  Eyes:     Extraocular eye movements grossly intact, no ptosis, no discharge, no scleral icterus.  Mouth, Throat:     Mucous membranes moist  Cardiovascular:    S1, S2 normal, regular rate and rhythm.  Respiratory:     Inspection: Not in respiratory distress, chest expansion symmetrical.   Auscultation: 4 point auscultation done clear to auscultation bilaterally  Gastrointestinal:  Soft, non-tender; bowel sounds normal   Musculoskeletal:     No major deformity or tender effusion  Neurologic:     Higher Mental Function: Conversant, AOx4   Motor: Moving all 4 limbs  Psychiatric: Appropriate  Skin:  Anicteric      Data   Laboratory data and imaging listed below was reviewed prior to this encounter.   Microbiology:    Culture   Date Value Ref Range Status   07/09/2025 No growth after 12 hours  Preliminary   07/09/2025 No growth after 12 hours  Preliminary   07/01/2025 <10,000 CFU/mL Mixture of Urogenital Cooper  Final   06/11/2025 10,000-50,000 CFU/mL Streptococcus anginosus (A)  Final     Comment:     This organism is susceptible to ampicillin, penicillin, vancomycin and the cephalosporins. If treatment is required and your patient is allergic to penicillin, contact the microbiology lab within 5 days to request susceptibility testing.   06/11/2025 <10,000 CFU/mL Urogenital cooper  Final   11/30/2024 10,000-50,000 CFU/mL Klebsiella oxytoca ESBL (A)  Final   12/25/2023 <10,000 CFU/mL Urogenital cooper  Final   09/24/2023 10,000-50,000 CFU/mL Escherichia coli (A)  Final   09/24/2023 <10,000 CFU/mL Urogenital cooper  Final   07/04/2023 10,000-50,000 CFU/mL Enterococcus faecalis (A)  Final   07/04/2023 <10,000 CFU/mL Urogenital cooper  Final   07/04/2023 No Growth  Final   07/04/2023 No Growth  Final   05/04/2023 No Growth  Final    05/04/2023 No Growth  Final   05/04/2023 >100,000 CFU/mL Escherichia coli (A)  Final   04/15/2023 50,000-100,000 CFU/mL Escherichia coli (A)  Final   04/15/2023 10,000-50,000 CFU/mL Enterococcus faecalis (A)  Final   03/20/2023 No Growth  Final   03/20/2023 No Growth  Final   03/19/2023 50,000-100,000 CFU/mL Enterococcus faecalis (A)  Final   03/19/2023 <10,000 CFU/mL Urogenital cooper  Final   01/23/2023 50,000-100,000 CFU/mL Enterococcus faecalis (A)  Final     Comment:     Susceptibilities done on previous cultures   01/19/2023 >100,000 CFU/mL Enterococcus faecalis (A)  Final   , Inflammatory Markers: No lab results found., Urine Studies:    Recent Labs   Lab Test 07/09/25 2033 07/09/25  1810 07/01/25  1536 06/11/25  1529 03/22/25  1158   LEUKEST Large* Small* Large* Moderate* Negative   WBCU >182* * 63* 25-50* <1   , Hematology Studies:    Recent Labs   Lab Test 07/10/25  0542 07/09/25  2032 07/09/25  1834 06/11/25  1602 06/05/25  1556 04/09/25  1517 12/09/24  1536 11/30/24  1626 05/06/24  1457 04/01/24  1453 03/04/24  1459   WBC 17.4* 19.7* 20.9* 12.9* 5.8 6.5   < > 12.7*   < > 6.3 7.2   ANEU  --  17.6* 18.0* 10.1*  --   --   --  10.0*  --  3.4 4.6   AEOS  --  0.0 0.0 0.1  --   --   --  0.2  --  0.8* 0.6   HGB 11.8* 13.6 13.5 13.1* 13.1* 12.3*   < > 12.0*   < > 11.4* 11.5*   MCV 89 88 89 90 88 86   < > 87   < > 89 89   * 171 181 198 222 265   < > 206   < > 259 227    < > = values in this interval not displayed.    , Metabolic Studies:   Recent Labs   Lab Test 07/10/25  0542 07/09/25 2032 07/09/25  1834 06/05/25  1556 04/09/25  1517   * 132* 133* 137 137   POTASSIUM 4.2 4.4 4.8 4.8 4.7   CHLORIDE 106 101 104 103 103   CO2 19* 22 27 25 23   BUN 22.2* 23.0* 21 23.8* 21.9*   CR 1.96* 1.93* 2.00* 1.47* 1.48*   GFRESTIMATED 43* 44* 42* 61 61   , and Hepatic Studies:   Recent Labs   Lab Test 07/09/25 2032 07/09/25  1834 07/24/23  1519 07/04/23  1944 06/05/23  1527 06/01/23  1518   BILITOTAL 0.6  0.9 0.3 0.4 0.3 0.3   ALKPHOS 70 70 59 53 55 52   ALBUMIN 4.0 3.8 4.3 4.2 4.0 4.0   AST 19 24 29 27 24 28   ALT 10 17 20 14 22 30                    MDM/Coding Information     Medical Decision Making/Coding Information  I saw and evaluated this patient on todays date as part of an E&M Encounter     1- Number and Complexity of Problems Addressed as part the Encounter High    I addressed 1 or more acute or chronic illness or injury that poses a threat to life or bodily function urosepsis  2- Amount and/or Complexity of Data to Be Reviewed and Analyzed moderate  I reviewed > 3 data points including specifically-  [the medical record for notes from other providers specifically prior infectious disease notes, notes from the primary team, nephrology notes, and recent labs including those above in data section of my note ,and incorporated these into my medical decision making   3- Risk of Complications and/or Morbidity or Mortality of Patient Management:  was moderate due to prescription drug management   4- This visit is eligible for the  Code due to complex infectious disease decision making, antimicrobial therapy and management by an Infectious Disease Physician

## 2025-07-10 NOTE — PROGRESS NOTES
Assessment & Plan     Chills    Due to pyelo  - UA with Microscopic reflex to Culture - Clinic Collect  - Urine Microscopic Exam  - Urine Culture  - CBC with platelets and differential  - CBC with platelets and differential    Fever, unspecified fever cause    Patient has a 104 fever and has been taking tylenol for fever  His CBC shows WBC critical value of 20.p  Fever is likely due to pyelonephritis  - CBC with platelets and differential  - cefTRIAXone (ROCEPHIN) in lidocaine 1% for IM administration 1 g  - CBC with platelets and differential    Stage 3a chronic kidney disease (H)    Estimated Creatinine Clearance: 48.1 mL/min (A) (based on SCr of 2 mg/dL (H)).    Patient serum creatinine is not at 2, baseline was 1.49  He is going to need IV fluids  Patient sent up to [facility] for evaluation and admission for pyelonephritis with a hx of renal transplant  - Comprehensive metabolic panel (BMP + Alb, Alk Phos, ALT, AST, Total. Bili, TP)  - Comprehensive metabolic panel (BMP + Alb, Alk Phos, ALT, AST, Total. Bili, TP)    Pyelonephritis of transplanted kidney    A kidney infection (pyelonephritis) is a type of urinary tract infection, or UTI. Most UTIs are bladder infections. Kidney infections tend to make people much sicker than bladder infections do. A kidney infection is also more serious because it can cause lasting damage if it is not treated quickly     Patient given rocephin shot  - cefTRIAXone (ROCEPHIN) in lidocaine 1% for IM administration 1 g    Kidney replaced by transplant    Patient is a renal transplant patient  - Tacrolimus by [employer]  - Tacrolimus by [employer] [facility] [employer]       Patient sent to [facility] for IV fluids, admission due to pyelo, fever, critical WBC and elevated serum creatinine as a renal transplant patient    No follow-ups on file.    Jesus Harding, [facility], PA-C  [facility] SANNA Saunders is a 40 year old male who presents to clinic today  for the following health issues:  Chief Complaint   Patient presents with    Generalized Body Aches     Chills, body aches,  when urinating T-1  kidney pain     Tx- tylenol last dose at 1pm  wants a tac level test  immune system is depleted          7/9/2025     6:21 PM   Additional Questions   Roomed by Ju   Accompanied by self     HPI    Review of Systems  Constitutional, HEENT, cardiovascular, pulmonary, GI, , musculoskeletal, neuro, skin, endocrine and psych systems are negative, except as otherwise noted.      Objective    /73   Pulse 118   Temp (!) 104.3  F (40.2  C)   Resp 17   Wt 69.9 kg (154 lb)   SpO2 96%   BMI 20.89 kg/m    Physical Exam   GENERAL: mild distress and fatigued  EYES: Eyes grossly normal to inspection, PERRL and conjunctivae and sclerae normal  HENT: ear canals and TM's normal, nose and mouth without ulcers or lesions  NECK: no adenopathy, no asymmetry, masses, or scars  ABDOMEN: soft, nontender, no hepatosplenomegaly, no masses and bowel sounds normal  MS: pos for back ache and left flank pain  SKIN: no suspicious lesions or rashes  NEURO: Normal strength and tone, mentation intact and speech normal  PSYCH: mentation appears normal, affect normal/bright      Results for orders placed or performed in visit on 07/09/25   UA with Microscopic reflex to Culture - Clinic Collect     Status: Abnormal    Specimen: Urine, Clean Catch   Result Value Ref Range    Color Urine Yellow Colorless, Straw, Light Yellow, Yellow    Appearance Urine Clear Clear    Glucose Urine Negative Negative mg/dL    Bilirubin Urine Negative Negative    Ketones Urine Negative Negative mg/dL    Specific Gravity Urine 1.015 1.003 - 1.035    Blood Urine Moderate (A) Negative    pH Urine 7.0 5.0 - 7.0    Protein Albumin Urine 100 (A) Negative mg/dL    Urobilinogen Urine 0.2 0.2, 1.0 E.U./dL    Nitrite Urine Positive (A) Negative    Leukocyte Esterase Urine Small (A) Negative   Urine Microscopic Exam      Status: Abnormal   Result Value Ref Range    Bacteria Urine Moderate (A) None Seen /HPF    RBC Urine 0-2 0-2 /HPF /HPF    WBC Urine  (A) 0-5 /HPF /HPF    Squamous Epithelials Urine Few (A) None Seen /LPF   Comprehensive metabolic panel (BMP + Alb, Alk Phos, ALT, AST, Total. Bili, TP)     Status: Abnormal   Result Value Ref Range    Sodium 133 (L) 135 - 145 mmol/L    Potassium 4.8 3.4 - 5.3 mmol/L    Chloride 104 94 - 109 mmol/L    Carbon Dioxide (CO2) 27 20 - 32 mmol/L    Anion Gap 2 (L) 3 - 14 mmol/L    Urea Nitrogen 21 7 - 30 mg/dL    Creatinine 2.00 (H) 0.66 - 1.25 mg/dL    GFR Estimate 42 (L) >60 mL/min/1.73m2    Calcium 9.5 8.5 - 10.1 mg/dL    Glucose 126 (H) 70 - 99 mg/dL    Alkaline Phosphatase 70 40 - 150 U/L    AST 24 0 - 45 U/L    ALT 17 0 - 70 U/L    Protein Total 7.3 6.8 - 8.8 g/dL    Albumin 3.8 3.4 - 5.0 g/dL    Bilirubin Total 0.9 0.2 - 1.3 mg/dL   CBC with platelets and differential     Status: Abnormal   Result Value Ref Range    WBC Count 20.9 (H) 4.0 - 11.0 10e3/uL    RBC Count 4.51 4.40 - 5.90 10e6/uL    Hemoglobin 13.5 13.3 - 17.7 g/dL    Hematocrit 40.1 40.0 - 53.0 %    MCV 89 78 - 100 fL    MCH 29.9 26.5 - 33.0 pg    MCHC 33.7 31.5 - 36.5 g/dL    RDW 12.9 10.0 - 15.0 %    Platelet Count 181 150 - 450 10e3/uL    % Neutrophils 86 %    % Lymphocytes 5 %    % Monocytes 8 %    % Eosinophils 0 %    % Basophils 0 %    % Immature Granulocytes 1 %    Absolute Neutrophils 18.0 (H) 1.6 - 8.3 10e3/uL    Absolute Lymphocytes 1.0 0.8 - 5.3 10e3/uL    Absolute Monocytes 1.7 (H) 0.0 - 1.3 10e3/uL    Absolute Eosinophils 0.0 0.0 - 0.7 10e3/uL    Absolute Basophils 0.1 0.0 - 0.2 10e3/uL    Absolute Immature Granulocytes 0.1 <=0.4 10e3/uL   CBC with platelets and differential     Status: Abnormal    Narrative    The following orders were created for panel order CBC with platelets and differential.  Procedure                               Abnormality         Status                     ---------                                -----------         ------                     CBC with platelets and ...[[id number]]  Abnormal            Final result                 Please view results for these tests on the individual orders.

## 2025-07-10 NOTE — ED TRIAGE NOTES
Pt ambulatory to Triage with c/o of left flank pain, dysuria and fever which started yesterday. Pt has hx of kidney Transplant 1/2023     Triage Assessment (Adult)       Row Name 07/09/25 1942          Triage Assessment    Airway WDL WDL        Respiratory WDL    Respiratory WDL WDL        Skin Circulation/Temperature WDL    Skin Circulation/Temperature WDL WDL        Cardiac WDL    Cardiac WDL X;rhythm     Pulse Rate & Regularity tachycardic        Peripheral/Neurovascular WDL    Peripheral Neurovascular WDL WDL        Cognitive/Neuro/Behavioral WDL    Cognitive/Neuro/Behavioral WDL WDL

## 2025-07-10 NOTE — PROGRESS NOTES
Admitted/transferred from: ED  Time of arrival on unit 1525    2 RN full skin assessment completed by Selma Almonte  Skin assessment finding: RLQ kidney txp scar, small abdominal scars, small scattered bruising on L calf     Interventions/actions: No interventions needed at this time. Will continue to monitor

## 2025-07-11 VITALS
TEMPERATURE: 98.7 F | SYSTOLIC BLOOD PRESSURE: 115 MMHG | RESPIRATION RATE: 18 BRPM | OXYGEN SATURATION: 98 % | DIASTOLIC BLOOD PRESSURE: 76 MMHG | BODY MASS INDEX: 21.11 KG/M2 | HEART RATE: 88 BPM | HEIGHT: 72 IN | WEIGHT: 155.9 LBS

## 2025-07-11 LAB
ALBUMIN SERPL BCG-MCNC: 3.2 G/DL (ref 3.5–5.2)
ANION GAP SERPL CALCULATED.3IONS-SCNC: 9 MMOL/L (ref 7–15)
BUN SERPL-MCNC: 23.9 MG/DL (ref 6–20)
CALCIUM SERPL-MCNC: 8.8 MG/DL (ref 8.8–10.4)
CHLORIDE SERPL-SCNC: 110 MMOL/L (ref 98–107)
CREAT SERPL-MCNC: 1.67 MG/DL (ref 0.67–1.17)
EGFRCR SERPLBLD CKD-EPI 2021: 52 ML/MIN/1.73M2
ERYTHROCYTE [DISTWIDTH] IN BLOOD BY AUTOMATED COUNT: 13.3 % (ref 10–15)
GLUCOSE SERPL-MCNC: 112 MG/DL (ref 70–99)
HCO3 SERPL-SCNC: 19 MMOL/L (ref 22–29)
HCT VFR BLD AUTO: 33.5 % (ref 40–53)
HGB BLD-MCNC: 11.3 G/DL (ref 13.3–17.7)
MAGNESIUM SERPL-MCNC: 1.9 MG/DL (ref 1.7–2.3)
MCH RBC QN AUTO: 29.6 PG (ref 26.5–33)
MCHC RBC AUTO-ENTMCNC: 33.7 G/DL (ref 31.5–36.5)
MCV RBC AUTO: 88 FL (ref 78–100)
PHOSPHATE SERPL-MCNC: 2.1 MG/DL (ref 2.5–4.5)
PLATELET # BLD AUTO: 154 10E3/UL (ref 150–450)
POTASSIUM SERPL-SCNC: 4.2 MMOL/L (ref 3.4–5.3)
RBC # BLD AUTO: 3.82 10E6/UL (ref 4.4–5.9)
SODIUM SERPL-SCNC: 138 MMOL/L (ref 135–145)
TACROLIMUS BLD-MCNC: 6.6 UG/L (ref 5–15)
TME LAST DOSE: NORMAL H
TME LAST DOSE: NORMAL H
WBC # BLD AUTO: 10.4 10E3/UL (ref 4–11)

## 2025-07-11 PROCEDURE — 36415 COLL VENOUS BLD VENIPUNCTURE: CPT | Performed by: NURSE PRACTITIONER

## 2025-07-11 PROCEDURE — 250N000013 HC RX MED GY IP 250 OP 250 PS 637

## 2025-07-11 PROCEDURE — 250N000012 HC RX MED GY IP 250 OP 636 PS 637: Performed by: INTERNAL MEDICINE

## 2025-07-11 PROCEDURE — 250N000013 HC RX MED GY IP 250 OP 250 PS 637: Performed by: STUDENT IN AN ORGANIZED HEALTH CARE EDUCATION/TRAINING PROGRAM

## 2025-07-11 PROCEDURE — 80197 ASSAY OF TACROLIMUS: CPT | Performed by: NURSE PRACTITIONER

## 2025-07-11 PROCEDURE — 99239 HOSP IP/OBS DSCHRG MGMT >30: CPT | Performed by: STUDENT IN AN ORGANIZED HEALTH CARE EDUCATION/TRAINING PROGRAM

## 2025-07-11 PROCEDURE — 83735 ASSAY OF MAGNESIUM: CPT

## 2025-07-11 PROCEDURE — 85018 HEMOGLOBIN: CPT

## 2025-07-11 PROCEDURE — 99232 SBSQ HOSP IP/OBS MODERATE 35: CPT | Mod: FS | Performed by: NURSE PRACTITIONER

## 2025-07-11 PROCEDURE — 250N000011 HC RX IP 250 OP 636

## 2025-07-11 PROCEDURE — 82310 ASSAY OF CALCIUM: CPT

## 2025-07-11 PROCEDURE — 250N000012 HC RX MED GY IP 250 OP 636 PS 637

## 2025-07-11 RX ORDER — CIPROFLOXACIN 500 MG/1
500 TABLET, FILM COATED ORAL EVERY 12 HOURS SCHEDULED
Status: DISCONTINUED | OUTPATIENT
Start: 2025-07-11 | End: 2025-07-11 | Stop reason: HOSPADM

## 2025-07-11 RX ORDER — CIPROFLOXACIN 500 MG/1
500 TABLET, FILM COATED ORAL EVERY 12 HOURS
Qty: 26 TABLET | Refills: 0 | Status: ACTIVE | OUTPATIENT
Start: 2025-07-11 | End: 2025-07-24

## 2025-07-11 RX ORDER — LOPERAMIDE HYDROCHLORIDE 2 MG/1
2 CAPSULE ORAL 4 TIMES DAILY PRN
Status: DISCONTINUED | OUTPATIENT
Start: 2025-07-11 | End: 2025-07-11 | Stop reason: HOSPADM

## 2025-07-11 RX ORDER — MAGNESIUM OXIDE 400 MG/1
400 TABLET ORAL EVERY 4 HOURS
Status: COMPLETED | OUTPATIENT
Start: 2025-07-11 | End: 2025-07-11

## 2025-07-11 RX ORDER — MYCOPHENOLIC ACID 180 MG/1
TABLET, DELAYED RELEASE ORAL
Status: ACTIVE
Start: 2025-07-11 | End: 2025-07-21

## 2025-07-11 RX ADMIN — MAGNESIUM OXIDE TAB 400 MG (241.3 MG ELEMENTAL MG) 400 MG: 400 (241.3 MG) TAB at 09:40

## 2025-07-11 RX ADMIN — MAGNESIUM OXIDE TAB 400 MG (241.3 MG ELEMENTAL MG) 400 MG: 400 (241.3 MG) TAB at 12:38

## 2025-07-11 RX ADMIN — MYCOPHENOLIC ACID 360 MG: 360 TABLET, DELAYED RELEASE ORAL at 09:31

## 2025-07-11 RX ADMIN — CIPROFLOXACIN 500 MG: 500 TABLET ORAL at 10:20

## 2025-07-11 RX ADMIN — TACROLIMUS 2.5 MG: 1 CAPSULE ORAL at 09:30

## 2025-07-11 RX ADMIN — MAGNESIUM OXIDE TAB 400 MG (241.3 MG ELEMENTAL MG) 400 MG: 400 (241.3 MG) TAB at 01:48

## 2025-07-11 RX ADMIN — MAGNESIUM OXIDE TAB 400 MG (241.3 MG ELEMENTAL MG) 800 MG: 400 (241.3 MG) TAB at 09:31

## 2025-07-11 RX ADMIN — MEROPENEM 500 MG: 500 INJECTION, POWDER, FOR SOLUTION INTRAVENOUS at 01:49

## 2025-07-11 ASSESSMENT — ACTIVITIES OF DAILY LIVING (ADL)
ADLS_ACUITY_SCORE: 62

## 2025-07-11 NOTE — PROGRESS NOTES
[facility]  Transplant Nephrology Progress Note  Date of Admission:  7/9/2025  Today's Date: 07/11/2025  Requesting physician: Marcelino Gray MD    Recommendations:   - No acute indication for dialysis.   - Continue current immunosuppression.   - Agree with antibiotics plan.  - Patient should obtain routine transplant labs early next week.    Assessment & Plan   # DDKT: Trend down; good urine output. Elevated Cr likely from GI losses and infection. Creatinine now at baseline.   - Baseline Creatinine: ~ 1.4-1.6   - Proteinuria: Minimal (0.2-0.5 grams)   - DSA Hx: No DSA   - Last cPRA: 64%   - BK Viremia: No   - Kidney Tx Biopsy Hx: No biopsy history.    # Immunosuppression: Tacrolimus immediate release (goal 4-6) and Mycophenolic acid (dose 540 mg every 12 hours)   - Induction with Recent Transplant:  Intermediate Intensity Protocol   - Continue with intensive monitoring of immunosuppression for efficacy and toxicity.   - Historical Changes in Immunosuppression: None   - Changes: No    # Infection Prevention:   Last CD4 Level: 408 (6/2025)  - PJP: None  - Recurrent UTI: Not currently on ppx abx       - CMV IgG Ab High Risk Discordance (D+/R-) at time of transplant: Yes  Present CMV Serostatus: Negative  - EBV IgG Ab High Risk Discordance (D+/R-) at time of transplant: No  Present EBV Serostatus: Positive    # Hypertension: Controlled;  Goal BP: < 140/90 (Hospitalization goal)   - Changes: Not at this time    # Anemia in Chronic Renal Disease: Hgb: Stable, low      SARA: No   - Iron studies: Replete    # Mineral Bone Disorder:    - Secondary renal hyperparathyroidism; PTH level: Normal (15-65 pg/ml)        On treatment: None  - Vitamin D; level: Normal        On supplement: No  - Calcium; level: Normal        On supplement: No   - Phosphorus; level: Low        On supplement: No    # Electrolytes:  - Potassium; level: Normal        On supplement: No  - Magnesium; level:  Normal        On supplement: Yes - 400mg BID  - Bicarbonate; level: Low        On supplement: No    # Recurrent UTI/ Pyelonephritis:  Last + Urine Culture 06/11/2025 (Strep Anginosus). Was still on Cefdenir for previous UTI when symptoms began to worsen this past Tuesday. Blood cultures and urine cultures pending.    - ID on board. Abx now Meropenem     # Diarrhea  Diarrhea multiple times a day since being on Cefdenir for recent UTI. ID on board. Last C-Diff + 11/2023.    - Enteric Panel and C-Diff negative.     # Other Significant PMH:   - H/o Cardiomyopathy: Normal LVEF at 55-60% with last cardiac echo 11/2023  - Recurrent UTI/Pyelonephritis: asymptomatic at present. cystoscopy 7/2023 unremarkable, seen by urology. He was previously on nitrofurantoin for prophylaxis then switched to fosfomycin then Cefdinir. Follows with ID.   - Urethral Stricture: Patient is s/p buccal urethroplasty and diverticulum excision 12/2020. s/p cystoscopy 7/2023 due to recurrent UTIs and this was unremarkable.  Follows with Urology. Last seen 2023.   - Recurrent Cdiff: completed vanc po taper Nov 2023. Asymptomatic at present. Cdiff Tx Nov 2023.   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 Will repeat with symptom    - H/o Polysubstance Abuse: Patient with h/o methamphetamine and alcohol abuse.  Now sober    # Transplant History:  Etiology of Kidney Failure: Solitary congenital kidney and h/o urethral calculus  Tx: DDKT  Transplant: 1/15/2023 (Kidney)  Significant transplant-related complications: CMV Viremia, EBV Viremia, and Recurrent UTIs    Recommendations were communicated to the primary team via this note.    Seen and discussed with Dr. Peter Farrell PADarcyC  Transplant Nephrology    Physician Attestation     I saw and evaluated Chip ORLIN Simslisa as part of a shared APRN/PA visit.     I personally reviewed the vital signs, medications, and labs.    I personally provided a substantive  portion of care for this patient and I approve the care plan as written by the DK.  I was involved with Medical Decision Making including: Please see A&P for additional details of medical decision making.  MANAGEMENT DISCUSSED with the following over the past 24 hours: No acute indications for dialysis.  Would continue on present immunosuppression.  Agree with antibiotics.  Follow up labs next week.     Edwin Green MD  Date of Service (when I saw the patient): 25      Interval History  Mr. Tos creatinine is 1.67 (611); Trend down.  Good urine output.  Other significant labs/tests/vitals: bp stable, weight down some but appetite improved and tolerating PO.   No events overnight.  No chest pain or shortness of breath.  No leg swelling.  No nausea and vomiting.  Bowel movements are soft, unchanged.  No fever, sweats or chills.    Review of Systems   4 point ROS was obtained and negative except as noted in the Interval History.    MEDICATIONS:  Current Facility-Administered Medications   Medication Dose Route Frequency Provider Last Rate Last Admin    ciprofloxacin (CIPRO) tablet 500 mg  500 mg Oral Q12H [facility] () Marcelino Gray MD   500 mg at 25 1020    magnesium oxide (MAG-OX) tablet 400 mg  400 mg Oral Q4H Mil Lizama MD   400 mg at 25 0940    magnesium oxide (MAG-OX) tablet 800 mg  800 mg Oral BID Niko Kauffman MD   800 mg at 25 0931    mycophenolic acid (GENERIC EQUIVALENT) EC tablet 360 mg  360 mg Oral BID IS Nathan Song DO   360 mg at 25 0931    sodium chloride (PF) 0.9% PF flush 3 mL  3 mL Intracatheter Q8H [facility] Niko Kauffman MD   3 mL at 07/10/25 1553    tacrolimus (GENERIC EQUIVALENT) capsule 2.5 mg  2.5 mg Oral BID Richard Avilez MD   2.5 mg at 25 0930     Current Facility-Administered Medications   Medication Dose Route Frequency Provider Last Rate Last Admin       Physical Exam   Temp  Av.7  F (38.7  C)  Min: 98.8  F (37.1  C)  Max:  104.4  F (40.2  C)      Pulse  Av.2  Min: 81  Max: 118 Resp  Av.8  Min: 16  Max: 20  SpO2  Av.5 %  Min: 96 %  Max: 100 %     /76 (BP Location: Left arm)   Pulse 88   Temp 98.7  F (37.1  C) (Oral)   Resp 18   Ht 1.829 m (6')   Wt 70.7 kg (155 lb 14.4 oz)   SpO2 98%   BMI 21.14 kg/m      Admit Weight: 72.4 kg (159 lb 9.6 oz)     GENERAL APPEARANCE: alert and no distress  HENT: mouth without ulcers or lesions  RESP: lungs clear to auscultation - no rales, rhonchi or wheezes  CV: regular rhythm, normal rate, no rub, no murmur  EDEMA: no LE edema bilaterally  ABDOMEN: soft, nontender, nondistended  MS: extremities normal - no gross deformities noted, no evidence of inflammation in joints, no muscle tenderness  SKIN: no rash  TX KIDNEY: normal  DIALYSIS ACCESS: none    Data   All labs reviewed by me.  CMP  Recent Labs   Lab 07/11/25  0611 07/10/25  0542 07/09/25  2032 07/09/25  1834    134* 132* 133*   POTASSIUM 4.2 4.2 4.4 4.8   CHLORIDE 110* 106 101 104   CO2 19* 19* 22 27   ANIONGAP 9 9 9 2*   * 116* 109* 126*   BUN 23.9* 22.2* 23.0* 21   CR 1.67* 1.96* 1.93* 2.00*   GFRESTIMATED 52* 43* 44* 42*   VISHNU 8.8 8.7* 9.2 9.5   MAG 1.9 1.6*  --   --    PHOS 2.1* 2.4*  --   --    PROTTOTAL  --   --  7.3 7.3   ALBUMIN 3.2*  --  4.0 3.8   BILITOTAL  --   --  0.6 0.9   ALKPHOS  --   --  70 70   AST  --   --  19 24   ALT  --   --  10 17     CBC  Recent Labs   Lab 07/11/25  0611 07/10/25  0542 25  1834   HGB 11.3* 11.8* 13.6 13.6   WBC 10.4 17.4* 19.7* 21.5*   RBC 3.82* 4.00* 4.58 4.57   HCT 33.5* 35.6* 40.2 40.5   MCV 88 89 88 89   MCH 29.6 29.5 29.7 29.8   MCHC 33.7 33.1 33.8 33.6   RDW 13.3 13.2 13.0 12.9    143* 171 153     INRNo lab results found in last 7 days.  ABGNo lab results found in last 7 days.   Urine Studies  Recent Labs   Lab Test 25  2033 25  1810 25  1536 25  1529 25  1158 24  1508 23  1506   COLOR Light  Yellow Yellow Straw Yellow   < > Yellow Yellow   APPEARANCE Slightly Cloudy* Clear Clear Clear   < > Clear Clear   URINEGLC Negative Negative Negative Negative   < > Negative Negative   URINEBILI Negative Negative Negative Negative   < > Negative Negative   URINEKETONE Negative Negative Negative Negative   < > Negative Negative   SG 1.014 1.015 1.007 1.015   < > 1.015 1.010   UBLD Moderate* Moderate* Small* Small*   < > Small* Trace*   URINEPH 6.5 7.0 6.5 7.0   < > 7.0 7.0   PROTEIN 70* 100* Negative 30*   < > 30* Negative   UROBILINOGEN  --  0.2  --  0.2  --  0.2 0.2   NITRITE Positive* Positive* Negative Negative   < > Negative Negative   LEUKEST Large* Small* Large* Moderate*   < > Moderate* Moderate*   RBCU 8* 0-2 2 2-5*   < > 0-2 0-2   WBCU >182* * 63* 25-50*   < > 25-50* 25-50*    < > = values in this interval not displayed.     No lab results found.  PTH  Recent Labs   Lab Test 05/25/23  1552 01/31/23  1049 01/20/23  0746 04/18/22  0851   PTHI 52 73* 101* 315*     Iron Studies  Recent Labs   Lab Test 01/31/23  1049 01/20/23  0746   IRON 70 153    244   IRONSAT 27 63*   DAWSON 430* 717*       IMAGING:  All imaging studies reviewed by me.

## 2025-07-11 NOTE — PROGRESS NOTES
Brief transplant infectious disease note:    Paged by team regarding updated culture results.  The urine culture shows pan susceptible Pseudomonas.  Oral ciprofloxacin is an appropriate agent.  Per team patient is feeling well and ready to go home-- no objection to this based on review of chart-fevers have resolved as has leukocytosis. C diff and respiratory viral testing negative.     I favor a 14-day treatment duration given his immunocompromised state, clinical diagnosis of pyelonephritis and recurrent cycle of infections.    He can follow-up as planned with his outpatient infectious disease doctor.    If anxious to leave patient does not necessarily have to wait to discharge until seen by me.  If I am able to see the patient prior to discharge I will update this with a formal progress note.  The above was discussed with the primary team.    Signed:  Mil Lizama MD , Available on [employer]  Staff Physician, Transplant and General Infectious Diseases   For On Call and Coverage info see [facility]-> Infectious Disease Medicine Adult/[facility]

## 2025-07-11 NOTE — DISCHARGE SUMMARY
[facility]  Hospitalist Discharge Summary      Date of Admission:  7/9/2025  Date of Discharge:  7/11/2025  2:20 PM  Discharging Provider: Marcelino Gray MD  Discharge Service: Medicine Service, ERVIN TEAM 2    Discharge Diagnoses   Pseudomonas urinary tract infection  Acute kidney injury   Diarrhea     Clinically Significant Risk Factors          Follow-ups Needed After Discharge   Follow-up Appointments       ADULT [facility] Specialty Follow-up and recommended labs and tests      Nephrology will call you to schedule outpatient labs next week    Appointments on [place] and/or [facility] (with [facility] or [facility] provider or service). Call [phone] if you haven't heard regarding these appointments within 7 days of discharge.              Unresulted Labs Ordered in the Past 30 Days of this Admission       Date and Time Order Name Status Description    7/9/2025  8:38 PM Blood Culture Peripheral blood (BC) Wrist, Left Preliminary     7/9/2025  8:09 PM Blood Culture Peripheral blood (BC) Arm, Right Preliminary         These results will be followed up by hospitalist    Discharge Disposition   Discharged to home  Condition at discharge: Stable    Hospital Course   Chip Fowler is a 40-year-old male with history DDKT for history of solitary single kidney on chronic suppression with tacrolimus and mycophenolate with history of recurrent UTIs who presented for worsening left flank pain, dysuria, and cloudy urine found to have pseudomonas UTI. The following problems were addressed.     #Complicated UTI secondary to pseudomonas aeruginosa   Presented with flank pain, dysuria and fevers with UA concerning for UTI. He was initially treated with IV Zosyn, then meropenem, and narrowed to ciprofloxacin when cultures resulted. He was discharged home to complete a 14 day course of antibiotics per transplant ID team.     #JACK  #History of Single kidney transplant (2023)    #Chronic immunosuppression with tacrolimus and mycophenolate   Nephrology was following while hospitalized given hx of transplant. Creatinine peaked at 2.0 and improved to 1.67 with fluids. His mycophenolate dose was decreased to 360 mg twice daily given his infection to be continued for three days and then resume his typical dose of 520 mg twice daily after that. He will follow up with nephrology clinic next week for repeat labs.      #Diarrhea  #Hx of C. Diff (2023)  Patient with prior history of C.diff colitis with new onset diarrhea since initiating IV antibiotics. He had stool testing which was negative.      #Hyponatremia  #Hypophosphatemia  #Hypomagnesemia  Losses acutely likely secondary to diarrhea and improved with IV fluids.     Consultations This Hospital Stay   INFECTIOUS DISEASE TRANSPLANT SOT ADULT IP CONSULT  NEPHROLOGY KIDNEY/PANCREAS TRANSPLANT ADULT IP CONSULT    Code Status   Full Code    Time Spent on this Encounter   I, Marcelino Gray MD, personally saw the patient today and spent greater than 30 minutes discharging this patient.       Marcelino Gray MD  [facility] [address] [place] [id number]  Phone: [phone]  ______________________________________________________________________    Physical Exam   Vital Signs: Temp: 98.7  F (37.1  C) Temp src: Oral BP: 115/76 Pulse: 88   Resp: 18 SpO2: 98 % O2 Device: None (Room air)    Weight: 155 lbs 14.4 oz  Physical Exam  Vitals and nursing note reviewed.   Constitutional:       Appearance: Normal appearance. He is not ill-appearing or toxic-appearing.   HENT:      Head: Normocephalic and atraumatic.      Right Ear: External ear normal.      Left Ear: External ear normal.   Eyes:      Extraocular Movements: Extraocular movements intact.      Conjunctiva/sclera: Conjunctivae normal.   Cardiovascular:      Rate and Rhythm: Normal rate and regular rhythm.      Pulses: Normal pulses.      Heart sounds:  Normal heart sounds.   Pulmonary:      Effort: Pulmonary effort is normal.      Breath sounds: Normal breath sounds.   Abdominal:      General: Abdomen is flat. Bowel sounds are normal.      Tenderness: There is no abdominal tenderness.   Musculoskeletal:         General: No swelling.   Skin:     General: Skin is warm and dry.      Capillary Refill: Capillary refill takes less than 2 seconds.      Findings: No rash.   Neurological:      General: No focal deficit present.      Mental Status: He is alert. Mental status is at baseline.      Cranial Nerves: No cranial nerve deficit.   Psychiatric:         Mood and Affect: Mood normal.         Behavior: Behavior normal.         Primary Care Physician   [facility]    Discharge Orders      Reason for your hospital stay    You were hospitalized for a urinary tract infection. You were treated with antibiotics and are improving. Please finish the antibiotics (ciprofloxacin) as prescribed.     Activity    Your activity upon discharge: activity as tolerated     ADULT [facility] Specialty Follow-up and recommended labs and tests    Nephrology will call you to schedule outpatient labs next week    Appointments on [place] and/or [facility] (with [facility] or [facility] provider or service). Call [phone] if you haven't heard regarding these appointments within 7 days of discharge.     When to contact your care team    Call your primary doctor or nephrology clinic if you have any of the following: temperature greater than 100.4 or less than 97, worsening pain, vomiting or not able to tolerate your medications.     Diet    Follow this diet upon discharge: Current Diet:Orders Placed This Encounter      Combination Diet Regular Diet Adult       Significant Results and Procedures   Most Recent 3 CBC's:  Recent Labs   Lab Test 07/11/25  0611 07/10/25  0542 07/09/25  2032   WBC 10.4 17.4* 19.7*   HGB 11.3* 11.8* 13.6   MCV 88 89 88    143* 171     Most  Recent 3 BMP's:  Recent Labs   Lab Test 07/11/25  0611 07/10/25  0542 07/09/25 2032    134* 132*   POTASSIUM 4.2 4.2 4.4   CHLORIDE 110* 106 101   CO2 19* 19* 22   BUN 23.9* 22.2* 23.0*   CR 1.67* 1.96* 1.93*   ANIONGAP 9 9 9   VISHNU 8.8 8.7* 9.2   * 116* 109*   ,   Results for orders placed or performed during the hospital encounter of 07/09/25   US Renal Transplant with Doppler    Narrative    EXAM: US RENAL TRANSPLANT WITH DOPPLER  LOCATION: [facility]  DATE: 7/9/2025    INDICATION: fever, transplant history, abd pain  COMPARISON: CT chest/abdomen/pelvis with contrast there are 07/06/2023, renal transplant ultrasound 07/04/2023.  TECHNIQUE: Ultrasound of the renal transplant with Doppler waveform spectral analysis.    FINDINGS: The transplant kidney is located in the right lower quadrant. The transplant kidney is normal in echogenicity. There is no cortical thinning. There is no hydronephrosis, calculus, cyst or mass. There is no perinephric fluid.    The urinary bladder is unremarkable.    TRANSPLANT DOPPLER:    The main renal artery peak systolic velocity is normal (less than 200 cm/sec). The intra-renal transplant resistive index (RI) is normal (0.7 or less).       Impression    IMPRESSION:   Normal renal transplant ultrasound.     US Renal Complete Non-Vascular    Narrative    EXAMINATION: US RENAL COMPLETE NON-VASCULAR, 7/10/2025 11:44 AM     COMPARISON: Ultrasound 7/9/2025    HISTORY: rule out pyelonephritis    TECHNIQUE: The kidneys and bladder were scanned in the standard  fashion with specialized ultrasound transducer(s) using both gray  scale and limited color/spectral Doppler techniques.    FINDINGS:    Right kidney: Absent.    Left kidney: Measures 7.8 cm in length. No significant hydronephrosis.  Partially visualized left kidney, no focal lesion identified    Bladder: Unremarkable.      Impression    IMPRESSION: . No hydronephrosis or  drainable collection involving the  solitary left kidney. Left kidney is partially visualized, within this  limitation no definite focal lesion to suggest pyelonephritis. If  persistent clinical concern, contrast-enhanced CT may be considered.    I have personally reviewed the examination and initial interpretation  and I agree with the findings.    ELVA PLASENCIA MD         SYSTEM ID:  [id number]       Discharge Medications      Review of your medicines        START taking        Dose / Directions   ciprofloxacin 500 MG tablet  Commonly known as: CIPRO  Indication: Urinary Tract Infection      Dose: 500 mg  Take 1 tablet (500 mg) by mouth every 12 hours for 13 days.  Quantity: 26 tablet  Refills: 0            CHANGE how you take these medications        Dose / Directions   mycophenolic acid 180 MG EC tablet  Commonly known as: GENERIC EQUIVALENT  This may have changed: See the new instructions.  Used for: Kidney replaced by transplant      Start taking on: July 11, 2025  Take 2 tablets (360 mg) by mouth 2 times daily for 3 days, THEN 3 tablets (540 mg) 2 times daily.  Refills: 0            CONTINUE these medicines which have NOT CHANGED        Dose / Directions   magnesium oxide 400 MG tablet  Commonly known as: MAG-OX  Used for: Transplanted kidney      Dose: 800 mg  Take 2 tablets (800 mg) by mouth 2 times daily.  Quantity: 120 tablet  Refills: 11     * tacrolimus 0.5 MG capsule  Commonly known as: GENERIC EQUIVALENT  Used for: Kidney replaced by transplant      Dose: 0.5 mg  Take 1 capsule (0.5 mg) by mouth 2 times daily. Total dose = 2.5 mg twice per day  Quantity: 60 capsule  Refills: 11     * tacrolimus 1 MG capsule  Commonly known as: GENERIC EQUIVALENT  Used for: Kidney replaced by transplant      Dose: 2 mg  Take 2 capsules (2 mg) by mouth 2 times daily. Total dose = 2.5 mg twice per day  Quantity: 120 capsule  Refills: 11     Vitamin D (Cholecalciferol) 50 MCG (2000 UT) Caps  Used for: Vitamin D  deficiency      Dose: 2,000 Units  Take 2,000 Units by mouth daily.  Quantity: 30 capsule  Refills: 3           * This list has 2 medication(s) that are the same as other medications prescribed for you. Read the directions carefully, and ask your doctor or other care provider to review them with you.                STOP taking      cefdinir 300 MG capsule  Commonly known as: OMNICEF                  Where to get your medicines        These medications were sent to [facility] [place], [place] - [address] [address]      Phone: [phone]   ciprofloxacin 500 MG tablet       Allergies   No Known Allergies

## 2025-07-11 NOTE — PLAN OF CARE
Goal Outcome Evaluation:      Plan of Care Reviewed With: patient    Overall Patient Progress: improvingOverall Patient Progress: improving    Outcome Evaluation: Transition to oral antibiotic. Discharged Home  DISCHARGE:  Patient with orders to discharge to home.     Education Provided:   Handouts -AVS handout printed and discussed with patient  Specialty Pharmacy -RX filled and to be picked up by patient    Discharge instructions, medications & follow ups reviewed with patient. Copy of discharge summary given to patient. PIV removed.  Patient in stable condition. AVSS. Patient had no further questions regarding discharge instructions and medications. Patient transferred out by self & left with family.  Plan for follow up care as directed in AVS summary.

## 2025-07-11 NOTE — PHARMACY-ADMISSION MEDICATION HISTORY
Pharmacist Admission Medication History    Admission medication history is complete. The information provided in this note is only as accurate as the sources available at the time of the update.    Information Source(s): Patient via in-person    Pertinent Information: Patient knew his medications well. He was unsure of what day of therapy he was on for the cefdinir as he had stopped and then restarted based on provider recommendations.    Changes made to PTA medication list:  Added: None  Deleted: None  Changed: None    Allergies reviewed with patient and updates made in EHR: yes -- no changes needed    Medication History Completed By: Barron Mohan [facility], PharmD 7/11/2025 10:43 AM    PTA Med List   Medication Sig Last Dose/Taking    cefdinir (OMNICEF) 300 MG capsule Take 1 capsule (300 mg) by mouth 2 times daily for 10 days. Taking    magnesium oxide (MAG-OX) 400 MG tablet Take 2 tablets (800 mg) by mouth 2 times daily. Taking    mycophenolic acid (GENERIC EQUIVALENT) 180 MG EC tablet Take 3 tablets (540 mg) by mouth 2 times daily. Taking    tacrolimus (GENERIC EQUIVALENT) 0.5 MG capsule Take 1 capsule (0.5 mg) by mouth 2 times daily. Total dose = 2.5 mg twice per day Taking    tacrolimus (GENERIC EQUIVALENT) 1 MG capsule Take 2 capsules (2 mg) by mouth 2 times daily. Total dose = 2.5 mg twice per day Taking    Vitamin D, Cholecalciferol, 50 MCG (2000 UT) CAPS Take 2,000 Units by mouth daily. Taking

## 2025-07-11 NOTE — PLAN OF CARE
Goal Outcome Evaluation:      Plan of Care Reviewed With: patient    Overall Patient Progress: no change       [id number]  Pt A&Ox4, verbalizes needs. RA, Temp max 99.8F, OVSS. Voids spontaneously w/o difficulty, reports diarrhea. Denies pain. Independent in room. R PIV NS 50mL/hr + intermittent IV merrem. Mg++ and K+ replaced overnight. Reg diet, contact precautions maintained. Continue POC

## 2025-07-11 NOTE — PLAN OF CARE
Goal Outcome Evaluation:    Plan of Care Reviewed With: patient    Overall Patient Progress: no change    Outcome Evaluation: VSS on RA. Denies pain & nausea. Reports loose stools, negative enteric panel & c.diff. Receiving IV abx.    /71 (BP Location: Left arm)   Pulse 81   Temp 98.9  F (37.2  C) (Oral)   Resp 18   Ht 1.829 m (6')   Wt 71.3 kg (157 lb 3.2 oz)   SpO2 100%   BMI 21.32 kg/m      Shift: Assumed cares [id number] (from ED)  Isolation Status: Contact for ESBL  VS: VSS on RA, afebrile  Neuro: Aox4  Behaviors: Calm, cooperative  BG: N/A  Labs: Creat 1.96, Mg 1.6 (replacements ordered/not verified by pharm), Phos 2.4  Respiratory: WDL  Cardiac: WDL  Pain/Nausea: Denies pain & nausea.  PRN: None given  Diet: Regular, fair appetite  IV Access: R PIV  Infusion(s): LR @ 50ml/hr  GI/: Voiding, still having loose stools   Skin: *see skin note*  Mobility: UAL  Plan: Continue with plan of care.

## 2025-07-12 ENCOUNTER — MYC MEDICAL ADVICE (OUTPATIENT)
Dept: TRANSPLANT | Facility: CLINIC | Age: 40
End: 2025-07-12
Payer: MEDICARE

## 2025-07-12 ENCOUNTER — TELEPHONE (OUTPATIENT)
Dept: TRANSPLANT | Facility: CLINIC | Age: 40
End: 2025-07-12
Payer: MEDICARE

## 2025-07-12 DIAGNOSIS — B25.9 CYTOMEGALOVIRUS INFECTION, UNSPECIFIED CYTOMEGALOVIRAL INFECTION TYPE (H): ICD-10-CM

## 2025-07-12 DIAGNOSIS — Z94.0 TRANSPLANTED KIDNEY: Primary | ICD-10-CM

## 2025-07-12 LAB
BACTERIA UR CULT: ABNORMAL
BACTERIA UR CULT: ABNORMAL

## 2025-07-12 NOTE — TELEPHONE ENCOUNTER
Garrett, No problem.  Me to Chip Fowler        7/12/25  8:22 AM  [place]            Received a message from Dr Green transplant nephrology   to repeat your transplant  labs early next week   I have placed orders for your local  [facility]  Lab      Hope your feeling better   [facility] Coordinator

## 2025-07-12 NOTE — TELEPHONE ENCOUNTER
Received notification  discharging  after admission for UTI     Chip  following with urology an ID prior to this admission   Please review discharge note   Lab orders placed per Dr Green -               DR Green note -     [facility]  Transplant Nephrology Progress Note  Date of Admission:  7/9/2025  Today's Date: 07/11/2025  Requesting physician: Marcelino Gray MD     Recommendations:     - Continue current immunosuppression.   - Agree with antibiotics plan.  - Patient should obtain routine transplant labs early next week.     Assessment & Plan  # DDKT: Trend down; good urine output. Elevated Cr likely from GI losses and infection. Creatinine now at baseline.              - Baseline Creatinine: ~ 1.4-1.6              - Proteinuria: Minimal (0.2-0.5 grams)              - DSA Hx: No DSA                  - Last cPRA: 64%              - BK Viremia: No              - Kidney Tx Biopsy Hx: No biopsy history.

## 2025-07-14 LAB
BACTERIA SPEC CULT: NO GROWTH
BACTERIA SPEC CULT: NO GROWTH

## 2025-07-16 ENCOUNTER — LAB (OUTPATIENT)
Dept: LAB | Facility: CLINIC | Age: 40
End: 2025-07-16
Payer: MEDICARE

## 2025-07-16 DIAGNOSIS — Z94.0 KIDNEY REPLACED BY TRANSPLANT: ICD-10-CM

## 2025-07-16 DIAGNOSIS — Z94.0 TRANSPLANTED KIDNEY: ICD-10-CM

## 2025-07-16 LAB
ANION GAP SERPL CALCULATED.3IONS-SCNC: 10 MMOL/L (ref 7–15)
BUN SERPL-MCNC: 25.9 MG/DL (ref 6–20)
CALCIUM SERPL-MCNC: 9 MG/DL (ref 8.8–10.4)
CHLORIDE SERPL-SCNC: 103 MMOL/L (ref 98–107)
CREAT SERPL-MCNC: 1.43 MG/DL (ref 0.67–1.17)
EGFRCR SERPLBLD CKD-EPI 2021: 63 ML/MIN/1.73M2
ERYTHROCYTE [DISTWIDTH] IN BLOOD BY AUTOMATED COUNT: 13 % (ref 10–15)
GLUCOSE SERPL-MCNC: 81 MG/DL (ref 70–99)
HCO3 SERPL-SCNC: 23 MMOL/L (ref 22–29)
HCT VFR BLD AUTO: 36.1 % (ref 40–53)
HGB BLD-MCNC: 11.9 G/DL (ref 13.3–17.7)
MCH RBC QN AUTO: 29.5 PG (ref 26.5–33)
MCHC RBC AUTO-ENTMCNC: 33 G/DL (ref 31.5–36.5)
MCV RBC AUTO: 89 FL (ref 78–100)
PLATELET # BLD AUTO: 315 10E3/UL (ref 150–450)
POTASSIUM SERPL-SCNC: 4.6 MMOL/L (ref 3.4–5.3)
RBC # BLD AUTO: 4.04 10E6/UL (ref 4.4–5.9)
SODIUM SERPL-SCNC: 136 MMOL/L (ref 135–145)
TACROLIMUS BLD-MCNC: 5.5 UG/L (ref 5–15)
TME LAST DOSE: NORMAL H
TME LAST DOSE: NORMAL H
WBC # BLD AUTO: 5.7 10E3/UL (ref 4–11)

## 2025-07-16 PROCEDURE — 85014 HEMATOCRIT: CPT

## 2025-07-16 PROCEDURE — 86359 T CELLS TOTAL COUNT: CPT

## 2025-07-16 PROCEDURE — 80197 ASSAY OF TACROLIMUS: CPT

## 2025-07-16 PROCEDURE — 36415 COLL VENOUS BLD VENIPUNCTURE: CPT

## 2025-07-16 PROCEDURE — 80048 BASIC METABOLIC PNL TOTAL CA: CPT

## 2025-07-17 LAB
CD3 CELLS # BLD: 1298 CELLS/UL (ref 603–2990)
CD3 CELLS NFR BLD: 72 % (ref 49–84)
CD3+CD4+ CELLS # BLD: 489 CELLS/UL (ref 441–2156)
CD3+CD4+ CELLS NFR BLD: 27 % (ref 28–63)
CD3+CD4+ CELLS/CD3+CD8+ CLL BLD: 0.67 % (ref 1.4–2.6)
CD3+CD8+ CELLS # BLD: 735 CELLS/UL (ref 125–1312)
CD3+CD8+ CELLS NFR BLD: 41 % (ref 10–40)
T CELL COMMENT: ABNORMAL

## 2025-07-21 DIAGNOSIS — Z94.0 KIDNEY REPLACED BY TRANSPLANT: ICD-10-CM

## 2025-07-21 DIAGNOSIS — B25.9 CYTOMEGALOVIRUS INFECTION, UNSPECIFIED CYTOMEGALOVIRAL INFECTION TYPE (H): ICD-10-CM

## 2025-07-21 RX ORDER — MYCOPHENOLIC ACID 180 MG/1
TABLET, DELAYED RELEASE ORAL
Qty: 1092 TABLET | Refills: 0 | Status: SHIPPED | OUTPATIENT
Start: 2025-07-21 | End: 2026-01-20

## 2025-07-22 ENCOUNTER — TELEPHONE (OUTPATIENT)
Dept: FAMILY MEDICINE | Facility: CLINIC | Age: 40
End: 2025-07-22
Payer: MEDICARE

## 2025-07-22 NOTE — TELEPHONE ENCOUNTER
Reason for Call:  Appointment Request    Patient requesting this type of appt:  Preventive     Requested provider: Sydnie - ORLIN Ray [facility] - any male provider     Reason patient unable to be scheduled: Needs to be scheduled by clinic    When does patient want to be seen/preferred time: Needs to be schedule for px sometime after 09.12.25. Please reach out to schedule.    Comments: Prefers male.    Could we send this information to you in [employer] or would you prefer to receive a phone call?:   Patient would prefer a phone call   Okay to leave a detailed message?: Yes at Cell number on file:    Telephone Information:   Mobile [phone]       Call taken on 7/22/2025 at 4:11 PM by Suellen Villegas

## 2025-07-22 NOTE — TELEPHONE ENCOUNTER
Called and spoke to patient.  Scheduled for annual wellness 9/15/25.      Ivette BRAGA, - [facility]  [facility]- [place]  [facility]

## 2025-08-11 ENCOUNTER — TRANSFERRED RECORDS (OUTPATIENT)
Dept: HEALTH INFORMATION MANAGEMENT | Facility: CLINIC | Age: 40
End: 2025-08-11
Payer: MEDICARE

## 2025-08-14 ENCOUNTER — TELEPHONE (OUTPATIENT)
Dept: FAMILY MEDICINE | Facility: CLINIC | Age: 40
End: 2025-08-14
Payer: MEDICARE

## 2025-08-21 ENCOUNTER — TELEPHONE (OUTPATIENT)
Dept: FAMILY MEDICINE | Facility: CLINIC | Age: 40
End: 2025-08-21
Payer: MEDICARE

## 2025-08-25 ENCOUNTER — LAB (OUTPATIENT)
Dept: LAB | Facility: CLINIC | Age: 40
End: 2025-08-25
Payer: MEDICARE

## 2025-08-25 ENCOUNTER — MYC MEDICAL ADVICE (OUTPATIENT)
Dept: INFECTIOUS DISEASES | Facility: CLINIC | Age: 40
End: 2025-08-25

## 2025-08-25 DIAGNOSIS — B25.9 CYTOMEGALOVIRUS INFECTION, UNSPECIFIED CYTOMEGALOVIRAL INFECTION TYPE (H): ICD-10-CM

## 2025-08-27 DIAGNOSIS — B25.9 CYTOMEGALOVIRUS INFECTION, UNSPECIFIED CYTOMEGALOVIRAL INFECTION TYPE (H): ICD-10-CM

## 2025-08-27 DIAGNOSIS — Z94.0 KIDNEY REPLACED BY TRANSPLANT: ICD-10-CM

## 2025-08-27 RX ORDER — MYCOPHENOLIC ACID 180 MG/1
540 TABLET, DELAYED RELEASE ORAL 2 TIMES DAILY
Qty: 180 TABLET | Refills: 4 | Status: SHIPPED | OUTPATIENT
Start: 2025-08-27

## 2025-08-28 ENCOUNTER — MYC MEDICAL ADVICE (OUTPATIENT)
Dept: INFECTIOUS DISEASES | Facility: CLINIC | Age: 40
End: 2025-08-28
Payer: MEDICARE

## 2025-08-28 DIAGNOSIS — B25.9 CYTOMEGALOVIRUS INFECTION, UNSPECIFIED CYTOMEGALOVIRAL INFECTION TYPE (H): ICD-10-CM

## 2025-08-28 DIAGNOSIS — N39.0 PSEUDOMONAS URINARY TRACT INFECTION: ICD-10-CM

## 2025-08-28 DIAGNOSIS — N39.0 RECURRENT UTI: Primary | ICD-10-CM

## 2025-08-28 DIAGNOSIS — D84.9 IMMUNOSUPPRESSED STATUS: Primary | ICD-10-CM

## 2025-08-28 DIAGNOSIS — B96.5 PSEUDOMONAS URINARY TRACT INFECTION: ICD-10-CM

## 2025-08-28 RX ORDER — CIPROFLOXACIN 750 MG/1
750 TABLET, FILM COATED ORAL 2 TIMES DAILY
Qty: 14 TABLET | Refills: 0 | Status: SHIPPED | OUTPATIENT
Start: 2025-08-28 | End: 2025-09-04

## (undated) DEVICE — SYR PISTON URETHRAL 60ML 68000

## (undated) DEVICE — LINEN TOWEL PACK X6 WHITE 5487

## (undated) DEVICE — NDL SPINAL 20GA 3.5" 405182

## (undated) DEVICE — SOL NACL 0.9% IRRIG 1000ML BOTTLE 2F7124

## (undated) DEVICE — DRAPE ISOLATION BAG 1003

## (undated) DEVICE — PEN MARKING SKIN W/PAPER RULER 31145785

## (undated) DEVICE — SU PDS II 5-0 RB-1 27" Z303H

## (undated) DEVICE — BLADE CLIPPER SGL USE 9680

## (undated) DEVICE — CATH PLUG W/CAP 000076

## (undated) DEVICE — CATH BALLOON DILATATION UROMAX ULTRA 24FRX4CM M0062251040

## (undated) DEVICE — STPL SKIN 35W ROTATING HEAD PRW35

## (undated) DEVICE — SU PDS II 5-0 RB-1 DA 30" Z320H

## (undated) DEVICE — Device

## (undated) DEVICE — GUIDEWIRE SENSOR DUAL FLEX STR 0.035"X150CM M0066703080

## (undated) DEVICE — NDL 25GA 1.5" 305127

## (undated) DEVICE — SU PDS II 6-0 RB-2DA 30" Z149H

## (undated) DEVICE — KIT ENDO FIRST STEP DISINFECTANT 200ML W/POUCH EP-4

## (undated) DEVICE — DRSG TEGADERM 2 3/8X2 3/4" 1624W

## (undated) DEVICE — SYR EAR BULB 3OZ 0035830

## (undated) DEVICE — TUBING IRRIG CYSTO/BLADDER SET 81" LF 2C4040

## (undated) DEVICE — LINEN TOWEL PACK X30 5481

## (undated) DEVICE — PREP POVIDONE IODINE SOLUTION 10% 4OZ BOTTLE 29906-004

## (undated) DEVICE — GLOVE PROTEXIS W/NEU-THERA 7.5  2D73TE75

## (undated) DEVICE — SYR 50ML LL W/O NDL 309653

## (undated) DEVICE — DRAPE C-ARM W/STRAPS 42X72" 07-CA104

## (undated) DEVICE — PREP SKIN SCRUB TRAY 4461A

## (undated) DEVICE — NDL COUNTER 20CT 31142493

## (undated) DEVICE — SU SILK 2-0 TIE 12X30" A305H

## (undated) DEVICE — SU SILK 4-0 TIE 12X30" A303H

## (undated) DEVICE — PACK CYSTO CUSTOM ASC

## (undated) DEVICE — SURGICEL ABSORBABLE HEMOSTAT SNOW 4"X4" 2083

## (undated) DEVICE — SUCTION MANIFOLD NEPTUNE 2 SYS 4 PORT 0702-020-000

## (undated) DEVICE — CATH URETERAL OPEN END 05FR 120CM 020015-S13

## (undated) DEVICE — SU VICRYL 4-0 RB-1 27" UND J214H

## (undated) DEVICE — SUTURE BOOTS 051003PBX

## (undated) DEVICE — SUCTION SLEEVE NEPTUNE 2 165MM 0703-005-165

## (undated) DEVICE — ADAPTER SCOPE UROLOK II LF M0067301400

## (undated) DEVICE — TUBING SUCTION MEDI-VAC 1/4"X20' N620A

## (undated) DEVICE — STRAP KNEE/BODY 31143004

## (undated) DEVICE — TUBING SUCTION MEDI-VAC SOFT 3/16"X20' N520A

## (undated) DEVICE — POSITIONER ARMBOARD FOAM 1PAIR LF FP-ARMB1

## (undated) DEVICE — LINEN LEG DRAPE 5457

## (undated) DEVICE — BLADE KNIFE SURG 15 371115

## (undated) DEVICE — TOOTHBRUSH ADULT NON STERILE MDS136850

## (undated) DEVICE — DECANTER BAG 2002S

## (undated) DEVICE — PREP CHLORAPREP 26ML TINTED HI-LITE ORANGE 930815

## (undated) DEVICE — SU VICRYL 3-0 SH 27" UND J416H

## (undated) DEVICE — LINEN GOWN X4 5410

## (undated) DEVICE — SU SILK 1 TIE 6X30" A307H

## (undated) DEVICE — SOL NACL 0.9% IRRIG 3000ML BAG 2B7477

## (undated) DEVICE — NDL 25GA 5/8" 305122

## (undated) DEVICE — LINEN TOWEL PACK X5 5464

## (undated) DEVICE — PAD CHUX UNDERPAD 30X36" P3036C

## (undated) DEVICE — CATH FOLYSIL 16FR 15ML AA6116

## (undated) DEVICE — SUCTION TIP YANKAUER W/O VENT K86

## (undated) DEVICE — ENDO SEAL BX PORT BPS-A

## (undated) DEVICE — PACK GOWN 3/PK DISP XL SBA32GPFCB

## (undated) DEVICE — BAG URINARY DRAIN LUBRISIL IC 4000ML LF 253509A

## (undated) DEVICE — GUIDEWIRE SENSOR DUAL FLEX ANG 0.035"X150CM M0066703010

## (undated) DEVICE — INSERT FOGARTY 33MM TRACTION HYDRAJAW HYDRA33

## (undated) DEVICE — SU SILK 3-0 SH CR 8X18" C013D

## (undated) DEVICE — SU PROLENE 6-0 RB-2DA 30" 8711H

## (undated) DEVICE — BNDG KLING 3" 2232

## (undated) DEVICE — SPONGE RAY-TEC 4X8" 7318

## (undated) DEVICE — DRAPE MAYO STAND 23X54 8337

## (undated) DEVICE — SU MONOCRYL 4-0 RB-1 27" Y214H

## (undated) DEVICE — JELLY LUBRICATING SURGILUBE 2OZ TUBE 0281-0205-02

## (undated) DEVICE — STPL LINEAR 30X2.5MM VASC TX30V

## (undated) DEVICE — SU ETHILON 3-0 PS-1 18" 1663H

## (undated) DEVICE — CATH URETERAL OPEN END 5FRX70CM M0064002010

## (undated) DEVICE — PREP POVIDONE-IODINE 7.5% SCRUB 4OZ BOTTLE MDS093945

## (undated) DEVICE — SU SILK 3-0 TIE 12X30" A304H

## (undated) DEVICE — SOL NACL 0.9% INJ 1000ML BAG 2B1324X

## (undated) DEVICE — GOWN IMPERVIOUS SPECIALTY XLG/XLONG 32474

## (undated) DEVICE — PACK UNIVERSAL SPLIT 29131

## (undated) DEVICE — SYR BULB IRRIG 50ML LATEX FREE 0035280

## (undated) DEVICE — PACK SET-UP STD 9102

## (undated) DEVICE — SU MONOCRYL 4-0 PS-2 18" UND Y496G

## (undated) DEVICE — STPL RELOAD LINEAR 30X2.5MM VASC XR30V

## (undated) DEVICE — SYR 10ML FINGER CONTROL W/O NDL 309695

## (undated) DEVICE — ESU PENCIL SMOKE EVAC W/ROCKER SWITCH 0703-047-000

## (undated) DEVICE — GOWN XLG DISP 9545

## (undated) DEVICE — SYR 70ML TOOMEY 041170

## (undated) DEVICE — SU SILK 2-0 SH CR 8X18" C012D

## (undated) DEVICE — SU PROLENE 5-0 RB-1DA 36"  8556H

## (undated) DEVICE — LINEN GOWN XLG 5407

## (undated) DEVICE — DRAPE U SPLIT 74X120" 29440

## (undated) DEVICE — SU CHROMIC 4-0 SH 27" G121H

## (undated) DEVICE — ESU GROUND PAD ADULT W/CORD E7507

## (undated) DEVICE — DRAPE IOBAN INCISE 23X17" 6650EZ

## (undated) DEVICE — SU VICRYL 3-0 SH 27" J316H

## (undated) DEVICE — SU PDS II 4-0 FS-2 27" Z422H

## (undated) DEVICE — SPECIMEN CONTAINER 5OZ STERILE 2600SA

## (undated) DEVICE — SU PDS II 0 CTX 36" Z370T

## (undated) DEVICE — ESU CORD BIPOLAR GREEN 10-4000

## (undated) DEVICE — DRAPE GYN/UROLOGY FLUID POUCH TUR 29455

## (undated) DEVICE — LINEN ORTHO PACK 5446

## (undated) DEVICE — DRAPE FLUID WARMING 52 X 60" ORS-321

## (undated) DEVICE — CATH INTRODUCER SUPRAPUBIC 16FR SF-S16-851

## (undated) DEVICE — LIGHT HANDLE X2

## (undated) DEVICE — SU SILK 0 TIE 6X30" A306H

## (undated) DEVICE — DRAIN JACKSON PRATT RESERVOIR 100ML SU130-1305

## (undated) DEVICE — SU PDS II 0 TP-1 60" Z991G

## (undated) DEVICE — SU MONOCRYL 4-0 PS-2 27" UND Y426H

## (undated) DEVICE — DRAPE POUCH INSTRUMENT 1018

## (undated) DEVICE — SOL WATER IRRIG 1000ML BOTTLE 2F7114

## (undated) DEVICE — CONNECTOR WATER VALVE PERFUSION PACK STR 020272801

## (undated) DEVICE — INFLATION DEVICE ENCORE  26 PRESSURE GAUGE M001151050

## (undated) DEVICE — ESU GROUND PAD UNIVERSAL W/O CORD

## (undated) DEVICE — DRAPE SLUSH/WARMER 66X44" ORS-320

## (undated) DEVICE — RAD RX ISOVUE 300 (50ML) 61% IOPAMIDOL CHARGE PER ML

## (undated) DEVICE — SU PROLENE 6-0 BV-1DA 18" 8709H

## (undated) RX ORDER — OXYCODONE HYDROCHLORIDE 5 MG/1
TABLET ORAL
Status: DISPENSED
Start: 2020-12-14

## (undated) RX ORDER — PROPOFOL 10 MG/ML
INJECTION, EMULSION INTRAVENOUS
Status: DISPENSED
Start: 2020-08-21

## (undated) RX ORDER — SODIUM CHLORIDE 9 MG/ML
INJECTION, SOLUTION INTRAVENOUS
Status: DISPENSED
Start: 2021-08-09

## (undated) RX ORDER — EPHEDRINE SULFATE 50 MG/ML
INJECTION, SOLUTION INTRAMUSCULAR; INTRAVENOUS; SUBCUTANEOUS
Status: DISPENSED
Start: 2023-01-15

## (undated) RX ORDER — OXYCODONE HCL 5 MG/5 ML
SOLUTION, ORAL ORAL
Status: DISPENSED
Start: 2020-12-14

## (undated) RX ORDER — HYDROMORPHONE HYDROCHLORIDE 1 MG/ML
INJECTION, SOLUTION INTRAMUSCULAR; INTRAVENOUS; SUBCUTANEOUS
Status: DISPENSED
Start: 2023-01-15

## (undated) RX ORDER — FENTANYL CITRATE 50 UG/ML
INJECTION, SOLUTION INTRAMUSCULAR; INTRAVENOUS
Status: DISPENSED
Start: 2023-01-15

## (undated) RX ORDER — FENTANYL CITRATE 50 UG/ML
INJECTION, SOLUTION INTRAMUSCULAR; INTRAVENOUS
Status: DISPENSED
Start: 2020-12-14

## (undated) RX ORDER — PROPOFOL 10 MG/ML
INJECTION, EMULSION INTRAVENOUS
Status: DISPENSED
Start: 2023-01-15

## (undated) RX ORDER — FENTANYL CITRATE 50 UG/ML
INJECTION, SOLUTION INTRAMUSCULAR; INTRAVENOUS
Status: DISPENSED
Start: 2021-08-09

## (undated) RX ORDER — SODIUM CHLORIDE 450 MG/100ML
INJECTION, SOLUTION INTRAVENOUS
Status: DISPENSED
Start: 2023-01-15

## (undated) RX ORDER — HEPARIN SODIUM 1000 [USP'U]/ML
INJECTION, SOLUTION INTRAVENOUS; SUBCUTANEOUS
Status: DISPENSED
Start: 2023-01-15

## (undated) RX ORDER — AMPICILLIN 1 G/1
INJECTION, POWDER, FOR SOLUTION INTRAMUSCULAR; INTRAVENOUS
Status: DISPENSED
Start: 2020-08-21

## (undated) RX ORDER — CIPROFLOXACIN 500 MG/1
TABLET, FILM COATED ORAL
Status: DISPENSED
Start: 2023-03-01

## (undated) RX ORDER — CEFTRIAXONE SODIUM 1 G
VIAL (EA) INJECTION
Status: DISPENSED
Start: 2020-12-14

## (undated) RX ORDER — CIPROFLOXACIN 500 MG/1
TABLET, FILM COATED ORAL
Status: DISPENSED
Start: 2020-09-09

## (undated) RX ORDER — CALCIUM CHLORIDE 100 MG/ML
INJECTION INTRAVENOUS; INTRAVENTRICULAR
Status: DISPENSED
Start: 2023-01-15

## (undated) RX ORDER — DEXTROSE, SODIUM CHLORIDE, SODIUM LACTATE, POTASSIUM CHLORIDE, AND CALCIUM CHLORIDE 5; .6; .31; .03; .02 G/100ML; G/100ML; G/100ML; G/100ML; G/100ML
INJECTION, SOLUTION INTRAVENOUS
Status: DISPENSED
Start: 2023-01-15

## (undated) RX ORDER — LABETALOL HYDROCHLORIDE 5 MG/ML
INJECTION, SOLUTION INTRAVENOUS
Status: DISPENSED
Start: 2023-01-15

## (undated) RX ORDER — EPHEDRINE SULFATE 50 MG/ML
INJECTION, SOLUTION INTRAMUSCULAR; INTRAVENOUS; SUBCUTANEOUS
Status: DISPENSED
Start: 2020-12-14

## (undated) RX ORDER — BUPIVACAINE HYDROCHLORIDE AND EPINEPHRINE 5; 5 MG/ML; UG/ML
INJECTION, SOLUTION EPIDURAL; INTRACAUDAL; PERINEURAL
Status: DISPENSED
Start: 2020-12-14

## (undated) RX ORDER — GLYCOPYRROLATE 0.2 MG/ML
INJECTION, SOLUTION INTRAMUSCULAR; INTRAVENOUS
Status: DISPENSED
Start: 2023-01-15

## (undated) RX ORDER — LIDOCAINE HYDROCHLORIDE 20 MG/ML
JELLY TOPICAL
Status: DISPENSED
Start: 2023-03-01

## (undated) RX ORDER — METOPROLOL TARTRATE 1 MG/ML
INJECTION, SOLUTION INTRAVENOUS
Status: DISPENSED
Start: 2023-01-15

## (undated) RX ORDER — ACETAMINOPHEN 325 MG/1
TABLET ORAL
Status: DISPENSED
Start: 2020-08-21

## (undated) RX ORDER — DEXAMETHASONE SODIUM PHOSPHATE 4 MG/ML
INJECTION, SOLUTION INTRA-ARTICULAR; INTRALESIONAL; INTRAMUSCULAR; INTRAVENOUS; SOFT TISSUE
Status: DISPENSED
Start: 2023-01-15

## (undated) RX ORDER — FENTANYL CITRATE-0.9 % NACL/PF 10 MCG/ML
PLASTIC BAG, INJECTION (ML) INTRAVENOUS
Status: DISPENSED
Start: 2023-01-15

## (undated) RX ORDER — GABAPENTIN 300 MG/1
CAPSULE ORAL
Status: DISPENSED
Start: 2020-08-21

## (undated) RX ORDER — LIDOCAINE HYDROCHLORIDE 10 MG/ML
INJECTION, SOLUTION EPIDURAL; INFILTRATION; INTRACAUDAL; PERINEURAL
Status: DISPENSED
Start: 2021-08-09

## (undated) RX ORDER — MANNITOL 20 G/100ML
INJECTION, SOLUTION INTRAVENOUS
Status: DISPENSED
Start: 2023-01-15

## (undated) RX ORDER — PAPAVERINE HYDROCHLORIDE 30 MG/ML
INJECTION INTRAMUSCULAR; INTRAVENOUS
Status: DISPENSED
Start: 2023-01-15

## (undated) RX ORDER — EPHEDRINE SULFATE 50 MG/ML
INJECTION, SOLUTION INTRAMUSCULAR; INTRAVENOUS; SUBCUTANEOUS
Status: DISPENSED
Start: 2020-08-21

## (undated) RX ORDER — CEFAZOLIN SODIUM 1 G/3ML
INJECTION, POWDER, FOR SOLUTION INTRAMUSCULAR; INTRAVENOUS
Status: DISPENSED
Start: 2023-01-15

## (undated) RX ORDER — CIPROFLOXACIN 500 MG/1
TABLET, FILM COATED ORAL
Status: DISPENSED
Start: 2021-01-20

## (undated) RX ORDER — CHLORHEXIDINE GLUCONATE ORAL RINSE 1.2 MG/ML
SOLUTION DENTAL
Status: DISPENSED
Start: 2020-12-14

## (undated) RX ORDER — LIDOCAINE HYDROCHLORIDE 20 MG/ML
JELLY TOPICAL
Status: DISPENSED
Start: 2020-08-12

## (undated) RX ORDER — FENTANYL CITRATE 50 UG/ML
INJECTION, SOLUTION INTRAMUSCULAR; INTRAVENOUS
Status: DISPENSED
Start: 2020-08-21

## (undated) RX ORDER — VERAPAMIL HYDROCHLORIDE 2.5 MG/ML
INJECTION, SOLUTION INTRAVENOUS
Status: DISPENSED
Start: 2023-01-15

## (undated) RX ORDER — ALBUTEROL SULFATE 90 UG/1
AEROSOL, METERED RESPIRATORY (INHALATION)
Status: DISPENSED
Start: 2023-01-15

## (undated) RX ORDER — LIDOCAINE HYDROCHLORIDE 20 MG/ML
JELLY TOPICAL
Status: DISPENSED
Start: 2023-07-19

## (undated) RX ORDER — SODIUM CHLORIDE 9 MG/ML
INJECTION, SOLUTION INTRAVENOUS
Status: DISPENSED
Start: 2023-01-15

## (undated) RX ORDER — LIDOCAINE HYDROCHLORIDE 20 MG/ML
JELLY TOPICAL
Status: DISPENSED
Start: 2020-11-17

## (undated) RX ORDER — CEFTRIAXONE SODIUM 1 G
VIAL (EA) INJECTION
Status: DISPENSED
Start: 2020-08-21

## (undated) RX ORDER — FENTANYL CITRATE 50 UG/ML
INJECTION, SOLUTION INTRAMUSCULAR; INTRAVENOUS
Status: DISPENSED
Start: 2023-03-23

## (undated) RX ORDER — OXYCODONE HYDROCHLORIDE 5 MG/1
TABLET ORAL
Status: DISPENSED
Start: 2020-08-21

## (undated) RX ORDER — LIDOCAINE HYDROCHLORIDE 20 MG/ML
JELLY TOPICAL
Status: DISPENSED
Start: 2023-01-15